# Patient Record
Sex: FEMALE | Race: WHITE | Employment: OTHER | URBAN - METROPOLITAN AREA
[De-identification: names, ages, dates, MRNs, and addresses within clinical notes are randomized per-mention and may not be internally consistent; named-entity substitution may affect disease eponyms.]

---

## 2017-08-17 PROBLEM — M54.40 ACUTE BILATERAL LOW BACK PAIN WITH SCIATICA: Status: ACTIVE | Noted: 2017-08-17

## 2017-08-24 PROBLEM — M48.061 LUMBAR STENOSIS: Status: ACTIVE | Noted: 2017-08-24

## 2018-03-20 DIAGNOSIS — I10 ESSENTIAL HYPERTENSION: ICD-10-CM

## 2018-03-20 RX ORDER — QUINAPRIL 10 MG/1
10 TABLET ORAL NIGHTLY
Qty: 90 TABLET | Refills: 1 | Status: SHIPPED | OUTPATIENT
Start: 2018-03-20 | End: 2018-07-06 | Stop reason: SDUPTHER

## 2018-03-31 ENCOUNTER — APPOINTMENT (OUTPATIENT)
Dept: CT IMAGING | Age: 63
End: 2018-03-31

## 2018-03-31 ENCOUNTER — HOSPITAL ENCOUNTER (EMERGENCY)
Age: 63
Discharge: HOME | End: 2018-03-31
Attending: EMERGENCY MEDICINE

## 2018-03-31 VITALS
HEIGHT: 65 IN | SYSTOLIC BLOOD PRESSURE: 151 MMHG | HEART RATE: 84 BPM | OXYGEN SATURATION: 97 % | DIASTOLIC BLOOD PRESSURE: 110 MMHG | BODY MASS INDEX: 31.65 KG/M2 | RESPIRATION RATE: 16 BRPM | TEMPERATURE: 98.1 F | WEIGHT: 190 LBS

## 2018-03-31 DIAGNOSIS — M54.89 MIDLINE BACK PAIN, UNSPECIFIED BACK LOCATION, UNSPECIFIED CHRONICITY: Primary | ICD-10-CM

## 2018-03-31 PROCEDURE — 72131 CT LUMBAR SPINE W/O DYE: CPT

## 2018-03-31 PROCEDURE — 72128 CT CHEST SPINE W/O DYE: CPT

## 2018-03-31 PROCEDURE — 96372 THER/PROPH/DIAG INJ SC/IM: CPT

## 2018-03-31 PROCEDURE — 6360000002 HC RX W HCPCS: Performed by: PHYSICIAN ASSISTANT

## 2018-03-31 PROCEDURE — 6370000000 HC RX 637 (ALT 250 FOR IP): Performed by: PHYSICIAN ASSISTANT

## 2018-03-31 PROCEDURE — 99284 EMERGENCY DEPT VISIT MOD MDM: CPT

## 2018-03-31 RX ORDER — KETOROLAC TROMETHAMINE 30 MG/ML
30 INJECTION, SOLUTION INTRAMUSCULAR; INTRAVENOUS ONCE
Status: COMPLETED | OUTPATIENT
Start: 2018-03-31 | End: 2018-03-31

## 2018-03-31 RX ORDER — HYDROCODONE BITARTRATE AND ACETAMINOPHEN 5; 325 MG/1; MG/1
1 TABLET ORAL EVERY 6 HOURS PRN
Qty: 12 TABLET | Refills: 0 | Status: SHIPPED | OUTPATIENT
Start: 2018-03-31 | End: 2018-04-05 | Stop reason: SDUPTHER

## 2018-03-31 RX ORDER — HYDROCODONE BITARTRATE AND ACETAMINOPHEN 5; 325 MG/1; MG/1
1 TABLET ORAL ONCE
Status: COMPLETED | OUTPATIENT
Start: 2018-03-31 | End: 2018-03-31

## 2018-03-31 RX ADMIN — HYDROCODONE BITARTRATE AND ACETAMINOPHEN 1 TABLET: 5; 325 TABLET ORAL at 19:35

## 2018-03-31 RX ADMIN — KETOROLAC TROMETHAMINE 30 MG: 30 INJECTION, SOLUTION INTRAMUSCULAR; INTRAVENOUS at 16:34

## 2018-03-31 ASSESSMENT — PAIN DESCRIPTION - PAIN TYPE
TYPE: ACUTE PAIN;CHRONIC PAIN
TYPE: ACUTE PAIN;CHRONIC PAIN

## 2018-03-31 ASSESSMENT — PAIN DESCRIPTION - FREQUENCY
FREQUENCY: CONTINUOUS
FREQUENCY: CONTINUOUS

## 2018-03-31 ASSESSMENT — PAIN SCALES - GENERAL
PAINLEVEL_OUTOF10: 8
PAINLEVEL_OUTOF10: 9
PAINLEVEL_OUTOF10: 9
PAINLEVEL_OUTOF10: 5

## 2018-03-31 ASSESSMENT — PAIN DESCRIPTION - LOCATION
LOCATION: BACK;SHOULDER
LOCATION: BACK;SHOULDER

## 2018-03-31 ASSESSMENT — PAIN DESCRIPTION - ORIENTATION
ORIENTATION: RIGHT;LOWER
ORIENTATION: LOWER;RIGHT

## 2018-04-03 ENCOUNTER — OFFICE VISIT (OUTPATIENT)
Dept: FAMILY MEDICINE CLINIC | Age: 63
End: 2018-04-03

## 2018-04-03 VITALS
HEART RATE: 98 BPM | DIASTOLIC BLOOD PRESSURE: 78 MMHG | OXYGEN SATURATION: 90 % | SYSTOLIC BLOOD PRESSURE: 154 MMHG | TEMPERATURE: 97.9 F | HEIGHT: 64 IN

## 2018-04-03 DIAGNOSIS — M54.16 CHRONIC RADICULAR PAIN OF LOWER BACK: ICD-10-CM

## 2018-04-03 DIAGNOSIS — M54.6 CHRONIC RIGHT-SIDED THORACIC BACK PAIN: ICD-10-CM

## 2018-04-03 DIAGNOSIS — G89.29 CHRONIC RIGHT-SIDED THORACIC BACK PAIN: ICD-10-CM

## 2018-04-03 DIAGNOSIS — M54.50 ACUTE EXACERBATION OF CHRONIC LOW BACK PAIN: Primary | ICD-10-CM

## 2018-04-03 DIAGNOSIS — G89.29 ACUTE EXACERBATION OF CHRONIC LOW BACK PAIN: Primary | ICD-10-CM

## 2018-04-03 DIAGNOSIS — G89.29 CHRONIC RADICULAR PAIN OF LOWER BACK: ICD-10-CM

## 2018-04-03 PROCEDURE — 96372 THER/PROPH/DIAG INJ SC/IM: CPT | Performed by: FAMILY MEDICINE

## 2018-04-03 PROCEDURE — 99213 OFFICE O/P EST LOW 20 MIN: CPT | Performed by: FAMILY MEDICINE

## 2018-04-03 RX ORDER — TRIAMCINOLONE ACETONIDE 40 MG/ML
40 INJECTION, SUSPENSION INTRA-ARTICULAR; INTRAMUSCULAR ONCE
Status: COMPLETED | OUTPATIENT
Start: 2018-04-03 | End: 2018-04-03

## 2018-04-03 RX ADMIN — TRIAMCINOLONE ACETONIDE 40 MG: 40 INJECTION, SUSPENSION INTRA-ARTICULAR; INTRAMUSCULAR at 15:01

## 2018-04-04 ENCOUNTER — TELEPHONE (OUTPATIENT)
Dept: FAMILY MEDICINE CLINIC | Age: 63
End: 2018-04-04

## 2018-04-04 DIAGNOSIS — G89.29 CHRONIC RADICULAR LOW BACK PAIN: ICD-10-CM

## 2018-04-04 DIAGNOSIS — M54.16 CHRONIC RADICULAR LOW BACK PAIN: ICD-10-CM

## 2018-04-04 DIAGNOSIS — M54.89 MIDLINE BACK PAIN, UNSPECIFIED BACK LOCATION, UNSPECIFIED CHRONICITY: ICD-10-CM

## 2018-04-04 DIAGNOSIS — M48.061 SPINAL STENOSIS OF LUMBAR REGION WITHOUT NEUROGENIC CLAUDICATION: Primary | ICD-10-CM

## 2018-04-05 RX ORDER — HYDROCODONE BITARTRATE AND ACETAMINOPHEN 5; 325 MG/1; MG/1
1 TABLET ORAL EVERY 8 HOURS PRN
Qty: 42 TABLET | Refills: 0 | Status: SHIPPED | OUTPATIENT
Start: 2018-04-05 | End: 2018-04-05 | Stop reason: SDUPTHER

## 2018-04-05 RX ORDER — HYDROCODONE BITARTRATE AND ACETAMINOPHEN 5; 325 MG/1; MG/1
1 TABLET ORAL EVERY 8 HOURS PRN
Qty: 42 TABLET | Refills: 0 | Status: SHIPPED | OUTPATIENT
Start: 2018-04-05 | End: 2018-04-19

## 2018-04-26 ENCOUNTER — OFFICE VISIT (OUTPATIENT)
Dept: PAIN MANAGEMENT | Age: 63
End: 2018-04-26

## 2018-04-26 VITALS
HEART RATE: 90 BPM | WEIGHT: 190 LBS | HEIGHT: 65 IN | BODY MASS INDEX: 31.65 KG/M2 | TEMPERATURE: 97.5 F | DIASTOLIC BLOOD PRESSURE: 86 MMHG | SYSTOLIC BLOOD PRESSURE: 148 MMHG | RESPIRATION RATE: 18 BRPM

## 2018-04-26 DIAGNOSIS — M54.16 LUMBAR RADICULOPATHY: ICD-10-CM

## 2018-04-26 DIAGNOSIS — M47.816 LUMBAR FACET ARTHROPATHY: ICD-10-CM

## 2018-04-26 DIAGNOSIS — G89.29 CHRONIC BACK PAIN, UNSPECIFIED BACK LOCATION, UNSPECIFIED BACK PAIN LATERALITY: ICD-10-CM

## 2018-04-26 DIAGNOSIS — M54.50 CHRONIC BILATERAL LOW BACK PAIN WITHOUT SCIATICA: ICD-10-CM

## 2018-04-26 DIAGNOSIS — M48.061 SPINAL STENOSIS OF LUMBAR REGION WITHOUT NEUROGENIC CLAUDICATION: ICD-10-CM

## 2018-04-26 DIAGNOSIS — M54.9 CHRONIC BACK PAIN, UNSPECIFIED BACK LOCATION, UNSPECIFIED BACK PAIN LATERALITY: ICD-10-CM

## 2018-04-26 DIAGNOSIS — G89.29 CHRONIC BILATERAL LOW BACK PAIN WITHOUT SCIATICA: ICD-10-CM

## 2018-04-26 DIAGNOSIS — G89.4 CHRONIC PAIN SYNDROME: ICD-10-CM

## 2018-04-26 DIAGNOSIS — M51.36 DDD (DEGENERATIVE DISC DISEASE), LUMBAR: ICD-10-CM

## 2018-04-26 PROBLEM — M54.14 THORACIC RADICULOPATHY: Status: ACTIVE | Noted: 2018-04-26

## 2018-04-26 PROBLEM — M51.369 DDD (DEGENERATIVE DISC DISEASE), LUMBAR: Status: ACTIVE | Noted: 2018-04-26

## 2018-04-26 PROCEDURE — 64484 NJX AA&/STRD TFRM EPI L/S EA: CPT | Performed by: PAIN MEDICINE

## 2018-04-26 PROCEDURE — 99204 OFFICE O/P NEW MOD 45 MIN: CPT

## 2018-04-26 PROCEDURE — 99204 OFFICE O/P NEW MOD 45 MIN: CPT | Performed by: PAIN MEDICINE

## 2018-04-26 PROCEDURE — 64483 NJX AA&/STRD TFRM EPI L/S 1: CPT | Performed by: PAIN MEDICINE

## 2018-05-03 RX ORDER — HYDROCODONE BITARTRATE AND ACETAMINOPHEN 5; 325 MG/1; MG/1
1 TABLET ORAL EVERY 6 HOURS PRN
COMMUNITY
End: 2018-05-24 | Stop reason: DRUGHIGH

## 2018-05-07 ENCOUNTER — HOSPITAL ENCOUNTER (OUTPATIENT)
Dept: OPERATING ROOM | Age: 63
Discharge: HOME | End: 2018-05-07

## 2018-05-07 ENCOUNTER — HOSPITAL ENCOUNTER (OUTPATIENT)
Age: 63
Setting detail: OUTPATIENT SURGERY
Discharge: HOME | End: 2018-05-07
Attending: PAIN MEDICINE | Admitting: PAIN MEDICINE

## 2018-05-07 VITALS
OXYGEN SATURATION: 100 % | DIASTOLIC BLOOD PRESSURE: 79 MMHG | HEART RATE: 60 BPM | SYSTOLIC BLOOD PRESSURE: 189 MMHG | RESPIRATION RATE: 14 BRPM

## 2018-05-07 DIAGNOSIS — M54.16 LUMBAR RADICULOPATHY: ICD-10-CM

## 2018-05-07 PROCEDURE — 3209999900 FLUORO FOR SURGICAL PROCEDURES

## 2018-05-07 PROCEDURE — 2709999900 HC NON-CHARGEABLE SUPPLY: Performed by: PAIN MEDICINE

## 2018-05-07 PROCEDURE — 2500000003 HC RX 250 WO HCPCS: Performed by: PAIN MEDICINE

## 2018-05-07 PROCEDURE — 6360000004 HC RX CONTRAST MEDICATION: Performed by: PAIN MEDICINE

## 2018-05-07 PROCEDURE — 7100000011 HC PHASE II RECOVERY - ADDTL 15 MIN: Performed by: PAIN MEDICINE

## 2018-05-07 PROCEDURE — 64483 NJX AA&/STRD TFRM EPI L/S 1: CPT | Performed by: PAIN MEDICINE

## 2018-05-07 PROCEDURE — 6360000002 HC RX W HCPCS: Performed by: PAIN MEDICINE

## 2018-05-07 PROCEDURE — 7100000010 HC PHASE II RECOVERY - FIRST 15 MIN: Performed by: PAIN MEDICINE

## 2018-05-07 PROCEDURE — 3600000015 HC SURGERY LEVEL 5 ADDTL 15MIN: Performed by: PAIN MEDICINE

## 2018-05-07 PROCEDURE — 3600000005 HC SURGERY LEVEL 5 BASE: Performed by: PAIN MEDICINE

## 2018-05-07 RX ORDER — LIDOCAINE HYDROCHLORIDE 5 MG/ML
INJECTION, SOLUTION INFILTRATION; INTRAVENOUS PRN
Status: DISCONTINUED | OUTPATIENT
Start: 2018-05-07 | End: 2018-05-07 | Stop reason: HOSPADM

## 2018-05-07 ASSESSMENT — PAIN - FUNCTIONAL ASSESSMENT: PAIN_FUNCTIONAL_ASSESSMENT: 0-10

## 2018-05-07 ASSESSMENT — PAIN SCALES - GENERAL
PAINLEVEL_OUTOF10: 2
PAINLEVEL_OUTOF10: 0

## 2018-05-24 ENCOUNTER — OFFICE VISIT (OUTPATIENT)
Dept: PAIN MANAGEMENT | Age: 63
End: 2018-05-24

## 2018-05-24 ENCOUNTER — HOSPITAL ENCOUNTER (OUTPATIENT)
Age: 63
Discharge: HOME | End: 2018-05-26

## 2018-05-24 VITALS
HEART RATE: 86 BPM | SYSTOLIC BLOOD PRESSURE: 178 MMHG | TEMPERATURE: 97.4 F | DIASTOLIC BLOOD PRESSURE: 110 MMHG | RESPIRATION RATE: 18 BRPM

## 2018-05-24 DIAGNOSIS — G89.29 CHRONIC BILATERAL LOW BACK PAIN WITHOUT SCIATICA: ICD-10-CM

## 2018-05-24 DIAGNOSIS — M54.16 LUMBAR RADICULOPATHY: ICD-10-CM

## 2018-05-24 DIAGNOSIS — M47.816 LUMBAR FACET ARTHROPATHY: ICD-10-CM

## 2018-05-24 DIAGNOSIS — G89.4 CHRONIC PAIN SYNDROME: ICD-10-CM

## 2018-05-24 DIAGNOSIS — M51.36 DDD (DEGENERATIVE DISC DISEASE), LUMBAR: ICD-10-CM

## 2018-05-24 DIAGNOSIS — M48.061 SPINAL STENOSIS OF LUMBAR REGION WITHOUT NEUROGENIC CLAUDICATION: ICD-10-CM

## 2018-05-24 DIAGNOSIS — M54.9 CHRONIC BACK PAIN, UNSPECIFIED BACK LOCATION, UNSPECIFIED BACK PAIN LATERALITY: Primary | ICD-10-CM

## 2018-05-24 DIAGNOSIS — G89.29 CHRONIC BACK PAIN, UNSPECIFIED BACK LOCATION, UNSPECIFIED BACK PAIN LATERALITY: Primary | ICD-10-CM

## 2018-05-24 DIAGNOSIS — M54.50 CHRONIC BILATERAL LOW BACK PAIN WITHOUT SCIATICA: ICD-10-CM

## 2018-05-24 LAB — SPECIFIC GRAVITY UA: 1.01 (ref 1–1.03)

## 2018-05-24 PROCEDURE — 80307 DRUG TEST PRSMV CHEM ANLYZR: CPT

## 2018-05-24 PROCEDURE — 99213 OFFICE O/P EST LOW 20 MIN: CPT | Performed by: PAIN MEDICINE

## 2018-05-24 PROCEDURE — 81005 URINALYSIS: CPT

## 2018-05-24 PROCEDURE — G0480 DRUG TEST DEF 1-7 CLASSES: HCPCS

## 2018-05-24 RX ORDER — HYDROCODONE BITARTRATE AND ACETAMINOPHEN 7.5; 325 MG/1; MG/1
1 TABLET ORAL 2 TIMES DAILY PRN
Qty: 90 TABLET | Refills: 0 | Status: SHIPPED | OUTPATIENT
Start: 2018-05-24 | End: 2018-05-24 | Stop reason: CLARIF

## 2018-05-24 RX ORDER — HYDROCODONE BITARTRATE AND ACETAMINOPHEN 7.5; 325 MG/1; MG/1
1 TABLET ORAL 2 TIMES DAILY PRN
Qty: 60 TABLET | Refills: 0 | Status: SHIPPED | OUTPATIENT
Start: 2018-05-24 | End: 2018-06-25 | Stop reason: SDUPTHER

## 2018-05-28 LAB
6AM URINE: <10 NG/ML
7-AMINOCLONAZEPAM, URINE: <5 NG/ML
ALPHA-HYDROXYALPRAZOLAM, URINE: <5 NG/ML
ALPHA-HYDROXYMIDAZOLAM, URINE: <20 NG/ML
ALPRAZOLAM, URINE: <5 NG/ML
CHLORDIAZEPOXIDE, URINE: <20 NG/ML
CLONAZEPAM, URINE: <5 NG/ML
CODEINE, URINE: <20 NG/ML
DIAZEPAM, URINE: <20 NG/ML
HYDROCODONE, URINE: <20 NG/ML
HYDROMORPHONE, URINE: <20 NG/ML
LORAZEPAM, URINE: <20 NG/ML
MIDAZOLAM, URINE: <20 NG/ML
MORPHINE URINE: <20 NG/ML
NORDIAZEPAM, URINE: <20 NG/ML
NORHYDROCODONE, URINE: 44 NG/ML
NOROXYCODONE, URINE: <20 NG/ML
NOROXYMORPHONE, URINE: <20 NG/ML
OXAZEPAM, URINE: <20 NG/ML
OXYCODONE, URINE CONFIRMATION: <20 NG/ML
OXYMORPHONE, URINE: <20 NG/ML
TEMAZEPAM, URINE: <20 NG/ML

## 2018-05-29 LAB
Lab: NORMAL
REPORT: NORMAL
THIS TEST SENT TO: NORMAL

## 2018-06-04 ENCOUNTER — HOSPITAL ENCOUNTER (OUTPATIENT)
Dept: OPERATING ROOM | Age: 63
Discharge: HOME | End: 2018-06-04

## 2018-06-04 ENCOUNTER — HOSPITAL ENCOUNTER (OUTPATIENT)
Age: 63
Setting detail: OUTPATIENT SURGERY
Discharge: HOME | End: 2018-06-04
Attending: PAIN MEDICINE | Admitting: PAIN MEDICINE

## 2018-06-04 VITALS
HEART RATE: 90 BPM | RESPIRATION RATE: 16 BRPM | SYSTOLIC BLOOD PRESSURE: 168 MMHG | DIASTOLIC BLOOD PRESSURE: 99 MMHG | OXYGEN SATURATION: 100 %

## 2018-06-04 DIAGNOSIS — M54.16 LUMBAR RADICULOPATHY: ICD-10-CM

## 2018-06-04 PROCEDURE — 2500000003 HC RX 250 WO HCPCS: Performed by: PAIN MEDICINE

## 2018-06-04 PROCEDURE — 7100000011 HC PHASE II RECOVERY - ADDTL 15 MIN: Performed by: PAIN MEDICINE

## 2018-06-04 PROCEDURE — 7100000010 HC PHASE II RECOVERY - FIRST 15 MIN: Performed by: PAIN MEDICINE

## 2018-06-04 PROCEDURE — 3600000005 HC SURGERY LEVEL 5 BASE: Performed by: PAIN MEDICINE

## 2018-06-04 PROCEDURE — 6360000002 HC RX W HCPCS: Performed by: PAIN MEDICINE

## 2018-06-04 PROCEDURE — 2709999900 HC NON-CHARGEABLE SUPPLY: Performed by: PAIN MEDICINE

## 2018-06-04 PROCEDURE — 3209999900 FLUORO FOR SURGICAL PROCEDURES

## 2018-06-04 PROCEDURE — 64483 NJX AA&/STRD TFRM EPI L/S 1: CPT | Performed by: PAIN MEDICINE

## 2018-06-04 RX ORDER — BUPIVACAINE HYDROCHLORIDE 2.5 MG/ML
INJECTION, SOLUTION EPIDURAL; INFILTRATION; INTRACAUDAL PRN
Status: DISCONTINUED | OUTPATIENT
Start: 2018-06-04 | End: 2018-06-04 | Stop reason: HOSPADM

## 2018-06-04 ASSESSMENT — PAIN SCALES - GENERAL
PAINLEVEL_OUTOF10: 6
PAINLEVEL_OUTOF10: 6

## 2018-06-04 ASSESSMENT — PAIN DESCRIPTION - DESCRIPTORS: DESCRIPTORS: BURNING

## 2018-06-04 ASSESSMENT — PAIN - FUNCTIONAL ASSESSMENT: PAIN_FUNCTIONAL_ASSESSMENT: 0-10

## 2018-06-11 ENCOUNTER — PROCEDURE VISIT (OUTPATIENT)
Dept: PAIN MANAGEMENT | Age: 63
End: 2018-06-11

## 2018-06-11 VITALS
HEART RATE: 82 BPM | SYSTOLIC BLOOD PRESSURE: 118 MMHG | OXYGEN SATURATION: 98 % | DIASTOLIC BLOOD PRESSURE: 70 MMHG | TEMPERATURE: 97.6 F | RESPIRATION RATE: 16 BRPM

## 2018-06-11 DIAGNOSIS — M79.7 FIBROMYALGIA: ICD-10-CM

## 2018-06-11 PROCEDURE — 20552 NJX 1/MLT TRIGGER POINT 1/2: CPT | Performed by: PAIN MEDICINE

## 2018-06-11 RX ORDER — METHYLPREDNISOLONE ACETATE 40 MG/ML
40 INJECTION, SUSPENSION INTRA-ARTICULAR; INTRALESIONAL; INTRAMUSCULAR; SOFT TISSUE ONCE
Status: COMPLETED | OUTPATIENT
Start: 2018-06-11 | End: 2018-06-11

## 2018-06-11 RX ORDER — HYDROCODONE BITARTRATE AND ACETAMINOPHEN 5; 325 MG/1; MG/1
TABLET ORAL
Refills: 0 | COMMUNITY
Start: 2018-04-01 | End: 2018-06-11

## 2018-06-11 RX ORDER — BUPIVACAINE HYDROCHLORIDE 2.5 MG/ML
1 INJECTION, SOLUTION INFILTRATION; PERINEURAL ONCE
Status: COMPLETED | OUTPATIENT
Start: 2018-06-11 | End: 2018-06-11

## 2018-06-11 RX ADMIN — BUPIVACAINE HYDROCHLORIDE 2.5 MG: 2.5 INJECTION, SOLUTION INFILTRATION; PERINEURAL at 16:25

## 2018-06-11 RX ADMIN — METHYLPREDNISOLONE ACETATE 40 MG: 40 INJECTION, SUSPENSION INTRA-ARTICULAR; INTRALESIONAL; INTRAMUSCULAR; SOFT TISSUE at 16:28

## 2018-06-25 ENCOUNTER — OFFICE VISIT (OUTPATIENT)
Dept: PAIN MANAGEMENT | Age: 63
End: 2018-06-25

## 2018-06-25 ENCOUNTER — HOSPITAL ENCOUNTER (OUTPATIENT)
Dept: GENERAL RADIOLOGY | Age: 63
Discharge: HOME | End: 2018-06-27

## 2018-06-25 ENCOUNTER — HOSPITAL ENCOUNTER (OUTPATIENT)
Age: 63
Discharge: HOME | End: 2018-06-27

## 2018-06-25 VITALS
OXYGEN SATURATION: 98 % | HEART RATE: 88 BPM | TEMPERATURE: 97.5 F | DIASTOLIC BLOOD PRESSURE: 68 MMHG | WEIGHT: 190 LBS | HEIGHT: 65 IN | BODY MASS INDEX: 31.65 KG/M2 | RESPIRATION RATE: 18 BRPM | SYSTOLIC BLOOD PRESSURE: 112 MMHG

## 2018-06-25 DIAGNOSIS — M54.16 LUMBAR RADICULOPATHY: ICD-10-CM

## 2018-06-25 DIAGNOSIS — M54.2 NECK PAIN: ICD-10-CM

## 2018-06-25 DIAGNOSIS — M54.50 CHRONIC BILATERAL LOW BACK PAIN WITHOUT SCIATICA: Primary | ICD-10-CM

## 2018-06-25 DIAGNOSIS — G89.29 CHRONIC BILATERAL LOW BACK PAIN WITHOUT SCIATICA: Primary | ICD-10-CM

## 2018-06-25 DIAGNOSIS — G89.4 CHRONIC PAIN SYNDROME: ICD-10-CM

## 2018-06-25 DIAGNOSIS — G89.29 CHRONIC BACK PAIN, UNSPECIFIED BACK LOCATION, UNSPECIFIED BACK PAIN LATERALITY: ICD-10-CM

## 2018-06-25 DIAGNOSIS — M48.061 SPINAL STENOSIS OF LUMBAR REGION WITHOUT NEUROGENIC CLAUDICATION: ICD-10-CM

## 2018-06-25 DIAGNOSIS — M51.26 DISC DISPLACEMENT, LUMBAR: ICD-10-CM

## 2018-06-25 DIAGNOSIS — M54.9 CHRONIC BACK PAIN, UNSPECIFIED BACK LOCATION, UNSPECIFIED BACK PAIN LATERALITY: ICD-10-CM

## 2018-06-25 DIAGNOSIS — M51.36 DDD (DEGENERATIVE DISC DISEASE), LUMBAR: ICD-10-CM

## 2018-06-25 DIAGNOSIS — M47.816 LUMBAR FACET ARTHROPATHY: ICD-10-CM

## 2018-06-25 PROCEDURE — 72050 X-RAY EXAM NECK SPINE 4/5VWS: CPT

## 2018-06-25 PROCEDURE — 99213 OFFICE O/P EST LOW 20 MIN: CPT | Performed by: PHYSICIAN ASSISTANT

## 2018-06-25 RX ORDER — HYDROCODONE BITARTRATE AND ACETAMINOPHEN 7.5; 325 MG/1; MG/1
1 TABLET ORAL 2 TIMES DAILY PRN
Qty: 60 TABLET | Refills: 0 | Status: SHIPPED | OUTPATIENT
Start: 2018-06-25 | End: 2018-07-19 | Stop reason: SDUPTHER

## 2018-06-26 ENCOUNTER — OFFICE VISIT (OUTPATIENT)
Dept: ENT CLINIC | Age: 63
End: 2018-06-26

## 2018-06-26 VITALS
DIASTOLIC BLOOD PRESSURE: 91 MMHG | HEIGHT: 65 IN | OXYGEN SATURATION: 98 % | WEIGHT: 190 LBS | HEART RATE: 79 BPM | BODY MASS INDEX: 31.65 KG/M2 | SYSTOLIC BLOOD PRESSURE: 148 MMHG

## 2018-06-26 DIAGNOSIS — R42 VERTIGO: Primary | ICD-10-CM

## 2018-06-26 PROCEDURE — 99213 OFFICE O/P EST LOW 20 MIN: CPT | Performed by: OTOLARYNGOLOGY

## 2018-06-26 ASSESSMENT — ENCOUNTER SYMPTOMS
COLOR CHANGE: 0
GASTROINTESTINAL NEGATIVE: 1
RESPIRATORY NEGATIVE: 1
ABDOMINAL PAIN: 0
SHORTNESS OF BREATH: 0
EYES NEGATIVE: 1

## 2018-06-27 ENCOUNTER — TELEPHONE (OUTPATIENT)
Dept: FAMILY MEDICINE CLINIC | Age: 63
End: 2018-06-27

## 2018-06-27 DIAGNOSIS — E11.9 TYPE 2 DIABETES MELLITUS WITHOUT COMPLICATION, WITHOUT LONG-TERM CURRENT USE OF INSULIN (HCC): Primary | ICD-10-CM

## 2018-06-27 DIAGNOSIS — I10 ESSENTIAL HYPERTENSION: ICD-10-CM

## 2018-06-28 ENCOUNTER — CLINICAL DOCUMENTATION (OUTPATIENT)
Dept: ENT CLINIC | Age: 63
End: 2018-06-28

## 2018-07-06 ENCOUNTER — HOSPITAL ENCOUNTER (OUTPATIENT)
Age: 63
Discharge: HOME | End: 2018-07-08

## 2018-07-06 ENCOUNTER — OFFICE VISIT (OUTPATIENT)
Dept: FAMILY MEDICINE CLINIC | Age: 63
End: 2018-07-06

## 2018-07-06 VITALS
OXYGEN SATURATION: 98 % | WEIGHT: 189 LBS | SYSTOLIC BLOOD PRESSURE: 134 MMHG | BODY MASS INDEX: 31.45 KG/M2 | TEMPERATURE: 97.7 F | DIASTOLIC BLOOD PRESSURE: 92 MMHG | HEART RATE: 98 BPM

## 2018-07-06 DIAGNOSIS — E11.9 TYPE 2 DIABETES MELLITUS WITHOUT COMPLICATION, WITHOUT LONG-TERM CURRENT USE OF INSULIN (HCC): ICD-10-CM

## 2018-07-06 DIAGNOSIS — F33.0 MILD EPISODE OF RECURRENT MAJOR DEPRESSIVE DISORDER (HCC): ICD-10-CM

## 2018-07-06 DIAGNOSIS — I10 ESSENTIAL HYPERTENSION: ICD-10-CM

## 2018-07-06 DIAGNOSIS — I10 ESSENTIAL HYPERTENSION: Primary | ICD-10-CM

## 2018-07-06 DIAGNOSIS — F41.9 ANXIETY: ICD-10-CM

## 2018-07-06 LAB
ALBUMIN SERPL-MCNC: 4.3 G/DL (ref 3.5–5.2)
ALP BLD-CCNC: 93 U/L (ref 35–104)
ALT SERPL-CCNC: 16 U/L (ref 0–32)
ANION GAP SERPL CALCULATED.3IONS-SCNC: 14 MMOL/L (ref 7–16)
AST SERPL-CCNC: 22 U/L (ref 0–31)
BASOPHILS ABSOLUTE: 0.05 E9/L (ref 0–0.2)
BASOPHILS RELATIVE PERCENT: 0.6 % (ref 0–2)
BILIRUB SERPL-MCNC: 0.4 MG/DL (ref 0–1.2)
BUN BLDV-MCNC: 7 MG/DL (ref 8–23)
CALCIUM SERPL-MCNC: 9.6 MG/DL (ref 8.6–10.2)
CHLORIDE BLD-SCNC: 102 MMOL/L (ref 98–107)
CO2: 26 MMOL/L (ref 22–29)
CREAT SERPL-MCNC: 0.7 MG/DL (ref 0.5–1)
EOSINOPHILS ABSOLUTE: 0.14 E9/L (ref 0.05–0.5)
EOSINOPHILS RELATIVE PERCENT: 1.6 % (ref 0–6)
GFR AFRICAN AMERICAN: >60
GFR NON-AFRICAN AMERICAN: >60 ML/MIN/1.73
GLUCOSE BLD-MCNC: 81 MG/DL (ref 74–109)
HBA1C MFR BLD: 5.6 % (ref 4–5.6)
HCT VFR BLD CALC: 44.9 % (ref 34–48)
HEMOGLOBIN: 15.6 G/DL (ref 11.5–15.5)
IMMATURE GRANULOCYTES #: 0.02 E9/L
IMMATURE GRANULOCYTES %: 0.2 % (ref 0–5)
LYMPHOCYTES ABSOLUTE: 2.08 E9/L (ref 1.5–4)
LYMPHOCYTES RELATIVE PERCENT: 23.4 % (ref 20–42)
MCH RBC QN AUTO: 35.1 PG (ref 26–35)
MCHC RBC AUTO-ENTMCNC: 34.7 % (ref 32–34.5)
MCV RBC AUTO: 101.1 FL (ref 80–99.9)
MONOCYTES ABSOLUTE: 0.65 E9/L (ref 0.1–0.95)
MONOCYTES RELATIVE PERCENT: 7.3 % (ref 2–12)
NEUTROPHILS ABSOLUTE: 5.95 E9/L (ref 1.8–7.3)
NEUTROPHILS RELATIVE PERCENT: 66.9 % (ref 43–80)
PDW BLD-RTO: 12.9 FL (ref 11.5–15)
PLATELET # BLD: 287 E9/L (ref 130–450)
PMV BLD AUTO: 11.4 FL (ref 7–12)
POTASSIUM SERPL-SCNC: 4.8 MMOL/L (ref 3.5–5)
RBC # BLD: 4.44 E12/L (ref 3.5–5.5)
SODIUM BLD-SCNC: 142 MMOL/L (ref 132–146)
TOTAL PROTEIN: 7.1 G/DL (ref 6.4–8.3)
WBC # BLD: 8.9 E9/L (ref 4.5–11.5)

## 2018-07-06 PROCEDURE — 36415 COLL VENOUS BLD VENIPUNCTURE: CPT | Performed by: FAMILY MEDICINE

## 2018-07-06 PROCEDURE — 99214 OFFICE O/P EST MOD 30 MIN: CPT | Performed by: FAMILY MEDICINE

## 2018-07-06 PROCEDURE — 85025 COMPLETE CBC W/AUTO DIFF WBC: CPT

## 2018-07-06 PROCEDURE — 80053 COMPREHEN METABOLIC PANEL: CPT

## 2018-07-06 PROCEDURE — 83036 HEMOGLOBIN GLYCOSYLATED A1C: CPT

## 2018-07-06 RX ORDER — QUINAPRIL 10 MG/1
10 TABLET ORAL NIGHTLY
Qty: 90 TABLET | Refills: 1 | Status: SHIPPED | OUTPATIENT
Start: 2018-07-06 | End: 2018-12-10 | Stop reason: SDUPTHER

## 2018-07-13 ENCOUNTER — TELEPHONE (OUTPATIENT)
Dept: FAMILY MEDICINE CLINIC | Age: 63
End: 2018-07-13

## 2018-07-19 ENCOUNTER — TELEPHONE (OUTPATIENT)
Dept: ENDOCRINOLOGY | Age: 63
End: 2018-07-19

## 2018-07-19 ENCOUNTER — OFFICE VISIT (OUTPATIENT)
Dept: PAIN MANAGEMENT | Age: 63
End: 2018-07-19

## 2018-07-19 VITALS
OXYGEN SATURATION: 97 % | DIASTOLIC BLOOD PRESSURE: 98 MMHG | HEIGHT: 64 IN | TEMPERATURE: 97.6 F | BODY MASS INDEX: 31.58 KG/M2 | RESPIRATION RATE: 16 BRPM | SYSTOLIC BLOOD PRESSURE: 160 MMHG | HEART RATE: 88 BPM | WEIGHT: 185 LBS

## 2018-07-19 DIAGNOSIS — M51.36 DDD (DEGENERATIVE DISC DISEASE), LUMBAR: ICD-10-CM

## 2018-07-19 DIAGNOSIS — M51.26 DISC DISPLACEMENT, LUMBAR: ICD-10-CM

## 2018-07-19 DIAGNOSIS — M47.816 LUMBAR FACET ARTHROPATHY: ICD-10-CM

## 2018-07-19 DIAGNOSIS — M54.16 LUMBAR RADICULOPATHY: ICD-10-CM

## 2018-07-19 DIAGNOSIS — M54.50 CHRONIC BILATERAL LOW BACK PAIN WITHOUT SCIATICA: ICD-10-CM

## 2018-07-19 DIAGNOSIS — M54.9 CHRONIC BACK PAIN, UNSPECIFIED BACK LOCATION, UNSPECIFIED BACK PAIN LATERALITY: ICD-10-CM

## 2018-07-19 DIAGNOSIS — G89.29 CHRONIC BACK PAIN, UNSPECIFIED BACK LOCATION, UNSPECIFIED BACK PAIN LATERALITY: ICD-10-CM

## 2018-07-19 DIAGNOSIS — M48.061 SPINAL STENOSIS OF LUMBAR REGION WITHOUT NEUROGENIC CLAUDICATION: ICD-10-CM

## 2018-07-19 DIAGNOSIS — G89.29 CHRONIC BILATERAL LOW BACK PAIN WITHOUT SCIATICA: ICD-10-CM

## 2018-07-19 DIAGNOSIS — G89.4 CHRONIC PAIN SYNDROME: ICD-10-CM

## 2018-07-19 PROCEDURE — 99213 OFFICE O/P EST LOW 20 MIN: CPT | Performed by: PHYSICIAN ASSISTANT

## 2018-07-19 RX ORDER — HYDROCODONE BITARTRATE AND ACETAMINOPHEN 7.5; 325 MG/1; MG/1
1 TABLET ORAL 2 TIMES DAILY PRN
Qty: 60 TABLET | Refills: 0 | Status: SHIPPED | OUTPATIENT
Start: 2018-07-27 | End: 2018-08-30 | Stop reason: SDUPTHER

## 2018-07-19 NOTE — PROGRESS NOTES
[address]  [address] [facility], [address] [place] [address] Lexa  [phone]    Follow up Note      Trevon Ochoa     Date of Visit:  2018    CC:  Patient presents for follow up   Chief Complaint   Patient presents with    Back Pain       HPI:    Pain is unchanged. On average,pain is perceived as severe (6-8 pain scale). Change in quality of symptoms:no. Pt reports pain around L1-L2 is much better and continues to have relief there. She reports pain just distal to her incision around L5-S1 at this time. Patient satisfaction with analgesia:fair. Medication side effects:none. Recent diagnostic testing:none. Recent interventional procedures: 18 PROCEDURE:  Lumbar paravertebral trigger point injections. poor. 18:  PROCEDURE: Bilateral Transforaminal epidural steroid injection under fluoroscopic guidance at foraminal level L1-2 (#2).  18:  PROCEDURE: Bilateral Transforaminal epidural steroid injection under fluoroscopic guidance at foraminal level L1-2 (#1). She has been on anticoagulation medications to include NSAIDS. The patient  has not been on herbal supplements. The patient is diabetic. Imaging:  MRI lumbar spine 2018  1. No significant interval change is observed since the previous study   of 2017.       2. Stable postoperative changes/posterior spinal fusion at the   L3-L4-L5 level.       3. Severe spine canal stenosis in the level of L2-L3           4. Encroachment of the neural foramina bilaterally in levels of L2-L3   and L5-S1      Thoracic spine MRI 2018  1. Some degenerative changes in the thoracic spine, mainly in the   facet joints in the mid-upper thoracic spine segments       2. Discrete loss of height of T6, more likely an old finding.      Previous treatments: Physical Therapy, Surgery L3-5 fusion/laminectomy and medications. .       Potential Aberrant Drug-Related Behavior:  No     Urine Drug Screenin18:  Consistent Cancer Mother         breast cancer [de-identified]     Stroke Mother     Heart Attack Father     Other Father 80        Aortic aneurysm       REVIEW OF SYSTEMS:     Ramon Broderick denies fever/chills, chest pain, shortness of breath, new bowel or bladder complaints or suicidal ideations. All other review of systems was negative. PHYSICAL EXAMINATION:      BP (!) 160/98 (Site: Right Arm, Position: Sitting, Cuff Size: Medium Adult)   Pulse 88   Temp 97.6 °F (36.4 °C) (Oral)   Resp 16   Ht 5' 4\" (1.626 m)   Wt 185 lb (83.9 kg)   LMP  (LMP Unknown)   SpO2 97%   BMI 31.76 kg/m²     General:      General appearance: awake, alert, oriented, in no acute distress, well developed, well nourished and in no acute distress   pleasant and well-hydrated. in no distress and A & O x3  Build:Overweight  Function:Rises from a seated position with difficulty    HEENT:    Head:normocephalic and atraumatic  Pupils:regular, round and equal.  Sclera: icterus present,   EOM:full and intact. Lungs:    Breathing:Normal expansion. Clear to auscultation. No rales, rhonchi, or wheezing. Abdomen:    Shape:non-distended and normal  Tenderness:none  Guarding:none      Lumbar spine:    Spine inspection:surgical incision scar   CVA tenderness:No   Palpation:midline tenderness, facet loading, right, negative and positive. Pt TTP just distal to surgical incision. Range of motion:abnormal moderately Lateral bending, flexion, extension rotation bilateral and is painful. Musculoskeletal:    SI joint tenderness:negative right, negative left              EDWAR test: Not done right, not done left  Piriformis tenderness:negative right, negative left  Trochanteric bursa tenderness:negative right, negative left  SLR:negative right, negative left, sitting     Extremities:    Tremors:None bilaterally upper and lower  Range of motion:Generally normal shoulders, pain with internal rotation of hips negative.   Intact:Yes  Varicose veins:absent

## 2018-08-02 ENCOUNTER — HOSPITAL ENCOUNTER (OUTPATIENT)
Dept: AUDIOLOGY | Age: 63
Discharge: HOME | End: 2018-08-02

## 2018-08-02 PROCEDURE — 92542 POSITIONAL NYSTAGMUS TEST: CPT | Performed by: AUDIOLOGIST

## 2018-08-02 PROCEDURE — 92537 CALORIC VSTBLR TEST W/REC: CPT | Performed by: AUDIOLOGIST

## 2018-08-02 PROCEDURE — 92700 UNLISTED ORL SERVICE/PX: CPT | Performed by: AUDIOLOGIST

## 2018-08-02 PROCEDURE — 92545 OSCILLATING TRACKING TEST: CPT | Performed by: AUDIOLOGIST

## 2018-08-02 PROCEDURE — 92544 OPTOKINETIC NYSTAGMUS TEST: CPT | Performed by: AUDIOLOGIST

## 2018-08-02 NOTE — PROGRESS NOTES
VNG EVALUATION    REASON FOR REFERRAL:  This patient was referred for VNG testing by Dr. Vivek De La O due to feeling off and dizzy. Feelings have been ongoing for years. Patient is having a lot of neck and back pain and believes pressure may ne also causing issues with balance. RESULTS:  Bithermal caloric irrigations revealed appropriate beating nystagmus with a right sided unilateral weakness. Directional preponderance and fixation suppression were within normal limits. No irregular eye movements were recorded during saccades, pendular tracking, or optokinetic testing. [place] was not completed due to neck and back pain. No significant nystagmus was recorded during gaze or positional testing. IMPRESSION:  VNG test results indicates a caloric reduced vestibular response of 29% in the right ear. Oculomotor and positional test results were within normal limits. If I can be of further assistance or provide additional information, please do not hesitate to contact this office. Thank you for the referral.      __________________________________    Electronically signed by MARY Kearney on 8/2/2018 at 3:13 PM

## 2018-08-07 ENCOUNTER — OFFICE VISIT (OUTPATIENT)
Dept: ENT CLINIC | Age: 63
End: 2018-08-07

## 2018-08-07 VITALS
OXYGEN SATURATION: 99 % | HEIGHT: 64 IN | DIASTOLIC BLOOD PRESSURE: 80 MMHG | WEIGHT: 185 LBS | SYSTOLIC BLOOD PRESSURE: 139 MMHG | BODY MASS INDEX: 31.58 KG/M2 | HEART RATE: 72 BPM

## 2018-08-07 DIAGNOSIS — R42 VERTIGO: Primary | ICD-10-CM

## 2018-08-07 PROCEDURE — 99213 OFFICE O/P EST LOW 20 MIN: CPT | Performed by: OTOLARYNGOLOGY

## 2018-08-07 ASSESSMENT — ENCOUNTER SYMPTOMS
EYES NEGATIVE: 1
RESPIRATORY NEGATIVE: 1
COLOR CHANGE: 0
ABDOMINAL PAIN: 0
GASTROINTESTINAL NEGATIVE: 1
SHORTNESS OF BREATH: 0

## 2018-08-07 NOTE — PROGRESS NOTES
Subjective:      Patient ID:  Michelle Monroe is a 66 y.o. female. HPI:    Michelle Monroe is here today for view of ENG results. The patient has had recurring episodes of dizziness. She is getting better since the last visit    Patient's medications, allergies, past medical, surgical, social and family histories were reviewed and updated as appropriate. Review of Systems   Constitutional: Negative. Eyes: Negative. Negative for visual disturbance. Respiratory: Negative. Negative for shortness of breath. Cardiovascular: Negative. Negative for chest pain. Gastrointestinal: Negative. Negative for abdominal pain. Genitourinary: Negative. Musculoskeletal: Negative. Skin: Negative. Negative for color change. Neurological: Positive for dizziness. Psychiatric/Behavioral: Negative. Negative for behavioral problems and hallucinations. All other systems reviewed and are negative. Objective:   Physical Exam   Constitutional: She is oriented to person, place, and time. She appears well-developed and well-nourished. HENT:   Head: Normocephalic and atraumatic. Nose: Nose normal.   Mouth/Throat: Uvula is midline, oropharynx is clear and moist and mucous membranes are normal.   Eyes: Conjunctivae and EOM are normal. Pupils are equal, round, and reactive to light. Neck: Normal range of motion. Neck supple. Cardiovascular: Normal rate, regular rhythm and normal heart sounds. Pulmonary/Chest: Effort normal and breath sounds normal.   Abdominal: Soft. Bowel sounds are normal.   Neurological: She is alert and oriented to person, place, and time. Skin: Skin is warm and dry. Nursing note and vitals reviewed. VNG:  IMPRESSION:  VNG test results indicates a caloric reduced vestibular response of 29% in the right ear.   Oculomotor and positional test results were within normal limits.        If I can be of further assistance or provide additional information, please do not

## 2018-08-17 ENCOUNTER — PREP FOR PROCEDURE (OUTPATIENT)
Dept: PAIN MANAGEMENT | Age: 63
End: 2018-08-17

## 2018-08-17 RX ORDER — SODIUM CHLORIDE 0.9 % (FLUSH) 0.9 %
10 SYRINGE (ML) INJECTION PRN
Status: CANCELLED | OUTPATIENT
Start: 2018-08-17 | End: 2019-08-17

## 2018-08-17 RX ORDER — SODIUM CHLORIDE 0.9 % (FLUSH) 0.9 %
10 SYRINGE (ML) INJECTION EVERY 12 HOURS SCHEDULED
Status: CANCELLED | OUTPATIENT
Start: 2018-08-17 | End: 2019-08-17

## 2018-08-21 ENCOUNTER — HOSPITAL ENCOUNTER (OUTPATIENT)
Dept: GENERAL RADIOLOGY | Age: 63
Discharge: HOME | End: 2018-08-23
Attending: PAIN MEDICINE

## 2018-08-21 ENCOUNTER — HOSPITAL ENCOUNTER (OUTPATIENT)
Age: 63
Setting detail: OUTPATIENT SURGERY
Discharge: HOME | End: 2018-08-21
Attending: PAIN MEDICINE | Admitting: PAIN MEDICINE

## 2018-08-21 VITALS
HEIGHT: 65 IN | OXYGEN SATURATION: 97 % | RESPIRATION RATE: 22 BRPM | SYSTOLIC BLOOD PRESSURE: 188 MMHG | BODY MASS INDEX: 30.82 KG/M2 | DIASTOLIC BLOOD PRESSURE: 84 MMHG | WEIGHT: 185 LBS | TEMPERATURE: 97.5 F | HEART RATE: 72 BPM

## 2018-08-21 DIAGNOSIS — R52 PAIN MANAGEMENT: ICD-10-CM

## 2018-08-21 LAB — METER GLUCOSE: 98 MG/DL (ref 70–110)

## 2018-08-21 PROCEDURE — 2709999900 HC NON-CHARGEABLE SUPPLY: Performed by: PAIN MEDICINE

## 2018-08-21 PROCEDURE — 3600000002 HC SURGERY LEVEL 2 BASE: Performed by: PAIN MEDICINE

## 2018-08-21 PROCEDURE — 82962 GLUCOSE BLOOD TEST: CPT

## 2018-08-21 PROCEDURE — 62323 NJX INTERLAMINAR LMBR/SAC: CPT | Performed by: PAIN MEDICINE

## 2018-08-21 PROCEDURE — 7100000011 HC PHASE II RECOVERY - ADDTL 15 MIN: Performed by: PAIN MEDICINE

## 2018-08-21 PROCEDURE — 2500000003 HC RX 250 WO HCPCS: Performed by: PAIN MEDICINE

## 2018-08-21 PROCEDURE — 7100000010 HC PHASE II RECOVERY - FIRST 15 MIN: Performed by: PAIN MEDICINE

## 2018-08-21 PROCEDURE — 6360000002 HC RX W HCPCS: Performed by: PAIN MEDICINE

## 2018-08-21 PROCEDURE — 3209999900 FLUORO FOR SURGICAL PROCEDURES

## 2018-08-21 RX ORDER — SODIUM CHLORIDE 0.9 % (FLUSH) 0.9 %
10 SYRINGE (ML) INJECTION PRN
Status: DISCONTINUED | OUTPATIENT
Start: 2018-08-21 | End: 2018-08-21 | Stop reason: HOSPADM

## 2018-08-21 RX ORDER — LIDOCAINE HYDROCHLORIDE 5 MG/ML
INJECTION, SOLUTION INFILTRATION; INTRAVENOUS PRN
Status: DISCONTINUED | OUTPATIENT
Start: 2018-08-21 | End: 2018-08-21 | Stop reason: HOSPADM

## 2018-08-21 RX ORDER — METHYLPREDNISOLONE ACETATE 40 MG/ML
INJECTION, SUSPENSION INTRA-ARTICULAR; INTRALESIONAL; INTRAMUSCULAR; SOFT TISSUE PRN
Status: DISCONTINUED | OUTPATIENT
Start: 2018-08-21 | End: 2018-08-21 | Stop reason: HOSPADM

## 2018-08-21 RX ORDER — SODIUM CHLORIDE 0.9 % (FLUSH) 0.9 %
10 SYRINGE (ML) INJECTION EVERY 12 HOURS SCHEDULED
Status: DISCONTINUED | OUTPATIENT
Start: 2018-08-21 | End: 2018-08-21 | Stop reason: HOSPADM

## 2018-08-21 ASSESSMENT — PAIN DESCRIPTION - ORIENTATION
ORIENTATION: LOWER

## 2018-08-21 ASSESSMENT — PAIN DESCRIPTION - ONSET
ONSET: ON-GOING

## 2018-08-21 ASSESSMENT — PAIN DESCRIPTION - FREQUENCY
FREQUENCY: CONTINUOUS

## 2018-08-21 ASSESSMENT — PAIN DESCRIPTION - LOCATION
LOCATION: BACK

## 2018-08-21 ASSESSMENT — PAIN DESCRIPTION - DESCRIPTORS
DESCRIPTORS: ACHING

## 2018-08-21 ASSESSMENT — PAIN - FUNCTIONAL ASSESSMENT: PAIN_FUNCTIONAL_ASSESSMENT: 0-10

## 2018-08-21 ASSESSMENT — PAIN SCALES - GENERAL
PAINLEVEL_OUTOF10: 6

## 2018-08-21 ASSESSMENT — PAIN DESCRIPTION - PAIN TYPE
TYPE: CHRONIC PAIN

## 2018-08-21 NOTE — H&P
[address]  [address] [facility], [address] [place] [address]  [phone]    Procedure History & Physical      Miladys Finn     HPI:    Patient  is here for low back and bilateral buttocks pain for LESI L1-2  Labs/imaging studies reviewed   All question and concerns addressed including R/B/A associated with the procedure    Past Medical History:   Diagnosis Date    Cerebral artery occlusion with cerebral infarction ([address]     tia    Chronic back pain     Costochondritis     Depression     Diabetic neuropathy ([facility] [address]     legs and feet    Falls frequently     last fall 3/20/15    Fibromyalgia     Hallux rigidus of left foot     History of colon polyps     HTN (hypertension)     Hyperlipidemia     Osteoarthritis     Pre-operative clearance for surgery 5/27/15    medical clearance per Dr. Rosa Connors arthritis(714.0)     Sleep apnea     uses cpap    Type II or unspecified type diabetes mellitus without mention of complication, not stated as uncontrolled        Past Surgical History:   Procedure Laterality Date    ANKLE SURGERY      Left    BACK SURGERY  2017    PLIF L3-L4, L4-L5 with rods, screws, and cages/Dr. Nikc Lopez BREAST SURGERY      reduction, bilat     SECTION      CHOLECYSTECTOMY      COLONOSCOPY  2015    ENDOSCOPY, COLON, DIAGNOSTIC      FOOT SURGERY  2005    Left    HERNIA REPAIR  1998    inguinal     NERVE BLOCK Bilateral 2018    L1-2 lumbar foramen #1    OTHER SURGICAL HISTORY Left 13    left foot tarso metatarsal joint injection    OTHER SURGICAL HISTORY Left 5/27/15    Endoscopic Gastroc recession left, Lapidus left foot and  Excision of exostosis left foot    [place] NOSE/CLEFT LIP/TIP Bilateral 2018    BILATERAL L1-2 LUMBAR FORAMEN #1 performed by Suly Clement MD at [address] [facility] NOSE/CLEFT LIP/TIP Bilateral 2018    BILATERAL TRANSFORAMINAL EPIDURAL STEROID INJECTION UNDER FLUOROSCOPIC GUIDANCE @ FORAMINAL LEVEL L1-2 #2 performed by Edwardo Lombardi MD at [address]  2000, 2005    Big Left Toe    TONSILLECTOMY      WISDOM TOOTH EXTRACTION         Prior to Admission medications    Medication Sig Start Date End Date Taking? Authorizing Provider   HYDROcodone-acetaminophen (NORCO) 7.5-325 MG per tablet Take 1 tablet by mouth 2 times daily as needed for Pain for up to 30 days. . 7/27/18 8/26/18 Yes TREASURE Olivera   quinapril (ACCUPRIL) 10 MG tablet Take 1 tablet by mouth nightly 7/6/18  Yes Apoorva Mc MD   gabapentin (NEURONTIN) 300 MG capsule Take 600 mg by mouth 3 times daily. Yes Historical Provider, MD   diclofenac (VOLTAREN) 75 MG EC tablet Take 1 tablet by mouth 2 times daily For pain. NSAID. 1/5/18  Yes Apoorva Mc MD   buPROPion (WELLBUTRIN XL) 150 MG extended release tablet Take 1 tablet by mouth every morning 11/28/17  Yes Apoorva Mc MD   metaxalone (SKELAXIN) 800 MG tablet Take 800 mg by mouth 3 times daily   Yes Historical Provider, MD   pitavastatin (LIVALO) 2 MG TABS tablet Take 1 tablet by mouth nightly 9/11/17  Yes Apoorva Mc MD   metFORMIN (GLUCOPHAGE XR) 500 MG extended release tablet Take 1 tablet by mouth 2 times daily (with meals) 9/11/17  Yes Apoorva Mc MD       Allergies   Allergen Reactions    Pcn [Penicillins] Anaphylaxis       Social History     Social History    Marital status:      Spouse name: N/A    Number of children: N/A    Years of education: N/A     Occupational History    Not on file.      Social History Main Topics    Smoking status: Current Every Day Smoker     Packs/day: 0.50     Years: 30.00     Types: Cigarettes    Smokeless tobacco: Never Used    Alcohol use 0.0 oz/week      Comment: rarely    Drug use: No    Sexual activity: No      Comment: Single      Other Topics Concern    Not on file     Social History Narrative    No narrative on file

## 2018-08-21 NOTE — OP NOTE
2018    Patient: Nav Zhu  :  1955  Age:  66 y.o. Sex:  female     PRE-OPERATIVE DIAGNOSIS: Lumbar disc displacement, lumbar radiculopathy. POST-OPERATIVE DIAGNOSIS: Same. PROCEDURE: Fluoroscopic guided therapeutic lumbar epidural steroid injection at the L2-3 level (# 1). SURGEON: ZARA Llamas M.D.. ANESTHESIA: Local    ESTIMATED BLOOD LOSS: None.  ______________________________________________________________________    BRIEF HISTORY:  Nav Zhu comes in today for first lumbar epidural injection at L1-2 level. The potential complications of this procedure were discussed with her again today. She has elected to undergo the aforementioned procedure. Lyssa complete History & Physical examination were reviewed in depth, a copy of which is in the chart. DESCRIPTION OF PROCEDURE:    After confirming written and informed consent, a time-out was performed and Lyssa name and date of birth, the procedure to be performed as well as the plan for the location of the needle insertion were confirmed. The patient was brought into the procedure room and placed in the prone position on the fluoroscopy table. A pillow was placed under the patient's lower abdomen/upper pelvis to increase lumbar interlaminar space. Standard monitors were placed, and vital signs were observed throughout the procedure. The area of the lumbar spine was prepped with chloraprep and draped in a sterile manner. The L1-2 interspace was identified and marked under AP fluoroscopy. The skin and subcutaneous tissues at the above level were anesthestized with 0.5% lidocaine. With intermittent fluoroscopy, an # 18 gauge 3 1/2 tuohy epidural needle was inserted and directed toward the interlaminar space. The needle was slowly advanced using loss of resistance technique and 5 cc glass syringe  until the tip of the epidural needle has passed through the ligamentum flavum and entered the epidural space.  AP and lateral fluoroscopic imaging is performed to verify that the epidural needle is properly placed. Negative aspiration of blood and CSF was confirmed. 0.5 ml of omnipaque 240 was used for confirmation of even epidural spread under both live and AP fluoroscopy. After negative aspiration, a solution of 0.5 % Lidocaine 3 ml and 40 mg DepoMedrol was easily injected. The needle was gently removed intact . The patient neck was cleaned and a Band-Aid was placed over the needle insertion point. Disposition the patient tolerated the procedure well and there were no complications . Vital signs remained stable throughout the procedure. The patient was escorted to the recovery area where they remained until discharge and written discharge instructions for the procedure were given. Plan: [facility] will return to our pain management center as scheduled.      Simon Bai MD

## 2018-08-30 ENCOUNTER — OFFICE VISIT (OUTPATIENT)
Dept: PAIN MANAGEMENT | Age: 63
End: 2018-08-30

## 2018-08-30 VITALS
RESPIRATION RATE: 16 BRPM | SYSTOLIC BLOOD PRESSURE: 174 MMHG | HEART RATE: 76 BPM | TEMPERATURE: 98.8 F | DIASTOLIC BLOOD PRESSURE: 102 MMHG

## 2018-08-30 DIAGNOSIS — M48.061 SPINAL STENOSIS OF LUMBAR REGION WITHOUT NEUROGENIC CLAUDICATION: ICD-10-CM

## 2018-08-30 DIAGNOSIS — G89.29 CHRONIC BILATERAL LOW BACK PAIN WITHOUT SCIATICA: ICD-10-CM

## 2018-08-30 DIAGNOSIS — G89.29 CHRONIC BACK PAIN, UNSPECIFIED BACK LOCATION, UNSPECIFIED BACK PAIN LATERALITY: ICD-10-CM

## 2018-08-30 DIAGNOSIS — M79.672 LEFT FOOT PAIN: ICD-10-CM

## 2018-08-30 DIAGNOSIS — M54.16 LUMBAR RADICULOPATHY: ICD-10-CM

## 2018-08-30 DIAGNOSIS — G89.4 CHRONIC PAIN SYNDROME: ICD-10-CM

## 2018-08-30 DIAGNOSIS — M54.50 CHRONIC BILATERAL LOW BACK PAIN WITHOUT SCIATICA: ICD-10-CM

## 2018-08-30 DIAGNOSIS — M47.816 LUMBAR FACET ARTHROPATHY: ICD-10-CM

## 2018-08-30 DIAGNOSIS — M54.9 CHRONIC BACK PAIN, UNSPECIFIED BACK LOCATION, UNSPECIFIED BACK PAIN LATERALITY: ICD-10-CM

## 2018-08-30 DIAGNOSIS — M51.36 DDD (DEGENERATIVE DISC DISEASE), LUMBAR: ICD-10-CM

## 2018-08-30 DIAGNOSIS — M51.26 DISC DISPLACEMENT, LUMBAR: ICD-10-CM

## 2018-08-30 PROCEDURE — 99213 OFFICE O/P EST LOW 20 MIN: CPT | Performed by: PHYSICIAN ASSISTANT

## 2018-08-30 RX ORDER — HYDROCODONE BITARTRATE AND ACETAMINOPHEN 7.5; 325 MG/1; MG/1
1 TABLET ORAL 2 TIMES DAILY PRN
Qty: 60 TABLET | Refills: 0 | Status: SHIPPED | OUTPATIENT
Start: 2018-08-30 | End: 2018-09-28 | Stop reason: SDUPTHER

## 2018-08-30 NOTE — PROGRESS NOTES
Surgery L3-5 fusion/laminectomy and medications. .       Potential Aberrant Drug-Related Behavior:  No     Urine Drug Screenin18:  Consistent for norhydrocodone metabolite     OARRS report:  2018 consistent   2018 consistent  2018 consistent  2018 consistent         Past Medical History:   Diagnosis Date    Cerebral artery occlusion with cerebral infarction ([facility] [address]     tia    Chronic back pain     Costochondritis     Depression     Diabetic neuropathy ([facility])     legs and feet    Falls frequently     last fall 3/20/15    Fibromyalgia     Hallux rigidus of left foot     History of colon polyps     HTN (hypertension)     Hyperlipidemia     Osteoarthritis     Pre-operative clearance for surgery 5/27/15    medical clearance per Dr. Nancy Price arthritis(714.0)     Sleep apnea     uses cpap    Type II or unspecified type diabetes mellitus without mention of complication, not stated as uncontrolled        Past Surgical History:   Procedure Laterality Date    ANKLE SURGERY      Left    BACK SURGERY  2017    PLIF L3-L4, L4-L5 with rods, screws, and cages/Dr. Laney Wolf BREAST SURGERY      reduction, bilat   [address]    CHOLECYSTECTOMY      COLONOSCOPY  2015    ENDOSCOPY, COLON, DIAGNOSTIC      FOOT SURGERY  2005    Left    HERNIA REPAIR  1998    inguinal     NERVE BLOCK Bilateral 2018    L1-2 lumbar foramen #1    OTHER SURGICAL HISTORY Left 13    left foot tarso metatarsal joint injection    OTHER SURGICAL HISTORY Left 5/27/15    Endoscopic Gastroc recession left, Lapidus left foot and  Excision of exostosis left foot    ID NJX DX/THER SBST INTRLMNR LMBR/SAC W/IMG GDN N/A 2018    EPIDURAL STEROID INJECTION L1-2 WITH LOW VOL performed by Dipti Love MD at [address] [facility] NOSE/CLEFT LIP/TIP Bilateral 2018    BILATERAL L1-2 LUMBAR FORAMEN #1 performed by Dipti Love MD at [address] [facility] NOSE/CLEFT LIP/TIP Bilateral 6/4/2018    BILATERAL TRANSFORAMINAL EPIDURAL STEROID INJECTION UNDER FLUOROSCOPIC GUIDANCE @ FORAMINAL LEVEL L1-2 #2 performed by Jazzmine Godinez MD at . [address]  2000, 2005    Big Left Toe    TONSILLECTOMY      WISDOM TOOTH EXTRACTION         Prior to Admission medications    Medication Sig Start Date End Date Taking? Authorizing Provider   HYDROcodone-acetaminophen (NORCO) 7.5-325 MG per tablet Take 1 tablet by mouth 2 times daily as needed for Pain for up to 30 days. . 8/30/18 9/29/18 Yes TREASURE Elizondo   quinapril (ACCUPRIL) 10 MG tablet Take 1 tablet by mouth nightly 7/6/18  Yes Olga Pierce MD   gabapentin (NEURONTIN) 300 MG capsule Take 600 mg by mouth 3 times daily. Yes Historical Provider, MD   diclofenac (VOLTAREN) 75 MG EC tablet Take 1 tablet by mouth 2 times daily For pain. NSAID. 1/5/18  Yes Olga Pierce MD   buPROPion (WELLBUTRIN XL) 150 MG extended release tablet Take 1 tablet by mouth every morning 11/28/17  Yes Olga Pierce MD   metaxalone (SKELAXIN) 800 MG tablet Take 800 mg by mouth 3 times daily   Yes Historical Provider, MD   pitavastatin (LIVALO) 2 MG TABS tablet Take 1 tablet by mouth nightly 9/11/17  Yes Olga Pierce MD   metFORMIN (GLUCOPHAGE XR) 500 MG extended release tablet Take 1 tablet by mouth 2 times daily (with meals) 9/11/17  Yes Olga Pierce MD       Allergies   Allergen Reactions    Pcn [Penicillins] Anaphylaxis       Social History     Social History    Marital status:      Spouse name: N/A    Number of children: N/A    Years of education: N/A     Occupational History    Not on file.      Social History Main Topics    Smoking status: Current Every Day Smoker     Packs/day: 0.50     Years: 30.00     Types: Cigarettes    Smokeless tobacco: Never Used    Alcohol use 0.0 oz/week      Comment: rarely    Drug use: No    Sexual activity: No      Comment: Single      Other Topics negative. Intact:Yes  Varicose veins:absent   Cyanosis:none  Edema:Normal    Neurological:    Cranial nerves:normal  Sensory:diminished to light lateral aspect of right leg  Motor:                  Right Quadriceps3/5          Left Quadriceps5/5           Right Gastrocnemius3/5    Left Gastrocnemius5/5  Right Ant Tibialis3/5  Left Ant Tibialis5/5  [address]    Dermatology:    Skin:no unusual rashes, no skin lesions, no palpable subcutaneous nodules and good skin turgor    Assessment/Plan:    Chronic low back pain with radiation to the Right groin, patient had low back surgery 08/2017 L3-5 fusion, recently had lumbar spine CT with severe L2-3 stenosis   Pt is s/p:  PROCEDURE: Fluoroscopic guided therapeutic lumbar epidural steroid injection at the L2-3 level (# 1) on 8/21/18 - 70% better at this point. Pt has required multiple procedures to obtain pain relief. Awaiting Neurosurgery referral as patient would like to see if there are any other options - She has an appointment for September 5th. Discussed smoking cessation. States that she will quit if surgery is recommended. She will be a good candidate for SCS if she is not a candidate for surgery  Continue with Norco 7.5/325 BID PRN - ordered for 8/30/18  Continue with Gabapentin 600 mg TID - through PCP  Referral to podiatry for evaluation of hardware removal and hammertoe left foot leading to painful ambulation and shoe wear - per patient request  OARRS report reviewed 08/18  Patient encouraged to stay active and to lose weight. Failed physical therapy, TENS unit, and pain cream  Treatment plan discussed with the patient including medications     Controlled Substances Monitoring:     RX Monitoring 7/19/2018   Attestation The Prescription Monitoring Report for this patient was reviewed today. Documentation Possible medication side effects, risk of tolerance/dependence & alternative treatments discussed. ;No signs of potential drug abuse or diversion identified. Acute Pain Prescriptions -   Medication Contracts Existing medication contract.                  ccreferring physic

## 2018-09-05 ENCOUNTER — OFFICE VISIT (OUTPATIENT)
Dept: NEUROSURGERY | Age: 63
End: 2018-09-05

## 2018-09-05 VITALS — BODY MASS INDEX: 29.99 KG/M2 | WEIGHT: 180 LBS | HEIGHT: 65 IN

## 2018-09-05 DIAGNOSIS — M54.2 NECK PAIN: Primary | ICD-10-CM

## 2018-09-05 DIAGNOSIS — M54.50 CHRONIC MIDLINE LOW BACK PAIN WITHOUT SCIATICA: ICD-10-CM

## 2018-09-05 DIAGNOSIS — G89.29 CHRONIC MIDLINE LOW BACK PAIN WITHOUT SCIATICA: ICD-10-CM

## 2018-09-05 PROCEDURE — 99204 OFFICE O/P NEW MOD 45 MIN: CPT | Performed by: NEUROLOGICAL SURGERY

## 2018-09-05 ASSESSMENT — ENCOUNTER SYMPTOMS
GASTROINTESTINAL NEGATIVE: 1
ALLERGIC/IMMUNOLOGIC NEGATIVE: 1
RESPIRATORY NEGATIVE: 1
BOWEL INCONTINENCE: 0
EYES NEGATIVE: 1
ABDOMINAL PAIN: 0
BACK PAIN: 1

## 2018-09-05 NOTE — LETTER
[address] [facility] Neurosurgery  [place] [address]  Phone: [phone]  Fax: [phone]    Scout Perry MD      September 5, 2018     Cricket Watkins MD  [address]    Patient: Washington Brandon  MR Number: [id number]  YOB: 1955  Date of Visit: 9/5/2018    Dear Dr. Cricket Watkins:    Thank you for the request for consultation for [facility] to me for the evaluation of back pain and neck pain. Below are the relevant portions of my assessment and plan of care. Assessment:     66year old lady who presents with back and neck pain. Her lumbar CT does not show any significant stenosis or hardware failure. She does not have ant cervical imaging for me to review       Plan:     I have advised the patient that she can consider a spinal cord stimulator trial for her back pain and we will get a cervical MRI  If you have questions, please do not hesitate to call me. I look forward to following Minerva Sadler along with you.     Sincerely,        Scout Perry MD

## 2018-09-05 NOTE — PROGRESS NOTES
are equal, round, and reactive to light. Conjunctivae and EOM are normal. Right eye exhibits no discharge. Left eye exhibits no discharge. No scleral icterus. Neck: Normal range of motion. Neck supple. No JVD present. No tracheal deviation present. No thyromegaly present. Pulmonary/Chest: No stridor. No apnea, no tachypnea and no bradypnea. She is not intubated. Abdominal: Normal appearance. She exhibits no distension. Musculoskeletal: Normal range of motion. She exhibits no edema, tenderness or deformity. Lymphadenopathy:     She has no cervical adenopathy. Neurological: She is alert and oriented to person, place, and time. She has normal strength and normal reflexes. She is not disoriented. She displays no atrophy, no tremor and normal reflexes. No cranial nerve deficit or sensory deficit. She exhibits normal muscle tone. She displays no seizure activity. Gait abnormal. Coordination normal. GCS eye subscore is 4. GCS verbal subscore is 5. GCS motor subscore is 6. She displays no Babinski's sign on the right side. She displays no Babinski's sign on the left side. Reflex Scores:       Tricep reflexes are 2+ on the right side and 2+ on the left side. Bicep reflexes are 2+ on the right side and 2+ on the left side. Brachioradialis reflexes are 2+ on the right side and 2+ on the left side. Patellar reflexes are 2+ on the right side and 2+ on the left side. Achilles reflexes are 2+ on the right side and 2+ on the left side. Skin: Skin is warm and dry. No rash noted. She is not diaphoretic. No erythema. No pallor. Psychiatric: She has a normal mood and affect. Her behavior is normal. Judgment and thought content normal.   Vitals reviewed. Assessment:      66year old lady who presents with back and neck pain. Her lumbar CT does not show any significant stenosis or hardware failure.    She does not have ant cervical imaging for me to review       Plan:      I have advised the

## 2018-09-11 ENCOUNTER — TELEPHONE (OUTPATIENT)
Dept: ADMINISTRATIVE | Age: 63
End: 2018-09-11

## 2018-09-17 ENCOUNTER — HOSPITAL ENCOUNTER (OUTPATIENT)
Dept: MRI IMAGING | Age: 63
Discharge: HOME | End: 2018-09-19

## 2018-09-17 DIAGNOSIS — M54.2 NECK PAIN: ICD-10-CM

## 2018-09-17 PROCEDURE — 72141 MRI NECK SPINE W/O DYE: CPT

## 2018-09-18 ENCOUNTER — TELEPHONE (OUTPATIENT)
Dept: NEUROSURGERY | Age: 63
End: 2018-09-18

## 2018-09-18 DIAGNOSIS — M54.2 NECK PAIN OF OVER 3 MONTHS DURATION: Primary | ICD-10-CM

## 2018-09-19 ENCOUNTER — TELEPHONE (OUTPATIENT)
Dept: NEUROSURGERY | Age: 63
End: 2018-09-19

## 2018-09-19 ENCOUNTER — HOSPITAL ENCOUNTER (OUTPATIENT)
Dept: GENERAL RADIOLOGY | Age: 63
Discharge: HOME | End: 2018-09-21

## 2018-09-19 ENCOUNTER — HOSPITAL ENCOUNTER (OUTPATIENT)
Age: 63
Discharge: HOME | End: 2018-09-21

## 2018-09-19 DIAGNOSIS — M54.2 NECK PAIN OF OVER 3 MONTHS DURATION: ICD-10-CM

## 2018-09-19 PROCEDURE — 72040 X-RAY EXAM NECK SPINE 2-3 VW: CPT

## 2018-09-20 NOTE — TELEPHONE ENCOUNTER
Pt called, x-ray results given, pt will proceed with PT, cervical MARIELENA, NSAIDS, and heat.   She is not currently a surgical candidate

## 2018-09-24 ENCOUNTER — TELEPHONE (OUTPATIENT)
Dept: FAMILY MEDICINE CLINIC | Age: 63
End: 2018-09-24

## 2018-09-24 DIAGNOSIS — Z12.31 ENCOUNTER FOR MAMMOGRAM TO ESTABLISH BASELINE MAMMOGRAM: Primary | ICD-10-CM

## 2018-09-24 NOTE — TELEPHONE ENCOUNTER
Pt phoned requesting an order for mammogram, she will have done at [facility]. Call barbie when order is placed.

## 2018-09-28 ENCOUNTER — OFFICE VISIT (OUTPATIENT)
Dept: PAIN MANAGEMENT | Age: 63
End: 2018-09-28

## 2018-09-28 VITALS
HEART RATE: 75 BPM | TEMPERATURE: 98 F | DIASTOLIC BLOOD PRESSURE: 80 MMHG | OXYGEN SATURATION: 96 % | RESPIRATION RATE: 16 BRPM | SYSTOLIC BLOOD PRESSURE: 138 MMHG

## 2018-09-28 DIAGNOSIS — M54.16 LUMBAR RADICULOPATHY: ICD-10-CM

## 2018-09-28 DIAGNOSIS — M47.816 LUMBAR FACET ARTHROPATHY: ICD-10-CM

## 2018-09-28 DIAGNOSIS — M54.9 CHRONIC BACK PAIN, UNSPECIFIED BACK LOCATION, UNSPECIFIED BACK PAIN LATERALITY: ICD-10-CM

## 2018-09-28 DIAGNOSIS — M54.50 CHRONIC BILATERAL LOW BACK PAIN WITHOUT SCIATICA: ICD-10-CM

## 2018-09-28 DIAGNOSIS — G89.29 CHRONIC BACK PAIN, UNSPECIFIED BACK LOCATION, UNSPECIFIED BACK PAIN LATERALITY: ICD-10-CM

## 2018-09-28 DIAGNOSIS — M51.36 DDD (DEGENERATIVE DISC DISEASE), LUMBAR: ICD-10-CM

## 2018-09-28 DIAGNOSIS — M51.26 DISC DISPLACEMENT, LUMBAR: ICD-10-CM

## 2018-09-28 DIAGNOSIS — G89.29 CHRONIC BILATERAL LOW BACK PAIN WITHOUT SCIATICA: ICD-10-CM

## 2018-09-28 DIAGNOSIS — M48.061 SPINAL STENOSIS OF LUMBAR REGION WITHOUT NEUROGENIC CLAUDICATION: Primary | ICD-10-CM

## 2018-09-28 DIAGNOSIS — G89.4 CHRONIC PAIN SYNDROME: ICD-10-CM

## 2018-09-28 PROCEDURE — 99213 OFFICE O/P EST LOW 20 MIN: CPT | Performed by: PAIN MEDICINE

## 2018-09-28 PROCEDURE — 99214 OFFICE O/P EST MOD 30 MIN: CPT | Performed by: PAIN MEDICINE

## 2018-09-28 RX ORDER — HYDROCODONE BITARTRATE AND ACETAMINOPHEN 7.5; 325 MG/1; MG/1
1 TABLET ORAL 2 TIMES DAILY PRN
Qty: 60 TABLET | Refills: 0 | Status: SHIPPED | OUTPATIENT
Start: 2018-10-04 | End: 2018-11-02 | Stop reason: SDUPTHER

## 2018-09-28 NOTE — PROGRESS NOTES
Start Date End Date Taking? Authorizing Provider   HYDROcodone-acetaminophen (NORCO) 7.5-325 MG per tablet Take 1 tablet by mouth 2 times daily as needed for Pain for up to 30 days. . 8/30/18 9/29/18 Yes TREASURE Eid   quinapril (ACCUPRIL) 10 MG tablet Take 1 tablet by mouth nightly 7/6/18  Yes Danilo Mtz MD   gabapentin (NEURONTIN) 300 MG capsule Take 600 mg by mouth 3 times daily. Yes Historical Provider, MD   diclofenac (VOLTAREN) 75 MG EC tablet Take 1 tablet by mouth 2 times daily For pain. NSAID. 1/5/18  Yes Danilo Mtz MD   metaxalone (SKELAXIN) 800 MG tablet Take 800 mg by mouth 3 times daily   Yes Historical Provider, MD   pitavastatin (LIVALO) 2 MG TABS tablet Take 1 tablet by mouth nightly 9/11/17  Yes Danilo Mtz MD   metFORMIN (GLUCOPHAGE XR) 500 MG extended release tablet Take 1 tablet by mouth 2 times daily (with meals) 9/11/17  Yes Danilo Mtz MD   buPROPion (WELLBUTRIN XL) 150 MG extended release tablet Take 1 tablet by mouth every morning 11/28/17   Danilo Mtz MD       Allergies   Allergen Reactions    Pcn [Penicillins] Anaphylaxis       Social History     Social History    Marital status:      Spouse name: N/A    Number of children: N/A    Years of education: N/A     Occupational History    Not on file.      Social History Main Topics    Smoking status: Current Every Day Smoker     Packs/day: 0.50     Years: 30.00     Types: Cigarettes    Smokeless tobacco: Never Used    Alcohol use 0.0 oz/week      Comment: rarely    Drug use: No    Sexual activity: No      Comment: Single      Other Topics Concern    Not on file     Social History Narrative    No narrative on file       Family History   Problem Relation Age of Onset    Dementia Mother     Breast Cancer Mother     Cancer Mother         breast cancer [de-identified]     Stroke Mother     Heart Attack Father     Other Father 80        Aortic aneurysm       REVIEW OF SYSTEMS:     Mayr Mtz denies fever/chills, chest pain, shortness of breath, new bowel or bladder complaints. All other review of systems was negative. PHYSICAL EXAMINATION:      /80   Pulse 75   Temp 98 °F (36.7 °C) (Oral)   Resp 16   LMP  (LMP Unknown)   SpO2 96%     General:       General appearance: awake, alert, oriented   pleasant and well-hydrated. , in moderate distress and A & O x3  Build:Overweight  Function:Rises from a seated position with difficulty     HEENT:     Head:normocephalic and atraumatic  Pupils:regular, round and equal.  Sclera: icterus absent,   EOM:full and intact.     Lungs:     Breathing:Breathing Pattern: regular, no distress     Abdomen:     Shape:obese and non-distended  Scars:yes  Tenderness:none  Guarding:none    Cervical spine:    Inspection:normal  Palpation:tenderness paravertebral muscles, facet loading, left, right, positive and tenderness. Range of motion:abnormal mildly flexion, extension rotation bilateral and is  painful.     Lumbar spine:     Spine inspection:surgical incision scar   CVA tenderness:No   Palpation:tenderness paravertebral muscles, facet loading, left, right and positive. Range of motion:abnormal moderately Lateral bending, flexion, extension rotation bilateral and is  painful.     Musculoskeletal:     Trigger points in Paraveteral:absent bilaterally  SI joint tenderness:positive right, negative left              EDWAR test:positive right, negative             left  Piriformis tenderness:negative right, negative left  Trochanteric bursa tenderness:positive right, negative left  SLR:negative right, negative left, sitting      Extremities:     Tremors:None bilaterally upper and lower  Range of motion:pain with internal rotation of hips negative.   Intact:Yes  Edema:Normal     Neurological:     Cranial nerves:normal  Sensory:diminished to light lateral aspect of right leg  Motor:          Right Grip5/5              Left Grip5/5               Right Bicep5/5           Left right

## 2018-10-08 ENCOUNTER — OFFICE VISIT (OUTPATIENT)
Dept: FAMILY MEDICINE CLINIC | Age: 63
End: 2018-10-08

## 2018-10-08 ENCOUNTER — TELEPHONE (OUTPATIENT)
Dept: FAMILY MEDICINE CLINIC | Age: 63
End: 2018-10-08

## 2018-10-08 VITALS
WEIGHT: 188 LBS | OXYGEN SATURATION: 95 % | DIASTOLIC BLOOD PRESSURE: 84 MMHG | BODY MASS INDEX: 31.28 KG/M2 | TEMPERATURE: 98.1 F | SYSTOLIC BLOOD PRESSURE: 148 MMHG | HEART RATE: 92 BPM

## 2018-10-08 DIAGNOSIS — F17.210 CIGARETTE NICOTINE DEPENDENCE WITHOUT COMPLICATION: ICD-10-CM

## 2018-10-08 DIAGNOSIS — Z76.0 MEDICATION REFILL: ICD-10-CM

## 2018-10-08 DIAGNOSIS — I10 ESSENTIAL HYPERTENSION: Primary | ICD-10-CM

## 2018-10-08 PROCEDURE — 99213 OFFICE O/P EST LOW 20 MIN: CPT | Performed by: FAMILY MEDICINE

## 2018-10-08 RX ORDER — VARENICLINE TARTRATE 25 MG
KIT ORAL
Qty: 1 EACH | Refills: 0 | Status: SHIPPED | OUTPATIENT
Start: 2018-10-08 | End: 2019-01-28 | Stop reason: ALTCHOICE

## 2018-10-08 RX ORDER — METFORMIN HYDROCHLORIDE 500 MG/1
500 TABLET, EXTENDED RELEASE ORAL
Qty: 90 TABLET | Refills: 1 | Status: SHIPPED | OUTPATIENT
Start: 2018-10-08 | End: 2019-02-22 | Stop reason: SDUPTHER

## 2018-10-10 ENCOUNTER — TELEPHONE (OUTPATIENT)
Dept: FAMILY MEDICINE CLINIC | Age: 63
End: 2018-10-10

## 2018-10-10 DIAGNOSIS — G89.29 CHRONIC BILATERAL LOW BACK PAIN WITHOUT SCIATICA: Primary | ICD-10-CM

## 2018-10-10 DIAGNOSIS — M54.16 LUMBAR RADICULOPATHY: ICD-10-CM

## 2018-10-10 DIAGNOSIS — G62.9 PERIPHERAL POLYNEUROPATHY: ICD-10-CM

## 2018-10-10 DIAGNOSIS — M51.26 DISC DISPLACEMENT, LUMBAR: ICD-10-CM

## 2018-10-10 DIAGNOSIS — M54.50 CHRONIC BILATERAL LOW BACK PAIN WITHOUT SCIATICA: Primary | ICD-10-CM

## 2018-10-11 ENCOUNTER — HOSPITAL ENCOUNTER (OUTPATIENT)
Dept: GENERAL RADIOLOGY | Age: 63
Discharge: HOME | End: 2018-10-13

## 2018-10-11 DIAGNOSIS — Z12.31 ENCOUNTER FOR MAMMOGRAM TO ESTABLISH BASELINE MAMMOGRAM: ICD-10-CM

## 2018-10-11 PROCEDURE — 77067 SCR MAMMO BI INCL CAD: CPT

## 2018-10-15 ENCOUNTER — HOSPITAL ENCOUNTER (OUTPATIENT)
Dept: PHYSICAL THERAPY | Age: 63
Setting detail: THERAPIES SERIES
Discharge: HOME | End: 2018-10-15

## 2018-10-15 ENCOUNTER — TELEPHONE (OUTPATIENT)
Dept: FAMILY MEDICINE CLINIC | Age: 63
End: 2018-10-15

## 2018-10-15 DIAGNOSIS — M54.2 CHRONIC NECK PAIN: Primary | ICD-10-CM

## 2018-10-15 DIAGNOSIS — G89.29 CHRONIC NECK PAIN: Primary | ICD-10-CM

## 2018-10-15 PROCEDURE — G8982 BODY POS GOAL STATUS: HCPCS | Performed by: PHYSICAL THERAPIST

## 2018-10-15 PROCEDURE — 97161 PT EVAL LOW COMPLEX 20 MIN: CPT | Performed by: PHYSICAL THERAPIST

## 2018-10-15 PROCEDURE — G8981 BODY POS CURRENT STATUS: HCPCS | Performed by: PHYSICAL THERAPIST

## 2018-10-15 ASSESSMENT — PAIN DESCRIPTION - DESCRIPTORS: DESCRIPTORS: SHARP;TIGHTNESS

## 2018-10-15 ASSESSMENT — PAIN DESCRIPTION - FREQUENCY: FREQUENCY: CONTINUOUS

## 2018-10-15 ASSESSMENT — PAIN DESCRIPTION - LOCATION: LOCATION: NECK

## 2018-10-15 ASSESSMENT — PAIN DESCRIPTION - PAIN TYPE: TYPE: CHRONIC PAIN

## 2018-10-17 ENCOUNTER — HOSPITAL ENCOUNTER (OUTPATIENT)
Dept: PHYSICAL THERAPY | Age: 63
Setting detail: THERAPIES SERIES
Discharge: HOME | End: 2018-10-17

## 2018-10-17 ENCOUNTER — HOSPITAL ENCOUNTER (OUTPATIENT)
Dept: PHYSICAL THERAPY | Age: 63
Setting detail: THERAPIES SERIES
End: 2018-10-17

## 2018-10-17 PROCEDURE — 97035 APP MDLTY 1+ULTRASOUND EA 15: CPT | Performed by: PHYSICAL THERAPIST

## 2018-10-17 PROCEDURE — G0283 ELEC STIM OTHER THAN WOUND: HCPCS | Performed by: PHYSICAL THERAPIST

## 2018-10-17 NOTE — TELEPHONE ENCOUNTER
I put Chantix rx in fax bin. I did not see any good alternative BP medications on PAP list so want her to  Continue the same as she has been taking.

## 2018-10-19 ENCOUNTER — HOSPITAL ENCOUNTER (OUTPATIENT)
Dept: PHYSICAL THERAPY | Age: 63
Setting detail: THERAPIES SERIES
Discharge: HOME | End: 2018-10-19

## 2018-10-19 PROCEDURE — G0283 ELEC STIM OTHER THAN WOUND: HCPCS | Performed by: PHYSICAL THERAPIST

## 2018-10-19 PROCEDURE — 97035 APP MDLTY 1+ULTRASOUND EA 15: CPT | Performed by: PHYSICAL THERAPIST

## 2018-10-22 ENCOUNTER — HOSPITAL ENCOUNTER (OUTPATIENT)
Dept: PHYSICAL THERAPY | Age: 63
Setting detail: THERAPIES SERIES
Discharge: HOME | End: 2018-10-22

## 2018-10-22 PROCEDURE — G0283 ELEC STIM OTHER THAN WOUND: HCPCS | Performed by: PHYSICAL THERAPIST

## 2018-10-22 PROCEDURE — 97035 APP MDLTY 1+ULTRASOUND EA 15: CPT | Performed by: PHYSICAL THERAPIST

## 2018-10-22 NOTE — PROGRESS NOTES
[facility]  Phone: [phone] Fax: [phone]       Physical Therapy Daily Treatment Note  Date:  10/22/2018    Patient Name:  Miley Samano    :  1955  MRN: [id number]    Restrictions/Precautions:    Diagnosis:  Cervical pain  Treatment Diagnosis:    Insurance/Certification information:  self  Referring Physician:  [facility] signed (Y/N):    Visit# / total visits:  4/  Pain level: 5/10  Time In:    1405  Time Out:   1500       Subjective:  Pt presents to PT for third treatment session. Reports cont pain across cervical region. States increase in symptoms following UBE. Exercises:  Exercise/Equipment Resistance/Repetitions Other comments                                                                                                                                         Other Therapeutic Activities:      Home Exercise Program:      Manual Treatments:      Modalities: MH/IFC [id number]; US x 8mins- left cervical region      Timed Code Treatment Minutes: Total Treatment Minutes:      Treatment/Activity Tolerance:  [x] Patient tolerated treatment well [] Patient limited by fatigue  [] Patient limited by pain  [] Patient limited by other medical complications  [] Other: AROM remains limited across cervical region. Pt instructed to cont AROM cervical region/chin tucks.  Held from SIRENA [address] 115 due to recent exacerbation in symptoms     Prognosis: [] Good [] Fair  [] Poor    Patient Requires Follow-up: [] Yes  [] No    Plan:   [x] Continue per plan of care [] Alter current plan (see comments)  [] Plan of care initiated [] Hold pending MD visit [] Discharge  Plan for Next Session:         Electronically signed by:  Mayela Gregory DPT

## 2018-10-26 ENCOUNTER — HOSPITAL ENCOUNTER (OUTPATIENT)
Dept: PHYSICAL THERAPY | Age: 63
Setting detail: THERAPIES SERIES
Discharge: HOME | End: 2018-10-26

## 2018-10-26 PROCEDURE — G0283 ELEC STIM OTHER THAN WOUND: HCPCS | Performed by: PHYSICAL THERAPIST

## 2018-10-26 PROCEDURE — 97035 APP MDLTY 1+ULTRASOUND EA 15: CPT | Performed by: PHYSICAL THERAPIST

## 2018-10-26 NOTE — PROGRESS NOTES
[facility]  Phone: [phone] Fax: [phone]       Physical Therapy Daily Treatment Note  Date:  10/26/2018    Patient Name:  Taylor Robles    :  1955  MRN: [id number]    Restrictions/Precautions:    Diagnosis:  Cervical pain  Treatment Diagnosis:    Insurance/Certification information:  self  Referring Physician:  Elliott Ya of care signed (Y/N):    Visit# / total visits:  5/  Pain level: 6/10  Time In:    1400  Time Out:   1445       Subjective:  Pt presents to PT for fourth treatment session. Reports cont pain across cervical region. States pain remains across B cervical regions with symptoms into posterior aspects of shoulders. Greater symptoms to L side. Exercises:  Exercise/Equipment Resistance/Repetitions Other comments                                                                                                                                         Other Therapeutic Activities:      Home Exercise Program:      Manual Treatments:      Modalities: MH/IFC r31feen; US x 8mins- left cervical region      Timed Code Treatment Minutes: Total Treatment Minutes:      Treatment/Activity Tolerance:  [x] Patient tolerated treatment well [] Patient limited by fatigue  [] Patient limited by pain  [] Patient limited by other medical complications  [] Other: AROM remains limited across cervical region. Pt instructed to cont AROM cervical region/chin tucks. US performed with increased symptoms across L upper trap region just superior to spine of scapula.      Prognosis: [] Good [] Fair  [] Poor    Patient Requires Follow-up: [] Yes  [] No    Plan:   [x] Continue per plan of care [] Alter current plan (see comments)  [] Plan of care initiated [] Hold pending MD visit [] Discharge  Plan for Next Session:         Electronically signed by:  Kristen Jamison DPT

## 2018-10-29 ENCOUNTER — HOSPITAL ENCOUNTER (OUTPATIENT)
Dept: PHYSICAL THERAPY | Age: 63
Setting detail: THERAPIES SERIES
Discharge: HOME | End: 2018-10-29

## 2018-10-29 PROCEDURE — 97035 APP MDLTY 1+ULTRASOUND EA 15: CPT | Performed by: PHYSICAL THERAPIST

## 2018-10-29 PROCEDURE — G0283 ELEC STIM OTHER THAN WOUND: HCPCS | Performed by: PHYSICAL THERAPIST

## 2018-10-31 ENCOUNTER — HOSPITAL ENCOUNTER (OUTPATIENT)
Dept: PHYSICAL THERAPY | Age: 63
Setting detail: THERAPIES SERIES
Discharge: HOME | End: 2018-10-31

## 2018-10-31 PROCEDURE — 97035 APP MDLTY 1+ULTRASOUND EA 15: CPT | Performed by: PHYSICAL THERAPIST

## 2018-10-31 PROCEDURE — G0283 ELEC STIM OTHER THAN WOUND: HCPCS | Performed by: PHYSICAL THERAPIST

## 2018-11-01 NOTE — PROGRESS NOTES
mainly in the   facet joints in the mid-upper thoracic spine segments       2. Discrete loss of height of T6, more likely an old finding.      Previous treatments: Physical Therapy, Surgery L3-5 fusion/laminectomy and medications. .       Potential Aberrant Drug-Related Behavior:  No     Urine Drug Screenin18:  Consistent for norhydrocodone metabolite      OARRS report:  2018 consistent   2018 consistent   2018 consistent   2018 consistent   2018 consistent   2018 consistent      Past Medical History:   Diagnosis Date    Cerebral artery occlusion with cerebral infarction ([facility] [address]     tia    Chronic back pain     Costochondritis     Depression     Diabetic neuropathy ([facility] [address]     legs and feet    Falls frequently     last fall 3/20/15    Fibromyalgia     Hallux rigidus of left foot     History of colon polyps     HTN (hypertension)     Hyperlipidemia     Osteoarthritis     Pre-operative clearance for surgery 5/27/15    medical clearance per Dr. Katarina Owens arthritis(714.0)     Sleep apnea     uses cpap    Type II or unspecified type diabetes mellitus without mention of complication, not stated as uncontrolled        Past Surgical History:   Procedure Laterality Date    ANKLE SURGERY      Left    BACK SURGERY  2017    PLIF L3-L4, L4-L5 with rods, screws, and cages/Dr. Karie Banks BREAST SURGERY  2010    reduction, bilat     SECTION      CHOLECYSTECTOMY      COLONOSCOPY  2015    ENDOSCOPY, COLON, DIAGNOSTIC      FOOT SURGERY  2005    Left    HERNIA REPAIR  1998    inguinal     NERVE BLOCK Bilateral 2018    L1-2 lumbar foramen #1    OTHER SURGICAL HISTORY Left 13    left foot tarso metatarsal joint injection    OTHER SURGICAL HISTORY Left 5/27/15    Endoscopic Gastroc recession left, Lapidus left foot and  Excision of exostosis left foot    CT NJX DX/THER SBST INTRLMNR LMBR/SAC W/IMG GDN N/A 2018    EPIDURAL left  Trochanteric bursa tenderness:negative right, negative left  SLR:negative right, negative left, sitting     Extremities:    Tremors:None bilaterally upper and lower  Range of motion:Generally normal shoulders, pain with internal rotation of hips negative. Intact:Yes  Varicose veins:absent   Cyanosis:none  Edema:Normal    Neurological:    Cranial nerves:normal  Sensory:diminished to light lateral aspect of right leg  Motor:                  Right Quadriceps3/5          Left Quadriceps5/5           Right Gastrocnemius3/5    Left Gastrocnemius5/5  Right Ant Tibialis3/5  Left Ant Tibialis5/5  [address]    Dermatology:    Skin:no unusual rashes, no skin lesions, no palpable subcutaneous nodules and good skin turgor    Assessment/Plan:    Chronic low back pain with radiation to the Right groin, patient had low back surgery 08/2017 L3-5 fusion, recently had lumbar spine CT with severe L2-3 stenosis   Patient is s/p bilateral TFESI L1-2 with good outcome, patient mentioned that pain in the upper region of her lower back is gone, will repeat as needed and possible facet MBB in the future   Patient had seen Madelaine Torres who didn't recommend surgery for her lower back. Going to PT for neck now. Left foot surgery coming up. Will consider interventional procedures for her neck if needed later (diagnostic facet MBB)  Patient returning in 1 month to meet with Dr. Macy Matthew to possibly schedule a LESI, but needed to wait due to already having 3 procedures in a 6 month period. Continue with Norco 7.5/325 BID PRN - ordered for 11/4/2018  Continue with Gabapentin 600 mg BID - through PCP. Patient recently weaned herself down to BID from TID as she wants to be taking less pills and feels that she is doing well. Patient currently in physical therapy for her cervical spine as she would like to be considered for a cervical injection in the future.   OARRS report reviewed 11/2018   Urine screen next office visit   Patient encouraged to stay active. Failed physical therapy for lumbar spine  Treatment plan discussed with the patient including medication side effects     Controlled Substances Monitoring:     RX Monitoring 11/2/2018   Attestation The Prescription Monitoring Report for this patient was reviewed today. Documentation Possible medication side effects, risk of tolerance/dependence & alternative treatments discussed. ;No signs of potential drug abuse or diversion identified. Acute Pain Prescriptions -   Medication Contracts Existing medication contract.                    ccreferring physic

## 2018-11-02 ENCOUNTER — TELEPHONE (OUTPATIENT)
Dept: PAIN MANAGEMENT | Age: 63
End: 2018-11-02

## 2018-11-02 ENCOUNTER — OFFICE VISIT (OUTPATIENT)
Dept: PAIN MANAGEMENT | Age: 63
End: 2018-11-02

## 2018-11-02 VITALS
SYSTOLIC BLOOD PRESSURE: 132 MMHG | BODY MASS INDEX: 29.99 KG/M2 | DIASTOLIC BLOOD PRESSURE: 88 MMHG | HEART RATE: 112 BPM | WEIGHT: 180 LBS | TEMPERATURE: 97.4 F | RESPIRATION RATE: 20 BRPM | HEIGHT: 65 IN

## 2018-11-02 DIAGNOSIS — M51.36 DDD (DEGENERATIVE DISC DISEASE), LUMBAR: ICD-10-CM

## 2018-11-02 DIAGNOSIS — M54.50 CHRONIC BILATERAL LOW BACK PAIN WITHOUT SCIATICA: ICD-10-CM

## 2018-11-02 DIAGNOSIS — G89.29 CHRONIC BACK PAIN, UNSPECIFIED BACK LOCATION, UNSPECIFIED BACK PAIN LATERALITY: ICD-10-CM

## 2018-11-02 DIAGNOSIS — M54.16 LUMBAR RADICULOPATHY: ICD-10-CM

## 2018-11-02 DIAGNOSIS — G89.29 CHRONIC BILATERAL LOW BACK PAIN WITHOUT SCIATICA: ICD-10-CM

## 2018-11-02 DIAGNOSIS — M48.061 SPINAL STENOSIS OF LUMBAR REGION WITHOUT NEUROGENIC CLAUDICATION: ICD-10-CM

## 2018-11-02 DIAGNOSIS — M51.26 DISC DISPLACEMENT, LUMBAR: ICD-10-CM

## 2018-11-02 DIAGNOSIS — G89.4 CHRONIC PAIN SYNDROME: ICD-10-CM

## 2018-11-02 DIAGNOSIS — M54.9 CHRONIC BACK PAIN, UNSPECIFIED BACK LOCATION, UNSPECIFIED BACK PAIN LATERALITY: ICD-10-CM

## 2018-11-02 DIAGNOSIS — M47.816 LUMBAR FACET ARTHROPATHY: ICD-10-CM

## 2018-11-02 PROCEDURE — 99213 OFFICE O/P EST LOW 20 MIN: CPT | Performed by: PHYSICIAN ASSISTANT

## 2018-11-02 RX ORDER — HYDROCODONE BITARTRATE AND ACETAMINOPHEN 7.5; 325 MG/1; MG/1
1 TABLET ORAL 2 TIMES DAILY PRN
Qty: 60 TABLET | Refills: 0 | Status: SHIPPED | OUTPATIENT
Start: 2018-11-04 | End: 2018-12-21 | Stop reason: SDUPTHER

## 2018-11-02 NOTE — PROGRESS NOTES
Ayad Rivera presents to the [facility] on 11/2/2018. Rosaura Zamora is complaining of pain low back into right hip, also c/o pain in neck. The pain is constant. The pain is described as aching, throbbing, stabbing, tender, burning, numb and miserable. Pain is rated today at a 7 on the VAS scale. She took her last dose of norco last night. She has not been on anticoagulation medications to include none.       /88   Pulse 112   Temp 97.4 °F (36.3 °C) (Oral)   Resp 20   Ht 5' 5\" (1.651 m)   Wt 180 lb (81.6 kg)   LMP  (LMP Unknown)   BMI 29.95 kg/m²

## 2018-11-27 ENCOUNTER — OFFICE VISIT (OUTPATIENT)
Dept: PAIN MANAGEMENT | Age: 63
End: 2018-11-27

## 2018-11-27 ENCOUNTER — TELEPHONE (OUTPATIENT)
Dept: PAIN MANAGEMENT | Age: 63
End: 2018-11-27

## 2018-11-27 ENCOUNTER — PREP FOR PROCEDURE (OUTPATIENT)
Dept: PODIATRY | Age: 63
End: 2018-11-27

## 2018-11-27 ENCOUNTER — PREP FOR PROCEDURE (OUTPATIENT)
Dept: PAIN MANAGEMENT | Age: 63
End: 2018-11-27

## 2018-11-27 VITALS
BODY MASS INDEX: 29.99 KG/M2 | HEIGHT: 65 IN | SYSTOLIC BLOOD PRESSURE: 112 MMHG | RESPIRATION RATE: 18 BRPM | TEMPERATURE: 98.4 F | WEIGHT: 180 LBS | DIASTOLIC BLOOD PRESSURE: 72 MMHG | HEART RATE: 84 BPM | OXYGEN SATURATION: 98 %

## 2018-11-27 DIAGNOSIS — M47.816 LUMBAR FACET ARTHROPATHY: ICD-10-CM

## 2018-11-27 DIAGNOSIS — M54.16 LUMBAR RADICULOPATHY: ICD-10-CM

## 2018-11-27 DIAGNOSIS — M48.061 SPINAL STENOSIS OF LUMBAR REGION WITHOUT NEUROGENIC CLAUDICATION: ICD-10-CM

## 2018-11-27 DIAGNOSIS — M54.16 LUMBAR RADICULOPATHY: Primary | ICD-10-CM

## 2018-11-27 DIAGNOSIS — M54.2 NECK PAIN: Primary | ICD-10-CM

## 2018-11-27 DIAGNOSIS — G89.29 CHRONIC BILATERAL LOW BACK PAIN WITHOUT SCIATICA: ICD-10-CM

## 2018-11-27 DIAGNOSIS — M54.9 CHRONIC BACK PAIN, UNSPECIFIED BACK LOCATION, UNSPECIFIED BACK PAIN LATERALITY: ICD-10-CM

## 2018-11-27 DIAGNOSIS — G89.4 CHRONIC PAIN SYNDROME: ICD-10-CM

## 2018-11-27 DIAGNOSIS — M54.50 CHRONIC BILATERAL LOW BACK PAIN WITHOUT SCIATICA: ICD-10-CM

## 2018-11-27 DIAGNOSIS — G89.29 CHRONIC BACK PAIN, UNSPECIFIED BACK LOCATION, UNSPECIFIED BACK PAIN LATERALITY: ICD-10-CM

## 2018-11-27 DIAGNOSIS — M51.36 DDD (DEGENERATIVE DISC DISEASE), LUMBAR: ICD-10-CM

## 2018-11-27 DIAGNOSIS — M79.672 LEFT FOOT PAIN: ICD-10-CM

## 2018-11-27 DIAGNOSIS — T85.848D: Primary | ICD-10-CM

## 2018-11-27 DIAGNOSIS — M51.26 DISC DISPLACEMENT, LUMBAR: ICD-10-CM

## 2018-11-27 PROCEDURE — 99214 OFFICE O/P EST MOD 30 MIN: CPT | Performed by: PAIN MEDICINE

## 2018-11-27 RX ORDER — SODIUM CHLORIDE 0.9 % (FLUSH) 0.9 %
10 SYRINGE (ML) INJECTION PRN
Status: CANCELLED | OUTPATIENT
Start: 2018-11-27

## 2018-11-27 RX ORDER — SODIUM CHLORIDE 0.9 % (FLUSH) 0.9 %
10 SYRINGE (ML) INJECTION EVERY 12 HOURS SCHEDULED
Status: CANCELLED | OUTPATIENT
Start: 2018-11-27

## 2018-11-27 RX ORDER — OXYCODONE AND ACETAMINOPHEN 7.5; 325 MG/1; MG/1
1 TABLET ORAL 2 TIMES DAILY
Qty: 60 TABLET | Refills: 0 | Status: SHIPPED | OUTPATIENT
Start: 2018-12-05 | End: 2018-11-28 | Stop reason: ALTCHOICE

## 2018-11-27 NOTE — PROGRESS NOTES
[facility], [address] [place] [address] [place]a  [phone]    Follow up Note      Taylor Robles     Date of Visit:  11/27/2018    CC:  Patient presents for follow up   Chief Complaint   Patient presents with    Pain     lower back neck pain       HPI:    Low back pain is worse   Change in quality of symptoms:none  Medication side effects:none. Recent diagnostic testing:none   Recent interventional procedures:none    She has not been on anticoagulation medications to include none and has not been on herbal supplements. She is not diabetic.     Imaging:   Cervical spine MRI 09/2018       1. Mild cervical spondylosis as described above.       2. No fracture or dislocation.       3. Increased signal is associated with facets at level of C3, C4, and   C5. This finding may be related to recent trauma versus inflammatory   arthritis. Clinical correlation recommended. MRI lumbar spine 03/2018  1. No significant interval change is observed since the previous study   of October 2017.       2. Stable postoperative changes/posterior spinal fusion at the   L3-L4-L5 level.       3. Severe spine canal stenosis in the level of L2-L3           4. Encroachment of the neural foramina bilaterally in levels of L2-L3   and L5-S1      Thoracic spine MRI 03/2018  1. Some degenerative changes in the thoracic spine, mainly in the   facet joints in the mid-upper thoracic spine segments       2. Discrete loss of height of T6, more likely an old finding.      Previous treatments: Physical Therapy, Surgery L3-5 fusion/laminectomy and medications. .    Patient had seen Dimitry Dunbar who didn't recommend surgery for her lower back       Potential Aberrant Drug-Related Behavior:  No    Urine Drug Screening: none  5/24/18:  Consistent for norhydrocodone metabolite    OARRS report:  05/2018 consistent   06/2018 consistent   07/2018 consistent   08/2018 consistent   09/2018 consistent   11/2018

## 2018-12-03 ENCOUNTER — TELEPHONE (OUTPATIENT)
Dept: FAMILY MEDICINE CLINIC | Age: 63
End: 2018-12-03

## 2018-12-03 ENCOUNTER — HOSPITAL ENCOUNTER (OUTPATIENT)
Dept: OPERATING ROOM | Age: 63
Discharge: HOME | End: 2018-12-03

## 2018-12-03 ENCOUNTER — HOSPITAL ENCOUNTER (OUTPATIENT)
Age: 63
Setting detail: OUTPATIENT SURGERY
Discharge: HOME | End: 2018-12-03
Attending: PAIN MEDICINE | Admitting: PAIN MEDICINE

## 2018-12-03 VITALS
OXYGEN SATURATION: 98 % | DIASTOLIC BLOOD PRESSURE: 84 MMHG | HEART RATE: 74 BPM | SYSTOLIC BLOOD PRESSURE: 177 MMHG | RESPIRATION RATE: 16 BRPM

## 2018-12-03 DIAGNOSIS — M54.16 LUMBAR RADICULOPATHY: ICD-10-CM

## 2018-12-03 PROCEDURE — 2709999900 HC NON-CHARGEABLE SUPPLY: Performed by: PAIN MEDICINE

## 2018-12-03 PROCEDURE — 6360000004 HC RX CONTRAST MEDICATION: Performed by: PAIN MEDICINE

## 2018-12-03 PROCEDURE — 64483 NJX AA&/STRD TFRM EPI L/S 1: CPT | Performed by: PAIN MEDICINE

## 2018-12-03 PROCEDURE — 6360000002 HC RX W HCPCS: Performed by: PAIN MEDICINE

## 2018-12-03 PROCEDURE — 3209999900 FLUORO FOR SURGICAL PROCEDURES

## 2018-12-03 PROCEDURE — 7100000010 HC PHASE II RECOVERY - FIRST 15 MIN: Performed by: PAIN MEDICINE

## 2018-12-03 PROCEDURE — 7100000011 HC PHASE II RECOVERY - ADDTL 15 MIN: Performed by: PAIN MEDICINE

## 2018-12-03 PROCEDURE — 2500000003 HC RX 250 WO HCPCS: Performed by: PAIN MEDICINE

## 2018-12-03 PROCEDURE — 3600000005 HC SURGERY LEVEL 5 BASE: Performed by: PAIN MEDICINE

## 2018-12-03 RX ORDER — LIDOCAINE HYDROCHLORIDE 5 MG/ML
INJECTION, SOLUTION INFILTRATION; INTRAVENOUS PRN
Status: DISCONTINUED | OUTPATIENT
Start: 2018-12-03 | End: 2018-12-03 | Stop reason: HOSPADM

## 2018-12-03 RX ORDER — SODIUM CHLORIDE 0.9 % (FLUSH) 0.9 %
10 SYRINGE (ML) INJECTION EVERY 12 HOURS SCHEDULED
Status: DISCONTINUED | OUTPATIENT
Start: 2018-12-03 | End: 2018-12-03 | Stop reason: HOSPADM

## 2018-12-03 RX ORDER — SODIUM CHLORIDE 0.9 % (FLUSH) 0.9 %
10 SYRINGE (ML) INJECTION PRN
Status: DISCONTINUED | OUTPATIENT
Start: 2018-12-03 | End: 2018-12-03 | Stop reason: HOSPADM

## 2018-12-03 ASSESSMENT — PAIN SCALES - GENERAL
PAINLEVEL_OUTOF10: 0
PAINLEVEL_OUTOF10: 0

## 2018-12-03 ASSESSMENT — PAIN - FUNCTIONAL ASSESSMENT: PAIN_FUNCTIONAL_ASSESSMENT: 0-10

## 2018-12-03 ASSESSMENT — PAIN DESCRIPTION - DESCRIPTORS: DESCRIPTORS: ACHING

## 2018-12-03 NOTE — OP NOTE
fluoroscopic view was then used to place the needle tip in the middle of the foramen. Negative aspiration was confirmed for blood and CSF and 0.5 cc of Omnipaque 240 contrast was injected at each level under live fluoroscopy. Appropriate neurograms were observed under AP fluoroscopy. Then after negative aspiration, a solution of the 2 cc of 0.5% lidocaine and 40 mg DepoMedrol was easily injected at each level. The needles were removed with constant aspiration technique. The patient back was cleaned and a bandage was placed over the needle insertion points    Disposition the patient tolerated the procedure well and there were no complications . Vital signs remained stable throughout the procedure. The patient was escorted to the recovery area where they remained until discharge and written discharge instructions for the procedure were given. Plan: Adolfo Koehler will return to our pain management center as scheduled.      Margy Jessica MD

## 2018-12-10 ENCOUNTER — OFFICE VISIT (OUTPATIENT)
Dept: FAMILY MEDICINE CLINIC | Age: 63
End: 2018-12-10

## 2018-12-10 VITALS
TEMPERATURE: 97.4 F | BODY MASS INDEX: 30.62 KG/M2 | SYSTOLIC BLOOD PRESSURE: 158 MMHG | DIASTOLIC BLOOD PRESSURE: 90 MMHG | WEIGHT: 184 LBS | HEART RATE: 80 BPM

## 2018-12-10 DIAGNOSIS — Z01.818 PRE-OP EVALUATION: Primary | ICD-10-CM

## 2018-12-10 DIAGNOSIS — E11.9 TYPE 2 DIABETES MELLITUS WITHOUT COMPLICATION, WITHOUT LONG-TERM CURRENT USE OF INSULIN (HCC): ICD-10-CM

## 2018-12-10 DIAGNOSIS — I10 ESSENTIAL HYPERTENSION: ICD-10-CM

## 2018-12-10 LAB
CREATININE URINE POCT: NORMAL
MICROALBUMIN/CREAT 24H UR: NORMAL MG/G{CREAT}
MICROALBUMIN/CREAT UR-RTO: NORMAL

## 2018-12-10 PROCEDURE — 99214 OFFICE O/P EST MOD 30 MIN: CPT | Performed by: FAMILY MEDICINE

## 2018-12-10 PROCEDURE — 90471 IMMUNIZATION ADMIN: CPT | Performed by: FAMILY MEDICINE

## 2018-12-10 PROCEDURE — 82044 UR ALBUMIN SEMIQUANTITATIVE: CPT | Performed by: FAMILY MEDICINE

## 2018-12-10 PROCEDURE — 93000 ELECTROCARDIOGRAM COMPLETE: CPT | Performed by: FAMILY MEDICINE

## 2018-12-10 PROCEDURE — 90686 IIV4 VACC NO PRSV 0.5 ML IM: CPT | Performed by: FAMILY MEDICINE

## 2018-12-10 RX ORDER — QUINAPRIL 20 MG/1
20 TABLET ORAL NIGHTLY
Qty: 90 TABLET | Refills: 1 | Status: SHIPPED | OUTPATIENT
Start: 2018-12-10 | End: 2019-01-29 | Stop reason: DRUGHIGH

## 2018-12-10 NOTE — PROGRESS NOTES
Day Smoker     Packs/day: 0.50     Years: 30.00     Types: Cigarettes    Smokeless tobacco: Never Used    Alcohol use 0.0 oz/week      Comment: rarely       ROS:  No CP, No palpitations,   No sob, No cough,   No abd pain, No heartburn,   No headaches,   No tingling, No numbness, No weakness,   No bowel changes, No hematochezia, No melena,  No bladder changes, No hematuria  No skin rashes, No skin lesions. No vision changes, No hearing changes,   No polyuria, polydipsia, polyphagia. Stable mood. ROS otherwise negative unless as listed in HPI. Chart reviewed and updated where appropriate for PMH, Fam, and Soc Hx. Physical Exam   BP (!) 158/90 (Site: Right Upper Arm, Position: Sitting, Cuff Size: Large Adult)   Pulse 80   Temp 97.4 °F (36.3 °C) (Oral)   Wt 184 lb (83.5 kg)   LMP  (LMP Unknown)   BMI 30.62 kg/m²   Wt Readings from Last 3 Encounters:   12/10/18 184 lb (83.5 kg)   11/27/18 180 lb (81.6 kg)   11/02/18 180 lb (81.6 kg)       Constitutional:    She is oriented to person, place, and time. She appears well-developed and well-nourished. HENT:    Nose: Nose normal.    Mouth/Throat: Oropharynx is clear and moist.   Eyes:    Conjunctivae are normal.    Pupils are equal, round, and reactive to light. EOMI. Neck:    Normal range of motion. No thyromegaly or nodules noted. No bruit. Cardiovascular:    Normal rate, regular rhythm and normal heart sounds. No ectopic beats. No murmur. No gallop and no friction rub. Pulmonary/Chest:    Effort normal and breath sounds normal.    No wheezes. No rales or rhonchi. Abdominal:    Soft. Obese. Bowel sounds are normal.    No distension. No tenderness. Musculoskeletal:    Normal range of motion. No joint swelling noted. No peripheral edema.     Current Outpatient Prescriptions on File Prior to Visit   Medication Sig Dispense Refill    varenicline (CHANTIX STARTING MONTH PAK) 0.5 MG X 11 & 1 MG X 42 tablet Take as directed on package. 1 each 0    metFORMIN (GLUCOPHAGE XR) 500 MG extended release tablet Take 1 tablet by mouth daily (with breakfast) 90 tablet 1    gabapentin (NEURONTIN) 300 MG capsule Take 600 mg by mouth 2 times daily. Corby Nobles diclofenac (VOLTAREN) 75 MG EC tablet Take 1 tablet by mouth 2 times daily For pain. NSAID. 180 tablet 1    metaxalone (SKELAXIN) 800 MG tablet Take 800 mg by mouth 3 times daily      pitavastatin (LIVALO) 2 MG TABS tablet Take 1 tablet by mouth nightly (Patient taking differently: Take 1 mg by mouth nightly ) 90 tablet 1     No current facility-administered medications on file prior to visit. Patient Active Problem List   Diagnosis Code    Loss of balance R26.89    Vertebrobasilar TIAs G45.0    Obesity E66.9    Disc displacement, lumbar M51.26    Lumbar radiculopathy M54.16    Peripheral neuropathy (HCC) G62.9    Type 2 diabetes mellitus without complication (HCC) G49.6    Depression F32.9    Osteoarthritis M19.90    Chronic back pain M54.9, G89.29    Fibromyalgia M79.7    HTN (hypertension) I10    Hyperlipidemia E78.5    History of adenomatous polyp of colon Z86.010    Vitamin D deficiency E55.9    Coccyx pain M53.3    Acute bilateral low back pain with sciatica M54.40    Lumbar stenosis M48.061    Chronic bilateral low back pain without sciatica M54.5, G89.29    DDD (degenerative disc disease), lumbar M51.36    Spinal stenosis of lumbar region without neurogenic claudication M48.061    Lumbar facet arthropathy M47.816    Chronic pain syndrome G89.4    Lumbar radiculopathy M54.16    Neck pain M54.2    Left foot pain M79.672        Jeni / Daiana Giordano was seen today for hypertension and pre-op exam.    Diagnoses and all orders for this visit:    Pre-op evaluation  -     EKG 12 lead; Future  -     EKG 12 lead  Patient presents at her baseline state of health. Only problems for improvement right now are her blood pressure.  We're increasing her quinapril to 20

## 2018-12-10 NOTE — PATIENT INSTRUCTIONS
Patient Education        Influenza (Flu) Vaccine (Inactivated or Recombinant): What You Need to Know  Why get vaccinated? Influenza (\"flu\") is a contagious disease that spreads around the [place] every winter, usually between October and May. Flu is caused by influenza viruses and is spread mainly by coughing, sneezing, and close contact. Anyone can get flu. Flu strikes suddenly and can last several days. Symptoms vary by age, but can include:  · Fever/chills. · Sore throat. · Muscle aches. · Fatigue. · Cough. · Headache. · Runny or stuffy nose. Flu can also lead to pneumonia and blood infections, and cause diarrhea and seizures in children. If you have a medical condition, such as heart or lung disease, flu can make it worse. Flu is more dangerous for some people. Infants and young children, people 72years of age and older, pregnant women, and people with certain health conditions or a weakened immune system are at greatest risk. Each year thousands of people in the [facility] die from flu, and many more are hospitalized. Flu vaccine can:  · Keep you from getting flu. · Make flu less severe if you do get it. · Keep you from spreading flu to your family and other people. Inactivated and recombinant flu vaccines  A dose of flu vaccine is recommended every flu season. Children 6 months through 6years of age may need two doses during the same flu season. Everyone else needs only one dose each flu season. Some inactivated flu vaccines contain a very small amount of a mercury-based preservative called thimerosal. Studies have not shown thimerosal in vaccines to be harmful, but flu vaccines that do not contain thimerosal are available. There is no live flu virus in flu shots. They cannot cause the flu. There are many flu viruses, and they are always changing.  Each year a new flu vaccine is made to protect against three or four viruses that are likely to cause disease in the upcoming flu pneumococcal vaccine (PCV13) and/or DTaP vaccine at the same time might be slightly more likely to have a seizure caused by fever. Ask your doctor for more information. Tell your doctor if a child who is getting flu vaccine has ever had a seizure  Problems that could happen after any injected vaccine:  · People sometimes faint after a medical procedure, including vaccination. Sitting or lying down for about 15 minutes can help prevent fainting, and injuries caused by a fall. Tell your doctor if you feel dizzy, or have vision changes or ringing in the ears. · Some people get severe pain in the shoulder and have difficulty moving the arm where a shot was given. This happens very rarely. · Any medication can cause a severe allergic reaction. Such reactions from a vaccine are very rare, estimated at about 1 in a million doses, and would happen within a few minutes to a few hours after the vaccination. As with any medicine, there is a very remote chance of a vaccine causing a serious injury or death. The safety of vaccines is always being monitored. For more information, visit: www.cdc.gov/vaccinesafety/. What if there is a serious reaction? What should I look for? · Look for anything that concerns you, such as signs of a severe allergic reaction, very high fever, or unusual behavior. Signs of a severe allergic reaction can include hives, swelling of the face and throat, difficulty breathing, a fast heartbeat, dizziness, and weakness - usually within a few minutes to a few hours after the vaccination. What should I do? · If you think it is a severe allergic reaction or other emergency that can't wait, call 9-1-1 and get the person to the nearest hospital. Otherwise, call your doctor. · Reactions should be reported to the \"Vaccine Adverse Event Reporting System\" (VAERS). Your doctor should file this report, or you can do it yourself through the VAERS website at www.vaers. [facility].gov, or by calling

## 2018-12-12 RX ORDER — OXYCODONE AND ACETAMINOPHEN 7.5; 325 MG/1; MG/1
1 TABLET ORAL 2 TIMES DAILY
COMMUNITY
End: 2019-01-08 | Stop reason: SINTOL

## 2018-12-14 ENCOUNTER — HOSPITAL ENCOUNTER (OUTPATIENT)
Dept: GENERAL RADIOLOGY | Age: 63
Discharge: HOME | End: 2018-12-16
Attending: PODIATRIST

## 2018-12-14 ENCOUNTER — ANESTHESIA EVENT (OUTPATIENT)
Dept: OPERATING ROOM | Age: 63
End: 2018-12-14

## 2018-12-14 ENCOUNTER — ANESTHESIA (OUTPATIENT)
Dept: OPERATING ROOM | Age: 63
End: 2018-12-14

## 2018-12-14 ENCOUNTER — HOSPITAL ENCOUNTER (OUTPATIENT)
Age: 63
Setting detail: OUTPATIENT SURGERY
Discharge: HOME | End: 2018-12-14
Attending: PODIATRIST | Admitting: PODIATRIST

## 2018-12-14 VITALS
TEMPERATURE: 97 F | HEIGHT: 65 IN | DIASTOLIC BLOOD PRESSURE: 78 MMHG | BODY MASS INDEX: 30.66 KG/M2 | OXYGEN SATURATION: 96 % | SYSTOLIC BLOOD PRESSURE: 142 MMHG | RESPIRATION RATE: 14 BRPM | HEART RATE: 85 BPM | WEIGHT: 184 LBS

## 2018-12-14 VITALS
DIASTOLIC BLOOD PRESSURE: 98 MMHG | SYSTOLIC BLOOD PRESSURE: 186 MMHG | RESPIRATION RATE: 7 BRPM | OXYGEN SATURATION: 99 % | TEMPERATURE: 97 F

## 2018-12-14 DIAGNOSIS — T84.84XA PAINFUL ORTHOPAEDIC HARDWARE (HCC): Primary | ICD-10-CM

## 2018-12-14 DIAGNOSIS — R52 PAIN: ICD-10-CM

## 2018-12-14 LAB
ANION GAP SERPL CALCULATED.3IONS-SCNC: 10 MMOL/L (ref 7–16)
BUN BLDV-MCNC: 11 MG/DL (ref 8–23)
CALCIUM SERPL-MCNC: 9.2 MG/DL (ref 8.6–10.2)
CHLORIDE BLD-SCNC: 103 MMOL/L (ref 98–107)
CO2: 27 MMOL/L (ref 22–29)
CREAT SERPL-MCNC: 0.7 MG/DL (ref 0.5–1)
GFR AFRICAN AMERICAN: >60
GFR NON-AFRICAN AMERICAN: >60 ML/MIN/1.73
GLUCOSE BLD-MCNC: 86 MG/DL (ref 74–99)
HCT VFR BLD CALC: 45.1 % (ref 34–48)
HEMOGLOBIN: 15.4 G/DL (ref 11.5–15.5)
MCH RBC QN AUTO: 34.9 PG (ref 26–35)
MCHC RBC AUTO-ENTMCNC: 34.1 % (ref 32–34.5)
MCV RBC AUTO: 102.3 FL (ref 80–99.9)
PDW BLD-RTO: 12.1 FL (ref 11.5–15)
PLATELET # BLD: 253 E9/L (ref 130–450)
PMV BLD AUTO: 11 FL (ref 7–12)
POTASSIUM SERPL-SCNC: 4.2 MMOL/L (ref 3.5–5)
RBC # BLD: 4.41 E12/L (ref 3.5–5.5)
SODIUM BLD-SCNC: 140 MMOL/L (ref 132–146)
WBC # BLD: 8.1 E9/L (ref 4.5–11.5)

## 2018-12-14 PROCEDURE — 3700000000 HC ANESTHESIA ATTENDED CARE: Performed by: PODIATRIST

## 2018-12-14 PROCEDURE — 6360000002 HC RX W HCPCS: Performed by: NURSE ANESTHETIST, CERTIFIED REGISTERED

## 2018-12-14 PROCEDURE — 3600000012 HC SURGERY LEVEL 2 ADDTL 15MIN: Performed by: PODIATRIST

## 2018-12-14 PROCEDURE — 2580000003 HC RX 258: Performed by: NURSE ANESTHETIST, CERTIFIED REGISTERED

## 2018-12-14 PROCEDURE — 7100000001 HC PACU RECOVERY - ADDTL 15 MIN: Performed by: PODIATRIST

## 2018-12-14 PROCEDURE — 6360000002 HC RX W HCPCS: Performed by: ANESTHESIOLOGY

## 2018-12-14 PROCEDURE — 88311 DECALCIFY TISSUE: CPT

## 2018-12-14 PROCEDURE — 7100000010 HC PHASE II RECOVERY - FIRST 15 MIN: Performed by: PODIATRIST

## 2018-12-14 PROCEDURE — 2500000003 HC RX 250 WO HCPCS: Performed by: PODIATRIST

## 2018-12-14 PROCEDURE — C1713 ANCHOR/SCREW BN/BN,TIS/BN: HCPCS | Performed by: PODIATRIST

## 2018-12-14 PROCEDURE — 3700000001 HC ADD 15 MINUTES (ANESTHESIA): Performed by: PODIATRIST

## 2018-12-14 PROCEDURE — 6370000000 HC RX 637 (ALT 250 FOR IP): Performed by: ANESTHESIOLOGY

## 2018-12-14 PROCEDURE — 2709999900 HC NON-CHARGEABLE SUPPLY: Performed by: PODIATRIST

## 2018-12-14 PROCEDURE — 88300 SURGICAL PATH GROSS: CPT

## 2018-12-14 PROCEDURE — 88304 TISSUE EXAM BY PATHOLOGIST: CPT

## 2018-12-14 PROCEDURE — 2720000010 HC SURG SUPPLY STERILE: Performed by: PODIATRIST

## 2018-12-14 PROCEDURE — 7100000011 HC PHASE II RECOVERY - ADDTL 15 MIN: Performed by: PODIATRIST

## 2018-12-14 PROCEDURE — 80048 BASIC METABOLIC PNL TOTAL CA: CPT

## 2018-12-14 PROCEDURE — 3600000002 HC SURGERY LEVEL 2 BASE: Performed by: PODIATRIST

## 2018-12-14 PROCEDURE — 7100000000 HC PACU RECOVERY - FIRST 15 MIN: Performed by: PODIATRIST

## 2018-12-14 PROCEDURE — 6360000002 HC RX W HCPCS: Performed by: PODIATRIST

## 2018-12-14 PROCEDURE — 85027 COMPLETE CBC AUTOMATED: CPT

## 2018-12-14 PROCEDURE — 3209999900 FLUORO FOR SURGICAL PROCEDURES

## 2018-12-14 PROCEDURE — 36415 COLL VENOUS BLD VENIPUNCTURE: CPT

## 2018-12-14 DEVICE — K WIRE FIX L6IN DIA0.045IN 1600645] MICROAIRE SURGICAL INSTRUMENTS INC]: Type: IMPLANTABLE DEVICE | Site: FOOT | Status: FUNCTIONAL

## 2018-12-14 RX ORDER — SODIUM CHLORIDE 9 MG/ML
INJECTION, SOLUTION INTRAVENOUS CONTINUOUS PRN
Status: DISCONTINUED | OUTPATIENT
Start: 2018-12-14 | End: 2018-12-14 | Stop reason: SDUPTHER

## 2018-12-14 RX ORDER — PROMETHAZINE HYDROCHLORIDE 25 MG/ML
6.25 INJECTION, SOLUTION INTRAMUSCULAR; INTRAVENOUS
Status: DISCONTINUED | OUTPATIENT
Start: 2018-12-14 | End: 2018-12-14 | Stop reason: HOSPADM

## 2018-12-14 RX ORDER — CEFAZOLIN SODIUM 1 G/3ML
INJECTION, POWDER, FOR SOLUTION INTRAMUSCULAR; INTRAVENOUS PRN
Status: DISCONTINUED | OUTPATIENT
Start: 2018-12-14 | End: 2018-12-14 | Stop reason: SDUPTHER

## 2018-12-14 RX ORDER — FENTANYL CITRATE 50 UG/ML
INJECTION, SOLUTION INTRAMUSCULAR; INTRAVENOUS PRN
Status: DISCONTINUED | OUTPATIENT
Start: 2018-12-14 | End: 2018-12-14 | Stop reason: SDUPTHER

## 2018-12-14 RX ORDER — SODIUM CHLORIDE 0.9 % (FLUSH) 0.9 %
10 SYRINGE (ML) INJECTION EVERY 12 HOURS SCHEDULED
Status: DISCONTINUED | OUTPATIENT
Start: 2018-12-14 | End: 2018-12-14 | Stop reason: HOSPADM

## 2018-12-14 RX ORDER — HYDROCODONE BITARTRATE AND ACETAMINOPHEN 5; 325 MG/1; MG/1
1 TABLET ORAL EVERY 6 HOURS PRN
COMMUNITY
End: 2018-12-21

## 2018-12-14 RX ORDER — CEFAZOLIN SODIUM 2 G/50ML
2 SOLUTION INTRAVENOUS
Status: DISCONTINUED | OUTPATIENT
Start: 2018-12-14 | End: 2018-12-14 | Stop reason: HOSPADM

## 2018-12-14 RX ORDER — SODIUM CHLORIDE 0.9 % (FLUSH) 0.9 %
10 SYRINGE (ML) INJECTION PRN
Status: DISCONTINUED | OUTPATIENT
Start: 2018-12-14 | End: 2018-12-14 | Stop reason: HOSPADM

## 2018-12-14 RX ORDER — MIDAZOLAM HYDROCHLORIDE 1 MG/ML
INJECTION INTRAMUSCULAR; INTRAVENOUS PRN
Status: DISCONTINUED | OUTPATIENT
Start: 2018-12-14 | End: 2018-12-14 | Stop reason: SDUPTHER

## 2018-12-14 RX ORDER — FENTANYL CITRATE 50 UG/ML
50 INJECTION, SOLUTION INTRAMUSCULAR; INTRAVENOUS EVERY 5 MIN PRN
Status: DISCONTINUED | OUTPATIENT
Start: 2018-12-14 | End: 2018-12-14 | Stop reason: HOSPADM

## 2018-12-14 RX ORDER — DIPHENHYDRAMINE HYDROCHLORIDE 50 MG/ML
12.5 INJECTION INTRAMUSCULAR; INTRAVENOUS
Status: DISCONTINUED | OUTPATIENT
Start: 2018-12-14 | End: 2018-12-14 | Stop reason: HOSPADM

## 2018-12-14 RX ORDER — DEXAMETHASONE SODIUM PHOSPHATE 4 MG/ML
INJECTION, SOLUTION INTRA-ARTICULAR; INTRALESIONAL; INTRAMUSCULAR; INTRAVENOUS; SOFT TISSUE PRN
Status: DISCONTINUED | OUTPATIENT
Start: 2018-12-14 | End: 2018-12-14 | Stop reason: SDUPTHER

## 2018-12-14 RX ORDER — SODIUM CHLORIDE, SODIUM LACTATE, POTASSIUM CHLORIDE, CALCIUM CHLORIDE 600; 310; 30; 20 MG/100ML; MG/100ML; MG/100ML; MG/100ML
INJECTION, SOLUTION INTRAVENOUS CONTINUOUS PRN
Status: DISCONTINUED | OUTPATIENT
Start: 2018-12-14 | End: 2018-12-14 | Stop reason: SDUPTHER

## 2018-12-14 RX ORDER — HYDROCODONE BITARTRATE AND ACETAMINOPHEN 5; 325 MG/1; MG/1
1 TABLET ORAL
Status: COMPLETED | OUTPATIENT
Start: 2018-12-14 | End: 2018-12-14

## 2018-12-14 RX ORDER — BUPIVACAINE HYDROCHLORIDE 5 MG/ML
INJECTION, SOLUTION EPIDURAL; INTRACAUDAL PRN
Status: DISCONTINUED | OUTPATIENT
Start: 2018-12-14 | End: 2018-12-14 | Stop reason: HOSPADM

## 2018-12-14 RX ORDER — VANCOMYCIN HYDROCHLORIDE 1 G/20ML
INJECTION, POWDER, LYOPHILIZED, FOR SOLUTION INTRAVENOUS PRN
Status: DISCONTINUED | OUTPATIENT
Start: 2018-12-14 | End: 2018-12-14 | Stop reason: HOSPADM

## 2018-12-14 RX ORDER — PROPOFOL 10 MG/ML
INJECTION, EMULSION INTRAVENOUS PRN
Status: DISCONTINUED | OUTPATIENT
Start: 2018-12-14 | End: 2018-12-14 | Stop reason: SDUPTHER

## 2018-12-14 RX ORDER — ONDANSETRON 2 MG/ML
INJECTION INTRAMUSCULAR; INTRAVENOUS PRN
Status: DISCONTINUED | OUTPATIENT
Start: 2018-12-14 | End: 2018-12-14 | Stop reason: SDUPTHER

## 2018-12-14 RX ORDER — MEPERIDINE HYDROCHLORIDE 25 MG/ML
12.5 INJECTION INTRAMUSCULAR; INTRAVENOUS; SUBCUTANEOUS EVERY 10 MIN PRN
Status: DISCONTINUED | OUTPATIENT
Start: 2018-12-14 | End: 2018-12-14 | Stop reason: HOSPADM

## 2018-12-14 RX ADMIN — ONDANSETRON HYDROCHLORIDE 4 MG: 2 INJECTION, SOLUTION INTRAMUSCULAR; INTRAVENOUS at 13:31

## 2018-12-14 RX ADMIN — CEFAZOLIN 2000 MG: 1 INJECTION, POWDER, FOR SOLUTION INTRAMUSCULAR; INTRAVENOUS at 13:27

## 2018-12-14 RX ADMIN — DEXAMETHASONE SODIUM PHOSPHATE 10 MG: 4 INJECTION, SOLUTION INTRAMUSCULAR; INTRAVENOUS at 13:31

## 2018-12-14 RX ADMIN — PROPOFOL 200 MG: 10 INJECTION, EMULSION INTRAVENOUS at 13:25

## 2018-12-14 RX ADMIN — MIDAZOLAM HYDROCHLORIDE 2 MG: 1 INJECTION, SOLUTION INTRAMUSCULAR; INTRAVENOUS at 13:14

## 2018-12-14 RX ADMIN — LIDOCAINE HYDROCHLORIDE 100 MG: 20 INJECTION INTRAVENOUS at 13:25

## 2018-12-14 RX ADMIN — FENTANYL CITRATE 50 MCG: 50 INJECTION, SOLUTION INTRAMUSCULAR; INTRAVENOUS at 15:00

## 2018-12-14 RX ADMIN — HYDROMORPHONE HYDROCHLORIDE 0.5 MG: 1 INJECTION, SOLUTION INTRAMUSCULAR; INTRAVENOUS; SUBCUTANEOUS at 15:10

## 2018-12-14 RX ADMIN — FENTANYL CITRATE 100 MCG: 50 INJECTION, SOLUTION INTRAMUSCULAR; INTRAVENOUS at 13:25

## 2018-12-14 RX ADMIN — SODIUM CHLORIDE, POTASSIUM CHLORIDE, SODIUM LACTATE AND CALCIUM CHLORIDE: 600; 310; 30; 20 INJECTION, SOLUTION INTRAVENOUS at 14:49

## 2018-12-14 RX ADMIN — HYDROCODONE BITARTRATE AND ACETAMINOPHEN 1 TABLET: 5; 325 TABLET ORAL at 16:15

## 2018-12-14 RX ADMIN — MEPERIDINE HYDROCHLORIDE 12.5 MG: 25 INJECTION, SOLUTION INTRAMUSCULAR; INTRAVENOUS; SUBCUTANEOUS at 15:07

## 2018-12-14 RX ADMIN — FENTANYL CITRATE 50 MCG: 50 INJECTION, SOLUTION INTRAMUSCULAR; INTRAVENOUS at 15:05

## 2018-12-14 RX ADMIN — PHENYLEPHRINE HYDROCHLORIDE 100 MCG: 10 INJECTION INTRAVENOUS at 13:27

## 2018-12-14 RX ADMIN — SODIUM CHLORIDE: 9 INJECTION, SOLUTION INTRAVENOUS at 13:14

## 2018-12-14 ASSESSMENT — PAIN DESCRIPTION - PAIN TYPE
TYPE: SURGICAL PAIN

## 2018-12-14 ASSESSMENT — PULMONARY FUNCTION TESTS
PIF_VALUE: 10
PIF_VALUE: 19
PIF_VALUE: 18
PIF_VALUE: 19
PIF_VALUE: 14
PIF_VALUE: 20
PIF_VALUE: 19
PIF_VALUE: 19
PIF_VALUE: 0
PIF_VALUE: 19
PIF_VALUE: 22
PIF_VALUE: 0
PIF_VALUE: 19
PIF_VALUE: 18
PIF_VALUE: 0
PIF_VALUE: 19
PIF_VALUE: 0
PIF_VALUE: 19
PIF_VALUE: 3
PIF_VALUE: 18
PIF_VALUE: 19
PIF_VALUE: 16
PIF_VALUE: 19
PIF_VALUE: 19
PIF_VALUE: 18
PIF_VALUE: 19
PIF_VALUE: 0
PIF_VALUE: 19
PIF_VALUE: 20
PIF_VALUE: 19
PIF_VALUE: 10
PIF_VALUE: 19
PIF_VALUE: 19
PIF_VALUE: 18
PIF_VALUE: 21
PIF_VALUE: 19
PIF_VALUE: 13
PIF_VALUE: 19
PIF_VALUE: 18
PIF_VALUE: 18
PIF_VALUE: 11
PIF_VALUE: 0
PIF_VALUE: 19
PIF_VALUE: 19
PIF_VALUE: 17
PIF_VALUE: 19
PIF_VALUE: 0
PIF_VALUE: 18
PIF_VALUE: 19
PIF_VALUE: 1
PIF_VALUE: 19
PIF_VALUE: 0
PIF_VALUE: 14
PIF_VALUE: 16
PIF_VALUE: 19
PIF_VALUE: 20
PIF_VALUE: 13
PIF_VALUE: 19
PIF_VALUE: 4
PIF_VALUE: 19
PIF_VALUE: 0
PIF_VALUE: 19
PIF_VALUE: 0
PIF_VALUE: 19
PIF_VALUE: 19
PIF_VALUE: 24
PIF_VALUE: 19
PIF_VALUE: 16
PIF_VALUE: 19
PIF_VALUE: 13
PIF_VALUE: 20
PIF_VALUE: 19
PIF_VALUE: 16
PIF_VALUE: 19

## 2018-12-14 ASSESSMENT — PAIN SCALES - GENERAL
PAINLEVEL_OUTOF10: 8
PAINLEVEL_OUTOF10: 7
PAINLEVEL_OUTOF10: 6
PAINLEVEL_OUTOF10: 7
PAINLEVEL_OUTOF10: 4
PAINLEVEL_OUTOF10: 8
PAINLEVEL_OUTOF10: 7

## 2018-12-14 ASSESSMENT — PAIN - FUNCTIONAL ASSESSMENT: PAIN_FUNCTIONAL_ASSESSMENT: 0-10

## 2018-12-14 ASSESSMENT — LIFESTYLE VARIABLES: SMOKING_STATUS: 1

## 2018-12-14 NOTE — ANESTHESIA PRE PROCEDURE
Department of Anesthesiology  Preprocedure Note       Name:  Jarrett Smith   Age:  66 y.o.  :  1955                                          MRN:  [id number]         Date:  2018      Surgeon: Elle Huang):  Karlo Anguiano DPM    Procedure: ARTHRODESIS 2ND DIGIT LEFT FOOT (Left )  REMOVAL OF PAINFUL HARDWARE LEFT FOOT  ++SYNTHES++ (Left )    Medications prior to admission:   Prior to Admission medications    Medication Sig Start Date End Date Taking? Authorizing Provider   pitavastatin (LIVALO) 1 MG TABS tablet Take 1 mg by mouth nightly   Yes Historical Provider, MD   oxyCODONE-acetaminophen (PERCOCET) 7.5-325 MG per tablet Take 1 tablet by mouth 2 times daily. Take am dos  . Yes Historical Provider, MD   quinapril (ACCUPRIL) 20 MG tablet Take 1 tablet by mouth nightly For blood pressure. 12/10/18  Yes Anusha Mccabe MD   varenicline (CHANTIX STARTING MONTH ) 0.5 MG X 11 & 1 MG X 42 tablet Take as directed on package. 10/8/18  Yes Anusha Mccabe MD   metFORMIN (GLUCOPHAGE XR) 500 MG extended release tablet Take 1 tablet by mouth daily (with breakfast) 10/8/18  Yes Anusha Mccabe MD   gabapentin (NEURONTIN) 300 MG capsule Take 600 mg by mouth 2 times daily. Take am dos . Yes Historical Provider, MD   diclofenac (VOLTAREN) 75 MG EC tablet Take 1 tablet by mouth 2 times daily For pain. NSAID. 18  Yes Anusha Mccabe MD   metaxalone (SKELAXIN) 800 MG tablet Take 800 mg by mouth 3 times daily as needed    Yes Historical Provider, MD       Current medications:    No current facility-administered medications for this encounter. Allergies:     Allergies   Allergen Reactions    Pcn [Penicillins] Anaphylaxis       Problem List:    Patient Active Problem List   Diagnosis Code    Loss of balance R26.89    Vertebrobasilar TIAs G45.0    Obesity E66.9    Disc displacement, lumbar M51.26    Lumbar radiculopathy M54.16    Peripheral neuropathy (HCC) G62.9    Type 2 diabetes

## 2018-12-17 NOTE — OP NOTE
[address] [address]                                OPERATIVE REPORT    PATIENT NAME: Bryan Saldivar                        :        1955  MED REC NO:   [id number]                            ROOM:  ACCOUNT NO:   [de-identified]                           ADMIT DATE: 2018  PROVIDER:     Chang Omer DPM    DATE OF PROCEDURE:  2018    PREOPERATIVE DIAGNOSES:  1.  Painful internal hardware, left foot. 2.  Hammertoe second digit, left foot. POSTOPERATIVE DIAGNOSES:  1.  Painful internal hardware, left foot. 2.  Hammertoe second digit, left foot. PROCEDURE PERFORMED:  Removal of painful hardware, left foot. Arthrodesis, second digit left foot with K-wire fixation. ESTIMATED BLOOD LOSS:  Less than 2 ml. COMPLICATIONS:  No complications. POSTOP INJECTION:  0.5% Marcaine plain 20 mL. TOURNIQUET:  Tourniquet was inflated to approximately 71 minutes, ankle  tourniquet at 250 mmHg. SPECIMEN:  Specimen from the arthrodesis was the head of the proximal  phalanx, second digit left foot. Also hardware, which included a plate  and five screws. DESCRIPTION OF PROCEDURE:  The patient was brought to the OR and placed  in the supine position. The patient's left foot was prepped and draped  in the usual aseptic fashion. Attention was directed to the left foot  base of the first metatarsal.  After inflation of tourniquet to 250  mmHg, a dorsal linear incision was made approximately 4 cm. This  incision was deepened with sharp and blunt dissection taking care to  avoid all neurovascular structures. All vessels were retracted and  ligated as necessary. Upon further dissection, the extensor tendon was  reflected laterally, and the periosteum was reflected exposing a plate  for the Lapidus procedure. At this time, using Synthes , three  screws were removed without any complication.   There was a fourth screw;  the head of the screw was stripped using a HeadSprout headless screw bur. The head of the screw was resected. This allowed removal of the plate. The area was flushed with vancomycin for approximately 5 minutes. All  fragment and all other metallic areas were reflected and resected in  toto. Specimen was the plate with five screws. The area was flushed  with copious amounts of normal saline solution and vancomycin, and  closure was achieved in the following manner. Periosteum was  reapproximated using a 2-0 Vicryl in simple interrupted fashion. Subcutaneous was closed with 3-0 Vicryl in a horizontal fashion, and  skin was reapproximated using 3-0 nylon in horizontal fashion and  simples. Postoperative dressing of Adaptic, 4x4, and Sebastián. Attention next was directed to the second digit, left foot where a  dorsal linear incision was made over the PIP joint. This incision was  deepened with sharp and blunt dissection taking care to avoid all  neurovascular structures. All vessels were retracted and ligated as  necessary. Upon exposure of the extensor tendon, a transverse incision  was made through the extensor tendon reflecting this proximally. Both  medial and lateral collateral ligaments were excised, and the head of  the proximal phalanx was introduced into the field. At this time using  a sagittal blade, the distal one-third of cartilage was resected, and  the cartilage from the base of the middle phalanx was resected as well. The area was flushed with copious amounts of normal saline solution, and  the middle phalanx and proximal phalanx were constructed and held in  rigorous position with a 0.045 K-wire. This was visualized on  fluoroscopy.   The area was flushed with normal saline solution, and  closure was achieved using a 3-0 Vicryl for four simple interrupted  sutures for the extensor tendon, a 3-0 Vicryl for subcutaneous in  horizontal fashion, and skin was reapproximated using a 3-0 nylon in  simple interrupted stitch. A postoperative dressing for this incision  was Adaptic, 4x4, and a 3-inch Sebastián and Ace bandage. Tourniquet was  deflated, and distal circulation 1 through 5 on left side was intact. The patient tolerated the anesthesia and procedure well with no  complications. 20 mL of 0.5% Marcaine plain were infiltrated into both  incision sites, and the patient was seen in Recovery. The patient will  be seen in three days postoperatively.         LEE ALFRED DPM    D: 12/14/2018 15:15:02       T: 12/14/2018 15:18:04     [email or web address]  Job#: [id number]     Doc#: [id number]    CC:

## 2018-12-21 DIAGNOSIS — M54.50 CHRONIC BILATERAL LOW BACK PAIN WITHOUT SCIATICA: ICD-10-CM

## 2018-12-21 DIAGNOSIS — G89.4 CHRONIC PAIN SYNDROME: ICD-10-CM

## 2018-12-21 DIAGNOSIS — M54.16 LUMBAR RADICULOPATHY: ICD-10-CM

## 2018-12-21 DIAGNOSIS — M48.061 SPINAL STENOSIS OF LUMBAR REGION WITHOUT NEUROGENIC CLAUDICATION: ICD-10-CM

## 2018-12-21 DIAGNOSIS — M51.26 DISC DISPLACEMENT, LUMBAR: ICD-10-CM

## 2018-12-21 DIAGNOSIS — M51.36 DDD (DEGENERATIVE DISC DISEASE), LUMBAR: ICD-10-CM

## 2018-12-21 DIAGNOSIS — M54.9 CHRONIC BACK PAIN, UNSPECIFIED BACK LOCATION, UNSPECIFIED BACK PAIN LATERALITY: ICD-10-CM

## 2018-12-21 DIAGNOSIS — G89.29 CHRONIC BILATERAL LOW BACK PAIN WITHOUT SCIATICA: ICD-10-CM

## 2018-12-21 DIAGNOSIS — G89.29 CHRONIC BACK PAIN, UNSPECIFIED BACK LOCATION, UNSPECIFIED BACK PAIN LATERALITY: ICD-10-CM

## 2018-12-21 DIAGNOSIS — M47.816 LUMBAR FACET ARTHROPATHY: ICD-10-CM

## 2018-12-21 RX ORDER — HYDROCODONE BITARTRATE AND ACETAMINOPHEN 7.5; 325 MG/1; MG/1
1 TABLET ORAL 2 TIMES DAILY PRN
Qty: 36 TABLET | Refills: 0 | Status: SHIPPED | OUTPATIENT
Start: 2018-12-21 | End: 2019-01-08 | Stop reason: SDUPTHER

## 2019-01-08 ENCOUNTER — OFFICE VISIT (OUTPATIENT)
Dept: PAIN MANAGEMENT | Age: 64
End: 2019-01-08

## 2019-01-08 VITALS
SYSTOLIC BLOOD PRESSURE: 136 MMHG | HEART RATE: 84 BPM | TEMPERATURE: 98 F | DIASTOLIC BLOOD PRESSURE: 84 MMHG | RESPIRATION RATE: 18 BRPM

## 2019-01-08 DIAGNOSIS — G89.29 CHRONIC BACK PAIN, UNSPECIFIED BACK LOCATION, UNSPECIFIED BACK PAIN LATERALITY: ICD-10-CM

## 2019-01-08 DIAGNOSIS — M54.9 CHRONIC BACK PAIN, UNSPECIFIED BACK LOCATION, UNSPECIFIED BACK PAIN LATERALITY: ICD-10-CM

## 2019-01-08 DIAGNOSIS — G89.4 CHRONIC PAIN SYNDROME: ICD-10-CM

## 2019-01-08 DIAGNOSIS — M51.36 DDD (DEGENERATIVE DISC DISEASE), LUMBAR: ICD-10-CM

## 2019-01-08 DIAGNOSIS — M54.50 CHRONIC BILATERAL LOW BACK PAIN WITHOUT SCIATICA: ICD-10-CM

## 2019-01-08 DIAGNOSIS — M47.816 LUMBAR FACET ARTHROPATHY: ICD-10-CM

## 2019-01-08 DIAGNOSIS — M51.26 DISC DISPLACEMENT, LUMBAR: ICD-10-CM

## 2019-01-08 DIAGNOSIS — M54.16 LUMBAR RADICULOPATHY: ICD-10-CM

## 2019-01-08 DIAGNOSIS — G89.29 CHRONIC BILATERAL LOW BACK PAIN WITHOUT SCIATICA: ICD-10-CM

## 2019-01-08 DIAGNOSIS — M48.061 SPINAL STENOSIS OF LUMBAR REGION WITHOUT NEUROGENIC CLAUDICATION: ICD-10-CM

## 2019-01-08 PROCEDURE — 99213 OFFICE O/P EST LOW 20 MIN: CPT | Performed by: PHYSICIAN ASSISTANT

## 2019-01-08 RX ORDER — HYDROCODONE BITARTRATE AND ACETAMINOPHEN 7.5; 325 MG/1; MG/1
1 TABLET ORAL 2 TIMES DAILY PRN
Qty: 60 TABLET | Refills: 0 | Status: SHIPPED | OUTPATIENT
Start: 2019-01-08 | End: 2019-02-07

## 2019-01-15 ENCOUNTER — TELEPHONE (OUTPATIENT)
Dept: FAMILY MEDICINE CLINIC | Age: 64
End: 2019-01-15

## 2019-01-25 ENCOUNTER — OFFICE VISIT (OUTPATIENT)
Dept: FAMILY MEDICINE CLINIC | Age: 64
End: 2019-01-25

## 2019-01-25 VITALS
OXYGEN SATURATION: 98 % | WEIGHT: 181 LBS | HEART RATE: 88 BPM | BODY MASS INDEX: 30.12 KG/M2 | SYSTOLIC BLOOD PRESSURE: 150 MMHG | DIASTOLIC BLOOD PRESSURE: 98 MMHG | TEMPERATURE: 97.9 F

## 2019-01-25 DIAGNOSIS — F41.9 ANXIETY: Primary | ICD-10-CM

## 2019-01-25 DIAGNOSIS — I16.0 HYPERTENSIVE URGENCY: ICD-10-CM

## 2019-01-25 PROCEDURE — 99214 OFFICE O/P EST MOD 30 MIN: CPT | Performed by: FAMILY MEDICINE

## 2019-01-25 RX ORDER — ESCITALOPRAM OXALATE 10 MG/1
10 TABLET ORAL DAILY
Qty: 30 TABLET | Refills: 1 | Status: SHIPPED | OUTPATIENT
Start: 2019-01-25 | End: 2019-02-22 | Stop reason: SDUPTHER

## 2019-01-25 RX ORDER — CLONIDINE HYDROCHLORIDE 0.1 MG/1
0.1 TABLET ORAL ONCE
Status: COMPLETED | OUTPATIENT
Start: 2019-01-25 | End: 2019-01-25

## 2019-01-25 RX ADMIN — CLONIDINE HYDROCHLORIDE 0.1 MG: 0.1 TABLET ORAL at 11:33

## 2019-01-28 ENCOUNTER — TELEPHONE (OUTPATIENT)
Dept: FAMILY MEDICINE CLINIC | Age: 64
End: 2019-01-28

## 2019-01-28 ENCOUNTER — HOSPITAL ENCOUNTER (EMERGENCY)
Age: 64
Discharge: HOME | End: 2019-01-28
Attending: EMERGENCY MEDICINE

## 2019-01-28 VITALS
TEMPERATURE: 98.6 F | BODY MASS INDEX: 29.99 KG/M2 | OXYGEN SATURATION: 97 % | RESPIRATION RATE: 16 BRPM | DIASTOLIC BLOOD PRESSURE: 89 MMHG | SYSTOLIC BLOOD PRESSURE: 155 MMHG | HEIGHT: 65 IN | HEART RATE: 78 BPM | WEIGHT: 180 LBS

## 2019-01-28 DIAGNOSIS — I10 HYPERTENSION, UNSPECIFIED TYPE: ICD-10-CM

## 2019-01-28 DIAGNOSIS — I10 ESSENTIAL HYPERTENSION: ICD-10-CM

## 2019-01-28 DIAGNOSIS — F41.1 ANXIETY STATE: Primary | ICD-10-CM

## 2019-01-28 LAB
ANION GAP SERPL CALCULATED.3IONS-SCNC: 15 MMOL/L (ref 7–16)
BASOPHILS ABSOLUTE: 0.08 E9/L (ref 0–0.2)
BASOPHILS RELATIVE PERCENT: 0.7 % (ref 0–2)
BUN BLDV-MCNC: 9 MG/DL (ref 8–23)
CALCIUM SERPL-MCNC: 9.5 MG/DL (ref 8.6–10.2)
CHLORIDE BLD-SCNC: 103 MMOL/L (ref 98–107)
CO2: 20 MMOL/L (ref 22–29)
CREAT SERPL-MCNC: 0.8 MG/DL (ref 0.5–1)
EKG ATRIAL RATE: 78 BPM
EKG P AXIS: 77 DEGREES
EKG P-R INTERVAL: 160 MS
EKG Q-T INTERVAL: 404 MS
EKG QRS DURATION: 92 MS
EKG QTC CALCULATION (BAZETT): 460 MS
EKG R AXIS: 42 DEGREES
EKG T AXIS: 61 DEGREES
EKG VENTRICULAR RATE: 78 BPM
EOSINOPHILS ABSOLUTE: 0.07 E9/L (ref 0.05–0.5)
EOSINOPHILS RELATIVE PERCENT: 0.6 % (ref 0–6)
GFR AFRICAN AMERICAN: >60
GFR NON-AFRICAN AMERICAN: >60 ML/MIN/1.73
GLUCOSE BLD-MCNC: 95 MG/DL (ref 74–99)
HCT VFR BLD CALC: 45 % (ref 34–48)
HEMOGLOBIN: 16.2 G/DL (ref 11.5–15.5)
IMMATURE GRANULOCYTES #: 0.04 E9/L
IMMATURE GRANULOCYTES %: 0.4 % (ref 0–5)
LYMPHOCYTES ABSOLUTE: 2.91 E9/L (ref 1.5–4)
LYMPHOCYTES RELATIVE PERCENT: 26.8 % (ref 20–42)
MCH RBC QN AUTO: 35.4 PG (ref 26–35)
MCHC RBC AUTO-ENTMCNC: 36 % (ref 32–34.5)
MCV RBC AUTO: 98.5 FL (ref 80–99.9)
MONOCYTES ABSOLUTE: 0.73 E9/L (ref 0.1–0.95)
MONOCYTES RELATIVE PERCENT: 6.7 % (ref 2–12)
NEUTROPHILS ABSOLUTE: 7.03 E9/L (ref 1.8–7.3)
NEUTROPHILS RELATIVE PERCENT: 64.8 % (ref 43–80)
PDW BLD-RTO: 12.1 FL (ref 11.5–15)
PLATELET # BLD: 280 E9/L (ref 130–450)
PMV BLD AUTO: 11.6 FL (ref 7–12)
POTASSIUM SERPL-SCNC: 4.2 MMOL/L (ref 3.5–5)
RBC # BLD: 4.57 E12/L (ref 3.5–5.5)
SODIUM BLD-SCNC: 138 MMOL/L (ref 132–146)
TROPONIN: 0.03 NG/ML (ref 0–0.03)
WBC # BLD: 10.9 E9/L (ref 4.5–11.5)

## 2019-01-28 PROCEDURE — 85025 COMPLETE CBC W/AUTO DIFF WBC: CPT

## 2019-01-28 PROCEDURE — 96374 THER/PROPH/DIAG INJ IV PUSH: CPT

## 2019-01-28 PROCEDURE — 6360000002 HC RX W HCPCS: Performed by: EMERGENCY MEDICINE

## 2019-01-28 PROCEDURE — 84484 ASSAY OF TROPONIN QUANT: CPT

## 2019-01-28 PROCEDURE — 93005 ELECTROCARDIOGRAM TRACING: CPT | Performed by: EMERGENCY MEDICINE

## 2019-01-28 PROCEDURE — 2580000003 HC RX 258

## 2019-01-28 PROCEDURE — 99283 EMERGENCY DEPT VISIT LOW MDM: CPT

## 2019-01-28 PROCEDURE — 80048 BASIC METABOLIC PNL TOTAL CA: CPT

## 2019-01-28 RX ORDER — SODIUM CHLORIDE 0.9 % (FLUSH) 0.9 %
SYRINGE (ML) INJECTION
Status: COMPLETED
Start: 2019-01-28 | End: 2019-01-28

## 2019-01-28 RX ORDER — HYDROXYZINE PAMOATE 25 MG/1
25 CAPSULE ORAL 3 TIMES DAILY PRN
Qty: 60 CAPSULE | Refills: 0 | Status: SHIPPED | OUTPATIENT
Start: 2019-01-28 | End: 2019-07-01 | Stop reason: SDUPTHER

## 2019-01-28 RX ORDER — LABETALOL HYDROCHLORIDE 5 MG/ML
20 INJECTION, SOLUTION INTRAVENOUS ONCE
Status: DISCONTINUED | OUTPATIENT
Start: 2019-01-28 | End: 2019-01-28 | Stop reason: HOSPADM

## 2019-01-28 RX ORDER — LORAZEPAM 2 MG/ML
0.5 INJECTION INTRAMUSCULAR ONCE
Status: COMPLETED | OUTPATIENT
Start: 2019-01-28 | End: 2019-01-28

## 2019-01-28 RX ADMIN — Medication 10 ML: at 14:54

## 2019-01-28 RX ADMIN — LORAZEPAM 0.5 MG: 2 INJECTION INTRAMUSCULAR; INTRAVENOUS at 14:50

## 2019-01-28 ASSESSMENT — PAIN SCALES - GENERAL: PAINLEVEL_OUTOF10: 8

## 2019-01-29 ENCOUNTER — HOSPITAL ENCOUNTER (OUTPATIENT)
Dept: PSYCHIATRY | Age: 64
Setting detail: THERAPIES SERIES
Discharge: HOME | End: 2019-01-29

## 2019-01-29 ENCOUNTER — HOSPITAL ENCOUNTER (OUTPATIENT)
Age: 64
Setting detail: THERAPIES SERIES
Discharge: HOME | End: 2019-01-29

## 2019-01-29 ENCOUNTER — TELEPHONE (OUTPATIENT)
Dept: FAMILY MEDICINE CLINIC | Age: 64
End: 2019-01-29

## 2019-01-29 VITALS
WEIGHT: 180 LBS | HEART RATE: 72 BPM | RESPIRATION RATE: 16 BRPM | DIASTOLIC BLOOD PRESSURE: 102 MMHG | TEMPERATURE: 97.6 F | BODY MASS INDEX: 29.99 KG/M2 | HEIGHT: 65 IN | SYSTOLIC BLOOD PRESSURE: 164 MMHG

## 2019-01-29 DIAGNOSIS — F41.9 ANXIETY DISORDER, UNSPECIFIED TYPE: ICD-10-CM

## 2019-01-29 PROCEDURE — 90853 GROUP PSYCHOTHERAPY: CPT

## 2019-01-29 PROCEDURE — 99204 OFFICE O/P NEW MOD 45 MIN: CPT | Performed by: PSYCHIATRY & NEUROLOGY

## 2019-01-29 RX ORDER — QUINAPRIL 40 MG/1
40 TABLET ORAL DAILY
Qty: 30 TABLET | Refills: 2 | Status: SHIPPED | OUTPATIENT
Start: 2019-01-29 | End: 2019-02-22 | Stop reason: SDUPTHER

## 2019-01-29 ASSESSMENT — PAIN DESCRIPTION - DESCRIPTORS: DESCRIPTORS: ACHING;STABBING

## 2019-01-29 ASSESSMENT — PAIN DESCRIPTION - LOCATION: LOCATION: BACK;KNEE;OTHER (COMMENT)

## 2019-01-29 ASSESSMENT — PAIN DESCRIPTION - FREQUENCY: FREQUENCY: CONTINUOUS

## 2019-01-29 ASSESSMENT — PAIN DESCRIPTION - PAIN TYPE: TYPE: CHRONIC PAIN

## 2019-01-29 ASSESSMENT — PAIN SCALES - GENERAL: PAINLEVEL_OUTOF10: 8

## 2019-01-29 ASSESSMENT — PAIN DESCRIPTION - PROGRESSION: CLINICAL_PROGRESSION: NOT CHANGED

## 2019-01-29 ASSESSMENT — PAIN DESCRIPTION - ONSET: ONSET: ON-GOING

## 2019-01-31 ENCOUNTER — HOSPITAL ENCOUNTER (OUTPATIENT)
Dept: PSYCHIATRY | Age: 64
Setting detail: THERAPIES SERIES
Discharge: HOME | End: 2019-01-31

## 2019-01-31 PROCEDURE — 90853 GROUP PSYCHOTHERAPY: CPT

## 2019-02-01 ENCOUNTER — HOSPITAL ENCOUNTER (OUTPATIENT)
Dept: PSYCHIATRY | Age: 64
Setting detail: THERAPIES SERIES
Discharge: HOME | End: 2019-02-01

## 2019-02-01 DIAGNOSIS — F41.9 ANXIETY DISORDER, UNSPECIFIED TYPE: ICD-10-CM

## 2019-02-01 PROCEDURE — 90853 GROUP PSYCHOTHERAPY: CPT

## 2019-02-01 PROCEDURE — 99213 OFFICE O/P EST LOW 20 MIN: CPT | Performed by: PSYCHIATRY & NEUROLOGY

## 2019-02-04 ENCOUNTER — HOSPITAL ENCOUNTER (OUTPATIENT)
Dept: PSYCHIATRY | Age: 64
Setting detail: THERAPIES SERIES
Discharge: HOME | End: 2019-02-04

## 2019-02-04 PROCEDURE — 90853 GROUP PSYCHOTHERAPY: CPT

## 2019-02-05 ENCOUNTER — HOSPITAL ENCOUNTER (OUTPATIENT)
Dept: PSYCHIATRY | Age: 64
Setting detail: THERAPIES SERIES
Discharge: HOME | End: 2019-02-05

## 2019-02-05 PROCEDURE — 90853 GROUP PSYCHOTHERAPY: CPT

## 2019-02-08 ENCOUNTER — HOSPITAL ENCOUNTER (OUTPATIENT)
Dept: PSYCHIATRY | Age: 64
Setting detail: THERAPIES SERIES
End: 2019-02-08

## 2019-02-08 ENCOUNTER — HOSPITAL ENCOUNTER (OUTPATIENT)
Dept: PSYCHIATRY | Age: 64
Setting detail: THERAPIES SERIES
Discharge: HOME | End: 2019-02-08

## 2019-02-08 DIAGNOSIS — F41.9 ANXIETY DISORDER, UNSPECIFIED TYPE: ICD-10-CM

## 2019-02-08 PROCEDURE — 99213 OFFICE O/P EST LOW 20 MIN: CPT | Performed by: PSYCHIATRY & NEUROLOGY

## 2019-02-08 PROCEDURE — 90853 GROUP PSYCHOTHERAPY: CPT

## 2019-02-12 ENCOUNTER — PREP FOR PROCEDURE (OUTPATIENT)
Dept: PAIN MANAGEMENT | Age: 64
End: 2019-02-12

## 2019-02-12 ENCOUNTER — OFFICE VISIT (OUTPATIENT)
Dept: PAIN MANAGEMENT | Age: 64
End: 2019-02-12

## 2019-02-12 VITALS
WEIGHT: 180 LBS | HEIGHT: 65 IN | DIASTOLIC BLOOD PRESSURE: 82 MMHG | SYSTOLIC BLOOD PRESSURE: 142 MMHG | OXYGEN SATURATION: 97 % | HEART RATE: 88 BPM | BODY MASS INDEX: 29.99 KG/M2 | RESPIRATION RATE: 16 BRPM

## 2019-02-12 DIAGNOSIS — G89.29 CHRONIC BACK PAIN, UNSPECIFIED BACK LOCATION, UNSPECIFIED BACK PAIN LATERALITY: ICD-10-CM

## 2019-02-12 DIAGNOSIS — M51.36 DDD (DEGENERATIVE DISC DISEASE), LUMBAR: ICD-10-CM

## 2019-02-12 DIAGNOSIS — M54.50 CHRONIC BILATERAL LOW BACK PAIN WITHOUT SCIATICA: ICD-10-CM

## 2019-02-12 DIAGNOSIS — M54.16 LUMBAR RADICULOPATHY: ICD-10-CM

## 2019-02-12 DIAGNOSIS — M47.812 CERVICAL FACET JOINT SYNDROME: Primary | ICD-10-CM

## 2019-02-12 DIAGNOSIS — M48.061 SPINAL STENOSIS OF LUMBAR REGION WITHOUT NEUROGENIC CLAUDICATION: ICD-10-CM

## 2019-02-12 DIAGNOSIS — G89.4 CHRONIC PAIN SYNDROME: ICD-10-CM

## 2019-02-12 DIAGNOSIS — M51.26 DISC DISPLACEMENT, LUMBAR: ICD-10-CM

## 2019-02-12 DIAGNOSIS — M47.816 LUMBAR FACET ARTHROPATHY: ICD-10-CM

## 2019-02-12 DIAGNOSIS — G89.29 CHRONIC BILATERAL LOW BACK PAIN WITHOUT SCIATICA: ICD-10-CM

## 2019-02-12 DIAGNOSIS — M54.9 CHRONIC BACK PAIN, UNSPECIFIED BACK LOCATION, UNSPECIFIED BACK PAIN LATERALITY: ICD-10-CM

## 2019-02-12 PROCEDURE — 99214 OFFICE O/P EST MOD 30 MIN: CPT | Performed by: PAIN MEDICINE

## 2019-02-12 RX ORDER — HYDROCODONE BITARTRATE AND ACETAMINOPHEN 7.5; 325 MG/1; MG/1
1 TABLET ORAL 2 TIMES DAILY
Qty: 60 TABLET | Refills: 0 | Status: SHIPPED | OUTPATIENT
Start: 2019-02-12 | End: 2019-03-12 | Stop reason: SDUPTHER

## 2019-02-12 RX ORDER — HYDROCODONE BITARTRATE AND ACETAMINOPHEN 7.5; 325 MG/1; MG/1
1 TABLET ORAL 2 TIMES DAILY
COMMUNITY
End: 2019-02-12 | Stop reason: SDUPTHER

## 2019-02-22 ENCOUNTER — OFFICE VISIT (OUTPATIENT)
Dept: FAMILY MEDICINE CLINIC | Age: 64
End: 2019-02-22

## 2019-02-22 VITALS
WEIGHT: 187 LBS | SYSTOLIC BLOOD PRESSURE: 152 MMHG | TEMPERATURE: 97.9 F | BODY MASS INDEX: 31.12 KG/M2 | OXYGEN SATURATION: 98 % | HEART RATE: 76 BPM | DIASTOLIC BLOOD PRESSURE: 98 MMHG

## 2019-02-22 DIAGNOSIS — Z76.0 MEDICATION REFILL: ICD-10-CM

## 2019-02-22 DIAGNOSIS — F41.9 ANXIETY: ICD-10-CM

## 2019-02-22 DIAGNOSIS — I10 ESSENTIAL HYPERTENSION: ICD-10-CM

## 2019-02-22 PROCEDURE — 99214 OFFICE O/P EST MOD 30 MIN: CPT | Performed by: FAMILY MEDICINE

## 2019-02-22 RX ORDER — ESCITALOPRAM OXALATE 10 MG/1
10 TABLET ORAL DAILY
Qty: 90 TABLET | Refills: 1 | Status: SHIPPED | OUTPATIENT
Start: 2019-02-22 | End: 2019-03-22

## 2019-02-22 RX ORDER — HYDROCHLOROTHIAZIDE 12.5 MG/1
12.5 TABLET ORAL DAILY
Qty: 30 TABLET | Refills: 1 | Status: SHIPPED | OUTPATIENT
Start: 2019-02-22 | End: 2019-04-16 | Stop reason: SDUPTHER

## 2019-02-22 RX ORDER — GABAPENTIN 300 MG/1
300 CAPSULE ORAL 2 TIMES DAILY
Qty: 180 CAPSULE | Refills: 0 | Status: SHIPPED | OUTPATIENT
Start: 2019-02-22 | End: 2019-05-31 | Stop reason: ALTCHOICE

## 2019-02-22 RX ORDER — METFORMIN HYDROCHLORIDE 500 MG/1
500 TABLET, EXTENDED RELEASE ORAL
Qty: 90 TABLET | Refills: 1 | Status: SHIPPED | OUTPATIENT
Start: 2019-02-22 | End: 2019-05-31

## 2019-02-22 RX ORDER — QUINAPRIL 40 MG/1
40 TABLET ORAL DAILY
Qty: 90 TABLET | Refills: 1 | Status: SHIPPED | OUTPATIENT
Start: 2019-02-22 | End: 2019-07-01 | Stop reason: SDUPTHER

## 2019-02-26 ENCOUNTER — HOSPITAL ENCOUNTER (OUTPATIENT)
Age: 64
Setting detail: OUTPATIENT SURGERY
Discharge: HOME | End: 2019-02-26
Attending: PAIN MEDICINE | Admitting: PAIN MEDICINE

## 2019-02-26 ENCOUNTER — HOSPITAL ENCOUNTER (OUTPATIENT)
Dept: GENERAL RADIOLOGY | Age: 64
Discharge: HOME | End: 2019-02-28
Attending: PAIN MEDICINE

## 2019-02-26 VITALS
HEART RATE: 65 BPM | OXYGEN SATURATION: 98 % | SYSTOLIC BLOOD PRESSURE: 156 MMHG | DIASTOLIC BLOOD PRESSURE: 82 MMHG | BODY MASS INDEX: 29.99 KG/M2 | TEMPERATURE: 98 F | RESPIRATION RATE: 18 BRPM | WEIGHT: 180 LBS | HEIGHT: 65 IN

## 2019-02-26 DIAGNOSIS — R52 PAIN: ICD-10-CM

## 2019-02-26 LAB — METER GLUCOSE: 114 MG/DL (ref 74–99)

## 2019-02-26 PROCEDURE — 2500000003 HC RX 250 WO HCPCS: Performed by: PAIN MEDICINE

## 2019-02-26 PROCEDURE — 2709999900 HC NON-CHARGEABLE SUPPLY: Performed by: PAIN MEDICINE

## 2019-02-26 PROCEDURE — 7100000010 HC PHASE II RECOVERY - FIRST 15 MIN: Performed by: PAIN MEDICINE

## 2019-02-26 PROCEDURE — 7100000011 HC PHASE II RECOVERY - ADDTL 15 MIN: Performed by: PAIN MEDICINE

## 2019-02-26 PROCEDURE — 3209999900 FLUORO FOR SURGICAL PROCEDURES

## 2019-02-26 PROCEDURE — 64490 INJ PARAVERT F JNT C/T 1 LEV: CPT | Performed by: PAIN MEDICINE

## 2019-02-26 PROCEDURE — 64491 INJ PARAVERT F JNT C/T 2 LEV: CPT | Performed by: PAIN MEDICINE

## 2019-02-26 PROCEDURE — 6360000002 HC RX W HCPCS: Performed by: PAIN MEDICINE

## 2019-02-26 PROCEDURE — 82962 GLUCOSE BLOOD TEST: CPT

## 2019-02-26 PROCEDURE — 3600000002 HC SURGERY LEVEL 2 BASE: Performed by: PAIN MEDICINE

## 2019-02-26 RX ORDER — BUPIVACAINE HYDROCHLORIDE 2.5 MG/ML
INJECTION, SOLUTION EPIDURAL; INFILTRATION; INTRACAUDAL PRN
Status: DISCONTINUED | OUTPATIENT
Start: 2019-02-26 | End: 2019-02-26 | Stop reason: ALTCHOICE

## 2019-02-26 RX ORDER — LIDOCAINE HYDROCHLORIDE 5 MG/ML
INJECTION, SOLUTION INFILTRATION; INTRAVENOUS PRN
Status: DISCONTINUED | OUTPATIENT
Start: 2019-02-26 | End: 2019-02-26 | Stop reason: ALTCHOICE

## 2019-02-26 RX ORDER — METHYLPREDNISOLONE ACETATE 40 MG/ML
INJECTION, SUSPENSION INTRA-ARTICULAR; INTRALESIONAL; INTRAMUSCULAR; SOFT TISSUE PRN
Status: DISCONTINUED | OUTPATIENT
Start: 2019-02-26 | End: 2019-02-26 | Stop reason: ALTCHOICE

## 2019-02-26 ASSESSMENT — PAIN DESCRIPTION - DESCRIPTORS
DESCRIPTORS: ACHING;BURNING
DESCRIPTORS: NUMBNESS

## 2019-02-26 ASSESSMENT — PAIN DESCRIPTION - FREQUENCY
FREQUENCY: CONTINUOUS

## 2019-02-26 ASSESSMENT — PAIN SCALES - GENERAL
PAINLEVEL_OUTOF10: 0

## 2019-02-26 ASSESSMENT — PAIN DESCRIPTION - ORIENTATION
ORIENTATION: LEFT

## 2019-02-26 ASSESSMENT — PAIN - FUNCTIONAL ASSESSMENT: PAIN_FUNCTIONAL_ASSESSMENT: 0-10

## 2019-02-26 ASSESSMENT — PAIN DESCRIPTION - LOCATION
LOCATION: NECK

## 2019-03-12 ENCOUNTER — OFFICE VISIT (OUTPATIENT)
Dept: PAIN MANAGEMENT | Age: 64
End: 2019-03-12

## 2019-03-12 VITALS
RESPIRATION RATE: 18 BRPM | DIASTOLIC BLOOD PRESSURE: 82 MMHG | HEART RATE: 84 BPM | WEIGHT: 185 LBS | TEMPERATURE: 98.9 F | BODY MASS INDEX: 30.82 KG/M2 | SYSTOLIC BLOOD PRESSURE: 140 MMHG | OXYGEN SATURATION: 98 % | HEIGHT: 65 IN

## 2019-03-12 DIAGNOSIS — M47.812 CERVICAL FACET JOINT SYNDROME: ICD-10-CM

## 2019-03-12 PROCEDURE — 99213 OFFICE O/P EST LOW 20 MIN: CPT | Performed by: PHYSICIAN ASSISTANT

## 2019-03-12 RX ORDER — HYDROCODONE BITARTRATE AND ACETAMINOPHEN 7.5; 325 MG/1; MG/1
1 TABLET ORAL 2 TIMES DAILY
Qty: 60 TABLET | Refills: 0 | Status: SHIPPED | OUTPATIENT
Start: 2019-03-14 | End: 2019-04-12 | Stop reason: SDUPTHER

## 2019-03-20 ENCOUNTER — HOSPITAL ENCOUNTER (EMERGENCY)
Age: 64
Discharge: HOME | End: 2019-03-20
Attending: EMERGENCY MEDICINE

## 2019-03-20 ENCOUNTER — APPOINTMENT (OUTPATIENT)
Dept: CT IMAGING | Age: 64
End: 2019-03-20

## 2019-03-20 VITALS
HEART RATE: 95 BPM | RESPIRATION RATE: 16 BRPM | SYSTOLIC BLOOD PRESSURE: 159 MMHG | DIASTOLIC BLOOD PRESSURE: 74 MMHG | WEIGHT: 185 LBS | BODY MASS INDEX: 30.82 KG/M2 | TEMPERATURE: 98.9 F | OXYGEN SATURATION: 99 % | HEIGHT: 65 IN

## 2019-03-20 DIAGNOSIS — R33.9 URINARY RETENTION: ICD-10-CM

## 2019-03-20 DIAGNOSIS — K59.00 CONSTIPATION, UNSPECIFIED CONSTIPATION TYPE: Primary | ICD-10-CM

## 2019-03-20 LAB
ANION GAP SERPL CALCULATED.3IONS-SCNC: 11 MMOL/L (ref 7–16)
ANISOCYTOSIS: ABNORMAL
BACTERIA: NORMAL /HPF
BASOPHILS ABSOLUTE: 0.03 E9/L (ref 0–0.2)
BASOPHILS RELATIVE PERCENT: 0.2 % (ref 0–2)
BILIRUBIN URINE: NEGATIVE
BLOOD, URINE: NEGATIVE
BUN BLDV-MCNC: 9 MG/DL (ref 8–23)
CALCIUM SERPL-MCNC: 8.8 MG/DL (ref 8.6–10.2)
CHLORIDE BLD-SCNC: 99 MMOL/L (ref 98–107)
CLARITY: CLEAR
CO2: 25 MMOL/L (ref 22–29)
COLOR: YELLOW
CREAT SERPL-MCNC: 0.7 MG/DL (ref 0.5–1)
EOSINOPHILS ABSOLUTE: 0.01 E9/L (ref 0.05–0.5)
EOSINOPHILS RELATIVE PERCENT: 0.1 % (ref 0–6)
GFR AFRICAN AMERICAN: >60
GFR NON-AFRICAN AMERICAN: >60 ML/MIN/1.73
GLUCOSE BLD-MCNC: 103 MG/DL (ref 74–99)
GLUCOSE URINE: NEGATIVE MG/DL
HCT VFR BLD CALC: 44.4 % (ref 34–48)
HEMOGLOBIN: 15.6 G/DL (ref 11.5–15.5)
IMMATURE GRANULOCYTES #: 0.04 E9/L
IMMATURE GRANULOCYTES %: 0.3 % (ref 0–5)
KETONES, URINE: NEGATIVE MG/DL
LACTIC ACID: 1.7 MMOL/L (ref 0.5–2.2)
LEUKOCYTE ESTERASE, URINE: NEGATIVE
LYMPHOCYTES ABSOLUTE: 0.54 E9/L (ref 1.5–4)
LYMPHOCYTES RELATIVE PERCENT: 4.4 % (ref 20–42)
MCH RBC QN AUTO: 35.1 PG (ref 26–35)
MCHC RBC AUTO-ENTMCNC: 35.1 % (ref 32–34.5)
MCV RBC AUTO: 100 FL (ref 80–99.9)
MONOCYTES ABSOLUTE: 0.74 E9/L (ref 0.1–0.95)
MONOCYTES RELATIVE PERCENT: 6 % (ref 2–12)
MUCUS: PRESENT
NEUTROPHILS ABSOLUTE: 11.01 E9/L (ref 1.8–7.3)
NEUTROPHILS RELATIVE PERCENT: 89 % (ref 43–80)
NITRITE, URINE: NEGATIVE
PDW BLD-RTO: 11.9 FL (ref 11.5–15)
PH UA: 7 (ref 5–9)
PLATELET # BLD: 214 E9/L (ref 130–450)
PMV BLD AUTO: 10.7 FL (ref 7–12)
POTASSIUM SERPL-SCNC: 3.9 MMOL/L (ref 3.5–5)
PROTEIN UA: NEGATIVE MG/DL
RBC # BLD: 4.44 E12/L (ref 3.5–5.5)
RBC UA: NORMAL /HPF (ref 0–2)
SODIUM BLD-SCNC: 135 MMOL/L (ref 132–146)
SPECIFIC GRAVITY UA: 1.01 (ref 1–1.03)
UROBILINOGEN, URINE: 0.2 E.U./DL
WBC # BLD: 12.4 E9/L (ref 4.5–11.5)
WBC UA: NORMAL /HPF (ref 0–5)

## 2019-03-20 PROCEDURE — 99284 EMERGENCY DEPT VISIT MOD MDM: CPT

## 2019-03-20 PROCEDURE — 85025 COMPLETE CBC W/AUTO DIFF WBC: CPT

## 2019-03-20 PROCEDURE — 96374 THER/PROPH/DIAG INJ IV PUSH: CPT

## 2019-03-20 PROCEDURE — 80048 BASIC METABOLIC PNL TOTAL CA: CPT

## 2019-03-20 PROCEDURE — 74177 CT ABD & PELVIS W/CONTRAST: CPT

## 2019-03-20 PROCEDURE — 83605 ASSAY OF LACTIC ACID: CPT

## 2019-03-20 PROCEDURE — 2580000003 HC RX 258: Performed by: RADIOLOGY

## 2019-03-20 PROCEDURE — 81001 URINALYSIS AUTO W/SCOPE: CPT

## 2019-03-20 PROCEDURE — 6360000004 HC RX CONTRAST MEDICATION: Performed by: RADIOLOGY

## 2019-03-20 PROCEDURE — 96375 TX/PRO/DX INJ NEW DRUG ADDON: CPT

## 2019-03-20 PROCEDURE — 6360000002 HC RX W HCPCS: Performed by: STUDENT IN AN ORGANIZED HEALTH CARE EDUCATION/TRAINING PROGRAM

## 2019-03-20 PROCEDURE — 2580000003 HC RX 258: Performed by: STUDENT IN AN ORGANIZED HEALTH CARE EDUCATION/TRAINING PROGRAM

## 2019-03-20 RX ORDER — MORPHINE SULFATE 2 MG/ML
4 INJECTION, SOLUTION INTRAMUSCULAR; INTRAVENOUS ONCE
Status: COMPLETED | OUTPATIENT
Start: 2019-03-20 | End: 2019-03-20

## 2019-03-20 RX ORDER — MAGNESIUM CARB/ALUMINUM HYDROX 105-160MG
TABLET,CHEWABLE ORAL
Qty: 1 BOTTLE | Refills: 0 | Status: SHIPPED | OUTPATIENT
Start: 2019-03-20 | End: 2019-05-30

## 2019-03-20 RX ORDER — ONDANSETRON 2 MG/ML
4 INJECTION INTRAMUSCULAR; INTRAVENOUS ONCE
Status: COMPLETED | OUTPATIENT
Start: 2019-03-20 | End: 2019-03-20

## 2019-03-20 RX ORDER — SODIUM CHLORIDE 0.9 % (FLUSH) 0.9 %
10 SYRINGE (ML) INJECTION PRN
Status: DISCONTINUED | OUTPATIENT
Start: 2019-03-20 | End: 2019-03-20 | Stop reason: HOSPADM

## 2019-03-20 RX ORDER — 0.9 % SODIUM CHLORIDE 0.9 %
1000 INTRAVENOUS SOLUTION INTRAVENOUS ONCE
Status: COMPLETED | OUTPATIENT
Start: 2019-03-20 | End: 2019-03-20

## 2019-03-20 RX ADMIN — IOPAMIDOL 110 ML: 755 INJECTION, SOLUTION INTRAVENOUS at 20:15

## 2019-03-20 RX ADMIN — ONDANSETRON 4 MG: 2 INJECTION INTRAMUSCULAR; INTRAVENOUS at 19:43

## 2019-03-20 RX ADMIN — MORPHINE SULFATE 4 MG: 2 INJECTION, SOLUTION INTRAMUSCULAR; INTRAVENOUS at 19:38

## 2019-03-20 RX ADMIN — SODIUM CHLORIDE 1000 ML: 9 INJECTION, SOLUTION INTRAVENOUS at 19:44

## 2019-03-20 RX ADMIN — Medication 10 ML: at 20:15

## 2019-03-20 ASSESSMENT — ENCOUNTER SYMPTOMS
NAUSEA: 0
CONSTIPATION: 1
WHEEZING: 0
ABDOMINAL PAIN: 0
COLOR CHANGE: 0
COUGH: 0
VOMITING: 0
SHORTNESS OF BREATH: 0
DIARRHEA: 0

## 2019-03-20 ASSESSMENT — PAIN - FUNCTIONAL ASSESSMENT: PAIN_FUNCTIONAL_ASSESSMENT: PREVENTS OR INTERFERES SOME ACTIVE ACTIVITIES AND ADLS

## 2019-03-20 ASSESSMENT — PAIN SCALES - GENERAL
PAINLEVEL_OUTOF10: 9
PAINLEVEL_OUTOF10: 9

## 2019-03-20 ASSESSMENT — PAIN DESCRIPTION - PAIN TYPE: TYPE: ACUTE PAIN

## 2019-03-20 ASSESSMENT — PAIN DESCRIPTION - LOCATION: LOCATION: ABDOMEN

## 2019-03-22 ENCOUNTER — OFFICE VISIT (OUTPATIENT)
Dept: FAMILY MEDICINE CLINIC | Age: 64
End: 2019-03-22

## 2019-03-22 ENCOUNTER — HOSPITAL ENCOUNTER (EMERGENCY)
Age: 64
Discharge: HOME | End: 2019-03-22
Attending: EMERGENCY MEDICINE

## 2019-03-22 ENCOUNTER — APPOINTMENT (OUTPATIENT)
Dept: GENERAL RADIOLOGY | Age: 64
End: 2019-03-22

## 2019-03-22 VITALS
BODY MASS INDEX: 30.79 KG/M2 | OXYGEN SATURATION: 98 % | HEIGHT: 65 IN | TEMPERATURE: 97.4 F | HEART RATE: 79 BPM | SYSTOLIC BLOOD PRESSURE: 128 MMHG | DIASTOLIC BLOOD PRESSURE: 68 MMHG

## 2019-03-22 VITALS
OXYGEN SATURATION: 95 % | DIASTOLIC BLOOD PRESSURE: 83 MMHG | TEMPERATURE: 97.6 F | HEIGHT: 65 IN | BODY MASS INDEX: 29.99 KG/M2 | RESPIRATION RATE: 14 BRPM | SYSTOLIC BLOOD PRESSURE: 154 MMHG | WEIGHT: 180 LBS | HEART RATE: 73 BPM

## 2019-03-22 DIAGNOSIS — R10.84 GENERALIZED ABDOMINAL PAIN: Primary | ICD-10-CM

## 2019-03-22 DIAGNOSIS — R10.30 LOWER ABDOMINAL PAIN: Primary | ICD-10-CM

## 2019-03-22 DIAGNOSIS — F50.2 VOMITING ASSOCIATED WITH BULIMIA NERVOSA WITH NAUSEA: ICD-10-CM

## 2019-03-22 DIAGNOSIS — R11.2 INTRACTABLE VOMITING WITH NAUSEA, UNSPECIFIED VOMITING TYPE: ICD-10-CM

## 2019-03-22 DIAGNOSIS — K59.00 CONSTIPATION, UNSPECIFIED CONSTIPATION TYPE: ICD-10-CM

## 2019-03-22 LAB
ALBUMIN SERPL-MCNC: 4.2 G/DL (ref 3.5–5.2)
ALP BLD-CCNC: 94 U/L (ref 35–104)
ALT SERPL-CCNC: 21 U/L (ref 0–32)
ANION GAP SERPL CALCULATED.3IONS-SCNC: 16 MMOL/L (ref 7–16)
APTT: 27.6 SEC (ref 24.5–35.1)
AST SERPL-CCNC: 34 U/L (ref 0–31)
BASOPHILS ABSOLUTE: 0.03 E9/L (ref 0–0.2)
BASOPHILS RELATIVE PERCENT: 0.4 % (ref 0–2)
BILIRUB SERPL-MCNC: 0.3 MG/DL (ref 0–1.2)
BUN BLDV-MCNC: 9 MG/DL (ref 8–23)
CALCIUM SERPL-MCNC: 9 MG/DL (ref 8.6–10.2)
CHLORIDE BLD-SCNC: 98 MMOL/L (ref 98–107)
CO2: 24 MMOL/L (ref 22–29)
CREAT SERPL-MCNC: 0.8 MG/DL (ref 0.5–1)
EOSINOPHILS ABSOLUTE: 0.01 E9/L (ref 0.05–0.5)
EOSINOPHILS RELATIVE PERCENT: 0.1 % (ref 0–6)
GFR AFRICAN AMERICAN: >60
GFR NON-AFRICAN AMERICAN: >60 ML/MIN/1.73
GLUCOSE BLD-MCNC: 83 MG/DL (ref 74–99)
HCT VFR BLD CALC: 45.7 % (ref 34–48)
HEMOGLOBIN: 16.1 G/DL (ref 11.5–15.5)
IMMATURE GRANULOCYTES #: 0.03 E9/L
IMMATURE GRANULOCYTES %: 0.4 % (ref 0–5)
INR BLD: 1
LACTIC ACID: 1.7 MMOL/L (ref 0.5–2.2)
LIPASE: 26 U/L (ref 13–60)
LYMPHOCYTES ABSOLUTE: 1.2 E9/L (ref 1.5–4)
LYMPHOCYTES RELATIVE PERCENT: 16.2 % (ref 20–42)
MAGNESIUM: 2 MG/DL (ref 1.6–2.6)
MCH RBC QN AUTO: 34.8 PG (ref 26–35)
MCHC RBC AUTO-ENTMCNC: 35.2 % (ref 32–34.5)
MCV RBC AUTO: 98.7 FL (ref 80–99.9)
MONOCYTES ABSOLUTE: 1.09 E9/L (ref 0.1–0.95)
MONOCYTES RELATIVE PERCENT: 14.7 % (ref 2–12)
NEUTROPHILS ABSOLUTE: 5.03 E9/L (ref 1.8–7.3)
NEUTROPHILS RELATIVE PERCENT: 68.2 % (ref 43–80)
PDW BLD-RTO: 11.9 FL (ref 11.5–15)
PLATELET # BLD: 207 E9/L (ref 130–450)
PMV BLD AUTO: 11.1 FL (ref 7–12)
POTASSIUM SERPL-SCNC: 3.5 MMOL/L (ref 3.5–5)
PROTHROMBIN TIME: 11.5 SEC (ref 9.3–12.4)
RBC # BLD: 4.63 E12/L (ref 3.5–5.5)
SODIUM BLD-SCNC: 138 MMOL/L (ref 132–146)
TOTAL PROTEIN: 6.6 G/DL (ref 6.4–8.3)
WBC # BLD: 7.4 E9/L (ref 4.5–11.5)

## 2019-03-22 PROCEDURE — 99214 OFFICE O/P EST MOD 30 MIN: CPT | Performed by: FAMILY MEDICINE

## 2019-03-22 PROCEDURE — 83690 ASSAY OF LIPASE: CPT

## 2019-03-22 PROCEDURE — 85025 COMPLETE CBC W/AUTO DIFF WBC: CPT

## 2019-03-22 PROCEDURE — 87040 BLOOD CULTURE FOR BACTERIA: CPT

## 2019-03-22 PROCEDURE — 96375 TX/PRO/DX INJ NEW DRUG ADDON: CPT

## 2019-03-22 PROCEDURE — 74018 RADEX ABDOMEN 1 VIEW: CPT

## 2019-03-22 PROCEDURE — 36415 COLL VENOUS BLD VENIPUNCTURE: CPT

## 2019-03-22 PROCEDURE — 80053 COMPREHEN METABOLIC PANEL: CPT

## 2019-03-22 PROCEDURE — 96374 THER/PROPH/DIAG INJ IV PUSH: CPT

## 2019-03-22 PROCEDURE — 99284 EMERGENCY DEPT VISIT MOD MDM: CPT

## 2019-03-22 PROCEDURE — 6360000002 HC RX W HCPCS: Performed by: EMERGENCY MEDICINE

## 2019-03-22 PROCEDURE — 83735 ASSAY OF MAGNESIUM: CPT

## 2019-03-22 PROCEDURE — 85730 THROMBOPLASTIN TIME PARTIAL: CPT

## 2019-03-22 PROCEDURE — 85610 PROTHROMBIN TIME: CPT

## 2019-03-22 PROCEDURE — 83605 ASSAY OF LACTIC ACID: CPT

## 2019-03-22 PROCEDURE — 2580000003 HC RX 258: Performed by: EMERGENCY MEDICINE

## 2019-03-22 RX ORDER — DICYCLOMINE HYDROCHLORIDE 10 MG/1
10 CAPSULE ORAL 4 TIMES DAILY
Qty: 360 CAPSULE | Refills: 1 | Status: SHIPPED | OUTPATIENT
Start: 2019-03-22 | End: 2019-04-01

## 2019-03-22 RX ORDER — PAROXETINE HYDROCHLORIDE 20 MG/1
10 TABLET, FILM COATED ORAL EVERY MORNING
COMMUNITY
End: 2019-05-30

## 2019-03-22 RX ORDER — ONDANSETRON 2 MG/ML
4 INJECTION INTRAMUSCULAR; INTRAVENOUS ONCE
Status: COMPLETED | OUTPATIENT
Start: 2019-03-22 | End: 2019-03-22

## 2019-03-22 RX ORDER — 0.9 % SODIUM CHLORIDE 0.9 %
1000 INTRAVENOUS SOLUTION INTRAVENOUS ONCE
Status: COMPLETED | OUTPATIENT
Start: 2019-03-22 | End: 2019-03-22

## 2019-03-22 RX ORDER — MORPHINE SULFATE 2 MG/ML
4 INJECTION, SOLUTION INTRAMUSCULAR; INTRAVENOUS ONCE
Status: COMPLETED | OUTPATIENT
Start: 2019-03-22 | End: 2019-03-22

## 2019-03-22 RX ADMIN — ONDANSETRON 4 MG: 2 INJECTION INTRAMUSCULAR; INTRAVENOUS at 15:44

## 2019-03-22 RX ADMIN — MORPHINE SULFATE 4 MG: 2 INJECTION, SOLUTION INTRAMUSCULAR; INTRAVENOUS at 15:44

## 2019-03-22 RX ADMIN — SODIUM CHLORIDE 1000 ML: 9 INJECTION, SOLUTION INTRAVENOUS at 15:44

## 2019-03-22 ASSESSMENT — PAIN DESCRIPTION - LOCATION: LOCATION: ABDOMEN

## 2019-03-22 ASSESSMENT — PAIN SCALES - GENERAL
PAINLEVEL_OUTOF10: 5
PAINLEVEL_OUTOF10: 9

## 2019-03-22 ASSESSMENT — PAIN DESCRIPTION - PROGRESSION: CLINICAL_PROGRESSION: GRADUALLY IMPROVING

## 2019-03-22 ASSESSMENT — PAIN DESCRIPTION - PAIN TYPE: TYPE: ACUTE PAIN

## 2019-03-24 ASSESSMENT — ENCOUNTER SYMPTOMS
NAUSEA: 1
ABDOMINAL PAIN: 1
COUGH: 0
CHEST TIGHTNESS: 0
CONSTIPATION: 1
DIARRHEA: 0
RESPIRATORY NEGATIVE: 1
BACK PAIN: 1
BLOOD IN STOOL: 0
VOMITING: 1
SHORTNESS OF BREATH: 0

## 2019-03-25 ENCOUNTER — TELEPHONE (OUTPATIENT)
Dept: FAMILY MEDICINE CLINIC | Age: 64
End: 2019-03-25

## 2019-03-25 RX ORDER — ONDANSETRON 4 MG/1
4 TABLET, ORALLY DISINTEGRATING ORAL EVERY 8 HOURS PRN
Qty: 9 TABLET | Refills: 0 | Status: SHIPPED | OUTPATIENT
Start: 2019-03-25 | End: 2019-03-28

## 2019-03-27 LAB
BLOOD CULTURE, ROUTINE: NORMAL
CULTURE, BLOOD 2: NORMAL

## 2019-03-29 LAB
EKG ATRIAL RATE: 100 BPM
EKG P AXIS: 81 DEGREES
EKG P-R INTERVAL: 160 MS
EKG Q-T INTERVAL: 364 MS
EKG QRS DURATION: 96 MS
EKG QTC CALCULATION (BAZETT): 469 MS
EKG R AXIS: 75 DEGREES
EKG T AXIS: 76 DEGREES
EKG VENTRICULAR RATE: 100 BPM

## 2019-04-01 ENCOUNTER — OFFICE VISIT (OUTPATIENT)
Dept: FAMILY MEDICINE CLINIC | Age: 64
End: 2019-04-01

## 2019-04-01 ENCOUNTER — TELEPHONE (OUTPATIENT)
Dept: FAMILY MEDICINE CLINIC | Age: 64
End: 2019-04-01

## 2019-04-01 VITALS
BODY MASS INDEX: 30.72 KG/M2 | HEART RATE: 75 BPM | WEIGHT: 184.4 LBS | DIASTOLIC BLOOD PRESSURE: 86 MMHG | OXYGEN SATURATION: 97 % | SYSTOLIC BLOOD PRESSURE: 138 MMHG | HEIGHT: 65 IN

## 2019-04-01 DIAGNOSIS — I10 ESSENTIAL HYPERTENSION: ICD-10-CM

## 2019-04-01 DIAGNOSIS — E11.9 TYPE 2 DIABETES MELLITUS WITHOUT COMPLICATION, WITHOUT LONG-TERM CURRENT USE OF INSULIN (HCC): Primary | ICD-10-CM

## 2019-04-01 LAB — HBA1C MFR BLD: 5.4 %

## 2019-04-01 PROCEDURE — 99213 OFFICE O/P EST LOW 20 MIN: CPT | Performed by: FAMILY MEDICINE

## 2019-04-01 PROCEDURE — 83036 HEMOGLOBIN GLYCOSYLATED A1C: CPT | Performed by: FAMILY MEDICINE

## 2019-04-01 NOTE — PROGRESS NOTES
[facility]  Office Progress Note - Dr. Essence James  4/1/19    CC:   Chief Complaint   Patient presents with    Hypertension     3 month f/u        S:   Hypertension  Follow-up  Blood pressure is borderline controlled today. Improved on recheck. Her readings from home still show regular elevations into the 144H and 995 systolic. BP Readings from Last 3 Encounters:   04/01/19 138/86   03/22/19 (!) 154/83   03/22/19 128/68     Patient continues medications regularly. Compliance is good. Denies CP, sob, abd pain, headaches, vision changes, dizziness, hypotensive symptoms. No side effects from medications noted. Diabetes mellitus  Follow-up -problem may be resolved. Stable A1c in the fives over time. She continues 500 mg of metformin daily. No polyuria polydipsia or polyphagia. Lab Results   Component Value Date    LABA1C 5.4 04/01/2019    LABA1C 5.6 07/06/2018    LABA1C 5.5 07/06/2017     Lab Results   Component Value Date    LABMICR <12.0 11/16/2017    LDLCALC 102 (H) 01/05/2018    CREATININE 0.8 03/22/2019     Wt Readings from Last 3 Encounters:   04/01/19 184 lb 6.4 oz (83.6 kg)   03/22/19 180 lb (81.6 kg)   03/20/19 185 lb (83.9 kg)     Patient continues diabetic medication regimen. Compliance is good. No side effects of medications noted. Diabetes is adequately controlled. Peripheral neuropathy is not present. Regular foot exams encouraged. Vision changes are not present. Yearly eye exam encouraged.         Past Medical History:   Diagnosis Date    Chronic back pain     Costochondritis     Depression     Diabetic neuropathy (HCC)     legs and feet    Falls frequently     none recent as of 2/19/19    Fibromyalgia     Hallux rigidus of left foot     HTN (hypertension)     Hyperlipidemia     CLYDE on CPAP     Osteoarthritis     Rheumatoid arthritis(714.0)     TIA (transient ischemic attack)     Type II or unspecified type diabetes mellitus without mention of complication, not stated as uncontrolled     Use of cane as ambulatory aid        Family History   Problem Relation Age of Onset    Dementia Mother     Breast Cancer Mother     Cancer Mother         breast cancer [de-identified]     Stroke Mother     Heart Attack Father     Other Father 80        Aortic aneurysm       Past Surgical History:   Procedure Laterality Date    ANESTHESIA NERVE BLOCK Left 2/26/2019    LEFT C3,4,5 MBB performed by Quinn Zamorano MD at [address]  2005    Left    ARTHRODESIS Left 12/14/2018    ARTHRODESIS 2ND DIGIT LEFT FOOT performed by Derrick Thomson DPM at [facility]  08/16/2017    PLIF L3-L4, L4-L5 with rods, screws, and cages/Dr. Moiz Ku BREAST SURGERY  2010    reduction, bilat   [address] [address]    CHOLECYSTECTOMY  2011    COLONOSCOPY  03/27/2015    ENDOSCOPY, COLON, DIAGNOSTIC      FOOT SURGERY  2005    Left    HARDWARE REMOVAL FOOT / ANKLE Left 12/14/2018    REMOVAL OF PAINFUL HARDWARE LEFT FOOT  ++SYNTHES++ performed by Derrick Thomson DPM at [facility]    inguinal     NERVE BLOCK Bilateral 05/07/2018    L1-2 lumbar foramen #1    NERVE BLOCK Bilateral 12/03/2018    s1 tfesi    OTHER SURGICAL HISTORY Left 9/25/13    left foot tarso metatarsal joint injection    OTHER SURGICAL HISTORY Left 5/27/15    Endoscopic Gastroc recession left, Lapidus left foot and  Excision of exostosis left foot    NC INJECT ANES/STEROID FORAMEN LUMBAR/SACRAL W IMG GUIDE ,1 LEVEL Bilateral 12/3/2018    BILATERAL S1  EPIDURAL STEROID INJECTION performed by Quinn Zamorano MD at [address] [facility] DX/THER SBST INTRLMNR LMBR/SAC W/IMG GDN N/A 8/21/2018    EPIDURAL STEROID INJECTION L1-2 WITH LOW VOL performed by Quinn Zamorano MD at [address] [facility] NOSE/CLEFT LIP/TIP Bilateral 5/7/2018    BILATERAL L1-2 LUMBAR FORAMEN #1 performed by Quinn Zamorano MD at [address] [facility] NOSE/CLEFT LIP/TIP Bilateral 6/4/2018    BILATERAL TRANSFORAMINAL EPIDURAL STEROID INJECTION UNDER FLUOROSCOPIC GUIDANCE @ FORAMINAL LEVEL L1-2 #2 performed by Cristina Patel MD at [address]  2000, 2005    Big Left Toe    TONSILLECTOMY      WISDOM TOOTH EXTRACTION         Social History     Tobacco Use    Smoking status: Current Every Day Smoker     Packs/day: 0.50     Years: 30.00     Pack years: 15.00     Types: Cigarettes    Smokeless tobacco: Never Used   Substance Use Topics    Alcohol use: Yes     Alcohol/week: 0.0 oz     Comment: rarely    Drug use: No       ROS:  No CP, No palpitations,   No sob, No cough,   No abd pain, No heartburn,   No headaches,   No tingling, No numbness, No weakness,   No bowel changes, No hematochezia, No melena,  No bladder changes, No hematuria  No skin rashes, No skin lesions. No vision changes, No hearing changes,   No polyuria, polydipsia, polyphagia. Stable mood. ROS otherwise negative unless as listed in HPI. Chart reviewed and updated where appropriate for PMH, Fam, and Soc Hx. Physical Exam   /86   Pulse 75   Ht 5' 5\" (1.651 m)   Wt 184 lb 6.4 oz (83.6 kg)   LMP  (LMP Unknown)   SpO2 97%   BMI 30.69 kg/m²   Wt Readings from Last 3 Encounters:   04/01/19 184 lb 6.4 oz (83.6 kg)   03/22/19 180 lb (81.6 kg)   03/20/19 185 lb (83.9 kg)       Constitutional:    She is oriented to person, place, and time. She appears well-developed and well-nourished. HENT:    Nose: Nose normal.    Mouth/Throat: Oropharynx is clear and moist.   Eyes:    Conjunctivae are normal.    Pupils are equal, round, and reactive to light. EOMI. Neck:    Normal range of motion. No thyromegaly or nodules noted. No bruit. Cardiovascular:    Normal rate, regular rhythm and normal heart sounds. No murmur. No gallop and no friction rub. Pulmonary/Chest:    Effort normal and breath sounds normal.    No wheezes. No rales or rhonchi. Abdominal:    Soft. Bowel sounds are normal.    No distension.  Osteoarthritis M19.90    Chronic back pain M54.9, G89.29    Fibromyalgia M79.7    HTN (hypertension) I10    Hyperlipidemia E78.5    History of adenomatous polyp of colon Z86.010    Vitamin D deficiency E55.9    Coccyx pain M53.3    Acute bilateral low back pain with sciatica M54.40    Lumbar stenosis M48.061    Chronic bilateral low back pain without sciatica M54.5, G89.29    DDD (degenerative disc disease), lumbar M51.36    Spinal stenosis of lumbar region without neurogenic claudication M48.061    Lumbar facet arthropathy M47.816    Chronic pain syndrome G89.4    Lumbar radiculopathy M54.16    Neck pain M54.2    Left foot pain M79.672    Painful orthopaedic hardware [facility]) T84.84XA    Cervical facet joint syndrome M47.812        Assessment / Olivia Peña was seen today for hypertension. Diagnoses and all orders for this visit:    Type 2 diabetes mellitus without complication, without long-term current use of insulin ([address] normal and stable A1c's  -     POCT glycosylated hemoglobin (Hb A1C)  Discontinue metformin. Recheck A1c in 3 months. Essential hypertension  Borderline control. We did a complete med rec today and outlined how she should be taking her medications. She will work on medication compliance. She will monitor her pressures at home and watch for readings regularly below 140/90. She will let me know if she is not having acceptable readings there. Return in about 3 months (around 7/1/2019). Patient counseled to follow up sooner or seek more acute care if symptoms worsening. Electronically signed by Domenica Foster MD on 4/1/2019    This note may have been created using dictation software.  Efforts were made to reduce grammatical or syntax errors, but some may persist.

## 2019-04-11 NOTE — PROGRESS NOTES
[facility], [address] [place] [address] Lexa  [phone]    Follow up Note      Oleg Zamorano     Date of Visit:  4/12/2019    CC:  Patient presents for follow up   Chief Complaint   Patient presents with    Neck Pain     worse on the left    Back Pain     low        HPI:    Pain is unchanged. Midline pain is still present to neck, Midline to lower back extending to right hip area. On average,pain is perceived as moderate (7 low back - pain scale). Change in quality of symptoms:no. Lower back is doing well at this point. Patient satisfaction with analgesia:fair. Medication side effects: None. Recent diagnostic testing:none. Recent interventional procedures: 6/11/18 PROCEDURE:  Lumbar paravertebral trigger point injections. poor. 6/4/18:  PROCEDURE: Bilateral Transforaminal epidural steroid injection under fluoroscopic guidance at foraminal level L1-2 (#2).  5/7/18:  PROCEDURE: Bilateral Transforaminal epidural steroid injection under fluoroscopic guidance at foraminal level L1-2 (#1).  8/21/18:  PROCEDURE: Fluoroscopic guided therapeutic lumbar epidural steroid injection at the L2-3 level (# 1). 70% better. 12/3/2018 PROCEDURE: Bilateral Transforaminal epidural steroid injection under fluoroscopic guidance at foraminal level S1 (#1) - excellent relief so far.  2/26/2019:  PROCEDURE: # 1  Left Cervical medial branch blocks at  Levels C3, C4 and C5 - left paraspinal area is better, but midline was never better. She has been on anticoagulation medications to include NSAIDS. The patient  has not been on herbal supplements. The patient is diabetic. Imaging:  MRI lumbar spine 03/2018  1. No significant interval change is observed since the previous study   of October 2017.       2. Stable postoperative changes/posterior spinal fusion at the   L3-L4-L5 level.       3. Severe spine canal stenosis in the level of L2-L3           4.  Encroachment of the Yes Valerie Lees MD   hydrOXYzine (VISTARIL) 25 MG capsule Take 1 capsule by mouth 3 times daily as needed for Anxiety  Patient taking differently: Take 25 mg by mouth 2 times daily as needed for Anxiety  1/28/19  Yes Skylar Sims MD   pitavastatin (LIVALO) 1 MG TABS tablet Take 1 mg by mouth nightly   Yes Historical Provider, MD   Magnesium Citrate 1.745 GM/30ML solution Use as directed  Patient taking differently: Take 296 mLs by mouth once  3/20/19   Joan Randle DO   quinapril (ACCUPRIL) 40 MG tablet Take 1 tablet by mouth daily For blood pressure. Patient taking differently: Take 40 mg by mouth every morning For blood pressure. 2/22/19   Valerie Lees MD   metFORMIN (GLUCOPHAGE XR) 500 MG extended release tablet Take 1 tablet by mouth daily (with breakfast) 2/22/19   Valerie Lees MD       Allergies   Allergen Reactions    Pcn [Penicillins] Anaphylaxis       Social History     Socioeconomic History    Marital status:      Spouse name: Not on file    Number of children: Not on file    Years of education: Not on file    Highest education level: Not on file   Occupational History    Not on file   Social Needs    Financial resource strain: Not on file    Food insecurity:     Worry: Not on file     Inability: Not on file    Transportation needs:     Medical: Not on file     Non-medical: Not on file   Tobacco Use    Smoking status: Current Every Day Smoker     Packs/day: 0.50     Years: 30.00     Pack years: 15.00     Types: Cigarettes    Smokeless tobacco: Never Used   Substance and Sexual Activity    Alcohol use:  Yes     Alcohol/week: 0.0 oz     Comment: rarely    Drug use: No    Sexual activity: Not on file     Comment: Single    Lifestyle    Physical activity:     Days per week: Not on file     Minutes per session: Not on file    Stress: Not on file   Relationships    Social connections:     Talks on phone: Not on file     Gets together: Not on file     Attends Orthodoxy service: Not on file     Active member of club or organization: Not on file     Attends meetings of clubs or organizations: Not on file     Relationship status: Not on file    Intimate partner violence:     Fear of current or ex partner: Not on file     Emotionally abused: Not on file     Physically abused: Not on file     Forced sexual activity: Not on file   Other Topics Concern    Not on file   Social History Narrative    Not on file       Family History   Problem Relation Age of Onset    Dementia Mother     Breast Cancer Mother     Cancer Mother         breast cancer [de-identified]     Stroke Mother     Heart Attack Father     Other Father 80        Aortic aneurysm       REVIEW OF SYSTEMS:     Karissa Manriquez denies fever/chills, chest pain, shortness of breath, new bowel or bladder complaints or suicidal ideations. All other review of systems was negative. PHYSICAL EXAMINATION:      /82   Pulse 84   Temp 97.8 °F (36.6 °C) (Oral)   Resp 18   Ht 5' 5\" (1.651 m)   Wt 185 lb (83.9 kg)   LMP  (LMP Unknown)   SpO2 94%   BMI 30.79 kg/m²     General:      General appearance: awake, alert, oriented, in no acute distress, well developed, well nourished and in no acute distress   pleasant and well-hydrated. in no distress and A & O x3  Build:Overweight  Function:Rises from a seated position with difficulty    HEENT:    Head:normocephalic and atraumatic  Pupils:regular, round and equal.  Sclera: icterus absent  EOM:full and intact. Lungs:    Breathing:Normal expansion. Clear to auscultation. No rales, rhonchi, or wheezing. Abdomen:    Shape:non-distended and normal  Tenderness:none  Guarding:none     Cervical spine:  Spine inspection:  Normal  Palpation:  + Midline TTP, negative paraspinal left and right, negative facet loading left and right  Range of motion:abnormal moderately Lateral bending, flexion, extension rotation left and is not painful.         Lumbar spine:    Spine inspection:surgical incision perform her daily activities with no signs of abuse or diversion) - ordered for 04/13/2019. Continue with Gabapentin 600 mg TID - through PCP  [place]ARRS report reviewed 04/2019  Patient encouraged to stay active and to lose weight. Failed physical therapy for her cervical spine  Treatment plan discussed with the patient including medications side effects     Controlled Substances Monitoring:     RX Monitoring 4/12/2019   Attestation The Prescription Monitoring Report for this patient was reviewed today. Acute Pain Prescriptions -   Chronic Pain Routine Monitoring Possible medication side effects, risk of tolerance/dependence & alternative treatments discussed. ;No signs of potential drug abuse or diversion identified: otherwise, see note documentation   Chronic Pain > 80 MEDD Obtained or confirmed a written medication contract was on file.                            ccreferring physic

## 2019-04-12 ENCOUNTER — OFFICE VISIT (OUTPATIENT)
Dept: PAIN MANAGEMENT | Age: 64
End: 2019-04-12

## 2019-04-12 VITALS
BODY MASS INDEX: 30.82 KG/M2 | DIASTOLIC BLOOD PRESSURE: 82 MMHG | HEIGHT: 65 IN | RESPIRATION RATE: 18 BRPM | HEART RATE: 84 BPM | TEMPERATURE: 97.8 F | OXYGEN SATURATION: 94 % | SYSTOLIC BLOOD PRESSURE: 118 MMHG | WEIGHT: 185 LBS

## 2019-04-12 DIAGNOSIS — M47.812 CERVICAL FACET JOINT SYNDROME: ICD-10-CM

## 2019-04-12 PROCEDURE — 99213 OFFICE O/P EST LOW 20 MIN: CPT | Performed by: PHYSICIAN ASSISTANT

## 2019-04-12 RX ORDER — HYDROCODONE BITARTRATE AND ACETAMINOPHEN 7.5; 325 MG/1; MG/1
1 TABLET ORAL 2 TIMES DAILY
Qty: 60 TABLET | Refills: 0 | Status: SHIPPED | OUTPATIENT
Start: 2019-04-13 | End: 2019-05-10 | Stop reason: SDUPTHER

## 2019-04-16 DIAGNOSIS — I10 ESSENTIAL HYPERTENSION: ICD-10-CM

## 2019-04-16 RX ORDER — HYDROCHLOROTHIAZIDE 12.5 MG/1
12.5 TABLET ORAL EVERY MORNING
Qty: 30 TABLET | Refills: 2 | Status: SHIPPED | OUTPATIENT
Start: 2019-04-16 | End: 2019-07-01 | Stop reason: SDUPTHER

## 2019-05-09 NOTE — PROGRESS NOTES
[facility], [address] [place] [address] Lexa  [phone]    Follow up Note      Kayla Oneal     Date of Visit:  5/10/2019    CC:  Patient presents for follow up   Chief Complaint   Patient presents with    Pain     neck down the back        HPI:    Pain is unchanged. Feels that she gets \"stuck\" when going from sitting to standing. On average,pain is perceived as moderate (7 low back - pain scale). Change in quality of symptoms:no. Seeing a massage therapist.    Patient satisfaction with analgesia:fair. Medication side effects: None. Recent diagnostic testing:none. Recent interventional procedures: 6/11/18 PROCEDURE:  Lumbar paravertebral trigger point injections. poor. 6/4/18:  PROCEDURE: Bilateral Transforaminal epidural steroid injection under fluoroscopic guidance at foraminal level L1-2 (#2).  5/7/18:  PROCEDURE: Bilateral Transforaminal epidural steroid injection under fluoroscopic guidance at foraminal level L1-2 (#1).  8/21/18:  PROCEDURE: Fluoroscopic guided therapeutic lumbar epidural steroid injection at the L2-3 level (# 1). 70% better. 12/3/2018 PROCEDURE: Bilateral Transforaminal epidural steroid injection under fluoroscopic guidance at foraminal level S1 (#1) - excellent relief so far.  2/26/2019:  PROCEDURE: # 1  Left Cervical medial branch blocks at  Levels C3, C4 and C5 - left paraspinal area is better, but midline was never better. She has been on anticoagulation medications to include NSAIDS. The patient  has not been on herbal supplements. The patient is diabetic. Imaging:  MRI lumbar spine 03/2018  1. No significant interval change is observed since the previous study   of October 2017.       2. Stable postoperative changes/posterior spinal fusion at the   L3-L4-L5 level.       3. Severe spine canal stenosis in the level of L2-L3           4.  Encroachment of the neural foramina bilaterally in levels of L2-L3   and L5-S1    Thoracic spine MRI 2018  1. Some degenerative changes in the thoracic spine, mainly in the   facet joints in the mid-upper thoracic spine segments       2. Discrete loss of height of T6, more likely an old finding.      Previous treatments: Physical Therapy, Surgery L3-5 fusion/laminectomy and medications. .       Potential Aberrant Drug-Related Behavior:  No     Urine Drug Screenin18:  Consistent for norhydrocodone metabolite  2018:  Consistent for hydrocodone and norhydrocodone      OARRS report:  2018 consistent   2018 consistent   2018 consistent   2018 consistent   2018 consistent   2018 consistent  2019 consistent  2019 consistent  2019 consistent  2019 consistent      Past Medical History:   Diagnosis Date    Chronic back pain     Costochondritis     Depression     Diabetic neuropathy ([facility] [address]     legs and feet    Falls frequently     none recent as of 19    Fibromyalgia     Hallux rigidus of left foot     HTN (hypertension)     Hyperlipidemia     CLYDE on CPAP     Osteoarthritis     Rheumatoid arthritis(714.0)     TIA (transient ischemic attack)     Type II or unspecified type diabetes mellitus without mention of complication, not stated as uncontrolled     Use of cane as ambulatory aid        Past Surgical History:   Procedure Laterality Date    ANESTHESIA NERVE BLOCK Left 2019    LEFT C3,4,5 MBB performed by Cristina Patel MD at [address] [address]      Left    ARTHRODESIS Left 2018    ARTHRODESIS 2ND DIGIT LEFT FOOT performed by Damaris Santiago DPM at [facility]  2017    PLIF L3-L4, L4-L5 with rods, screws, and cages/Dr. Melquiades Galicia BREAST SURGERY  2010    reduction, bilat     SECTION      CHOLECYSTECTOMY      COLONOSCOPY  2015    ENDOSCOPY, COLON, DIAGNOSTIC      FOOT SURGERY  2005    Left    HARDWARE REMOVAL FOOT / ANKLE Left 2018    REMOVAL OF PAINFUL HARDWARE LEFT FOOT  ++SYNTHES++ performed by Loretta Brink DPM at [facility]    inguinal     NERVE BLOCK Bilateral 05/07/2018    L1-2 lumbar foramen #1    NERVE BLOCK Bilateral 12/03/2018    s1 tfesi    OTHER SURGICAL HISTORY Left 9/25/13    left foot tarso metatarsal joint injection    OTHER SURGICAL HISTORY Left 5/27/15    Endoscopic Gastroc recession left, Lapidus left foot and  Excision of exostosis left foot    ND INJECT ANES/STEROID FORAMEN LUMBAR/SACRAL W IMG GUIDE ,1 LEVEL Bilateral 12/3/2018    BILATERAL S1  EPIDURAL STEROID INJECTION performed by Christos Amaro MD at [facility] DX/THER SBST INTRLMNR LMBR/SAC W/IMG GDN N/A 8/21/2018    EPIDURAL STEROID INJECTION L1-2 WITH LOW VOL performed by Christos Amaro MD at [address] [facility] NOSE/CLEFT LIP/TIP Bilateral 5/7/2018    BILATERAL L1-2 LUMBAR FORAMEN #1 performed by Christos Amaro MD at [address] [facility] NOSE/CLEFT LIP/TIP Bilateral 6/4/2018    BILATERAL TRANSFORAMINAL EPIDURAL STEROID INJECTION UNDER FLUOROSCOPIC GUIDANCE @ FORAMINAL LEVEL L1-2 #2 performed by Christos Amaro MD at [address]  2000, 2005    Big Left Toe    TONSILLECTOMY      WISDOM TOOTH EXTRACTION         Prior to Admission medications    Medication Sig Start Date End Date Taking? Authorizing Provider   hydrochlorothiazide (HYDRODIURIL) 12.5 MG tablet Take 1 tablet by mouth every morning For blood pressure 4/16/19   Estephanie Goldberg MD   HYDROcodone-acetaminophen (NORCO) 7.5-325 MG per tablet Take 1 tablet by mouth 2 times daily for 30 days. 4/13/19 5/13/19  TREASURE Humphrey   PARoxetine (PAXIL) 20 MG tablet Take 10 mg by mouth every morning     Historical Provider, MD   Magnesium Citrate 1.745 GM/30ML solution Use as directed  Patient taking differently: Take 296 mLs by mouth once  3/20/19   Angelina Craig DO   quinapril (ACCUPRIL) 40 MG tablet Take 1 tablet by mouth daily For blood pressure.   Patient taking differently: Take 40 mg by mouth every morning For blood pressure. 2/22/19   Anay Zapata MD   gabapentin (NEURONTIN) 300 MG capsule Take 1 capsule by mouth 2 times daily for 90 days. . 2/22/19 5/23/19  Anay Zapata MD   metFORMIN (GLUCOPHAGE XR) 500 MG extended release tablet Take 1 tablet by mouth daily (with breakfast) 2/22/19   Anay Zapata MD   hydrOXYzine (VISTARIL) 25 MG capsule Take 1 capsule by mouth 3 times daily as needed for Anxiety  Patient taking differently: Take 25 mg by mouth 2 times daily as needed for Anxiety  1/28/19   Damari Gama MD   pitavastatin (LIVALO) 1 MG TABS tablet Take 1 mg by mouth nightly    Historical Provider, MD       Allergies   Allergen Reactions    Pcn [Penicillins] Anaphylaxis       Social History     Socioeconomic History    Marital status:      Spouse name: Not on file    Number of children: Not on file    Years of education: Not on file    Highest education level: Not on file   Occupational History    Not on file   Social Needs    Financial resource strain: Not on file    Food insecurity:     Worry: Not on file     Inability: Not on file    Transportation needs:     Medical: Not on file     Non-medical: Not on file   Tobacco Use    Smoking status: Current Every Day Smoker     Packs/day: 0.50     Years: 30.00     Pack years: 15.00     Types: Cigarettes    Smokeless tobacco: Never Used   Substance and Sexual Activity    Alcohol use:  Yes     Alcohol/week: 0.0 oz     Comment: rarely    Drug use: No    Sexual activity: Not on file     Comment: Single    Lifestyle    Physical activity:     Days per week: Not on file     Minutes per session: Not on file    Stress: Not on file   Relationships    Social connections:     Talks on phone: Not on file     Gets together: Not on file     Attends Bahai service: Not on file     Active member of club or organization: Not on file     Attends meetings of clubs or organizations: Not on file Relationship status: Not on file    Intimate partner violence:     Fear of current or ex partner: Not on file     Emotionally abused: Not on file     Physically abused: Not on file     Forced sexual activity: Not on file   Other Topics Concern    Not on file   Social History Narrative    Not on file       Family History   Problem Relation Age of Onset    Dementia Mother     Breast Cancer Mother     Cancer Mother         breast cancer [de-identified]     Stroke Mother     Heart Attack Father     Other Father 80        Aortic aneurysm       REVIEW OF SYSTEMS:     Luna Hurt denies fever/chills, chest pain, shortness of breath, new bowel or bladder complaints or suicidal ideations. All other review of systems was negative. PHYSICAL EXAMINATION:      LMP  (LMP Unknown)     General:      General appearance: awake, alert, oriented, in no acute distress, well developed, well nourished and in no acute distress   pleasant and well-hydrated. in no distress and A & O x3  Build:Overweight  Function:Rises from a seated position with difficulty    HEENT:    Head:normocephalic and atraumatic  Pupils:regular, round and equal.  Sclera: icterus absent  EOM:full and intact. Lungs:    Breathing:Normal expansion. Clear to auscultation. No rales, rhonchi, or wheezing. Abdomen:    Shape:non-distended and normal  Tenderness:none  Guarding:none         Lumbar spine:    Spine inspection:surgical incision scar   CVA tenderness:No   Palpation: Negative paraspinal TTP, + midline TTP,  negative facet loading left and right  Range of motion:abnormal moderately Lateral bending, flexion, extension rotation bilateral and is not painful.     Musculoskeletal:    SI joint tenderness:negative right, negative left              EDWAR test: Negative right, not done left  Piriformis tenderness:negative right, negative left  Trochanteric bursa tenderness:negative right, negative left  SLR:negative right, negative left, sitting Extremities:    Tremors:None bilaterally upper and lower  Range of motion:Generally normal shoulders, pain with internal rotation of hips negative. Intact:Yes  Varicose veins:absent   Cyanosis:none  Edema:Normal      Neurological:    Cranial nerves:normal  Sensory:diminished to light lateral aspect of right leg  Motor:                  Right Quadriceps3/5          Left Quadriceps5/5           Right Gastrocnemius3/5    Left Gastrocnemius5/5  Right Ant Tibialis3/5  Left Ant Tibialis5/5  [address]    Dermatology:    Skin:no unusual rashes, no skin lesions, no palpable subcutaneous nodules and good skin turgor    Assessment/Plan:    Chronic low back pain with radiation to the Right groin, patient had low back surgery 08/2017 L3-5 fusion, recently had lumbar spine CT with severe L2-3 stenosis   Follow up on her low back pain, her low back pain is managed with current medications with some pain returning at this point  Patient is complaining of increased neck pain with radiation to the Left shoulder with positive cervical facet tenderness on exam Left worse than Right   Patient is s/p:  PROCEDURE: # 1  Left Cervical medial branch blocks at  Levels C3, C4 and C5 on 2/26/2019 with some continued left sided pain relief at this point but continues to have midline tenderness, which has been present since the injection without relief. Continue with Norco 7.5/325 BID PRN (patient has adqeuate pain control/able to perform her daily activities with no signs of abuse or diversion) - ordered for 05/13/2019. Continue with Gabapentin 600 mg TID - through PCP  OARRS report reviewed 05/2019  UDS ordered today  Currently in massotherapy. Had one session. She would like to hold off injections to see if she has improvement from massotherapy. Patient encouraged to stay active and to lose weight.  Failed physical therapy for her cervical spine  Treatment plan discussed with the patient including medications side effects Controlled Substances Monitoring:     RX Monitoring 5/10/2019   Attestation The Prescription Monitoring Report for this patient was reviewed today. Acute Pain Prescriptions -   Chronic Pain Routine Monitoring Possible medication side effects, risk of tolerance/dependence & alternative treatments discussed. ;Random urine drug screen sent today. ;No signs of potential drug abuse or diversion identified: otherwise, see note documentation   Chronic Pain > 80 MEDD Obtained or confirmed a written medication contract was on file.                                ccreferring physic

## 2019-05-10 ENCOUNTER — HOSPITAL ENCOUNTER (OUTPATIENT)
Age: 64
Discharge: HOME | End: 2019-05-12

## 2019-05-10 ENCOUNTER — OFFICE VISIT (OUTPATIENT)
Dept: PAIN MANAGEMENT | Age: 64
End: 2019-05-10

## 2019-05-10 VITALS
HEART RATE: 102 BPM | SYSTOLIC BLOOD PRESSURE: 132 MMHG | BODY MASS INDEX: 30.82 KG/M2 | TEMPERATURE: 98 F | OXYGEN SATURATION: 99 % | WEIGHT: 185 LBS | RESPIRATION RATE: 18 BRPM | DIASTOLIC BLOOD PRESSURE: 90 MMHG | HEIGHT: 65 IN

## 2019-05-10 DIAGNOSIS — M47.812 CERVICAL FACET JOINT SYNDROME: ICD-10-CM

## 2019-05-10 LAB — SPECIFIC GRAVITY UA: 1.01 (ref 1–1.03)

## 2019-05-10 PROCEDURE — G0480 DRUG TEST DEF 1-7 CLASSES: HCPCS

## 2019-05-10 PROCEDURE — 99213 OFFICE O/P EST LOW 20 MIN: CPT | Performed by: PHYSICIAN ASSISTANT

## 2019-05-10 PROCEDURE — 80307 DRUG TEST PRSMV CHEM ANLYZR: CPT

## 2019-05-10 RX ORDER — HYDROCODONE BITARTRATE AND ACETAMINOPHEN 7.5; 325 MG/1; MG/1
1 TABLET ORAL 2 TIMES DAILY
Qty: 60 TABLET | Refills: 0 | Status: SHIPPED | OUTPATIENT
Start: 2019-05-13 | End: 2019-05-29 | Stop reason: SDUPTHER

## 2019-05-14 LAB
6AM URINE: <10 NG/ML
7-AMINOCLONAZEPAM, URINE: <5 NG/ML
ALPHA-HYDROXYALPRAZOLAM, URINE: <5 NG/ML
ALPHA-HYDROXYMIDAZOLAM, URINE: <20 NG/ML
ALPRAZOLAM, URINE: <5 NG/ML
CHLORDIAZEPOXIDE, URINE: <20 NG/ML
CLONAZEPAM, URINE: <5 NG/ML
CODEINE, URINE: <20 NG/ML
DIAZEPAM, URINE: <20 NG/ML
HYDROCODONE, URINE: 73 NG/ML
HYDROMORPHONE, URINE: <20 NG/ML
LORAZEPAM, URINE: <20 NG/ML
MIDAZOLAM, URINE: <20 NG/ML
MORPHINE URINE: <20 NG/ML
NORDIAZEPAM, URINE: <20 NG/ML
NORHYDROCODONE, URINE: 579 NG/ML
NOROXYCODONE, URINE: <20 NG/ML
NOROXYMORPHONE, URINE: <20 NG/ML
OXAZEPAM, URINE: <20 NG/ML
OXYCODONE, URINE CONFIRMATION: <20 NG/ML
OXYMORPHONE, URINE: <20 NG/ML
TEMAZEPAM, URINE: <20 NG/ML

## 2019-05-17 LAB
Lab: NORMAL
REPORT: NORMAL
THIS TEST SENT TO: NORMAL

## 2019-05-29 ENCOUNTER — OFFICE VISIT (OUTPATIENT)
Dept: PAIN MANAGEMENT | Age: 64
End: 2019-05-29

## 2019-05-29 ENCOUNTER — PREP FOR PROCEDURE (OUTPATIENT)
Dept: PAIN MANAGEMENT | Age: 64
End: 2019-05-29

## 2019-05-29 VITALS
SYSTOLIC BLOOD PRESSURE: 114 MMHG | HEIGHT: 65 IN | BODY MASS INDEX: 30.82 KG/M2 | RESPIRATION RATE: 16 BRPM | OXYGEN SATURATION: 99 % | WEIGHT: 185 LBS | DIASTOLIC BLOOD PRESSURE: 68 MMHG | HEART RATE: 78 BPM | TEMPERATURE: 98.7 F

## 2019-05-29 DIAGNOSIS — M47.812 CERVICAL FACET JOINT SYNDROME: ICD-10-CM

## 2019-05-29 PROCEDURE — 99213 OFFICE O/P EST LOW 20 MIN: CPT | Performed by: PHYSICIAN ASSISTANT

## 2019-05-29 RX ORDER — HYDROCODONE BITARTRATE AND ACETAMINOPHEN 7.5; 325 MG/1; MG/1
1 TABLET ORAL 2 TIMES DAILY
Qty: 60 TABLET | Refills: 0 | Status: SHIPPED | OUTPATIENT
Start: 2019-06-12 | End: 2019-06-26 | Stop reason: SDUPTHER

## 2019-05-29 NOTE — PROGRESS NOTES
Eugenia Mckenzie presents to the [facility] on 5/29/2019. [facility] is complaining of pain cervical/lower back SI joint. The pain is constant. The pain is described as aching, throbbing, shooting and stabbing. Pain is rated on her best day at a 8, on her worst day at a 10, and on average at a 8 on the VAS scale.  She took her last dose of Percocet this am.     Any procedures since your last visit    Pacemaker or defibrilator: No managed by none    She has not been on anticoagulation medications to include none and is managed by None     /68   Pulse 78   Temp 98.7 °F (37.1 °C) (Oral)   Resp 16   Ht 5' 5\" (1.651 m)   Wt 185 lb (83.9 kg)   LMP  (LMP Unknown)   SpO2 99%   BMI 30.79 kg/m²

## 2019-05-29 NOTE — PROGRESS NOTES
Use as directed  Patient taking differently: Take 296 mLs by mouth once  3/20/19  Yes Ryan Khalil DO   quinapril (ACCUPRIL) 40 MG tablet Take 1 tablet by mouth daily For blood pressure. Patient taking differently: Take 40 mg by mouth every morning For blood pressure. 2/22/19  Yes Rose Mary Leon MD   metFORMIN (GLUCOPHAGE XR) 500 MG extended release tablet Take 1 tablet by mouth daily (with breakfast) 2/22/19  Yes Rose Mary Leon MD   hydrOXYzine (VISTARIL) 25 MG capsule Take 1 capsule by mouth 3 times daily as needed for Anxiety  Patient taking differently: Take 25 mg by mouth 2 times daily as needed for Anxiety  1/28/19  Yes Corona Bautista MD   pitavastatin (LIVALO) 1 MG TABS tablet Take 1 mg by mouth nightly   Yes Sonia Arias MD   gabapentin (NEURONTIN) 300 MG capsule Take 1 capsule by mouth 2 times daily for 90 days. . 2/22/19 5/23/19  Rose Mary Leon MD       Allergies   Allergen Reactions    Pcn [Penicillins] Anaphylaxis       Social History     Socioeconomic History    Marital status:      Spouse name: Not on file    Number of children: Not on file    Years of education: Not on file    Highest education level: Not on file   Occupational History    Not on file   Social Needs    Financial resource strain: Not on file    Food insecurity:     Worry: Not on file     Inability: Not on file    Transportation needs:     Medical: Not on file     Non-medical: Not on file   Tobacco Use    Smoking status: Current Every Day Smoker     Packs/day: 0.50     Years: 30.00     Pack years: 15.00     Types: Cigarettes    Smokeless tobacco: Never Used   Substance and Sexual Activity    Alcohol use:  Yes     Alcohol/week: 0.0 oz     Comment: rarely    Drug use: No    Sexual activity: Not on file     Comment: Single    Lifestyle    Physical activity:     Days per week: Not on file     Minutes per session: Not on file    Stress: Not on file   Relationships    Social connections:     Talks on phone: Not on file     Gets together: Not on file     Attends Church service: Not on file     Active member of club or organization: Not on file     Attends meetings of clubs or organizations: Not on file     Relationship status: Not on file    Intimate partner violence:     Fear of current or ex partner: Not on file     Emotionally abused: Not on file     Physically abused: Not on file     Forced sexual activity: Not on file   Other Topics Concern    Not on file   Social History Narrative    Not on file       Family History   Problem Relation Age of Onset    Dementia Mother     Breast Cancer Mother     Cancer Mother         breast cancer [de-identified]     Stroke Mother     Heart Attack Father     Other Father 80        Aortic aneurysm       REVIEW OF SYSTEMS:     Sabinejoss Wong denies fever/chills, chest pain, shortness of breath, new bowel or bladder complaints or suicidal ideations. All other review of systems was negative. PHYSICAL EXAMINATION:      /68   Pulse 78   Temp 98.7 °F (37.1 °C) (Oral)   Resp 16   Ht 5' 5\" (1.651 m)   Wt 185 lb (83.9 kg)   LMP  (LMP Unknown)   SpO2 99%   BMI 30.79 kg/m²     General:      General appearance: awake, alert, oriented, in no acute distress, well developed, well nourished and in no acute distress   pleasant and well-hydrated. in no distress and A & O x3  Build:Overweight  Function:Rises from a seated position with difficulty    HEENT:    Head:normocephalic and atraumatic  Pupils:regular, round and equal.  Sclera: icterus absent  EOM:full and intact. Lungs:    Breathing:Normal expansion. Clear to auscultation. No rales, rhonchi, or wheezing.     Abdomen:    Shape:non-distended and normal  Tenderness:none  Guarding:none         Lumbar spine:    Spine inspection:surgical incision scar   CVA tenderness:No   Palpation: + right paraspinal TTP, + midline TTP distally around S1,  negative facet loading left and right  Range of motion:abnormal moderately Lateral bending, flexion, extension rotation bilateral and is mildly painful. Musculoskeletal:    SI joint tenderness:negative right, negative left              EDWAR test: Negative right, not done left  Piriformis tenderness:negative right, negative left  Trochanteric bursa tenderness:negative right, negative left  SLR:positive right, negative left, sitting     Extremities:    Tremors:None bilaterally upper and lower  Range of motion:Generally normal shoulders, pain with internal rotation of hips negative. Intact:Yes  Varicose veins:absent   Cyanosis:none  Edema:Normal      Neurological:    Cranial nerves:normal  Sensory:diminished to light lateral aspect of right leg  Motor:                  Right Quadriceps3/5          Left Quadriceps5/5           Right Gastrocnemius3/5    Left Gastrocnemius5/5  Right Ant Tibialis3/5  Left Ant Tibialis5/5  [address]    Dermatology:    Skin:no unusual rashes, no skin lesions, no palpable subcutaneous nodules and good skin turgor    Assessment/Plan:    Chronic low back pain with radiation to the Right groin, patient had low back surgery 08/2017 L3-5 fusion, recently had lumbar spine CT with severe L2-3 stenosis   Follow up on her low back pain, her low back pain is managed with current medications with some pain returning at this point  Patient is complaining of increased neck pain with radiation to the Left shoulder with positive cervical facet tenderness on exam Left worse than Right   Patient is s/p:  PROCEDURE: # 1  Left Cervical medial branch blocks at  Levels C3, C4 and C5 on 2/26/2019 with some continued left sided pain relief at this point but continues to have midline tenderness, which has been present since the injection without relief. Continue with Norco 7.5/325 BID PRN (patient has adqeuate pain control/able to perform her daily activities with no signs of abuse or diversion) - ordered for 06/12/2019.   Continue with Gabapentin 600 mg TID - through PCP  OARRS report reviewed 05/2019  UDS reviewed today and is consistent  Attended massotherapy - helped somewhat  Will order a b/l TESI under fluoroscopic guidance at foraminal level S1 - over 2 months of relief from last injection and her pain is on the S1 region. Patient is going to a wedding in [place] on June 7th and requests injection before that time with any physician. Patient encouraged to stay active and to lose weight. Failed physical therapy for her cervical spine  Treatment plan discussed with the patient including medications side effects       Controlled Substances Monitoring:     RX Monitoring 5/29/2019   Attestation The Prescription Monitoring Report for this patient was reviewed today. Acute Pain Prescriptions -   Chronic Pain Routine Monitoring Possible medication side effects, risk of tolerance/dependence & alternative treatments discussed. ;No signs of potential drug abuse or diversion identified: otherwise, see note documentation   Chronic Pain > 80 MEDD Obtained or confirmed a written medication contract was on file.                                  ccreferring physic

## 2019-05-30 ENCOUNTER — OFFICE VISIT (OUTPATIENT)
Dept: FAMILY MEDICINE CLINIC | Age: 64
End: 2019-05-30

## 2019-05-30 VITALS
WEIGHT: 197 LBS | SYSTOLIC BLOOD PRESSURE: 130 MMHG | BODY MASS INDEX: 32.82 KG/M2 | HEIGHT: 65 IN | TEMPERATURE: 97.4 F | OXYGEN SATURATION: 99 % | DIASTOLIC BLOOD PRESSURE: 76 MMHG | HEART RATE: 81 BPM

## 2019-05-30 DIAGNOSIS — L03.221 CELLULITIS, NECK: Primary | ICD-10-CM

## 2019-05-30 PROCEDURE — 99213 OFFICE O/P EST LOW 20 MIN: CPT | Performed by: FAMILY MEDICINE

## 2019-05-30 RX ORDER — SULFAMETHOXAZOLE AND TRIMETHOPRIM 800; 160 MG/1; MG/1
1 TABLET ORAL 2 TIMES DAILY
Qty: 14 TABLET | Refills: 0 | Status: SHIPPED | OUTPATIENT
Start: 2019-05-30 | End: 2019-05-31 | Stop reason: ALTCHOICE

## 2019-05-30 ASSESSMENT — ENCOUNTER SYMPTOMS
SORE THROAT: 0
BACK PAIN: 0
SHORTNESS OF BREATH: 0
PHOTOPHOBIA: 0
NAUSEA: 0
COUGH: 0
BLOOD IN STOOL: 0
ABDOMINAL PAIN: 0
DIARRHEA: 0
CONSTIPATION: 0
VOMITING: 0

## 2019-05-30 NOTE — PROGRESS NOTES
2019     Donna Holder (:  1955) is a 60 y.o. female, here for evaluation of the following medical concerns:    HPI  Patient presents today for evaluation of worsening pain on the left side of the neck and behind the left ear. Patient states that she had a perm done in the last week. She states that the area became sore at that time and has worsened since. Denies any fever or chills. Denies any trauma or injury affected region denies any other systemic symptoms. Review of Systems   Constitutional: Negative for chills and fever. HENT: Negative for congestion, hearing loss, nosebleeds and sore throat. Eyes: Negative for photophobia. Respiratory: Negative for cough and shortness of breath. Cardiovascular: Negative for chest pain, palpitations and leg swelling. Gastrointestinal: Negative for abdominal pain, blood in stool, constipation, diarrhea, nausea and vomiting. Endocrine: Negative for polydipsia. Genitourinary: Negative for dysuria, frequency, hematuria and urgency. Musculoskeletal: Negative for back pain and myalgias. Skin: Positive for rash and wound. Neurological: Negative for dizziness, tremors, weakness and headaches. Hematological: Does not bruise/bleed easily. Psychiatric/Behavioral: Negative for hallucinations and suicidal ideas. All other systems reviewed and are negative. Prior to Visit Medications    Medication Sig Taking? Authorizing Provider   sulfamethoxazole-trimethoprim (BACTRIM DS;SEPTRA DS) 800-160 MG per tablet Take 1 tablet by mouth 2 times daily for 7 days Yes Chap[place] Josue,    HYDROcodone-acetaminophen (NORCO) 7.5-325 MG per tablet Take 1 tablet by mouth 2 times daily for 30 days. TREASURE De   hydrochlorothiazide (HYDRODIURIL) 12.5 MG tablet Take 1 tablet by mouth every morning For blood pressure  Jayne Valentine MD   quinapril (ACCUPRIL) 40 MG tablet Take 1 tablet by mouth daily For blood pressure.   Patient taking differently: Take 40 mg by mouth every morning For blood pressure. Estephanie Goldberg MD   gabapentin (NEURONTIN) 300 MG capsule Take 1 capsule by mouth 2 times daily for 90 days. Frankie Aguilar MD   metFORMIN (GLUCOPHAGE XR) 500 MG extended release tablet Take 1 tablet by mouth daily (with breakfast)  Estephanie Goldberg MD   hydrOXYzine (VISTARIL) 25 MG capsule Take 1 capsule by mouth 3 times daily as needed for Anxiety  Patient taking differently: Take 25 mg by mouth 2 times daily as needed for Leslie De La Vega MD   pitavastatin (LIVALO) 1 MG TABS tablet Take 1 mg by mouth nightly  Historical Provider, MD        Social History     Tobacco Use    Smoking status: Current Every Day Smoker     Packs/day: 0.50     Years: 30.00     Pack years: 15.00     Types: Cigarettes    Smokeless tobacco: Never Used   Substance Use Topics    Alcohol use: Yes     Alcohol/week: 0.0 oz     Comment: rarely        Vitals:    05/30/19 1302   BP: 130/76   Site: Right Upper Arm   Position: Sitting   Pulse: 81   Temp: 97.4 °F (36.3 °C)   SpO2: 99%   Weight: 197 lb (89.4 kg)   Height: 5' 5\" (1.651 m)     Estimated body mass index is 32.78 kg/m² as calculated from the following:    Height as of this encounter: 5' 5\" (1.651 m). Weight as of this encounter: 197 lb (89.4 kg). Physical Exam   Constitutional: She is oriented to person, place, and time. HENT:   Head: Normocephalic and atraumatic. Eyes: Pupils are equal, round, and reactive to light. Conjunctivae and EOM are normal. No scleral icterus. Neck: Neck supple. No thyromegaly present. Cardiovascular: Normal rate, regular rhythm, normal heart sounds and intact distal pulses. No murmur heard. Pulmonary/Chest: Effort normal and breath sounds normal. She has no rales. Abdominal: Soft. Bowel sounds are normal. She exhibits no distension. There is no tenderness. Musculoskeletal: Normal range of motion. She exhibits no edema.    Lymphadenopathy:     She has

## 2019-05-31 RX ORDER — SULFAMETHOXAZOLE AND TRIMETHOPRIM 800; 160 MG/1; MG/1
1 TABLET ORAL 2 TIMES DAILY
COMMUNITY
End: 2019-08-02

## 2019-05-31 RX ORDER — GABAPENTIN 300 MG/1
300 CAPSULE ORAL 2 TIMES DAILY
COMMUNITY
End: 2019-07-01 | Stop reason: ALTCHOICE

## 2019-06-04 ENCOUNTER — HOSPITAL ENCOUNTER (OUTPATIENT)
Age: 64
Setting detail: OUTPATIENT SURGERY
Discharge: HOME | End: 2019-06-04
Attending: PAIN MEDICINE | Admitting: PAIN MEDICINE

## 2019-06-04 ENCOUNTER — APPOINTMENT (OUTPATIENT)
Dept: GENERAL RADIOLOGY | Age: 64
End: 2019-06-04
Attending: PAIN MEDICINE

## 2019-06-04 VITALS
SYSTOLIC BLOOD PRESSURE: 144 MMHG | TEMPERATURE: 98 F | WEIGHT: 197 LBS | BODY MASS INDEX: 32.82 KG/M2 | HEIGHT: 65 IN | RESPIRATION RATE: 18 BRPM | HEART RATE: 87 BPM | DIASTOLIC BLOOD PRESSURE: 88 MMHG | OXYGEN SATURATION: 100 %

## 2019-06-04 DIAGNOSIS — R52 PAIN: ICD-10-CM

## 2019-06-04 PROCEDURE — 2709999900 HC NON-CHARGEABLE SUPPLY: Performed by: PAIN MEDICINE

## 2019-06-04 PROCEDURE — 3600000012 HC SURGERY LEVEL 2 ADDTL 15MIN: Performed by: PAIN MEDICINE

## 2019-06-04 PROCEDURE — 2500000003 HC RX 250 WO HCPCS: Performed by: PAIN MEDICINE

## 2019-06-04 PROCEDURE — 6360000002 HC RX W HCPCS: Performed by: PAIN MEDICINE

## 2019-06-04 PROCEDURE — 7100000011 HC PHASE II RECOVERY - ADDTL 15 MIN: Performed by: PAIN MEDICINE

## 2019-06-04 PROCEDURE — 3209999900 FLUORO FOR SURGICAL PROCEDURES

## 2019-06-04 PROCEDURE — 7100000010 HC PHASE II RECOVERY - FIRST 15 MIN: Performed by: PAIN MEDICINE

## 2019-06-04 PROCEDURE — 64483 NJX AA&/STRD TFRM EPI L/S 1: CPT | Performed by: PAIN MEDICINE

## 2019-06-04 PROCEDURE — 6360000004 HC RX CONTRAST MEDICATION: Performed by: PAIN MEDICINE

## 2019-06-04 PROCEDURE — 3600000002 HC SURGERY LEVEL 2 BASE: Performed by: PAIN MEDICINE

## 2019-06-04 RX ORDER — LIDOCAINE HYDROCHLORIDE 5 MG/ML
INJECTION, SOLUTION INFILTRATION; INTRAVENOUS PRN
Status: DISCONTINUED | OUTPATIENT
Start: 2019-06-04 | End: 2019-06-04 | Stop reason: ALTCHOICE

## 2019-06-04 RX ORDER — METHYLPREDNISOLONE ACETATE 40 MG/ML
INJECTION, SUSPENSION INTRA-ARTICULAR; INTRALESIONAL; INTRAMUSCULAR; SOFT TISSUE PRN
Status: DISCONTINUED | OUTPATIENT
Start: 2019-06-04 | End: 2019-06-04 | Stop reason: ALTCHOICE

## 2019-06-04 ASSESSMENT — PAIN - FUNCTIONAL ASSESSMENT: PAIN_FUNCTIONAL_ASSESSMENT: 0-10

## 2019-06-04 NOTE — PROGRESS NOTES
Patient awake and alert, no complaints of pain, nausea or vomiting. Discharge instructions reviewed with patient.

## 2019-06-04 NOTE — H&P
[facility]        [address] [facility], [address] [employer]  Dept: [phone]    Procedure History & Physical      Tilike Rhodes     HPI:    Patient  is here for LBP BLE pain for b/l S1 TFESI  Labs/imaging studies reviewed   All question and concerns addressed including R/B/A associated with the procedure    Past Medical History:   Diagnosis Date    Chronic back pain     Costochondritis     Depression     Diet-controlled type 2 diabetes mellitus ([address]     Falls frequently     none recent as of 2/19/19    Fibromyalgia     Hallux rigidus of left foot     HTN (hypertension)     Hyperlipidemia     CLYDE on CPAP     Osteoarthritis     Rheumatoid arthritis(714.0)     TIA (transient ischemic attack)     Use of cane as ambulatory aid        Past Surgical History:   Procedure Laterality Date    ANESTHESIA NERVE BLOCK Left 2/26/2019    LEFT C3,4,5 MBB performed by Dima Mena MD at [address]  2005    Left    ARTHRODESIS Left 12/14/2018    ARTHRODESIS 2ND DIGIT LEFT FOOT performed by Jimmy Schaefer DPM at [address] [place] [address]  08/16/2017    PLIF L3-L4, L4-L5 with rods, screws, and cages/Dr. Mikhail Monroe BREAST SURGERY  2010    reduction, bilat   [address] [facility]    CHOLECYSTECTOMY  2011    COLONOSCOPY  03/27/2015    ENDOSCOPY, COLON, DIAGNOSTIC      FOOT SURGERY  2005    Left    HARDWARE REMOVAL FOOT / ANKLE Left 12/14/2018    REMOVAL OF PAINFUL HARDWARE LEFT FOOT  ++SYNTHES++ performed by Jimmy Schaefer DPM at [facility] 108    inguinal     NERVE BLOCK Bilateral 05/07/2018    L1-2 lumbar foramen #1    NERVE BLOCK Bilateral 12/03/2018    s1 tfesi    OTHER SURGICAL HISTORY Left 9/25/13    left foot tarso metatarsal joint injection    OTHER SURGICAL HISTORY Left 5/27/15    Endoscopic Gastroc recession left, Lapidus left foot and  Excision of exostosis left foot    CT INJECT ANES/STEROID FORAMEN LUMBAR/SACRAL W IMG GUIDE ,1 LEVEL Bilateral pain    MUSCULOSKELETAL: negative for muscle weakness    SKIN: negative for itching or rashes. BEHAVIOR/PSYCH:  negative for poor appetite, increased appetite, decreased sleep and poor concentration    All other systems negative      PHYSICAL EXAM:    VITALS:  BP (!) 168/92   Pulse 89   Temp 98 °F (36.7 °C) (Temporal)   Resp 18   Ht 5' 5\" (1.651 m)   Wt 197 lb (89.4 kg)   LMP  (LMP Unknown)   SpO2 99%   BMI 32.78 kg/m²     CONSTITUTIONAL:  awake, alert, cooperative, no apparent distress, and appears stated age    EYES: PERRLA, EOMI    LUNGS:  No increased work of breathing, no audible wheezing    CARDIOVASCULAR:  regular rate and rhythm    ABDOMEN:  Soft non tender non distended     EXTREMITIES: no signs of clubbing or cyanosis. MUSCULOSKELETAL: negative for flaccid muscle tone or spastic movements. SKIN: gross examination reveals no signs of rashes, or diaphoresis. NEURO: Cranial nerves II-XII grossly intact. No signs of agitated mood.        Assessment/Plan:    LBP BLE pain for b/l S1 TFESI

## 2019-06-26 ENCOUNTER — OFFICE VISIT (OUTPATIENT)
Dept: PAIN MANAGEMENT | Age: 64
End: 2019-06-26

## 2019-06-26 VITALS
BODY MASS INDEX: 30.82 KG/M2 | TEMPERATURE: 98.6 F | RESPIRATION RATE: 18 BRPM | SYSTOLIC BLOOD PRESSURE: 128 MMHG | WEIGHT: 185 LBS | DIASTOLIC BLOOD PRESSURE: 74 MMHG | HEIGHT: 65 IN | HEART RATE: 84 BPM | OXYGEN SATURATION: 94 %

## 2019-06-26 DIAGNOSIS — M47.812 CERVICAL FACET JOINT SYNDROME: ICD-10-CM

## 2019-06-26 PROCEDURE — 99213 OFFICE O/P EST LOW 20 MIN: CPT | Performed by: PHYSICIAN ASSISTANT

## 2019-06-26 RX ORDER — HYDROCODONE BITARTRATE AND ACETAMINOPHEN 7.5; 325 MG/1; MG/1
1 TABLET ORAL 2 TIMES DAILY
Qty: 60 TABLET | Refills: 0 | Status: SHIPPED | OUTPATIENT
Start: 2019-07-12 | End: 2019-07-15 | Stop reason: SDUPTHER

## 2019-06-26 NOTE — PROGRESS NOTES
Anshul Pak presents to the [facility] on 6/26/2019. [facility] [place] is complaining of pain in the back . The pain is persistent. The pain is described as aching, throbbing, shooting and stabbing. Pain is rated on her best day at a 7, on her worst day at a 7, and on average at a 7 on the VAS scale. She took her last dose of Norco yesterday . Any procedures since your last visit: No,    Pacemaker or defibrilator: No managed by none. She has not been on anticoagulation medication      /74   Pulse 84   Temp 98.6 °F (37 °C)   Resp 18   Ht 5' 5\" (1.651 m)   Wt 185 lb (83.9 kg)   LMP  (LMP Unknown)   SpO2 94%   BMI 30.79 kg/m²      No LMP recorded (lmp unknown).  Patient is postmenopausal.

## 2019-06-26 NOTE — PROGRESS NOTES
[address] [facility], [address] [place] [address] Lexa  [phone]    Follow up Note      Bouchra Hancock     Date of Visit:  6/26/2019    CC:  Patient presents for follow up   Chief Complaint   Patient presents with    Pain     lower back        HPI:    Pain is better to lower back. Injection did help. Pain at distal end of incision. Neck is still painful, but lower back is currently worse. Change in quality of symptoms:no. Overall, doing well. Patient satisfaction with analgesia:fair. Medication side effects: None. Recent diagnostic testing:none. Recent interventional procedures: 6/11/18 PROCEDURE:  Lumbar paravertebral trigger point injections. poor. 6/4/18:  PROCEDURE: Bilateral Transforaminal epidural steroid injection under fluoroscopic guidance at foraminal level L1-2 (#2).  5/7/18:  PROCEDURE: Bilateral Transforaminal epidural steroid injection under fluoroscopic guidance at foraminal level L1-2 (#1).  8/21/18:  PROCEDURE: Fluoroscopic guided therapeutic lumbar epidural steroid injection at the L2-3 level (# 1). 70% better. 12/3/2018 PROCEDURE: Bilateral Transforaminal epidural steroid injection under fluoroscopic guidance at foraminal level S1 (#1) - Has worn off at this point. 2/26/2019:  PROCEDURE: # 1  Left Cervical medial branch blocks at  Levels C3, C4 and C5 - left paraspinal area is better, but midline was never better. 6/4/2019 PROCEDURE: Bilateral Transforaminal epidural steroid injection under fluoroscopic guidance at foraminal level S1 (#2) - good relief at this point. She has been on anticoagulation medications to include NSAIDS. The patient  has not been on herbal supplements. The patient is diabetic. Imaging:  MRI lumbar spine 03/2018  1. No significant interval change is observed since the previous study   of October 2017.       2. Stable postoperative changes/posterior spinal fusion at the   L3-L4-L5 level.       3.  Severe spine canal stenosis in the level of L2-L3           4. Encroachment of the neural foramina bilaterally in levels of L2-L3   and L5-S1      Thoracic spine MRI 2018  1. Some degenerative changes in the thoracic spine, mainly in the   facet joints in the mid-upper thoracic spine segments       2. Discrete loss of height of T6, more likely an old finding.      Previous treatments: Physical Therapy, Surgery L3-5 fusion/laminectomy and medications. .       Potential Aberrant Drug-Related Behavior:  No     Urine Drug Screenin18:  Consistent for norhydrocodone metabolite  2018:  Consistent for hydrocodone and norhydrocodone  05/10/2019:  Consistent for hydrocodone and norhydrocodone      OARRS report:  2018 consistent   2018 consistent   2018 consistent   2018 consistent   2018 consistent   2018 consistent  2019 consistent  2019 consistent  2019 consistent  2019 consistent  2019 consistent      Past Medical History:   Diagnosis Date    Chronic back pain     Costochondritis     Depression     Diet-controlled type 2 diabetes mellitus ([facility] [address] [facility]     Falls frequently     none recent as of 19    Fibromyalgia     Hallux rigidus of left foot     HTN (hypertension)     Hyperlipidemia     CLYDE on CPAP     Osteoarthritis     Rheumatoid arthritis(714.0)     TIA (transient ischemic attack)     Use of cane as ambulatory aid        Past Surgical History:   Procedure Laterality Date    ANESTHESIA NERVE BLOCK Left 2019    LEFT C3,4,5 MBB performed by Kristina Arce MD at [address]      Left    ARTHRODESIS Left 2018    ARTHRODESIS 2ND DIGIT LEFT FOOT performed by Emeterio Bruce DPM at [address] [facility]  2017    PLIF L3-L4, L4-L5 with rods, screws, and cages/Dr. Mirta Oliva BREAST SURGERY  2010    reduction, bilat   [address]    CHOLECYSTECTOMY  2011    COLONOSCOPY  2015    ENDOSCOPY, COLON, DIAGNOSTIC      EPIDURAL STEROID INJECTION N/A 6/4/2019    BILATERAL TRANSFORAMINAL EPIDURAL STEROID INJECTION S1 performed by Radha Lopez DO at [address]  2005    Left    HARDWARE REMOVAL FOOT / ANKLE Left 12/14/2018    REMOVAL OF PAINFUL HARDWARE LEFT FOOT  ++SYNTHES++ performed by Alice Overton DPM at [facility] 108    inguinal     NERVE BLOCK Bilateral 05/07/2018    L1-2 lumbar foramen #1    NERVE BLOCK Bilateral 12/03/2018    s1 tfesi    OTHER SURGICAL HISTORY Left 9/25/13    left foot tarso metatarsal joint injection    OTHER SURGICAL HISTORY Left 5/27/15    Endoscopic Gastroc recession left, Lapidus left foot and  Excision of exostosis left foot    UT INJECT ANES/STEROID FORAMEN LUMBAR/SACRAL W IMG GUIDE ,1 LEVEL Bilateral 12/3/2018    BILATERAL S1  EPIDURAL STEROID INJECTION performed by Yousuf Watson MD at [address] [facility] DX/THER SBST INTRLMNR LMBR/SAC W/IMG GDN N/A 8/21/2018    EPIDURAL STEROID INJECTION L1-2 WITH LOW VOL performed by Yousuf Watson MD at [address] [facility] NOSE/CLEFT LIP/TIP Bilateral 5/7/2018    BILATERAL L1-2 LUMBAR FORAMEN #1 performed by Yousuf Watson MD at [address] [facility] NOSE/CLEFT LIP/TIP Bilateral 6/4/2018    BILATERAL TRANSFORAMINAL EPIDURAL STEROID INJECTION UNDER FLUOROSCOPIC GUIDANCE @ FORAMINAL LEVEL L1-2 #2 performed by Yousuf aWtson MD at [address]  2000, 2005    Big Left Toe    TONSILLECTOMY      WISDOM TOOTH EXTRACTION         Prior to Admission medications    Medication Sig Start Date End Date Taking? Authorizing Provider   HYDROcodone-acetaminophen (NORCO) 7.5-325 MG per tablet Take 1 tablet by mouth 2 times daily for 30 days. 7/12/19 8/11/19 Yes TREASURE Durán   gabapentin (NEURONTIN) 300 MG capsule Take 300 mg by mouth 2 times daily.    Yes Historical Provider, MD   sulfamethoxazole-trimethoprim (BACTRIM DS;SEPTRA DS) 800-160 MG per tablet Take 1 tablet by mouth 2 times daily   Yes Historical file     Forced sexual activity: Not on file   Other Topics Concern    Not on file   Social History Narrative    Not on file       Family History   Problem Relation Age of Onset    Dementia Mother     Breast Cancer Mother     Cancer Mother         breast cancer [de-identified]     Stroke Mother     Heart Attack Father     Other Father 80        Aortic aneurysm       REVIEW OF SYSTEMS:     Melany Moreau denies fever/chills, chest pain, shortness of breath, new bowel or bladder complaints or suicidal ideations. All other review of systems was negative. PHYSICAL EXAMINATION:      /74   Pulse 84   Temp 98.6 °F (37 °C)   Resp 18   Ht 5' 5\" (1.651 m)   Wt 185 lb (83.9 kg)   LMP  (LMP Unknown)   SpO2 94%   BMI 30.79 kg/m²     General:      General appearance: awake, alert, oriented, in no acute distress, well developed, well nourished and in no acute distress   pleasant and well-hydrated. in no distress and A & O x3  Build:Overweight  Function:Rises from a seated position with difficulty    HEENT:    Head:normocephalic and atraumatic  Pupils:regular, round and equal.  Sclera: icterus absent  EOM:full and intact. Lungs:    Breathing:Normal expansion. Clear to auscultation. No rales, rhonchi, or wheezing. Abdomen:    Shape:non-distended and normal  Tenderness:none  Guarding:none         Lumbar spine:    Spine inspection:surgical incision scar   CVA tenderness:No   Palpation:  negative paraspinal TTP, + midline TTP distally around S1,  negative facet loading left and right  Range of motion:abnormal moderately Lateral bending, flexion, extension rotation bilateral and is mildly painful.     Musculoskeletal:    SI joint tenderness:negative right, negative left              EDWAR test: Negative right, not done left  Piriformis tenderness:negative right, negative left  Trochanteric bursa tenderness:negative right, negative left  SLR: negative right, negative left, sitting     Extremities:    Tremors:None bilaterally upper and lower  Range of motion:Generally normal shoulders, pain with internal rotation of hips negative. Intact:Yes  Varicose veins:absent   Cyanosis:none  Edema:Normal      Neurological:    Cranial nerves:normal  Sensory:diminished to light lateral aspect of right leg  Motor:                  Right Quadriceps3/5          Left Quadriceps5/5           Right Gastrocnemius3/5    Left Gastrocnemius5/5  Right Ant Tibialis3/5  Left Ant Tibialis5/5  [address]    Dermatology:    Skin:no unusual rashes, no skin lesions, no palpable subcutaneous nodules and good skin turgor    Assessment/Plan:    Chronic low back pain with radiation to the Right groin, patient had low back surgery 08/2017 L3-5 fusion, recently had lumbar spine CT with severe L2-3 stenosis   Follow up on her low back pain, her low back pain is managed with current medications with some pain returning at this point  Patient is complaining of increased neck pain with radiation to the Left shoulder with positive cervical facet tenderness on exam Left worse than Right   Patient is s/p:  PROCEDURE: # 1  Left Cervical medial branch blocks at  Levels C3, C4 and C5 on 2/26/2019 with some continued left sided pain relief at this point but continues to have midline tenderness, which has been present since the injection without relief. Continue with Norco 7.5/325 BID PRN (patient has adqeuate pain control/able to perform her daily activities with no signs of abuse or diversion) - ordered for 07/12/2019. Continue with Gabapentin 600 mg TID - through PCP  OARRS report reviewed 05/2019  UDS reviewed today and is consistent  Attended massotherapy - helped somewhat  Patient is s/p b/l TESI under fluoroscopic guidance at foraminal level S1 on 6/4/2019 with good relief at this point.  - patient has a history of over 2 months of relief from last injection and her pain is on the S1 region. Patient encouraged to stay active and to lose weight.  Failed physical therapy for her cervical spine  Treatment plan discussed with the patient including medications side effects     Controlled Substance Monitoring:    Acute and Chronic Pain Monitoring:   RX Monitoring 6/26/2019   Attestation -   Acute Pain Prescriptions -   Periodic Controlled Substance Monitoring Possible medication side effects, risk of tolerance/dependence & alternative treatments discussed. ;No signs of potential drug abuse or diversion identified. ;Assessed functional status. ;Obtaining appropriate analgesic effect of treatment.    Chronic Pain > 80 MEDD -                                      ccreferring physic

## 2019-07-01 ENCOUNTER — OFFICE VISIT (OUTPATIENT)
Dept: FAMILY MEDICINE CLINIC | Age: 64
End: 2019-07-01

## 2019-07-01 VITALS
SYSTOLIC BLOOD PRESSURE: 138 MMHG | OXYGEN SATURATION: 97 % | BODY MASS INDEX: 32.78 KG/M2 | HEART RATE: 84 BPM | WEIGHT: 197 LBS | DIASTOLIC BLOOD PRESSURE: 92 MMHG | TEMPERATURE: 97.9 F

## 2019-07-01 DIAGNOSIS — I10 ESSENTIAL HYPERTENSION: ICD-10-CM

## 2019-07-01 DIAGNOSIS — F41.9 ANXIETY: ICD-10-CM

## 2019-07-01 DIAGNOSIS — E11.9 TYPE 2 DIABETES MELLITUS WITHOUT COMPLICATION, WITHOUT LONG-TERM CURRENT USE OF INSULIN (HCC): Primary | ICD-10-CM

## 2019-07-01 LAB — HBA1C MFR BLD: 5.8 %

## 2019-07-01 PROCEDURE — 99214 OFFICE O/P EST MOD 30 MIN: CPT | Performed by: FAMILY MEDICINE

## 2019-07-01 PROCEDURE — 83036 HEMOGLOBIN GLYCOSYLATED A1C: CPT | Performed by: FAMILY MEDICINE

## 2019-07-01 RX ORDER — HYDROCHLOROTHIAZIDE 12.5 MG/1
12.5 TABLET ORAL EVERY MORNING
Qty: 30 TABLET | Refills: 2 | Status: SHIPPED | OUTPATIENT
Start: 2019-07-01 | End: 2019-10-01 | Stop reason: SDUPTHER

## 2019-07-01 RX ORDER — GABAPENTIN 400 MG/1
400 CAPSULE ORAL 2 TIMES DAILY
Qty: 180 CAPSULE | Refills: 0 | Status: SHIPPED | OUTPATIENT
Start: 2019-07-01 | End: 2019-10-01 | Stop reason: SDUPTHER

## 2019-07-01 RX ORDER — HYDROXYZINE PAMOATE 25 MG/1
25 CAPSULE ORAL 3 TIMES DAILY PRN
Qty: 60 CAPSULE | Refills: 0 | Status: SHIPPED | OUTPATIENT
Start: 2019-07-01 | End: 2019-07-29 | Stop reason: SDUPTHER

## 2019-07-01 RX ORDER — QUINAPRIL 40 MG/1
40 TABLET ORAL DAILY
Qty: 90 TABLET | Refills: 1 | Status: SHIPPED | OUTPATIENT
Start: 2019-07-01 | End: 2019-10-01 | Stop reason: SDUPTHER

## 2019-07-01 NOTE — PROGRESS NOTES
lb (89.4 kg)   06/26/19 185 lb (83.9 kg)   06/04/19 197 lb (89.4 kg)       Constitutional:    She is oriented to person, place, and time. She appears well-developed and well-nourished. HENT:    Nose: Nose normal.    Mouth/Throat: Oropharynx is clear and moist.   Eyes:    Conjunctivae are normal.    Pupils are equal, round, and reactive to light. EOMI. Neck:    Normal range of motion. No thyromegaly or nodules noted. No bruit. Cardiovascular:    Normal rate, regular rhythm and normal heart sounds. No murmur. No gallop and no friction rub. Pulmonary/Chest:    Effort normal and breath sounds normal.    No wheezes. No rales or rhonchi. Abdominal:    Soft. Bowel sounds are normal.    No distension. No tenderness. Musculoskeletal:    Normal range of motion. No joint swelling noted. No peripheral edema. Skin:    Skin is warm and dry. No rashes, lesions. Psychiatric:    She has a sometimes anxious mood and tearful affect. Normal groom and dress. Current Outpatient Medications on File Prior to Visit   Medication Sig Dispense Refill    [START ON 7/12/2019] HYDROcodone-acetaminophen (NORCO) 7.5-325 MG per tablet Take 1 tablet by mouth 2 times daily for 30 days. 60 tablet 0    sulfamethoxazole-trimethoprim (BACTRIM DS;SEPTRA DS) 800-160 MG per tablet Take 1 tablet by mouth 2 times daily      pitavastatin (LIVALO) 1 MG TABS tablet Take 1 mg by mouth nightly       No current facility-administered medications on file prior to visit.         Patient Active Problem List   Diagnosis Code    Loss of balance R26.89    Vertebrobasilar TIAs G45.0    Obesity E66.9    Disc displacement, lumbar M51.26    Lumbar radiculopathy M54.16    Peripheral neuropathy (HCC) G62.9    Type 2 diabetes mellitus without complication (HCC) D78.7    Depression F32.9    Osteoarthritis M19.90    Chronic back pain M54.9, G89.29    Fibromyalgia M79.7    HTN (hypertension) I10    Hyperlipidemia E78.5    counseled to follow up sooner or seek more acute care if symptoms worsening. Electronically signed by Carlos Jordan MD on 7/1/2019  Please note that >25 minutes was spent face-to-face with patient gathering history, performing physical exam, discussing findings, counseling patient, and determining plan forward. All questions addressed and answered. This note may have been created using dictation software.  Efforts were made to reduce grammatical or syntax errors, but some may persist.

## 2019-07-15 ENCOUNTER — OFFICE VISIT (OUTPATIENT)
Dept: PAIN MANAGEMENT | Age: 64
End: 2019-07-15

## 2019-07-15 VITALS
TEMPERATURE: 97.6 F | DIASTOLIC BLOOD PRESSURE: 90 MMHG | HEART RATE: 88 BPM | BODY MASS INDEX: 32.78 KG/M2 | WEIGHT: 197 LBS | SYSTOLIC BLOOD PRESSURE: 142 MMHG | RESPIRATION RATE: 16 BRPM | OXYGEN SATURATION: 99 %

## 2019-07-15 DIAGNOSIS — M25.551 RIGHT HIP PAIN: Primary | ICD-10-CM

## 2019-07-15 DIAGNOSIS — M47.812 CERVICAL FACET JOINT SYNDROME: ICD-10-CM

## 2019-07-15 PROCEDURE — 99213 OFFICE O/P EST LOW 20 MIN: CPT | Performed by: PHYSICIAN ASSISTANT

## 2019-07-15 RX ORDER — HYDROCODONE BITARTRATE AND ACETAMINOPHEN 7.5; 325 MG/1; MG/1
1 TABLET ORAL 2 TIMES DAILY
Qty: 60 TABLET | Refills: 0 | Status: SHIPPED | OUTPATIENT
Start: 2019-07-26 | End: 2019-08-28 | Stop reason: SDUPTHER

## 2019-07-15 NOTE — PROGRESS NOTES
organization: Not on file     Attends meetings of clubs or organizations: Not on file     Relationship status: Not on file    Intimate partner violence:     Fear of current or ex partner: Not on file     Emotionally abused: Not on file     Physically abused: Not on file     Forced sexual activity: Not on file   Other Topics Concern    Not on file   Social History Narrative    Not on file       Family History   Problem Relation Age of Onset    Dementia Mother     Breast Cancer Mother     Cancer Mother         breast cancer [de-identified]     Stroke Mother     Heart Attack Father     Other Father 80        Aortic aneurysm       REVIEW OF SYSTEMS:     Vanesa Watson denies fever/chills, chest pain, shortness of breath, new bowel or bladder complaints or suicidal ideations. All other review of systems was negative. PHYSICAL EXAMINATION:      BP (!) 142/90   Pulse 88   Temp 97.6 °F (36.4 °C) (Oral)   Resp 16   Wt 197 lb (89.4 kg)   LMP  (LMP Unknown)   SpO2 99%   BMI 32.78 kg/m²     General:      General appearance: awake, alert, oriented, in no acute distress, well developed, well nourished and in no acute distress   pleasant and well-hydrated. in no distress and A & O x3  Build:Overweight  Function:Rises from a seated position with difficulty    HEENT:    Head:normocephalic and atraumatic  Pupils:regular, round and equal.  Sclera: icterus absent  EOM:full and intact. Lungs:    Breathing:Normal expansion. Clear to auscultation. No rales, rhonchi, or wheezing. Abdomen:    Shape:non-distended and normal  Tenderness:none  Guarding:none         Lumbar spine:    Spine inspection:surgical incision scar   CVA tenderness:No   Palpation:  negative paraspinal TTP, + midline TTP distally around S1 continues,  negative facet loading left and right  Range of motion:abnormal moderately Lateral bending, flexion, extension rotation bilateral and is mildly painful.     Musculoskeletal:    SI joint tenderness:negative right, negative left              EDWAR test: Negative right, not done left  Piriformis tenderness:negative right, negative left  Trochanteric bursa tenderness:negative right, negative left  SLR: positive right, negative left, sitting     Extremities:    Tremors:None bilaterally upper and lower  Range of motion:Generally normal shoulders, pain with internal rotation of hips negative. Negative log roll exam.  Negative heel tap exam.    Intact:Yes  Varicose veins:absent   Cyanosis:none  Edema:Normal      Neurological:    Cranial nerves:normal  Sensory:diminished to light lateral aspect of right leg  Motor:                  Right Quadriceps3/5          Left Quadriceps5/5           Right Gastrocnemius3/5    Left Gastrocnemius5/5  Right Ant Tibialis3/5  Left Ant Tibialis5/5  [address]    Dermatology:    Skin:no unusual rashes, no skin lesions, no palpable subcutaneous nodules and good skin turgor    Assessment/Plan:    Chronic low back pain with radiation to the Right groin, patient had low back surgery 08/2017 L3-5 fusion, recently had lumbar spine CT with severe L2-3 stenosis   Follow up on her low back pain, her low back pain is managed with current medications with some pain returning at this point  Patient is complaining of increased neck pain with radiation to the Left shoulder with positive cervical facet tenderness on exam Left worse than Right   Patient is s/p:  PROCEDURE: # 1  Left Cervical medial branch blocks at  Levels C3, C4 and C5 on 2/26/2019 with some continued left sided pain relief at this point but continues to have midline tenderness, which has been present since the injection without relief. Patient presents today early prior to her next appointment to discuss right groin pain. Patient c/o deep right hip joint pain. She does have an aspect of radiation from her lower back and her exam is negative for log roll and heel tap regarding her right hip pain, however, this should be worked up.   Right

## 2019-07-16 ENCOUNTER — HOSPITAL ENCOUNTER (OUTPATIENT)
Age: 64
Discharge: HOME | End: 2019-07-18

## 2019-07-16 ENCOUNTER — HOSPITAL ENCOUNTER (OUTPATIENT)
Dept: GENERAL RADIOLOGY | Age: 64
Discharge: HOME | End: 2019-07-18

## 2019-07-16 DIAGNOSIS — M25.551 RIGHT HIP PAIN: ICD-10-CM

## 2019-07-16 PROCEDURE — 73502 X-RAY EXAM HIP UNI 2-3 VIEWS: CPT

## 2019-07-25 ENCOUNTER — TELEPHONE (OUTPATIENT)
Dept: FAMILY MEDICINE CLINIC | Age: 64
End: 2019-07-25

## 2019-07-25 ENCOUNTER — OFFICE VISIT (OUTPATIENT)
Dept: PAIN MANAGEMENT | Age: 64
End: 2019-07-25

## 2019-07-25 VITALS
RESPIRATION RATE: 18 BRPM | OXYGEN SATURATION: 96 % | HEART RATE: 88 BPM | SYSTOLIC BLOOD PRESSURE: 138 MMHG | TEMPERATURE: 97.5 F | WEIGHT: 185 LBS | BODY MASS INDEX: 31.58 KG/M2 | DIASTOLIC BLOOD PRESSURE: 88 MMHG | HEIGHT: 64 IN

## 2019-07-25 DIAGNOSIS — G89.4 CHRONIC PAIN SYNDROME: ICD-10-CM

## 2019-07-25 DIAGNOSIS — M54.16 LUMBAR RADICULOPATHY: Primary | ICD-10-CM

## 2019-07-25 DIAGNOSIS — M51.36 DDD (DEGENERATIVE DISC DISEASE), LUMBAR: ICD-10-CM

## 2019-07-25 PROCEDURE — 99213 OFFICE O/P EST LOW 20 MIN: CPT | Performed by: PHYSICIAN ASSISTANT

## 2019-07-25 NOTE — PROGRESS NOTES
jose/Dr. Eric Beckett BREAST SURGERY      reduction, bilat     SECTION      CHOLECYSTECTOMY      COLONOSCOPY  2015    ENDOSCOPY, COLON, DIAGNOSTIC      EPIDURAL STEROID INJECTION N/A 2019    BILATERAL TRANSFORAMINAL EPIDURAL STEROID INJECTION S1 performed by Jak Harris DO at [address]      Left    HARDWARE REMOVAL FOOT / ANKLE Left 2018    REMOVAL OF PAINFUL HARDWARE LEFT FOOT  ++SYNTHES++ performed by Carlos Zuñiga DPM at [facility] 108    inguinal     NERVE BLOCK Bilateral 2018    L1-2 lumbar foramen #1    NERVE BLOCK Bilateral 2018    s1 tfesi    OTHER SURGICAL HISTORY Left 13    left foot tarso metatarsal joint injection    OTHER SURGICAL HISTORY Left 5/27/15    Endoscopic Gastroc recession left, Lapidus left foot and  Excision of exostosis left foot    CO INJECT ANES/STEROID FORAMEN LUMBAR/SACRAL W IMG GUIDE ,1 LEVEL Bilateral 12/3/2018    BILATERAL S1  EPIDURAL STEROID INJECTION performed by Damian Johnston MD at [address] [facility] DX/THER SBST INTRLMNR LMBR/SAC W/IMG GDN N/A 2018    EPIDURAL STEROID INJECTION L1-2 WITH LOW VOL performed by Damian Johnston MD at [address] [facility] NOSE/CLEFT LIP/TIP Bilateral 2018    BILATERAL L1-2 LUMBAR FORAMEN #1 performed by Damian Johnston MD at [address] [facility] NOSE/CLEFT LIP/TIP Bilateral 2018    BILATERAL TRANSFORAMINAL EPIDURAL STEROID INJECTION UNDER FLUOROSCOPIC GUIDANCE @ FORAMINAL LEVEL L1-2 #2 performed by Damian Johnston MD at [address]  ,     Big Left Toe    TONSILLECTOMY      WISDOM TOOTH EXTRACTION         Prior to Admission medications    Medication Sig Start Date End Date Taking? Authorizing Provider   HYDROcodone-acetaminophen (NORCO) 7.5-325 MG per tablet Take 1 tablet by mouth 2 times daily for 30 days.  19 Yes TREASURE Reid   gabapentin (NEURONTIN) 400 MG capsule Take 1 pain is truly coming from her right hip joint. R/b discussed. Continue with Norco 7.5/325 BID PRN (patient has adqeuate pain control/able to perform her daily activities with no signs of abuse or diversion) - No script today. Ordered last visit. Continue with Gabapentin 600 mg TID - through PCP  OARRS report reviewed 07/2019  Attended massotherapy - helped somewhat  Patient is s/p b/l TESI under fluoroscopic guidance at foraminal level S1 on 6/4/2019 with some continued relief at this point. Patient encouraged to stay active  Failed physical therapy for her cervical spine  Treatment plan discussed with the patient including medications side effects     Controlled Substance Monitoring:    Acute and Chronic Pain Monitoring:   RX Monitoring 7/25/2019   Attestation -   Acute Pain Prescriptions -   Periodic Controlled Substance Monitoring Possible medication side effects, risk of tolerance/dependence & alternative treatments discussed. ;No signs of potential drug abuse or diversion identified. ;Assessed functional status.    Chronic Pain > 80 MEDD -                                      ccreferring physic

## 2019-07-29 RX ORDER — HYDROXYZINE PAMOATE 25 MG/1
25 CAPSULE ORAL 3 TIMES DAILY PRN
Qty: 270 CAPSULE | Refills: 0 | Status: SHIPPED | OUTPATIENT
Start: 2019-07-29 | End: 2019-10-01 | Stop reason: SDUPTHER

## 2019-08-02 ENCOUNTER — PREP FOR PROCEDURE (OUTPATIENT)
Dept: PAIN MANAGEMENT | Age: 64
End: 2019-08-02

## 2019-08-02 ENCOUNTER — OFFICE VISIT (OUTPATIENT)
Dept: PAIN MANAGEMENT | Age: 64
End: 2019-08-02

## 2019-08-02 VITALS
HEART RATE: 84 BPM | HEIGHT: 65 IN | DIASTOLIC BLOOD PRESSURE: 72 MMHG | OXYGEN SATURATION: 98 % | RESPIRATION RATE: 18 BRPM | SYSTOLIC BLOOD PRESSURE: 120 MMHG | TEMPERATURE: 98.6 F | WEIGHT: 185 LBS | BODY MASS INDEX: 30.82 KG/M2

## 2019-08-02 DIAGNOSIS — G89.4 CHRONIC PAIN SYNDROME: ICD-10-CM

## 2019-08-02 DIAGNOSIS — M54.16 LUMBAR RADICULOPATHY: Primary | ICD-10-CM

## 2019-08-02 PROCEDURE — 99213 OFFICE O/P EST LOW 20 MIN: CPT | Performed by: PHYSICIAN ASSISTANT

## 2019-08-12 ENCOUNTER — HOSPITAL ENCOUNTER (OUTPATIENT)
Age: 64
Setting detail: OUTPATIENT SURGERY
Discharge: HOME | End: 2019-08-12
Attending: PAIN MEDICINE | Admitting: PAIN MEDICINE

## 2019-08-12 ENCOUNTER — HOSPITAL ENCOUNTER (OUTPATIENT)
Dept: OPERATING ROOM | Age: 64
Setting detail: OUTPATIENT SURGERY
Discharge: HOME | End: 2019-08-12
Attending: PAIN MEDICINE

## 2019-08-12 VITALS
SYSTOLIC BLOOD PRESSURE: 158 MMHG | OXYGEN SATURATION: 99 % | HEART RATE: 78 BPM | DIASTOLIC BLOOD PRESSURE: 86 MMHG | RESPIRATION RATE: 16 BRPM

## 2019-08-12 DIAGNOSIS — M54.16 LUMBAR RADICULOPATHY: ICD-10-CM

## 2019-08-12 PROCEDURE — 7100000010 HC PHASE II RECOVERY - FIRST 15 MIN: Performed by: PAIN MEDICINE

## 2019-08-12 PROCEDURE — 6360000002 HC RX W HCPCS: Performed by: PAIN MEDICINE

## 2019-08-12 PROCEDURE — 7100000011 HC PHASE II RECOVERY - ADDTL 15 MIN: Performed by: PAIN MEDICINE

## 2019-08-12 PROCEDURE — 6360000004 HC RX CONTRAST MEDICATION: Performed by: PAIN MEDICINE

## 2019-08-12 PROCEDURE — 64483 NJX AA&/STRD TFRM EPI L/S 1: CPT | Performed by: PAIN MEDICINE

## 2019-08-12 PROCEDURE — 3209999900 FLUORO FOR SURGICAL PROCEDURES

## 2019-08-12 PROCEDURE — 2709999900 HC NON-CHARGEABLE SUPPLY: Performed by: PAIN MEDICINE

## 2019-08-12 PROCEDURE — 2500000003 HC RX 250 WO HCPCS: Performed by: PAIN MEDICINE

## 2019-08-12 PROCEDURE — 3600000005 HC SURGERY LEVEL 5 BASE: Performed by: PAIN MEDICINE

## 2019-08-12 RX ORDER — LIDOCAINE HYDROCHLORIDE 5 MG/ML
INJECTION, SOLUTION INFILTRATION; INTRAVENOUS PRN
Status: DISCONTINUED | OUTPATIENT
Start: 2019-08-12 | End: 2019-08-12 | Stop reason: ALTCHOICE

## 2019-08-12 ASSESSMENT — PAIN DESCRIPTION - LOCATION: LOCATION: BACK

## 2019-08-12 ASSESSMENT — PAIN DESCRIPTION - PAIN TYPE: TYPE: CHRONIC PAIN

## 2019-08-12 ASSESSMENT — PAIN SCALES - GENERAL
PAINLEVEL_OUTOF10: 8
PAINLEVEL_OUTOF10: 0

## 2019-08-12 ASSESSMENT — PAIN - FUNCTIONAL ASSESSMENT: PAIN_FUNCTIONAL_ASSESSMENT: 0-10

## 2019-08-12 NOTE — H&P
habits, diarrhea, constipation and abdominal pain    MUSCULOSKELETAL: negative for muscle weakness    SKIN: negative for itching or rashes. BEHAVIOR/PSYCH:  negative for poor appetite, increased appetite, decreased sleep and poor concentration    All other systems negative      PHYSICAL EXAM:    VITALS:  BP (!) 172/87   Pulse 78   Resp 23   LMP  (LMP Unknown)   SpO2 99%     CONSTITUTIONAL:  awake, alert, cooperative, no apparent distress, and appears stated age    EYES: PERRLA, EOMI    LUNGS:  No increased work of breathing, no audible wheezing    CARDIOVASCULAR:  regular rate and rhythm    ABDOMEN:  Soft non tender non distended     EXTREMITIES: no signs of clubbing or cyanosis. MUSCULOSKELETAL: negative for flaccid muscle tone or spastic movements. SKIN: gross examination reveals no signs of rashes, or diaphoresis. NEURO: Cranial nerves II-XII grossly intact. No signs of agitated mood.        Assessment/Plan:    Low back and leg pain for bilateral S1 TFESI

## 2019-08-28 ENCOUNTER — OFFICE VISIT (OUTPATIENT)
Dept: PAIN MANAGEMENT | Age: 64
End: 2019-08-28

## 2019-08-28 VITALS
WEIGHT: 185 LBS | HEIGHT: 64 IN | RESPIRATION RATE: 16 BRPM | HEART RATE: 88 BPM | SYSTOLIC BLOOD PRESSURE: 128 MMHG | TEMPERATURE: 97.9 F | BODY MASS INDEX: 31.58 KG/M2 | DIASTOLIC BLOOD PRESSURE: 74 MMHG | OXYGEN SATURATION: 99 %

## 2019-08-28 DIAGNOSIS — M47.812 CERVICAL FACET JOINT SYNDROME: ICD-10-CM

## 2019-08-28 PROCEDURE — 99213 OFFICE O/P EST LOW 20 MIN: CPT | Performed by: PHYSICIAN ASSISTANT

## 2019-08-28 RX ORDER — HYDROCODONE BITARTRATE AND ACETAMINOPHEN 7.5; 325 MG/1; MG/1
1 TABLET ORAL 2 TIMES DAILY
Qty: 60 TABLET | Refills: 0 | Status: SHIPPED | OUTPATIENT
Start: 2019-08-28 | End: 2019-09-25 | Stop reason: SDUPTHER

## 2019-09-18 ENCOUNTER — OFFICE VISIT (OUTPATIENT)
Dept: PAIN MANAGEMENT | Age: 64
End: 2019-09-18

## 2019-09-18 ENCOUNTER — APPOINTMENT (OUTPATIENT)
Dept: CT IMAGING | Age: 64
End: 2019-09-18

## 2019-09-18 ENCOUNTER — APPOINTMENT (OUTPATIENT)
Dept: MRI IMAGING | Age: 64
End: 2019-09-18

## 2019-09-18 ENCOUNTER — HOSPITAL ENCOUNTER (OUTPATIENT)
Age: 64
Setting detail: OBSERVATION
Discharge: HOME | End: 2019-09-20
Attending: EMERGENCY MEDICINE | Admitting: FAMILY MEDICINE

## 2019-09-18 VITALS
DIASTOLIC BLOOD PRESSURE: 68 MMHG | SYSTOLIC BLOOD PRESSURE: 124 MMHG | OXYGEN SATURATION: 98 % | HEART RATE: 88 BPM | RESPIRATION RATE: 16 BRPM | TEMPERATURE: 97.6 F

## 2019-09-18 DIAGNOSIS — R73.9 HYPERGLYCEMIA: ICD-10-CM

## 2019-09-18 DIAGNOSIS — G89.4 CHRONIC PAIN SYNDROME: ICD-10-CM

## 2019-09-18 DIAGNOSIS — M54.16 LUMBAR RADICULOPATHY: Primary | ICD-10-CM

## 2019-09-18 DIAGNOSIS — M54.9 INTRACTABLE BACK PAIN: Primary | ICD-10-CM

## 2019-09-18 DIAGNOSIS — M51.36 DDD (DEGENERATIVE DISC DISEASE), LUMBAR: ICD-10-CM

## 2019-09-18 LAB
ALBUMIN SERPL-MCNC: 4.5 G/DL (ref 3.5–5.2)
ALP BLD-CCNC: 96 U/L (ref 35–104)
ALT SERPL-CCNC: 16 U/L (ref 0–32)
ANION GAP SERPL CALCULATED.3IONS-SCNC: 13 MMOL/L (ref 7–16)
AST SERPL-CCNC: 16 U/L (ref 0–31)
BASOPHILS ABSOLUTE: 0.05 E9/L (ref 0–0.2)
BASOPHILS RELATIVE PERCENT: 0.6 % (ref 0–2)
BILIRUB SERPL-MCNC: 0.4 MG/DL (ref 0–1.2)
BILIRUBIN URINE: NEGATIVE
BLOOD, URINE: NEGATIVE
BUN BLDV-MCNC: 8 MG/DL (ref 8–23)
CALCIUM SERPL-MCNC: 9.7 MG/DL (ref 8.6–10.2)
CHLORIDE BLD-SCNC: 101 MMOL/L (ref 98–107)
CLARITY: CLEAR
CO2: 26 MMOL/L (ref 22–29)
COLOR: YELLOW
CREAT SERPL-MCNC: 0.9 MG/DL (ref 0.5–1)
EKG ATRIAL RATE: 89 BPM
EKG P AXIS: 83 DEGREES
EKG P-R INTERVAL: 164 MS
EKG Q-T INTERVAL: 372 MS
EKG QRS DURATION: 94 MS
EKG QTC CALCULATION (BAZETT): 452 MS
EKG R AXIS: 60 DEGREES
EKG T AXIS: 72 DEGREES
EKG VENTRICULAR RATE: 89 BPM
EOSINOPHILS ABSOLUTE: 0.04 E9/L (ref 0.05–0.5)
EOSINOPHILS RELATIVE PERCENT: 0.5 % (ref 0–6)
GFR AFRICAN AMERICAN: >60
GFR NON-AFRICAN AMERICAN: >60 ML/MIN/1.73
GLUCOSE BLD-MCNC: 147 MG/DL (ref 74–99)
GLUCOSE URINE: NEGATIVE MG/DL
HCT VFR BLD CALC: 44.4 % (ref 34–48)
HEMOGLOBIN: 15.4 G/DL (ref 11.5–15.5)
IMMATURE GRANULOCYTES #: 0.02 E9/L
IMMATURE GRANULOCYTES %: 0.2 % (ref 0–5)
KETONES, URINE: NEGATIVE MG/DL
LACTIC ACID: 1.9 MMOL/L (ref 0.5–2.2)
LEUKOCYTE ESTERASE, URINE: NEGATIVE
LYMPHOCYTES ABSOLUTE: 2.58 E9/L (ref 1.5–4)
LYMPHOCYTES RELATIVE PERCENT: 30.4 % (ref 20–42)
MCH RBC QN AUTO: 34.4 PG (ref 26–35)
MCHC RBC AUTO-ENTMCNC: 34.7 % (ref 32–34.5)
MCV RBC AUTO: 99.1 FL (ref 80–99.9)
MONOCYTES ABSOLUTE: 0.64 E9/L (ref 0.1–0.95)
MONOCYTES RELATIVE PERCENT: 7.5 % (ref 2–12)
NEUTROPHILS ABSOLUTE: 5.17 E9/L (ref 1.8–7.3)
NEUTROPHILS RELATIVE PERCENT: 60.8 % (ref 43–80)
NITRITE, URINE: NEGATIVE
PDW BLD-RTO: 12.4 FL (ref 11.5–15)
PH UA: 6.5 (ref 5–9)
PLATELET # BLD: 286 E9/L (ref 130–450)
PMV BLD AUTO: 10.8 FL (ref 7–12)
POTASSIUM SERPL-SCNC: 3.4 MMOL/L (ref 3.5–5)
PROTEIN UA: NEGATIVE MG/DL
RBC # BLD: 4.48 E12/L (ref 3.5–5.5)
SODIUM BLD-SCNC: 140 MMOL/L (ref 132–146)
SPECIFIC GRAVITY UA: <=1.005 (ref 1–1.03)
TOTAL PROTEIN: 7 G/DL (ref 6.4–8.3)
TROPONIN: <0.01 NG/ML (ref 0–0.03)
UROBILINOGEN, URINE: 0.2 E.U./DL
WBC # BLD: 8.5 E9/L (ref 4.5–11.5)

## 2019-09-18 PROCEDURE — G0378 HOSPITAL OBSERVATION PER HR: HCPCS

## 2019-09-18 PROCEDURE — 74177 CT ABD & PELVIS W/CONTRAST: CPT

## 2019-09-18 PROCEDURE — 85025 COMPLETE CBC W/AUTO DIFF WBC: CPT

## 2019-09-18 PROCEDURE — 87088 URINE BACTERIA CULTURE: CPT

## 2019-09-18 PROCEDURE — 93010 ELECTROCARDIOGRAM REPORT: CPT | Performed by: INTERNAL MEDICINE

## 2019-09-18 PROCEDURE — 96374 THER/PROPH/DIAG INJ IV PUSH: CPT

## 2019-09-18 PROCEDURE — 81003 URINALYSIS AUTO W/O SCOPE: CPT

## 2019-09-18 PROCEDURE — 6360000002 HC RX W HCPCS: Performed by: PHYSICIAN ASSISTANT

## 2019-09-18 PROCEDURE — 6360000002 HC RX W HCPCS: Performed by: STUDENT IN AN ORGANIZED HEALTH CARE EDUCATION/TRAINING PROGRAM

## 2019-09-18 PROCEDURE — 80053 COMPREHEN METABOLIC PANEL: CPT

## 2019-09-18 PROCEDURE — 99213 OFFICE O/P EST LOW 20 MIN: CPT | Performed by: PHYSICIAN ASSISTANT

## 2019-09-18 PROCEDURE — 96376 TX/PRO/DX INJ SAME DRUG ADON: CPT

## 2019-09-18 PROCEDURE — 93005 ELECTROCARDIOGRAM TRACING: CPT | Performed by: PHYSICIAN ASSISTANT

## 2019-09-18 PROCEDURE — 2580000003 HC RX 258: Performed by: STUDENT IN AN ORGANIZED HEALTH CARE EDUCATION/TRAINING PROGRAM

## 2019-09-18 PROCEDURE — 99285 EMERGENCY DEPT VISIT HI MDM: CPT

## 2019-09-18 PROCEDURE — 94760 N-INVAS EAR/PLS OXIMETRY 1: CPT

## 2019-09-18 PROCEDURE — 6360000004 HC RX CONTRAST MEDICATION: Performed by: RADIOLOGY

## 2019-09-18 PROCEDURE — 84484 ASSAY OF TROPONIN QUANT: CPT

## 2019-09-18 PROCEDURE — 96375 TX/PRO/DX INJ NEW DRUG ADDON: CPT

## 2019-09-18 PROCEDURE — 2580000003 HC RX 258: Performed by: RADIOLOGY

## 2019-09-18 PROCEDURE — 72148 MRI LUMBAR SPINE W/O DYE: CPT

## 2019-09-18 PROCEDURE — 87040 BLOOD CULTURE FOR BACTERIA: CPT

## 2019-09-18 PROCEDURE — 72146 MRI CHEST SPINE W/O DYE: CPT

## 2019-09-18 PROCEDURE — 83605 ASSAY OF LACTIC ACID: CPT

## 2019-09-18 PROCEDURE — 2580000003 HC RX 258: Performed by: PHYSICIAN ASSISTANT

## 2019-09-18 PROCEDURE — 6370000000 HC RX 637 (ALT 250 FOR IP): Performed by: STUDENT IN AN ORGANIZED HEALTH CARE EDUCATION/TRAINING PROGRAM

## 2019-09-18 RX ORDER — DEXAMETHASONE SODIUM PHOSPHATE 10 MG/ML
10 INJECTION INTRAMUSCULAR; INTRAVENOUS ONCE
Status: COMPLETED | OUTPATIENT
Start: 2019-09-18 | End: 2019-09-18

## 2019-09-18 RX ORDER — POTASSIUM CHLORIDE 20 MEQ/1
20 TABLET, EXTENDED RELEASE ORAL 2 TIMES DAILY WITH MEALS
Status: DISCONTINUED | OUTPATIENT
Start: 2019-09-18 | End: 2019-09-20 | Stop reason: HOSPADM

## 2019-09-18 RX ORDER — SODIUM CHLORIDE 0.9 % (FLUSH) 0.9 %
10 SYRINGE (ML) INJECTION EVERY 12 HOURS SCHEDULED
Status: DISCONTINUED | OUTPATIENT
Start: 2019-09-18 | End: 2019-09-20 | Stop reason: HOSPADM

## 2019-09-18 RX ORDER — KETOROLAC TROMETHAMINE 30 MG/ML
15 INJECTION, SOLUTION INTRAMUSCULAR; INTRAVENOUS ONCE
Status: COMPLETED | OUTPATIENT
Start: 2019-09-18 | End: 2019-09-18

## 2019-09-18 RX ORDER — ORPHENADRINE CITRATE 100 MG/1
100 TABLET, EXTENDED RELEASE ORAL 2 TIMES DAILY
Status: DISCONTINUED | OUTPATIENT
Start: 2019-09-18 | End: 2019-09-18

## 2019-09-18 RX ORDER — GABAPENTIN 400 MG/1
400 CAPSULE ORAL 2 TIMES DAILY
Status: DISCONTINUED | OUTPATIENT
Start: 2019-09-18 | End: 2019-09-20 | Stop reason: HOSPADM

## 2019-09-18 RX ORDER — HYDROXYZINE PAMOATE 25 MG/1
25 CAPSULE ORAL 3 TIMES DAILY PRN
Status: DISCONTINUED | OUTPATIENT
Start: 2019-09-18 | End: 2019-09-20 | Stop reason: HOSPADM

## 2019-09-18 RX ORDER — ONDANSETRON 2 MG/ML
4 INJECTION INTRAMUSCULAR; INTRAVENOUS ONCE
Status: COMPLETED | OUTPATIENT
Start: 2019-09-18 | End: 2019-09-18

## 2019-09-18 RX ORDER — SODIUM CHLORIDE 0.9 % (FLUSH) 0.9 %
10 SYRINGE (ML) INJECTION PRN
Status: DISCONTINUED | OUTPATIENT
Start: 2019-09-18 | End: 2019-09-20 | Stop reason: HOSPADM

## 2019-09-18 RX ORDER — SIMVASTATIN 20 MG
10 TABLET ORAL NIGHTLY
Status: DISCONTINUED | OUTPATIENT
Start: 2019-09-18 | End: 2019-09-20 | Stop reason: HOSPADM

## 2019-09-18 RX ORDER — LISINOPRIL 20 MG/1
20 TABLET ORAL DAILY
Status: DISCONTINUED | OUTPATIENT
Start: 2019-09-19 | End: 2019-09-20 | Stop reason: HOSPADM

## 2019-09-18 RX ORDER — HYDROCHLOROTHIAZIDE 12.5 MG/1
12.5 TABLET ORAL EVERY MORNING
Status: DISCONTINUED | OUTPATIENT
Start: 2019-09-19 | End: 2019-09-20 | Stop reason: HOSPADM

## 2019-09-18 RX ORDER — 0.9 % SODIUM CHLORIDE 0.9 %
1000 INTRAVENOUS SOLUTION INTRAVENOUS ONCE
Status: COMPLETED | OUTPATIENT
Start: 2019-09-18 | End: 2019-09-18

## 2019-09-18 RX ORDER — LORAZEPAM 2 MG/ML
0.5 INJECTION INTRAMUSCULAR ONCE
Status: DISCONTINUED | OUTPATIENT
Start: 2019-09-18 | End: 2019-09-18

## 2019-09-18 RX ORDER — LORAZEPAM 2 MG/ML
0.5 INJECTION INTRAMUSCULAR
Status: DISCONTINUED | OUTPATIENT
Start: 2019-09-18 | End: 2019-09-18

## 2019-09-18 RX ORDER — SODIUM CHLORIDE 0.9 % (FLUSH) 0.9 %
10 SYRINGE (ML) INJECTION PRN
Status: CANCELLED | OUTPATIENT
Start: 2019-09-18

## 2019-09-18 RX ORDER — ONDANSETRON 2 MG/ML
4 INJECTION INTRAMUSCULAR; INTRAVENOUS EVERY 6 HOURS PRN
Status: DISCONTINUED | OUTPATIENT
Start: 2019-09-18 | End: 2019-09-20 | Stop reason: HOSPADM

## 2019-09-18 RX ORDER — SODIUM CHLORIDE 0.9 % (FLUSH) 0.9 %
10 SYRINGE (ML) INJECTION PRN
Status: COMPLETED | OUTPATIENT
Start: 2019-09-18 | End: 2019-09-18

## 2019-09-18 RX ADMIN — SODIUM CHLORIDE 1000 ML: 9 INJECTION, SOLUTION INTRAVENOUS at 15:24

## 2019-09-18 RX ADMIN — SIMVASTATIN 10 MG: 20 TABLET, FILM COATED ORAL at 23:49

## 2019-09-18 RX ADMIN — HYDROMORPHONE HYDROCHLORIDE 0.5 MG: 1 INJECTION, SOLUTION INTRAMUSCULAR; INTRAVENOUS; SUBCUTANEOUS at 17:19

## 2019-09-18 RX ADMIN — HYDROMORPHONE HYDROCHLORIDE 0.5 MG: 1 INJECTION, SOLUTION INTRAMUSCULAR; INTRAVENOUS; SUBCUTANEOUS at 23:41

## 2019-09-18 RX ADMIN — HYDROMORPHONE HYDROCHLORIDE 1 MG: 1 INJECTION, SOLUTION INTRAMUSCULAR; INTRAVENOUS; SUBCUTANEOUS at 15:24

## 2019-09-18 RX ADMIN — ONDANSETRON 4 MG: 2 INJECTION INTRAMUSCULAR; INTRAVENOUS at 17:19

## 2019-09-18 RX ADMIN — Medication 10 ML: at 23:50

## 2019-09-18 RX ADMIN — KETOROLAC TROMETHAMINE 15 MG: 30 INJECTION, SOLUTION INTRAMUSCULAR; INTRAVENOUS at 15:24

## 2019-09-18 RX ADMIN — GABAPENTIN 400 MG: 400 CAPSULE ORAL at 23:41

## 2019-09-18 RX ADMIN — HYDROMORPHONE HYDROCHLORIDE 1 MG: 1 INJECTION, SOLUTION INTRAMUSCULAR; INTRAVENOUS; SUBCUTANEOUS at 20:00

## 2019-09-18 RX ADMIN — DEXAMETHASONE SODIUM PHOSPHATE 10 MG: 10 INJECTION INTRAMUSCULAR; INTRAVENOUS at 17:19

## 2019-09-18 RX ADMIN — IOPAMIDOL 110 ML: 755 INJECTION, SOLUTION INTRAVENOUS at 16:50

## 2019-09-18 RX ADMIN — Medication 10 ML: at 16:49

## 2019-09-18 RX ADMIN — HYDROXYZINE PAMOATE 25 MG: 25 CAPSULE ORAL at 23:40

## 2019-09-18 RX ADMIN — POTASSIUM CHLORIDE 20 MEQ: 20 TABLET, EXTENDED RELEASE ORAL at 23:40

## 2019-09-18 ASSESSMENT — PAIN DESCRIPTION - LOCATION
LOCATION: BACK
LOCATION: BACK

## 2019-09-18 ASSESSMENT — PAIN DESCRIPTION - ONSET: ONSET: ON-GOING

## 2019-09-18 ASSESSMENT — PAIN - FUNCTIONAL ASSESSMENT: PAIN_FUNCTIONAL_ASSESSMENT: ACTIVITIES ARE NOT PREVENTED

## 2019-09-18 ASSESSMENT — PAIN DESCRIPTION - PAIN TYPE
TYPE: CHRONIC PAIN
TYPE: ACUTE PAIN

## 2019-09-18 ASSESSMENT — PAIN SCALES - GENERAL
PAINLEVEL_OUTOF10: 6
PAINLEVEL_OUTOF10: 8
PAINLEVEL_OUTOF10: 9
PAINLEVEL_OUTOF10: 10
PAINLEVEL_OUTOF10: 10

## 2019-09-18 ASSESSMENT — PAIN DESCRIPTION - FREQUENCY
FREQUENCY: CONTINUOUS
FREQUENCY: CONTINUOUS

## 2019-09-18 ASSESSMENT — PAIN DESCRIPTION - DESCRIPTORS
DESCRIPTORS: ACHING;SHARP;CONSTANT
DESCRIPTORS: ACHING;THROBBING

## 2019-09-18 ASSESSMENT — PAIN DESCRIPTION - PROGRESSION: CLINICAL_PROGRESSION: GRADUALLY WORSENING

## 2019-09-18 NOTE — PROGRESS NOTES
Davis Brown presents to the [address] [place] [address] on 9/18/2019. Vanesa Watson is complaining of pain lower back/rt. groin. The pain is constant. The pain is described as aching, throbbing, shooting and stabbing. Pain is rated on her best day at a 10, on her worst day at a 10, and on average at a 10 on the VAS scale. She took her last dose of Norco *    Any procedures since your last visit: No, with  % relief. Pacemaker or defibrilator: No managed by     She has not been on anticoagulation medications to include none and is managed by       /68   Pulse 88   Temp 97.6 °F (36.4 °C) (Oral)   Resp 16   LMP  (LMP Unknown)   SpO2 98%      No LMP recorded (lmp unknown).  Patient is postmenopausal.
OR    SD SUNNY NOSE/CLEFT LIP/TIP Bilateral 6/4/2018    BILATERAL TRANSFORAMINAL EPIDURAL STEROID INJECTION UNDER FLUOROSCOPIC GUIDANCE @ FORAMINAL LEVEL L1-2 #2 performed by Ravinder Almendarez MD at . [address]  2000, 2005    Big Left Toe    TONSILLECTOMY      WISDOM TOOTH EXTRACTION         Prior to Admission medications    Medication Sig Start Date End Date Taking? Authorizing Provider   HYDROcodone-acetaminophen (NORCO) 7.5-325 MG per tablet Take 1 tablet by mouth 2 times daily for 30 days. 8/28/19 9/27/19 Yes TREASURE Manrique   hydrOXYzine (VISTARIL) 25 MG capsule Take 1 capsule by mouth 3 times daily as needed for Anxiety 7/29/19  Yes Kenrick Reid MD   gabapentin (NEURONTIN) 400 MG capsule Take 1 capsule by mouth 2 times daily for 90 days. 7/1/19 9/29/19 Yes Kenrick Reid MD   quinapril (ACCUPRIL) 40 MG tablet Take 1 tablet by mouth daily For blood pressure. 7/1/19  Yes Kenrick Reid MD   hydrochlorothiazide (HYDRODIURIL) 12.5 MG tablet Take 1 tablet by mouth every morning For blood pressure 7/1/19  Yes Kenrick Reid MD   pitavastatin (LIVALO) 1 MG TABS tablet Take 1 mg by mouth nightly   Yes Historical Provider, MD       Allergies   Allergen Reactions    Pcn [Penicillins] Anaphylaxis       Social History     Socioeconomic History    Marital status:      Spouse name: Not on file    Number of children: Not on file    Years of education: Not on file    Highest education level: Not on file   Occupational History    Not on file   Social Needs    Financial resource strain: Not on file    Food insecurity:     Worry: Not on file     Inability: Not on file    Transportation needs:     Medical: Not on file     Non-medical: Not on file   Tobacco Use    Smoking status: Current Every Day Smoker     Packs/day: 0.50     Years: 30.00     Pack years: 15.00     Types: Cigarettes    Smokeless tobacco: Never Used   Substance and Sexual Activity    Alcohol use:  Yes

## 2019-09-18 NOTE — ED PROVIDER NOTES
Chloride 101 98 - 107 mmol/L    CO2 26 22 - 29 mmol/L    Anion Gap 13 7 - 16 mmol/L    Glucose 147 (H) 74 - 99 mg/dL    BUN 8 8 - 23 mg/dL    CREATININE 0.9 0.5 - 1.0 mg/dL    GFR Non-African American >60 >=60 mL/min/1.73    GFR African American >60     Calcium 9.7 8.6 - 10.2 mg/dL    Total Protein 7.0 6.4 - 8.3 g/dL    Alb 4.5 3.5 - 5.2 g/dL    Total Bilirubin 0.4 0.0 - 1.2 mg/dL    Alkaline Phosphatase 96 35 - 104 U/L    ALT 16 0 - 32 U/L    AST 16 0 - 31 U/L   Lactic Acid, Plasma   Result Value Ref Range    Lactic Acid 1.9 0.5 - 2.2 mmol/L   Urinalysis   Result Value Ref Range    Color, UA Yellow Straw/Yellow    Clarity, UA Clear Clear    Glucose, Ur Negative Negative mg/dL    Bilirubin Urine Negative Negative    Ketones, Urine Negative Negative mg/dL    Specific Gravity, UA <=1.005 1.005 - 1.030    Blood, Urine Negative Negative    pH, UA 6.5 5.0 - 9.0    Protein, UA Negative Negative mg/dL    Urobilinogen, Urine 0.2 <2.0 E.U./dL    Nitrite, Urine Negative Negative    Leukocyte Esterase, Urine Negative Negative   Troponin   Result Value Ref Range    Troponin <0.01 0.00 - 0.03 ng/mL   EKG 12 Lead   Result Value Ref Range    Ventricular Rate 89 BPM    Atrial Rate 89 BPM    P-R Interval 164 ms    QRS Duration 94 ms    Q-T Interval 372 ms    QTc Calculation (Bazett) 452 ms    P Axis 83 degrees    R Axis 60 degrees    T Axis 72 degrees     Imaging: All Radiology results interpreted by Radiologist unless otherwise noted. CT ABDOMEN PELVIS W IV CONTRAST Additional Contrast? None   Final Result   1. Status post cholecystectomy, no dilatation of the biliary tree or   pancreatic ductal system. 2. No obstructive uropathy. 3. No acute inflammatory changes omental mesenteric fat planes, free   intraperitoneal air, ascites or indication for bowel obstruction. No   signs for diverticulitis. 4. Unremarkable appearance for the appendix. 5. After laminectomy and fusion in the lower lumbar spine. MRI LUMBAR SPINE W WO CONTRAST    (Results Pending)   MRI THORACIC SPINE W WO CONTRAST    (Results Pending)     EKG #1:  Interpreted by emergency department physician unless otherwise noted. Time:  1539    Rate: 89  Rhythm: Sinus. Interpretation: normal EKG, normal sinus rhythm. ED Course / Medical Decision Making     Medications   orphenadrine (NORFLEX) extended release tablet 100 mg (has no administration in time range)   HYDROmorphone (DILAUDID) injection 1 mg (1 mg Intravenous Given 9/18/19 1524)   ketorolac (TORADOL) injection 15 mg (15 mg Intravenous Given 9/18/19 1524)   ondansetron (ZOFRAN) injection 4 mg (4 mg Intravenous Given 9/18/19 1719)   0.9 % sodium chloride bolus (1,000 mLs Intravenous New Bag 9/18/19 1524)   iopamidol (ISOVUE-370) 76 % injection 110 mL (110 mLs Intravenous Given 9/18/19 1650)   sodium chloride flush 0.9 % injection 10 mL (10 mLs Intravenous Given 9/18/19 1649)   dexamethasone (DECADRON) injection 10 mg (10 mg Intravenous Given 9/18/19 1719)   HYDROmorphone (DILAUDID) injection 0.5 mg (0.5 mg Intravenous Given 9/18/19 1719)      Re-examination:  9/18/19       Time: 1630    Patient's pain is improving. She is requesting more pain medication. Time: 1800   Patient's symptoms continue to improve. Consult(s):   IP CONSULT TO FAMILY MEDICINE I spoke with Dr. Kary Lin, family medicine resident, who will come see the patient. Procedure(s):   none    Medical Decision Making/Counseling:    Patient presents to the emergency department for intractable back pain. Patient received multiple IV pain medications in the emergency department with some improvement of her symptoms. MRI imaging of the patient's lumbar spine was ordered and we are still waiting on these results. She will be admitted for further evaluation treatment. .    Assessment      1. Intractable back pain    2.  Hyperglycemia       Plan   Admit to med/surg floor  Patient condition is fair    New Medications

## 2019-09-19 ENCOUNTER — APPOINTMENT (OUTPATIENT)
Dept: MRI IMAGING | Age: 64
End: 2019-09-19

## 2019-09-19 LAB
ANION GAP SERPL CALCULATED.3IONS-SCNC: 10 MMOL/L (ref 7–16)
BUN BLDV-MCNC: 10 MG/DL (ref 8–23)
CALCIUM SERPL-MCNC: 8.9 MG/DL (ref 8.6–10.2)
CHLORIDE BLD-SCNC: 102 MMOL/L (ref 98–107)
CO2: 24 MMOL/L (ref 22–29)
CREAT SERPL-MCNC: 0.8 MG/DL (ref 0.5–1)
GFR AFRICAN AMERICAN: >60
GFR NON-AFRICAN AMERICAN: >60 ML/MIN/1.73
GLUCOSE BLD-MCNC: 209 MG/DL (ref 74–99)
HCT VFR BLD CALC: 40.8 % (ref 34–48)
HEMOGLOBIN: 14 G/DL (ref 11.5–15.5)
MCH RBC QN AUTO: 34.7 PG (ref 26–35)
MCHC RBC AUTO-ENTMCNC: 34.3 % (ref 32–34.5)
MCV RBC AUTO: 101.2 FL (ref 80–99.9)
PDW BLD-RTO: 12.2 FL (ref 11.5–15)
PLATELET # BLD: 271 E9/L (ref 130–450)
PMV BLD AUTO: 11.1 FL (ref 7–12)
POTASSIUM REFLEX MAGNESIUM: 4.3 MMOL/L (ref 3.5–5)
RBC # BLD: 4.03 E12/L (ref 3.5–5.5)
SODIUM BLD-SCNC: 136 MMOL/L (ref 132–146)
WBC # BLD: 7 E9/L (ref 4.5–11.5)

## 2019-09-19 PROCEDURE — 6370000000 HC RX 637 (ALT 250 FOR IP): Performed by: FAMILY MEDICINE

## 2019-09-19 PROCEDURE — G0378 HOSPITAL OBSERVATION PER HR: HCPCS

## 2019-09-19 PROCEDURE — 96376 TX/PRO/DX INJ SAME DRUG ADON: CPT

## 2019-09-19 PROCEDURE — 94660 CPAP INITIATION&MGMT: CPT

## 2019-09-19 PROCEDURE — 36415 COLL VENOUS BLD VENIPUNCTURE: CPT

## 2019-09-19 PROCEDURE — 6370000000 HC RX 637 (ALT 250 FOR IP): Performed by: STUDENT IN AN ORGANIZED HEALTH CARE EDUCATION/TRAINING PROGRAM

## 2019-09-19 PROCEDURE — 2580000003 HC RX 258: Performed by: STUDENT IN AN ORGANIZED HEALTH CARE EDUCATION/TRAINING PROGRAM

## 2019-09-19 PROCEDURE — 85027 COMPLETE CBC AUTOMATED: CPT

## 2019-09-19 PROCEDURE — 6360000002 HC RX W HCPCS: Performed by: FAMILY MEDICINE

## 2019-09-19 PROCEDURE — 80048 BASIC METABOLIC PNL TOTAL CA: CPT

## 2019-09-19 PROCEDURE — 99222 1ST HOSP IP/OBS MODERATE 55: CPT | Performed by: FAMILY MEDICINE

## 2019-09-19 RX ORDER — HYDROCODONE BITARTRATE AND ACETAMINOPHEN 7.5; 325 MG/1; MG/1
1 TABLET ORAL EVERY 12 HOURS
Status: DISCONTINUED | OUTPATIENT
Start: 2019-09-19 | End: 2019-09-20 | Stop reason: HOSPADM

## 2019-09-19 RX ORDER — LABETALOL HYDROCHLORIDE 5 MG/ML
10 INJECTION, SOLUTION INTRAVENOUS EVERY 6 HOURS PRN
Status: DISCONTINUED | OUTPATIENT
Start: 2019-09-19 | End: 2019-09-20 | Stop reason: HOSPADM

## 2019-09-19 RX ORDER — IBUPROFEN 600 MG/1
600 TABLET ORAL 3 TIMES DAILY PRN
Status: DISCONTINUED | OUTPATIENT
Start: 2019-09-19 | End: 2019-09-20 | Stop reason: HOSPADM

## 2019-09-19 RX ADMIN — HYDROMORPHONE HYDROCHLORIDE 0.25 MG: 1 INJECTION, SOLUTION INTRAMUSCULAR; INTRAVENOUS; SUBCUTANEOUS at 19:06

## 2019-09-19 RX ADMIN — IBUPROFEN 600 MG: 600 TABLET, FILM COATED ORAL at 14:13

## 2019-09-19 RX ADMIN — IBUPROFEN 600 MG: 600 TABLET, FILM COATED ORAL at 20:53

## 2019-09-19 RX ADMIN — HYDROMORPHONE HYDROCHLORIDE 0.25 MG: 1 INJECTION, SOLUTION INTRAMUSCULAR; INTRAVENOUS; SUBCUTANEOUS at 21:08

## 2019-09-19 RX ADMIN — POTASSIUM CHLORIDE 20 MEQ: 20 TABLET, EXTENDED RELEASE ORAL at 10:18

## 2019-09-19 RX ADMIN — Medication 10 ML: at 19:06

## 2019-09-19 RX ADMIN — HYDROXYZINE PAMOATE 25 MG: 25 CAPSULE ORAL at 12:13

## 2019-09-19 RX ADMIN — Medication 10 ML: at 21:09

## 2019-09-19 RX ADMIN — GABAPENTIN 400 MG: 400 CAPSULE ORAL at 10:20

## 2019-09-19 RX ADMIN — HYDROCODONE BITARTRATE AND ACETAMINOPHEN 1 TABLET: 7.5; 325 TABLET ORAL at 14:13

## 2019-09-19 RX ADMIN — HYDROCHLOROTHIAZIDE 12.5 MG: 12.5 TABLET ORAL at 10:20

## 2019-09-19 RX ADMIN — LISINOPRIL 20 MG: 20 TABLET ORAL at 22:37

## 2019-09-19 RX ADMIN — SIMVASTATIN 10 MG: 20 TABLET, FILM COATED ORAL at 20:54

## 2019-09-19 RX ADMIN — HYDROXYZINE PAMOATE 25 MG: 25 CAPSULE ORAL at 20:53

## 2019-09-19 RX ADMIN — HYDROMORPHONE HYDROCHLORIDE 0.25 MG: 1 INJECTION, SOLUTION INTRAMUSCULAR; INTRAVENOUS; SUBCUTANEOUS at 12:14

## 2019-09-19 RX ADMIN — Medication 10 ML: at 12:14

## 2019-09-19 RX ADMIN — Medication 10 ML: at 10:21

## 2019-09-19 RX ADMIN — GABAPENTIN 400 MG: 400 CAPSULE ORAL at 20:53

## 2019-09-19 RX ADMIN — Medication 10 ML: at 12:41

## 2019-09-19 ASSESSMENT — PAIN DESCRIPTION - DESCRIPTORS
DESCRIPTORS: ACHING;SHARP
DESCRIPTORS: ACHING;SHARP

## 2019-09-19 ASSESSMENT — PAIN DESCRIPTION - FREQUENCY
FREQUENCY: CONTINUOUS
FREQUENCY: CONTINUOUS

## 2019-09-19 ASSESSMENT — PAIN SCALES - GENERAL
PAINLEVEL_OUTOF10: 10
PAINLEVEL_OUTOF10: 9
PAINLEVEL_OUTOF10: 5
PAINLEVEL_OUTOF10: 8

## 2019-09-19 ASSESSMENT — PAIN DESCRIPTION - LOCATION
LOCATION: BACK
LOCATION: BACK

## 2019-09-19 ASSESSMENT — PAIN DESCRIPTION - ONSET
ONSET: ON-GOING
ONSET: ON-GOING

## 2019-09-19 ASSESSMENT — ENCOUNTER SYMPTOMS
DIARRHEA: 0
SHORTNESS OF BREATH: 0
BACK PAIN: 1
COUGH: 0
ABDOMINAL PAIN: 0
NAUSEA: 0
VOMITING: 0
CHEST TIGHTNESS: 0

## 2019-09-19 ASSESSMENT — PAIN DESCRIPTION - PAIN TYPE
TYPE: CHRONIC PAIN
TYPE: CHRONIC PAIN

## 2019-09-19 ASSESSMENT — PAIN - FUNCTIONAL ASSESSMENT
PAIN_FUNCTIONAL_ASSESSMENT: ACTIVITIES ARE NOT PREVENTED
PAIN_FUNCTIONAL_ASSESSMENT: ACTIVITIES ARE NOT PREVENTED

## 2019-09-19 NOTE — H&P
P-R Interval 164 ms    QRS Duration 94 ms    Q-T Interval 372 ms    QTc Calculation (Bazett) 452 ms    P Axis 83 degrees    R Axis 60 degrees    T Axis 72 degrees       CT ABDOMEN PELVIS W IV CONTRAST Additional Contrast? None   Final Result   1. Status post cholecystectomy, no dilatation of the biliary tree or   pancreatic ductal system. 2. No obstructive uropathy. 3. No acute inflammatory changes omental mesenteric fat planes, free   intraperitoneal air, ascites or indication for bowel obstruction. No   signs for diverticulitis. 4. Unremarkable appearance for the appendix. 5. After laminectomy and fusion in the lower lumbar spine. MRI LUMBAR SPINE W WO CONTRAST    (Results Pending)   MRI THORACIC SPINE W WO CONTRAST    (Results Pending)       ASSESSMENT/PLAN:      Active Hospital Problems    Diagnosis Date Noted    Intractable back pain [M54.9] 09/18/2019     1) Intractable back pain  Patient's pain has been out of proportion to her normal pain and developed a couple weeks after her epidural injection. With urinary incontinence and right sided buttock numbness, ANNIKA needs to be ruled out. Spinal epidural abscess unlikely as CT scan was unremarkable, however patient does have severe TTP in lower lumbar region, so this needs to also be ruled out. MRI will be done to rule both of these etiologies out. Will control pain as below  -Pain control with dilaudid 0.5 mg [id number], (patient takes total of 15 mg of norco daily)  -MRI thoracic and lumbar spine w and wo contrast    Home medications for HTN and HLD were restarted. DVT ppx: Lovenox  Code Status: Full        Case discussed with attending on Dr. Lia Haji.     Noy Garcia DO  Family Medicine Resident Physician   9/18/2019

## 2019-09-19 NOTE — PROGRESS NOTES
[facility]  Progress Note    Chief complaint :  Chief Complaint   Patient presents with    Back Pain    Abdominal Pain       Subjective:    Pt continues to have back pain along her whole spine stating maximum pain is around lumbar and sacral spine. She is concerned about not having had a BM in 1 day whereas she usually has several a day even with being on opioid medication and claiming not to take laxative or stool softener. No more loss of urine involuntarily. She states she can feel touch to the saddle region but states \"it feels weird down there\". States she can \"not live with this pain\". She reports not having had the contrast MRI done yesterday. Per nurse, MRI with contrast is planned today but unaware of time. Past medical, surgical, family and social history were reviewed, non-contributory, and unchanged unless otherwise stated. Review of Systems   Constitutional: Negative for chills and fever. Respiratory: Negative for cough, chest tightness and shortness of breath. Cardiovascular: Negative for chest pain, palpitations and leg swelling. Gastrointestinal: Negative for abdominal pain, diarrhea, nausea and vomiting. Genitourinary: Negative for dysuria and frequency. Musculoskeletal: Positive for back pain. Neurological: Negative for dizziness, light-headedness and headaches. Objective:  BP (!) 170/80   Pulse 89   Temp 97.7 °F (36.5 °C) (Oral)   Resp 18   Ht 5' 4\" (1.626 m)   Wt 202 lb 6.1 oz (91.8 kg)   LMP  (LMP Unknown)   SpO2 93%   BMI 34.74 kg/m²     Physical Exam   Constitutional: She appears well-developed and well-nourished. No distress. HENT:   Head: Normocephalic and atraumatic. Cardiovascular: Normal rate, regular rhythm and normal heart sounds. Exam reveals no gallop and no friction rub. No murmur heard. Pulmonary/Chest: Effort normal and breath sounds normal. No stridor. No respiratory distress. She has no wheezes. fL       Radiology and other tests reviewed:  MRI THORACIC SPINE WO CONTRAST   Final Result   1. Discrete localized bone marrow edema in the anterior-inferior   corner of T10, anterior superior corner of T11 at the disc space level   with intact endplates are more likely reactive process from   degenerative changes predominant anteriorly at T10-11 level. 2. No other areas of inflammatory reaction is seen in the thoracic   spine. 3. There is normal appearance for the thoracic spine cord. 4. No epidural process seen. MRI LUMBAR SPINE WO CONTRAST   Final Result   1. After laminectomy and fusion of L3, L4 and L5.      2. The canal is decompressed where laminectomy and fusion have been   performed. 3. At L2-3 level, the level above the fusion, there is a mild broad   posterior displacement represent a disc protrusion associated with   facet/ligamentum flavum hypertrophy, are causing moderate to severe   spine canal stenosis. CT ABDOMEN PELVIS W IV CONTRAST Additional Contrast? None   Final Result   1. Status post cholecystectomy, no dilatation of the biliary tree or   pancreatic ductal system. 2. No obstructive uropathy. 3. No acute inflammatory changes omental mesenteric fat planes, free   intraperitoneal air, ascites or indication for bowel obstruction. No   signs for diverticulitis. 4. Unremarkable appearance for the appendix. 5. After laminectomy and fusion in the lower lumbar spine.                 Assessment:  Active Hospital Problems    Diagnosis Date Noted    Intractable back pain [M54.9] 09/18/2019       Plan:    1) Intractable back pain  - Pain control with    - home Norco 7-325 mg BID    - Ibuprofen 600 mg TID PRN for mild and moderate pain    - with dilaudid 0.5 mg q6hrs PRN for severe breakthrough  - MRI thoracic and lumbar spine w/o contrast: no evidence of abscess or cauda equina syndrome but discrete localized bone marrow edema in the anterior-inferior,

## 2019-09-19 NOTE — PROGRESS NOTES
Pt refused  Head to toe and skin assessment. Patient stated, \"I don't have any open wound or sores that she is aware of.\" Patient stated, \"My medications are more important than an assessment. \" passed information onto oncoming nurse.

## 2019-09-19 NOTE — ED NOTES
Patient very upset about not being able to have water while at testing and not getting PM medications that she takes at home. Tried to explain why water isn't given in testing areas but patient extremely agitated and argumentative. SBAR faxed to [facility] and Gabriela Ruelas updated on patient complaints and refusing to complete MRI testing.      Luis Manuel Heredia RN  09/18/19 [id number]

## 2019-09-19 NOTE — PROGRESS NOTES
Went to pt bedside to set up the cpap as ordered. Placed mask on pt and she insisted that I remove it. Stated the mask was \"too much\". I explained that adjustments could be made and other mask options but the pt stated she was only here for the night and she would be fine with out it. cpap at bedside ready for use.   Pt told if she changed her mind to notify nurse and I would come back to assist.

## 2019-09-20 ENCOUNTER — TELEPHONE (OUTPATIENT)
Dept: FAMILY MEDICINE CLINIC | Age: 64
End: 2019-09-20

## 2019-09-20 ENCOUNTER — TELEPHONE (OUTPATIENT)
Dept: NEUROSURGERY | Age: 64
End: 2019-09-20

## 2019-09-20 VITALS
RESPIRATION RATE: 20 BRPM | TEMPERATURE: 97.6 F | DIASTOLIC BLOOD PRESSURE: 70 MMHG | BODY MASS INDEX: 34.55 KG/M2 | HEIGHT: 64 IN | WEIGHT: 202.38 LBS | OXYGEN SATURATION: 96 % | SYSTOLIC BLOOD PRESSURE: 122 MMHG | HEART RATE: 90 BPM

## 2019-09-20 PROBLEM — M54.9 INTRACTABLE BACK PAIN: Status: RESOLVED | Noted: 2019-09-18 | Resolved: 2019-09-20

## 2019-09-20 LAB
ANION GAP SERPL CALCULATED.3IONS-SCNC: 14 MMOL/L (ref 7–16)
BUN BLDV-MCNC: 11 MG/DL (ref 8–23)
CALCIUM SERPL-MCNC: 8.8 MG/DL (ref 8.6–10.2)
CHLORIDE BLD-SCNC: 105 MMOL/L (ref 98–107)
CO2: 22 MMOL/L (ref 22–29)
CREAT SERPL-MCNC: 0.7 MG/DL (ref 0.5–1)
GFR AFRICAN AMERICAN: >60
GFR NON-AFRICAN AMERICAN: >60 ML/MIN/1.73
GLUCOSE BLD-MCNC: 98 MG/DL (ref 74–99)
HCT VFR BLD CALC: 40.8 % (ref 34–48)
HEMOGLOBIN: 13.8 G/DL (ref 11.5–15.5)
MCH RBC QN AUTO: 34.9 PG (ref 26–35)
MCHC RBC AUTO-ENTMCNC: 33.8 % (ref 32–34.5)
MCV RBC AUTO: 103.3 FL (ref 80–99.9)
PDW BLD-RTO: 12.6 FL (ref 11.5–15)
PLATELET # BLD: 279 E9/L (ref 130–450)
PMV BLD AUTO: 11.1 FL (ref 7–12)
POTASSIUM REFLEX MAGNESIUM: 4.1 MMOL/L (ref 3.5–5)
RBC # BLD: 3.95 E12/L (ref 3.5–5.5)
SODIUM BLD-SCNC: 141 MMOL/L (ref 132–146)
WBC # BLD: 15.6 E9/L (ref 4.5–11.5)

## 2019-09-20 PROCEDURE — 6360000002 HC RX W HCPCS: Performed by: STUDENT IN AN ORGANIZED HEALTH CARE EDUCATION/TRAINING PROGRAM

## 2019-09-20 PROCEDURE — 6370000000 HC RX 637 (ALT 250 FOR IP): Performed by: STUDENT IN AN ORGANIZED HEALTH CARE EDUCATION/TRAINING PROGRAM

## 2019-09-20 PROCEDURE — 80048 BASIC METABOLIC PNL TOTAL CA: CPT

## 2019-09-20 PROCEDURE — 36415 COLL VENOUS BLD VENIPUNCTURE: CPT

## 2019-09-20 PROCEDURE — 85027 COMPLETE CBC AUTOMATED: CPT

## 2019-09-20 PROCEDURE — 96376 TX/PRO/DX INJ SAME DRUG ADON: CPT

## 2019-09-20 PROCEDURE — G0378 HOSPITAL OBSERVATION PER HR: HCPCS

## 2019-09-20 PROCEDURE — 2580000003 HC RX 258: Performed by: STUDENT IN AN ORGANIZED HEALTH CARE EDUCATION/TRAINING PROGRAM

## 2019-09-20 PROCEDURE — 99219 PR INITIAL OBSERVATION CARE/DAY 50 MINUTES: CPT | Performed by: NEUROLOGICAL SURGERY

## 2019-09-20 PROCEDURE — 6360000002 HC RX W HCPCS: Performed by: FAMILY MEDICINE

## 2019-09-20 PROCEDURE — 96372 THER/PROPH/DIAG INJ SC/IM: CPT

## 2019-09-20 PROCEDURE — 94660 CPAP INITIATION&MGMT: CPT

## 2019-09-20 PROCEDURE — 99238 HOSP IP/OBS DSCHRG MGMT 30/<: CPT | Performed by: FAMILY MEDICINE

## 2019-09-20 PROCEDURE — 6370000000 HC RX 637 (ALT 250 FOR IP): Performed by: FAMILY MEDICINE

## 2019-09-20 RX ORDER — IBUPROFEN 600 MG/1
600 TABLET ORAL EVERY 6 HOURS PRN
Qty: 120 TABLET | Refills: 0 | Status: SHIPPED | OUTPATIENT
Start: 2019-09-20 | End: 2019-10-01

## 2019-09-20 RX ORDER — DOCUSATE SODIUM 100 MG/1
100 CAPSULE, LIQUID FILLED ORAL 2 TIMES DAILY
Qty: 60 CAPSULE | Refills: 1 | Status: SHIPPED | OUTPATIENT
Start: 2019-09-20 | End: 2019-10-01

## 2019-09-20 RX ADMIN — GABAPENTIN 400 MG: 400 CAPSULE ORAL at 09:37

## 2019-09-20 RX ADMIN — POTASSIUM CHLORIDE 20 MEQ: 20 TABLET, EXTENDED RELEASE ORAL at 09:37

## 2019-09-20 RX ADMIN — HYDROMORPHONE HYDROCHLORIDE 0.25 MG: 1 INJECTION, SOLUTION INTRAMUSCULAR; INTRAVENOUS; SUBCUTANEOUS at 05:14

## 2019-09-20 RX ADMIN — HYDROCODONE BITARTRATE AND ACETAMINOPHEN 1 TABLET: 7.5; 325 TABLET ORAL at 02:28

## 2019-09-20 RX ADMIN — HYDROCHLOROTHIAZIDE 12.5 MG: 12.5 TABLET ORAL at 09:37

## 2019-09-20 RX ADMIN — HYDROXYZINE PAMOATE 25 MG: 25 CAPSULE ORAL at 05:13

## 2019-09-20 RX ADMIN — ENOXAPARIN SODIUM 40 MG: 40 INJECTION SUBCUTANEOUS at 09:37

## 2019-09-20 RX ADMIN — Medication 10 ML: at 05:13

## 2019-09-20 RX ADMIN — Medication 10 ML: at 09:38

## 2019-09-20 RX ADMIN — IBUPROFEN 600 MG: 600 TABLET, FILM COATED ORAL at 06:20

## 2019-09-20 ASSESSMENT — ENCOUNTER SYMPTOMS
TROUBLE SWALLOWING: 0
BACK PAIN: 1
SHORTNESS OF BREATH: 0
CONSTIPATION: 1
ABDOMINAL PAIN: 0
PHOTOPHOBIA: 0

## 2019-09-20 ASSESSMENT — PAIN SCALES - GENERAL
PAINLEVEL_OUTOF10: 7
PAINLEVEL_OUTOF10: 3
PAINLEVEL_OUTOF10: 5
PAINLEVEL_OUTOF10: 7
PAINLEVEL_OUTOF10: 2

## 2019-09-20 ASSESSMENT — PAIN DESCRIPTION - ONSET
ONSET: ON-GOING
ONSET: AWAKENED FROM SLEEP
ONSET: AWAKENED FROM SLEEP

## 2019-09-20 ASSESSMENT — PAIN - FUNCTIONAL ASSESSMENT
PAIN_FUNCTIONAL_ASSESSMENT: ACTIVITIES ARE NOT PREVENTED

## 2019-09-20 ASSESSMENT — PAIN DESCRIPTION - FREQUENCY
FREQUENCY: CONTINUOUS

## 2019-09-20 ASSESSMENT — PAIN DESCRIPTION - DESCRIPTORS
DESCRIPTORS: ACHING;SHARP
DESCRIPTORS: ACHING;SHARP;DISCOMFORT

## 2019-09-20 ASSESSMENT — PAIN DESCRIPTION - LOCATION
LOCATION: BACK

## 2019-09-20 ASSESSMENT — PAIN DESCRIPTION - PAIN TYPE
TYPE: CHRONIC PAIN

## 2019-09-20 ASSESSMENT — PAIN DESCRIPTION - ORIENTATION
ORIENTATION: LOWER
ORIENTATION: LOWER

## 2019-09-20 NOTE — TELEPHONE ENCOUNTER
Patient called our office with multiple complaints about her hospital stay. Advised patient to speak with the charge nurse, and attempted to give her information about the Patient Advocate but she hung up on me.

## 2019-09-20 NOTE — PROGRESS NOTES
Spoke to Dr. Angie Hughes re: consult. Okay to discharge. Updated Dr. Aung Mohamud and gave direct number for physician to physician communication.

## 2019-09-20 NOTE — CONSULTS
intolerance. Genitourinary: Negative for flank pain. Musculoskeletal: Positive for arthralgias, back pain, gait problem and myalgias. Skin: Negative for wound. Neurological: Positive for weakness and numbness. Negative for headaches. Psychiatric/Behavioral: Negative for confusion. Physical Exam   Constitutional: She appears well-developed and well-nourished. HENT:   Head: Normocephalic and atraumatic. Eyes: Pupils are equal, round, and reactive to light. EOM are normal.   Neck: Normal range of motion. No tracheal deviation present. Cardiovascular: Normal rate. Pulmonary/Chest: Effort normal.   Abdominal: She exhibits no distension. Neurological:   Alert and oriented x3  CN3-12 intact  Upper strength full  RLE 3/5 strength  LLE strength 4/5  Sensation intact to light touch  On right:   +kvng finger  +EDWAR test  +compression   +distraction   Skin: Skin is warm and dry. Psychiatric: Thought content normal.        /70   Pulse 90   Temp 97.6 °F (36.4 °C) (Oral)   Resp 20   Ht 5' 4\" (1.626 m)   Wt 202 lb 6.1 oz (91.8 kg)   LMP  (LMP Unknown)   SpO2 96%   BMI 34.74 kg/m²        Assessment:   Right SI joint dysfunction   Lumbar radiculopathy   Hx L3-5 fusion   New Problem: adjacent segment degeneration and L2-3 severe spinal stenosis    Plan:  -She is a pt of Dr. Benjamin Angeles and may continue to follow up with him  -Recommend right SI joint injection or possible RFA right SI joint. Dr. Ryan Joiner discussed case with Dr. Reynaldo Pal. May also continue lumbar MARIELENA  -If pt fails conservative treatments, may need to discuss possible surgery. Electronically signed by Naresh Bennett PA-C on 9/20/2019 at 12:10 PM       I independently saw, evaluated, and examined the patient. Agree with plan outlined above. I independently reviewed the patient's imaging and laboratory results. Kimberly Rodríguez is 60years old. She has history of hypertension, hyperlipidemia, obstructive sleep apnea, and diabetes. She also has history of L3-5 posterior fusion and interbody placement by Dr. Anuel Moore in August 2017. She has been followed in the pain clinic. She states that she has had injections performed. She presents with back pain and leg pain. In addition she has right hip pain. She has symptoms of neurogenic claudication. Her MRI from September 18 demonstrates that adjacent segment degeneration at the L2-3 level. There is severe spinal canal stenosis at the L2-3 level. CT scan of the abdomen pelvis from September 18 demonstrates the hardware from L3-5. To address the spinal stenosis and adjacent segment degeneration, she would need extension of fusion to the L2-3 level with decompression. Her right sacroiliac joint pain is currently bothering her the most.  She is going to follow-up with the pain clinic and potentially have radiofrequency ablation of the right sacroiliac joint. Na 141.  Electronically signed by Bridger Anderson MD

## 2019-09-20 NOTE — DISCHARGE SUMMARY
Physician Discharge Summary  [id number] [facility]     Patient ID:  Danilo Alexandra  [id number]  60 y.o.  1955    Admit date: 9/18/2019    Discharge date and time:  09/20/19 in the afternoon    Admission Diagnoses:   Intractable back pain [M54.9]    Discharge Diagnoses: Active Problems:    HTN (hypertension)  Resolved Problems:    Intractable back pain      Consults: neurosurgery and pain management    Procedures: none    Hospital Course: Poornima Bhagat is a 59 yo F with PMH of HTN, HLD, CLYDE, DM, and chronic back pain that presented to the ED due to the worsening of her back pain. In the ED, lab work showed hypokalemia. CT of the abdomen was completed which showed no acute process. Patient was admitted for pain management and further evaluation of her back pain. During her admission, neurosurgery was consulted and an MRI of the thoracic and lumbar spine w/w/o contrast was ordered. MRI of the thoracic spine w/o contrast showed localized bone marrow edema in the anterior-inferior corner of T10, anterior superior corner of T11 at the disc space level with intact endplates. Lumbar spine MRI showed at L2-L3 level above the level of the fusion, there is a mild broad posterior displacement representing a disc protrusion associated with facet/ligamentum flavum hypertrophy, which is causing moderate to severe spinal canal stenosis. Patient refused to have the MRI w/ contrast completed. Neurosurgery recommended SI joint injection or possible RFA right SI joint with the continuation of MARIELENA; once conservative treatments fail, surgery is possible. Dr. Nathanael Orozco, who is the patient's pain management doctor, was consulted. He stated that patient was stable to go home on the same medication regimen she was admitted on. He did not want to increase her medications at this point without seeing her. Patient is to have an ablation of the SI joint as an outpatient procedure.  Patient stated that she still had Norco at home, so no Gwynn was given upon discharge. Patient states that she hasn't had a stool in days, patient did not complain of abdominal pain or blood per rectum. Patient is a chronically on opioids for her pain. Patient was discharged with Ibuprofen 600 mg Q6 hours PRN for pain and Colace 100 mg BID. Patient was discharged in a stable condition with a follow up with Dr. Dmitri Rm office. Discharge Exam:  Physical Exam   Constitutional: She appears well-developed and well-nourished. No distress. Cardiovascular: Normal rate, regular rhythm and normal heart sounds. Exam reveals no gallop and no friction rub. No murmur heard. Pulmonary/Chest: Effort normal and breath sounds normal. She has no wheezes. Abdominal: Soft. She exhibits no distension. Bowel sounds are decreased. There is no tenderness. Musculoskeletal:        Lumbar back: She exhibits tenderness. She exhibits no deformity. Psychiatric: Her mood appears anxious. She is not agitated and not aggressive. Cognition and memory are normal.     Disposition: home  fair    Patient Instructions:   Current Discharge Medication List           Details   ibuprofen (ADVIL;MOTRIN) 600 MG tablet Take 1 tablet by mouth every 6 hours as needed for Pain  Qty: 120 tablet, Refills: 0      docusate sodium (COLACE) 100 MG capsule Take 1 capsule by mouth 2 times daily  Qty: 60 capsule, Refills: 1              Details   HYDROcodone-acetaminophen (NORCO) 7.5-325 MG per tablet Take 1 tablet by mouth 2 times daily for 30 days. Qty: 60 tablet, Refills: 0    Comments: Reduce doses taken as pain becomes manageable  Associated Diagnoses: Cervical facet joint syndrome      hydrOXYzine (VISTARIL) 25 MG capsule Take 1 capsule by mouth 3 times daily as needed for Anxiety  Qty: 270 capsule, Refills: 0      gabapentin (NEURONTIN) 400 MG capsule Take 1 capsule by mouth 2 times daily for 90 days.   Qty: 180 capsule, Refills: 0      quinapril (ACCUPRIL) 40 MG tablet Take 1 tablet

## 2019-09-20 NOTE — PROGRESS NOTES
Spoke to pain management, okay to discharge and follow up outpatient for possible ablation, resume same home medications for pain. Updated Dr. Francisca Ferro re: plan of care and discharge.

## 2019-09-20 NOTE — PROGRESS NOTES
[facility]  [facility]    Chief complaint :  Chief Complaint   Patient presents with    Back Pain    Abdominal Pain       Subjective:    Patient claims to not have been receiving medications overnight. Patient states that she is still having back pain and numbness in her groin. Patient states the pain is not well controlled with medications. Had a lengthy conversation with patient about her complaints. Patient would like pain management to see her in the hospital. Patient denies being able to stool since before she was admitted to the hospital on Wednesday; patient states that she was had regular bowel movements prior to her worsening of back pain without the need for laxatives or stool softeners. She states with her back pain, she has been experiencing R leg weakness and paresthesias. Tolerating diet. Per nursing, patient has been verbally abusive towards nursing staff. Past medical, surgical, family and social history were reviewed, non-contributory, and unchanged unless otherwise stated. Review of Systems   Respiratory: Negative for shortness of breath. Cardiovascular: Negative for chest pain. Gastrointestinal: Positive for constipation. Negative for abdominal pain. Genitourinary: Negative for difficulty urinating. Musculoskeletal: Positive for back pain. Neurological: Positive for weakness and numbness. Psychiatric/Behavioral: The patient is nervous/anxious. Objective:  /88   Pulse 80   Temp 97.9 °F (36.6 °C) (Oral)   Resp 18   Ht 5' 4\" (1.626 m)   Wt 202 lb 6.1 oz (91.8 kg)   LMP  (LMP Unknown)   SpO2 97%   BMI 34.74 kg/m²     Physical Exam   Constitutional: She appears well-developed and well-nourished. No distress. Cardiovascular: Normal rate, regular rhythm and normal heart sounds. Exam reveals no gallop and no friction rub. No murmur heard. Pulmonary/Chest: Effort normal and breath sounds normal. She has no wheezes. Abdominal: Soft. She exhibits no distension. Bowel sounds are decreased. There is no tenderness. Musculoskeletal:        Lumbar back: She exhibits tenderness. She exhibits no deformity. Psychiatric: Her mood appears anxious. She is not agitated and not aggressive. Cognition and memory are normal.     8/12/2019  PROCEDURE: Bilateral Transforaminal epidural steroid injection under fluoroscopic guidance at foraminal level S1  (#3) - 75-80% better x 1 month.  Now has returned.       Labs:  Recent Results (from the past 24 hour(s))   Basic Metabolic Panel w/ Reflex to MG    Collection Time: 09/20/19  6:00 AM   Result Value Ref Range    Sodium 141 132 - 146 mmol/L    Potassium reflex Magnesium 4.1 3.5 - 5.0 mmol/L    Chloride 105 98 - 107 mmol/L    CO2 22 22 - 29 mmol/L    Anion Gap 14 7 - 16 mmol/L    Glucose 98 74 - 99 mg/dL    BUN 11 8 - 23 mg/dL    CREATININE 0.7 0.5 - 1.0 mg/dL    GFR Non-African American >60 >=60 mL/min/1.73    GFR African American >60     Calcium 8.8 8.6 - 10.2 mg/dL   CBC    Collection Time: 09/20/19  6:00 AM   Result Value Ref Range    WBC 15.6 (H) 4.5 - 11.5 E9/L    RBC 3.95 3.50 - 5.50 E12/L    Hemoglobin 13.8 11.5 - 15.5 g/dL    Hematocrit 40.8 34.0 - 48.0 %    .3 (H) 80.0 - 99.9 fL    MCH 34.9 26.0 - 35.0 pg    MCHC 33.8 32.0 - 34.5 %    RDW 12.6 11.5 - 15.0 fL    Platelets 054 632 - 508 E9/L    MPV 11.1 7.0 - 12.0 fL       Radiology and other tests reviewed:  MRI THORACIC SPINE WO CONTRAST   Final Result   1. Discrete localized bone marrow edema in the anterior-inferior   corner of T10, anterior superior corner of T11 at the disc space level   with intact endplates are more likely reactive process from   degenerative changes predominant anteriorly at T10-11 level. 2. No other areas of inflammatory reaction is seen in the thoracic   spine. 3. There is normal appearance for the thoracic spine cord. 4. No epidural process seen.       MRI LUMBAR SPINE WO CONTRAST PPX: Lovenox   Code: FULL  Dispo: possible discharge today    Electronically signed by Eliza Mcallister MD on 9/20/2019 at 7:41 AM

## 2019-09-20 NOTE — TELEPHONE ENCOUNTER
Encounter created to check OARRS. Pt had 30 day supply of [place] 7-325 filled on 8/28/19. Pt now in hospital stating home medication isn't controlling pain. Spoke with Dr. Madeline Mcgill. He is ok with given bridging amount of Norco 7-325 for a week until patient can be seen in office. He requested we do not increase the dose. He spoke with Dr. Mary Driscoll over the phone.     Cezar Salcedo MD  Family Medicine Resident PGY-3  09/20/19   11:31 AM

## 2019-09-21 LAB — URINE CULTURE, ROUTINE: NORMAL

## 2019-09-23 ENCOUNTER — TELEPHONE (OUTPATIENT)
Dept: FAMILY MEDICINE CLINIC | Age: 64
End: 2019-09-23

## 2019-09-23 LAB
BLOOD CULTURE, ROUTINE: NORMAL
CULTURE, BLOOD 2: NORMAL

## 2019-09-23 NOTE — TELEPHONE ENCOUNTER
Pt has a 3 mth f/u with you on 10/1/19. She needs a [facility] f/u. Ok to change appt to a TCM appt?

## 2019-09-25 ENCOUNTER — OFFICE VISIT (OUTPATIENT)
Dept: PAIN MANAGEMENT | Age: 64
End: 2019-09-25

## 2019-09-25 ENCOUNTER — TELEPHONE (OUTPATIENT)
Dept: PAIN MANAGEMENT | Age: 64
End: 2019-09-25

## 2019-09-25 ENCOUNTER — HOSPITAL ENCOUNTER (OUTPATIENT)
Age: 64
Discharge: HOME | End: 2019-09-27

## 2019-09-25 VITALS
BODY MASS INDEX: 29.99 KG/M2 | WEIGHT: 180 LBS | HEIGHT: 65 IN | DIASTOLIC BLOOD PRESSURE: 72 MMHG | HEART RATE: 69 BPM | RESPIRATION RATE: 18 BRPM | OXYGEN SATURATION: 98 % | TEMPERATURE: 98 F | SYSTOLIC BLOOD PRESSURE: 110 MMHG

## 2019-09-25 DIAGNOSIS — M47.812 CERVICAL FACET JOINT SYNDROME: ICD-10-CM

## 2019-09-25 LAB — SPECIFIC GRAVITY UA: <=1.005 (ref 1–1.03)

## 2019-09-25 PROCEDURE — G0480 DRUG TEST DEF 1-7 CLASSES: HCPCS

## 2019-09-25 PROCEDURE — 99213 OFFICE O/P EST LOW 20 MIN: CPT | Performed by: PHYSICIAN ASSISTANT

## 2019-09-25 PROCEDURE — 80307 DRUG TEST PRSMV CHEM ANLYZR: CPT

## 2019-09-25 RX ORDER — HYDROCODONE BITARTRATE AND ACETAMINOPHEN 7.5; 325 MG/1; MG/1
1 TABLET ORAL 2 TIMES DAILY
Qty: 60 TABLET | Refills: 0 | Status: SHIPPED | OUTPATIENT
Start: 2019-09-25 | End: 2019-10-25

## 2019-09-25 NOTE — PROGRESS NOTES
BLOCK Right 2019    BILATERAL TRANSFORAMINAL EPIDURAL STEROID INJECTION S1 #3 performed by Alex Mckeon MD [address]      Left    ARTHRODESIS Left 2018    ARTHRODESIS 2ND DIGIT LEFT FOOT performed by Pat Mcgrath DPM at [address] [facility]  2017    PLIF L3-L4, L4-L5 with rods, screws, and cages/Dr. Lesly Tipton BREAST SURGERY      reduction, bilat     SECTION      CHOLECYSTECTOMY      COLONOSCOPY  2015    ENDOSCOPY, COLON, DIAGNOSTIC      EPIDURAL STEROID INJECTION N/A 2019    BILATERAL TRANSFORAMINAL EPIDURAL STEROID INJECTION S1 performed by Gavino Cuadra DO at [address]      Left    [address] / ANKLE Left 2018    REMOVAL OF PAINFUL HARDWARE LEFT FOOT  ++SYNTHES++ performed by Pat Mcgrath DPM at [facility]    inguinal     NERVE BLOCK Bilateral 2018    L1-2 lumbar foramen #1    NERVE BLOCK Bilateral 2018    s1 tfesi    NERVE BLOCK Bilateral 2019    OTHER SURGICAL HISTORY Left 13    left foot tarso metatarsal joint injection    OTHER SURGICAL HISTORY Left 5/27/15    Endoscopic Gastroc recession left, Lapidus left foot and  Excision of exostosis left foot    ME INJECT ANES/STEROID FORAMEN LUMBAR/SACRAL W IMG GUIDE ,1 LEVEL Bilateral 12/3/2018    BILATERAL S1  EPIDURAL STEROID INJECTION performed by Alex Mckeon MD at [address] [facility] DX/THER SBST INTRLMNR LMBR/SAC W/IMG GDN N/A 2018    EPIDURAL STEROID INJECTION L1-2 WITH LOW VOL performed by Alex Mckeon MD at [address] [facility] NOSE/CLEFT LIP/TIP Bilateral 2018    BILATERAL L1-2 LUMBAR FORAMEN #1 performed by Alex Mckeon MD at [address] [facility] NOSE/CLEFT LIP/TIP Bilateral 2018    BILATERAL TRANSFORAMINAL EPIDURAL STEROID INJECTION UNDER FLUOROSCOPIC GUIDANCE @ FORAMINAL LEVEL L1-2 #2 performed by Alex Mckeon MD at [address] [facility] guidance at foraminal level S1  (#3) on 8/12/2019 with 75-80% relief to lower back and radiation to groin. Lasted one month and now back to baseline.       Patient encouraged to stay active  Failed physical therapy for her cervical spine  Treatment plan discussed with the patient including medications side effects     Controlled Substance Monitoring:    Acute and Chronic Pain Monitoring:   RX Monitoring 9/25/2019   Attestation -   Acute Pain Prescriptions -   Periodic Controlled Substance Monitoring Possible medication side effects, risk of tolerance/dependence & alternative treatments discussed. ;No signs of potential drug abuse or diversion identified. ;Assessed functional status. ;Random urine drug screen sent today.    Chronic Pain > 80 MEDD -                                    ccreferring physicf

## 2019-09-29 LAB
6AM URINE: <10 NG/ML
CODEINE, URINE: <20 NG/ML
HYDROCODONE, URINE: 854 NG/ML
HYDROMORPHONE, URINE: <20 NG/ML
MORPHINE URINE: <20 NG/ML
NORHYDROCODONE, URINE: 889 NG/ML
NOROXYCODONE, URINE: <20 NG/ML
NOROXYMORPHONE, URINE: <20 NG/ML
OXYCODONE, URINE CONFIRMATION: <20 NG/ML
OXYMORPHONE, URINE: <20 NG/ML

## 2019-09-30 ENCOUNTER — HOSPITAL ENCOUNTER (OUTPATIENT)
Dept: OPERATING ROOM | Age: 64
Setting detail: OUTPATIENT SURGERY
Discharge: HOME | End: 2019-09-30
Attending: PAIN MEDICINE

## 2019-09-30 ENCOUNTER — HOSPITAL ENCOUNTER (OUTPATIENT)
Age: 64
Setting detail: OUTPATIENT SURGERY
Discharge: HOME | End: 2019-09-30
Attending: PAIN MEDICINE | Admitting: PAIN MEDICINE

## 2019-09-30 VITALS
HEART RATE: 72 BPM | RESPIRATION RATE: 16 BRPM | SYSTOLIC BLOOD PRESSURE: 117 MMHG | DIASTOLIC BLOOD PRESSURE: 71 MMHG | OXYGEN SATURATION: 99 %

## 2019-09-30 DIAGNOSIS — M46.1 SACROILIITIS (HCC): ICD-10-CM

## 2019-09-30 PROBLEM — M53.3 DISORDER OF SACRUM: Status: ACTIVE | Noted: 2019-09-30

## 2019-09-30 PROCEDURE — 3600000015 HC SURGERY LEVEL 5 ADDTL 15MIN: Performed by: PAIN MEDICINE

## 2019-09-30 PROCEDURE — 6360000002 HC RX W HCPCS: Performed by: PAIN MEDICINE

## 2019-09-30 PROCEDURE — 3600000005 HC SURGERY LEVEL 5 BASE: Performed by: PAIN MEDICINE

## 2019-09-30 PROCEDURE — 64640 INJECTION TREATMENT OF NERVE: CPT | Performed by: PAIN MEDICINE

## 2019-09-30 PROCEDURE — 7100000010 HC PHASE II RECOVERY - FIRST 15 MIN: Performed by: PAIN MEDICINE

## 2019-09-30 PROCEDURE — 2709999900 HC NON-CHARGEABLE SUPPLY: Performed by: PAIN MEDICINE

## 2019-09-30 PROCEDURE — 7100000011 HC PHASE II RECOVERY - ADDTL 15 MIN: Performed by: PAIN MEDICINE

## 2019-09-30 PROCEDURE — 3209999900 FLUORO FOR SURGICAL PROCEDURES

## 2019-09-30 PROCEDURE — 2500000003 HC RX 250 WO HCPCS: Performed by: PAIN MEDICINE

## 2019-09-30 PROCEDURE — C1713 ANCHOR/SCREW BN/BN,TIS/BN: HCPCS | Performed by: PAIN MEDICINE

## 2019-09-30 RX ORDER — LIDOCAINE HYDROCHLORIDE 20 MG/ML
INJECTION, SOLUTION EPIDURAL; INFILTRATION; INTRACAUDAL; PERINEURAL PRN
Status: DISCONTINUED | OUTPATIENT
Start: 2019-09-30 | End: 2019-09-30 | Stop reason: ALTCHOICE

## 2019-09-30 ASSESSMENT — PAIN SCALES - GENERAL: PAINLEVEL_OUTOF10: 0

## 2019-09-30 ASSESSMENT — PAIN - FUNCTIONAL ASSESSMENT: PAIN_FUNCTIONAL_ASSESSMENT: 0-10

## 2019-10-01 ENCOUNTER — OFFICE VISIT (OUTPATIENT)
Dept: FAMILY MEDICINE CLINIC | Age: 64
End: 2019-10-01

## 2019-10-01 VITALS
WEIGHT: 201 LBS | TEMPERATURE: 98.3 F | DIASTOLIC BLOOD PRESSURE: 100 MMHG | HEART RATE: 89 BPM | SYSTOLIC BLOOD PRESSURE: 140 MMHG | BODY MASS INDEX: 34.31 KG/M2 | HEIGHT: 64 IN | OXYGEN SATURATION: 94 %

## 2019-10-01 DIAGNOSIS — G89.29 CHRONIC RIGHT SI JOINT PAIN: ICD-10-CM

## 2019-10-01 DIAGNOSIS — M51.36 LUMBAR DEGENERATIVE DISC DISEASE: Primary | ICD-10-CM

## 2019-10-01 DIAGNOSIS — I10 ESSENTIAL HYPERTENSION: ICD-10-CM

## 2019-10-01 DIAGNOSIS — Z98.1 HISTORY OF LUMBAR SPINAL FUSION: ICD-10-CM

## 2019-10-01 DIAGNOSIS — M54.9 INTRACTABLE BACK PAIN: ICD-10-CM

## 2019-10-01 DIAGNOSIS — M53.3 CHRONIC RIGHT SI JOINT PAIN: ICD-10-CM

## 2019-10-01 LAB
7-AMINOCLONAZEPAM, URINE: <5 NG/ML
ALPHA-HYDROXYALPRAZOLAM, URINE: <5 NG/ML
ALPHA-HYDROXYMIDAZOLAM, URINE: <20 NG/ML
ALPRAZOLAM, URINE: <5 NG/ML
CHLORDIAZEPOXIDE, URINE: <20 NG/ML
CLONAZEPAM, URINE: <5 NG/ML
DIAZEPAM, URINE: <20 NG/ML
LORAZEPAM, URINE: <20 NG/ML
MIDAZOLAM, URINE: <20 NG/ML
NORDIAZEPAM, URINE: <20 NG/ML
OXAZEPAM, URINE: <20 NG/ML
TEMAZEPAM, URINE: <20 NG/ML

## 2019-10-01 PROCEDURE — 1111F DSCHRG MED/CURRENT MED MERGE: CPT | Performed by: FAMILY MEDICINE

## 2019-10-01 PROCEDURE — 99215 OFFICE O/P EST HI 40 MIN: CPT | Performed by: FAMILY MEDICINE

## 2019-10-02 RX ORDER — ATORVASTATIN CALCIUM 40 MG/1
40 TABLET, FILM COATED ORAL DAILY
Qty: 90 TABLET | Refills: 1 | Status: SHIPPED
Start: 2019-10-02 | End: 2020-06-12 | Stop reason: SDUPTHER

## 2019-10-02 RX ORDER — QUINAPRIL 40 MG/1
40 TABLET ORAL DAILY
Qty: 90 TABLET | Refills: 1 | Status: ON HOLD
Start: 2019-10-02 | End: 2020-03-19 | Stop reason: HOSPADM

## 2019-10-02 RX ORDER — HYDROCHLOROTHIAZIDE 12.5 MG/1
12.5 TABLET ORAL EVERY MORNING
Qty: 90 TABLET | Refills: 1 | Status: ON HOLD
Start: 2019-10-02 | End: 2020-03-19 | Stop reason: HOSPADM

## 2019-10-02 RX ORDER — GABAPENTIN 400 MG/1
400 CAPSULE ORAL 2 TIMES DAILY
Qty: 180 CAPSULE | Refills: 0 | Status: SHIPPED | OUTPATIENT
Start: 2019-10-02 | End: 2019-12-19 | Stop reason: SDUPTHER

## 2019-10-02 RX ORDER — HYDROXYZINE PAMOATE 25 MG/1
25 CAPSULE ORAL 3 TIMES DAILY PRN
Qty: 270 CAPSULE | Refills: 1 | Status: SHIPPED
Start: 2019-10-02 | End: 2020-09-11 | Stop reason: SDUPTHER

## 2019-10-03 ENCOUNTER — TELEPHONE (OUTPATIENT)
Dept: FAMILY MEDICINE CLINIC | Age: 64
End: 2019-10-03

## 2019-10-03 LAB
Lab: NORMAL
REPORT: NORMAL
THIS TEST SENT TO: NORMAL

## 2019-10-08 ENCOUNTER — TELEPHONE (OUTPATIENT)
Dept: FAMILY MEDICINE CLINIC | Age: 64
End: 2019-10-08

## 2019-10-20 ENCOUNTER — HOSPITAL ENCOUNTER (EMERGENCY)
Age: 64
Discharge: HOME | End: 2019-10-20

## 2019-10-20 VITALS
OXYGEN SATURATION: 100 % | HEIGHT: 65 IN | RESPIRATION RATE: 16 BRPM | WEIGHT: 180 LBS | DIASTOLIC BLOOD PRESSURE: 75 MMHG | TEMPERATURE: 98.2 F | SYSTOLIC BLOOD PRESSURE: 167 MMHG | BODY MASS INDEX: 29.99 KG/M2 | HEART RATE: 63 BPM

## 2019-10-20 DIAGNOSIS — M54.50 ACUTE EXACERBATION OF CHRONIC LOW BACK PAIN: Primary | ICD-10-CM

## 2019-10-20 DIAGNOSIS — G89.29 ACUTE EXACERBATION OF CHRONIC LOW BACK PAIN: Primary | ICD-10-CM

## 2019-10-20 PROCEDURE — 6360000002 HC RX W HCPCS: Performed by: NURSE PRACTITIONER

## 2019-10-20 PROCEDURE — 99282 EMERGENCY DEPT VISIT SF MDM: CPT

## 2019-10-20 PROCEDURE — 96372 THER/PROPH/DIAG INJ SC/IM: CPT

## 2019-10-20 RX ADMIN — HYDROMORPHONE HYDROCHLORIDE 1 MG: 1 INJECTION, SOLUTION INTRAMUSCULAR; INTRAVENOUS; SUBCUTANEOUS at 11:08

## 2019-10-20 ASSESSMENT — PAIN DESCRIPTION - PAIN TYPE: TYPE: ACUTE PAIN

## 2019-10-20 ASSESSMENT — PAIN DESCRIPTION - LOCATION: LOCATION: BACK

## 2019-10-20 ASSESSMENT — PAIN SCALES - GENERAL: PAINLEVEL_OUTOF10: 10

## 2019-11-05 ENCOUNTER — OFFICE VISIT (OUTPATIENT)
Dept: PAIN MANAGEMENT | Age: 64
End: 2019-11-05

## 2019-11-05 VITALS
TEMPERATURE: 98.4 F | HEART RATE: 84 BPM | SYSTOLIC BLOOD PRESSURE: 134 MMHG | WEIGHT: 180 LBS | DIASTOLIC BLOOD PRESSURE: 84 MMHG | HEIGHT: 65 IN | BODY MASS INDEX: 29.99 KG/M2 | RESPIRATION RATE: 18 BRPM | OXYGEN SATURATION: 98 %

## 2019-11-05 DIAGNOSIS — M25.551 RIGHT HIP PAIN: ICD-10-CM

## 2019-11-05 DIAGNOSIS — M54.50 CHRONIC BILATERAL LOW BACK PAIN WITHOUT SCIATICA: Primary | ICD-10-CM

## 2019-11-05 DIAGNOSIS — M48.061 SPINAL STENOSIS OF LUMBAR REGION WITHOUT NEUROGENIC CLAUDICATION: ICD-10-CM

## 2019-11-05 DIAGNOSIS — M47.812 CERVICAL FACET JOINT SYNDROME: ICD-10-CM

## 2019-11-05 DIAGNOSIS — M47.816 LUMBAR FACET ARTHROPATHY: ICD-10-CM

## 2019-11-05 DIAGNOSIS — G89.29 CHRONIC BILATERAL LOW BACK PAIN WITHOUT SCIATICA: Primary | ICD-10-CM

## 2019-11-05 DIAGNOSIS — M51.36 DDD (DEGENERATIVE DISC DISEASE), LUMBAR: ICD-10-CM

## 2019-11-05 DIAGNOSIS — G89.4 CHRONIC PAIN SYNDROME: ICD-10-CM

## 2019-11-05 DIAGNOSIS — M54.16 LUMBAR RADICULOPATHY: ICD-10-CM

## 2019-11-05 PROCEDURE — 99214 OFFICE O/P EST MOD 30 MIN: CPT | Performed by: PAIN MEDICINE

## 2019-11-05 RX ORDER — HYDROCODONE BITARTRATE AND ACETAMINOPHEN 7.5; 325 MG/1; MG/1
1 TABLET ORAL EVERY 8 HOURS PRN
Qty: 90 TABLET | Refills: 0 | Status: SHIPPED | OUTPATIENT
Start: 2019-11-05 | End: 2019-12-04 | Stop reason: SDUPTHER

## 2019-11-06 ENCOUNTER — OFFICE VISIT (OUTPATIENT)
Dept: FAMILY MEDICINE CLINIC | Age: 64
End: 2019-11-06

## 2019-11-06 VITALS
SYSTOLIC BLOOD PRESSURE: 138 MMHG | TEMPERATURE: 96.7 F | HEIGHT: 65 IN | OXYGEN SATURATION: 98 % | WEIGHT: 209 LBS | BODY MASS INDEX: 34.82 KG/M2 | DIASTOLIC BLOOD PRESSURE: 86 MMHG | HEART RATE: 73 BPM

## 2019-11-06 DIAGNOSIS — M54.9 CHRONIC BACK PAIN, UNSPECIFIED BACK LOCATION, UNSPECIFIED BACK PAIN LATERALITY: ICD-10-CM

## 2019-11-06 DIAGNOSIS — G89.29 CHRONIC BACK PAIN, UNSPECIFIED BACK LOCATION, UNSPECIFIED BACK PAIN LATERALITY: ICD-10-CM

## 2019-11-06 PROCEDURE — 90471 IMMUNIZATION ADMIN: CPT | Performed by: FAMILY MEDICINE

## 2019-11-06 PROCEDURE — 90686 IIV4 VACC NO PRSV 0.5 ML IM: CPT | Performed by: FAMILY MEDICINE

## 2019-11-06 PROCEDURE — 99214 OFFICE O/P EST MOD 30 MIN: CPT | Performed by: FAMILY MEDICINE

## 2019-11-06 RX ORDER — CYCLOBENZAPRINE HCL 5 MG
5 TABLET ORAL 2 TIMES DAILY PRN
Qty: 60 TABLET | Refills: 1 | Status: SHIPPED | OUTPATIENT
Start: 2019-11-06 | End: 2019-11-16

## 2019-11-12 ENCOUNTER — INITIAL CONSULT (OUTPATIENT)
Dept: NEUROSURGERY | Age: 64
End: 2019-11-12

## 2019-11-12 VITALS
HEART RATE: 89 BPM | DIASTOLIC BLOOD PRESSURE: 91 MMHG | BODY MASS INDEX: 35.16 KG/M2 | HEIGHT: 65 IN | SYSTOLIC BLOOD PRESSURE: 171 MMHG | WEIGHT: 211 LBS

## 2019-11-12 DIAGNOSIS — G89.29 CHRONIC MIDLINE LOW BACK PAIN WITH RIGHT-SIDED SCIATICA: Primary | ICD-10-CM

## 2019-11-12 DIAGNOSIS — M54.41 CHRONIC MIDLINE LOW BACK PAIN WITH RIGHT-SIDED SCIATICA: Primary | ICD-10-CM

## 2019-11-12 PROCEDURE — 99213 OFFICE O/P EST LOW 20 MIN: CPT | Performed by: NEUROLOGICAL SURGERY

## 2019-11-21 ENCOUNTER — OFFICE VISIT (OUTPATIENT)
Dept: FAMILY MEDICINE CLINIC | Age: 64
End: 2019-11-21

## 2019-11-21 VITALS
TEMPERATURE: 97.9 F | HEART RATE: 92 BPM | SYSTOLIC BLOOD PRESSURE: 136 MMHG | DIASTOLIC BLOOD PRESSURE: 88 MMHG | BODY MASS INDEX: 34.99 KG/M2 | WEIGHT: 210 LBS | HEIGHT: 65 IN | OXYGEN SATURATION: 98 %

## 2019-11-21 DIAGNOSIS — F17.210 CIGARETTE NICOTINE DEPENDENCE WITHOUT COMPLICATION: ICD-10-CM

## 2019-11-21 DIAGNOSIS — F41.8 DEPRESSION WITH ANXIETY: Primary | ICD-10-CM

## 2019-11-21 DIAGNOSIS — Z12.31 ENCOUNTER FOR MAMMOGRAM TO ESTABLISH BASELINE MAMMOGRAM: ICD-10-CM

## 2019-11-21 DIAGNOSIS — K12.0 APHTHOUS ULCER OF MOUTH: ICD-10-CM

## 2019-11-21 PROCEDURE — 99214 OFFICE O/P EST MOD 30 MIN: CPT | Performed by: FAMILY MEDICINE

## 2019-11-21 RX ORDER — FLUOXETINE HYDROCHLORIDE 20 MG/1
20 CAPSULE ORAL DAILY
Qty: 30 CAPSULE | Refills: 5 | Status: SHIPPED | OUTPATIENT
Start: 2019-11-21 | End: 2019-12-20 | Stop reason: SDUPTHER

## 2019-11-21 RX ORDER — VARENICLINE TARTRATE 25 MG
KIT ORAL
Qty: 1 BOX | Refills: 0 | Status: SHIPPED | OUTPATIENT
Start: 2019-11-21 | End: 2019-11-25

## 2019-11-25 ENCOUNTER — TELEPHONE (OUTPATIENT)
Dept: FAMILY MEDICINE CLINIC | Age: 64
End: 2019-11-25

## 2019-11-25 DIAGNOSIS — F17.210 CIGARETTE NICOTINE DEPENDENCE WITHOUT COMPLICATION: ICD-10-CM

## 2019-11-25 RX ORDER — VARENICLINE TARTRATE 1 MG/1
TABLET, FILM COATED ORAL
Qty: 10 TABLET | Refills: 0 | Status: SHIPPED | OUTPATIENT
Start: 2019-11-25 | End: 2019-12-19

## 2019-12-03 ENCOUNTER — HOSPITAL ENCOUNTER (OUTPATIENT)
Dept: GENERAL RADIOLOGY | Age: 64
Discharge: HOME | End: 2019-12-05

## 2019-12-03 DIAGNOSIS — Z12.31 ENCOUNTER FOR MAMMOGRAM TO ESTABLISH BASELINE MAMMOGRAM: ICD-10-CM

## 2019-12-03 PROCEDURE — 77063 BREAST TOMOSYNTHESIS BI: CPT

## 2019-12-04 ENCOUNTER — OFFICE VISIT (OUTPATIENT)
Dept: PAIN MANAGEMENT | Age: 64
End: 2019-12-04

## 2019-12-04 VITALS
SYSTOLIC BLOOD PRESSURE: 124 MMHG | WEIGHT: 200 LBS | BODY MASS INDEX: 33.32 KG/M2 | RESPIRATION RATE: 18 BRPM | TEMPERATURE: 98.4 F | DIASTOLIC BLOOD PRESSURE: 72 MMHG | HEART RATE: 84 BPM | HEIGHT: 65 IN | OXYGEN SATURATION: 98 %

## 2019-12-04 DIAGNOSIS — M51.36 DDD (DEGENERATIVE DISC DISEASE), LUMBAR: ICD-10-CM

## 2019-12-04 DIAGNOSIS — M48.061 SPINAL STENOSIS OF LUMBAR REGION WITHOUT NEUROGENIC CLAUDICATION: ICD-10-CM

## 2019-12-04 PROCEDURE — 99213 OFFICE O/P EST LOW 20 MIN: CPT | Performed by: PHYSICIAN ASSISTANT

## 2019-12-04 RX ORDER — HYDROCODONE BITARTRATE AND ACETAMINOPHEN 7.5; 325 MG/1; MG/1
1 TABLET ORAL EVERY 8 HOURS PRN
Qty: 90 TABLET | Refills: 0 | Status: SHIPPED | OUTPATIENT
Start: 2019-12-06 | End: 2020-01-10 | Stop reason: SDUPTHER

## 2019-12-05 ENCOUNTER — HOSPITAL ENCOUNTER (OUTPATIENT)
Age: 64
Discharge: HOME | End: 2019-12-07

## 2019-12-05 PROCEDURE — 88305 TISSUE EXAM BY PATHOLOGIST: CPT

## 2019-12-17 ENCOUNTER — PREP FOR PROCEDURE (OUTPATIENT)
Dept: NEUROSURGERY | Age: 64
End: 2019-12-17

## 2019-12-17 DIAGNOSIS — M51.9 LUMBAR DISC DISEASE: Primary | ICD-10-CM

## 2019-12-17 RX ORDER — SODIUM CHLORIDE 0.9 % (FLUSH) 0.9 %
10 SYRINGE (ML) INJECTION EVERY 12 HOURS SCHEDULED
Status: CANCELLED | OUTPATIENT
Start: 2019-12-17

## 2019-12-17 RX ORDER — SODIUM CHLORIDE 0.9 % (FLUSH) 0.9 %
10 SYRINGE (ML) INJECTION PRN
Status: CANCELLED | OUTPATIENT
Start: 2019-12-17

## 2019-12-17 RX ORDER — SODIUM CHLORIDE 9 MG/ML
INJECTION, SOLUTION INTRAVENOUS CONTINUOUS
Status: CANCELLED | OUTPATIENT
Start: 2019-12-17

## 2019-12-18 ENCOUNTER — TELEPHONE (OUTPATIENT)
Dept: FAMILY MEDICINE CLINIC | Age: 64
End: 2019-12-18

## 2019-12-19 ENCOUNTER — OFFICE VISIT (OUTPATIENT)
Dept: FAMILY MEDICINE CLINIC | Age: 64
End: 2019-12-19

## 2019-12-19 VITALS
TEMPERATURE: 98.5 F | BODY MASS INDEX: 35.49 KG/M2 | WEIGHT: 213 LBS | OXYGEN SATURATION: 97 % | HEIGHT: 65 IN | HEART RATE: 96 BPM | DIASTOLIC BLOOD PRESSURE: 82 MMHG | SYSTOLIC BLOOD PRESSURE: 140 MMHG

## 2019-12-19 DIAGNOSIS — Z01.818 PRE-OP EXAM: Primary | ICD-10-CM

## 2019-12-19 PROCEDURE — 93000 ELECTROCARDIOGRAM COMPLETE: CPT | Performed by: FAMILY MEDICINE

## 2019-12-19 PROCEDURE — 99213 OFFICE O/P EST LOW 20 MIN: CPT | Performed by: FAMILY MEDICINE

## 2019-12-19 RX ORDER — GABAPENTIN 400 MG/1
400 CAPSULE ORAL 2 TIMES DAILY
Qty: 180 CAPSULE | Refills: 0 | Status: ON HOLD
Start: 2019-12-19 | End: 2020-03-19 | Stop reason: HOSPADM

## 2019-12-20 ENCOUNTER — TELEPHONE (OUTPATIENT)
Dept: NEUROSURGERY | Age: 64
End: 2019-12-20

## 2019-12-20 ENCOUNTER — OFFICE VISIT (OUTPATIENT)
Dept: ENT CLINIC | Age: 64
End: 2019-12-20

## 2019-12-20 VITALS
SYSTOLIC BLOOD PRESSURE: 138 MMHG | WEIGHT: 200 LBS | DIASTOLIC BLOOD PRESSURE: 96 MMHG | HEIGHT: 65 IN | BODY MASS INDEX: 33.32 KG/M2 | HEART RATE: 90 BPM

## 2019-12-20 DIAGNOSIS — D00.07 SQUAMOUS CELL CARCINOMA IN SITU (SCCIS) OF LATERAL PORTION OF TONGUE: ICD-10-CM

## 2019-12-20 DIAGNOSIS — F41.8 DEPRESSION WITH ANXIETY: ICD-10-CM

## 2019-12-20 DIAGNOSIS — K14.8 LESION OF TONGUE: Primary | ICD-10-CM

## 2019-12-20 PROCEDURE — 99204 OFFICE O/P NEW MOD 45 MIN: CPT | Performed by: OTOLARYNGOLOGY

## 2019-12-20 RX ORDER — FLUOXETINE HYDROCHLORIDE 20 MG/1
20 CAPSULE ORAL DAILY
Qty: 30 CAPSULE | Refills: 5 | Status: SHIPPED | OUTPATIENT
Start: 2019-12-20 | End: 2020-01-27

## 2019-12-20 ASSESSMENT — ENCOUNTER SYMPTOMS
RESPIRATORY NEGATIVE: 1
SHORTNESS OF BREATH: 0
GASTROINTESTINAL NEGATIVE: 1
COLOR CHANGE: 0
ABDOMINAL PAIN: 0
EYES NEGATIVE: 1

## 2019-12-26 ENCOUNTER — TELEPHONE (OUTPATIENT)
Dept: ENT CLINIC | Age: 64
End: 2019-12-26

## 2019-12-27 RX ORDER — CYCLOBENZAPRINE HCL 5 MG
TABLET ORAL
Qty: 60 TABLET | Refills: 1 | Status: SHIPPED | OUTPATIENT
Start: 2019-12-27 | End: 2020-01-27 | Stop reason: SDUPTHER

## 2019-12-27 RX ORDER — CYCLOBENZAPRINE HCL 5 MG
5 TABLET ORAL 2 TIMES DAILY PRN
Qty: 180 TABLET | Refills: 0 | OUTPATIENT
Start: 2019-12-27

## 2019-12-30 ENCOUNTER — HOSPITAL ENCOUNTER (OUTPATIENT)
Dept: PREADMISSION TESTING | Age: 64
Discharge: HOME | End: 2019-12-30

## 2019-12-30 ENCOUNTER — HOSPITAL ENCOUNTER (OUTPATIENT)
Age: 64
Discharge: HOME | End: 2019-12-30

## 2019-12-30 DIAGNOSIS — M51.9 LUMBAR DISC DISEASE: ICD-10-CM

## 2019-12-30 LAB
ANION GAP SERPL CALCULATED.3IONS-SCNC: 12 MMOL/L (ref 7–16)
BASOPHILS ABSOLUTE: 0.06 E9/L (ref 0–0.2)
BASOPHILS RELATIVE PERCENT: 0.7 % (ref 0–2)
BUN BLDV-MCNC: 11 MG/DL (ref 8–23)
CALCIUM SERPL-MCNC: 9 MG/DL (ref 8.6–10.2)
CHLORIDE BLD-SCNC: 100 MMOL/L (ref 98–107)
CO2: 24 MMOL/L (ref 22–29)
CREAT SERPL-MCNC: 0.9 MG/DL (ref 0.5–1)
EOSINOPHILS ABSOLUTE: 0.12 E9/L (ref 0.05–0.5)
EOSINOPHILS RELATIVE PERCENT: 1.4 % (ref 0–6)
GFR AFRICAN AMERICAN: >60
GFR NON-AFRICAN AMERICAN: >60 ML/MIN/1.73
GLUCOSE BLD-MCNC: 148 MG/DL (ref 74–99)
HCT VFR BLD CALC: 43.9 % (ref 34–48)
HEMOGLOBIN: 14.9 G/DL (ref 11.5–15.5)
IMMATURE GRANULOCYTES #: 0.01 E9/L
IMMATURE GRANULOCYTES %: 0.1 % (ref 0–5)
LYMPHOCYTES ABSOLUTE: 2.74 E9/L (ref 1.5–4)
LYMPHOCYTES RELATIVE PERCENT: 33.1 % (ref 20–42)
MCH RBC QN AUTO: 34.1 PG (ref 26–35)
MCHC RBC AUTO-ENTMCNC: 33.9 % (ref 32–34.5)
MCV RBC AUTO: 100.5 FL (ref 80–99.9)
MONOCYTES ABSOLUTE: 0.74 E9/L (ref 0.1–0.95)
MONOCYTES RELATIVE PERCENT: 8.9 % (ref 2–12)
NEUTROPHILS ABSOLUTE: 4.62 E9/L (ref 1.8–7.3)
NEUTROPHILS RELATIVE PERCENT: 55.8 % (ref 43–80)
PDW BLD-RTO: 11.9 FL (ref 11.5–15)
PLATELET # BLD: 287 E9/L (ref 130–450)
PMV BLD AUTO: 10.2 FL (ref 7–12)
POTASSIUM REFLEX MAGNESIUM: 4 MMOL/L (ref 3.5–5)
RBC # BLD: 4.37 E12/L (ref 3.5–5.5)
SODIUM BLD-SCNC: 136 MMOL/L (ref 132–146)
WBC # BLD: 8.3 E9/L (ref 4.5–11.5)

## 2019-12-30 PROCEDURE — 36415 COLL VENOUS BLD VENIPUNCTURE: CPT

## 2019-12-30 PROCEDURE — 85025 COMPLETE CBC W/AUTO DIFF WBC: CPT

## 2019-12-30 PROCEDURE — 80048 BASIC METABOLIC PNL TOTAL CA: CPT

## 2020-01-05 ENCOUNTER — HOSPITAL ENCOUNTER (OUTPATIENT)
Dept: CT IMAGING | Age: 65
Discharge: HOME | End: 2020-01-07

## 2020-01-05 PROCEDURE — 6360000004 HC RX CONTRAST MEDICATION: Performed by: RADIOLOGY

## 2020-01-05 PROCEDURE — 70492 CT SFT TSUE NCK W/O & W/DYE: CPT

## 2020-01-05 RX ADMIN — IOPAMIDOL 80 ML: 755 INJECTION, SOLUTION INTRAVENOUS at 09:40

## 2020-01-10 ENCOUNTER — HOSPITAL ENCOUNTER (OUTPATIENT)
Age: 65
Discharge: HOME | End: 2020-01-12

## 2020-01-10 ENCOUNTER — OFFICE VISIT (OUTPATIENT)
Dept: PAIN MANAGEMENT | Age: 65
End: 2020-01-10

## 2020-01-10 VITALS
TEMPERATURE: 98.6 F | RESPIRATION RATE: 16 BRPM | HEART RATE: 86 BPM | OXYGEN SATURATION: 98 % | HEIGHT: 65 IN | SYSTOLIC BLOOD PRESSURE: 128 MMHG | WEIGHT: 200 LBS | DIASTOLIC BLOOD PRESSURE: 78 MMHG | BODY MASS INDEX: 33.32 KG/M2

## 2020-01-10 LAB — SPECIFIC GRAVITY UA: 1.01 (ref 1–1.03)

## 2020-01-10 PROCEDURE — G0480 DRUG TEST DEF 1-7 CLASSES: HCPCS

## 2020-01-10 PROCEDURE — 99213 OFFICE O/P EST LOW 20 MIN: CPT | Performed by: PHYSICIAN ASSISTANT

## 2020-01-10 PROCEDURE — 80307 DRUG TEST PRSMV CHEM ANLYZR: CPT

## 2020-01-10 RX ORDER — HYDROCODONE BITARTRATE AND ACETAMINOPHEN 7.5; 325 MG/1; MG/1
1 TABLET ORAL EVERY 8 HOURS PRN
Qty: 90 TABLET | Refills: 0 | Status: SHIPPED
Start: 2020-01-10 | End: 2020-02-11 | Stop reason: SDUPTHER

## 2020-01-10 NOTE — PROGRESS NOTES
consistent  05/2019 consistent  06/2019 consistent  07/2019 consistent  08/2019 consistent  08/2019 consistent  09/2019 consistent  09/2019 consistent  12/2019 consistent  01/2020 consistent      Past Medical History:   Diagnosis Date    Cancer ([facility] [address] 12/2019    LESION REMOVED UNDER TONGUE  DENTAL CLINIC    Chronic back pain     Costochondritis     Depression     Diet-controlled type 2 diabetes mellitus ([facility] [address]     Falls frequently     none recent as of 2/19/19    Fibromyalgia     Hallux rigidus of left foot     HTN (hypertension)     Hyperlipidemia     CLYDE on CPAP     SETTING ?    Osteoarthritis     Rheumatoid arthritis(714.0)     TIA (transient ischemic attack)     Use of cane as ambulatory aid        Past Surgical History:   Procedure Laterality Date    ANESTHESIA NERVE BLOCK Left 2/26/2019    LEFT C3,4,5 MBB performed by Rashmi Ayala MD at [address] [facility] Right 8/12/2019    BILATERAL TRANSFORAMINAL EPIDURAL STEROID INJECTION S1 #3 performed by Rashmi Ayala MD at [address] [facility]  2005    Left    ARTHRODESIS Left 12/14/2018    ARTHRODESIS 2ND DIGIT LEFT FOOT performed by Gabriella Fernandez DPM at [address] [facility]  08/16/2017    PLIF L3-L4, L4-L5 with rods, screws, and cages/Dr. Kathleen Magaña BREAST SURGERY  2010    reduction, bilat   [address]    CHOLECYSTECTOMY  2011    COLONOSCOPY  03/27/2015    ENDOSCOPY, COLON, DIAGNOSTIC      EPIDURAL STEROID INJECTION N/A 6/4/2019    BILATERAL TRANSFORAMINAL EPIDURAL STEROID INJECTION S1 performed by Selene Molina DO at [address]  2005    Left    [address] / ANKLE Left 12/14/2018    REMOVAL OF PAINFUL HARDWARE LEFT FOOT  ++SYNTHES++ performed by Gabriella Fernandez DPM at [address]    inguinal     NERVE BLOCK Bilateral 05/07/2018    L1-2 lumbar foramen #1    NERVE BLOCK Bilateral 12/03/2018    s1 tfesi    NERVE BLOCK Bilateral 08/12/2019    NERVE BLOCK Right 09/30/2019    sacral radiofrequency    OTHER SURGICAL HISTORY Left 9/25/13    left foot tarso metatarsal joint injection    OTHER SURGICAL HISTORY Left 5/27/15    Endoscopic Gastroc recession left, Lapidus left foot and  Excision of exostosis left foot    VT INJECT ANES/STEROID FORAMEN LUMBAR/SACRAL W IMG GUIDE ,1 LEVEL Bilateral 12/3/2018    BILATERAL S1  EPIDURAL STEROID INJECTION performed by Kike Sol MD at [address] [facility] DX/THER SBST INTRLMNR LMBR/SAC W/IMG GDN N/A 8/21/2018    EPIDURAL STEROID INJECTION L1-2 WITH LOW VOL performed by Kike Sol MD at [address] [facility] NOSE/CLEFT LIP/TIP Bilateral 5/7/2018    BILATERAL L1-2 LUMBAR FORAMEN #1 performed by Kike Sol MD at [address] [facility] NOSE/CLEFT LIP/TIP Bilateral 6/4/2018    BILATERAL TRANSFORAMINAL EPIDURAL STEROID INJECTION UNDER FLUOROSCOPIC GUIDANCE @ FORAMINAL LEVEL L1-2 #2 performed by Kike Sol MD at [address] Right 9/30/2019    RIGHT SACRAL RADIOFREQUENCY ABLATION performed by Kike Sol MD at [address]  2000, 2005    Big Left Toe    TONSILLECTOMY      WISDOM TOOTH EXTRACTION         Prior to Admission medications    Medication Sig Start Date End Date Taking? Authorizing Provider   HYDROcodone-acetaminophen (NORCO) 7.5-325 MG per tablet Take 1 tablet by mouth every 8 hours as needed for Pain for up to 30 days. Intended supply: 30 days 1/10/20 2/9/20 Yes TREASURE Dick   cyclobenzaprine (FLEXERIL) 5 MG tablet TAKE 1 TABLET BY MOUTH 2 TIMES A DAY AS NEEDED FOR MUSCLE SPASMS 12/27/19  Yes Toshia German MD   varenicline (CHANTIX) 1 MG tablet Take 1 mg by mouth 2 times daily   Yes Historical Provider, MD   FLUoxetine (PROZAC) 20 MG capsule Take 1 capsule by mouth daily For mood. 12/20/19  Yes Toshia German MD   gabapentin (NEURONTIN) 400 MG capsule Take 1 capsule by mouth 2 times daily for 92 days.  12/19/19 3/20/20 Yes Merlynn Kanner, MD   hydrochlorothiazide (HYDRODIURIL) 12.5 MG tablet Take 1 tablet by mouth every morning For blood pressure 10/2/19  Yes Merlynn Kanner, MD   hydrOXYzine (VISTARIL) 25 MG capsule Take 1 capsule by mouth 3 times daily as needed for Anxiety 10/2/19  Yes Merlynn Kanner, MD   quinapril (ACCUPRIL) 40 MG tablet Take 1 tablet by mouth daily For blood pressure. Patient taking differently: Take 40 mg by mouth every evening For blood pressure. 10/2/19  Yes Merlynn Kanner, MD   atorvastatin (LIPITOR) 40 MG tablet Take 1 tablet by mouth daily 10/2/19  Yes Merlynn Kanner, MD       Allergies   Allergen Reactions    Pcn [Penicillins] Anaphylaxis       Social History     Socioeconomic History    Marital status:      Spouse name: Not on file    Number of children: Not on file    Years of education: Not on file    Highest education level: Not on file   Occupational History    Not on file   Social Needs    Financial resource strain: Not on file    Food insecurity:     Worry: Not on file     Inability: Not on file    Transportation needs:     Medical: Not on file     Non-medical: Not on file   Tobacco Use    Smoking status: Former Smoker     Packs/day: 0.50     Years: 30.00     Pack years: 15.00     Types: Cigarettes     Last attempt to quit: 10/15/2019     Years since quittin.2    Smokeless tobacco: Never Used    Tobacco comment: STOP  USING CHANTIX   Substance and Sexual Activity    Alcohol use:  Yes     Alcohol/week: 0.0 standard drinks     Comment: rarely    Drug use: No    Sexual activity: Not on file     Comment: Single    Lifestyle    Physical activity:     Days per week: Not on file     Minutes per session: Not on file    Stress: Not on file   Relationships    Social connections:     Talks on phone: Not on file     Gets together: Not on file     Attends Protestant service: Not on file     Active member of club or organization: Not on file     Attends meetings of clubs or organizations: Not on file     Relationship status: Not on file    Intimate partner violence:     Fear of current or ex partner: Not on file     Emotionally abused: Not on file     Physically abused: Not on file     Forced sexual activity: Not on file   Other Topics Concern    Not on file   Social History Narrative    Not on file       Family History   Problem Relation Age of Onset    Dementia Mother     Breast Cancer Mother     Cancer Mother         breast cancer [de-identified]     Stroke Mother     Heart Attack Father     Other Father 80        Aortic aneurysm    Cancer Brother        REVIEW OF SYSTEMS:     Anusha Girard denies fever/chills, chest pain, shortness of breath, new bowel or bladder complaints or suicidal ideations. All other review of systems was negative. PHYSICAL EXAMINATION:      /78   Pulse 86   Temp 98.6 °F (37 °C) (Oral)   Resp 16   Ht 5' 5\" (1.651 m)   Wt 200 lb (90.7 kg)   LMP  (LMP Unknown)   SpO2 98%   BMI 33.28 kg/m²     General:      General appearance: awake, alert, oriented, in no acute distress, well developed, well nourished and in no acute distress   pleasant and well-hydrated. in no distress and A & O x3  Build:Overweight  Function:Rises from a seated position with difficulty    HEENT:    Head:normocephalic and atraumatic  Pupils:regular, round and equal.  Sclera: icterus absent  EOM:full and intact. Lungs:    Breathing:Normal expansion. Clear to auscultation. No rales, rhonchi, or wheezing. Abdomen:    Shape:non-distended and normal  Tenderness:none  Guarding:none         Lumbar spine:    Inspection:   Surgical incision  Palpation:  + midline and paraspinal TTP  Range of motion:abnormal moderately Lateral bending, flexion, extension rotation bilateral and is painful. Extremities:    Tremors:None bilaterally upper and lower  Range of motion:Generally normal shoulders, + mild right hip pain on log roll.     Intact:Yes  Varicose veins:absent Cyanosis:none  Edema:Normal      Neurological:    Cranial nerves:normal  Sensory:diminished to light lateral aspect of right leg  Motor:                  Right Quadriceps3/5          Left Quadriceps5/5           Right Gastrocnemius3/5    Left Gastrocnemius5/5  Right Ant Tibialis3/5  Left Ant Tibialis5/5  [address]    Dermatology:    Skin:no unusual rashes, no skin lesions, no palpable subcutaneous nodules and good skin turgor    Assessment/Plan:      Chronic low back pain with radiation to the Right groin, patient had low back surgery 08/2017 L3-5 fusion, recently had lumbar spine CT with severe L2-3 stenosis   Plan:  Patient is going to be scheduled for an exploration of prior fusion and an L2-L3 PLIF. She was scheduled but on hold as she had not quit smoking. She has stopped at this time. Informed patient that repeating interventional procedures will not help last visit  Continue with Norco 7.5/325 TID BID PRN - ordered today. Continue with Gabapentin 600 mg TID through her PCP  OARRS report reviewed 01/2020. UDS ordered today   Patient encouraged to stay active and to lose weight. Failed physical therapy  Treatment plan discussed with the patient including medications side effects     Controlled Substance Monitoring:    Acute and Chronic Pain Monitoring:   RX Monitoring 1/10/2020   Attestation -   Acute Pain Prescriptions -   Periodic Controlled Substance Monitoring Possible medication side effects, risk of tolerance/dependence & alternative treatments discussed. ;No signs of potential drug abuse or diversion identified. ;Assessed functional status. ;Obtaining appropriate analgesic effect of treatment. ;Random urine drug screen sent today.    Chronic Pain > 80 MEDD -                          ccreferring physicf

## 2020-01-10 NOTE — PROGRESS NOTES
Daphne Headley presents to the [facility] on 1/10/2020. [facility] in [place] is complaining of pain rt. Hip/groin/lower back  The pain is constant. The pain is described as aching, throbbing, shooting and stabbing. Pain is rated on her best day at a 8, on her worst day at a 10, and on average at a 7 on the VAS scale. She took her last dose of Norco 3 per day     Any procedures since your last visit: No, with  % relief. Pacemaker or defibrilator: No managed by     She is not on NSAIDS and is not on anticoagulation medications to include none and is managed by      /78   Pulse 86   Temp 98.6 °F (37 °C) (Oral)   Resp 16   Ht 5' 5\" (1.651 m)   Wt 200 lb (90.7 kg)   LMP  (LMP Unknown)   SpO2 98%   BMI 33.28 kg/m²      No LMP recorded (lmp unknown).  Patient is postmenopausal.

## 2020-01-13 LAB
6AM URINE: <10 NG/ML
CODEINE, URINE: <20 NG/ML
HYDROCODONE, URINE: 1080 NG/ML
HYDROMORPHONE, URINE: <20 NG/ML
MORPHINE URINE: <20 NG/ML
NORHYDROCODONE, URINE: 695 NG/ML
NOROXYCODONE, URINE: <20 NG/ML
NOROXYMORPHONE, URINE: <20 NG/ML
OXYCODONE, URINE CONFIRMATION: <20 NG/ML
OXYMORPHONE, URINE: <20 NG/ML

## 2020-01-15 ENCOUNTER — OFFICE VISIT (OUTPATIENT)
Dept: ENT CLINIC | Age: 65
End: 2020-01-15

## 2020-01-15 VITALS — HEIGHT: 65 IN | WEIGHT: 185 LBS | BODY MASS INDEX: 30.82 KG/M2

## 2020-01-15 LAB
Lab: NORMAL
REPORT: NORMAL
THIS TEST SENT TO: NORMAL

## 2020-01-15 PROCEDURE — 99213 OFFICE O/P EST LOW 20 MIN: CPT | Performed by: OTOLARYNGOLOGY

## 2020-01-15 ASSESSMENT — ENCOUNTER SYMPTOMS
COLOR CHANGE: 0
RESPIRATORY NEGATIVE: 1
EYES NEGATIVE: 1
ABDOMINAL PAIN: 0
SHORTNESS OF BREATH: 0
GASTROINTESTINAL NEGATIVE: 1

## 2020-01-15 NOTE — PROGRESS NOTES
Subjective:      Patient ID:  Huma Goodwin is a 60 y.o. female. HPI:    Pt returns for review of CT neck . There are not changes since last visit. No new lesions    Patient's medications, allergies, past medical, surgical, social and family histories were reviewed and updated as appropriate. Review of Systems   Constitutional: Negative. Eyes: Negative. Negative for visual disturbance. Respiratory: Negative. Negative for shortness of breath. Cardiovascular: Negative. Negative for chest pain. Gastrointestinal: Negative. Negative for abdominal pain. Genitourinary: Negative. Musculoskeletal: Negative. Skin: Negative. Negative for color change. Neurological: Positive for dizziness. Psychiatric/Behavioral: Negative. Negative for behavioral problems and hallucinations. All other systems reviewed and are negative. Objective: There were no vitals filed for this visit. Physical Exam  Vitals signs and nursing note reviewed. Constitutional:       Appearance: She is well-developed. HENT:      Head: Normocephalic and atraumatic. Nose: Nose normal.      Mouth/Throat:      Lips: Pink. Mouth: Mucous membranes are moist.      Tongue: No lesions. Palate: No mass. Pharynx: Uvula midline. Comments: There is a healing area on the left underside of the tongue where the biopsy was taken. Palpation shows mostly scar tissue. Eyes:      Conjunctiva/sclera: Conjunctivae normal.      Pupils: Pupils are equal, round, and reactive to light. Neck:      Musculoskeletal: Normal range of motion and neck supple. Comments: No adenopathy palpated today. Cardiovascular:      Rate and Rhythm: Normal rate and regular rhythm. Heart sounds: Normal heart sounds. Pulmonary:      Effort: Pulmonary effort is normal.      Breath sounds: Normal breath sounds. Abdominal:      General: Bowel sounds are normal.      Palpations: Abdomen is soft.    Lymphadenopathy: Cervical: No cervical adenopathy. Skin:     General: Skin is warm and dry. Neurological:      Mental Status: She is alert and oriented to person, place, and time. CT neck  Impression       1. No evidence of discrete mass involving tongue or floor of the mouth   on CT.       2. No evidence of neck soft tissue mass or lymphadenopathy. Assessment:       Diagnosis Orders   1. Lesion of tongue     2. 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        Follow up in 1 month(s)

## 2020-01-27 ENCOUNTER — OFFICE VISIT (OUTPATIENT)
Dept: FAMILY MEDICINE CLINIC | Age: 65
End: 2020-01-27

## 2020-01-27 VITALS
HEART RATE: 116 BPM | HEIGHT: 65 IN | SYSTOLIC BLOOD PRESSURE: 132 MMHG | TEMPERATURE: 97.8 F | DIASTOLIC BLOOD PRESSURE: 90 MMHG | OXYGEN SATURATION: 98 % | BODY MASS INDEX: 35.52 KG/M2 | WEIGHT: 213.2 LBS

## 2020-01-27 PROCEDURE — 99214 OFFICE O/P EST MOD 30 MIN: CPT | Performed by: FAMILY MEDICINE

## 2020-01-27 RX ORDER — FLUOXETINE HYDROCHLORIDE 40 MG/1
40 CAPSULE ORAL DAILY
Qty: 30 CAPSULE | Refills: 2 | Status: SHIPPED | OUTPATIENT
Start: 2020-01-27 | End: 2020-01-27

## 2020-01-27 RX ORDER — FLUOXETINE HYDROCHLORIDE 40 MG/1
40 CAPSULE ORAL DAILY
Qty: 90 CAPSULE | Refills: 1 | Status: SHIPPED
Start: 2020-01-27 | End: 2020-06-12 | Stop reason: SDUPTHER

## 2020-01-27 RX ORDER — CYCLOBENZAPRINE HCL 5 MG
5 TABLET ORAL 2 TIMES DAILY PRN
Qty: 180 TABLET | Refills: 0 | Status: ON HOLD
Start: 2020-01-27 | End: 2020-03-06 | Stop reason: HOSPADM

## 2020-01-27 NOTE — PROGRESS NOTES
OR    ANESTHESIA NERVE BLOCK Right 2019    BILATERAL TRANSFORAMINAL EPIDURAL STEROID INJECTION S1 #3 performed by Rashmi Ayala MD at [address]      Left    ARTHRODESIS Left 2018    ARTHRODESIS 2ND DIGIT LEFT FOOT performed by Gabriella Fernandez DPM at [address] [facility]  2017    PLIF L3-L4, L4-L5 with rods, screws, and cages/Dr. Kathleen Magaña BREAST SURGERY      reduction, bilat     SECTION      CHOLECYSTECTOMY      COLONOSCOPY  2015    ENDOSCOPY, COLON, DIAGNOSTIC      EPIDURAL STEROID INJECTION N/A 2019    BILATERAL TRANSFORAMINAL EPIDURAL STEROID INJECTION S1 performed by Selene Molina DO at [address]      Left    [address] / ANKLE Left 2018    REMOVAL OF PAINFUL HARDWARE LEFT FOOT  ++SYNTHES++ performed by Gabriella Fernandez DPM at [facility]    inguinal     NERVE BLOCK Bilateral 2018    L1-2 lumbar foramen #1    NERVE BLOCK Bilateral 2018    s1 tfesi    NERVE BLOCK Bilateral 2019    NERVE BLOCK Right 2019    sacral radiofrequency    OTHER SURGICAL HISTORY Left 13    left foot tarso metatarsal joint injection    OTHER SURGICAL HISTORY Left 5/27/15    Endoscopic Gastroc recession left, Lapidus left foot and  Excision of exostosis left foot    [place] INJECT ANES/STEROID FORAMEN LUMBAR/SACRAL W IMG GUIDE ,1 LEVEL Bilateral 12/3/2018    BILATERAL S1  EPIDURAL STEROID INJECTION performed by Rashmi Ayala MD at [address] [facility] DX/THER SBST INTRLMNR LMBR/SAC W/IMG GDN N/A 2018    EPIDURAL STEROID INJECTION L1-2 WITH LOW VOL performed by Rashmi Ayala MD at [address] [facility] NOSE/CLEFT LIP/TIP Bilateral 2018    BILATERAL L1-2 LUMBAR FORAMEN #1 performed by Rashmi Ayala MD at [address] [facility] NOSE/CLEFT LIP/TIP Bilateral 2018    BILATERAL TRANSFORAMINAL EPIDURAL STEROID INJECTION UNDER FLUOROSCOPIC GUIDANCE @ FORAMINAL LEVEL L1-2 #2 performed by Rufus Snell MD at [address] Right 2019    RIGHT SACRAL RADIOFREQUENCY ABLATION performed by Rufus Snell MD at [address]  ,     Big Left Toe    TONSILLECTOMY      WISDOM TOOTH EXTRACTION         Social History     Tobacco Use    Smoking status: Former Smoker     Packs/day: 0.50     Years: 30.00     Pack years: 15.00     Types: Cigarettes     Last attempt to quit: 10/15/2019     Years since quittin.2    Smokeless tobacco: Never Used    Tobacco comment: STOP  USING CHANTIX   Substance Use Topics    Alcohol use: Yes     Alcohol/week: 0.0 standard drinks     Comment: rarely    Drug use: No       ROS:  No CP, No palpitations,   No sob, No cough,   No abd pain, No heartburn,   No headaches,   No tingling, No numbness, No weakness,   No bowel changes, No hematochezia, No melena,  No bladder changes, No hematuria  No skin rashes, No skin lesions. No vision changes, No hearing changes,   No polyuria, polydipsia, polyphagia. Depressed and anxious and labile mood. ROS otherwise negative unless as listed in HPI. Chart reviewed and updated where appropriate for PMH, Fam, and Soc Hx. Physical Exam   BP (!) 132/90 (Site: Left Upper Arm, Position: Sitting, Cuff Size: Large Adult)   Pulse 116   Temp 97.8 °F (36.6 °C) (Temporal)   Ht 5' 5\" (1.651 m)   Wt 213 lb 3.2 oz (96.7 kg)   LMP  (LMP Unknown)   SpO2 98%   BMI 35.48 kg/m²   Wt Readings from Last 3 Encounters:   20 213 lb 3.2 oz (96.7 kg)   01/15/20 185 lb (83.9 kg)   01/10/20 200 lb (90.7 kg)       Constitutional:    She is oriented to person, place, and time. She appears well-developed and well-nourished. Cardiovascular:    Normal rate, regular rhythm and normal heart sounds. No murmur. No gallop and no friction rub. Pulmonary/Chest:    Effort normal and breath sounds normal.    No wheezes.  No rales or rhonchi. Abdominal:    Soft. Bowel sounds are normal.    No distension. No tenderness. Musculoskeletal:    Normal range of motion. No joint swelling noted. No peripheral edema. Neurological:    She is alert and oriented to person, place, and time. Motor and sensation grossly intact. Antalgic Gait with a quad point cane. Skin:    Skin is warm and dry. No rashes, lesions. Psychiatric:    She has a anxious mood and tearful and labile affect. Normal groom and dress. Lacking insight but judgement fair. Current Outpatient Medications on File Prior to Visit   Medication Sig Dispense Refill    HYDROcodone-acetaminophen (NORCO) 7.5-325 MG per tablet Take 1 tablet by mouth every 8 hours as needed for Pain for up to 30 days. Intended supply: 30 days 90 tablet 0    gabapentin (NEURONTIN) 400 MG capsule Take 1 capsule by mouth 2 times daily for 92 days. 180 capsule 0    hydrochlorothiazide (HYDRODIURIL) 12.5 MG tablet Take 1 tablet by mouth every morning For blood pressure 90 tablet 1    hydrOXYzine (VISTARIL) 25 MG capsule Take 1 capsule by mouth 3 times daily as needed for Anxiety 270 capsule 1    quinapril (ACCUPRIL) 40 MG tablet Take 1 tablet by mouth daily For blood pressure. (Patient taking differently: Take 40 mg by mouth every evening For blood pressure.) 90 tablet 1    atorvastatin (LIPITOR) 40 MG tablet Take 1 tablet by mouth daily 90 tablet 1     No current facility-administered medications on file prior to visit.         Patient Active Problem List   Diagnosis Code    Loss of balance R26.89    Vertebrobasilar TIAs G45.0    Obesity E66.9    Disc displacement, lumbar M51.26    Lumbar radiculopathy M54.16    Peripheral neuropathy (HCC) G62.9    Type 2 diabetes mellitus without complication (HCC) A40.7    Depression F32.9    Osteoarthritis M19.90    Chronic back pain M54.9, G89.29    Fibromyalgia M79.7    HTN (hypertension) I10    Hyperlipidemia E78.5    History of

## 2020-01-28 ENCOUNTER — TELEPHONE (OUTPATIENT)
Dept: FAMILY MEDICINE CLINIC | Age: 65
End: 2020-01-28

## 2020-02-10 ENCOUNTER — PREP FOR PROCEDURE (OUTPATIENT)
Dept: NEUROSURGERY | Age: 65
End: 2020-02-10

## 2020-02-10 RX ORDER — SODIUM CHLORIDE 9 MG/ML
INJECTION, SOLUTION INTRAVENOUS CONTINUOUS
Status: CANCELLED | OUTPATIENT
Start: 2020-02-10

## 2020-02-10 RX ORDER — SODIUM CHLORIDE 0.9 % (FLUSH) 0.9 %
10 SYRINGE (ML) INJECTION EVERY 12 HOURS SCHEDULED
Status: CANCELLED | OUTPATIENT
Start: 2020-02-10

## 2020-02-10 RX ORDER — SODIUM CHLORIDE 0.9 % (FLUSH) 0.9 %
10 SYRINGE (ML) INJECTION PRN
Status: CANCELLED | OUTPATIENT
Start: 2020-02-10

## 2020-02-11 ENCOUNTER — OFFICE VISIT (OUTPATIENT)
Dept: PAIN MANAGEMENT | Age: 65
End: 2020-02-11

## 2020-02-11 VITALS
RESPIRATION RATE: 16 BRPM | HEART RATE: 112 BPM | TEMPERATURE: 98 F | SYSTOLIC BLOOD PRESSURE: 158 MMHG | BODY MASS INDEX: 35.49 KG/M2 | WEIGHT: 213 LBS | HEIGHT: 65 IN | DIASTOLIC BLOOD PRESSURE: 88 MMHG | OXYGEN SATURATION: 95 %

## 2020-02-11 PROCEDURE — 99213 OFFICE O/P EST LOW 20 MIN: CPT | Performed by: PHYSICIAN ASSISTANT

## 2020-02-11 RX ORDER — HYDROCODONE BITARTRATE AND ACETAMINOPHEN 7.5; 325 MG/1; MG/1
1 TABLET ORAL EVERY 8 HOURS PRN
Qty: 72 TABLET | Refills: 0 | Status: ON HOLD
Start: 2020-02-11 | End: 2020-03-06 | Stop reason: HOSPADM

## 2020-02-11 NOTE — PROGRESS NOTES
[address]  [address] [facility], [address] [facility]    Follow up Note      Rosalino Huerta     Date of Visit:  2020    CC:  Patient presents for follow up   Chief Complaint   Patient presents with    Follow-up     lower back        HPI:    Patient reports continued lower back pain that extends to the right groin. Surgery is scheduled for 3/04/2020. Change in quality of symptoms:no. Patient satisfaction with analgesia:fair. Medication side effects: None. Recent diagnostic testing:none. Recent interventional procedures:  None since last visit.         She has been on anticoagulation medications to include NSAIDS. The patient  has not been on herbal supplements. The patient is diabetic. Imaging:  MRI lumbar spine 2018  1. No significant interval change is observed since the previous study   of 2017.       2. Stable postoperative changes/posterior spinal fusion at the   L3-L4-L5 level.       3. Severe spine canal stenosis in the level of L2-L3           4. Encroachment of the neural foramina bilaterally in levels of L2-L3   and L5-S1      Thoracic spine MRI 2018  1. Some degenerative changes in the thoracic spine, mainly in the   facet joints in the mid-upper thoracic spine segments       2. Discrete loss of height of T6, more likely an old finding.      Previous treatments: Physical Therapy, Surgery L3-5 fusion/laminectomy and medications. .       Potential Aberrant Drug-Related Behavior:  No     Urine Drug Screenin18:  Consistent for norhydrocodone metabolite  2018:  Consistent for hydrocodone and norhydrocodone  05/10/2019:  Consistent for hydrocodone and norhydrocodone  2019:  Consistent for hydrocodone and norhydrocodone  01/10/2020:  Consistent for hydrocodone and norhydrocodone      OARRS report:  2018 consistent   2018 consistent   2018 consistent   2018 consistent   2018 consistent   2018 consistent  2019 tfesi    NERVE BLOCK Bilateral 08/12/2019    NERVE BLOCK Right 09/30/2019    sacral radiofrequency    OTHER SURGICAL HISTORY Left 9/25/13    left foot tarso metatarsal joint injection    OTHER SURGICAL HISTORY Left 5/27/15    Endoscopic Gastroc recession left, Lapidus left foot and  Excision of exostosis left foot    [place] INJECT ANES/STEROID FORAMEN LUMBAR/SACRAL W IMG GUIDE ,1 LEVEL Bilateral 12/3/2018    BILATERAL S1  EPIDURAL STEROID INJECTION performed by Teresa Ingram MD at [address] [facility] DX/THER SBST INTRLMNR LMBR/SAC W/IMG GDN N/A 8/21/2018    EPIDURAL STEROID INJECTION L1-2 WITH LOW VOL performed by Teresa Ingram MD at [address] [facility] NOSE/CLEFT LIP/TIP Bilateral 5/7/2018    BILATERAL L1-2 LUMBAR FORAMEN #1 performed by Teresa Ingram MD at [address] [facility] NOSE/CLEFT LIP/TIP Bilateral 6/4/2018    BILATERAL TRANSFORAMINAL EPIDURAL STEROID INJECTION UNDER FLUOROSCOPIC GUIDANCE @ FORAMINAL LEVEL L1-2 #2 performed by Teresa Ingram MD at [address] Right 9/30/2019    RIGHT SACRAL RADIOFREQUENCY ABLATION performed by Teresa Ingram MD at [address] [facility]  2000, 2005    Big Left Toe    TONSILLECTOMY      WISDOM TOOTH EXTRACTION         Prior to Admission medications    Medication Sig Start Date End Date Taking? Authorizing Provider   HYDROcodone-acetaminophen (NORCO) 7.5-325 MG per tablet Take 1 tablet by mouth every 8 hours as needed for Pain for up to 24 days. Intended supply: 24 days 2/11/20 3/6/20 Yes TREASURE Crook   FLUoxetine (PROZAC) 40 MG capsule Take 1 capsule by mouth daily For mood. 1/27/20  Yes Binu Naqvi MD   cyclobenzaprine (FLEXERIL) 5 MG tablet Take 1 tablet by mouth 2 times daily as needed for Muscle spasms 1/27/20  Yes Binu Naqvi MD   gabapentin (NEURONTIN) 400 MG capsule Take 1 capsule by mouth 2 times daily for 92 days.  12/19/19 3/20/20 Yes Binu Naqvi MD hydrochlorothiazide (HYDRODIURIL) 12.5 MG tablet Take 1 tablet by mouth every morning For blood pressure 10/2/19  Yes Chris Reid MD   hydrOXYzine (VISTARIL) 25 MG capsule Take 1 capsule by mouth 3 times daily as needed for Anxiety 10/2/19  Yes Chris Reid MD   quinapril (ACCUPRIL) 40 MG tablet Take 1 tablet by mouth daily For blood pressure. Patient taking differently: Take 40 mg by mouth every evening For blood pressure. 10/2/19  Yes Chris Reid MD   atorvastatin (LIPITOR) 40 MG tablet Take 1 tablet by mouth daily 10/2/19  Yes Chris Reid MD       Allergies   Allergen Reactions    Pcn [Penicillins] Anaphylaxis       Social History     Socioeconomic History    Marital status:      Spouse name: Not on file    Number of children: Not on file    Years of education: Not on file    Highest education level: Not on file   Occupational History    Not on file   Social Needs    Financial resource strain: Not on file    Food insecurity:     Worry: Not on file     Inability: Not on file    Transportation needs:     Medical: Not on file     Non-medical: Not on file   Tobacco Use    Smoking status: Former Smoker     Packs/day: 0.50     Years: 30.00     Pack years: 15.00     Types: Cigarettes     Last attempt to quit: 10/15/2019     Years since quittin.3    Smokeless tobacco: Never Used    Tobacco comment: STOP  USING CHANTIX   Substance and Sexual Activity    Alcohol use:  Yes     Alcohol/week: 0.0 standard drinks     Comment: rarely    Drug use: No    Sexual activity: Not on file     Comment: Single    Lifestyle    Physical activity:     Days per week: Not on file     Minutes per session: Not on file    Stress: Not on file   Relationships    Social connections:     Talks on phone: Not on file     Gets together: Not on file     Attends Rastafarian service: Not on file     Active member of club or organization: Not on file     Attends meetings of clubs or organizations: Not on file     Relationship status: Not on file    Intimate partner violence:     Fear of current or ex partner: Not on file     Emotionally abused: Not on file     Physically abused: Not on file     Forced sexual activity: Not on file   Other Topics Concern    Not on file   Social History Narrative    Not on file       Family History   Problem Relation Age of Onset    Dementia Mother     Breast Cancer Mother     Cancer Mother         breast cancer [de-identified]     Stroke Mother     Heart Attack Father     Other Father 80        Aortic aneurysm    Cancer Brother        REVIEW OF SYSTEMS:     Damian Ireland denies fever/chills, chest pain, shortness of breath, new bowel or bladder complaints or suicidal ideations. All other review of systems was negative. PHYSICAL EXAMINATION:      BP (!) 158/88   Pulse 112   Temp 98 °F (36.7 °C) (Oral)   Resp 16   Ht 5' 5\" (1.651 m)   Wt 213 lb (96.6 kg)   LMP  (LMP Unknown)   SpO2 95%   BMI 35.45 kg/m²     General:      General appearance: awake, alert, oriented, in no acute distress, well developed, well nourished and in no acute distress   pleasant and well-hydrated. in no distress and A & O x3  Build:Overweight  Function:Rises from a seated position with difficulty    HEENT:    Head:normocephalic and atraumatic  Pupils:regular, round and equal.  Sclera: icterus absent  EOM:full and intact. Lungs:    Breathing:Normal expansion. Clear to auscultation. No rales, rhonchi, or wheezing. Abdomen:    Shape:non-distended and normal  Tenderness:none  Guarding:none         Lumbar spine:    Inspection:   Surgical incision  Palpation:  + midline and paraspinal TTP  Range of motion:abnormal moderately Lateral bending, flexion, extension rotation bilateral and is painful. Extremities:    Tremors:None bilaterally upper and lower  Range of motion:Generally normal shoulders, + mild right hip pain on log roll.     Intact:Yes  Varicose veins:absent

## 2020-02-13 NOTE — H&P
[address]  [address] [facility], [address] [place] [address] Lexa  [phone]    Procedure History & Physical      [facility]     HPI:    Patient  is here for low back and leg pain for bilateral S1 TFESI  Labs/imaging studies reviewed   All question and concerns addressed including R/B/A associated with the procedure    Past Medical History:   Diagnosis Date    Cerebral artery occlusion with cerebral infarction ([facility]     tia    Chronic back pain     Costochondritis     Depression     Diabetic neuropathy ([facility]     legs and feet    Falls frequently     last fall 3/20/15    Fibromyalgia     Hallux rigidus of left foot     History of colon polyps     HTN (hypertension)     Hyperlipidemia     Osteoarthritis     Pre-operative clearance for surgery 5/27/15    medical clearance per Dr. Tanika Vance arthritis(714.0)     Sleep apnea     uses cpap    Type II or unspecified type diabetes mellitus without mention of complication, not stated as uncontrolled        Past Surgical History:   Procedure Laterality Date    ANKLE SURGERY      Left    BACK SURGERY  2017    PLIF L3-L4, L4-L5 with rods, screws, and cages/Dr. Philip Caceres BREAST SURGERY      reduction, bilat     SECTION      CHOLECYSTECTOMY      COLONOSCOPY  2015    ENDOSCOPY, COLON, DIAGNOSTIC      FOOT SURGERY  2005    Left    HERNIA REPAIR  1998    inguinal     NERVE BLOCK Bilateral 2018    L1-2 lumbar foramen #1    NERVE BLOCK Bilateral 2018    s1 tfesi    OTHER SURGICAL HISTORY Left 13    left foot tarso metatarsal joint injection    OTHER SURGICAL HISTORY Left 5/27/15    Endoscopic Gastroc recession left, Lapidus left foot and  Excision of exostosis left foot    CT NJX DX/THER SBST INTRLMNR LMBR/SAC W/IMG GDN N/A 2018    EPIDURAL STEROID INJECTION L1-2 WITH LOW VOL performed by Rohini Castorena MD at [address] [facility] NOSE/CLEFT LIP/TIP Bilateral tobacco: Never Used    Alcohol use 0.0 oz/week      Comment: rarely    Drug use: No    Sexual activity: No      Comment: Single      Other Topics Concern    Not on file     Social History Narrative    No narrative on file       Family History   Problem Relation Age of Onset    Dementia Mother     Breast Cancer Mother     Cancer Mother         breast cancer [de-identified]     Stroke Mother     Heart Attack Father     Other Father 80        Aortic aneurysm         REVIEW OF SYSTEMS:    CONSTITUTIONAL:  negative for  fevers, chills, sweats and fatigue    RESPIRATORY:  negative for  dry cough, cough with sputum, dyspnea, wheezing and chest pain    CARDIOVASCULAR:  negative for chest pain, dyspnea, palpitations, syncope    GASTROINTESTINAL:  negative for nausea, vomiting, change in bowel habits, diarrhea, constipation and abdominal pain    MUSCULOSKELETAL: negative for muscle weakness    SKIN: negative for itching or rashes. BEHAVIOR/PSYCH:  negative for poor appetite, increased appetite, decreased sleep and poor concentration    All other systems negative      PHYSICAL EXAM:    VITALS:  BP (!) 177/84   Pulse 74   Resp 16   LMP  (LMP Unknown)   SpO2 98%     CONSTITUTIONAL:  awake, alert, cooperative, no apparent distress, and appears stated age    EYES: PERRLA, EOMI    LUNGS:  No increased work of breathing, no audible wheezing    CARDIOVASCULAR:  regular rate and rhythm    ABDOMEN:  Soft non tender non distended     EXTREMITIES: no signs of clubbing or cyanosis. MUSCULOSKELETAL: negative for flaccid muscle tone or spastic movements. SKIN: gross examination reveals no signs of rashes, or diaphoresis. NEURO: Cranial nerves II-XII grossly intact. No signs of agitated mood.        Assessment/Plan:    Low back and bilateral leg pain for bilateral S1 TFESI Wound Care: Petrolatum

## 2020-02-19 NOTE — PROGRESS NOTES
if you are to spend the night in the hospital.     PARKING INSTRUCTIONS:   [x] Arrival Time:_____0500________  · [x] Parking lot '\"I\"  is located on [facility] (the corner of [facility] and [facility]). To enter, press the button and the gate will lift. A free token will be provided to exit the lot. One car per patient is allowed to park in this lot. All other cars are to park on [address] [place] [address] either in the parking garage or the handicap lot. [] To reach the [facility] lobby from [address] [place] [address], upon entering the hospital, take elevator B to the 3rd floor. EDUCATION INSTRUCTIONS:      [] Knee or hip replacement booklet & exercise pamphlets given. [x] [address] placed in chart. [x] Pre-admission Testing educational folder given  [x] Incentive Spirometry,coughing & deep breathing exercises reviewed. [x]Medication information sheet(s)   [x]Fluoroscopy-Xray used in surgery reviewed with patient. Educational pamphlet placed in chart. [x]Pain: Post-op pain is normal and to be expected. You will be asked to rate your pain from 0-10(a zero is not acceptable-education is needed). Your post-op pain goal is:5  [x] Ask your nurse for your pain medication. [] Joint camp offered. [] Joint replacement booklets given. [] Other:___________________________    MEDICATION INSTRUCTIONS:   [x]Bring a complete list of your medications, please write the last time you took the medicine, give this list to the nurse. [x] Take the following medications the morning of surgery with 1-2 ounces of water: SEE LIST  [x] Stop herbal supplements and vitamins 5 days before your surgery. [] DO NOT take any diabetic medicine the morning of surgery. Follow instructions for insulin the day before surgery. [] If you are diabetic and your blood sugar is low or you feel symptomatic, you may drink 1-2 ounces of apple juice or take a glucose tablet.   The morning of your procedure, you may call the pre-op area if you have concerns about your blood sugar [phone]. [] Use your inhalers the morning of surgery. Bring your emergency inhaler with you day of surgery. [x] Follow physician instructions regarding any blood thinners you may be taking. WHAT TO EXPECT:  [x] The day of surgery you will be greeted and checked in by the [employer].  In addition, you will be registered in the [place] by a Patient Access Representative. Please bring your photo ID and insurance card. A nurse will greet you in accordance to the time you are needed in the pre-op area to prepare you for surgery. Please do not be discouraged if you are not greeted in the order you arrive as there are many variables that are involved in patient preparation. Your patience is greatly appreciated as you wait for your nurse. Please bring in items such as: books, magazines, newspapers, electronics, or any other items  to occupy your time in the waiting area. [x]  Delays may occur with surgery and staff will make a sincere effort to keep you informed of delays. If any delays occur with your procedure, we apologize ahead of time for your inconvenience as we recognize the value of your time.

## 2020-02-20 ENCOUNTER — OFFICE VISIT (OUTPATIENT)
Dept: FAMILY MEDICINE CLINIC | Age: 65
End: 2020-02-20

## 2020-02-20 ENCOUNTER — TELEPHONE (OUTPATIENT)
Dept: FAMILY MEDICINE CLINIC | Age: 65
End: 2020-02-20

## 2020-02-20 VITALS
BODY MASS INDEX: 36.65 KG/M2 | TEMPERATURE: 98.7 F | SYSTOLIC BLOOD PRESSURE: 136 MMHG | HEIGHT: 65 IN | OXYGEN SATURATION: 98 % | DIASTOLIC BLOOD PRESSURE: 86 MMHG | WEIGHT: 220 LBS | HEART RATE: 113 BPM

## 2020-02-20 PROCEDURE — 93000 ELECTROCARDIOGRAM COMPLETE: CPT | Performed by: FAMILY MEDICINE

## 2020-02-20 PROCEDURE — 99213 OFFICE O/P EST LOW 20 MIN: CPT | Performed by: FAMILY MEDICINE

## 2020-02-20 NOTE — PROGRESS NOTES
[facility]  Office Progress Note - Dr. Terrazas Stager  2/20/20    CC:   Chief Complaint   Patient presents with    Pre-op Exam     Back surgery 03/04/20        S: preparing for back surgery  2 weeks or so. Feeling at baseline health. No new problems or concerns. Nonsmoking now in prep for surgery. Active per usual.  Walking with a quad cane. She is able to do stairs - and stops on the way up due to pain. No SOB or CP during activity. No recent chest pains. No SOB. No previous problems with anesthesia. ALLERGY to PCN. She is hoping to go to acute rehab. She did this previously after last surgery. EKG reviewed and showing NSR, normal axis, good-r-wave progression. No St evelations or depression. No Q-waves.          Past Medical History:   Diagnosis Date    Cancer ([facility] [facility] 12/2019    LESION REMOVED UNDER TONGUE  DENTAL CLINIC    Chronic back pain     Costochondritis     Depression     Diet-controlled type 2 diabetes mellitus ([facility]     Falls frequently     none recent as of 2/19/19    Fibromyalgia     Hallux rigidus of left foot     HTN (hypertension)     Hyperlipidemia     CLYDE on CPAP     SETTING ?    Osteoarthritis     Rheumatoid arthritis(714.0)     TIA (transient ischemic attack)     Use of cane as ambulatory aid        Family History   Problem Relation Age of Onset    Dementia Mother     Breast Cancer Mother     Cancer Mother         breast cancer [de-identified]     Stroke Mother     Heart Attack Father     Other Father 80        Aortic aneurysm    Cancer Brother        Past Surgical History:   Procedure Laterality Date    ANESTHESIA NERVE BLOCK Left 2/26/2019    LEFT C3,4,5 MBB performed by Sami Faulkner MD at [address] [facility] Right 8/12/2019    BILATERAL TRANSFORAMINAL EPIDURAL STEROID INJECTION S1 #3 performed by Sami Faulkner MD at [facility] [address]  2005    Left    ARTHRODESIS Left 12/14/2018    ARTHRODESIS 2ND DIGIT LEFT FOOT performed by Tg Puri DPM at [address] [facility]  2017    PLIF L3-L4, L4-L5 with rods, screws, and cages/Dr. Priscila Yuan BREAST SURGERY      reduction, bilat     SECTION      CHOLECYSTECTOMY      COLONOSCOPY  2015    ENDOSCOPY, COLON, DIAGNOSTIC      EPIDURAL STEROID INJECTION N/A 2019    BILATERAL TRANSFORAMINAL EPIDURAL STEROID INJECTION S1 performed by Juanjose Resendiz DO at [address]      Left    [address] / ANKLE Left 2018    REMOVAL OF PAINFUL HARDWARE LEFT FOOT  ++SYNTHES++ performed by Tg Puri DPM at [facility]    inguinal     NERVE BLOCK Bilateral 2018    L1-2 lumbar foramen #1    NERVE BLOCK Bilateral 2018    s1 tfesi    NERVE BLOCK Bilateral 2019    NERVE BLOCK Right 2019    sacral radiofrequency    OTHER SURGICAL HISTORY Left 13    left foot tarso metatarsal joint injection    OTHER SURGICAL HISTORY Left 5/27/15    Endoscopic Gastroc recession left, Lapidus left foot and  Excision of exostosis left foot    DE INJECT ANES/STEROID FORAMEN LUMBAR/SACRAL W IMG GUIDE ,1 LEVEL Bilateral 12/3/2018    BILATERAL S1  EPIDURAL STEROID INJECTION performed by Santos Majano MD at [address] [facility] DX/THER SBST INTRLMNR LMBR/SAC W/IMG GDN N/A 2018    EPIDURAL STEROID INJECTION L1-2 WITH LOW VOL performed by Santos Majano MD at [address] [facility] NOSE/CLEFT LIP/TIP Bilateral 2018    BILATERAL L1-2 LUMBAR FORAMEN #1 performed by Santos Majano MD at [address] [facility] NOSE/CLEFT LIP/TIP Bilateral 2018    BILATERAL TRANSFORAMINAL EPIDURAL STEROID INJECTION UNDER FLUOROSCOPIC GUIDANCE @ FORAMINAL LEVEL L1-2 #2 performed by Santos Majano MD at [address] Right 2019    RIGHT SACRAL RADIOFREQUENCY ABLATION performed by Santos Majano MD at [address]  ,     Big sounds are normal.    No distension. No tenderness. Musculoskeletal:    Normal range of motion. No joint swelling noted. No peripheral edema. Skin:    Skin is warm and dry. No rashes, lesions. Psychiatric:    She has a normal mood and affect. Normal groom and dress. Current Outpatient Medications on File Prior to Visit   Medication Sig Dispense Refill    HYDROcodone-acetaminophen (NORCO) 7.5-325 MG per tablet Take 1 tablet by mouth every 8 hours as needed for Pain for up to 24 days. Intended supply: 24 days 72 tablet 0    FLUoxetine (PROZAC) 40 MG capsule Take 1 capsule by mouth daily For mood. 90 capsule 1    cyclobenzaprine (FLEXERIL) 5 MG tablet Take 1 tablet by mouth 2 times daily as needed for Muscle spasms 180 tablet 0    gabapentin (NEURONTIN) 400 MG capsule Take 1 capsule by mouth 2 times daily for 92 days. 180 capsule 0    hydrochlorothiazide (HYDRODIURIL) 12.5 MG tablet Take 1 tablet by mouth every morning For blood pressure 90 tablet 1    hydrOXYzine (VISTARIL) 25 MG capsule Take 1 capsule by mouth 3 times daily as needed for Anxiety 270 capsule 1    quinapril (ACCUPRIL) 40 MG tablet Take 1 tablet by mouth daily For blood pressure. (Patient taking differently: Take 40 mg by mouth every evening For blood pressure.) 90 tablet 1    atorvastatin (LIPITOR) 40 MG tablet Take 1 tablet by mouth daily 90 tablet 1     No current facility-administered medications on file prior to visit.         Patient Active Problem List   Diagnosis Code    Loss of balance R26.89    Vertebrobasilar TIAs G45.0    Obesity E66.9    Disc displacement, lumbar M51.26    Lumbar radiculopathy M54.16    Peripheral neuropathy (HCC) G62.9    Type 2 diabetes mellitus without complication (HCC) D65.9    Depression F32.9    Osteoarthritis M19.90    Chronic back pain M54.9, G89.29    Fibromyalgia M79.7    HTN (hypertension) I10    Hyperlipidemia E78.5    History of adenomatous polyp of colon Z86.010    Vitamin D deficiency E55.9    Coccyx pain M53.3    Acute bilateral low back pain with sciatica M54.40    Lumbar stenosis M48.061    Chronic bilateral low back pain without sciatica M54.5, G89.29    DDD (degenerative disc disease), lumbar M51.36    Spinal stenosis of lumbar region without neurogenic claudication M48.061    Lumbar facet arthropathy M47.816    Chronic pain syndrome G89.4    Lumbar radiculopathy M54.16    Neck pain M54.2    Left foot pain M79.672    Painful orthopaedic hardware [facility]) T84.84XA    Cervical facet joint syndrome M47.812    Cellulitis, neck L03.221    Disorder of sacrum M53.3        Assessment / Madonna Goodpasture was seen today for pre-op exam.    Diagnoses and all orders for this visit:    Pre-op exam  -     EKG 12 Lead - Normal, unchanged. She is likely of low cardiopulm risk for her upcoming surgery. She is nonsmoking. She completes at least 4 mets of activity without difficult. No problems with anesthesia. At baseline health without new problems / unexplained problems. ALLERGY to PCN. May proceed with surgery without further testing given all of above. May do well to have an acute rehab stay after surgery. F/u 2 months or so  Patient counseled to follow up sooner or seek more acute care if symptoms worsening. Electronically signed by Joslyn Tate MD on 2/20/2020    This note may have been created using dictation software.  Efforts were made to reduce grammatical or syntax errors, but some may persist.

## 2020-02-21 NOTE — TELEPHONE ENCOUNTER
Skinny Nance calling in wanting to speak with Doctor regarding pt's disability forms and diagnosis problems. I advised her that Doctor is seeing patients right now and cannot speak right now. I let her know that there is a note out to him regarding this and he will reply as soon as he can. Skinny Nance says there are 20 more days remaining for this to be completed.

## 2020-02-25 ENCOUNTER — OFFICE VISIT (OUTPATIENT)
Dept: ENT CLINIC | Age: 65
End: 2020-02-25

## 2020-02-25 VITALS
HEART RATE: 100 BPM | WEIGHT: 200 LBS | SYSTOLIC BLOOD PRESSURE: 148 MMHG | BODY MASS INDEX: 33.32 KG/M2 | HEIGHT: 65 IN | DIASTOLIC BLOOD PRESSURE: 86 MMHG

## 2020-02-25 PROCEDURE — 99213 OFFICE O/P EST LOW 20 MIN: CPT | Performed by: OTOLARYNGOLOGY

## 2020-02-25 ASSESSMENT — ENCOUNTER SYMPTOMS
SHORTNESS OF BREATH: 0
GASTROINTESTINAL NEGATIVE: 1
EYES NEGATIVE: 1
RESPIRATORY NEGATIVE: 1
ABDOMINAL PAIN: 0
COLOR CHANGE: 0

## 2020-02-25 NOTE — PROGRESS NOTES
Subjective:      Patient ID:  Milka Chavez is a 60 y.o. female. HPI:    Patient has hisotry of L lateral tongue SSC in-situ biopsied previously. CT neck was done and was unremarkable. There are not changes since last visit. No new lesions. Patient's medications, allergies, past medical, surgical, social and family histories were reviewed and updated as appropriate. Review of Systems   Constitutional: Negative. Eyes: Negative. Negative for visual disturbance. Respiratory: Negative. Negative for shortness of breath. Cardiovascular: Negative. Negative for chest pain. Gastrointestinal: Negative. Negative for abdominal pain. Genitourinary: Negative. Musculoskeletal: Negative. Skin: Negative. Negative for color change. Neurological: Positive for dizziness. Psychiatric/Behavioral: Negative. Negative for behavioral problems and hallucinations. All other systems reviewed and are negative. Objective:     Vitals:    02/25/20 1034   BP: (!) 148/86   Pulse: 100     Physical Exam  Vitals signs and nursing note reviewed. Constitutional:       Appearance: She is well-developed. HENT:      Head: Normocephalic and atraumatic. Nose: Nose normal.      Mouth/Throat:      Lips: Pink. Mouth: Mucous membranes are moist.      Tongue: No lesions. Palate: No mass. Pharynx: Uvula midline. Comments: There is a healing area on the left underside of the tongue where the biopsy was taken. Palpation shows mostly scar tissue. Eyes:      Conjunctiva/sclera: Conjunctivae normal.      Pupils: Pupils are equal, round, and reactive to light. Neck:      Musculoskeletal: Normal range of motion and neck supple. Comments: No adenopathy palpated today. Cardiovascular:      Rate and Rhythm: Normal rate and regular rhythm. Heart sounds: Normal heart sounds. Pulmonary:      Effort: Pulmonary effort is normal.      Breath sounds: Normal breath sounds.

## 2020-02-26 ENCOUNTER — HOSPITAL ENCOUNTER (OUTPATIENT)
Dept: GENERAL RADIOLOGY | Age: 65
Discharge: HOME | End: 2020-02-28

## 2020-02-26 ENCOUNTER — HOSPITAL ENCOUNTER (OUTPATIENT)
Dept: PREADMISSION TESTING | Age: 65
Discharge: HOME | End: 2020-02-26

## 2020-02-26 ENCOUNTER — ANESTHESIA EVENT (OUTPATIENT)
Dept: OPERATING ROOM | Age: 65
DRG: 455 | End: 2020-02-26

## 2020-02-26 VITALS
BODY MASS INDEX: 35.49 KG/M2 | TEMPERATURE: 97.4 F | DIASTOLIC BLOOD PRESSURE: 80 MMHG | RESPIRATION RATE: 16 BRPM | WEIGHT: 213 LBS | HEART RATE: 96 BPM | OXYGEN SATURATION: 96 % | SYSTOLIC BLOOD PRESSURE: 152 MMHG | HEIGHT: 65 IN

## 2020-02-26 LAB
ABO/RH: NORMAL
ANION GAP SERPL CALCULATED.3IONS-SCNC: 10 MMOL/L (ref 7–16)
ANTIBODY SCREEN: NORMAL
BACTERIA: NORMAL /HPF
BASOPHILS ABSOLUTE: 0.05 E9/L (ref 0–0.2)
BASOPHILS RELATIVE PERCENT: 0.6 % (ref 0–2)
BILIRUBIN URINE: NEGATIVE
BLOOD, URINE: NEGATIVE
BUN BLDV-MCNC: 10 MG/DL (ref 8–23)
CALCIUM SERPL-MCNC: 9 MG/DL (ref 8.6–10.2)
CHLORIDE BLD-SCNC: 104 MMOL/L (ref 98–107)
CLARITY: CLEAR
CO2: 25 MMOL/L (ref 22–29)
COLOR: YELLOW
CREAT SERPL-MCNC: 0.8 MG/DL (ref 0.5–1)
EOSINOPHILS ABSOLUTE: 0.11 E9/L (ref 0.05–0.5)
EOSINOPHILS RELATIVE PERCENT: 1.2 % (ref 0–6)
GFR AFRICAN AMERICAN: >60
GFR NON-AFRICAN AMERICAN: >60 ML/MIN/1.73
GLUCOSE BLD-MCNC: 187 MG/DL (ref 74–99)
GLUCOSE URINE: NEGATIVE MG/DL
HCT VFR BLD CALC: 40.2 % (ref 34–48)
HEMOGLOBIN: 13.4 G/DL (ref 11.5–15.5)
IMMATURE GRANULOCYTES #: 0.02 E9/L
IMMATURE GRANULOCYTES %: 0.2 % (ref 0–5)
INR BLD: 0.9
KETONES, URINE: NEGATIVE MG/DL
LEUKOCYTE ESTERASE, URINE: ABNORMAL
LYMPHOCYTES ABSOLUTE: 2.14 E9/L (ref 1.5–4)
LYMPHOCYTES RELATIVE PERCENT: 23.7 % (ref 20–42)
MCH RBC QN AUTO: 32.7 PG (ref 26–35)
MCHC RBC AUTO-ENTMCNC: 33.3 % (ref 32–34.5)
MCV RBC AUTO: 98 FL (ref 80–99.9)
MONOCYTES ABSOLUTE: 0.74 E9/L (ref 0.1–0.95)
MONOCYTES RELATIVE PERCENT: 8.2 % (ref 2–12)
NEUTROPHILS ABSOLUTE: 5.98 E9/L (ref 1.8–7.3)
NEUTROPHILS RELATIVE PERCENT: 66.1 % (ref 43–80)
NITRITE, URINE: NEGATIVE
PDW BLD-RTO: 12.1 FL (ref 11.5–15)
PH UA: 6.5 (ref 5–9)
PLATELET # BLD: 281 E9/L (ref 130–450)
PMV BLD AUTO: 10.8 FL (ref 7–12)
POTASSIUM REFLEX MAGNESIUM: 4.3 MMOL/L (ref 3.5–5)
PROTEIN UA: NEGATIVE MG/DL
PROTHROMBIN TIME: 10.5 SEC (ref 9.3–12.4)
RBC # BLD: 4.1 E12/L (ref 3.5–5.5)
RBC UA: NORMAL /HPF (ref 0–2)
SODIUM BLD-SCNC: 139 MMOL/L (ref 132–146)
SPECIFIC GRAVITY UA: 1.01 (ref 1–1.03)
UROBILINOGEN, URINE: 0.2 E.U./DL
WBC # BLD: 9 E9/L (ref 4.5–11.5)
WBC UA: NORMAL /HPF (ref 0–5)

## 2020-02-26 PROCEDURE — 87081 CULTURE SCREEN ONLY: CPT

## 2020-02-26 PROCEDURE — 86900 BLOOD TYPING SEROLOGIC ABO: CPT

## 2020-02-26 PROCEDURE — G0480 DRUG TEST DEF 1-7 CLASSES: HCPCS

## 2020-02-26 PROCEDURE — 36415 COLL VENOUS BLD VENIPUNCTURE: CPT

## 2020-02-26 PROCEDURE — 86901 BLOOD TYPING SEROLOGIC RH(D): CPT

## 2020-02-26 PROCEDURE — 85610 PROTHROMBIN TIME: CPT

## 2020-02-26 PROCEDURE — 87088 URINE BACTERIA CULTURE: CPT

## 2020-02-26 PROCEDURE — 71046 X-RAY EXAM CHEST 2 VIEWS: CPT

## 2020-02-26 PROCEDURE — 85025 COMPLETE CBC W/AUTO DIFF WBC: CPT

## 2020-02-26 PROCEDURE — 80048 BASIC METABOLIC PNL TOTAL CA: CPT

## 2020-02-26 PROCEDURE — 86850 RBC ANTIBODY SCREEN: CPT

## 2020-02-26 PROCEDURE — 81001 URINALYSIS AUTO W/SCOPE: CPT

## 2020-02-26 ASSESSMENT — PAIN DESCRIPTION - PAIN TYPE: TYPE: ACUTE PAIN

## 2020-02-26 ASSESSMENT — PAIN DESCRIPTION - LOCATION: LOCATION: BACK

## 2020-02-26 ASSESSMENT — PAIN DESCRIPTION - ORIENTATION: ORIENTATION: LOWER

## 2020-02-26 NOTE — ANESTHESIA PRE PROCEDURE
COLONOSCOPY  03/27/2015    ENDOSCOPY, COLON, DIAGNOSTIC      EPIDURAL STEROID INJECTION N/A 6/4/2019    BILATERAL TRANSFORAMINAL EPIDURAL STEROID INJECTION S1 performed by Frances Hernandez DO at [address]  [address]    Left    HARDWARE REMOVAL FOOT / ANKLE Left 12/14/2018    REMOVAL OF PAINFUL HARDWARE LEFT FOOT  ++SYNTHES++ performed by Saintclair Kitchen, DPM at [facility] [address]    inguinal     NERVE BLOCK Bilateral 05/07/2018    L1-2 lumbar foramen #1    NERVE BLOCK Bilateral 12/03/2018    s1 tfesi    NERVE BLOCK Bilateral 08/12/2019    NERVE BLOCK Right 09/30/2019    sacral radiofrequency    OTHER SURGICAL HISTORY Left 9/25/13    left foot tarso metatarsal joint injection    OTHER SURGICAL HISTORY Left 5/27/15    Endoscopic Gastroc recession left, Lapidus left foot and  Excision of exostosis left foot    [place] INJECT ANES/STEROID FORAMEN LUMBAR/SACRAL W IMG GUIDE ,1 LEVEL Bilateral 12/3/2018    BILATERAL S1  EPIDURAL STEROID INJECTION performed by Dorene Espinal MD at [address] [facility] DX/THER SBST INTRLMNR LMBR/SAC W/IMG GDN N/A 8/21/2018    EPIDURAL STEROID INJECTION L1-2 WITH LOW VOL performed by Dorene Espinal MD at [address] [facility] NOSE/CLEFT LIP/TIP Bilateral 5/7/2018    BILATERAL L1-2 LUMBAR FORAMEN #1 performed by Dorene Espinal MD at [address] [facility] NOSE/CLEFT LIP/TIP Bilateral 6/4/2018    BILATERAL TRANSFORAMINAL EPIDURAL STEROID INJECTION UNDER FLUOROSCOPIC GUIDANCE @ FORAMINAL LEVEL L1-2 #2 performed by Dorene Espinal MD at [address] Right 9/30/2019    RIGHT SACRAL RADIOFREQUENCY ABLATION [facility]  [facility]    Big Left Toe    TONSILLECTOMY      WISDOM TOOTH EXTRACTION         Social History:    Social History     Tobacco Use    Smoking status: Former Smoker     Packs/day: 0.50     Years: 30.00     Pack years: 15.00     Types: Cigarettes     Last attempt to quit: 10/15/2019     Years since quittin.3    Smokeless tobacco: Never Used    Tobacco comment: STOP  USING CHANTIX   Substance Use Topics    Alcohol use: Yes     Alcohol/week: 0.0 standard drinks     Comment: rarely                                Counseling given: Not Answered  Comment: STOP  USING CHANTIX      Vital Signs (Current): There were no vitals filed for this visit. BP Readings from Last 3 Encounters:   20 (!) 152/80   20 (!) 148/86   20 136/86       NPO Status:  greater than 8 hours                                                                               BMI:   Wt Readings from Last 3 Encounters:   20 213 lb (96.6 kg)   20 200 lb (90.7 kg)   20 220 lb (99.8 kg)     There is no height or weight on file to calculate BMI.    CBC:   Lab Results   Component Value Date    WBC 8.3 2019    RBC 4.37 2019    HGB 14.9 2019    HCT 43.9 2019    .5 2019    RDW 11.9 2019     2019       CMP:   Lab Results   Component Value Date     2019    K 4.0 2019     2019    CO2 24 2019    BUN 11 2019    CREATININE 0.9 2019    GFRAA >60 2019    LABGLOM >60 2019    GLUCOSE 148 2019    PROT 7.0 2019    CALCIUM 9.0 2019    BILITOT 0.4 2019    ALKPHOS 96 2019    AST 16 2019    ALT 16 2019       POC Tests: No results for input(s): POCGLU, POCNA, POCK, POCCL, POCBUN, POCHEMO, POCHCT in the last 72 hours.     Coags:   Lab Results   Component Value Date    PROTIME 11.5 2019    INR 1.0 2019    APTT 27.6 2019       HCG (If Applicable): No results found for: PREGTESTUR, PREGSERUM, HCG, HCGQUANT     ABGs: No results found for: PHART, PO2ART, RYN0MBD, MOS9LTT, BEART, C6GTUCWX     Type & Screen (If Applicable):  No results found for: LABABO, [address]    Anesthesia Evaluation  Patient summary reviewed and Nursing notes reviewed no history of anesthetic complications:   Airway: Mallampati: III  TM distance: <3 FB   Neck ROM: limited  Mouth opening: > = 3 FB Dental:          Pulmonary: breath sounds clear to auscultation  (+) sleep apnea: on CPAP,                            ROS comment: States that she quit smoking 2-3 months ago    Probable COPD based on extensive smoking history   Cardiovascular:    (+) hypertension:, hyperlipidemia    (-) past MI, CAD and CABG/stent      Rhythm: regular  Rate: normal                 ROS comment: Uses cane for ambulation     Neuro/Psych:   (+) neuromuscular disease:, TIA (about 6-7 years ago;  no residual defects), psychiatric history:depression/anxiety              ROS comment: Degenerative joint disease    Fibromyalgia    Lumbar disc displacement-Lumbar radiculopathy s/p back surgery about 2-3 years ago GI/Hepatic/Renal: Neg GI/Hepatic/Renal ROS       (-) hiatal hernia, GERD and PUD       Endo/Other:    (+) DiabetesType II DM, , : arthritis: OA., .                 Abdominal:   (+) obese,         Vascular: negative vascular ROS. Anesthesia Plan      general     ASA 3     (Possible invasives lines if needed (arterial and/or central lines). )  Induction: intravenous. BIS    Anesthetic plan and risks discussed with patient. Use of blood products discussed with patient whom consented to blood products. Shhanaz Mittal, ALEXANDRO - CRNA   2/26/2020      Changes made to assessment and physical exam.  Spoke to patient about anesthetic plan. Patient understands and wishes to proceed. NO CHANGE IN ASSESSMENT    Pt seen, examined, chart reviewed, plan discussed.   Feng Atwood  3/4/2020  7:11 AM

## 2020-02-27 LAB — MRSA CULTURE ONLY: NORMAL

## 2020-02-28 LAB — URINE CULTURE, ROUTINE: NORMAL

## 2020-02-29 LAB
3-OH-COTININE: <2 NG/ML
COTININE: <2 NG/ML
NICOTINE: <2 NG/ML

## 2020-03-03 NOTE — H&P
Back Pain   This is a chronic problem. The current episode started more than 1 year ago. The problem occurs constantly. The problem has been gradually worsening since onset. The pain is present in the lumbar spine. The quality of the pain is described as aching, stabbing and shooting. The pain does not radiate. The pain is at a severity of 6/10. The pain is moderate. The pain is the same all the time. The symptoms are aggravated by position. Stiffness is present in the morning. Pertinent negatives include no abdominal pain, bladder incontinence, bowel incontinence, chest pain, dysuria, fever, headaches, leg pain, numbness, paresis, paresthesias, pelvic pain, perianal numbness, tingling, weakness or weight loss. She has tried NSAIDs, ice, heat and analgesics for the symptoms. The treatment provided mild relief.         Review of Systems   Constitutional: Negative. Negative for fever and weight loss. HENT: Negative. Eyes: Negative. Respiratory: Negative. Cardiovascular: Negative. Negative for chest pain. Gastrointestinal: Negative. Negative for abdominal pain and bowel incontinence. Endocrine: Negative. Genitourinary: Negative. Negative for bladder incontinence, dysuria and pelvic pain. Musculoskeletal: Positive for back pain. Skin: Negative. Allergic/Immunologic: Negative. Neurological: Negative. Negative for tingling, weakness, numbness, headaches and paresthesias. Hematological: Negative. Psychiatric/Behavioral: Negative.          Objective:   Physical Exam   Constitutional: She is oriented to person, place, and time. She appears well-developed and well-nourished. No distress. She is not intubated. HENT:   Head: Normocephalic and atraumatic. Right Ear: External ear normal.   Left Ear: External ear normal.   Nose: Nose normal.   Mouth/Throat: Oropharynx is clear and moist. No oropharyngeal exudate. Eyes: Pupils are equal, round, and reactive to light.  Conjunctivae and EOM are normal. Right eye exhibits no discharge. Left eye exhibits no discharge. No scleral icterus. Neck: Normal range of motion. Neck supple. No JVD present. No tracheal deviation present. No thyromegaly present. Pulmonary/Chest: No stridor. No apnea, no tachypnea and no bradypnea. She is not intubated. Abdominal: Normal appearance. She exhibits no distension. Musculoskeletal: Normal range of motion. She exhibits no edema, tenderness or deformity. Lymphadenopathy:     She has no cervical adenopathy. Neurological: She is alert and oriented to person, place, and time. She has normal strength and normal reflexes. She is not disoriented. She displays no atrophy, no tremor and normal reflexes. No cranial nerve deficit or sensory deficit. She exhibits normal muscle tone. She displays no seizure activity. Gait abnormal. Coordination normal. GCS eye subscore is 4. GCS verbal subscore is 5. GCS motor subscore is 6. She displays no Babinski's sign on the right side. She displays no Babinski's sign on the left side. Reflex Scores:       Tricep reflexes are 2+ on the right side and 2+ on the left side. Bicep reflexes are 2+ on the right side and 2+ on the left side. Brachioradialis reflexes are 2+ on the right side and 2+ on the left side. Patellar reflexes are 2+ on the right side and 2+ on the left side. Achilles reflexes are 2+ on the right side and 2+ on the left side. Skin: Skin is warm and dry. No rash noted. She is not diaphoretic. No erythema. No pallor. Psychiatric: She has a normal mood and affect. Her behavior is normal. Judgment and thought content normal.   Vitals reviewed.        Assessment:   60year old lady who presents with back pain. 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   R/B/A of surgery have been discussed and she wishes to proceed with

## 2020-03-04 ENCOUNTER — APPOINTMENT (OUTPATIENT)
Dept: GENERAL RADIOLOGY | Age: 65
DRG: 455 | End: 2020-03-04
Attending: NEUROLOGICAL SURGERY

## 2020-03-04 ENCOUNTER — ANESTHESIA (OUTPATIENT)
Dept: OPERATING ROOM | Age: 65
DRG: 455 | End: 2020-03-04

## 2020-03-04 ENCOUNTER — HOSPITAL ENCOUNTER (INPATIENT)
Age: 65
LOS: 2 days | Discharge: NURSING, REHAB OR LONG-TERM CARE | DRG: 455 | End: 2020-03-06
Attending: NEUROLOGICAL SURGERY | Admitting: NEUROLOGICAL SURGERY

## 2020-03-04 VITALS
TEMPERATURE: 96.8 F | OXYGEN SATURATION: 100 % | SYSTOLIC BLOOD PRESSURE: 202 MMHG | RESPIRATION RATE: 15 BRPM | DIASTOLIC BLOOD PRESSURE: 106 MMHG

## 2020-03-04 LAB — METER GLUCOSE: 171 MG/DL (ref 74–99)

## 2020-03-04 PROCEDURE — 22842 INSERT SPINE FIXATION DEVICE: CPT | Performed by: PHYSICIAN ASSISTANT

## 2020-03-04 PROCEDURE — 6360000002 HC RX W HCPCS: Performed by: NEUROLOGICAL SURGERY

## 2020-03-04 PROCEDURE — 88304 TISSUE EXAM BY PATHOLOGIST: CPT

## 2020-03-04 PROCEDURE — 22853 INSJ BIOMECHANICAL DEVICE: CPT | Performed by: NEUROLOGICAL SURGERY

## 2020-03-04 PROCEDURE — 22633 ARTHRD CMBN 1NTRSPC LUMBAR: CPT | Performed by: NEUROLOGICAL SURGERY

## 2020-03-04 PROCEDURE — 2709999900 HC NON-CHARGEABLE SUPPLY: Performed by: NEUROLOGICAL SURGERY

## 2020-03-04 PROCEDURE — 3E0U0GB INTRODUCTION OF RECOMBINANT BONE MORPHOGENETIC PROTEIN INTO JOINTS, OPEN APPROACH: ICD-10-PCS | Performed by: NEUROLOGICAL SURGERY

## 2020-03-04 PROCEDURE — 6360000002 HC RX W HCPCS: Performed by: PHYSICIAN ASSISTANT

## 2020-03-04 PROCEDURE — 82962 GLUCOSE BLOOD TEST: CPT

## 2020-03-04 PROCEDURE — 95909 NRV CNDJ TST 5-6 STUDIES: CPT | Performed by: AUDIOLOGIST

## 2020-03-04 PROCEDURE — 22842 INSERT SPINE FIXATION DEVICE: CPT | Performed by: NEUROLOGICAL SURGERY

## 2020-03-04 PROCEDURE — 2580000003 HC RX 258

## 2020-03-04 PROCEDURE — 22853 INSJ BIOMECHANICAL DEVICE: CPT | Performed by: PHYSICIAN ASSISTANT

## 2020-03-04 PROCEDURE — 3209999900 FLUORO FOR SURGICAL PROCEDURES

## 2020-03-04 PROCEDURE — 22633 ARTHRD CMBN 1NTRSPC LUMBAR: CPT | Performed by: PHYSICIAN ASSISTANT

## 2020-03-04 PROCEDURE — 95940 IONM IN OPERATNG ROOM 15 MIN: CPT | Performed by: AUDIOLOGIST

## 2020-03-04 PROCEDURE — 3700000000 HC ANESTHESIA ATTENDED CARE: Performed by: NEUROLOGICAL SURGERY

## 2020-03-04 PROCEDURE — 0SB20ZZ EXCISION OF LUMBAR VERTEBRAL DISC, OPEN APPROACH: ICD-10-PCS | Performed by: NEUROLOGICAL SURGERY

## 2020-03-04 PROCEDURE — 22614 ARTHRD PST TQ 1NTRSPC EA ADD: CPT | Performed by: NEUROLOGICAL SURGERY

## 2020-03-04 PROCEDURE — 6370000000 HC RX 637 (ALT 250 FOR IP): Performed by: NEUROLOGICAL SURGERY

## 2020-03-04 PROCEDURE — 22614 ARTHRD PST TQ 1NTRSPC EA ADD: CPT | Performed by: PHYSICIAN ASSISTANT

## 2020-03-04 PROCEDURE — 88311 DECALCIFY TISSUE: CPT

## 2020-03-04 PROCEDURE — 2720000010 HC SURG SUPPLY STERILE: Performed by: NEUROLOGICAL SURGERY

## 2020-03-04 PROCEDURE — C1713 ANCHOR/SCREW BN/BN,TIS/BN: HCPCS | Performed by: NEUROLOGICAL SURGERY

## 2020-03-04 PROCEDURE — 00NY0ZZ RELEASE LUMBAR SPINAL CORD, OPEN APPROACH: ICD-10-PCS | Performed by: NEUROLOGICAL SURGERY

## 2020-03-04 PROCEDURE — 1200000000 HC SEMI PRIVATE

## 2020-03-04 PROCEDURE — 6360000002 HC RX W HCPCS: Performed by: ANESTHESIOLOGY

## 2020-03-04 PROCEDURE — 2500000003 HC RX 250 WO HCPCS

## 2020-03-04 PROCEDURE — 2500000003 HC RX 250 WO HCPCS: Performed by: NEUROLOGICAL SURGERY

## 2020-03-04 PROCEDURE — 0SG1071 FUSION OF 2 OR MORE LUMBAR VERTEBRAL JOINTS WITH AUTOLOGOUS TISSUE SUBSTITUTE, POSTERIOR APPROACH, POSTERIOR COLUMN, OPEN APPROACH: ICD-10-PCS | Performed by: NEUROLOGICAL SURGERY

## 2020-03-04 PROCEDURE — 3700000001 HC ADD 15 MINUTES (ANESTHESIA): Performed by: NEUROLOGICAL SURGERY

## 2020-03-04 PROCEDURE — 7100000000 HC PACU RECOVERY - FIRST 15 MIN: Performed by: NEUROLOGICAL SURGERY

## 2020-03-04 PROCEDURE — 2780000010 HC IMPLANT OTHER: Performed by: NEUROLOGICAL SURGERY

## 2020-03-04 PROCEDURE — 0SG00AJ FUSION OF LUMBAR VERTEBRAL JOINT WITH INTERBODY FUSION DEVICE, POSTERIOR APPROACH, ANTERIOR COLUMN, OPEN APPROACH: ICD-10-PCS | Performed by: NEUROLOGICAL SURGERY

## 2020-03-04 PROCEDURE — 3600000005 HC SURGERY LEVEL 5 BASE: Performed by: NEUROLOGICAL SURGERY

## 2020-03-04 PROCEDURE — 2580000003 HC RX 258: Performed by: NEUROLOGICAL SURGERY

## 2020-03-04 PROCEDURE — 2700000000 HC OXYGEN THERAPY PER DAY

## 2020-03-04 PROCEDURE — 3600000015 HC SURGERY LEVEL 5 ADDTL 15MIN: Performed by: NEUROLOGICAL SURGERY

## 2020-03-04 PROCEDURE — 0QP004Z REMOVAL OF INTERNAL FIXATION DEVICE FROM LUMBAR VERTEBRA, OPEN APPROACH: ICD-10-PCS | Performed by: NEUROLOGICAL SURGERY

## 2020-03-04 PROCEDURE — 7100000001 HC PACU RECOVERY - ADDTL 15 MIN: Performed by: NEUROLOGICAL SURGERY

## 2020-03-04 PROCEDURE — 88300 SURGICAL PATH GROSS: CPT

## 2020-03-04 PROCEDURE — 6360000002 HC RX W HCPCS

## 2020-03-04 PROCEDURE — 2580000003 HC RX 258: Performed by: PHYSICIAN ASSISTANT

## 2020-03-04 DEVICE — 5.5MM CURVED ROD, TITANIUM ALLOY, 75MM LENGTH
Type: IMPLANTABLE DEVICE | Site: BACK | Status: FUNCTIONAL
Brand: CREO

## 2020-03-04 DEVICE — THREADED LOCKING CAP, CREO
Type: IMPLANTABLE DEVICE | Site: BACK | Status: FUNCTIONAL
Brand: CREO

## 2020-03-04 DEVICE — BONE GRAFT KIT 7510400 INFUSE MEDIUM
Type: IMPLANTABLE DEVICE | Site: BACK | Status: FUNCTIONAL
Brand: INFUSE® BONE GRAFT

## 2020-03-04 DEVICE — CREO® THREADED 7.5 X 50MM POLYAXIAL SCREW
Type: IMPLANTABLE DEVICE | Site: BACK | Status: FUNCTIONAL
Brand: CREO

## 2020-03-04 DEVICE — SUSTAIN RADIOLUCENT SPACER, OBLIQUE, SMALL, 10X22, 10MM
Type: IMPLANTABLE DEVICE | Site: BACK | Status: FUNCTIONAL
Brand: SUSTAIN

## 2020-03-04 RX ORDER — SODIUM CHLORIDE 0.9 % (FLUSH) 0.9 %
10 SYRINGE (ML) INJECTION EVERY 12 HOURS SCHEDULED
Status: DISCONTINUED | OUTPATIENT
Start: 2020-03-04 | End: 2020-03-04

## 2020-03-04 RX ORDER — CYCLOBENZAPRINE HCL 10 MG
10 TABLET ORAL 3 TIMES DAILY PRN
Status: DISCONTINUED | OUTPATIENT
Start: 2020-03-04 | End: 2020-03-06 | Stop reason: HOSPADM

## 2020-03-04 RX ORDER — MIDAZOLAM HYDROCHLORIDE 1 MG/ML
INJECTION INTRAMUSCULAR; INTRAVENOUS PRN
Status: DISCONTINUED | OUTPATIENT
Start: 2020-03-04 | End: 2020-03-04 | Stop reason: SDUPTHER

## 2020-03-04 RX ORDER — PROPOFOL 10 MG/ML
INJECTION, EMULSION INTRAVENOUS PRN
Status: DISCONTINUED | OUTPATIENT
Start: 2020-03-04 | End: 2020-03-04 | Stop reason: SDUPTHER

## 2020-03-04 RX ORDER — FLUOXETINE HYDROCHLORIDE 20 MG/1
40 CAPSULE ORAL DAILY
Status: DISCONTINUED | OUTPATIENT
Start: 2020-03-05 | End: 2020-03-06 | Stop reason: HOSPADM

## 2020-03-04 RX ORDER — VANCOMYCIN HYDROCHLORIDE 500 MG/10ML
INJECTION, POWDER, LYOPHILIZED, FOR SOLUTION INTRAVENOUS PRN
Status: DISCONTINUED | OUTPATIENT
Start: 2020-03-04 | End: 2020-03-04 | Stop reason: ALTCHOICE

## 2020-03-04 RX ORDER — SODIUM PHOSPHATE, DIBASIC AND SODIUM PHOSPHATE, MONOBASIC 7; 19 G/133ML; G/133ML
1 ENEMA RECTAL DAILY PRN
Status: DISCONTINUED | OUTPATIENT
Start: 2020-03-04 | End: 2020-03-06 | Stop reason: HOSPADM

## 2020-03-04 RX ORDER — HYDROCODONE BITARTRATE AND ACETAMINOPHEN 5; 325 MG/1; MG/1
2 TABLET ORAL PRN
Status: DISCONTINUED | OUTPATIENT
Start: 2020-03-04 | End: 2020-03-04 | Stop reason: HOSPADM

## 2020-03-04 RX ORDER — KETAMINE HYDROCHLORIDE 10 MG/ML
INJECTION, SOLUTION INTRAMUSCULAR; INTRAVENOUS PRN
Status: DISCONTINUED | OUTPATIENT
Start: 2020-03-04 | End: 2020-03-04 | Stop reason: SDUPTHER

## 2020-03-04 RX ORDER — LISINOPRIL 20 MG/1
20 TABLET ORAL EVERY EVENING
Status: DISCONTINUED | OUTPATIENT
Start: 2020-03-04 | End: 2020-03-06 | Stop reason: HOSPADM

## 2020-03-04 RX ORDER — SODIUM CHLORIDE 9 MG/ML
INJECTION, SOLUTION INTRAVENOUS CONTINUOUS
Status: DISCONTINUED | OUTPATIENT
Start: 2020-03-04 | End: 2020-03-04

## 2020-03-04 RX ORDER — LABETALOL HYDROCHLORIDE 5 MG/ML
INJECTION, SOLUTION INTRAVENOUS PRN
Status: DISCONTINUED | OUTPATIENT
Start: 2020-03-04 | End: 2020-03-04 | Stop reason: SDUPTHER

## 2020-03-04 RX ORDER — SODIUM CHLORIDE 0.9 % (FLUSH) 0.9 %
10 SYRINGE (ML) INJECTION PRN
Status: DISCONTINUED | OUTPATIENT
Start: 2020-03-04 | End: 2020-03-04

## 2020-03-04 RX ORDER — SODIUM CHLORIDE 0.9 % (FLUSH) 0.9 %
10 SYRINGE (ML) INJECTION PRN
Status: DISCONTINUED | OUTPATIENT
Start: 2020-03-04 | End: 2020-03-06 | Stop reason: HOSPADM

## 2020-03-04 RX ORDER — MORPHINE SULFATE 4 MG/ML
4 INJECTION, SOLUTION INTRAMUSCULAR; INTRAVENOUS
Status: DISCONTINUED | OUTPATIENT
Start: 2020-03-04 | End: 2020-03-06 | Stop reason: HOSPADM

## 2020-03-04 RX ORDER — LIDOCAINE HYDROCHLORIDE AND EPINEPHRINE 5; 5 MG/ML; UG/ML
INJECTION, SOLUTION INFILTRATION; PERINEURAL PRN
Status: DISCONTINUED | OUTPATIENT
Start: 2020-03-04 | End: 2020-03-04 | Stop reason: ALTCHOICE

## 2020-03-04 RX ORDER — ATORVASTATIN CALCIUM 40 MG/1
40 TABLET, FILM COATED ORAL DAILY
Status: DISCONTINUED | OUTPATIENT
Start: 2020-03-04 | End: 2020-03-06 | Stop reason: HOSPADM

## 2020-03-04 RX ORDER — SODIUM CHLORIDE 0.9 % (FLUSH) 0.9 %
10 SYRINGE (ML) INJECTION EVERY 12 HOURS SCHEDULED
Status: DISCONTINUED | OUTPATIENT
Start: 2020-03-04 | End: 2020-03-06 | Stop reason: HOSPADM

## 2020-03-04 RX ORDER — LIDOCAINE HYDROCHLORIDE 20 MG/ML
INJECTION, SOLUTION INTRAVENOUS PRN
Status: DISCONTINUED | OUTPATIENT
Start: 2020-03-04 | End: 2020-03-04 | Stop reason: SDUPTHER

## 2020-03-04 RX ORDER — HYDROCODONE BITARTRATE AND ACETAMINOPHEN 7.5; 325 MG/1; MG/1
1 TABLET ORAL EVERY 4 HOURS PRN
Status: DISCONTINUED | OUTPATIENT
Start: 2020-03-04 | End: 2020-03-05

## 2020-03-04 RX ORDER — GLYCOPYRROLATE 1 MG/5 ML
SYRINGE (ML) INTRAVENOUS PRN
Status: DISCONTINUED | OUTPATIENT
Start: 2020-03-04 | End: 2020-03-04 | Stop reason: SDUPTHER

## 2020-03-04 RX ORDER — PHENYLEPHRINE HYDROCHLORIDE 10 MG/ML
INJECTION INTRAVENOUS PRN
Status: DISCONTINUED | OUTPATIENT
Start: 2020-03-04 | End: 2020-03-04 | Stop reason: SDUPTHER

## 2020-03-04 RX ORDER — ROCURONIUM BROMIDE 10 MG/ML
INJECTION, SOLUTION INTRAVENOUS PRN
Status: DISCONTINUED | OUTPATIENT
Start: 2020-03-04 | End: 2020-03-04 | Stop reason: SDUPTHER

## 2020-03-04 RX ORDER — SENNA AND DOCUSATE SODIUM 50; 8.6 MG/1; MG/1
1 TABLET, FILM COATED ORAL 2 TIMES DAILY
Status: DISCONTINUED | OUTPATIENT
Start: 2020-03-04 | End: 2020-03-06 | Stop reason: HOSPADM

## 2020-03-04 RX ORDER — POLYETHYLENE GLYCOL 3350 17 G/17G
17 POWDER, FOR SOLUTION ORAL DAILY
Status: DISCONTINUED | OUTPATIENT
Start: 2020-03-04 | End: 2020-03-06 | Stop reason: HOSPADM

## 2020-03-04 RX ORDER — MORPHINE SULFATE 2 MG/ML
1 INJECTION, SOLUTION INTRAMUSCULAR; INTRAVENOUS EVERY 5 MIN PRN
Status: DISCONTINUED | OUTPATIENT
Start: 2020-03-04 | End: 2020-03-04 | Stop reason: HOSPADM

## 2020-03-04 RX ORDER — MORPHINE SULFATE 2 MG/ML
2 INJECTION, SOLUTION INTRAMUSCULAR; INTRAVENOUS EVERY 5 MIN PRN
Status: DISCONTINUED | OUTPATIENT
Start: 2020-03-04 | End: 2020-03-04 | Stop reason: HOSPADM

## 2020-03-04 RX ORDER — GABAPENTIN 400 MG/1
400 CAPSULE ORAL 2 TIMES DAILY
Status: DISCONTINUED | OUTPATIENT
Start: 2020-03-04 | End: 2020-03-06 | Stop reason: HOSPADM

## 2020-03-04 RX ORDER — NEOSTIGMINE METHYLSULFATE 1 MG/ML
INJECTION, SOLUTION INTRAVENOUS PRN
Status: DISCONTINUED | OUTPATIENT
Start: 2020-03-04 | End: 2020-03-04 | Stop reason: SDUPTHER

## 2020-03-04 RX ORDER — HYDROCODONE BITARTRATE AND ACETAMINOPHEN 5; 325 MG/1; MG/1
1 TABLET ORAL PRN
Status: DISCONTINUED | OUTPATIENT
Start: 2020-03-04 | End: 2020-03-04 | Stop reason: HOSPADM

## 2020-03-04 RX ORDER — PROMETHAZINE HYDROCHLORIDE 25 MG/ML
6.25 INJECTION, SOLUTION INTRAMUSCULAR; INTRAVENOUS EVERY 10 MIN PRN
Status: DISCONTINUED | OUTPATIENT
Start: 2020-03-04 | End: 2020-03-04 | Stop reason: HOSPADM

## 2020-03-04 RX ORDER — HYDROCHLOROTHIAZIDE 12.5 MG/1
12.5 TABLET ORAL EVERY MORNING
Status: DISCONTINUED | OUTPATIENT
Start: 2020-03-04 | End: 2020-03-06 | Stop reason: HOSPADM

## 2020-03-04 RX ORDER — ONDANSETRON 2 MG/ML
4 INJECTION INTRAMUSCULAR; INTRAVENOUS EVERY 6 HOURS PRN
Status: DISCONTINUED | OUTPATIENT
Start: 2020-03-04 | End: 2020-03-06 | Stop reason: HOSPADM

## 2020-03-04 RX ORDER — DEXAMETHASONE SODIUM PHOSPHATE 10 MG/ML
INJECTION, SOLUTION INTRAMUSCULAR; INTRAVENOUS PRN
Status: DISCONTINUED | OUTPATIENT
Start: 2020-03-04 | End: 2020-03-04 | Stop reason: SDUPTHER

## 2020-03-04 RX ORDER — BUPIVACAINE HYDROCHLORIDE 2.5 MG/ML
INJECTION, SOLUTION EPIDURAL; INFILTRATION; INTRACAUDAL PRN
Status: DISCONTINUED | OUTPATIENT
Start: 2020-03-04 | End: 2020-03-04 | Stop reason: ALTCHOICE

## 2020-03-04 RX ORDER — MORPHINE SULFATE 2 MG/ML
2 INJECTION, SOLUTION INTRAMUSCULAR; INTRAVENOUS
Status: DISCONTINUED | OUTPATIENT
Start: 2020-03-04 | End: 2020-03-06 | Stop reason: HOSPADM

## 2020-03-04 RX ORDER — SODIUM CHLORIDE 9 MG/ML
INJECTION, SOLUTION INTRAVENOUS CONTINUOUS PRN
Status: DISCONTINUED | OUTPATIENT
Start: 2020-03-04 | End: 2020-03-04 | Stop reason: SDUPTHER

## 2020-03-04 RX ORDER — HYDROXYZINE PAMOATE 25 MG/1
25 CAPSULE ORAL 3 TIMES DAILY PRN
Status: DISCONTINUED | OUTPATIENT
Start: 2020-03-04 | End: 2020-03-06 | Stop reason: HOSPADM

## 2020-03-04 RX ORDER — PROMETHAZINE HYDROCHLORIDE 25 MG/1
12.5 TABLET ORAL EVERY 6 HOURS PRN
Status: DISCONTINUED | OUTPATIENT
Start: 2020-03-04 | End: 2020-03-06 | Stop reason: HOSPADM

## 2020-03-04 RX ORDER — ONDANSETRON 2 MG/ML
INJECTION INTRAMUSCULAR; INTRAVENOUS PRN
Status: DISCONTINUED | OUTPATIENT
Start: 2020-03-04 | End: 2020-03-04 | Stop reason: SDUPTHER

## 2020-03-04 RX ORDER — QUINAPRIL 20 MG/1
40 TABLET ORAL EVERY EVENING
Status: DISCONTINUED | OUTPATIENT
Start: 2020-03-04 | End: 2020-03-04 | Stop reason: CLARIF

## 2020-03-04 RX ORDER — MEPERIDINE HYDROCHLORIDE 50 MG/ML
12.5 INJECTION INTRAMUSCULAR; INTRAVENOUS; SUBCUTANEOUS EVERY 5 MIN PRN
Status: DISCONTINUED | OUTPATIENT
Start: 2020-03-04 | End: 2020-03-04 | Stop reason: HOSPADM

## 2020-03-04 RX ORDER — FENTANYL CITRATE 50 UG/ML
INJECTION, SOLUTION INTRAMUSCULAR; INTRAVENOUS PRN
Status: DISCONTINUED | OUTPATIENT
Start: 2020-03-04 | End: 2020-03-04 | Stop reason: SDUPTHER

## 2020-03-04 RX ADMIN — CYCLOBENZAPRINE 10 MG: 10 TABLET, FILM COATED ORAL at 12:01

## 2020-03-04 RX ADMIN — Medication 0.6 MG: at 09:45

## 2020-03-04 RX ADMIN — MIDAZOLAM 2 MG: 1 INJECTION INTRAMUSCULAR; INTRAVENOUS at 06:35

## 2020-03-04 RX ADMIN — PHENYLEPHRINE HYDROCHLORIDE 200 MCG: 10 INJECTION INTRAVENOUS at 08:03

## 2020-03-04 RX ADMIN — DEXTROSE MONOHYDRATE 1.5 G: 50 INJECTION, SOLUTION INTRAVENOUS at 17:46

## 2020-03-04 RX ADMIN — Medication 1.5 G: at 06:35

## 2020-03-04 RX ADMIN — PHENYLEPHRINE HYDROCHLORIDE 200 MCG: 10 INJECTION INTRAVENOUS at 07:22

## 2020-03-04 RX ADMIN — SODIUM CHLORIDE: 9 INJECTION, SOLUTION INTRAVENOUS at 09:00

## 2020-03-04 RX ADMIN — DEXAMETHASONE SODIUM PHOSPHATE 10 MG: 10 INJECTION INTRAMUSCULAR; INTRAVENOUS at 07:01

## 2020-03-04 RX ADMIN — SENNOSIDES AND DOCUSATE SODIUM 1 TABLET: 8.6; 5 TABLET ORAL at 19:22

## 2020-03-04 RX ADMIN — Medication 1.5 G: at 06:00

## 2020-03-04 RX ADMIN — Medication 3 MG: at 09:45

## 2020-03-04 RX ADMIN — SENNOSIDES AND DOCUSATE SODIUM 1 TABLET: 8.6; 5 TABLET ORAL at 13:29

## 2020-03-04 RX ADMIN — LIDOCAINE HYDROCHLORIDE 80 MG: 20 INJECTION, SOLUTION INTRAVENOUS at 06:41

## 2020-03-04 RX ADMIN — FENTANYL CITRATE 100 MCG: 50 INJECTION, SOLUTION INTRAMUSCULAR; INTRAVENOUS at 06:41

## 2020-03-04 RX ADMIN — MORPHINE SULFATE 4 MG: 4 INJECTION, SOLUTION INTRAMUSCULAR; INTRAVENOUS at 19:24

## 2020-03-04 RX ADMIN — BISACODYL 5 MG: 5 TABLET, COATED ORAL at 13:29

## 2020-03-04 RX ADMIN — ONDANSETRON HYDROCHLORIDE 4 MG: 2 INJECTION, SOLUTION INTRAMUSCULAR; INTRAVENOUS at 09:26

## 2020-03-04 RX ADMIN — PHENYLEPHRINE HYDROCHLORIDE 200 MCG: 10 INJECTION INTRAVENOUS at 07:17

## 2020-03-04 RX ADMIN — FENTANYL CITRATE 50 MCG: 50 INJECTION, SOLUTION INTRAMUSCULAR; INTRAVENOUS at 07:15

## 2020-03-04 RX ADMIN — FENTANYL CITRATE 50 MCG: 50 INJECTION, SOLUTION INTRAMUSCULAR; INTRAVENOUS at 08:43

## 2020-03-04 RX ADMIN — ATORVASTATIN CALCIUM 40 MG: 40 TABLET, FILM COATED ORAL at 19:22

## 2020-03-04 RX ADMIN — MORPHINE SULFATE 2 MG: 2 INJECTION, SOLUTION INTRAMUSCULAR; INTRAVENOUS at 10:40

## 2020-03-04 RX ADMIN — LABETALOL HYDROCHLORIDE 5 MG: 5 INJECTION INTRAVENOUS at 09:54

## 2020-03-04 RX ADMIN — PHENYLEPHRINE HYDROCHLORIDE 1 MCG: 10 INJECTION INTRAVENOUS at 08:06

## 2020-03-04 RX ADMIN — LISINOPRIL 20 MG: 20 TABLET ORAL at 17:46

## 2020-03-04 RX ADMIN — PHENYLEPHRINE HYDROCHLORIDE 200 MCG: 10 INJECTION INTRAVENOUS at 07:51

## 2020-03-04 RX ADMIN — PROPOFOL 180 MG: 10 INJECTION, EMULSION INTRAVENOUS at 06:41

## 2020-03-04 RX ADMIN — MORPHINE SULFATE 2 MG: 2 INJECTION, SOLUTION INTRAMUSCULAR; INTRAVENOUS at 10:35

## 2020-03-04 RX ADMIN — Medication 0.25 MCG/KG/HR: at 07:20

## 2020-03-04 RX ADMIN — HYDROCODONE BITARTRATE AND ACETAMINOPHEN 1 TABLET: 7.5; 325 TABLET ORAL at 17:46

## 2020-03-04 RX ADMIN — CYCLOBENZAPRINE 10 MG: 10 TABLET, FILM COATED ORAL at 21:19

## 2020-03-04 RX ADMIN — PHENYLEPHRINE HYDROCHLORIDE 200 MCG: 10 INJECTION INTRAVENOUS at 07:44

## 2020-03-04 RX ADMIN — SODIUM CHLORIDE: 9 INJECTION, SOLUTION INTRAVENOUS at 06:35

## 2020-03-04 RX ADMIN — ROCURONIUM BROMIDE 10 MG: 10 INJECTION, SOLUTION INTRAVENOUS at 07:44

## 2020-03-04 RX ADMIN — MORPHINE SULFATE 2 MG: 2 INJECTION, SOLUTION INTRAMUSCULAR; INTRAVENOUS at 10:57

## 2020-03-04 RX ADMIN — GABAPENTIN 400 MG: 400 CAPSULE ORAL at 19:21

## 2020-03-04 RX ADMIN — PHENYLEPHRINE HYDROCHLORIDE 200 MCG: 10 INJECTION INTRAVENOUS at 07:32

## 2020-03-04 RX ADMIN — HYDROCODONE BITARTRATE AND ACETAMINOPHEN 1 TABLET: 7.5; 325 TABLET ORAL at 12:01

## 2020-03-04 RX ADMIN — PHENYLEPHRINE HYDROCHLORIDE 200 MCG: 10 INJECTION INTRAVENOUS at 07:38

## 2020-03-04 RX ADMIN — HYDROCODONE BITARTRATE AND ACETAMINOPHEN 1 TABLET: 7.5; 325 TABLET ORAL at 22:03

## 2020-03-04 RX ADMIN — KETAMINE HYDROCHLORIDE 50 MG: 10 INJECTION, SOLUTION INTRAMUSCULAR; INTRAVENOUS at 07:17

## 2020-03-04 RX ADMIN — HYDROCHLOROTHIAZIDE 12.5 MG: 12.5 TABLET ORAL at 13:29

## 2020-03-04 RX ADMIN — MORPHINE SULFATE 2 MG: 2 INJECTION, SOLUTION INTRAMUSCULAR; INTRAVENOUS at 10:47

## 2020-03-04 RX ADMIN — MORPHINE SULFATE 2 MG: 2 INJECTION, SOLUTION INTRAMUSCULAR; INTRAVENOUS at 13:45

## 2020-03-04 RX ADMIN — POLYETHYLENE GLYCOL 3350 17 G: 17 POWDER, FOR SOLUTION ORAL at 13:29

## 2020-03-04 RX ADMIN — SODIUM CHLORIDE: 9 INJECTION, SOLUTION INTRAVENOUS at 05:59

## 2020-03-04 RX ADMIN — ROCURONIUM BROMIDE 50 MG: 10 INJECTION, SOLUTION INTRAVENOUS at 06:41

## 2020-03-04 ASSESSMENT — PULMONARY FUNCTION TESTS
PIF_VALUE: 2
PIF_VALUE: 29
PIF_VALUE: 27
PIF_VALUE: 29
PIF_VALUE: 30
PIF_VALUE: 33
PIF_VALUE: 29
PIF_VALUE: 29
PIF_VALUE: 28
PIF_VALUE: 33
PIF_VALUE: 26
PIF_VALUE: 29
PIF_VALUE: 35
PIF_VALUE: 26
PIF_VALUE: 32
PIF_VALUE: 30
PIF_VALUE: 1
PIF_VALUE: 28
PIF_VALUE: 33
PIF_VALUE: 29
PIF_VALUE: 23
PIF_VALUE: 30
PIF_VALUE: 30
PIF_VALUE: 28
PIF_VALUE: 27
PIF_VALUE: 3
PIF_VALUE: 34
PIF_VALUE: 29
PIF_VALUE: 0
PIF_VALUE: 28
PIF_VALUE: 24
PIF_VALUE: 32
PIF_VALUE: 30
PIF_VALUE: 25
PIF_VALUE: 27
PIF_VALUE: 29
PIF_VALUE: 27
PIF_VALUE: 28
PIF_VALUE: 3
PIF_VALUE: 29
PIF_VALUE: 23
PIF_VALUE: 29
PIF_VALUE: 30
PIF_VALUE: 2
PIF_VALUE: 28
PIF_VALUE: 30
PIF_VALUE: 27
PIF_VALUE: 2
PIF_VALUE: 27
PIF_VALUE: 29
PIF_VALUE: 27
PIF_VALUE: 29
PIF_VALUE: 29
PIF_VALUE: 2
PIF_VALUE: 26
PIF_VALUE: 29
PIF_VALUE: 28
PIF_VALUE: 30
PIF_VALUE: 16
PIF_VALUE: 30
PIF_VALUE: 29
PIF_VALUE: 30
PIF_VALUE: 29
PIF_VALUE: 20
PIF_VALUE: 30
PIF_VALUE: 25
PIF_VALUE: 32
PIF_VALUE: 34
PIF_VALUE: 25
PIF_VALUE: 30
PIF_VALUE: 28
PIF_VALUE: 28
PIF_VALUE: 21
PIF_VALUE: 30
PIF_VALUE: 29
PIF_VALUE: 24
PIF_VALUE: 20
PIF_VALUE: 3
PIF_VALUE: 1
PIF_VALUE: 30
PIF_VALUE: 28
PIF_VALUE: 29
PIF_VALUE: 28
PIF_VALUE: 0
PIF_VALUE: 29
PIF_VALUE: 29
PIF_VALUE: 5
PIF_VALUE: 2
PIF_VALUE: 32
PIF_VALUE: 30
PIF_VALUE: 30
PIF_VALUE: 28
PIF_VALUE: 22
PIF_VALUE: 29
PIF_VALUE: 34
PIF_VALUE: 32
PIF_VALUE: 27
PIF_VALUE: 29
PIF_VALUE: 1
PIF_VALUE: 24
PIF_VALUE: 21
PIF_VALUE: 20
PIF_VALUE: 29
PIF_VALUE: 30
PIF_VALUE: 31
PIF_VALUE: 32
PIF_VALUE: 28
PIF_VALUE: 29
PIF_VALUE: 28
PIF_VALUE: 2
PIF_VALUE: 28
PIF_VALUE: 2
PIF_VALUE: 29
PIF_VALUE: 31
PIF_VALUE: 23
PIF_VALUE: 28
PIF_VALUE: 29
PIF_VALUE: 33
PIF_VALUE: 20
PIF_VALUE: 30
PIF_VALUE: 4
PIF_VALUE: 33
PIF_VALUE: 20
PIF_VALUE: 32
PIF_VALUE: 32
PIF_VALUE: 20
PIF_VALUE: 2
PIF_VALUE: 24
PIF_VALUE: 27
PIF_VALUE: 31
PIF_VALUE: 31
PIF_VALUE: 28
PIF_VALUE: 26
PIF_VALUE: 25
PIF_VALUE: 32
PIF_VALUE: 30
PIF_VALUE: 33
PIF_VALUE: 30
PIF_VALUE: 27
PIF_VALUE: 28
PIF_VALUE: 29
PIF_VALUE: 25
PIF_VALUE: 32
PIF_VALUE: 27
PIF_VALUE: 29
PIF_VALUE: 23
PIF_VALUE: 31
PIF_VALUE: 29
PIF_VALUE: 30
PIF_VALUE: 2
PIF_VALUE: 4
PIF_VALUE: 30
PIF_VALUE: 2
PIF_VALUE: 27
PIF_VALUE: 29
PIF_VALUE: 33
PIF_VALUE: 29
PIF_VALUE: 31
PIF_VALUE: 28
PIF_VALUE: 28
PIF_VALUE: 29
PIF_VALUE: 28
PIF_VALUE: 27
PIF_VALUE: 28
PIF_VALUE: 30
PIF_VALUE: 30
PIF_VALUE: 29
PIF_VALUE: 28
PIF_VALUE: 3
PIF_VALUE: 26
PIF_VALUE: 20
PIF_VALUE: 29
PIF_VALUE: 20
PIF_VALUE: 31
PIF_VALUE: 30
PIF_VALUE: 28
PIF_VALUE: 30
PIF_VALUE: 15
PIF_VALUE: 28
PIF_VALUE: 30
PIF_VALUE: 29
PIF_VALUE: 22
PIF_VALUE: 27
PIF_VALUE: 29
PIF_VALUE: 21
PIF_VALUE: 35
PIF_VALUE: 34
PIF_VALUE: 20
PIF_VALUE: 20
PIF_VALUE: 25
PIF_VALUE: 28
PIF_VALUE: 31
PIF_VALUE: 29
PIF_VALUE: 0
PIF_VALUE: 31
PIF_VALUE: 30
PIF_VALUE: 28

## 2020-03-04 ASSESSMENT — PAIN DESCRIPTION - ORIENTATION
ORIENTATION: LOWER
ORIENTATION: RIGHT
ORIENTATION: LOWER;RIGHT
ORIENTATION: RIGHT

## 2020-03-04 ASSESSMENT — PAIN SCALES - GENERAL
PAINLEVEL_OUTOF10: 10
PAINLEVEL_OUTOF10: 8
PAINLEVEL_OUTOF10: 6
PAINLEVEL_OUTOF10: 10
PAINLEVEL_OUTOF10: 7
PAINLEVEL_OUTOF10: 6
PAINLEVEL_OUTOF10: 10
PAINLEVEL_OUTOF10: 0
PAINLEVEL_OUTOF10: 10
PAINLEVEL_OUTOF10: 0
PAINLEVEL_OUTOF10: 0
PAINLEVEL_OUTOF10: 7
PAINLEVEL_OUTOF10: 10
PAINLEVEL_OUTOF10: 0
PAINLEVEL_OUTOF10: 9

## 2020-03-04 ASSESSMENT — PAIN - FUNCTIONAL ASSESSMENT: PAIN_FUNCTIONAL_ASSESSMENT: 0-10

## 2020-03-04 ASSESSMENT — PAIN DESCRIPTION - FREQUENCY
FREQUENCY: CONTINUOUS

## 2020-03-04 ASSESSMENT — PAIN DESCRIPTION - ONSET
ONSET: AWAKENED FROM SLEEP
ONSET: ON-GOING

## 2020-03-04 ASSESSMENT — PAIN DESCRIPTION - DIRECTION
RADIATING_TOWARDS: RIGHT HIP
RADIATING_TOWARDS: RT HIP

## 2020-03-04 ASSESSMENT — PAIN DESCRIPTION - DESCRIPTORS
DESCRIPTORS: ACHING;BURNING;SHARP
DESCRIPTORS: BURNING;CONSTANT
DESCRIPTORS: BURNING;CRAMPING
DESCRIPTORS: CONSTANT
DESCRIPTORS: BURNING;CONSTANT
DESCRIPTORS: BURNING;CRAMPING
DESCRIPTORS: ACHING;DISCOMFORT;SHARP
DESCRIPTORS: ACHING;DISCOMFORT;CONSTANT
DESCRIPTORS: ACHING;DISCOMFORT;SHARP;NAGGING

## 2020-03-04 ASSESSMENT — PAIN DESCRIPTION - PAIN TYPE
TYPE: SURGICAL PAIN
TYPE: SURGICAL PAIN
TYPE: ACUTE PAIN
TYPE: SURGICAL PAIN
TYPE: SURGICAL PAIN
TYPE: ACUTE PAIN
TYPE: SURGICAL PAIN

## 2020-03-04 ASSESSMENT — PAIN DESCRIPTION - LOCATION
LOCATION: BACK
LOCATION: BACK;HIP
LOCATION: BACK
LOCATION: HIP
LOCATION: BACK
LOCATION: HIP

## 2020-03-04 ASSESSMENT — PAIN DESCRIPTION - PROGRESSION: CLINICAL_PROGRESSION: GRADUALLY WORSENING

## 2020-03-04 NOTE — PLAN OF CARE
Problem: Discharge Planning:  Goal: Discharged to appropriate level of care  Description  Discharged to appropriate level of care  Outcome: Met This Shift     Problem: Infection - Surgical Site:  Goal: Will show no infection signs and symptoms  Description  Will show no infection signs and symptoms  Outcome: Met This Shift     Problem: Mobility - Impaired:  Goal: Mobility will improve to maximum level  Description  Mobility will improve to maximum level  Outcome: Met This Shift     Problem: Pain - Acute:  Goal: Pain level will decrease  Description  Pain level will decrease  Outcome: Met This Shift     Problem: Sensory Perception - Impaired:  Goal: Sensory function intact, lower extremity  Description  Sensory function intact, lower extremity  Outcome: Met This Shift     Problem: Falls - Risk of:  Goal: Will remain free from falls  Description  Will remain free from falls  Outcome: Met This Shift  Goal: Absence of physical injury  Description  Absence of physical injury  Outcome: Met This Shift     Problem: Risk for Impaired Skin Integrity  Goal: Tissue integrity - skin and mucous membranes  Description  Structural intactness and normal physiological function of skin and  mucous membranes.   Outcome: Met This Shift     Problem: Pain:  Goal: Pain level will decrease  Description  Pain level will decrease  Outcome: Met This Shift  Goal: Control of acute pain  Description  Control of acute pain  Outcome: Met This Shift  Goal: Control of chronic pain  Description  Control of chronic pain  Outcome: Met This Shift

## 2020-03-04 NOTE — PROGRESS NOTES
INTRAOPERATIVE MONITORING EMG REPORT    Diagnosis: previous fusion, adjacent segment disorder, stenosis  Procedure: exploration fusion L3-5, posterior fusion L2-L4, PLIF L2/3   Anesthesia: isoflurane  Surgeon: Araceli Hodge M.D. Intra-op Monitorin.5 hours    Procedure:     EMG recording electrodes were placed over the vastus medialis, vastus lateralis, anterior tibialis, and medial gastrocnemius  musles for recording spontaneous EMG activity. A silent control was performed to test the integrity of the system prior to the procedure. Results:  Spontaneous EMG was quiet during the surgical procedure.     Evoked EMG:   LEFT    Direct Nerve (mA)            Screw (mA)   L2                                                      >30  L3                                                      14  L4     >30      RIGHT     L2                                                      >30  L3                                                      >30  L4                                                      >30

## 2020-03-04 NOTE — PROGRESS NOTES
Dangled pt's on side of bed. Attempted to have pt sit in chair. Pt stated \"the pain in my hip is to great for me to sit in a chair right now; I want to get back into bed\". Assisted pt back into bed. Will continue to monitor pt.

## 2020-03-04 NOTE — CONSULTS
Hospital Medicine  Consult History & Physical        Chief Complaint:    Medical management    Date of Service:   03/04/20    History Of Present Illness:      60 y.o. female with PMH of chronic back pain, HTN, CLYDE on CPAP, hyperlipidemia, diet controlled DM, rheumatoid arthritis, osteoarthritis, and CA who we are asked to see/evaluate by Patsy Delacruz MD for medical management. Admitted to neurosurgery services following an exploration of prior L3-L5 fusion and L2-L3 posterior lumbar interbody fusion. Her prior surgery was in 8/2017. Her back pain is a chronic problem with this current episode starting more than 1 year ago. The complainant has been constant and gradually worsening since onset. She is also complaining of right hip pain. Denies dizziness, nausea, vomiting or abdominal pain. Her last BM was yesterday. [facility] is consulted for medical management.       Past Medical History:        Diagnosis Date    Cancer ([facility] [address] 12/2019    LESION REMOVED UNDER TONGUE  DENTAL CLINIC    Chronic back pain     Costochondritis     Depression     Diet-controlled type 2 diabetes mellitus ([facility] [address]     Falls frequently     none recent as of 2/19/19    Fibromyalgia     Hallux rigidus of left foot     HTN (hypertension)     Hyperlipidemia     CLYDE on CPAP     SETTING ?    Osteoarthritis     Rheumatoid arthritis(714.0)     TIA (transient ischemic attack)     Use of cane as ambulatory aid        Past Surgical History:        Procedure Laterality Date    ANESTHESIA NERVE BLOCK Left 2/26/2019    LEFT C3,4,5 MBB performed by Dorene Espinal MD at [facility] Right 8/12/2019    BILATERAL TRANSFORAMINAL EPIDURAL STEROID INJECTION S1 #3 performed by Dorene Espinal MD at [address] [place], [place] [address]eri Kristian  2005    Left    ARTHRODESIS Left 12/14/2018    ARTHRODESIS 2ND DIGIT LEFT FOOT performed by Saintclair Kitchen, DPM at [address] [place] [address]  08/16/2017    PLIF L3-L4, L4-L5 with rods, ABLATION NERVES Right 9/30/2019    RIGHT SACRAL RADIOFREQUENCY ABLATION performed by Claire Back MD at [address]  2000, 2005    Big Left Toe    TONSILLECTOMY      WISDOM TOOTH EXTRACTION         Medications Prior to Admission:      Prior to Admission medications    Medication Sig Start Date End Date Taking? Authorizing Provider   HYDROcodone-acetaminophen (NORCO) 7.5-325 MG per tablet Take 1 tablet by mouth every 8 hours as needed for Pain for up to 24 days. Intended supply: 24 days 2/11/20 3/6/20 Yes TREASURE Oleary   FLUoxetine (PROZAC) 40 MG capsule Take 1 capsule by mouth daily For mood. 1/27/20  Yes Desmond Simms MD   cyclobenzaprine (FLEXERIL) 5 MG tablet Take 1 tablet by mouth 2 times daily as needed for Muscle spasms 1/27/20  Yes Desmond Simms MD   gabapentin (NEURONTIN) 400 MG capsule Take 1 capsule by mouth 2 times daily for 92 days. 12/19/19 3/20/20 Yes Desmond Simms MD   hydrochlorothiazide (HYDRODIURIL) 12.5 MG tablet Take 1 tablet by mouth every morning For blood pressure 10/2/19  Yes Desmond Simms MD   hydrOXYzine (VISTARIL) 25 MG capsule Take 1 capsule by mouth 3 times daily as needed for Anxiety 10/2/19  Yes Desmond Simms MD   quinapril (ACCUPRIL) 40 MG tablet Take 1 tablet by mouth daily For blood pressure. Patient taking differently: Take 40 mg by mouth every evening For blood pressure. 10/2/19  Yes Desmond Simms MD   atorvastatin (LIPITOR) 40 MG tablet Take 1 tablet by mouth daily 10/2/19  Yes Desmond Simms MD       Allergies:    Pcn [penicillins]    Social History:      TOBACCO:   reports that she quit smoking about 4 months ago. Her smoking use included cigarettes. She has a 15.00 pack-year smoking history. She has never used smokeless tobacco.  ETOH:   reports current alcohol use.       Family History:          Problem Relation Age of Onset    Dementia Mother     Breast Cancer Mother     Cancer Mother         breast cancer [de-identified] hours. No results for input(s): Cheyenne Parisi in the last 72 hours. Urinalysis:      Lab Results   Component Value Date    NITRU Negative 02/26/2020    WBCUA 1-3 02/26/2020    BACTERIA NONE SEEN 02/26/2020    RBCUA NONE 02/26/2020    BLOODU Negative 02/26/2020    SPECGRAV 1.010 02/26/2020    GLUCOSEU Negative 02/26/2020         ASSESSMENT:  HTN  HLD  Sleep apnea  Status post redo lumbar fusion    PLAN:  Resume home medication  Antibiotics  Pain control  Nausea control  Monitor BP  Monitor BGL  Daily weights  I/Os  Neurovascular checks  Monitor bowel function  IS/C&DB while awake  OOB for meals  IVF fluids  Oxygen therapy PRN       -  has a past medical history of Cancer ([facility] [address] Chronic back pain, Costochondritis, Depression, Diet-controlled type 2 diabetes mellitus ([facility] [address] Falls frequently, Fibromyalgia, Hallux rigidus of left foot, HTN (hypertension), Hyperlipidemia, CLYDE on CPAP, Osteoarthritis, Rheumatoid arthritis(714.0), TIA (transient ischemic attack), and Use of cane as ambulatory aid. - Body mass index is 35.45 kg/m².      - DVT Prophylaxis  SCDs    PT/OT Evaluation: Ordered    Iosco Peng, CNP  [facility]  Please contact me via [address]

## 2020-03-04 NOTE — OP NOTE
[address]                  [address] [facility],  [facility]                                OPERATIVE REPORT    PATIENT NAME: Donta Coleman                        :        1955  MED REC NO:   [id number]                            ROOM:       5219  ACCOUNT NO:   [de-identified]                           ADMIT DATE: 2020  PROVIDER:     Jose Manuel Britton MD    DATE OF PROCEDURE:  2020    PREOPERATIVE DIAGNOSES:  1. Prior L3 to L5 fusion. 2.  L2-L3 adjacent segment stenosis. POSTOPERATIVE DIAGNOSES:  1. Prior L3 to L5 fusion. 2.  L2-L3 adjacent segment stenosis. OPERATIVE PROCEDURES:  1. Exploration of prior L3 to L5 fusion. 2.  Removal of previously placed L3, L4, and L5 pedicle screws. 3.  Revision of posterolateral fusion from L2 to L4 with use of  bilateral L2, L3, and L4 pedicle screws with use of locally harvested  autograft and recombinant BMP-2 (INFUSE) of posterolateral fusion from  L2 to L4.  4.  A 360-degree fusion with posterior lumbar interbody fusion at L2-L3  with use of bilateral Globus SUSTAIN PEEK interbody spacer packed with  recombinant BMP-2 (INFUSE). 5.  Bilateral L2 and L3 laminectomy, bilateral L2-L3 medial facetectomy,  bilateral L2 and L3 foraminotomy, and bilateral L2-L3 diskectomy. 6.  Use of O-arm assisted navigation with placement of pedicle screws. 7.  Use of free-running EMG to test the pedicle screw heads. 8.  Use of intraoperative fluoroscopy, interpreted by myself, the  surgeon. ANESTHESIA:  Generalized endotracheal anesthesia. SURGEON:  Jose Manuel Britton MD    ASSISTANT:  Nichol Tomas PA-C    COMPLICATIONS:  None. ESTIMATED BLOOD LOSS:  300 mL. SPECIMEN:  Disk. OPERATIVE INDICATIONS:  The patient is a 81-year-old lady who had  previously undergone L3 to L5 fusion. She did well; however, presented  to the office complaining of back pain that radiated into her legs.   She  had CT myelogram that showed adjacent segment stenosis at L2-L3. There  was also some question as to whether or not she had lucency around her  previously placed L4 pedicle screws. After risks, benefits, and  alternatives were discussed with the patient, it was determined that she  would undergo the above-listed procedure. OPERATIVE PROCEDURE:  The patient was brought into the operating room. A timeout was performed where she was identified by her name, medical  record number, and the operative procedure which she was about to  undergo. Next, induction of generalized endotracheal anesthesia was  then commenced. Upon completion of induction of generalized  endotracheal anesthesia, she received preoperative antibiotics. Hopper  catheter was placed. Monitoring electrodes were placed into the muscle  groups in her lower extremities for free-running EMG testing. She was  then flipped into prone position on a Kenny table. All pressure  points were padded. Her lumbosacral region was prepped and draped in  the usual sterile fashion. After this was done, a #10 blade was used to  make a skin incision. Monopolar cautery was used to dissect through the  subcutaneous tissue. I placed a self-retaining Weitlaner retractor into  the wound. Next, I opened up the lumbodorsal fascia sharply with  monopolar cautery and I proceeded to carry dissection out laterally to  expose the bilateral lamina at L2 and transverse process at L2, also  exposed the bilateral transverse processes at L3 and L4. I then  proceeded to then expose the previously placed pedicle screws at L3, L4,  and L5. Next, I then proceeded to then use screwdriver to remove the  locking caps. Once the locking caps were removed, I removed the  previously placed rods and I then subsequently proceeded to then remove  the previously placed L3, L4, and L5 pedicle screws. Of note, the L3  and L4 pedicle screws were loose. The L5 pedicle screws were actually  very tight.

## 2020-03-04 NOTE — ANESTHESIA POSTPROCEDURE EVALUATION
Department of Anesthesiology  Postprocedure Note    Patient: Trixie Moreno  MRN: [id number]  YOB: 1955  Date of evaluation: 3/4/2020  Time:  11:34 AM     Procedure Summary     Date:  03/04/20 Room / Location:  [facility] OR 06 / [facility]    Anesthesia Start:  0159 Anesthesia Stop:      Procedure:  EXPLORATION OF PRIOR L3-L5 FUSION AND L2-L3  POSTERIOR LUMBAR INTERBODY FUSION -- NEEDS O-ARM, AUDIOLOGY, CAGES, PLATES, SCREWS, C-ARM, TERESA TABLE, CELL SAVER, PLATELET GEL -- GLOBUS (N/A ) Diagnosis:  (PRIOR FUSION, ADJACENT SEGMENT STENOSIS)    Surgeon:  Anabel Joseph MD Responsible Provider:  Socorro Rowan MD    Anesthesia Type:  general ASA Status:  3          Anesthesia Type: general    Carolyn Phase I: Carolyn Score: 9    Carolyn Phase II:      Last vitals: Reviewed and per EMR flowsheets.        Anesthesia Post Evaluation    Patient location during evaluation: PACU  Patient participation: complete - patient participated  Level of consciousness: awake  Pain score: 3  Airway patency: patent  Nausea & Vomiting: no nausea and no vomiting  Complications: no  Cardiovascular status: blood pressure returned to baseline  Respiratory status: acceptable  Hydration status: euvolemic

## 2020-03-04 NOTE — PROGRESS NOTES
Floor Called, nurse to nurse given. Spoke with Denilson Taveras . Patients test results review, VS reported to receiving nurse. Any and all important information regarding patient disclosed.

## 2020-03-04 NOTE — PROGRESS NOTES
Occupational Therapy          OT consult received and appreciated. Chart reviewed. Will hold evaluation due to LSO brace ordered and not delivered . Will evaluate at a later time after brace arrives. Thank you.  Amada Bell, OTR/L #[id number]

## 2020-03-05 PROCEDURE — 97530 THERAPEUTIC ACTIVITIES: CPT

## 2020-03-05 PROCEDURE — 6370000000 HC RX 637 (ALT 250 FOR IP): Performed by: NEUROLOGICAL SURGERY

## 2020-03-05 PROCEDURE — 6360000002 HC RX W HCPCS: Performed by: NEUROLOGICAL SURGERY

## 2020-03-05 PROCEDURE — 97162 PT EVAL MOD COMPLEX 30 MIN: CPT

## 2020-03-05 PROCEDURE — 97535 SELF CARE MNGMENT TRAINING: CPT

## 2020-03-05 PROCEDURE — 2580000003 HC RX 258: Performed by: NEUROLOGICAL SURGERY

## 2020-03-05 PROCEDURE — 97165 OT EVAL LOW COMPLEX 30 MIN: CPT

## 2020-03-05 PROCEDURE — 6370000000 HC RX 637 (ALT 250 FOR IP): Performed by: PHYSICIAN ASSISTANT

## 2020-03-05 PROCEDURE — 1200000000 HC SEMI PRIVATE

## 2020-03-05 RX ORDER — OXYCODONE HYDROCHLORIDE 5 MG/1
5 TABLET ORAL EVERY 4 HOURS PRN
Status: DISCONTINUED | OUTPATIENT
Start: 2020-03-05 | End: 2020-03-06 | Stop reason: HOSPADM

## 2020-03-05 RX ORDER — ACETAMINOPHEN 325 MG/1
650 TABLET ORAL EVERY 4 HOURS PRN
Status: DISCONTINUED | OUTPATIENT
Start: 2020-03-05 | End: 2020-03-06 | Stop reason: HOSPADM

## 2020-03-05 RX ORDER — OXYCODONE HYDROCHLORIDE 10 MG/1
10 TABLET ORAL EVERY 4 HOURS PRN
Status: DISCONTINUED | OUTPATIENT
Start: 2020-03-05 | End: 2020-03-06 | Stop reason: HOSPADM

## 2020-03-05 RX ADMIN — FLUOXETINE HYDROCHLORIDE 40 MG: 20 CAPSULE ORAL at 09:25

## 2020-03-05 RX ADMIN — HYDROXYZINE PAMOATE 25 MG: 25 CAPSULE ORAL at 20:31

## 2020-03-05 RX ADMIN — MORPHINE SULFATE 4 MG: 4 INJECTION, SOLUTION INTRAMUSCULAR; INTRAVENOUS at 20:30

## 2020-03-05 RX ADMIN — DEXTROSE MONOHYDRATE 1.5 G: 50 INJECTION, SOLUTION INTRAVENOUS at 18:36

## 2020-03-05 RX ADMIN — ATORVASTATIN CALCIUM 40 MG: 40 TABLET, FILM COATED ORAL at 20:29

## 2020-03-05 RX ADMIN — HYDROXYZINE PAMOATE 25 MG: 25 CAPSULE ORAL at 12:36

## 2020-03-05 RX ADMIN — OXYCODONE HYDROCHLORIDE 10 MG: 10 TABLET ORAL at 13:25

## 2020-03-05 RX ADMIN — MORPHINE SULFATE 2 MG: 2 INJECTION, SOLUTION INTRAMUSCULAR; INTRAVENOUS at 16:15

## 2020-03-05 RX ADMIN — SODIUM CHLORIDE, PRESERVATIVE FREE 10 ML: 5 INJECTION INTRAVENOUS at 08:28

## 2020-03-05 RX ADMIN — MORPHINE SULFATE 4 MG: 4 INJECTION, SOLUTION INTRAMUSCULAR; INTRAVENOUS at 08:28

## 2020-03-05 RX ADMIN — SENNOSIDES AND DOCUSATE SODIUM 1 TABLET: 8.6; 5 TABLET ORAL at 09:25

## 2020-03-05 RX ADMIN — HYDROCODONE BITARTRATE AND ACETAMINOPHEN 1 TABLET: 7.5; 325 TABLET ORAL at 02:03

## 2020-03-05 RX ADMIN — HYDROCHLOROTHIAZIDE 12.5 MG: 12.5 TABLET ORAL at 09:25

## 2020-03-05 RX ADMIN — MORPHINE SULFATE 4 MG: 4 INJECTION, SOLUTION INTRAMUSCULAR; INTRAVENOUS at 03:05

## 2020-03-05 RX ADMIN — BISACODYL 5 MG: 5 TABLET, COATED ORAL at 09:25

## 2020-03-05 RX ADMIN — MORPHINE SULFATE 4 MG: 4 INJECTION, SOLUTION INTRAMUSCULAR; INTRAVENOUS at 10:32

## 2020-03-05 RX ADMIN — GABAPENTIN 400 MG: 400 CAPSULE ORAL at 09:25

## 2020-03-05 RX ADMIN — CYCLOBENZAPRINE 10 MG: 10 TABLET, FILM COATED ORAL at 06:28

## 2020-03-05 RX ADMIN — SODIUM CHLORIDE, PRESERVATIVE FREE 10 ML: 5 INJECTION INTRAVENOUS at 20:30

## 2020-03-05 RX ADMIN — DEXTROSE MONOHYDRATE 1.5 G: 50 INJECTION, SOLUTION INTRAVENOUS at 06:22

## 2020-03-05 RX ADMIN — POLYETHYLENE GLYCOL 3350 17 G: 17 POWDER, FOR SOLUTION ORAL at 09:25

## 2020-03-05 RX ADMIN — GABAPENTIN 400 MG: 400 CAPSULE ORAL at 20:30

## 2020-03-05 RX ADMIN — OXYCODONE HYDROCHLORIDE 10 MG: 10 TABLET ORAL at 18:36

## 2020-03-05 RX ADMIN — SENNOSIDES AND DOCUSATE SODIUM 1 TABLET: 8.6; 5 TABLET ORAL at 20:30

## 2020-03-05 RX ADMIN — MAGNESIUM HYDROXIDE 30 ML: 2400 SUSPENSION ORAL at 20:29

## 2020-03-05 RX ADMIN — CYCLOBENZAPRINE 10 MG: 10 TABLET, FILM COATED ORAL at 18:36

## 2020-03-05 RX ADMIN — HYDROCODONE BITARTRATE AND ACETAMINOPHEN 1 TABLET: 7.5; 325 TABLET ORAL at 09:29

## 2020-03-05 ASSESSMENT — PAIN DESCRIPTION - LOCATION
LOCATION: BACK
LOCATION: BACK
LOCATION: BACK;HIP
LOCATION: BACK

## 2020-03-05 ASSESSMENT — PAIN DESCRIPTION - DESCRIPTORS
DESCRIPTORS: DISCOMFORT;SORE;ACHING
DESCRIPTORS: CONSTANT;DISCOMFORT;NAGGING
DESCRIPTORS: DISCOMFORT;NAGGING;PRESSURE
DESCRIPTORS: DISCOMFORT;PRESSURE;SORE
DESCRIPTORS: CONSTANT;DISCOMFORT;PRESSURE;SHARP

## 2020-03-05 ASSESSMENT — PAIN SCALES - GENERAL
PAINLEVEL_OUTOF10: 10
PAINLEVEL_OUTOF10: 8
PAINLEVEL_OUTOF10: 0
PAINLEVEL_OUTOF10: 5
PAINLEVEL_OUTOF10: 8
PAINLEVEL_OUTOF10: 4
PAINLEVEL_OUTOF10: 8
PAINLEVEL_OUTOF10: 10
PAINLEVEL_OUTOF10: 8
PAINLEVEL_OUTOF10: 7
PAINLEVEL_OUTOF10: 5
PAINLEVEL_OUTOF10: 7
PAINLEVEL_OUTOF10: 7
PAINLEVEL_OUTOF10: 6
PAINLEVEL_OUTOF10: 9

## 2020-03-05 ASSESSMENT — PAIN DESCRIPTION - ONSET
ONSET: ON-GOING

## 2020-03-05 ASSESSMENT — PAIN DESCRIPTION - FREQUENCY
FREQUENCY: CONTINUOUS

## 2020-03-05 ASSESSMENT — PAIN DESCRIPTION - PAIN TYPE
TYPE: SURGICAL PAIN
TYPE: ACUTE PAIN;SURGICAL PAIN;CHRONIC PAIN

## 2020-03-05 ASSESSMENT — PAIN DESCRIPTION - ORIENTATION
ORIENTATION: RIGHT;LOWER
ORIENTATION: RIGHT;LOWER

## 2020-03-05 ASSESSMENT — PAIN DESCRIPTION - DIRECTION
RADIATING_TOWARDS: RT HIP
RADIATING_TOWARDS: RT HIP

## 2020-03-05 NOTE — PROGRESS NOTES
DIAGNOSTIC      EPIDURAL STEROID INJECTION N/A 6/4/2019    BILATERAL TRANSFORAMINAL EPIDURAL STEROID INJECTION S1 performed by Natividad Marcus DO at [address]  2005    Left    HARDWARE REMOVAL FOOT / ANKLE Left 12/14/2018    REMOVAL OF PAINFUL HARDWARE LEFT FOOT  ++SYNTHES++ performed by Henry Tran DPM at [facility]    inguinal     LUMBAR SPINE SURGERY N/A 3/4/2020    EXPLORATION OF PRIOR L3-L5 FUSION AND L2-L3  POSTERIOR LUMBAR INTERBODY FUSION -- NEEDS O-ARM, AUDIOLOGY, CAGES, PLATES, SCREWS, C-ARM, TERESA TABLE, CELL SAVER, PLATELET GEL -- GLOBUS performed by Vika Collazo MD at [facility] Bilateral 05/07/2018    L1-2 lumbar foramen #1    NERVE BLOCK Bilateral 12/03/2018    s1 tfesi    NERVE BLOCK Bilateral 08/12/2019    NERVE BLOCK Right 09/30/2019    sacral radiofrequency    OTHER SURGICAL HISTORY Left 9/25/13    left foot tarso metatarsal joint injection    OTHER SURGICAL HISTORY Left 5/27/15    Endoscopic Gastroc recession left, Lapidus left foot and  Excision of exostosis left foot    NC INJECT ANES/STEROID FORAMEN LUMBAR/SACRAL W IMG GUIDE ,1 LEVEL Bilateral 12/3/2018    BILATERAL S1  EPIDURAL STEROID INJECTION performed by Teresa Ingram MD at [address] [facility] DX/THER SBST INTRLMNR LMBR/SAC W/IMG GDN N/A 8/21/2018    EPIDURAL STEROID INJECTION L1-2 WITH LOW VOL performed by Teresa Ingram MD at [address] [facility] NOSE/CLEFT LIP/TIP Bilateral 5/7/2018    BILATERAL L1-2 LUMBAR FORAMEN #1 performed by Teresa Ingram MD at [address] [facility] NOSE/CLEFT LIP/TIP Bilateral 6/4/2018    BILATERAL TRANSFORAMINAL EPIDURAL STEROID INJECTION UNDER FLUOROSCOPIC GUIDANCE @ FORAMINAL LEVEL L1-2 #2 performed by Teresa Ingram MD at [address] Right 9/30/2019    RIGHT SACRAL RADIOFREQUENCY ABLATION performed by Teresa Ingram MD at [address]  2000, 2005    Big Left Toe    TONSILLECTOMY      WISDOM TOOTH EXTRACTION         Past Medical:  Past Medical History:   Diagnosis Date    [facility] ([facility] 12/2019    LESION REMOVED UNDER TONGUE  DENTAL CLINIC    Chronic back pain     Costochondritis     Depression     Diet-controlled type 2 diabetes mellitus [facility]     Falls frequently     none recent as of 2/19/19    Fibromyalgia     Hallux rigidus of left foot     HTN (hypertension)     Hyperlipidemia     CLYDE on CPAP     SETTING ?    Osteoarthritis     Rheumatoid arthritis(714.0)     TIA (transient ischemic attack)     Use of cane as ambulatory aid        Current Co-morbidities:  [] Alzheimer's   [] Dysphasia     [] Parkinsonism  [] Amputation   [] GERD  [] Peripheral artery disease   [] Anemia      [] Encephalopathy  [] Peripheral vascular disease  [] Anxiety   [] Gangrene   [] Pneumonia  [] Aphasia   [] Gout    [] Polyneuropathy  [] Asthma   [] Heart Failure (diastolic) [] Post-polio syndrome  [] Atrial fibrillation  [] Heart Failure (left-sided) [] Pseudomonas enteritis   [] Blind    [] Heart Failure (right-sided) [] Pulmonary embolism  [] Cellulitis     [] Heart Failure (systolic) [] Renal dialysis  [] Clostridium difficile  [] Hemiparesis   [] Renal failure  [] Congestive heart failure [x] Hypertension   [x] Rheumatoid arthritis  [] COPD   [] Hypotension   [] Seizure disorder   [] Coronary Artery Disease [] Hypothyroidism  [] Septicemia   [] Deaf    [] Hyperlipidemia   [] Sleep apnea  [] Depression   [] Morbid obesity  [] Spinal cord injury  [x] Diabetes   [] MRSA   [] Stroke  [] Diabetic nephropathy  [] Myocardial infarction  [] Tracheostomy  [] Diabetic neuropathy  [x] Osteoarthritis  [] Traumatic brain injury   [] Diabetic retinopathy  [] Osteoporosis   [] Urinary tract infection  [] DVT    [] Pancytopenia  [] Vocal cord paralysis  []  Spinal stenosis   []  kidney disease [] VRE  [x] Post op lumbar fusion   [x] pain   [x] fibromyalgia       Medical/Functional Conditions requiring inpatient rehabilitation: Hypertension ( monitor BP), Diabetes ( monitor Blood sugar), OA,RA, post op lumbar fusion, fibromyalgia, back pain    Risk for Medical/Clinical Complications: hypo/hypertension, hypo/hyperglycemia, Falls, injury, pain, skin breakdown, abnormal vitals, abnormal labs, DVT, PE, pneumonia, decreased mobility, neuro changes     CLINICAL DATA:     Height : 5'5\"     Weight:  213#   BMI: 35.5       Date: 3/5/2020 Date: 3/6/2020 Date:    temperature 100.9 98.8    pulse 108 90    respirations 18 18    Blood pressure 117/67 115/66    Pulse oximeter 97% room air 95% room air       ALLERGIES: Pcn [penicillins]    DIET : DIET GENERAL;    Current Lab and Diagnostic Tests:   No results found for this or any previous visit (from the past 24 hour(s)).   Xr Chest Standard (2 Vw)  Result Date: 2/26/2020  No active process and no interim change     Additional labs or diagnostic studies needed before admission to rehabilitation unit:  None noted    Medications:   lidocaine  1 patch Transdermal Daily    atorvastatin  40 mg Oral Daily    FLUoxetine  40 mg Oral Daily    gabapentin  400 mg Oral BID    hydrochlorothiazide  12.5 mg Oral QAM    sodium chloride flush  10 mL Intravenous 2 times per day    vancomycin (VANCOCIN) IV  1,500 mg Intravenous Q12H    polyethylene glycol  17 g Oral Daily    bisacodyl  5 mg Oral Daily    sennosides-docusate sodium  1 tablet Oral BID    lisinopril  20 mg Oral QPM       ibuprofen, oxyCODONE, oxyCODONE, acetaminophen, cyclobenzaprine, hydrOXYzine, sodium chloride flush, promethazine **OR** ondansetron, morphine **OR** morphine, magnesium hydroxide, fleet    SPECIAL PRECAUTIONS: [] No current precautions  [] Cardiac  [] Renal [] Sternal [] Respiratory      [] Neurological           [] Hip  [x] Spinal [] Seizure  [] Aspiration  [] Isolation precautions:    [] Contact   [] Respiratory   [] Protective     [] Droplet    [x] Weight Bearing precautions:         [] Non Weight Bearing        [] Toe Touch Weight Bearing        [] Partial Weight Bearing        [x] Weight Bearing as Tolerated        [x] Fall Risk:   [] Recent history of falls [x] Falls risk level Sanders Anne Scale):high      [x] Bed Alarm    [x] Do not leave alone in the bathroom    [] Chair Alarm    [] Cognitive impairment      [] One to One supervision  [] Sitter / Jazmín Ly sitter   [] Safety enclosure bed  [] Decreased balance     SPECIAL REHABILITATION NEEDS:   [x] IV Therapy: [x] PRN Adapter  [] Midline  [] PICC      [] Central Line    [] TPN       [] Oxygen: [] Trach [] Bi-PAP [] CPAP  [] Nasal cannula  [] Liters:     [x] Wound Care:   [] Pressure ulcers(stage and location)    [] Wound vac   [x] Wound or incision care    [x] Pain Management (level of pain, meds): back pain ranges 10-4 on norco     [] Incontinence Bladder [] Hopper  Insertion date:   []Hemodialysis and  Frequency:   [] Incontinence Bowel    [] Last bowel movement :none noted    [] Substance use history:  [] Tobacco(quit 10/15/19)  [x] Alcohol  [] Other     [] Ethnic  [] Cultural  [] Spiritual  [] Language [] Needs  [] Other than English  [] Hearing Impaired  [] Visually Impaired  [] Speaking Impaired  [] Blind    [] Special equipment:  [] Devices/Splints  [] Type   [x] Brace   [x] Type - LSO  [] Bariatric bed  [] Extra wide commode  [] Extra wide wheelchair [] Extra wide walker  [] Jakub walker  [] Jakub wheelchair  [] Transfer lift    [] Other equipment     FUNCTIONAL STATUS PT / Yoandy Goss / Aftab Purcell:  FIM / EVAL Discipline Initial: 3/5/2020 Follow Up: 3/6/2020 Current:    Eating OT Set up  Set up     Grooming OT Moderate Assist  Stand by Assist     Bathing OT Dependent  Max Assist     Dressing Upper Extremity OT Dependent  Max Assist     Dressing Lower Extremity OT Dependent  Dependent     Toileting OT nt Minimum assistance     Toilet Transfers OT nt Minimum assistance     Tub/Shower Transfers OT nt nt    Homemaking OT nt nt    Bed Mobility PT Moderate Assist  Minimum assistance     Bed/Wheelchair Transfers PT Moderate Assist  Minimum assistance     Locomotion Walk / Wheelchair  Device:  Distance: PT 5' Wheeled walker Moderate Assist  45' Minimum assistance Wheeled walker     Endurance PT      Expression SP      Social Interaction SP      Problem Solving SP      Memory SP      Comprehension SP      Swallowing SP      Bowel Management NSG      Bladder Management NSG        Comments on Functional Status: pain is somewhat limiting.  Will benefit from 3 hours of acute rehab therapy daily , improve gait,transfers, self care to supervision    [x] Able to participate a minimum of 3 hours per day of therapy intervention    Required treatments/services: [x] Rehabilitation nursing [] Dietitian / nurtition                 [x] Case management  [] Respiratory Therapy      [x] Social work   [] Other     Required Therapy:  Therapy Hours per Day Days per Week Therapeutic Interventions Required   [x] Physical Therapy 1.5 5-7 Gait, transfers, Safety, strength, education, endurance    [x] Occupational Therapy 1.5 5-7 ADL's, Safety, strength, education, endurance    [] Speech Pathology      [] Prosthetics / Orthotics       []         Anticipated Discharge Plan:   Anticipated DME Needs:  [x] Home     [] Commode   [] Alone    [] Wheelchair   [x] Supervised    [] Walker   [] Assist    [] Oxygen        [] Hospital Bed  [] Assisted Living    [] Ramp        [x] To Be Determined      Anticipated Home Health Services:  Anticipated Outpatient Services:  [] PT       [] PT  [] OT      [] OT  [] Speech     [] Speech  [] Nursing     [] Dialysis  [] Aide      [x] To Be Determined  [x] To Be Determined    Anticipated support group:  [] Amputation  [] Multiple Sclerosis  [] Stroke  [] Brain Injury  [] Spinal cord injury  [] Other     Barriers to discharge: none noted    Discharge Support: [] Patient lives alone and does not have a caregiver available     [x] Patient has a caregiver available     [] Discharge plan has been verified with patient's caregiver      [] Caregiver is in agreement with the discharge plan     Expected functional status for safe discharge: supervision    Patient/support person goals: to go home and have pain relieved    Expected length of stay: 7-10 days    Discussed expected length of stay and agreeable to IRF plan: [x] Yes   [] No    Impairment Group Category: 8.9    Etiological Diagnosis: Lumbar radiculopathy    Primary Rehabilitation Diagnosis: Lumbar radiculopathy      Electronically signed by Vinicius Au RN on 3/6/2020 at 12:45 PM    Prescreen completed __________________________________ (signature of prescreener)    Date:   3/6/2020 Time:  36      JUSTIFICATION FOR ADMISSION TO ACUTE REHABILITATION:  Patient has suffered decline in functional abilities for gait, transfers,   ADL's and IADL's as well as endurance. Patient has functional deficits requiring intensive therapy across multiple disciplines in order to return home safely. Patient will need physician oversight for respiratory issues, abnormal vital signs, nutritional and hydration status, safety issues, medications and therapy modalities. PT, OT will work on deficits as noted in evaluations. Case management and social work will provide services for DME and management of a safe discharge home.      RECOMMEND LEVEL OF CARE  Recommend inpatient rehabilitation: [x] Yes   [] No  If no indicate reason:    [] Functional level too high  [] Unmotivated  [] No insurance carrier approval [] Unlikely to return to community  [] No medical necessity  [] Patient or family chose other facility  [] Too medically complex  [] Inadequate discharge plan  [] Rehabilitation bed unavailable [] Functional level too low  [] patient or family refused ARU    If patient not accepted for IRF admission, recommended level of care:  [] Subacute Rehabilitation Facility  [] [address]  [] [facility]   [] [facility]  [] Other      [] LTAC       Physician Assigned:  [] Dr. Max Mor         [x] Dr. Lanney John              [] Dr. Dl Mcardle [] Dr. Dada Frater:    ____________________________________________________________________  ____________________________________________________________________  ____________________________________________________________________  ____________________________________________________________________  ____________________________________________________________________      Physician Signature:_____________________________________    Print Signature:_________________________________________    Date:   3/6/2020 Time:    1446

## 2020-03-05 NOTE — PROGRESS NOTES
Physical Therapy    Physical Therapy Initial Assessment     Name: Nima Shafer  : 1955  MRN: [id number]    Referring Provider:  Kelvin Garduno MD    Date of Service: 3/5/2020    Evaluating PT:  Jonah Torres PT, DPT PT [id number]    Room #:  1075/7004-L  Diagnosis:  PRIOR FUSION, ADJACENT SEGMENT STENOSIS  PMHx/PSHx:  Fibromyalgia, Chronic Back Pain, Rheumatoid Arthritis, HTN, TIA, Depression  History of spine surgeries and L ankle surgeries    Procedure/Surgery:  EXPLORATION OF PRIOR L3-L5 FUSION AND L2-L3  POSTERIOR LUMBAR INTERBODY 3/5/2020  Precautions: Falls, Spine, LSO, Hemovac, Alarm  Equipment Needs:  [employer]    SUBJECTIVE:     Pt lives with sister and son in a 2 story home with 1+2 stairs to enter and no rail. Bed is on 2nd floor and bath is on 2nd floor. Pt has flight of stairs to 2nd floor with single rail (partial). Pt ambulated with [employer] [place] independently PTA. +   Equipment Owned:  Small Based [facility]    OBJECTIVE:   Initial Evaluation  Date: 3/5/2020 Treatment Short Term/ Long Term   Goals   AM-PAC 6 Clicks 33/24     Was pt agreeable to Eval/treatment? Yes     Does pt have pain? /10 Low back  R hip     Bed Mobility  Rolling: Júnior  Supine to sit: ModA  Sit to supine: NT  Scooting: ModA  Rolling: Independent  Supine to sit:  Independent  Sit to supine: Independent  Scooting: Independent     Transfers Sit to stand: ModA  Stand to sit: ModA  Stand pivot: ModA [employer]  Sit to stand: Modified Independent  Stand to sit: Modified Independent  Stand pivot: Modified Independent  [employer]   Ambulation    5 feet with ModA [employer]  >150 feet with Modified Independent  [employer]   Stair negotiation: ascended and descended  NT  >4 steps with no rail Modified Independent  AAD   ROM BUE:  See OT Note  BLE:  WFL     Strength BUE:  See OT Note  RLE: 4/5 grossly  LLE: 4/5 grossly  Improve Strength 1/3 MMT Grade   Balance Sitting EOB:  Júnior  Dynamic Standing:  ModA [employer]  Sitting EOB:  Independent    Dynamic Standing:  Modified Independent  [employer] Pt is A & O x 4  Sensation:  Pt denies numbness and tingling to extremities  Edema: WNL    Vitals:    Spo2 95 %  PRE    Spo2 97% At EOB during brief hyperventilation/anxious episode    Spo2 95%  POST      Patient education  Pt educated on role of PT    Patient response to education:   Pt verbalized understanding Pt demonstrated skill Pt requires further education in this area   x x x     ASSESSMENT:    Comments:  Pt received in supine agreeable to PT evaluation. Pt educated on spinal precautions. Pt required assistance to roll and assistance to don LSO. Pt requiring assistance with trunk and BLEs to perform supine to sit via log roll. Pt sitting statically with intermittent assistance. Pt tolerating ~3 min at edge of bed, then began hyperventilating stating she can't breathe due to brace. PT checked LSO brace, assured pt it is fighting proper in position and tightness. Pt spo2 97% at this time, pt educated on pursed lip breathing, hyperventilation and anxiety resolved ~ 1 min. Pt performed sit to stand transfer with assistance. Pt ambulated from bed to chair with decreased speed, step length, and step height bilaterally. RN updated, notified patient requesting pain medication, RN in to room to administer medication. Pt seated in chair approximately 10 min before pt requesting PT. Pt asking physical therapy to remove or loosen brace while she is in bedside chair. PT informing patient that the brace is fitting properly. PT offering to assist patient to transfer back to bed, due to inability to tolerate seated position. Pt declining to transfer back to bed, perseverating that she would like a different brace. Pt informed patient that the neurosurgeon selects the brace, and pt is to wear it anytime she is beyond 45 degrees until otherwise specified. Pt states she will sit in bedside chair and attempt to allow pain medications to take effect.  Patient informed if she is unable to tolerate chair, to use call bell and RN staff or PT will assist her. Pt understanding of this. Patient left in bedside chair with all needs met and call bell in reach, chair alarm activated. Treatment:  Patient practiced and was instructed in the following treatment:     Bed mobility- verbal cues to perform bed mobility via log roll   Spine precautions/donning of LSO.  Functional transfers-Verbal cues for proper positioning and sequencing to perform transfers safely with maximum independence.  Gait training- Verbal cues for proper positioning and sequencing using assistive device to maximize functional mobility independence. Pt's/ family goals   1. Get better    Patient and or family understand(s) diagnosis, prognosis, and plan of care. Yes    PLAN:    PT care will be provided in accordance with the objectives noted above. Exercises and functional mobility practice will be used as well as appropriate assistive devices or modalities to obtain goals. Patient and family education will also be administered as needed. Frequency of treatments: 5-7x/week x 1-2 weeks. Time in 1000  Time out  1033    Total Treatment Time  25 minutes     Evaluation Time includes thorough review of current medical information, gathering information on past medical history/social history and prior level of function, completion of standardized testing/informal observation of tasks, assessment of data and education on plan of care and goals.     CPT codes:  [] Low Complexity PT evaluation 39448  [x] Moderate Complexity PT evaluation 14746  [] High Complexity PT evaluation 81334  [] PT Re-evaluation 74686  [] Gait training 14061 0 minutes  [] Manual therapy 22497 0 minutes  [x] Therapeutic activities 81709 25 minutes  [] Therapeutic exercises 47598 0 minutes  [] Neuromuscular reeducation 32217 0 minutes       Daphney Macdonald PT, DPT   [id number]

## 2020-03-05 NOTE — PROGRESS NOTES
Hospitalist Progress Note      PCP: Luann Zaragoza MD    Date of Admission: 3/4/2020    Chief Complaint: Back pain    Hospital Course: Patient was admitted yesterday for PLIF with Dr. Gloria Botello. Subjective: She is complaining of increased pain level 9/10 of the back and right hip. She states that she has been having right hip pain for a very long time but it is worse since surgery. It is described as starting in her back and wrapping around to the right hip and radiating into her groin. She sees pain management. She was up in a chair and could only sit for a 1/2 hour due to increased pain. She said Dr. Gloria Botello saw her earlier this morning and is going to increase/adjust her pain medications. Also complaining of a sore, scratchy throat today most likely from intubation. Denies any other complaints at this time. Medications:  Reviewed    Infusion Medications   Scheduled Medications    atorvastatin  40 mg Oral Daily    FLUoxetine  40 mg Oral Daily    gabapentin  400 mg Oral BID    hydrochlorothiazide  12.5 mg Oral QAM    sodium chloride flush  10 mL Intravenous 2 times per day    vancomycin (VANCOCIN) IV  1,500 mg Intravenous Q12H    polyethylene glycol  17 g Oral Daily    bisacodyl  5 mg Oral Daily    sennosides-docusate sodium  1 tablet Oral BID    lisinopril  20 mg Oral QPM     PRN Meds: cyclobenzaprine, HYDROcodone-acetaminophen, hydrOXYzine, sodium chloride flush, promethazine **OR** ondansetron, morphine **OR** morphine, magnesium hydroxide, fleet      Intake/Output Summary (Last 24 hours) at 3/5/2020 1137  Last data filed at 3/5/2020 1053  Gross per 24 hour   Intake 540 ml   Output 3606 ml   Net -3066 ml       Exam:    /63   Pulse 107   Temp 99.6 °F (37.6 °C) (Temporal)   Resp 20   Ht 5' 5\" (1.651 m)   Wt 213 lb (96.6 kg)   LMP  (LMP Unknown)   SpO2 93%   BMI 35.45 kg/m²     General appearance: No apparent distress, appears stated age and cooperative.   HEENT: Pupils

## 2020-03-05 NOTE — CARE COORDINATION
3/5/2020 Care Coordination: Met with patient to discuss discharge planning. Pt states she has a hx of SEYZ ARU 9/2017. She has a shower chair and cane at home. States she has no insurance. Voicemail message left for [employer] to see patient. PM&R order obtained. SW/CM will follow.  Jojo Valenzuela RN CM

## 2020-03-05 NOTE — PROGRESS NOTES
Department of Neurosurgery  Progress Note    CHIEF COMPLAINT: Exploration L3-4 fusion; L2-L3 PLIF 3/4    SUBJECTIVE:  C/o op site pain. Hemovac drain removed. Up in chair with PT/OT. No new issues overnight. REVIEW OF SYSTEMS :  Constitutional: Negative for chills and fever. Neurological: Negative for dizziness, tremors and speech change. OBJECTIVE:   VITALS:  /67   Pulse 108   Temp 100.9 °F (38.3 °C) (Temporal)   Resp 18   Ht 5' 5\" (1.651 m)   Wt 213 lb (96.6 kg)   LMP  (LMP Unknown)   SpO2 93%   BMI 35.45 kg/m²     PHYSICAL:  Constitutional: Appears well-nourished. Head: Normocephalic and atraumatic. Eyes: EOM are normal. Pupils are equal, round, and reactive to light. Neck: Normal range of motion. No tracheal deviation present. Cardiovascular: Normal rate. Pulmonary/Chest: No stridor. Abdominal: No distension. Neurological:   Alert and oriented x3  Face symmetric  Moving all extremities  Sensation intact to light touch   Incision c/d/i  Skin: Skin is warm and dry.    Psychiatric: Thought content normal.       DATA:  CBC:   Lab Results   Component Value Date    WBC 9.0 02/26/2020    RBC 4.10 02/26/2020    HGB 13.4 02/26/2020    HCT 40.2 02/26/2020    MCV 98.0 02/26/2020    MCH 32.7 02/26/2020    MCHC 33.3 02/26/2020    RDW 12.1 02/26/2020     02/26/2020    MPV 10.8 02/26/2020     BMP:    Lab Results   Component Value Date     02/26/2020    K 4.3 02/26/2020     02/26/2020    CO2 25 02/26/2020    BUN 10 02/26/2020    LABALBU 4.5 09/18/2019    CREATININE 0.8 02/26/2020    CALCIUM 9.0 02/26/2020    GFRAA >60 02/26/2020    LABGLOM >60 02/26/2020    GLUCOSE 187 02/26/2020     PT/INR:    Lab Results   Component Value Date    PROTIME 10.5 02/26/2020    INR 0.9 02/26/2020     PTT:    Lab Results   Component Value Date    APTT 27.6 03/22/2019   [APTT}    Current Inpatient Medications  Current Facility-Administered Medications: oxyCODONE (ROXICODONE) immediate release

## 2020-03-05 NOTE — PROGRESS NOTES
FOOT / ANKLE Left 12/14/2018    REMOVAL OF PAINFUL HARDWARE LEFT FOOT  ++SYNTHES++ performed by Aisha Gallego DPM at [facility]    inguinal     LUMBAR SPINE SURGERY N/A 3/4/2020    EXPLORATION OF PRIOR L3-L5 FUSION AND L2-L3  POSTERIOR LUMBAR INTERBODY FUSION -- NEEDS O-ARM, AUDIOLOGY, CAGES, PLATES, SCREWS, C-ARM, TERESA TABLE, CELL SAVER, PLATELET GEL -- GLOBUS performed by Jason Collier MD at [address] [facility] Bilateral 05/07/2018    L1-2 lumbar foramen #1    NERVE BLOCK Bilateral 12/03/2018    s1 tfesi    NERVE BLOCK Bilateral 08/12/2019    NERVE BLOCK Right 09/30/2019    sacral radiofrequency    OTHER SURGICAL HISTORY Left 9/25/13    left foot tarso metatarsal joint injection    OTHER SURGICAL HISTORY Left 5/27/15    Endoscopic Gastroc recession left, Lapidus left foot and  Excision of exostosis left foot    ND INJECT ANES/STEROID FORAMEN LUMBAR/SACRAL W IMG GUIDE ,1 LEVEL Bilateral 12/3/2018    BILATERAL S1  EPIDURAL STEROID INJECTION performed by Rufus Snell MD at [address] [facility] DX/THER SBST INTRLMNR LMBR/SAC W/IMG GDN N/A 8/21/2018    EPIDURAL STEROID INJECTION L1-2 WITH LOW VOL performed by Rufus Snell MD at [address] [facility] NOSE/CLEFT LIP/TIP Bilateral 5/7/2018    BILATERAL L1-2 LUMBAR FORAMEN #1 performed by Rufus Snell MD at [address] [facility] NOSE/CLEFT LIP/TIP Bilateral 6/4/2018    BILATERAL TRANSFORAMINAL EPIDURAL STEROID INJECTION UNDER FLUOROSCOPIC GUIDANCE @ FORAMINAL LEVEL L1-2 #2 performed by Rufus Snell MD at [address] Right 9/30/2019    RIGHT SACRAL RADIOFREQUENCY ABLATION performed by Rufus Snell MD at [address] [facility]  2000, 2005    Big Left Toe    TONSILLECTOMY      WISDOM TOOTH EXTRACTION        Pertinent Medical History:   Past Medical History:   Diagnosis Date    Cancer ([address] 12/2019    LESION REMOVED UNDER TONGUE  DENTAL CLINIC    Chronic back pain     Costochondritis     Depression     Diet-controlled type 2 diabetes mellitus ([facility]     Falls frequently     none recent as of 2/19/19    Fibromyalgia     Hallux rigidus of left foot     HTN (hypertension)     Hyperlipidemia     CLYDE on CPAP     SETTING ?    Osteoarthritis     Rheumatoid arthritis(714.0)     TIA (transient ischemic attack)     Use of cane as ambulatory aid       Precautions:  Falls, neutral spine (no B,L,T), LSO brace, hemovac, veliz, chair alarm     Home Living: Pt lives with sister & son (alone during the day) in a 2 story home with 2+1 steps to enter and no HR. B & B on 2nd, 1/2 bath on 1st.  Bathroom setup: tub/shower, regular toilet   Equipment owned: small base quad cane    Prior Level of Function: Pt. States Ind. with ADLs , occasional assist from family (laundry) with IADLs; ambulated sm. base q-cane  Driving: active  Occupation: retired    Pain Level: 9/10 back pain, R groin, coughing. RN aware  Cognition: A&O: 4/4; Follows 1 step directions. Easily distracted, focuses on pain   Memory:  fair   Sequencing:  Fair-   Problem solving:  Fair-   Judgement/safety:  Fair-     Functional Assessment:   Initial Eval Status  Date: 3-5-2020 Treatment Status  Date: Short Term Goals = Long Term Goals  Treatment frequency: 3-5 days   Feeding s/u  Mod I    Grooming Moderate Assist   Supervision    UB Dressing Dependent with donning of LSO, gown  Supine/ log rolling in bed. Supervision    LB Dressing Dependent   Stand by Assist with A.E. Bathing Maximal Assist/Dep  Stand by Assist    Toileting NT/ veliz  Supervision    Bed Mobility  Log roll: Min A  Supine to sit: Moderate Assist   Sit to supine:NT  Supine to sit: Supervision   Sit to supine: Supervision    Functional Transfers Moderate Assist with sit <> stand, SPT using ww  Supervision    Functional Mobility Moderate Assist with ww few steps towards bedside chair  Supervision    Balance Sitting:     Static:  SBA    Dynamic:Min A  Standing:  Mod prior level of function, completion of standardized testing/informal observation of tasks, assessment of data, consultation with other medical professions/disciplines, and development of POC/Goals.)      Assessment of current deficits   Functional mobility [x]  ADLs [x] Strength [x]  Cognition []  Functional transfers  [x] IADLs [x] Safety Awareness [x]  Endurance [x]  Fine Motor Coordination [] Balance [x] Vision/perception [] Sensation []   Gross Motor Coordination [] ROM [] Delirium []                  Motor Control []    Plan of Care: OT for 2-5 x/wk. ADL retraining [x]   Equipment needs [x]   Neuromuscular re-education [x] Energy Conservation Techniques [x]  Functional Transfer training [x] Patient and/or Family Education [x]  Functional Mobility training [x]  Environmental Modifications [x]  Cognitive re-training []   Compensatory techniques for ADLs [x]  Splinting Needs []   Positioning to improve overall function [x]   Therapeutic Activity [x]  Therapeutic Exercise  [x]  Visual/Perceptual: []    Delirium prevention/treatment  []   Other:  []    Rehab Potential: Good for established goals     Patient / Family Goal: to have pain better controlled    Patient and/or family were instructed diagnosis, prognosis/goals and plan of care. Demonstrated fair understanding. [] Malnutrition indicators have been identified and nursing has been notified to ensure a dietitian consult is ordered. low Evaluation + 30    Treatment Time In:10:00            Treatment Time Out: 10:30               Treatment Charges: Mins Units   Ther Ex  44627     Manual Therapy 55886     Thera Activities 73462 10    ADL/Home Mgt 47020 20    Neuro Re-ed 87807     Group Therapy      Orthotic manage/training  16200     Non-Billable Time     Total Timed Treatment 30 2           KEANU Jordan/L   License #  [id number]

## 2020-03-06 ENCOUNTER — HOSPITAL ENCOUNTER (INPATIENT)
Age: 65
LOS: 13 days | Discharge: HOME | DRG: 560 | End: 2020-03-19
Attending: PHYSICAL MEDICINE & REHABILITATION | Admitting: PHYSICAL MEDICINE & REHABILITATION

## 2020-03-06 VITALS
DIASTOLIC BLOOD PRESSURE: 69 MMHG | BODY MASS INDEX: 35.49 KG/M2 | OXYGEN SATURATION: 94 % | HEIGHT: 65 IN | HEART RATE: 108 BPM | SYSTOLIC BLOOD PRESSURE: 143 MMHG | WEIGHT: 213 LBS | TEMPERATURE: 99.8 F | RESPIRATION RATE: 19 BRPM

## 2020-03-06 PROBLEM — Z98.1 S/P LUMBAR SPINAL FUSION: Status: ACTIVE | Noted: 2020-03-06

## 2020-03-06 LAB
ANION GAP SERPL CALCULATED.3IONS-SCNC: 12 MMOL/L (ref 7–16)
BUN BLDV-MCNC: 19 MG/DL (ref 8–23)
CALCIUM SERPL-MCNC: 8.2 MG/DL (ref 8.6–10.2)
CHLORIDE BLD-SCNC: 94 MMOL/L (ref 98–107)
CO2: 22 MMOL/L (ref 22–29)
CREAT SERPL-MCNC: 0.9 MG/DL (ref 0.5–1)
GFR AFRICAN AMERICAN: >60
GFR NON-AFRICAN AMERICAN: >60 ML/MIN/1.73
GLUCOSE BLD-MCNC: 236 MG/DL (ref 74–99)
POTASSIUM SERPL-SCNC: 4.4 MMOL/L (ref 3.5–5)
SODIUM BLD-SCNC: 128 MMOL/L (ref 132–146)

## 2020-03-06 PROCEDURE — 6370000000 HC RX 637 (ALT 250 FOR IP): Performed by: NURSE PRACTITIONER

## 2020-03-06 PROCEDURE — 6370000000 HC RX 637 (ALT 250 FOR IP): Performed by: NEUROLOGICAL SURGERY

## 2020-03-06 PROCEDURE — 97535 SELF CARE MNGMENT TRAINING: CPT

## 2020-03-06 PROCEDURE — 80048 BASIC METABOLIC PNL TOTAL CA: CPT

## 2020-03-06 PROCEDURE — L0627 LO SAG RI AN/POS PNL PRE CST: HCPCS

## 2020-03-06 PROCEDURE — 6370000000 HC RX 637 (ALT 250 FOR IP): Performed by: PHYSICIAN ASSISTANT

## 2020-03-06 PROCEDURE — 6360000002 HC RX W HCPCS: Performed by: NEUROLOGICAL SURGERY

## 2020-03-06 PROCEDURE — 97530 THERAPEUTIC ACTIVITIES: CPT

## 2020-03-06 PROCEDURE — 2580000003 HC RX 258: Performed by: NEUROLOGICAL SURGERY

## 2020-03-06 PROCEDURE — 36415 COLL VENOUS BLD VENIPUNCTURE: CPT

## 2020-03-06 PROCEDURE — 1280000000 HC REHAB R&B

## 2020-03-06 RX ORDER — HYDROCHLOROTHIAZIDE 12.5 MG/1
12.5 TABLET ORAL EVERY MORNING
Status: DISCONTINUED | OUTPATIENT
Start: 2020-03-07 | End: 2020-03-09

## 2020-03-06 RX ORDER — LISINOPRIL 20 MG/1
20 TABLET ORAL EVERY EVENING
Qty: 30 TABLET | Refills: 3 | Status: SHIPPED
Start: 2020-03-06 | End: 2020-06-12 | Stop reason: SDUPTHER

## 2020-03-06 RX ORDER — ACETAMINOPHEN 325 MG/1
650 TABLET ORAL EVERY 4 HOURS PRN
Status: DISCONTINUED | OUTPATIENT
Start: 2020-03-06 | End: 2020-03-19 | Stop reason: HOSPADM

## 2020-03-06 RX ORDER — ACETAMINOPHEN 325 MG/1
650 TABLET ORAL EVERY 4 HOURS PRN
Status: DISCONTINUED | OUTPATIENT
Start: 2020-03-06 | End: 2020-03-06 | Stop reason: SDUPTHER

## 2020-03-06 RX ORDER — OXYCODONE HYDROCHLORIDE 5 MG/1
5 TABLET ORAL EVERY 4 HOURS PRN
Qty: 42 TABLET | Refills: 0 | Status: ON HOLD
Start: 2020-03-06 | End: 2020-03-19 | Stop reason: HOSPADM

## 2020-03-06 RX ORDER — POLYETHYLENE GLYCOL 3350 17 G/17G
17 POWDER, FOR SOLUTION ORAL DAILY PRN
Status: DISCONTINUED | OUTPATIENT
Start: 2020-03-06 | End: 2020-03-19 | Stop reason: HOSPADM

## 2020-03-06 RX ORDER — LISINOPRIL 20 MG/1
20 TABLET ORAL EVERY EVENING
Status: DISCONTINUED | OUTPATIENT
Start: 2020-03-07 | End: 2020-03-19 | Stop reason: HOSPADM

## 2020-03-06 RX ORDER — ATORVASTATIN CALCIUM 40 MG/1
40 TABLET, FILM COATED ORAL DAILY
Status: DISCONTINUED | OUTPATIENT
Start: 2020-03-06 | End: 2020-03-19 | Stop reason: HOSPADM

## 2020-03-06 RX ORDER — ACETAMINOPHEN 325 MG/1
650 TABLET ORAL EVERY 4 HOURS PRN
Status: CANCELLED | OUTPATIENT
Start: 2020-03-06

## 2020-03-06 RX ORDER — POLYETHYLENE GLYCOL 3350 17 G/17G
17 POWDER, FOR SOLUTION ORAL DAILY PRN
Status: CANCELLED | OUTPATIENT
Start: 2020-03-06

## 2020-03-06 RX ORDER — OXYCODONE HYDROCHLORIDE 5 MG/1
5 TABLET ORAL EVERY 4 HOURS PRN
Status: DISCONTINUED | OUTPATIENT
Start: 2020-03-06 | End: 2020-03-09

## 2020-03-06 RX ORDER — SODIUM PHOSPHATE, DIBASIC AND SODIUM PHOSPHATE, MONOBASIC 7; 19 G/133ML; G/133ML
1 ENEMA RECTAL DAILY PRN
Status: CANCELLED | OUTPATIENT
Start: 2020-03-06

## 2020-03-06 RX ORDER — GABAPENTIN 400 MG/1
400 CAPSULE ORAL 2 TIMES DAILY
Status: CANCELLED | OUTPATIENT
Start: 2020-03-06

## 2020-03-06 RX ORDER — CYCLOBENZAPRINE HCL 10 MG
10 TABLET ORAL 3 TIMES DAILY PRN
Qty: 40 TABLET | Refills: 0 | Status: ON HOLD
Start: 2020-03-06 | End: 2020-03-19 | Stop reason: HOSPADM

## 2020-03-06 RX ORDER — HYDROXYZINE PAMOATE 25 MG/1
25 CAPSULE ORAL 3 TIMES DAILY PRN
Status: DISCONTINUED | OUTPATIENT
Start: 2020-03-06 | End: 2020-03-19 | Stop reason: HOSPADM

## 2020-03-06 RX ORDER — LISINOPRIL 20 MG/1
20 TABLET ORAL EVERY EVENING
Status: CANCELLED | OUTPATIENT
Start: 2020-03-06

## 2020-03-06 RX ORDER — GABAPENTIN 400 MG/1
400 CAPSULE ORAL 2 TIMES DAILY
Status: DISCONTINUED | OUTPATIENT
Start: 2020-03-06 | End: 2020-03-10

## 2020-03-06 RX ORDER — HYDROXYZINE PAMOATE 25 MG/1
25 CAPSULE ORAL 3 TIMES DAILY PRN
Status: CANCELLED | OUTPATIENT
Start: 2020-03-06

## 2020-03-06 RX ORDER — IBUPROFEN 400 MG/1
400 TABLET ORAL EVERY 6 HOURS PRN
Status: DISCONTINUED | OUTPATIENT
Start: 2020-03-06 | End: 2020-03-06 | Stop reason: HOSPADM

## 2020-03-06 RX ORDER — HYDROCHLOROTHIAZIDE 12.5 MG/1
12.5 TABLET ORAL EVERY MORNING
Status: CANCELLED | OUTPATIENT
Start: 2020-03-07

## 2020-03-06 RX ORDER — ATORVASTATIN CALCIUM 40 MG/1
40 TABLET, FILM COATED ORAL DAILY
Status: CANCELLED | OUTPATIENT
Start: 2020-03-06

## 2020-03-06 RX ORDER — LIDOCAINE 4 G/G
1 PATCH TOPICAL DAILY
Status: DISCONTINUED | OUTPATIENT
Start: 2020-03-06 | End: 2020-03-06 | Stop reason: HOSPADM

## 2020-03-06 RX ORDER — FLUOXETINE HYDROCHLORIDE 20 MG/1
40 CAPSULE ORAL DAILY
Status: CANCELLED | OUTPATIENT
Start: 2020-03-07

## 2020-03-06 RX ORDER — FLUOXETINE HYDROCHLORIDE 20 MG/1
40 CAPSULE ORAL DAILY
Status: DISCONTINUED | OUTPATIENT
Start: 2020-03-07 | End: 2020-03-19 | Stop reason: HOSPADM

## 2020-03-06 RX ORDER — CYCLOBENZAPRINE HCL 10 MG
10 TABLET ORAL 3 TIMES DAILY PRN
Status: CANCELLED | OUTPATIENT
Start: 2020-03-06

## 2020-03-06 RX ORDER — CYCLOBENZAPRINE HCL 10 MG
10 TABLET ORAL 3 TIMES DAILY PRN
Status: DISCONTINUED | OUTPATIENT
Start: 2020-03-06 | End: 2020-03-11

## 2020-03-06 RX ORDER — OXYCODONE HYDROCHLORIDE 5 MG/1
5 TABLET ORAL EVERY 4 HOURS PRN
Status: CANCELLED | OUTPATIENT
Start: 2020-03-06

## 2020-03-06 RX ORDER — SODIUM PHOSPHATE, DIBASIC AND SODIUM PHOSPHATE, MONOBASIC 7; 19 G/133ML; G/133ML
1 ENEMA RECTAL DAILY PRN
Status: DISCONTINUED | OUTPATIENT
Start: 2020-03-06 | End: 2020-03-19 | Stop reason: HOSPADM

## 2020-03-06 RX ADMIN — FLUOXETINE HYDROCHLORIDE 40 MG: 20 CAPSULE ORAL at 08:26

## 2020-03-06 RX ADMIN — OXYCODONE HYDROCHLORIDE 10 MG: 10 TABLET ORAL at 01:06

## 2020-03-06 RX ADMIN — HYDROCHLOROTHIAZIDE 12.5 MG: 12.5 TABLET ORAL at 08:26

## 2020-03-06 RX ADMIN — OXYCODONE HYDROCHLORIDE 10 MG: 10 TABLET ORAL at 16:30

## 2020-03-06 RX ADMIN — CYCLOBENZAPRINE 10 MG: 10 TABLET, FILM COATED ORAL at 08:27

## 2020-03-06 RX ADMIN — HYDROXYZINE PAMOATE 25 MG: 25 CAPSULE ORAL at 03:01

## 2020-03-06 RX ADMIN — GABAPENTIN 400 MG: 400 CAPSULE ORAL at 21:23

## 2020-03-06 RX ADMIN — CYCLOBENZAPRINE 10 MG: 10 TABLET, FILM COATED ORAL at 03:01

## 2020-03-06 RX ADMIN — BISACODYL 5 MG: 5 TABLET, COATED ORAL at 08:27

## 2020-03-06 RX ADMIN — OXYCODONE HYDROCHLORIDE 10 MG: 10 TABLET ORAL at 07:23

## 2020-03-06 RX ADMIN — OXYCODONE HYDROCHLORIDE 5 MG: 5 TABLET ORAL at 21:45

## 2020-03-06 RX ADMIN — SENNOSIDES AND DOCUSATE SODIUM 1 TABLET: 8.6; 5 TABLET ORAL at 08:27

## 2020-03-06 RX ADMIN — GABAPENTIN 400 MG: 400 CAPSULE ORAL at 08:27

## 2020-03-06 RX ADMIN — POLYETHYLENE GLYCOL 3350 17 G: 17 POWDER, FOR SOLUTION ORAL at 08:27

## 2020-03-06 RX ADMIN — LISINOPRIL 20 MG: 20 TABLET ORAL at 16:29

## 2020-03-06 RX ADMIN — DEXTROSE MONOHYDRATE 1.5 G: 50 INJECTION, SOLUTION INTRAVENOUS at 07:02

## 2020-03-06 RX ADMIN — HYDROXYZINE PAMOATE 25 MG: 25 CAPSULE ORAL at 16:29

## 2020-03-06 RX ADMIN — MORPHINE SULFATE 4 MG: 4 INJECTION, SOLUTION INTRAMUSCULAR; INTRAVENOUS at 03:02

## 2020-03-06 RX ADMIN — SODIUM CHLORIDE, PRESERVATIVE FREE 10 ML: 5 INJECTION INTRAVENOUS at 07:04

## 2020-03-06 RX ADMIN — ATORVASTATIN CALCIUM 40 MG: 40 TABLET, FILM COATED ORAL at 21:19

## 2020-03-06 ASSESSMENT — PAIN DESCRIPTION - LOCATION
LOCATION: BACK
LOCATION: BACK;INCISION;HIP
LOCATION: BACK
LOCATION: BACK

## 2020-03-06 ASSESSMENT — PAIN DESCRIPTION - DESCRIPTORS
DESCRIPTORS: ACHING;DISCOMFORT
DESCRIPTORS: RADIATING;ACHING;THROBBING
DESCRIPTORS: ACHING;DISCOMFORT
DESCRIPTORS: ACHING;SORE;DISCOMFORT
DESCRIPTORS: ACHING;DISCOMFORT

## 2020-03-06 ASSESSMENT — PAIN SCALES - GENERAL
PAINLEVEL_OUTOF10: 10
PAINLEVEL_OUTOF10: 7
PAINLEVEL_OUTOF10: 8
PAINLEVEL_OUTOF10: 10
PAINLEVEL_OUTOF10: 9
PAINLEVEL_OUTOF10: 10
PAINLEVEL_OUTOF10: 8
PAINLEVEL_OUTOF10: 9

## 2020-03-06 ASSESSMENT — PAIN DESCRIPTION - PAIN TYPE
TYPE: SURGICAL PAIN

## 2020-03-06 ASSESSMENT — PAIN DESCRIPTION - FREQUENCY: FREQUENCY: CONTINUOUS

## 2020-03-06 NOTE — PROGRESS NOTES
Hospitalist Progress Note      PCP: Greg Zamora MD    Date of Admission: 3/4/2020    Chief Complaint: Back pain    Hospital Course: Patient was admitted yesterday for PLIF with Dr. Jono Vanegas. Subjective: She is complaining of increased pain level 9/10 of the back and right hip. She states that she has been having right hip pain for a very long time but it is worse since surgery. It is described as starting in her back and wrapping around to the right hip and radiating into her groin. She sees pain management. She was up in a chair and could only sit for a 1/2 hour due to increased pain. She said Dr. Jono Vanegas saw her earlier this morning and is going to increase/adjust her pain medications. Also complaining of a sore, scratchy throat today most likely from intubation. Denies any other complaints at this time.         Medications:  Reviewed    Infusion Medications   Scheduled Medications    lidocaine  1 patch Transdermal Daily    atorvastatin  40 mg Oral Daily    FLUoxetine  40 mg Oral Daily    gabapentin  400 mg Oral BID    hydrochlorothiazide  12.5 mg Oral QAM    sodium chloride flush  10 mL Intravenous 2 times per day    vancomycin (VANCOCIN) IV  1,500 mg Intravenous Q12H    polyethylene glycol  17 g Oral Daily    bisacodyl  5 mg Oral Daily    sennosides-docusate sodium  1 tablet Oral BID    lisinopril  20 mg Oral QPM     PRN Meds: ibuprofen, oxyCODONE, oxyCODONE, acetaminophen, cyclobenzaprine, hydrOXYzine, sodium chloride flush, promethazine **OR** ondansetron, morphine **OR** morphine, magnesium hydroxide, fleet      Intake/Output Summary (Last 24 hours) at 3/6/2020 1106  Last data filed at 3/6/2020 0830  Gross per 24 hour   Intake 970 ml   Output 90 ml   Net 880 ml       Exam:    /88   Pulse 95   Temp 98.9 °F (37.2 °C) (Temporal)   Resp 16   Ht 5' 5\" (1.651 m)   Wt 213 lb (96.6 kg)   LMP  (LMP Unknown)   SpO2 95%   BMI 35.45 kg/m²     General appearance: No apparent

## 2020-03-06 NOTE — LETTER
PORTABLE PATIENT PROFILE  Milka Chavez  [id number]    MEDICAL DIAGNOSIS/CONDITION:   Patient Active Problem List   Diagnosis    Loss of balance    Vertebrobasilar TIAs    Obesity    Disc displacement, lumbar    Lumbar radiculopathy    Peripheral neuropathy (HCC)    Type 2 diabetes mellitus without complication (HCC)    Depression    Osteoarthritis    Chronic back pain    Fibromyalgia    HTN (hypertension)    Hyperlipidemia    History of adenomatous polyp of colon    Vitamin D deficiency    Coccyx pain    Acute bilateral low back pain with sciatica    Lumbar stenosis    Chronic bilateral low back pain without sciatica    DDD (degenerative disc disease), lumbar    Spinal stenosis of lumbar region without neurogenic claudication    Lumbar facet arthropathy    Chronic pain syndrome    Lumbar radiculopathy    Neck pain    Left foot pain    Painful orthopaedic hardware ([address]    Cervical facet joint syndrome    Cellulitis, neck    Disorder of sacrum    Adjacent segment disease with spinal stenosis    S/P lumbar spinal fusion       INSURANCE INFORMATION:  Payor: /     ADVANCED DIRECTIVES:   Advance Directives (For Healthcare)  Healthcare Directive: Yes, patient has an advance directive for healthcare treatment  Type of Healthcare Directive: Living will  Copy in Chart: Yes, copy in chart  [unfilled]     EMERGENCY CONTACT:       RISK FACTORS:   Social History     Tobacco Use    Smoking status: Former Smoker     Packs/day: 0.50     Years: 30.00     Pack years: 15.00     Types: Cigarettes     Last attempt to quit: 10/15/2019     Years since quittin.4    Smokeless tobacco: Never Used    Tobacco comment: STOP  USING CHANTIX   Substance Use Topics    Alcohol use:  Yes     Alcohol/week: 0.0 standard drinks     Comment: rarely       ALLERGIES:  Allergies   Allergen Reactions    Pcn [Penicillins] Anaphylaxis       IMMUNIZATIONS:  Immunization History Administered Date(s) Administered    Influenza Virus Vaccine 10/23/2014, 10/31/2015    Influenza, Quadv, IM, PF (6 mo and older Fluzone, Flulaval, Fluarix, and 3 yrs and older Afluria) 09/11/2017, 12/10/2018, 11/06/2019    Pneumococcal Polysaccharide ([id number]) 12/10/2015    Tdap (Boostrix, Adacel) 11/16/2017    Zoster Live (Zostavax) 07/06/2017       SWALLOWING:   Difficulty Chewing or Swallowing Food: No    VISION AND HEARING:   Sensory Problems  Visual impairment: Glasses    PHYSICIANS INVOLVED WITH CARE:    Edwin Giron MD  No ref.  provider found  MD Edwin Carpio MD

## 2020-03-06 NOTE — DISCHARGE INSTR - COC
6/4/2019    BILATERAL TRANSFORAMINAL EPIDURAL STEROID INJECTION S1 performed by Kathi Purcell DO at [facility]  2005    Left    HARDWARE REMOVAL FOOT / ANKLE Left 12/14/2018    REMOVAL OF PAINFUL HARDWARE LEFT FOOT  ++SYNTHES++ performed by Mendy Nickerson DPM at [facility] 108    inguinal     LUMBAR SPINE SURGERY N/A 3/4/2020    EXPLORATION OF PRIOR L3-L5 FUSION AND L2-L3  POSTERIOR LUMBAR INTERBODY FUSION -- NEEDS O-ARM, AUDIOLOGY, CAGES, PLATES, SCREWS, C-ARM, TERESA TABLE, CELL SAVER, PLATELET GEL -- GLOBUS performed by Yahir Tavarez MD at [address] [facility] [address] Bilateral 05/07/2018    L1-2 lumbar foramen #1    NERVE BLOCK Bilateral 12/03/2018    s1 tfesi    NERVE BLOCK Bilateral 08/12/2019    NERVE BLOCK Right 09/30/2019    sacral radiofrequency    OTHER SURGICAL HISTORY Left 9/25/13    left foot tarso metatarsal joint injection    OTHER SURGICAL HISTORY Left 5/27/15    Endoscopic Gastroc recession left, Lapidus left foot and  Excision of exostosis left foot    CT INJECT ANES/STEROID FORAMEN LUMBAR/SACRAL W IMG GUIDE ,1 LEVEL Bilateral 12/3/2018    BILATERAL S1  EPIDURAL STEROID INJECTION performed by Rhonda Calhoun MD at [address] [facility] DX/THER SBST INTRLMNR LMBR/SAC W/IMG GDN N/A 8/21/2018    EPIDURAL STEROID INJECTION L1-2 WITH LOW VOL performed by Rhonda Calhoun MD at [address] [facility] NOSE/CLEFT LIP/TIP Bilateral 5/7/2018    BILATERAL L1-2 LUMBAR FORAMEN #1 performed by Rhonda Calhoun MD at [address] [facility] NOSE/CLEFT LIP/TIP Bilateral 6/4/2018    BILATERAL TRANSFORAMINAL EPIDURAL STEROID INJECTION UNDER FLUOROSCOPIC GUIDANCE @ FORAMINAL LEVEL L1-2 #2 performed by Rhonda Calhoun MD at [address] Right 9/30/2019    RIGHT SACRAL RADIOFREQUENCY ABLATION performed by Rhonda Calhoun MD at [address]  2000, 2005    Big Left Toe    TONSILLECTOMY      WISDOM TOOTH EXTRACTION Immunization History:   Immunization History   Administered Date(s) Administered    Influenza Virus Vaccine 10/23/2014, 10/31/2015    Influenza, Terrence Austin, IM, PF (6 mo and older Fluzone, Flulaval, Fluarix, and 3 yrs and older Afluria) 09/11/2017, 12/10/2018, 11/06/2019    Pneumococcal Polysaccharide (Zuleeewqi51) 12/10/2015    Tdap (Boostrix, Adacel) 11/16/2017    Zoster Live (Zostavax) 07/06/2017       Active Problems:  Patient Active Problem List   Diagnosis Code    Loss of balance R26.89    Vertebrobasilar TIAs G45.0    Obesity E66.9    Disc displacement, lumbar M51.26    Lumbar radiculopathy M54.16    Peripheral neuropathy (HCC) G62.9    Type 2 diabetes mellitus without complication (HCC) H18.4    Depression F32.9    Osteoarthritis M19.90    Chronic back pain M54.9, G89.29    Fibromyalgia M79.7    HTN (hypertension) I10    Hyperlipidemia E78.5    History of adenomatous polyp of colon Z86.010    Vitamin D deficiency E55.9    Coccyx pain M53.3    Acute bilateral low back pain with sciatica M54.40    Lumbar stenosis M48.061    Chronic bilateral low back pain without sciatica M54.5, G89.29    DDD (degenerative disc disease), lumbar M51.36    Spinal stenosis of lumbar region without neurogenic claudication M48.061    Lumbar facet arthropathy M47.816    Chronic pain syndrome G89.4    Lumbar radiculopathy M54.16    Neck pain M54.2    Left foot pain M79.672    Painful orthopaedic hardware [facility]) T84.84XA    Cervical facet joint syndrome M47.812    Cellulitis, neck L03.221    Disorder of sacrum M53.3    Adjacent segment disease with spinal stenosis M48.00    S/P lumbar spinal fusion Z98.1       Isolation/Infection:   Isolation          No Isolation        Patient Infection Status     None to display          Nurse Assessment:  Last Vital Signs: BP (!) 143/69   Pulse 108   Temp 99.8 °F (37.7 °C) (Temporal)   Resp 19   Ht 5' 5\" (1.651 m)   Wt 213 lb (96.6 kg)   LMP  (LMP Unknown)   SpO2 94%   BMI 35.45 kg/m²     Last documented pain score (0-10 scale): Pain Level: 10  Last Weight:   Wt Readings from Last 1 Encounters:   03/04/20 213 lb (96.6 kg)     Mental Status:  oriented and alert    IV Access:  - Peripheral IV - site  R FA, insertion date: 3/4/2020    Nursing Mobility/ADLs:  Walking   Assisted  Transfer  Assisted  Bathing  Assisted  Dressing  Assisted  Toileting  Assisted  Feeding  Independent  Med Admin  Independent  Med Delivery   whole    Wound Care Documentation and Therapy:        Elimination:  Continence:   · Bowel: No  · Bladder: Yes  Urinary Catheter: None   Colostomy/Ileostomy/Ileal Conduit: No       Date of Last BM: 3/6/2020    Intake/Output Summary (Last 24 hours) at 3/6/2020 1650  Last data filed at 3/6/2020 1429  Gross per 24 hour   Intake 750 ml   Output 50 ml   Net 700 ml     I/O last 3 completed shifts: In: 750 [P.O.:750]  Out: 48 [Drains:50]    Safety Concerns: At Risk for Falls    Impairments/Disabilities:      None    Nutrition Therapy:  Current Nutrition Therapy:   - Oral Diet:  General    Routes of Feeding: Oral  Liquids: No Restrictions  Daily Fluid Restriction: no  Last Modified Barium Swallow with Video (Video Swallowing Test): not done    Treatments at the Time of Hospital Discharge:   Respiratory Treatments: ***  Oxygen Therapy:  is not on home oxygen therapy.   Ventilator:    - No ventilator support    Rehab Therapies: Physical Therapy and Occupational Therapy  Weight Bearing Status/Restrictions: No weight bearing restirctions  Other Medical Equipment (for information only, NOT a DME order):  walker, bath bench, bedside commode and hospital bed  Other Treatments: ***    RN SIGNATURE: Electronically signed by Anya Meléndez RN on 3/6/2020 at 4:53 PM      CASE MANAGEMENT/SOCIAL WORK SECTION    Inpatient Status Date: ***    Readmission Risk Assessment Score:  Readmission Risk              Risk of Unplanned Readmission:        9 Discharging to Facility/ Agency   · Name:   · Address:  · Phone:  · Fax:    Dialysis Facility (if applicable)   · Name:  · Address:  · Dialysis Schedule:  · Phone:  · Fax:    / signature: {Esignature:[id number]}    PHYSICIAN SECTION    Prognosis: {Prognosis:1115912229}    Condition at Discharge: Praneeth Arreola Patient Condition:505104092}    Rehab Potential (if transferring to Rehab): {Prognosis:4006974265}    Recommended Labs or Other Treatments After Discharge: ***    Physician Certification: I certify the above information and transfer of Isabel Keller  is necessary for the continuing treatment of the diagnosis listed and that she requires {Admit to Appropriate Level of Care:14212} for {GREATER/LESS:740964399} 30 days.      Update Admission H&P: {CHP DME Changes in SCZEU:880309156}    PHYSICIAN SIGNATURE:  {Esignature:488102992}

## 2020-03-06 NOTE — PROGRESS NOTES
Pt updated on transferring soon to [facility]. N2N called to [facility].     Electronically signed by Delano Chavez RN on 3/6/2020 at 4:47 PM

## 2020-03-06 NOTE — PROGRESS NOTES
Attempted to call report to ARU. Nurse not available at this time. Will attempt again.     Electronically signed by Delano Chavez RN on 3/6/2020 at 3:47 PM

## 2020-03-06 NOTE — PROGRESS NOTES
Physical Therapy  Facility/Department: [address] NEURO SPINE  Daily Treatment Note  NAME: Isabel Keller  : 1955  MRN: [id number]    Date of Service: 3/6/2020     Referring Provider:  Samantha Serrato MD     Evaluating PT:  Prince Plummer PT, DPT PT [id number]     Room #:  2903/3833-R  Diagnosis:  PRIOR FUSION, ADJACENT SEGMENT STENOSIS  PMHx/PSHx:  Fibromyalgia, Chronic Back Pain, Rheumatoid Arthritis, HTN, TIA, Depression  History of spine surgeries and L ankle surgeries     Procedure/Surgery:  EXPLORATION OF PRIOR L3-L5 FUSION AND L2-L3  POSTERIOR LUMBAR INTERBODY 3/5/2020  Precautions: Falls, Spine, LSO, Hemovac, Alarm  Equipment Needs:  [employer]     SUBJECTIVE:     Pt lives with sister and son (reports frequently not home) in a 2 story home with 1+2 stairs to enter and no rail. Bed is on 2nd floor and bath is on 2nd floor. Pt has flight of stairs to 2nd floor with single rail (partial). Pt ambulated with m-spatial [place] independently PTA. +   Equipment Owned:  Small Based Tiffanie Carr      OBJECTIVE:    Initial Evaluation  Date: 3/5/2020 Treatment Date: 3/6/2020  Short Term/ Long Term   Goals   AM-PAC 6 Clicks        Was pt agreeable to Eval/treatment? Yes  Yes     Does pt have pain? 9/10 Low back  R hip  Moderate pain in R ribcage with deep inhalation     Bed Mobility  Rolling: Júnior  Supine to sit: ModA  Sit to supine: NT  Scooting: ModA  Rolling: Júnior  Supine to sit: Júnior  Sit to supine: NT  Scooting: Júnior Rolling: Independent  Supine to sit:  Independent  Sit to supine: Independent  Scooting: Independent      Transfers Sit to stand: ModA  Stand to sit: ModA  Stand pivot: ModA [employer] Sit to stand: Júnior  Stand to sit: Júnior  Stand pivot: Júnior WW  Sit to stand: Modified Independent  Stand to sit: Modified Independent  Stand pivot: Modified Independent  [employer]   Ambulation    5 feet with ModA [employer] 45' x 2 ' Júnior    >150 feet with Modified Independent  [employer]   Stair negotiation: ascended and descended  NT  NT >4 steps with no rail Modified Independent  AAD   ROM BUE:  See OT Note  BLE:  WFL       Strength BUE:  See OT Note  RLE: 4/5 grossly  LLE: 4/5 grossly   Improve Strength 1/3 MMT Grade   Balance Sitting EOB:  Júnior  Dynamic Standing:  ModA [employer]  Sitting EOB:  SBA  Dynamic Standing:  Júnior [employer] Sitting EOB:  Independent     Dynamic Standing:  Modified Independent  WW       Pt is A & O x 4  Sensation:  Pt denies numbness and tingling to extremities  Edema: WNL    Vitals:    Spo2 95% /65 PRE    Spo2 95%   POST      Patient education  Pt educated on role of PT    Patient response to education:   Pt verbalized understanding Pt demonstrated skill Pt requires further education in this area   x x x     ASSESSMENT:    Comments:  Pt received in supine in pain medication therapeutic window, agreeable to PT. Pt recalled all spinal precautions however needed reinforcement throughout session to abide. Pt with improved activity tolerance and independence this session. Pt required assistance of trunk for log roll. Pt sitting statically without assistance. Pt intermittently reporting pain in R ribcage with deep inhalation. Pt educated on pursed lip breathing. Pt performed functional transfers with assistance and cues. Pt ambulating with decreased speed, step length, and step height bilaterally. PT will continue with plan of care, and progress pt as able. RN updated, patient has all needs, call bell in reach, and chair alarm activated    Treatment:  Patient practiced and was instructed in the following treatment:     Bed mobility- verbal cues to perform via log roll   Functional transfers-Verbal cues for proper positioning and sequencing to perform transfers safely with maximum independence.  Gait training-Verbal cues for proper positioning and sequencing using assistive device to maximize functional mobility independence.  Pursed lip breathing    PLAN:    Patient is making good progress towards established goals.   Will continue with current POC.       Time in  0945  Time out  1010    Total Treatment Time  25 minutes     CPT codes:  [] Gait training 99396 0 minutes  [] Manual therapy 51829 0 minutes  [x] Therapeutic activities 56261 25 minutes  [] Therapeutic exercises 93645 0 minutes  [] Neuromuscular reeducation 30044 0 minutes    Roanne Kayser PT, DPT  [id number]

## 2020-03-06 NOTE — PLAN OF CARE
Problem: Discharge Planning:  Goal: Discharged to appropriate level of care  Description  Discharged to appropriate level of care  Outcome: Met This Shift     Problem: Infection - Surgical Site:  Goal: Will show no infection signs and symptoms  Description  Will show no infection signs and symptoms  Outcome: Met This Shift     Problem: Mobility - Impaired:  Goal: Mobility will improve to maximum level  Description  Mobility will improve to maximum level  Outcome: Met This Shift     Problem: Pain - Acute:  Goal: Pain level will decrease  Description  Pain level will decrease  Outcome: Met This Shift     Problem: Falls - Risk of:  Goal: Will remain free from falls  Description  Will remain free from falls  Outcome: Met This Shift     Problem: Pain:  Goal: Pain level will decrease  Description  Pain level will decrease  Outcome: Met This Shift

## 2020-03-06 NOTE — CARE COORDINATION
3/6 Transition of Care. Plan is Daylene Ernie will go to ARU today. Will need drain discontinued.  Betsy Sheets RN CM

## 2020-03-06 NOTE — LETTER
DATE: 3/17/2020        [employer] available in your area  [address] [facility]  [place], [address]  [phone]  Activities include: ceramics, aerobics, walkers club, and fitness center. [address]  [place], [address]  [phone] opportunities, programs and prescription assistance   [facility]  [address], [facility] [address]  [phone], line dancing, chair yoga,   dance exercise classes, painting, gardening groups and more. [facility]  [address] [facility]  [facility], [place]  [phone]  Socialization opportunities, exercise and wellness classes, crafts, and computer classes. [facility]  [address], [address]  [phone]  Senior group meets on Mondays and Wednesdays from [address] [facility]. Activities include: discussions, crafts, guest speakers, cards and films. [address] [place] [address], [place]  [phone]  Activities such as cards, bowling and occasional bus trips. [facility]  [address] [facility], [address]  [phone]  Weekly programs including painting, dancing, exercise, computer classes,   crafts and theater. [address] [facility] 30 [facility],[address], [place] [address]  [phone]  Meetings 9AM-2PM on Thursdays to socialize and play cards. [place] [facility]  [address] [facility]  [phone]  Exercise equipment, water exercise classes,  indoor and outdoor pool. [employer] [facility]  [address] [facility], [address]  [phone]  Activities include: bus trips, fitness programs, arts, crafts, dinners and card games. [employer] [facility] [address] [facility], [address]  [address]  [place], [address]  [phone] Water exercise programs, different exercise equipment, indoor pool. [address] at the . [address] [facility]. [address]  [phone]  Free health screenings, health education, health information on community resources, fitness classes & fitness center. [facility]  [address], [address] [place] [address]  [phone]  8AM-4PM daily with noon meal.  Activities include arts, crafts, knitting and shannon [employer] [facility] [address]  [address]  [phone]  Low level fitness classes and warm water arthritis classes. [facility]  [address], [address] [facility]  [phone]  Group meets at 12PM on Mondays, Wednesdays and Fridays. Activities include bingo, lunch and special programs.

## 2020-03-06 NOTE — PROGRESS NOTES
8623I occupied by another patient. Will await bed availability.     Electronically signed by Dexter Aquino RN on 3/6/2020 at 1:36 PM

## 2020-03-07 ENCOUNTER — APPOINTMENT (OUTPATIENT)
Dept: GENERAL RADIOLOGY | Age: 65
DRG: 560 | End: 2020-03-07
Attending: PHYSICAL MEDICINE & REHABILITATION

## 2020-03-07 LAB
ANION GAP SERPL CALCULATED.3IONS-SCNC: 11 MMOL/L (ref 7–16)
BASOPHILS ABSOLUTE: 0 E9/L (ref 0–0.2)
BASOPHILS RELATIVE PERCENT: 0.3 % (ref 0–2)
BUN BLDV-MCNC: 17 MG/DL (ref 8–23)
CALCIUM SERPL-MCNC: 8.2 MG/DL (ref 8.6–10.2)
CHLORIDE BLD-SCNC: 96 MMOL/L (ref 98–107)
CO2: 25 MMOL/L (ref 22–29)
CREAT SERPL-MCNC: 0.9 MG/DL (ref 0.5–1)
EOSINOPHILS ABSOLUTE: 0.14 E9/L (ref 0.05–0.5)
EOSINOPHILS RELATIVE PERCENT: 0.9 % (ref 0–6)
GFR AFRICAN AMERICAN: >60
GFR NON-AFRICAN AMERICAN: >60 ML/MIN/1.73
GLUCOSE BLD-MCNC: 175 MG/DL (ref 74–99)
HCT VFR BLD CALC: 34 % (ref 34–48)
HEMOGLOBIN: 11.2 G/DL (ref 11.5–15.5)
LYMPHOCYTES ABSOLUTE: 1.72 E9/L (ref 1.5–4)
LYMPHOCYTES RELATIVE PERCENT: 11.3 % (ref 20–42)
MCH RBC QN AUTO: 33.1 PG (ref 26–35)
MCHC RBC AUTO-ENTMCNC: 32.9 % (ref 32–34.5)
MCV RBC AUTO: 100.6 FL (ref 80–99.9)
MONOCYTES ABSOLUTE: 1.56 E9/L (ref 0.1–0.95)
MONOCYTES RELATIVE PERCENT: 9.6 % (ref 2–12)
NEUTROPHILS ABSOLUTE: 12.17 E9/L (ref 1.8–7.3)
NEUTROPHILS RELATIVE PERCENT: 78.3 % (ref 43–80)
PDW BLD-RTO: 12.3 FL (ref 11.5–15)
PLATELET # BLD: 238 E9/L (ref 130–450)
PMV BLD AUTO: 10.6 FL (ref 7–12)
POTASSIUM REFLEX MAGNESIUM: 4.7 MMOL/L (ref 3.5–5)
RBC # BLD: 3.38 E12/L (ref 3.5–5.5)
RBC # BLD: NORMAL 10*6/UL
SODIUM BLD-SCNC: 132 MMOL/L (ref 132–146)
VACUOLATED NEUTROPHILS: ABNORMAL
WBC # BLD: 15.6 E9/L (ref 4.5–11.5)

## 2020-03-07 PROCEDURE — 6370000000 HC RX 637 (ALT 250 FOR IP): Performed by: PHYSICIAN ASSISTANT

## 2020-03-07 PROCEDURE — 36415 COLL VENOUS BLD VENIPUNCTURE: CPT

## 2020-03-07 PROCEDURE — 97530 THERAPEUTIC ACTIVITIES: CPT | Performed by: PHYSICAL THERAPIST

## 2020-03-07 PROCEDURE — 80048 BASIC METABOLIC PNL TOTAL CA: CPT

## 2020-03-07 PROCEDURE — 6370000000 HC RX 637 (ALT 250 FOR IP): Performed by: PHYSICAL MEDICINE & REHABILITATION

## 2020-03-07 PROCEDURE — 85025 COMPLETE CBC W/AUTO DIFF WBC: CPT

## 2020-03-07 PROCEDURE — 97162 PT EVAL MOD COMPLEX 30 MIN: CPT | Performed by: PHYSICAL THERAPIST

## 2020-03-07 PROCEDURE — 97535 SELF CARE MNGMENT TRAINING: CPT

## 2020-03-07 PROCEDURE — 97166 OT EVAL MOD COMPLEX 45 MIN: CPT

## 2020-03-07 PROCEDURE — 6360000002 HC RX W HCPCS: Performed by: PHYSICAL MEDICINE & REHABILITATION

## 2020-03-07 PROCEDURE — 71046 X-RAY EXAM CHEST 2 VIEWS: CPT

## 2020-03-07 PROCEDURE — 1280000000 HC REHAB R&B

## 2020-03-07 RX ORDER — KETOROLAC TROMETHAMINE 30 MG/ML
15 INJECTION, SOLUTION INTRAMUSCULAR; INTRAVENOUS ONCE
Status: COMPLETED | OUTPATIENT
Start: 2020-03-07 | End: 2020-03-07

## 2020-03-07 RX ADMIN — HYDROXYZINE PAMOATE 25 MG: 25 CAPSULE ORAL at 13:43

## 2020-03-07 RX ADMIN — OXYCODONE HYDROCHLORIDE 5 MG: 5 TABLET ORAL at 15:48

## 2020-03-07 RX ADMIN — OXYCODONE HYDROCHLORIDE 5 MG: 5 TABLET ORAL at 01:55

## 2020-03-07 RX ADMIN — FLUOXETINE HYDROCHLORIDE 40 MG: 20 CAPSULE ORAL at 08:57

## 2020-03-07 RX ADMIN — ACETAMINOPHEN 650 MG: 325 TABLET ORAL at 15:48

## 2020-03-07 RX ADMIN — KETOROLAC TROMETHAMINE 15 MG: 30 INJECTION, SOLUTION INTRAMUSCULAR at 18:43

## 2020-03-07 RX ADMIN — OXYCODONE HYDROCHLORIDE 5 MG: 5 TABLET ORAL at 06:41

## 2020-03-07 RX ADMIN — HYDROCHLOROTHIAZIDE 12.5 MG: 12.5 TABLET ORAL at 08:57

## 2020-03-07 RX ADMIN — GABAPENTIN 400 MG: 400 CAPSULE ORAL at 20:36

## 2020-03-07 RX ADMIN — CYCLOBENZAPRINE 10 MG: 10 TABLET, FILM COATED ORAL at 17:20

## 2020-03-07 RX ADMIN — BISACODYL 5 MG: 5 TABLET, COATED ORAL at 08:57

## 2020-03-07 RX ADMIN — ATORVASTATIN CALCIUM 40 MG: 40 TABLET, FILM COATED ORAL at 20:35

## 2020-03-07 RX ADMIN — MAGNESIUM HYDROXIDE 30 ML: 2400 SUSPENSION ORAL at 19:00

## 2020-03-07 RX ADMIN — ACETAMINOPHEN 650 MG: 325 TABLET ORAL at 09:00

## 2020-03-07 RX ADMIN — POLYETHYLENE GLYCOL 3350 17 G: 17 POWDER, FOR SOLUTION ORAL at 19:00

## 2020-03-07 RX ADMIN — ACETAMINOPHEN 650 MG: 325 TABLET ORAL at 20:38

## 2020-03-07 RX ADMIN — GABAPENTIN 400 MG: 400 CAPSULE ORAL at 08:57

## 2020-03-07 RX ADMIN — OXYCODONE HYDROCHLORIDE 5 MG: 5 TABLET ORAL at 20:35

## 2020-03-07 ASSESSMENT — PAIN SCALES - GENERAL
PAINLEVEL_OUTOF10: 7
PAINLEVEL_OUTOF10: 8
PAINLEVEL_OUTOF10: 9
PAINLEVEL_OUTOF10: 3
PAINLEVEL_OUTOF10: 0
PAINLEVEL_OUTOF10: 7
PAINLEVEL_OUTOF10: 6
PAINLEVEL_OUTOF10: 0
PAINLEVEL_OUTOF10: 8
PAINLEVEL_OUTOF10: 9
PAINLEVEL_OUTOF10: 9
PAINLEVEL_OUTOF10: 7
PAINLEVEL_OUTOF10: 7

## 2020-03-07 ASSESSMENT — PAIN DESCRIPTION - FREQUENCY
FREQUENCY: CONTINUOUS

## 2020-03-07 ASSESSMENT — PAIN DESCRIPTION - PAIN TYPE
TYPE: SURGICAL PAIN
TYPE: SURGICAL PAIN
TYPE: SURGICAL PAIN;CHRONIC PAIN
TYPE: SURGICAL PAIN
TYPE: SURGICAL PAIN

## 2020-03-07 ASSESSMENT — PAIN DESCRIPTION - ONSET
ONSET: ON-GOING

## 2020-03-07 ASSESSMENT — 9 HOLE PEG TEST
TESTTIME_SECONDS: 27
TESTTIME_SECONDS: 31

## 2020-03-07 ASSESSMENT — PAIN DESCRIPTION - DESCRIPTORS
DESCRIPTORS: ACHING;DISCOMFORT

## 2020-03-07 ASSESSMENT — PAIN DESCRIPTION - ORIENTATION
ORIENTATION: RIGHT;LOWER
ORIENTATION: RIGHT

## 2020-03-07 ASSESSMENT — PAIN DESCRIPTION - PROGRESSION
CLINICAL_PROGRESSION: GRADUALLY IMPROVING

## 2020-03-07 ASSESSMENT — PAIN DESCRIPTION - LOCATION
LOCATION: BACK

## 2020-03-07 NOTE — PROGRESS NOTES
Patient oriented to room and new admission folder given. Patient Guide reviewed and explanation of Patient Rights and Responsibilities completed.  Nolberto Mireles  3/6/2020  7:35 PM

## 2020-03-07 NOTE — PROGRESS NOTES
Progress Note - covering Dr. Irina Cerrato    Subjective/   60y.o. year old female on the rehab unit for post laminectomy. She is complaining of a lot of pain in the (R) flank and into the (R) groin and thigh. States this is old. No SOB or chest pain. No cough. She had a low grade temp earlier. No chills or sweats reported. Objective/   VITALS:  /76   Pulse 96   Temp 98.6 °F (37 °C) (Oral)   Resp 20   Ht 5' 5\" (1.651 m)   Wt 214 lb (97.1 kg)   LMP  (LMP Unknown)   SpO2 94%   BMI 35.61 kg/m²   24HR INTAKE/OUTPUT:      Intake/Output Summary (Last 24 hours) at 3/7/2020 1718  Last data filed at 3/7/2020 1130  Gross per 24 hour   Intake 600 ml   Output 310 ml   Net 290 ml     Constitutional:  Alert, awake, no apparent distress   Cardiovascular:  S1, S2 without m/r/g   Respiratory:  CTA B without w/r/r   Abdomen: +BS, protuberant. No tenderness. No rebound  Ext: no pitting LE edema, no calf tenderness or cords  Neuro: awake and alert, weakness (B) LEs. Uncomfortable. Nursing notes this seems to wax and wane. Functional Level    Initial Evaluation  3/7 AM     PM    Short Term Goals Long Term Goals    Was pt agreeable to Eval/treatment? yes           Does pt have pain? 8/10 back pain           Bed Mobility  Rolling: Min A  Supine to sit: Mod A  Sit to supine:  Mod A  Scooting: Min A towards EOB     Supervision Mod Independent    Transfers Sit to stand: Min A  Stand to sit: Min A  Stand pivot: Min A     Supervision Mod Independent    Ambulation    4 feet with SBQC with Mod A  30 feet x2, 75 feet with FWW with Min A     150 feet with AAD with Supervision 300 feet with AAD with Mod Independent    Walking 10 feet on uneven surface  NT     10 feet with AAD with Min A 10 feet with AAD with Supervision   Wheel Chair Mobility NT           Car Transfers NT, pt declines due to pain     Min A Supervision   Stair negotiation: ascended and descended  3 steps with B rail with Mod A     4 steps with 1 rail with

## 2020-03-07 NOTE — H&P
[address]                  [address] [place],  [facility]                              HISTORY AND PHYSICAL    PATIENT NAME: Tigist Bautista                        :        1955  MED REC NO:   [id number]                            ROOM:       3271  ACCOUNT NO:   [de-identified]                           ADMIT DATE: 2020  PROVIDER:     Cecilia Pryor MD    REHAB HISTORY AND PHYSICAL    AGE:  60. SEX:  Female. CHIEF COMPLAINT:  Lumbar radiculopathy. HISTORY OF PRESENT ILLNESS:  The patient was admitted to [facility]  with worsening lumbar radiculopathy pain. She had had previous lumbar  surgery, but developed problems above the level of previous surgery. Dr. Lina Daigle did an extended decompression and fusion. She tolerated the  surgery, but still has significant pain, weakness, and functional  deficit. She is at a minimal assist level with most of her functioning  with very limited ambulation distance. She is felt to be a good  candidate for further rehab. PAST MEDICAL HISTORY:  1.  Lumbar radiculopathy. 2.  Obesity. 3.  Type 2 diabetes mellitus. 4.  Peripheral neuropathy. 5.  Hypertension. ALLERGIES:  PENICILLIN. CURRENT MEDICATIONS:  Lipitor, Prozac, Neurontin, HydroDIURIL, and  Zestril. HABITS:  No smoking or alcohol. REVIEW OF SYSTEMS:  Negative for prior HEENT problems. Negative for  prior cardiac or pulmonary problems. Negative for prior GI or   problems. Orthopedic is positive for arthritis. Neurologic is positive  for neuropathy and radiculopathy. PHYSICAL EXAMINATION:  GENERAL:  Obese white female in no acute distress. HEENT:  Head:  Normocephalic, atraumatic. Eyes:  Pupils equal, round,  reactive to light. Pharynx:  Normal.  LUNGS:  Clear to P and A. HEART:  Regular rate and rhythm. S1, S2 normal.  No murmurs or gallops. ABDOMEN:  Bowel sounds normal.  Soft, nontender.   No masses or  organomegaly. BACK:  There is a lumbar incision. EXTREMITIES:  Without clubbing, cyanosis, or edema. MENTAL STATUS:  Alert and oriented. SENSATION:  Diminished distally. NEUROMUSCULAR:  4/5 strength in both lowers. PROBLEM LIST:  1. Lumbar radiculopathy. 2.  Status post lumbar decompression and fusion. 3.  Obesity. 4.  Hypertension. 5.  Type 2 diabetes mellitus. 6.  Diabetic neuropathy. INDIVIDUALIZED OVERALL PLAN OF CARE:  I think the patient is a good  candidate for further rehab. She should be able to progress well with  her strength. We will address pain control, control her blood pressure  and diabetes. She needs to get to an independent level of functioning  for home. Rehab prognosis good. Medical prognosis good. PT will be working on ambulation, transfers, and advanced transfers an  hour and a half five to seven days a week with goals of modified  independent. OT will be working on upper extremity strengthening,  functioning, ADL skills, and basic homemaking an hour and a half five to  seven days a week with goals of modified independent. She will have  24-hour-a-day, 7-day-a-week rehab nursing to address bowel and bladder  issues, carryover from therapy and safety. Overall length of stay will probably be about two weeks with the patient  returning home at the end of that time.         Randolph Browne MD    D: 03/07/2020 8:16:15       T: 03/07/2020 8:27:08     [email or web address]  Job#: [id number]     Doc#: [id number]    CC:

## 2020-03-07 NOTE — PROGRESS NOTES
Occupational Therapy   Occupational Therapy Initial Assessment  Date: 3/7/2020   Patient Name: Nima Shafer  MRN: [id number]     : 1955   Referring Practitioner:   Diagnosis: lumbar radiculopathy (Pt underwent exploration of prior L3-L5 fusion and L2-L3 PLIF on 3/4)   Additional Pertinent Hx: fibromyalgia, DM II, HTN, RA, TIA, chronic back pain (has had back surgery), CA, foot surgery    Precautions:  falls, spinal, LSO (pt can tima sititng EOB per verbally speaking with  3/7)    Date of Service: 3/7/2020    Discharge Recommendations:  Home with Home health OT, Home with assist PRN         Subjective   General  Chart Reviewed: Yes  Family / Caregiver Present: No  Pain Level: 7/10 at surgical site, RN aware    Social/Functional History  Lives With: (son and sister, sister is not always home)  Type of Home: House  Home Layout: Multi-level, Bed/Bath upstairs  Home Access: Stairs to enter without rails  Entrance Stairs - Number of Steps: 1+2 steps  Bathroom Shower/Tub: Tub/Shower unit, Walk-in shower(walk in on 1st, tub on 2nd)  Bathroom Toilet: Standard  Bathroom Equipment: Shower chair, 3-in-1 commode  Home Equipment: (SBQC)  Receives Help From: Family  ADL Assistance: Independent  Homemaking Assistance: Needs assistance  Ambulation Assistance: Independent  Transfer Assistance: Independent  Active : Yes  Occupation: Retired       Objective   Vision: Within Functional Limits  Hearing: Within functional limits    Orientation  Overall Orientation Status: Within Normal Limits  Orientation Level: Oriented X4     Balance  Sitting Balance: Supervision (on commode and sitting EOB)  Standing Balance: Minimal assistance  Functional Mobility  Functional - Mobility Device: (ww)  Activity: To/from bathroom  Assist Level: Minimal assistance    Toilet Transfer: Minimal assistance  Shower Transfers: Minimal assistance     ADL  Feeding: Independent  Grooming: Setup  UE Bathing: Setup (simulated)  LE Bathing: Moderate assistance (simulated)  UE Dressing: Maximum assistance (assist with LSO)  LE Dressing: Maximum assistance (assist threading pants, socks, shoes)  Toileting: Minimal assistance (assist to steady balance with troy hygiene and pants management)    RUE Tone: Normotonic  LUE Tone: Normotonic    Coordination  Movements Are Fluid And Coordinated: Yes     Bed mobility  Supine to Sit: Unable to assess  Sit to Supine: Stand by assistance(with vc's for log rolling technique)     Vision - Basic Assessment  Prior Vision: Wears glasses only for reading  Cognition  Overall Cognitive Status: WFL  Arousal/Alertness: Appropriate responses to stimuli  Following Commands: Follows multistep commands consistently; Follows multistep commands with repitition; Follows multistep commands with increased time  Attention Span: Appears intact  Memory: Appears intact  Safety Judgement: Decreased awareness of need for assistance;Decreased awareness of need for safety  Problem Solving: Assistance required to generate solutions;Assistance required to correct errors made;Assistance required to implement solutions;Assistance required to identify errors made  Insights: Decreased awareness of deficits  Initiation: Does not require cues  Sequencing: Does not require cues  Perception  Overall Perceptual Status: WFL     Sensation  Overall Sensation Status: WFL, c/o numbness/tingling at RLE        LUE AROM : Exceptions (grossly 110')  RUE AROM : Exceptions (grossly 110')    Gross LUE Strength: WFL (grossly observed 3/5)  Gross RUE Strength: WFL (grossly observed 3/5)     Hand Dominance: Left  Left Hand Strength -  (lbs)  Handle Setting 2: 50.3# Norm for pt age group: 45.7#  Right Hand Strength -  (lbs)  Handle Setting 2: 53.3# Norm for pt age group: 55.1#  Fine Motor Skills  Left 9 Hole Peg Test Time (secs): 27  Norm for pt age group: 20.33 secs  Right 9 Hole Peg Test Time (secs): 31 Norm for pt age group: 18.99 secs     Assessment Performance deficits / Impairments: Decreased functional mobility ; Decreased strength;Decreased endurance;Decreased ADL status; Decreased safe awareness;Decreased ROM; Decreased balance  Prognosis: Good  Decision Making: Medium Complexity    Treatment/education: Initial OT eval completed. Pt presents sitting in chair and agreeable to OT session. Completed ADLs this AM see above for assessment. Educated pt on OT POC and pt participated in goal planning. Educated pt on hand placement/body mechanics/posture/balance to increase safety with ADLs/functional transfers/mobility. Educated on spinal precautions. Pt requires increased time to complete ADLs/functional transfers due to being limited by pain. Pt demonstrates decreased insight. Pt appears motivated, cooperative, pleasant. Pt appeared to have tolerated session well. Pt would benefit from skilled OT services to increase independence with ADLs/functional transfers. Initiate OT POC. REQUIRES OT FOLLOW UP: Yes  Activity Tolerance: Patient limited by pain; Patient Tolerated treatment well        Plan   Times per week: 2x/day, 5-7 days/wk  Plan weeks: 1-2 wks  Current Treatment Recommendations: Strengthening, Endurance Training, Neuromuscular Re-education, Patient/Caregiver Education & Training, ROM, Equipment Evaluation, Education, & procurement, Balance Training, Gait Training, Self-Care / ADL, Functional Mobility Training, Safety Education & Training, Home Management Training    Goals  Long term goals  Time Frame for Long term goals : 1-2 wks  Long term goal 1: Pt will be IND with grooming task seated/standing at sink  Long term goal 2: Pt will be Mod I with UE dressing of shirt and LSO  Long term goal 3: Pt will be Mod I with LE dressing using AE  Long term goal 4: Pt will be Mod I with commode and shower transfers  Long term goal 5: Pt will be SUP for bathing task  Long term goals 6: Pt will be Mod I with light homemaking task  Long term goal 7: Pt will demo G

## 2020-03-07 NOTE — PROGRESS NOTES
Department of Neurosurgery  Attending Progress Note    CHIEF COMPLAINT:    SUBJECTIVE:  Complains of incisional pain and spasm    ROS:    OBJECTIVE  Physical  VITALS:  BP (!) 151/58   Pulse 104   Temp 97.8 °F (36.6 °C)   Resp 18   Ht 5' 5\" (1.651 m)   Wt 214 lb (97.1 kg)   LMP  (LMP Unknown)   SpO2 96%   BMI 35.61 kg/m²   NEUROLOGIC:  Mental Status Exam:  Level of Alertness:   awake  Orientation:   person, place, time  Motor Exam:  Motor exam is symmetrical 5 out of 5 all extremities bilaterally  Sensory:  Sensory intact    Data  CBC:   Lab Results   Component Value Date    WBC 15.6 03/07/2020    RBC 3.38 03/07/2020    HGB 11.2 03/07/2020    HCT 34.0 03/07/2020    .6 03/07/2020    MCH 33.1 03/07/2020    MCHC 32.9 03/07/2020    RDW 12.3 03/07/2020     03/07/2020    MPV 10.6 03/07/2020     BMP:    Lab Results   Component Value Date     03/07/2020    K 4.7 03/07/2020    CL 96 03/07/2020    CO2 25 03/07/2020    BUN 17 03/07/2020    LABALBU 4.5 09/18/2019    CREATININE 0.9 03/07/2020    CALCIUM 8.2 03/07/2020    GFRAA >60 03/07/2020    LABGLOM >60 03/07/2020    GLUCOSE 175 03/07/2020     Current Inpatient Medications  Current Facility-Administered Medications: acetaminophen (TYLENOL) tablet 650 mg, 650 mg, Oral, Q4H PRN  polyethylene glycol (GLYCOLAX) packet 17 g, 17 g, Oral, Daily PRN  bisacodyl (DULCOLAX) EC tablet 5 mg, 5 mg, Oral, Daily  fleet rectal enema 1 enema, 1 enema, Rectal, Daily PRN  magnesium hydroxide (MILK OF MAGNESIA) 400 MG/5ML suspension 30 mL, 30 mL, Oral, Daily PRN  atorvastatin (LIPITOR) tablet 40 mg, 40 mg, Oral, Daily  cyclobenzaprine (FLEXERIL) tablet 10 mg, 10 mg, Oral, TID PRN  FLUoxetine (PROZAC) capsule 40 mg, 40 mg, Oral, Daily  gabapentin (NEURONTIN) capsule 400 mg, 400 mg, Oral, BID  hydrochlorothiazide (HYDRODIURIL) tablet 12.5 mg, 12.5 mg, Oral, QAM  hydrOXYzine (VISTARIL) capsule 25 mg, 25 mg, Oral, TID PRN  lisinopril (PRINIVIL;ZESTRIL) tablet 20 mg, 20 mg,

## 2020-03-07 NOTE — PROGRESS NOTES
Physical Therapy  Evaluating Therapist: Niyah Cabrera PT, FRANCOIS, [id number]    ROOM: [address] [place] [address]  DIAGNOSIS: Lumbar radiculopathy  PRECAUTIONS: spinal neutrality, LSO, falls    HPI: Pt underwent exploration of prior L3-5 fusion and L2-3 posterior lumbar interbody fusion on 3/5/20 for treatment of worsening lumbar pain. Social: Pt lives with sister and son in a 2 floor house with 1+2 steps and no rail(s) to enter. Bed is on second floor and bath is on second floor. 12 stairs with 1 HR to second floor. She was Mod Independent with [facility] prior to admission. Initial Evaluation  3/7 AM     PM    Short Term Goals Long Term Goals    Was pt agreeable to Eval/treatment? yes       Does pt have pain? 8/10 back pain       Bed Mobility  Rolling: Min A  Supine to sit: Mod A  Sit to supine: Mod A  Scooting: Min A towards EOB   Supervision Mod Independent    Transfers Sit to stand: Min A  Stand to sit: Min A  Stand pivot: Min A   Supervision Mod Independent    Ambulation    4 feet with SBQC with Mod A  30 feet x2, 75 feet with FWW with Min A   150 feet with AAD with Supervision 300 feet with AAD with Mod Independent    Walking 10 feet on uneven surface  NT   10 feet with AAD with Min A 10 feet with AAD with Supervision   Wheel Chair Mobility NT       Car Transfers NT, pt declines due to pain   Min A Supervision   Stair negotiation: ascended and descended  3 steps with B rail with Mod A   4 steps with 1 rail with Min A 12 steps with 1 rail with Supervision   Curb Step:   ascended and descended NT   7.5 inch step with AAD and Min A 7.5 inch step with AAD and Supervision   Picking up object off the floor NT   Will  object with Min A Will  object with Supervision   BLE ROM WNL       BLE Strength 4/5 overall bilaterally       Balance  Sitting EOB: SBA  Dynamic standing: Min A with FWW       Date Family Teach Completed TBD       Is additional Family Teaching Needed?   Y or N Y       Hindering Progress Pain control, impulsivity, balance deficits, decreased endurance        PT recommended ELOS 2 weeks       Team's Discharge Plan        Therapist at Team Meeting          Pt is alert and Oriented x3  Sensation: intact light touch to B LEs, no c/o numbness/tingling  Edema: unremarkable  Endurance: [employer]    Chair alarm: yes     ASSESSMENT  Pt displays functional ability as noted in the objective portion of this evaluation. Comments:  Pt resting in L sidelying upon arrival, with LSO loosened with posterior rotation of R shoulder and anterior rotation of L hip. PT assisting pt with replacing LSO during review of spinal precautions with 3/3 recall demonstrated by pt verbally. Pt requires ongoing education for avoiding twisting or stooping during session despite ability to verbally state spinal precautions. Pt with rapid, impulsive movements of trunk during bed mobility with multiple attempts for twisting requiring manual and verbal cues to maintain spinal neutral. Pt with poor ability to complete sit>supine with neutral alignment, attempting to roll onto thoracic spine with B knees in hook-lying on R side. Pt requires lift/lower assistance on cues for hand placement with each sit<>stand transfer with limited carryover during session. Pt has forward flexed posture during ambulation requiring manual assistance for walker approximation and verbal cues for increased step length and symmetry. Short distance amb was attempted with [employer] [place] with fair sequencing demonstrated, improved balance and pain control was noted with use of FWW and walker was used for remainder of session. Pt limited by back pain and sensation of spasms during session, requiring increased rest breaks between each component of mobility assessment and declining car transfer due to pain. Pt returned to room upon completion of session with all needs in reach and chair alarm set.     Patient education  Pt educated on spinal precautions, objective findings, appropriate

## 2020-03-08 LAB
ALBUMIN SERPL-MCNC: 2.9 G/DL (ref 3.5–5.2)
ALP BLD-CCNC: 97 U/L (ref 35–104)
ALT SERPL-CCNC: 16 U/L (ref 0–32)
ANION GAP SERPL CALCULATED.3IONS-SCNC: 14 MMOL/L (ref 7–16)
AST SERPL-CCNC: 22 U/L (ref 0–31)
BASOPHILS ABSOLUTE: 0.05 E9/L (ref 0–0.2)
BASOPHILS RELATIVE PERCENT: 0.4 % (ref 0–2)
BILIRUB SERPL-MCNC: 1 MG/DL (ref 0–1.2)
BILIRUBIN URINE: NEGATIVE
BLOOD, URINE: NEGATIVE
BUN BLDV-MCNC: 14 MG/DL (ref 8–23)
CALCIUM SERPL-MCNC: 8.3 MG/DL (ref 8.6–10.2)
CHLORIDE BLD-SCNC: 95 MMOL/L (ref 98–107)
CLARITY: CLEAR
CO2: 24 MMOL/L (ref 22–29)
COLOR: YELLOW
CREAT SERPL-MCNC: 0.8 MG/DL (ref 0.5–1)
EOSINOPHILS ABSOLUTE: 0.27 E9/L (ref 0.05–0.5)
EOSINOPHILS RELATIVE PERCENT: 2.2 % (ref 0–6)
GFR AFRICAN AMERICAN: >60
GFR NON-AFRICAN AMERICAN: >60 ML/MIN/1.73
GLUCOSE BLD-MCNC: 117 MG/DL (ref 74–99)
GLUCOSE URINE: NEGATIVE MG/DL
HCT VFR BLD CALC: 33.7 % (ref 34–48)
HEMOGLOBIN: 10.8 G/DL (ref 11.5–15.5)
IMMATURE GRANULOCYTES #: 0.07 E9/L
IMMATURE GRANULOCYTES %: 0.6 % (ref 0–5)
KETONES, URINE: NEGATIVE MG/DL
LEUKOCYTE ESTERASE, URINE: NEGATIVE
LYMPHOCYTES ABSOLUTE: 2.07 E9/L (ref 1.5–4)
LYMPHOCYTES RELATIVE PERCENT: 17 % (ref 20–42)
MCH RBC QN AUTO: 32.4 PG (ref 26–35)
MCHC RBC AUTO-ENTMCNC: 32 % (ref 32–34.5)
MCV RBC AUTO: 101.2 FL (ref 80–99.9)
MONOCYTES ABSOLUTE: 1.39 E9/L (ref 0.1–0.95)
MONOCYTES RELATIVE PERCENT: 11.4 % (ref 2–12)
NEUTROPHILS ABSOLUTE: 8.33 E9/L (ref 1.8–7.3)
NEUTROPHILS RELATIVE PERCENT: 68.4 % (ref 43–80)
NITRITE, URINE: NEGATIVE
PDW BLD-RTO: 12.3 FL (ref 11.5–15)
PH UA: 8 (ref 5–9)
PLATELET # BLD: 268 E9/L (ref 130–450)
PMV BLD AUTO: 10.8 FL (ref 7–12)
POTASSIUM SERPL-SCNC: 4.6 MMOL/L (ref 3.5–5)
PROTEIN UA: NEGATIVE MG/DL
RBC # BLD: 3.33 E12/L (ref 3.5–5.5)
SODIUM BLD-SCNC: 133 MMOL/L (ref 132–146)
SPECIFIC GRAVITY UA: 1.02 (ref 1–1.03)
TOTAL PROTEIN: 5.7 G/DL (ref 6.4–8.3)
UROBILINOGEN, URINE: 1 E.U./DL
WBC # BLD: 12.2 E9/L (ref 4.5–11.5)

## 2020-03-08 PROCEDURE — 97530 THERAPEUTIC ACTIVITIES: CPT

## 2020-03-08 PROCEDURE — 85025 COMPLETE CBC W/AUTO DIFF WBC: CPT

## 2020-03-08 PROCEDURE — 6370000000 HC RX 637 (ALT 250 FOR IP): Performed by: PHYSICAL MEDICINE & REHABILITATION

## 2020-03-08 PROCEDURE — 80053 COMPREHEN METABOLIC PANEL: CPT

## 2020-03-08 PROCEDURE — 81003 URINALYSIS AUTO W/O SCOPE: CPT

## 2020-03-08 PROCEDURE — 1280000000 HC REHAB R&B

## 2020-03-08 PROCEDURE — 36415 COLL VENOUS BLD VENIPUNCTURE: CPT

## 2020-03-08 PROCEDURE — 97535 SELF CARE MNGMENT TRAINING: CPT

## 2020-03-08 PROCEDURE — 87088 URINE BACTERIA CULTURE: CPT

## 2020-03-08 PROCEDURE — 6370000000 HC RX 637 (ALT 250 FOR IP): Performed by: PHYSICIAN ASSISTANT

## 2020-03-08 RX ADMIN — OXYCODONE HYDROCHLORIDE 5 MG: 5 TABLET ORAL at 16:55

## 2020-03-08 RX ADMIN — CYCLOBENZAPRINE 10 MG: 10 TABLET, FILM COATED ORAL at 05:19

## 2020-03-08 RX ADMIN — GABAPENTIN 400 MG: 400 CAPSULE ORAL at 20:59

## 2020-03-08 RX ADMIN — OXYCODONE HYDROCHLORIDE 5 MG: 5 TABLET ORAL at 05:19

## 2020-03-08 RX ADMIN — HYDROXYZINE PAMOATE 25 MG: 25 CAPSULE ORAL at 09:07

## 2020-03-08 RX ADMIN — OXYCODONE HYDROCHLORIDE 5 MG: 5 TABLET ORAL at 09:07

## 2020-03-08 RX ADMIN — FLUOXETINE HYDROCHLORIDE 40 MG: 20 CAPSULE ORAL at 09:07

## 2020-03-08 RX ADMIN — GABAPENTIN 400 MG: 400 CAPSULE ORAL at 09:08

## 2020-03-08 RX ADMIN — CYCLOBENZAPRINE 10 MG: 10 TABLET, FILM COATED ORAL at 16:55

## 2020-03-08 RX ADMIN — CYCLOBENZAPRINE 10 MG: 10 TABLET, FILM COATED ORAL at 09:07

## 2020-03-08 RX ADMIN — ACETAMINOPHEN 650 MG: 325 TABLET ORAL at 17:05

## 2020-03-08 RX ADMIN — LISINOPRIL 20 MG: 20 TABLET ORAL at 16:55

## 2020-03-08 RX ADMIN — OXYCODONE HYDROCHLORIDE 5 MG: 5 TABLET ORAL at 13:14

## 2020-03-08 RX ADMIN — HYDROXYZINE PAMOATE 25 MG: 25 CAPSULE ORAL at 20:59

## 2020-03-08 RX ADMIN — ATORVASTATIN CALCIUM 40 MG: 40 TABLET, FILM COATED ORAL at 20:59

## 2020-03-08 RX ADMIN — HYDROCHLOROTHIAZIDE 12.5 MG: 12.5 TABLET ORAL at 09:08

## 2020-03-08 RX ADMIN — OXYCODONE HYDROCHLORIDE 5 MG: 5 TABLET ORAL at 21:05

## 2020-03-08 RX ADMIN — BISACODYL 5 MG: 5 TABLET, COATED ORAL at 09:07

## 2020-03-08 RX ADMIN — OXYCODONE HYDROCHLORIDE 5 MG: 5 TABLET ORAL at 00:40

## 2020-03-08 RX ADMIN — MAGNESIUM HYDROXIDE 30 ML: 2400 SUSPENSION ORAL at 17:01

## 2020-03-08 RX ADMIN — POLYETHYLENE GLYCOL 3350 17 G: 17 POWDER, FOR SOLUTION ORAL at 16:55

## 2020-03-08 ASSESSMENT — PAIN SCALES - GENERAL
PAINLEVEL_OUTOF10: 7
PAINLEVEL_OUTOF10: 6
PAINLEVEL_OUTOF10: 6
PAINLEVEL_OUTOF10: 10
PAINLEVEL_OUTOF10: 0
PAINLEVEL_OUTOF10: 8
PAINLEVEL_OUTOF10: 5
PAINLEVEL_OUTOF10: 7
PAINLEVEL_OUTOF10: 10
PAINLEVEL_OUTOF10: 8
PAINLEVEL_OUTOF10: 9

## 2020-03-08 ASSESSMENT — PAIN DESCRIPTION - ORIENTATION
ORIENTATION: RIGHT;LEFT
ORIENTATION: RIGHT;LOWER
ORIENTATION: RIGHT;LEFT
ORIENTATION: RIGHT
ORIENTATION: RIGHT;LOWER

## 2020-03-08 ASSESSMENT — PAIN DESCRIPTION - LOCATION
LOCATION: HIP
LOCATION: BACK
LOCATION: BACK
LOCATION: BACK;HIP
LOCATION: BACK

## 2020-03-08 ASSESSMENT — PAIN DESCRIPTION - PROGRESSION
CLINICAL_PROGRESSION: NOT CHANGED

## 2020-03-08 ASSESSMENT — PAIN DESCRIPTION - FREQUENCY
FREQUENCY: INTERMITTENT

## 2020-03-08 ASSESSMENT — PAIN - FUNCTIONAL ASSESSMENT
PAIN_FUNCTIONAL_ASSESSMENT: PREVENTS OR INTERFERES SOME ACTIVE ACTIVITIES AND ADLS

## 2020-03-08 ASSESSMENT — PAIN DESCRIPTION - PAIN TYPE
TYPE: CHRONIC PAIN

## 2020-03-08 ASSESSMENT — PAIN DESCRIPTION - ONSET
ONSET: ON-GOING

## 2020-03-08 ASSESSMENT — PAIN DESCRIPTION - DESCRIPTORS
DESCRIPTORS: ACHING;DISCOMFORT
DESCRIPTORS: ACHING;DISCOMFORT
DESCRIPTORS: NAGGING;ACHING;DISCOMFORT

## 2020-03-08 NOTE — PROGRESS NOTES
Physical Therapy  Evaluating Therapist: Bc Ruano PT, FRAN[place]IS, [id number]    ROOM: [address] [place] [address]  DIAGNOSIS: Lumbar radiculopathy  PRECAUTIONS: spinal neutrality, LSO, falls    HPI: Pt underwent exploration of prior L3-5 fusion and L2-3 posterior lumbar interbody fusion on 3/5/20 for treatment of worsening lumbar pain. Social: Pt lives with sister and son in a 2 floor house with 1+2 steps and no rail(s) to enter. Bed is on second floor and bath is on second floor. 12 stairs with 1 HR to second floor. She was Mod Independent with [facility] prior to admission. Initial Evaluation  3/7 AM    3/8 PM    Short Term Goals Long Term Goals    Was pt agreeable to Eval/treatment? yes yes      Does pt have pain? 8/10 back pain 8/10 R side pain and low back R side        Bed Mobility  Rolling: Min A  Supine to sit: Mod A  Sit to supine:  Mod A  Scooting: Min A towards EOB Rolling Júnior  Supine to sit ModA  Scooting SBA  Supervision Mod Independent    Transfers Sit to stand: Min A  Stand to sit: Min A  Stand pivot: Min A Sit to stand Júnior  Stand to sit Júnior  Stand pivot [place] with ww  Supervision Mod Independent    Ambulation    4 feet with SBQC with Mod A  30 feet x2, 75 feet with FWW with Min A 70 feet x1 with ww Jún[place] 35 feet x1 with ww J[place]  150 feet with AAD with Supervision 300 feet with AAD with Mod Independent    Walking 10 feet on uneven surface  NT N/T  10 feet with AAD with Min A 10 feet with AAD with Supervision   Wheel Chair Mobility NT N/T      Car Transfers NT, pt declines due to pain N/T due to pain  Min A Supervision   Stair negotiation: ascended and descended  3 steps with B rail with Mod A 2 steps B rails modA  4 steps with 1 rail with Min A 12 steps with 1 rail with Supervision   Curb Step:   ascended and descended NT N/T  7.5 inch step with AAD and Min A 7.5 inch step with AAD and Supervision   Picking up object off the floor NT N/T  Will  object with Min A Will  object with Supervision   BLE ROM WNL BLE Strength 4/5 overall bilaterally       Balance  Sitting EOB: SBA  Dynamic standing: Min A with FWW Sitting EOB SBA  Dynamic standing with ww Angel      Date Family Teach Completed TBD       Is additional Family Teaching Needed? Y or N Y yes      Hindering Progress Pain control, impulsivity, balance deficits, decreased endurance  Pain , decreased endurance      PT recommended ELOS 2 weeks       Team's Discharge Plan        Therapist at Team Meeting          Pt is alert and Oriented x3  Sensation: intact light touch to B LEs, no c/o numbness/tingling  Edema: unremarkable  Endurance: [employer]    Chair alarm: yes         Comments:  Pt lying on side in bed with LSO loose and agreed to tx session. Pt c/o R side pain that radiates. Donned LSO in supine. Pt required ModA for trunk during sidelying to sit. Pt required cues for handplacement during transfers. No LOB during amb but noted increased pain with sit <>stand transfers. Pt returned to room at end of session remained up in chair for lunch. Pt given call light. Patient education  Pt educated on spinal precautions and LSO fit. Patient response to education:   Pt verbalized understanding Pt demonstrated skill Pt requires further education in this area   yes yes yes         PLAN  PT care will be provided in accordance with the objectives noted above. Whenever appropriate, clear delegation orders will be provided for nursing staff. Exercises and functional mobility practice will be used as well as appropriate assistive devices or modalities to obtain goals. Patient and family education will also be administered as needed. Frequency of treatments will be 2x/day M-F and 1x/day Sat or Sun x  2 weeks.     Time in:10:50  Time out:11:20    [place] Code [id number]

## 2020-03-09 PROCEDURE — 97530 THERAPEUTIC ACTIVITIES: CPT

## 2020-03-09 PROCEDURE — 97110 THERAPEUTIC EXERCISES: CPT

## 2020-03-09 PROCEDURE — 6370000000 HC RX 637 (ALT 250 FOR IP): Performed by: PHYSICAL MEDICINE & REHABILITATION

## 2020-03-09 PROCEDURE — 1280000000 HC REHAB R&B

## 2020-03-09 PROCEDURE — 97535 SELF CARE MNGMENT TRAINING: CPT

## 2020-03-09 PROCEDURE — 6370000000 HC RX 637 (ALT 250 FOR IP): Performed by: PHYSICIAN ASSISTANT

## 2020-03-09 RX ORDER — OXYCODONE HYDROCHLORIDE AND ACETAMINOPHEN 5; 325 MG/1; MG/1
1 TABLET ORAL EVERY 4 HOURS PRN
Status: DISCONTINUED | OUTPATIENT
Start: 2020-03-09 | End: 2020-03-11

## 2020-03-09 RX ADMIN — OXYCODONE AND ACETAMINOPHEN 1 TABLET: 5; 325 TABLET ORAL at 18:52

## 2020-03-09 RX ADMIN — HYDROXYZINE PAMOATE 25 MG: 25 CAPSULE ORAL at 17:52

## 2020-03-09 RX ADMIN — BISACODYL 5 MG: 5 TABLET, COATED ORAL at 08:34

## 2020-03-09 RX ADMIN — ATORVASTATIN CALCIUM 40 MG: 40 TABLET, FILM COATED ORAL at 20:40

## 2020-03-09 RX ADMIN — OXYCODONE AND ACETAMINOPHEN 1 TABLET: 5; 325 TABLET ORAL at 15:03

## 2020-03-09 RX ADMIN — OXYCODONE HYDROCHLORIDE 5 MG: 5 TABLET ORAL at 06:41

## 2020-03-09 RX ADMIN — CYCLOBENZAPRINE 10 MG: 10 TABLET, FILM COATED ORAL at 16:04

## 2020-03-09 RX ADMIN — ACETAMINOPHEN 650 MG: 325 TABLET ORAL at 07:25

## 2020-03-09 RX ADMIN — POLYETHYLENE GLYCOL 3350 17 G: 17 POWDER, FOR SOLUTION ORAL at 08:34

## 2020-03-09 RX ADMIN — GABAPENTIN 400 MG: 400 CAPSULE ORAL at 08:34

## 2020-03-09 RX ADMIN — OXYCODONE AND ACETAMINOPHEN 1 TABLET: 5; 325 TABLET ORAL at 23:16

## 2020-03-09 RX ADMIN — LISINOPRIL 20 MG: 20 TABLET ORAL at 17:52

## 2020-03-09 RX ADMIN — FLUOXETINE HYDROCHLORIDE 40 MG: 20 CAPSULE ORAL at 08:34

## 2020-03-09 RX ADMIN — GABAPENTIN 400 MG: 400 CAPSULE ORAL at 20:40

## 2020-03-09 RX ADMIN — OXYCODONE AND ACETAMINOPHEN 1 TABLET: 5; 325 TABLET ORAL at 11:06

## 2020-03-09 RX ADMIN — CYCLOBENZAPRINE 10 MG: 10 TABLET, FILM COATED ORAL at 08:34

## 2020-03-09 RX ADMIN — OXYCODONE HYDROCHLORIDE 5 MG: 5 TABLET ORAL at 02:40

## 2020-03-09 RX ADMIN — CYCLOBENZAPRINE 10 MG: 10 TABLET, FILM COATED ORAL at 23:16

## 2020-03-09 ASSESSMENT — PAIN DESCRIPTION - PAIN TYPE
TYPE: SURGICAL PAIN
TYPE: CHRONIC PAIN
TYPE: SURGICAL PAIN
TYPE: CHRONIC PAIN
TYPE: CHRONIC PAIN

## 2020-03-09 ASSESSMENT — PAIN DESCRIPTION - PROGRESSION
CLINICAL_PROGRESSION: NOT CHANGED

## 2020-03-09 ASSESSMENT — PAIN DESCRIPTION - LOCATION
LOCATION: BACK

## 2020-03-09 ASSESSMENT — PAIN SCALES - GENERAL
PAINLEVEL_OUTOF10: 9
PAINLEVEL_OUTOF10: 5
PAINLEVEL_OUTOF10: 7
PAINLEVEL_OUTOF10: 0
PAINLEVEL_OUTOF10: 8
PAINLEVEL_OUTOF10: 0
PAINLEVEL_OUTOF10: 8
PAINLEVEL_OUTOF10: 10
PAINLEVEL_OUTOF10: 10
PAINLEVEL_OUTOF10: 8
PAINLEVEL_OUTOF10: 8
PAINLEVEL_OUTOF10: 0
PAINLEVEL_OUTOF10: 5
PAINLEVEL_OUTOF10: 0
PAINLEVEL_OUTOF10: 6
PAINLEVEL_OUTOF10: 0

## 2020-03-09 ASSESSMENT — PAIN DESCRIPTION - DESCRIPTORS
DESCRIPTORS: CONSTANT;DISCOMFORT;SORE
DESCRIPTORS: ACHING;DISCOMFORT;SORE
DESCRIPTORS: ACHING;DISCOMFORT;SORE

## 2020-03-09 ASSESSMENT — PAIN DESCRIPTION - ONSET
ONSET: ON-GOING

## 2020-03-09 ASSESSMENT — PAIN DESCRIPTION - FREQUENCY
FREQUENCY: CONTINUOUS

## 2020-03-09 ASSESSMENT — PAIN - FUNCTIONAL ASSESSMENT
PAIN_FUNCTIONAL_ASSESSMENT: PREVENTS OR INTERFERES SOME ACTIVE ACTIVITIES AND ADLS
PAIN_FUNCTIONAL_ASSESSMENT: PREVENTS OR INTERFERES SOME ACTIVE ACTIVITIES AND ADLS

## 2020-03-09 ASSESSMENT — PAIN DESCRIPTION - ORIENTATION
ORIENTATION: LOWER
ORIENTATION: MID;LOWER
ORIENTATION: MID;LOWER

## 2020-03-09 NOTE — PROGRESS NOTES
with 1 rail with Min A 12 steps with 1 rail with Supervision   Curb Step:   ascended and descended NT NT NT 7.5 inch step with AAD and Min A 7.5 inch step with AAD and Supervision   Picking up object off the floor NT NT NT Will  object with Min A Will  object with Supervision   BLE ROM WNL WNL      BLE Strength 4/5 overall bilaterally 4/5 BLE      Balance  Sitting EOB: SBA  Dynamic standing: Min A with FWW Sitting: SBA  Standing: CGA with [employer]      Date Family Teach Completed TBD TBD      Is additional Family Teaching Needed? Y or N Y Yes      Hindering Progress Pain control, impulsivity, balance deficits, decreased endurance  Pain, decreased endurance      PT recommended ELOS 2 weeks       Team's Discharge Plan        Therapist at Team Meeting          Therapeutic Exercise:   AM:   - Functional mobility as noted above in chart  - STS x3 reps to improve BLE strength and function  PM:   - Functional mobility as noted above in chart  - Seated dip machine 2x5 with 10lbs on each side to improve BUE strength  - LAQ 2x5/side to improve BLE strength  - Ankle pumps 2x25 to improve BLE strength    Additional Comments: PT reported significant levels of LBP and R hip pain with all functional mobility as well as during rest. Pt requires increased time to complete sit<>stand and cues for proper hand placement and trunk posture. Pt ambulated with a slow gait speed and cited pain as her limiting factor. Pt's R knee buckled during ambulation but did not require assistance to correct for this. Pt stated that her R hip pain has been ongoing even before the surgery. Deferred to 1 step forward and backward stair step up downs in the AM session to ensure safety due to pt's high pain levels and reported fatigue prior to starting the activity.      Patient/family education  Pt/family educated on gait with [employer], sit<>stand posture/technique, stair negotiation technique    Patient/family response to education:   Pt/family verbalized understanding Pt/family demonstrated skill Pt/family requires further education in this area   yes With cues yes     AM  Time in: 0915  Time out: 1000    PM  Time in: 1430  Time out: 1515    Pt is making good progress toward established Physical Therapy goals. Continue with physical therapy current plan of care.     Delphine Hernadez, DPT  [id number]

## 2020-03-09 NOTE — PROGRESS NOTES
Physical Therapy  Weekly Team Note  Evaluating Therapist: Krishna Chaidez, PT, DPT, [id number]    ROOM: [address] [place] [address]  DIAGNOSIS: Lumbar radiculopathy  PRECAUTIONS: spinal neutrality, LSO, falls    HPI: Pt underwent exploration of prior L3-5 fusion and L2-3 posterior lumbar interbody fusion on 3/5/20 for treatment of worsening lumbar pain. Social: Pt lives with sister and son in a 2 floor house with 1+2 steps and no rail(s) to enter. Bed is on second floor and bath is on second floor. 12 stairs with 1 HR to second floor. She was Mod Independent with [facility] prior to admission. Initial Evaluation  3/7 3/9/20    Comments   Short Term Goals Long Term Goals    Bed Mobility  Rolling: Min A  Supine to sit: Mod A  Sit to supine:  Mod A  Scooting: Min A towards EOB Rolling: SBA  Supine to sit: Min A  Sit to supine: CGA  Scooting: SBA  Log roll technique Supervision Mod Independent    Transfers Sit to stand: Min A  Stand to sit: Min A  Stand pivot: Min A Sit to stand: CGA  Stand to sit: CGA  Stand pivot: CGA with [employer] Increased time to complete Supervision Mod Independent    Ambulation    4 feet with SBQC with Mod A  30 feet x2, 75 feet with FWW with Min A 50 feet with [employer] CGA; 15 feet x2 reps with [employer] CGA Slow gait speed, antalgic, reported fatigue at end of ambulation 150 feet with AAD with Supervision 300 feet with AAD with Mod Independent    Wheel Chair Mobility NT 25 feet with BUE propulsion SBA Pain limited     Car Transfers NT, pt declines due to pain Mod A Very painful Min A Supervision   Stair negotiation: ascended and descended  3 steps with B rail with Mod A 1 step forward and backward x4 reps with B rails CGA  4 steps with 1 rail with Min A 12 steps with 1 rail with Supervision   BLE ROM WNL WNL      BLE Strength 4/5 overall bilaterally 4/5 BLE      Balance  Sitting EOB: SBA  Dynamic standing: Min A with FWW Sitting: SBA  Standing: CGA with [employer]      Date Family Teach Completed TBD TBD      Is additional Family Teaching Needed? Y or N Y Yes      Hindering Progress Pain control, impulsivity, balance deficits, decreased endurance  Pain, decreased endurance      PT recommended ELOS 2 weeks 2 weeks      Team's Discharge Plan  2 weeks      Therapist at [facility], [facility]  Desma Halsted, [place], FRANCOIS [id number]          Date:  3/9/20  Supporting factors: Mod Independent PLOF, family support  Barriers to discharge:  Pain, decreased endurance/overall mobility  Additional comments:  Pt is mainly limited by pain and fatigue at this time. Pt should continue to use [employer] for ambulation at this time to increase stability/balance. Should see an improvement in pt's functional mobility once pt is experiencing less pain.   DME:  [employer]  After Care:  Home health PT    Vanesa Srivastava  [id number]

## 2020-03-09 NOTE — DISCHARGE SUMMARY
daily as needed for Anxiety             lisinopril (PRINIVIL;ZESTRIL) 20 MG tablet  Take 1 tablet by mouth every evening             oxyCODONE (ROXICODONE) 5 MG immediate release tablet  Take 1 tablet by mouth every 4 hours as needed for Pain for up to 7 days. quinapril (ACCUPRIL) 40 MG tablet  Take 1 tablet by mouth daily For blood pressure.                  Darleen Lopez  3/9/2020

## 2020-03-09 NOTE — PROGRESS NOTES
OCCUPATIONAL THERAPY DAILY NOTE    Date:3/9/2020  Patient Name: Michael Delgado  MRN: [id number]  : 1955  [facility]     Referring Practitioner:   Diagnosis: lumbar radiculopathy (Pt underwent exploration of prior L3-L5 fusion and L2-L3 PLIF on 3/4)   Additional Pertinent Hx: fibromyalgia, DM II, HTN, RA, TIA, chronic back pain (has had back surgery), CA, foot surgery  Precautions:  falls, spinal, LSO (pt can tima sititng EOB per verbally speaking with  3/7)      Functional Assessment:   Date Status AE  Comments   Feeding 3/9/2020 Independent      Grooming 3/9/2020  SBA  Washing/drying hands standing at sink. Bathing 3/7/2020  UB- set up  LB mod assist     UB Dressing 3/8/2020  max assist     LB Dressing 3/9/20  max assist Reacher  sock aid Donning slip on shoes with assist required to manage back of shoe around heel. Homemaking   TBD       Functional Transfers / Balance:   Date Status DME  Comments   Sit Balance 3/9/2020  S/SBA  Seated at EOB and on commode. Stand Balance 3/9/2020 CGA ww For balance/safety d/t unsteadiness during functional activities. [] Tub  [x] Shower   Transfer 3/7/2020  min assist     Commode   Transfer      Toileting 3/9/2020        3/9/20 CGA        Min A W/w  Min cues for hand placement/safety required. Pt required assist for balance/safety during clothing management d/t unsteadiness requiring Min A to recover. Functional   Mobility 3/9/2020 CGA ww Completed within pt's room/bathroom with min cues for walker safety and proximity. Other: supine <>EOB 3/9/2020 SBA Bed rail Min cues for technique and to maintain precautions required. Functional Exercises / Activity:  -UBE completed seated at table 2x5 minutes to increase B UE strength/endurance for functional activities with fair+ tolerance. -Yellow [place] completed x40 reps in all planes with focus on increasing B UE strength/endurance for functional activities. Fair tolerance.      Pt c/o Missed -         Total Tx Time 90 Mins   Viva Mediate OTR/L [place]. Emperatrizo MENDEZ/L [id number]    I have read & agree with the above status.     Viva Mediate OTR/L [id number]

## 2020-03-09 NOTE — PROGRESS NOTES
03/09/20 1440   Attendance   Activity Puzzles  [facility] [facility]   Participation Active participation   [place] Demonstrates ability to complete social goals   Social Skills Cooperates with others in group activity   Leisure Education Demonstrates knowledge of benefits of leisure involvement  [place] identified dexterity and socialization as benefits.)   Time Spent With Patient   Minutes 30

## 2020-03-10 LAB — URINE CULTURE, ROUTINE: NORMAL

## 2020-03-10 PROCEDURE — 97530 THERAPEUTIC ACTIVITIES: CPT

## 2020-03-10 PROCEDURE — 1280000000 HC REHAB R&B

## 2020-03-10 PROCEDURE — 97110 THERAPEUTIC EXERCISES: CPT

## 2020-03-10 PROCEDURE — 6370000000 HC RX 637 (ALT 250 FOR IP): Performed by: PHYSICAL MEDICINE & REHABILITATION

## 2020-03-10 PROCEDURE — 97535 SELF CARE MNGMENT TRAINING: CPT

## 2020-03-10 PROCEDURE — 6370000000 HC RX 637 (ALT 250 FOR IP): Performed by: PHYSICIAN ASSISTANT

## 2020-03-10 RX ORDER — GABAPENTIN 400 MG/1
400 CAPSULE ORAL 3 TIMES DAILY
Status: DISCONTINUED | OUTPATIENT
Start: 2020-03-10 | End: 2020-03-19 | Stop reason: HOSPADM

## 2020-03-10 RX ADMIN — HYDROXYZINE PAMOATE 25 MG: 25 CAPSULE ORAL at 17:50

## 2020-03-10 RX ADMIN — OXYCODONE AND ACETAMINOPHEN 1 TABLET: 5; 325 TABLET ORAL at 04:05

## 2020-03-10 RX ADMIN — FLUOXETINE HYDROCHLORIDE 40 MG: 20 CAPSULE ORAL at 08:32

## 2020-03-10 RX ADMIN — GABAPENTIN 400 MG: 400 CAPSULE ORAL at 21:43

## 2020-03-10 RX ADMIN — POLYETHYLENE GLYCOL 3350 17 G: 17 POWDER, FOR SOLUTION ORAL at 08:31

## 2020-03-10 RX ADMIN — ATORVASTATIN CALCIUM 40 MG: 40 TABLET, FILM COATED ORAL at 21:43

## 2020-03-10 RX ADMIN — OXYCODONE AND ACETAMINOPHEN 1 TABLET: 5; 325 TABLET ORAL at 08:31

## 2020-03-10 RX ADMIN — HYDROXYZINE PAMOATE 25 MG: 25 CAPSULE ORAL at 08:32

## 2020-03-10 RX ADMIN — OXYCODONE AND ACETAMINOPHEN 1 TABLET: 5; 325 TABLET ORAL at 13:28

## 2020-03-10 RX ADMIN — GABAPENTIN 400 MG: 400 CAPSULE ORAL at 08:32

## 2020-03-10 RX ADMIN — OXYCODONE AND ACETAMINOPHEN 1 TABLET: 5; 325 TABLET ORAL at 22:48

## 2020-03-10 RX ADMIN — LISINOPRIL 20 MG: 20 TABLET ORAL at 18:27

## 2020-03-10 RX ADMIN — CYCLOBENZAPRINE 10 MG: 10 TABLET, FILM COATED ORAL at 17:50

## 2020-03-10 RX ADMIN — OXYCODONE AND ACETAMINOPHEN 1 TABLET: 5; 325 TABLET ORAL at 17:50

## 2020-03-10 RX ADMIN — GABAPENTIN 400 MG: 400 CAPSULE ORAL at 13:28

## 2020-03-10 RX ADMIN — BISACODYL 5 MG: 5 TABLET, COATED ORAL at 08:31

## 2020-03-10 ASSESSMENT — PAIN DESCRIPTION - FREQUENCY
FREQUENCY: CONTINUOUS

## 2020-03-10 ASSESSMENT — PAIN DESCRIPTION - PROGRESSION
CLINICAL_PROGRESSION: NOT CHANGED

## 2020-03-10 ASSESSMENT — PAIN DESCRIPTION - ONSET
ONSET: ON-GOING

## 2020-03-10 ASSESSMENT — PAIN SCALES - GENERAL
PAINLEVEL_OUTOF10: 9
PAINLEVEL_OUTOF10: 8
PAINLEVEL_OUTOF10: 5
PAINLEVEL_OUTOF10: 0
PAINLEVEL_OUTOF10: 9
PAINLEVEL_OUTOF10: 7
PAINLEVEL_OUTOF10: 9
PAINLEVEL_OUTOF10: 0
PAINLEVEL_OUTOF10: 9
PAINLEVEL_OUTOF10: 0
PAINLEVEL_OUTOF10: 6

## 2020-03-10 ASSESSMENT — PAIN DESCRIPTION - DESCRIPTORS
DESCRIPTORS: CONSTANT;DISCOMFORT;SHARP;SORE
DESCRIPTORS: ACHING;CONSTANT;DISCOMFORT
DESCRIPTORS: CONSTANT;SHARP;SHOOTING
DESCRIPTORS: CONSTANT
DESCRIPTORS: CONSTANT;DISCOMFORT;SHARP

## 2020-03-10 ASSESSMENT — PAIN DESCRIPTION - PAIN TYPE
TYPE: CHRONIC PAIN
TYPE: SURGICAL PAIN
TYPE: CHRONIC PAIN
TYPE: ACUTE PAIN;SURGICAL PAIN

## 2020-03-10 ASSESSMENT — PAIN DESCRIPTION - LOCATION
LOCATION: BACK
LOCATION: BACK;LEG
LOCATION: BACK;HIP
LOCATION: BACK;LEG
LOCATION: BACK;HIP
LOCATION: BACK

## 2020-03-10 ASSESSMENT — PAIN DESCRIPTION - ORIENTATION
ORIENTATION: RIGHT
ORIENTATION: RIGHT
ORIENTATION: LOWER
ORIENTATION: LOWER;RIGHT

## 2020-03-10 ASSESSMENT — PAIN - FUNCTIONAL ASSESSMENT: PAIN_FUNCTIONAL_ASSESSMENT: PREVENTS OR INTERFERES SOME ACTIVE ACTIVITIES AND ADLS

## 2020-03-10 NOTE — PROGRESS NOTES
OCCUPATIONAL THERAPY DAILY NOTE    Date:3/10/2020  Patient Name: Milka Chavez  MRN: [id number]  : 1955  Room: [address] [place], [place] [address]     Referring Practitioner:   Diagnosis: lumbar radiculopathy (Pt underwent exploration of prior L3-L5 fusion and L2-L3 PLIF on 3/4)   Additional Pertinent Hx: fibromyalgia, DM II, HTN, RA, TIA, chronic back pain (has had back surgery), CA, foot surgery  Precautions:  falls, spinal, LSO (pt can tima sititng EOB per verbally speaking with  3/7)      Functional Assessment:   Date Status AE  Comments   Feeding 3/9/2020 Independent      Grooming 3/10/2020  S/SBA seated Pt washed face/hands seated at sink. Pt applied deodorant and brushed hair while seated. .    Bathing 3/10/2020  UB- set up  LB min/mod assist  Pt washed neck, arms, chest and stomach after set up. Pt needed mod assist to reach legs/feet and may benefit using LH sponge for back precautions. Pt stood for troy care and washing buttocks at sink. UB Dressing 3/10/2020 Mod A  Pt donned back brace seated on EOB with mod A..   Pt donned t- shirt needing min assist.    LB Dressing 3/10/20  mod/Max assist Reacher  sock aid  LH shoe horn Pt needed vc's using reacher to tima underwear/pants over feet and pulling garment up over hips while standing. Demo how to use sock aid to tima socks for back precautions. Pt used LH shoe horn to tima slip ons needing vc's. Homemaking   TBD       Functional Transfers / Balance:   Date Status DME  Comments   Sit Balance 3/10/2020  S/SBA  Seated at EOB and up in w/c   Stand Balance 3/10/2020 CGA ww  sink Standing balance during troy care, clothing mgmt and transfers . [] Tub  [x] Shower   Transfer 3/7/2020  min assist  3/10/20 Shower pending Dr. Karyle Puller per consult with OTR/L and nurse.     Commode   Transfer      Toileting 3/9/2020        3/9/20 CGA        Min A W/w     Functional   Mobility 3/9/2020 CGA ww .   Other: supine >EOB    EOB to w/c  3/10/2020      3/10/20 SBA      CGA Breakdown  Variance:   First Session  8:17am 9:10am [x] Individual Tx- 53 Mins  [] Concurrent Tx -   [] Co-Tx -   [] Group Tx -   [] Time Missed -     Second Session [id number] [] Individual Tx-   [x] Concurrent Tx -45 Mins  [] Co-Tx -   [] Group Tx -   [] Time Missed -     Third Session    [] Individual Tx-   [] Concurrent Tx -  [] Co-Tx -   [] Group Tx -   [] Time Missed -         Total Tx Time 98 Mins   Oskar Linea MENDEZ/L [address] MENDEZ/L [id number]    I have read & agree with the above status.     Godwin Elias OTR/L [id number]

## 2020-03-10 NOTE — PATIENT CARE CONFERENCE
assist, pain hinders  Short term wheelchair goal: met  Long term wheelchair goal: Modified independent      Car transfers:   Current level: mod assist  Short term car transfers goal: min assist  Long term car transfers goal:supervision    Stairs:   Current level : 1 with 2 rails at Contact guard assist  Short term stairs goal: 4 at min assist  Long term stairs goal: 12 at supervision    Lower Extremity Strength Issues:  4/5 bilaterally    Other comments: standing balance is Contact guard assist with a wheeled walker      OCCUPATIONAL THERAPY:      Tub/shower:   Current level: min assist with wheeled walker  Short term tub/shower goal: Contact guard assist  Long term tub/shower goal: standby assist      Feeding:  Current level: independent  Short term feeding goal: independent  Long term feeding goal: independent    Grooming:   Current level: supervision  Short term grooming goal: Modified independent  Long term grooming goal: Modified independent    Bathing:  Current level: mod assist  Short term bathing goal: min assist  Long term bathing goal: standby assist    Homemaking:   Current level: to be assessed    Upper body dressing:  Current level: mod assist due to LSO brace  Short term upper body dressing goal: min assist  Long term upper body dressing goal: Modified independent    Lower body dressing:  Current level: max assist with adaptive equipment  Short term lower body dressing goal: mod assist-min assist  Long term lower body dressing goal: Modified independent    Toilet transfer:   Current level: min assist with wheeled walker, pain  hinders  Short term toilet transfer goal: Contact guard assist  Long term toilet transfer goal: Modified independent      Safety awareness: fair      Patient/family's personal goals: \"get done with rehab\"  Factors supporting goal achievement:  family support, prior level of function  Factors hindering goal achievement:  pain, decreased endurance      Discharge Plan   Estimated

## 2020-03-10 NOTE — PROGRESS NOTES
Physical Therapy  Daily Treatment Note  Evaluating Therapist: Paddy Rhodes PT, FRANCOIS, [id number]    ROOM: [address] [place] [address]  DIAGNOSIS: Lumbar radiculopathy  PRECAUTIONS: spinal neutrality, LSO, falls    HPI: Pt underwent exploration of prior L3-5 fusion and L2-3 posterior lumbar interbody fusion on 3/5/20 for treatment of worsening lumbar pain. Social: Pt lives with sister and son in a 2 floor house with 1+2 steps and no rail(s) to enter. Bed is on second floor and bath is on second floor. 12 stairs with 1 HR to second floor. She was Mod Independent with [facility] prior to admission. Initial Evaluation  3/7 AM    PM    Short Term Goals Long Term Goals    Was pt agreeable to Eval/treatment? yes yes yes     Does pt have pain? 8/10 back pain 8/10 back pain and R hip pain   7/10 back pain 9/10 R hip pain     Bed Mobility  Rolling: Min A  Supine to sit: Mod A  Sit to supine:  Mod A  Scooting: Min A towards EOB Rolling: SBA  Supine to sit: Min A  Sit to supine: Min A  Scooting: SBA  SBA all aspects in hospital bed Supervision Mod Independent    Transfers Sit to stand: Min A  Stand to sit: Min A  Stand pivot: Min A Sit to stand: CGA  Stand to sit: CGA  Stand pivot: CGA with [employer] Sit to stand: CGA  Stand to sit: CGA  Stand pivot: CGA with [employer] Supervision Mod Independent    Ambulation    4 feet with SBQC with Mod A  30 feet x2, 75 feet with FWW with Min A 50 feet front [employer] CGA and 100 feet front [employer] CGA 50 feet x 2 front [employer]  feet with AAD with Supervision 300 feet with AAD with Mod Independent    Walking 10 feet on uneven surface  NT NT NT 10 feet with AAD with Min A 10 feet with AAD with Supervision   Wheel Chair Mobility NT NT NT     Car Transfers NT, pt declines due to pain NT NT Min A Supervision   Stair negotiation: ascended and descended  3 steps with B rail with Mod A NT NT 4 steps with 1 rail with Min A 12 steps with 1 rail with Supervision   Curb Step:   ascended and descended NT NT NT 7.5 inch step with AAD and Min A 7.5 inch step with AAD and Supervision   Picking up object off the floor NT NT NT Will  object with Min A Will  object with Supervision   BLE ROM WNL WNL      BLE Strength 4/5 overall bilaterally 4/5 BLE      Balance  Sitting EOB: SBA  Dynamic standing: Min A with FWW Sitting: SBA  Standing: CGA with [employer]      Date Family Teach Completed TBD TBD      Is additional Family Teaching Needed? Y or N Y Yes      Hindering Progress Pain control, impulsivity, balance deficits, decreased endurance  Pain, decreased endurance      PT recommended ELOS 2 weeks       Team's Discharge Plan        Therapist at Team Meeting          Therapeutic Exercise:   AM:   - Functional mobility as noted above in chart  - STS x5 reps to improve BLE strength and function  - in supine position RLE stretching (hamstring, quads, RLE distraction for relief of hip pain)    PM:   - Functional mobility as noted above in chart  - seated therapeutic exercises: LAQ, isometric hip adduction, hip abduction orange theraband, ankle df/pf all performed 3 x 10  - repeated bouts of rolling R and L in bed for doffing of LSO at end of session in room. Additional Comments: Pt reporting significant tightness and pain throughout R hip as well as post-op back pain. Pt was placed in supine position on mat table for performance of RLE stretching. No neural tension noted with SLR test.  Quads and hamstrings were stretched to pt tolerance and long axis distraction was applied to RLE for relief of hip pain. Pt reported mild improvement in hip pain at end of session but back pain remained persistent. In PM session hip pain was elevated to 9/10 which pt states is from \"sitting up too long\" and back pain remained the same. Pt's pain seems to be multifaceted with reports of hip joint type pain as well as radicular pain.       Patient/family education  Pt educated on gait with WW, sit<>stand technique    Patient/family response to education:   Pt/family

## 2020-03-10 NOTE — PROGRESS NOTES
03/10/20 1258   Attendance   Activity Puzzles  ([address] [facility])   Participation Active participation   [place] Demonstrates ability to complete social goals   Social Skills Cooperates with others in group activity   Leisure Education Demonstrates knowledge of benefits of leisure involvement  Riccardo Owusu identified enjoyment as a benefit.)   Time Spent With Patient   Minutes 30

## 2020-03-11 PROCEDURE — 1280000000 HC REHAB R&B

## 2020-03-11 PROCEDURE — 97110 THERAPEUTIC EXERCISES: CPT

## 2020-03-11 PROCEDURE — 6370000000 HC RX 637 (ALT 250 FOR IP): Performed by: NEUROLOGICAL SURGERY

## 2020-03-11 PROCEDURE — 97530 THERAPEUTIC ACTIVITIES: CPT

## 2020-03-11 PROCEDURE — 6370000000 HC RX 637 (ALT 250 FOR IP): Performed by: PHYSICAL MEDICINE & REHABILITATION

## 2020-03-11 PROCEDURE — 6370000000 HC RX 637 (ALT 250 FOR IP): Performed by: PHYSICIAN ASSISTANT

## 2020-03-11 RX ORDER — METHOCARBAMOL 750 MG/1
750 TABLET, FILM COATED ORAL 4 TIMES DAILY
Status: DISCONTINUED | OUTPATIENT
Start: 2020-03-11 | End: 2020-03-19 | Stop reason: HOSPADM

## 2020-03-11 RX ORDER — OXYCODONE HYDROCHLORIDE AND ACETAMINOPHEN 5; 325 MG/1; MG/1
1.5 TABLET ORAL EVERY 4 HOURS PRN
Status: DISCONTINUED | OUTPATIENT
Start: 2020-03-11 | End: 2020-03-17

## 2020-03-11 RX ADMIN — METHOCARBAMOL TABLETS 750 MG: 750 TABLET, COATED ORAL at 13:29

## 2020-03-11 RX ADMIN — BISACODYL 5 MG: 5 TABLET, COATED ORAL at 08:45

## 2020-03-11 RX ADMIN — OXYCODONE AND ACETAMINOPHEN 1 TABLET: 5; 325 TABLET ORAL at 08:45

## 2020-03-11 RX ADMIN — HYDROXYZINE PAMOATE 25 MG: 25 CAPSULE ORAL at 15:18

## 2020-03-11 RX ADMIN — OXYCODONE AND ACETAMINOPHEN 1 TABLET: 5; 325 TABLET ORAL at 02:28

## 2020-03-11 RX ADMIN — METHOCARBAMOL TABLETS 750 MG: 750 TABLET, COATED ORAL at 20:09

## 2020-03-11 RX ADMIN — LISINOPRIL 20 MG: 20 TABLET ORAL at 18:05

## 2020-03-11 RX ADMIN — GABAPENTIN 400 MG: 400 CAPSULE ORAL at 20:09

## 2020-03-11 RX ADMIN — HYDROXYZINE PAMOATE 25 MG: 25 CAPSULE ORAL at 02:29

## 2020-03-11 RX ADMIN — GABAPENTIN 400 MG: 400 CAPSULE ORAL at 08:44

## 2020-03-11 RX ADMIN — OXYCODONE AND ACETAMINOPHEN 1.5 TABLET: 5; 325 TABLET ORAL at 23:04

## 2020-03-11 RX ADMIN — GABAPENTIN 400 MG: 400 CAPSULE ORAL at 15:18

## 2020-03-11 RX ADMIN — OXYCODONE AND ACETAMINOPHEN 1.5 TABLET: 5; 325 TABLET ORAL at 13:29

## 2020-03-11 RX ADMIN — METHOCARBAMOL TABLETS 750 MG: 750 TABLET, COATED ORAL at 18:05

## 2020-03-11 RX ADMIN — ATORVASTATIN CALCIUM 40 MG: 40 TABLET, FILM COATED ORAL at 20:09

## 2020-03-11 RX ADMIN — ACETAMINOPHEN 650 MG: 325 TABLET ORAL at 21:49

## 2020-03-11 RX ADMIN — HYDROXYZINE PAMOATE 25 MG: 25 CAPSULE ORAL at 09:08

## 2020-03-11 RX ADMIN — CYCLOBENZAPRINE 10 MG: 10 TABLET, FILM COATED ORAL at 08:44

## 2020-03-11 RX ADMIN — FLUOXETINE HYDROCHLORIDE 40 MG: 20 CAPSULE ORAL at 08:45

## 2020-03-11 RX ADMIN — OXYCODONE AND ACETAMINOPHEN 1.5 TABLET: 5; 325 TABLET ORAL at 18:16

## 2020-03-11 RX ADMIN — CYCLOBENZAPRINE 10 MG: 10 TABLET, FILM COATED ORAL at 02:29

## 2020-03-11 ASSESSMENT — PAIN SCALES - GENERAL
PAINLEVEL_OUTOF10: 0
PAINLEVEL_OUTOF10: 10
PAINLEVEL_OUTOF10: 7
PAINLEVEL_OUTOF10: 8
PAINLEVEL_OUTOF10: 8
PAINLEVEL_OUTOF10: 10
PAINLEVEL_OUTOF10: 7
PAINLEVEL_OUTOF10: 8
PAINLEVEL_OUTOF10: 3
PAINLEVEL_OUTOF10: 8
PAINLEVEL_OUTOF10: 0
PAINLEVEL_OUTOF10: 10
PAINLEVEL_OUTOF10: 7
PAINLEVEL_OUTOF10: 0

## 2020-03-11 ASSESSMENT — PAIN DESCRIPTION - DESCRIPTORS
DESCRIPTORS: DISCOMFORT;CONSTANT;SHOOTING
DESCRIPTORS: CONSTANT;DISCOMFORT;SPASM
DESCRIPTORS: CONSTANT;DISCOMFORT
DESCRIPTORS: CONSTANT;DISCOMFORT;SPASM
DESCRIPTORS: ACHING;CONSTANT;DISCOMFORT;SHOOTING

## 2020-03-11 ASSESSMENT — PAIN - FUNCTIONAL ASSESSMENT
PAIN_FUNCTIONAL_ASSESSMENT: PREVENTS OR INTERFERES SOME ACTIVE ACTIVITIES AND ADLS

## 2020-03-11 ASSESSMENT — PAIN DESCRIPTION - LOCATION
LOCATION: BACK;HIP;KNEE
LOCATION: HIP;BACK
LOCATION: HIP;BACK
LOCATION: BACK
LOCATION: BACK;HIP

## 2020-03-11 ASSESSMENT — PAIN DESCRIPTION - ORIENTATION
ORIENTATION: RIGHT

## 2020-03-11 ASSESSMENT — PAIN DESCRIPTION - ONSET
ONSET: ON-GOING

## 2020-03-11 ASSESSMENT — PAIN DESCRIPTION - FREQUENCY
FREQUENCY: CONTINUOUS

## 2020-03-11 ASSESSMENT — PAIN DESCRIPTION - PROGRESSION
CLINICAL_PROGRESSION: NOT CHANGED
CLINICAL_PROGRESSION: GRADUALLY WORSENING
CLINICAL_PROGRESSION: GRADUALLY WORSENING
CLINICAL_PROGRESSION: NOT CHANGED

## 2020-03-11 ASSESSMENT — PAIN DESCRIPTION - PAIN TYPE
TYPE: SURGICAL PAIN
TYPE: CHRONIC PAIN
TYPE: CHRONIC PAIN;SURGICAL PAIN

## 2020-03-11 NOTE — PROGRESS NOTES
SBA      CGA Bed rail      ww Pt used log rolling method for supine> EOB transfers     Pt used ww for back precautions during bed to w/c transfers. Functional Exercises / Activity:  Pt completed therapeutic leisure puzzle activity seated at table with 1# wrist weights donned with focus on increasing B UE strength/endurance for functional activities. Pt required Min A and increased time to complete 48 piece puzzle. Pt pain is a limiting factor with regards to endurance & nursing & MD aware    Pt demo CGA-MIn A sit<>stand with increased time to get on her feet. Pt stood @ high low table while engaged in a functional activity standing @ the high low table. Pt took rests as need & min vc's to ensure good safety stand<>sit. Pt activity tolerance continues to be hindered by pain     Sensory / Neuromuscular Re-Education:      Cognitive Skills:   Status Comments   Problem   Solving fair    Memory fair    Sequencing fair    Safety fair      Visual Perception:    Education:  Pt educated with A.E to increase LB dressing while following back precautions. Pt educated with safety during sit to stand transfers on/off EOB and w/c to increase awareness. [] Family teach completed on:    Pain Level :9/10 back pain;nurse just gave patient pain medication. AM tx session: Pt c/o pain 9/10 in back and reports pain hindering concentration. Additional Notes:     Patient has made fair progress during treatment sessions toward set goals. Therapy emphasis to obtain goals:  Current Treatment Recommendations: Strengthening, Endurance Training, Neuromuscular Re-education, Patient/Caregiver Education & Training, ROM, Equipment Evaluation, Education, & procurement, Balance Training, Gait Training, Self-Care / ADL, Functional Mobility Training, Safety Education & Training, Home Management Training        [x] Continue with current OT Plan of care.   [] Prepare for Discharge     DISCHARGE RECOMMENDATIONS  Recommended DME:    Cely Serrato Discharge Care:   []Home Independently  []Home with 24hr Care / Supervision []Home with Partial Supervision []Home with Home Health OT []Home with Out Pt OT []Other: ___   Comments:         Time in Time out Tx Time Breakdown  Variance:   First Session  [id number] [x] Individual Tx- 45 Mins  [] Concurrent Tx -   [] Co-Tx -   [] Group Tx -   [] Time Missed -     Second Session 1345 1430 [] Individual Tx-   [x] Concurrent Tx - 45 Mins  [] Co-Tx -   [] Group Tx -   [] Time Missed -     Third Session    [] Individual Tx-   [] Concurrent Tx -  [] Co-Tx -   [] Group Tx -   [] Time Missed -         Total Tx Time 90 Mins   Renuka Contreras OTR/L [id number]

## 2020-03-11 NOTE — PROGRESS NOTES
Physical Therapy  Daily Treatment Note  Evaluating Therapist: Felix Rodas, PT, DPT, [id number]    ROOM: [address] [place] [address]  DIAGNOSIS: Lumbar radiculopathy  PRECAUTIONS: spinal neutrality, LSO, falls    HPI: Pt underwent exploration of prior L3-5 fusion and L2-3 posterior lumbar interbody fusion on 3/5/20 for treatment of worsening lumbar pain. Social: Pt lives with sister and son in a 2 floor house with 1+2 steps and no rail(s) to enter. Bed is on second floor and bath is on second floor. 12 stairs with 1 HR to second floor. She was Mod Independent with [facility] prior to admission. Initial Evaluation  3/7 AM    PM    Short Term Goals Long Term Goals    Was pt agreeable to Eval/treatment? yes yes yes     Does pt have pain? 8/10 back pain 8/10 back pain and R hip pain   9/10 back, R hip, and now R knee pain     Bed Mobility  Rolling: Min A  Supine to sit: Mod A  Sit to supine:  Mod A  Scooting: Min A towards EOB NT NT Supervision Mod Independent    Transfers Sit to stand: Min A  Stand to sit: Min A  Stand pivot: Min A Sit to stand: Angel  Stand to sit: Angel  Stand pivot: NT pt unable Sit to stand: Angel  Stand to sit: Angel  Stand pivot: Angel with [employer] Supervision Mod Independent    Ambulation    4 feet with SBQC with Mod A  30 feet x2, 75 feet with FWW with Min A NT pt unable to maintain weight bearing on RLE this AM 50 feet x 2 front [employer]  feet with AAD with Supervision 300 feet with AAD with Mod Independent    Walking 10 feet on uneven surface  NT NT NT 10 feet with AAD with Min A 10 feet with AAD with Supervision   Wheel Chair Mobility NT NT NT     Car Transfers NT, pt declines due to pain NT NT Min A Supervision   Stair negotiation: ascended and descended  3 steps with B rail with Mod A NT NT 4 steps with 1 rail with Min A 12 steps with 1 rail with Supervision   Curb Step:   ascended and descended NT NT NT 7.5 inch step with AAD and Min A 7.5 inch step with AAD and Supervision   Picking up object off the floor NT NT NT

## 2020-03-11 NOTE — PROGRESS NOTES
03/11/20 1304   Attendance   Activity Puzzles  (Jigsaw Puzzles)   Participation Active participation   [place] Demonstrates ability to complete social goals   Social Skills Interacts independently in social activity   Leisure Education Demonstrates knowledge of benefits of leisure involvement  Mikel Denis identified accomplishment as a benefit.)   Time Spent With Patient   Minutes 40

## 2020-03-11 NOTE — PROGRESS NOTES
Gonzalo Sutherland is a 60 y.o. female patient. Current Facility-Administered Medications   Medication Dose Route Frequency Provider Last Rate Last Dose    oxyCODONE-acetaminophen (PERCOCET) 5-325 MG per tablet 1.5 tablet  1.5 tablet Oral Q4H PRN Candelaria Sherman MD        gabapentin (NEURONTIN) capsule 400 mg  400 mg Oral TID Candelaria Sherman MD   400 mg at 03/11/20 0844    acetaminophen (TYLENOL) tablet 650 mg  650 mg Oral Q4H PRN Candelaria Sherman MD   650 mg at 03/09/20 0725    polyethylene glycol (GLYCOLAX) packet 17 g  17 g Oral Daily PRN Candelaria Sherman MD   17 g at 03/10/20 0831    bisacodyl (DULCOLAX) EC tablet 5 mg  5 mg Oral Daily [place]   5 mg at 03/11/20 0845    fleet rectal enema 1 enema  1 enema Rectal Daily PRN [place]        magnesium hydroxide (MILK OF MAGNESIA) 400 MG/5ML suspension 30 mL  30 mL Oral Daily PRN [place]   30 mL at 03/08/20 1701    atorvastatin (LIPITOR) tablet 40 mg  40 mg Oral Daily [place]   40 mg at 03/10/20 2143    cyclobenzaprine (FLEXERIL) tablet 10 mg  10 mg Oral TID PRN [place]   10 mg at 03/11/20 0844    FLUoxetine (PROZAC) capsule 40 mg  40 mg Oral Daily [place]   40 mg at 03/11/20 0845    hydrOXYzine (VISTARIL) capsule 25 mg  25 mg Oral TID PRN [place]   25 mg at 03/11/20 0229    lisinopril (PRINIVIL;ZESTRIL) tablet 20 mg  20 mg Oral QPM [place]   20 mg at 03/10/20 1827     Allergies   Allergen Reactions    Pcn [Penicillins] Anaphylaxis     Active Problems:    Lumbar radiculopathy  Resolved Problems:    * No resolved hospital problems. *    Blood pressure 108/78, pulse 97, temperature 98.6 °F (37 °C), temperature source Temporal, resp. rate 18, height 5' 5\" (1.651 m), weight 214 lb (97.1 kg), SpO2 98 %, not currently breastfeeding. Subjective:  Symptoms:  Stable. She reports weakness. Diet:  Adequate intake. Activity level: Impaired due to pain. transfers . []? Tub  [x]? Shower   Transfer 3/7/2020  min assist   3/10/20 Shower pending Dr. Keren Roy per consult with OTR/L and nurse. Commode   Transfer        Toileting 3/9/2020           3/9/20 CGA           Min A W/w      Functional   Mobility 3/9/2020 CGA ww .   Other: supine >EOB     EOB to w/c  3/10/2020        3/10/20 SBA        CGA Bed rail        ww Pt used log rolling method for supine> EOB transfers      Pt used ww for back precautions during bed to w/c transfers.       Functional Exercises / Activity:  Pt engaged in am ADL;s to increase independence with dressing, bathing, grooming and transfers while following back precautions and incorporated A.E training.   -Pt completed therapeutic leisure puzzle activity seated at table with 1# wrist weights donned with focus on increasing B UE strength/endurance for functional activities. Pt required Mod/Min A and increased time to complete 48 piece puzzle.      Sensory / Neuromuscular Re-Education:        Cognitive Skills:    Status Comments   Problem   Solving fair     Memory fair     Sequencing fair     Safety fair        Visual Perception:     Education:  Pt educated with A.E to increase LB dressing while following back precautions. Pt educated with safety during sit to stand transfers on/off EOB and w/c to increase awareness. []? Family teach completed on:     Pain Level :9/10 back pain;nurse just gave patient pain medication. AM tx session: Pt c/o pain 9/10 in back and reports pain hindering concentration.      Additional Notes:      Patient has made fair progress during treatment sessions toward set goals.  Therapy emphasis to obtain goals:  Current Treatment Recommendations: Strengthening, Endurance Training, Neuromuscular Re-education, Patient/Caregiver Education & Training, ROM, Equipment Evaluation, Education, & procurement, Balance Training, Gait Training, Self-Care / ADL, Functional Mobility Training, Safety Education & Training, Home Management Training          [x]? Continue with current OT Plan of care. []? Prepare for Discharge                DISCHARGE RECOMMENDATIONS    Recommended DME:     Post Discharge Care:   []? Home Independently  []? Home with 24hr Care / Supervision []? Home with Partial Supervision []? Home with Home Health OT []? Home with Out Pt OT []? Other: ___   Comments:            Time in Time out Tx Time Breakdown  Variance:   First Session  8:17am 9:10am [x]? Individual Tx- 53 Mins  []? Concurrent Tx -   []? Co-Tx -   []? Group Tx -   []? Time Missed -      Second Session [id number] []? Individual Tx-   [x]? Concurrent Tx -45 Mins  []? Co-Tx -   []? Group Tx -   []? Time Missed -      Third Session      []? Individual Tx-   []? Concurrent Tx -  []? Co-Tx -   []? Group Tx -   []? Time Missed -              Total Tx Time 98 Mins   Zafar Page MENDEZ/L [address] MENDEZ/L [address]     I have read & agree with the above status.     Piyush Yenifer OTR/L [id number]                     Revision History                                            Assessment:    Condition: In stable condition. Improving. (Lumbar radiculopathy). Plan:   Encourage ambulation. (She is participating well in therapy  Still complaining of significant pain issues  I increased gabapentin yesterday  We will increase the Percocet to 7.5 mg).        Sania Lou MD  3/11/2020

## 2020-03-12 PROCEDURE — 1280000000 HC REHAB R&B

## 2020-03-12 PROCEDURE — 97530 THERAPEUTIC ACTIVITIES: CPT

## 2020-03-12 PROCEDURE — 97535 SELF CARE MNGMENT TRAINING: CPT

## 2020-03-12 PROCEDURE — 6370000000 HC RX 637 (ALT 250 FOR IP): Performed by: PHYSICAL MEDICINE & REHABILITATION

## 2020-03-12 PROCEDURE — 6370000000 HC RX 637 (ALT 250 FOR IP): Performed by: NEUROLOGICAL SURGERY

## 2020-03-12 PROCEDURE — 97110 THERAPEUTIC EXERCISES: CPT

## 2020-03-12 PROCEDURE — 6370000000 HC RX 637 (ALT 250 FOR IP): Performed by: PHYSICIAN ASSISTANT

## 2020-03-12 RX ADMIN — GABAPENTIN 400 MG: 400 CAPSULE ORAL at 09:09

## 2020-03-12 RX ADMIN — OXYCODONE AND ACETAMINOPHEN 1.5 TABLET: 5; 325 TABLET ORAL at 21:22

## 2020-03-12 RX ADMIN — BISACODYL 5 MG: 5 TABLET, COATED ORAL at 09:17

## 2020-03-12 RX ADMIN — GABAPENTIN 400 MG: 400 CAPSULE ORAL at 21:22

## 2020-03-12 RX ADMIN — ACETAMINOPHEN 650 MG: 325 TABLET ORAL at 09:08

## 2020-03-12 RX ADMIN — ATORVASTATIN CALCIUM 40 MG: 40 TABLET, FILM COATED ORAL at 21:22

## 2020-03-12 RX ADMIN — OXYCODONE AND ACETAMINOPHEN 1.5 TABLET: 5; 325 TABLET ORAL at 17:10

## 2020-03-12 RX ADMIN — METHOCARBAMOL TABLETS 750 MG: 750 TABLET, COATED ORAL at 16:33

## 2020-03-12 RX ADMIN — GABAPENTIN 400 MG: 400 CAPSULE ORAL at 12:56

## 2020-03-12 RX ADMIN — OXYCODONE AND ACETAMINOPHEN 1.5 TABLET: 5; 325 TABLET ORAL at 09:15

## 2020-03-12 RX ADMIN — POLYETHYLENE GLYCOL 3350 17 G: 17 POWDER, FOR SOLUTION ORAL at 09:07

## 2020-03-12 RX ADMIN — OXYCODONE AND ACETAMINOPHEN 1.5 TABLET: 5; 325 TABLET ORAL at 12:51

## 2020-03-12 RX ADMIN — METHOCARBAMOL TABLETS 750 MG: 750 TABLET, COATED ORAL at 09:08

## 2020-03-12 RX ADMIN — FLUOXETINE HYDROCHLORIDE 40 MG: 20 CAPSULE ORAL at 09:08

## 2020-03-12 RX ADMIN — HYDROXYZINE PAMOATE 25 MG: 25 CAPSULE ORAL at 21:22

## 2020-03-12 RX ADMIN — LISINOPRIL 20 MG: 20 TABLET ORAL at 18:17

## 2020-03-12 RX ADMIN — METHOCARBAMOL TABLETS 750 MG: 750 TABLET, COATED ORAL at 12:50

## 2020-03-12 RX ADMIN — METHOCARBAMOL TABLETS 750 MG: 750 TABLET, COATED ORAL at 21:22

## 2020-03-12 RX ADMIN — HYDROXYZINE PAMOATE 25 MG: 25 CAPSULE ORAL at 09:07

## 2020-03-12 ASSESSMENT — PAIN DESCRIPTION - LOCATION
LOCATION: BACK;LEG
LOCATION: GROIN
LOCATION: BACK;GROIN

## 2020-03-12 ASSESSMENT — PAIN SCALES - GENERAL
PAINLEVEL_OUTOF10: 8
PAINLEVEL_OUTOF10: 8
PAINLEVEL_OUTOF10: 6
PAINLEVEL_OUTOF10: 10
PAINLEVEL_OUTOF10: 0
PAINLEVEL_OUTOF10: 10
PAINLEVEL_OUTOF10: 8
PAINLEVEL_OUTOF10: 5

## 2020-03-12 ASSESSMENT — PAIN DESCRIPTION - ORIENTATION
ORIENTATION: RIGHT

## 2020-03-12 ASSESSMENT — PAIN DESCRIPTION - DESCRIPTORS
DESCRIPTORS: SPASM
DESCRIPTORS: ACHING

## 2020-03-12 ASSESSMENT — PAIN DESCRIPTION - PROGRESSION: CLINICAL_PROGRESSION: NOT CHANGED

## 2020-03-13 PROCEDURE — 6370000000 HC RX 637 (ALT 250 FOR IP): Performed by: NEUROLOGICAL SURGERY

## 2020-03-13 PROCEDURE — 97535 SELF CARE MNGMENT TRAINING: CPT

## 2020-03-13 PROCEDURE — 97530 THERAPEUTIC ACTIVITIES: CPT

## 2020-03-13 PROCEDURE — 6370000000 HC RX 637 (ALT 250 FOR IP): Performed by: PHYSICAL MEDICINE & REHABILITATION

## 2020-03-13 PROCEDURE — 6370000000 HC RX 637 (ALT 250 FOR IP): Performed by: PHYSICIAN ASSISTANT

## 2020-03-13 PROCEDURE — 1280000000 HC REHAB R&B

## 2020-03-13 PROCEDURE — 97110 THERAPEUTIC EXERCISES: CPT

## 2020-03-13 RX ORDER — LIDOCAINE 4 G/G
1 PATCH TOPICAL DAILY
Status: DISCONTINUED | OUTPATIENT
Start: 2020-03-13 | End: 2020-03-19 | Stop reason: HOSPADM

## 2020-03-13 RX ADMIN — METHOCARBAMOL TABLETS 750 MG: 750 TABLET, COATED ORAL at 21:31

## 2020-03-13 RX ADMIN — OXYCODONE AND ACETAMINOPHEN 1.5 TABLET: 5; 325 TABLET ORAL at 21:31

## 2020-03-13 RX ADMIN — GABAPENTIN 400 MG: 400 CAPSULE ORAL at 13:57

## 2020-03-13 RX ADMIN — ATORVASTATIN CALCIUM 40 MG: 40 TABLET, FILM COATED ORAL at 21:31

## 2020-03-13 RX ADMIN — FLUOXETINE HYDROCHLORIDE 40 MG: 20 CAPSULE ORAL at 08:32

## 2020-03-13 RX ADMIN — METHOCARBAMOL TABLETS 750 MG: 750 TABLET, COATED ORAL at 08:32

## 2020-03-13 RX ADMIN — HYDROXYZINE PAMOATE 25 MG: 25 CAPSULE ORAL at 08:38

## 2020-03-13 RX ADMIN — OXYCODONE AND ACETAMINOPHEN 1.5 TABLET: 5; 325 TABLET ORAL at 03:00

## 2020-03-13 RX ADMIN — BISACODYL 5 MG: 5 TABLET, COATED ORAL at 08:38

## 2020-03-13 RX ADMIN — OXYCODONE AND ACETAMINOPHEN 1.5 TABLET: 5; 325 TABLET ORAL at 12:30

## 2020-03-13 RX ADMIN — LISINOPRIL 20 MG: 20 TABLET ORAL at 17:26

## 2020-03-13 RX ADMIN — METHOCARBAMOL TABLETS 750 MG: 750 TABLET, COATED ORAL at 17:26

## 2020-03-13 RX ADMIN — OXYCODONE AND ACETAMINOPHEN 1.5 TABLET: 5; 325 TABLET ORAL at 08:32

## 2020-03-13 RX ADMIN — METHOCARBAMOL TABLETS 750 MG: 750 TABLET, COATED ORAL at 12:29

## 2020-03-13 RX ADMIN — GABAPENTIN 400 MG: 400 CAPSULE ORAL at 08:32

## 2020-03-13 RX ADMIN — GABAPENTIN 400 MG: 400 CAPSULE ORAL at 21:31

## 2020-03-13 RX ADMIN — HYDROXYZINE PAMOATE 25 MG: 25 CAPSULE ORAL at 14:55

## 2020-03-13 RX ADMIN — OXYCODONE AND ACETAMINOPHEN 1.5 TABLET: 5; 325 TABLET ORAL at 17:26

## 2020-03-13 ASSESSMENT — PAIN DESCRIPTION - ONSET
ONSET: ON-GOING
ONSET: AWAKENED FROM SLEEP
ONSET: ON-GOING
ONSET: ON-GOING

## 2020-03-13 ASSESSMENT — PAIN DESCRIPTION - ORIENTATION
ORIENTATION: LOWER

## 2020-03-13 ASSESSMENT — PAIN SCALES - GENERAL
PAINLEVEL_OUTOF10: 0
PAINLEVEL_OUTOF10: 7
PAINLEVEL_OUTOF10: 5
PAINLEVEL_OUTOF10: 4
PAINLEVEL_OUTOF10: 0
PAINLEVEL_OUTOF10: 0
PAINLEVEL_OUTOF10: 9
PAINLEVEL_OUTOF10: 8
PAINLEVEL_OUTOF10: 8
PAINLEVEL_OUTOF10: 5

## 2020-03-13 ASSESSMENT — PAIN DESCRIPTION - FREQUENCY
FREQUENCY: CONTINUOUS

## 2020-03-13 ASSESSMENT — PAIN DESCRIPTION - PROGRESSION
CLINICAL_PROGRESSION: NOT CHANGED

## 2020-03-13 ASSESSMENT — PAIN DESCRIPTION - PAIN TYPE
TYPE: CHRONIC PAIN
TYPE: ACUTE PAIN
TYPE: CHRONIC PAIN

## 2020-03-13 ASSESSMENT — PAIN DESCRIPTION - DESCRIPTORS
DESCRIPTORS: ACHING;DISCOMFORT
DESCRIPTORS: ACHING;CONSTANT;DISCOMFORT
DESCRIPTORS: ACHING;DISCOMFORT;SPASM
DESCRIPTORS: ACHING;CONSTANT

## 2020-03-13 ASSESSMENT — PAIN DESCRIPTION - LOCATION
LOCATION: BACK

## 2020-03-13 ASSESSMENT — PAIN DESCRIPTION - RADICULAR PAIN: RADICULAR_PAIN: ABSENT

## 2020-03-13 NOTE — PROGRESS NOTES
breastfeeding. Subjective:  Symptoms:  Stable. She reports weakness. Diet:  Adequate intake. Activity level: Impaired due to pain. Pain:  She complains of pain that is moderate. Objective:  General Appearance: In no acute distress. Vital signs: (most recent): Blood pressure 131/77, pulse 84, temperature 98.4 °F (36.9 °C), temperature source Temporal, resp. rate 17, height 5' 5\" (1.651 m), weight 214 lb (97.1 kg), SpO2 98 %, not currently breastfeeding. Vital signs are normal.    Output: Producing urine and producing stool. Lungs:  Normal effort and normal respiratory rate. Breath sounds clear to auscultation. Heart: Normal rate. Regular rhythm. S1 normal and S2 normal.    Abdomen: Abdomen is soft. Bowel sounds are normal.   There is no abdominal tenderness. Extremities: Decreased range of motion. Neurological: Patient is alert. (Right leg weakness). Assessment:      Date   Status   AE    Comments     Feeding   3/11/2020   Independent              Grooming   3/12/2020   Sup   seated   Standing at sink pt applied make up and combed hair. Bathing   3/10/2020    UB- set up    LB min/mod assist           UB Dressing   3/10/2020   Mod A             LB Dressing   3/10/20    mod/Max assist   Reacher    sock aid    LH shoe horn       Homemaking        TBD                  Functional Transfers / Balance:      Date Status DME  Comments   Sit Balance 3/11/2020  S/SBA       Stand Balance 3/11/2020 CGA ww  sink     []? Tub  [x]? Shower   Transfer 3/7/2020  min assist       Commode   Transfer        Toileting 3/11/2020           3/9/20 CGA           Min A W/w      Functional   Mobility 3/11/2020 Min-CGA ww     Other: supine >EOB     EOB to w/c  3/10/2020        3/10/20 SBA        CGA Bed rail        ww          Assessment:    Condition: In stable condition. Improving. (Lumbar radiculopathy). Plan:   Encourage ambulation.   (Still having problems with right leg  Previous x-ray showed moderate osteoarthritis  I have increased her narcotic and her gabapentin  Lidoderm patch to the hip  We are not able to use nonsteroidals because of the lumbar surgery).        Ric Marcelo MD  3/13/2020

## 2020-03-13 NOTE — PROGRESS NOTES
A 7.5 inch step with AAD and Supervision   Picking up object off the floor NT NT NT Will  object with Min A Will  object with Supervision   BLE ROM WNL WNL      BLE Strength 4/5 overall bilaterally 4/5 BLE      Balance  Sitting EOB: SBA  Dynamic standing: Min A with FWW Sitting: SBA  Standing: SBA with [employer]      Date Family Teach Completed TBD TBD      Is additional Family Teaching Needed? Y or N Y Yes      Hindering Progress Pain control, impulsivity, balance deficits, decreased endurance  Pain, decreased endurance      PT recommended ELOS 2 weeks       Team's Discharge Plan        Therapist at Team Meeting          Therapeutic Exercise:    AM:   - Functional mobility as noted above in chart  - Side stepping no UE support x20 feet/side  - Ambulation with no AD x20 feet SBA  PM:   - Functional mobility as noted above in chart  - STS x10 reps to improve BLE strength and function  - Standing knee flexion x10/side, Standing hip extension x10/side to improve BLE strength and function       Additional Comments: Pt has significantly improved her overall level of functional mobility since eval. Pt has been experiencing less pain overall which is most likely contributing to pt's improvement in functional mobility. Pt demonstrates good safety with functional mobility and requires minimal cueing for this. PT sessions should continue to progress pt's overall level of functional mobility, strength, and endurance. Patient/family education  Pt educated on wheelchair push ups for back spasm relief, exercises to promote gentle pain free ROM    Patient/family response to education:   Pt/family verbalized understanding Pt/family demonstrated skill Pt/family requires further education in this area   yes With cues yes     AM  Time in: 0915  Time out: 1000    PM  Time in: 1430  Time out: 1515    Pt is making good progress toward established Physical Therapy goals.   Continue with physical therapy current plan of care.    Shyla Ruelas, DPT  [id number]

## 2020-03-14 LAB
BACTERIA: ABNORMAL /HPF
BILIRUBIN URINE: NEGATIVE
BLOOD, URINE: NEGATIVE
CLARITY: CLEAR
COLOR: YELLOW
GLUCOSE URINE: NEGATIVE MG/DL
KETONES, URINE: NEGATIVE MG/DL
LEUKOCYTE ESTERASE, URINE: ABNORMAL
NITRITE, URINE: NEGATIVE
PH UA: 6 (ref 5–9)
PROTEIN UA: NEGATIVE MG/DL
RBC UA: ABNORMAL /HPF (ref 0–2)
SPECIFIC GRAVITY UA: 1.01 (ref 1–1.03)
UROBILINOGEN, URINE: 0.2 E.U./DL
VITAMIN D 25-HYDROXY: 13 NG/ML (ref 30–100)
WBC UA: ABNORMAL /HPF (ref 0–5)

## 2020-03-14 PROCEDURE — 81001 URINALYSIS AUTO W/SCOPE: CPT

## 2020-03-14 PROCEDURE — 36415 COLL VENOUS BLD VENIPUNCTURE: CPT

## 2020-03-14 PROCEDURE — 87088 URINE BACTERIA CULTURE: CPT

## 2020-03-14 PROCEDURE — 1280000000 HC REHAB R&B

## 2020-03-14 PROCEDURE — 87186 SC STD MICRODIL/AGAR DIL: CPT

## 2020-03-14 PROCEDURE — 97530 THERAPEUTIC ACTIVITIES: CPT | Performed by: PHYSICAL THERAPIST

## 2020-03-14 PROCEDURE — 87077 CULTURE AEROBIC IDENTIFY: CPT

## 2020-03-14 PROCEDURE — 6370000000 HC RX 637 (ALT 250 FOR IP): Performed by: PHYSICAL MEDICINE & REHABILITATION

## 2020-03-14 PROCEDURE — 82306 VITAMIN D 25 HYDROXY: CPT

## 2020-03-14 PROCEDURE — 6370000000 HC RX 637 (ALT 250 FOR IP): Performed by: NEUROLOGICAL SURGERY

## 2020-03-14 PROCEDURE — 6370000000 HC RX 637 (ALT 250 FOR IP): Performed by: PHYSICIAN ASSISTANT

## 2020-03-14 RX ORDER — ERGOCALCIFEROL 1.25 MG/1
50000 CAPSULE ORAL WEEKLY
Status: DISCONTINUED | OUTPATIENT
Start: 2020-03-14 | End: 2020-03-19 | Stop reason: HOSPADM

## 2020-03-14 RX ADMIN — OXYCODONE AND ACETAMINOPHEN 1.5 TABLET: 5; 325 TABLET ORAL at 02:13

## 2020-03-14 RX ADMIN — METHOCARBAMOL TABLETS 750 MG: 750 TABLET, COATED ORAL at 13:44

## 2020-03-14 RX ADMIN — HYDROXYZINE PAMOATE 25 MG: 25 CAPSULE ORAL at 22:17

## 2020-03-14 RX ADMIN — METHOCARBAMOL TABLETS 750 MG: 750 TABLET, COATED ORAL at 22:17

## 2020-03-14 RX ADMIN — FLUOXETINE HYDROCHLORIDE 40 MG: 20 CAPSULE ORAL at 08:54

## 2020-03-14 RX ADMIN — GABAPENTIN 400 MG: 400 CAPSULE ORAL at 22:17

## 2020-03-14 RX ADMIN — BISACODYL 5 MG: 5 TABLET, COATED ORAL at 08:54

## 2020-03-14 RX ADMIN — OXYCODONE AND ACETAMINOPHEN 1.5 TABLET: 5; 325 TABLET ORAL at 08:53

## 2020-03-14 RX ADMIN — OXYCODONE AND ACETAMINOPHEN 1.5 TABLET: 5; 325 TABLET ORAL at 18:10

## 2020-03-14 RX ADMIN — GABAPENTIN 400 MG: 400 CAPSULE ORAL at 08:54

## 2020-03-14 RX ADMIN — METHOCARBAMOL TABLETS 750 MG: 750 TABLET, COATED ORAL at 16:50

## 2020-03-14 RX ADMIN — LISINOPRIL 20 MG: 20 TABLET ORAL at 18:10

## 2020-03-14 RX ADMIN — ATORVASTATIN CALCIUM 40 MG: 40 TABLET, FILM COATED ORAL at 22:17

## 2020-03-14 RX ADMIN — METHOCARBAMOL TABLETS 750 MG: 750 TABLET, COATED ORAL at 08:54

## 2020-03-14 RX ADMIN — GABAPENTIN 400 MG: 400 CAPSULE ORAL at 13:44

## 2020-03-14 RX ADMIN — OXYCODONE AND ACETAMINOPHEN 1.5 TABLET: 5; 325 TABLET ORAL at 22:20

## 2020-03-14 RX ADMIN — OXYCODONE AND ACETAMINOPHEN 1.5 TABLET: 5; 325 TABLET ORAL at 13:44

## 2020-03-14 RX ADMIN — ERGOCALCIFEROL 50000 UNITS: 1.25 CAPSULE ORAL at 22:17

## 2020-03-14 RX ADMIN — HYDROXYZINE PAMOATE 25 MG: 25 CAPSULE ORAL at 08:54

## 2020-03-14 ASSESSMENT — PAIN DESCRIPTION - DESCRIPTORS
DESCRIPTORS: CONSTANT;DISCOMFORT;ACHING
DESCRIPTORS: ACHING
DESCRIPTORS: ACHING
DESCRIPTORS: ACHING;DISCOMFORT;DULL;CONSTANT

## 2020-03-14 ASSESSMENT — PAIN SCALES - GENERAL
PAINLEVEL_OUTOF10: 5
PAINLEVEL_OUTOF10: 7
PAINLEVEL_OUTOF10: 0
PAINLEVEL_OUTOF10: 8
PAINLEVEL_OUTOF10: 7
PAINLEVEL_OUTOF10: 7
PAINLEVEL_OUTOF10: 4
PAINLEVEL_OUTOF10: 7

## 2020-03-14 ASSESSMENT — PAIN DESCRIPTION - ORIENTATION
ORIENTATION: LOWER
ORIENTATION: LOWER;RIGHT
ORIENTATION: LOWER;RIGHT
ORIENTATION: RIGHT

## 2020-03-14 ASSESSMENT — PAIN DESCRIPTION - PROGRESSION
CLINICAL_PROGRESSION: NOT CHANGED
CLINICAL_PROGRESSION: NOT CHANGED

## 2020-03-14 ASSESSMENT — PAIN DESCRIPTION - ONSET
ONSET: ON-GOING
ONSET: ON-GOING

## 2020-03-14 ASSESSMENT — PAIN DESCRIPTION - PAIN TYPE
TYPE: CHRONIC PAIN
TYPE: ACUTE PAIN
TYPE: CHRONIC PAIN
TYPE: CHRONIC PAIN

## 2020-03-14 ASSESSMENT — PAIN DESCRIPTION - LOCATION
LOCATION: BACK;HIP
LOCATION: BACK
LOCATION: HIP
LOCATION: BACK;HIP

## 2020-03-14 ASSESSMENT — PAIN DESCRIPTION - FREQUENCY
FREQUENCY: CONTINUOUS
FREQUENCY: CONTINUOUS

## 2020-03-14 ASSESSMENT — PAIN DESCRIPTION - RADICULAR PAIN
RADICULAR_PAIN: ABSENT
RADICULAR_PAIN: ABSENT

## 2020-03-14 NOTE — PROGRESS NOTES
03/08/2020    PROT 5.7 03/08/2020    LABALBU 2.9 03/08/2020    CALCIUM 8.3 03/08/2020    BILITOT 1.0 03/08/2020    ALKPHOS 97 03/08/2020    AST 22 03/08/2020    ALT 16 03/08/2020             25 Hydroxy VIT D:                                        13    FUNCTIONAL STATUS:     Cognition:   WNL. Speech:  WNL. ADLs and Self Care:  MOD to MIN A. Bed Mobility:  SBA. Transfers:   SBA. Gait:     150' x 2 with [employer] with SBA. Stairs:    8 steps with 2 rails with SBA, non reciprocally. ROM:        PLAN:    1.) Start on Ergocalciferol Weekly. 2,) Start on Protein supplement. 3.) Rechek Labs to follow Protein Calorie malnutrition,Leucocytosis  and      Anemia.   4.) Start to ambulate on the unit with Staff.  5.) Continue Rehab Program.

## 2020-03-15 LAB
ALBUMIN SERPL-MCNC: 3.3 G/DL (ref 3.5–5.2)
ALP BLD-CCNC: 127 U/L (ref 35–104)
ALT SERPL-CCNC: 14 U/L (ref 0–32)
ANION GAP SERPL CALCULATED.3IONS-SCNC: 11 MMOL/L (ref 7–16)
AST SERPL-CCNC: 16 U/L (ref 0–31)
BILIRUB SERPL-MCNC: <0.2 MG/DL (ref 0–1.2)
BUN BLDV-MCNC: 8 MG/DL (ref 8–23)
CALCIUM SERPL-MCNC: 8.6 MG/DL (ref 8.6–10.2)
CHLORIDE BLD-SCNC: 105 MMOL/L (ref 98–107)
CO2: 27 MMOL/L (ref 22–29)
CREAT SERPL-MCNC: 0.8 MG/DL (ref 0.5–1)
GFR AFRICAN AMERICAN: >60
GFR NON-AFRICAN AMERICAN: >60 ML/MIN/1.73
GLUCOSE BLD-MCNC: 121 MG/DL (ref 74–99)
HCT VFR BLD CALC: 33.7 % (ref 34–48)
HEMOGLOBIN: 10.6 G/DL (ref 11.5–15.5)
MCH RBC QN AUTO: 32.3 PG (ref 26–35)
MCHC RBC AUTO-ENTMCNC: 31.5 % (ref 32–34.5)
MCV RBC AUTO: 102.7 FL (ref 80–99.9)
PDW BLD-RTO: 12 FL (ref 11.5–15)
PLATELET # BLD: 413 E9/L (ref 130–450)
PMV BLD AUTO: 9.4 FL (ref 7–12)
POTASSIUM SERPL-SCNC: 4.3 MMOL/L (ref 3.5–5)
RBC # BLD: 3.28 E12/L (ref 3.5–5.5)
SODIUM BLD-SCNC: 143 MMOL/L (ref 132–146)
TOTAL PROTEIN: 5.6 G/DL (ref 6.4–8.3)
WBC # BLD: 8.4 E9/L (ref 4.5–11.5)

## 2020-03-15 PROCEDURE — 6370000000 HC RX 637 (ALT 250 FOR IP): Performed by: NEUROLOGICAL SURGERY

## 2020-03-15 PROCEDURE — 97530 THERAPEUTIC ACTIVITIES: CPT

## 2020-03-15 PROCEDURE — 6370000000 HC RX 637 (ALT 250 FOR IP): Performed by: PHYSICAL MEDICINE & REHABILITATION

## 2020-03-15 PROCEDURE — 1280000000 HC REHAB R&B

## 2020-03-15 PROCEDURE — 80053 COMPREHEN METABOLIC PANEL: CPT

## 2020-03-15 PROCEDURE — 6370000000 HC RX 637 (ALT 250 FOR IP): Performed by: PHYSICIAN ASSISTANT

## 2020-03-15 PROCEDURE — 36415 COLL VENOUS BLD VENIPUNCTURE: CPT

## 2020-03-15 PROCEDURE — 97535 SELF CARE MNGMENT TRAINING: CPT

## 2020-03-15 PROCEDURE — 85027 COMPLETE CBC AUTOMATED: CPT

## 2020-03-15 RX ADMIN — GABAPENTIN 400 MG: 400 CAPSULE ORAL at 12:33

## 2020-03-15 RX ADMIN — METHOCARBAMOL TABLETS 750 MG: 750 TABLET, COATED ORAL at 12:33

## 2020-03-15 RX ADMIN — GABAPENTIN 400 MG: 400 CAPSULE ORAL at 22:10

## 2020-03-15 RX ADMIN — METHOCARBAMOL TABLETS 750 MG: 750 TABLET, COATED ORAL at 07:52

## 2020-03-15 RX ADMIN — OXYCODONE AND ACETAMINOPHEN 1.5 TABLET: 5; 325 TABLET ORAL at 08:14

## 2020-03-15 RX ADMIN — LISINOPRIL 20 MG: 20 TABLET ORAL at 17:58

## 2020-03-15 RX ADMIN — METHOCARBAMOL TABLETS 750 MG: 750 TABLET, COATED ORAL at 22:07

## 2020-03-15 RX ADMIN — FLUOXETINE HYDROCHLORIDE 40 MG: 20 CAPSULE ORAL at 08:32

## 2020-03-15 RX ADMIN — ATORVASTATIN CALCIUM 40 MG: 40 TABLET, FILM COATED ORAL at 22:07

## 2020-03-15 RX ADMIN — GABAPENTIN 400 MG: 400 CAPSULE ORAL at 08:32

## 2020-03-15 RX ADMIN — OXYCODONE AND ACETAMINOPHEN 1.5 TABLET: 5; 325 TABLET ORAL at 22:10

## 2020-03-15 RX ADMIN — METHOCARBAMOL TABLETS 750 MG: 750 TABLET, COATED ORAL at 17:05

## 2020-03-15 RX ADMIN — HYDROXYZINE PAMOATE 25 MG: 25 CAPSULE ORAL at 08:32

## 2020-03-15 RX ADMIN — BISACODYL 5 MG: 5 TABLET, COATED ORAL at 08:32

## 2020-03-15 RX ADMIN — OXYCODONE AND ACETAMINOPHEN 1.5 TABLET: 5; 325 TABLET ORAL at 17:08

## 2020-03-15 RX ADMIN — OXYCODONE AND ACETAMINOPHEN 1.5 TABLET: 5; 325 TABLET ORAL at 12:33

## 2020-03-15 RX ADMIN — HYDROXYZINE PAMOATE 25 MG: 25 CAPSULE ORAL at 22:07

## 2020-03-15 ASSESSMENT — PAIN SCALES - GENERAL
PAINLEVEL_OUTOF10: 7
PAINLEVEL_OUTOF10: 0
PAINLEVEL_OUTOF10: 5
PAINLEVEL_OUTOF10: 3
PAINLEVEL_OUTOF10: 9
PAINLEVEL_OUTOF10: 0
PAINLEVEL_OUTOF10: 8
PAINLEVEL_OUTOF10: 8

## 2020-03-15 ASSESSMENT — PAIN DESCRIPTION - PAIN TYPE
TYPE: ACUTE PAIN
TYPE: CHRONIC PAIN

## 2020-03-15 ASSESSMENT — PAIN - FUNCTIONAL ASSESSMENT: PAIN_FUNCTIONAL_ASSESSMENT: PREVENTS OR INTERFERES SOME ACTIVE ACTIVITIES AND ADLS

## 2020-03-15 ASSESSMENT — PAIN DESCRIPTION - DESCRIPTORS
DESCRIPTORS: ACHING;DISCOMFORT;CONSTANT;SORE
DESCRIPTORS: ACHING
DESCRIPTORS: ACHING;DISCOMFORT

## 2020-03-15 ASSESSMENT — PAIN DESCRIPTION - LOCATION
LOCATION: BACK;HIP
LOCATION: HIP
LOCATION: BACK
LOCATION: BACK

## 2020-03-15 ASSESSMENT — PAIN DESCRIPTION - FREQUENCY
FREQUENCY: CONTINUOUS
FREQUENCY: CONTINUOUS

## 2020-03-15 ASSESSMENT — PAIN DESCRIPTION - PROGRESSION
CLINICAL_PROGRESSION: NOT CHANGED
CLINICAL_PROGRESSION: NOT CHANGED

## 2020-03-15 ASSESSMENT — PAIN DESCRIPTION - ONSET
ONSET: ON-GOING
ONSET: ON-GOING

## 2020-03-15 ASSESSMENT — PAIN DESCRIPTION - ORIENTATION
ORIENTATION: RIGHT
ORIENTATION: MID
ORIENTATION: LOWER;RIGHT

## 2020-03-15 NOTE — PROGRESS NOTES
spinal precautions due to pt able to recall 1/3 intitially and AE for LE ADLs.     [] Family teach completed on:    Pain Level : Back; R Hip; R LE- Moderate    Additional Notes:     Patient has made fair progress during treatment sessions toward set goals. Therapy emphasis to obtain goals:  Current Treatment Recommendations: Strengthening, Endurance Training, Neuromuscular Re-education, Patient/Caregiver Education & Training, ROM, Equipment Evaluation, Education, & procurement, Balance Training, Gait Training, Self-Care / ADL, Functional Mobility Training, Safety Education & Training, Home Management Training        [x] Continue with current OT Plan of care. [] Prepare for Discharge     DISCHARGE RECOMMENDATIONS  Recommended DME:    Post Discharge Care:   []Home Independently  []Home with 24hr Care / Supervision []Home with Partial Supervision []Home with Home Health OT []Home with Out Pt OT []Other: ___   Comments:         Time in Time out Tx Time Breakdown  Variance:   First Session  10:20 10:50 [x] Individual Tx- 30  [] Concurrent Tx -   [] Co-Tx -   [] Group Tx -   [] Time Missed -      Second Session   [] Individual Tx-   [] Concurrent Tx -  [] Co-Tx -   [] Group Tx -   [] Time Missed -     Third Session    [] Individual Tx-   [] Concurrent Tx -  [] Co-Tx -   [] Group Tx -   [] Time Missed -         Total Tx Time: 30  mins    Martha Cisse MENDEZ/L [id number]  I have read the above note and agree with the documentation.   Humza Devine OTR/L [id number]

## 2020-03-16 LAB
ORGANISM: ABNORMAL
URINE CULTURE, ROUTINE: ABNORMAL

## 2020-03-16 PROCEDURE — 97110 THERAPEUTIC EXERCISES: CPT

## 2020-03-16 PROCEDURE — 97535 SELF CARE MNGMENT TRAINING: CPT

## 2020-03-16 PROCEDURE — 6370000000 HC RX 637 (ALT 250 FOR IP): Performed by: NEUROLOGICAL SURGERY

## 2020-03-16 PROCEDURE — 6370000000 HC RX 637 (ALT 250 FOR IP): Performed by: PHYSICAL MEDICINE & REHABILITATION

## 2020-03-16 PROCEDURE — 97530 THERAPEUTIC ACTIVITIES: CPT

## 2020-03-16 PROCEDURE — 6370000000 HC RX 637 (ALT 250 FOR IP): Performed by: PHYSICIAN ASSISTANT

## 2020-03-16 PROCEDURE — 1280000000 HC REHAB R&B

## 2020-03-16 RX ORDER — CIPROFLOXACIN 500 MG/1
500 TABLET, FILM COATED ORAL EVERY 12 HOURS SCHEDULED
Status: DISCONTINUED | OUTPATIENT
Start: 2020-03-16 | End: 2020-03-19 | Stop reason: HOSPADM

## 2020-03-16 RX ADMIN — OXYCODONE AND ACETAMINOPHEN 1.5 TABLET: 5; 325 TABLET ORAL at 17:11

## 2020-03-16 RX ADMIN — HYDROXYZINE PAMOATE 25 MG: 25 CAPSULE ORAL at 08:53

## 2020-03-16 RX ADMIN — BISACODYL 5 MG: 5 TABLET, COATED ORAL at 08:52

## 2020-03-16 RX ADMIN — CIPROFLOXACIN HYDROCHLORIDE 500 MG: 500 TABLET, FILM COATED ORAL at 21:08

## 2020-03-16 RX ADMIN — GABAPENTIN 400 MG: 400 CAPSULE ORAL at 08:52

## 2020-03-16 RX ADMIN — OXYCODONE AND ACETAMINOPHEN 1.5 TABLET: 5; 325 TABLET ORAL at 13:06

## 2020-03-16 RX ADMIN — GABAPENTIN 400 MG: 400 CAPSULE ORAL at 13:06

## 2020-03-16 RX ADMIN — LISINOPRIL 20 MG: 20 TABLET ORAL at 17:07

## 2020-03-16 RX ADMIN — HYDROXYZINE PAMOATE 25 MG: 25 CAPSULE ORAL at 16:28

## 2020-03-16 RX ADMIN — METHOCARBAMOL TABLETS 750 MG: 750 TABLET, COATED ORAL at 17:07

## 2020-03-16 RX ADMIN — OXYCODONE AND ACETAMINOPHEN 1.5 TABLET: 5; 325 TABLET ORAL at 08:53

## 2020-03-16 RX ADMIN — GABAPENTIN 400 MG: 400 CAPSULE ORAL at 21:07

## 2020-03-16 RX ADMIN — OXYCODONE AND ACETAMINOPHEN 1.5 TABLET: 5; 325 TABLET ORAL at 02:33

## 2020-03-16 RX ADMIN — METHOCARBAMOL TABLETS 750 MG: 750 TABLET, COATED ORAL at 08:52

## 2020-03-16 RX ADMIN — ATORVASTATIN CALCIUM 40 MG: 40 TABLET, FILM COATED ORAL at 21:07

## 2020-03-16 RX ADMIN — OXYCODONE AND ACETAMINOPHEN 1.5 TABLET: 5; 325 TABLET ORAL at 21:24

## 2020-03-16 RX ADMIN — METHOCARBAMOL TABLETS 750 MG: 750 TABLET, COATED ORAL at 13:06

## 2020-03-16 RX ADMIN — FLUOXETINE HYDROCHLORIDE 40 MG: 20 CAPSULE ORAL at 08:52

## 2020-03-16 ASSESSMENT — PAIN DESCRIPTION - PAIN TYPE
TYPE: CHRONIC PAIN
TYPE: ACUTE PAIN;CHRONIC PAIN

## 2020-03-16 ASSESSMENT — PAIN SCALES - GENERAL
PAINLEVEL_OUTOF10: 6
PAINLEVEL_OUTOF10: 9
PAINLEVEL_OUTOF10: 9
PAINLEVEL_OUTOF10: 6
PAINLEVEL_OUTOF10: 5
PAINLEVEL_OUTOF10: 8
PAINLEVEL_OUTOF10: 8
PAINLEVEL_OUTOF10: 3
PAINLEVEL_OUTOF10: 0
PAINLEVEL_OUTOF10: 9

## 2020-03-16 ASSESSMENT — PAIN DESCRIPTION - DESCRIPTORS
DESCRIPTORS: ACHING;CONSTANT;DISCOMFORT;SPASM
DESCRIPTORS: ACHING;DISCOMFORT;DULL
DESCRIPTORS: ACHING
DESCRIPTORS: ACHING;CONSTANT

## 2020-03-16 ASSESSMENT — PAIN DESCRIPTION - ONSET
ONSET: ON-GOING

## 2020-03-16 ASSESSMENT — PAIN DESCRIPTION - PROGRESSION
CLINICAL_PROGRESSION: NOT CHANGED

## 2020-03-16 ASSESSMENT — PAIN DESCRIPTION - FREQUENCY
FREQUENCY: CONTINUOUS

## 2020-03-16 ASSESSMENT — PAIN DESCRIPTION - ORIENTATION
ORIENTATION: RIGHT
ORIENTATION: RIGHT
ORIENTATION: LOWER
ORIENTATION: LOWER;RIGHT
ORIENTATION: LOWER

## 2020-03-16 ASSESSMENT — PAIN DESCRIPTION - LOCATION
LOCATION: BACK
LOCATION: BACK
LOCATION: GROIN;HIP
LOCATION: HIP
LOCATION: BACK;HIP

## 2020-03-16 ASSESSMENT — PAIN - FUNCTIONAL ASSESSMENT: PAIN_FUNCTIONAL_ASSESSMENT: PREVENTS OR INTERFERES SOME ACTIVE ACTIVITIES AND ADLS

## 2020-03-16 NOTE — PROGRESS NOTES
OCCUPATIONAL THERAPY DAILY NOTE    Date:3/16/2020  Patient Name: Noemi Mulligan  MRN: [id number]  : 1955  Room: [address] [place], [place] [address]     Referring Practitioner:   Diagnosis: lumbar radiculopathy (Pt underwent exploration of prior L3-L5 fusion and L2-L3 PLIF on 3/4)   Additional Pertinent Hx: fibromyalgia, DM II, HTN, RA, TIA, chronic back pain (has had back surgery), CA, foot surgery  Precautions:  falls, spinal, LSO (pt can tima sititng EOB per verbally speaking with  3/7)    Functional Assessment:   Date Status AE  Comments   Feeding 3/16/2020 Independent      Grooming 3/16/2020 Mod I  seated Blow drying her hair   Bathing 3/16/2020 Sup  Shower level seated with back incision covered with aqua guard   UB Dressing 3/16/2020 Sup  To don her bra, shirt & LSO brace   LB Dressing 3/16/20 SBA Shoe horn Increased time & min vc's for safety when her pants dropped to floor & vc's to use reacher to pull pants up   Homemaking 3/13/20  CGA  ww      Functional Transfers / Balance:   Date Status DME  Comments   Sit Balance 3/16/2020 S/SBA  Demoed during ADLs. Stand Balance 3/16/2020 SBA ww   During functional mobility   [] Tub  [x] Shower   Transfer 3/16/2020 SBA Ww, shower bench Vc for AD placement provided   Commode   Transfer      Toileting 3/16/2020        3/16/20 SBA        SBA W/w     Functional   Mobility 3/16/2020 SBA ww Household plus distance   Other: supine <>EOB    EOB <> w/c  3/15/2020      3/15/20 SBA      SBA Bed rail      ww Pt demoed understanding of log vladimir technique    Pt utilized ww while transferring from bed to chair     Functional Exercises / Activity:  Pt demo Sup supine>sit. Pt demo Sup to don LSO brace & SBA to don shoes seated EOB. Pt demo SBA functional mobility using a rw household distance.  Pt tolerated BUE strengthening exercises & endurance activities to improve pt activity tolerance & ability to complete homemaking tasks with F tolerance      Sensory / Neuromuscular

## 2020-03-16 NOTE — PROGRESS NOTES
Physical Therapy  Weekly Team Note  Evaluating Therapist: Mini Zuniga, PT, DPT, [id number]    ROOM: [address] [place] [address]  DIAGNOSIS: Lumbar radiculopathy  PRECAUTIONS: spinal neutrality, LSO, falls    HPI: Pt underwent exploration of prior L3-5 fusion and L2-3 posterior lumbar interbody fusion on 3/5/20 for treatment of worsening lumbar pain. Social: Pt lives with sister and son in a 2 floor house with 1+2 steps and no rail(s) to enter. Bed is on second floor and bath is on second floor. 12 stairs with 1 HR to second floor. She was Mod Independent with [facility] prior to admission. Initial Evaluation  3/7 3/9/20    3/16/20 Comments   Short Term Goals Long Term Goals    Bed Mobility  Rolling: Min A  Supine to sit: Mod A  Sit to supine:  Mod A  Scooting: Min A towards EOB Rolling: SBA  Supine to sit: Min A  Sit to supine: CGA  Scooting: SBA  Supervision all aspects  Supervision Mod Independent    Transfers Sit to stand: Min A  Stand to sit: Min A  Stand pivot: Min A Sit to stand: CGA  Stand to sit: CGA  Stand pivot: CGA with [employer] Sit to stand: SBA  Stand to sit: SBA  Stand pivot: SBA with [employer] Increased time to complete due to pain Supervision Mod Independent    Ambulation    4 feet with SBQC with Mod A  30 feet x2, 75 feet with FWW with Min A 50 feet with [employer] CGA; 15 feet x2 reps with [employer]  feet x1 rep and 75 feet x1 rep with WW SBA Slow gait speed, increased R hip pain with ambulation 150 feet with AAD with Supervision 300 feet with AAD with Mod Independent    Wheel Chair Mobility NT 25 feet with BUE propulsion SBA NT      Car Transfers NT, pt declines due to pain Mod A SBA  Min A Supervision   Stair negotiation: ascended and descended  3 steps with B rail with Mod A 1 step forward and backward x4 reps with B rails CGA 8 steps 2 rails SBA  4 steps with 1 rail with Min A 12 steps with 1 rail with Supervision   BLE ROM WNL WNL WNL      BLE Strength 4/5 overall bilaterally 4/5 BLE 4/5 BLE      Balance  Sitting EOB: SBA  Dynamic standing: Min A with FWW Sitting: SBA  Standing: CGA with [employer] Sitting: Supervision  Standing: SBA with [employer]      Date Family Teach Completed TBD TBD TBD      Is additional Family Teaching Needed? Y or N Y Yes Yes      Hindering Progress Pain control, impulsivity, balance deficits, decreased endurance  Pain, decreased endurance Pain      PT recommended ELOS 2 weeks 2 weeks 7-10 days      Team's Discharge Plan  2 weeks Discharge 3/24/20      Therapist at [address] [facility], PT, DPT [id number]   Ivelisse [place] PT, DPT [id number]          Date:  3/9/20  Supporting factors: Mod Independent PLOF, family support  Barriers to discharge:  Pain, decreased endurance/overall mobility  Additional comments:  Pt is mainly limited by pain and fatigue at this time. Pt should continue to use [employer] for ambulation at this time to increase stability/balance. Should see an improvement in pt's functional mobility once pt is experiencing less pain. DME:  [employer]  After Care:  Home health PT    [place], [place]  [id number]    Date:  3/16/20  Supporting factors: Mod Independent PLOF, family support  Barriers to discharge:  Pain, decreased endurance/overall mobility  Additional comments:  Pt's functional mobility significantly improves when pt is not experiencing increased pain levels. Pt's performance in PT is highly dependent upon pain. Pt has been reporting more R hip pain than LBP. Will continue to progress pt's strength, endurance, and overall functional mobility with respect to pt's pain levels.   DME:  [employer]  After Care:   Bandar Adams, FRANCOIS  [id number]

## 2020-03-16 NOTE — PROGRESS NOTES
Pan Shah is a 60 y.o. female patient.     Current Facility-Administered Medications   Medication Dose Route Frequency Provider Last Rate Last Dose    ciprofloxacin (CIPRO) tablet 500 mg  500 mg Oral 2 times per day Isaiah Prince MD        vitamin D (ERGOCALCIFEROL) capsule 50,000 Units  50,000 Units Oral Weekly Salma Maldonado MD   50,000 Units at 03/14/20 2217    lidocaine 4 % external patch 1 patch  1 patch Transdermal Daily Isaiah Prince MD   1 patch at 03/15/20 0830    oxyCODONE-acetaminophen (PERCOCET) 5-325 MG per tablet 1.5 tablet  1.5 tablet Oral Q4H PRN Isaiah Prince MD   1.5 tablet at 03/16/20 0233    methocarbamol (ROBAXIN) tablet 750 mg  750 mg Oral 4x Daily Rosalba Santos MD   750 mg at 03/15/20 2207    gabapentin (NEURONTIN) capsule 400 mg  400 mg Oral TID Isaiah Prince MD   400 mg at 03/15/20 2210    acetaminophen (TYLENOL) tablet 650 mg  650 mg Oral Q4H PRN Isaiah Prince MD   650 mg at 03/12/20 0908    polyethylene glycol (GLYCOLAX) packet 17 g  17 g Oral Daily PRN Isaiah Prince MD   17 g at 03/12/20 0907    bisacodyl (DULCOLAX) EC tablet 5 mg  5 mg Oral Daily TREASURE Smart   5 mg at 03/15/20 7752    fleet rectal enema 1 enema  1 enema Rectal Daily PRN TREASURE Smart        magnesium hydroxide (MILK OF MAGNESIA) 400 MG/5ML suspension 30 mL  30 mL Oral Daily PRN TREASURE Smart   30 mL at 03/08/20 1701    atorvastatin (LIPITOR) tablet 40 mg  40 mg Oral Daily TREASURE Smart   40 mg at 03/15/20 2207    FLUoxetine (PROZAC) capsule 40 mg  40 mg Oral Daily TREASURE Smart   40 mg at 03/15/20 3216    hydrOXYzine (VISTARIL) capsule 25 mg  25 mg Oral TID PRN TREASURE Smart   25 mg at 03/15/20 2207    lisinopril (PRINIVIL;ZESTRIL) tablet 20 mg  20 mg Oral QPM T[employer] Smart   20 mg at 03/15/20 1758     Allergies   Allergen Reactions    Pcn [Penicillins] Anaphylaxis     Active Problems:    Lumbar radiculopathy  Resolved Problems:    *

## 2020-03-16 NOTE — PROGRESS NOTES
rail with Supervision   Curb Step:   ascended and descended NT NT 7.5 inch step with [employer] Min A 7.5 inch step with AAD and Min A 7.5 inch step with AAD and Supervision   Picking up object off the floor NT NT NT Will  object with Min A Will  object with Supervision   BLE ROM WNL WNL         BLE Strength 4/5 overall bilaterally 4/5 BLE         Balance  Sitting EOB: SBA  Dynamic standing: Min A with FWW Sitting: Supervision  Standing: SBA with [employer]         Date Family Teach Completed TBD TBD         Is additional Family Teaching Needed? Y or N Y Yes         Hindering Progress Pain control, impulsivity, balance deficits, decreased endurance  Pain, decreased endurance         PT recommended ELOS 2 weeks           Team's Discharge Plan             Therapist at Team Meeting                Therapeutic Exercise:   AM:  - Functional mobility as noted above in chart  - STS x5 reps to improve BLE strength and function  - Standing hip abduction in parallel bars x10/side to improve BLE strength and function  - Standing calf raises x15 to improve BLE strength and function  - Seated manual resisted hip abductions x15 reps to improve BLE strength and function  PM:  - Functional mobility as noted above in chart      Patient education  Pt educated on safety with functional mobility, stair negotiation, pain relief tips while sitting in chair    Patient response to education:   Pt verbalized understanding Pt demonstrated skill Pt requires further education in this area   yes yes yes     Additional Comments: Pt reported significantly high levels of R hip pain that limited her functional mobility and activity. Pt was unable to ambulate further distance  due to her pain. Pt stated that her R hip pain is radiating down to her R knee and causes her knee feel like it is going to buckle. Performed gentle exercises in parallel bars to relieve weight bearing pain from R hip as well as continuing to improve leg strength and endurance.

## 2020-03-17 ENCOUNTER — APPOINTMENT (OUTPATIENT)
Dept: GENERAL RADIOLOGY | Age: 65
DRG: 560 | End: 2020-03-17
Attending: PHYSICAL MEDICINE & REHABILITATION

## 2020-03-17 PROCEDURE — 97530 THERAPEUTIC ACTIVITIES: CPT

## 2020-03-17 PROCEDURE — 97110 THERAPEUTIC EXERCISES: CPT

## 2020-03-17 PROCEDURE — 6370000000 HC RX 637 (ALT 250 FOR IP): Performed by: PHYSICIAN ASSISTANT

## 2020-03-17 PROCEDURE — 6370000000 HC RX 637 (ALT 250 FOR IP): Performed by: PHYSICAL MEDICINE & REHABILITATION

## 2020-03-17 PROCEDURE — 72100 X-RAY EXAM L-S SPINE 2/3 VWS: CPT

## 2020-03-17 PROCEDURE — 6370000000 HC RX 637 (ALT 250 FOR IP): Performed by: NEUROLOGICAL SURGERY

## 2020-03-17 PROCEDURE — 1280000000 HC REHAB R&B

## 2020-03-17 RX ORDER — OXYCODONE HYDROCHLORIDE AND ACETAMINOPHEN 5; 325 MG/1; MG/1
1 TABLET ORAL EVERY 4 HOURS PRN
Status: DISCONTINUED | OUTPATIENT
Start: 2020-03-17 | End: 2020-03-19 | Stop reason: HOSPADM

## 2020-03-17 RX ORDER — MORPHINE SULFATE 15 MG/1
15 TABLET, FILM COATED, EXTENDED RELEASE ORAL EVERY 12 HOURS SCHEDULED
Status: DISCONTINUED | OUTPATIENT
Start: 2020-03-17 | End: 2020-03-19 | Stop reason: HOSPADM

## 2020-03-17 RX ADMIN — OXYCODONE AND ACETAMINOPHEN 1.5 TABLET: 5; 325 TABLET ORAL at 05:33

## 2020-03-17 RX ADMIN — METHOCARBAMOL TABLETS 750 MG: 750 TABLET, COATED ORAL at 17:29

## 2020-03-17 RX ADMIN — HYDROXYZINE PAMOATE 25 MG: 25 CAPSULE ORAL at 21:22

## 2020-03-17 RX ADMIN — GABAPENTIN 400 MG: 400 CAPSULE ORAL at 15:24

## 2020-03-17 RX ADMIN — OXYCODONE HYDROCHLORIDE AND ACETAMINOPHEN 1 TABLET: 5; 325 TABLET ORAL at 17:32

## 2020-03-17 RX ADMIN — CIPROFLOXACIN HYDROCHLORIDE 500 MG: 500 TABLET, FILM COATED ORAL at 09:23

## 2020-03-17 RX ADMIN — METHOCARBAMOL TABLETS 750 MG: 750 TABLET, COATED ORAL at 00:12

## 2020-03-17 RX ADMIN — MORPHINE SULFATE 15 MG: 15 TABLET, FILM COATED, EXTENDED RELEASE ORAL at 10:55

## 2020-03-17 RX ADMIN — FLUOXETINE HYDROCHLORIDE 40 MG: 20 CAPSULE ORAL at 09:24

## 2020-03-17 RX ADMIN — CIPROFLOXACIN HYDROCHLORIDE 500 MG: 500 TABLET, FILM COATED ORAL at 21:22

## 2020-03-17 RX ADMIN — OXYCODONE AND ACETAMINOPHEN 1.5 TABLET: 5; 325 TABLET ORAL at 09:22

## 2020-03-17 RX ADMIN — METHOCARBAMOL TABLETS 750 MG: 750 TABLET, COATED ORAL at 12:21

## 2020-03-17 RX ADMIN — LISINOPRIL 20 MG: 20 TABLET ORAL at 17:29

## 2020-03-17 RX ADMIN — HYDROXYZINE PAMOATE 25 MG: 25 CAPSULE ORAL at 09:25

## 2020-03-17 RX ADMIN — ATORVASTATIN CALCIUM 40 MG: 40 TABLET, FILM COATED ORAL at 21:22

## 2020-03-17 RX ADMIN — BISACODYL 5 MG: 5 TABLET, COATED ORAL at 09:22

## 2020-03-17 RX ADMIN — METHOCARBAMOL TABLETS 750 MG: 750 TABLET, COATED ORAL at 05:09

## 2020-03-17 RX ADMIN — GABAPENTIN 400 MG: 400 CAPSULE ORAL at 21:22

## 2020-03-17 RX ADMIN — ACETAMINOPHEN 650 MG: 325 TABLET ORAL at 05:08

## 2020-03-17 RX ADMIN — MORPHINE SULFATE 15 MG: 15 TABLET, FILM COATED, EXTENDED RELEASE ORAL at 21:22

## 2020-03-17 RX ADMIN — OXYCODONE AND ACETAMINOPHEN 1.5 TABLET: 5; 325 TABLET ORAL at 01:27

## 2020-03-17 RX ADMIN — GABAPENTIN 400 MG: 400 CAPSULE ORAL at 09:22

## 2020-03-17 ASSESSMENT — PAIN SCALES - GENERAL
PAINLEVEL_OUTOF10: 0
PAINLEVEL_OUTOF10: 8
PAINLEVEL_OUTOF10: 7
PAINLEVEL_OUTOF10: 8
PAINLEVEL_OUTOF10: 8
PAINLEVEL_OUTOF10: 6
PAINLEVEL_OUTOF10: 5
PAINLEVEL_OUTOF10: 8
PAINLEVEL_OUTOF10: 7
PAINLEVEL_OUTOF10: 8
PAINLEVEL_OUTOF10: 6

## 2020-03-17 ASSESSMENT — PAIN DESCRIPTION - DESCRIPTORS
DESCRIPTORS: ACHING;CONSTANT
DESCRIPTORS: ACHING;CONSTANT
DESCRIPTORS: ACHING

## 2020-03-17 ASSESSMENT — PAIN DESCRIPTION - LOCATION
LOCATION: HIP
LOCATION: GROIN;HIP
LOCATION: BACK;HIP

## 2020-03-17 ASSESSMENT — PAIN DESCRIPTION - ORIENTATION
ORIENTATION: RIGHT

## 2020-03-17 NOTE — PATIENT CARE CONFERENCE
[address] [place] [address]  ACUTE REHABILITATION  TEAM CONFERENCE NOTE/PATIENT PLAN OF CARE    Date: 3/17/2020  Admission date: 3/6/2020  Patient Name: Zhang Arroyo        MRN: [id number]    : 1955  (66 y.o.)  Gender: female   Rehab diagnosis/surgery with date:  Lumbar radiculopathy-360 degree fusion with PLIF at L2-L3 on 3/4/20  Impairment Group Code:  8.9      MEDICAL/FUNCTIONAL HISTORY/STATUS:  continues to complain of back pain, will check follow up x-ray today    Consultations/Labs/X-rays: 3/14/20 urine culture positive for Klebsiella      MEDICATION UPDATE:  Cipro 500mg twice daily for urinary tract infection      NURSING FIMS:    Bowel:   Current level: continent    Bladder:   Current level: continent    Toilet Hygiene:   Current level : supervision  Short term Toilet hygiene goal: Modified independent  Long term toilet hygiene goal:  independent    Skin integrity:    back  incision  open to air, staples out  Pain: back    NUTRITION    Diet  general  Liquid consistency   thin    SOCIAL INFORMATION:  Lives with: son   Prior community services:  none  Home Architecture:  2 story, 1-2 entry, no rails, 12 with 1 rail to 2nd with full bath and bed, 1/2 bath on first floor  Prior Level of function:  independent with quad cane  DME:  quad cane, bedside commode    FAMILY / PATIENT EDUCATION:  safety and self care are ongoing    PHYSICAL THERAPY    Bed mobility:   Current level: supervision  Short term bed mobility goal: supervision  Long term bed mobility goal: Modified independent    Chair/bed transfers:  Current level: standby assist with wheeled walker, extra time due to pain  Short term Chair/bed transfers goal: supervision  Long term Chair/bed transfers goal: Modified independent      Ambulation:   Current level: 150 ft wheeled walker at standby assist, c/o right hip  pain  Short term ambulation goal: met  Long term ambulation goal: 300 ft at Modified independent    Car transfers:   Current level: home  Services at Discharge: home exercise program  Equipment at Discharge: wheeled walker      INTERDISCIPLINARY TEAM/PHYSICIAN [address]:  try MS contin, decrease percocet      I approve the established interdisciplinary plan of care as documented within the medical record of [facility]. Electronically signed by Ruddy Garcia RN  on 3/17/2020 at 9:26 AM  The following interdisciplinary team members were present:  THU Esparza LSW Richard S. Haybarger, PT, DPT  Cas Rodríguez OTR

## 2020-03-17 NOTE — PROGRESS NOTES
Physical Therapy  Facility/Department: [address] REHAB  Daily Treatment Note  NAME: Tiny Dowell  : 1955  MRN: [id number]    Date of Service: 3/17/2020    Evaluating Therapist: Neftaly Hernandes PT, DPT, [id number]     ROOM: [address] [place] [address]  DIAGNOSIS: Lumbar radiculopathy  PRECAUTIONS: spinal neutrality, LSO, falls     HPI: Pt underwent exploration of prior L3-5 fusion and L2-3 posterior lumbar interbody fusion on 3/5/20 for treatment of worsening lumbar pain.     Social: Pt lives with sister and son in a 2 floor house with 1+2 steps and no rail(s) to enter. Bed is on second floor and bath is on second floor. 12 stairs with 1 HR to second floor. She was Mod Independent with [facility] prior to admission.       Initial Evaluation  3/7 AM    PM    Short Term Goals Long Term Goals    Was pt agreeable to Eval/treatment? yes yes yes       Does pt have pain? 8/10 back pain Mild LBP, 7-8/10 R hip pain    Mild LBP, 7/10 R hip pain       Bed Mobility  Rolling: Min A  Supine to sit: Mod A  Sit to supine:  Mod A  Scooting: Min A towards EOB Supervision all aspects Supervision all aspects Supervision Mod Independent    Transfers Sit to stand: Min A  Stand to sit: Min A  Stand pivot: Min A Sit to stand: Sup  Stand to sit: Sup  Stand pivot: Sup with [employer] Sit to stand: Sup  Stand to sit: Sup  Stand pivot: Sup with [employer] Supervision Mod Independent    Ambulation    4 feet with SBQC with Mod A  30 feet x2, 75 feet with FWW with Min A 400 feet x1 rep and 300 feet x1 rep with [employer] Sup 400 feet with [employer] Sup 150 feet with AAD with Supervision 300 feet with AAD with Mod Independent    Walking 10 feet on uneven surface  NT NT NT 10 feet with AAD with Min A 10 feet with AAD with Supervision   Wheel Chair Mobility NT NT NT       Car Transfers NT, pt declines due to pain NT NT Min A Supervision   Stair negotiation: ascended and descended  3 steps with B rail with Mod A 8 steps 2 rails Sup NT 4 steps with 1 rail with Min A 12 steps with 1 rail with Supervision

## 2020-03-17 NOTE — PROGRESS NOTES
radiculopathy  Resolved Problems:    * No resolved hospital problems. *    Blood pressure (!) 145/69, pulse 81, temperature 98.7 °F (37.1 °C), temperature source Temporal, resp. rate 18, height 5' 5\" (1.651 m), weight 214 lb (97.1 kg), SpO2 98 %, not currently breastfeeding. Subjective:  Symptoms:  Stable. She reports weakness. Diet:  Adequate intake. Activity level: Impaired due to pain. Pain:  She complains of pain that is moderate. Objective:  General Appearance: In no acute distress. Vital signs: (most recent): Blood pressure (!) 145/69, pulse 81, temperature 98.7 °F (37.1 °C), temperature source Temporal, resp. rate 18, height 5' 5\" (1.651 m), weight 214 lb (97.1 kg), SpO2 98 %, not currently breastfeeding. Vital signs are normal.    Output: Producing urine and producing stool. Lungs:  Normal effort and normal respiratory rate. Breath sounds clear to auscultation. Heart: Normal rate. Regular rhythm. S1 normal and S2 normal.    Abdomen: Abdomen is soft. Bowel sounds are normal.   There is no abdominal tenderness. Extremities: Normal range of motion. Neurological: Patient is alert. (4/5 RLE). Assessment:    Condition: In stable condition. Improving. (LumBar radiculopathy). Plan:   Encourage ambulation. (Still having pain despite the addition of MS Contin and the increased gabapentin  Slowly improving right leg function  Will review in team today  Planning discharge).        Isaiah Prince MD  3/17/2020

## 2020-03-18 VITALS
SYSTOLIC BLOOD PRESSURE: 125 MMHG | DIASTOLIC BLOOD PRESSURE: 71 MMHG | TEMPERATURE: 98.9 F | OXYGEN SATURATION: 97 % | WEIGHT: 214 LBS | HEART RATE: 90 BPM | HEIGHT: 65 IN | RESPIRATION RATE: 18 BRPM | BODY MASS INDEX: 35.65 KG/M2

## 2020-03-18 PROCEDURE — 97110 THERAPEUTIC EXERCISES: CPT

## 2020-03-18 PROCEDURE — 6370000000 HC RX 637 (ALT 250 FOR IP): Performed by: PHYSICAL MEDICINE & REHABILITATION

## 2020-03-18 PROCEDURE — 6370000000 HC RX 637 (ALT 250 FOR IP): Performed by: NEUROLOGICAL SURGERY

## 2020-03-18 PROCEDURE — 97530 THERAPEUTIC ACTIVITIES: CPT

## 2020-03-18 PROCEDURE — 1280000000 HC REHAB R&B

## 2020-03-18 PROCEDURE — 6370000000 HC RX 637 (ALT 250 FOR IP): Performed by: PHYSICIAN ASSISTANT

## 2020-03-18 PROCEDURE — 97535 SELF CARE MNGMENT TRAINING: CPT

## 2020-03-18 RX ADMIN — GABAPENTIN 400 MG: 400 CAPSULE ORAL at 21:27

## 2020-03-18 RX ADMIN — CIPROFLOXACIN HYDROCHLORIDE 500 MG: 500 TABLET, FILM COATED ORAL at 08:39

## 2020-03-18 RX ADMIN — CIPROFLOXACIN HYDROCHLORIDE 500 MG: 500 TABLET, FILM COATED ORAL at 21:28

## 2020-03-18 RX ADMIN — ATORVASTATIN CALCIUM 40 MG: 40 TABLET, FILM COATED ORAL at 21:27

## 2020-03-18 RX ADMIN — FLUOXETINE HYDROCHLORIDE 40 MG: 20 CAPSULE ORAL at 08:39

## 2020-03-18 RX ADMIN — OXYCODONE HYDROCHLORIDE AND ACETAMINOPHEN 1 TABLET: 5; 325 TABLET ORAL at 06:03

## 2020-03-18 RX ADMIN — METHOCARBAMOL TABLETS 750 MG: 750 TABLET, COATED ORAL at 17:05

## 2020-03-18 RX ADMIN — GABAPENTIN 400 MG: 400 CAPSULE ORAL at 08:39

## 2020-03-18 RX ADMIN — METHOCARBAMOL TABLETS 750 MG: 750 TABLET, COATED ORAL at 11:37

## 2020-03-18 RX ADMIN — OXYCODONE HYDROCHLORIDE AND ACETAMINOPHEN 1 TABLET: 5; 325 TABLET ORAL at 20:06

## 2020-03-18 RX ADMIN — BISACODYL 5 MG: 5 TABLET, COATED ORAL at 08:39

## 2020-03-18 RX ADMIN — MORPHINE SULFATE 15 MG: 15 TABLET, FILM COATED, EXTENDED RELEASE ORAL at 08:45

## 2020-03-18 RX ADMIN — METHOCARBAMOL TABLETS 750 MG: 750 TABLET, COATED ORAL at 00:09

## 2020-03-18 RX ADMIN — MORPHINE SULFATE 15 MG: 15 TABLET, FILM COATED, EXTENDED RELEASE ORAL at 21:28

## 2020-03-18 RX ADMIN — GABAPENTIN 400 MG: 400 CAPSULE ORAL at 13:49

## 2020-03-18 RX ADMIN — LISINOPRIL 20 MG: 20 TABLET ORAL at 17:03

## 2020-03-18 RX ADMIN — METHOCARBAMOL TABLETS 750 MG: 750 TABLET, COATED ORAL at 06:03

## 2020-03-18 ASSESSMENT — PAIN DESCRIPTION - ONSET
ONSET: ON-GOING

## 2020-03-18 ASSESSMENT — PAIN DESCRIPTION - PAIN TYPE
TYPE: SURGICAL PAIN
TYPE: ACUTE PAIN
TYPE: CHRONIC PAIN
TYPE: ACUTE PAIN
TYPE: SURGICAL PAIN

## 2020-03-18 ASSESSMENT — PAIN DESCRIPTION - ORIENTATION
ORIENTATION: LEFT
ORIENTATION: LOWER
ORIENTATION: LEFT
ORIENTATION: LEFT
ORIENTATION: LOWER

## 2020-03-18 ASSESSMENT — PAIN DESCRIPTION - PROGRESSION
CLINICAL_PROGRESSION: GRADUALLY IMPROVING
CLINICAL_PROGRESSION: GRADUALLY IMPROVING
CLINICAL_PROGRESSION: NOT CHANGED
CLINICAL_PROGRESSION: NOT CHANGED

## 2020-03-18 ASSESSMENT — PAIN DESCRIPTION - DESCRIPTORS
DESCRIPTORS: ACHING;DISCOMFORT
DESCRIPTORS: ACHING;DISCOMFORT
DESCRIPTORS: ACHING;THROBBING;CONSTANT

## 2020-03-18 ASSESSMENT — PAIN DESCRIPTION - FREQUENCY
FREQUENCY: CONTINUOUS

## 2020-03-18 ASSESSMENT — PAIN - FUNCTIONAL ASSESSMENT
PAIN_FUNCTIONAL_ASSESSMENT: PREVENTS OR INTERFERES SOME ACTIVE ACTIVITIES AND ADLS
PAIN_FUNCTIONAL_ASSESSMENT: ACTIVITIES ARE NOT PREVENTED

## 2020-03-18 ASSESSMENT — PAIN SCALES - GENERAL
PAINLEVEL_OUTOF10: 0
PAINLEVEL_OUTOF10: 6
PAINLEVEL_OUTOF10: 9
PAINLEVEL_OUTOF10: 7

## 2020-03-18 ASSESSMENT — PAIN DESCRIPTION - LOCATION
LOCATION: HIP
LOCATION: BACK
LOCATION: HIP
LOCATION: BACK
LOCATION: HIP

## 2020-03-18 NOTE — PROGRESS NOTES
Physical Therapy  Facility/Department: [address] REHAB  Daily Treatment Note  NAME: Rosalino Huerta  : 1955  MRN: [id number]    Date of Service: 3/18/2020    Evaluating Therapist: Angie To PT, DPT, [id number]     ROOM: [address] [place] [address]  DIAGNOSIS: Lumbar radiculopathy  PRECAUTIONS: spinal neutrality, LSO, falls     HPI: Pt underwent exploration of prior L3-5 fusion and L2-3 posterior lumbar interbody fusion on 3/5/20 for treatment of worsening lumbar pain.     Social: Pt lives with sister and son in a 2 floor house with 1+2 steps and no rail(s) to enter. Bed is on second floor and bath is on second floor. 12 stairs with 1 HR to second floor. She was Mod Independent with [facility] prior to admission.       Initial Evaluation  3/7 AM    PM    Short Term Goals Long Term Goals    Was pt agreeable to Eval/treatment? yes yes yes       Does pt have pain? 8/10 back pain Mild LBP, 7/10 R hip pain    Mild LBP, 7/10 R hip pain       Bed Mobility  Rolling: Min A  Supine to sit: Mod A  Sit to supine:  Mod A  Scooting: Min A towards EOB Supervision all aspects Supervision all aspects Supervision Mod Independent    Transfers Sit to stand: Min A  Stand to sit: Min A  Stand pivot: Min A Sit to stand: Sup  Stand to sit: Sup  Stand pivot: Sup with [employer] Sit to stand: Sup  Stand to sit: Sup  Stand pivot: Sup with [employer] Supervision Mod Independent    Ambulation    4 feet with SBQC with Mod A  30 feet x2, 75 feet with FWW with Min A 400 feet with [employer] Sup 400 feet x1 rep and 300 feet x1 rep with [employer] Sup 150 feet with AAD with Supervision 300 feet with AAD with Mod Independent    Walking 10 feet on uneven surface  NT NT NT 10 feet with AAD with Min A 10 feet with AAD with Supervision   Wheel Chair Mobility NT NT NT       Car Transfers NT, pt declines due to pain NT NT Min A Supervision   Stair negotiation: ascended and descended  3 steps with B rail with Mod A 12 steps 2 rails Sup NT 4 steps with 1 rail with Min A 12 steps with 1 rail with Supervision Curb Step:   ascended and descended NT NT NT 7.5 inch step with AAD and Min A 7.5 inch step with AAD and Supervision   Picking up object off the floor NT NT NT Will  object with Min A Will  object with Supervision   BLE ROM WNL WNL         BLE Strength 4/5 overall bilaterally 4/5 BLE         Balance  Sitting EOB: SBA  Dynamic standing: Min A with FWW Sitting: Independent  Standing: Sup with [employer]         Date Family Teach Completed TBD TBD         Is additional Family Teaching Needed? Y or N Y Yes         Hindering Progress Pain control, impulsivity, balance deficits, decreased endurance  Pain, decreased endurance         PT recommended ELOS 2 weeks           Team's Discharge Plan             Therapist at Team Meeting                Therapeutic Exercise:   AM:  - Functional mobility as noted above in chart  - Side stepping in parallel bars with intermittent hand support x2 reps/side down and back to improve BLE strength, function, and dynamic standing balance  - Standing alternating marching 2x10/side to improve BLE strength (emphasis on controlled eccentric and core contraction during hip flexion)  PM:  - Functional mobility as noted above in chart  - Tandem stance hold x1 minute/side to improve balance  - Airex pad feet together stance hold x2 minutes to improve balance  - Airex pad feet together stance hold with eyes closed x2 minutes to improve balance  - 3 cane forward step overs x8 reps to improve dynamic standing balance      Patient education  Pt educated on safety with functional mobility    Patient response to education:   Pt verbalized understanding Pt demonstrated skill Pt requires further education in this area   yes yes yes     Additional Comments: Pt continues to report significant levels of pain before, during, and after PT. Pt ambulates with a steady gait with no unsteadiness observed. Pt should continue to use [employer] for ambulation and other functional mobility at this time.  Pt's ambulation distance is limited by pain. Focused on balance exercises in PM session to improve pt's static and dynamic standing balance. Expect to see pt continue to improve with functional mobility as pain becomes less severe. AM  Time in: 0915  Time out: 1000  PM  Time in: 1430  Time out: 1515    Pt is making good progress toward established Physical Therapy goals. Continue with physical therapy current plan of care.     Seferino Jones DPT  [id number]

## 2020-03-18 NOTE — PROGRESS NOTES
negotiation, pain relief tips while sitting in chair     Patient response to education:   Pt verbalized understanding Pt demonstrated skill Pt requires further education in this area   yes yes yes      Additional Comments: Pt improved ambulation distance today as a result of having less pain in her low back and R hip. Pt stated that she feels her functional mobility is entirely dependent upon her pain levels. Pt stated that she feels she does not require family teaching especially given the circumstances of the visitor restriction. Will continue to progress pt's strength, endurance, safety, and overall level of functional mobility.     AM  Time in: 0915  Time out: 1000  PM  Time in: 1430  Time out: 1515     Pt is making good progress toward established Physical Therapy goals. Continue with physical therapy current plan of care.     Farrah Rasclara, DPT  [id number]                      Assessment:    Condition: In stable condition. Improving. (Lumbar radiculopathy). Plan:   Encourage ambulation. (Ambulation is improved with MS Contin  I decreased the dose of Percocet  Recheck of the x-ray did show some spondylo-listhesis  I will discuss that with Dr. Shakir Byrne).        Avril Zuñiga MD  3/18/2020

## 2020-03-18 NOTE — PROGRESS NOTES
OCCUPATIONAL THERAPY DAILY NOTE    Date:3/18/2020  Patient Name: Tiny Dowell  MRN: [id number]  : 1955  Room: [address] [place], [place] [address]     Referring Practitioner:   Diagnosis: lumbar radiculopathy (Pt underwent exploration of prior L3-L5 fusion and L2-L3 PLIF on 3/4)   Additional Pertinent Hx: fibromyalgia, DM II, HTN, RA, TIA, chronic back pain (has had back surgery), CA, foot surgery  Precautions:  falls, spinal, LSO (pt can tima sititng EOB per verbally speaking with  3/7)    Functional Assessment:   Date Status AE  Comments   Feeding 3/16/2020 Independent      Grooming 3/16/2020 Mod I  seated    Bathing 3/16/2020 Sup     UB Dressing 3/17/2020 Sup     LB Dressing 3/18/20  SBA Sock aid,long shoe horn  Pt donned socks with sock aid and slip on shoes using long shoe horn    Homemaking 3/13/20  CGA  ww      Functional Transfers / Balance:   Date Status DME  Comments   Sit Balance 3/16/2020 S/SBA     Stand Balance 3/18/20  SBA ww   At table top level engaging BUE;s in tx activity with several rest breaks due to pain    [] Tub  [x] Shower   Transfer 3/16/2020 SBA Ww, shower bench    Commode   Transfer      Toileting 3/18/20         3/18/20  SBA        SBA W/w     Functional   Mobility 3/17/20  SBA ww Pt ambulated from her room to OT clinic    Other: supine <>EOB    EOB <> w/c  3/18/20       3/18/20  SBA      SBA Bed rail      ww Verbal cues to remind pt to follow log rolling technique      Functional Exercises / Activity:   BUE strength exercises: dowel faiza with 2 # weight 3 sets 10 reps in all planes                                             Red can do bar 3 sets 15 reps   Therapeutic activity with focus on UB strength ,fine motor coordination and standing balance/endurance. Pt stood 3 times at SBA for 5 mins each time     Standing balance/endurance activity activity at table top level completing shoulder ROM arc at SBA         Sensory / Neuromuscular Re-Education:      Cognitive Skills:   Status Comments Problem   Solving Fair+    Memory Fair+    Sequencing Fair+    Safety Fair+      Visual Perception:    Education:  Pt educated on log rolling technique when getting out of bed  Pt was educated on AE for LB dressing       [] Family teach completed on:    Pain Level : R hip 7/10    Additional Notes:     Patient has made fair progress during treatment sessions toward set goals. Therapy emphasis to obtain goals:  Current Treatment Recommendations: Strengthening, Endurance Training, Neuromuscular Re-education, Patient/Caregiver Education & Training, ROM, Equipment Evaluation, Education, & procurement, Balance Training, Gait Training, Self-Care / ADL, Functional Mobility Training, Safety Education & Training, Home Management Training        [x] Continue with current OT Plan of care.   [] Prepare for Discharge     DISCHARGE RECOMMENDATIONS  Recommended DME:    Post Discharge Care:   []Home Independently  []Home with 24hr Care / Supervision []Home with Partial Supervision []Home with Home Health OT []Home with Out Pt OT []Other: ___   Comments:         Time in Time out Tx Time Breakdown  Variance:   First Session  [id number] [] Individual Tx-   [x] Concurrent Tx - 45 mins  [] Co-Tx -   [] Group Tx -   [] Time Missed -      Second Session [id number]  [x] Individual Tx-45   [] Concurrent Tx    [] Co-Tx -   [] Group Tx -   [] Time Missed -     Third Session    [] Individual Tx-   [] Concurrent Tx -  [] Co-Tx -   [] Group Tx -   [] Time Missed -         Total Tx Time: 90 mins    Masoud Valente OTR/L [id number]

## 2020-03-19 PROCEDURE — 6370000000 HC RX 637 (ALT 250 FOR IP): Performed by: PHYSICAL MEDICINE & REHABILITATION

## 2020-03-19 PROCEDURE — 97530 THERAPEUTIC ACTIVITIES: CPT

## 2020-03-19 PROCEDURE — 97535 SELF CARE MNGMENT TRAINING: CPT

## 2020-03-19 PROCEDURE — 6370000000 HC RX 637 (ALT 250 FOR IP): Performed by: PHYSICIAN ASSISTANT

## 2020-03-19 PROCEDURE — 97110 THERAPEUTIC EXERCISES: CPT

## 2020-03-19 PROCEDURE — 6370000000 HC RX 637 (ALT 250 FOR IP): Performed by: NEUROLOGICAL SURGERY

## 2020-03-19 RX ORDER — GABAPENTIN 400 MG/1
400 CAPSULE ORAL 3 TIMES DAILY
Qty: 90 CAPSULE | Refills: 3 | Status: SHIPPED
Start: 2020-03-19 | End: 2020-09-11 | Stop reason: SDUPTHER

## 2020-03-19 RX ORDER — CIPROFLOXACIN 500 MG/1
500 TABLET, FILM COATED ORAL EVERY 12 HOURS SCHEDULED
Qty: 8 TABLET | Refills: 0 | Status: SHIPPED | OUTPATIENT
Start: 2020-03-19 | End: 2020-03-23

## 2020-03-19 RX ORDER — POLYETHYLENE GLYCOL 3350 17 G/17G
17 POWDER, FOR SOLUTION ORAL DAILY PRN
Qty: 527 G | Refills: 1 | Status: SHIPPED | OUTPATIENT
Start: 2020-03-19 | End: 2020-04-17

## 2020-03-19 RX ORDER — OXYCODONE HYDROCHLORIDE AND ACETAMINOPHEN 5; 325 MG/1; MG/1
1 TABLET ORAL EVERY 6 HOURS PRN
Qty: 60 TABLET | Refills: 0 | Status: SHIPPED | OUTPATIENT
Start: 2020-03-19 | End: 2020-04-01 | Stop reason: SDUPTHER

## 2020-03-19 RX ORDER — METHOCARBAMOL 750 MG/1
750 TABLET, FILM COATED ORAL 4 TIMES DAILY
Qty: 40 TABLET | Refills: 0 | Status: SHIPPED | OUTPATIENT
Start: 2020-03-19 | End: 2020-03-29

## 2020-03-19 RX ORDER — MORPHINE SULFATE 15 MG/1
15 TABLET, FILM COATED, EXTENDED RELEASE ORAL EVERY 12 HOURS SCHEDULED
Qty: 30 TABLET | Refills: 0 | Status: SHIPPED | OUTPATIENT
Start: 2020-03-19 | End: 2020-04-01 | Stop reason: SDUPTHER

## 2020-03-19 RX ADMIN — METHOCARBAMOL TABLETS 750 MG: 750 TABLET, COATED ORAL at 08:50

## 2020-03-19 RX ADMIN — FLUOXETINE HYDROCHLORIDE 40 MG: 20 CAPSULE ORAL at 08:51

## 2020-03-19 RX ADMIN — MORPHINE SULFATE 15 MG: 15 TABLET, FILM COATED, EXTENDED RELEASE ORAL at 08:49

## 2020-03-19 RX ADMIN — OXYCODONE HYDROCHLORIDE AND ACETAMINOPHEN 1 TABLET: 5; 325 TABLET ORAL at 13:04

## 2020-03-19 RX ADMIN — CIPROFLOXACIN HYDROCHLORIDE 500 MG: 500 TABLET, FILM COATED ORAL at 08:51

## 2020-03-19 RX ADMIN — GABAPENTIN 400 MG: 400 CAPSULE ORAL at 13:03

## 2020-03-19 RX ADMIN — METHOCARBAMOL TABLETS 750 MG: 750 TABLET, COATED ORAL at 00:45

## 2020-03-19 RX ADMIN — BISACODYL 5 MG: 5 TABLET, COATED ORAL at 08:50

## 2020-03-19 RX ADMIN — OXYCODONE HYDROCHLORIDE AND ACETAMINOPHEN 1 TABLET: 5; 325 TABLET ORAL at 08:53

## 2020-03-19 RX ADMIN — GABAPENTIN 400 MG: 400 CAPSULE ORAL at 08:49

## 2020-03-19 RX ADMIN — METHOCARBAMOL TABLETS 750 MG: 750 TABLET, COATED ORAL at 13:03

## 2020-03-19 ASSESSMENT — PAIN SCALES - GENERAL
PAINLEVEL_OUTOF10: 0
PAINLEVEL_OUTOF10: 5
PAINLEVEL_OUTOF10: 7
PAINLEVEL_OUTOF10: 5
PAINLEVEL_OUTOF10: 7

## 2020-03-19 ASSESSMENT — PAIN DESCRIPTION - PROGRESSION: CLINICAL_PROGRESSION: NOT CHANGED

## 2020-03-19 ASSESSMENT — PAIN DESCRIPTION - PAIN TYPE: TYPE: SURGICAL PAIN

## 2020-03-19 ASSESSMENT — PAIN DESCRIPTION - FREQUENCY: FREQUENCY: CONTINUOUS

## 2020-03-19 ASSESSMENT — PAIN DESCRIPTION - LOCATION
LOCATION: BACK;HIP
LOCATION: BACK;HIP

## 2020-03-19 ASSESSMENT — PAIN DESCRIPTION - DESCRIPTORS
DESCRIPTORS: ACHING;DISCOMFORT
DESCRIPTORS: ACHING;DISCOMFORT

## 2020-03-19 ASSESSMENT — PAIN DESCRIPTION - ONSET: ONSET: ON-GOING

## 2020-03-19 ASSESSMENT — PAIN DESCRIPTION - ORIENTATION: ORIENTATION: LOWER

## 2020-03-19 NOTE — PROGRESS NOTES
OCCUPATIONAL THERAPY DAILY NOTE & DISCHARGE SUMMARY  Date:3/19/2020  Patient Name: Claudene Bristol  MRN: [id number]  : 1955  Room: [address] [place] [address]     Referring Practitioner:   Diagnosis: lumbar radiculopathy (Pt underwent exploration of prior L3-L5 fusion and L2-L3 PLIF on 3/4)   Additional Pertinent Hx: fibromyalgia, DM II, HTN, RA, TIA, chronic back pain (has had back surgery), CA, foot surgery  Precautions:  falls, spinal, LSO (pt can tima sititng EOB per verbally speaking with  3/7)    Functional Assessment:   Date Status AE  Comments   Feeding 3/19/2020 Independent      Grooming 3/19/2020 Mod I  seated    Bathing 3/16/2020 Sup     UB Dressing 3/19/2020 Mod I      LB Dressing 3/19/20  SBA Sock aid,long shoe horn  Pt donned socks with sock aid and slip on shoes using long shoe horn    Homemaking 3/13/20  CGA  ww      Functional Transfers / Balance:   Date Status DME  Comments   Sit Balance 3/19/2020 S/SBA     Stand Balance 3/19/20  SBA ww   At table top level engaging BUE;s in tx activity with several rest breaks due to pain    [] Tub  [x] Shower   Transfer 3/16/2020 SBA Ww, shower bench    Commode   Transfer      Toileting 3/19/20         3/19/20  Mod I         independent W/w     Functional   Mobility 3/19/20  Sup ww Pt ambulated from her room to OT clinic    Other: supine <>EOB    EOB <> w/c  3/19/20       3/18/20  SBA      SBA Bed rail      ww Verbal cues to remind pt to follow log rolling technique      Functional Exercises / Activity:   Pt demo Mod I sit<>stand. Pt demo Mod I functional mobility in her room. Pt demo Mod I commode trf using a rw to a standard commode. PT demo independent toileting. Pt demo Sup functional mobility on the unit to the OT gym. Pt tolerated endurance activities to maximize pt activity tolerance so that she can sustain IADLs when she goes home.     Sensory / Neuromuscular Re-Education:      Cognitive Skills:   Status Comments   Problem   Solving Fair+    Memory Fair+    Sequencing Fair+    Safety Fair+      Visual Perception:    Education:  Pt educated on log rolling technique when getting out of bed  Pt was educated on AE for LB dressing       [] Family teach completed on:    Pain Level : R hip 7/10    Additional Notes:     Patient has made fair progress during treatment sessions toward set goals. Therapy emphasis to obtain goals:  Current Treatment Recommendations: Strengthening, Endurance Training, Neuromuscular Re-education, Patient/Caregiver Education & Training, ROM, Equipment Evaluation, Education, & procurement, Balance Training, Gait Training, Self-Care / ADL, Functional Mobility Training, Safety Education & Training, Home Management Training        [] Continue with current OT Plan of care. [x] Prepare for Discharge     DISCHARGE RECOMMENDATIONS  OCCUPATIONAL THERAPY DISCHARGE SUMMARY    Discontinue Occupational Therapy intervention. Pt has:   [] met all set goals. [x] made good progress toward set goals. [] has made slow progress toward goals and would benefit from rehab setting change.  [] had a medical decline and therefore was transferred off the Rehab unit.     Long term goals  Time Frame for Long term goals : 1-2 wks  Long term goal 1: Pt will be IND with grooming task seated/standing at sink  Long term goal 2: Pt will be Mod I with UE dressing of shirt and LSO  Long term goal 3: Pt will be Mod I with LE dressing using AE  Long term goal 4: Pt will be Mod I with commode and shower transfers  Long term goal 5: Pt will be SUP for bathing task  Long term goals 6: Pt will be Mod I with light homemaking task  Long term goal 7: Pt will demo G safety, insight, understanding, reasoning, and adherence to spinal precautions during functional activities    The Patient has received education on:  [x]Transfer Safety [x]Compensatory tech for ADLs [x]AE training [x]DME training [x]Energy Conservation [x]Home / Kitchen Safety  [x]Fall Prevention  []Other:    Family training was completed: no    Post Discharge Care:   []Home Independently  []Home with 24hr Care / Supervision []Home with Partial Supervision []Home with Home Health OT []Home with Out Pt OT []Other: ___   Comments:         Time in Time out Tx Time Breakdown  Variance:   First Session  [id number] [] Individual Tx-   [x] Concurrent Tx - 45 mins  [] Co-Tx -   [] Group Tx -   [] Time Missed -      Second Session [id number]  [x] Individual Tx-45   [] Concurrent Tx    [] Co-Tx -   [] Group Tx -   [] Time Missed -     Third Session    [] Individual Tx-   [] Concurrent Tx -  [] Co-Tx -   [] Group Tx -   [] Time Missed -         Total Tx Time: 90 mins    Renuka Contreras OTR/L [id number]

## 2020-03-19 NOTE — PROGRESS NOTES
Daphne Headley is a 60 y.o. female patient.     Current Facility-Administered Medications   Medication Dose Route Frequency Provider Last Rate Last Dose    oxyCODONE-acetaminophen (PERCOCET) 5-325 MG per tablet 1 tablet  1 tablet Oral Q4H PRN Porsha Oshea MD   1 tablet at 03/19/20 0853    morphine (MS CONTIN) extended release tablet 15 mg  15 mg Oral 2 times per day Porsha Oshea MD   15 mg at 03/19/20 0849    ciprofloxacin (CIPRO) tablet 500 mg  500 mg Oral 2 times per day Porsha Oshea MD   500 mg at 03/19/20 0851    vitamin D (ERGOCALCIFEROL) capsule 50,000 Units  50,000 Units Oral Weekly Lisa Holt MD   50,000 Units at 03/14/20 2217    lidocaine 4 % external patch 1 patch  1 patch Transdermal Daily Porsha Oshea MD   1 patch at 03/19/20 0850    methocarbamol (ROBAXIN) tablet 750 mg  750 mg Oral 4x Daily Ryann Guajardo MD   750 mg at 03/19/20 0850    gabapentin (NEURONTIN) capsule 400 mg  400 mg Oral TID Porsha Oshea MD   400 mg at 03/19/20 0849    acetaminophen (TYLENOL) tablet 650 mg  650 mg Oral Q4H PRN Porsha Oshea MD   650 mg at 03/17/20 0508    polyethylene glycol (GLYCOLAX) packet 17 g  17 g Oral Daily PRN Porsha Oshea MD   17 g at 03/12/20 0907    bisacodyl (DULCOLAX) EC tablet 5 mg  5 mg Oral Daily TREASURE Hadley   5 mg at 03/19/20 9387    fleet rectal enema 1 enema  1 enema Rectal Daily PRN TREASURE Hadley        magnesium hydroxide (MILK OF MAGNESIA) 400 MG/5ML suspension 30 mL  30 mL Oral Daily PRN TREASURE Hadley   30 mL at 03/08/20 1701    atorvastatin (LIPITOR) tablet 40 mg  40 mg Oral Daily TREASURE Hadley   40 mg at 03/18/20 2127    FLUoxetine (PROZAC) capsule 40 mg  40 mg Oral Daily TREASURE Hadley   40 mg at 03/19/20 0851    hydrOXYzine (VISTARIL) capsule 25 mg  25 mg Oral TID PRN TREASURE Hadley   25 mg at 03/17/20 2122    lisinopril (PRINIVIL;ZESTRIL) tablet 20 mg  20 mg Oral QPM TREASURE Hadley   20 mg at 03/18/20 1703     Allergies   Allergen Reactions    Pcn [Penicillins] Anaphylaxis     Active Problems:    Lumbar radiculopathy  Resolved Problems:    * No resolved hospital problems. *    Blood pressure 125/71, pulse 90, temperature 98.9 °F (37.2 °C), temperature source Temporal, resp. rate 18, height 5' 5\" (1.651 m), weight 214 lb (97.1 kg), SpO2 97 %, not currently breastfeeding. Subjective:  Symptoms:  Stable. She reports weakness. Diet:  Adequate intake. Activity level: Impaired due to weakness. Pain:  She complains of pain that is mild. Objective:  General Appearance: In no acute distress. Vital signs: (most recent): Blood pressure 125/71, pulse 90, temperature 98.9 °F (37.2 °C), temperature source Temporal, resp. rate 18, height 5' 5\" (1.651 m), weight 214 lb (97.1 kg), SpO2 97 %, not currently breastfeeding. Vital signs are normal.    Output: Producing urine and producing stool. Lungs:  Normal effort and normal respiratory rate. Breath sounds clear to auscultation. Heart: Normal rate. Regular rhythm. S1 normal and S2 normal.    Abdomen: Abdomen is soft. Bowel sounds are normal.   There is no abdominal tenderness. Extremities: Normal range of motion. Neurological: Patient is alert. (4/5 str). Assessment:    Condition: In stable condition. Improving. (Lumbar lami). Plan:   Encourage ambulation. (Pain better controlled  Up and ambulating in the room  Dr. Carolina Marr reviewed the x-rays and feels that she is stable  We are planning discharge).        Ric Marcelo MD  3/19/2020

## 2020-03-19 NOTE — PLAN OF CARE
Problem: Pain:  Goal: Pain level will decrease  Description: Pain level will decrease  3/19/2020 1631 by Laura Solorio RN  Outcome: Met This Shift  3/19/2020 0459 by Marilia Loyola RN  Outcome: Met This Shift     Problem: Falls - Risk of:  Goal: Will remain free from falls  Description: Will remain free from falls  3/19/2020 1631 by Laura Solorio RN  Outcome: Met This Shift  3/19/2020 0459 by Marilia Loyola RN  Outcome: Met This Shift     Problem: Falls - Risk of:  Goal: Absence of physical injury  Description: Absence of physical injury  Outcome: Met This Shift     Problem: DAILY CARE  Goal: Daily care needs are met  3/19/2020 1631 by Laura Solorio RN  Outcome: Met This Shift  3/19/2020 0459 by Marilia Loyola RN  Outcome: Met This Shift     Problem: Mobility - Impaired:  Goal: Mobility will improve  Description: Mobility will improve  3/19/2020 1631 by Laura Solorio RN  Outcome: Met This Shift  3/19/2020 0459 by Marilia Loyola RN  Outcome: Met This Shift

## 2020-03-20 NOTE — CONSULTS
[address]                  [address] [facility]  [facility]                                  CONSULTATION    PATIENT NAME: Tamela Andrews                        :        1955  MED REC NO:   [id number]                            ROOM:       45  ACCOUNT NO:   [de-identified]                           ADMIT DATE: 2020  PROVIDER:     Ian Cahidez MD    CONSULT DATE:  2020    INTRODUCTION:  The patient was admitted to [facility] with lumbar  radiculopathy. She underwent decompression and fusion, and was  transferred to acute rehab on 2020. HOSPITAL COURSE:  On admission, the patient was felt to be a good rehab  candidate. She had a lot of problems with pain, and eventually, had to  be on MS Contin for adequate pain relief as well as Norco for  breakthrough. She did improve eventually from a functional standpoint  and progressed to the point that she was at a standby to independent  level with all of her functioning and was ready for discharge home on  2020. Discharge was to home. Discharge condition is good. LABORATORY DATA:  CBC and metabolic profile were unremarkable except for  a hemoglobin 10.6, hematocrit 33.7. MEDICATIONS:  MS Contin 15 mg b.i.d., Percocet 5/325 for breakthrough  pain, Cipro 500 mg b.i.d. for four more days, gabapentin 400 mg t.i.d.,  Robaxin 750 mg q.i.d., Zestril 20 mg daily. DIAGNOSES:  1. Lumbar radiculopathy. 2.  Status post lumbar decompression and fusion. 3.  Obesity. 4.  Hypertension. 5.  Type 2 diabetes mellitus. 6.  Diabetic neuropathy. 7.  Recent urinary tract infection, treated.         Sherryle Bergeron, MD    D: 2020 13:02:58       T: 2020 13:13:13     [email or web address]  Job#: [id number]     Doc#: [id number]    CC:

## 2020-03-25 NOTE — DISCHARGE SUMMARY
[address]                  [address] [facility],  [facility]                               DISCHARGE SUMMARY    PATIENT NAME: Jonathan Kitchen                        :        1955  MED REC NO:   [id number]                            ROOM:       7762  ACCOUNT NO:   [de-identified]                           ADMIT DATE: 2020  PROVIDER:     Chaitanya Tellez MD                  DISCHARGE DATE:  2020      REHAB DISCHARGE SUMMARY    INTRODUCTION:  The patient was admitted to [facility] with lumbar  radiculopathy and had decompression and fusion. She was transferred to  acute rehab on about 2020. HOSPITAL COURSE:  On admission, the patient was felt to be a good rehab  candidate. She progressed and was ready for discharge home on  2020. Discharge is to home. Discharge condition is good. See  the consult that is dated 2020 for further details. DIAGNOSIS:  Lumbar radiculopathy.         Massiel Tompkins MD    D: 2020 10:20:53       T: 2020 10:27:11     [email or web address]  Job#: [id number]     Doc#: [id number]    CC:

## 2020-03-31 ENCOUNTER — HOSPITAL ENCOUNTER (OUTPATIENT)
Dept: GENERAL RADIOLOGY | Age: 65
Discharge: HOME | End: 2020-04-02

## 2020-03-31 ENCOUNTER — HOSPITAL ENCOUNTER (OUTPATIENT)
Age: 65
Discharge: HOME | End: 2020-04-02

## 2020-03-31 PROCEDURE — 72100 X-RAY EXAM L-S SPINE 2/3 VWS: CPT

## 2020-04-01 ENCOUNTER — OFFICE VISIT (OUTPATIENT)
Dept: NEUROSURGERY | Age: 65
End: 2020-04-01

## 2020-04-01 VITALS — TEMPERATURE: 98 F | HEIGHT: 65 IN | WEIGHT: 214 LBS | BODY MASS INDEX: 35.65 KG/M2

## 2020-04-01 PROCEDURE — 99024 POSTOP FOLLOW-UP VISIT: CPT | Performed by: PHYSICIAN ASSISTANT

## 2020-04-01 RX ORDER — OXYCODONE HYDROCHLORIDE AND ACETAMINOPHEN 5; 325 MG/1; MG/1
1 TABLET ORAL EVERY 6 HOURS PRN
Qty: 60 TABLET | Refills: 0 | Status: SHIPPED | OUTPATIENT
Start: 2020-04-01 | End: 2020-04-16

## 2020-04-01 RX ORDER — MORPHINE SULFATE 15 MG/1
15 TABLET, FILM COATED, EXTENDED RELEASE ORAL EVERY 12 HOURS SCHEDULED
Qty: 30 TABLET | Refills: 0 | Status: SHIPPED | OUTPATIENT
Start: 2020-04-01 | End: 2020-04-16

## 2020-04-01 NOTE — PROGRESS NOTES
Post Operative Follow-up    Subjective: Kamila Shea is a 60 y.o.  female who underwent exploration of prior L3-5 fusion and L2-3 PLIF on 3/4/20 by Dr. Brayan De La Torre. She presents to the office for a one month follow up. Pt states her previous back pain has improved. She has new pain going from her right elbow into her hand. Needs refills on pain medication. Wears brace when out of bed. Denies issues with incision site. Lumbar spine x-rays reviewed.      Physical Exam:              WDWN, no apparent distress              Non-labored breathing               Vitals Stable              Alert and oriented x3              CN 3-12 intact              PERRL              EOMI              Good motor strength              Sensation to LT intact bilaterally   Incision site healing well with no signs of infection                 Imagin20 lumbar x-rays:   Impression   Findings consistent with degenerative disc disease and   degenerative facets disease. Stable L2-L4 posterior fusion hardware. Stable grade 1 retrolisthesis of L2 on L3 and grade 1 retrolisthesis   of L5 on S1.          Assessment: This is a 60 y.o.  female presenting for a one month follow-up s/p L2-3 PLIF     Plan:  -Pain control and expectations discussed  -We will continue to refill her pain medications for 3 months after surgery. Pain management can take over after that. Pain medications refilled to pharmacy. -OARRS report reviewed  -Continue with restrictions and brace  -Follow-up in neurosurgery clinic in 2 months for 3 month follow up with repeat lumbar x-rays  -Call or return to neurosurgery office sooner if symptoms worsen or if new issues arise in the interim.     Electronically signed by Barak Blackburn PA-C on 2020 at 2:28 PM

## 2020-04-17 ENCOUNTER — VIRTUAL VISIT (OUTPATIENT)
Dept: PRIMARY CARE CLINIC | Age: 65
End: 2020-04-17

## 2020-04-17 VITALS — WEIGHT: 214 LBS | BODY MASS INDEX: 35.65 KG/M2 | HEIGHT: 65 IN

## 2020-04-17 PROCEDURE — 99442 PR PHYS/QHP TELEPHONE EVALUATION 11-20 MIN: CPT | Performed by: FAMILY MEDICINE

## 2020-04-17 RX ORDER — MORPHINE SULFATE 15 MG/1
15 TABLET, FILM COATED, EXTENDED RELEASE ORAL 2 TIMES DAILY
COMMUNITY
End: 2020-06-09

## 2020-04-17 RX ORDER — METHOCARBAMOL 750 MG/1
750 TABLET, FILM COATED ORAL 2 TIMES DAILY PRN
Qty: 180 TABLET | Refills: 0 | Status: SHIPPED
Start: 2020-04-17 | End: 2020-08-17

## 2020-04-17 RX ORDER — METHOCARBAMOL 750 MG/1
750 TABLET, FILM COATED ORAL 4 TIMES DAILY PRN
COMMUNITY
End: 2020-04-17

## 2020-04-17 RX ORDER — OXYCODONE HYDROCHLORIDE AND ACETAMINOPHEN 5; 325 MG/1; MG/1
1 TABLET ORAL EVERY 6 HOURS PRN
COMMUNITY
End: 2020-06-09

## 2020-04-17 RX ORDER — METHOCARBAMOL 750 MG/1
750 TABLET, FILM COATED ORAL 2 TIMES DAILY PRN
Qty: 60 TABLET | Refills: 0 | Status: SHIPPED
Start: 2020-04-17 | End: 2020-04-17

## 2020-04-17 NOTE — PROGRESS NOTES
Whitley Wong is a 60 y.o. female evaluated via telephone on 4/17/2020. Consent:  She and/or health care decision maker is aware that that she may receive a bill for this telephone service, depending on her insurance coverage, and has provided verbal consent to proceed: Yes      Documentation:  I communicated with the patient and/or health care decision maker about hospital follow up back surgery. Details of this discussion including any medical advice provided: back is improved. \"My back is okay. .. theres this spot that [is a little painful]. It affects me that im weak and when I am lying in bed and want to turn over. .. um that's not doable. I have to pull myself. \"  But overall her pain is significantly improved. She is still having right low back vs right hip pain - hard to differentiate on the phone. This sounds more like a right L2 or L3 radiculopathy. Right buttock, right hip and groin pain that radiates to her right knee. Pain meds have been helpful, when it wears off the pain comes on strong and fast.   She is in MS contin and Percocet. Understands this will not be long term, and then she will be seeing the pain clinic. No new problems or concerns. Overall improved from prior to surgery. Has reasonable expectations for her pain medication. Is somewhat interested in discussion of hip injection to see if this helps with pain and diagnosis. She has not checked her BP recently. She feels well. Denies CP, sob, abd pain, headaches, vision changes. Overall no changes made today with medications. She was counseled to follow up with Neurosurgery and pain mgmt as currently scheduled. Does not need any more immediate care based on current tolerability of symptoms and restricted health workup capacity given corona virus. Did refill robaxin and reduced dose to BID prn, as she has bene using it lately.      I affirm this is a Patient Initiated Episode with an Established Patient who has not had a related appointment within my department in the past 7 days or scheduled within the next 24 hours. Total Time: 18 minutes.      Mati Cleary   3:28 PM

## 2020-04-20 ENCOUNTER — TELEPHONE (OUTPATIENT)
Dept: FAMILY MEDICINE CLINIC | Age: 65
End: 2020-04-20

## 2020-04-24 ENCOUNTER — HOSPITAL ENCOUNTER (OUTPATIENT)
Dept: GENERAL RADIOLOGY | Age: 65
Discharge: HOME | End: 2020-04-26

## 2020-04-24 ENCOUNTER — HOSPITAL ENCOUNTER (OUTPATIENT)
Age: 65
Discharge: HOME | End: 2020-04-26

## 2020-04-24 PROCEDURE — 72100 X-RAY EXAM L-S SPINE 2/3 VWS: CPT

## 2020-04-27 ENCOUNTER — OFFICE VISIT (OUTPATIENT)
Dept: NEUROSURGERY | Age: 65
End: 2020-04-27

## 2020-04-27 VITALS
DIASTOLIC BLOOD PRESSURE: 105 MMHG | HEIGHT: 65 IN | SYSTOLIC BLOOD PRESSURE: 164 MMHG | TEMPERATURE: 97.2 F | BODY MASS INDEX: 33.32 KG/M2 | HEART RATE: 102 BPM | WEIGHT: 200 LBS

## 2020-04-27 PROCEDURE — 99024 POSTOP FOLLOW-UP VISIT: CPT | Performed by: PHYSICIAN ASSISTANT

## 2020-04-28 ENCOUNTER — TELEPHONE (OUTPATIENT)
Dept: NEUROSURGERY | Age: 65
End: 2020-04-28

## 2020-04-28 ENCOUNTER — TELEPHONE (OUTPATIENT)
Dept: PAIN MANAGEMENT | Age: 65
End: 2020-04-28

## 2020-04-28 NOTE — TELEPHONE ENCOUNTER
Patient would like to speak with [place], [place]. Patient states she has questions for her.   Pt#  [phone]

## 2020-04-29 ENCOUNTER — TELEPHONE (OUTPATIENT)
Dept: NEUROSURGERY | Age: 65
End: 2020-04-29

## 2020-05-26 ENCOUNTER — HOSPITAL ENCOUNTER (OUTPATIENT)
Age: 65
Discharge: HOME | End: 2020-05-28

## 2020-05-26 ENCOUNTER — HOSPITAL ENCOUNTER (OUTPATIENT)
Dept: GENERAL RADIOLOGY | Age: 65
Discharge: HOME | End: 2020-05-28

## 2020-05-26 PROCEDURE — 72100 X-RAY EXAM L-S SPINE 2/3 VWS: CPT

## 2020-05-27 ENCOUNTER — OFFICE VISIT (OUTPATIENT)
Dept: NEUROSURGERY | Age: 65
End: 2020-05-27

## 2020-05-27 VITALS
DIASTOLIC BLOOD PRESSURE: 93 MMHG | TEMPERATURE: 99.2 F | WEIGHT: 200 LBS | SYSTOLIC BLOOD PRESSURE: 138 MMHG | HEIGHT: 65 IN | BODY MASS INDEX: 33.32 KG/M2 | HEART RATE: 95 BPM

## 2020-05-27 PROCEDURE — 99024 POSTOP FOLLOW-UP VISIT: CPT | Performed by: PHYSICIAN ASSISTANT

## 2020-06-09 ENCOUNTER — TELEPHONE (OUTPATIENT)
Dept: PAIN MANAGEMENT | Age: 65
End: 2020-06-09

## 2020-06-09 ENCOUNTER — PREP FOR PROCEDURE (OUTPATIENT)
Dept: PAIN MANAGEMENT | Age: 65
End: 2020-06-09

## 2020-06-09 ENCOUNTER — OFFICE VISIT (OUTPATIENT)
Dept: PAIN MANAGEMENT | Age: 65
End: 2020-06-09

## 2020-06-09 ENCOUNTER — HOSPITAL ENCOUNTER (OUTPATIENT)
Age: 65
Discharge: HOME | End: 2020-06-11
Payer: COMMERCIAL

## 2020-06-09 VITALS
BODY MASS INDEX: 33.32 KG/M2 | TEMPERATURE: 97.8 F | OXYGEN SATURATION: 97 % | HEART RATE: 88 BPM | HEIGHT: 65 IN | SYSTOLIC BLOOD PRESSURE: 146 MMHG | WEIGHT: 200 LBS | RESPIRATION RATE: 16 BRPM | DIASTOLIC BLOOD PRESSURE: 88 MMHG

## 2020-06-09 LAB — SPECIFIC GRAVITY UA: <=1.005 (ref 1–1.03)

## 2020-06-09 PROCEDURE — G0480 DRUG TEST DEF 1-7 CLASSES: HCPCS

## 2020-06-09 PROCEDURE — 80307 DRUG TEST PRSMV CHEM ANLYZR: CPT

## 2020-06-09 PROCEDURE — 99214 OFFICE O/P EST MOD 30 MIN: CPT | Performed by: PHYSICIAN ASSISTANT

## 2020-06-09 RX ORDER — HYDROCODONE BITARTRATE AND ACETAMINOPHEN 5; 325 MG/1; MG/1
1 TABLET ORAL 3 TIMES DAILY
Qty: 90 TABLET | Refills: 0 | Status: SHIPPED
Start: 2020-06-09 | End: 2020-07-17 | Stop reason: SDUPTHER

## 2020-06-09 RX ORDER — HYDROCODONE BITARTRATE AND ACETAMINOPHEN 7.5; 325 MG/1; MG/1
1 TABLET ORAL 2 TIMES DAILY
Qty: 75 TABLET | Refills: 0 | Status: SHIPPED
Start: 2020-06-09 | End: 2020-06-09

## 2020-06-09 NOTE — PROGRESS NOTES
Do you currently have any of the following:    Fever: No  Headache:  No  Cough: No  Shortness of breath: No  Exposed to anyone with these symptoms: No         Robina Kay presents to the [address] [place] [address] on 6/9/2020. Lenn Hammans is complaining of pain lower back/SIJ. The pain is constant. The pain is described as aching, throbbing, shooting and stabbing. Pain is rated on her best day at a 6, on her worst day at a 10, and on average at a 8 on the VAS scale. She took her last dose of Percocet this am,MScontin out. Any procedures since your last visit: No, with % relief. Pacemaker or defibrilator: No managed by . She is  on NSAIDS and is not on anticoagulation medications to include none and is managed by    BP (!) 146/88   Pulse 88   Temp 97.8 °F (36.6 °C) (Oral)   Resp 16   Ht 5' 5\" (1.651 m)   Wt 200 lb (90.7 kg)   LMP  (LMP Unknown)   SpO2 97%   BMI 33.28 kg/m²      No LMP recorded (lmp unknown).  Patient is postmenopausal.

## 2020-06-09 NOTE — PROGRESS NOTES
Medical History:   Diagnosis Date    Cancer [facility]) 12/2019    LESION REMOVED UNDER TONGUE  DENTAL CLINIC    Chronic back pain     Costochondritis     Depression     Diet-controlled type 2 diabetes mellitus ([address]     Falls frequently     none recent as of 2/19/19    Fibromyalgia     Hallux rigidus of left foot     HTN (hypertension)     Hyperlipidemia     CLYDE on CPAP     SETTING ?    Osteoarthritis     Rheumatoid arthritis(714.0)     TIA (transient ischemic attack)     Use of cane as ambulatory aid        Past Surgical History:   Procedure Laterality Date    ANESTHESIA NERVE BLOCK Left 2/26/2019    LEFT C3,4,5 MBB performed by Vinay Sheldon MD at [address] [facility] Right 8/12/2019    BILATERAL TRANSFORAMINAL EPIDURAL STEROID INJECTION S1 #3 performed by Vinay Sheldon MD [facility] [address] [facility]  2005    Left    ARTHRODESIS Left 12/14/2018    ARTHRODESIS 2ND DIGIT LEFT FOOT performed by Sunday Jones DPM at [address] [facility]  08/16/2017    PLIF L3-L4, L4-L5 with rods, screws, and cages/Dr. Brayan Watson BREAST SURGERY  2010    reduction, bilat   [address] [facility]    CHOLECYSTECTOMY  2011    COLONOSCOPY  03/27/2015    ENDOSCOPY, COLON, DIAGNOSTIC      EPIDURAL STEROID INJECTION N/A 6/4/2019    BILATERAL TRANSFORAMINAL EPIDURAL STEROID INJECTION S1 performed by Carl Saini DO at [address]  2005    Left    HARDWARE REMOVAL FOOT / ANKLE Left 12/14/2018    REMOVAL OF PAINFUL HARDWARE LEFT FOOT  ++SYNTHES++ performed by Sunday Jones DPM at [facility]    inguinal     LUMBAR SPINE SURGERY N/A 3/4/2020    EXPLORATION OF PRIOR L3-L5 FUSION AND L2-L3  POSTERIOR LUMBAR INTERBODY FUSION -- NEEDS O-ARM, AUDIOLOGY, CAGES, PLATES, SCREWS, C-ARM, TERESA TABLE, CELL SAVER, PLATELET GEL -- GLOBUS performed by Marlyn Magana MD at [address] Bilateral 05/07/2018    L1-2 lumbar foramen #1    NERVE BLOCK Bilateral 12/03/2018 Gastrocnemius5/5  Right Ant Tibialis3/5  Left Ant Tibialis5/5  [address]    Dermatology:    Skin:no unusual rashes, no skin lesions, no palpable subcutaneous nodules and good skin turgor    Assessment/Plan:      Chronic low back pain with radiation to the Right groin, patient had low back surgery 08/2017 L3-5 fusion, recently had lumbar spine CT with severe L2-3 stenosis     Plan:  Patient is s/p revision fusion L2-L4 on 3/4/2020. Continues with right SI joint pain. Has seen NSG. Recommending PT and right SI joint injection. Ordered today. Has been on percocet 5/325 BID s/p surgery. Working well but causing constipation. Will change to norco 5/325 TID. (same morphine equivalents). Previously on norco 7.5 TID #90 through our office. Discussed that our goal is to continue decreasing as able. Continue with Gabapentin 600 mg TID through her PCP  OARRS report reviewed 06/2020. Patient encouraged to stay active and to lose weight. Failed physical therapy  Treatment plan discussed with the patient including medications side effects     Controlled Substance Monitoring:    Acute and Chronic Pain Monitoring:   RX Monitoring 6/9/2020   Attestation -   Acute Pain Prescriptions -   Periodic Controlled Substance Monitoring Possible medication side effects, risk of tolerance/dependence & alternative treatments discussed. ;No signs of potential drug abuse or diversion identified. ;Assessed functional status.    Chronic Pain > 80 MEDD -                          ccreferring physicf

## 2020-06-11 ENCOUNTER — HOSPITAL ENCOUNTER (OUTPATIENT)
Age: 65
Discharge: HOME | End: 2020-06-13

## 2020-06-11 PROCEDURE — U0003 INFECTIOUS AGENT DETECTION BY NUCLEIC ACID (DNA OR RNA); SEVERE ACUTE RESPIRATORY SYNDROME CORONAVIRUS 2 (SARS-COV-2) (CORONAVIRUS DISEASE [COVID-19]), AMPLIFIED PROBE TECHNIQUE, MAKING USE OF HIGH THROUGHPUT TECHNOLOGIES AS DESCRIBED BY CMS-2020-01-R: HCPCS

## 2020-06-12 ENCOUNTER — OFFICE VISIT (OUTPATIENT)
Dept: FAMILY MEDICINE CLINIC | Age: 65
End: 2020-06-12

## 2020-06-12 VITALS
OXYGEN SATURATION: 96 % | BODY MASS INDEX: 36.78 KG/M2 | HEART RATE: 91 BPM | TEMPERATURE: 99.2 F | SYSTOLIC BLOOD PRESSURE: 144 MMHG | WEIGHT: 221 LBS | DIASTOLIC BLOOD PRESSURE: 88 MMHG

## 2020-06-12 LAB
6AM URINE: <10 NG/ML
7-AMINOCLONAZEPAM, URINE: <5 NG/ML
ALPHA-HYDROXYALPRAZOLAM, URINE: <5 NG/ML
ALPHA-HYDROXYMIDAZOLAM, URINE: <20 NG/ML
ALPRAZOLAM, URINE: <5 NG/ML
CHLORDIAZEPOXIDE, URINE: <20 NG/ML
CLONAZEPAM, URINE: <5 NG/ML
CODEINE, URINE: <20 NG/ML
DIAZEPAM, URINE: <20 NG/ML
HYDROCODONE, URINE: 119 NG/ML
HYDROMORPHONE, URINE: <20 NG/ML
LORAZEPAM, URINE: <20 NG/ML
MIDAZOLAM, URINE: <20 NG/ML
MORPHINE URINE: <20 NG/ML
NORDIAZEPAM, URINE: <20 NG/ML
NORHYDROCODONE, URINE: 181 NG/ML
NOROXYCODONE, URINE: 401 NG/ML
NOROXYMORPHONE, URINE: <20 NG/ML
OXAZEPAM, URINE: <20 NG/ML
OXYCODONE, URINE CONFIRMATION: 392 NG/ML
OXYMORPHONE, URINE: <20 NG/ML
TEMAZEPAM, URINE: <20 NG/ML

## 2020-06-12 PROCEDURE — 99214 OFFICE O/P EST MOD 30 MIN: CPT | Performed by: FAMILY MEDICINE

## 2020-06-12 RX ORDER — FLUOXETINE HYDROCHLORIDE 40 MG/1
40 CAPSULE ORAL DAILY
Qty: 90 CAPSULE | Refills: 1 | Status: SHIPPED
Start: 2020-06-12 | End: 2020-09-11 | Stop reason: SDUPTHER

## 2020-06-12 RX ORDER — LISINOPRIL 30 MG/1
30 TABLET ORAL EVERY EVENING
Qty: 90 TABLET | Refills: 1 | Status: SHIPPED
Start: 2020-06-12 | End: 2020-09-11 | Stop reason: SDUPTHER

## 2020-06-12 RX ORDER — VARENICLINE TARTRATE
KIT
Qty: 1 BOX | Refills: 0 | Status: SHIPPED
Start: 2020-06-12 | End: 2020-06-30

## 2020-06-12 RX ORDER — ATORVASTATIN CALCIUM 40 MG/1
40 TABLET, FILM COATED ORAL DAILY
Qty: 90 TABLET | Refills: 1 | Status: SHIPPED
Start: 2020-06-12 | End: 2020-09-11 | Stop reason: SDUPTHER

## 2020-06-12 NOTE — PROGRESS NOTES
EXPLORATION OF PRIOR L3-L5 FUSION AND L2-L3  POSTERIOR LUMBAR INTERBODY FUSION -- NEEDS O-ARM, AUDIOLOGY, CAGES, PLATES, SCREWS, C-ARM, TERESA TABLE, CELL SAVER, PLATELET GEL -- GLOBUS performed by Mary Bell MD at [address] Bilateral 2018    L1-2 lumbar foramen #1    NERVE BLOCK Bilateral 2018    s1 tfesi    NERVE BLOCK Bilateral 2019    NERVE BLOCK Right 2019    sacral radiofrequency    OTHER SURGICAL HISTORY Left 13    left foot tarso metatarsal joint injection    OTHER SURGICAL HISTORY Left 5/27/15    Endoscopic Gastroc recession left, Lapidus left foot and  Excision of exostosis left foot    OH INJECT ANES/STEROID FORAMEN LUMBAR/SACRAL W IMG GUIDE ,1 LEVEL Bilateral 12/3/2018    BILATERAL S1  EPIDURAL STEROID INJECTION performed by Alli Padilla MD at [address] [facility] DX/THER SBST INTRLMNR LMBR/SAC W/IMG GDN N/A 2018    EPIDURAL STEROID INJECTION L1-2 WITH LOW VOL performed by Alli Padilla MD at [address] [facility] NOSE/CLEFT LIP/TIP Bilateral 2018    BILATERAL L1-2 LUMBAR FORAMEN #1 performed by Alli Padilla MD at [address] [facility] NOSE/CLEFT LIP/TIP Bilateral 2018    BILATERAL TRANSFORAMINAL EPIDURAL STEROID INJECTION UNDER FLUOROSCOPIC GUIDANCE @ FORAMINAL LEVEL L1-2 #2 performed by Alli Padilla MD at [address] [place] Right 2019    RIGHT SACRAL RADIOFREQUENCY ABLATION performed by Alli Padilla MD at [address]  ,     Big Left Toe    TONSILLECTOMY      WISDOM TOOTH EXTRACTION         Social History     Tobacco Use    Smoking status: Former Smoker     Packs/day: 0.50     Years: 30.00     Pack years: 15.00     Types: Cigarettes     Last attempt to quit: 10/15/2019     Years since quittin.6    Smokeless tobacco: Never Used    Tobacco comment: STOP  USING CHANTIX   Substance Use Topics    Alcohol use:  Yes     Alcohol/week: 0.0 standard drinks     Comment: rarely    Drug use: No     _________________________________________________________  ROS: POSITIVE: Low back pain and groin pain. Otherwise:  No CP, No palpitations,   No sob, No cough,   No abd pain, No heartburn,   No headaches, No vision changes, No hearing changes,   No tingling, No numbness, No weakness,   No bowel changes, No hematochezia, No melena,  No bladder changes, No hematuria  No skin rashes, No skin lesions. No polyuria, polydipsia, polyphagia. Stable mood. ROS otherwise negative unless as listed in HPI. Chart reviewed and updated where appropriate for PMH, Fam, and Soc Hx.  __________________________________________________________  Physical Exam   BP (!) 144/88   Pulse 91   Temp 99.2 °F (37.3 °C) (Temporal)   Wt 221 lb (100.2 kg)   LMP  (LMP Unknown)   SpO2 96%   BMI 36.78 kg/m²   Wt Readings from Last 3 Encounters:   06/12/20 221 lb (100.2 kg)   06/09/20 200 lb (90.7 kg)   05/27/20 200 lb (90.7 kg)   Weight is not being appropriately recorded at pain management visits. She is up about 1 pound from her last visit with me. Constitutional:    She is oriented to person, place, and time. She appears well-developed and well-nourished. HENT:    Nose: Nose normal.    Mouth/Throat: Oropharynx is clear and moist.   Eyes:    Conjunctivae are normal.    Pupils are equal, round, and reactive to light. EOMI. Neck:    Normal range of motion. No thyromegaly or nodules noted. No bruit. Cardiovascular:    Normal rate, regular rhythm and normal heart sounds. No murmur. No gallop and no friction rub. Pulmonary/Chest:    Effort normal and breath sounds normal.    No wheezes. No rales or rhonchi. Abdominal:    Soft. Bowel sounds are normal.    No distension. No tenderness. Skin:    Skin is warm and dry. No rashes, No lesions. Psychiatric:    She has a normal mood and affect. Normal groom and dress. No SI or HI. ________________________________________________________  Current Outpatient Medications on File Prior to Visit   Medication Sig Dispense Refill    HYDROcodone-acetaminophen (NORCO) 5-325 MG per tablet Take 1 tablet by mouth 3 times daily for 30 days. Intended supply: 3 days. Take lowest dose possible to manage pain 90 tablet 0    methocarbamol (ROBAXIN) 750 MG tablet Take 1 tablet by mouth 2 times daily as needed (muscle pain / spasm) 180 tablet 0    gabapentin (NEURONTIN) 400 MG capsule Take 1 capsule by mouth 3 times daily for 30 days. 90 capsule 3    hydrOXYzine (VISTARIL) 25 MG capsule Take 1 capsule by mouth 3 times daily as needed for Anxiety 270 capsule 1     No current facility-administered medications on file prior to visit.         Patient Active Problem List   Diagnosis Code    Loss of balance R26.89    Vertebrobasilar TIAs G45.0    Obesity E66.9    Disc displacement, lumbar M51.26    Peripheral neuropathy ([address] G62.9    Type 2 diabetes mellitus without complication (HCC) Y40.8    Depression F32.9    Osteoarthritis M19.90    Chronic back pain M54.9, G89.29    Fibromyalgia M79.7    HTN (hypertension) I10    Hyperlipidemia E78.5    History of adenomatous polyp of colon Z86.010    Vitamin D deficiency E55.9    Coccyx pain M53.3    Acute bilateral low back pain with sciatica M54.40    Lumbar stenosis M48.061    Chronic bilateral low back pain without sciatica M54.5, G89.29    DDD (degenerative disc disease), lumbar M51.36    Spinal stenosis of lumbar region without neurogenic claudication M48.061    Lumbar facet arthropathy M47.816    Chronic pain syndrome G89.4    Lumbar radiculopathy M54.16    Neck pain M54.2    Left foot pain M79.672    Painful orthopaedic hardware [facility]) T84.84XA    Cervical facet joint syndrome M47.812    Cellulitis, neck L03.221    Disorder of sacrum M53.3    Adjacent segment disease with spinal stenosis M48.00    S/P lumbar spinal fusion Z98.1

## 2020-06-13 LAB
SARS-COV-2: NOT DETECTED
SOURCE: NORMAL

## 2020-06-15 ENCOUNTER — TELEPHONE (OUTPATIENT)
Dept: FAMILY MEDICINE CLINIC | Age: 65
End: 2020-06-15

## 2020-06-15 ENCOUNTER — TELEPHONE (OUTPATIENT)
Dept: ENT CLINIC | Age: 65
End: 2020-06-15

## 2020-06-15 LAB
Lab: NORMAL
REPORT: NORMAL
THIS TEST SENT TO: NORMAL

## 2020-06-15 NOTE — TELEPHONE ENCOUNTER
Patient said her cpap machine .  She got a loaner but her pcp needs to fax a rx saying that she needs a new one.    [phone]  Respiratory Dept ATTN: Alesha Odom

## 2020-06-16 ENCOUNTER — TELEPHONE (OUTPATIENT)
Dept: FAMILY MEDICINE CLINIC | Age: 65
End: 2020-06-16

## 2020-06-16 ENCOUNTER — HOSPITAL ENCOUNTER (OUTPATIENT)
Dept: GENERAL RADIOLOGY | Age: 65
Setting detail: OUTPATIENT SURGERY
Discharge: HOME | End: 2020-06-18
Attending: PAIN MEDICINE

## 2020-06-16 ENCOUNTER — HOSPITAL ENCOUNTER (OUTPATIENT)
Age: 65
Setting detail: OUTPATIENT SURGERY
Discharge: HOME | End: 2020-06-16
Attending: PAIN MEDICINE | Admitting: PAIN MEDICINE

## 2020-06-16 VITALS
BODY MASS INDEX: 36.65 KG/M2 | RESPIRATION RATE: 18 BRPM | OXYGEN SATURATION: 98 % | HEIGHT: 65 IN | DIASTOLIC BLOOD PRESSURE: 77 MMHG | SYSTOLIC BLOOD PRESSURE: 180 MMHG | TEMPERATURE: 98.2 F | WEIGHT: 220 LBS | HEART RATE: 87 BPM

## 2020-06-16 PROCEDURE — 3209999900 FLUORO FOR SURGICAL PROCEDURES

## 2020-06-16 PROCEDURE — 6360000002 HC RX W HCPCS: Performed by: PAIN MEDICINE

## 2020-06-16 PROCEDURE — 3600000002 HC SURGERY LEVEL 2 BASE: Performed by: PAIN MEDICINE

## 2020-06-16 PROCEDURE — 7100000010 HC PHASE II RECOVERY - FIRST 15 MIN: Performed by: PAIN MEDICINE

## 2020-06-16 PROCEDURE — 2500000003 HC RX 250 WO HCPCS: Performed by: PAIN MEDICINE

## 2020-06-16 PROCEDURE — 2709999900 HC NON-CHARGEABLE SUPPLY: Performed by: PAIN MEDICINE

## 2020-06-16 PROCEDURE — 6360000004 HC RX CONTRAST MEDICATION: Performed by: PAIN MEDICINE

## 2020-06-16 PROCEDURE — 27096 INJECT SACROILIAC JOINT: CPT | Performed by: PAIN MEDICINE

## 2020-06-16 PROCEDURE — 7100000011 HC PHASE II RECOVERY - ADDTL 15 MIN: Performed by: PAIN MEDICINE

## 2020-06-16 RX ORDER — BUPIVACAINE HYDROCHLORIDE 2.5 MG/ML
INJECTION, SOLUTION EPIDURAL; INFILTRATION; INTRACAUDAL PRN
Status: DISCONTINUED | OUTPATIENT
Start: 2020-06-16 | End: 2020-06-16 | Stop reason: ALTCHOICE

## 2020-06-16 RX ORDER — METHYLPREDNISOLONE ACETATE 40 MG/ML
INJECTION, SUSPENSION INTRA-ARTICULAR; INTRALESIONAL; INTRAMUSCULAR; SOFT TISSUE PRN
Status: DISCONTINUED | OUTPATIENT
Start: 2020-06-16 | End: 2020-06-16 | Stop reason: ALTCHOICE

## 2020-06-16 RX ORDER — LIDOCAINE HYDROCHLORIDE 5 MG/ML
INJECTION, SOLUTION INFILTRATION; INTRAVENOUS PRN
Status: DISCONTINUED | OUTPATIENT
Start: 2020-06-16 | End: 2020-06-16 | Stop reason: ALTCHOICE

## 2020-06-16 ASSESSMENT — PAIN DESCRIPTION - DESCRIPTORS: DESCRIPTORS: CONSTANT;DISCOMFORT

## 2020-06-16 ASSESSMENT — PAIN SCALES - GENERAL: PAINLEVEL_OUTOF10: 6

## 2020-06-16 ASSESSMENT — PAIN DESCRIPTION - PAIN TYPE: TYPE: CHRONIC PAIN

## 2020-06-16 ASSESSMENT — PAIN - FUNCTIONAL ASSESSMENT: PAIN_FUNCTIONAL_ASSESSMENT: 0-10

## 2020-06-16 NOTE — H&P
[facility]        [address] [facility], [address]  Dept: [phone]    Procedure History & Physical      Marilee Jane     HPI:    Patient  is here for right LBP for right SIJ injection  Labs/imaging studies reviewed   All question and concerns addressed including R/B/A associated with the procedure    Past Medical History:   Diagnosis Date    Cancer ([facility] 12/2019    LESION REMOVED UNDER TONGUE  DENTAL CLINIC    Chronic back pain     Costochondritis     Depression     Diet-controlled type 2 diabetes mellitus ([facility]     Falls frequently     none recent as of 2/19/19    Fibromyalgia     Hallux rigidus of left foot     HTN (hypertension)     Hyperlipidemia     CLYDE on CPAP     SETTING ?    Osteoarthritis     Rheumatoid arthritis(714.0)     TIA (transient ischemic attack)     Use of cane as ambulatory aid        Past Surgical History:   Procedure Laterality Date    ANESTHESIA NERVE BLOCK Left 2/26/2019    LEFT C3,4,5 MBB performed by Luna Brand MD at [address] Right 8/12/2019    BILATERAL TRANSFORAMINAL EPIDURAL STEROID INJECTION S1 #3 performed by Luna Brand MD at [address]  2005    Left    ARTHRODESIS Left 12/14/2018    ARTHRODESIS 2ND DIGIT LEFT FOOT performed by Charity Whalen DPM at [address]  08/16/2017    PLIF L3-L4, L4-L5 with rods, screws, and cages/Dr. Marcelino Xie BREAST SURGERY  2010    reduction, bilat   [address] [facility]    CHOLECYSTECTOMY  2011    COLONOSCOPY  03/27/2015    ENDOSCOPY, COLON, DIAGNOSTIC      EPIDURAL STEROID INJECTION N/A 6/4/2019    BILATERAL TRANSFORAMINAL EPIDURAL STEROID INJECTION S1 performed by Caren Diallo DO at [address]  2005    Left    [address] / ANKLE Left 12/14/2018    REMOVAL OF PAINFUL HARDWARE LEFT FOOT  ++SYNTHES++ performed by Charity Whalen DPM at [facility]    inguinal     LUMBAR SPINE SURGERY N/A cyanosis. MUSCULOSKELETAL: negative for flaccid muscle tone or spastic movements. SKIN: gross examination reveals no signs of rashes, or diaphoresis. NEURO: Cranial nerves II-XII grossly intact. No signs of agitated mood.        Assessment/Plan:    Right LB pain for right SIJ injection

## 2020-06-16 NOTE — TELEPHONE ENCOUNTER
Pt returned your phone call regarding moving appt up. She can be reached at [phone]. Thank you.

## 2020-06-26 ENCOUNTER — OFFICE VISIT (OUTPATIENT)
Dept: ENT CLINIC | Age: 65
End: 2020-06-26

## 2020-06-26 VITALS — WEIGHT: 220 LBS | HEIGHT: 65 IN | BODY MASS INDEX: 36.65 KG/M2 | TEMPERATURE: 97 F

## 2020-06-26 PROCEDURE — 99213 OFFICE O/P EST LOW 20 MIN: CPT | Performed by: OTOLARYNGOLOGY

## 2020-06-26 ASSESSMENT — ENCOUNTER SYMPTOMS
COLOR CHANGE: 0
ABDOMINAL PAIN: 0
RESPIRATORY NEGATIVE: 1
GASTROINTESTINAL NEGATIVE: 1
EYES NEGATIVE: 1
SHORTNESS OF BREATH: 0

## 2020-06-26 NOTE — PROGRESS NOTES
Subjective:      Patient ID:  Whitley Alicia is a 63 y.o. female. HPI:    Patient presents today for new lesion of the tongue. Condition has been present for 3 week(s). Pt noticed it herself and is concerned about 2 spots one on the tongue and one on the gingiva of the mandible.  Pt has previous history of tongue CA      Past Medical History:   Diagnosis Date    [facility] 12/2019    LESION REMOVED UNDER TONGUE  DENTAL CLINIC    Chronic back pain     Costochondritis     Depression     Diet-controlled type 2 diabetes mellitus ([facility]     Falls frequently     none recent as of 2/19/19    Fibromyalgia     Hallux rigidus of left foot     HTN (hypertension)     Hyperlipidemia     CLYDE on CPAP     SETTING ?    Osteoarthritis     Rheumatoid arthritis(714.0)     TIA (transient ischemic attack)     Use of cane as ambulatory aid      Past Surgical History:   Procedure Laterality Date    ANESTHESIA NERVE BLOCK Left 2/26/2019    LEFT C3,4,5 MBB performed by Viry Carlin MD at [address] [facility] Right 8/12/2019    BILATERAL TRANSFORAMINAL EPIDURAL STEROID INJECTION S1 #3 performed by Viry Carlin MD at [facility] Right 6/16/2020    RIGHT SACROILIAC JOINT INJECTION      CPT: 55206 performed by Mt Mercado DO at [facility]  2005    Left    ARTHRODESIS Left 12/14/2018    ARTHRODESIS 2ND DIGIT LEFT FOOT performed by Stephenie Lennox, DPM at [address] [place] [address]  08/16/2017    PLIF L3-L4, L4-L5 with rods, screws, and cages/Dr. Samantha Rivas BREAST SURGERY  2010    reduction, bilat   [address]    CHOLECYSTECTOMY  2011    COLONOSCOPY  03/27/2015    ENDOSCOPY, COLON, DIAGNOSTIC      EPIDURAL STEROID INJECTION N/A 6/4/2019    BILATERAL TRANSFORAMINAL EPIDURAL STEROID INJECTION S1 performed by tM Mercado DO at [address] [facility]  2005    Left    HARDWARE REMOVAL FOOT / ANKLE Left 12/14/2018    REMOVAL OF PAINFUL HARDWARE LEFT Cancer Brother      Social History     Socioeconomic History    Marital status:      Spouse name: None    Number of children: None    Years of education: None    Highest education level: None   Occupational History    None   Social Needs    Financial resource strain: None    Food insecurity     Worry: None     Inability: None    Transportation needs     Medical: None     Non-medical: None   Tobacco Use    Smoking status: Former Smoker     Packs/day: 0.50     Years: 30.00     Pack years: 15.00     Types: Cigarettes     Last attempt to quit: 10/15/2019     Years since quittin.6    Smokeless tobacco: Never Used    Tobacco comment: STOP  USING CHANTIX   Substance and Sexual Activity    Alcohol use: Yes     Alcohol/week: 0.0 standard drinks     Comment: rarely    Drug use: No    Sexual activity: Not Currently   Lifestyle    Physical activity     Days per week: None     Minutes per session: None    Stress: None   Relationships    Social connections     Talks on phone: None     Gets together: None     Attends [employer] service: None     Active member of club or organization: None     Attends meetings of clubs or organizations: None     Relationship status: None    Intimate partner violence     Fear of current or ex partner: None     Emotionally abused: None     Physically abused: None     Forced sexual activity: None   Other Topics Concern    None   Social History Narrative    None     Allergies   Allergen Reactions    Pcn [Penicillins] Anaphylaxis       Review of Systems   Constitutional: Negative. HENT: Positive for mouth sores. Eyes: Negative. Negative for visual disturbance. Respiratory: Negative. Negative for shortness of breath. Cardiovascular: Negative. Negative for chest pain. Gastrointestinal: Negative. Negative for abdominal pain. Genitourinary: Negative. Musculoskeletal: Negative. Skin: Negative. Negative for color change.    Neurological: Positive

## 2020-06-28 ENCOUNTER — HOSPITAL ENCOUNTER (EMERGENCY)
Age: 65
Discharge: HOME | End: 2020-06-28
Attending: EMERGENCY MEDICINE

## 2020-06-28 VITALS
BODY MASS INDEX: 36.65 KG/M2 | HEART RATE: 89 BPM | TEMPERATURE: 96.9 F | HEIGHT: 65 IN | WEIGHT: 220 LBS | SYSTOLIC BLOOD PRESSURE: 162 MMHG | OXYGEN SATURATION: 98 % | RESPIRATION RATE: 18 BRPM | DIASTOLIC BLOOD PRESSURE: 92 MMHG

## 2020-06-28 LAB
BILIRUBIN URINE: NEGATIVE
BLOOD, URINE: NEGATIVE
CLARITY: CLEAR
COLOR: YELLOW
GLUCOSE URINE: NEGATIVE MG/DL
KETONES, URINE: NEGATIVE MG/DL
LEUKOCYTE ESTERASE, URINE: NEGATIVE
NITRITE, URINE: NEGATIVE
PH UA: 6 (ref 5–9)
PROTEIN UA: NEGATIVE MG/DL
SPECIFIC GRAVITY UA: 1.01 (ref 1–1.03)
UROBILINOGEN, URINE: 0.2 E.U./DL

## 2020-06-28 PROCEDURE — 99283 EMERGENCY DEPT VISIT LOW MDM: CPT

## 2020-06-28 PROCEDURE — 81003 URINALYSIS AUTO W/O SCOPE: CPT

## 2020-06-28 PROCEDURE — 87088 URINE BACTERIA CULTURE: CPT

## 2020-06-28 PROCEDURE — 96372 THER/PROPH/DIAG INJ SC/IM: CPT

## 2020-06-28 PROCEDURE — 6360000002 HC RX W HCPCS: Performed by: EMERGENCY MEDICINE

## 2020-06-28 RX ORDER — NAPROXEN 500 MG/1
500 TABLET ORAL 2 TIMES DAILY
Qty: 14 TABLET | Refills: 0 | Status: SHIPPED | OUTPATIENT
Start: 2020-06-28 | End: 2020-07-17 | Stop reason: ALTCHOICE

## 2020-06-28 RX ORDER — KETOROLAC TROMETHAMINE 30 MG/ML
30 INJECTION, SOLUTION INTRAMUSCULAR; INTRAVENOUS ONCE
Status: COMPLETED | OUTPATIENT
Start: 2020-06-28 | End: 2020-06-28

## 2020-06-28 RX ADMIN — KETOROLAC TROMETHAMINE 30 MG: 30 INJECTION, SOLUTION INTRAMUSCULAR at 21:43

## 2020-06-28 ASSESSMENT — PAIN DESCRIPTION - PAIN TYPE
TYPE: ACUTE PAIN
TYPE: ACUTE PAIN

## 2020-06-28 ASSESSMENT — PAIN DESCRIPTION - ORIENTATION
ORIENTATION: LOWER
ORIENTATION: LOWER

## 2020-06-28 ASSESSMENT — PAIN SCALES - GENERAL
PAINLEVEL_OUTOF10: 6
PAINLEVEL_OUTOF10: 9
PAINLEVEL_OUTOF10: 9

## 2020-06-28 ASSESSMENT — PAIN DESCRIPTION - LOCATION
LOCATION: BACK
LOCATION: BACK

## 2020-06-28 ASSESSMENT — PAIN DESCRIPTION - DESCRIPTORS
DESCRIPTORS: ACHING;SHARP;TENDER;THROBBING
DESCRIPTORS: CONSTANT

## 2020-06-29 ENCOUNTER — TELEPHONE (OUTPATIENT)
Dept: FAMILY MEDICINE CLINIC | Age: 65
End: 2020-06-29

## 2020-06-29 NOTE — ED NOTES
Pain low back midline in one spot. Pain radiates to leg hip and leg. No falls in past month. abd  Pressure and urine frequency. No burning.       Josue Ware RN  06/28/20 [id number]

## 2020-06-29 NOTE — TELEPHONE ENCOUNTER
Tatiana from [address] calling in, states that Pt's CPAP is broken and needs a new script for it. For insurance purposes, they need recent chart notes stating that patient has CLYDE. Elva Bradford said that making an addendum on pt's 06/18/20 office visit notes would satisfy this. Not sure if this is able to be done?

## 2020-06-29 NOTE — ED PROVIDER NOTES
HPI:  20,   Time: 9:29 PM EDT         Blank Barton is a 63 y.o. female presenting to the ED for dysuria , beginning ago. The complaint has been persistent, moderate in severity, and worsened by nothing. Patient states that she had back surgery in March by neurosurgery and that she is on a pain management plan and she has been having lower abdominal discomfort which she attributes to her back problems however seem to be worse today she was concerned she had a urinary tract infection she presents to the ER. She is not having any incontinence. .    ROS:   Pertinent positives and negatives are stated within HPI, all other systems reviewed and are negative.  --------------------------------------------- PAST HISTORY ---------------------------------------------  Past Medical History:  has a past medical history of Cancer ([address] Chronic back pain, Costochondritis, Depression, Diet-controlled type 2 diabetes mellitus ([facility] Falls frequently, Fibromyalgia, Hallux rigidus of left foot, HTN (hypertension), Hyperlipidemia, CLYDE on CPAP, Osteoarthritis, Rheumatoid arthritis(714.0), TIA (transient ischemic attack), and Use of cane as ambulatory aid. Past Surgical History:  has a past surgical history that includes  section (); Toe Surgery (, ); Ankle surgery (); Foot surgery (); Cholecystectomy (); Tonsillectomy; [place] tooth extraction; other surgical history (Left, 13); hernia repair (); Colonoscopy (2015); other surgical history (Left, 5/27/15); Breast surgery (); Endoscopy, colon, diagnostic; back surgery (2017); Nerve Block (Bilateral, 2018); pr becca nose/cleft lip/tip (Bilateral, 2018); pr becca nose/cleft lip/tip (Bilateral, 2018); pr njx dx/ther sbst intrlmnr lmbr/sac w/img gdn (N/A, 2018);  Nerve Block (Bilateral, 2018); pr inject anes/steroid foramen lumbar/sacral w img guide ,1 level (Bilateral, 12/3/2018); arthrodesis (Left, 12/14/2018); HARDWARE REMOVAL FOOT / ANKLE (Left, 12/14/2018); Anesthesia Nerve Block (Left, 2/26/2019); epidural steroid injection (N/A, 6/4/2019); Nerve Block (Bilateral, 08/12/2019); Anesthesia Nerve Block (Right, 8/12/2019); Nerve Block (Right, 09/30/2019); RADIOFREQUENCY ABLATION NERVES (Right, 9/30/2019); Lumbar spine surgery (N/A, 3/4/2020); and Anesthesia Nerve Block (Right, 6/16/2020). Social History:  reports that she has been smoking cigarettes. She has a 15.00 pack-year smoking history. She has never used smokeless tobacco. She reports current alcohol use. She reports that she does not use drugs. Family History: family history includes Breast Cancer in her mother; Cancer in her brother and mother; Dementia in her mother; Heart Attack in her father; Other (age of onset: 80) in her father; Stroke in her mother. The patients home medications have been reviewed.     Allergies: Pcn [penicillins]    -------------------------------------------------- RESULTS -------------------------------------------------  All laboratory and radiology results have been personally reviewed by myself   LABS:  Results for orders placed or performed during the hospital encounter of 06/28/20   Culture, Urine   Result Value Ref Range    Urine Culture, Routine       <10,000 CFU/mL  Mixed gram positive organisms  Gram negative rods     Urinalysis   Result Value Ref Range    Color, UA Yellow Straw/Yellow    Clarity, UA Clear Clear    Glucose, Ur Negative Negative mg/dL    Bilirubin Urine Negative Negative    Ketones, Urine Negative Negative mg/dL    Specific Gravity, UA 1.015 1.005 - 1.030    Blood, Urine Negative Negative    pH, UA 6.0 5.0 - 9.0    Protein, UA Negative Negative mg/dL    Urobilinogen, Urine 0.2 <2.0 E.U./dL    Nitrite, Urine Negative Negative    Leukocyte Esterase, Urine Negative Negative       RADIOLOGY:  Interpreted by Radiologist.  No orders to display       ------------------------- NURSING NOTES AND are answered at this time and they are agreeable with the plan.      --------------------------------- IMPRESSION AND DISPOSITION ---------------------------------    IMPRESSION  1. Dysuria    2.  Acute exacerbation of chronic low back pain        DISPOSITION  Disposition: Discharge to home  Patient condition is fair                  Nona Charles MD  07/01/20 1295

## 2020-06-30 ENCOUNTER — HOSPITAL ENCOUNTER (OUTPATIENT)
Age: 65
Discharge: HOME | End: 2020-07-02

## 2020-06-30 ENCOUNTER — OFFICE VISIT (OUTPATIENT)
Dept: FAMILY MEDICINE CLINIC | Age: 65
End: 2020-06-30

## 2020-06-30 VITALS
WEIGHT: 215 LBS | TEMPERATURE: 97.9 F | HEIGHT: 65 IN | HEART RATE: 91 BPM | OXYGEN SATURATION: 99 % | DIASTOLIC BLOOD PRESSURE: 86 MMHG | SYSTOLIC BLOOD PRESSURE: 140 MMHG | BODY MASS INDEX: 35.82 KG/M2

## 2020-06-30 LAB
ALBUMIN SERPL-MCNC: 4.1 G/DL (ref 3.5–5.2)
ALP BLD-CCNC: 155 U/L (ref 35–104)
ALT SERPL-CCNC: 25 U/L (ref 0–32)
ANION GAP SERPL CALCULATED.3IONS-SCNC: 14 MMOL/L (ref 7–16)
AST SERPL-CCNC: 25 U/L (ref 0–31)
BACTERIA: ABNORMAL /HPF
BASOPHILS ABSOLUTE: 0.06 E9/L (ref 0–0.2)
BASOPHILS RELATIVE PERCENT: 0.5 % (ref 0–2)
BILIRUB SERPL-MCNC: 0.3 MG/DL (ref 0–1.2)
BILIRUBIN URINE: NEGATIVE
BLOOD, URINE: NEGATIVE
BUN BLDV-MCNC: 14 MG/DL (ref 8–23)
C-REACTIVE PROTEIN: <0.1 MG/DL (ref 0–0.4)
CALCIUM SERPL-MCNC: 9.3 MG/DL (ref 8.6–10.2)
CHLORIDE BLD-SCNC: 102 MMOL/L (ref 98–107)
CLARITY: CLEAR
CO2: 21 MMOL/L (ref 22–29)
COLOR: YELLOW
CREAT SERPL-MCNC: 0.8 MG/DL (ref 0.5–1)
CRYSTALS, UA: ABNORMAL /HPF
EOSINOPHILS ABSOLUTE: 0.1 E9/L (ref 0.05–0.5)
EOSINOPHILS RELATIVE PERCENT: 0.8 % (ref 0–6)
GFR AFRICAN AMERICAN: >60
GFR NON-AFRICAN AMERICAN: >60 ML/MIN/1.73
GLUCOSE BLD-MCNC: 100 MG/DL (ref 74–99)
GLUCOSE URINE: NEGATIVE MG/DL
HCT VFR BLD CALC: 46.1 % (ref 34–48)
HEMOGLOBIN: 15.3 G/DL (ref 11.5–15.5)
IMMATURE GRANULOCYTES #: 0.04 E9/L
IMMATURE GRANULOCYTES %: 0.3 % (ref 0–5)
KETONES, URINE: NEGATIVE MG/DL
LEUKOCYTE ESTERASE, URINE: NEGATIVE
LIPASE: 36 U/L (ref 13–60)
LYMPHOCYTES ABSOLUTE: 2.83 E9/L (ref 1.5–4)
LYMPHOCYTES RELATIVE PERCENT: 23.7 % (ref 20–42)
MCH RBC QN AUTO: 32.9 PG (ref 26–35)
MCHC RBC AUTO-ENTMCNC: 33.2 % (ref 32–34.5)
MCV RBC AUTO: 99.1 FL (ref 80–99.9)
MONOCYTES ABSOLUTE: 0.97 E9/L (ref 0.1–0.95)
MONOCYTES RELATIVE PERCENT: 8.1 % (ref 2–12)
NEUTROPHILS ABSOLUTE: 7.93 E9/L (ref 1.8–7.3)
NEUTROPHILS RELATIVE PERCENT: 66.6 % (ref 43–80)
NITRITE, URINE: NEGATIVE
PDW BLD-RTO: 13.5 FL (ref 11.5–15)
PH UA: 5.5 (ref 5–9)
PLATELET # BLD: 326 E9/L (ref 130–450)
PMV BLD AUTO: 11.5 FL (ref 7–12)
POTASSIUM SERPL-SCNC: 4.2 MMOL/L (ref 3.5–5)
PROTEIN UA: NEGATIVE MG/DL
RBC # BLD: 4.65 E12/L (ref 3.5–5.5)
RBC UA: ABNORMAL /HPF (ref 0–2)
SEDIMENTATION RATE, ERYTHROCYTE: 2 MM/HR (ref 0–20)
SODIUM BLD-SCNC: 137 MMOL/L (ref 132–146)
SPECIFIC GRAVITY UA: 1.01 (ref 1–1.03)
TOTAL PROTEIN: 7.1 G/DL (ref 6.4–8.3)
UROBILINOGEN, URINE: 0.2 E.U./DL
WBC # BLD: 11.9 E9/L (ref 4.5–11.5)
WBC UA: ABNORMAL /HPF (ref 0–5)

## 2020-06-30 PROCEDURE — 80053 COMPREHEN METABOLIC PANEL: CPT

## 2020-06-30 PROCEDURE — 86140 C-REACTIVE PROTEIN: CPT

## 2020-06-30 PROCEDURE — 36415 COLL VENOUS BLD VENIPUNCTURE: CPT

## 2020-06-30 PROCEDURE — 81001 URINALYSIS AUTO W/SCOPE: CPT

## 2020-06-30 PROCEDURE — 85025 COMPLETE CBC W/AUTO DIFF WBC: CPT

## 2020-06-30 PROCEDURE — 85651 RBC SED RATE NONAUTOMATED: CPT

## 2020-06-30 PROCEDURE — 99214 OFFICE O/P EST MOD 30 MIN: CPT | Performed by: FAMILY MEDICINE

## 2020-06-30 PROCEDURE — 83690 ASSAY OF LIPASE: CPT

## 2020-06-30 RX ORDER — AMOXICILLIN 250 MG
2 CAPSULE ORAL DAILY PRN
Qty: 30 TABLET | Refills: 0 | Status: SHIPPED
Start: 2020-06-30 | End: 2020-08-17

## 2020-06-30 NOTE — PROGRESS NOTES
[facility]  Office Progress Note - Dr. Jarquin Number  6/30/20    CC:   Chief Complaint   Patient presents with   [facility] ED Follow-up     06/28/20 for Low back pain and nausea --Possible kidney stones    Sleep Apnea        HPI:She was in the Ed 2 days ago. inc freq urination  + constipation. No dsuria.   + Nocturia.   + nausea. a few weeks. pain, pressure, tightenting in the lower abd which is present in waves. Almost a pelvic or vaginal pain on further discussion. +reflux  \"malaise\"  +sweats and chills. No measured fever. +maybe hematuria. Blood and urine will be ordered today. Urine from ED was normal and had <10kCFU. Roommate had ab positive for covid. Patient Asx. Sleep apnea  She has long term obstructive sleep apnea. She uses her CPAP all night. She feels much better when she uses it. She feels rested in morning. She even takes it on vacation with her. Her machine recently broke. She has a loaner. She needs a new one. Her medical supply company wont get her a new one because of insurance bureaucratic regulations that require an office note to documented on the condition. A summary of the above on a Rx pad would suffice. So here it is in office note form. 7cm H2O pressure. Small so and Paykel ESON nasal mask with heated humidification.  Was last orders sent in 2015 or so that I was able to find in her chart.   _________________________________________________________  Past Medical History:   Diagnosis Date    Cancer ([facility] [address] 12/2019    LESION REMOVED UNDER TONGUE  DENTAL CLINIC    Chronic back pain     Costochondritis     Depression     Diet-controlled type 2 diabetes mellitus ([address]     Falls frequently     none recent as of 2/19/19    Fibromyalgia     Hallux rigidus of left foot     HTN (hypertension)     Hyperlipidemia     CLYDE on CPAP     SETTING ?    Osteoarthritis     Rheumatoid arthritis(714.0)     TIA (transient ischemic attack)     Use of cane as ambulatory aid        Family History   Problem Relation Age of Onset    Dementia Mother     Breast Cancer Mother     Cancer Mother         breast cancer [de-identified]     Stroke Mother     Heart Attack Father     Other Father 80        Aortic aneurysm    Cancer Brother        Past Surgical History:   Procedure Laterality Date    ANESTHESIA NERVE BLOCK Left 2019    LEFT C3,4,5 MBB performed by Seth Cuellar MD at [address] Right 2019    BILATERAL TRANSFORAMINAL EPIDURAL STEROID INJECTION S1 #3 performed by Seth Cuellar MD at [address] Right 2020    RIGHT SACROILIAC JOINT INJECTION      CPT: [facility]. performed by Barron Lowe DO at [address]      Left    ARTHRODESIS Left 2018    ARTHRODESIS 2ND DIGIT LEFT FOOT performed by Grzegorz Mead DPM at [address] [place] [address]  2017    PLIF L3-L4, L4-L5 with rods, screws, and cages/Dr. Chiquita Felder BREAST SURGERY      reduction, bilat     SECTION      CHOLECYSTECTOMY      COLONOSCOPY  2015    ENDOSCOPY, COLON, DIAGNOSTIC      EPIDURAL STEROID INJECTION N/A 2019    BILATERAL TRANSFORAMINAL EPIDURAL STEROID INJECTION S1 performed by Barron Lowe DO at [address]      Left    HARDWARE REMOVAL FOOT / ANKLE Left 2018    REMOVAL OF PAINFUL HARDWARE LEFT FOOT  ++SYNTHES++ performed by Grzegorz Mead DPM at [facility] 108    inguinal     LUMBAR SPINE SURGERY N/A 3/4/2020    EXPLORATION OF PRIOR L3-L5 FUSION AND L2-L3  POSTERIOR LUMBAR INTERBODY FUSION -- NEEDS O-ARM, AUDIOLOGY, CAGES, PLATES, SCREWS, C-ARM, TERESA TABLE, CELL SAVER, PLATELET GEL -- GLOBUS performed by Cande Chan MD at [facility] 21 Bilateral 2018    L1-2 lumbar foramen #1    NERVE BLOCK Bilateral 2018    s1 tfesi    NERVE BLOCK Bilateral 2019    NERVE BLOCK Right 2019    sacral radiofrequency    OTHER SURGICAL HISTORY Left 13    left foot tarso metatarsal joint injection    OTHER SURGICAL HISTORY Left 5/27/15    Endoscopic Gastroc recession left, Lapidus left foot and  Excision of exostosis left foot    [place] INJECT ANES/STEROID FORAMEN LUMBAR/SACRAL W IMG GUIDE ,1 LEVEL Bilateral 12/3/2018    BILATERAL S1  EPIDURAL STEROID INJECTION performed by Rachel Francis MD at [address] [facility] DX/THER SBST INTRLMNR LMBR/SAC W/IMG GDN N/A 2018    EPIDURAL STEROID INJECTION L1-2 WITH LOW VOL performed by Rachel Francis MD at [address] [facility] NOSE/CLEFT LIP/TIP Bilateral 2018    BILATERAL L1-2 LUMBAR FORAMEN #1 performed by Rachel Francis MD at [address] [facility] NOSE/CLEFT LIP/TIP Bilateral 2018    BILATERAL TRANSFORAMINAL EPIDURAL STEROID INJECTION UNDER FLUOROSCOPIC GUIDANCE @ FORAMINAL LEVEL L1-2 #2 performed by Rachel Francis MD at [address] Right 2019    RIGHT SACRAL RADIOFREQUENCY ABLATION performed by Rachel Francis MD at [address]  ,     Big Left Toe    TONSILLECTOMY      WISDOM TOOTH EXTRACTION         Social History     Tobacco Use    Smoking status: Current Every Day Smoker     Packs/day: 0.50     Years: 30.00     Pack years: 15.00     Types: Cigarettes     Last attempt to quit: 10/15/2019     Years since quittin.7    Smokeless tobacco: Never Used    Tobacco comment: was going to try to stop but chantix is too expensive   Substance Use Topics    Alcohol use:  Yes     Alcohol/week: 0.0 standard drinks     Comment: rarely    Drug use: No     _________________________________________________________  ROS: POSITIVE:As in HPI  Otherwise:  No CP, No palpitations,   No sob, No cough,   +abd pain, No heartburn,   No headaches, No vision changes, No hearing changes,   No tingling, No numbness, No weakness,   No bowel changes, No hematochezia, No melena,  +bladder changes, No hematuria  No skin rashes, No skin lesions. No polyuria, polydipsia, polyphagia. Stable mood. ROS otherwise negative unless as listed in HPI. Chart reviewed and updated where appropriate for PMH, Fam, and Soc Hx.  __________________________________________________________  Physical Exam   BP (!) 140/86 (Site: Left Upper Arm, Position: Sitting, Cuff Size: Large Adult)   Pulse 91   Temp 97.9 °F (36.6 °C) (Temporal)   Ht 5' 5\" (1.651 m)   Wt 215 lb (97.5 kg)   LMP  (LMP Unknown)   SpO2 99%   BMI 35.78 kg/m²   Wt Readings from Last 3 Encounters:   06/30/20 215 lb (97.5 kg)   06/28/20 220 lb (99.8 kg)   06/26/20 220 lb (99.8 kg)       Constitutional:    She is oriented to person, place, and time. She appears well-developed and well-nourished. HENT:    Nose: Nose normal.    Mouth/Throat: Oropharynx is clear and moist.   Eyes:    Conjunctivae are normal.    Pupils are equal, round, and reactive to light. EOMI. Neck:    Normal range of motion. No thyromegaly or nodules noted. No bruit. Cardiovascular:    Normal rate, regular rhythm and normal heart sounds. No murmur. No gallop and no friction rub. Pulmonary/Chest:    Effort normal and breath sounds normal.    No wheezes. No rales or rhonchi. Abdominal:    Soft. Bowel sounds are normal.    No distension. Minimal suprapubic tenderness. No guarding, no rigidity. Musculoskeletal:    Normal range of motion. No joint swelling noted.     No peripheral edema.    ________________________________________________________  Current Outpatient Medications on File Prior to Visit   Medication Sig Dispense Refill    naproxen (NAPROSYN) 500 MG tablet Take 1 tablet by mouth 2 times daily for 7 days 14 tablet 0    atorvastatin (LIPITOR) 40 MG tablet Take 1 tablet by mouth daily 90 tablet 1    lisinopril (PRINIVIL;ZESTRIL) 30 MG tablet Take 1 tablet by mouth every evening 90 tablet 1    FLUoxetine (PROZAC) 40 MG capsule Take 1 capsule by mouth daily For mood. 90 capsule 1    HYDROcodone-acetaminophen (NORCO) 5-325 MG per tablet Take 1 tablet by mouth 3 times daily for 30 days. Intended supply: 3 days. Take lowest dose possible to manage pain 90 tablet 0    methocarbamol (ROBAXIN) 750 MG tablet Take 1 tablet by mouth 2 times daily as needed (muscle pain / spasm) 180 tablet 0    hydrOXYzine (VISTARIL) 25 MG capsule Take 1 capsule by mouth 3 times daily as needed for Anxiety 270 capsule 1    gabapentin (NEURONTIN) 400 MG capsule Take 1 capsule by mouth 3 times daily for 30 days. 90 capsule 3     No current facility-administered medications on file prior to visit.         Patient Active Problem List   Diagnosis Code    Loss of balance R26.89    Vertebrobasilar TIAs G45.0    Obesity E66.9    Disc displacement, lumbar M51.26    Peripheral neuropathy ([address] G62.9    Type 2 diabetes mellitus without complication (HCC) L04.2    Depression F32.9    Osteoarthritis M19.90    Chronic back pain M54.9, G89.29    Fibromyalgia M79.7    HTN (hypertension) I10    Hyperlipidemia E78.5    History of adenomatous polyp of colon Z86.010    Vitamin D deficiency E55.9    Coccyx pain M53.3    Acute bilateral low back pain with sciatica M54.40    Lumbar stenosis M48.061    Chronic bilateral low back pain without sciatica M54.5, G89.29    DDD (degenerative disc disease), lumbar M51.36    Spinal stenosis of lumbar region without neurogenic claudication M48.061    Lumbar facet arthropathy M47.816    Chronic pain syndrome G89.4    Lumbar radiculopathy M54.16    Neck pain M54.2    Left foot pain M79.672    Painful orthopaedic hardware [facility]) T84.84XA    Cervical facet joint syndrome M47.812    Cellulitis, neck L03.221    Disorder of sacrum M53.3    Adjacent segment disease with spinal stenosis M48.00    S/P lumbar spinal fusion Z98.1      ________________________________________________________  Assessment / Fariha Europe was seen today for ed follow-up and sleep apnea. Diagnoses and all orders for this visit:    Suprapubic abdominal pain  -     CBC Auto Differential; Future  -     Comprehensive Metabolic Panel; Future  -     LIPASE; Future  -     SEDIMENTATION RATE; Future  -     C-REACTIVE PROTEIN; Future  -     Cancel: POCT Urinalysis no Micro  -     Urinalysis with Microscopic; Future  -     senna-docusate (PERICOLACE) 8.6-50 MG per tablet; Take 2 tablets by mouth daily as needed for Constipation    Gross hematuria  -     CBC Auto Differential; Future  -     Comprehensive Metabolic Panel; Future  -     LIPASE; Future  -     SEDIMENTATION RATE; Future  -     C-REACTIVE PROTEIN; Future  -     Cancel: POCT Urinalysis no Micro  -     Urinalysis with Microscopic; Future    Reviewed recent Ed visit and this couple weeks of lower abd/pelvic pain. DD is wide - nephrolithiasis, UTI, Pyelo, constipation, uterine prolapse causing vaginal fullness sensation, ovarian or uterine pathology, etc.   Will start with above labs and urine. Can come back for pelvic exam if she would like. Try pericolace to see if any improvement with more freq BMs. Call for new symptoms that would help differentiate. Labs reviewed - minimal wbc inc at 11.9, alk phos at 155, few crystals in urine. Otherwise normal.     CLYDE (obstructive sleep apnea)  CPAP irreparable. See HPI notes. Needs a new one. Return if symptoms worsen or fail to improve. or as scheduled. Patient counseled to follow up sooner or seek more acute care if symptoms worsening or not improving according to plan. Electronically signed by Jefferson Amaya MD on 7/4/2020  Please note that >25 minutes was spent face-to-face with patient gathering history, performing physical exam, discussing findings, counseling patient, and determining plan forward. All questions addressed and answered. This note may have been created using dictation software.  Efforts were made to reduce grammatical or syntax errors, but some may persist.

## 2020-07-01 LAB — URINE CULTURE, ROUTINE: NORMAL

## 2020-07-02 ENCOUNTER — TELEPHONE (OUTPATIENT)
Dept: FAMILY MEDICINE CLINIC | Age: 65
End: 2020-07-02

## 2020-07-02 NOTE — TELEPHONE ENCOUNTER
Patient asking for lab result from 06/30? Also do you think she may have an aneurysm like her dad?  She feels her symptoms are similar

## 2020-07-09 ENCOUNTER — OFFICE VISIT (OUTPATIENT)
Dept: SURGERY | Age: 65
End: 2020-07-09

## 2020-07-09 VITALS
HEIGHT: 65 IN | RESPIRATION RATE: 16 BRPM | OXYGEN SATURATION: 95 % | WEIGHT: 217.2 LBS | DIASTOLIC BLOOD PRESSURE: 75 MMHG | BODY MASS INDEX: 36.19 KG/M2 | HEART RATE: 90 BPM | TEMPERATURE: 98.2 F | SYSTOLIC BLOOD PRESSURE: 121 MMHG

## 2020-07-09 PROCEDURE — 99243 OFF/OP CNSLTJ NEW/EST LOW 30: CPT | Performed by: SURGERY

## 2020-07-09 RX ORDER — SODIUM CHLORIDE 9 MG/ML
INJECTION, SOLUTION INTRAVENOUS CONTINUOUS
Status: CANCELLED | OUTPATIENT
Start: 2020-07-09

## 2020-07-09 NOTE — PROGRESS NOTES
performed by Dorian Tony MD at [address] Right 2019    BILATERAL TRANSFORAMINAL EPIDURAL STEROID INJECTION S1 #3 performed by Dorian Tony MD at [address] [facility] Right 2020    RIGHT SACROILIAC JOINT INJECTION      CPT: 32993 performed by Padmaja Garcia DO at [address]      Left    ARTHRODESIS Left 2018    ARTHRODESIS 2ND DIGIT LEFT FOOT performed by Skye Villela DPM at [facility]  2017    PLIF L3-L4, L4-L5 with rods, screws, and cages/Dr. Khoury Bohemia BREAST SURGERY      reduction, bilat     SECTION      CHOLECYSTECTOMY      COLONOSCOPY  2015    ENDOSCOPY, COLON, DIAGNOSTIC      EPIDURAL STEROID INJECTION N/A 2019    BILATERAL TRANSFORAMINAL EPIDURAL STEROID INJECTION S1 performed by Padmaja Garcia DO at [address]      Left    [address] / ANKLE Left 2018    REMOVAL OF PAINFUL HARDWARE LEFT FOOT  ++SYNTHES++ performed by Skye Villela DPM at [facility] 108    inguinal     LUMBAR SPINE SURGERY N/A 3/4/2020    EXPLORATION OF PRIOR L3-L5 FUSION AND L2-L3  POSTERIOR LUMBAR INTERBODY FUSION -- NEEDS O-ARM, AUDIOLOGY, CAGES, PLATES, SCREWS, C-ARM, TERESA TABLE, CELL SAVER, PLATELET GEL -- GLOBUS performed by Abbey Sharpe MD at [facility] 21 Bilateral 2018    L1-2 lumbar foramen #1    NERVE BLOCK Bilateral 2018    s1 tfesi    NERVE BLOCK Bilateral 2019    NERVE BLOCK Right 2019    sacral radiofrequency    OTHER SURGICAL HISTORY Left 13    left foot tarso metatarsal joint injection    OTHER SURGICAL HISTORY Left 5/27/15    Endoscopic Gastroc recession left, Lapidus left foot and  Excision of exostosis left foot    [place] INJECT ANES/STEROID FORAMEN LUMBAR/SACRAL W IMG GUIDE ,1 LEVEL Bilateral 12/3/2018    BILATERAL S1  EPIDURAL STEROID INJECTION performed by Dorian Tony MD at [facility] OR    DE NJX DX/THER SBST INTRLMNR LMBR/SAC W/IMG GDN N/A 8/21/2018    EPIDURAL STEROID INJECTION L1-2 WITH LOW VOL performed by Gail Bess MD at [address] [facility] NOSE/CLEFT LIP/TIP Bilateral 5/7/2018    BILATERAL L1-2 LUMBAR FORAMEN #1 performed by Gail Bess MD at [address] [facility] NOSE/CLEFT LIP/TIP Bilateral 6/4/2018    BILATERAL TRANSFORAMINAL EPIDURAL STEROID INJECTION UNDER FLUOROSCOPIC GUIDANCE @ FORAMINAL LEVEL L1-2 #2 performed by Gail Bess MD at [address] Right 9/30/2019    RIGHT SACRAL RADIOFREQUENCY ABLATION performed by Gail Bess MD at [address]  2000, 2005    Big Left Toe    TONSILLECTOMY      WISDOM TOOTH EXTRACTION          Allergies: Pcn [penicillins]     Medications:   Current Outpatient Medications   Medication Sig Dispense Refill    atorvastatin (LIPITOR) 40 MG tablet Take 1 tablet by mouth daily 90 tablet 1    lisinopril (PRINIVIL;ZESTRIL) 30 MG tablet Take 1 tablet by mouth every evening 90 tablet 1    FLUoxetine (PROZAC) 40 MG capsule Take 1 capsule by mouth daily For mood. 90 capsule 1    HYDROcodone-acetaminophen (NORCO) 5-325 MG per tablet Take 1 tablet by mouth 3 times daily for 30 days. Intended supply: 3 days. Take lowest dose possible to manage pain 90 tablet 0    methocarbamol (ROBAXIN) 750 MG tablet Take 1 tablet by mouth 2 times daily as needed (muscle pain / spasm) 180 tablet 0    hydrOXYzine (VISTARIL) 25 MG capsule Take 1 capsule by mouth 3 times daily as needed for Anxiety 270 capsule 1    senna-docusate (PERICOLACE) 8.6-50 MG per tablet Take 2 tablets by mouth daily as needed for Constipation (Patient not taking: Reported on 7/9/2020) 30 tablet 0    naproxen (NAPROSYN) 500 MG tablet Take 1 tablet by mouth 2 times daily for 7 days 14 tablet 0    gabapentin (NEURONTIN) 400 MG capsule Take 1 capsule by mouth 3 times daily for 30 days.  90 capsule 3     No current facility-administered medications for this visit. Social History:   Social History     Tobacco Use    Smoking status: Current Every Day Smoker     Packs/day: 0.50     Years: 30.00     Pack years: 15.00     Types: Cigarettes     Last attempt to quit: 10/15/2019     Years since quittin.7    Smokeless tobacco: Never Used    Tobacco comment: was going to try to stop but chantix is too expensive   Substance Use Topics    Alcohol use: Yes     Alcohol/week: 0.0 standard drinks     Comment: rarely        Family History:   Family History   Problem Relation Age of Onset    Dementia Mother     Breast Cancer Mother     Cancer Mother         breast cancer [de-identified]     Stroke Mother     Heart Attack Father     Other Father 80        Aortic aneurysm    Cancer Brother        REVIEW OF SYSTEMS:    Constitutional: negative  Eyes: negative  Ears, nose, mouth, throat, and face: negative  Respiratory: negative  Cardiovascular: negative  Gastrointestinal: as in HPI  Genitourinary:negative  Integument/breast: negative  Hematologic/lymphatic: negative  Musculoskeletal:negative  Neurological: negative  Allergic/Immunologic: negative    PHYSICAL EXAM   /75 (Site: Left Upper Arm, Position: Sitting, Cuff Size: Large Adult)   Pulse 90   Temp 98.2 °F (36.8 °C) (Oral)   Resp 16   Ht 5' 5\" (1.651 m)   Wt 217 lb 3.2 oz (98.5 kg)   LMP  (LMP Unknown)   SpO2 95%   BMI 36.14 kg/m²     General appearance: alert, cooperative and in no acute distress. Eyes: Grossly normal   Lungs: clear to auscultation bilaterally  Heart: regular rate and rhythm  Abdomen:  soft, non-tender; bowel sounds normal; no masses,  no organomegaly  Skin: No skin abnormalities  Neurologic: Alert and oriented x 3. Grossly normal  Musculoskeletal: No clubbing cyanosis or edema.       ASSESSMENT AND PLAN:       Assessment: Mallory Campuzano is an 63 y.o. female who presents for a colonoscopy with family history of colon cancer, history of tubular adenomas, with constipation, weakess    Plan: I will set the patient up for a colonoscopy, possible biopsy, possible polypectomy. I explained the risks including but not limited to bleeding, perforation leading to possible surgery, or infection. The benefits, alternatives, and potential complications associated with the above procedure to be performed and transfusions when applicable with the patient/responsible person prior to the procedure. I discussed the risk of bowel peroration, postoperative bleeding, post-polypectomy syndrome, as well as the possibility of needing emergency surgery or another colonoscopy. All of the patient's questions were answered. The patient understands and agrees to the procedure.        Physician Signature: Electronically signed by Maryse Cosme MD, General Surgery    Send copy of H&P to PCP, Jefferson Amaya MD and referring physician, Gracie Ruano,*

## 2020-07-09 NOTE — PATIENT INSTRUCTIONS
Rigoberto Hahn MD, FACS    Preoperative Instructions    Please read the following information very carefully. It contains information that is necessary to best prepare you for your upcoming procedure. Make arrangements for a  to take you to and from your procedure. YOU MUST HAVE SOMEONE DRIVE YOU HOME - this cannot be a taxi or public transportation. You will not be administered anesthesia without someone to go home and be at home with you that day. Nothing to eat or drink after midnight the night before your procedure. Follow your bowel prep instructions if you have them for this procedure. 3 days prior to your procedure: Stop taking blood thinners like Coumadin or Plavix or Xarelto. 5 days prior to your procedure: Stop taking Aspirin or Aspirin containing products. If you cannot stop any of these medications prior to your procedure, please contact our office. Medications morning of procedure: Only heart, breathing, blood pressure, and seizure medications are permitted on the morning of your procedure. These medications can be taken with a sip of water. IF YOU ARE UNABLE TO KEEP THE ABOVE SCHEDULED PROCEDURE, YOU MUST NOTIFY DR. BURTON'S OFFICE [phone]. NOT THE FACILITY. NO CHEWING GUM OR CHEWING TOBACCO AFTER MIDNIGHT ON DAY OF PROCEDURE.    YOU MUST HAVE TRANSPORTATION TO AND FROM THE FACILITY. What is a colonoscopy? A colonoscopy is a test that lets a doctor look inside your colon. The doctor uses a thin, lighted tube called a colonoscope to look for problems. These include small growths called polyps, cancer, or bleeding. During the test, the doctor can take samples of tissue that can be checked for cancer or other problems. This is called a biopsy. The doctor can also take out polyps. Before the test, you will need to stop eating solid foods. You also will drink a liquid or take a tablet that cleans out your colon.  This helps your doctor be able to see inside your colon during the test.  Follow-up care is a key part of your treatment and safety. Be sure to make and go to all appointments, and call your doctor if you are having problems. It's also a good idea to know your test results and keep a list of the medicines you take. What happens before the procedure? Procedures can be stressful. This information will help you understand what you can expect. And it will help you safely prepare for your procedure. Preparing for the procedure  · Understand exactly what procedure is planned, along with the risks, benefits, and other options. · Tell your doctors ALL the medicines, vitamins, supplements, and herbal remedies you take. Some of these can increase the risk of bleeding or interact with anesthesia. · If you take blood thinners, such as warfarin (Coumadin), clopidogrel (Plavix), or aspirin, be sure to talk to your doctor. He or she will tell you if you should stop taking these medicines before your procedure. Make sure that you understand exactly what your doctor wants you to do. · Your doctor will tell you which medicines to take or stop before your procedure. You may need to stop taking certain medicines a week or more before the procedure. So talk to your doctor as soon as you can. · If you have an advance directive, let your doctor know. It may include a living will and a durable power of  for health care. Bring a copy to the hospital. If you don't have one, you may want to prepare one. It lets your doctor and loved ones know your health care wishes. Doctors advise that everyone prepare these papers before any type of surgery or procedure. Before the procedure  · Follow your doctor's directions about when to stop eating solid foods and drink only clear liquids. You can drink water, clear juices, clear broths, flavored ice pops, and gelatin (such as Jell-O). Do not eat or drink anything red or purple.  This includes grape juice and grape-flavored ice pops. It also includes fruit punch and cherry gelatin. · Drink the \"colon prep\" liquid as your doctor tells you. You will want to stay home, because the liquid will make you go to the bathroom a lot. Your stools will be loose and watery. It is very important to drink all of the liquid. If you have problems drinking it, call your doctor. Some doctors may have you take a tablet rather than drink a liquid. · Do not eat any solid foods after you drink the colon prep. · Stop drinking clear liquids 6 to 8 hours before the test.  What happens on the day of the procedure? · Follow the instructions exactly about when to stop eating and drinking. If you don't, your procedure may be canceled. If your doctor told you to take your medicines on the day of the procedure, take them with only a sip of water. · Take a bath or shower before you come in for your procedure. Do not apply lotions, perfumes, deodorants, or nail polish. · Take off all jewelry and piercings. And take out contact lenses, if you wear them. At the [address] [place] [address] or hospital  · Bring a picture ID. · You will be kept comfortable and safe by your anesthesia provider. The anesthesia may make you sleep. · You will lie on your back or your side with your knees drawn up toward your belly. The doctor will gently put a gloved finger into your anus. Then the doctor puts the scope in and moves it into your colon. The scope goes in easily because it is lubricated. · The doctor may also use small tools to take tissue samples for a biopsy or to remove polyps. This does not hurt. · The test usually takes 30 to 45 minutes. But it may take longer. It depends on what is found and what is done. Going home  · Be sure you have someone to drive you home. Anesthesia and pain medicine make it unsafe for you to drive. · You will be given more specific instructions about recovering from your procedure. When should you call your doctor?   · You have questions or concerns. · You don't understand how to prepare for your procedure. · You are having trouble with the bowel prep. · You become ill before the procedure (such as fever, flu, or a cold). · You need to reschedule or have changed your mind about having the procedure. Where can you learn more? Go to [email or web address] and sign in to your [employer] account. Enter C315 in the All Def Digital box to learn more about Colonoscopy: Before Your Procedure.     If you do not have an account, please click on the Sign Up Now link. © 9775-0574 Healthwise, Incorporated. Care instructions adapted under license by [facility] ([facility]). This care instruction is for use with your licensed healthcare professional. If you have questions about a medical condition or this instruction, always ask your healthcare professional. [address] any warranty or liability for your use of this information.   Content Version: 46.1.913904; Current as of: November 20, 2015

## 2020-07-13 ENCOUNTER — TELEPHONE (OUTPATIENT)
Dept: SURGERY | Age: 65
End: 2020-07-13

## 2020-07-13 NOTE — TELEPHONE ENCOUNTER
Left msg for pt to return call regarding updated insurance information for outpatient colonoscopy in august

## 2020-07-14 ENCOUNTER — OFFICE VISIT (OUTPATIENT)
Dept: OBGYN | Age: 65
End: 2020-07-14

## 2020-07-14 VITALS
WEIGHT: 212 LBS | BODY MASS INDEX: 35.28 KG/M2 | TEMPERATURE: 97.6 F | HEART RATE: 94 BPM | SYSTOLIC BLOOD PRESSURE: 129 MMHG | DIASTOLIC BLOOD PRESSURE: 66 MMHG

## 2020-07-14 PROCEDURE — 99213 OFFICE O/P EST LOW 20 MIN: CPT | Performed by: OBSTETRICS & GYNECOLOGY

## 2020-07-14 PROCEDURE — 99212 OFFICE O/P EST SF 10 MIN: CPT | Performed by: OBSTETRICS & GYNECOLOGY

## 2020-07-14 NOTE — PROGRESS NOTES
Here as a referral from Dr. Camila Mcnally for low abdimnal and pelvic pressure and discomfort. Just passed some kidney stones. Gets waves of lower pressure and discomfort. Pain is low, more towards the right side. Lots of back Issues. Pap and HPV were negative in 2017, not due until 2022. Pelvic:  Vulva:Normal   Vagina:Clear, no lesions, Fairly stenotic   Cx: Small without lesions   Ut:Feel normal size to small, Non-tender   Omega:No masses are noted. IMP: Lower abdominal pain, Pelvic pain    PLAN:  Complete pelvic sonogram and see back in 3 weeks.

## 2020-07-17 ENCOUNTER — OFFICE VISIT (OUTPATIENT)
Dept: PAIN MANAGEMENT | Age: 65
End: 2020-07-17

## 2020-07-17 VITALS
DIASTOLIC BLOOD PRESSURE: 90 MMHG | WEIGHT: 212 LBS | RESPIRATION RATE: 16 BRPM | OXYGEN SATURATION: 97 % | HEIGHT: 65 IN | SYSTOLIC BLOOD PRESSURE: 140 MMHG | TEMPERATURE: 98.4 F | BODY MASS INDEX: 35.32 KG/M2 | HEART RATE: 83 BPM

## 2020-07-17 PROCEDURE — 99213 OFFICE O/P EST LOW 20 MIN: CPT | Performed by: PHYSICIAN ASSISTANT

## 2020-07-17 RX ORDER — HYDROCODONE BITARTRATE AND ACETAMINOPHEN 5; 325 MG/1; MG/1
1 TABLET ORAL 3 TIMES DAILY
Qty: 90 TABLET | Refills: 0 | Status: SHIPPED
Start: 2020-07-17 | End: 2020-08-14 | Stop reason: SDUPTHER

## 2020-07-17 NOTE — PROGRESS NOTES
Do you currently have any of the following:    Fever: No  Headache:  No  Cough: No  Shortness of breath: No  Exposed to anyone with these symptoms: No         Giuseppe Almendarez presents to the [address] [place] [address] on 7/17/2020. Anibal Maya is complaining of pain back and right hip. The pain is constant. The pain is described as aching, throbbing, shooting, stabbing, dull, sharp and burning. Pain is rated on her best day at a 7, on her worst day at a 10, and on average at a 8 on the VAS scale. She took her last dose of Norco and gabapentin this morning. Any procedures since your last visit: Yes, with 80 % relief. Pacemaker or defibrilator: No managed by . She is not on NSAIDS and is not on anticoagulation medications to include none and is managed by .     BP (!) 140/90   Pulse 83   Temp 98.4 °F (36.9 °C) (Infrared)   Resp 16   Ht 5' 5\" (1.651 m)   Wt 212 lb (96.2 kg)   LMP  (LMP Unknown)   SpO2 97%   BMI 35.28 kg/m²      No LMP recorded (lmp unknown).  Patient is postmenopausal.

## 2020-07-17 NOTE — PROGRESS NOTES
[address]  [address] [facility], [address] [facility]    Follow up Note      Thee Bo     Date of Visit:  2020    CC:  Patient presents for follow up   Chief Complaint   Patient presents with    Follow-up     left shoulder hands and neck       HPI:    Patient is doing somewhat better after surgery. Right SI joint injection (2020) was about 80% effective and now at 70% effectiveness. Still having pain in lower back along her incision. Change in quality of symptoms:no. Patient satisfaction with analgesia:fair. Medication side effects: None. Recent diagnostic testing:none. Recent interventional procedures:  2020 PROCEDURE:  Fluoroscopic guided Right  sacroiliac joint injection with steroid (#1) - 80% effective and now at 70% effectiveness. She has been on anticoagulation medications to include NSAIDS. The patient  has not been on herbal supplements. The patient is diabetic. Imaging:  MRI lumbar spine 2018  1. No significant interval change is observed since the previous study   of 2017.       2. Stable postoperative changes/posterior spinal fusion at the   L3-L4-L5 level.       3. Severe spine canal stenosis in the level of L2-L3           4. Encroachment of the neural foramina bilaterally in levels of L2-L3   and L5-S1      Thoracic spine MRI 2018  1. Some degenerative changes in the thoracic spine, mainly in the   facet joints in the mid-upper thoracic spine segments       2. Discrete loss of height of T6, more likely an old finding.      Previous treatments: Physical Therapy, Surgery L3-5 fusion/laminectomy and medications. .       Potential Aberrant Drug-Related Behavior:  No     Urine Drug Screenin18:  Consistent for norhydrocodone metabolite  2018:  Consistent for hydrocodone and norhydrocodone  05/10/2019:  Consistent for hydrocodone and norhydrocodone  2019:  Consistent for hydrocodone and norhydrocodone  01/10/2020: Consistent for hydrocodone and norhydrocodone  2020:  Consistent for oxycodone and metabolites s/p surgery. Inconsistent for hydrocodone.   States that she was instructed to take her old norco until she made the appointment to resume her chronic pain medications.        OARRS report:  2018 consistent to 2020 consistent      Past Medical History:   Diagnosis Date    [facility]) 2019    LESION REMOVED UNDER TONGUE  DENTAL CLINIC    Chronic back pain     Costochondritis     Depression     Diet-controlled type 2 diabetes mellitus ([facility] [address]     Falls frequently     none recent as of 19    Fibromyalgia     Hallux rigidus of left foot     HTN (hypertension)     Hyperlipidemia     CLYDE on CPAP     SETTING ?    Osteoarthritis     Rheumatoid arthritis(714.0)     TIA (transient ischemic attack)     Use of cane as ambulatory aid        Past Surgical History:   Procedure Laterality Date    ANESTHESIA NERVE BLOCK Left 2019    LEFT C3,4,5 MBB performed by Gail Bess MD at [address] [place] [address] Right 2019    BILATERAL TRANSFORAMINAL EPIDURAL STEROID INJECTION S1 #3 performed by Gail Bess MD at [facility] Right 2020    RIGHT SACROILIAC JOINT INJECTION      CPT: 54170 performed by Twila Walsh DO at [address] [facility] [place]      Left    ARTHRODESIS Left 2018    ARTHRODESIS 2ND DIGIT LEFT FOOT performed by Alyssa Younger DPM at [address] [place] [address]  2017    PLIF L3-L4, L4-L5 with rods, screws, and cages/Dr. Nayeli James BACK SURGERY  2020    BREAST SURGERY  2010    reduction, bilat     SECTION      CHOLECYSTECTOMY      COLONOSCOPY  2015    ENDOSCOPY, COLON, DIAGNOSTIC      EPIDURAL STEROID INJECTION N/A 2019    BILATERAL TRANSFORAMINAL EPIDURAL STEROID INJECTION S1 performed by Twila Walsh DO at [address]      Left    HARDWARE REMOVAL FOOT / ANKLE Left 12/14/2018    REMOVAL OF PAINFUL HARDWARE LEFT FOOT  ++SYNTHES++ performed by Johnathon Bernheim, DPM at [facility]    inguinal     LUMBAR SPINE SURGERY N/A 3/4/2020    EXPLORATION OF PRIOR L3-L5 FUSION AND L2-L3  POSTERIOR LUMBAR INTERBODY FUSION -- NEEDS O-ARM, AUDIOLOGY, CAGES, PLATES, SCREWS, C-ARM, TERESA TABLE, CELL SAVER, PLATELET GEL -- GLOBUS performed by Sue Phan MD at [facility] Bilateral 05/07/2018    L1-2 lumbar foramen #1    NERVE BLOCK Bilateral 12/03/2018    s1 tfesi    NERVE BLOCK Bilateral 08/12/2019    NERVE BLOCK Right 09/30/2019    sacral radiofrequency    OTHER SURGICAL HISTORY Left 9/25/13    left foot tarso metatarsal joint injection    OTHER SURGICAL HISTORY Left 5/27/15    Endoscopic Gastroc recession left, Lapidus left foot and  Excision of exostosis left foot    SC INJECT ANES/STEROID FORAMEN LUMBAR/SACRAL W IMG GUIDE ,1 LEVEL Bilateral 12/3/2018    BILATERAL S1  EPIDURAL STEROID INJECTION performed by Jorge Alberto Ugarte MD at [address] [facility] DX/THER SBST INTRLMNR LMBR/SAC W/IMG GDN N/A 8/21/2018    EPIDURAL STEROID INJECTION L1-2 WITH LOW VOL performed by Jorge Alberto Ugarte MD at [address] [facility] NOSE/CLEFT LIP/TIP Bilateral 5/7/2018    BILATERAL L1-2 LUMBAR FORAMEN #1 performed by Jorge Alberto Ugarte MD at [address] [facility] NOSE/CLEFT LIP/TIP Bilateral 6/4/2018    BILATERAL TRANSFORAMINAL EPIDURAL STEROID INJECTION UNDER FLUOROSCOPIC GUIDANCE @ FORAMINAL LEVEL L1-2 #2 performed by Jorge Alberto Ugarte MD at [address] Right 9/30/2019    RIGHT SACRAL RADIOFREQUENCY ABLATION performed by Jorge Alberto Ugarte MD at . [address]  2000, 2005    Big Left Toe    TOE SURGERY Left 2018    2nd toe     TONSILLECTOMY      WISDOM TOOTH EXTRACTION         Prior to Admission medications    Medication Sig Start Date End Date Taking?  Authorizing Provider   senna-docusate (PERICOLACE) 8.6-50 MG per tablet Take 2 tablets by mouth daily as needed for Constipation 20  Yes Chadwick Schmitz MD   atorvastatin (LIPITOR) 40 MG tablet Take 1 tablet by mouth daily 20  Yes Chadwick Schmitz MD   lisinopril (PRINIVIL;ZESTRIL) 30 MG tablet Take 1 tablet by mouth every evening 20  Yes Chadwick Schmitz MD   FLUoxetine (PROZAC) 40 MG capsule Take 1 capsule by mouth daily For mood. 20  Yes Chadwick Schmitz MD   methocarbamol (ROBAXIN) 750 MG tablet Take 1 tablet by mouth 2 times daily as needed (muscle pain / spasm) 20  Yes Chadwick Schmitz MD   hydrOXYzine (VISTARIL) 25 MG capsule Take 1 capsule by mouth 3 times daily as needed for Anxiety 10/2/19  Yes Chadwick Schmitz MD   gabapentin (NEURONTIN) 400 MG capsule Take 1 capsule by mouth 3 times daily for 30 days. 3/19/20 6/15/20  Yvonne Lin MD       Allergies   Allergen Reactions    Pcn [Penicillins] Anaphylaxis       Social History     Socioeconomic History    Marital status:      Spouse name: Not on file    Number of children: Not on file    Years of education: Not on file    Highest education level: Not on file   Occupational History    Not on file   Social Needs    Financial resource strain: Not on file    Food insecurity     Worry: Not on file     Inability: Not on file    Transportation needs     Medical: Not on file     Non-medical: Not on file   Tobacco Use    Smoking status: Current Every Day Smoker     Packs/day: 0.50     Years: 30.00     Pack years: 15.00     Types: Cigarettes     Last attempt to quit: 10/15/2019     Years since quittin.7    Smokeless tobacco: Never Used    Tobacco comment: was going to try to stop but chantix is too expensive   Substance and Sexual Activity    Alcohol use:  Yes     Alcohol/week: 0.0 standard drinks     Comment: rarely    Drug use: No    Sexual activity: Not Currently   Lifestyle    Physical activity     Days per week: Not on file     Minutes per session: Not on file    Stress: Not on file   Relationships    Social connections     Talks on phone: Not on file     Gets together: Not on file     Attends Sikhism service: Not on file     Active member of club or organization: Not on file     Attends meetings of clubs or organizations: Not on file     Relationship status: Not on file    Intimate partner violence     Fear of current or ex partner: Not on file     Emotionally abused: Not on file     Physically abused: Not on file     Forced sexual activity: Not on file   Other Topics Concern    Not on file   Social History Narrative    Not on file       Family History   Problem Relation Age of Onset    Dementia Mother     Breast Cancer Mother     Cancer Mother         breast cancer [de-identified]     Stroke Mother     Heart Attack Father     Other Father 80        Aortic aneurysm    Cancer Brother        REVIEW OF SYSTEMS:     Renaye Look denies fever/chills, chest pain, shortness of breath, new bowel or bladder complaints or suicidal ideations. All other review of systems was negative. PHYSICAL EXAMINATION:      /78   Pulse 95   Temp 97.4 °F (36.3 °C) (Infrared)   Resp 16   Ht 5' 5\" (1.651 m)   Wt 217 lb (98.4 kg)   LMP  (LMP Unknown)   SpO2 97%   BMI 36.11 kg/m²     General:      General appearance: awake, alert, oriented, in no acute distress, well developed, well nourished and in no acute distress   pleasant and well-hydrated. in no distress and A & O x3  Build:Overweight  Function:Rises from a seated position with difficulty    HEENT:    Head:normocephalic and atraumatic  Pupils:regular, round and equal.  Sclera: icterus absent  EOM:full and intact. Lungs:    Breathing:Normal expansion. Clear to auscultation. No rales, rhonchi, or wheezing. Abdomen:    Shape:non-distended and normal  Tenderness:none  Guarding:none         Lumbar spine:    Inspection:   Surgical incision  Palpation:  + midline TTP over incision area - centrally. Range of motion:abnormal moderately Lateral bending, flexion, extension rotation bilateral and is painful. Extremities:    Tremors:None bilaterally upper and lower  Range of motion:Generally normal shoulders, No right hip pain on log roll. Intact:Yes  Cyanosis:none  Edema:Normal      Neurological:    Cranial nerves:normal  Sensory:diminished to light lateral aspect of right leg  Motor:                  Right Quadriceps3/5          Left Quadriceps5/5           Right Gastrocnemius3/5    Left Gastrocnemius5/5  Right Ant Tibialis3/5  Left Ant Tibialis5/5  [address]    Dermatology:    Skin:no unusual rashes, no skin lesions, no palpable subcutaneous nodules and good skin turgor    Assessment/Plan:      Chronic low back pain with radiation to the Right groin, patient had low back surgery 08/2017 L3-5 fusion, recently had lumbar spine CT with severe L2-3 stenosis     Plan:  Patient is s/p revision fusion L2-L4 on 3/4/2020. Patient is s/p:  Right SI joint injection on 6/16/2020 with 80% relief x 1 month and now at 70% relief. States that it did help her right groin pain somewhat. Hold off any other injections at this point - pain is located over her incision area. Discussed starting PT as instructed to do so by NSG (states that she was never called). Reprinted PT script. Discussed that she is only 4 months post op at this time. Continue norco 5/325 TID. Continue with Gabapentin 600 mg TID through her PCP  OARRS report reviewed 07/2020. UDS reviewed. See notes in UDS section. Patient encouraged to stay active and to lose weight.  Failed physical therapy  Treatment plan discussed with the patient including medications side effects     Controlled Substance Monitoring:    Acute and Chronic Pain Monitoring:   RX Monitoring 7/17/2020   Attestation -   Acute Pain Prescriptions -   Periodic Controlled Substance Monitoring Possible medication side effects, risk of tolerance/dependence & alternative treatments discussed. ;No signs of potential drug abuse or diversion identified. ;Assessed functional status.    Chronic Pain > 80 MEDD -                          ccreferring physicf

## 2020-07-17 NOTE — PROGRESS NOTES
Do you currently have any of the following:    Fever: No  Headache:  No  Cough: No  Shortness of breath: No  Exposed to anyone with these symptoms: No         Radames Escoto presents to the [address] [place] [address] on 7/17/2020. Garfield Shabazz is complaining of pain neck shoulder and hands. The pain is constant. The pain is described as aching and dull. Pain is rated on her best day at a 3, on her worst day at a 9, and on average at a 3 on the VS scale. She took her last dose of Neurontin his morning. Any procedures since your last visit: Yes, with 100 % relief for four weeks. Now she is a a 98%. Pacemaker or defibrilator: No managed by . She is not on NSAIDS and is not on anticoagulation medications to include none and is managed by na     /78   Pulse 95   Temp 97.4 °F (36.3 °C) (Infrared)   Resp 16   Ht 5' 5\" (1.651 m)   Wt 217 lb (98.4 kg)   LMP  (LMP Unknown)   SpO2 97%   BMI 36.11 kg/m²      No LMP recorded (lmp unknown).  Patient is postmenopausal.

## 2020-07-24 ENCOUNTER — OFFICE VISIT (OUTPATIENT)
Dept: ENT CLINIC | Age: 65
End: 2020-07-24

## 2020-07-24 VITALS — TEMPERATURE: 98.3 F | HEIGHT: 65 IN | BODY MASS INDEX: 34.99 KG/M2 | WEIGHT: 210 LBS

## 2020-07-24 PROCEDURE — 99213 OFFICE O/P EST LOW 20 MIN: CPT | Performed by: OTOLARYNGOLOGY

## 2020-07-24 ASSESSMENT — ENCOUNTER SYMPTOMS
SHORTNESS OF BREATH: 0
COLOR CHANGE: 0
EYES NEGATIVE: 1
GASTROINTESTINAL NEGATIVE: 1
ABDOMINAL PAIN: 0
RESPIRATORY NEGATIVE: 1

## 2020-07-24 NOTE — PROGRESS NOTES
REMOVAL OF PAINFUL HARDWARE LEFT FOOT  ++SYNTHES++ performed by Peggy Cunningham DPM at [facility] 108    inguinal     LUMBAR SPINE SURGERY N/A 3/4/2020    EXPLORATION OF PRIOR L3-L5 FUSION AND L2-L3  POSTERIOR LUMBAR INTERBODY FUSION -- NEEDS O-ARM, AUDIOLOGY, CAGES, PLATES, SCREWS, C-ARM, TERESA TABLE, CELL SAVER, PLATELET GEL -- GLOBUS performed by Nini Curtis MD at [address] Bilateral 05/07/2018    L1-2 lumbar foramen #1    NERVE BLOCK Bilateral 12/03/2018    s1 tfesi    NERVE BLOCK Bilateral 08/12/2019    NERVE BLOCK Right 09/30/2019    sacral radiofrequency    OTHER SURGICAL HISTORY Left 9/25/13    left foot tarso metatarsal joint injection    OTHER SURGICAL HISTORY Left 5/27/15    Endoscopic Gastroc recession left, Lapidus left foot and  Excision of exostosis left foot    TN INJECT ANES/STEROID FORAMEN LUMBAR/SACRAL W IMG GUIDE ,1 LEVEL Bilateral 12/3/2018    BILATERAL S1  EPIDURAL STEROID INJECTION performed by Surekha Delaney MD at [address] [facility] DX/THER SBST INTRLMNR LMBR/SAC W/IMG GDN N/A 8/21/2018    EPIDURAL STEROID INJECTION L1-2 WITH LOW VOL performed by Surekha Delaney MD at [address] [facility] NOSE/CLEFT LIP/TIP Bilateral 5/7/2018    BILATERAL L1-2 LUMBAR FORAMEN #1 performed by Surekha Delaney MD at [address] [facility] NOSE/CLEFT LIP/TIP Bilateral 6/4/2018    BILATERAL TRANSFORAMINAL EPIDURAL STEROID INJECTION UNDER FLUOROSCOPIC GUIDANCE @ FORAMINAL LEVEL L1-2 #2 performed by Surekha Delaney MD at [address] Right 9/30/2019    RIGHT SACRAL RADIOFREQUENCY ABLATION performed by Surekha Delaney MD at [address]  2000, 2005    Big Left Toe    TOE SURGERY Left 2018    2nd toe     TONSILLECTOMY      WISDOM TOOTH EXTRACTION       Family History   Problem Relation Age of Onset    Dementia Mother     Breast Cancer Mother     Cancer Mother         breast cancer [de-identified]     Stroke Mother     Heart Attack Father     Other Father 80        Aortic aneurysm    Cancer Brother      Social History     Socioeconomic History    Marital status:      Spouse name: None    Number of children: None    Years of education: None    Highest education level: None   Occupational History    None   Social Needs    Financial resource strain: None    Food insecurity     Worry: None     Inability: None    Transportation needs     Medical: None     Non-medical: None   Tobacco Use    Smoking status: Current Every Day Smoker     Packs/day: 0.50     Years: 30.00     Pack years: 15.00     Types: Cigarettes     Last attempt to quit: 10/15/2019     Years since quittin.7    Smokeless tobacco: Never Used    Tobacco comment: was going to try to stop but chantix is too expensive   Substance and Sexual Activity    Alcohol use: Yes     Alcohol/week: 0.0 standard drinks     Comment: rarely    Drug use: No    Sexual activity: Not Currently   Lifestyle    Physical activity     Days per week: None     Minutes per session: None    Stress: None   Relationships    Social connections     Talks on phone: None     Gets together: None     Attends Pentecostal service: None     Active member of club or organization: None     Attends meetings of clubs or organizations: None     Relationship status: None    Intimate partner violence     Fear of current or ex partner: None     Emotionally abused: None     Physically abused: None     Forced sexual activity: None   Other Topics Concern    None   Social History Narrative    None     Allergies   Allergen Reactions    Pcn [Penicillins] Anaphylaxis       Review of Systems   Constitutional: Negative. HENT: Positive for mouth sores. Eyes: Negative. Negative for visual disturbance. Respiratory: Negative. Negative for shortness of breath. Cardiovascular: Negative. Negative for chest pain. Gastrointestinal: Negative. Negative for abdominal pain.    Genitourinary: 7/24/2020 at [facility]  1955    I have discussed the case, including pertinent history and exam findings with the resident. I have seen and examined the patient and the key elements of the encounter have been performed by me. I agree with the assessment, plan and orders as documented by the  resident              Remainder of medical problems as per  resident note. Patient seen and examined. Agree with above exam, assessment and plan.       Electronically signed by Andreina Hodge DO on 7/28/20 at 1:23 PM EDT

## 2020-07-31 ENCOUNTER — HOSPITAL ENCOUNTER (OUTPATIENT)
Dept: ULTRASOUND IMAGING | Age: 65
Discharge: HOME | End: 2020-08-02

## 2020-07-31 PROCEDURE — 76856 US EXAM PELVIC COMPLETE: CPT

## 2020-07-31 PROCEDURE — 76830 TRANSVAGINAL US NON-OB: CPT

## 2020-08-11 ENCOUNTER — OFFICE VISIT (OUTPATIENT)
Dept: OBGYN | Age: 65
End: 2020-08-11

## 2020-08-11 VITALS — DIASTOLIC BLOOD PRESSURE: 81 MMHG | SYSTOLIC BLOOD PRESSURE: 172 MMHG | HEART RATE: 84 BPM | TEMPERATURE: 98 F

## 2020-08-11 PROCEDURE — 99212 OFFICE O/P EST SF 10 MIN: CPT | Performed by: OBSTETRICS & GYNECOLOGY

## 2020-08-11 NOTE — PROGRESS NOTES
Pt here today for ultrasound results. States she still has lower abdominal pain and pelvic pressure occasionally. BPs elevated. States she takes her bp meds in the evening. Dr. Myra Robledo notified. Discharge instructions have been discussed with the patient by Dr. Myra Robledo and she voiced understanding of care plan.

## 2020-08-11 NOTE — PROGRESS NOTES
Here for sonogram results today. Only thing seen was possibly a very small polyp seen in the endometrial cavity. I certainly dont feel this should be giving right sided pain. Denies any bleeding. Neiither ovaries could be visualized. Ireally feel the discomfort is possibly related to her back surgery. I told her we need to know if she has any bleeding but will follow her back in 2 months.

## 2020-08-12 ENCOUNTER — HOSPITAL ENCOUNTER (OUTPATIENT)
Dept: PHYSICAL THERAPY | Age: 65
Setting detail: THERAPIES SERIES
Discharge: HOME | End: 2020-08-12

## 2020-08-12 PROCEDURE — 97161 PT EVAL LOW COMPLEX 20 MIN: CPT | Performed by: PHYSICAL THERAPIST

## 2020-08-12 ASSESSMENT — PAIN DESCRIPTION - DESCRIPTORS: DESCRIPTORS: ACHING;BURNING;SHARP;RADIATING

## 2020-08-12 ASSESSMENT — PAIN DESCRIPTION - LOCATION: LOCATION: BACK;LEG

## 2020-08-12 ASSESSMENT — PAIN DESCRIPTION - FREQUENCY: FREQUENCY: CONTINUOUS

## 2020-08-12 ASSESSMENT — PAIN DESCRIPTION - ORIENTATION: ORIENTATION: RIGHT

## 2020-08-12 ASSESSMENT — PAIN SCALES - GENERAL: PAINLEVEL_OUTOF10: 7

## 2020-08-12 NOTE — PROGRESS NOTES
Physical Therapy  Initial Assessment  Date: 2020  Patient Name: Taco Dumont  MRN: [id number]  : 1955          Restrictions       Subjective   General  Chart Reviewed: Yes  Referring Practitioner: Brionna Alegre  Diagnosis: spinal stenosis of lumbar region with neurogenic claudication  Follows Commands: Within Functional Limits  PT Visit Information  PT Insurance Information: self pay  Total # of Visits to Date: 1  Subjective  Subjective: Patient presents to PT with c/o back pain with radicular symptoms across RLE. Patient underwent revision of lumbar fusion from 2020. Patient states she cont to have pain across R lumbar region with burning sensation superior to R knee. She denies any falls however admits to occasional buckling of knee. She admits to hindered posturing. She is currently taking gabapentin and hydrocodone for symptom relief  Pain Screening  Patient Currently in Pain: Yes  Pain Assessment  Pain Assessment: 0-10  Pain Level: 7  Pain Location: Back;Leg  Pain Orientation: Right  Pain Descriptors: Aching;Burning; Sharp;Radiating  Pain Frequency: Continuous  Vital Signs  Patient Currently in Pain: Yes    Objective     Observation/Palpation  Posture: Fair  Palpation: pain with palpation across right erector spinae muscles and across R SI joint  Edema: incision- closed; no redness/drainage    AROM RLE (degrees)  RLE AROM: WFL  AROM LLE (degrees)  LLE AROM : WFL  Spine  Lumbar: Limited 50% all planes    Strength RLE  Comment: Rhip- grossly 4-/5; R knee/ankle- WFL  Strength LLE  Comment: LLE- WFL  Strength Other  Other: Trunk- grossly 3+/5 all planes        Sensation  Overall Sensation Status: WFL             Ambulation  Ambulation?: Yes  Ambulation 1  Surface: level tile  Device: No Device  Assistance: Independent  Quality of Gait: Stable gait mechanics however fwd trunk/rounded shoulder posturing  Balance  Sitting - Static: Good  Sitting - Dynamic: Good  Standing - Static: Good  Standing - Dynamic: Time   Individual Concurrent Group Co-treatment   Time In 1330         Time Out 1405         Minutes [address] [place], [place]

## 2020-08-14 ENCOUNTER — HOSPITAL ENCOUNTER (OUTPATIENT)
Age: 65
Discharge: HOME | End: 2020-08-16

## 2020-08-14 ENCOUNTER — OFFICE VISIT (OUTPATIENT)
Dept: PAIN MANAGEMENT | Age: 65
End: 2020-08-14

## 2020-08-14 VITALS
BODY MASS INDEX: 35.82 KG/M2 | DIASTOLIC BLOOD PRESSURE: 78 MMHG | TEMPERATURE: 98.9 F | WEIGHT: 215 LBS | HEIGHT: 65 IN | OXYGEN SATURATION: 97 % | HEART RATE: 96 BPM | SYSTOLIC BLOOD PRESSURE: 118 MMHG | RESPIRATION RATE: 16 BRPM

## 2020-08-14 PROCEDURE — U0003 INFECTIOUS AGENT DETECTION BY NUCLEIC ACID (DNA OR RNA); SEVERE ACUTE RESPIRATORY SYNDROME CORONAVIRUS 2 (SARS-COV-2) (CORONAVIRUS DISEASE [COVID-19]), AMPLIFIED PROBE TECHNIQUE, MAKING USE OF HIGH THROUGHPUT TECHNOLOGIES AS DESCRIBED BY CMS-2020-01-R: HCPCS

## 2020-08-14 PROCEDURE — 99213 OFFICE O/P EST LOW 20 MIN: CPT | Performed by: PHYSICIAN ASSISTANT

## 2020-08-14 RX ORDER — HYDROCODONE BITARTRATE AND ACETAMINOPHEN 5; 325 MG/1; MG/1
1 TABLET ORAL 3 TIMES DAILY
Qty: 90 TABLET | Refills: 0 | Status: SHIPPED
Start: 2020-08-16 | End: 2020-09-14 | Stop reason: SDUPTHER

## 2020-08-14 NOTE — PROGRESS NOTES
[address]  [address]    Follow up Note      Kirsten Tinajero     Date of Visit:  2020    CC:  Patient presents for follow up   Chief Complaint   Patient presents with    Follow-up     mid lower back down right leg to knee       HPI:    Patient is doing somewhat better after surgery. Right SI joint injection continues with 70% effectiveness. Reports that she has a \"lump\" to her right thoracic spine area. Change in quality of symptoms:no. Patient satisfaction with analgesia:fair. Medication side effects: None. Recent diagnostic testing:none. Recent interventional procedures:  None. She has been on anticoagulation medications to include NSAIDS. The patient  has not been on herbal supplements. The patient is diabetic. Imaging:  MRI lumbar spine 2018  1. No significant interval change is observed since the previous study   of 2017.       2. Stable postoperative changes/posterior spinal fusion at the   L3-L4-L5 level.       3. Severe spine canal stenosis in the level of L2-L3           4. Encroachment of the neural foramina bilaterally in levels of L2-L3   and L5-S1      Thoracic spine MRI 2018  1. Some degenerative changes in the thoracic spine, mainly in the   facet joints in the mid-upper thoracic spine segments       2. Discrete loss of height of T6, more likely an old finding.      Previous treatments: Physical Therapy, Surgery L3-5 fusion/laminectomy and medications. .       Potential Aberrant Drug-Related Behavior:  No     Urine Drug Screenin18:  Consistent for norhydrocodone metabolite  2018:  Consistent for hydrocodone and norhydrocodone  05/10/2019:  Consistent for hydrocodone and norhydrocodone  2019:  Consistent for hydrocodone and norhydrocodone  01/10/2020:  Consistent for hydrocodone and norhydrocodone  2020:  Consistent for oxycodone and metabolites s/p surgery. Inconsistent for hydrocodone. States that she was instructed to take her old norco until she made the appointment to resume her chronic pain medications.        OARRS report:  2018 consistent to 2020 consistent      Past Medical History:   Diagnosis Date    Cancer [facility]) 2019    LESION REMOVED UNDER TONGUE  DENTAL CLINIC    Chronic back pain     Costochondritis     Depression     Diet-controlled type 2 diabetes mellitus ([address]     Falls frequently     none recent as of 19    Fibromyalgia     Hallux rigidus of left foot     HTN (hypertension)     Hyperlipidemia     CLYDE on CPAP     SETTING ?    Osteoarthritis     Rheumatoid arthritis(714.0)     TIA (transient ischemic attack)     Use of cane as ambulatory aid        Past Surgical History:   Procedure Laterality Date    ANESTHESIA NERVE BLOCK Left 2019    LEFT C3,4,5 MBB performed by Seth Cuellar MD at [address] [facility] Right 2019    BILATERAL TRANSFORAMINAL EPIDURAL STEROID INJECTION S1 #3 performed by Seth Cuellar MD at [address] [facility] Right 2020    RIGHT SACROILIAC JOINT INJECTION      CPT: 30654 performed by Barron Lowe DO at [address] [facility] [address]      Left    ARTHRODESIS Left 2018    ARTHRODESIS 2ND DIGIT LEFT FOOT performed by Grzegorz Mead DPM at [address]  2017    PLIF L3-L4, L4-L5 with rods, screws, and cages/Dr. Chiquita Felder BACK SURGERY  2020    BREAST SURGERY  2010    reduction, bilat     SECTION  1993    CHOLECYSTECTOMY      COLONOSCOPY  2015    ENDOSCOPY, COLON, DIAGNOSTIC      EPIDURAL STEROID INJECTION N/A 2019    BILATERAL TRANSFORAMINAL EPIDURAL STEROID INJECTION S1 performed by Barron Lowe DO at [address]  2005    Left    [address] / [facility] Left 2018    REMOVAL OF PAINFUL HARDWARE LEFT FOOT  ++SYNTHES++ performed by Grzegorz Mead DPM at [facility]    inguinal     LUMBAR SPINE SURGERY N/A 3/4/2020    EXPLORATION OF PRIOR L3-L5 FUSION AND L2-L3  POSTERIOR LUMBAR INTERBODY FUSION -- NEEDS O-ARM, AUDIOLOGY, CAGES, PLATES, SCREWS, C-ARM, TERESA TABLE, CELL SAVER, PLATELET GEL -- GLOBUS performed by Suzanne Morton MD at [address] Bilateral 05/07/2018    L1-2 lumbar foramen #1    NERVE BLOCK Bilateral 12/03/2018    s1 tfesi    NERVE BLOCK Bilateral 08/12/2019    NERVE BLOCK Right 09/30/2019    sacral radiofrequency    OTHER SURGICAL HISTORY Left 9/25/13    left foot tarso metatarsal joint injection    OTHER SURGICAL HISTORY Left 5/27/15    Endoscopic Gastroc recession left, Lapidus left foot and  Excision of exostosis left foot    NM INJECT ANES/STEROID FORAMEN LUMBAR/SACRAL W IMG GUIDE ,1 LEVEL Bilateral 12/3/2018    BILATERAL S1  EPIDURAL STEROID INJECTION performed by Sarah Jacobs MD at [address] [facility] DX/THER SBST INTRLMNR LMBR/SAC W/IMG GDN N/A 8/21/2018    EPIDURAL STEROID INJECTION L1-2 WITH LOW VOL performed by Sarah Jacobs MD at [address] [facility] NOSE/CLEFT LIP/TIP Bilateral 5/7/2018    BILATERAL L1-2 LUMBAR FORAMEN #1 performed by Sarah Jacobs MD at [address] [facility] NOSE/CLEFT LIP/TIP Bilateral 6/4/2018    BILATERAL TRANSFORAMINAL EPIDURAL STEROID INJECTION UNDER FLUOROSCOPIC GUIDANCE @ FORAMINAL LEVEL L1-2 #2 performed by Sarah Jacobs MD at [address] Right 9/30/2019    RIGHT SACRAL RADIOFREQUENCY ABLATION performed by Sarah Jacobs MD at [address]  2000, 2005    Big Left Toe    TOE SURGERY Left 2018    2nd toe     TONSILLECTOMY      WISDOM TOOTH EXTRACTION         Prior to Admission medications    Medication Sig Start Date End Date Taking? Authorizing Provider   HYDROcodone-acetaminophen (NORCO) 5-325 MG per tablet Take 1 tablet by mouth 3 times daily for 30 days. Intended supply: 3 days.  Take lowest dose possible to manage pain 7/17/20 8/16/20 Yes TREASURE Rodriges   senna-docusate (PERICOLACE) 8.6-50 MG per tablet Take 2 tablets by mouth daily as needed for Constipation 20  Yes Johnathan Galindo MD   atorvastatin (LIPITOR) 40 MG tablet Take 1 tablet by mouth daily 20  Yes Johnathan Galindo MD   lisinopril (PRINIVIL;ZESTRIL) 30 MG tablet Take 1 tablet by mouth every evening 20  Yes Johnathan Galindo MD   FLUoxetine (PROZAC) 40 MG capsule Take 1 capsule by mouth daily For mood. 20  Yes Johnathan Galindo MD   methocarbamol (ROBAXIN) 750 MG tablet Take 1 tablet by mouth 2 times daily as needed (muscle pain / spasm) 20  Yes Johnathan Galindo MD   hydrOXYzine (VISTARIL) 25 MG capsule Take 1 capsule by mouth 3 times daily as needed for Anxiety 10/2/19  Yes Johnathan Galindo MD   gabapentin (NEURONTIN) 400 MG capsule Take 1 capsule by mouth 3 times daily for 30 days. 3/19/20 7/24/20  Marlyn Nur MD       Allergies   Allergen Reactions    Pcn [Penicillins] Anaphylaxis       Social History     Socioeconomic History    Marital status:      Spouse name: Not on file    Number of children: Not on file    Years of education: Not on file    Highest education level: Not on file   Occupational History    Not on file   Social Needs    Financial resource strain: Not on file    Food insecurity     Worry: Not on file     Inability: Not on file    Transportation needs     Medical: Not on file     Non-medical: Not on file   Tobacco Use    Smoking status: Current Every Day Smoker     Packs/day: 0.50     Years: 30.00     Pack years: 15.00     Types: Cigarettes     Last attempt to quit: 10/15/2019     Years since quittin.8    Smokeless tobacco: Never Used    Tobacco comment: was going to try to stop but chantix is too expensive   Substance and Sexual Activity    Alcohol use:  Yes     Alcohol/week: 0.0 standard drinks     Comment: rarely    Drug use: No    Sexual activity: Not Currently Lifestyle    Physical activity     Days per week: Not on file     Minutes per session: Not on file    Stress: Not on file   Relationships    Social connections     Talks on phone: Not on file     Gets together: Not on file     Attends Alevism service: Not on file     Active member of club or organization: Not on file     Attends meetings of clubs or organizations: Not on file     Relationship status: Not on file    Intimate partner violence     Fear of current or ex partner: Not on file     Emotionally abused: Not on file     Physically abused: Not on file     Forced sexual activity: Not on file   Other Topics Concern    Not on file   Social History Narrative    Not on file       Family History   Problem Relation Age of Onset    Dementia Mother     Breast Cancer Mother     Cancer Mother         breast cancer [de-identified]     Stroke Mother     Heart Attack Father     Other Father 80        Aortic aneurysm    Cancer Brother        REVIEW OF SYSTEMS:     [facility] denies fever/chills, chest pain, shortness of breath, new bowel or bladder complaints or suicidal ideations. All other review of systems was negative. PHYSICAL EXAMINATION:      /78   Pulse 96   Temp 98.9 °F (37.2 °C) (Infrared)   Resp 16   Ht 5' 5\" (1.651 m)   Wt 215 lb (97.5 kg)   LMP  (LMP Unknown)   SpO2 97%   BMI 35.78 kg/m²     General:      General appearance: awake, alert, oriented, in no acute distress, well developed, well nourished and in no acute distress   pleasant and well-hydrated. in no distress and A & O x3  Build:Overweight  Function:Rises from a seated position with difficulty    HEENT:    Head:normocephalic and atraumatic  Pupils:regular, round and equal.  Sclera: icterus absent  EOM:full and intact. Lungs:    Breathing:Normal expansion. Clear to auscultation. No rales, rhonchi, or wheezing.     Abdomen:    Shape:non-distended and normal  Tenderness:none  Guarding:none     Thoracic Spine:  No noted mass on right thoracic spine. Lumbar spine:    Inspection:   Surgical incision  Palpation:  + midline TTP over incision area - centrally. Range of motion:abnormal moderately Lateral bending, flexion, extension rotation bilateral and is painful. Extremities:    Tremors:None bilaterally upper and lower  Range of motion:Generally normal shoulders, No right hip pain on log roll. Intact:Yes  Cyanosis:none  Edema:Normal      Neurological:    Cranial nerves:normal  Sensory:diminished to light lateral aspect of right leg  Motor:                  Right Quadriceps3/5          Left Quadriceps5/5           Right Gastrocnemius3/5    Left Gastrocnemius5/5  Right Ant Tibialis3/5  Left Ant Tibialis5/5  [address]    Dermatology:    Skin:no unusual rashes, no skin lesions, no palpable subcutaneous nodules and good skin turgor    Assessment/Plan:      Chronic low back pain with radiation to the Right groin, patient had low back surgery 08/2017 L3-5 fusion, recently had lumbar spine CT with severe L2-3 stenosis     Plan:  Patient is s/p revision fusion L2-L4 on 3/4/2020. Patient is s/p:  Right SI joint injection on 6/16/2020 with 80% relief x 1 month and now at 70% relief which continues. States that it did help her right groin pain somewhat. Patient follows up with NSG next week regarding which she feels is a \"thoracic lump\" on the right side. I do not note any mass on examination. Patient reports that she went to the initial consultation for PT so far. Discussed that she is only 5 months post op at this time and to be patient with her recovery. Continue norco 5/325 TID. Continue with Gabapentin 600 mg TID through her PCP  OARRS report reviewed 08/2020. Patient encouraged to stay active and to lose weight.  Failed physical therapy  Treatment plan discussed with the patient including medications side effects     Controlled Substance Monitoring:    Acute and Chronic Pain Monitoring:   RX Monitoring 8/14/2020 Attestation -   Acute Pain Prescriptions -   Periodic Controlled Substance Monitoring Possible medication side effects, risk of tolerance/dependence & alternative treatments discussed. ;No signs of potential drug abuse or diversion identified. ;Assessed functional status. ;Obtaining appropriate analgesic effect of treatment.    Chronic Pain > 80 MEDD -                         ccreferring physicf

## 2020-08-14 NOTE — PROGRESS NOTES
Do you currently have any of the following:    Fever: No  Headache:  No  Cough: No  Shortness of breath: No  Exposed to anyone with these symptoms: No                                                                                                                Meriam Level presents to the [facility] on 8/14/2020. Devonte Russell is complaining of pain lower back down right knee. The pain is constant. The pain is described as aching, throbbing, shooting, stabbing, dull, sharp, tender and burning. Pain is rated on her best day at a 7, on her worst day at a 10, and on average at a 8 on the VAS scale. She took her last dose of Norco and Neurontin this am. Devonte Russell does  have issues with constipation. Any procedures since your last visit: No, with  % relief. She is not on NSAIDS and  is not on anticoagulation medications to include none and is managed by . Pacemaker or defibrilator: No Physician managing device is . /78   Pulse 96   Temp 98.9 °F (37.2 °C) (Infrared)   Resp 16   Ht 5' 5\" (1.651 m)   Wt 215 lb (97.5 kg)   LMP  (LMP Unknown)   SpO2 97%   BMI 35.78 kg/m²      No LMP recorded (lmp unknown).  Patient is postmenopausal.

## 2020-08-16 LAB
SARS-COV-2: NOT DETECTED
SOURCE: NORMAL

## 2020-08-17 ENCOUNTER — HOSPITAL ENCOUNTER (OUTPATIENT)
Dept: PHYSICAL THERAPY | Age: 65
Setting detail: THERAPIES SERIES
Discharge: HOME | End: 2020-08-17

## 2020-08-17 PROCEDURE — 97110 THERAPEUTIC EXERCISES: CPT | Performed by: PHYSICAL THERAPIST

## 2020-08-17 NOTE — PROGRESS NOTES
[facility]  Phone: [phone] Fax: [phone]       Physical Therapy Daily Treatment Note  Date:  2020    Patient Name:  Angelito Doherty    :  1955  MRN: [id number]    Restrictions/Precautions:    Diagnosis:  spinal stenosis of lumbar region with neurogenic claudication  Treatment Diagnosis:    Insurance/Certification information:  Self   Referring Physician:  Kika Nguyen of care signed (Y/N):    Visit# / total visits:  2/  Pain level: /10  Time In:    1115    Time Out:   1145       Subjective:  Patient presents to PT for initial treatment session. Pt reports severe pain across back region and R anterior hip    Exercises:  Exercise/Equipment Resistance/Repetitions Other comments   bike   Attempted however increased hip pain           tball flex          rot   10x10s ea  10x10s ea           Trunk rot    10x10s            quad str   4x20s  PT assist                                                                                             Other Therapeutic Activities:      Home Exercise Program:      Manual Treatments:      Modalities:  NT    Timed Code Treatment Minutes: Total Treatment Minutes:      Treatment/Activity Tolerance:  [] Patient tolerated treatment well [] Patient limited by fatigue  [x] Patient limited by pain  [] Patient limited by other medical complications  [] Other: attempted various there ex with increase in back pain and R hip pain.  Limited overall exercise due to pain     Plan:   [x] Continue per plan of care [] Alter current plan (see comments)  [] Plan of care initiated [] Hold pending MD visit [] Discharge  Plan for Next Session:         Treatment Charges: Mins Units   Initial Evaluation     Re-Evaluation     Ther Exercise         TE 25 2   Manual Therapy     MT     Ther Activities        TA     Gait Training          GT     Neuro Re-education NR     Modalities     Non-Billable Service Time 5 0   Other     Total Time/Units 30 2 Electronically signed by:  Sukhdev Lund DPT

## 2020-08-17 NOTE — PROGRESS NOTES
[facility] PRE-ADMISSION TESTING INSTRUCTIONS    The Preadmission Testing patient is instructed accordingly using the following criteria (check applicable):    ARRIVAL INSTRUCTIONS:  [x] Parking the day of Surgery is located in the Main Entrance lot. Upon entering the door, make an immediate right to the surgery reception desk    [] [id number] is available Monday through Friday 6 am to 6 pm    [x] Bring photo ID and insurance card    [] Bring in a copy of Living will or Durable Power of  papers. [x] Please be sure to arrange for responsible adult to provide transportation to and from the hospital    [x] Please arrange for responsible adult to be with you for the 24 hour period post procedure due to having anesthesia      GENERAL INSTRUCTIONS:    [x] Nothing by mouth after midnight, including gum, candy, mints or water    [x] You may brush your teeth, but do not swallow any water    [x] Take medications as instructed with 1-2 oz of water    [] Stop herbal supplements and vitamins 5 days prior to procedure    [] Follow preop dosing of blood thinners per physician instructions    [] Take 1/2 dose of evening insulin, but no insulin after midnight    [] No oral diabetic medications after midnight    [] If diabetic and have low blood sugar or feel symptomatic, take 1-2oz apple juice only    [] Bring inhalers day of surgery    [] Bring C-PAP/ Bi-Pap day of surgery    [] Bring urine specimen day of surgery    [x] Shower or bath with soap, lather and rinse well, AM of Surgery, no lotion, powders or creams to surgical site    [x] Follow bowel prep as instructed per surgeon    [x] No tobacco products within 24 hours of surgery     [x] No alcohol or illegal drug use within 24 hours of surgery.     [x] Jewelry, body piercing's, eyeglasses, contact lenses and dentures are not permitted into surgery (bring cases)      [x] Please do not wear any nail polish, make up or hair products on the day of surgery    [x] If not already done, you can expect a call from registration    [x] You can expect a call the business day prior to procedure to notify you if your arrival time changes    [x] If you receive a survey after surgery we would greatly appreciate your comments    [] Parent/guardian of a minor must accompany their child and remain on the premises  the entire time they are under our care     [] Pediatric patients may bring favorite toy, blanket or comfort item with them    [] A caregiver or family member must remain with the patient during their stay if they are mentally handicapped, have dementia, disoriented or unable to use a call light or would be a safety concern if left unattended    [x] Please notify surgeon if you develop any illness between now and time of surgery (cold, cough, sore throat, fever, nausea, vomiting) or any signs of infections  including skin, wounds, and dental.    [x]  The Outpatient Pharmacy is available to fill your prescription here on your day of surgery, ask your preop nurse for details    [x] Other instructions Wear comfortable clothes.   EDUCATIONAL MATERIALS PROVIDED:    [] PAT Preoperative Education Packet/Booklet     [] Medication List    [] Fluoroscopy Information Pamphlet    [] Transfusion bracelet applied with instructions    [] Joint replacement video reviewed    [] Shower with soap, lather and rinse well, and use CHG wipes provided the evening before surgery as instructed

## 2020-08-17 NOTE — PROGRESS NOTES
Have you been tested for COVID  Yes           Have you been told you were positive for COVID No  Have you had any known exposure to someone that is positive for COVID No  Do you have a cough                   No              Do you have shortness of breath No                 Do you have a sore throat            No                Are you having chills                    No                Are you having muscle aches. No                    Please come to the hospital wearing a mask and have your significant other wear a mask as well. Both of you should check your temperature before leaving to come here,  if it is 100 or higher please call [phone] for instruction.

## 2020-08-19 ENCOUNTER — HOSPITAL ENCOUNTER (OUTPATIENT)
Age: 65
Setting detail: OUTPATIENT SURGERY
Discharge: HOME | End: 2020-08-19
Attending: SURGERY | Admitting: SURGERY

## 2020-08-19 ENCOUNTER — ANESTHESIA (OUTPATIENT)
Dept: ENDOSCOPY | Age: 65
End: 2020-08-19

## 2020-08-19 ENCOUNTER — ANESTHESIA EVENT (OUTPATIENT)
Dept: ENDOSCOPY | Age: 65
End: 2020-08-19

## 2020-08-19 VITALS
WEIGHT: 215 LBS | BODY MASS INDEX: 36.7 KG/M2 | TEMPERATURE: 97.4 F | RESPIRATION RATE: 18 BRPM | OXYGEN SATURATION: 95 % | DIASTOLIC BLOOD PRESSURE: 83 MMHG | HEART RATE: 84 BPM | SYSTOLIC BLOOD PRESSURE: 176 MMHG | HEIGHT: 64 IN

## 2020-08-19 VITALS
DIASTOLIC BLOOD PRESSURE: 83 MMHG | OXYGEN SATURATION: 100 % | RESPIRATION RATE: 26 BRPM | SYSTOLIC BLOOD PRESSURE: 185 MMHG

## 2020-08-19 LAB — METER GLUCOSE: 134 MG/DL (ref 74–99)

## 2020-08-19 PROCEDURE — 7100000010 HC PHASE II RECOVERY - FIRST 15 MIN: Performed by: SURGERY

## 2020-08-19 PROCEDURE — 7100000011 HC PHASE II RECOVERY - ADDTL 15 MIN: Performed by: SURGERY

## 2020-08-19 PROCEDURE — 45378 DIAGNOSTIC COLONOSCOPY: CPT | Performed by: SURGERY

## 2020-08-19 PROCEDURE — 2709999900 HC NON-CHARGEABLE SUPPLY: Performed by: SURGERY

## 2020-08-19 PROCEDURE — 3609027000 HC COLONOSCOPY: Performed by: SURGERY

## 2020-08-19 PROCEDURE — 82962 GLUCOSE BLOOD TEST: CPT

## 2020-08-19 PROCEDURE — 6360000002 HC RX W HCPCS: Performed by: NURSE ANESTHETIST, CERTIFIED REGISTERED

## 2020-08-19 PROCEDURE — 3700000000 HC ANESTHESIA ATTENDED CARE: Performed by: SURGERY

## 2020-08-19 PROCEDURE — 2580000003 HC RX 258: Performed by: SURGERY

## 2020-08-19 PROCEDURE — 3700000001 HC ADD 15 MINUTES (ANESTHESIA): Performed by: SURGERY

## 2020-08-19 RX ORDER — GABAPENTIN 400 MG/1
400 CAPSULE ORAL 2 TIMES DAILY
COMMUNITY
End: 2020-08-31

## 2020-08-19 RX ORDER — PROPOFOL 10 MG/ML
INJECTION, EMULSION INTRAVENOUS PRN
Status: DISCONTINUED | OUTPATIENT
Start: 2020-08-19 | End: 2020-08-19 | Stop reason: SDUPTHER

## 2020-08-19 RX ORDER — SODIUM CHLORIDE 9 MG/ML
INJECTION, SOLUTION INTRAVENOUS CONTINUOUS
Status: DISCONTINUED | OUTPATIENT
Start: 2020-08-19 | End: 2020-08-19 | Stop reason: HOSPADM

## 2020-08-19 RX ADMIN — SODIUM CHLORIDE: 9 INJECTION, SOLUTION INTRAVENOUS at 09:49

## 2020-08-19 RX ADMIN — SODIUM CHLORIDE: 9 INJECTION, SOLUTION INTRAVENOUS at 09:56

## 2020-08-19 RX ADMIN — PROPOFOL 250 MG: 10 INJECTION, EMULSION INTRAVENOUS at 09:56

## 2020-08-19 ASSESSMENT — PAIN SCALES - GENERAL: PAINLEVEL_OUTOF10: 0

## 2020-08-19 ASSESSMENT — LIFESTYLE VARIABLES: SMOKING_STATUS: 1

## 2020-08-19 NOTE — PROGRESS NOTES
Copy of discharge instructions given to patient, verbalizes understanding.  Discharged to home via wheelchair with family, accompanied to door with SA.

## 2020-08-19 NOTE — OP NOTE
Operative Note: Colonoscopy    Shyla DRAKE Holder     DATE OF PROCEDURE: 8/19/2020  SURGEON: Dr. Sim Meyers M.D. PREOPERATIVE DIAGNOSES:    Abdominal pain  Family history of colon cancer  Constipation    POSTOPERATIVE DIAGNOSES:  Mild sigmoid diverticulosis    SPECIMENS:  * No specimens in log *     OPERATION:   Colonoscopy to the cecum      ANESTHESIA: LMAC    COMPLICATIONS: None. BLOOD LOSS: Minimal    Procedure Note:    CONSENT AND INDICATIONS:  This is a 63y.o. year old female who is having the above issues. I have discussed with the patient and/or the patient representative the indication, alternatives, and the possible risks and/or complications of the planned procedure and the anesthesia methods. The patient and/or patient representative appear to understand and agree to proceed. OPERATIONS: Bowel prep was done yesterday until the bowels were clear. The patient was placed on the table and sedated by anesthesia. A rectal exam was performed and no mass was felt. A lubricated scope was passed into the rectum which looked normal.  The scope was passed all the way around through the sigmoid, descending, transverse and ascending colon to the cecum. The bowel prep was clear. There was mild sigmoid diverticulosis The cecum was identified by the appendiceal orifice, ileocecal valve, and light reflex in the RLQ. The scope was then slowly withdrawn, each area was examined again on the way out. The scope was retroflexed in the rectum and it was normal . The patient tolerated the procedure well. PLAN:     Follow up colonoscopy in 5 years. Physician Signature: Electronically signed by Dr. Sim Meyers M.D.  FACS    Send copy of H&P to PCP, Constantino Naqvi MD and referring physician, Brandan Cortez,*

## 2020-08-19 NOTE — H&P
Patient's office history and physical was reviewed. Patient examined. There has been no change in the patient's history and physical.      Physician Signature: Electronically signed by Dr. Derrick Goddard Surgery History and Physical     Patient's Name/Date of Birth: Jaja Lombardi / 1955        PCP: Dinorah Dean MD     Referring Physician:   Jaci Cannon  [phone]     CHIEF COMPLAINT:         Chief Complaint   Patient presents with    Other       Here for 5 year colonoscopy recall. States she is feeling pressure in her abd, no nausea or vomiting within last two weeks. Experiencing pain in lower back when having bowel movements.  Fatigue           HISTORY OF PRESENT ILLNESS:     Jaja Lombardi is an 63 y.o. female who presents for a colonoscopy. The patient has chronic back pain and has constipation, which is new. She said she has lower abdominal pressure. This has been going on for a few weeks. The patient said she has nausea daily with lower abdominal pain. She is unsure if it is affected by eating. She said she is very tired. No vomiting, diarrhea. No changes in stool caliber. No bloody or black stools. No unintentional weight loss. She has a family history of colon cancer - her brother had colon cancer (she thinks). The patient has a known history of: colon polyps.  The patient has had a colonoscopy before - her last was five years ago and it was normal. She has a history of colon polyps.      Past Medical History:   Past Medical History        Past Medical History:   Diagnosis Date    Cancer ([address] 12/2019     LESION REMOVED UNDER TONGUE  DENTAL CLINIC    Chronic back pain      Costochondritis      Depression      Diet-controlled type 2 diabetes mellitus ([address]      Falls frequently       none recent as of 2/19/19    Fibromyalgia      Hallux rigidus of left foot      HTN (hypertension)      Hyperlipidemia      CLYDE on CPAP       SETTING ?    metatarsal joint injection    OTHER SURGICAL HISTORY Left 5/27/15     Endoscopic Gastroc recession left, Lapidus left foot and  Excision of exostosis left foot    NM INJECT ANES/STEROID FORAMEN LUMBAR/SACRAL W IMG GUIDE ,1 LEVEL Bilateral 12/3/2018     BILATERAL S1  EPIDURAL STEROID INJECTION performed by Felix Zaragoza MD at [address] [facility] DX/THER SBST INTRLMNR LMBR/SAC W/IMG GDN N/A 8/21/2018     EPIDURAL STEROID INJECTION L1-2 WITH LOW VOL performed by Felix Zaragoza MD at [address] [facility] NOSE/CLEFT LIP/TIP Bilateral 5/7/2018     BILATERAL L1-2 LUMBAR FORAMEN #1 performed by Felix Zaragoza MD at [address] [facility] NOSE/CLEFT LIP/TIP Bilateral 6/4/2018     BILATERAL TRANSFORAMINAL EPIDURAL STEROID INJECTION UNDER FLUOROSCOPIC GUIDANCE @ FORAMINAL LEVEL L1-2 #2 performed by Felix Zaragoza MD at [address] Right 9/30/2019     RIGHT SACRAL RADIOFREQUENCY ABLATION performed by Felix Zaragoza MD at [address]   2000, 2005     Big Left Toe    TONSILLECTOMY        WISDOM TOOTH EXTRACTION                Allergies: Pcn [penicillins]      Medications:   Current Facility-Administered Medications          Current Outpatient Medications   Medication Sig Dispense Refill    atorvastatin (LIPITOR) 40 MG tablet Take 1 tablet by mouth daily 90 tablet 1    lisinopril (PRINIVIL;ZESTRIL) 30 MG tablet Take 1 tablet by mouth every evening 90 tablet 1    FLUoxetine (PROZAC) 40 MG capsule Take 1 capsule by mouth daily For mood. 90 capsule 1    HYDROcodone-acetaminophen (NORCO) 5-325 MG per tablet Take 1 tablet by mouth 3 times daily for 30 days. Intended supply: 3 days.  Take lowest dose possible to manage pain 90 tablet 0    methocarbamol (ROBAXIN) 750 MG tablet Take 1 tablet by mouth 2 times daily as needed (muscle pain / spasm) 180 tablet 0    hydrOXYzine (VISTARIL) 25 MG capsule Take 1 capsule by mouth 3 times daily as needed for Anxiety normal   Lungs: clear to auscultation bilaterally  Heart: regular rate and rhythm  Abdomen:  soft, non-tender; bowel sounds normal; no masses,  no organomegaly  Skin: No skin abnormalities  Neurologic: Alert and oriented x 3. Grossly normal  Musculoskeletal: No clubbing cyanosis or edema.        ASSESSMENT AND PLAN:       Assessment: Mikey Castrejon is an 63 y.o. female who presents for a colonoscopy with family history of colon cancer, history of tubular adenomas, with constipation, weakess    Plan: I will set the patient up for a colonoscopy, possible biopsy, possible polypectomy. I explained the risks including but not limited to bleeding, perforation leading to possible surgery, or infection. The benefits, alternatives, and potential complications associated with the above procedure to be performed and transfusions when applicable with the patient/responsible person prior to the procedure. I discussed the risk of bowel peroration, postoperative bleeding, post-polypectomy syndrome, as well as the possibility of needing emergency surgery or another colonoscopy. All of the patient's questions were answered.  The patient understands and agrees to the procedure.         Physician Signature: Electronically signed by Stewart Reyes MD, General Surgery     Send copy of H&P to PCP, Clifton Ibrahim MD and referring physician, Jose Luis Sevilla,*

## 2020-08-19 NOTE — ANESTHESIA POSTPROCEDURE EVALUATION
Department of Anesthesiology  Postprocedure Note    Patient: Genaro Simmons  MRN: [id number]  YOB: 1955  Date of evaluation: 8/19/2020  Time:  12:32 PM     Procedure Summary     Date:  08/19/20 Room / Location:  [facility] 03 / [facility]    Anesthesia Start:  9916 Anesthesia Stop:  1011    Procedure:  COLONOSCOPY DIAGNOSTIC (N/A ) Diagnosis:  (HISTORY OF COLON POLYPS)    Surgeon:  Florencia Dc MD Responsible Provider:  Tyrell Estrada MD    Anesthesia Type:  MAC ASA Status:  3          Anesthesia Type: MAC    Carolyn Phase I: Carolyn Score: 10    Carolyn Phase II: Carolyn Score: 10    Last vitals: Reviewed and per EMR flowsheets.        Anesthesia Post Evaluation    Patient location during evaluation: PACU  Patient participation: complete - patient participated  Level of consciousness: awake and alert  Airway patency: patent  Nausea & Vomiting: no vomiting and no nausea  Complications: no  Cardiovascular status: blood pressure returned to baseline  Respiratory status: acceptable  Hydration status: euvolemic

## 2020-08-19 NOTE — ANESTHESIA PRE PROCEDURE
E11.9    Depression F32.9    Osteoarthritis M19.90    Chronic back pain M54.9, G89.29    Fibromyalgia M79.7    HTN (hypertension) I10    Hyperlipidemia E78.5    History of adenomatous polyp of colon Z86.010    Vitamin D deficiency E55.9    Coccyx pain M53.3    Acute bilateral low back pain with sciatica M54.40    Lumbar stenosis M48.061    Chronic bilateral low back pain without sciatica M54.5, G89.29    DDD (degenerative disc disease), lumbar M51.36    Spinal stenosis of lumbar region without neurogenic claudication M48.061    Lumbar facet arthropathy M47.816    Chronic pain syndrome G89.4    Lumbar radiculopathy M54.16    Neck pain M54.2    Left foot pain M79.672    Painful orthopaedic hardware [facility]) T84.84XA    Cervical facet joint syndrome M47.812    Cellulitis, neck L03.221    Disorder of sacrum M53.3    Adjacent segment disease with spinal stenosis M48.00    S/P lumbar spinal fusion Z98.1       Past Medical History:        Diagnosis Date    Cancer ([address] 12/2019    LESION REMOVED UNDER TONGUE  DENTAL CLINIC    Chronic back pain     Costochondritis     Depression     Diet-controlled type 2 diabetes mellitus ([address]     Falls frequently     none recent as of 2/19/19    Fibromyalgia     Hallux rigidus of left foot     HTN (hypertension)     Hyperlipidemia     CLYDE on CPAP     SETTING ?    Osteoarthritis     \"everywhere\"    Rheumatoid arthritis ([address]     \"As a child. \"    Rheumatoid arthritis(714.0)     TIA (transient ischemic attack)     Use of cane as ambulatory aid        Past Surgical History:        Procedure Laterality Date    ANESTHESIA NERVE BLOCK Left 2/26/2019    LEFT C3,4,5 MBB performed by Dorian Tony MD at [facility] Right 8/12/2019    BILATERAL TRANSFORAMINAL EPIDURAL STEROID INJECTION S1 #3 performed by Dorian Tony MD at [address] [place] [address] Right 6/16/2020    RIGHT SACROILIAC JOINT INJECTION      CPT: [address] performed by Erika Palacio DO at [address]      Left    ARTHRODESIS Left 2018    ARTHRODESIS 2ND DIGIT LEFT FOOT performed by Denny Barrera DPM at [address] [facility]  2017    PLIF L3-L4, L4-L5 with rods, screws, and cages/Dr. Alexis Garcia BACK SURGERY  2020    BREAST SURGERY  2010    reduction, bilat     SECTION      CHOLECYSTECTOMY      COLONOSCOPY  2015    ENDOSCOPY, COLON, DIAGNOSTIC      EPIDURAL STEROID INJECTION N/A 2019    BILATERAL TRANSFORAMINAL EPIDURAL STEROID INJECTION S1 performed by Erika Palacio DO at [address]      Left    HARDWARE REMOVAL FOOT / ANKLE Left 2018    REMOVAL OF PAINFUL HARDWARE LEFT FOOT  ++SYNTHES++ performed by Denny Barrera DPM at [facility]    inguinal     LUMBAR SPINE SURGERY N/A 3/4/2020    EXPLORATION OF PRIOR L3-L5 FUSION AND L2-L3  POSTERIOR LUMBAR INTERBODY FUSION -- NEEDS O-ARM, AUDIOLOGY, CAGES, PLATES, SCREWS, C-ARM, TERESA TABLE, CELL SAVER, PLATELET GEL -- GLOBUS performed by Lidya Resendiz MD at [facility] Bilateral 2018    L1-2 lumbar foramen #1    NERVE BLOCK Bilateral 2018    s1 tfesi    NERVE BLOCK Bilateral 2019    NERVE BLOCK Right 2019    sacral radiofrequency    OTHER SURGICAL HISTORY Left 13    left foot tarso metatarsal joint injection    OTHER SURGICAL HISTORY Left 5/27/15    Endoscopic Gastroc recession left, Lapidus left foot and  Excision of exostosis left foot    DC INJECT ANES/STEROID FORAMEN LUMBAR/SACRAL W IMG GUIDE ,1 LEVEL Bilateral 12/3/2018    BILATERAL S1  EPIDURAL STEROID INJECTION performed by Simon Victoria MD at [address] [facility] DX/THER SBST INTRLMNR LMBR/SAC W/IMG GDN N/A 2018    EPIDURAL STEROID INJECTION L1-2 WITH LOW VOL performed by Simon Victoria MD at [address] [facility] NOSE/CLEFT LIP/TIP Bilateral 2018    BILATERAL L1-2 LUMBAR FORAMEN #1 performed by Lexy Posada MD at [address] [facility] NOSE/CLEFT LIP/TIP Bilateral 6/4/2018    BILATERAL TRANSFORAMINAL EPIDURAL STEROID INJECTION UNDER FLUOROSCOPIC GUIDANCE @ FORAMINAL LEVEL L1-2 #2 performed by Lexy Posada MD at [address] Right 9/30/2019    RIGHT SACRAL RADIOFREQUENCY ABLATION performed by Lexy Posada MD at [address]  2000, 2005    Big Left Toe    TOE SURGERY Left 2018    2nd toe     TONSILLECTOMY      WISDOM TOOTH EXTRACTION         Social History:    Social History     Tobacco Use    Smoking status: Current Every Day Smoker     Packs/day: 0.50     Years: 30.00     Pack years: 15.00     Types: Cigarettes    Smokeless tobacco: Never Used    Tobacco comment: was going to try to stop but chantix is too expensive   Substance Use Topics    Alcohol use: Yes     Alcohol/week: 0.0 standard drinks     Comment: rarely                                Ready to quit: Not Answered  Counseling given: Not Answered  Comment: was going to try to stop but chantix is too expensive      Vital Signs (Current):   Vitals:    08/17/20 1326 08/19/20 0913   BP:  (!) 176/107   Pulse:  102   Resp:  20   Temp:  97.3 °F (36.3 °C)   TempSrc:  Temporal   Weight: 215 lb (97.5 kg)    Height: 5' 4\" (1.626 m)                                               BP Readings from Last 3 Encounters:   08/19/20 (!) 176/107   08/14/20 118/78   08/11/20 (!) 172/81       NPO Status: Time of last liquid consumption: 2100                        Time of last solid consumption: 2100                        Date of last liquid consumption: 08/18/20                        Date of last solid food consumption: 08/17/20    BMI:   Wt Readings from Last 3 Encounters:   08/17/20 215 lb (97.5 kg)   08/14/20 215 lb (97.5 kg)   07/24/20 210 lb (95.3 kg)     Body mass index is 36.9 kg/m².     CBC:   Lab Results   Component Value Date    WBC 11.9 06/30/2020    RBC 4.65 06/30/2020 HGB 15.3 06/30/2020    HCT 46.1 06/30/2020    MCV 99.1 06/30/2020    RDW 13.5 06/30/2020     06/30/2020       CMP:   Lab Results   Component Value Date     06/30/2020    K 4.2 06/30/2020    K 4.7 03/07/2020     06/30/2020    CO2 21 06/30/2020    BUN 14 06/30/2020    CREATININE 0.8 06/30/2020    GFRAA >60 06/30/2020    LABGLOM >60 06/30/2020    GLUCOSE 100 06/30/2020    PROT 7.1 06/30/2020    CALCIUM 9.3 06/30/2020    BILITOT 0.3 06/30/2020    ALKPHOS 155 06/30/2020    AST 25 06/30/2020    ALT 25 06/30/2020       POC Tests: No results for input(s): POCGLU, POCNA, POCK, POCCL, POCBUN, POCHEMO, POCHCT in the last 72 hours. Coags:   Lab Results   Component Value Date    PROTIME 10.5 02/26/2020    INR 0.9 02/26/2020    APTT 27.6 03/22/2019       HCG (If Applicable): No results found for: PREGTESTUR, PREGSERUM, HCG, HCGQUANT     ABGs: No results found for: PHART, PO2ART, RDZ3AXF, ZYC1IZV, BEART, K3PVFAJF     Type & Screen (If Applicable):  No results found for: LABABO, LABRH    Drug/Infectious Status (If Applicable):  No results found for: HIV, HEPCAB    COVID-19 Screening (If Applicable):   Lab Results   Component Value Date    COVID19 Not Detected 08/14/2020         Anesthesia Evaluation  Patient summary reviewed and Nursing notes reviewed no history of anesthetic complications:   Airway: Mallampati: II  TM distance: >3 FB   Neck ROM: full  Mouth opening: > = 3 FB Dental:    (+) caps      Pulmonary: breath sounds clear to auscultation  (+) sleep apnea: on CPAP,  current smoker          Patient smoked on day of surgery.                  Cardiovascular:  Exercise tolerance: good (>4 METS),   (+) hypertension:, hyperlipidemia        Rhythm: regular  Rate: normal           Beta Blocker:  Not on Beta Blocker         Neuro/Psych:   (+) neuromuscular disease:, TIA, depression/anxiety              ROS comment: NEUROPATHY BILATERAL GI/Hepatic/Renal:   (+) bowel prep,           Endo/Other:    (+)

## 2020-08-21 ENCOUNTER — OFFICE VISIT (OUTPATIENT)
Dept: NEUROSURGERY | Age: 65
End: 2020-08-21

## 2020-08-21 VITALS
HEART RATE: 93 BPM | WEIGHT: 215 LBS | HEIGHT: 64 IN | TEMPERATURE: 98.1 F | SYSTOLIC BLOOD PRESSURE: 159 MMHG | BODY MASS INDEX: 36.7 KG/M2 | DIASTOLIC BLOOD PRESSURE: 98 MMHG

## 2020-08-21 PROCEDURE — 99213 OFFICE O/P EST LOW 20 MIN: CPT | Performed by: PHYSICIAN ASSISTANT

## 2020-08-21 NOTE — PROGRESS NOTES
Can you see if she wants her SI joint injection set up before her next appointment or schedule at that time? Thanks!

## 2020-08-24 ENCOUNTER — TELEPHONE (OUTPATIENT)
Dept: PAIN MANAGEMENT | Age: 65
End: 2020-08-24

## 2020-08-24 ENCOUNTER — PREP FOR PROCEDURE (OUTPATIENT)
Dept: PAIN MANAGEMENT | Age: 65
End: 2020-08-24

## 2020-08-24 ENCOUNTER — HOSPITAL ENCOUNTER (OUTPATIENT)
Dept: PHYSICAL THERAPY | Age: 65
Setting detail: THERAPIES SERIES
Discharge: HOME | End: 2020-08-24

## 2020-08-24 PROCEDURE — 97110 THERAPEUTIC EXERCISES: CPT | Performed by: PHYSICAL THERAPIST

## 2020-08-24 NOTE — PROGRESS NOTES
Activities        TA     Gait Training          GT     Neuro Re-education NR     Modalities     Non-Billable Service Time 5 0   Other     Total Time/Units 35 2     Electronically signed by:  Mg Mills DPT

## 2020-08-25 ENCOUNTER — TELEPHONE (OUTPATIENT)
Dept: NEUROSURGERY | Age: 65
End: 2020-08-25

## 2020-08-25 ENCOUNTER — OFFICE VISIT (OUTPATIENT)
Dept: ENT CLINIC | Age: 65
End: 2020-08-25

## 2020-08-25 VITALS — BODY MASS INDEX: 37.56 KG/M2 | HEIGHT: 64 IN | TEMPERATURE: 99.2 F | WEIGHT: 220 LBS

## 2020-08-25 PROCEDURE — 99213 OFFICE O/P EST LOW 20 MIN: CPT | Performed by: OTOLARYNGOLOGY

## 2020-08-25 ASSESSMENT — ENCOUNTER SYMPTOMS
ABDOMINAL PAIN: 0
COLOR CHANGE: 0
EYES NEGATIVE: 1
SHORTNESS OF BREATH: 0
RESPIRATORY NEGATIVE: 1
GASTROINTESTINAL NEGATIVE: 1

## 2020-08-25 NOTE — PROGRESS NOTES
"Oncology Rooming Note    March 28, 2018 12:55 PM   Orlin Alcantar is a 70 year old male who presents for:    Chief Complaint   Patient presents with     Blood Draw     labs drawn by venipuncture by rn.  vs taken.     Oncology Clinic Visit     Return: Bladder CA     Initial Vitals: /80 (BP Location: Right arm, Patient Position: Sitting, Cuff Size: Adult Regular)  Pulse 110  Temp 98.3  F (36.8  C) (Oral)  Resp 16  Wt 73 kg (160 lb 14.4 oz)  SpO2 99%  BMI 21.82 kg/m2 Estimated body mass index is 21.82 kg/(m^2) as calculated from the following:    Height as of 3/26/18: 1.829 m (6' 0.01\").    Weight as of this encounter: 73 kg (160 lb 14.4 oz). Body surface area is 1.93 meters squared.  No Pain (0) Comment: Data Unavailable   No LMP for male patient.  Allergies reviewed: Yes  Medications reviewed: Yes    Medications: Medication refills not needed today.  Pharmacy name entered into [facility]:    [facility] - [place], [place] - [address] [facility] - [place], [place] - [facility] [address]  [facility] [place], [place] - [address]    Clinical concerns: new patient to Dr. Teddy Espinal was NOT notified.    10 minutes for nursing intake (face to face time)     Elizabeth Norman CMA              " Subjective:      Patient ID:  Angelito Doherty is a 63 y.o. female. HPI:    Patient presents today for lesion of the tongue. Condition has been present for 3 month(s). Pt states the lesion is still present and somewhat bothersome. Pt has previous history of tongue CA in situ completely excised by Dr. Earline Leiva in Dec 2019. She notes that the lesion has not progressed but that it does irritate her when she eats. Past Medical History:   Diagnosis Date    Cancer ([facility] [address] 12/2019    LESION REMOVED UNDER TONGUE  DENTAL CLINIC    Chronic back pain     Costochondritis     Depression     Diet-controlled type 2 diabetes mellitus ([facility] [address]     Falls frequently     none recent as of 2/19/19    Fibromyalgia     Hallux rigidus of left foot     HTN (hypertension)     Hyperlipidemia     CLYDE on CPAP     SETTING ?    Osteoarthritis     \"everywhere\"    Rheumatoid arthritis ([facility] [address]     \"As a child. \"    Rheumatoid arthritis(714.0)     TIA (transient ischemic attack)     Use of cane as ambulatory aid      Past Surgical History:   Procedure Laterality Date    ANESTHESIA NERVE BLOCK Left 2/26/2019    LEFT C3,4,5 MBB performed by Seth Cuellar MD at [address] Right 8/12/2019    BILATERAL TRANSFORAMINAL EPIDURAL STEROID INJECTION S1 #3 performed by Seth Cuellar MD at [address] [facility] [address] Right 6/16/2020    RIGHT SACROILIAC JOINT INJECTION      CPT: 47717 performed by Barron Lowe DO at [address]  2005    Left    ARTHRODESIS Left 12/14/2018    ARTHRODESIS 2ND DIGIT LEFT FOOT performed by Grzegorz Mead DPM at [address] [place] [address]  08/16/2017    PLIF L3-L4, L4-L5 with rods, screws, and cages/Dr. Chiquita Felder BACK SURGERY  03/04/2020    BREAST SURGERY  2010    reduction, bilat   [address] CHOLECYSTECTOMY  2011    COLONOSCOPY  03/27/2015    COLONOSCOPY N/A 8/19/2020    COLONOSCOPY DIAGNOSTIC performed by Patrick Shell MD at [facility] ENDOSCOPY    ENDOSCOPY, COLON, DIAGNOSTIC      EPIDURAL STEROID INJECTION N/A 6/4/2019    BILATERAL TRANSFORAMINAL EPIDURAL STEROID INJECTION S1 performed by Walter Silver DO at [address]  2005    Left    HARDWARE REMOVAL FOOT / ANKLE Left 12/14/2018    REMOVAL OF PAINFUL HARDWARE LEFT FOOT  ++SYNTHES++ performed by Jaymes Rinne, DPM at [facility]    inguinal     LUMBAR SPINE SURGERY N/A 3/4/2020    EXPLORATION OF PRIOR L3-L5 FUSION AND L2-L3  POSTERIOR LUMBAR INTERBODY FUSION -- NEEDS O-ARM, AUDIOLOGY, CAGES, PLATES, SCREWS, C-ARM, TERESA TABLE, CELL SAVER, PLATELET GEL -- GLOBUS performed by Oswaldo Elam MD at [facility] Bilateral 05/07/2018    L1-2 lumbar foramen #1    NERVE BLOCK Bilateral 12/03/2018    s1 tfesi    NERVE BLOCK Bilateral 08/12/2019    NERVE BLOCK Right 09/30/2019    sacral radiofrequency    OTHER SURGICAL HISTORY Left 9/25/13    left foot tarso metatarsal joint injection    OTHER SURGICAL HISTORY Left 5/27/15    Endoscopic Gastroc recession left, Lapidus left foot and  Excision of exostosis left foot    FL INJECT ANES/STEROID FORAMEN LUMBAR/SACRAL W IMG GUIDE ,1 LEVEL Bilateral 12/3/2018    BILATERAL S1  EPIDURAL STEROID INJECTION performed by Gabriel Webb MD at [address] [facility] DX/THER SBST INTRLMNR LMBR/SAC W/IMG GDN N/A 8/21/2018    EPIDURAL STEROID INJECTION L1-2 WITH LOW VOL performed by Gabriel Webb MD at [address] [facility] NOSE/CLEFT LIP/TIP Bilateral 5/7/2018    BILATERAL L1-2 LUMBAR FORAMEN #1 performed by Gabriel Webb MD at [address] [facility] NOSE/CLEFT LIP/TIP Bilateral 6/4/2018    BILATERAL TRANSFORAMINAL EPIDURAL STEROID INJECTION UNDER FLUOROSCOPIC GUIDANCE @ FORAMINAL LEVEL L1-2 #2 performed by Gabriel Webb MD at [address] Right 9/30/2019    RIGHT SACRAL RADIOFREQUENCY ABLATION performed by Gabriel Webb MD at [address] [facility]  2000, 2005    Big Left Toe    TOE SURGERY Left 2018    2nd toe     TONSILLECTOMY      WISDOM TOOTH EXTRACTION       Family History   Problem Relation Age of Onset    Dementia Mother     Breast Cancer Mother     Cancer Mother         breast cancer [de-identified]     Stroke Mother     Heart Attack Father     Other Father 80        Aortic aneurysm    Cancer Brother      Social History     Socioeconomic History    Marital status:      Spouse name: None    Number of children: None    Years of education: None    Highest education level: None   Occupational History    None   Social Needs    Financial resource strain: None    Food insecurity     Worry: None     Inability: None    Transportation needs     Medical: None     Non-medical: None   Tobacco Use    Smoking status: Current Every Day Smoker     Packs/day: 0.50     Years: 30.00     Pack years: 15.00     Types: Cigarettes    Smokeless tobacco: Never Used    Tobacco comment: was going to try to stop but chantix is too expensive   Substance and Sexual Activity    Alcohol use: Yes     Alcohol/week: 0.0 standard drinks     Comment: rarely    Drug use: No    Sexual activity: Not Currently   Lifestyle    Physical activity     Days per week: None     Minutes per session: None    Stress: None   Relationships    Social connections     Talks on phone: None     Gets together: None     Attends [employer] witness service: None     Active member of club or organization: None     Attends meetings of clubs or organizations: None     Relationship status: None    Intimate partner violence     Fear of current or ex partner: None     Emotionally abused: None     Physically abused: None     Forced sexual activity: None   Other Topics Concern    None   Social History Narrative    None     Allergies   Allergen Reactions    Pcn [Penicillins] Anaphylaxis       Review of Systems   Constitutional: Negative. Negative for appetite change. HENT: Positive for mouth sores.     Eyes: Negative. Negative for pain, discharge and visual disturbance. Respiratory: Negative. Negative for apnea, chest tightness and shortness of breath. Cardiovascular: Negative. Negative for chest pain, palpitations and leg swelling. Gastrointestinal: Negative. Negative for abdominal pain, diarrhea and vomiting. Endocrine: Negative for cold intolerance, heat intolerance and polydipsia. Genitourinary: Negative. Negative for dysuria, flank pain and hematuria. Musculoskeletal: Negative. Negative for arthralgias, gait problem and neck pain. Skin: Negative. Negative for color change, pallor and rash. Allergic/Immunologic: Negative for environmental allergies, food allergies and immunocompromised state. Neurological: Negative. Negative for dizziness, numbness and headaches. Hematological: Negative for adenopathy. Psychiatric/Behavioral: Negative. Negative for behavioral problems and hallucinations. All other systems reviewed and are negative. Objective:     Vitals:    08/25/20 1023   Temp: 99.2 °F (37.3 °C)     Physical Exam  Vitals signs and nursing note reviewed. Constitutional:       Appearance: She is well-developed. HENT:      Head: Normocephalic and atraumatic. Right Ear: Hearing, tympanic membrane, ear canal and external ear normal.      Left Ear: Hearing, tympanic membrane, ear canal and external ear normal.      Nose: Nose normal.      Mouth/Throat:      Tongue: Lesions present. Comments: Left side under the tongue there is a 0.5cm lesion, raised, white  Eyes:      Conjunctiva/sclera: Conjunctivae normal.      Pupils: Pupils are equal, round, and reactive to light. Neck:      Musculoskeletal: Normal range of motion and neck supple. Cardiovascular:      Rate and Rhythm: Normal rate and regular rhythm. Heart sounds: Normal heart sounds. Pulmonary:      Effort: Pulmonary effort is normal. No respiratory distress.       Breath sounds: Normal breath sounds. Abdominal:      General: Bowel sounds are normal. There is no distension. Palpations: Abdomen is soft. Tenderness: There is no abdominal tenderness. There is no guarding. Musculoskeletal: Normal range of motion. Skin:     General: Skin is warm and dry. Neurological:      Mental Status: She is alert and oriented to person, place, and time. Psychiatric:         Behavior: Behavior normal.         Thought Content: Thought content normal.         Judgment: Judgment normal.                 Assessment:       Diagnosis Orders   1. Lesion of tongue     2. 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 I have seen and examined the patient and the key elements of the encounter have been performed by me. I agree with the assessment, plan and orders as documented by the  resident              Remainder of medical problems as per  resident note. Patient seen and examined. Agree with above exam, assessment and plan.       Electronically signed by David Hutchinson DO on 8/28/20 at 12:55 PM EDT

## 2020-08-25 NOTE — TELEPHONE ENCOUNTER
Giovanniace Faye called in stating she has some questions about PT and injections she would like to discuss with you about. She is smoking again so before her next surgery she will have to quit smoking again.  Pts #[phone]

## 2020-08-26 ENCOUNTER — HOSPITAL ENCOUNTER (OUTPATIENT)
Dept: PHYSICAL THERAPY | Age: 65
Setting detail: THERAPIES SERIES
Discharge: HOME | End: 2020-08-26

## 2020-08-26 PROCEDURE — 97110 THERAPEUTIC EXERCISES: CPT | Performed by: PHYSICAL THERAPIST

## 2020-08-26 NOTE — PROGRESS NOTES
[facility]  Phone: [phone] Fax: [phone]       Physical Therapy Daily Treatment Note  Date:  2020    Patient Name:  Brittany Hui    :  1955  MRN: [id number]    Restrictions/Precautions:    Diagnosis:  spinal stenosis of lumbar region with neurogenic claudication  Treatment Diagnosis:    Insurance/Certification information:  Self   Referring Physician:  Olivia Fox of care signed (Y/N):    Visit# / total visits:  3/  Pain level: /10  Time In:    1300   Time Out:   1330     Subjective:  Patient presents to PT for two treatment sessions this week. Pt reports cont pain across mid back region and R anterior hip    Exercises:  Exercise/Equipment Resistance/Repetitions Other comments             tball flex          rot   10x10s ea  10x10s ea         Bridge  2x5    Pelvic tilts    2x10            SLR      x10ea             Sit to stand    x5 Focusing on proper technique to improve core stabilization and reduce overall pain with movement                                                       Other Therapeutic Activities:      Home Exercise Program:      Manual Treatments:      Modalities:  NT    Timed Code Treatment Minutes: Total Treatment Minutes:      Treatment/Activity Tolerance:  [] Patient tolerated treatment well [] Patient limited by fatigue  [x] Patient limited by pain  [] Patient limited by other medical complications  [] Other: performed there ex focusing on trunk ROM/strengthening. Limited strength remains across core region making functional activities difficult. Pain remains across back with all there ex.    Antalgic gait present today following therapy     Plan:   [x] Continue per plan of care [] Alter current plan (see comments)  [] Plan of care initiated [] Hold pending MD visit [] Discharge  Plan for Next Session:         Treatment Charges: Mins Units   Initial Evaluation     Re-Evaluation     Ther Exercise         TE 30 2   Manual Therapy     MT Ther Activities        TA     Gait Training          GT     Neuro Re-education NR     Modalities     Non-Billable Service Time  0   Other     Total Time/Units 30 2     Electronically signed by:  Uyen Benitez DPT

## 2020-08-28 ENCOUNTER — TELEPHONE (OUTPATIENT)
Dept: NEUROSURGERY | Age: 65
End: 2020-08-28

## 2020-08-28 ASSESSMENT — ENCOUNTER SYMPTOMS
DIARRHEA: 0
APNEA: 0
EYE DISCHARGE: 0
VOMITING: 0
EYE PAIN: 0
CHEST TIGHTNESS: 0

## 2020-08-28 NOTE — TELEPHONE ENCOUNTER
Patient is to go for second SI joint injection. Call/return to our office after injection to discuss pain relief. Continue PT at this time.

## 2020-08-31 ENCOUNTER — HOSPITAL ENCOUNTER (OUTPATIENT)
Dept: PHYSICAL THERAPY | Age: 65
Setting detail: THERAPIES SERIES
Discharge: HOME | End: 2020-08-31

## 2020-08-31 PROCEDURE — 97110 THERAPEUTIC EXERCISES: CPT | Performed by: PHYSICAL THERAPIST

## 2020-08-31 NOTE — PROGRESS NOTES
[facility]  INSTRUCTIONS    . ..........................................................................................................................................       ? Parking the day of Surgery is located in the [facility]. Upon entering the door, make an immediate right to the surgery reception desk    ? Bring photo ID and insurance card     ? You may have a light breakfast day of procedure    ? Wear loose comfortable clothing    ? Please follow instructions for medications as given per Dr's office     ? Stop blood thinners as per Dr's office instructions    ? You can expect a call the business day prior to procedure to notify you if your arrival time changes    ? Please arrange for     ?   Other instructions

## 2020-08-31 NOTE — PROGRESS NOTES
[facility]  Phone: [phone] Fax: [phone]       Physical Therapy Daily Treatment Note  Date:  2020    Patient Name:  Kirsten Tinajero    :  1955  MRN: [id number]    Restrictions/Precautions:    Diagnosis:  spinal stenosis of lumbar region with neurogenic claudication  Treatment Diagnosis:    Insurance/Certification information:  Self   Referring Physician:  Meli Maloney of care signed (Y/N):    Visit# / total visits:  4/  Pain level: /10  Time In:    1300   Time Out:   1335     Subjective:  Patient presents to PT for third treatment sessions this week. Pt reports cont pain across mid/low back region. She is going to have injections tomorrow. Exercises:  Exercise/Equipment Resistance/Repetitions Other comments             tball flex          rot   10x10s ea  10x10s ea    Seated on ball  x5mins  Focusing on upright posturing- core strengthening         Bridge  2x5    Pelvic tilts    2x10            SLR      x10ea             Sit to stand    x10 Focusing on proper technique to improve core stabilization and reduce overall pain with movement                                                       Other Therapeutic Activities:      Home Exercise Program:      Manual Treatments:      Modalities:  NT    Timed Code Treatment Minutes: Total Treatment Minutes:      Treatment/Activity Tolerance:  [] Patient tolerated treatment well [] Patient limited by fatigue  [x] Patient limited by pain  [] Patient limited by other medical complications  [] Other: performed there ex focusing on trunk ROM/strengthening. Limited strength remains across core region making functional activities difficult. Pain cont to increase especially with rotation movements of the back.       Plan:   [x] Continue per plan of care [] Alter current plan (see comments)  [] Plan of care initiated [] Hold pending MD visit [] Discharge  Plan for Next Session:         Treatment Charges: Mins Units   Initial Evaluation     Re-Evaluation     Ther Exercise         TE 35 2   Manual Therapy     MT     Ther Activities        TA     Gait Training          GT     Neuro Re-education NR     Modalities     Non-Billable Service Time  0   Other     Total Time/Units 35 2     Electronically signed by:  Karyn BELLT

## 2020-08-31 NOTE — PROGRESS NOTES
Have you been tested for COVID  Yes           Have you been told you were positive for COVID No  Have you had any known exposure to someone that is positive for COVID No  Do you have a cough                   No              Do you have shortness of breath No                 Do you have a sore throat            No                Are you having chills                    No                Are you having muscle aches. No                    Please come to the hospital wearing a mask and have your significant other wear a mask as well. Both of you should check your temperature before leaving to come here,  if it is 100 or higher please call [phone] for instruction.

## 2020-09-01 ENCOUNTER — HOSPITAL ENCOUNTER (OUTPATIENT)
Age: 65
Setting detail: OUTPATIENT SURGERY
Discharge: HOME | End: 2020-09-01
Attending: PAIN MEDICINE | Admitting: PAIN MEDICINE

## 2020-09-01 ENCOUNTER — HOSPITAL ENCOUNTER (OUTPATIENT)
Dept: GENERAL RADIOLOGY | Age: 65
Discharge: HOME | End: 2020-09-03
Attending: PAIN MEDICINE

## 2020-09-01 VITALS
SYSTOLIC BLOOD PRESSURE: 156 MMHG | WEIGHT: 215 LBS | BODY MASS INDEX: 35.82 KG/M2 | RESPIRATION RATE: 16 BRPM | TEMPERATURE: 98 F | HEART RATE: 74 BPM | DIASTOLIC BLOOD PRESSURE: 70 MMHG | OXYGEN SATURATION: 97 % | HEIGHT: 65 IN

## 2020-09-01 PROCEDURE — 27096 INJECT SACROILIAC JOINT: CPT | Performed by: PAIN MEDICINE

## 2020-09-01 PROCEDURE — 2500000003 HC RX 250 WO HCPCS: Performed by: PAIN MEDICINE

## 2020-09-01 PROCEDURE — 3209999900 FLUORO FOR SURGICAL PROCEDURES

## 2020-09-01 PROCEDURE — 7100000011 HC PHASE II RECOVERY - ADDTL 15 MIN: Performed by: PAIN MEDICINE

## 2020-09-01 PROCEDURE — 3600000002 HC SURGERY LEVEL 2 BASE: Performed by: PAIN MEDICINE

## 2020-09-01 PROCEDURE — 6360000002 HC RX W HCPCS: Performed by: PAIN MEDICINE

## 2020-09-01 PROCEDURE — 2709999900 HC NON-CHARGEABLE SUPPLY: Performed by: PAIN MEDICINE

## 2020-09-01 PROCEDURE — 6360000004 HC RX CONTRAST MEDICATION: Performed by: PAIN MEDICINE

## 2020-09-01 PROCEDURE — 7100000010 HC PHASE II RECOVERY - FIRST 15 MIN: Performed by: PAIN MEDICINE

## 2020-09-01 RX ORDER — LIDOCAINE HYDROCHLORIDE 5 MG/ML
INJECTION, SOLUTION INFILTRATION; INTRAVENOUS PRN
Status: DISCONTINUED | OUTPATIENT
Start: 2020-09-01 | End: 2020-09-01 | Stop reason: ALTCHOICE

## 2020-09-01 RX ORDER — METHYLPREDNISOLONE ACETATE 40 MG/ML
INJECTION, SUSPENSION INTRA-ARTICULAR; INTRALESIONAL; INTRAMUSCULAR; SOFT TISSUE PRN
Status: DISCONTINUED | OUTPATIENT
Start: 2020-09-01 | End: 2020-09-01 | Stop reason: ALTCHOICE

## 2020-09-01 RX ORDER — BUPIVACAINE HYDROCHLORIDE 2.5 MG/ML
INJECTION, SOLUTION EPIDURAL; INFILTRATION; INTRACAUDAL PRN
Status: DISCONTINUED | OUTPATIENT
Start: 2020-09-01 | End: 2020-09-01 | Stop reason: ALTCHOICE

## 2020-09-01 ASSESSMENT — PAIN SCALES - GENERAL
PAINLEVEL_OUTOF10: 8

## 2020-09-01 ASSESSMENT — PAIN DESCRIPTION - ORIENTATION
ORIENTATION: RIGHT;LOWER

## 2020-09-01 ASSESSMENT — PAIN DESCRIPTION - PAIN TYPE
TYPE: CHRONIC PAIN

## 2020-09-01 ASSESSMENT — PAIN DESCRIPTION - LOCATION
LOCATION: BACK

## 2020-09-01 ASSESSMENT — PAIN DESCRIPTION - DESCRIPTORS
DESCRIPTORS: ACHING;BURNING

## 2020-09-01 ASSESSMENT — PAIN DESCRIPTION - DIRECTION: RADIATING_TOWARDS: RIGHT KNEE

## 2020-09-01 ASSESSMENT — PAIN - FUNCTIONAL ASSESSMENT: PAIN_FUNCTIONAL_ASSESSMENT: 0-10

## 2020-09-01 NOTE — OP NOTE
The joint space was appropriately outlined. Then, after negative aspiration, a solution consisiting of 0.25% marcaine 2 cc and 40 mg DepoMedrol was easily injected. The needle was then removed and the needle insertion site was covered with Band-Aid. Disposition the patient tolerated the procedure well and there were no complications . Vital signs remained stable throughout the procedure. The patient was escorted to the recovery area where they remained until discharge and written discharge instructions for the procedure were given. Plan: Shyla Hoff will return to our pain management center as scheduled.      Robel Mccormick DO

## 2020-09-01 NOTE — H&P
MINA DAMIAN [facility] - BEHAVIORAL HEALTH SERVICES Pain Management        [address] [facility], [address]  Dept: [phone]    Procedure History & Physical      Rashel Martini     HPI:    Patient  is here for right LBP for right SIJ injection  Labs/imaging studies reviewed   All question and concerns addressed including R/B/A associated with the procedure    Past Medical History:   Diagnosis Date    Cancer ([facility] [address] 2019    LESION REMOVED UNDER TONGUE  DENTAL CLINIC    Chronic back pain     Costochondritis     Depression     Diet-controlled type 2 diabetes mellitus ([facility] [address] [facility]     Falls frequently     none recent as of 19    Fibromyalgia     Hallux rigidus of left foot     HTN (hypertension)     Hyperlipidemia     CLYDE on CPAP     SETTING ?    Osteoarthritis     \"everywhere\"    Rheumatoid arthritis ([facility] [address]     \"As a child. \"    Rheumatoid arthritis(714.0)     TIA (transient ischemic attack)     Use of cane as ambulatory aid        Past Surgical History:   Procedure Laterality Date    ANESTHESIA NERVE BLOCK Left 2019    LEFT C3,4,5 MBB performed by Lexy Posada MD at [facility] Right 2019    BILATERAL TRANSFORAMINAL EPIDURAL STEROID INJECTION S1 #3 performed by Lexy Posada MD at [address] Right 2020    RIGHT SACROILIAC JOINT INJECTION      CPT: 27077 performed by Gilda Morton DO at [address]      Left    ARTHRODESIS Left 2018    ARTHRODESIS 2ND DIGIT LEFT FOOT performed by Maira Anderson DPM at [address] [place] [address]  2017    PLIF L3-L4, L4-L5 with rods, screws, and cages/Dr. Emmie Ibarra BACK SURGERY  2020    BREAST SURGERY  2010    reduction, bilat     SECTION  1993    CHOLECYSTECTOMY      COLONOSCOPY  2015    COLONOSCOPY N/A 2020    COLONOSCOPY DIAGNOSTIC performed by Brenda Trevino MD at [address] [facility], COLON, DIAGNOSTIC      EPIDURAL STEROID INJECTION N/A 2019 BILATERAL TRANSFORAMINAL EPIDURAL STEROID INJECTION S1 performed by Kaley Putnam DO at [address]  2005    Left    HARDWARE REMOVAL FOOT / ANKLE Left 12/14/2018    REMOVAL OF PAINFUL HARDWARE LEFT FOOT  ++SYNTHES++ performed by Maribel Murphy DPM at [facility]    inguinal     LUMBAR SPINE SURGERY N/A 3/4/2020    EXPLORATION OF PRIOR L3-L5 FUSION AND L2-L3  POSTERIOR LUMBAR INTERBODY FUSION -- NEEDS O-ARM, AUDIOLOGY, CAGES, PLATES, SCREWS, C-ARM, TERESA TABLE, CELL SAVER, PLATELET GEL -- GLOBUS performed by Antonieta Ba MD at [address] Bilateral 05/07/2018    L1-2 lumbar foramen #1    NERVE BLOCK Bilateral 12/03/2018    s1 tfesi    NERVE BLOCK Bilateral 08/12/2019    NERVE BLOCK Right 09/30/2019    sacral radiofrequency    OTHER SURGICAL HISTORY Left 9/25/13    left foot tarso metatarsal joint injection    OTHER SURGICAL HISTORY Left 5/27/15    Endoscopic Gastroc recession left, Lapidus left foot and  Excision of exostosis left foot    [place] INJECT ANES/STEROID FORAMEN LUMBAR/SACRAL W IMG GUIDE ,1 LEVEL Bilateral 12/3/2018    BILATERAL S1  EPIDURAL STEROID INJECTION performed by Gabriella Neves MD at [address] [facility] DX/THER SBST INTRLMNR LMBR/SAC W/IMG GDN N/A 8/21/2018    EPIDURAL STEROID INJECTION L1-2 WITH LOW VOL performed by Gabriella Neves MD at [address] [facility] NOSE/CLEFT LIP/TIP Bilateral 5/7/2018    BILATERAL L1-2 LUMBAR FORAMEN #1 performed by Gabriella Neves MD at [address] [facility] NOSE/CLEFT LIP/TIP Bilateral 6/4/2018    BILATERAL TRANSFORAMINAL EPIDURAL STEROID INJECTION UNDER FLUOROSCOPIC GUIDANCE @ FORAMINAL LEVEL L1-2 #2 performed by Gabriella Neves MD at [address] Right 9/30/2019    RIGHT SACRAL RADIOFREQUENCY ABLATION performed by Gabriella Neves MD at [address]  [address], 2005    Big Left Toe    TOE SURGERY Left 2018    2nd toe     TONSILLECTOMY      JESSICADOM TOOTH EXTRACTION         Prior to Admission medications    Medication Sig Start Date End Date Taking? Authorizing Provider   HYDROcodone-acetaminophen (NORCO) 5-325 MG per tablet Take 1 tablet by mouth 3 times daily for 30 days. Intended supply: 3 days. Take lowest dose possible to manage pain 8/16/20 9/15/20 Yes TREASURE Machado   atorvastatin (LIPITOR) 40 MG tablet Take 1 tablet by mouth daily 6/12/20  Yes Mallory Mckee MD   lisinopril (PRINIVIL;ZESTRIL) 30 MG tablet Take 1 tablet by mouth every evening 6/12/20  Yes Mallory Mckee MD   FLUoxetine (PROZAC) 40 MG capsule Take 1 capsule by mouth daily For mood. 6/12/20  Yes Mallory Mckee MD   gabapentin (NEURONTIN) 400 MG capsule Take 1 capsule by mouth 3 times daily for 30 days. 3/19/20 8/31/20 Yes Yoly Thompson MD   hydrOXYzine (VISTARIL) 25 MG capsule Take 1 capsule by mouth 3 times daily as needed for Anxiety 10/2/19  Yes Mallory Mckee MD       Allergies   Allergen Reactions    Pcn [Penicillins] Anaphylaxis       Social History     Socioeconomic History    Marital status:      Spouse name: Not on file    Number of children: Not on file    Years of education: Not on file    Highest education level: Not on file   Occupational History    Not on file   Social Needs    Financial resource strain: Not on file    Food insecurity     Worry: Not on file     Inability: Not on file    Transportation needs     Medical: Not on file     Non-medical: Not on file   Tobacco Use    Smoking status: Current Every Day Smoker     Packs/day: 0.50     Years: 30.00     Pack years: 15.00     Types: Cigarettes    Smokeless tobacco: Never Used    Tobacco comment: was going to try to stop but chantix is too expensive   Substance and Sexual Activity    Alcohol use:  Yes     Alcohol/week: 0.0 standard drinks     Comment: rarely    Drug use: No    Sexual activity: Not on file   Lifestyle    Physical activity     Days per week: Not on file     Minutes per session: Not on file    Stress: Not on file   Relationships    Social connections     Talks on phone: Not on file     Gets together: Not on file     Attends Evangelical service: Not on file     Active member of club or organization: Not on file     Attends meetings of clubs or organizations: Not on file     Relationship status: Not on file    Intimate partner violence     Fear of current or ex partner: Not on file     Emotionally abused: Not on file     Physically abused: Not on file     Forced sexual activity: Not on file   Other Topics Concern    Not on file   Social History Narrative    Not on file       Family History   Problem Relation Age of Onset    Dementia Mother     Breast Cancer Mother     Cancer Mother         breast cancer [de-identified]     Stroke Mother     Heart Attack Father     Other Father 80        Aortic aneurysm    Cancer Brother          REVIEW OF SYSTEMS:    CONSTITUTIONAL:  negative for  fevers, chills, sweats and fatigue    RESPIRATORY:  negative for  dry cough, cough with sputum, dyspnea, wheezing and chest pain    CARDIOVASCULAR:  negative for chest pain, dyspnea, palpitations, syncope    GASTROINTESTINAL:  negative for nausea, vomiting, change in bowel habits, diarrhea, constipation and abdominal pain    MUSCULOSKELETAL: negative for muscle weakness    SKIN: negative for itching or rashes.     BEHAVIOR/PSYCH:  negative for poor appetite, increased appetite, decreased sleep and poor concentration    All other systems negative      PHYSICAL EXAM:    VITALS:  BP (!) 197/84   Pulse 79   Temp 98 °F (36.7 °C) (Temporal)   Resp 16   Ht 5' 5\" (1.651 m)   Wt 215 lb (97.5 kg)   LMP  (LMP Unknown)   SpO2 95%   BMI 35.78 kg/m²     CONSTITUTIONAL:  awake, alert, cooperative, no apparent distress, and appears stated age    EYES: PERRLA, EOMI    LUNGS:  No increased work of breathing, no audible wheezing    CARDIOVASCULAR:  regular rate and rhythm    ABDOMEN:  Soft non

## 2020-09-04 ENCOUNTER — HOSPITAL ENCOUNTER (OUTPATIENT)
Dept: PHYSICAL THERAPY | Age: 65
Setting detail: THERAPIES SERIES
Discharge: HOME | End: 2020-09-04

## 2020-09-04 PROCEDURE — 97110 THERAPEUTIC EXERCISES: CPT | Performed by: PHYSICAL THERAPIST

## 2020-09-04 NOTE — PROGRESS NOTES
[facility]  Phone: [phone] Fax: [phone]       Physical Therapy Daily Treatment Note  Date:  2020    Patient Name:  Ardeen Moritz    :  1955  MRN: [id number]    Restrictions/Precautions:    Diagnosis:  spinal stenosis of lumbar region with neurogenic claudication  Treatment Diagnosis:    Insurance/Certification information:  Self   Referring Physician:  Catie Sanford of care signed (Y/N):    Visit# / total visits:  6/  Pain level: /10  Time In:    1300   Time Out:   1330     Subjective:  Patient presents to PT for fifth treatment session. . Pt reports cont to have symptoms across back region with radiating pain across lateral hip into groin. She admits to feeling somewhat better however states symptoms remain controlled with medication. Exercises:  Exercise/Equipment Resistance/Repetitions Other comments             tball flex          rot   10x10s ea  10x10s ea    Seated on ball  x5mins  Focusing on upright posturing- core strengthening         Bridge  2x5    Pelvic tilts    2x10            SLR      x10ea             Sit to stand    x10 Focusing on proper technique to improve core stabilization and reduce overall pain with movement                                                       Other Therapeutic Activities:      Home Exercise Program:      Manual Treatments:      Modalities:  NT    Timed Code Treatment Minutes: Total Treatment Minutes:      Treatment/Activity Tolerance:  [] Patient tolerated treatment well [] Patient limited by fatigue  [x] Patient limited by pain  [] Patient limited by other medical complications  [] Other: performed there ex focusing on trunk ROM/strengthening. Better tolerance of there ex today. No c/o increased pain with exercises. Pt sat with erect posturing on ball with no issues. Overall better upright posturing both sit/stand with less fwd trunk/rounded shoulder posturing.    Plan:   [x] Continue per plan of care [] Sophia Cole current plan (see comments)  [] Plan of care initiated [] Hold pending MD visit [] Discharge  Plan for Next Session:         Treatment Charges: Mins Units   Initial Evaluation     Re-Evaluation     Ther Exercise         TE 30 2   Manual Therapy     MT     Ther Activities        TA     Gait Training          GT     Neuro Re-education NR     Modalities     Non-Billable Service Time  0   Other     Total Time/Units 30 2     Electronically signed by:  Clayton Tellez DPT

## 2020-09-08 ENCOUNTER — HOSPITAL ENCOUNTER (OUTPATIENT)
Dept: PHYSICAL THERAPY | Age: 65
Setting detail: THERAPIES SERIES
Discharge: HOME | End: 2020-09-08

## 2020-09-08 PROCEDURE — 97110 THERAPEUTIC EXERCISES: CPT | Performed by: PHYSICAL THERAPIST

## 2020-09-08 NOTE — PROGRESS NOTES
[facility]  Phone: [phone] Fax: [phone]       Physical Therapy Daily Treatment Note  Date:  2020    Patient Name:  Mallory Campuzano    :  1955  MRN: [id number]    Restrictions/Precautions:    Diagnosis:  spinal stenosis of lumbar region with neurogenic claudication  Treatment Diagnosis:    Insurance/Certification information:  Self   Referring Physician:  Dario Screen of care signed (Y/N):    Visit# / total visits:  7/  Pain level: /10  Time In:    1300  Time Out:   1325     Subjective:  Patient presents to PT for sixth treatment session. . Pt reports she has noticed some improvement in core strength/posturing with therapy however cont to have ongoing pain to SI region   Exercises:  Exercise/Equipment Resistance/Repetitions Other comments             tball flex          rot   10x10s ea  10x10s ea    Seated on ball  x5mins  Focusing on upright posturing- core strengthening         Bridge  2x5    Pelvic tilts    2x10            SLR      x10ea             Sit to stand    x10 Focusing on proper technique to improve core stabilization and reduce overall pain with movement                                                       Other Therapeutic Activities:      Home Exercise Program:      Manual Treatments:      Modalities:  NT    Timed Code Treatment Minutes: Total Treatment Minutes:      Treatment/Activity Tolerance:  [] Patient tolerated treatment well [] Patient limited by fatigue  [x] Patient limited by pain  [] Patient limited by other medical complications  [] Other: performed there ex focusing on trunk ROM/strengthening. Pt cont to have pain across lower back region with there ex. Pt cont ot exhibit better upright posturing with standing/walking.      Plan:   [x] Continue per plan of care [] Alter current plan (see comments)  [] Plan of care initiated [] Hold pending MD visit [] Discharge  Plan for Next Session:         Treatment Charges: Mins Units   Initial Evaluation     Re-Evaluation     Ther Exercise         TE 25 2   Manual Therapy     MT     Ther Activities        TA     Gait Training          GT     Neuro Re-education NR     Modalities     Non-Billable Service Time  0   Other     Total Time/Units 25 2     Electronically signed by:  Randy Erickson DPT

## 2020-09-10 ENCOUNTER — HOSPITAL ENCOUNTER (OUTPATIENT)
Dept: PHYSICAL THERAPY | Age: 65
Setting detail: THERAPIES SERIES
Discharge: HOME | End: 2020-09-10

## 2020-09-10 PROCEDURE — 97110 THERAPEUTIC EXERCISES: CPT | Performed by: PHYSICAL THERAPIST

## 2020-09-10 NOTE — PROGRESS NOTES
[facility]  Phone: [phone] Fax: [phone]       Physical Therapy Daily Treatment Note  Date:  9/10/2020    Patient Name:  Danette Sears    :  1955  MRN: [id number]    Restrictions/Precautions:    Diagnosis:  spinal stenosis of lumbar region with neurogenic claudication  Treatment Diagnosis:    Insurance/Certification information:  Self   Referring Physician:  Ricarda Severs of care signed (Y/N):    Visit# / total visits:  8/  Pain level: /10  Time In:    1130  Time Out:   1155     Subjective:  Patient presents to PT for seventh treatment session. . Pt reports she cont to have symptoms across R SI region that radiates across anterior pelvic region. Exercises:  Exercise/Equipment Resistance/Repetitions Other comments             tball flex          rot   10x10s ea  10x10s ea    Seated on ball  x5mins  Focusing on upright posturing- core strengthening         Bridge  2x5    Pelvic tilts    2x10            SLR      x10ea             Sit to stand    x10 Focusing on proper technique to improve core stabilization and reduce overall pain with movement                                                       Other Therapeutic Activities:      Home Exercise Program:      Manual Treatments:      Modalities:  NT    Timed Code Treatment Minutes: Total Treatment Minutes:      Treatment/Activity Tolerance:  [] Patient tolerated treatment well [] Patient limited by fatigue  [x] Patient limited by pain  [] Patient limited by other medical complications  [] Other: performed there ex focusing on trunk ROM/strengthening. Pt demonstrates independence with HEP.  Patient recommends pt cont with HEP daily and to follow up with NS      Plan:   [x] Continue per plan of care [] Alter current plan (see comments)  [] Plan of care initiated [] Hold pending MD visit [] Discharge  Plan for Next Session:         Treatment Charges: Mins Units   Initial Evaluation     Re-Evaluation     Ther Exercise TE 25 2   Manual Therapy     MT     Ther Activities        TA     Gait Training          GT     Neuro Re-education NR     Modalities     Non-Billable Service Time  0   Other     Total Time/Units 25 2     Electronically signed by:  Aziza Ventura DPT

## 2020-09-11 ENCOUNTER — OFFICE VISIT (OUTPATIENT)
Dept: FAMILY MEDICINE CLINIC | Age: 65
End: 2020-09-11

## 2020-09-11 VITALS
OXYGEN SATURATION: 98 % | WEIGHT: 212 LBS | BODY MASS INDEX: 36.19 KG/M2 | HEIGHT: 64 IN | SYSTOLIC BLOOD PRESSURE: 138 MMHG | HEART RATE: 88 BPM | DIASTOLIC BLOOD PRESSURE: 76 MMHG | TEMPERATURE: 98.4 F

## 2020-09-11 PROCEDURE — 99214 OFFICE O/P EST MOD 30 MIN: CPT | Performed by: FAMILY MEDICINE

## 2020-09-11 RX ORDER — GABAPENTIN 400 MG/1
400 CAPSULE ORAL 3 TIMES DAILY
Qty: 90 CAPSULE | Refills: 2 | Status: SHIPPED
Start: 2020-09-11 | End: 2021-01-27 | Stop reason: SDUPTHER

## 2020-09-11 RX ORDER — LISINOPRIL 30 MG/1
30 TABLET ORAL EVERY EVENING
Qty: 90 TABLET | Refills: 1 | Status: SHIPPED
Start: 2020-09-11 | End: 2021-04-26 | Stop reason: SDUPTHER

## 2020-09-11 RX ORDER — FLUOXETINE HYDROCHLORIDE 40 MG/1
40 CAPSULE ORAL DAILY
Qty: 90 CAPSULE | Refills: 1 | Status: SHIPPED
Start: 2020-09-11 | End: 2021-04-26 | Stop reason: SDUPTHER

## 2020-09-11 RX ORDER — HYDROXYZINE PAMOATE 25 MG/1
25 CAPSULE ORAL 3 TIMES DAILY PRN
Qty: 270 CAPSULE | Refills: 1 | Status: SHIPPED
Start: 2020-09-11 | End: 2021-08-07 | Stop reason: SDUPTHER

## 2020-09-11 RX ORDER — ATORVASTATIN CALCIUM 40 MG/1
40 TABLET, FILM COATED ORAL DAILY
Qty: 90 TABLET | Refills: 1 | Status: SHIPPED
Start: 2020-09-11 | End: 2021-05-13 | Stop reason: SDUPTHER

## 2020-09-11 RX ORDER — VARENICLINE TARTRATE 1 MG/1
1 TABLET, FILM COATED ORAL 2 TIMES DAILY
Qty: 60 TABLET | Refills: 1 | Status: ON HOLD
Start: 2020-09-11 | End: 2021-03-09

## 2020-09-11 NOTE — PROGRESS NOTES
[facility]  Office Progress Note - Dr. Clif Cortez  9/11/20    CC:   Chief Complaint   Patient presents with    Hypertension        HPI:   Hypertension  Follow-up  Blood pressure is controlled today. BP Readings from Last 3 Encounters:   09/11/20 138/76   09/01/20 (!) 156/70   08/21/20 (!) 159/98     Patient continues medications regularly. Compliance is good. Denies CP, sob, abd pain, headaches, vision changes, dizziness, hypotensive symptoms. No side effects from medications noted. She has been feeling ok emotionally. Steady  Continues prozac  Still problems with son who has aspergers. Worries about him. Trying to find his way. Overall not getting as upset as had bene in past. No SI or Hi. Back pain improved, still present  Considering SI joint fusion. Has injection recently that did help. Following with PMR and NS. Hyperlipidemia  Follow-up  Patient continues lipid-lowering medication. Compliance is good. No side effects noted. No myalgias. Lab Results   Component Value Date    CHOL 204 (H) 01/05/2018    CHOL 207 (H) 12/06/2016    CHOL 203 (H) 08/17/2016     Lab Results   Component Value Date    TRIG 314 (H) 01/05/2018    TRIG 244 (H) 12/06/2016    TRIG 331 (H) 08/17/2016     Lab Results   Component Value Date    HDL 39 01/05/2018    HDL 40 12/06/2016    HDL 40 08/17/2016     Lab Results   Component Value Date    LDLCALC 102 (H) 01/05/2018    LDLCALC 118 (H) 12/06/2016    LDLCALC 97 08/17/2016     Lipids were adequately controlled.       _________________________________________________________  Past Medical History:   Diagnosis Date    Cancer ([facility] [address] 12/2019    LESION REMOVED UNDER TONGUE  DENTAL CLINIC    Chronic back pain     Costochondritis     Depression     Diet-controlled type 2 diabetes mellitus ([address]     Falls frequently     none recent as of 2/19/19    Fibromyalgia     Hallux rigidus of left foot     HTN (hypertension)     Hyperlipidemia     CLYDE on CPAP     SETTING ?    Osteoarthritis     \"everywhere\"    Rheumatoid arthritis ([address]     \"As a child. \"    Rheumatoid arthritis(714.0)     TIA (transient ischemic attack)     Use of cane as ambulatory aid        Family History   Problem Relation Age of Onset    Dementia Mother     Breast Cancer Mother     Cancer Mother         breast cancer [de-identified]     Stroke Mother     Heart Attack Father     Other Father 80        Aortic aneurysm    Cancer Brother        Past Surgical History:   Procedure Laterality Date    ANESTHESIA NERVE BLOCK Left 2019    LEFT C3,4,5 MBB performed by Lonnie Leong MD at [address] [facility] Right 2019    BILATERAL TRANSFORAMINAL EPIDURAL STEROID INJECTION S1 #3 performed by Lonnie Leong MD at [address] [facility] Right 2020    RIGHT SACROILIAC JOINT INJECTION      CPT: 68154 performed by Jin Chawla DO at [address] [facility]      Left    ARTHRODESIS Left 2018    ARTHRODESIS 2ND DIGIT LEFT FOOT performed by Kalyani Borges DPM at [place] [address]  2017    PLIF L3-L4, L4-L5 with rods, screws, and cages/Dr. Smooth Lee BACK SURGERY  2020    BREAST SURGERY      reduction, bilat     SECTION  1993    CHOLECYSTECTOMY      COLONOSCOPY  2015    COLONOSCOPY N/A 2020    COLONOSCOPY DIAGNOSTIC performed by Ballard Hatchet, MD at [address] [place] [address], COLON, DIAGNOSTIC      EPIDURAL STEROID INJECTION N/A 2019    BILATERAL TRANSFORAMINAL EPIDURAL STEROID INJECTION S1 [facility] [address]      Left    HARDWARE REMOVAL FOOT / ANKLE Left 2018    REMOVAL OF PAINFUL HARDWARE LEFT FOOT  ++SYNTHES++ performed by Kalyani Borges DPM at [facility] 108    inguinal     LUMBAR SPINE SURGERY N/A 3/4/2020    EXPLORATION OF PRIOR L3-L5 FUSION AND L2-L3  POSTERIOR LUMBAR INTERBODY FUSION -- NEEDS O-ARM, AUDIOLOGY, CAGES, PLATES, SCREWS, C-ARM, TERESA TABLE, CELL SAVER, PLATELET GEL -- GLOBUS performed by Reinaldo Lozano MD at [address] Bilateral 05/07/2018    L1-2 lumbar foramen #1    NERVE BLOCK Bilateral 12/03/2018    s1 tfesi    NERVE BLOCK Bilateral 08/12/2019    NERVE BLOCK Right 09/30/2019    sacral radiofrequency    NERVE BLOCK Right 9/1/2020    RIGHT SACROILIAC JOINT INJECTION #2 performed by Glenys Hamman, DO at [facility] Left 9/25/13    left foot tarso metatarsal joint injection    OTHER SURGICAL HISTORY Left 5/27/15    Endoscopic Gastroc recession left, Lapidus left foot and  Excision of exostosis left foot    [place] INJECT ANES/STEROID FORAMEN LUMBAR/SACRAL W IMG GUIDE ,1 LEVEL Bilateral 12/3/2018    BILATERAL S1  EPIDURAL STEROID INJECTION performed by Emmie Villarreal MD at [address] [facility] DX/THER SBST INTRLMNR LMBR/SAC W/IMG GDN N/A 8/21/2018    EPIDURAL STEROID INJECTION L1-2 WITH LOW VOL performed by Emmie Villarreal MD at [address] [facility] NOSE/CLEFT LIP/TIP Bilateral 5/7/2018    BILATERAL L1-2 LUMBAR FORAMEN #1 performed by Emmie Villarreal MD at [address] [facility] NOSE/CLEFT LIP/TIP Bilateral 6/4/2018    BILATERAL TRANSFORAMINAL EPIDURAL STEROID INJECTION UNDER FLUOROSCOPIC GUIDANCE @ FORAMINAL LEVEL L1-2 #2 performed by Emmie Villarreal MD at [address] Right 9/30/2019    RIGHT SACRAL RADIOFREQUENCY ABLATION performed by Emmie Villarreal MD at [address] [facility]  2000, 2005    Big Left Toe    TOE SURGERY Left 2018    2nd toe     TONSILLECTOMY      WISDOM TOOTH EXTRACTION         Social History     Tobacco Use    Smoking status: Current Every Day Smoker     Packs/day: 0.50     Years: 30.00     Pack years: 15.00     Types: Cigarettes    Smokeless tobacco: Never Used    Tobacco comment: was going to try to stop but chantix is too expensive   Substance Use Topics    Alcohol use:  Yes Alcohol/week: 0.0 standard drinks     Comment: rarely    Drug use: No     _________________________________________________________  ROS: POSITIVE:  Otherwise:  No CP, No palpitations,   No sob, No cough,   No abd pain, No heartburn,   No headaches, No vision changes, No hearing changes,   No tingling, No numbness, No weakness,   No bowel changes, No hematochezia, No melena,  No bladder changes, No hematuria  No skin rashes, No skin lesions. No polyuria, polydipsia, polyphagia. Stable mood. ROS otherwise negative unless as listed in HPI. Chart reviewed and updated where appropriate for PMH, Fam, and Soc Hx.  __________________________________________________________  Physical Exam   /76 (Site: Left Upper Arm, Position: Sitting, Cuff Size: Large Adult)   Pulse 88   Temp 98.4 °F (36.9 °C) (Temporal)   Ht 5' 4\" (1.626 m)   Wt 212 lb (96.2 kg)   LMP  (LMP Unknown)   SpO2 98%   BMI 36.39 kg/m²   Wt Readings from Last 3 Encounters:   09/11/20 212 lb (96.2 kg)   08/31/20 215 lb (97.5 kg)   08/25/20 220 lb (99.8 kg)       Constitutional:    She is oriented to person, place, and time. She appears well-developed and well-nourished. HENT:    Nose: Nose normal.    Mouth/Throat: Oropharynx is clear and moist.   Eyes:    Conjunctivae are normal.    Pupils are equal, round, and reactive to light. EOMI. Neck:    Normal range of motion. No thyromegaly or nodules noted. No bruit. Cardiovascular:    Normal rate, regular rhythm and normal heart sounds. No murmur. No gallop and no friction rub. Pulmonary/Chest:    Effort normal and breath sounds normal.    No wheezes. No rales or rhonchi. Abdominal:    Soft. Bowel sounds are normal.    No distension. No tenderness. Musculoskeletal:    Normal range of motion. No joint swelling noted. No peripheral edema. Skin:    Skin is warm and dry. No rashes, No lesions. Psychiatric:    She has a normal mood and affect.     Normal groom and dress. No SI or HI.   ________________________________________________________  Current Outpatient Medications on File Prior to Visit   Medication Sig Dispense Refill    HYDROcodone-acetaminophen (NORCO) 5-325 MG per tablet Take 1 tablet by mouth 3 times daily for 30 days. Intended supply: 3 days. Take lowest dose possible to manage pain 90 tablet 0     No current facility-administered medications on file prior to visit. Patient Active Problem List   Diagnosis Code    Loss of balance R26.89    Vertebrobasilar TIAs G45.0    Obesity E66.9    Disc displacement, lumbar M51.26    Peripheral neuropathy ([address] G62.9    Type 2 diabetes mellitus without complication (HCC) T19.0    Depression F32.9    Osteoarthritis M19.90    Chronic back pain M54.9, G89.29    Fibromyalgia M79.7    HTN (hypertension) I10    Hyperlipidemia E78.5    History of adenomatous polyp of colon Z86.010    Vitamin D deficiency E55.9    Coccyx pain M53.3    Acute bilateral low back pain with sciatica M54.40    Lumbar stenosis M48.061    Chronic bilateral low back pain without sciatica M54.5, G89.29    DDD (degenerative disc disease), lumbar M51.36    Spinal stenosis of lumbar region without neurogenic claudication M48.061    Lumbar facet arthropathy M47.816    Chronic pain syndrome G89.4    Lumbar radiculopathy M54.16    Neck pain M54.2    Left foot pain M79.672    Painful orthopaedic hardware [facility]) T84.84XA    Cervical facet joint syndrome M47.812    Cellulitis, neck L03.221    Disorder of sacrum M53.3    Adjacent segment disease with spinal stenosis M48.00    S/P lumbar spinal fusion Z98.1      ________________________________________________________  Assessment / Parish Cortez was seen today for hypertension. Diagnoses and all orders for this visit:    Other chronic pain  -     gabapentin (NEURONTIN) 400 MG capsule; Take 1 capsule by mouth 3 times daily for 90 days.     Depression with anxiety  -     FLUoxetine (PROZAC) 40 MG capsule; Take 1 capsule by mouth daily For mood. -     hydrOXYzine (VISTARIL) 25 MG capsule; Take 1 capsule by mouth 3 times daily as needed for Anxiety    Essential hypertension  -     lisinopril (PRINIVIL;ZESTRIL) 30 MG tablet; Take 1 tablet by mouth every evening    Hyperlipidemia, unspecified hyperlipidemia type  -     atorvastatin (LIPITOR) 40 MG tablet; Take 1 tablet by mouth daily    Cigarette nicotine dependence without complication  -     varenicline (CHANTIX CONTINUING MONTH CHARITY) 1 MG tablet; Take 1 tablet by mouth 2 times daily      Return in about 6 months (around 3/11/2021). or as scheduled. Patient counseled to follow up sooner or seek more acute care if symptoms worsening or not improving according to plan. Electronically signed by Jefferson Amaya MD on 9/11/2020  Please note that >25 minutes was spent face-to-face with patient gathering history, performing physical exam, discussing findings, counseling patient, and determining plan forward. All questions addressed and answered. This note may have been created using dictation software.  Efforts were made to reduce grammatical or syntax errors, but some may persist.

## 2020-09-14 ENCOUNTER — OFFICE VISIT (OUTPATIENT)
Dept: PAIN MANAGEMENT | Age: 65
End: 2020-09-14

## 2020-09-14 VITALS
WEIGHT: 212 LBS | HEART RATE: 84 BPM | SYSTOLIC BLOOD PRESSURE: 142 MMHG | DIASTOLIC BLOOD PRESSURE: 82 MMHG | TEMPERATURE: 98.3 F | RESPIRATION RATE: 18 BRPM | HEIGHT: 63 IN | BODY MASS INDEX: 37.56 KG/M2 | OXYGEN SATURATION: 98 %

## 2020-09-14 PROCEDURE — 99213 OFFICE O/P EST LOW 20 MIN: CPT | Performed by: PHYSICIAN ASSISTANT

## 2020-09-14 RX ORDER — HYDROCODONE BITARTRATE AND ACETAMINOPHEN 5; 325 MG/1; MG/1
1 TABLET ORAL 3 TIMES DAILY
Qty: 90 TABLET | Refills: 0 | Status: SHIPPED
Start: 2020-10-01 | End: 2020-10-30 | Stop reason: SDUPTHER

## 2020-09-14 NOTE — PROGRESS NOTES
Do you currently have any of the following:    Fever: No  Headache:  No  Cough: No  Shortness of breath: No  Exposed to anyone with these symptoms: No         Tasha Adams presents to the [address] [place] [address] on 9/14/2020. Placido Mckinney is complaining of pain in the back . The pain is constant. The pain is described as aching, throbbing, shooting and stabbing. Pain is rated on her best day at a 6, on her worst day at a 7, and on average at a 6 on the VAS scale. She took her last dose of Norco .     Any procedures since your last visit: Yes, with 75 % relief. Pacemaker or defibrilator: No managed by none. She is not on NSAIDS and is not on anticoagulation medicationBP (!) 142/82   Pulse 84   Temp 98.3 °F (36.8 °C)   Resp 18   Ht 5' 3\" (1.6 m)   Wt 212 lb (96.2 kg)   LMP  (LMP Unknown)   SpO2 98%   BMI 37.55 kg/m²      No LMP recorded (lmp unknown).  Patient is postmenopausal.

## 2020-09-14 NOTE — PROGRESS NOTES
[address]  [address]  [facility] - BE[facility]AVIOR[facility], [facility]    Follow up Note      Maxine Yuval     Date of Visit:  2020    CC:  Patient presents for follow up   Chief Complaint   Patient presents with    Pain     lower back       HPI:    Patient is better since the injection. Change in quality of symptoms:no. Patient satisfaction with analgesia:fair. Medication side effects: None. Recent diagnostic testing:none. Recent interventional procedures:  2020   PROCEDURE:  Fluoroscopic guided Right sacroiliac joint injection with steroid (#2) - 75-80% relief x 10 days and now 70% relief . She has been on anticoagulation medications to include NSAIDS. The patient  has not been on herbal supplements. The patient is diabetic. Imaging:  MRI lumbar spine 2018  1. No significant interval change is observed since the previous study   of 2017.       2. Stable postoperative changes/posterior spinal fusion at the   L3-L4-L5 level.       3. Severe spine canal stenosis in the level of L2-L3           4. Encroachment of the neural foramina bilaterally in levels of L2-L3   and L5-S1      Thoracic spine MRI 2018  1. Some degenerative changes in the thoracic spine, mainly in the   facet joints in the mid-upper thoracic spine segments       2. Discrete loss of height of T6, more likely an old finding.      Previous treatments: Physical Therapy, Surgery L3-5 fusion/laminectomy and medications. .       Potential Aberrant Drug-Related Behavior:  No     Urine Drug Screenin18:  Consistent for norhydrocodone metabolite  2018:  Consistent for hydrocodone and norhydrocodone  05/10/2019:  Consistent for hydrocodone and norhydrocodone  2019:  Consistent for hydrocodone and norhydrocodone  01/10/2020:  Consistent for hydrocodone and norhydrocodone  2020:  Consistent for oxycodone and metabolites s/p surgery. Inconsistent for hydrocodone.   States that she was instructed to take her old norco until she made the appointment to resume her chronic pain medications.        OARRS report:  2018 consistent to 2020 consistent      Past Medical History:   Diagnosis Date    Cancer [facility]) 2019    LESION REMOVED UNDER TONGUE  DENTAL CLINIC    Chronic back pain     Costochondritis     Depression     Diet-controlled type 2 diabetes mellitus ([facility] [address] [facility]     Falls frequently     none recent as of 19    Fibromyalgia     Hallux rigidus of left foot     HTN (hypertension)     Hyperlipidemia     CLYDE on CPAP     SETTING ?    Osteoarthritis     \"everywhere\"    Rheumatoid arthritis ([address]     \"As a child. \"    Rheumatoid arthritis(714.0)     TIA (transient ischemic attack)     Use of cane as ambulatory aid        Past Surgical History:   Procedure Laterality Date    ANESTHESIA NERVE BLOCK Left 2019    LEFT C3,4,5 MBB performed by Reed Herring MD at [facility] Right 2019    BILATERAL TRANSFORAMINAL EPIDURAL STEROID INJECTION S1 #3 performed by Reed Herring MD at [address] [facility] Right 2020    RIGHT SACROILIAC JOINT INJECTION      CPT: 66169 performed by Prabhjot Mohamud DO at [address]      Left    ARTHRODESIS Left 2018    ARTHRODESIS 2ND DIGIT LEFT FOOT performed by Layne Shay DPM at [address] [place] [address]  2017    PLIF L3-L4, L4-L5 with rods, screws, and cages/Dr. Debra Oakes BACK SURGERY  2020    BREAST SURGERY  2010    reduction, bilat     SECTION      CHOLECYSTECTOMY      COLONOSCOPY  2015    COLONOSCOPY N/A 2020    COLONOSCOPY DIAGNOSTIC performed by Bradford Anguiano MD at [address] [place] [address], COLON, DIAGNOSTIC      EPIDURAL STEROID INJECTION N/A 2019    BILATERAL TRANSFORAMINAL EPIDURAL STEROID INJECTION S1 performed by Prabhjot Mohamud DO at [address]  2005    Left    [address] / ANKLE Left 12/14/2018    REMOVAL OF PAINFUL HARDWARE LEFT FOOT  ++SYNTHES++ performed by Mookie Inman DPM at [facility]    inguinal     LUMBAR SPINE SURGERY N/A 3/4/2020    EXPLORATION OF PRIOR L3-L5 FUSION AND L2-L3  POSTERIOR LUMBAR INTERBODY FUSION -- NEEDS O-ARM, AUDIOLOGY, CAGES, PLATES, SCREWS, C-ARM, TERESA TABLE, CELL SAVER, PLATELET GEL -- GLOBUS performed by Harpal Arredondo MD at [facility] Bilateral 05/07/2018    L1-2 lumbar foramen #1    NERVE BLOCK Bilateral 12/03/2018    s1 tfesi    NERVE BLOCK Bilateral 08/12/2019    NERVE BLOCK Right 09/30/2019    sacral radiofrequency    NERVE BLOCK Right 9/1/2020    RIGHT SACROILIAC JOINT INJECTION #2 performed by Darío Olivares DO at [address] Left 9/25/13    left foot tarso metatarsal joint injection    OTHER SURGICAL HISTORY Left 5/27/15    Endoscopic Gastroc recession left, Lapidus left foot and  Excision of exostosis left foot    [place] INJECT ANES/STEROID FORAMEN LUMBAR/SACRAL W IMG GUIDE ,1 LEVEL Bilateral 12/3/2018    BILATERAL S1  EPIDURAL STEROID INJECTION performed by Néstor Severino MD at [address] [facility] DX/THER SBST INTRLMNR LMBR/SAC W/IMG GDN N/A 8/21/2018    EPIDURAL STEROID INJECTION L1-2 WITH LOW VOL performed by Néstor Severino MD at [address] [facility] NOSE/CLEFT LIP/TIP Bilateral 5/7/2018    BILATERAL L1-2 LUMBAR FORAMEN #1 performed by Néstor Severino MD at [address] [facility] NOSE/CLEFT LIP/TIP Bilateral 6/4/2018    BILATERAL TRANSFORAMINAL EPIDURAL STEROID INJECTION UNDER FLUOROSCOPIC GUIDANCE @ FORAMINAL LEVEL L1-2 #2 performed by Néstor Severino MD at [address] Right 9/30/2019    RIGHT SACRAL RADIOFREQUENCY ABLATION performed by Néstor Severino MD at [address]  [address], 2005    Big Left Toe    TOE SURGERY Left 2018    2nd toe     TONSILLECTOMY      WISDOM TOOTH EXTRACTION         Prior to Admission medications    Medication Sig Start Date End Date Taking? Authorizing Provider   gabapentin (NEURONTIN) 400 MG capsule Take 1 capsule by mouth 3 times daily for 90 days. 9/11/20 12/10/20 Yes Anita Manuel, MD   FLUoxetine (PROZAC) 40 MG capsule Take 1 capsule by mouth daily For mood. 9/11/20  Yes Anita Manuel, MD   lisinopril (PRINIVIL;ZESTRIL) 30 MG tablet Take 1 tablet by mouth every evening 9/11/20  Yes Anita Manuel, MD   atorvastatin (LIPITOR) 40 MG tablet Take 1 tablet by mouth daily 9/11/20  Yes Anita Manuel, MD   hydrOXYzine (VISTARIL) 25 MG capsule Take 1 capsule by mouth 3 times daily as needed for Anxiety 9/11/20  Yes Anita Prior, MD   varenicline (CHANTIX CONTINUING MONTH PAK) 1 MG tablet Take 1 tablet by mouth 2 times daily 9/11/20  Yes Anita Manuel, MD   HYDROcodone-acetaminophen (NORCO) 5-325 MG per tablet Take 1 tablet by mouth 3 times daily for 30 days. Intended supply: 3 days. Take lowest dose possible to manage pain 8/16/20 9/15/20 Yes TREASURE Escamilla       Allergies   Allergen Reactions    Pcn [Penicillins] Anaphylaxis       Social History     Socioeconomic History    Marital status:      Spouse name: Not on file    Number of children: Not on file    Years of education: Not on file    Highest education level: Not on file   Occupational History    Not on file   Social Needs    Financial resource strain: Not on file    Food insecurity     Worry: Not on file     Inability: Not on file    Transportation needs     Medical: Not on file     Non-medical: Not on file   Tobacco Use    Smoking status: Current Every Day Smoker     Packs/day: 0.50     Years: 30.00     Pack years: 15.00     Types: Cigarettes    Smokeless tobacco: Never Used    Tobacco comment: was going to try to stop but chantix is too expensive   Substance and Sexual Activity    Alcohol use:  Yes     Alcohol/week: 0.0 standard drinks     Comment: rarely    Drug use: No    Sexual activity: Not on file   Lifestyle    Physical activity     Days per week: Not on file     Minutes per session: Not on file    Stress: Not on file   Relationships    Social connections     Talks on phone: Not on file     Gets together: Not on file     Attends Restoration service: Not on file     Active member of club or organization: Not on file     Attends meetings of clubs or organizations: Not on file     Relationship status: Not on file    Intimate partner violence     Fear of current or ex partner: Not on file     Emotionally abused: Not on file     Physically abused: Not on file     Forced sexual activity: Not on file   Other Topics Concern    Not on file   Social History Narrative    Not on file       Family History   Problem Relation Age of Onset    Dementia Mother     Breast Cancer Mother     Cancer Mother         breast cancer [de-identified]     Stroke Mother     Heart Attack Father     Other Father 80        Aortic aneurysm    Cancer Brother        REVIEW OF SYSTEMS:     Dayan Pretty denies fever/chills, chest pain, shortness of breath, new bowel or bladder complaints or suicidal ideations. All other review of systems was negative. PHYSICAL EXAMINATION:      BP (!) 142/82   Pulse 84   Temp 98.3 °F (36.8 °C)   Resp 18   Ht 5' 3\" (1.6 m)   Wt 212 lb (96.2 kg)   LMP  (LMP Unknown)   SpO2 98%   BMI 37.55 kg/m²     General:      General appearance: awake, alert, oriented, in no acute distress, well developed, well nourished and in no acute distress   pleasant and well-hydrated. in no distress and A & O x3  Build:Overweight  Function:Rises from a seated position with difficulty    HEENT:    Head:normocephalic and atraumatic  Pupils:regular, round and equal.  Sclera: icterus absent  EOM:full and intact. Lungs:    Breathing:Normal expansion. Clear to auscultation. No rales, rhonchi, or wheezing.     Abdomen:    Shape:non-distended and normal  Tenderness:none  Guarding:none     Thoracic treatment.    Chronic Pain > 80 MEDD -                         ccreferring physicf

## 2020-09-21 ENCOUNTER — TELEPHONE (OUTPATIENT)
Dept: FAMILY MEDICINE CLINIC | Age: 65
End: 2020-09-21

## 2020-09-21 NOTE — TELEPHONE ENCOUNTER
Received call from [employer] asking us to call patient for Proof of Income for medication LM on patient voice mail asking her to call [phone] and give proof of income

## 2020-09-25 ENCOUNTER — OFFICE VISIT (OUTPATIENT)
Dept: NEUROSURGERY | Age: 65
End: 2020-09-25

## 2020-09-25 VITALS
WEIGHT: 212 LBS | SYSTOLIC BLOOD PRESSURE: 124 MMHG | DIASTOLIC BLOOD PRESSURE: 80 MMHG | HEIGHT: 63 IN | HEART RATE: 82 BPM | BODY MASS INDEX: 37.56 KG/M2 | TEMPERATURE: 98.4 F

## 2020-09-25 PROCEDURE — 99213 OFFICE O/P EST LOW 20 MIN: CPT | Performed by: PHYSICIAN ASSISTANT

## 2020-09-25 NOTE — PROGRESS NOTES
kvng finger on the right and she has 3 positive provocative tests with significant short term improvement with right sacroiliac joint injections. I will schedule her for a right sacroiliac joint fusion.     Marilou Benitez

## 2020-10-01 ENCOUNTER — TELEPHONE (OUTPATIENT)
Dept: ENT CLINIC | Age: 65
End: 2020-10-01

## 2020-10-01 NOTE — TELEPHONE ENCOUNTER
Called patient to schedule an appointment for left jaw,liquid in ear,tingling sensation neck pain x 2 weeks.  Patient has history tongue cancer

## 2020-10-02 ENCOUNTER — OFFICE VISIT (OUTPATIENT)
Dept: ENT CLINIC | Age: 65
End: 2020-10-02

## 2020-10-02 VITALS — TEMPERATURE: 98.2 F | WEIGHT: 210 LBS | BODY MASS INDEX: 34.99 KG/M2 | HEIGHT: 65 IN

## 2020-10-02 PROCEDURE — 99213 OFFICE O/P EST LOW 20 MIN: CPT | Performed by: OTOLARYNGOLOGY

## 2020-10-02 RX ORDER — PREDNISONE 10 MG/1
40 TABLET ORAL DAILY
Qty: 20 TABLET | Refills: 0 | Status: SHIPPED | OUTPATIENT
Start: 2020-10-02 | End: 2020-10-07

## 2020-10-02 ASSESSMENT — ENCOUNTER SYMPTOMS
EYES NEGATIVE: 1
EYE PAIN: 0
RESPIRATORY NEGATIVE: 1
EYE DISCHARGE: 0
GASTROINTESTINAL NEGATIVE: 1
SHORTNESS OF BREATH: 0
ABDOMINAL PAIN: 0
VOMITING: 0
CHEST TIGHTNESS: 0
COLOR CHANGE: 0
APNEA: 0
DIARRHEA: 0

## 2020-10-02 NOTE — PROGRESS NOTES
Subjective:      Patient ID:  Augustina Quigley is a 63 y.o. female. HPI:    Patient presents today for left ear and jaw pain. Condition has been present for 2 week(s). PT states there is sharp pain there at times and down her neck. Past Medical History:   Diagnosis Date    Cancer ([facility] 2019    LESION REMOVED UNDER TONGUE  DENTAL CLINIC    Chronic back pain     Costochondritis     Depression     Diet-controlled type 2 diabetes mellitus ([facility]     Falls frequently     none recent as of 19    Fibromyalgia     Hallux rigidus of left foot     HTN (hypertension)     Hyperlipidemia     CLYDE on CPAP     SETTING ?    Osteoarthritis     \"everywhere\"    Rheumatoid arthritis ([facility]     \"As a child. \"    Rheumatoid arthritis(714.0)     TIA (transient ischemic attack)     Use of cane as ambulatory aid      Past Surgical History:   Procedure Laterality Date    ANESTHESIA NERVE BLOCK Left 2019    LEFT C3,4,5 MBB performed by Rodrigo Kumar MD at [facility] Right 2019    BILATERAL TRANSFORAMINAL EPIDURAL STEROID INJECTION S1 #3 performed by Rodrigo Kumar MD at [address] [facility] Right 2020    RIGHT SACROILIAC JOINT INJECTION      CPT: 67814 performed by Rhys Elizondo DO at [address]      Left    ARTHRODESIS Left 2018    ARTHRODESIS 2ND DIGIT LEFT FOOT performed by Ruddy Connell DPM at [facility]  2017    PLIF L3-L4, L4-L5 with rods, screws, and cages/Dr. Perla Medrano BACK SURGERY  2020    BREAST SURGERY      reduction, bilat     SECTION  1993    CHOLECYSTECTOMY      COLONOSCOPY  2015    COLONOSCOPY N/A 2020    COLONOSCOPY DIAGNOSTIC performed by Sanju Brock MD at [address] [facility], COLON, DIAGNOSTIC      EPIDURAL STEROID INJECTION N/A 2019    BILATERAL TRANSFORAMINAL EPIDURAL STEROID INJECTION S1 performed by Rhys Elizondo DO at [facility] TONSILLECTOMY      WISDOM TOOTH EXTRACTION       Family History   Problem Relation Age of Onset    Dementia Mother    Jacquelin Lopez Breast Cancer Mother     Cancer Mother         breast cancer [de-identified]     Stroke Mother     Heart Attack Father     Other Father 80        Aortic aneurysm    Cancer Brother      Social History     Socioeconomic History    Marital status:      Spouse name: None    Number of children: None    Years of education: None    Highest education level: None   Occupational History    None   Social Needs    Financial resource strain: None    Food insecurity     Worry: None     Inability: None    Transportation needs     Medical: None     Non-medical: None   Tobacco Use    Smoking status: Current Every Day Smoker     Packs/day: 0.50     Years: 30.00     Pack years: 15.00     Types: Cigarettes    Smokeless tobacco: Never Used    Tobacco comment: was going to try to stop but chantix is too expensive   Substance and Sexual Activity    Alcohol use: Not Currently     Alcohol/week: 0.0 standard drinks     Comment: rarely    Drug use: No    Sexual activity: None   Lifestyle    Physical activity     Days per week: None     Minutes per session: None    Stress: None   Relationships    Social connections     Talks on phone: None     Gets together: None     Attends Congregational service: None     Active member of club or organization: None     Attends meetings of clubs or organizations: None     Relationship status: None    Intimate partner violence     Fear of current or ex partner: None     Emotionally abused: None     Physically abused: None     Forced sexual activity: None   Other Topics Concern    None   Social History Narrative    None     Allergies   Allergen Reactions    Pcn [Penicillins] Anaphylaxis       Review of Systems   Constitutional: Negative. Negative for appetite change. HENT: Positive for mouth sores. Eyes: Negative. Negative for pain, discharge and visual disturbance. Respiratory: Negative. Negative for apnea, chest tightness and shortness of breath. Cardiovascular: Negative. Negative for chest pain, palpitations and leg swelling. Gastrointestinal: Negative. Negative for abdominal pain, diarrhea and vomiting. Endocrine: Negative for cold intolerance, heat intolerance and polydipsia. Genitourinary: Negative. Negative for dysuria, flank pain and hematuria. Musculoskeletal: Negative. Negative for arthralgias, gait problem and neck pain. Skin: Negative. Negative for color change, pallor and rash. Allergic/Immunologic: Negative for environmental allergies, food allergies and immunocompromised state. Neurological: Negative. Negative for dizziness, numbness and headaches. Hematological: Negative for adenopathy. Psychiatric/Behavioral: Negative. Negative for behavioral problems and hallucinations. All other systems reviewed and are negative. Objective:     Vitals:    10/02/20 0911   Temp: 98.2 °F (36.8 °C)     Physical Exam  Vitals signs and nursing note reviewed. Constitutional:       Appearance: She is well-developed. HENT:      Head: Normocephalic and atraumatic. Comments: Pt jaw palpated and does have some crepitance on the left and excursion is in midline. Right Ear: Hearing, tympanic membrane, ear canal and external ear normal.      Left Ear: Hearing, tympanic membrane, ear canal and external ear normal.      Nose: Nose normal.      Mouth/Throat:      Tongue: Lesions present. Comments: Left side under the tongue there is a 0.5cm lesion, raised, white  Eyes:      Conjunctiva/sclera: Conjunctivae normal.      Pupils: Pupils are equal, round, and reactive to light. Neck:      Musculoskeletal: Normal range of motion and neck supple. Cardiovascular:      Rate and Rhythm: Normal rate and regular rhythm. Heart sounds: Normal heart sounds.    Pulmonary:      Effort: Pulmonary effort is normal. No respiratory distress. Breath sounds: Normal breath sounds. Abdominal:      General: Bowel sounds are normal. There is no distension. Palpations: Abdomen is soft. Tenderness: There is no abdominal tenderness. There is no guarding. Musculoskeletal: Normal range of motion. Skin:     General: Skin is warm and dry. Neurological:      Mental Status: She is alert and oriented to person, place, and time. Psychiatric:         Behavior: Behavior normal.         Thought Content: Thought content normal.         Judgment: Judgment normal.                 Assessment:       Diagnosis Orders   1. Otalgia of left ear     2. Arthralgia of left temporomandibular joint                Plan:      TMJ  Discussed causes of TMJ and the management of it. Patient is to start a soft diet for the next week with warm compresses to the area and OTC NSAIDS. I recommend:    Excision tongue lesion with frozen section. The procedure risks and benefits were discussed with the patient and family. Pt and family understood and decided to proceed with the surgery. Risks of any anesthesia are:  Reactions to medicines   Breathing problems  Risks of any surgery are:  Bleeding   Infection     Specific risks of surgery:       Follow up in 1 week(s)

## 2020-10-02 NOTE — PATIENT INSTRUCTIONS
[facility]. We are committed to your medical treatment and  care. If you need to reschedule or cancel your surgery or follow up  appointment, please call the surgery scheduler at [phone]. INSTRUCTIONS FOR SURGERY    Nothing to eat or drink after midnight the night before surgery unless surgery is at [facility] or otherwise instructed by the hospital.    DO NOT TAKE ANY ASPIRIN PRODUCTS 7 days prior to surgery-unless required by your cardiologist or primary care physician. Tylenol only. No Advil, Motrin, Aleve, or Ibuprofen    Any illegal drugs in your system (including Marijuana even if legally prescribed) will result in your surgery being cancelled. Please be sure to check with our office or the hospital on time frame for the drugs to be out of your system. Should your insurance change at any time you must contact our office. Failure to do so may result in your surgery being rescheduled. If you need paperwork filled out for work, you must give the office 2 weeks to complete and submit the forms.       [address], [facility] call you a couple days prior to your surgery and give you further instructions, if any questions call them at [phone]

## 2020-10-14 ASSESSMENT — PAIN DESCRIPTION - LOCATION
LOCATION: OTHER (COMMENT)
LOCATION_2: BACK

## 2020-10-14 ASSESSMENT — PAIN DESCRIPTION - ORIENTATION
ORIENTATION: INNER
ORIENTATION_2: LOWER

## 2020-10-14 ASSESSMENT — PAIN DESCRIPTION - PAIN TYPE
TYPE_2: CHRONIC PAIN
TYPE: CHRONIC PAIN

## 2020-10-14 ASSESSMENT — PAIN SCALES - GENERAL: PAINLEVEL_OUTOF10: 3

## 2020-10-14 ASSESSMENT — PAIN DESCRIPTION - INTENSITY: RATING_2: 8

## 2020-10-14 NOTE — PROGRESS NOTES
Have you been tested for COVID  No   Scheduled for COVID test 10-16-20 for OR scheduled 10-21-20         Have you been told you were positive for COVID No  Have you had any known exposure to someone that is positive for COVID No  Do you have a cough                   No              Do you have shortness of breath No                 Do you have a sore throat            No                Are you having chills                    No                Are you having muscle aches. No                    Please come to the hospital wearing a mask and have your significant other wear a mask as well. Both of you should check your temperature before leaving to come here,  if it is 100 or higher please call [phone] for instruction. [facility] PRE-ADMISSION TESTING INSTRUCTIONS    The Preadmission Testing patient is instructed accordingly using the following criteria (check applicable):    ARRIVAL INSTRUCTIONS:  [x] Parking the day of Surgery is located in the Main Entrance lot. Upon entering the door, make an immediate right to the surgery reception desk    [] [phone] is available Monday through Friday 6 am to 6 pm    [x] Bring photo ID and insurance card    [] Bring in a copy of Living will or Durable Power of  papers.     [x] Please be sure to arrange for responsible adult to provide transportation to and from the hospital    [x] Please arrange for responsible adult to be with you for the 24 hour period post procedure due to having anesthesia      GENERAL INSTRUCTIONS:    [x] Nothing by mouth after midnight, including gum, candy, mints or water    [x] You may brush your teeth, but do not swallow any water    [x] Take medications as instructed with 1-2 oz of water    [x] Stop herbal supplements and vitamins 5 days prior to procedure    [x] Follow preop dosing of blood thinners per physician instructions    [] Take 1/2 dose of evening insulin, but no insulin after midnight    [] No oral diabetic medications after midnight    [] If diabetic and have low blood sugar or feel symptomatic, take 1-2oz apple juice only    [] Bring inhalers day of surgery    [] Bring C-PAP/ Bi-Pap day of surgery    [] Bring urine specimen day of surgery    [x] Shower or bath with soap, lather and rinse well, AM of Surgery, no lotion, powders or creams to surgical site    [] Follow bowel prep as instructed per surgeon    [x] No tobacco products within 24 hours of surgery     [x] No alcohol or illegal drug use within 24 hours of surgery.     [x] Jewelry, body piercing's, eyeglasses, contact lenses and dentures are not permitted into surgery (bring cases)      [x] Please do not wear any nail polish, make up or hair products on the day of surgery    [x] If not already done, you can expect a call from registration    [x] You can expect a call the business day prior to procedure to notify you if your arrival time changes    [x] If you receive a survey after surgery we would greatly appreciate your comments    [] Parent/guardian of a minor must accompany their child and remain on the premises  the entire time they are under our care     [] Pediatric patients may bring favorite toy, blanket or comfort item with them    [] A caregiver or family member must remain with the patient during their stay if they are mentally handicapped, have dementia, disoriented or unable to use a call light or would be a safety concern if left unattended    [x] Please notify surgeon if you develop any illness between now and time of surgery (cold, cough, sore throat, fever, nausea, vomiting) or any signs of infections  including skin, wounds, and dental.    [x]  The Outpatient Pharmacy is available to fill your prescription here on your day of surgery, ask your preop nurse for details    [x] Other instructions  EDUCATIONAL MATERIALS PROVIDED:    [] PAT Preoperative Education Packet/Booklet     [] Medication List    [] Fluoroscopy Information Pamphlet    [] Transfusion bracelet applied with instructions    [] Joint replacement video reviewed    [] Shower with soap, lather and rinse well, and use CHG wipes provided the evening before surgery as instructed

## 2020-10-15 ENCOUNTER — OFFICE VISIT (OUTPATIENT)
Dept: FAMILY MEDICINE CLINIC | Age: 65
End: 2020-10-15

## 2020-10-15 ENCOUNTER — TELEPHONE (OUTPATIENT)
Dept: ADMINISTRATIVE | Age: 65
End: 2020-10-15

## 2020-10-15 ENCOUNTER — NURSE ONLY (OUTPATIENT)
Dept: FAMILY MEDICINE CLINIC | Age: 65
End: 2020-10-15

## 2020-10-15 VITALS
BODY MASS INDEX: 34.95 KG/M2 | SYSTOLIC BLOOD PRESSURE: 128 MMHG | HEART RATE: 88 BPM | OXYGEN SATURATION: 96 % | DIASTOLIC BLOOD PRESSURE: 74 MMHG | HEIGHT: 65 IN

## 2020-10-15 PROCEDURE — 90686 IIV4 VACC NO PRSV 0.5 ML IM: CPT | Performed by: FAMILY MEDICINE

## 2020-10-15 PROCEDURE — 90471 IMMUNIZATION ADMIN: CPT | Performed by: FAMILY MEDICINE

## 2020-10-15 PROCEDURE — 99213 OFFICE O/P EST LOW 20 MIN: CPT | Performed by: NURSE PRACTITIONER

## 2020-10-15 RX ORDER — SODIUM CHLORIDE 0.9 % (FLUSH) 0.9 %
10 SYRINGE (ML) INJECTION EVERY 12 HOURS SCHEDULED
Status: CANCELLED | OUTPATIENT
Start: 2020-10-15

## 2020-10-15 RX ORDER — SODIUM CHLORIDE 9 MG/ML
INJECTION, SOLUTION INTRAVENOUS CONTINUOUS
Status: CANCELLED | OUTPATIENT
Start: 2020-10-15

## 2020-10-15 RX ORDER — SODIUM CHLORIDE 0.9 % (FLUSH) 0.9 %
10 SYRINGE (ML) INJECTION PRN
Status: CANCELLED | OUTPATIENT
Start: 2020-10-15

## 2020-10-15 NOTE — PROGRESS NOTES
Chief Complaint:   Foot Pain (Lt foot , started a week ago. Heal hurts. )      History of Present Illness   Source of history provided by:  patient. Cierra Rodas is a 63 y.o. old female presenting to express care for evaluation of left foot pain starting 7 days ago. Pt states there was an injury in which she stepped on her keys by accident. Reports no associated swelling or bruising. Denies any paresthesias, knee pain, weakness, fever, chills, or abrasions. Pt states there is mild pain with ambulation. Has been taking nothing for her symptoms. She does have a history of surgery to the foot. She does have an appointment with Dr. Artemus Osgood tomorrow but the pain has been too intense to wait any longer. Review of Systems   Unless otherwise stated in this report or unable to obtain because of the patient's clinical or mental status as evidenced by the medical record, this patients's positive and negative responses for Review of Systems, constitutional, psych, eyes, ENT, cardiovascular, respiratory, gastrointestinal, neurological, genitourinary, musculoskeletal, integument systems and systems related to the presenting problem are either stated in the preceding or were negative for the symptoms and/or complaints related to the medical problem. Past Medical History:  has a past medical history of Cancer ([address] Chronic back pain, Costochondritis, Depression, Falls, Fibromyalgia, Hallux rigidus of left foot, HTN (hypertension), Hyperlipidemia, CLYDE on CPAP, Osteoarthritis, Rheumatoid arthritis ([address] Rheumatoid arthritis(714.0), TIA (transient ischemic attack), Tongue lesion, and Use of cane as ambulatory aid. Past Surgical History:  has a past surgical history that includes  section (); Toe Surgery (, ); Ankle surgery (); Cholecystectomy (); Tonsillectomy; [place] tooth extraction; other surgical history (Left, 13); hernia repair ();  Colonoscopy (2015); other surgical history (Left, 5/27/15); Endoscopy, colon, diagnostic; back surgery (08/16/2017); Nerve Block (Bilateral, 05/07/2018); pr becca nose/cleft lip/tip (Bilateral, 5/7/2018); pr becca nose/cleft lip/tip (Bilateral, 6/4/2018); pr njx dx/ther sbst intrlmnr lmbr/sac w/img gdn (N/A, 8/21/2018); Nerve Block (Bilateral, 12/03/2018); pr inject anes/steroid foramen lumbar/sacral w img guide ,1 level (Bilateral, 12/3/2018); arthrodesis (Left, 12/14/2018); HARDWARE REMOVAL FOOT / ANKLE (Left, 12/14/2018); Anesthesia Nerve Block (Left, 2/26/2019); epidural steroid injection (N/A, 6/4/2019); Nerve Block (Bilateral, 08/12/2019); Anesthesia Nerve Block (Right, 8/12/2019); Nerve Block (Right, 09/30/2019); RADIOFREQUENCY ABLATION NERVES (Right, 9/30/2019); Lumbar spine surgery (N/A, 3/4/2020); Anesthesia Nerve Block (Right, 6/16/2020); back surgery (03/04/2020); Toe Surgery (Left, 2018); Colonoscopy (N/A, 8/19/2020); Nerve Block (Right, 9/1/2020); Breast surgery (2010); and Foot surgery. Social History:  reports that she quit smoking 10 days ago. Her smoking use included cigarettes. She has a 7.50 pack-year smoking history. She has never used smokeless tobacco. She reports previous alcohol use. She reports that she does not use drugs. Family History: family history includes Breast Cancer in her mother; Cancer in her brother and mother; Dementia in her mother; Heart Attack in her father; Other (age of onset: 80) in her father; Stroke in her mother. Allergies: Pcn [penicillins]    Physical Exam   Vital Signs: /74   Pulse 88   Ht 5' 5\" (1.651 m)   LMP  (LMP Unknown)   SpO2 96%   BMI 34.95 kg/m²  Oxygen Saturation Interpretation: Normal.    Constitutional:  Alert, development consistent with age. Neck:  Normal ROM. Supple. Chest: Heart RRR without pathologic murmurs or gallops. Lungs CTAB without W/R/R. Foot: left            Tenderness:   To the dorsal aspect of the foot/heel            Swelling: None noted Deformity: No obvious deformity. ROM: Limited ROM due to pain. Skin:  No rash, abrasions, or erythema noted. Neurovascular:              Sensory deficit: Sensation intact proximal and distal to the injury site. Pulse deficit: Pulses 2+ and bounding. Capillary refill: Less then 2 sec throughout. Ankle: left            Tenderness:  None              Swelling: None            Deformity: No obvious deformity noted. ROM: Full ROM. Skin:  No abrasions, erythema, or rashes noted. Gait: Antalgic gait noted. Lymphatics: No lymphangitis or adenopathy noted. Neurological:  Alert and oriented. Motor functions intact. Test Results Section   Radiology: All Radiology results interpreted by Radiologist unless otherwise noted. No results found. Assessment / Plan   Impression:   Wil Yepez was seen today for foot pain. Diagnoses and all orders for this visit:    Left foot pain  -     XR FOOT LEFT (MIN 3 VIEWS); Future      X-ray of left foot was obtained in office. Will await final results from radiologist, however, I did look at the x-ray myself and did not notice any obvious fracture or dislocation. There is a bone spur noted which could be causing her pain. Advised patient I would like her to take an anti-inflammatory however she does have a biopsy coming up in a couple days and will call ENT to see if she is allowed take any anti-inflammatories. In the meanwhile did advise ice to the area. She is following up with her podiatrist, Dr. Artemus Osgood, tomorrow. ED sooner if symptoms worsen or change. ED immediately with worsening pain/swelling, calf pain/swelling, fever, paresthesias, weakness, CP, or SOB. Pt is in agreement with this care plan. All questions answered. Return if symptoms worsen or fail to improve.     Bandar Vallejo, APRN - CNP

## 2020-10-16 ENCOUNTER — OFFICE VISIT (OUTPATIENT)
Dept: PODIATRY | Age: 65
End: 2020-10-16

## 2020-10-16 ENCOUNTER — HOSPITAL ENCOUNTER (OUTPATIENT)
Age: 65
Discharge: HOME | End: 2020-10-18

## 2020-10-16 VITALS
WEIGHT: 210 LBS | HEIGHT: 65 IN | DIASTOLIC BLOOD PRESSURE: 82 MMHG | BODY MASS INDEX: 34.99 KG/M2 | SYSTOLIC BLOOD PRESSURE: 132 MMHG | TEMPERATURE: 97.7 F

## 2020-10-16 PROCEDURE — U0003 INFECTIOUS AGENT DETECTION BY NUCLEIC ACID (DNA OR RNA); SEVERE ACUTE RESPIRATORY SYNDROME CORONAVIRUS 2 (SARS-COV-2) (CORONAVIRUS DISEASE [COVID-19]), AMPLIFIED PROBE TECHNIQUE, MAKING USE OF HIGH THROUGHPUT TECHNOLOGIES AS DESCRIBED BY CMS-2020-01-R: HCPCS

## 2020-10-16 PROCEDURE — 99213 OFFICE O/P EST LOW 20 MIN: CPT | Performed by: PODIATRIST

## 2020-10-16 PROCEDURE — 29540 STRAPPING ANKLE &/FOOT: CPT | Performed by: PODIATRIST

## 2020-10-16 PROCEDURE — 20550 NJX 1 TENDON SHEATH/LIGAMENT: CPT | Performed by: PODIATRIST

## 2020-10-16 RX ORDER — BUPIVACAINE HYDROCHLORIDE 5 MG/ML
30 INJECTION, SOLUTION PERINEURAL ONCE
Status: COMPLETED | OUTPATIENT
Start: 2020-10-16 | End: 2020-10-16

## 2020-10-16 RX ORDER — BETAMETHASONE SODIUM PHOSPHATE AND BETAMETHASONE ACETATE 3; 3 MG/ML; MG/ML
6 INJECTION, SUSPENSION INTRA-ARTICULAR; INTRALESIONAL; INTRAMUSCULAR; SOFT TISSUE ONCE
Status: COMPLETED | OUTPATIENT
Start: 2020-10-16 | End: 2020-10-16

## 2020-10-16 RX ADMIN — BUPIVACAINE HYDROCHLORIDE 150 MG: 5 INJECTION, SOLUTION PERINEURAL at 11:16

## 2020-10-16 RX ADMIN — BETAMETHASONE SODIUM PHOSPHATE AND BETAMETHASONE ACETATE 6 MG: 3; 3 INJECTION, SUSPENSION INTRA-ARTICULAR; INTRALESIONAL; INTRAMUSCULAR; SOFT TISSUE at 11:16

## 2020-10-16 NOTE — PROGRESS NOTES
10/16/20  Jaquelin Fears : 1955 Sex: female  Age: 63 y.o. Patient was referred by Yahir Kerns MD    Chief Complaint   Patient presents with    Foot Injury     left foot injured it stepping on keys went thru express care yesterday had xrays no fx noted  she stepped on keys over ten days ago        SUBJECTIVE patient is seen today for left foot pain. Patient relates that she stepped on a set of keys about a week ago and it aggravated her plantar fasciitis. She has had plantar fasciitis in the past with instructions icing today she had an x-ray done several days ago showing no fractures she feels about 2% better  Foot Injury    The incident occurred 5 to 7 days ago. The incident occurred at home. The injury mechanism was a direct blow. The pain is present in the left foot. The pain is at a severity of 3/10. The pain is mild. The pain has been fluctuating since onset. Review of Systems  Const: Denies constitutional symptoms  Musculo: Denies symptoms other than stated above  Skin: Denies symptoms other than stated above       Current Outpatient Medications:     HYDROcodone-acetaminophen (NORCO) 5-325 MG per tablet, Take 1 tablet by mouth 3 times daily for 30 days. Take lowest dose possible to manage pain, Disp: 90 tablet, Rfl: 0    gabapentin (NEURONTIN) 400 MG capsule, Take 1 capsule by mouth 3 times daily for 90 days. , Disp: 90 capsule, Rfl: 2    FLUoxetine (PROZAC) 40 MG capsule, Take 1 capsule by mouth daily For mood. , Disp: 90 capsule, Rfl: 1    lisinopril (PRINIVIL;ZESTRIL) 30 MG tablet, Take 1 tablet by mouth every evening, Disp: 90 tablet, Rfl: 1    atorvastatin (LIPITOR) 40 MG tablet, Take 1 tablet by mouth daily, Disp: 90 tablet, Rfl: 1    hydrOXYzine (VISTARIL) 25 MG capsule, Take 1 capsule by mouth 3 times daily as needed for Anxiety, Disp: 270 capsule, Rfl: 1    varenicline (CHANTIX CONTINUING MONTH CHARITY) 1 MG tablet, Take 1 tablet by mouth 2 times daily, Disp: 60 tablet, Rfl: 1  Allergies   Allergen Reactions    Pcn [Penicillins] Anaphylaxis       Past Medical History:   Diagnosis Date    Cancer ([facility] [address] [facility] 2019    LESION REMOVED UNDER TONGUE  DENTAL CLINIC    Chronic back pain     Costochondritis     h/o    Depression     Falls     last one      Fibromyalgia     Hallux rigidus of left foot     HTN (hypertension)     Hyperlipidemia     CLYDE on CPAP     Osteoarthritis     \"everywhere\"    Rheumatoid arthritis ([facility] [address]     \"As a child. \"    Rheumatoid arthritis(714.0)     TIA (transient ischemic attack)     no deficits     Tongue lesion     for OR 10-21-     Use of cane as ambulatory aid      Past Surgical History:   Procedure Laterality Date    ANESTHESIA NERVE BLOCK Left 2019    LEFT C3,4,5 MBB performed by Anne De MD at [address] [facility] Right 2019    BILATERAL TRANSFORAMINAL EPIDURAL STEROID INJECTION S1 #3 performed by Anne De MD at [address] [place] [address] Right 2020    RIGHT SACROILIAC JOINT INJECTION      CPT: 58480 performed by Мария Chappell DO at [address] [place] [address]      Left    ARTHRODESIS Left 2018    ARTHRODESIS 2ND DIGIT LEFT FOOT performed by Romie Alan DPM at [address] [place] [address]  2017    PLIF L3-L4, L4-L5 with rods, screws, and cages/Dr. Tessa Acuna BACK SURGERY  2020    BREAST SURGERY      reduction, bilat     SECTION      CHOLECYSTECTOMY      COLONOSCOPY  2015    COLONOSCOPY N/A 2020    COLONOSCOPY DIAGNOSTIC performed by Perla Reed MD at [address] [facility], COLON, DIAGNOSTIC      EPIDURAL STEROID INJECTION N/A 2019    BILATERAL TRANSFORAMINAL EPIDURAL STEROID INJECTION S1 performed by Мария Chappell DO at [place]      Left    [address] / ANKLE Left 2018    REMOVAL OF PAINFUL HARDWARE LEFT FOOT  ++SYNTHES++ performed by Romie Alan DPM at [facility] HERNIA REPAIR  1998    inguinal     LUMBAR SPINE SURGERY N/A 3/4/2020    EXPLORATION OF PRIOR L3-L5 FUSION AND L2-L3  POSTERIOR LUMBAR INTERBODY FUSION -- NEEDS O-ARM, AUDIOLOGY, CAGES, PLATES, SCREWS, C-ARM, TERESA TABLE, CELL SAVER, PLATELET GEL -- GLOBUS performed by Shelley Lubin MD at [facility] Bilateral 05/07/2018    L1-2 lumbar foramen #1    NERVE BLOCK Bilateral 12/03/2018    s1 tfesi    NERVE BLOCK Bilateral 08/12/2019    NERVE BLOCK Right 09/30/2019    sacral radiofrequency    NERVE BLOCK Right 9/1/2020    RIGHT SACROILIAC JOINT INJECTION #2 performed [facility]ztof Maddox [facility]    left foot tarso metatarsal joint injection    OTHER SURGICAL HISTORY Left 5/27/15    Endoscopic Gastroc recession left, Lapidus left foot and  Excision of exostosis left foot    [place] INJECT ANES/STEROID FORAMEN LUMBAR/SACRAL W IMG GUIDE ,1 LEVEL Bilateral 12/3/2018    BILATERAL S1  EPIDURAL STEROID INJECTION performed by Candance Reasons, MD at [address] [facility] DX/THER SBST INTRLMNR LMBR/SAC W/IMG GDN N/A 8/21/2018    EPIDURAL STEROID INJECTION L1-2 WITH LOW VOL performed by Candance Reasons, MD at [address] [facility] NOSE/CLEFT LIP/TIP Bilateral 5/7/2018    BILATERAL L1-2 LUMBAR FORAMEN #1 performed by Candance Reasons, MD at [address] [facility] NOSE/CLEFT LIP/TIP Bilateral 6/4/2018    BILATERAL TRANSFORAMINAL EPIDURAL STEROID INJECTION UNDER FLUOROSCOPIC GUIDANCE @ FORAMINAL LEVEL L1-2 #2 performed by Candance Reasons, MD at [address] Right 9/30/2019    RIGHT SACRAL RADIOFREQUENCY ABLATION performed by Candance Reasons, MD at . [facility] [address]  2000, 2005    Big Left Toe    TOE SURGERY Left 2018    2nd toe     TONSILLECTOMY      WISDOM TOOTH EXTRACTION       Family History   Problem Relation Age of Onset    Dementia Mother     Breast Cancer Mother     Cancer Mother         breast cancer [de-identified]     Stroke Mother     Heart Attack Father     Other Father 80        Aortic aneurysm    Cancer Brother      Social History     Socioeconomic History    Marital status:      Spouse name: Not on file    Number of children: Not on file    Years of education: Not on file    Highest education level: Not on file   Occupational History    Not on file   Social Needs    Financial resource strain: Not on file    Food insecurity     Worry: Not on file     Inability: Not on file    Transportation needs     Medical: Not on file     Non-medical: Not on file   Tobacco Use    Smoking status: Former Smoker     Packs/day: 0.25     Years: 30.00     Pack years: 7.50     Types: Cigarettes     Last attempt to quit: 10/5/2020     Years since quittin.0    Smokeless tobacco: Never Used    Tobacco comment: was going to try to stop but chantix is too expensive   Substance and Sexual Activity    Alcohol use: Not Currently     Alcohol/week: 0.0 standard drinks     Comment: rarely    Drug use: No    Sexual activity: Not on file   Lifestyle    Physical activity     Days per week: Not on file     Minutes per session: Not on file    Stress: Not on file   Relationships    Social connections     Talks on phone: Not on file     Gets together: Not on file     Attends [employer] service: Not on file     Active member of club or organization: Not on file     Attends meetings of clubs or organizations: Not on file     Relationship status: Not on file    Intimate partner violence     Fear of current or ex partner: Not on file     Emotionally abused: Not on file     Physically abused: Not on file     Forced sexual activity: Not on file   Other Topics Concern    Not on file   Social History Narrative    Not on file       Vitals:    10/16/20 1042   BP: 132/82   Temp: 97.7 °F (36.5 °C)   TempSrc: Temporal   Weight: 210 lb (95.3 kg)   Height: 5' 5\" (1.651 m)       Focused Lower Extremity Physical Exam:  Vitals:    10/16/20 1042 BP: 132/82   Temp: 97.7 °F (36.5 °C)        Foot Exam     Ortho Exam    Vascular: pulses  dp  pt  palpable  Capillary Refill Time:   Hair growth  Skin:    Edema:    Neurologic:      Musculoskeletal/ Orthopedic examination: There is pain with palpation at the insertion of the plantar fascial muscle calcaneus. There is negative pain with palpation to the medial lateral tubercles of the calcaneus. NAIL   Web space   Derm:      Xr Foot Left (min 3 Views)    Result Date: 10/15/2020  EXAMINATION: THREE XRAY VIEWS OF THE LEFT FOOT 10/15/2020 2:02 pm COMPARISON: February 2013 HISTORY: ORDERING SYSTEM PROVIDED HISTORY: Left foot pain TECHNOLOGIST PROVIDED HISTORY: Special attention to heel. Reason for exam:->left foot injury, extreme pain FINDINGS: Surgical changes at the base of the proximal phalanx of the 1st toe, mid 1st metatarsal, and medial cuboid. Diffuse osteopenia. No acute fracture or dislocation. No erosive or lytic bony changes. Alignment is anatomic. The large inferior calcaneal spur again noted. Partially visualized surgical changes at the distal fibula and talus. Diffuse osteopenia and surgical changes. No acute fracture or dislocation. No erosive or lytic bony changes. Large inferior calcaneal spur. Assessment and Plan: Heel spur noted plantar fasciitis noted I feel that the trauma induced the plantar fasciitis. Today we gave a cortisone shot into the right heel LowDye with arch pad icing stretching when the taping comes off  Potential risks, complications, alternative treatments, and procedure prognosis were explained to the patient. Verbal informed consent was obtained from the patient. Chlora prep preparation was performed over plantar fascia. 1 mL of 0.5% Marcaine plain and 1 cc of Celestone Soluspan into the left heel injected in standard medial approach plantar fascia. Patient tolerated steroid injection well. Band-Aid applied.   The patient was educated on a possible steroid flare and the use of ice with frozen water bottle 10 minutes each night discussed. Continue passive and active range of motion stretches and strengthening bilateral plantar fascia and Achilles tendons. STRAPPING AND TAPING  An application of 1 inch and 2 inch tape to plantar aspect of foot comprising of felt pad to support the foot and ankle to reduce pain and control swelling. Yamilka Yi was seen today for foot injury. Diagnoses and all orders for this visit:    Plantar fascial fibromatosis    Calcaneal spur of both feet    Pain in left toe(s)    Contusion of left foot, initial encounter        No follow-ups on file. Seen By:  Marija Sanford DPM      Document was created using voice recognition software. Note was reviewed, however may contain grammatical errors.

## 2020-10-17 ENCOUNTER — HOSPITAL ENCOUNTER (OUTPATIENT)
Dept: GENERAL RADIOLOGY | Age: 65
Discharge: HOME | End: 2020-10-19

## 2020-10-17 ENCOUNTER — HOSPITAL ENCOUNTER (OUTPATIENT)
Age: 65
Discharge: HOME | End: 2020-10-19

## 2020-10-17 LAB
SARS-COV-2: NOT DETECTED
SOURCE: NORMAL

## 2020-10-17 PROCEDURE — 73521 X-RAY EXAM HIPS BI 2 VIEWS: CPT

## 2020-10-17 PROCEDURE — 71046 X-RAY EXAM CHEST 2 VIEWS: CPT

## 2020-10-20 NOTE — H&P
Subjective:      Patient ID:  Alexandr Padgett is a 63 y.o. female.     HPI:     Patient presents today for left ear and jaw pain. Condition has been present for 2 week(s). PT states there is sharp pain there at times and down her neck.         Past Medical History        Past Medical History:   Diagnosis Date    Cancer [facility] 2019     LESION REMOVED UNDER TONGUE  DENTAL CLINIC    Chronic back pain      Costochondritis      Depression      Diet-controlled type 2 diabetes mellitus ([facility]      Falls frequently       none recent as of 19    Fibromyalgia      Hallux rigidus of left foot      HTN (hypertension)      Hyperlipidemia      CLYDE on CPAP       SETTING ?    Osteoarthritis       \"everywhere\"    Rheumatoid arthritis ([facility]       \"As a child. \"    Rheumatoid arthritis(714.0)      TIA (transient ischemic attack)      Use of cane as ambulatory aid          Past Surgical History         Past Surgical History:   Procedure Laterality Date    ANESTHESIA NERVE BLOCK Left 2019     LEFT C3,4,5 MBB performed by Ernestina Davis MD at [facility] Right 2019     BILATERAL TRANSFORAMINAL EPIDURAL STEROID INJECTION S1 #3 performed by Ernestina Davis MD at [address] [facility] Right 2020     RIGHT SACROILIAC JOINT INJECTION      CPT: 46756 performed by Stephanie Giordano DO at [address]        Left    ARTHRODESIS Left 2018     ARTHRODESIS 2ND DIGIT LEFT FOOT performed by Shirley Shannon DPM at [address] [place] [address]   2017     PLIF L3-L4, L4-L5 with rods, screws, and cages/Dr. Garnett Second BACK SURGERY   2020    BREAST SURGERY        reduction, bilat     SECTION       CHOLECYSTECTOMY       COLONOSCOPY   2015    COLONOSCOPY N/A 2020     COLONOSCOPY DIAGNOSTIC performed [facility] Alison Rodgers MD at [facility] ENDOSCOPY    ENDOSCOPY, COLON, DIAGNOSTIC        EPIDURAL STEROID INJECTION N/A 6/4/2019     BILATERAL TRANSFORAMINAL EPIDURAL STEROID INJECTION S1 performed by Emely Blake DO at [address]   2005     Left    [address] / ANKLE Left 12/14/2018     REMOVAL OF PAINFUL HARDWARE LEFT FOOT  ++SYNTHES++ performed by Lucy Mcintosh DPM at [facility]     inguinal     LUMBAR SPINE SURGERY N/A 3/4/2020     EXPLORATION OF PRIOR L3-L5 FUSION AND L2-L3  POSTERIOR LUMBAR INTERBODY FUSION -- NEEDS O-ARM, AUDIOLOGY, CAGES, PLATES, SCREWS, C-ARM, TERESA TABLE, CELL SAVER, PLATELET GEL -- GLOBUS performed by Gretta Cash MD at [address] Bilateral 05/07/2018     L1-2 lumbar foramen #1    NERVE BLOCK Bilateral 12/03/2018     s1 tfesi    NERVE BLOCK Bilateral 08/12/2019    NERVE BLOCK Right 09/30/2019     sacral radiofrequency    NERVE BLOCK Right 9/1/2020     RIGHT SACROILIAC JOINT INJECTION #2 performed by Emely Blake DO at [address] Left 9/25/13     left foot tarso metatarsal joint injection    OTHER SURGICAL HISTORY Left 5/27/15     Endoscopic Gastroc recession left, Lapidus left foot and  Excision of exostosis left foot    [place] INJECT ANES/STEROID FORAMEN LUMBAR/SACRAL W IMG GUIDE ,1 LEVEL Bilateral 12/3/2018     BILATERAL S1  EPIDURAL STEROID INJECTION performed by Fina Mendes MD at [address] DX/THER SBST INTRLMNR LMBR/SAC W/IMG GDN N/A 8/21/2018     EPIDURAL STEROID INJECTION L1-2 WITH LOW VOL performed by Fina Mendes MD at [address] [facility] NOSE/CLEFT LIP/TIP Bilateral 5/7/2018     BILATERAL L1-2 LUMBAR FORAMEN #1 performed by Fina Mendes MD at [address] NOSE/CLEFT LIP/TIP Bilateral 6/4/2018     BILATERAL TRANSFORAMINAL EPIDURAL STEROID INJECTION UNDER FLUOROSCOPIC GUIDANCE @ FORAMINAL LEVEL L1-2 #2 performed by Fina Mendes MD at [address] [place] Right 9/30/2019     RIGHT SACRAL RADIOFREQUENCY ABLATION performed by Lukasz Monk Nicole Carrillo MD at . [facility] [address]   2000, 2005     Big Left Toe    TOE SURGERY Left 2018     2nd toe     TONSILLECTOMY        WISDOM TOOTH EXTRACTION            Family History         Family History   Problem Relation Age of Onset    Dementia Mother      Breast Cancer Mother      Cancer Mother           breast cancer [de-identified]     Stroke Mother      Heart Attack Father      Other Father 80         Aortic aneurysm    Cancer Brother          Social History   Social History            Socioeconomic History    Marital status:        Spouse name: None    Number of children: None    Years of education: None    Highest education level: None   Occupational History    None   Social Needs    Financial resource strain: None    Food insecurity       Worry: None       Inability: None    Transportation needs       Medical: None       Non-medical: None   Tobacco Use    Smoking status: Current Every Day Smoker       Packs/day: 0.50       Years: 30.00       Pack years: 15.00       Types: Cigarettes    Smokeless tobacco: Never Used    Tobacco comment: was going to try to stop but chantix is too expensive   Substance and Sexual Activity    Alcohol use: Not Currently       Alcohol/week: 0.0 standard drinks       Comment: rarely    Drug use: No    Sexual activity: None   Lifestyle    Physical activity       Days per week: None       Minutes per session: None    Stress: None   Relationships    Social connections       Talks on phone: None       Gets together: None       Attends [employer] service: None       Active member of club or organization: None       Attends meetings of clubs or organizations: None       Relationship status: None    Intimate partner violence       Fear of current or ex partner: None       Emotionally abused: None       Physically abused: None       Forced sexual activity: None   Other Topics Concern    None   Social History Narrative    None             Allergies   Allergen Reactions    Pcn [Penicillins] Anaphylaxis         Review of Systems   Constitutional: Negative. Negative for appetite change. HENT: Positive for mouth sores. Eyes: Negative. Negative for pain, discharge and visual disturbance. Respiratory: Negative. Negative for apnea, chest tightness and shortness of breath. Cardiovascular: Negative. Negative for chest pain, palpitations and leg swelling. Gastrointestinal: Negative. Negative for abdominal pain, diarrhea and vomiting. Endocrine: Negative for cold intolerance, heat intolerance and polydipsia. Genitourinary: Negative. Negative for dysuria, flank pain and hematuria. Musculoskeletal: Negative. Negative for arthralgias, gait problem and neck pain. Skin: Negative. Negative for color change, pallor and rash. Allergic/Immunologic: Negative for environmental allergies, food allergies and immunocompromised state. Neurological: Negative. Negative for dizziness, numbness and headaches. Hematological: Negative for adenopathy. Psychiatric/Behavioral: Negative. Negative for behavioral problems and hallucinations. All other systems reviewed and are negative.                    Objective:          Vitals:     10/02/20 0911   Temp: 98.2 °F (36.8 °C)      Physical Exam  Vitals signs and nursing note reviewed. Constitutional:       Appearance: She is well-developed. HENT:      Head: Normocephalic and atraumatic. Comments: Pt jaw palpated and does have some crepitance on the left and excursion is in midline. Right Ear: Hearing, tympanic membrane, ear canal and external ear normal.      Left Ear: Hearing, tympanic membrane, ear canal and external ear normal.      Nose: Nose normal.      Mouth/Throat:      Tongue: Lesions present. Comments: Left side under the tongue there is a 0.5cm lesion, raised, white  Eyes:      Conjunctiva/sclera: Conjunctivae normal.      Pupils: Pupils are equal, round, and reactive to light.    Neck: Musculoskeletal: Normal range of motion and neck supple. Cardiovascular:      Rate and Rhythm: Normal rate and regular rhythm. Heart sounds: Normal heart sounds. Pulmonary:      Effort: Pulmonary effort is normal. No respiratory distress. Breath sounds: Normal breath sounds. Abdominal:      General: Bowel sounds are normal. There is no distension. Palpations: Abdomen is soft. Tenderness: There is no abdominal tenderness. There is no guarding. Musculoskeletal: Normal range of motion. Skin:     General: Skin is warm and dry. Neurological:      Mental Status: She is alert and oriented to person, place, and time. Psychiatric:         Behavior: Behavior normal.         Thought Content: Thought content normal.         Judgment: Judgment normal.                        Assessment:        Diagnosis Orders   1. Otalgia of left ear      2. Arthralgia of left temporomandibular joint                             Plan:      TMJ  Discussed causes of TMJ and the management of it. Patient is to start a soft diet for the next week with warm compresses to the area and OTC NSAIDS.       I recommend:     Excision tongue lesion with frozen section.     The procedure risks and benefits were discussed with the patient and family.  Pt and family understood and decided to proceed with the surgery.     Risks of any anesthesia are:  · Reactions to medicines   · Breathing problems  Risks of any surgery are:  · Bleeding   · Infection      Specific risks of surgery:

## 2020-10-21 ENCOUNTER — ANESTHESIA (OUTPATIENT)
Dept: OPERATING ROOM | Age: 65
End: 2020-10-21

## 2020-10-21 ENCOUNTER — HOSPITAL ENCOUNTER (OUTPATIENT)
Age: 65
Setting detail: OUTPATIENT SURGERY
Discharge: HOME | End: 2020-10-21
Attending: OTOLARYNGOLOGY | Admitting: OTOLARYNGOLOGY

## 2020-10-21 ENCOUNTER — ANESTHESIA EVENT (OUTPATIENT)
Dept: OPERATING ROOM | Age: 65
End: 2020-10-21

## 2020-10-21 VITALS
BODY MASS INDEX: 34.99 KG/M2 | OXYGEN SATURATION: 94 % | RESPIRATION RATE: 18 BRPM | DIASTOLIC BLOOD PRESSURE: 82 MMHG | SYSTOLIC BLOOD PRESSURE: 157 MMHG | HEIGHT: 65 IN | TEMPERATURE: 98 F | HEART RATE: 88 BPM | WEIGHT: 210 LBS

## 2020-10-21 VITALS
RESPIRATION RATE: 4 BRPM | OXYGEN SATURATION: 99 % | DIASTOLIC BLOOD PRESSURE: 119 MMHG | SYSTOLIC BLOOD PRESSURE: 232 MMHG

## 2020-10-21 LAB
HCT VFR BLD CALC: 41 % (ref 34–48)
HEMOGLOBIN: 13.9 G/DL (ref 11.5–15.5)
MCH RBC QN AUTO: 34.2 PG (ref 26–35)
MCHC RBC AUTO-ENTMCNC: 33.9 % (ref 32–34.5)
MCV RBC AUTO: 101 FL (ref 80–99.9)
PDW BLD-RTO: 12.5 FL (ref 11.5–15)
PLATELET # BLD: 278 E9/L (ref 130–450)
PMV BLD AUTO: 11.2 FL (ref 7–12)
RBC # BLD: 4.06 E12/L (ref 3.5–5.5)
WBC # BLD: 8.8 E9/L (ref 4.5–11.5)

## 2020-10-21 PROCEDURE — 36415 COLL VENOUS BLD VENIPUNCTURE: CPT

## 2020-10-21 PROCEDURE — 3700000000 HC ANESTHESIA ATTENDED CARE: Performed by: OTOLARYNGOLOGY

## 2020-10-21 PROCEDURE — 93005 ELECTROCARDIOGRAM TRACING: CPT

## 2020-10-21 PROCEDURE — 6360000002 HC RX W HCPCS: Performed by: NURSE ANESTHETIST, CERTIFIED REGISTERED

## 2020-10-21 PROCEDURE — 3600000002 HC SURGERY LEVEL 2 BASE: Performed by: OTOLARYNGOLOGY

## 2020-10-21 PROCEDURE — 7100000011 HC PHASE II RECOVERY - ADDTL 15 MIN: Performed by: OTOLARYNGOLOGY

## 2020-10-21 PROCEDURE — 7100000010 HC PHASE II RECOVERY - FIRST 15 MIN: Performed by: OTOLARYNGOLOGY

## 2020-10-21 PROCEDURE — 85027 COMPLETE CBC AUTOMATED: CPT

## 2020-10-21 PROCEDURE — 6370000000 HC RX 637 (ALT 250 FOR IP): Performed by: ANESTHESIOLOGY

## 2020-10-21 PROCEDURE — 2709999900 HC NON-CHARGEABLE SUPPLY: Performed by: OTOLARYNGOLOGY

## 2020-10-21 PROCEDURE — 6360000002 HC RX W HCPCS: Performed by: ANESTHESIOLOGY

## 2020-10-21 PROCEDURE — 2500000003 HC RX 250 WO HCPCS: Performed by: OTOLARYNGOLOGY

## 2020-10-21 PROCEDURE — 3600000012 HC SURGERY LEVEL 2 ADDTL 15MIN: Performed by: OTOLARYNGOLOGY

## 2020-10-21 PROCEDURE — 2500000003 HC RX 250 WO HCPCS: Performed by: NURSE ANESTHETIST, CERTIFIED REGISTERED

## 2020-10-21 PROCEDURE — 41100 BIOPSY OF TONGUE: CPT | Performed by: OTOLARYNGOLOGY

## 2020-10-21 PROCEDURE — 7100000000 HC PACU RECOVERY - FIRST 15 MIN: Performed by: OTOLARYNGOLOGY

## 2020-10-21 PROCEDURE — 88342 IMHCHEM/IMCYTCHM 1ST ANTB: CPT

## 2020-10-21 PROCEDURE — 88331 PATH CONSLTJ SURG 1 BLK 1SPC: CPT

## 2020-10-21 PROCEDURE — 88305 TISSUE EXAM BY PATHOLOGIST: CPT

## 2020-10-21 PROCEDURE — 7100000001 HC PACU RECOVERY - ADDTL 15 MIN: Performed by: OTOLARYNGOLOGY

## 2020-10-21 PROCEDURE — 3700000001 HC ADD 15 MINUTES (ANESTHESIA): Performed by: OTOLARYNGOLOGY

## 2020-10-21 PROCEDURE — 2580000003 HC RX 258: Performed by: NURSE ANESTHETIST, CERTIFIED REGISTERED

## 2020-10-21 RX ORDER — ONDANSETRON 2 MG/ML
INJECTION INTRAMUSCULAR; INTRAVENOUS PRN
Status: DISCONTINUED | OUTPATIENT
Start: 2020-10-21 | End: 2020-10-21 | Stop reason: SDUPTHER

## 2020-10-21 RX ORDER — SODIUM CHLORIDE 9 MG/ML
INJECTION, SOLUTION INTRAVENOUS CONTINUOUS PRN
Status: DISCONTINUED | OUTPATIENT
Start: 2020-10-21 | End: 2020-10-21 | Stop reason: SDUPTHER

## 2020-10-21 RX ORDER — ROCURONIUM BROMIDE 10 MG/ML
INJECTION, SOLUTION INTRAVENOUS PRN
Status: DISCONTINUED | OUTPATIENT
Start: 2020-10-21 | End: 2020-10-21 | Stop reason: SDUPTHER

## 2020-10-21 RX ORDER — SUCCINYLCHOLINE/SOD CL,ISO/PF 200MG/10ML
SYRINGE (ML) INTRAVENOUS PRN
Status: DISCONTINUED | OUTPATIENT
Start: 2020-10-21 | End: 2020-10-21 | Stop reason: SDUPTHER

## 2020-10-21 RX ORDER — CHLORHEXIDINE GLUCONATE 0.12 MG/ML
15 RINSE ORAL 2 TIMES DAILY
Qty: 420 ML | Refills: 0 | Status: SHIPPED | OUTPATIENT
Start: 2020-10-21 | End: 2020-11-04

## 2020-10-21 RX ORDER — DEXAMETHASONE SODIUM PHOSPHATE 4 MG/ML
INJECTION, SOLUTION INTRA-ARTICULAR; INTRALESIONAL; INTRAMUSCULAR; INTRAVENOUS; SOFT TISSUE PRN
Status: DISCONTINUED | OUTPATIENT
Start: 2020-10-21 | End: 2020-10-21 | Stop reason: SDUPTHER

## 2020-10-21 RX ORDER — LIDOCAINE HYDROCHLORIDE AND EPINEPHRINE 10; 10 MG/ML; UG/ML
INJECTION, SOLUTION INFILTRATION; PERINEURAL PRN
Status: DISCONTINUED | OUTPATIENT
Start: 2020-10-21 | End: 2020-10-21 | Stop reason: ALTCHOICE

## 2020-10-21 RX ORDER — SODIUM CHLORIDE 0.9 % (FLUSH) 0.9 %
10 SYRINGE (ML) INJECTION EVERY 12 HOURS SCHEDULED
Status: DISCONTINUED | OUTPATIENT
Start: 2020-10-21 | End: 2020-10-21 | Stop reason: HOSPADM

## 2020-10-21 RX ORDER — MEPERIDINE HYDROCHLORIDE 25 MG/ML
12.5 INJECTION INTRAMUSCULAR; INTRAVENOUS; SUBCUTANEOUS EVERY 5 MIN PRN
Status: DISCONTINUED | OUTPATIENT
Start: 2020-10-21 | End: 2020-10-21 | Stop reason: HOSPADM

## 2020-10-21 RX ORDER — FENTANYL CITRATE 50 UG/ML
50 INJECTION, SOLUTION INTRAMUSCULAR; INTRAVENOUS EVERY 5 MIN PRN
Status: DISCONTINUED | OUTPATIENT
Start: 2020-10-21 | End: 2020-10-21 | Stop reason: HOSPADM

## 2020-10-21 RX ORDER — FENTANYL CITRATE 50 UG/ML
INJECTION, SOLUTION INTRAMUSCULAR; INTRAVENOUS PRN
Status: DISCONTINUED | OUTPATIENT
Start: 2020-10-21 | End: 2020-10-21 | Stop reason: SDUPTHER

## 2020-10-21 RX ORDER — LIDOCAINE HYDROCHLORIDE 20 MG/ML
INJECTION, SOLUTION EPIDURAL; INFILTRATION; INTRACAUDAL; PERINEURAL PRN
Status: DISCONTINUED | OUTPATIENT
Start: 2020-10-21 | End: 2020-10-21 | Stop reason: SDUPTHER

## 2020-10-21 RX ORDER — SODIUM CHLORIDE 0.9 % (FLUSH) 0.9 %
10 SYRINGE (ML) INJECTION PRN
Status: DISCONTINUED | OUTPATIENT
Start: 2020-10-21 | End: 2020-10-21 | Stop reason: HOSPADM

## 2020-10-21 RX ORDER — ONDANSETRON 2 MG/ML
4 INJECTION INTRAMUSCULAR; INTRAVENOUS
Status: DISCONTINUED | OUTPATIENT
Start: 2020-10-21 | End: 2020-10-21 | Stop reason: HOSPADM

## 2020-10-21 RX ORDER — HYDROCODONE BITARTRATE AND ACETAMINOPHEN 5; 325 MG/1; MG/1
1 TABLET ORAL ONCE
Status: COMPLETED | OUTPATIENT
Start: 2020-10-21 | End: 2020-10-21

## 2020-10-21 RX ORDER — FENTANYL CITRATE 50 UG/ML
25 INJECTION, SOLUTION INTRAMUSCULAR; INTRAVENOUS EVERY 5 MIN PRN
Status: DISCONTINUED | OUTPATIENT
Start: 2020-10-21 | End: 2020-10-21 | Stop reason: HOSPADM

## 2020-10-21 RX ORDER — MIDAZOLAM HYDROCHLORIDE 1 MG/ML
INJECTION INTRAMUSCULAR; INTRAVENOUS PRN
Status: DISCONTINUED | OUTPATIENT
Start: 2020-10-21 | End: 2020-10-21 | Stop reason: SDUPTHER

## 2020-10-21 RX ORDER — PROPOFOL 10 MG/ML
INJECTION, EMULSION INTRAVENOUS PRN
Status: DISCONTINUED | OUTPATIENT
Start: 2020-10-21 | End: 2020-10-21 | Stop reason: SDUPTHER

## 2020-10-21 RX ADMIN — SODIUM CHLORIDE: 9 INJECTION, SOLUTION INTRAVENOUS at 11:22

## 2020-10-21 RX ADMIN — ONDANSETRON 4 MG: 2 INJECTION INTRAMUSCULAR; INTRAVENOUS at 11:19

## 2020-10-21 RX ADMIN — SODIUM CHLORIDE: 9 INJECTION, SOLUTION INTRAVENOUS at 11:13

## 2020-10-21 RX ADMIN — SUGAMMADEX 300 MG: 100 INJECTION, SOLUTION INTRAVENOUS at 11:52

## 2020-10-21 RX ADMIN — MIDAZOLAM 2 MG: 1 INJECTION INTRAMUSCULAR; INTRAVENOUS at 11:13

## 2020-10-21 RX ADMIN — ROCURONIUM BROMIDE 40 MG: 10 INJECTION, SOLUTION INTRAVENOUS at 11:30

## 2020-10-21 RX ADMIN — DEXAMETHASONE SODIUM PHOSPHATE 10 MG: 4 INJECTION, SOLUTION INTRAMUSCULAR; INTRAVENOUS at 11:19

## 2020-10-21 RX ADMIN — HYDROCODONE BITARTRATE AND ACETAMINOPHEN 1 TABLET: 5; 325 TABLET ORAL at 13:26

## 2020-10-21 RX ADMIN — PROPOFOL 200 MG: 10 INJECTION, EMULSION INTRAVENOUS at 11:19

## 2020-10-21 RX ADMIN — LIDOCAINE HYDROCHLORIDE 40 MG: 20 INJECTION, SOLUTION EPIDURAL; INFILTRATION; INTRACAUDAL; PERINEURAL at 11:34

## 2020-10-21 RX ADMIN — FENTANYL CITRATE 100 MCG: 50 INJECTION, SOLUTION INTRAMUSCULAR; INTRAVENOUS at 11:19

## 2020-10-21 RX ADMIN — LIDOCAINE HYDROCHLORIDE 60 MG: 20 INJECTION, SOLUTION EPIDURAL; INFILTRATION; INTRACAUDAL; PERINEURAL at 11:19

## 2020-10-21 RX ADMIN — FENTANYL CITRATE 50 MCG: 50 INJECTION, SOLUTION INTRAMUSCULAR; INTRAVENOUS at 12:25

## 2020-10-21 RX ADMIN — Medication 160 MG: at 11:19

## 2020-10-21 ASSESSMENT — PAIN SCALES - GENERAL
PAINLEVEL_OUTOF10: 7
PAINLEVEL_OUTOF10: 0

## 2020-10-21 ASSESSMENT — PULMONARY FUNCTION TESTS
PIF_VALUE: 17
PIF_VALUE: 21
PIF_VALUE: 3
PIF_VALUE: 20
PIF_VALUE: 4
PIF_VALUE: 1
PIF_VALUE: 17
PIF_VALUE: 17
PIF_VALUE: 24
PIF_VALUE: 17
PIF_VALUE: 20
PIF_VALUE: 1
PIF_VALUE: 22
PIF_VALUE: 21
PIF_VALUE: 17
PIF_VALUE: 22
PIF_VALUE: 2
PIF_VALUE: 20
PIF_VALUE: 17
PIF_VALUE: 2
PIF_VALUE: 21
PIF_VALUE: 2
PIF_VALUE: 1
PIF_VALUE: 23
PIF_VALUE: 2
PIF_VALUE: 22
PIF_VALUE: 17
PIF_VALUE: 20
PIF_VALUE: 17
PIF_VALUE: 21
PIF_VALUE: 17
PIF_VALUE: 23
PIF_VALUE: 20
PIF_VALUE: 22
PIF_VALUE: 19
PIF_VALUE: 17
PIF_VALUE: 23
PIF_VALUE: 23
PIF_VALUE: 17
PIF_VALUE: 22
PIF_VALUE: 17
PIF_VALUE: 20
PIF_VALUE: 2
PIF_VALUE: 17
PIF_VALUE: 18

## 2020-10-21 NOTE — H&P
Carla Souza was seen and re-examined preoperatively today, October 21, 2020. There was no substantial change in her physical and medical status. Patient is fit for the proposed surgical procedure. All questions were appropriately addressed and had no further questions regarding the risks, benefits, and alternatives of the procedure. Carla Souza and family wished to proceed.     Navid Villa DO  Resident Physician  [facility]  Otolaryngology Residency  10/21/2020  10:36 AM

## 2020-10-21 NOTE — ANESTHESIA PRE PROCEDURE
Department of Anesthesiology  Preprocedure Note       Name:  Augustina Quigley   Age:  63 y.o.  :  1955                                          MRN:  [id number]         Date:  10/21/2020      Surgeon: Cora Mullins):  Janina Gonzales DO    Procedure: Procedure(s):  EXCISION OF TONGUE LESION    Medications prior to admission:   Prior to Admission medications    Medication Sig Start Date End Date Taking? Authorizing Provider   HYDROcodone-acetaminophen (NORCO) 5-325 MG per tablet Take 1 tablet by mouth 3 times daily for 30 days. Take lowest dose possible to manage pain 10/1/20 10/31/20 Yes TREASURE Hale   gabapentin (NEURONTIN) 400 MG capsule Take 1 capsule by mouth 3 times daily for 90 days. 9/11/20 12/10/20 Yes Radha Landis MD   FLUoxetine (PROZAC) 40 MG capsule Take 1 capsule by mouth daily For mood. 20  Yes Radha Landis MD   lisinopril (PRINIVIL;ZESTRIL) 30 MG tablet Take 1 tablet by mouth every evening 20  Yes Radha Landis MD   atorvastatin (LIPITOR) 40 MG tablet Take 1 tablet by mouth daily 20  Yes Radha Landis MD   hydrOXYzine (VISTARIL) 25 MG capsule Take 1 capsule by mouth 3 times daily as needed for Anxiety 20  Yes Radha Landis MD   varenicline (CHANTIX CONTINUING MONTH CHARITY) 1 MG tablet Take 1 tablet by mouth 2 times daily 20  Yes Radha Landis MD       Current medications:    Current Facility-Administered Medications   Medication Dose Route Frequency Provider Last Rate Last Dose    sodium chloride flush 0.9 % injection 10 mL  10 mL Intravenous 2 times per day Jairo Dennis DO        sodium chloride flush 0.9 % injection 10 mL  10 mL Intravenous PRN Jairo Dennis DO           Allergies:     Allergies   Allergen Reactions    Pcn [Penicillins] Anaphylaxis       Problem List:    Patient Active Problem List   Diagnosis Code    Loss of balance R26.89    Vertebrobasilar TIAs G45.0    Obesity E66.9    Disc displacement, lumbar M51.26    Peripheral neuropathy (HCC) G62.9    Type 2 diabetes mellitus without complication (HCC) X02.1    Depression F32.9    Osteoarthritis M19.90    Chronic back pain M54.9, G89.29    Fibromyalgia M79.7    HTN (hypertension) I10    Hyperlipidemia E78.5    History of adenomatous polyp of colon Z86.010    Vitamin D deficiency E55.9    Coccyx pain M53.3    Acute bilateral low back pain with sciatica M54.40    Lumbar stenosis M48.061    Chronic bilateral low back pain without sciatica M54.5, G89.29    DDD (degenerative disc disease), lumbar M51.36    Spinal stenosis of lumbar region without neurogenic claudication M48.061    Lumbar facet arthropathy M47.816    Chronic pain syndrome G89.4    Lumbar radiculopathy M54.16    Neck pain M54.2    Left foot pain M79.672    Painful orthopaedic hardware [facility]) T84.84XA    Cervical facet joint syndrome M47.812    Cellulitis, neck L03.221    Disorder of sacrum M53.3    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 \"    Rheumatoid arthritis(714.0)     TIA (transient ischemic attack)     no deficits     Tongue lesion     for OR 10-21-20     Use of cane as ambulatory aid        Past Surgical History:        Procedure Laterality Date    ANESTHESIA NERVE BLOCK Left 2/26/2019    LEFT C3,4,5 MBB performed by Jazmín Mullins MD at [facility] Right 8/12/2019    BILATERAL TRANSFORAMINAL EPIDURAL STEROID INJECTION S1 #3 performed by Jazmín Mullins MD at [address] [place] [address] STEROID INJECTION performed by Andres Mckeon MD at [address] [facility] DX/THER SBST INTRLMNR LMBR/SAC W/IMG GDN N/A 2018    EPIDURAL STEROID INJECTION L1-2 WITH LOW VOL performed by Andres Mckeon MD at [address] [facility] NOSE/CLEFT LIP/TIP Bilateral 2018    BILATERAL L1-2 LUMBAR FORAMEN #1 performed by Andres Mckeon MD at [address] [facility] NOSE/CLEFT LIP/TIP Bilateral 2018    BILATERAL TRANSFORAMINAL EPIDURAL STEROID INJECTION UNDER FLUOROSCOPIC GUIDANCE @ FORAMINAL LEVEL L1-2 #2 performed by Andres Mckeon MD at [address] Right 2019    RIGHT SACRAL RADIOFREQUENCY ABLATION performed by Andres Mckeon MD at . [facility] [address]  ,     Big Left Toe    TOE SURGERY Left 2018    2nd toe     TONSILLECTOMY      WISDOM TOOTH EXTRACTION         Social History:    Social History     Tobacco Use    Smoking status: Former Smoker     Packs/day: 0.25     Years: 30.00     Pack years: 7.50     Types: Cigarettes     Last attempt to quit: 10/5/2020     Years since quittin.0    Smokeless tobacco: Never Used    Tobacco comment: was going to try to stop but chantix is too expensive   Substance Use Topics    Alcohol use: Not Currently     Alcohol/week: 0.0 standard drinks     Comment: rarely                                Counseling given: Not Answered  Comment: was going to try to stop but chantix is too expensive      Vital Signs (Current):   Vitals:    10/14/20 1620   BP: (!) 151/82   Pulse: 76   Resp: 18   Temp: 98.4 °F (36.9 °C)   TempSrc: Temporal   SpO2: 97%   Weight: 210 lb (95.3 kg)   Height: 5' 5\" (1.651 m)                                              BP Readings from Last 3 Encounters:   10/14/20 (!) 151/82   10/16/20 132/82   10/15/20 128/74       NPO Status: Time of last liquid consumption:                         Time of last solid consumption:                         Date of last liquid consumption: 10/20/20                        Date of last solid food consumption: 10/20/20    BMI:   Wt Readings from Last 3 Encounters:   10/14/20 210 lb (95.3 kg)   10/16/20 210 lb (95.3 kg)   10/02/20 210 lb (95.3 kg)     Body mass index is 34.95 kg/m². CBC:   Lab Results   Component Value Date    WBC 11.9 06/30/2020    RBC 4.65 06/30/2020    HGB 15.3 06/30/2020    HCT 46.1 06/30/2020    MCV 99.1 06/30/2020    RDW 13.5 06/30/2020     06/30/2020       CMP:   Lab Results   Component Value Date     06/30/2020    K 4.2 06/30/2020    K 4.7 03/07/2020     06/30/2020    CO2 21 06/30/2020    BUN 14 06/30/2020    CREATININE 0.8 06/30/2020    GFRAA >60 06/30/2020    LABGLOM >60 06/30/2020    GLUCOSE 100 06/30/2020    PROT 7.1 06/30/2020    CALCIUM 9.3 06/30/2020    BILITOT 0.3 06/30/2020    ALKPHOS 155 06/30/2020    AST 25 06/30/2020    ALT 25 06/30/2020       POC Tests: No results for input(s): POCGLU, POCNA, POCK, POCCL, POCBUN, POCHEMO, POCHCT in the last 72 hours.     Coags:   Lab Results   Component Value Date    PROTIME 10.5 02/26/2020    INR 0.9 02/26/2020    APTT 27.6 03/22/2019       HCG (If Applicable): No results found for: PREGTESTUR, PREGSERUM, HCG, HCGQUANT     ABGs: No results found for: PHART, PO2ART, OEP7BQS, RIO3BIC, BEART, U0OPEAKM     Type & Screen (If Applicable):  No results found for: LABABO, LABRH    Drug/Infectious Status (If Applicable):  No results found for: HIV, HEPCAB    COVID-19 Screening (If Applicable):   Lab Results   Component Value Date    COVID19 Not Detected 10/16/2020         Anesthesia Evaluation  Patient summary reviewed  Airway: Mallampati: III  TM distance: >3 FB     Mouth opening: > = 3 FB Dental:          Pulmonary:   (+) sleep apnea: on CPAP,                             Cardiovascular:    (+) hypertension:,         Rhythm: regular  Rate: normal           Beta Blocker:  Not on Beta Blocker         Neuro/Psych:   (+) neuromuscular disease:, TIA, psychiatric history:depression/anxiety    (-) seizures           GI/Hepatic/Renal:             Endo/Other:    (+) DiabetesType II DM, , : arthritis: rheumatoid. , .                 Abdominal:   (+) obese,     Abdomen: soft. Vascular:                                        Anesthesia Plan      general     ASA 3       Induction: intravenous. Anesthetic plan and risks discussed with patient. Plan discussed with CRNA.                   Endy Cuenca MD   10/21/2020

## 2020-10-21 NOTE — PROGRESS NOTES
CLINICAL PHARMACY NOTE: MEDS TO [address] [place] [address] [facility] Select Patient?: No  Total # of Prescriptions Filled: 1   The following medications were delivered to the patient:  · Chlorhexidine gluc 0.12% sol  Total # of Interventions Completed: 4  Time Spent (min): 45    Additional Documentation:

## 2020-10-21 NOTE — ANESTHESIA POSTPROCEDURE EVALUATION
Department of Anesthesiology  Postprocedure Note    Patient: Ryan Garg  MRN: [id number]  YOB: 1955  Date of evaluation: 10/21/2020  Time:  12:55 PM     Procedure Summary     Date:  10/21/20 Room / Location:  [address]    Anesthesia Start:  9518 Anesthesia Stop:  5926    Procedure:  EXCISION OF TONGUE LESION (N/A Mouth) Diagnosis:  (TONGUE LESION)    Surgeon:  Nanda Tanner DO Responsible Provider:  Paz Nuñez MD    Anesthesia Type:  general ASA Status:  3          Anesthesia Type: general    Carolyn Phase I: Carolyn Score: 10    Carolyn Phase II:      Last vitals: Reviewed and per EMR flowsheets.        Anesthesia Post Evaluation    Patient location during evaluation: PACU  Patient participation: complete - patient participated  Level of consciousness: awake  Pain score: 3  Airway patency: patent  Nausea & Vomiting: no nausea  Complications: no  Cardiovascular status: blood pressure returned to baseline  Respiratory status: acceptable  Hydration status: euvolemic

## 2020-10-22 LAB
EKG ATRIAL RATE: 73 BPM
EKG P AXIS: 70 DEGREES
EKG P-R INTERVAL: 158 MS
EKG Q-T INTERVAL: 406 MS
EKG QRS DURATION: 94 MS
EKG QTC CALCULATION (BAZETT): 447 MS
EKG R AXIS: 26 DEGREES
EKG T AXIS: 59 DEGREES
EKG VENTRICULAR RATE: 73 BPM

## 2020-10-23 ENCOUNTER — TELEPHONE (OUTPATIENT)
Dept: FAMILY MEDICINE CLINIC | Age: 65
End: 2020-10-23

## 2020-10-23 NOTE — TELEPHONE ENCOUNTER
Roby Henderson called in, wanting to see Dr Mariann ABAD. She is not able to bear weight on her left foot and cannot wait until her November appt. I wasn't sure if we were able to schedule without consulting you first.  Please advise.

## 2020-10-26 ENCOUNTER — OFFICE VISIT (OUTPATIENT)
Dept: PODIATRY | Age: 65
End: 2020-10-26

## 2020-10-26 VITALS — TEMPERATURE: 97.7 F | DIASTOLIC BLOOD PRESSURE: 84 MMHG | SYSTOLIC BLOOD PRESSURE: 144 MMHG

## 2020-10-26 PROCEDURE — 99213 OFFICE O/P EST LOW 20 MIN: CPT | Performed by: PODIATRIST

## 2020-10-26 PROCEDURE — 20550 NJX 1 TENDON SHEATH/LIGAMENT: CPT | Performed by: PODIATRIST

## 2020-10-26 RX ORDER — BUPIVACAINE HYDROCHLORIDE 5 MG/ML
1 INJECTION, SOLUTION PERINEURAL ONCE
Status: COMPLETED | OUTPATIENT
Start: 2020-10-26 | End: 2020-10-26

## 2020-10-26 RX ORDER — BETAMETHASONE SODIUM PHOSPHATE AND BETAMETHASONE ACETATE 3; 3 MG/ML; MG/ML
6 INJECTION, SUSPENSION INTRA-ARTICULAR; INTRALESIONAL; INTRAMUSCULAR; SOFT TISSUE ONCE
Status: COMPLETED | OUTPATIENT
Start: 2020-10-26 | End: 2020-10-26

## 2020-10-26 RX ADMIN — BETAMETHASONE SODIUM PHOSPHATE AND BETAMETHASONE ACETATE 6 MG: 3; 3 INJECTION, SUSPENSION INTRA-ARTICULAR; INTRALESIONAL; INTRAMUSCULAR; SOFT TISSUE at 16:18

## 2020-10-26 RX ADMIN — BUPIVACAINE HYDROCHLORIDE 5 MG: 5 INJECTION, SOLUTION PERINEURAL at 16:18

## 2020-10-26 NOTE — PROGRESS NOTES
10/26/20  Terese Olszewski : 1955 Sex: female  Age: 63 y.o. Patient was referred by Anna Hanson MD    Chief Complaint   Patient presents with    Foot Injury     left had an inj last visit no better just had tongue sx and is having back sx next month    Foot Pain       SUBJECTIVE pt seen for f/u  Left heel pain  50% better injection really helped   HPI  Review of Systems  Const: Denies constitutional symptoms  Musculo: Denies symptoms other than stated above  Skin: Denies symptoms other than stated above       Current Outpatient Medications:     chlorhexidine (PERIDEX) 0.12 % solution, Take 15 mLs by mouth 2 times daily for 14 days Swish and spit., Disp: 420 mL, Rfl: 0    HYDROcodone-acetaminophen (NORCO) 5-325 MG per tablet, Take 1 tablet by mouth 3 times daily for 30 days. Take lowest dose possible to manage pain, Disp: 90 tablet, Rfl: 0    gabapentin (NEURONTIN) 400 MG capsule, Take 1 capsule by mouth 3 times daily for 90 days. , Disp: 90 capsule, Rfl: 2    FLUoxetine (PROZAC) 40 MG capsule, Take 1 capsule by mouth daily For mood. , Disp: 90 capsule, Rfl: 1    lisinopril (PRINIVIL;ZESTRIL) 30 MG tablet, Take 1 tablet by mouth every evening, Disp: 90 tablet, Rfl: 1    atorvastatin (LIPITOR) 40 MG tablet, Take 1 tablet by mouth daily, Disp: 90 tablet, Rfl: 1    hydrOXYzine (VISTARIL) 25 MG capsule, Take 1 capsule by mouth 3 times daily as needed for Anxiety, Disp: 270 capsule, Rfl: 1    varenicline (CHANTIX CONTINUING MONTH ) 1 MG tablet, Take 1 tablet by mouth 2 times daily, Disp: 60 tablet, Rfl: 1  Allergies   Allergen Reactions    Pcn [Penicillins] Anaphylaxis       Past Medical History:   Diagnosis Date    Cancer ([facility] [address] 2019    LESION REMOVED UNDER TONGUE  DENTAL CLINIC    Chronic back pain     Costochondritis     h/o    Depression     Falls     last one      Fibromyalgia     Hallux rigidus of left foot     HTN (hypertension)     Hyperlipidemia     CLYDE on CPAP     Osteoarthritis     \"everywhere\"    Rheumatoid arthritis ([address]     \"As a child. \"    Rheumatoid arthritis(714.0)     TIA (transient ischemic attack)     no deficits     Tongue lesion     for OR 10-21-20     Use of cane as ambulatory aid      Past Surgical History:   Procedure Laterality Date    ANESTHESIA NERVE BLOCK Left 2019    LEFT C3,4,5 MBB performed by Tyler Lala MD at [address] Right 2019    BILATERAL TRANSFORAMINAL EPIDURAL STEROID INJECTION S1 #3 performed by Tyler Lala MD at [address] [facility] Right 2020    RIGHT SACROILIAC JOINT INJECTION      CPT: 29676 performed by Lilli Weir DO at [address]      Left    ARTHRODESIS Left 2018    ARTHRODESIS 2ND DIGIT LEFT FOOT performed by Aamir Morin DPM at [address]  2017    PLIF L3-L4, L4-L5 with rods, screws, and cages/Dr. Earline Goldsmith BACK SURGERY  2020    BREAST SURGERY      reduction, bilat     SECTION      CHOLECYSTECTOMY      COLONOSCOPY  2015    COLONOSCOPY N/A 2020    COLONOSCOPY DIAGNOSTIC performed by Sada Estrada MD at [address], COLON, DIAGNOSTIC      EPIDURAL STEROID INJECTION N/A 2019    BILATERAL TRANSFORAMINAL EPIDURAL STEROID INJECTION S1 performed by Lilli Weir DO at [address]      Left    [address] / [facility] Left 2018    REMOVAL OF PAINFUL HARDWARE LEFT FOOT  ++SYNTHES++ performed by Aamir Morin DPM at [facility]    inguinal     LUMBAR SPINE SURGERY N/A 3/4/2020    EXPLORATION OF PRIOR L3-L5 FUSION AND L2-L3  POSTERIOR LUMBAR INTERBODY FUSION -- NEEDS O-ARM, AUDIOLOGY, CAGES, PLATES, SCREWS, C-ARM, TERESA TABLE, CELL SAVER, PLATELET GEL -- GLOBUS performed by Nidhi Bridges MD at [address] N/A 10/21/2020    EXCISION OF TONGUE LESION performed by Abe Mortimer, Exam: skin intact; Neurovascular  Dorsalis pedis: 3+  Posterior tibial: 3+  Saphenous nerve sensation: normal  Tibial nerve sensation: normal  Superficial peroneal nerve sensation: normal  Deep peroneal nerve sensation: normal  Sural nerve sensation: normal             Ortho Exam    Vascular: pulses  dp  pt  palpable  Capillary Refill Time:   Hair growth  Skin:    Edema:    Neurologic:      Musculoskeletal/ Orthopedic examination:  Pain inferior aspect left heel   NAIL   Web space   Derm:         Assessment and Plan:Potential risks, complications, alternative treatments, and procedure prognosis were explained to the patient. Verbal informed consent was obtained from the patient. Chlora prep preparation was performed over plantar fascia. 1 mL of 0.5% Marcaine plain and 1cc celestone  injected in standard medial approach plantar fascia. Patient tolerated steroid injection well. Band-Aid applied. The patient was educated on a possible steroid flare and the use of ice with frozen water bottle 10 minutes each night discussed. Continue passive and active range of motion stretches and strengthening bilateral plantar fascia and Achilles tendons. [facility] was seen today for foot injury and foot pain. Diagnoses and all orders for this visit:    Plantar fascial fibromatosis    Calcaneal spur of both feet    Pain in left toe(s)    Other orders  -     betamethasone acetate-betamethasone sodium phosphate (CELESTONE) injection 6 mg  -     bupivacaine (MARCAINE) 0.5 % injection 5 mg        Return in about 1 month (around 11/26/2020). Seen By:  Claudia Chaidez DPM      Document was created using voice recognition software. Note was reviewed, however may contain grammatical errors.

## 2020-10-27 ENCOUNTER — OFFICE VISIT (OUTPATIENT)
Dept: ENT CLINIC | Age: 65
End: 2020-10-27

## 2020-10-27 VITALS — BODY MASS INDEX: 34.99 KG/M2 | TEMPERATURE: 98.1 F | HEIGHT: 65 IN | WEIGHT: 210 LBS

## 2020-10-27 PROCEDURE — 99212 OFFICE O/P EST SF 10 MIN: CPT | Performed by: OTOLARYNGOLOGY

## 2020-10-27 RX ORDER — PREDNISONE 20 MG/1
40 TABLET ORAL DAILY
Qty: 10 TABLET | Refills: 0 | Status: SHIPPED | OUTPATIENT
Start: 2020-10-27 | End: 2020-11-01

## 2020-10-27 ASSESSMENT — ENCOUNTER SYMPTOMS
ABDOMINAL PAIN: 0
GASTROINTESTINAL NEGATIVE: 1
EYE DISCHARGE: 0
APNEA: 0
VOMITING: 0
COLOR CHANGE: 0
EYE PAIN: 0
CHEST TIGHTNESS: 0
RESPIRATORY NEGATIVE: 1
SHORTNESS OF BREATH: 0
EYES NEGATIVE: 1
DIARRHEA: 0

## 2020-10-27 NOTE — PROGRESS NOTES
Subjective:      Patient ID:  Nicole Mccabe is a 63 y.o. female. HPI:    Patient presents today for follow up excision of tongue lesion. Patient is having pain and some difficulty swallowing and with speech since surgery, denies fever chills shortness of breath. Past Medical History:   Diagnosis Date    Cancer ([facility] [address] 2019    LESION REMOVED UNDER TONGUE  DENTAL CLINIC    Chronic back pain     Costochondritis     h/o    Depression     Falls     last one      Fibromyalgia     Hallux rigidus of left foot     HTN (hypertension)     Hyperlipidemia     CLYDE on CPAP     Osteoarthritis     \"everywhere\"    Rheumatoid arthritis ([facility] [address]     \"As a child. \"    Rheumatoid arthritis(714.0)     TIA (transient ischemic attack)     no deficits     Tongue lesion     for OR 10-21-20     Use of cane as ambulatory aid      Past Surgical History:   Procedure Laterality Date    ANESTHESIA NERVE BLOCK Left 2019    LEFT C3,4,5 MBB performed by Quita Little MD at [facility] Right 2019    BILATERAL TRANSFORAMINAL EPIDURAL STEROID INJECTION S1 #3 performed by Quita Little MD at [address] [facility] Right 2020    RIGHT SACROILIAC JOINT INJECTION      CPT: 89617 performed by Anthony Dempsey DO at [address]      Left    ARTHRODESIS Left 2018    ARTHRODESIS 2ND DIGIT LEFT FOOT performed by Salma Lizarraga DPM at [address] [place] [address]  2017    PLIF L3-L4, L4-L5 with rods, screws, and cages/Dr. Meneses Corporal BACK SURGERY  2020    BREAST SURGERY  2010    reduction, bilat     SECTION      CHOLECYSTECTOMY      COLONOSCOPY  2015    COLONOSCOPY N/A 2020    COLONOSCOPY DIAGNOSTIC performed by Ananya Ramesh MD at [address] [facility], COLON, DIAGNOSTIC      EPIDURAL STEROID INJECTION N/A 2019    BILATERAL TRANSFORAMINAL EPIDURAL STEROID INJECTION S1 performed by Anthony Dempsey Deanna Morris MD at [facility] TOE SURGERY  ,     Big Left Toe    TOE SURGERY Left 2018    2nd toe     TONSILLECTOMY      WISDOM TOOTH EXTRACTION       Family History   Problem Relation Age of Onset    Dementia Mother     Breast Cancer Mother     Cancer Mother         breast cancer [de-identified]     Stroke Mother     Heart Attack Father     Other Father 80        Aortic aneurysm    Cancer Brother      Social History     Socioeconomic History    Marital status:      Spouse name: None    Number of children: None    Years of education: None    Highest education level: None   Occupational History    None   Social Needs    Financial resource strain: None    Food insecurity     Worry: None     Inability: None    Transportation needs     Medical: None     Non-medical: None   Tobacco Use    Smoking status: Former Smoker     Packs/day: 0.25     Years: 30.00     Pack years: 7.50     Types: Cigarettes     Last attempt to quit: 10/5/2020     Years since quittin.0    Smokeless tobacco: Never Used    Tobacco comment: was going to try to stop but chantix is too expensive   Substance and Sexual Activity    Alcohol use: Not Currently     Alcohol/week: 0.0 standard drinks     Comment: rarely    Drug use: No    Sexual activity: None   Lifestyle    Physical activity     Days per week: None     Minutes per session: None    Stress: None   Relationships    Social connections     Talks on phone: None     Gets together: None     Attends Buddhist service: None     Active member of club or organization: None     Attends meetings of clubs or organizations: None     Relationship status: None    Intimate partner violence     Fear of current or ex partner: None     Emotionally abused: None     Physically abused: None     Forced sexual activity: None   Other Topics Concern    None   Social History Narrative    None     Allergies   Allergen Reactions    Pcn [Penicillins] Anaphylaxis       Review of Systems Constitutional: Negative. Negative for appetite change. HENT: Positive for mouth sores. Eyes: Negative. Negative for pain, discharge and visual disturbance. Respiratory: Negative. Negative for apnea, chest tightness and shortness of breath. Cardiovascular: Negative. Negative for chest pain, palpitations and leg swelling. Gastrointestinal: Negative. Negative for abdominal pain, diarrhea and vomiting. Endocrine: Negative for cold intolerance, heat intolerance and polydipsia. Genitourinary: Negative. Negative for dysuria, flank pain and hematuria. Musculoskeletal: Negative. Negative for arthralgias, gait problem and neck pain. Skin: Negative. Negative for color change, pallor and rash. Allergic/Immunologic: Negative for environmental allergies, food allergies and immunocompromised state. Neurological: Negative. Negative for dizziness, numbness and headaches. Hematological: Negative for adenopathy. Psychiatric/Behavioral: Negative. Negative for behavioral problems and hallucinations. All other systems reviewed and are negative. Objective:     Vitals:    10/27/20 1301   Temp: 98.1 °F (36.7 °C)     Physical Exam  Vitals signs and nursing note reviewed. Constitutional:       Appearance: She is well-developed. HENT:      Head: Normocephalic and atraumatic. Comments: Pt jaw palpated and does have some crepitance on the left and excursion is in midline. Right Ear: Hearing, tympanic membrane, ear canal and external ear normal.      Left Ear: Hearing, tympanic membrane, ear canal and external ear normal.      Nose: Nose normal.      Mouth/Throat:      Tongue: No lesions. Comments: Well healing left sided surgical excision of the tongue   Sutures dissolving   Eyes:      Conjunctiva/sclera: Conjunctivae normal.      Pupils: Pupils are equal, round, and reactive to light. Neck:      Musculoskeletal: Normal range of motion and neck supple. Cardiovascular:      Rate and Rhythm: Normal rate and regular rhythm. Heart sounds: Normal heart sounds. Pulmonary:      Effort: Pulmonary effort is normal. No respiratory distress. Breath sounds: Normal breath sounds. Abdominal:      General: Bowel sounds are normal. There is no distension. Palpations: Abdomen is soft. Tenderness: There is no abdominal tenderness. There is no guarding. Musculoskeletal: Normal range of motion. Skin:     General: Skin is warm and dry. Neurological:      Mental Status: She is alert and oriented to person, place, and time. Psychiatric:         Behavior: Behavior normal.         Thought Content: Thought content normal.         Judgment: Judgment normal.                 Assessment:       Diagnosis Orders   1. 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 Arthralgia of left temporomandibular joint                Plan:      TMJ  Discussed causes of TMJ and the management of it. Patient is to start a soft diet for the next week with warm compresses to the area and OTC NSAIDS. Will give prednisone 40mg for 5 days to assist with worsened pain since surgery    Tongue Squamous cell carcinoma in situ  -discussed with pathology in situ but awaiting final path  -will await final pathology but if continues in situ, no further intervention, margins surgically negatve        Follow up in 1 week(s)                     Jm Devi  1955    I have discussed the case, including pertinent history and exam findings with the resident. I have seen and examined the patient and the key elements of the encounter have been performed by me. I agree with the assessment, plan and orders as documented by the  resident              Remainder of medical problems as per  resident note. Patient seen and examined. Agree with above exam, assessment and plan.       Electronically signed by Mark Young DO on 11/8/20 at 6:07 PM EST

## 2020-10-30 ENCOUNTER — TELEPHONE (OUTPATIENT)
Dept: FAMILY MEDICINE CLINIC | Age: 65
End: 2020-10-30

## 2020-10-30 ENCOUNTER — OFFICE VISIT (OUTPATIENT)
Dept: PAIN MANAGEMENT | Age: 65
End: 2020-10-30

## 2020-10-30 ENCOUNTER — HOSPITAL ENCOUNTER (OUTPATIENT)
Age: 65
Discharge: HOME | End: 2020-11-01

## 2020-10-30 VITALS
OXYGEN SATURATION: 98 % | TEMPERATURE: 98.2 F | WEIGHT: 210 LBS | SYSTOLIC BLOOD PRESSURE: 134 MMHG | HEART RATE: 91 BPM | HEIGHT: 65 IN | BODY MASS INDEX: 34.99 KG/M2 | DIASTOLIC BLOOD PRESSURE: 72 MMHG

## 2020-10-30 LAB — SPECIFIC GRAVITY UA: >=1.03 (ref 1–1.03)

## 2020-10-30 PROCEDURE — G0480 DRUG TEST DEF 1-7 CLASSES: HCPCS

## 2020-10-30 PROCEDURE — 99213 OFFICE O/P EST LOW 20 MIN: CPT

## 2020-10-30 PROCEDURE — 80307 DRUG TEST PRSMV CHEM ANLYZR: CPT

## 2020-10-30 PROCEDURE — 99213 OFFICE O/P EST LOW 20 MIN: CPT | Performed by: PHYSICIAN ASSISTANT

## 2020-10-30 RX ORDER — HYDROCODONE BITARTRATE AND ACETAMINOPHEN 5; 325 MG/1; MG/1
1 TABLET ORAL 3 TIMES DAILY
Qty: 51 TABLET | Refills: 0 | Status: SHIPPED
Start: 2020-11-01 | End: 2020-12-14 | Stop reason: SDUPTHER

## 2020-10-30 NOTE — PROGRESS NOTES
Do you currently have any of the following:    Fever: No  Headache:  No  Cough: No  Shortness of breath: No  Exposed to anyone with these symptoms: No         Nicole Mccabe presents to the [address] [place] [address] on 10/30/2020. [facility] is complaining of backpain . The pain is constant. The pain is described as aching, throbbing, shooting and stabbing. Pain is rated on her best day at a 6, on her worst day at a 10, and on average at a 8 on the VAS scale. She took her last dose of Norco was this morning. Any procedures since your last visit: No    Pacemaker or defibrilator: No     She is not on NSAIDS and is not on anticoagulation medications to include none. /72 (Site: Right Upper Arm, Position: Sitting, Cuff Size: Medium Adult)   Pulse 91   Temp 98.2 °F (36.8 °C)   Ht 5' 5\" (1.651 m)   Wt 210 lb (95.3 kg)   LMP  (LMP Unknown)   SpO2 98%   BMI 34.95 kg/m²      No LMP recorded (lmp unknown).  Patient is postmenopausal.

## 2020-10-30 NOTE — TELEPHONE ENCOUNTER
2 boxes of Chantix 1 mg tabs. #56 tabs/box received for patient today via our PAP. Labeled, signed and ready for . Voicemail left for patient to return our call at their earliest convenience. Patient will need to sign and date P.A. P. log. (white binder above my desk)    Chantix on Dr. Maddie Rodríguez desk.

## 2020-10-30 NOTE — PROGRESS NOTES
OARRS report:  2018 consistent to 10/2020 consistent      Past Medical History:   Diagnosis Date    Cancer [facility]) 2019    LESION REMOVED UNDER TONGUE  DENTAL CLINIC    Chronic back pain     Costochondritis     h/o    Depression     Falls     last one      Fibromyalgia     Hallux rigidus of left foot     HTN (hypertension)     Hyperlipidemia     CLYDE on CPAP     Osteoarthritis     \"everywhere\"    Rheumatoid arthritis ([address]     \"As a child. \"    Rheumatoid arthritis(714.0)     TIA (transient ischemic attack)     no deficits     Tongue lesion     for OR 10-21-20     Use of cane as ambulatory aid        Past Surgical History:   Procedure Laterality Date    ANESTHESIA NERVE BLOCK Left 2019    LEFT C3,4,5 MBB performed by Alexa Salcedo MD at [address] Right 2019    BILATERAL TRANSFORAMINAL EPIDURAL STEROID INJECTION S1 #3 performed by Alexa Salcedo MD at [address] Right 2020    RIGHT SACROILIAC JOINT INJECTION      CPT: 78036 performed by Kell Wolfe DO at [address]      Left    ARTHRODESIS Left 2018    ARTHRODESIS 2ND DIGIT LEFT FOOT performed by Holly Box DPM at [address] [place] [address]  2017    PLIF L3-L4, L4-L5 with rods, screws, and cages/Dr. Arcadio Aguiar BACK SURGERY  2020    BREAST SURGERY  2010    reduction, bilat     SECTION  1993    CHOLECYSTECTOMY      COLONOSCOPY  2015    COLONOSCOPY N/A 2020    COLONOSCOPY DIAGNOSTIC performed by Fidelia Yip MD at [address] [facility], COLON, DIAGNOSTIC      EPIDURAL STEROID INJECTION N/A 2019    BILATERAL TRANSFORAMINAL EPIDURAL STEROID INJECTION S1 performed by Kell Wolfe DO at [address] [place] [address]      Left    [address] / ANKLE Left 2018    REMOVAL OF PAINFUL HARDWARE LEFT FOOT  ++SYNTHES++ performed by Holly Box DPM at [address] [place] [address] REPAIR  1998    inguinal     LUMBAR SPINE SURGERY N/A 3/4/2020    EXPLORATION OF PRIOR L3-L5 FUSION AND L2-L3  POSTERIOR LUMBAR INTERBODY FUSION -- NEEDS O-ARM, AUDIOLOGY, CAGES, PLATES, SCREWS, C-ARM, TERESA TABLE, CELL SAVER, PLATELET GEL -- GLOBUS performed by Princess Dav MD at [address] N/A 10/21/2020    EXCISION OF TONGUE LESION performed by Kathleen Dowell DO at [address] Bilateral 05/07/2018    L1-2 lumbar foramen #1    NERVE BLOCK Bilateral 12/03/2018    s1 tfesi    NERVE BLOCK Bilateral 08/12/2019    NERVE BLOCK Right 09/30/2019    sacral radiofrequency    NERVE BLOCK Right 9/1/2020    RIGHT SACROILIAC JOINT INJECTION #2 performed by Rebeca Sanford DO at [address] [facility] Left 9/25/13    left foot tarso metatarsal joint injection    OTHER SURGICAL HISTORY Left 5/27/15    Endoscopic Gastroc recession left, Lapidus left foot and  Excision of exostosis left foot    MA INJECT ANES/STEROID FORAMEN LUMBAR/SACRAL W IMG GUIDE ,1 LEVEL Bilateral 12/3/2018    BILATERAL S1  EPIDURAL STEROID INJECTION performed by Myriam Del Rio MD at [address] [facility] DX/THER SBST INTRLMNR LMBR/SAC W/IMG GDN N/A 8/21/2018    EPIDURAL STEROID INJECTION L1-2 WITH LOW VOL performed by Myriam Del Rio MD at [facility] NOSE/CLEFT LIP/TIP Bilateral 5/7/2018    BILATERAL L1-2 LUMBAR FORAMEN #1 performed by Myriam Del Rio MD at [address] [facility] NOSE/CLEFT LIP/TIP Bilateral 6/4/2018    BILATERAL TRANSFORAMINAL EPIDURAL STEROID INJECTION UNDER FLUOROSCOPIC GUIDANCE @ FORAMINAL LEVEL L1-2 #2 performed by Myriam Del Rio MD at [address] Right 9/30/2019    RIGHT SACRAL RADIOFREQUENCY ABLATION performed by Myriam Del Rio MD at [address]  2000, 2005    Big Left Toe    TOE SURGERY Left 2018    2nd toe     TONSILLECTOMY      WISDOM TOOTH EXTRACTION         Prior to Admission medications Medication Sig Start Date End Date Taking? Authorizing Provider   predniSONE (DELTASONE) 20 MG tablet Take 2 tablets by mouth daily for 5 days 10/27/20 11/1/20 Yes Win Rashid, DO   chlorhexidine (PERIDEX) 0.12 % solution Take 15 mLs by mouth 2 times daily for 14 days Swish and spit. 10/21/20 11/4/20 Yes Jenn Barrientos,    HYDROcodone-acetaminophen (NORCO) 5-325 MG per tablet Take 1 tablet by mouth 3 times daily for 30 days. Take lowest dose possible to manage pain 10/1/20 10/31/20 Yes TREASURE Cook   gabapentin (NEURONTIN) 400 MG capsule Take 1 capsule by mouth 3 times daily for 90 days. 9/11/20 12/10/20 Yes Nazario Hardy MD   FLUoxetine (PROZAC) 40 MG capsule Take 1 capsule by mouth daily For mood.  9/11/20  Yes Nazario Hardy MD   lisinopril (PRINIVIL;ZESTRIL) 30 MG tablet Take 1 tablet by mouth every evening 9/11/20  Yes Nazario Hardy MD   atorvastatin (LIPITOR) 40 MG tablet Take 1 tablet by mouth daily 9/11/20  Yes Nazario Hardy MD   hydrOXYzine (VISTARIL) 25 MG capsule Take 1 capsule by mouth 3 times daily as needed for Anxiety 9/11/20  Yes Nazario Hardy MD   varenicline (CHANTIX CONTINUING MONTH CHARITY) 1 MG tablet Take 1 tablet by mouth 2 times daily 9/11/20  Yes Nazario Hardy MD       Allergies   Allergen Reactions    Pcn [Penicillins] Anaphylaxis       Social History     Socioeconomic History    Marital status:      Spouse name: Not on file    Number of children: Not on file    Years of education: Not on file    Highest education level: Not on file   Occupational History    Not on file   Social Needs    Financial resource strain: Not on file    Food insecurity     Worry: Not on file     Inability: Not on file    Transportation needs     Medical: Not on file     Non-medical: Not on file   Tobacco Use    Smoking status: Former Smoker     Packs/day: 0.25     Years: 30.00     Pack years: 7.50     Types: Cigarettes     Last attempt to quit: 10/5/2020     Years since quittin.0    Smokeless tobacco: Never Used    Tobacco comment: was going to try to stop but chantix is too expensive   Substance and Sexual Activity    Alcohol use: Not Currently     Alcohol/week: 0.0 standard drinks     Comment: rarely    Drug use: No    Sexual activity: Not on file   Lifestyle    Physical activity     Days per week: Not on file     Minutes per session: Not on file    Stress: Not on file   Relationships    Social connections     Talks on phone: Not on file     Gets together: Not on file     Attends Sabianist service: Not on file     Active member of club or organization: Not on file     Attends meetings of clubs or organizations: Not on file     Relationship status: Not on file    Intimate partner violence     Fear of current or ex partner: Not on file     Emotionally abused: Not on file     Physically abused: Not on file     Forced sexual activity: Not on file   Other Topics Concern    Not on file   Social History Narrative    Not on file       Family History   Problem Relation Age of Onset    Dementia Mother     Breast Cancer Mother     Cancer Mother         breast cancer [de-identified]     Stroke Mother     Heart Attack Father     Other Father 80        Aortic aneurysm    Cancer Brother        REVIEW OF SYSTEMS:     Julia Morgan denies fever/chills, chest pain, shortness of breath, new bowel or bladder complaints or suicidal ideations. All other review of systems was negative. PHYSICAL EXAMINATION:      /72 (Site: Right Upper Arm, Position: Sitting, Cuff Size: Medium Adult)   Pulse 91   Temp 98.2 °F (36.8 °C)   Ht 5' 5\" (1.651 m)   Wt 210 lb (95.3 kg)   LMP  (LMP Unknown)   SpO2 98%   BMI 34.95 kg/m²     General:      General appearance: awake, alert, oriented, in no acute distress, well developed, well nourished and in no acute distress   pleasant and well-hydrated.     in no distress and A & O x3  Build:Overweight  Function:Rises from a seated position with difficulty    HEENT:    Head:normocephalic and atraumatic  Pupils:regular, round and equal.  Sclera: icterus absent  EOM:full and intact. Lungs:    Breathing:Normal expansion. Clear to auscultation. No rales, rhonchi, or wheezing. Abdomen:    Shape:non-distended and normal  Tenderness:none  Guarding:none     Lumbar spine:     Range of motion:abnormal moderately Lateral bending, flexion, extension rotation bilateral and is painful. Extremities:    Tremors:None bilaterally upper and lower  Range of motion:Generally normal shoulders  Intact:Yes  Cyanosis:none  Edema:Normal      Neurological:    Cranial nerves:normal  Sensory:diminished to light lateral aspect of right leg  Motor:                  Right Quadriceps3/5          Left Quadriceps5/5           Right Gastrocnemius3/5    Left Gastrocnemius5/5  Right Ant Tibialis3/5  Left Ant Tibialis5/5  Gait:antalgic    Dermatology:    Skin:no unusual rashes, no skin lesions, no palpable subcutaneous nodules and good skin turgor    Assessment/Plan:      Chronic low back pain with radiation to the Right groin, patient had low back surgery 08/2017 L3-5 fusion, recently had lumbar spine CT with severe L2-3 stenosis     Plan:  Patient is s/p revision fusion L2-L4 on 3/4/2020. Patient is s/p:  Right SI joint injection on 6/16/2020 with 80% relief x 1 month and s/p repeat on 9/1/2020 with 75-80% relief x 10 days and now at with less relief but still some. She is scheduled for a right SI joint fusion with Dr. Hanna Sutherland on 11/17. They can prescribe freely during the post op period. Continue norco 5/325 TID. Continue with Gabapentin 600 mg TID through her PCP  OARRS report reviewed 10/2020. UDS ordered today  Patient encouraged to stay active and to lose weight.  Failed physical therapy  Treatment plan discussed with the patient including medications side effects       Controlled Substance Monitoring:    Acute and Chronic Pain Monitoring:   RX Monitoring 10/30/2020   Attestation -   Acute Pain Prescriptions -   Periodic Controlled Substance Monitoring Possible medication side effects, risk of tolerance/dependence & alternative treatments discussed. ;No signs of potential drug abuse or diversion identified. ;Assessed functional status. ;Obtaining appropriate analgesic effect of treatment. ;Random urine drug screen sent today.    Chronic Pain > 80 MEDD -                       ccreferring physicf

## 2020-11-04 LAB
6AM URINE: <10 NG/ML
CODEINE, URINE: <20 NG/ML
HYDROCODONE, URINE: 1015 NG/ML
HYDROMORPHONE, URINE: <20 NG/ML
MORPHINE URINE: <20 NG/ML
NORHYDROCODONE, URINE: 1028 NG/ML
NOROXYCODONE, URINE: <20 NG/ML
NOROXYMORPHONE, URINE: <20 NG/ML
OXYCODONE, URINE CONFIRMATION: <20 NG/ML
OXYMORPHONE, URINE: <20 NG/ML

## 2020-11-05 LAB
Lab: NORMAL
REPORT: NORMAL
THIS TEST SENT TO: NORMAL

## 2020-11-06 ENCOUNTER — TELEPHONE (OUTPATIENT)
Dept: ENT CLINIC | Age: 65
End: 2020-11-06

## 2020-11-06 ENCOUNTER — APPOINTMENT (OUTPATIENT)
Dept: GENERAL RADIOLOGY | Age: 65
End: 2020-11-06

## 2020-11-06 ENCOUNTER — TELEPHONE (OUTPATIENT)
Dept: ADMINISTRATIVE | Age: 65
End: 2020-11-06

## 2020-11-06 ENCOUNTER — OFFICE VISIT (OUTPATIENT)
Dept: ENT CLINIC | Age: 65
End: 2020-11-06

## 2020-11-06 ENCOUNTER — HOSPITAL ENCOUNTER (EMERGENCY)
Age: 65
Discharge: HOME | End: 2020-11-06
Attending: EMERGENCY MEDICINE
Payer: COMMERCIAL

## 2020-11-06 VITALS
HEART RATE: 77 BPM | RESPIRATION RATE: 16 BRPM | SYSTOLIC BLOOD PRESSURE: 149 MMHG | WEIGHT: 210 LBS | TEMPERATURE: 98.4 F | BODY MASS INDEX: 34.99 KG/M2 | HEIGHT: 65 IN | DIASTOLIC BLOOD PRESSURE: 83 MMHG | OXYGEN SATURATION: 98 %

## 2020-11-06 VITALS — DIASTOLIC BLOOD PRESSURE: 95 MMHG | SYSTOLIC BLOOD PRESSURE: 153 MMHG

## 2020-11-06 VITALS
SYSTOLIC BLOOD PRESSURE: 155 MMHG | TEMPERATURE: 97.6 F | HEIGHT: 65 IN | BODY MASS INDEX: 34.99 KG/M2 | HEART RATE: 90 BPM | WEIGHT: 210 LBS | DIASTOLIC BLOOD PRESSURE: 102 MMHG | OXYGEN SATURATION: 97 %

## 2020-11-06 LAB
ALBUMIN SERPL-MCNC: 3.6 G/DL (ref 3.5–5.2)
ALP BLD-CCNC: 140 U/L (ref 35–104)
ALT SERPL-CCNC: 26 U/L (ref 0–32)
ANION GAP SERPL CALCULATED.3IONS-SCNC: 8 MMOL/L (ref 7–16)
AST SERPL-CCNC: 16 U/L (ref 0–31)
BASOPHILS ABSOLUTE: 0.02 E9/L (ref 0–0.2)
BASOPHILS RELATIVE PERCENT: 0.2 % (ref 0–2)
BILIRUB SERPL-MCNC: 0.4 MG/DL (ref 0–1.2)
BUN BLDV-MCNC: 13 MG/DL (ref 8–23)
CALCIUM SERPL-MCNC: 8.7 MG/DL (ref 8.6–10.2)
CHLORIDE BLD-SCNC: 103 MMOL/L (ref 98–107)
CO2: 26 MMOL/L (ref 22–29)
CREAT SERPL-MCNC: 0.8 MG/DL (ref 0.5–1)
EKG ATRIAL RATE: 78 BPM
EKG P AXIS: 66 DEGREES
EKG P-R INTERVAL: 166 MS
EKG Q-T INTERVAL: 398 MS
EKG QRS DURATION: 92 MS
EKG QTC CALCULATION (BAZETT): 453 MS
EKG R AXIS: 6 DEGREES
EKG T AXIS: 50 DEGREES
EKG VENTRICULAR RATE: 78 BPM
EOSINOPHILS ABSOLUTE: 0.14 E9/L (ref 0.05–0.5)
EOSINOPHILS RELATIVE PERCENT: 1.4 % (ref 0–6)
GFR AFRICAN AMERICAN: >60
GFR NON-AFRICAN AMERICAN: >60 ML/MIN/1.73
GLUCOSE BLD-MCNC: 149 MG/DL (ref 74–99)
HCT VFR BLD CALC: 42.7 % (ref 34–48)
HEMOGLOBIN: 14.1 G/DL (ref 11.5–15.5)
IMMATURE GRANULOCYTES #: 0.04 E9/L
IMMATURE GRANULOCYTES %: 0.4 % (ref 0–5)
LYMPHOCYTES ABSOLUTE: 2.12 E9/L (ref 1.5–4)
LYMPHOCYTES RELATIVE PERCENT: 21.2 % (ref 20–42)
MCH RBC QN AUTO: 33.7 PG (ref 26–35)
MCHC RBC AUTO-ENTMCNC: 33 % (ref 32–34.5)
MCV RBC AUTO: 102.2 FL (ref 80–99.9)
MONOCYTES ABSOLUTE: 0.88 E9/L (ref 0.1–0.95)
MONOCYTES RELATIVE PERCENT: 8.8 % (ref 2–12)
NEUTROPHILS ABSOLUTE: 6.8 E9/L (ref 1.8–7.3)
NEUTROPHILS RELATIVE PERCENT: 68 % (ref 43–80)
PDW BLD-RTO: 12.4 FL (ref 11.5–15)
PLATELET # BLD: 265 E9/L (ref 130–450)
PMV BLD AUTO: 10 FL (ref 7–12)
POTASSIUM REFLEX MAGNESIUM: 4.3 MMOL/L (ref 3.5–5)
PRO-BNP: 46 PG/ML (ref 0–125)
RBC # BLD: 4.18 E12/L (ref 3.5–5.5)
SODIUM BLD-SCNC: 137 MMOL/L (ref 132–146)
TOTAL PROTEIN: 6.4 G/DL (ref 6.4–8.3)
TROPONIN: <0.01 NG/ML (ref 0–0.03)
TROPONIN: <0.01 NG/ML (ref 0–0.03)
WBC # BLD: 10 E9/L (ref 4.5–11.5)

## 2020-11-06 PROCEDURE — 83880 ASSAY OF NATRIURETIC PEPTIDE: CPT

## 2020-11-06 PROCEDURE — 96374 THER/PROPH/DIAG INJ IV PUSH: CPT

## 2020-11-06 PROCEDURE — 2500000003 HC RX 250 WO HCPCS: Performed by: EMERGENCY MEDICINE

## 2020-11-06 PROCEDURE — 71045 X-RAY EXAM CHEST 1 VIEW: CPT

## 2020-11-06 PROCEDURE — 99284 EMERGENCY DEPT VISIT MOD MDM: CPT

## 2020-11-06 PROCEDURE — 84484 ASSAY OF TROPONIN QUANT: CPT

## 2020-11-06 PROCEDURE — 99024 POSTOP FOLLOW-UP VISIT: CPT | Performed by: OTOLARYNGOLOGY

## 2020-11-06 PROCEDURE — 93010 ELECTROCARDIOGRAM REPORT: CPT | Performed by: INTERNAL MEDICINE

## 2020-11-06 PROCEDURE — 6370000000 HC RX 637 (ALT 250 FOR IP): Performed by: GENERAL PRACTICE

## 2020-11-06 PROCEDURE — 85025 COMPLETE CBC W/AUTO DIFF WBC: CPT

## 2020-11-06 PROCEDURE — 6370000000 HC RX 637 (ALT 250 FOR IP): Performed by: EMERGENCY MEDICINE

## 2020-11-06 PROCEDURE — 36415 COLL VENOUS BLD VENIPUNCTURE: CPT

## 2020-11-06 PROCEDURE — 93005 ELECTROCARDIOGRAM TRACING: CPT | Performed by: GENERAL PRACTICE

## 2020-11-06 PROCEDURE — 80053 COMPREHEN METABOLIC PANEL: CPT

## 2020-11-06 RX ORDER — OMEPRAZOLE 20 MG/1
20 CAPSULE, DELAYED RELEASE ORAL
Qty: 60 CAPSULE | Refills: 0 | Status: SHIPPED | OUTPATIENT
Start: 2020-11-06 | End: 2021-03-03

## 2020-11-06 RX ORDER — ASPIRIN 81 MG/1
324 TABLET, CHEWABLE ORAL ONCE
Status: COMPLETED | OUTPATIENT
Start: 2020-11-06 | End: 2020-11-06

## 2020-11-06 RX ADMIN — FAMOTIDINE 20 MG: 10 INJECTION INTRAVENOUS at 11:30

## 2020-11-06 RX ADMIN — ASPIRIN 81 MG CHEWABLE TABLET 324 MG: 81 TABLET CHEWABLE at 10:28

## 2020-11-06 RX ADMIN — LIDOCAINE HYDROCHLORIDE: 20 SOLUTION ORAL; TOPICAL at 11:30

## 2020-11-06 ASSESSMENT — ENCOUNTER SYMPTOMS
DIARRHEA: 0
EYE PAIN: 0
DIARRHEA: 0
NAUSEA: 0
EYES NEGATIVE: 1
COUGH: 0
GASTROINTESTINAL NEGATIVE: 1
WHEEZING: 0
COLOR CHANGE: 0
SORE THROAT: 0
EYE DISCHARGE: 0
ABDOMINAL PAIN: 0
SHORTNESS OF BREATH: 0
SHORTNESS OF BREATH: 0
ABDOMINAL PAIN: 0
VOMITING: 0
APNEA: 0
CHEST TIGHTNESS: 0
CHOKING: 0
RESPIRATORY NEGATIVE: 1
SINUS PAIN: 0
CHEST TIGHTNESS: 0
EYE PAIN: 0
VOMITING: 0

## 2020-11-06 NOTE — PROGRESS NOTES
:     Patient ID:  Carla Souza is a 63 y.o. female. HPI:    Pt returns today for followup, s/p tongue biopsy and review of path report. Pt is not doing well. There are not problems from the surgical site. Pt is feeling chest pain today. She states it has been present for a week. She states the chest is hurting and won't relieved        Past Medical History:   Diagnosis Date    Cancer ([facility] 2019    LESION REMOVED UNDER TONGUE  DENTAL CLINIC    Chronic back pain     Costochondritis     h/o    Depression     Falls     last one      Fibromyalgia     Hallux rigidus of left foot     HTN (hypertension)     Hyperlipidemia     CLYDE on CPAP     Osteoarthritis     \"everywhere\"    Rheumatoid arthritis ([facility]     \"As a child. \"    Rheumatoid arthritis(714.0)     TIA (transient ischemic attack)     no deficits     Tongue lesion     for OR 10-21-20     Use of cane as ambulatory aid      Past Surgical History:   Procedure Laterality Date    ANESTHESIA NERVE BLOCK Left 2019    LEFT C3,4,5 MBB performed by Leonora Howell MD at [address] [facility] Right 2019    BILATERAL TRANSFORAMINAL EPIDURAL STEROID INJECTION S1 #3 performed by Leonora Howell MD at [address] [facility] [address] Right 2020    RIGHT SACROILIAC JOINT INJECTION      CPT: 68863 performed by Yue Keen DO at [address]      Left    ARTHRODESIS Left 2018    ARTHRODESIS 2ND DIGIT LEFT FOOT performed by Paddy Coleman DPM at [address] [facility]  2017    PLIF L3-L4, L4-L5 with rods, screws, and cages/Dr. Mackenzie Virk BACK SURGERY  2020    BREAST SURGERY  2010    reduction, bilat     SECTION  1993    CHOLECYSTECTOMY      COLONOSCOPY  2015    COLONOSCOPY N/A 2020    COLONOSCOPY DIAGNOSTIC performed by Caro Catherine MD at [address] [facility], COLON, DIAGNOSTIC      EPIDURAL STEROID INJECTION N/A 2019    BILATERAL TRANSFORAMINAL EPIDURAL STEROID INJECTION S1 performed by Krzysztof Maddox DO at [address] [facility]      Left    [address] / ANKLE Left 12/14/2018    REMOVAL OF PAINFUL HARDWARE LEFT FOOT  ++SYNTHES++ performed by Michoacano Hardy DPM at [address]    inguinal     LUMBAR SPINE SURGERY N/A 3/4/2020    EXPLORATION OF PRIOR L3-L5 FUSION AND L2-L3  POSTERIOR LUMBAR INTERBODY FUSION -- NEEDS O-ARM, AUDIOLOGY, CAGES, PLATES, SCREWS, C-ARM, TERESA TABLE, CELL SAVER, PLATELET GEL -- GLOBUS performed by Shelley Lubin MD at [email or web address] N/A 10/21/2020    EXCISION OF TONGUE LESION performed by Lety Fitch DO at [address] [facility] Bilateral 05/07/2018    L1-2 lumbar foramen #1    NERVE BLOCK Bilateral 12/03/2018    s1 tfesi    NERVE BLOCK Bilateral 08/12/2019    NERVE BLOCK Right 09/30/2019    sacral radiofrequency    NERVE BLOCK Right 9/1/2020    RIGHT SACROILIAC JOINT INJECTION #2 performed by Krzysztof Maddox DO at [address] [facility] Left 9/25/13    left foot tarso metatarsal joint injection    OTHER SURGICAL HISTORY Left 5/27/15    Endoscopic Gastroc recession left, Lapidus left foot and  Excision of exostosis left foot    NC INJECT ANES/STEROID FORAMEN LUMBAR/SACRAL W IMG GUIDE ,1 LEVEL Bilateral 12/3/2018    BILATERAL S1  EPIDURAL STEROID INJECTION performed by Candance Reasons, MD at [address] [facility] DX/THER SBST INTRLMNR LMBR/SAC W/IMG GDN N/A 8/21/2018    EPIDURAL STEROID INJECTION L1-2 WITH LOW VOL performed by Candance Reasons, MD at [address] [facility] NOSE/CLEFT LIP/TIP Bilateral 5/7/2018    BILATERAL L1-2 LUMBAR FORAMEN #1 performed by Candance Reasons, MD at [address] [facility] NOSE/CLEFT LIP/TIP Bilateral 6/4/2018    BILATERAL TRANSFORAMINAL EPIDURAL STEROID INJECTION UNDER FLUOROSCOPIC GUIDANCE @ FORAMINAL LEVEL L1-2 #2 performed by Candance Reasons, MD at [address] [facility] Allergen Reactions    Pcn [Penicillins] Anaphylaxis           Review of Systems   Constitutional: Negative. Negative for appetite change. HENT: Positive for mouth sores. Eyes: Negative. Negative for pain, discharge and visual disturbance. Respiratory: Negative. Negative for apnea, chest tightness and shortness of breath. Cardiovascular: Negative. Negative for chest pain, palpitations and leg swelling. Gastrointestinal: Negative. Negative for abdominal pain, diarrhea and vomiting. Endocrine: Negative for cold intolerance, heat intolerance and polydipsia. Genitourinary: Negative. Negative for dysuria, flank pain and hematuria. Musculoskeletal: Negative. Negative for arthralgias, gait problem and neck pain. Skin: Negative. Negative for color change, pallor and rash. Allergic/Immunologic: Negative for environmental allergies, food allergies and immunocompromised state. Neurological: Negative. Negative for dizziness, numbness and headaches. Hematological: Negative for adenopathy. Psychiatric/Behavioral: Negative. Negative for behavioral problems and hallucinations. All other systems reviewed and are negative. Objective:   Physical Exam  Vitals signs and nursing note reviewed. Constitutional:       Appearance: She is well-developed. HENT:      Head: Normocephalic and atraumatic. Comments: Pt jaw palpated and does have some crepitance on the left and excursion is in midline. Right Ear: Hearing, tympanic membrane, ear canal and external ear normal.      Left Ear: Hearing, tympanic membrane, ear canal and external ear normal.      Nose: Nose normal.      Mouth/Throat:      Tongue: No lesions. Comments: Well healing left sided surgical excision of the tongue   Sutures dissolving   Eyes:      Conjunctiva/sclera: Conjunctivae normal.      Pupils: Pupils are equal, round, and reactive to light.    Neck:      Musculoskeletal: Normal range of motion and neck supple. Cardiovascular:      Rate and Rhythm: Normal rate and regular rhythm. Heart sounds: Normal heart sounds. Pulmonary:      Effort: Pulmonary effort is normal. No respiratory distress. Breath sounds: Normal breath sounds. Abdominal:      General: Bowel sounds are normal. There is no distension. Palpations: Abdomen is soft. Tenderness: There is no abdominal tenderness. There is no guarding. Musculoskeletal: Normal range of motion. Skin:     General: Skin is warm and dry. Neurological:      Mental Status: She is alert and oriented to person, place, and time. Psychiatric:         Behavior: Behavior normal.         Thought Content: Thought content normal.         Judgment: Judgment normal.               Pathology:  Diagnosis:   Left anterior tongue, excision: In situ squamous cell carcinoma   Surgical margins negative for malignancy     Comment:     Intradepartmental consultation is obtained. Additional   levels as well as cytokeratin 5/6 immunostained sections are reviewed. There is no definitive evidence of an invasive component. Assessment:      Diagnosis Orders   1. 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  She is doing better from the surgery.    Follow up in 1 month(s) for recheck

## 2020-11-06 NOTE — ED PROVIDER NOTES
normal.      Nose: Nose normal.      Mouth/Throat:      Pharynx: No oropharyngeal exudate. Eyes:      General:         Right eye: No discharge. Left eye: No discharge. Pupils: Pupils are equal, round, and reactive to light. Neck:      Musculoskeletal: Normal range of motion. Thyroid: No thyromegaly. Vascular: No JVD. Trachea: No tracheal deviation. Cardiovascular:      Rate and Rhythm: Normal rate and regular rhythm. Heart sounds: Normal heart sounds. No murmur. No friction rub. No gallop. Pulmonary:      Effort: No respiratory distress. Breath sounds: No stridor. No wheezing or rales. Abdominal:      General: There is no distension. Tenderness: There is no abdominal tenderness. Musculoskeletal:         General: No deformity. Lymphadenopathy:      Cervical: No cervical adenopathy. Skin:     General: Skin is warm and dry. Neurological:      Mental Status: She is alert. Cranial Nerves: No cranial nerve deficit. EKG #1:  Interpreted by emergency department physician unless otherwise noted. Time:  0857    Rate: 78  Rhythm: Sinus. Interpretation: normal EKG, normal sinus rhythm, normal sinus rhythm. Heart Score for Risk of Major Adverse Cardiac Events    HISTORY  Highly suspicious  +2  Moderately suspicious +1  Slightly suspicious  +0    EKG  Significant ST depression +2  Non specific repolarization +1  Normal    +0    AGE  >=65    +2  45-65    +1  <45    +0    RISK FACTORS*  > or = 3 risk factors or +2    history of atherosclerotic    disease.    1-2 risk factors  +1  No risk factors   +0    TROPONIN  >= 3 x normal limit  +2  1-3 x normal limit  +1  Within lormal range  +0    Total    3    *Risk factors  Risk factors: HTN, hypercholesterolemia, DM, obesity (BMI >30 kg/m²), smoking (current, or smoking cessation =3 mo), positive family history (parent or sibling with CVD before age 72); atherosclerotic disease: prior MI, PCI/CABG, CVA/TIA, or --------------------------------------------- PAST HISTORY ---------------------------------------------  Past Medical History:  has a past medical history of Cancer ([address] Chronic back pain, Costochondritis, Depression, Falls, Fibromyalgia, Hallux rigidus of left foot, HTN (hypertension), Hyperlipidemia, CLYDE on CPAP, Osteoarthritis, Rheumatoid arthritis ([address] Rheumatoid arthritis(714.0), TIA (transient ischemic attack), Tongue lesion, and Use of cane as ambulatory aid. Past Surgical History:  has a past surgical history that includes  section (); Toe Surgery (, ); Ankle surgery (); Cholecystectomy (); Tonsillectomy; [place] tooth extraction; other surgical history (Left, 13); hernia repair (); Colonoscopy (2015); other surgical history (Left, 5/27/15); Endoscopy, colon, diagnostic; back surgery (2017); Nerve Block (Bilateral, 2018); pr becca nose/cleft lip/tip (Bilateral, 2018); pr becca nose/cleft lip/tip (Bilateral, 2018); pr njx dx/ther sbst intrlmnr lmbr/sac w/img gdn (N/A, 2018); Nerve Block (Bilateral, 2018); pr inject anes/steroid foramen lumbar/sacral w img guide ,1 level (Bilateral, 12/3/2018); arthrodesis (Left, 2018); HARDWARE REMOVAL FOOT / ANKLE (Left, 2018); Anesthesia Nerve Block (Left, 2019); epidural steroid injection (N/A, 2019); Nerve Block (Bilateral, 2019); Anesthesia Nerve Block (Right, 2019); Nerve Block (Right, 2019); RADIOFREQUENCY ABLATION NERVES (Right, 2019); Lumbar spine surgery (N/A, 3/4/2020); Anesthesia Nerve Block (Right, 2020); back surgery (2020); Toe Surgery (Left, ); Colonoscopy (N/A, 2020); Nerve Block (Right, 2020); Breast surgery (); Foot surgery; and Mouth surgery (N/A, 10/21/2020). Social History:  reports that she quit smoking about 4 weeks ago. Her smoking use included cigarettes.  She has a 7.50 pack-year smoking g/dL    Total Bilirubin 0.4 0.0 - 1.2 mg/dL    Alkaline Phosphatase 140 (H) 35 - 104 U/L    ALT 26 0 - 32 U/L    AST 16 0 - 31 U/L   Troponin   Result Value Ref Range    Troponin <0.01 0.00 - 0.03 ng/mL   Brain Natriuretic Peptide   Result Value Ref Range    Pro-BNP 46 0 - 125 pg/mL   Troponin   Result Value Ref Range    Troponin <0.01 0.00 - 0.03 ng/mL   EKG 12 Lead   Result Value Ref Range    Ventricular Rate 78 BPM    Atrial Rate 78 BPM    P-R Interval 166 ms    QRS Duration 92 ms    Q-T Interval 398 ms    QTc Calculation (Bazett) 453 ms    P Axis 66 degrees    R Axis 6 degrees    T Axis 50 degrees       Radiology:  XR CHEST PORTABLE   Final Result   No acute cardiopulmonary disease in the visualized chest             ------------------------- NURSING NOTES AND VITALS REVIEWED ---------------------------  Date / Time Roomed:  11/6/2020  9:23 AM  ED Bed Assignment:  16/16    The nursing notes within the ED encounter and vital signs as below have been reviewed. BP (!) 144/79   Pulse 78   Temp 98.4 °F (36.9 °C) (Oral)   Resp 16   Ht 5' 5\" (1.651 m)   Wt 210 lb (95.3 kg)   LMP  (LMP Unknown)   SpO2 100%   BMI 34.95 kg/m²   Oxygen Saturation Interpretation: Normal      ------------------------------------------ PROGRESS NOTES ------------------------------------------  2:19 PM EST  I have spoken with the patient and discussed todays results, in addition to providing specific details for the plan of care and counseling regarding the diagnosis and prognosis. Their questions are answered at this time and they are agreeable with the plan. I discussed at length with them reasons for immediate return here for re evaluation. They will followup with their primary care physician by calling their office tomorrow.       --------------------------------- ADDITIONAL PROVIDER NOTES ---------------------------------  At this time the patient is without objective evidence of an acute process requiring hospitalization or inpatient management. They have remained hemodynamically stable throughout their entire ED visit and are stable for discharge with outpatient follow-up. The plan has been discussed in detail and they are aware of the specific conditions for emergent return, as well as the importance of follow-up. New Prescriptions    No medications on file       Diagnosis:  1. Chest pain, unspecified type        Disposition:  Patient's disposition: Discharge to home  Patient's condition is stable.                  Nallely  43., DO  Resident  11/06/20 [id number]

## 2020-11-06 NOTE — TELEPHONE ENCOUNTER
Pt was seen at [facility] ER today for chest pain. She was told to schedule a f/u asap with her pcp to have a stress test ordered. Pcp out of the office next week. Pt requesting if she can be scheduled for stress test.  Please contact pt at [phone].

## 2020-11-06 NOTE — TELEPHONE ENCOUNTER
Patient in office c/o severe heartburn for a week now. Patient denies hx of heartburn previously and has never felt like this before. Patient denies SOB, chest pain, etc. Patient appears very uncomfortable with numbness and tingling on L side of neck as well. VS taken-Dr Gonzales notified. Patient sent to ED for evaluation.

## 2020-11-10 ENCOUNTER — TELEPHONE (OUTPATIENT)
Dept: FAMILY MEDICINE CLINIC | Age: 65
End: 2020-11-10

## 2020-11-10 ENCOUNTER — TELEPHONE (OUTPATIENT)
Dept: NEUROSURGERY | Age: 65
End: 2020-11-10

## 2020-11-10 NOTE — TELEPHONE ENCOUNTER
Postpone upcoming neurosurgery next week for SI joint fusion due to new onset chest pain. Appt with me on Monday - please schedule. Possible stress test ordered at that time.

## 2020-11-10 NOTE — TELEPHONE ENCOUNTER
She saw Dr Salvador Villafana who then sent her to the ER because of the Jaw pain.   They did an EKG and told her that she needs a stress test.

## 2020-11-10 NOTE — PROGRESS NOTES
Jennifer 38 PRE-ADMISSION TESTING GENERAL INSTRUCTIONS- [facility]-phone number:[phone]    GENERAL INSTRUCTIONS  [x] Antibacterial Soap shower Night before  Surgery  [x] Jim wipe instruction sheet and wipes given. [x] Nothing by mouth after midnight, including gum, candy, mints, or water. [x] You may brush your teeth, gargle, but do NOT swallow water. [x]No smoking, chewing tobacco, illegal drugs, or alcohol within 24 hours of your surgery. [x] Jewelry, valuables or body piercing's should not be brought to the hospital. All body and/or tongue piercing's must be removed prior to arriving to hospital.  ALL hair pins must be removed. [x] Do not wear makeup, lotions, powders, deodorant. Nail polish as directed by the nurse. [x] Arrange transportation with a responsible adult  to and from the hospital. If you do not have a responsible adult  to transport you, you will need to make arrangements with a medical transportation company (i.e. [employer]. A Uber/taxi/bus is not appropriate unless you are accompanied by a responsible adult ). Arrange for someone to be with you for the remainder of the day and for 24 hours after your procedure due to having had anesthesia. Who will be your  for transportation? __sister_______________   Who will be staying with you for 24 hrs after your procedure?__________sister________  [x] Bring insurance card and photo ID. [x] Transfusion Bracelet: Please bring with you to hospital, day of surgery  [x] CPAP/BI-PAP: Please bring your machine if you are to spend the night in the hospital.     PARKING INSTRUCTIONS:   [x] Arrival Time:___0500  [address]           [x] To reach the [facility] from [facility], upon entering the hospital, take elevator B to the 3rd floor.     EDUCATION INSTRUCTIONS:       [x] Pre-admission Testing educational folder given  [x] Incentive Spirometry,coughing & deep breathing exercises reviewed. []Fluoroscopy-Xray used in surgery reviewed with patient. Educational pamphlet placed in chart. [x]Pain: Post-op pain is normal and to be expected. You will be asked to rate your pain from 0-10(a zero is not acceptable-education is needed). Your post-op pain goal is:  [x] Ask your nurse for your pain medication. MEDICATION INSTRUCTIONS:   [x]Bring a complete list of your medications, please write the last time you took the medicine, give this list to the nurse. [x] Take the following medications the morning of surgery with 1-2 ounces of water: SEE MED SHEET    WHAT TO EXPECT:  [x] The day of surgery you will be greeted and checked in by the [employer].  In addition, you will be registered in the [place] by a Patient Access Representative. Please bring your photo ID and insurance card. A nurse will greet you in accordance to the time you are needed in the pre-op area to prepare you for surgery. Please do not be discouraged if you are not greeted in the order you arrive as there are many variables that are involved in patient preparation. Your patience is greatly appreciated as you wait for your nurse. Please bring in items such as: books, magazines, newspapers, electronics, or any other items  to occupy your time in the waiting area. [x]  Delays may occur with surgery and staff will make a sincere effort to keep you informed of delays. If any delays occur with your procedure, we apologize ahead of time for your inconvenience as we recognize the value of your time.

## 2020-11-11 ENCOUNTER — HOSPITAL ENCOUNTER (OUTPATIENT)
Dept: PREADMISSION TESTING | Age: 65
Discharge: HOME | End: 2020-11-11

## 2020-11-11 NOTE — TELEPHONE ENCOUNTER
Patient questioning if her surgery had to be postponed. Needs medical and cardiac clearance. Patient notified 11/11/20.

## 2020-11-12 ENCOUNTER — TELEPHONE (OUTPATIENT)
Dept: PAIN MANAGEMENT | Age: 65
End: 2020-11-12

## 2020-11-12 NOTE — TELEPHONE ENCOUNTER
Pt called stating \"my surgery was postponed until possibly January. I have a partial refill on my norco but will need more refills. \"  Pt last seen Oct 2020 no future apt scheduled at this time.

## 2020-11-16 ENCOUNTER — OFFICE VISIT (OUTPATIENT)
Dept: FAMILY MEDICINE CLINIC | Age: 65
End: 2020-11-16
Payer: COMMERCIAL

## 2020-11-16 VITALS
HEIGHT: 65 IN | TEMPERATURE: 98.8 F | OXYGEN SATURATION: 98 % | SYSTOLIC BLOOD PRESSURE: 134 MMHG | BODY MASS INDEX: 35.16 KG/M2 | WEIGHT: 211 LBS | DIASTOLIC BLOOD PRESSURE: 86 MMHG | HEART RATE: 90 BPM

## 2020-11-16 PROCEDURE — 90471 IMMUNIZATION ADMIN: CPT | Performed by: FAMILY MEDICINE

## 2020-11-16 PROCEDURE — 90750 HZV VACC RECOMBINANT IM: CPT | Performed by: FAMILY MEDICINE

## 2020-11-16 PROCEDURE — 99213 OFFICE O/P EST LOW 20 MIN: CPT | Performed by: FAMILY MEDICINE

## 2020-11-16 NOTE — PROGRESS NOTES
[facility]  Office Progress Note - Dr. Sandrine Parra  11/16/20    CC:   Chief Complaint   Patient presents with    Heartburn     Follow up from hospital SEB 11/06        HPI: f/u from ED visit  She was sent from ENT office to Ed for neck pain and report of heart burn. This was interpreted as possible chest pain with neck radiation. She felt though was typical of heartburn and that neck pain was related to recent ENT surgery / positioning. In ED she had ACS workup which was neg. troponins x2, EKG reassuring. She had quick resolution of symptoms with a GI cocktail. She has not had any recurrence. She continues to have some persistent ever present soreness along the anterior /lateral side of her neck, less tenderness but present over the SCM. No masses or LAD palpable. She has been normally active recently without SOBOE, Angina, exertional fatigue etc.        _________________________________________________________  Past Medical History:   Diagnosis Date    Cancer ([facility] 12/2019    LESION REMOVED UNDER TONGUE  DENTAL CLINIC    Chronic back pain     Costochondritis     h/o    Depression     Falls     last one 2017     Fibromyalgia     Hallux rigidus of left foot     HTN (hypertension)     Hyperlipidemia     CLYDE on CPAP     Osteoarthritis     \"everywhere\"    Rheumatoid arthritis ([address]     \"As a child. \"    Rheumatoid arthritis(714.0)     TIA (transient ischemic attack)     no deficits     Tongue lesion     for OR 10-21-20     Use of cane as ambulatory aid        Family History   Problem Relation Age of Onset    Dementia Mother     Breast Cancer Mother     Cancer Mother         breast cancer [de-identified]     Stroke Mother     Heart Attack Father     Other Father 80        Aortic aneurysm    Cancer Brother        Past Surgical History:   Procedure Laterality Date    ANESTHESIA NERVE BLOCK Left 2/26/2019    LEFT C3,4,5 MBB performed by Donis Croft MD at [facility] ANESTHESIA NERVE BLOCK Right 2019    BILATERAL TRANSFORAMINAL EPIDURAL STEROID INJECTION S1 #3 performed by Melissa Smith MD at [address] Right 2020    RIGHT SACROILIAC JOINT INJECTION      CPT: 28770 performed by Janie Fontaine DO at [address]      Left    ARTHRODESIS Left 2018    ARTHRODESIS 2ND DIGIT LEFT FOOT performed by Jessica Dumont DPM at [facility]  2017    PLIF L3-L4, L4-L5 with rods, screws, and cages/Dr. Silvano Thompson BACK SURGERY  2020    BREAST SURGERY      reduction, bilat     SECTION  1993    CHOLECYSTECTOMY      COLONOSCOPY  2015    COLONOSCOPY N/A 2020    COLONOSCOPY DIAGNOSTIC performed by Genoveva Castellano MD at [facility], COLON, DIAGNOSTIC      EPIDURAL STEROID INJECTION N/A 2019    BILATERAL TRANSFORAMINAL EPIDURAL STEROID INJECTION S1 performed by Janie Fontaine DO at [address]      Left    [address] / ANKLE Left 2018    REMOVAL OF PAINFUL HARDWARE LEFT FOOT  ++SYNTHES++ performed by Jessica Dumont DPM at [facility]    inguinal     LUMBAR SPINE SURGERY N/A 3/4/2020    EXPLORATION OF PRIOR L3-L5 FUSION AND L2-L3  POSTERIOR LUMBAR INTERBODY FUSION -- NEEDS O-ARM, AUDIOLOGY, CAGES, PLATES, SCREWS, C-ARM, TERESA TABLE, CELL SAVER, PLATELET GEL -- GLOBUS performed by Socrates Null MD at [address] N/A 10/21/2020    EXCISION OF TONGUE LESION performed by Mark Young DO at [address] Bilateral 2018    L1-2 lumbar foramen #1    NERVE BLOCK Bilateral 2018    s1 tfesi    NERVE BLOCK Bilateral 2019    NERVE BLOCK Right 2019    sacral radiofrequency    NERVE BLOCK Right 2020    RIGHT SACROILIAC JOINT INJECTION #2 performed by Janie Fontaine DO at [facility] Left 13    left foot tarso metatarsal hematuria  No skin rashes, No skin lesions. No polyuria, polydipsia, polyphagia. Stable mood. ROS otherwise negative unless as listed in HPI. Chart reviewed and updated where appropriate for PMH, Fam, and Soc Hx.  __________________________________________________________  Physical Exam   /86 (Site: Left Upper Arm, Position: Sitting, Cuff Size: Large Adult)   Pulse 90   Temp 98.8 °F (37.1 °C) (Temporal)   Ht 5' 5\" (1.651 m)   Wt 211 lb (95.7 kg)   LMP  (LMP Unknown)   SpO2 98%   BMI 35.11 kg/m²   Wt Readings from Last 3 Encounters:   11/16/20 211 lb (95.7 kg)   11/06/20 210 lb (95.3 kg)   11/06/20 210 lb (95.3 kg)       Constitutional:    She is oriented to person, place, and time. She appears well-developed and well-nourished. HENT:   Eyes:    Conjunctivae are normal.    Pupils are equal, round, and reactive to light. EOMI. Neck:    Normal range of motion. No thyromegaly or nodules noted. No bruit. Tenderness over left neck as in the HPI. Cardiovascular:    Normal rate, regular rhythm and normal heart sounds. No murmur. No gallop and no friction rub. Pulmonary/Chest:    Effort normal and breath sounds normal.    No wheezes. No rales or rhonchi. Abdominal:    Soft. Bowel sounds are normal.    No distension. No tenderness. Musculoskeletal:    Normal range of motion. No joint swelling noted. No peripheral edema. Skin:    Skin is warm and dry. No rashes, No lesions. Psychiatric:    She has a normal mood and affect. Normal groom and dress. No SI or HI.   ________________________________________________________  Current Outpatient Medications on File Prior to Visit   Medication Sig Dispense Refill    HYDROcodone-acetaminophen (NORCO) 5-325 MG per tablet Take 1 tablet by mouth 3 times daily for 17 days. Take lowest dose possible to manage pain 51 tablet 0    gabapentin (NEURONTIN) 400 MG capsule Take 1 capsule by mouth 3 times daily for 90 days.  90 capsule 2  FLUoxetine (PROZAC) 40 MG capsule Take 1 capsule by mouth daily For mood. 90 capsule 1    lisinopril (PRINIVIL;ZESTRIL) 30 MG tablet Take 1 tablet by mouth every evening 90 tablet 1    atorvastatin (LIPITOR) 40 MG tablet Take 1 tablet by mouth daily 90 tablet 1    hydrOXYzine (VISTARIL) 25 MG capsule Take 1 capsule by mouth 3 times daily as needed for Anxiety 270 capsule 1    varenicline (CHANTIX CONTINUING MONTH CHARITY) 1 MG tablet Take 1 tablet by mouth 2 times daily 60 tablet 1    omeprazole (PRILOSEC) 20 MG delayed release capsule Take 1 capsule by mouth 2 times daily (before meals) (Patient not taking: Reported on 11/16/2020) 60 capsule 0     No current facility-administered medications on file prior to visit.         Patient Active Problem List   Diagnosis Code    Loss of balance R26.89    Vertebrobasilar TIAs G45.0    Obesity E66.9    Disc displacement, lumbar M51.26    Peripheral neuropathy ([facility] [address] G62.9    Type 2 diabetes mellitus without complication (HCC) E03.2    Depression F32.9    Osteoarthritis M19.90    Chronic back pain M54.9, G89.29    Fibromyalgia M79.7    HTN (hypertension) I10    Hyperlipidemia E78.5    History of adenomatous polyp of colon Z86.010    Vitamin D deficiency E55.9    Coccyx pain M53.3    Acute bilateral low back pain with sciatica M54.40    Lumbar stenosis M48.061    Chronic bilateral low back pain without sciatica M54.5, G89.29    DDD (degenerative disc disease), lumbar M51.36    Spinal stenosis of lumbar region without neurogenic claudication M48.061    Lumbar facet arthropathy M47.816    Chronic pain syndrome G89.4    Lumbar radiculopathy M54.16    Neck pain M54.2    Left foot pain M79.672    Painful orthopaedic hardware [facility]) T84.84XA    Cervical facet joint syndrome M47.812    Cellulitis, neck L03.221    Disorder of sacrum M53.3    Adjacent segment disease with spinal stenosis [id number]    S/P lumbar spinal fusion Z98.1    Tongue lesion K14.8 ________________________________________________________  Assessment / Amara Castillo was seen today for heartburn. Diagnoses and all orders for this visit:    Chest pain, unspecified type  This sounds very much like she probably had 2 different problems going on at once. Sounds like reflux was causing her heartburn sensation. Denies ever having CP, pressure, heaviness, exertional symptoms, etc.   GI cocktail quickly resolved sx in ED. Normal EKG and neg trops in ED. Persistent and likely unrelated neck pain, which is probably from recent ENT surgery / positioning. It is slowly improving with time and no abnormalities on PE. Given all of the above, I do not think she needs to have a stress test to evaluate for CAD. She does have some risk factors but she is not having any persistent or recurrent symptoms that would suggest CAD. Certainly we can pursue this if any would develop. OK to proceed with NS that was scheduled recently. Encounter for mammogram to establish baseline mammogram  -     [facility] YESENIA DIGITAL SCREEN BILATERAL; Future    Other orders  -     Zoster recombinant [facility])    Keep reg fu, sooner prn or as scheduled. Patient counseled to follow up sooner or seek more acute care if symptoms worsening or not improving according to plan. Electronically signed by Kayden Cruz MD on 11/16/2020    This note may have been created using dictation software.  Efforts were made to reduce grammatical or syntax errors, but some may persist.

## 2020-12-07 ENCOUNTER — TELEPHONE (OUTPATIENT)
Dept: NEUROSURGERY | Age: 65
End: 2020-12-07

## 2020-12-11 ENCOUNTER — OFFICE VISIT (OUTPATIENT)
Dept: ENT CLINIC | Age: 65
End: 2020-12-11

## 2020-12-11 VITALS — TEMPERATURE: 97.9 F | WEIGHT: 211 LBS | HEIGHT: 65 IN | BODY MASS INDEX: 35.16 KG/M2

## 2020-12-11 PROCEDURE — 99213 OFFICE O/P EST LOW 20 MIN: CPT | Performed by: OTOLARYNGOLOGY

## 2020-12-11 ASSESSMENT — ENCOUNTER SYMPTOMS
ABDOMINAL PAIN: 0
EYE DISCHARGE: 0
CHEST TIGHTNESS: 0
EYE PAIN: 0
RESPIRATORY NEGATIVE: 1
VOMITING: 0
SHORTNESS OF BREATH: 0
GASTROINTESTINAL NEGATIVE: 1
COLOR CHANGE: 0
DIARRHEA: 0
APNEA: 0
EYES NEGATIVE: 1

## 2020-12-11 NOTE — PROGRESS NOTES
Subjective:      Patient ID:  Ibeth Graf is a 63 y.o. female. HPI:    Patient presents today for tongue cancer check. Condition has been present for 1 month(s). Pt feels increased sensation in the area of the biopsy      Past Medical History:   Diagnosis Date    Cancer ([facility] [address] [address] 2019    LESION REMOVED UNDER TONGUE  DENTAL CLINIC    Chronic back pain     Costochondritis     h/o    Depression     Falls     last one      Fibromyalgia     Hallux rigidus of left foot     HTN (hypertension)     Hyperlipidemia     CLYDE on CPAP     Osteoarthritis     \"everywhere\"    Rheumatoid arthritis ([address]     \"As a child. \"    Rheumatoid arthritis(714.0)     TIA (transient ischemic attack)     no deficits     Tongue lesion     for OR 10-21-20     Use of cane as ambulatory aid      Past Surgical History:   Procedure Laterality Date    ANESTHESIA NERVE BLOCK Left 2019    LEFT C3,4,5 MBB performed by Andres Mckeon MD at [facility] Right 2019    BILATERAL TRANSFORAMINAL EPIDURAL STEROID INJECTION S1 #3 performed by Andres Mckeon MD at [address] [facility] Right 2020    RIGHT SACROILIAC JOINT INJECTION      CPT: 60455 performed by Ximena Gonzalez DO at [address]      Left    ARTHRODESIS Left 2018    ARTHRODESIS 2ND DIGIT LEFT FOOT performed by Hemanth Dorsey DPM at [facility]  2017    PLIF L3-L4, L4-L5 with rods, screws, and cages/Dr. Mindi Hudson BACK SURGERY  2020    BREAST SURGERY      reduction, bilat     SECTION      CHOLECYSTECTOMY      COLONOSCOPY  2015    COLONOSCOPY N/A 2020    COLONOSCOPY DIAGNOSTIC performed by Lita Cerrato MD at [address] [facility], COLON, DIAGNOSTIC      EPIDURAL STEROID INJECTION N/A 2019    BILATERAL TRANSFORAMINAL EPIDURAL STEROID INJECTION S1 performed by Ximena Gonzalez DO at [address]      Left    HARDWARE REMOVAL FOOT / ANKLE Left 12/14/2018    REMOVAL OF PAINFUL HARDWARE LEFT FOOT  ++SYNTHES++ performed by Michoacano Hardy DPM at [facility]    inguinal     LUMBAR SPINE SURGERY N/A 3/4/2020    EXPLORATION OF PRIOR L3-L5 FUSION AND L2-L3  POSTERIOR LUMBAR INTERBODY FUSION -- NEEDS O-ARM, AUDIOLOGY, CAGES, PLATES, SCREWS, C-ARM, TERESA TABLE, CELL SAVER, PLATELET GEL -- GLOBUS performed by Shelley Lubin MD at [address] [facility]  N/A 10/21/2020    EXCISION OF TONGUE LESION performed by Lety Fitch DO at [address] [facility] [address] Bilateral 05/07/2018    L1-2 lumbar foramen #1    NERVE BLOCK Bilateral 12/03/2018    s1 tfesi    NERVE BLOCK Bilateral 08/12/2019    NERVE BLOCK Right 09/30/2019    sacral radiofrequency    NERVE BLOCK Right 9/1/2020    RIGHT SACROILIAC JOINT INJECTION #2 performed by Krzysztof Maddox DO at [address] Left 9/25/13    left foot tarso metatarsal joint injection    OTHER SURGICAL HISTORY Left 5/27/15    Endoscopic Gastroc recession left, Lapidus left foot and  Excision of exostosis left foot    MN INJECT ANES/STEROID FORAMEN LUMBAR/SACRAL W IMG GUIDE ,1 LEVEL Bilateral 12/3/2018    BILATERAL S1  EPIDURAL STEROID INJECTION performed by Candance Reasons, MD at [address] [facility] DX/THER SBST INTRLMNR LMBR/SAC W/IMG GDN N/A 8/21/2018    EPIDURAL STEROID INJECTION L1-2 WITH LOW VOL performed by Candance Reasons, MD at [address] [facility] NOSE/CLEFT LIP/TIP Bilateral 5/7/2018    BILATERAL L1-2 LUMBAR FORAMEN #1 performed by Candance Reasons, MD at [address] [facility] NOSE/CLEFT LIP/TIP Bilateral 6/4/2018    BILATERAL TRANSFORAMINAL EPIDURAL STEROID INJECTION UNDER FLUOROSCOPIC GUIDANCE @ FORAMINAL LEVEL L1-2 #2 performed by Candance Reasons, MD at [address] Right 9/30/2019    RIGHT SACRAL RADIOFREQUENCY ABLATION performed by Candance Reasons, MD at . [facility] ,     Big Left Toe    TOE SURGERY Left 2018    2nd toe     TONSILLECTOMY      WISDOM TOOTH EXTRACTION       Family History   Problem Relation Age of Onset    Dementia Mother     Breast Cancer Mother     Cancer Mother         breast cancer [de-identified]     Stroke Mother     Heart Attack Father     Other Father 80        Aortic aneurysm    Cancer Brother      Social History     Socioeconomic History    Marital status:      Spouse name: None    Number of children: None    Years of education: None    Highest education level: None   Occupational History    None   Social Needs    Financial resource strain: None    Food insecurity     Worry: None     Inability: None    Transportation needs     Medical: None     Non-medical: None   Tobacco Use    Smoking status: Former Smoker     Packs/day: 0.25     Years: 30.00     Pack years: 7.50     Types: Cigarettes     Last attempt to quit: 10/5/2020     Years since quittin.1    Smokeless tobacco: Never Used    Tobacco comment: was going to try to stop but chantix is too expensive   Substance and Sexual Activity    Alcohol use: Not Currently     Alcohol/week: 0.0 standard drinks     Comment: rarely    Drug use: No    Sexual activity: None   Lifestyle    Physical activity     Days per week: None     Minutes per session: None    Stress: None   Relationships    Social connections     Talks on phone: None     Gets together: None     Attends Christian service: None     Active member of club or organization: None     Attends meetings of clubs or organizations: None     Relationship status: None    Intimate partner violence     Fear of current or ex partner: None     Emotionally abused: None     Physically abused: None     Forced sexual activity: None   Other Topics Concern    None   Social History Narrative    None     Allergies   Allergen Reactions    Pcn [Penicillins] Anaphylaxis       Review of Systems   Constitutional: Negative.   Negative for appetite change. HENT: Positive for mouth sores. Eyes: Negative. Negative for pain, discharge and visual disturbance. Respiratory: Negative. Negative for apnea, chest tightness and shortness of breath. Cardiovascular: Negative. Negative for chest pain, palpitations and leg swelling. Gastrointestinal: Negative. Negative for abdominal pain, diarrhea and vomiting. Endocrine: Negative for cold intolerance, heat intolerance and polydipsia. Genitourinary: Negative. Negative for dysuria, flank pain and hematuria. Musculoskeletal: Negative. Negative for arthralgias, gait problem and neck pain. Skin: Negative. Negative for color change, pallor and rash. Allergic/Immunologic: Negative for environmental allergies, food allergies and immunocompromised state. Neurological: Negative. Negative for dizziness, numbness and headaches. Hematological: Negative for adenopathy. Psychiatric/Behavioral: Negative. Negative for behavioral problems and hallucinations. All other systems reviewed and are negative. Objective:     Vitals:    12/11/20 0819   Temp: 97.9 °F (36.6 °C)     Physical Exam  Vitals signs and nursing note reviewed. Constitutional:       Appearance: She is well-developed. HENT:      Head: Normocephalic and atraumatic. Comments: Pt jaw palpated and does have some crepitance on the left and excursion is in midline. Right Ear: Hearing, tympanic membrane, ear canal and external ear normal.      Left Ear: Hearing, tympanic membrane, ear canal and external ear normal.      Nose: Nose normal.      Mouth/Throat:      Tongue: No lesions. Tongue does not deviate from midline. Comments: Well healing left sided surgical excision of the tongue   Sutures dissolving   Eyes:      Conjunctiva/sclera: Conjunctivae normal.      Pupils: Pupils are equal, round, and reactive to light. Neck:      Musculoskeletal: Normal range of motion and neck supple.    Cardiovascular: Rate and Rhythm: Normal rate and regular rhythm. Heart sounds: Normal heart sounds. Pulmonary:      Effort: Pulmonary effort is normal. No respiratory distress. Breath sounds: Normal breath sounds. Abdominal:      General: Bowel sounds are normal. There is no distension. Palpations: Abdomen is soft. Tenderness: There is no abdominal tenderness. There is no guarding. Musculoskeletal: Normal range of motion. Skin:     General: Skin is warm and dry. Neurological:      Mental Status: She is alert and oriented to person, place, and time. Psychiatric:         Behavior: Behavior normal.         Thought Content: Thought content normal.         Judgment: Judgment normal.                 Assessment:       Diagnosis Orders   1. Lesion of tongue     2. 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        Follow up in 1 month(s)

## 2020-12-14 ENCOUNTER — TELEPHONE (OUTPATIENT)
Dept: PAIN MANAGEMENT | Age: 65
End: 2020-12-14

## 2020-12-14 RX ORDER — HYDROCODONE BITARTRATE AND ACETAMINOPHEN 5; 325 MG/1; MG/1
1 TABLET ORAL 2 TIMES DAILY
Qty: 44 TABLET | Refills: 0 | Status: SHIPPED
Start: 2020-12-14 | End: 2021-01-06 | Stop reason: SDUPTHER

## 2020-12-14 NOTE — TELEPHONE ENCOUNTER
Please schedule patient for 1/5/2021. She said that she will call you. She is going into the dentist's office. I gave her enough to get to that date - norco.  She is only taking BID now due to doing better. Her surgery has been delayed at this time. Most likely the end of January.

## 2020-12-14 NOTE — TELEPHONE ENCOUNTER
Pt. Called and needs remainder of her medication escribed to the pharmacy please. [address] [facility] pharmacy.

## 2020-12-21 ENCOUNTER — TELEPHONE (OUTPATIENT)
Dept: FAMILY MEDICINE CLINIC | Age: 65
End: 2020-12-21

## 2020-12-21 NOTE — TELEPHONE ENCOUNTER
Eleanor calling to ask if you will write something to excuse her from [facility]. She said that if you include \"Perminant\" on the note and they will not contact her again.  It can be faxed to [phone]

## 2021-01-06 ENCOUNTER — OFFICE VISIT (OUTPATIENT)
Dept: PAIN MANAGEMENT | Age: 66
End: 2021-01-06

## 2021-01-06 VITALS
BODY MASS INDEX: 36.02 KG/M2 | RESPIRATION RATE: 16 BRPM | WEIGHT: 211 LBS | OXYGEN SATURATION: 99 % | HEART RATE: 92 BPM | HEIGHT: 64 IN | TEMPERATURE: 97.6 F | SYSTOLIC BLOOD PRESSURE: 118 MMHG | DIASTOLIC BLOOD PRESSURE: 78 MMHG

## 2021-01-06 DIAGNOSIS — M47.816 LUMBAR FACET ARTHROPATHY: ICD-10-CM

## 2021-01-06 DIAGNOSIS — G89.4 CHRONIC PAIN SYNDROME: ICD-10-CM

## 2021-01-06 DIAGNOSIS — M47.812 CERVICAL FACET JOINT SYNDROME: ICD-10-CM

## 2021-01-06 DIAGNOSIS — M54.50 CHRONIC BILATERAL LOW BACK PAIN WITHOUT SCIATICA: ICD-10-CM

## 2021-01-06 DIAGNOSIS — G89.29 CHRONIC BILATERAL LOW BACK PAIN WITHOUT SCIATICA: ICD-10-CM

## 2021-01-06 DIAGNOSIS — M54.16 LUMBAR RADICULOPATHY: ICD-10-CM

## 2021-01-06 DIAGNOSIS — M48.061 SPINAL STENOSIS OF LUMBAR REGION WITHOUT NEUROGENIC CLAUDICATION: Primary | ICD-10-CM

## 2021-01-06 DIAGNOSIS — M51.36 DDD (DEGENERATIVE DISC DISEASE), LUMBAR: ICD-10-CM

## 2021-01-06 DIAGNOSIS — M25.551 RIGHT HIP PAIN: ICD-10-CM

## 2021-01-06 PROCEDURE — 99214 OFFICE O/P EST MOD 30 MIN: CPT | Performed by: PHYSICIAN ASSISTANT

## 2021-01-06 PROCEDURE — 99213 OFFICE O/P EST LOW 20 MIN: CPT | Performed by: PHYSICIAN ASSISTANT

## 2021-01-06 RX ORDER — HYDROCODONE BITARTRATE AND ACETAMINOPHEN 5; 325 MG/1; MG/1
1 TABLET ORAL 2 TIMES DAILY
Qty: 70 TABLET | Refills: 0 | Status: SHIPPED
Start: 2021-01-06 | End: 2021-02-03 | Stop reason: SDUPTHER

## 2021-01-06 NOTE — PROGRESS NOTES
[address]  [address]  [facility]    Follow up Note      Padmini Rothman     Date of Visit:  2021    CC:  Patient presents for follow up   Chief Complaint   Patient presents with    Follow-up     lower back/neck region        HPI:    Patient is unchanged. Reports that she did fall due to losing her balance several weeks ago. Hit buttocks area. Doing better now. Change in quality of symptoms:no. Patient satisfaction with analgesia:fair. Medication side effects: None. Recent diagnostic testing:none. Recent interventional procedures: None. She has been on anticoagulation medications to include NSAIDS. The patient  has not been on herbal supplements. The patient is diabetic. Imaging:  MRI lumbar spine 2018  1. No significant interval change is observed since the previous study   of 2017.       2. Stable postoperative changes/posterior spinal fusion at the   L3-L4-L5 level.       3. Severe spine canal stenosis in the level of L2-L3           4. Encroachment of the neural foramina bilaterally in levels of L2-L3   and L5-S1      Thoracic spine MRI 2018  1. Some degenerative changes in the thoracic spine, mainly in the   facet joints in the mid-upper thoracic spine segments       2. Discrete loss of height of T6, more likely an old finding.      Previous treatments: Physical Therapy, Surgery L3-5 fusion/laminectomy and medications. .       Potential Aberrant Drug-Related Behavior:  No     Urine Drug Screenin18:  Consistent for norhydrocodone metabolite  2018:  Consistent for hydrocodone and norhydrocodone  05/10/2019:  Consistent for hydrocodone and norhydrocodone  2019:  Consistent for hydrocodone and norhydrocodone  01/10/2020:  Consistent for hydrocodone and norhydrocodone 2020:  Consistent for oxycodone and metabolites s/p surgery. Inconsistent for hydrocodone. States that she was instructed to take her old norco until she made the appointment to resume her chronic pain medications. 10/2020:  Consistent      OARRS report:  2018 consistent to 2021 consistent    Opioid agreement:  2020      Past Medical History:   Diagnosis Date    Cancer [facility]) 2019    LESION REMOVED UNDER TONGUE  DENTAL CLINIC    Chronic back pain     Costochondritis     h/o    Depression     Falls     last one      Fibromyalgia     Hallux rigidus of left foot     HTN (hypertension)     Hyperlipidemia     CLYDE on CPAP     Osteoarthritis     \"everywhere\"    Rheumatoid arthritis ([address]     \"As a child. \"    Rheumatoid arthritis(714.0)     TIA (transient ischemic attack)     no deficits     Tongue lesion     for OR 10-21-20     Use of cane as ambulatory aid        Past Surgical History:   Procedure Laterality Date    ANESTHESIA NERVE BLOCK Left 2019    LEFT C3,4,5 MBB performed by David Ashton MD at [address] Right 2019    BILATERAL TRANSFORAMINAL EPIDURAL STEROID INJECTION S1 #3 performed by David Ashton MD at [address] [facility] [address] Right 2020    RIGHT SACROILIAC JOINT INJECTION      CPT: 49882 performed by Mickey Alamo DO at [address]      Left    ARTHRODESIS Left 2018    ARTHRODESIS 2ND DIGIT LEFT FOOT performed by Dillon Avery DPM at [address] [place] [address]  2017    PLIF L3-L4, L4-L5 with rods, screws, and cages/Dr. Marybeth Gomez BACK SURGERY  2020    BREAST SURGERY  2010    reduction, bilat     SECTION  1993    CHOLECYSTECTOMY      COLONOSCOPY  2015    COLONOSCOPY N/A 2020    COLONOSCOPY DIAGNOSTIC performed by Valentina Cho MD at [address] [facility], COLON, DIAGNOSTIC      EPIDURAL STEROID INJECTION N/A 2019 BILATERAL TRANSFORAMINAL EPIDURAL STEROID INJECTION S1 performed by Tien Truong DO at [address]      Left    HARDWARE REMOVAL FOOT / ANKLE Left 12/14/2018    REMOVAL OF PAINFUL HARDWARE LEFT FOOT  ++SYNTHES++ performed by Maxx Menezes DPM at [facility]    inguinal     LUMBAR SPINE SURGERY N/A 3/4/2020    EXPLORATION OF PRIOR L3-L5 FUSION AND L2-L3  POSTERIOR LUMBAR INTERBODY FUSION -- NEEDS O-ARM, AUDIOLOGY, CAGES, PLATES, SCREWS, C-ARM, TERESA TABLE, CELL SAVER, PLATELET GEL -- GLOBUS performed by Oksana Molina MD at [address] N/A 10/21/2020    EXCISION OF TONGUE LESION performed by Ricco Blank DO at [address] [facility] [address] Bilateral 05/07/2018    L1-2 lumbar foramen #1    NERVE BLOCK Bilateral 12/03/2018    s1 tfesi    NERVE BLOCK Bilateral 08/12/2019    NERVE BLOCK Right 09/30/2019    sacral radiofrequency    NERVE BLOCK Right 9/1/2020    RIGHT SACROILIAC JOINT INJECTION #2 performed by Tien Truong DO at [address] Left 9/25/13    left foot tarso metatarsal joint injection    OTHER SURGICAL HISTORY Left 5/27/15    Endoscopic Gastroc recession left, Lapidus left foot and  Excision of exostosis left foot    GA NJX AA&/STRD TFRML EPI LUMBAR/SACRAL 1 LEVEL Bilateral 12/3/2018    BILATERAL S1  EPIDURAL STEROID INJECTION performed by Viry Yoo MD at [address] [facility] DX/THER SBST INTRLMNR LMBR/SAC W/IMG GDN N/A 8/21/2018    EPIDURAL STEROID INJECTION L1-2 WITH LOW VOL performed by Viry Yoo MD at [address] [facility] NOSE/CLEFT LIP/TIP Bilateral 5/7/2018    BILATERAL L1-2 LUMBAR FORAMEN #1 performed by Viry Yoo MD at [address] [facility] NOSE/CLEFT LIP/TIP Bilateral 6/4/2018    BILATERAL TRANSFORAMINAL EPIDURAL STEROID INJECTION UNDER FLUOROSCOPIC GUIDANCE @ FORAMINAL LEVEL L1-2 #2 performed by Viry Yoo MD at [address] [facility]  RADIOFREQUENCY ABLATION NERVES Right 9/30/2019    RIGHT SACRAL RADIOFREQUENCY ABLATION performed by Bib Arthur MD at . [address]  2000, 2005    Big Left Toe    TOE SURGERY Left 2018    2nd toe     TONSILLECTOMY      WISDOM TOOTH EXTRACTION         Prior to Admission medications    Medication Sig Start Date End Date Taking? Authorizing Provider   HYDROcodone-acetaminophen (NORCO) 5-325 MG per tablet Take 1 tablet by mouth 2 times daily for 30 days. 10 extra pills given to be taken TID for severe pain only 1/6/21 2/5/21 Yes TREASURE Yu   omeprazole (PRILOSEC) 20 MG delayed release capsule Take 1 capsule by mouth 2 times daily (before meals) 11/6/20  Yes Riley Pierce DO   FLUoxetine (PROZAC) 40 MG capsule Take 1 capsule by mouth daily For mood. 9/11/20  Yes Kimberlyn Montez MD   lisinopril (PRINIVIL;ZESTRIL) 30 MG tablet Take 1 tablet by mouth every evening 9/11/20  Yes Kimberlyn Montez MD   atorvastatin (LIPITOR) 40 MG tablet Take 1 tablet by mouth daily 9/11/20  Yes Kimberlyn Montez MD   hydrOXYzine (VISTARIL) 25 MG capsule Take 1 capsule by mouth 3 times daily as needed for Anxiety 9/11/20  Yes Kimberlyn Montez MD   varenicline (CHANTIX CONTINUING MONTH PAK) 1 MG tablet Take 1 tablet by mouth 2 times daily 9/11/20  Yes Kimberlyn oMntez MD   gabapentin (NEURONTIN) 400 MG capsule Take 1 capsule by mouth 3 times daily for 90 days.  9/11/20 12/10/20  Kimberlyn Montez MD       Allergies   Allergen Reactions    Pcn [Penicillins] Anaphylaxis       Social History     Socioeconomic History    Marital status:      Spouse name: Not on file    Number of children: Not on file    Years of education: Not on file    Highest education level: Not on file   Occupational History    Not on file   Social Needs    Financial resource strain: Not on file    Food insecurity     Worry: Not on file     Inability: Not on file   [employer] needs Medical: Not on file     Non-medical: Not on file   Tobacco Use    Smoking status: Former Smoker     Packs/day: 0.25     Years: 30.00     Pack years: 7.50     Types: Cigarettes     Quit date: 10/5/2020     Years since quittin.2    Smokeless tobacco: Never Used    Tobacco comment: was going to try to stop but chantix is too expensive   Substance and Sexual Activity    Alcohol use: Not Currently     Alcohol/week: 0.0 standard drinks     Comment: rarely    Drug use: No    Sexual activity: Not on file   Lifestyle    Physical activity     Days per week: Not on file     Minutes per session: Not on file    Stress: Not on file   Relationships    Social connections     Talks on phone: Not on file     Gets together: Not on file     Attends Scientologist service: Not on file     Active member of club or organization: Not on file     Attends meetings of clubs or organizations: Not on file     Relationship status: Not on file    Intimate partner violence     Fear of current or ex partner: Not on file     Emotionally abused: Not on file     Physically abused: Not on file     Forced sexual activity: Not on file   Other Topics Concern    Not on file   Social History Narrative    Not on file       Family History   Problem Relation Age of Onset    Dementia Mother     Breast Cancer Mother     Cancer Mother         breast cancer [de-identified]     Stroke Mother     Heart Attack Father     Other Father 80        Aortic aneurysm    Cancer Brother        REVIEW OF SYSTEMS:     Phyliss Loser denies fever/chills, chest pain, shortness of breath, new bowel or bladder complaints or suicidal ideations. All other review of systems was negative.     PHYSICAL EXAMINATION:      /78   Pulse 92   Temp 97.6 °F (36.4 °C) (Oral)   Resp 16   Ht 5' 4\" (1.626 m)   Wt 211 lb (95.7 kg)   LMP  (LMP Unknown)   SpO2 99%   BMI 36.22 kg/m²     General: General appearance: awake, alert, oriented, in no acute distress, well developed, well nourished and in no acute distress   pleasant and well-hydrated. in no distress and A & O x3  Build:Overweight  Function:Rises from a seated position with difficulty    HEENT:    Head:normocephalic and atraumatic  Pupils:regular, round and equal.  Sclera: icterus absent  EOM:full and intact. Lungs:    Breathing:Normal expansion. Clear to auscultation. No rales, rhonchi, or wheezing. Abdomen:    Shape:non-distended and normal  Tenderness:none  Guarding:none     Lumbar spine:     Range of motion:abnormal moderately Lateral bending, flexion, extension rotation bilateral and is painful. Extremities:    Tremors:None bilaterally upper and lower  Range of motion:Generally normal shoulders  Intact:Yes  Cyanosis:none  Edema:Normal      Neurological:    Cranial nerves:normal  Sensory:diminished to light lateral aspect of right leg    Cervical spine:  + TTP throughout myofascial area left > right.   + mild midline TTP.                      Dermatology:    Skin:no unusual rashes, no skin lesions, no palpable subcutaneous nodules and good skin turgor    Assessment/Plan:      Chronic low back pain with radiation to the Right groin, patient had low back surgery 08/2017 L3-5 fusion, recently had lumbar spine CT with severe L2-3 stenosis     Plan:  Patient is s/p revision fusion L2-L4 on 3/4/2020. Patient is s/p:  Right SI joint injection on 6/16/2020 with 80% relief x 1 month and s/p repeat on 9/1/2020 with 75-80% relief x 10 days and now at with less relief but still some. She is going to be scheduled for a right SI joint fusion with Dr. Julius Boothe. Not scheduled yet. She thinks she will be scheduled for February. They can prescribe freely during the post op period. Continue norco 5/325 TID. Down to #70. Would like 10 extra for severe days.     Continue with Gabapentin 600 mg TID through her PCP  OARRS report reviewed 01/2021 B/L cervical spine TPIs ordered today. Patient encouraged to stay active and to lose weight. Failed physical therapy  Treatment plan discussed with the patient including medications side effects       Controlled Substance Monitoring:    Acute and Chronic Pain Monitoring:   RX Monitoring 1/6/2021   Attestation -   Acute Pain Prescriptions -   Periodic Controlled Substance Monitoring Possible medication side effects, risk of tolerance/dependence & alternative treatments discussed. ;No signs of potential drug abuse or diversion identified. ;Assessed functional status. ;Obtaining appropriate analgesic effect of treatment.    Chronic Pain > 80 MEDD -                       ccreferring physicf

## 2021-01-06 NOTE — PROGRESS NOTES
Do you currently have any of the following:    Fever: No  Headache:  No  Cough: No  Shortness of breath: No  Exposed to anyone with these symptoms: No         Sherron Ventura presents to the [address] [place] [address] on 1/6/2021. Sabrina Farmer is complaining of pain lower back/neck region The pain is constant. The pain is described as aching, throbbing, shooting and stabbing. Pain is rated on her best day at a 6, on her worst day at a 10, and on average at a 8 on the VAS scale. She took her last dose of Percocet     Any procedures since your last visit:     Pacemaker or defibrilator: No managed by . She is not on NSAIDS and is not on anticoagulation medications to include none and is managed by     Medication Contract and Consent for Opioid Use Documents Filed     Patient Documents       Type of Document Status Date Received Received By Description     Medication Contract Received  [facility] Opioid medication contract with Dr Lutz Gave 3/24/20     Medication Contract Received 4/26/2018  3:50 PM ANYA NUNO PAIN MANAGEMENT PATIENT AGREEMENT 4/26/2018     Medication Contract Received 11/5/2020  4:23 PM EBER HONG Opioid medication contract with Dr Lutz Gave                /78   Pulse 92   Temp 97.6 °F (36.4 °C) (Oral)   Resp 16   Ht 5' 4\" (1.626 m)   Wt 211 lb (95.7 kg)   LMP  (LMP Unknown)   SpO2 99%   BMI 36.22 kg/m²      No LMP recorded (lmp unknown).  Patient is postmenopausal.

## 2021-01-07 NOTE — TELEPHONE ENCOUNTER
Letter signed and faxed to given number on 12/24/20. No confirmation fax received after faxing several times. VM left with pt requesting return call to confirm whether or not this matter has been taken care of for her.

## 2021-01-13 ENCOUNTER — PROCEDURE VISIT (OUTPATIENT)
Dept: PAIN MANAGEMENT | Age: 66
End: 2021-01-13

## 2021-01-13 VITALS
SYSTOLIC BLOOD PRESSURE: 138 MMHG | RESPIRATION RATE: 16 BRPM | DIASTOLIC BLOOD PRESSURE: 84 MMHG | HEART RATE: 88 BPM | TEMPERATURE: 98.7 F

## 2021-01-13 DIAGNOSIS — M79.10 MYALGIA: Primary | ICD-10-CM

## 2021-01-13 PROCEDURE — 20553 NJX 1/MLT TRIGGER POINTS 3/>: CPT | Performed by: PAIN MEDICINE

## 2021-01-13 RX ORDER — BUPIVACAINE HYDROCHLORIDE 2.5 MG/ML
10 INJECTION, SOLUTION INFILTRATION; PERINEURAL ONCE
Status: COMPLETED | OUTPATIENT
Start: 2021-01-13 | End: 2021-01-13

## 2021-01-13 RX ORDER — METHYLPREDNISOLONE ACETATE 40 MG/ML
40 INJECTION, SUSPENSION INTRA-ARTICULAR; INTRALESIONAL; INTRAMUSCULAR; SOFT TISSUE ONCE
Status: COMPLETED | OUTPATIENT
Start: 2021-01-13 | End: 2021-01-13

## 2021-01-13 RX ADMIN — BUPIVACAINE HYDROCHLORIDE 25 MG: 2.5 INJECTION, SOLUTION INFILTRATION; PERINEURAL at 14:00

## 2021-01-13 RX ADMIN — METHYLPREDNISOLONE ACETATE 40 MG: 40 INJECTION, SUSPENSION INTRA-ARTICULAR; INTRALESIONAL; INTRAMUSCULAR; SOFT TISSUE at 14:00

## 2021-01-13 NOTE — PROGRESS NOTES
2021    Patient: Tamiko Germain  :  1955  Age:  63 y.o. Sex:  female     PRE-PROCEDURE DIAGNOSIS: Myofascial pain. POST-PROCEDURE DIAGNOSIS: Same. PROCEDURE: bilateral trapezius, supraspinatus, rhomboid trigger point injections. SURGEON:   ZARA Ricardo. ANESTHESIA: local    ESTIMATED BLOOD LOSS:  None.  ______________________________________________________________________    BRIEF HISTORY: Tamiko Germain comes in today for bilateral trapezius, supraspinatus, rhomboid trigger point injection. After discussing the potential risks and benefits of the procedure with the patient. [place] [facility] did request that we proceed. A complete History & Physical was reviewed and it is unchanged. DESCRIPTION OF PROCEDURE:   Each trigger point was marked with patient input, prepped with chloraprep and draped. A #25-gauge, 1.5-inch needle was passed into the area of greatest tenderness. Each trigger point received equal, divided dosages or a total of 9 cc of 0.25% Marcaine mixed with 40 mg DepoMedrol after negative aspiration of blood, air or paresthesia. The needle was removed intact and Band-Aids were applied. Disposition the patient tolerated the procedure well and there were no complications . Ita Duval will follow up in our [address] [place] [address] as scheduled. She was encouraged to call with questions, concerns or if worsening of symptoms occurs.     Radu Jimenez, DO

## 2021-01-13 NOTE — PROGRESS NOTES
1/13/2021    Patient Active Problem List   Diagnosis    Loss of balance    Vertebrobasilar TIAs    Obesity    Disc displacement, lumbar    Peripheral neuropathy ([facility]    Type 2 diabetes mellitus without complication (HCC)    Depression    Osteoarthritis    Chronic back pain    Fibromyalgia    HTN (hypertension)    Hyperlipidemia    History of adenomatous polyp of colon    Vitamin D deficiency    Coccyx pain    Acute bilateral low back pain with sciatica    Lumbar stenosis    Chronic bilateral low back pain without sciatica    DDD (degenerative disc disease), lumbar    Spinal stenosis of lumbar region without neurogenic claudication    Lumbar facet arthropathy    Chronic pain syndrome    Lumbar radiculopathy    Neck pain    Left foot pain    Painful orthopaedic hardware ([facility]    Cervical facet joint syndrome    Cellulitis, neck    Disorder of sacrum    Adjacent segment disease with spinal stenosis    S/P lumbar spinal fusion    Tongue lesion       Allergies as of 01/13/2021 - Review Complete 01/13/2021   Allergen Reaction Noted    Pcn [penicillins] Anaphylaxis 11/12/2012        Procedure: cervical trigger point injections     y consent signed y pt confirms not on blood thinners  n/a ID band applied y procedure verified with patient  y allergies noted n/a pt confirms not pregnant    Patient rates pain a 8/10 on the VAS scale. Skin Prep:  ChloraPrep    Anesthetic:  none    Medication:  Marcaine 0.25% and DepoMedrol 40 mg    Vitals:    01/13/21 1335   BP: 138/84   Pulse: 88   Resp: 16   Temp: 98.7 °F (37.1 °C)       Comments: Tolerated well    I witnessed patient signing consent to Medical Procedure and Treatment form.      Ramila Page

## 2021-01-15 ENCOUNTER — OFFICE VISIT (OUTPATIENT)
Dept: ENT CLINIC | Age: 66
End: 2021-01-15

## 2021-01-15 VITALS — WEIGHT: 210 LBS | BODY MASS INDEX: 34.99 KG/M2 | TEMPERATURE: 97.9 F | HEIGHT: 65 IN | RESPIRATION RATE: 18 BRPM

## 2021-01-15 DIAGNOSIS — D00.07 SQUAMOUS CELL CARCINOMA IN SITU (SCCIS) OF LATERAL PORTION OF TONGUE: Primary | ICD-10-CM

## 2021-01-15 PROCEDURE — 99213 OFFICE O/P EST LOW 20 MIN: CPT | Performed by: OTOLARYNGOLOGY

## 2021-01-15 ASSESSMENT — ENCOUNTER SYMPTOMS
DIARRHEA: 0
EYE PAIN: 0
APNEA: 0
EYE DISCHARGE: 0
CHEST TIGHTNESS: 0
COLOR CHANGE: 0
GASTROINTESTINAL NEGATIVE: 1
ABDOMINAL PAIN: 0
RESPIRATORY NEGATIVE: 1
EYES NEGATIVE: 1
VOMITING: 0
SHORTNESS OF BREATH: 0

## 2021-01-15 NOTE — PROGRESS NOTES
Subjective:      Patient ID:  Felix Lux is a 63 y.o. female. HPI:    Patient presents today for tongue cancer check. Condition has been present for 2 month(s). Pt feels increased sensation in the area of the biopsy. States that she just feels abnormal and uncomfortable sensation in the area of excision. She denies pain, but states that it is irritating to her. She denies difficulty with swallowing or speaking. Past Medical History:   Diagnosis Date    Cancer ([address] 12/2019    LESION REMOVED UNDER TONGUE  DENTAL CLINIC    Chronic back pain     Costochondritis     h/o    Depression     Falls     last one 2017     Fibromyalgia     Hallux rigidus of left foot     HTN (hypertension)     Hyperlipidemia     CLYDE on CPAP     Osteoarthritis     \"everywhere\"    Rheumatoid arthritis ([address]     \"As a child. \"    Rheumatoid arthritis(714.0)     TIA (transient ischemic attack)     no deficits     Tongue lesion     for OR 10-21-20     Use of cane as ambulatory aid      Past Surgical History:   Procedure Laterality Date    ANESTHESIA NERVE BLOCK Left 2/26/2019    LEFT C3,4,5 MBB performed by Merlene Rubin MD at [facility] Right 8/12/2019    BILATERAL TRANSFORAMINAL EPIDURAL STEROID INJECTION S1 #3 performed by Merlene Rubin MD at [address] [facility] [address] Right 6/16/2020    RIGHT SACROILIAC JOINT INJECTION      CPT: 42987 performed by Stephanie Bass DO at [address]  2005    Left    ARTHRODESIS Left 12/14/2018    ARTHRODESIS 2ND DIGIT LEFT FOOT performed by Eulice Nageotte, DPM at [address] [facility]  08/16/2017    PLIF L3-L4, L4-L5 with rods, screws, and cages/Dr. Ramesh Braun BACK SURGERY  03/04/2020    BREAST SURGERY  2010    reduction, bilat   [address] CHOLECYSTECTOMY  2011    COLONOSCOPY  03/27/2015    COLONOSCOPY N/A 8/19/2020    COLONOSCOPY DIAGNOSTIC performed by Andrea Garibay MD at [address] [facility] ENDOSCOPY, COLON, DIAGNOSTIC      EPIDURAL STEROID INJECTION N/A 6/4/2019    BILATERAL TRANSFORAMINAL EPIDURAL STEROID INJECTION S1 performed by Deuce Garzon DO at [address]      Left    [address] / ANKLE Left 12/14/2018    REMOVAL OF PAINFUL HARDWARE LEFT FOOT  ++SYNTHES++ performed by Bentley Soto DPM at [facility]    inguinal     LUMBAR SPINE SURGERY N/A 3/4/2020    EXPLORATION OF PRIOR L3-L5 FUSION AND L2-L3  POSTERIOR LUMBAR INTERBODY FUSION -- NEEDS O-ARM, AUDIOLOGY, CAGES, PLATES, SCREWS, C-ARM, TERESA TABLE, CELL SAVER, PLATELET GEL -- GLOBUS performed by Diony Saldana MD at [address] N/A 10/21/2020    EXCISION OF TONGUE LESION performed by Annika Cruz DO at [address] Bilateral 05/07/2018    L1-2 lumbar foramen #1    NERVE BLOCK Bilateral 12/03/2018    s1 tfesi    NERVE BLOCK Bilateral 08/12/2019    NERVE BLOCK Right 09/30/2019    sacral radiofrequency    NERVE BLOCK Right 9/1/2020    RIGHT SACROILIAC JOINT INJECTION #2 performed by Deuce Garzon DO at [address] [facility]note 2] Left 9/25/13    left foot tarso metatarsal joint injection    OTHER SURGICAL HISTORY Left 5/27/15    Endoscopic Gastroc recession left, Lapidus left foot and  Excision of exostosis left foot    RI NJX AA&/STRD TFRML EPI LUMBAR/SACRAL 1 LEVEL Bilateral 12/3/2018    BILATERAL S1  EPIDURAL STEROID INJECTION performed by Kailash Morse MD at [address] [facility] DX/THER SBST INTRLMNR LMBR/SAC W/IMG GDN N/A 8/21/2018    EPIDURAL STEROID INJECTION L1-2 WITH LOW VOL performed by Kailash Morse MD at [address] [facility] NOSE/CLEFT LIP/TIP Bilateral 5/7/2018    BILATERAL L1-2 LUMBAR FORAMEN #1 performed by Kailash Morse MD at [address] [facility] NOSE/CLEFT LIP/TIP Bilateral 6/4/2018    BILATERAL TRANSFORAMINAL EPIDURAL STEROID INJECTION UNDER FLUOROSCOPIC GUIDANCE @ FORAMINAL LEVEL L1-2 #2 performed by Darek Moulton Marcello Burch MD at [address] Right 2019    RIGHT SACRAL RADIOFREQUENCY ABLATION performed by Bib Arthur MD at [address] [facility] [address] TOE SURGERY  2000,     Big Left Toe    TOE SURGERY Left 2018    2nd toe     TONSILLECTOMY      WISDOM TOOTH EXTRACTION       Family History   Problem Relation Age of Onset    Dementia Mother     Breast Cancer Mother     Cancer Mother         breast cancer [de-identified]     Stroke Mother     Heart Attack Father     Other Father 80        Aortic aneurysm    Cancer Brother      Social History     Socioeconomic History    Marital status:      Spouse name: None    Number of children: None    Years of education: None    Highest education level: None   Occupational History    None   Social Needs    Financial resource strain: None    Food insecurity     Worry: None     Inability: None    Transportation needs     Medical: None     Non-medical: None   Tobacco Use    Smoking status: Former Smoker     Packs/day: 0.25     Years: 30.00     Pack years: 7.50     Types: Cigarettes     Quit date: 10/5/2020     Years since quittin.2    Smokeless tobacco: Never Used    Tobacco comment: was going to try to stop but chantix is too expensive   Substance and Sexual Activity    Alcohol use: Not Currently     Alcohol/week: 0.0 standard drinks     Comment: rarely    Drug use: No    Sexual activity: None   Lifestyle    Physical activity     Days per week: None     Minutes per session: None    Stress: None   Relationships    Social connections     Talks on phone: None     Gets together: None     Attends Restorationism service: None     Active member of club or organization: None     Attends meetings of clubs or organizations: None     Relationship status: None    Intimate partner violence     Fear of current or ex partner: None     Emotionally abused: None     Physically abused: None     Forced sexual activity: None   Other Topics Concern    None   Social History Narrative    None     Allergies   Allergen Reactions    Pcn [Penicillins] Anaphylaxis       Review of Systems   Constitutional: Negative. Negative for appetite change. HENT: Positive for mouth sores. Eyes: Negative. Negative for pain, discharge and visual disturbance. Respiratory: Negative. Negative for apnea, chest tightness and shortness of breath. Cardiovascular: Negative. Negative for chest pain, palpitations and leg swelling. Gastrointestinal: Negative. Negative for abdominal pain, diarrhea and vomiting. Endocrine: Negative for cold intolerance, heat intolerance and polydipsia. Genitourinary: Negative. Negative for dysuria, flank pain and hematuria. Musculoskeletal: Negative. Negative for arthralgias, gait problem and neck pain. Skin: Negative. Negative for color change, pallor and rash. Allergic/Immunologic: Negative for environmental allergies, food allergies and immunocompromised state. Neurological: Negative. Negative for dizziness, numbness and headaches. Hematological: Negative for adenopathy. Psychiatric/Behavioral: Negative. Negative for behavioral problems and hallucinations. All other systems reviewed and are negative. Objective:     Vitals:    01/15/21 0837   Resp: 18   Temp: 97.9 °F (36.6 °C)     Physical Exam  Vitals signs and nursing note reviewed. Constitutional:       Appearance: She is well-developed. HENT:      Head: Normocephalic and atraumatic. Comments:        Right Ear: Hearing, tympanic membrane, ear canal and external ear normal.      Left Ear: Hearing, tympanic membrane, ear canal and external ear normal.      Nose: Nose normal.      Mouth/Throat:      Tongue: No lesions. Tongue does not deviate from midline. Comments:  Well healing left sided surgical excision of the tongue   Sutures dissolving   Eyes:      Conjunctiva/sclera: Conjunctivae normal.      Pupils: Pupils are equal, round, and reactive to light. Neck:      Musculoskeletal: Normal range of motion and neck supple. Cardiovascular:      Rate and Rhythm: Normal rate and regular rhythm. Heart sounds: Normal heart sounds. Pulmonary:      Effort: Pulmonary effort is normal. No respiratory distress. Breath sounds: Normal breath sounds. Abdominal:      General: Bowel sounds are normal. There is no distension. Palpations: Abdomen is soft. Tenderness: There is no abdominal tenderness. There is no guarding. Musculoskeletal: Normal range of motion. Skin:     General: Skin is warm and dry. Neurological:      Mental Status: She is alert and oriented to person, place, and time. Psychiatric:         Behavior: Behavior normal.         Thought Content: Thought content normal.         Judgment: Judgment normal.                 Assessment:       Diagnosis Orders   1. 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 Follow up in 3 months                     Hetal Vega  1955    I have discussed the case, including pertinent history and exam findings with the resident. I have seen and examined the patient and the key elements of the encounter have been performed by me. I agree with the assessment, plan and orders as documented by the  resident              Remainder of medical problems as per  resident note. Patient seen and examined. Agree with above exam, assessment and plan.       Electronically signed by Artem Beltran DO on 1/20/21 at 11:27 AM EST

## 2021-01-18 ENCOUNTER — TELEPHONE (OUTPATIENT)
Dept: FAMILY MEDICINE CLINIC | Age: 66
End: 2021-01-18

## 2021-01-18 NOTE — TELEPHONE ENCOUNTER
Chantix received via patient assistance program. Medication ready for . Voicemail left for pt advising her that she may  any time. Med labeled and on Dr. Rivera Clipper desk. Pt must sign P. A. P. book upon .  (White binder above my computer/desk)

## 2021-01-26 ENCOUNTER — TELEPHONE (OUTPATIENT)
Dept: FAMILY MEDICINE CLINIC | Age: 66
End: 2021-01-26

## 2021-01-26 DIAGNOSIS — G89.29 OTHER CHRONIC PAIN: ICD-10-CM

## 2021-01-26 NOTE — TELEPHONE ENCOUNTER
Eleanor calling for a new script for Gabapentin. She needs it to go to [facility] in [facility]. She is asking for a 90 days supply. I did not pend this yet because she has an appointment with you next week. Either way, she will be out by her appointment. Please send whatever you want her to have at this time.

## 2021-01-27 RX ORDER — GABAPENTIN 400 MG/1
400 CAPSULE ORAL 3 TIMES DAILY
Qty: 270 CAPSULE | Refills: 1 | Status: SHIPPED
Start: 2021-01-27 | End: 2021-05-04 | Stop reason: SDUPTHER

## 2021-02-03 ENCOUNTER — OFFICE VISIT (OUTPATIENT)
Dept: PAIN MANAGEMENT | Age: 66
End: 2021-02-03

## 2021-02-03 VITALS
HEIGHT: 65 IN | RESPIRATION RATE: 18 BRPM | WEIGHT: 220 LBS | DIASTOLIC BLOOD PRESSURE: 98 MMHG | SYSTOLIC BLOOD PRESSURE: 138 MMHG | BODY MASS INDEX: 36.65 KG/M2 | TEMPERATURE: 97.4 F | OXYGEN SATURATION: 98 % | HEART RATE: 96 BPM

## 2021-02-03 DIAGNOSIS — M25.551 RIGHT HIP PAIN: ICD-10-CM

## 2021-02-03 DIAGNOSIS — M54.16 LUMBAR RADICULOPATHY: ICD-10-CM

## 2021-02-03 DIAGNOSIS — M54.50 CHRONIC BILATERAL LOW BACK PAIN WITHOUT SCIATICA: ICD-10-CM

## 2021-02-03 DIAGNOSIS — G89.29 CHRONIC BILATERAL LOW BACK PAIN WITHOUT SCIATICA: ICD-10-CM

## 2021-02-03 DIAGNOSIS — M51.36 DDD (DEGENERATIVE DISC DISEASE), LUMBAR: ICD-10-CM

## 2021-02-03 DIAGNOSIS — M48.061 SPINAL STENOSIS OF LUMBAR REGION WITHOUT NEUROGENIC CLAUDICATION: ICD-10-CM

## 2021-02-03 DIAGNOSIS — G89.4 CHRONIC PAIN SYNDROME: ICD-10-CM

## 2021-02-03 DIAGNOSIS — M79.10 MYALGIA: Primary | ICD-10-CM

## 2021-02-03 DIAGNOSIS — M47.816 LUMBAR FACET ARTHROPATHY: ICD-10-CM

## 2021-02-03 DIAGNOSIS — M47.812 CERVICAL FACET JOINT SYNDROME: ICD-10-CM

## 2021-02-03 PROCEDURE — 99213 OFFICE O/P EST LOW 20 MIN: CPT | Performed by: PHYSICIAN ASSISTANT

## 2021-02-03 PROCEDURE — 99214 OFFICE O/P EST MOD 30 MIN: CPT | Performed by: PHYSICIAN ASSISTANT

## 2021-02-03 RX ORDER — HYDROCODONE BITARTRATE AND ACETAMINOPHEN 5; 325 MG/1; MG/1
1 TABLET ORAL 2 TIMES DAILY
Qty: 70 TABLET | Refills: 0 | Status: SHIPPED | OUTPATIENT
Start: 2021-02-05 | End: 2021-03-07

## 2021-02-03 NOTE — PROGRESS NOTES
[address]  [address]  [place] [facility], [facility]    Follow up Note      Karli Christian     Date of Visit:  2/3/2021    CC:  Patient presents for follow up   Chief Complaint   Patient presents with    Follow-up     neck and lower back       HPI:    Patient is a little better to her cervical paraspinal areas. More pain to her left trapezium. States that her sacrum surgery is scheduled for 3/9. Change in quality of symptoms:no. Patient satisfaction with analgesia:fair. Medication side effects: None. Recent diagnostic testing:none. Recent interventional procedures: 2021 PROCEDURE: bilateral trapezius, supraspinatus, rhomboid trigger point injections  100% better except for left trapezium area. .    She has been on anticoagulation medications to include NSAIDS. The patient  has not been on herbal supplements. The patient is diabetic. Imaging:    MRI lumbar spine 2018  1. No significant interval change is observed since the previous study   of 2017.       2. Stable postoperative changes/posterior spinal fusion at the   L3-L4-L5 level.       3. Severe spine canal stenosis in the level of L2-L3           4. Encroachment of the neural foramina bilaterally in levels of L2-L3   and L5-S1      Thoracic spine MRI 2018  1. Some degenerative changes in the thoracic spine, mainly in the   facet joints in the mid-upper thoracic spine segments       2. Discrete loss of height of T6, more likely an old finding.      Previous treatments: Physical Therapy, Surgery L3-5 fusion/laminectomy and medications. .       Potential Aberrant Drug-Related Behavior:  No     Urine Drug Screenin18:  Consistent for norhydrocodone metabolite  2018:  Consistent for hydrocodone and norhydrocodone  05/10/2019:  Consistent for hydrocodone and norhydrocodone  2019:  Consistent for hydrocodone and norhydrocodone  01/10/2020:  Consistent for hydrocodone and norhydrocodone 2020:  Consistent for oxycodone and metabolites s/p surgery. Inconsistent for hydrocodone. States that she was instructed to take her old norco until she made the appointment to resume her chronic pain medications. 10/2020:  Consistent     OARRS report:  2018 consistent to 2021 consistent    Opioid agreement:  2020      Past Medical History:   Diagnosis Date    Cancer [facility]) 2019    LESION REMOVED UNDER TONGUE  DENTAL CLINIC    Chronic back pain     Costochondritis     h/o    Depression     Falls     last one      Fibromyalgia     Hallux rigidus of left foot     HTN (hypertension)     Hyperlipidemia     CLYDE on CPAP     Osteoarthritis     \"everywhere\"    Rheumatoid arthritis ([address]     \"As a child. \"    Rheumatoid arthritis(714.0)     TIA (transient ischemic attack)     no deficits     Tongue lesion     for OR 10-21-20     Use of cane as ambulatory aid        Past Surgical History:   Procedure Laterality Date    ANESTHESIA NERVE BLOCK Left 2019    LEFT C3,4,5 MBB performed by Buddy Pace MD at [address] [facility] Right 2019    BILATERAL TRANSFORAMINAL EPIDURAL STEROID INJECTION S1 #3 performed by Buddy Pace MD at [address] [facility] Right 2020    RIGHT SACROILIAC JOINT INJECTION      CPT: 48627 performed by Juan Francisco Fernando DO at [address]      Left    ARTHRODESIS Left 2018    ARTHRODESIS 2ND DIGIT LEFT FOOT performed by Osmany Rankin DPM at [address] [facility]  2017    PLIF L3-L4, L4-L5 with rods, screws, and cages/Dr. Treviño Tessa BACK SURGERY  2020    BREAST SURGERY  2010    reduction, bilat     SECTION  1993    CHOLECYSTECTOMY      COLONOSCOPY  2015    COLONOSCOPY N/A 2020    COLONOSCOPY DIAGNOSTIC performed by Ronaldo Higgins MD at [address], COLON, DIAGNOSTIC      EPIDURAL STEROID INJECTION N/A 2019    BILATERAL SACRAL RADIOFREQUENCY ABLATION performed by Jhoana Pham MD at [address]  2000, 2005    Big Left Toe    TOE SURGERY Left 2018    2nd toe     TONSILLECTOMY      WISDOM TOOTH EXTRACTION         Prior to Admission medications    Medication Sig Start Date End Date Taking? Authorizing Provider   gabapentin (NEURONTIN) 400 MG capsule Take 1 capsule by mouth 3 times daily for 90 days. 1/27/21 4/27/21 Yes Hollis Mena MD   HYDROcodone-acetaminophen (NORCO) 5-325 MG per tablet Take 1 tablet by mouth 2 times daily for 30 days. 10 extra pills given to be taken TID for severe pain only 1/6/21 2/5/21 Yes TREASURE Ricks   omeprazole (PRILOSEC) 20 MG delayed release capsule Take 1 capsule by mouth 2 times daily (before meals) 11/6/20  Yes Riley Pierce DO   FLUoxetine (PROZAC) 40 MG capsule Take 1 capsule by mouth daily For mood.  9/11/20  Yes Hollis Mena MD   lisinopril (PRINIVIL;ZESTRIL) 30 MG tablet Take 1 tablet by mouth every evening 9/11/20  Yes Hollis Mena MD   atorvastatin (LIPITOR) 40 MG tablet Take 1 tablet by mouth daily 9/11/20  Yes Hollis Mena MD   hydrOXYzine (VISTARIL) 25 MG capsule Take 1 capsule by mouth 3 times daily as needed for Anxiety 9/11/20  Yes Hollis Mena MD   varenicline (CHANTIX CONTINUING MONTH CHARITY) 1 MG tablet Take 1 tablet by mouth 2 times daily 9/11/20  Yes Hollis Mena MD       Allergies   Allergen Reactions    Pcn [Penicillins] Anaphylaxis       Social History     Socioeconomic History    Marital status:      Spouse name: Not on file    Number of children: Not on file    Years of education: Not on file    Highest education level: Not on file   Occupational History    Not on file   Social Needs    Financial resource strain: Not on file    Food insecurity     Worry: Not on file     Inability: Not on file    Transportation needs     Medical: Not on file     Non-medical: Not on file Tobacco Use    Smoking status: Former Smoker     Packs/day: 0.25     Years: 30.00     Pack years: 7.50     Types: Cigarettes     Quit date: 10/5/2020     Years since quittin.3    Smokeless tobacco: Never Used    Tobacco comment: was going to try to stop but chantix is too expensive   Substance and Sexual Activity    Alcohol use: Not Currently     Alcohol/week: 0.0 standard drinks     Comment: rarely    Drug use: No    Sexual activity: Not on file   Lifestyle    Physical activity     Days per week: Not on file     Minutes per session: Not on file    Stress: Not on file   Relationships    Social connections     Talks on phone: Not on file     Gets together: Not on file     Attends Alevism service: Not on file     Active member of club or organization: Not on file     Attends meetings of clubs or organizations: Not on file     Relationship status: Not on file    Intimate partner violence     Fear of current or ex partner: Not on file     Emotionally abused: Not on file     Physically abused: Not on file     Forced sexual activity: Not on file   Other Topics Concern    Not on file   Social History Narrative    Not on file       Family History   Problem Relation Age of Onset    Dementia Mother     Breast Cancer Mother     Cancer Mother         breast cancer [address]     Stroke Mother     Heart Attack Father     Other Father 80        Aortic aneurysm    Cancer Brother        REVIEW OF SYSTEMS:     Sabrina Farmer denies fever/chills, chest pain, shortness of breath, new bowel or bladder complaints or suicidal ideations. All other review of systems was negative.     PHYSICAL EXAMINATION:      BP (!) 138/98   Pulse 96   Temp 97.4 °F (36.3 °C) (Infrared)   Resp 18   Ht 5' 5\" (1.651 m)   Wt 220 lb (99.8 kg)   LMP  (LMP Unknown)   SpO2 98%   BMI 36.61 kg/m²     General:      General appearance: awake, alert, oriented, in no acute distress, well developed, well nourished and in no acute distress   pleasant and well-hydrated. in no distress and A & O x3  Build:Overweight  Function:Rises from a seated position with difficulty    HEENT:    Head:normocephalic and atraumatic  Pupils:regular, round and equal.  Sclera: icterus absent  EOM:full and intact. Lungs:    Breathing:Normal expansion. Clear to auscultation. No rales, rhonchi, or wheezing. Abdomen:    Shape:non-distended and normal  Tenderness:none  Guarding:none     Lumbar spine:     Range of motion:abnormal moderately Lateral bending, flexion, extension rotation bilateral and is painful. Extremities:    Tremors:None bilaterally upper and lower  Range of motion:Generally normal shoulders  Intact:Yes  Cyanosis:none  Edema:Normal      Neurological:    Cranial nerves:normal  Sensory:diminished to light lateral aspect of right leg    Cervical spine:  Left trapezius TTP, mild right sided cervical paraspinal TTP.                 Dermatology:    Skin:no unusual rashes, no skin lesions, no palpable subcutaneous nodules and good skin turgor    Assessment/Plan:      Chronic low back pain with radiation to the Right groin, patient had low back surgery 08/2017 L3-5 fusion, recently had lumbar spine CT with severe L2-3 stenosis     Plan:  Patient is s/p revision fusion L2-L4 on 3/4/2020. Patient is s/p:  Right SI joint injection on 6/16/2020 with 80% relief x 1 month and s/p repeat on 9/1/2020 with 75-80% relief x 10 days and now at with less relief but still some. She is scheduled for a right SI joint fusion with Dr. Alexis Blackmon on March 9, 2021. They can prescribe freely during the post op period. Continue norco 5/325 TID. Down to #70. Would like 10 extra for severe days. Continue with Gabapentin 600 mg TID through her PCP  OARRS report reviewed 02/2021  B/L cervical spine TPIs with 100% relief except for left trapezium area. Repeat ordered for that area. Patient encouraged to stay active and to lose weight.  Failed physical therapy  Treatment plan discussed with the patient including medications side effects       Controlled Substance Monitoring:    Acute and Chronic Pain Monitoring:   RX Monitoring 2/3/2021   Attestation -   Acute Pain Prescriptions -   Periodic Controlled Substance Monitoring Possible medication side effects, risk of tolerance/dependence & alternative treatments discussed. ;No signs of potential drug abuse or diversion identified. ;Assessed functional status. ;Obtaining appropriate analgesic effect of treatment.    Chronic Pain > 80 MEDD -                     ccreferring physicf

## 2021-02-04 ENCOUNTER — PROCEDURE VISIT (OUTPATIENT)
Dept: PAIN MANAGEMENT | Age: 66
End: 2021-02-04

## 2021-02-04 VITALS
SYSTOLIC BLOOD PRESSURE: 152 MMHG | TEMPERATURE: 98.1 F | RESPIRATION RATE: 18 BRPM | DIASTOLIC BLOOD PRESSURE: 92 MMHG | HEART RATE: 100 BPM | OXYGEN SATURATION: 98 %

## 2021-02-04 DIAGNOSIS — M79.10 MYALGIA: Primary | ICD-10-CM

## 2021-02-04 PROCEDURE — 20553 NJX 1/MLT TRIGGER POINTS 3/>: CPT | Performed by: PAIN MEDICINE

## 2021-02-04 RX ORDER — BUPIVACAINE HYDROCHLORIDE 2.5 MG/ML
10 INJECTION, SOLUTION EPIDURAL; INFILTRATION; INTRACAUDAL ONCE
Status: COMPLETED | OUTPATIENT
Start: 2021-02-04 | End: 2021-02-04

## 2021-02-04 RX ADMIN — BUPIVACAINE HYDROCHLORIDE 10 ML: 2.5 INJECTION, SOLUTION EPIDURAL; INFILTRATION; INTRACAUDAL at 12:23

## 2021-02-04 RX ADMIN — BUPIVACAINE HYDROCHLORIDE 25 MG: 2.5 INJECTION, SOLUTION EPIDURAL; INFILTRATION; INTRACAUDAL at 10:04

## 2021-02-04 NOTE — PROGRESS NOTES
2021    Patient: Jenaro Delgado  :  1955  Age:  63 y.o. Sex:  female     PRE-PROCEDURE DIAGNOSIS: Myofascial pain. POST-PROCEDURE DIAGNOSIS: Same. PROCEDURE: bilateral cervical paraspinal, trapezius, supraspinatus, rhomboid trigger point injections. SURGEON:   ZARA Aguayo. ANESTHESIA: local    ESTIMATED BLOOD LOSS:  None.  ______________________________________________________________________    BRIEF HISTORY: Jenaro Delgado comes in today for bilateral cervical paraspinal, trapezius, supraspinatus, rhomboid trigger point injection. After discussing the potential risks and benefits of the procedure with the patient. Jared Fofana did request that we proceed. A complete History & Physical was reviewed and it is unchanged. DESCRIPTION OF PROCEDURE:   Each trigger point was marked with patient input, prepped with chloraprep and draped. A #25-gauge, 1.5-inch needle was passed into the area of greatest tenderness. Each trigger point received equal, divided dosages or a total of 10 cc of 0.25% Marcaine after negative aspiration of blood, air or paresthesia. The needle was removed intact and Band-Aids were applied. Disposition the patient tolerated the procedure well and there were no complications . Jared Fofana will follow up in our [address] [place] [address] as scheduled. She was encouraged to call with questions, concerns or if worsening of symptoms occurs.     Kuldip Caceres DO

## 2021-02-04 NOTE — PROGRESS NOTES
2/4/2021    Patient Active Problem List   Diagnosis    Loss of balance    Vertebrobasilar TIAs    Obesity    Disc displacement, lumbar    Peripheral neuropathy (HCC)    Type 2 diabetes mellitus without complication (HCC)    Depression    Osteoarthritis    Chronic back pain    Fibromyalgia    HTN (hypertension)    Hyperlipidemia    History of adenomatous polyp of colon    Vitamin D deficiency    Coccyx pain    Acute bilateral low back pain with sciatica    Lumbar stenosis    Chronic bilateral low back pain without sciatica    DDD (degenerative disc disease), lumbar    Spinal stenosis of lumbar region without neurogenic claudication    Lumbar facet arthropathy    Chronic pain syndrome    Lumbar radiculopathy    Neck pain    Left foot pain    Painful orthopaedic hardware ([address]    Cervical facet joint syndrome    Cellulitis, neck    Disorder of sacrum    Adjacent segment disease with spinal stenosis    S/P lumbar spinal fusion    Tongue lesion       Allergies as of 02/04/2021 - Review Complete 02/04/2021   Allergen Reaction Noted    Pcn [penicillins] Anaphylaxis 11/12/2012        Procedure: bilateral trapezius, supraspinatus, rhomboid trigger point injections          y consent signed y pt confirms not on blood thinners  n/a ID band applied y procedure verified with patient  y allergies noted y pt confirms not pregnant    Patient rates pain a 8/10 on the VAS scale. Skin Prep:  ChloraPrep    Anesthetic:  None    Medication:  Marcaine 0.25%    Vitals:    02/04/21 0913   BP: (!) 152/92   Pulse: 100   Resp: 18   Temp: 98.1 °F (36.7 °C)   SpO2: 98%       Comments: Tolerated well    I witnessed patient signing consent to Medical Procedure and Treatment form.      Hanna Boudreaux

## 2021-02-09 ENCOUNTER — OFFICE VISIT (OUTPATIENT)
Dept: PODIATRY | Age: 66
End: 2021-02-09

## 2021-02-09 VITALS — TEMPERATURE: 97.4 F | DIASTOLIC BLOOD PRESSURE: 82 MMHG | SYSTOLIC BLOOD PRESSURE: 122 MMHG

## 2021-02-09 DIAGNOSIS — S90.32XA CONTUSION OF LEFT FOOT, INITIAL ENCOUNTER: Primary | ICD-10-CM

## 2021-02-09 PROCEDURE — 99213 OFFICE O/P EST LOW 20 MIN: CPT | Performed by: PODIATRIST

## 2021-02-09 NOTE — PROGRESS NOTES
Reactions    Pcn [Penicillins] Anaphylaxis       Past Medical History:   Diagnosis Date    Cancer ([facility] 2019    LESION REMOVED UNDER TONGUE  DENTAL CLINIC    Chronic back pain     Costochondritis     h/o    Depression     Falls     last one      Fibromyalgia     Hallux rigidus of left foot     HTN (hypertension)     Hyperlipidemia     CLYDE on CPAP     Osteoarthritis     \"everywhere\"    Rheumatoid arthritis ([facility]     \"As a child. \"    Rheumatoid arthritis(714.0)     TIA (transient ischemic attack)     no deficits     Tongue lesion     for OR 10-21-     Use of cane as ambulatory aid      Past Surgical History:   Procedure Laterality Date    ANESTHESIA NERVE BLOCK Left 2019    LEFT C3,4,5 MBB performed by Shreya Gooden MD at [address] [facility] Right 2019    BILATERAL TRANSFORAMINAL EPIDURAL STEROID INJECTION S1 #3 performed by Shreya Gooden MD at [address] [facility] Right 2020    RIGHT SACROILIAC JOINT INJECTION      CPT: 00390 performed by Jay Ulloa DO at [address]      Left    ARTHRODESIS Left 2018    ARTHRODESIS 2ND DIGIT LEFT FOOT performed by Bong Chino DPM at [address] [place] [address]  2017    PLIF L3-L4, L4-L5 with rods, screws, and cages/Dr. Jennifer Ramirez BACK SURGERY  2020    BREAST SURGERY  2010    reduction, bilat     SECTION  1993    CHOLECYSTECTOMY      COLONOSCOPY  2015    COLONOSCOPY N/A 2020    COLONOSCOPY DIAGNOSTIC performed by Keven Cordova MD at [address] [facility], COLON, DIAGNOSTIC      EPIDURAL STEROID INJECTION N/A 2019    BILATERAL TRANSFORAMINAL EPIDURAL STEROID INJECTION S1 performed by Jay Ulloa DO at [address]      Left    [address] / ANKLE Left 2018    REMOVAL OF PAINFUL HARDWARE LEFT FOOT  ++SYNTHES++ performed by Bong Chino DPM at [place] [address] inguinal     LUMBAR SPINE SURGERY N/A 3/4/2020    EXPLORATION OF PRIOR L3-L5 FUSION AND L2-L3  POSTERIOR LUMBAR INTERBODY FUSION -- NEEDS O-ARM, AUDIOLOGY, CAGES, PLATES, SCREWS, C-ARM, TERESA TABLE, CELL SAVER, PLATELET GEL -- GLOBUS performed by Chapincito Darden MD at [address] [facility] [address] N/A 10/21/2020    EXCISION OF TONGUE LESION performed by Heidy Yuan DO at [address] [facility] [address] Bilateral 05/07/2018    L1-2 lumbar foramen #1    NERVE BLOCK Bilateral 12/03/2018    s1 tfesi    NERVE BLOCK Bilateral 08/12/2019    NERVE BLOCK Right 09/30/2019    sacral radiofrequency    NERVE BLOCK Right 9/1/2020    RIGHT SACROILIAC JOINT INJECTION #2 performed by Nancy Chacon DO at [place] [address] Left 9/25/13    left foot tarso metatarsal joint injection    OTHER SURGICAL HISTORY Left 5/27/15    Endoscopic Gastroc recession left, Lapidus left foot and  Excision of exostosis left foot    NC NJX AA&/STRD TFRML EPI LUMBAR/SACRAL 1 LEVEL Bilateral 12/3/2018    BILATERAL S1  EPIDURAL STEROID INJECTION performed by Mannie Stevens MD at [address] [facility] DX/THER SBST INTRLMNR LMBR/SAC W/IMG GDN N/A 8/21/2018    EPIDURAL STEROID INJECTION L1-2 WITH LOW VOL performed by Mannie Stevens MD at [address] [facility] NOSE/CLEFT LIP/TIP Bilateral 5/7/2018    BILATERAL L1-2 LUMBAR FORAMEN #1 performed by Mannie Stevens MD at [address] [facility] NOSE/CLEFT LIP/TIP Bilateral 6/4/2018    BILATERAL TRANSFORAMINAL EPIDURAL STEROID INJECTION UNDER FLUOROSCOPIC GUIDANCE @ FORAMINAL LEVEL L1-2 #2 performed by Mannie Stevens MD at [address] Right 9/30/2019    RIGHT SACRAL RADIOFREQUENCY ABLATION performed by Mannie Stevens MD at [address]  2000, 2005    Big Left Toe    TOE SURGERY Left 2018    2nd toe     TONSILLECTOMY      WISDOM TOOTH EXTRACTION       Family History   Problem Relation Age of Onset    Dementia Mother    [facility] Breast Cancer Mother     Cancer Mother         breast cancer [de-identified]     Stroke Mother     Heart Attack Father     Other Father 80        Aortic aneurysm    Cancer Brother      Social History     Socioeconomic History    Marital status:      Spouse name: Not on file    Number of children: Not on file    Years of education: Not on file    Highest education level: Not on file   Occupational History    Not on file   Social Needs    Financial resource strain: Not on file    Food insecurity     Worry: Not on file     Inability: Not on file    Transportation needs     Medical: Not on file     Non-medical: Not on file   Tobacco Use    Smoking status: Former Smoker     Packs/day: 0.25     Years: 30.00     Pack years: 7.50     Types: Cigarettes     Quit date: 10/5/2020     Years since quittin.3    Smokeless tobacco: Never Used    Tobacco comment: was going to try to stop but chantix is too expensive   Substance and Sexual Activity    Alcohol use: Not Currently     Alcohol/week: 0.0 standard drinks     Comment: rarely    Drug use: No    Sexual activity: Not on file   Lifestyle    Physical activity     Days per week: Not on file     Minutes per session: Not on file    Stress: Not on file   Relationships    Social connections     Talks on phone: Not on file     Gets together: Not on file     Attends Hoahaoism service: Not on file     Active member of club or organization: Not on file     Attends meetings of clubs or organizations: Not on file     Relationship status: Not on file    Intimate partner violence     Fear of current or ex partner: Not on file     Emotionally abused: Not on file     Physically abused: Not on file     Forced sexual activity: Not on file   Other Topics Concern    Not on file   Social History Narrative    Not on file       Vitals:    21 0959   BP: 122/82   Temp: 97.4 °F (36.3 °C)   TempSrc: Temporal       Focused Lower Extremity Physical Exam:  Vitals:    21 0959   BP: 122/82   Temp: 97.4 °F (36.3 °C)        Foot Exam    General  General Appearance: appears stated age and healthy   Orientation: alert and oriented to person, place, and time       Left Foot/Ankle      Inspection and Palpation  Tenderness: bony tenderness and metatarsals   Swelling: dorsum   Skin Exam: skin intact; Neurovascular  Dorsalis pedis: 3+  Posterior tibial: 3+  Saphenous nerve sensation: normal  Tibial nerve sensation: normal  Superficial peroneal nerve sensation: normal  Deep peroneal nerve sensation: normal  Sural nerve sensation: normal    Muscle Strength  Ankle dorsiflexion: 5  Ankle plantar flexion: 5  Ankle inversion: 5  Ankle eversion: 5  Great toe extension: 5  Great toe flexion: 5             Left Ankle Exam     Tenderness   The patient is experiencing tenderness in the ATF. Swelling: mild    Range of Motion   Dorsiflexion: normal   Plantar flexion: normal   Eversion: normal   Inversion: normal     Muscle Strength   The patient has normal left ankle strength. Dorsiflexion:  5/5   Plantar flexion:  5/5             Vascular: pulses  dp  pt  palapble  Capillary Refill Time:   Hair growth  Skin:    Edema:    Neurologic:      Musculoskeletal/ Orthopedic examination: Pain with palpation to the anterior tib-fib ligament. Pain with inversion negative pain with eversion dorsiflexion and plantarflexion. Mild pain with palpation to the metatarsal 1-3  NAIL   Web space   Derm:      X-rays were taken revealing internal hardware is noted fusion at the base of the metatarsal cuneiform joint as well as an implant no fracture seen in ankle    Assessment and Plan: Today the patient was placed in a cam walker left side for 2 weeks she may take this off when she sleeps takes a shower she will take this off before back surgery. Cam Walker  Signed informed consent was obtained from the patient. Patient applied the cam walker to the affected limb. This device fit well.   Patient was given written and verbal instructions regarding this cam walker. This is medically necessary to help reduce pain and promote and expedite healing. [facility] was seen today for foot injury. Diagnoses and all orders for this visit:    Contusion of left foot, initial encounter  -     XR FOOT LEFT (MIN 3 VIEWS); Future  -     XR ANKLE LEFT (MIN 3 VIEWS); Future        No follow-ups on file. Seen By:  Ayah Aguilera DPM      Document was created using voice recognition software. Note was reviewed, however may contain grammatical errors.

## 2021-02-11 ENCOUNTER — PREP FOR PROCEDURE (OUTPATIENT)
Dept: NEUROSURGERY | Age: 66
End: 2021-02-11

## 2021-02-11 DIAGNOSIS — Z01.818 PRE-OP TESTING: Primary | ICD-10-CM

## 2021-02-11 RX ORDER — SODIUM CHLORIDE 0.9 % (FLUSH) 0.9 %
10 SYRINGE (ML) INJECTION EVERY 12 HOURS SCHEDULED
Status: CANCELLED | OUTPATIENT
Start: 2021-02-11

## 2021-02-11 RX ORDER — SODIUM CHLORIDE 9 MG/ML
INJECTION, SOLUTION INTRAVENOUS CONTINUOUS
Status: CANCELLED | OUTPATIENT
Start: 2021-02-11

## 2021-02-11 RX ORDER — SODIUM CHLORIDE 0.9 % (FLUSH) 0.9 %
10 SYRINGE (ML) INJECTION PRN
Status: CANCELLED | OUTPATIENT
Start: 2021-02-11

## 2021-02-23 NOTE — PROGRESS NOTES
PATIENT AGREES TO COVID TEST 3/4/21 @ [facility], AGREES TO SELF ISOLATE UNTIL OR DAY.

## 2021-02-24 ENCOUNTER — OFFICE VISIT (OUTPATIENT)
Dept: OBGYN | Age: 66
End: 2021-02-24

## 2021-02-24 VITALS
RESPIRATION RATE: 16 BRPM | DIASTOLIC BLOOD PRESSURE: 78 MMHG | HEART RATE: 90 BPM | SYSTOLIC BLOOD PRESSURE: 148 MMHG | BODY MASS INDEX: 34.99 KG/M2 | HEIGHT: 65 IN | WEIGHT: 210 LBS

## 2021-02-24 DIAGNOSIS — N89.8 VAGINAL ODOR: Primary | ICD-10-CM

## 2021-02-24 DIAGNOSIS — B96.89 BV (BACTERIAL VAGINOSIS): ICD-10-CM

## 2021-02-24 DIAGNOSIS — N76.0 BV (BACTERIAL VAGINOSIS): ICD-10-CM

## 2021-02-24 PROCEDURE — 99212 OFFICE O/P EST SF 10 MIN: CPT | Performed by: OBSTETRICS & GYNECOLOGY

## 2021-02-24 PROCEDURE — 99213 OFFICE O/P EST LOW 20 MIN: CPT | Performed by: OBSTETRICS & GYNECOLOGY

## 2021-02-24 RX ORDER — METRONIDAZOLE 500 MG/1
500 TABLET ORAL 2 TIMES DAILY
Qty: 14 TABLET | Refills: 0 | Status: SHIPPED
Start: 2021-02-24 | End: 2021-03-03

## 2021-02-24 NOTE — PROGRESS NOTES
Here today for a Vaginal odor, Having back surgery in 2 weeks. Odor noted last few weeks. No itching. Perhaps a bit of yellow discharge noted. Pelvic:Appears to have some BV present. IMP: Vaginal odor, BV    PLAN:Flagyl 500 bid for 7 days. See prn.

## 2021-03-03 NOTE — PROGRESS NOTES
Jennifer 38 PRE-ADMISSION TESTING GENERAL INSTRUCTIONS- [facility]-phone number:[phone]    GENERAL INSTRUCTIONS    [x] Jim wipe instruction sheet and wipes given. [x] Nothing by mouth after midnight, including gum, candy, mints, or water. [x] You may brush your teeth, gargle, but do NOT swallow water. [x]No smoking, chewing tobacco, illegal drugs, or alcohol within 24 hours of your surgery. [x] Jewelry, valuables or body piercing's should not be brought to the hospital. All body and/or tongue piercing's must be removed prior to arriving to hospital.  ALL hair pins must be removed. [x] Do not wear makeup, lotions, powders, deodorant. Nail polish as directed by the nurse. [x] Arrange transportation with a responsible adult  to and from the hospital. If you do not have a responsible adult  to transport you, you will need to make arrangements with a medical transportation company (i.e. [employer]. A Uber/taxi/bus is not appropriate unless you are accompanied by a responsible adult ). Arrange for someone to be with you for the remainder of the day and for 24 hours after your procedure due to having had anesthesia. Who will be your  for transportation?____FRANSISCO WASHBURN______________   Who will be staying with you for 24 hrs after your procedure?__________FRANSISCO WASHBURN________  [x] Bring insurance card and photo ID. [x] Transfusion Bracelet: Please bring with you to hospital, day of surgery      [x] Bring copy of living will or healthcare power of  papers to be placed in your electronic record.   [x] CPAP/BI-PAP: Please bring your machine if you are to spend the night in the hospital.     PARKING INSTRUCTIONS:   [x] ARRIVE TO [address] [place] [address] 3/9/21 @ 0500 AM. YOU WILL BE DIRECTED TO PRE OP FOR SURGERY WITH DR Dorian Burr         [x] To reach the [facility] lobby from [address] [place] [address], upon entering the hospital, take elevator B to the 3rd floor. EDUCATION INSTRUCTIONS:        [x] Pre-admission Testing educational folder given  [x] Incentive Spirometry,coughing & deep breathing exercises reviewed. [x]Medication information sheet(s)   [x]Fluoroscopy-Xray used in surgery reviewed with patient. Educational pamphlet placed in chart. [x]Pain: Post-op pain is normal and to be expected. You will be asked to rate your pain from 0-10(a zero is not acceptable-education is needed). Your post-op pain goal is:  [x] Ask your nurse for your pain medication. MEDICATION INSTRUCTIONS:   [x]Bring a complete list of your medications, please write the last time you took the medicine, give this list to the nurse. [x] Take the following medications the morning of surgery with 1-2 ounces of water: SEE LIST  [x] Stop herbal supplements and vitamins 5 days before your surgery. [x] Follow physician instructions regarding any blood thinners you may be taking. WHAT TO EXPECT:  [x] The day of surgery you will be greeted and checked in by the [employer].  In addition, you will be registered in the [place] by a Patient Access Representative. Please bring your photo ID and insurance card. A nurse will greet you in accordance to the time you are needed in the pre-op area to prepare you for surgery. Please do not be discouraged if you are not greeted in the order you arrive as there are many variables that are involved in patient preparation. Your patience is greatly appreciated as you wait for your nurse. Please bring in items such as: books, magazines, newspapers, electronics, or any other items  to occupy your time in the waiting area. [x]  Delays may occur with surgery and staff will make a sincere effort to keep you informed of delays. If any delays occur with your procedure, we apologize ahead of time for your inconvenience as we recognize the value of your time.

## 2021-03-04 ENCOUNTER — HOSPITAL ENCOUNTER (OUTPATIENT)
Age: 66
Discharge: HOME | End: 2021-03-06
Payer: OTHER GOVERNMENT

## 2021-03-04 ENCOUNTER — HOSPITAL ENCOUNTER (OUTPATIENT)
Dept: PREADMISSION TESTING | Age: 66
Discharge: HOME | End: 2021-03-04

## 2021-03-04 ENCOUNTER — HOSPITAL ENCOUNTER (OUTPATIENT)
Dept: GENERAL RADIOLOGY | Age: 66
Discharge: HOME | End: 2021-03-06

## 2021-03-04 VITALS
DIASTOLIC BLOOD PRESSURE: 75 MMHG | SYSTOLIC BLOOD PRESSURE: 144 MMHG | OXYGEN SATURATION: 98 % | RESPIRATION RATE: 20 BRPM | WEIGHT: 210 LBS | BODY MASS INDEX: 34.99 KG/M2 | HEIGHT: 65 IN | TEMPERATURE: 97.5 F

## 2021-03-04 DIAGNOSIS — Z01.818 PRE-OP TESTING: ICD-10-CM

## 2021-03-04 DIAGNOSIS — U07.1 COVID-19: ICD-10-CM

## 2021-03-04 LAB
ABO/RH: NORMAL
ANION GAP SERPL CALCULATED.3IONS-SCNC: 11 MMOL/L (ref 7–16)
ANTIBODY SCREEN: NORMAL
BASOPHILS ABSOLUTE: 0.05 E9/L (ref 0–0.2)
BASOPHILS RELATIVE PERCENT: 0.6 % (ref 0–2)
BILIRUBIN URINE: NEGATIVE
BLOOD, URINE: NEGATIVE
BUN BLDV-MCNC: 10 MG/DL (ref 8–23)
CALCIUM SERPL-MCNC: 8.8 MG/DL (ref 8.6–10.2)
CHLORIDE BLD-SCNC: 104 MMOL/L (ref 98–107)
CLARITY: CLEAR
CO2: 23 MMOL/L (ref 22–29)
COLOR: YELLOW
CREAT SERPL-MCNC: 0.8 MG/DL (ref 0.5–1)
EOSINOPHILS ABSOLUTE: 0.08 E9/L (ref 0.05–0.5)
EOSINOPHILS RELATIVE PERCENT: 1 % (ref 0–6)
GFR AFRICAN AMERICAN: >60
GFR NON-AFRICAN AMERICAN: >60 ML/MIN/1.73
GLUCOSE BLD-MCNC: 120 MG/DL (ref 74–99)
GLUCOSE URINE: NEGATIVE MG/DL
HCT VFR BLD CALC: 43.1 % (ref 34–48)
HEMOGLOBIN: 14.8 G/DL (ref 11.5–15.5)
IMMATURE GRANULOCYTES #: 0.03 E9/L
IMMATURE GRANULOCYTES %: 0.4 % (ref 0–5)
INR BLD: 1
KETONES, URINE: NEGATIVE MG/DL
LEUKOCYTE ESTERASE, URINE: NEGATIVE
LYMPHOCYTES ABSOLUTE: 1.85 E9/L (ref 1.5–4)
LYMPHOCYTES RELATIVE PERCENT: 22.3 % (ref 20–42)
MCH RBC QN AUTO: 33.8 PG (ref 26–35)
MCHC RBC AUTO-ENTMCNC: 34.3 % (ref 32–34.5)
MCV RBC AUTO: 98.4 FL (ref 80–99.9)
MONOCYTES ABSOLUTE: 0.69 E9/L (ref 0.1–0.95)
MONOCYTES RELATIVE PERCENT: 8.3 % (ref 2–12)
NEUTROPHILS ABSOLUTE: 5.6 E9/L (ref 1.8–7.3)
NEUTROPHILS RELATIVE PERCENT: 67.4 % (ref 43–80)
NITRITE, URINE: NEGATIVE
PDW BLD-RTO: 12.4 FL (ref 11.5–15)
PH UA: 6.5 (ref 5–9)
PLATELET # BLD: 292 E9/L (ref 130–450)
PMV BLD AUTO: 11.2 FL (ref 7–12)
POTASSIUM REFLEX MAGNESIUM: 4.3 MMOL/L (ref 3.5–5)
PROTEIN UA: NEGATIVE MG/DL
PROTHROMBIN TIME: 10.5 SEC (ref 9.3–12.4)
RBC # BLD: 4.38 E12/L (ref 3.5–5.5)
SODIUM BLD-SCNC: 138 MMOL/L (ref 132–146)
SPECIFIC GRAVITY UA: 1.01 (ref 1–1.03)
UROBILINOGEN, URINE: 0.2 E.U./DL
WBC # BLD: 8.3 E9/L (ref 4.5–11.5)

## 2021-03-04 PROCEDURE — 86901 BLOOD TYPING SEROLOGIC RH(D): CPT

## 2021-03-04 PROCEDURE — 80048 BASIC METABOLIC PNL TOTAL CA: CPT

## 2021-03-04 PROCEDURE — 81003 URINALYSIS AUTO W/O SCOPE: CPT

## 2021-03-04 PROCEDURE — 36415 COLL VENOUS BLD VENIPUNCTURE: CPT

## 2021-03-04 PROCEDURE — 86900 BLOOD TYPING SEROLOGIC ABO: CPT

## 2021-03-04 PROCEDURE — 86850 RBC ANTIBODY SCREEN: CPT

## 2021-03-04 PROCEDURE — 85610 PROTHROMBIN TIME: CPT

## 2021-03-04 PROCEDURE — 87088 URINE BACTERIA CULTURE: CPT

## 2021-03-04 PROCEDURE — 71046 X-RAY EXAM CHEST 2 VIEWS: CPT

## 2021-03-04 PROCEDURE — U0003 INFECTIOUS AGENT DETECTION BY NUCLEIC ACID (DNA OR RNA); SEVERE ACUTE RESPIRATORY SYNDROME CORONAVIRUS 2 (SARS-COV-2) (CORONAVIRUS DISEASE [COVID-19]), AMPLIFIED PROBE TECHNIQUE, MAKING USE OF HIGH THROUGHPUT TECHNOLOGIES AS DESCRIBED BY CMS-2020-01-R: HCPCS

## 2021-03-04 PROCEDURE — 87081 CULTURE SCREEN ONLY: CPT

## 2021-03-04 PROCEDURE — 85025 COMPLETE CBC W/AUTO DIFF WBC: CPT

## 2021-03-04 ASSESSMENT — PAIN SCALES - GENERAL: PAINLEVEL_OUTOF10: 8

## 2021-03-04 ASSESSMENT — PAIN DESCRIPTION - PAIN TYPE: TYPE: CHRONIC PAIN

## 2021-03-04 ASSESSMENT — PAIN DESCRIPTION - DESCRIPTORS: DESCRIPTORS: ACHING;SHARP

## 2021-03-05 LAB — MRSA CULTURE ONLY: NORMAL

## 2021-03-06 LAB
SARS-COV-2: NOT DETECTED
SOURCE: NORMAL
URINE CULTURE, ROUTINE: NORMAL

## 2021-03-08 NOTE — H&P
ANA Brooke is a 63 y.o.  female who underwent exploration of prior L3-5 fusion and L2-3 PLIF on 3/4/20 by Dr. Trixie Rouse. She had routine follow up. At patient's three month follow up, she continued to have severe right SI joint pain and right groin pain. Pain is worse getting up from a seated position and sitting on her right side. Provocative testing including distraction, thigh thrust, and EDWAR were positive on the right. Pt was sent for a right SI joint injection performed on 6/16/20. She had 80% pain relief for one month after the injection. At her follow up in our office, she was sent for a second right SI joint injection. Patient presents to  discuss pain relief from second injection. States she had 75-80% pain relief for two weeks. Pain has progressively returned. Denies new pain, weakness, numbness, or tingling. ROS  HEENT: negative for headache  CVS: negative for chest pain  RESP: negative for SOB  GI: negative for abdominal pain  : negative for hematuria  HEME: positive for easy bruising  ID: negative for recent infection  MUSCULOSKELETAL: positive for back pain  PSYCHIATRIC: negative for anxiety  NEUROLOGIC: negative for seizure or stroke    Past Medical History:   Diagnosis Date    Cancer ([facility] [address] 12/2019    LESION REMOVED UNDER TONGUE  DENTAL CLINIC    Chronic back pain     Costochondritis     h/o    Depression     Falls     last one 2017     Fibromyalgia     Hallux rigidus of left foot     HTN (hypertension)     Hyperlipidemia     CLYDE on CPAP     Osteoarthritis     \"everywhere\"    Rheumatoid arthritis ([address]     \"As a child. \"    Rheumatoid arthritis(714.0)     TIA (transient ischemic attack)     no deficits     Tongue lesion     for OR 10-21-20     Use of cane as ambulatory aid      Past Surgical History:   Procedure Laterality Date    ANESTHESIA NERVE BLOCK Left 2/26/2019    LEFT C3,4,5 MBB performed by Jhoana Pham MD at [address] Right 8/12/2019 BILATERAL TRANSFORAMINAL EPIDURAL STEROID INJECTION S1 #3 performed by Louie Vera MD at [address] Right 2020    RIGHT SACROILIAC JOINT INJECTION      CPT: 18014 performed by Aurelio Catalan DO at [address]      Left    ARTHRODESIS Left 2018    ARTHRODESIS 2ND DIGIT LEFT FOOT performed by Denise Schuster DPM at [address]  2017    PLIF L3-L4, L4-L5 with rods, screws, and cages/Dr. Riley Capellan BACK SURGERY  2020    BREAST SURGERY  2010    reduction, bilat     SECTION  1993    CHOLECYSTECTOMY      COLONOSCOPY  2015    COLONOSCOPY N/A 2020    COLONOSCOPY DIAGNOSTIC performed by Radha Rausch MD at [address] [facility], COLON, DIAGNOSTIC      EPIDURAL STEROID INJECTION N/A 2019    BILATERAL TRANSFORAMINAL EPIDURAL STEROID INJECTION S1 performed by Aurelio Catalan DO at [facility]      Left    [address] / [facility]KLE Left 2018    REMOVAL OF PAINFUL HARDWARE LEFT FOOT  ++SYNTHES++ performed by Denise Schuster DPM at [facility]     LUMBAR SPINE SURGERY N/A 3/4/2020    EXPLORATION OF PRIOR L3-L5 FUSION AND L2-L3  POSTERIOR LUMBAR INTERBODY FUSION -- NEEDS O-ARM, AUDIOLOGY, CAGES, PLATES, SCREWS, C-ARM, TERESA TABLE, CELL SAVER, PLATELET GEL -- GLOBUS performed by Marian Campbell MD at [address] N/A 10/21/2020    EXCISION OF TONGUE LESION performed by Alfonso Linares DO at [address] Bilateral 2018    L1-2 lumbar foramen #1    NERVE BLOCK Bilateral 2018    s1 tfesi    NERVE BLOCK Bilateral 2019    NERVE BLOCK Right 2019    sacral radiofrequency    NERVE BLOCK Right 2020    RIGHT SACROILIAC JOINT INJECTION #2 performed by Aurelio Catalan DO at [address] [place] [address] Left 13    left foot tarso metatarsal joint injection    OTHER SURGICAL HISTORY Left 5/27/15    Endoscopic Gastroc recession left, Lapidus left foot and  Excision of exostosis left foot    ID NJX AA&/STRD TFRML EPI LUMBAR/SACRAL 1 LEVEL Bilateral 12/3/2018    BILATERAL S1  EPIDURAL STEROID INJECTION performed by Emelia Barthel, MD at [address] [facility] DX/THER SBST INTRLMNR LMBR/SAC W/IMG GDN N/A 2018    EPIDURAL STEROID INJECTION L1-2 WITH LOW VOL performed by Emelia Barthel, MD at [address] [facility] NOSE/CLEFT LIP/TIP Bilateral 2018    BILATERAL L1-2 LUMBAR FORAMEN #1 performed by Emelia Barthel, MD at [address] [facility] NOSE/CLEFT LIP/TIP Bilateral 2018    BILATERAL TRANSFORAMINAL EPIDURAL STEROID INJECTION UNDER FLUOROSCOPIC GUIDANCE @ FORAMINAL LEVEL L1-2 #2 performed by Emelia Barthel, MD at [address] Right 2019    RIGHT SACRAL RADIOFREQUENCY ABLATION performed by Emelia Barthel, MD at [address] [facility] TOE SURGERY  ,     Big Left Toe    TOE SURGERY Left 2018    2nd toe     TONSILLECTOMY      WISDOM TOOTH EXTRACTION       Family History   Problem Relation Age of Onset    Dementia Mother     Breast Cancer Mother     Cancer Mother         breast cancer [de-identified]     Stroke Mother     Heart Attack Father     Other Father 80        Aortic aneurysm    Cancer Brother      Social History     Socioeconomic History    Marital status:      Spouse name: Not on file    Number of children: Not on file    Years of education: Not on file    Highest education level: Not on file   Occupational History    Not on file   Social Needs    Financial resource strain: Not on file    Food insecurity     Worry: Not on file     Inability: Not on file    Transportation needs     Medical: Not on file     Non-medical: Not on file   Tobacco Use    Smoking status: Former Smoker     Packs/day: 0.25     Years: 30.00     Pack years: 7.50     Types: Cigarettes     Quit date: 10/5/2020     Years since quittin.4    Smokeless tobacco: Never Used    Tobacco comment: was going to try to stop but chantix is too expensive   Substance and Sexual Activity    Alcohol use: Not Currently     Alcohol/week: 0.0 standard drinks     Comment: rarely    Drug use: No    Sexual activity: Not on file   Lifestyle    Physical activity     Days per week: Not on file     Minutes per session: Not on file    Stress: Not on file   Relationships    Social connections     Talks on phone: Not on file     Gets together: Not on file     Attends Sabianist service: Not on file     Active member of club or organization: Not on file     Attends meetings of clubs or organizations: Not on file     Relationship status: Not on file    Intimate partner violence     Fear of current or ex partner: Not on file     Emotionally abused: Not on file     Physically abused: Not on file     Forced sexual activity: Not on file   Other Topics Concern    Not on file   Social History Narrative    Not on file     No current facility-administered medications on file prior to encounter. Current Outpatient Medications on File Prior to Encounter   Medication Sig Dispense Refill    gabapentin (NEURONTIN) 400 MG capsule Take 1 capsule by mouth 3 times daily for 90 days. 270 capsule 1    FLUoxetine (PROZAC) 40 MG capsule Take 1 capsule by mouth daily For mood.  90 capsule 1    lisinopril (PRINIVIL;ZESTRIL) 30 MG tablet Take 1 tablet by mouth every evening 90 tablet 1    atorvastatin (LIPITOR) 40 MG tablet Take 1 tablet by mouth daily 90 tablet 1    hydrOXYzine (VISTARIL) 25 MG capsule Take 1 capsule by mouth 3 times daily as needed for Anxiety 270 capsule 1    varenicline (CHANTIX CONTINUING MONTH CHARITY) 1 MG tablet Take 1 tablet by mouth 2 times daily (Patient not taking: Reported on 2/24/2021) 60 tablet 1     Allergies   Allergen Reactions    Pcn [Penicillins] Anaphylaxis            Physical Exam:              WDWN, no apparent distress              Non-labored breathing               Vitals Stable              Alert and oriented x3              CN 3-12 intact              PERRL              EOMI              CZIX motor strength              Sensation to LT intact bilaterally              On right:               +SI joint tenderness              +EDWAR              +distraction               +thigh thrust               Previous lumbar incision healed                              Imaging: N/A     Assessment: This is a 63 y.o.  female presenting for a follow-up right SI joint pain      Plan:  -Pt sent for hip/pelvis x-rays  -Pt had greater than 75% pain relief after both SI joint injections. She has failed conservative treatments including physical therapy.  -Discussed further treatment options including RFA and SI joint fusion. Pt wishes to proceed with right SI joint fusion  -OARRS report reviewed     Electronically signed by Shankar Nelson PA-C on 9/25/2020 at 1:56 PM      I have interviewed and examined the patient and agree with above. She has right SI joint pain with positive kvng finger on the right and she has 3 positive provocative tests with significant short term improvement with right sacroiliac joint injections. I will schedule her for a right sacroiliac joint fusion.  R/B/A of surgery have been discussed and she wishes to proceed    Socrates Piper

## 2021-03-09 ENCOUNTER — HOSPITAL ENCOUNTER (OUTPATIENT)
Age: 66
Setting detail: OBSERVATION
Discharge: HOME HEALTH | End: 2021-03-10
Attending: NEUROLOGICAL SURGERY | Admitting: NEUROLOGICAL SURGERY

## 2021-03-09 ENCOUNTER — ANESTHESIA (OUTPATIENT)
Dept: OPERATING ROOM | Age: 66
End: 2021-03-09

## 2021-03-09 ENCOUNTER — ANESTHESIA EVENT (OUTPATIENT)
Dept: OPERATING ROOM | Age: 66
End: 2021-03-09

## 2021-03-09 VITALS
SYSTOLIC BLOOD PRESSURE: 120 MMHG | TEMPERATURE: 97.2 F | OXYGEN SATURATION: 100 % | RESPIRATION RATE: 12 BRPM | DIASTOLIC BLOOD PRESSURE: 68 MMHG

## 2021-03-09 DIAGNOSIS — U07.1 COVID-19: Primary | ICD-10-CM

## 2021-03-09 DIAGNOSIS — F17.210 CIGARETTE NICOTINE DEPENDENCE WITHOUT COMPLICATION: ICD-10-CM

## 2021-03-09 DIAGNOSIS — M53.3 SACROILIAC JOINT DYSFUNCTION OF RIGHT SIDE: Primary | ICD-10-CM

## 2021-03-09 DIAGNOSIS — G89.29 CHRONIC MIDLINE LOW BACK PAIN WITH SCIATICA, SCIATICA LATERALITY UNSPECIFIED: ICD-10-CM

## 2021-03-09 DIAGNOSIS — M54.40 CHRONIC MIDLINE LOW BACK PAIN WITH SCIATICA, SCIATICA LATERALITY UNSPECIFIED: ICD-10-CM

## 2021-03-09 PROCEDURE — 2500000003 HC RX 250 WO HCPCS: Performed by: NEUROLOGICAL SURGERY

## 2021-03-09 PROCEDURE — 2580000003 HC RX 258: Performed by: PHYSICIAN ASSISTANT

## 2021-03-09 PROCEDURE — G0378 HOSPITAL OBSERVATION PER HR: HCPCS

## 2021-03-09 PROCEDURE — 2720000010 HC SURG SUPPLY STERILE: Performed by: NEUROLOGICAL SURGERY

## 2021-03-09 PROCEDURE — 6360000002 HC RX W HCPCS: Performed by: NEUROLOGICAL SURGERY

## 2021-03-09 PROCEDURE — 7100000000 HC PACU RECOVERY - FIRST 15 MIN: Performed by: NEUROLOGICAL SURGERY

## 2021-03-09 PROCEDURE — 6360000002 HC RX W HCPCS: Performed by: NURSE ANESTHETIST, CERTIFIED REGISTERED

## 2021-03-09 PROCEDURE — 2709999900 HC NON-CHARGEABLE SUPPLY: Performed by: NEUROLOGICAL SURGERY

## 2021-03-09 PROCEDURE — 3700000001 HC ADD 15 MINUTES (ANESTHESIA): Performed by: NEUROLOGICAL SURGERY

## 2021-03-09 PROCEDURE — 2780000010 HC IMPLANT OTHER: Performed by: NEUROLOGICAL SURGERY

## 2021-03-09 PROCEDURE — 27280 ARTHR SI JT OPN B1GRF INSTRM: CPT | Performed by: PHYSICIAN ASSISTANT

## 2021-03-09 PROCEDURE — 3600000015 HC SURGERY LEVEL 5 ADDTL 15MIN: Performed by: NEUROLOGICAL SURGERY

## 2021-03-09 PROCEDURE — C1713 ANCHOR/SCREW BN/BN,TIS/BN: HCPCS | Performed by: NEUROLOGICAL SURGERY

## 2021-03-09 PROCEDURE — 6360000002 HC RX W HCPCS: Performed by: PHYSICIAN ASSISTANT

## 2021-03-09 PROCEDURE — 6370000000 HC RX 637 (ALT 250 FOR IP): Performed by: NEUROLOGICAL SURGERY

## 2021-03-09 PROCEDURE — 3700000000 HC ANESTHESIA ATTENDED CARE: Performed by: NEUROLOGICAL SURGERY

## 2021-03-09 PROCEDURE — 2500000003 HC RX 250 WO HCPCS: Performed by: NURSE ANESTHETIST, CERTIFIED REGISTERED

## 2021-03-09 PROCEDURE — 3600000005 HC SURGERY LEVEL 5 BASE: Performed by: NEUROLOGICAL SURGERY

## 2021-03-09 PROCEDURE — 2580000003 HC RX 258: Performed by: NURSE ANESTHETIST, CERTIFIED REGISTERED

## 2021-03-09 PROCEDURE — 27280 ARTHR SI JT OPN B1GRF INSTRM: CPT | Performed by: NEUROLOGICAL SURGERY

## 2021-03-09 PROCEDURE — 2580000003 HC RX 258: Performed by: NEUROLOGICAL SURGERY

## 2021-03-09 PROCEDURE — 6370000000 HC RX 637 (ALT 250 FOR IP): Performed by: PHYSICIAN ASSISTANT

## 2021-03-09 PROCEDURE — 6360000002 HC RX W HCPCS: Performed by: ANESTHESIOLOGY

## 2021-03-09 PROCEDURE — C1729 CATH, DRAINAGE: HCPCS | Performed by: NEUROLOGICAL SURGERY

## 2021-03-09 PROCEDURE — 7100000001 HC PACU RECOVERY - ADDTL 15 MIN: Performed by: NEUROLOGICAL SURGERY

## 2021-03-09 DEVICE — IMPLANTABLE DEVICE
Type: IMPLANTABLE DEVICE | Site: HIP | Status: FUNCTIONAL
Brand: IFUSE IMPLANT SYSTEM

## 2021-03-09 DEVICE — IMPLANT SPNL L40MM DIA7MM SACROILIAC JT TI POR PLSM SPR: Type: IMPLANTABLE DEVICE | Site: HIP | Status: FUNCTIONAL

## 2021-03-09 RX ORDER — FLUOXETINE HYDROCHLORIDE 20 MG/1
40 CAPSULE ORAL DAILY
Status: DISCONTINUED | OUTPATIENT
Start: 2021-03-10 | End: 2021-03-10 | Stop reason: HOSPADM

## 2021-03-09 RX ORDER — PROMETHAZINE HYDROCHLORIDE 25 MG/1
12.5 TABLET ORAL EVERY 6 HOURS PRN
Status: DISCONTINUED | OUTPATIENT
Start: 2021-03-09 | End: 2021-03-10 | Stop reason: HOSPADM

## 2021-03-09 RX ORDER — POLYETHYLENE GLYCOL 3350 17 G/17G
17 POWDER, FOR SOLUTION ORAL DAILY
Status: DISCONTINUED | OUTPATIENT
Start: 2021-03-09 | End: 2021-03-10 | Stop reason: HOSPADM

## 2021-03-09 RX ORDER — MORPHINE SULFATE 4 MG/ML
4 INJECTION, SOLUTION INTRAMUSCULAR; INTRAVENOUS
Status: DISCONTINUED | OUTPATIENT
Start: 2021-03-09 | End: 2021-03-10 | Stop reason: HOSPADM

## 2021-03-09 RX ORDER — HYDROCODONE BITARTRATE AND ACETAMINOPHEN 10; 325 MG/1; MG/1
1 TABLET ORAL EVERY 4 HOURS PRN
Status: DISCONTINUED | OUTPATIENT
Start: 2021-03-09 | End: 2021-03-10 | Stop reason: HOSPADM

## 2021-03-09 RX ORDER — HYDROCODONE BITARTRATE AND ACETAMINOPHEN 5; 325 MG/1; MG/1
1 TABLET ORAL EVERY 4 HOURS PRN
Status: DISCONTINUED | OUTPATIENT
Start: 2021-03-09 | End: 2021-03-10 | Stop reason: HOSPADM

## 2021-03-09 RX ORDER — VANCOMYCIN HYDROCHLORIDE 500 MG/10ML
INJECTION, POWDER, LYOPHILIZED, FOR SOLUTION INTRAVENOUS PRN
Status: DISCONTINUED | OUTPATIENT
Start: 2021-03-09 | End: 2021-03-09 | Stop reason: ALTCHOICE

## 2021-03-09 RX ORDER — SODIUM CHLORIDE 0.9 % (FLUSH) 0.9 %
10 SYRINGE (ML) INJECTION PRN
Status: DISCONTINUED | OUTPATIENT
Start: 2021-03-09 | End: 2021-03-10 | Stop reason: HOSPADM

## 2021-03-09 RX ORDER — MORPHINE SULFATE 2 MG/ML
2 INJECTION, SOLUTION INTRAMUSCULAR; INTRAVENOUS
Status: DISCONTINUED | OUTPATIENT
Start: 2021-03-09 | End: 2021-03-10 | Stop reason: HOSPADM

## 2021-03-09 RX ORDER — HYDROCODONE BITARTRATE AND ACETAMINOPHEN 5; 325 MG/1; MG/1
1 TABLET ORAL EVERY 8 HOURS PRN
Status: ON HOLD | COMMUNITY
End: 2021-03-10 | Stop reason: HOSPADM

## 2021-03-09 RX ORDER — SODIUM CHLORIDE 9 MG/ML
INJECTION, SOLUTION INTRAVENOUS CONTINUOUS
Status: DISCONTINUED | OUTPATIENT
Start: 2021-03-09 | End: 2021-03-09

## 2021-03-09 RX ORDER — ATORVASTATIN CALCIUM 40 MG/1
40 TABLET, FILM COATED ORAL DAILY
Status: DISCONTINUED | OUTPATIENT
Start: 2021-03-09 | End: 2021-03-10 | Stop reason: HOSPADM

## 2021-03-09 RX ORDER — SODIUM CHLORIDE 0.9 % (FLUSH) 0.9 %
10 SYRINGE (ML) INJECTION EVERY 12 HOURS SCHEDULED
Status: DISCONTINUED | OUTPATIENT
Start: 2021-03-09 | End: 2021-03-10 | Stop reason: HOSPADM

## 2021-03-09 RX ORDER — SODIUM CHLORIDE 0.9 % (FLUSH) 0.9 %
10 SYRINGE (ML) INJECTION PRN
Status: DISCONTINUED | OUTPATIENT
Start: 2021-03-09 | End: 2021-03-09 | Stop reason: HOSPADM

## 2021-03-09 RX ORDER — DEXAMETHASONE SODIUM PHOSPHATE 10 MG/ML
INJECTION, SOLUTION INTRAMUSCULAR; INTRAVENOUS PRN
Status: DISCONTINUED | OUTPATIENT
Start: 2021-03-09 | End: 2021-03-09 | Stop reason: SDUPTHER

## 2021-03-09 RX ORDER — GLYCOPYRROLATE 1 MG/5 ML
SYRINGE (ML) INTRAVENOUS PRN
Status: DISCONTINUED | OUTPATIENT
Start: 2021-03-09 | End: 2021-03-09 | Stop reason: SDUPTHER

## 2021-03-09 RX ORDER — ONDANSETRON 2 MG/ML
4 INJECTION INTRAMUSCULAR; INTRAVENOUS EVERY 6 HOURS PRN
Status: DISCONTINUED | OUTPATIENT
Start: 2021-03-09 | End: 2021-03-10 | Stop reason: HOSPADM

## 2021-03-09 RX ORDER — HYDROXYZINE PAMOATE 25 MG/1
25 CAPSULE ORAL 3 TIMES DAILY PRN
Status: DISCONTINUED | OUTPATIENT
Start: 2021-03-09 | End: 2021-03-10 | Stop reason: HOSPADM

## 2021-03-09 RX ORDER — CYCLOBENZAPRINE HCL 10 MG
10 TABLET ORAL 3 TIMES DAILY PRN
Status: DISCONTINUED | OUTPATIENT
Start: 2021-03-09 | End: 2021-03-10 | Stop reason: HOSPADM

## 2021-03-09 RX ORDER — HYDROCODONE BITARTRATE AND ACETAMINOPHEN 5; 325 MG/1; MG/1
1 TABLET ORAL PRN
Status: DISCONTINUED | OUTPATIENT
Start: 2021-03-09 | End: 2021-03-09 | Stop reason: HOSPADM

## 2021-03-09 RX ORDER — LIDOCAINE HYDROCHLORIDE 20 MG/ML
INJECTION, SOLUTION INTRAVENOUS PRN
Status: DISCONTINUED | OUTPATIENT
Start: 2021-03-09 | End: 2021-03-09 | Stop reason: SDUPTHER

## 2021-03-09 RX ORDER — LIDOCAINE HYDROCHLORIDE AND EPINEPHRINE 10; 10 MG/ML; UG/ML
INJECTION, SOLUTION INFILTRATION; PERINEURAL PRN
Status: DISCONTINUED | OUTPATIENT
Start: 2021-03-09 | End: 2021-03-09 | Stop reason: ALTCHOICE

## 2021-03-09 RX ORDER — ROCURONIUM BROMIDE 10 MG/ML
INJECTION, SOLUTION INTRAVENOUS PRN
Status: DISCONTINUED | OUTPATIENT
Start: 2021-03-09 | End: 2021-03-09 | Stop reason: SDUPTHER

## 2021-03-09 RX ORDER — MAGNESIUM CARB/ALUMINUM HYDROX 105-160MG
TABLET,CHEWABLE ORAL PRN
Status: DISCONTINUED | OUTPATIENT
Start: 2021-03-09 | End: 2021-03-09 | Stop reason: ALTCHOICE

## 2021-03-09 RX ORDER — OXYCODONE HYDROCHLORIDE 10 MG/1
10 TABLET ORAL EVERY 4 HOURS PRN
Status: DISCONTINUED | OUTPATIENT
Start: 2021-03-09 | End: 2021-03-09

## 2021-03-09 RX ORDER — PROPOFOL 10 MG/ML
INJECTION, EMULSION INTRAVENOUS PRN
Status: DISCONTINUED | OUTPATIENT
Start: 2021-03-09 | End: 2021-03-09 | Stop reason: SDUPTHER

## 2021-03-09 RX ORDER — MIDAZOLAM HYDROCHLORIDE 1 MG/ML
INJECTION INTRAMUSCULAR; INTRAVENOUS PRN
Status: DISCONTINUED | OUTPATIENT
Start: 2021-03-09 | End: 2021-03-09 | Stop reason: SDUPTHER

## 2021-03-09 RX ORDER — SENNA AND DOCUSATE SODIUM 50; 8.6 MG/1; MG/1
1 TABLET, FILM COATED ORAL 2 TIMES DAILY
Status: DISCONTINUED | OUTPATIENT
Start: 2021-03-09 | End: 2021-03-10 | Stop reason: HOSPADM

## 2021-03-09 RX ORDER — MEPERIDINE HYDROCHLORIDE 25 MG/ML
12.5 INJECTION INTRAMUSCULAR; INTRAVENOUS; SUBCUTANEOUS EVERY 5 MIN PRN
Status: DISCONTINUED | OUTPATIENT
Start: 2021-03-09 | End: 2021-03-09 | Stop reason: HOSPADM

## 2021-03-09 RX ORDER — GABAPENTIN 400 MG/1
400 CAPSULE ORAL 3 TIMES DAILY
Status: DISCONTINUED | OUTPATIENT
Start: 2021-03-09 | End: 2021-03-10 | Stop reason: HOSPADM

## 2021-03-09 RX ORDER — SODIUM CHLORIDE 0.9 % (FLUSH) 0.9 %
10 SYRINGE (ML) INJECTION EVERY 12 HOURS SCHEDULED
Status: DISCONTINUED | OUTPATIENT
Start: 2021-03-09 | End: 2021-03-09 | Stop reason: HOSPADM

## 2021-03-09 RX ORDER — ONDANSETRON 2 MG/ML
INJECTION INTRAMUSCULAR; INTRAVENOUS PRN
Status: DISCONTINUED | OUTPATIENT
Start: 2021-03-09 | End: 2021-03-09 | Stop reason: SDUPTHER

## 2021-03-09 RX ORDER — FENTANYL CITRATE 50 UG/ML
INJECTION, SOLUTION INTRAMUSCULAR; INTRAVENOUS PRN
Status: DISCONTINUED | OUTPATIENT
Start: 2021-03-09 | End: 2021-03-09 | Stop reason: SDUPTHER

## 2021-03-09 RX ORDER — SODIUM CHLORIDE 9 MG/ML
INJECTION, SOLUTION INTRAVENOUS CONTINUOUS
Status: DISCONTINUED | OUTPATIENT
Start: 2021-03-09 | End: 2021-03-10 | Stop reason: HOSPADM

## 2021-03-09 RX ORDER — BUPIVACAINE HYDROCHLORIDE 2.5 MG/ML
INJECTION, SOLUTION EPIDURAL; INFILTRATION; INTRACAUDAL PRN
Status: DISCONTINUED | OUTPATIENT
Start: 2021-03-09 | End: 2021-03-09 | Stop reason: ALTCHOICE

## 2021-03-09 RX ORDER — HYDROCODONE BITARTRATE AND ACETAMINOPHEN 5; 325 MG/1; MG/1
2 TABLET ORAL PRN
Status: DISCONTINUED | OUTPATIENT
Start: 2021-03-09 | End: 2021-03-09 | Stop reason: HOSPADM

## 2021-03-09 RX ORDER — DIPHENHYDRAMINE HYDROCHLORIDE 50 MG/ML
12.5 INJECTION INTRAMUSCULAR; INTRAVENOUS
Status: DISCONTINUED | OUTPATIENT
Start: 2021-03-09 | End: 2021-03-09 | Stop reason: HOSPADM

## 2021-03-09 RX ORDER — NEOSTIGMINE METHYLSULFATE 1 MG/ML
INJECTION, SOLUTION INTRAVENOUS PRN
Status: DISCONTINUED | OUTPATIENT
Start: 2021-03-09 | End: 2021-03-09 | Stop reason: SDUPTHER

## 2021-03-09 RX ORDER — OXYCODONE HYDROCHLORIDE 5 MG/1
5 TABLET ORAL EVERY 4 HOURS PRN
Status: DISCONTINUED | OUTPATIENT
Start: 2021-03-09 | End: 2021-03-09

## 2021-03-09 RX ADMIN — ONDANSETRON HYDROCHLORIDE 4 MG: 2 INJECTION, SOLUTION INTRAMUSCULAR; INTRAVENOUS at 08:31

## 2021-03-09 RX ADMIN — FENTANYL CITRATE 100 MCG: 50 INJECTION, SOLUTION INTRAMUSCULAR; INTRAVENOUS at 06:38

## 2021-03-09 RX ADMIN — SODIUM CHLORIDE: 9 INJECTION, SOLUTION INTRAVENOUS at 05:59

## 2021-03-09 RX ADMIN — GABAPENTIN 400 MG: 400 CAPSULE ORAL at 20:42

## 2021-03-09 RX ADMIN — PHENYLEPHRINE HYDROCHLORIDE 200 MCG: 10 INJECTION INTRAVENOUS at 07:29

## 2021-03-09 RX ADMIN — SODIUM CHLORIDE, PRESERVATIVE FREE 10 ML: 5 INJECTION INTRAVENOUS at 22:37

## 2021-03-09 RX ADMIN — HYDROXYZINE PAMOATE 25 MG: 25 CAPSULE ORAL at 20:52

## 2021-03-09 RX ADMIN — GABAPENTIN 400 MG: 400 CAPSULE ORAL at 13:31

## 2021-03-09 RX ADMIN — PROPOFOL 100 MG: 10 INJECTION, EMULSION INTRAVENOUS at 06:38

## 2021-03-09 RX ADMIN — DOCUSATE SODIUM 50 MG AND SENNOSIDES 8.6 MG 1 TABLET: 8.6; 5 TABLET, FILM COATED ORAL at 13:31

## 2021-03-09 RX ADMIN — Medication 3 MG: at 08:57

## 2021-03-09 RX ADMIN — PHENYLEPHRINE HYDROCHLORIDE 200 MCG: 10 INJECTION INTRAVENOUS at 07:21

## 2021-03-09 RX ADMIN — ROCURONIUM BROMIDE 30 MG: 10 INJECTION, SOLUTION INTRAVENOUS at 06:38

## 2021-03-09 RX ADMIN — SODIUM CHLORIDE: 9 INJECTION, SOLUTION INTRAVENOUS at 22:34

## 2021-03-09 RX ADMIN — HYDROMORPHONE HYDROCHLORIDE 0.5 MG: 1 INJECTION, SOLUTION INTRAMUSCULAR; INTRAVENOUS; SUBCUTANEOUS at 09:35

## 2021-03-09 RX ADMIN — HYDROMORPHONE HYDROCHLORIDE 0.5 MG: 1 INJECTION, SOLUTION INTRAMUSCULAR; INTRAVENOUS; SUBCUTANEOUS at 09:42

## 2021-03-09 RX ADMIN — POLYETHYLENE GLYCOL 3350 17 G: 17 POWDER, FOR SOLUTION ORAL at 13:32

## 2021-03-09 RX ADMIN — LIDOCAINE HYDROCHLORIDE 100 MG: 20 INJECTION, SOLUTION INTRAVENOUS at 06:38

## 2021-03-09 RX ADMIN — SODIUM CHLORIDE: 9 INJECTION, SOLUTION INTRAVENOUS at 13:30

## 2021-03-09 RX ADMIN — FENTANYL CITRATE 50 MCG: 50 INJECTION, SOLUTION INTRAMUSCULAR; INTRAVENOUS at 08:14

## 2021-03-09 RX ADMIN — BISACODYL 5 MG: 5 TABLET, COATED ORAL at 13:31

## 2021-03-09 RX ADMIN — PHENYLEPHRINE HYDROCHLORIDE 200 MCG: 10 INJECTION INTRAVENOUS at 06:57

## 2021-03-09 RX ADMIN — DOCUSATE SODIUM 50 MG AND SENNOSIDES 8.6 MG 1 TABLET: 8.6; 5 TABLET, FILM COATED ORAL at 20:42

## 2021-03-09 RX ADMIN — MORPHINE SULFATE 4 MG: 4 INJECTION, SOLUTION INTRAMUSCULAR; INTRAVENOUS at 22:37

## 2021-03-09 RX ADMIN — CYCLOBENZAPRINE 10 MG: 10 TABLET, FILM COATED ORAL at 13:40

## 2021-03-09 RX ADMIN — MIDAZOLAM 2 MG: 1 INJECTION INTRAMUSCULAR; INTRAVENOUS at 06:34

## 2021-03-09 RX ADMIN — PHENYLEPHRINE HYDROCHLORIDE 200 MCG: 10 INJECTION INTRAVENOUS at 07:36

## 2021-03-09 RX ADMIN — PHENYLEPHRINE HYDROCHLORIDE 200 MCG: 10 INJECTION INTRAVENOUS at 07:34

## 2021-03-09 RX ADMIN — Medication 10 ML: at 13:41

## 2021-03-09 RX ADMIN — PHENYLEPHRINE HYDROCHLORIDE 100 MCG: 10 INJECTION INTRAVENOUS at 07:15

## 2021-03-09 RX ADMIN — PHENYLEPHRINE HYDROCHLORIDE 50 MCG/MIN: 10 INJECTION INTRAVENOUS at 07:42

## 2021-03-09 RX ADMIN — PHENYLEPHRINE HYDROCHLORIDE 200 MCG: 10 INJECTION INTRAVENOUS at 07:01

## 2021-03-09 RX ADMIN — LISINOPRIL 30 MG: 20 TABLET ORAL at 20:51

## 2021-03-09 RX ADMIN — HYDROMORPHONE HYDROCHLORIDE 0.5 MG: 1 INJECTION, SOLUTION INTRAMUSCULAR; INTRAVENOUS; SUBCUTANEOUS at 09:21

## 2021-03-09 RX ADMIN — VANCOMYCIN HYDROCHLORIDE 1500 MG: 10 INJECTION, POWDER, LYOPHILIZED, FOR SOLUTION INTRAVENOUS at 06:01

## 2021-03-09 RX ADMIN — HYDROCODONE BITARTRATE AND ACETAMINOPHEN 1 TABLET: 10; 325 TABLET ORAL at 20:42

## 2021-03-09 RX ADMIN — DEXAMETHASONE SODIUM PHOSPHATE 10 MG: 10 INJECTION, SOLUTION INTRAMUSCULAR; INTRAVENOUS at 07:15

## 2021-03-09 RX ADMIN — HYDROMORPHONE HYDROCHLORIDE 0.5 MG: 1 INJECTION, SOLUTION INTRAMUSCULAR; INTRAVENOUS; SUBCUTANEOUS at 09:27

## 2021-03-09 RX ADMIN — ATORVASTATIN CALCIUM 40 MG: 40 TABLET, FILM COATED ORAL at 13:32

## 2021-03-09 RX ADMIN — Medication 2000 MG: at 16:03

## 2021-03-09 RX ADMIN — Medication 0.6 MG: at 08:57

## 2021-03-09 ASSESSMENT — PULMONARY FUNCTION TESTS
PIF_VALUE: 30
PIF_VALUE: 21
PIF_VALUE: 1
PIF_VALUE: 27
PIF_VALUE: 34
PIF_VALUE: 21
PIF_VALUE: 20
PIF_VALUE: 17
PIF_VALUE: 17
PIF_VALUE: 35
PIF_VALUE: 21
PIF_VALUE: 26
PIF_VALUE: 27
PIF_VALUE: 1
PIF_VALUE: 17
PIF_VALUE: 16
PIF_VALUE: 27
PIF_VALUE: 16
PIF_VALUE: 1
PIF_VALUE: 20
PIF_VALUE: 17
PIF_VALUE: 18
PIF_VALUE: 33
PIF_VALUE: 31
PIF_VALUE: 3
PIF_VALUE: 27
PIF_VALUE: 20
PIF_VALUE: 21
PIF_VALUE: 16
PIF_VALUE: 26
PIF_VALUE: 31
PIF_VALUE: 17
PIF_VALUE: 30
PIF_VALUE: 27
PIF_VALUE: 16
PIF_VALUE: 24
PIF_VALUE: 35
PIF_VALUE: 17
PIF_VALUE: 26
PIF_VALUE: 21
PIF_VALUE: 17
PIF_VALUE: 33
PIF_VALUE: 2
PIF_VALUE: 21
PIF_VALUE: 33
PIF_VALUE: 31
PIF_VALUE: 23
PIF_VALUE: 21
PIF_VALUE: 30
PIF_VALUE: 17
PIF_VALUE: 17
PIF_VALUE: 25
PIF_VALUE: 17
PIF_VALUE: 1
PIF_VALUE: 27
PIF_VALUE: 34
PIF_VALUE: 16
PIF_VALUE: 26
PIF_VALUE: 34
PIF_VALUE: 31
PIF_VALUE: 16
PIF_VALUE: 27
PIF_VALUE: 27
PIF_VALUE: 31
PIF_VALUE: 19
PIF_VALUE: 2
PIF_VALUE: 30
PIF_VALUE: 27
PIF_VALUE: 33
PIF_VALUE: 30
PIF_VALUE: 21
PIF_VALUE: 21
PIF_VALUE: 34
PIF_VALUE: 26

## 2021-03-09 ASSESSMENT — PAIN DESCRIPTION - DESCRIPTORS
DESCRIPTORS: SHARP;CONSTANT
DESCRIPTORS: ACHING;STABBING

## 2021-03-09 ASSESSMENT — PAIN DESCRIPTION - LOCATION
LOCATION: BACK
LOCATION: BACK

## 2021-03-09 ASSESSMENT — PAIN SCALES - GENERAL
PAINLEVEL_OUTOF10: 10
PAINLEVEL_OUTOF10: 7
PAINLEVEL_OUTOF10: 7
PAINLEVEL_OUTOF10: 10

## 2021-03-09 ASSESSMENT — PAIN - FUNCTIONAL ASSESSMENT
PAIN_FUNCTIONAL_ASSESSMENT: PREVENTS OR INTERFERES SOME ACTIVE ACTIVITIES AND ADLS
PAIN_FUNCTIONAL_ASSESSMENT: 0-10

## 2021-03-09 ASSESSMENT — PAIN DESCRIPTION - FREQUENCY: FREQUENCY: CONTINUOUS

## 2021-03-09 ASSESSMENT — PAIN DESCRIPTION - ONSET: ONSET: ON-GOING

## 2021-03-09 ASSESSMENT — PAIN DESCRIPTION - PAIN TYPE
TYPE: SURGICAL PAIN

## 2021-03-09 ASSESSMENT — PAIN DESCRIPTION - ORIENTATION: ORIENTATION: RIGHT;LEFT

## 2021-03-09 NOTE — PROGRESS NOTES
Admitted to [facility]. Instructions reviewed with pt and sister. Covid test done: 3/4/21    Results: not detected    Self quarantine guidelines followed since tested? yes    Any unusual S/S or concerns expressed/observed?  no

## 2021-03-09 NOTE — ANESTHESIA PRE PROCEDURE
Department of Anesthesiology  Preprocedure Note       Name:  Shell Clinton   Age:  63 y.o.  :  1955                                          MRN:  [id number]         Date:  3/9/2021      Surgeon: Consuelo Tena):  Luna Pressley MD    Procedure: Procedure(s):  RIGHT PERCUTANEOUS SACROILIAC JOINT FUSION, (REQUESTS O-ARM)    Medications prior to admission:   Prior to Admission medications    Medication Sig Start Date End Date Taking? Authorizing Provider   HYDROcodone-acetaminophen (NORCO) 5-325 MG per tablet Take 1 tablet by mouth every 8 hours as needed for Pain. Historical Provider, MD   Handicap Placard MISC DX:  Loss of Balance, Chronic low back pain, s/p back surgery. Duration of 5 years 21   Adela Billy MD   gabapentin (NEURONTIN) 400 MG capsule Take 1 capsule by mouth 3 times daily for 90 days. 21  Adela Billy MD   FLUoxetine (PROZAC) 40 MG capsule Take 1 capsule by mouth daily For mood. 20   Adela Billy MD   lisinopril (PRINIVIL;ZESTRIL) 30 MG tablet Take 1 tablet by mouth every evening 20   Adela Billy MD   atorvastatin (LIPITOR) 40 MG tablet Take 1 tablet by mouth daily 20   Adela Billy MD   hydrOXYzine (VISTARIL) 25 MG capsule Take 1 capsule by mouth 3 times daily as needed for Anxiety 20   Adela Billy MD       Current medications:    No current facility-administered medications for this visit. No current outpatient medications on file.      Facility-Administered Medications Ordered in Other Visits   Medication Dose Route Frequency Provider Last Rate Last Admin    0.9 % sodium chloride infusion   Intravenous Continuous TREASURE Su 100 mL/hr at 21 0559 New Bag at 21 0559    sodium chloride flush 0.9 % injection 10 mL  10 mL Intravenous 2 times per day TREASURE Su        sodium chloride flush 0.9 % injection 10 mL  10 mL Intravenous PRN TREASURE Su       [facility] vancomycin 1500 mg in dextrose 5% 300 mL IVPB  1,500 mg Intravenous On Call to Jazmin Luna, PA           Allergies: Allergies   Allergen Reactions    Pcn [Penicillins] Anaphylaxis       Problem List:    Patient Active Problem List   Diagnosis Code    Loss of balance R26.89    Vertebrobasilar TIAs G45.0    Obesity E66.9    Disc displacement, lumbar M51.26    Peripheral neuropathy ([address] G62.9    Type 2 diabetes mellitus without complication (HCC) Z20.9    Depression F32.9    Osteoarthritis M19.90    Chronic back pain M54.9, G89.29    Fibromyalgia M79.7    HTN (hypertension) I10    Hyperlipidemia E78.5    History of adenomatous polyp of colon Z86.010    Vitamin D deficiency E55.9    Coccyx pain M53.3    Acute bilateral low back pain with sciatica M54.40    Lumbar stenosis M48.061    Chronic bilateral low back pain without sciatica M54.5, G89.29    DDD (degenerative disc disease), lumbar M51.36    Spinal stenosis of lumbar region without neurogenic claudication M48.061    Lumbar facet arthropathy M47.816    Chronic pain syndrome G89.4    Lumbar radiculopathy M54.16    Neck pain M54.2    Left foot pain M79.672    Painful orthopaedic hardware [facility]) T84.84XA    Cervical facet joint syndrome M47.812    Cellulitis, neck L03.221    Disorder of sacrum M53.3    Adjacent segment disease with spinal stenosis [id number]    S/P lumbar spinal fusion Z98.1    Tongue lesion K14.8       Past Medical History:        Diagnosis Date    Cancer ([facility] [address] 12/2019    LESION REMOVED UNDER TONGUE  DENTAL CLINIC    Chronic back pain     Costochondritis     h/o    Depression     Falls     last one 2017     Fibromyalgia     Hallux rigidus of left foot     HTN (hypertension)     Hyperlipidemia     CLYDE on CPAP     Osteoarthritis     \"everywhere\"    Rheumatoid arthritis ([address]     \"As a child. \"    Rheumatoid arthritis(714.0)     TIA (transient ischemic attack)     no deficits     Tongue lesion Right 2019    sacral radiofrequency    NERVE BLOCK Right 2020    RIGHT SACROILIAC JOINT INJECTION #2 performed by Aurelio Catalan DO at [address] [facility] Left 13    left foot tarso metatarsal joint injection    OTHER SURGICAL HISTORY Left 5/27/15    Endoscopic Gastroc recession left, Lapidus left foot and  Excision of exostosis left foot    CA NJX AA&/STRD TFRML EPI LUMBAR/SACRAL 1 LEVEL Bilateral 12/3/2018    BILATERAL S1  EPIDURAL STEROID INJECTION performed by Louie Vera MD at [address] [facility] DX/THER SBST INTRLMNR LMBR/SAC W/IMG GDN N/A 2018    EPIDURAL STEROID INJECTION L1-2 WITH LOW VOL performed by Louie Vera MD at [address] [facility] NOSE/CLEFT LIP/TIP Bilateral 2018    BILATERAL L1-2 LUMBAR FORAMEN #1 performed by Louie Vera MD at [address] [facility] NOSE/CLEFT LIP/TIP Bilateral 2018    BILATERAL TRANSFORAMINAL EPIDURAL STEROID INJECTION UNDER FLUOROSCOPIC GUIDANCE @ FORAMINAL LEVEL L1-2 #2 performed by Louie Vera MD at [address] Right 2019    RIGHT SACRAL RADIOFREQUENCY ABLATION performed by Louie Vera MD at [address]  ,     Big Left Toe    TOE SURGERY Left 2018    2nd toe     TONSILLECTOMY      WISDOM TOOTH EXTRACTION         Social History:    Social History     Tobacco Use    Smoking status: Former Smoker     Packs/day: 0.25     Years: 30.00     Pack years: 7.50     Types: Cigarettes     Quit date: 10/5/2020     Years since quittin.4    Smokeless tobacco: Never Used    Tobacco comment: was going to try to stop but chantix is too expensive   Substance Use Topics    Alcohol use: Not Currently     Alcohol/week: 0.0 standard drinks     Comment: rarely                                Counseling given: Not Answered  Comment: was going to try to stop but chantix is too expensive      Vital Signs (Current):    There were no vitals filed for this visit. BP Readings from Last 3 Encounters:   03/04/21 (!) 144/75   02/24/21 (!) 148/78   02/09/21 122/82       NPO Status:                                                                                 BMI:   Wt Readings from Last 3 Encounters:   03/09/21 210 lb (95.3 kg)   03/04/21 210 lb (95.3 kg)   02/24/21 210 lb (95.3 kg)     There is no height or weight on file to calculate BMI.    CBC:   Lab Results   Component Value Date    WBC 8.3 03/04/2021    RBC 4.38 03/04/2021    HGB 14.8 03/04/2021    HCT 43.1 03/04/2021    MCV 98.4 03/04/2021    RDW 12.4 03/04/2021     03/04/2021       CMP:   Lab Results   Component Value Date     03/04/2021    K 4.3 03/04/2021     03/04/2021    CO2 23 03/04/2021    BUN 10 03/04/2021    CREATININE 0.8 03/04/2021    GFRAA >60 03/04/2021    LABGLOM >60 03/04/2021    GLUCOSE 120 03/04/2021    PROT 6.4 11/06/2020    CALCIUM 8.8 03/04/2021    BILITOT 0.4 11/06/2020    ALKPHOS 140 11/06/2020    AST 16 11/06/2020    ALT 26 11/06/2020       POC Tests: No results for input(s): POCGLU, POCNA, POCK, POCCL, POCBUN, POCHEMO, POCHCT in the last 72 hours.     Coags:   Lab Results   Component Value Date    PROTIME 10.5 03/04/2021    INR 1.0 03/04/2021    APTT 27.6 03/22/2019       HCG (If Applicable): No results found for: PREGTESTUR, PREGSERUM, HCG, HCGQUANT     ABGs: No results found for: PHART, PO2ART, WZD7NVK, QIR7FQI, BEART, U4WQJJCW     Type & Screen (If Applicable):  No results found for: LABABO, LABRH    Drug/Infectious Status (If Applicable):  No results found for: HIV, HEPCAB    COVID-19 Screening (If Applicable):   Lab Results   Component Value Date    COVID19 Not Detected 03/04/2021         Anesthesia Evaluation  Patient summary reviewed no history of anesthetic complications:   Airway: Mallampati: III  TM distance: >3 FB     Mouth opening: > = 3 FB Dental:          Pulmonary: breath sounds clear to auscultation  (+) sleep apnea: on CPAP,                            ROS comment: Former Smoker   Cardiovascular:    (+) hypertension:,         Rhythm: regular  Rate: normal           Beta Blocker:  Not on Beta Blocker         Neuro/Psych:   (+) neuromuscular disease:, TIA, psychiatric history:depression/anxiety    (-) seizures           GI/Hepatic/Renal:             Endo/Other:    (+) DiabetesType II DM, , : arthritis: rheumatoid and OA., .                  ROS comment: obese Abdominal:           Vascular: negative vascular ROS. Anesthesia Plan      general     ASA 3       Induction: intravenous. MIPS: Postoperative opioids intended and Prophylactic antiemetics administered. Anesthetic plan and risks discussed with patient. Plan discussed with CRNA.                   Heather Webb MD   3/9/2021

## 2021-03-09 NOTE — PLAN OF CARE
Problem: Falls - Risk of:  Goal: Will remain free from falls  Description: Will remain free from falls  3/9/2021 1855 by Amee Cotter RN  Outcome: Met This Shift  3/9/2021 1324 by Radha Gilmore RN  Outcome: Met This Shift  Goal: Absence of physical injury  Description: Absence of physical injury  3/9/2021 1855 by Amee Cotter RN  Outcome: Met This Shift  3/9/2021 1324 by Radha Gilmore RN  Outcome: Met This Shift     Problem: Skin Integrity:  Goal: Will show no infection signs and symptoms  Description: Will show no infection signs and symptoms  Outcome: Met This Shift  Goal: Absence of new skin breakdown  Description: Absence of new skin breakdown  Outcome: Met This Shift  Goal: Risk for impaired skin integrity will decrease  Description: Risk for impaired skin integrity will decrease  Outcome: Met This Shift     Problem: Pain:  Goal: Pain level will decrease  Description: Pain level will decrease  Outcome: Met This Shift  Goal: Control of acute pain  Description: Control of acute pain  Outcome: Met This Shift  Goal: Control of chronic pain  Description: Control of chronic pain  Outcome: Met This Shift     Problem: Sensory:  Goal: Pain level will decrease  Description: Pain level will decrease  Outcome: Met This Shift

## 2021-03-09 NOTE — BRIEF OP NOTE
Brief Postoperative Note      Patient: Felix Lux  YOB: 1955  MRN: [id number]    Date of Procedure: 3/9/2021    Pre-Op Diagnosis: RT SACOILIAC JOINT DISFUCTION    Post-Op Diagnosis: Same       Procedure(s):  RIGHT PERCUTANEOUS SACROILIAC JOINT FUSION    Surgeon(s):  Kevan Fried MD    Assistant:  Physician Assistant: Yomaira Bonilla PA-C    Anesthesia: General    Estimated Blood Loss (mL): less than 50     Complications: None    Specimens:   * No specimens in log *    Implants:  Implant Name Type Inv.  Item Serial No.  Lot No. LRB No. Used Action   IMPL SPINE I-FUSE 3D 7.0X45 MM 3D Spine IMPL SPINE I-FUSE 3D 7.0X45 MM 3D  [employer] [id number] Right 1 Implanted   IMPLANT SPNL L40MM DIA7MM SACROILIAC JT TI POR PLSM SPR  IMPLANT SPNL L40MM DIA7MM SACROILIAC JT TI POR PLSM SPR  [employer]WD [id number] Right 1 Implanted   IMPL SPINE I-FUSE SAC 35 MM 3D 7 MM Spine IMPL SPINE I-FUSE SAC 35 MM 3D 7 MM  [employer] [id number] Right 1 Implanted         Drains: * No LDAs found *    Findings: see dictated op note    Electronically signed by Dwayne Stout MD on 3/9/2021 at 8:47 AM

## 2021-03-09 NOTE — ANESTHESIA POSTPROCEDURE EVALUATION
Department of Anesthesiology  Postprocedure Note    Patient: Pryor Gottron  MRN: [id number]  YOB: 1955  Date of evaluation: 3/9/2021  Time:  11:15 AM     Procedure Summary     Date: 03/09/21 Room / Location: [facility] OR  / [facility]    Anesthesia Start: 7949 Anesthesia Stop: 8886    Procedure: RIGHT PERCUTANEOUS SACROILIAC JOINT FUSION (Right Hip) Diagnosis: ( [facility])    Surgeons: Oly Soni MD Responsible Provider: Marck Waggoner MD    Anesthesia Type: general ASA Status: 3          Anesthesia Type: general    Carolyn Phase I: Carolyn Score: 9    Carolyn Phase II:      Last vitals: Reviewed and per EMR flowsheets.        Anesthesia Post Evaluation    Patient location during evaluation: PACU  Patient participation: complete - patient participated  Level of consciousness: awake and alert  Airway patency: patent  Nausea & Vomiting: no nausea and no vomiting  Complications: no  Cardiovascular status: hemodynamically stable  Respiratory status: acceptable  Hydration status: euvolemic

## 2021-03-10 ENCOUNTER — APPOINTMENT (OUTPATIENT)
Dept: GENERAL RADIOLOGY | Age: 66
End: 2021-03-10
Attending: NEUROLOGICAL SURGERY

## 2021-03-10 VITALS
BODY MASS INDEX: 34.99 KG/M2 | DIASTOLIC BLOOD PRESSURE: 64 MMHG | WEIGHT: 210 LBS | RESPIRATION RATE: 18 BRPM | HEART RATE: 80 BPM | TEMPERATURE: 98.4 F | OXYGEN SATURATION: 97 % | SYSTOLIC BLOOD PRESSURE: 130 MMHG | HEIGHT: 65 IN

## 2021-03-10 PROCEDURE — 96375 TX/PRO/DX INJ NEW DRUG ADDON: CPT

## 2021-03-10 PROCEDURE — 6370000000 HC RX 637 (ALT 250 FOR IP): Performed by: PHYSICIAN ASSISTANT

## 2021-03-10 PROCEDURE — G0378 HOSPITAL OBSERVATION PER HR: HCPCS

## 2021-03-10 PROCEDURE — 97165 OT EVAL LOW COMPLEX 30 MIN: CPT

## 2021-03-10 PROCEDURE — 97530 THERAPEUTIC ACTIVITIES: CPT

## 2021-03-10 PROCEDURE — 2580000003 HC RX 258: Performed by: NEUROLOGICAL SURGERY

## 2021-03-10 PROCEDURE — 97535 SELF CARE MNGMENT TRAINING: CPT

## 2021-03-10 PROCEDURE — 6360000002 HC RX W HCPCS: Performed by: NEUROLOGICAL SURGERY

## 2021-03-10 PROCEDURE — 6370000000 HC RX 637 (ALT 250 FOR IP): Performed by: NEUROLOGICAL SURGERY

## 2021-03-10 PROCEDURE — 97162 PT EVAL MOD COMPLEX 30 MIN: CPT

## 2021-03-10 PROCEDURE — 96374 THER/PROPH/DIAG INJ IV PUSH: CPT

## 2021-03-10 PROCEDURE — 3209999900 FLUORO FOR SURGICAL PROCEDURES

## 2021-03-10 PROCEDURE — 97166 OT EVAL MOD COMPLEX 45 MIN: CPT

## 2021-03-10 PROCEDURE — 96376 TX/PRO/DX INJ SAME DRUG ADON: CPT

## 2021-03-10 RX ORDER — HYDROCODONE BITARTRATE AND ACETAMINOPHEN 5; 325 MG/1; MG/1
1 TABLET ORAL EVERY 4 HOURS PRN
Qty: 42 TABLET | Refills: 0 | Status: SHIPPED | OUTPATIENT
Start: 2021-03-10 | End: 2021-03-24 | Stop reason: SDUPTHER

## 2021-03-10 RX ORDER — CYCLOBENZAPRINE HCL 10 MG
10 TABLET ORAL 3 TIMES DAILY PRN
Qty: 40 TABLET | Refills: 0 | Status: SHIPPED | OUTPATIENT
Start: 2021-03-10 | End: 2021-04-16 | Stop reason: SDUPTHER

## 2021-03-10 RX ORDER — VARENICLINE TARTRATE 1 MG/1
1 TABLET, FILM COATED ORAL 2 TIMES DAILY
Qty: 60 TABLET | Refills: 1 | Status: SHIPPED | OUTPATIENT
Start: 2021-03-10 | End: 2021-03-11 | Stop reason: ALTCHOICE

## 2021-03-10 RX ADMIN — ATORVASTATIN CALCIUM 40 MG: 40 TABLET, FILM COATED ORAL at 08:29

## 2021-03-10 RX ADMIN — Medication 2000 MG: at 02:03

## 2021-03-10 RX ADMIN — SODIUM CHLORIDE, PRESERVATIVE FREE 10 ML: 5 INJECTION INTRAVENOUS at 08:33

## 2021-03-10 RX ADMIN — DOCUSATE SODIUM 50 MG AND SENNOSIDES 8.6 MG 1 TABLET: 8.6; 5 TABLET, FILM COATED ORAL at 08:29

## 2021-03-10 RX ADMIN — HYDROCODONE BITARTRATE AND ACETAMINOPHEN 1 TABLET: 10; 325 TABLET ORAL at 12:06

## 2021-03-10 RX ADMIN — CYCLOBENZAPRINE 10 MG: 10 TABLET, FILM COATED ORAL at 08:29

## 2021-03-10 RX ADMIN — BISACODYL 5 MG: 5 TABLET, COATED ORAL at 08:29

## 2021-03-10 RX ADMIN — MORPHINE SULFATE 4 MG: 4 INJECTION, SOLUTION INTRAMUSCULAR; INTRAVENOUS at 14:28

## 2021-03-10 RX ADMIN — FLUOXETINE HYDROCHLORIDE 40 MG: 20 CAPSULE ORAL at 08:29

## 2021-03-10 RX ADMIN — GABAPENTIN 400 MG: 400 CAPSULE ORAL at 13:25

## 2021-03-10 RX ADMIN — CYCLOBENZAPRINE 10 MG: 10 TABLET, FILM COATED ORAL at 14:28

## 2021-03-10 RX ADMIN — MORPHINE SULFATE 4 MG: 4 INJECTION, SOLUTION INTRAMUSCULAR; INTRAVENOUS at 08:33

## 2021-03-10 RX ADMIN — HYDROCODONE BITARTRATE AND ACETAMINOPHEN 1 TABLET: 5; 325 TABLET ORAL at 00:52

## 2021-03-10 RX ADMIN — GABAPENTIN 400 MG: 400 CAPSULE ORAL at 08:28

## 2021-03-10 RX ADMIN — LISINOPRIL 30 MG: 20 TABLET ORAL at 17:47

## 2021-03-10 RX ADMIN — POLYETHYLENE GLYCOL 3350 17 G: 17 POWDER, FOR SOLUTION ORAL at 08:31

## 2021-03-10 RX ADMIN — HYDROCODONE BITARTRATE AND ACETAMINOPHEN 1 TABLET: 10; 325 TABLET ORAL at 07:16

## 2021-03-10 ASSESSMENT — PAIN DESCRIPTION - ORIENTATION: ORIENTATION: RIGHT;LOWER

## 2021-03-10 ASSESSMENT — PAIN DESCRIPTION - PROGRESSION
CLINICAL_PROGRESSION: NOT CHANGED
CLINICAL_PROGRESSION: NOT CHANGED

## 2021-03-10 ASSESSMENT — PAIN SCALES - GENERAL
PAINLEVEL_OUTOF10: 4
PAINLEVEL_OUTOF10: 4
PAINLEVEL_OUTOF10: 6
PAINLEVEL_OUTOF10: 4
PAINLEVEL_OUTOF10: 3

## 2021-03-10 ASSESSMENT — PAIN DESCRIPTION - DESCRIPTORS: DESCRIPTORS: ACHING;SHARP

## 2021-03-10 ASSESSMENT — PAIN - FUNCTIONAL ASSESSMENT
PAIN_FUNCTIONAL_ASSESSMENT: PREVENTS OR INTERFERES WITH MANY ACTIVE NOT PASSIVE ACTIVITIES
PAIN_FUNCTIONAL_ASSESSMENT: PREVENTS OR INTERFERES WITH MANY ACTIVE NOT PASSIVE ACTIVITIES

## 2021-03-10 ASSESSMENT — PAIN DESCRIPTION - PAIN TYPE: TYPE: ACUTE PAIN;SURGICAL PAIN

## 2021-03-10 ASSESSMENT — PAIN DESCRIPTION - LOCATION: LOCATION: SACRUM

## 2021-03-10 ASSESSMENT — PAIN DESCRIPTION - ONSET: ONSET: ON-GOING

## 2021-03-10 NOTE — OP NOTE
[address]                  [address] [facility], [address] [place] [address]                                OPERATIVE REPORT    PATIENT NAME: Bassem Lester                        :        1955  MED REC NO:   [id number]                            ROOM:       5206  ACCOUNT NO:   [de-identified]                           ADMIT DATE: 2021  PROVIDER:     Prabhjot Alejandro MD    DATE OF PROCEDURE:  2021    PREOPERATIVE DIAGNOSIS:  Right sacroiliac joint dysfunction. POSTOPERATIVE DIAGNOSIS:  Right sacroiliac joint dysfunction. OPERATIVE PROCEDURES:  1. Right sacroiliac joint fusion with use of SI-BONE iFuse implant  system with the first implant being a 7 mm x 45 mm implant, second  implant being a 7 mm x 40 implant, and the last implant being a 7 mm x  35 mm implant. 2.  Use of O-arm assisted navigation with placement of sacroiliac joint  implants and right sacroiliac joint fusion. 3.  AS modifier for Julia Negro PA-C, as an assistant for primary  exposure and primary closure. ANESTHESIA:  Generalized endotracheal anesthesia. SURGEON:  Prabhjot Alejandro MD    ASSISTANT:  Julia Negro PA-C    COMPLICATIONS:  None. ESTIMATED BLOOD LOSS:  50 mL. SPECIMEN:  None. OPERATIVE INDICATIONS:  The patient is a 30-year-old lady who presented  to the office complaining of right sacroiliac joint dysfunction. She  had physical therapy and sacroiliac joint injections, but only had  short-term relief and after risks, benefits, and alternatives were  discussed with the patient, it was determined that she would undergo the  above-listed procedure. Julia Negro PA-C, was required for her  expertise due to the complex nature of the procedure for primary  exposure and primary closure. DESCRIPTION OF OPERATIVE PROCEDURE:  The patient was brought into the  operating room.   A timeout was performed where she was identified by her  name, medical record number, and the operative procedure, which she was  about to undergo. Next, induction of generalized endotracheal  anesthesia was then commenced. Upon completion of induction of  generalized endotracheal anesthesia, she received preoperative  antibiotics. She was then flipped into prone position on a flat Kenny  table. All pressure points were padded. Her lumbosacral and gluteal  regions were then prepped and draped in the usual sterile fashion. I  then proceeded to then place the O-arm percutaneous pin in the left  posterior-superior iliac spine. After this was done, I then performed  an O-arm spin. After the spin was completed, I then proceeded to use  the O-arm wand to miladis out my incision in the right sacroiliac region. Once this incision was marked out, I then proceeded to use a #10 blade  to make the skin incision. I then used the blunt finger dissection to  dissect down to the sacrum. After this was done, I then proceeded to  then use a Universal drill guide to dock on to the sacrum. Once I used  the Universal drill guide to dock into the sacrum, I then proceeded to  use a pin  to then insert a guide pin. Once the guide pin was  docked into the sacrum, I was then able to place my tissue dissector  over the guide pin and then placed my soft tissue retractor over that. Once I placed the soft tissue retractor over the guide pin, I then  proceeded to then drill down through the sacroiliac joint and after this  was done, I then removed the drill and proceeded to then broach. Upon  completion of broaching, I then subsequently proceeded to then insert  the first implant, it was a 7 mm x 45 mm implant across the sacroiliac  joint. Once this was completed, I then proceeded to then use the guide  pin to then insert the Universal drill guide and once the [place]  drill guide was inserted, I then subsequently proceeded to use the  second guide pin that was inserted with the pin .   Once the guide  pin had been inserted, I then proceeded to then use the soft tissue  dissector to dissect soft tissue over the guide pin and then proceeded  to then place my soft tissue retractor over that. I was able to then  drill through the sacroiliac joint and upon completion of the drilling,  I then proceeded to broach. After broaching, I then proceeded to then  place my second implant, which was a 7 mm x 40 mm implant. Once this  was done, I then proceeded to again use my pin directors and then guide  the Universal drill guide for placement of the third implant. Once this  was docked onto bone, I then proceeded to drive the guide pin and  through the sacroiliac joint. Once this was completed, I then proceeded  to then use soft tissue dissector to dissect the soft tissue. Upon  completion of dissection with soft tissue dissector, I then placed a  soft tissue retractor. After this was docked onto bone, I then  proceeded to then drill across the sacroiliac joint and once this was  completed, I then proceeded to then broach again, and I ultimately  placed the final 7 mm x 35 mm implant. After this was done, I then  performed another O-arm spin to confirm appropriate placement of the  implants. I then irrigated the wound copiously with  antibiotic-impregnated saline. I proceeded to then close that incision  in layers using 2-0 Vicryl for subcutaneous layer and 4-0 Monocryl in a  subcuticular fashion for the skin with Dermabond over that, and I was  able to then remove the perk pin and closed the perk pin insertion site  with 2-0 Vicryl and 4-0 Monocryl. Dry sterile dressing was placed over  both incisions. The patient was then flipped in the supine position on  her hospital bed, was extubated, and transported to the postanesthesia  care unit in stable condition. There were no complications. Counts  were correct. I was present for the entire case. Please note, Caryn Guo PA-C, was the only qualified assistant.   She

## 2021-03-10 NOTE — CARE COORDINATION
3/10/2021 - Care coordination - S/P right SI joint fusion. Neurosurgery following. Spoke with pt in room to discuss discharge planning. PCP is Dr Judith Orona. Pharmacy is [facility]. Lives with her son and sister in a 2 story home with 15 steps to 2nd floor where bedroom is. Bathroom on first floor. DME - cane, fww. Crutches in room. Denies hx CHLOE/ARU. Hx [facility]. Pt requesting hospital bed. [facility]ME order on chart for [facility]. Pt has no health insurance but applies for financial  assistance with [facility] every 6-8 months. States she signed up in October for financial assistance. Spoke with Fortino Brock in [facility] DME regarding bed. And called referral for [address] to [facility]. Plan is to discharge home when medically stable. Pt states her sister can provide a ride home. SW/CM will follow.

## 2021-03-10 NOTE — PROGRESS NOTES
OCCUPATIONAL THERAPY INITIAL EVALUATION      Date:3/10/2021  Patient Name: Santiago Mckay  MRN: [id number]  : 1955  Room: [address] [place], [place] [address]    Evaluating OT: Marilou Loya. Doug Denise OTR/L #[id number]    Referring physician: Mitzi Lopez MD  AM-PAC Daily Activity Raw Score:   Recommended Adaptive Equipment: ww, BSC, AE for LE dressing and bathing - hip kit issued 3/10    Diagnosis: SI joint dysfunction   Surgery: R SI fusion with implants   Pertinent Medical History:   Past Medical History:   Diagnosis Date    [facility]    LESION REMOVED UNDER TONGUE  DENTAL CLINIC    Chronic back pain     Costochondritis     h/o    Depression     Falls     last one      Fibromyalgia     Hallux rigidus of left foot     HTN (hypertension)     Hyperlipidemia     CLYDE on CPAP     Osteoarthritis     \"everywhere\"    Rheumatoid arthritis ([facility]     \"As a child. \"    Rheumatoid arthritis(714.0)     TIA (transient ischemic attack)     no deficits     Tongue lesion     for OR 10-21-20     Use of cane as ambulatory aid         Precautions:  Falls, NWB to RLE     Home Living: Pt lives with sister and son in a 2 story home  with 1+1+2 step(s) to enter and no rail(s); bed/bath on second floor with first floor set up available if patient receives hospital bed. Bathroom setup: walk in shower with seat    Equipment owned: [facility]  Prior Level of Function:   Independent with ADLs ,  Shared with family  with IADLs; using cane prn for ambulation.    Driving: yes                           Medication Management self  Occupation: retired  Leisure:TV reading and walking    Pain Level: 7/10 R SI and L knee and foot pain  Cognition: A&O: 3/3; Follows multi step directions   Memory:  good   Sequencing:  good   Problem solving:  good   Judgement/safety:  Good-      Functional Assessment:   Initial Eval Status  Date: 3/10/21 Treatment Status  Date: Short Term Goals=LTG  Treatment frequency: PRN 1-3 x/week   Feeding Independent      Grooming Setup sitting   Mod I sitting    UB Dressing SBA  Independent sitting   LB Dressing Mod A   Mod I with AE prn    Bathing Mod A   Mod I with LHS and shower seat    Toileting Mod A   Mod I to [facility] and ww   Bed Mobility  Supine to sit: SBA   Sit to supine: n/t  Supine to sit: mod I    Sit to supine: mod I    Functional Transfers Sit to stand: min A with ww  Stand to sit:  Min A cues to use UE and grade decent  Stand pivot: min A with ww able to hop  Mod I with ww    Functional Mobility Min A with ww  Mod I with ww short distance   Balance Sitting:     Static:  good    Dynamic:SBA  Standing: min  A     Activity Tolerance Fair+ O2 RA 97% HR 91  good   Visual/  Perceptual Glasses: yes WFL not (present)         Safety Good-                 good     Hand dominance: L  UE ROM: RUE:  WFL  LUE:  WFL  Strength: RUE:  4+/5 LUE:  4+/5   Strength: R WFL   L WFL   Fine Motor Coordination: R WFL  L WFL    Hearing: WFL  Sensation:  No c/o numbness or tingling  Tone:  WFL  Edema: none noted                            Comments: Nursing approval to work with patient given. Upon arrival, patient supine and agreeable to evaluation. OT evaluation performed with  education on benefits of mobility and safety with ADL completion. Patient cooperative and motivated. At end of session, patient sitting up in chair and  with call light and phone within reach, all lines and tubes intact. Nursing notified. OT treatment: OT for functional assessment of  ADL, Functional Transfer/Mobility Training, Equipment Needs, Energy Conservation Techniques, Pt/Family Education, Ther Ex- deep breathing for edema and pain control., OT role and POC reviewed. Patient  demo good- understanding of functional limitations and safety. Skilled occupational therapy services provided include instruction/training on safety and adapted techniques for completion of therapeutic activities, ADLs/IADLs, and therapeutic exercise deep breathing for edema and pain control.  Therapist educated pt on NWB to RLE precautions. Skilled monitoring of O2 sats, HR, and pt response throughout treatment. OT treatment provided this date includes:  ·  ADL-  Instruction/training on safety and adapted techniques for completion of ADLs: Pt. Demonstrates good understanding of precautions & techniques- reviewed AE and issued, mod I to don sock with sock aide. reviewed bathroom safety and need to use LHS and seat. Demo good- understanding. ·  Functional Mobility-  Instruction/training on safety and improved independence with bed mobility, /functional transfers using ww   ·  Sitting EOB SBA 5 minutes to improve dynamic sitting balance and activity tolerance during ADLs. ·  Activity tolerance- Instruction/training on energy conservation/work simplification for completion of ADLs:. Pt. Demo fair+ tolerance due to pain  this day   ·  Skilled monitoring of O2 sats-  refer to activity tolerance reporting  · Therapeutic Exercise- Instruction on deep breathing and benefits of upright sitting exercise to improve functional Ind. with ADLs             Patient would  benefit from continued skilled OT to increase functional independence and quality of life.     Eval Complexity: mod    Assessment of current deficits   Functional mobility [x]  ADLs [x] Strength []  Cognition []  Functional transfers  [x] IADLs [x] Safety Awareness [x]  Endurance [x]  Fine Motor Coordination [] Balance [x] Vision/perception [] Sensation []   Gross Motor Coordination [] ROM [] Delirium []                  Motor Control []    Plan of Care: OT 1-3x/week for 5-7 days PRN   Instruction/training on adapted ADL techniques and AE recommendations to increase functional independence within precautions  Training on energy conservation strategies/techniques to improve independence/tolerance for self-care routine  Functional transfer/mobility training/DME recommendations for increased independence, safety, and fall prevention  Patient/Family education to increase follow through with safety techniques and functional independence  Recommendation of environmental modifications for increased safety with functional transfers/mobility and ADLs  Positioning to improve functional independence    Rehab Potential: Good for established goals    Patient / Family Goal: home with son to assist     Evaluation time includes thorough review of current medical information, gathering information on past medical & social history & PLOF, completion of standardized testing, informal observation of tasks, consultation with other medical professions/disciplines, assessment of data & development of POC/goals. Patient and/or family were instructed diagnosis, prognosis/goals and plan of care. yes Demonstrated good understanding. Min Units   OT Eval Low 47965     OT Eval Medium 06314 X    OT Eval High 03131     OT Re-Eval [id number]     Therapeutic Ex [phone]     Therapeutic Activities 70603 12 1   ADL/Self Care 04832 11 1   Orthotic Management 38177     Neuro Re-Ed 37923     Non-Billable Time              Time In: 7:50          Time Out: 8:15                          13:20                           13:40    [] Malnutrition indicators have been identified and nursing has been notified to ensure a dietitian consult is ordered. mod OT Evaluation + 23  treatment minutes    Beata Serum.  [address]

## 2021-03-10 NOTE — PROGRESS NOTES
Pt is s/p R SI joint fusion POD#1, doing okay. Is to work with PT for crutch walking d/t NWB Rt leg. Post-op Instructions Reinforced:    Use of Incentive Spirometry q2h- encourage use  Use of PCD's when stationary in bed-reinforced importance  Pain Control- Use of pain scale and communication(white) board for pain med availability, Frequency and time available. Encourage up to chair for each meal-reinforce importance  Progress diet slowly-avoid nausea/vomiting  S/S to alert staff- sudden increase of pain/numbness of extremities, CP/SOB  Setting realistic pain goals-reaching goal before discharge. ALWAYS keep call light in reach !!  Reinforce hospital/unit expectations    Discharge Plan-Await PT/OT and post-op progress to assist with discharg planning/needs.  Pt requesting hospital bed d/t number of steps @ home-CM aware    Verbalized understanding of all of above-contact number given    Ani Huertas RN, Spine Navigator  [phone]

## 2021-03-10 NOTE — PROGRESS NOTES
CLINICAL PHARMACY NOTE: MEDS TO [address] Select Patient?: No  Total # of Prescriptions Filled: 2   The following medications were delivered to the patient:  · Hydrocodone/apap 5-325  · Cyclobenzaprine 10 mg  Total # of Interventions Completed: 3  Time Spent (min): 15    Additional Documentation:

## 2021-03-10 NOTE — PLAN OF CARE
Shift  Goal: Ability to tolerate increased activity will improve  Description: Ability to tolerate increased activity will improve  Outcome: Met This Shift     Problem:  Bowel/Gastric:  Goal: Gastrointestinal status for postoperative course will improve  Description: Gastrointestinal status for postoperative course will improve  Outcome: Met This Shift     Problem: Fluid Volume:  Goal: Maintenance of adequate hydration will improve  Description: Maintenance of adequate hydration will improve  Outcome: Met This Shift     Problem: Health Behavior:  Goal: Ability to state signs and symptoms to report to health care provider will improve  Description: Ability to state signs and symptoms to report to health care provider will improve  Outcome: Met This Shift     Problem: Physical Regulation:  Goal: Ability to maintain clinical measurements within normal limits will improve  Description: Ability to maintain clinical measurements within normal limits will improve  Outcome: Met This Shift     Problem: Sensory:  Goal: Pain level will decrease  Description: Pain level will decrease  3/10/2021 0555 by Lauren Robles RN  Outcome: Met This Shift  3/9/2021 2655 by Kaylee Cook RN  Outcome: Met This Shift

## 2021-03-10 NOTE — PLAN OF CARE
Problem: Falls - Risk of:  Goal: Will remain free from falls  Description: Will remain free from falls  3/10/2021 1050 by Anna Joiner RN  Outcome: Met This Shift  3/10/2021 0555 by Bola Goodwin RN  Outcome: Met This Shift  Goal: Absence of physical injury  Description: Absence of physical injury  3/10/2021 1050 by Anna Joiner RN  Outcome: Met This Shift  3/10/2021 0555 by Bola Goodwin RN  Outcome: Met This Shift     Problem: Skin Integrity:  Goal: Will show no infection signs and symptoms  Description: Will show no infection signs and symptoms  3/10/2021 1050 by Anna Joiner RN  Outcome: Met This Shift  3/10/2021 0555 by Bola Goodwin RN  Outcome: Met This Shift  Goal: Absence of new skin breakdown  Description: Absence of new skin breakdown  3/10/2021 1050 by Anna Joiner RN  Outcome: Met This Shift  3/10/2021 0555 by Bola Goodwin RN  Outcome: Met This Shift  Goal: Risk for impaired skin integrity will decrease  Description: Risk for impaired skin integrity will decrease  3/10/2021 1050 by Anna Joiner RN  Outcome: Met This Shift  3/10/2021 0555 by Bola Goodwin RN  Outcome: Met This Shift     Problem:  Bowel/Gastric:  Goal: Gastrointestinal status for postoperative course will improve  Description: Gastrointestinal status for postoperative course will improve  3/10/2021 0555 by Bola Goodwin RN  Outcome: Met This Shift     Problem: Fluid Volume:  Goal: Maintenance of adequate hydration will improve  Description: Maintenance of adequate hydration will improve  3/10/2021 0555 by Bola Goodwin RN  Outcome: Met This Shift     Problem: Health Behavior:  Goal: Ability to state signs and symptoms to report to health care provider will improve  Description: Ability to state signs and symptoms to report to health care provider will improve  3/10/2021 0555 by Bola Goodwin RN  Outcome: Met This Shift     Problem: Physical Regulation:  Goal: Ability to maintain clinical measurements within normal limits will improve Description: Ability to maintain clinical measurements within normal limits will improve  3/10/2021 0555 by Dru Pringle RN  Outcome: Met This Shift     Problem: Urinary Elimination:  Goal: Ability to achieve and maintain adequate urine output will improve  Description: Ability to achieve and maintain adequate urine output will improve  Outcome: Met This Shift     Problem: Pain:  Goal: Pain level will decrease  Description: Pain level will decrease  3/10/2021 1050 by Ana Cheney RN  Outcome: Ongoing  3/10/2021 0555 by Dru Pringle RN  Outcome: Met This Shift  Goal: Control of acute pain  Description: Control of acute pain  3/10/2021 1050 by Ana Cheney RN  Outcome: Ongoing  3/10/2021 0555 by Dru Pringle RN  Outcome: Met This Shift  Goal: Control of chronic pain  Description: Control of chronic pain  3/10/2021 1050 by Ana Cheney RN  Outcome: Ongoing  3/10/2021 0555 by Dru Pringle RN  Outcome: Met This Shift     Problem:  Activity:  Goal: Ability to avoid complications of mobility impairment will improve  Description: Ability to avoid complications of mobility impairment will improve  3/10/2021 1050 by Ana Cheney RN  Outcome: Ongoing  3/10/2021 0555 by Dru Pringle RN  Outcome: Met This Shift  Goal: Ability to tolerate increased activity will improve  Description: Ability to tolerate increased activity will improve  3/10/2021 1050 by Ana Cheney RN  Outcome: Ongoing  3/10/2021 0555 by Dru Pringle RN  Outcome: Met This Shift     Problem: Sensory:  Goal: Pain level will decrease  Description: Pain level will decrease  3/10/2021 1050 by Ana Cheney RN  Outcome: Ongoing  3/10/2021 0555 by Dru Pringle RN  Outcome: Met This Shift

## 2021-03-11 ENCOUNTER — OFFICE VISIT (OUTPATIENT)
Dept: FAMILY MEDICINE CLINIC | Age: 66
End: 2021-03-11

## 2021-03-11 VITALS
SYSTOLIC BLOOD PRESSURE: 130 MMHG | BODY MASS INDEX: 34.95 KG/M2 | HEART RATE: 89 BPM | HEIGHT: 65 IN | DIASTOLIC BLOOD PRESSURE: 76 MMHG | OXYGEN SATURATION: 98 % | TEMPERATURE: 98 F

## 2021-03-11 DIAGNOSIS — Z23 NEED FOR SHINGLES VACCINE: ICD-10-CM

## 2021-03-11 DIAGNOSIS — E78.5 HYPERLIPIDEMIA, UNSPECIFIED HYPERLIPIDEMIA TYPE: ICD-10-CM

## 2021-03-11 DIAGNOSIS — I10 ESSENTIAL HYPERTENSION: Primary | ICD-10-CM

## 2021-03-11 DIAGNOSIS — R73.03 PREDIABETES: ICD-10-CM

## 2021-03-11 DIAGNOSIS — F33.0 MILD EPISODE OF RECURRENT MAJOR DEPRESSIVE DISORDER (HCC): ICD-10-CM

## 2021-03-11 LAB
CHOLESTEROL, TOTAL: 140 MG/DL (ref 0–199)
HBA1C MFR BLD: 6.4 % (ref 4–5.6)
HDLC SERPL-MCNC: 48 MG/DL
LDL CHOLESTEROL CALCULATED: 65 MG/DL (ref 0–99)
TRIGL SERPL-MCNC: 137 MG/DL (ref 0–149)
VLDLC SERPL CALC-MCNC: 27 MG/DL

## 2021-03-11 PROCEDURE — 90750 HZV VACC RECOMBINANT IM: CPT | Performed by: FAMILY MEDICINE

## 2021-03-11 PROCEDURE — 90471 IMMUNIZATION ADMIN: CPT | Performed by: FAMILY MEDICINE

## 2021-03-11 PROCEDURE — 99214 OFFICE O/P EST MOD 30 MIN: CPT | Performed by: FAMILY MEDICINE

## 2021-03-11 RX ORDER — FLUOXETINE HYDROCHLORIDE 20 MG/1
20 CAPSULE ORAL DAILY
Qty: 30 CAPSULE | Refills: 1 | Status: SHIPPED
Start: 2021-03-11 | End: 2021-05-13

## 2021-03-11 NOTE — DISCHARGE SUMMARY
Discharge Summary    [address] SUMMARY:                The patient is a 63 y.o. female who was admitted to the hospital on 3/9/2021  5:15 AM for treatment of back pain. On the day of admission, a SI joint fusion was performed. The patient's hospital course was uncomplicated and consisted of physical therapy, incision observation, and a return to normal oral intake. The patient was discharged on 3/10/2021  7:13 PM tolerating a diet, moving bowels, and urinating without difficulty. The incisions were clean and intact. The patient was discharged to home in satisfactory condition with instructions to call the office for a follow up appointment. Hospital Problem List:  Active Problems:    Sacroiliac joint dysfunction  Resolved Problems:    * No resolved hospital problems. *     Procedure(s) (LRB):  RIGHT PERCUTANEOUS SACROILIAC JOINT FUSION (Right)    Discharge Medications:    [address] Medication Instructions WWI:[id number]    Printed on:03/11/21 7983   Medication Information                      atorvastatin (LIPITOR) 40 MG tablet  Take 1 tablet by mouth daily             cyclobenzaprine (FLEXERIL) 10 MG tablet  Take 1 tablet by mouth 3 times daily as needed for Muscle spasms             FLUoxetine (PROZAC) 40 MG capsule  Take 1 capsule by mouth daily For mood. gabapentin (NEURONTIN) 400 MG capsule  Take 1 capsule by mouth 3 times daily for 90 days. Handicap Placard MISC  DX:  Loss of Balance, Chronic low back pain, s/p back surgery. Duration of 5 years             HYDROcodone-acetaminophen (NORCO) 5-325 MG per tablet  Take 1 tablet by mouth every 4 hours as needed for Pain for up to 7 days.              hydrOXYzine (VISTARIL) 25 MG capsule  Take 1 capsule by mouth 3 times daily as needed for Anxiety             lisinopril (PRINIVIL;ZESTRIL) 30 MG tablet  Take 1 tablet by mouth every evening             varenicline (CHANTIX CONTINUING MONTH CHARITY) 1 MG tablet  Take 1 tablet by mouth 2 times daily                 Sebastian Barnett  3/11/2021

## 2021-03-11 NOTE — PROGRESS NOTES
[facility]  Office Progress Note - Dr. Leavitt Paget  3/11/21    CC:   Chief Complaint   Patient presents with    Hypertension        HPI:  She would like shingrix #2 today - given. Hypertension  Follow-up  Blood pressure is  controlled today. BP Readings from Last 3 Encounters:   03/11/21 130/76   03/10/21 130/64   03/09/21 120/68     Patient continues medications regularly. Compliance is good. Denies CP, sob, abd pain, headaches, vision changes, dizziness, hypotensive symptoms. No side effects from medications noted. Smoking cessation - she is currently not smoking. definitely thinking about it. But hasn't picked it back up yet. Mood has been down. She is considering inc to prozac - she is currently on 40mg daily and has bene pretty steady. No SI or HI. She has not been moving bowels well since surgery. Was passing some gas in the hospital though, but hasn't yet had a BM.       _________________________________________________________    Assessment / Magdalenegiacomo Velazquez was seen today for hypertension. Diagnoses and all orders for this visit:    Essential hypertension  At goal  Continue same meds as below. Hyperlipidemia, unspecified hyperlipidemia type  -     Lipid Panel; Future  Update lipids  Continue statin. Prediabetes  -     Hemoglobin A1C; Future  -     Cancel: Microalbumin / Creatinine Urine Ratio; Future  Update a1c and microalbumin. Had DM in 2013, but resolved and has bene steady normal to prediabetes in recent years. Need for shingles vaccine  -     Zoster recombinant [facility])    Mild episode of recurrent major depressive disorder ([facility] [address]  Worse recently and she would like to try inc prozac  Thinks related to recent surgery, dec mobility, problems with son  No SI or HI. Will inc prozac to 60mg daily. Other orders  -     FLUoxetine (PROZAC) 20 MG capsule;  Take 1 capsule by mouth daily Total of 60mg daily (40mg + 20mg capsules)    Return in about 2 months (around 5/11/2021). or as scheduled. Patient counseled to follow up sooner or seek more acute care if symptoms worsening or not improving according to plan. Electronically signed by Doris Menendez MD on 3/11/2021    _________________________________________________________  Current Outpatient Medications on File Prior to Visit   Medication Sig Dispense Refill    HYDROcodone-acetaminophen (NORCO) 5-325 MG per tablet Take 1 tablet by mouth every 4 hours as needed for Pain for up to 7 days. 42 tablet 0    cyclobenzaprine (FLEXERIL) 10 MG tablet Take 1 tablet by mouth 3 times daily as needed for Muscle spasms 40 tablet 0    Handicap Placard MISC DX:  Loss of Balance, Chronic low back pain, s/p back surgery. Duration of 5 years 1 each 0    gabapentin (NEURONTIN) 400 MG capsule Take 1 capsule by mouth 3 times daily for 90 days. 270 capsule 1    FLUoxetine (PROZAC) 40 MG capsule Take 1 capsule by mouth daily For mood. 90 capsule 1    lisinopril (PRINIVIL;ZESTRIL) 30 MG tablet Take 1 tablet by mouth every evening 90 tablet 1    atorvastatin (LIPITOR) 40 MG tablet Take 1 tablet by mouth daily 90 tablet 1    hydrOXYzine (VISTARIL) 25 MG capsule Take 1 capsule by mouth 3 times daily as needed for Anxiety 270 capsule 1     No current facility-administered medications on file prior to visit.         Patient Active Problem List   Diagnosis Code    Loss of balance R26.89    Vertebrobasilar TIAs G45.0    Obesity E66.9    Disc displacement, lumbar M51.26    Peripheral neuropathy ([address] G62.9    Type 2 diabetes mellitus without complication (HCC) R70.1    Depression F32.9    Osteoarthritis M19.90    Chronic back pain M54.9, G89.29    Fibromyalgia M79.7    HTN (hypertension) I10    Hyperlipidemia E78.5    History of adenomatous polyp of colon Z86.010    Vitamin D deficiency E55.9    Coccyx pain M53.3    Acute bilateral low back pain with sciatica M54.40    Lumbar stenosis M48.061    Chronic bilateral low back pain without sciatica M54.5, G89.29    DDD (degenerative disc disease), lumbar M51.36    Spinal stenosis of lumbar region without neurogenic claudication M48.061    Lumbar facet arthropathy M47.816    Chronic pain syndrome G89.4    Lumbar radiculopathy M54.16    Neck pain M54.2    Left foot pain M79.672    Painful orthopaedic hardware [facility]) T84.84XA    Cervical facet joint syndrome M47.812    Cellulitis, neck L03.221    Disorder of sacrum M53.3    Adjacent segment disease with spinal stenosis [id number]    S/P lumbar spinal fusion Z98.1    Tongue lesion K14.8    Sacroiliac joint dysfunction M53.3     _________________________________________________________  Past Medical History:   Diagnosis Date    Cancer ([facility] [address] 12/2019    LESION REMOVED UNDER TONGUE  DENTAL CLINIC    Chronic back pain     Costochondritis     h/o    Depression     Falls     last one 2017     Fibromyalgia     Hallux rigidus of left foot     HTN (hypertension)     Hyperlipidemia     CLYDE on CPAP     Osteoarthritis     \"everywhere\"    Rheumatoid arthritis ([address]     \"As a child. \"    Rheumatoid arthritis(714.0)     TIA (transient ischemic attack)     no deficits     Tongue lesion     for OR 10-21-20     Use of cane as ambulatory aid        Family History   Problem Relation Age of Onset    Dementia Mother     Breast Cancer Mother     Cancer Mother         breast cancer [de-identified]     Stroke Mother     Heart Attack Father     Other Father 80        Aortic aneurysm    Cancer Brother        Past Surgical History:   Procedure Laterality Date    ANESTHESIA NERVE BLOCK Left 2/26/2019    LEFT C3,4,5 MBB performed by Wilbert Calderon MD at [address] Right 8/12/2019    BILATERAL TRANSFORAMINAL EPIDURAL STEROID INJECTION S1 #3 performed by Wilbert Calderon MD at [address] [facility] [facility] [address] Right 6/16/2020    RIGHT SACROILIAC JOINT INJECTION      CPT: 62690 performed by [place] Martino Kaley Garcia DO at [address]      Left    ARTHRODESIS Left 2018    ARTHRODESIS 2ND DIGIT LEFT FOOT performed by Maxx Menezes DPM at [facility]  2017    PLIF L3-L4, L4-L5 with rods, screws, and cages/Dr. Mckeon Grout BACK SURGERY  2020    BACK SURGERY Right 3/9/2021    RIGHT PERCUTANEOUS SACROILIAC JOINT FUSION performed by Oksana Molina MD at [facility]      reduction, bilat     SECTION      CHOLECYSTECTOMY      COLONOSCOPY  2015    COLONOSCOPY N/A 2020    COLONOSCOPY DIAGNOSTIC performed by Wil De La O MD at [facility] COLON, DIAGNOSTIC      EPIDURAL STEROID INJECTION N/A 2019    BILATERAL TRANSFORAMINAL EPIDURAL STEROID INJECTION S1 performed by Tien Truong DO at [facility]      Left    [address] / ANKLE Left 2018    REMOVAL OF PAINFUL HARDWARE LEFT FOOT  ++SYNTHES++ performed by Maxx Menezes DPM at [facility]    inguinal     LUMBAR SPINE SURGERY N/A 3/4/2020    EXPLORATION OF PRIOR L3-L5 FUSION AND L2-L3  POSTERIOR LUMBAR INTERBODY FUSION -- NEEDS O-ARM, AUDIOLOGY, CAGES, PLATES, SCREWS, C-ARM, TERESA TABLE, CELL SAVER, PLATELET GEL -- GLOBUS performed by Oksana Molina MD at [address] N/A 10/21/2020    EXCISION OF TONGUE LESION performed by Ricco Blank DO at [address] Bilateral 2018    L1-2 lumbar foramen #1    NERVE BLOCK Bilateral 2018    s1 tfesi    NERVE BLOCK Bilateral 2019    NERVE BLOCK Right 2019    sacral radiofrequency    NERVE BLOCK Right 2020    RIGHT SACROILIAC JOINT INJECTION #2 performed by Tien Truong DO at [facility] Left 13    left foot tarso metatarsal joint injection    OTHER SURGICAL HISTORY Left 5/27/15    Endoscopic Gastroc recession left, Lapidus left foot and  Excision of exostosis left foot    GA NJX AA&/STRD TFRML EPI LUMBAR/SACRAL 1 LEVEL Bilateral 12/3/2018    BILATERAL S1  EPIDURAL STEROID INJECTION performed by Esvin Guerrero MD at [address] [facility] DX/THER SBST INTRLMNR LMBR/SAC W/IMG GDN N/A 2018    EPIDURAL STEROID INJECTION L1-2 WITH LOW VOL performed by Esvin Guerrero MD at [address] [facility] NOSE/CLEFT LIP/TIP Bilateral 2018    BILATERAL L1-2 LUMBAR FORAMEN #1 performed by Esvin Guerrero MD at [address] [facility] NOSE/CLEFT LIP/TIP Bilateral 2018    BILATERAL TRANSFORAMINAL EPIDURAL STEROID INJECTION UNDER FLUOROSCOPIC GUIDANCE @ FORAMINAL LEVEL L1-2 #2 performed by Esvin Guerrero MD at [address] Right 2019    RIGHT SACRAL RADIOFREQUENCY ABLATION performed by Esvin Guerrero MD at [address]  ,     Big Left Toe    TOE SURGERY Left 2018    2nd toe     TONSILLECTOMY      WISDOM TOOTH EXTRACTION         Social History     Tobacco Use    Smoking status: Former Smoker     Packs/day: 0.25     Years: 30.00     Pack years: 7.50     Types: Cigarettes     Quit date: 10/5/2020     Years since quittin.4    Smokeless tobacco: Never Used    Tobacco comment: was going to try to stop but chantix is too expensive   Substance Use Topics    Alcohol use: Not Currently     Alcohol/week: 0.0 standard drinks     Comment: rarely    Drug use: No       Chart reviewed and updated where appropriate for PMH, Fam, and Soc Hx.  _________________________________________________________  ROS: POSITIVE: As in the HPI.    Otherwise Pertinent negatives are negative.    __________________________________________________________  Physical Exam   /76 (Site: Left Upper Arm, Position: Sitting, Cuff Size: Large Adult)   Pulse 89   Temp 98 °F (36.7 °C) (Temporal)   Ht 5' 5\" (1.651 m)   LMP  (LMP Unknown)   SpO2 98%   BMI 34.95 kg/m²   Wt Readings from Last 3 Encounters:   21 210 lb (95.3 kg) 03/04/21 210 lb (95.3 kg)   02/24/21 210 lb (95.3 kg)       Constitutional:    She is oriented to person, place, and time. She appears well-developed and well-nourished. Seated in wheelchair due to NWB on RLE. Neck:    Normal range of motion. No thyromegaly or nodules noted. No bruit. Cardiovascular:    Normal rate, regular rhythm and normal heart sounds. No murmur. No gallop and no friction rub. Pulmonary/Chest:    Effort normal and breath sounds normal.    No wheezes. No rales or rhonchi. Abdominal:    Soft. Bowel sounds are normal.    No distension. No tenderness. Musculoskeletal:    Normal range of motion. No joint swelling noted. No peripheral edema. Skin:    Skin is warm and dry. No rashes, No lesions. Psychiatric:    She has a normal mood and affect. Normal groom and dress. No SI or HI.   ________________________________________________________    This note may have been created using dictation software.  Efforts were made to reduce grammatical or syntax errors, but some may persist.

## 2021-03-16 ENCOUNTER — OFFICE VISIT (OUTPATIENT)
Dept: PODIATRY | Age: 66
End: 2021-03-16

## 2021-03-16 VITALS — SYSTOLIC BLOOD PRESSURE: 130 MMHG | TEMPERATURE: 97.7 F | DIASTOLIC BLOOD PRESSURE: 70 MMHG

## 2021-03-16 DIAGNOSIS — M25.572 CHRONIC PAIN OF LEFT ANKLE: Primary | ICD-10-CM

## 2021-03-16 DIAGNOSIS — G89.29 CHRONIC PAIN OF LEFT ANKLE: Primary | ICD-10-CM

## 2021-03-16 PROCEDURE — 20605 DRAIN/INJ JOINT/BURSA W/O US: CPT | Performed by: PODIATRIST

## 2021-03-16 PROCEDURE — 99213 OFFICE O/P EST LOW 20 MIN: CPT | Performed by: PODIATRIST

## 2021-03-16 RX ORDER — BUPIVACAINE HYDROCHLORIDE 5 MG/ML
1 INJECTION, SOLUTION PERINEURAL ONCE
Status: COMPLETED | OUTPATIENT
Start: 2021-03-16 | End: 2021-03-16

## 2021-03-16 RX ORDER — METHYLPREDNISOLONE ACETATE 40 MG/ML
40 INJECTION, SUSPENSION INTRA-ARTICULAR; INTRALESIONAL; INTRAMUSCULAR; SOFT TISSUE ONCE
Status: COMPLETED | OUTPATIENT
Start: 2021-03-16 | End: 2021-03-16

## 2021-03-16 RX ADMIN — BUPIVACAINE HYDROCHLORIDE 5 MG: 5 INJECTION, SOLUTION PERINEURAL at 16:18

## 2021-03-16 RX ADMIN — METHYLPREDNISOLONE ACETATE 40 MG: 40 INJECTION, SUSPENSION INTRA-ARTICULAR; INTRALESIONAL; INTRAMUSCULAR; SOFT TISSUE at 16:19

## 2021-03-16 NOTE — PROGRESS NOTES
3/16/21  Claudette Councilman : 1955 Sex: female  Age: 63 y.o. Patient was referred by Reny Ryder MD    Chief Complaint   Patient presents with    Foot Injury     left foot and left ankle twisted it on  was given cam walker had hip sx cannot wear cam walker     Foot Pain       SUBJECTIVE patient is seen for follow-up of left ankle pain. Patient could not wear the cam walker due to having right hip surgery. Patient is still in pain  HPI  Review of Systems  Const: Denies constitutional symptoms  Musculo: Denies symptoms other than stated above  Skin: Denies symptoms other than stated above       Current Outpatient Medications:     FLUoxetine (PROZAC) 20 MG capsule, Take 1 capsule by mouth daily Total of 60mg daily (40mg + 20mg capsules), Disp: 30 capsule, Rfl: 1    HYDROcodone-acetaminophen (NORCO) 5-325 MG per tablet, Take 1 tablet by mouth every 4 hours as needed for Pain for up to 7 days. , Disp: 42 tablet, Rfl: 0    cyclobenzaprine (FLEXERIL) 10 MG tablet, Take 1 tablet by mouth 3 times daily as needed for Muscle spasms, Disp: 40 tablet, Rfl: 0    Handicap Placard MISC, DX:  Loss of Balance, Chronic low back pain, s/p back surgery. Duration of 5 years, Disp: 1 each, Rfl: 0    gabapentin (NEURONTIN) 400 MG capsule, Take 1 capsule by mouth 3 times daily for 90 days. , Disp: 270 capsule, Rfl: 1    FLUoxetine (PROZAC) 40 MG capsule, Take 1 capsule by mouth daily For mood. , Disp: 90 capsule, Rfl: 1    lisinopril (PRINIVIL;ZESTRIL) 30 MG tablet, Take 1 tablet by mouth every evening, Disp: 90 tablet, Rfl: 1    atorvastatin (LIPITOR) 40 MG tablet, Take 1 tablet by mouth daily, Disp: 90 tablet, Rfl: 1    hydrOXYzine (VISTARIL) 25 MG capsule, Take 1 capsule by mouth 3 times daily as needed for Anxiety, Disp: 270 capsule, Rfl: 1  Allergies   Allergen Reactions    Pcn [Penicillins] Anaphylaxis       Past Medical History:   Diagnosis Date    Cancer ([facility] [address] 2019    LESION REMOVED UNDER TONGUE DENTAL CLINIC    Chronic back pain     Costochondritis     h/o    Depression     Falls     last one 2017     Fibromyalgia     Hallux rigidus of left foot     HTN (hypertension)     Hyperlipidemia     CLYDE on CPAP     Osteoarthritis     \"everywhere\"    Rheumatoid arthritis ([address]     \"As a child. \"    Rheumatoid arthritis(714.0)     TIA (transient ischemic attack)     no deficits     Tongue lesion     for OR 10-21-20     Use of cane as ambulatory aid      Past Surgical History:   Procedure Laterality Date    ANESTHESIA NERVE BLOCK Left 2019    LEFT C3,4,5 MBB performed by Carmen Morales MD at [address] Right 2019    BILATERAL TRANSFORAMINAL EPIDURAL STEROID INJECTION S1 #3 performed by Carmen Morales MD at [address] [facility] [address] Right 2020    RIGHT SACROILIAC JOINT INJECTION      CPT: 15021 performed by Allison Fernandes DO at [address]      Left    ARTHRODESIS Left 2018    ARTHRODESIS 2ND DIGIT LEFT FOOT performed by Ligia Garcia DPM at [address] [place] [address]  2017    PLIF L3-L4, L4-L5 with rods, screws, and cages/Dr. Radha Phillips BACK SURGERY  2020    BACK SURGERY Right 3/9/2021    RIGHT PERCUTANEOUS SACROILIAC JOINT FUSION performed by Janiya Rivera MD at [facility]      reduction, bilat     SECTION  1993    CHOLECYSTECTOMY      COLONOSCOPY  2015    COLONOSCOPY N/A 2020    COLONOSCOPY DIAGNOSTIC performed by Mer Donahue MD at [address] [facility], COLON, DIAGNOSTIC      EPIDURAL STEROID INJECTION N/A 2019    BILATERAL TRANSFORAMINAL EPIDURAL STEROID INJECTION S1 performed by Allison Fernandes DO at [address]      Left    [address] / ANKLE Left 2018    REMOVAL OF PAINFUL HARDWARE LEFT FOOT  ++SYNTHES++ performed by Ligia Garcia DPM at [place] [address] N/A 3/4/2020    EXPLORATION OF PRIOR L3-L5 FUSION AND L2-L3  POSTERIOR LUMBAR INTERBODY FUSION -- NEEDS O-ARM, AUDIOLOGY, CAGES, PLATES, SCREWS, C-ARM, TERESA TABLE, CELL SAVER, PLATELET GEL -- GLOBUS performed by Sherren Philips, MD at [address] [facility] N/A 10/21/2020    EXCISION OF TONGUE LESION performed by Artem Beltran DO at [address] [facility] Bilateral 05/07/2018    L1-2 lumbar foramen #1    NERVE BLOCK Bilateral 12/03/2018    s1 tfesi    NERVE BLOCK Bilateral 08/12/2019    NERVE BLOCK Right 09/30/2019    sacral radiofrequency    NERVE BLOCK Right 9/1/2020    RIGHT SACROILIAC JOINT INJECTION #2 performed by Enrique Castro DO at [address] [facility] Left 9/25/13    left foot tarso metatarsal joint injection    OTHER SURGICAL HISTORY Left 5/27/15    Endoscopic Gastroc recession left, Lapidus left foot and  Excision of exostosis left foot    NM NJX AA&/STRD TFRML EPI LUMBAR/SACRAL 1 LEVEL Bilateral 12/3/2018    BILATERAL S1  EPIDURAL STEROID INJECTION performed by Moises Reid MD at [address] [facility] DX/THER SBST INTRLMNR LMBR/SAC W/IMG GDN N/A 8/21/2018    EPIDURAL STEROID INJECTION L1-2 WITH LOW VOL performed by Moises Reid MD at [address] [facility] NOSE/CLEFT LIP/TIP Bilateral 5/7/2018    BILATERAL L1-2 LUMBAR FORAMEN #1 performed by Moises Reid MD at [address] [facility] NOSE/CLEFT LIP/TIP Bilateral 6/4/2018    BILATERAL TRANSFORAMINAL EPIDURAL STEROID INJECTION UNDER FLUOROSCOPIC GUIDANCE @ FORAMINAL LEVEL L1-2 #2 performed by Moises Reid MD at [address] Right 9/30/2019    RIGHT SACRAL RADIOFREQUENCY ABLATION performed by Moises Reid MD at [address]  2000, 2005    Big Left Toe    TOE SURGERY Left 2018    2nd toe     TONSILLECTOMY      WISDOM TOOTH EXTRACTION       Family History   Problem Relation Age of Onset    Dementia Mother     Breast Cancer Mother     Cancer Mother         breast cancer [de-identified]     Stroke Mother     Heart Attack Father     Other Father 80        Aortic aneurysm    Cancer Brother      Social History     Socioeconomic History    Marital status:      Spouse name: Not on file    Number of children: Not on file    Years of education: Not on file    Highest education level: Not on file   Occupational History    Not on file   Social Needs    Financial resource strain: Not on file    Food insecurity     Worry: Not on file     Inability: Not on file    Transportation needs     Medical: Not on file     Non-medical: Not on file   Tobacco Use    Smoking status: Former Smoker     Packs/day: 0.25     Years: 30.00     Pack years: 7.50     Types: Cigarettes     Quit date: 10/5/2020     Years since quittin.4    Smokeless tobacco: Never Used    Tobacco comment: was going to try to stop but chantix is too expensive   Substance and Sexual Activity    Alcohol use: Not Currently     Alcohol/week: 0.0 standard drinks     Comment: rarely    Drug use: No    Sexual activity: Not on file   Lifestyle    Physical activity     Days per week: Not on file     Minutes per session: Not on file    Stress: Not on file   Relationships    Social connections     Talks on phone: Not on file     Gets together: Not on file     Attends Sabianism service: Not on file     Active member of club or organization: Not on file     Attends meetings of clubs or organizations: Not on file     Relationship status: Not on file    Intimate partner violence     Fear of current or ex partner: Not on file     Emotionally abused: Not on file     Physically abused: Not on file     Forced sexual activity: Not on file   Other Topics Concern    Not on file   Social History Narrative    Not on file       Vitals:    21 1538   BP: 130/70   Temp: 97.7 °F (36.5 °C)   TempSrc: Temporal   Weight: Comment: wheelchair       Focused Lower Extremity Physical Exam:  Vitals:    21 1538 BP: 130/70   Temp: 97.7 °F (36.5 °C)        Foot Exam    Left Foot/Ankle      Muscle Strength  Ankle dorsiflexion: 2  Ankle plantar flexion: 2             Left Ankle Exam     Tenderness   The patient is experiencing tenderness in the ATF, deltoid and lateral malleolus. Swelling: mild    Range of Motion   Dorsiflexion: abnormal   Plantar flexion: abnormal   Eversion: abnormal   Inversion: abnormal     Muscle Strength   Dorsiflexion:  2/5   Plantar flexion:  2/5   Anterior tibial:  3/5   Posterior tibial:  2/5  Gastrocsoleus:  3/5  Peroneal muscle:  3/5    Tests   Anterior drawer: negative  Varus tilt: negative            Vascular: pulses  dp  pt    Capillary Refill Time:   Hair growth  Skin:    Edema:    Neurologic:      Musculoskeletal/ Orthopedic examination:    NAIL   Web space  Derm:          Assessment and Plan: Left ankle 1 cc injection:  Potential risks, complications, alternative treatment options and procedure prognosis were explained to the patient. Verbal and signed informed consent were obtained from the patient. An antiseptic preparation of the skin was performed with ChloraPrep 1 cc 0.5% Marcaine plain 1 cc of Depo-Medrol injected into the left ankle. No adverse reaction was observed. The puncture site was covered with an adhesive bandage and the patient was fitted with a franci patient was then instructed on elevating icing left ankle. Post injection instructions were given. Tami Sosa was seen today for foot injury and foot pain. Diagnoses and all orders for this visit:    Chronic pain of left ankle    Other orders  -     methylPREDNISolone acetate (DEPO-MEDROL) injection 40 mg  -     bupivacaine (MARCAINE) 0.5 % injection 5 mg        Return in about 1 month (around 4/16/2021). Seen By:  Ju Anderson DPM      Document was created using voice recognition software. Note was reviewed, however may contain grammatical errors.

## 2021-03-17 ENCOUNTER — PROCEDURE VISIT (OUTPATIENT)
Dept: PAIN MANAGEMENT | Age: 66
End: 2021-03-17

## 2021-03-17 VITALS
OXYGEN SATURATION: 98 % | HEART RATE: 84 BPM | RESPIRATION RATE: 16 BRPM | BODY MASS INDEX: 34.95 KG/M2 | DIASTOLIC BLOOD PRESSURE: 90 MMHG | TEMPERATURE: 98.3 F | SYSTOLIC BLOOD PRESSURE: 138 MMHG | WEIGHT: 210 LBS

## 2021-03-17 DIAGNOSIS — M79.10 MYALGIA: Primary | ICD-10-CM

## 2021-03-17 PROCEDURE — 76942 ECHO GUIDE FOR BIOPSY: CPT | Performed by: PAIN MEDICINE

## 2021-03-17 PROCEDURE — 20553 NJX 1/MLT TRIGGER POINTS 3/>: CPT | Performed by: PAIN MEDICINE

## 2021-03-17 RX ORDER — BUPIVACAINE HYDROCHLORIDE 2.5 MG/ML
10 INJECTION, SOLUTION EPIDURAL; INFILTRATION; INTRACAUDAL ONCE
Status: COMPLETED | OUTPATIENT
Start: 2021-03-17 | End: 2021-03-17

## 2021-03-17 RX ADMIN — BUPIVACAINE HYDROCHLORIDE 10 ML: 2.5 INJECTION, SOLUTION EPIDURAL; INFILTRATION; INTRACAUDAL at 15:15

## 2021-03-17 NOTE — PROGRESS NOTES
Do you currently have any of the following:    Fever: No  Headache:  No  Cough: No  Shortness of breath: No  Exposed to anyone with these symptoms: No                                                                                                                Marge Christian presents to the [facility] on 3/17/2021. Klarissa Clark is complaining of cervical pain radiating into bilateal shoulders. The pain is constant. The pain is described as aching. Pain is rated on her best day at a 6, on her worst day at a 10, and on average at a 7 on the VAS scale. She took her last dose of Norco today, Neurontin today. Klarissa Clark does not have issues with constipation. Any procedures since your last visit: Yes, with 75 % relief. She is not on NSAIDS and  is not on anticoagulation medications to include none and is managed by . Pacemaker or defibrillator: No Physician managing device is . Medication Contract and Consent for Opioid Use Documents Filed     Patient Documents       Type of Document Status Date Received Received By Description     Medication Contract Received  Ayanna Valenzuela Opioid medication contract with Dr Leonel Deras 3/24/20     Medication Contract Received 4/26/2018  3:50 PM ANYA NUNO PAIN MANAGEMENT PATIENT AGREEMENT 4/26/2018     Medication Contract Received 11/5/2020  4:23 PM EBER HONG Opioid medication contract with Dr Leonel Deras     Medication Contract Received 3/1/2021  1:26 PM EBER HONG Opioid medication contract with Dr Lenoel Deras                   BP (!) 138/90   Pulse 84   Temp 98.3 °F (36.8 °C)   Resp 16   Wt 210 lb (95.3 kg)   LMP  (LMP Unknown)   SpO2 98%   BMI 34.95 kg/m²      No LMP recorded (lmp unknown).  Patient is postmenopausal.

## 2021-03-17 NOTE — PROGRESS NOTES
3/17/2021    Patient: Terrie Varghese  :  1955  Age:  63 y.o. Sex:  female     PRE-PROCEDURE DIAGNOSIS: Myofascial pain. POST-PROCEDURE DIAGNOSIS: Same. PROCEDURE: bilateral cervical paraspinal, trapezius, supraspinatus, rhomboid trigger point injections under ultrasound guidance. SURGEON:   ZARA Hatfield. ANESTHESIA: local    ESTIMATED BLOOD LOSS:  None.  ______________________________________________________________________    BRIEF HISTORY: Terrie Varghese comes in today for bilateral cervical paraspinal, trapezius, supraspinatus, rhomboid  trigger point injection. After discussing the potential risks and benefits of the procedure with the patient. Perico Adams did request that we proceed. A complete History & Physical was reviewed and it is unchanged. DESCRIPTION OF PROCEDURE:   Each trigger point was marked with patient input, prepped with chloraprep and draped. A #25-gauge, 1.5-inch needle was passed into the area of greatest tenderness under ultrasound guidance. Each trigger point received equal, divided dosages or a total of 10 cc of 0.25% Marcaine after negative aspiration of blood, air or paresthesia with ultrasound visualization of solution spread. The needle was removed intact and Band-Aids were applied. Disposition the patient tolerated the procedure well and there were no complications . Perico Adams will follow up in our [address] [place] [address] as scheduled. She was encouraged to call with questions, concerns or if worsening of symptoms occurs.     Ulysses Thayer, DO

## 2021-03-22 DIAGNOSIS — M53.3 SACROILIAC JOINT DYSFUNCTION OF RIGHT SIDE: Primary | ICD-10-CM

## 2021-03-24 ENCOUNTER — TELEPHONE (OUTPATIENT)
Dept: NEUROSURGERY | Age: 66
End: 2021-03-24

## 2021-03-24 DIAGNOSIS — G89.29 CHRONIC MIDLINE LOW BACK PAIN WITH SCIATICA, SCIATICA LATERALITY UNSPECIFIED: ICD-10-CM

## 2021-03-24 DIAGNOSIS — M54.40 CHRONIC MIDLINE LOW BACK PAIN WITH SCIATICA, SCIATICA LATERALITY UNSPECIFIED: ICD-10-CM

## 2021-03-24 RX ORDER — HYDROCODONE BITARTRATE AND ACETAMINOPHEN 5; 325 MG/1; MG/1
1 TABLET ORAL EVERY 4 HOURS PRN
Qty: 42 TABLET | Refills: 0 | Status: SHIPPED
Start: 2021-03-24 | End: 2021-05-19 | Stop reason: SDUPTHER

## 2021-03-24 NOTE — TELEPHONE ENCOUNTER
Patient requesting refill on [place] to [facility] in [facility].

## 2021-03-29 ENCOUNTER — TELEPHONE (OUTPATIENT)
Dept: NEUROSURGERY | Age: 66
End: 2021-03-29

## 2021-03-29 DIAGNOSIS — M53.3 SACROILIAC JOINT DYSFUNCTION OF RIGHT SIDE: Primary | ICD-10-CM

## 2021-03-29 NOTE — TELEPHONE ENCOUNTER
Patient requesting tub transfer bench order faxed to [facility] ([facility]) [phone]

## 2021-03-31 ENCOUNTER — OFFICE VISIT (OUTPATIENT)
Dept: PAIN MANAGEMENT | Age: 66
End: 2021-03-31

## 2021-03-31 ENCOUNTER — OFFICE VISIT (OUTPATIENT)
Dept: PODIATRY | Age: 66
End: 2021-03-31

## 2021-03-31 VITALS — DIASTOLIC BLOOD PRESSURE: 82 MMHG | TEMPERATURE: 97.8 F | SYSTOLIC BLOOD PRESSURE: 138 MMHG

## 2021-03-31 DIAGNOSIS — G89.4 CHRONIC PAIN SYNDROME: ICD-10-CM

## 2021-03-31 DIAGNOSIS — G89.29 CHRONIC BILATERAL LOW BACK PAIN WITHOUT SCIATICA: ICD-10-CM

## 2021-03-31 DIAGNOSIS — M48.061 SPINAL STENOSIS OF LUMBAR REGION WITHOUT NEUROGENIC CLAUDICATION: ICD-10-CM

## 2021-03-31 DIAGNOSIS — M47.812 CERVICAL FACET JOINT SYNDROME: ICD-10-CM

## 2021-03-31 DIAGNOSIS — M25.572 CHRONIC PAIN OF LEFT ANKLE: Primary | ICD-10-CM

## 2021-03-31 DIAGNOSIS — M79.10 MYALGIA: Primary | ICD-10-CM

## 2021-03-31 DIAGNOSIS — M51.36 DDD (DEGENERATIVE DISC DISEASE), LUMBAR: ICD-10-CM

## 2021-03-31 DIAGNOSIS — G89.29 CHRONIC PAIN OF LEFT ANKLE: Primary | ICD-10-CM

## 2021-03-31 DIAGNOSIS — M54.50 CHRONIC BILATERAL LOW BACK PAIN WITHOUT SCIATICA: ICD-10-CM

## 2021-03-31 DIAGNOSIS — M54.16 LUMBAR RADICULOPATHY: ICD-10-CM

## 2021-03-31 DIAGNOSIS — M47.816 LUMBAR FACET ARTHROPATHY: ICD-10-CM

## 2021-03-31 PROCEDURE — 99213 OFFICE O/P EST LOW 20 MIN: CPT | Performed by: PODIATRIST

## 2021-03-31 PROCEDURE — 99213 OFFICE O/P EST LOW 20 MIN: CPT | Performed by: PHYSICIAN ASSISTANT

## 2021-03-31 NOTE — PROGRESS NOTES
3/16/21  Pryor Gottron : 1955 Sex: female  Age: 63 y.o. Patient was referred by Kaiser Campbell MD    Chief Complaint   Patient presents with    Foot Pain     had an inj left ankle last visit doing great        SUBJECTIVE patient is seen for follow-up of left ankle pain. Patient could not wear the cam walker due to having right hip surgery. Injection last  Visit   Feeling better   Foot Pain   The pain is present in the left foot. This is a chronic problem. The current episode started more than 1 month ago. The problem occurs rarely. The problem has been rapidly improving. Review of Systems  Const: Denies constitutional symptoms  Musculo: Denies symptoms other than stated above  Skin: Denies symptoms other than stated above       Current Outpatient Medications:     diclofenac sodium (VOLTAREN) 1 % GEL, Apply 4 g topically 2 times daily, Disp: 350 g, Rfl: 1    Misc. Devices (TRANSFER BENCH) MISC, 1 Units by Does not apply route daily, Disp: 1 each, Rfl: 0    HYDROcodone-acetaminophen (NORCO) 5-325 MG per tablet, Take 1 tablet by mouth every 4 hours as needed for Pain for up to 7 days. , Disp: 42 tablet, Rfl: 0    FLUoxetine (PROZAC) 20 MG capsule, Take 1 capsule by mouth daily Total of 60mg daily (40mg + 20mg capsules), Disp: 30 capsule, Rfl: 1    Handicap Placard MISC, DX:  Loss of Balance, Chronic low back pain, s/p back surgery. Duration of 5 years, Disp: 1 each, Rfl: 0    gabapentin (NEURONTIN) 400 MG capsule, Take 1 capsule by mouth 3 times daily for 90 days. , Disp: 270 capsule, Rfl: 1    FLUoxetine (PROZAC) 40 MG capsule, Take 1 capsule by mouth daily For mood. , Disp: 90 capsule, Rfl: 1    lisinopril (PRINIVIL;ZESTRIL) 30 MG tablet, Take 1 tablet by mouth every evening, Disp: 90 tablet, Rfl: 1    atorvastatin (LIPITOR) 40 MG tablet, Take 1 tablet by mouth daily, Disp: 90 tablet, Rfl: 1    hydrOXYzine (VISTARIL) 25 MG capsule, Take 1 capsule by mouth 3 times daily as needed for Anxiety, Disp: 270 capsule, Rfl: 1  Allergies   Allergen Reactions    Pcn [Penicillins] Anaphylaxis       Past Medical History:   Diagnosis Date    Cancer ([facility] 12/2019    LESION REMOVED UNDER TONGUE  DENTAL CLINIC    Chronic back pain     Costochondritis     h/o    Depression     Falls     last one 2017     Fibromyalgia     Hallux rigidus of left foot     HTN (hypertension)     Hyperlipidemia     CLYDE on CPAP     Osteoarthritis     \"everywhere\"    Rheumatoid arthritis ([facility] [address]     \"As a child. \"    Rheumatoid arthritis(714.0)     TIA (transient ischemic attack)     no deficits     Tongue lesion     for OR 10-21-20     Use of cane as ambulatory aid      Past Surgical History:   Procedure Laterality Date    ANESTHESIA NERVE BLOCK Left 2/26/2019    LEFT C3,4,5 MBB performed by Ryder Paz MD at [facility] Right 8/12/2019    BILATERAL TRANSFORAMINAL EPIDURAL STEROID INJECTION S1 #3 performed by Ryder Paz MD at [address] [facility] [address] Right 6/16/2020    RIGHT SACROILIAC JOINT INJECTION      CPT: 80029 performed by Skinny Hunter DO at [address]  2005    Left    ARTHRODESIS Left 12/14/2018    ARTHRODESIS 2ND DIGIT LEFT FOOT performed by Danny King DPM at [address] [facility]  08/16/2017    PLIF L3-L4, L4-L5 with rods, screws, and cages/Dr. Radha Rhodes BACK SURGERY  03/04/2020    BACK SURGERY Right 3/9/2021    RIGHT PERCUTANEOUS SACROILIAC JOINT FUSION performed by Any Modi MD at [facility]  2010    reduction, bilat   [address]    CHOLECYSTECTOMY  2011    COLONOSCOPY  03/27/2015    COLONOSCOPY N/A 8/19/2020    COLONOSCOPY DIAGNOSTIC performed by Fritz Mead MD at [address] [facility], COLON, DIAGNOSTIC      EPIDURAL STEROID INJECTION N/A 6/4/2019    BILATERAL TRANSFORAMINAL EPIDURAL STEROID INJECTION S1 performed by Skinny Hunter DO at [address]      Left    HARDWARE REMOVAL FOOT / ANKLE Left 12/14/2018    REMOVAL OF PAINFUL HARDWARE LEFT FOOT  ++SYNTHES++ performed by Delphine Neumann DPM at [facility]    inguinal     LUMBAR SPINE SURGERY N/A 3/4/2020    EXPLORATION OF PRIOR L3-L5 FUSION AND L2-L3  POSTERIOR LUMBAR INTERBODY FUSION -- NEEDS O-ARM, AUDIOLOGY, CAGES, PLATES, SCREWS, C-ARM, TERESA TABLE, CELL SAVER, PLATELET GEL -- GLOBUS performed by Stella Jin MD at [address] N/A 10/21/2020    EXCISION OF TONGUE LESION performed by Lonnie Maynard DO at [address] [facility] [address] Bilateral 05/07/2018    L1-2 lumbar foramen #1    NERVE BLOCK Bilateral 12/03/2018    s1 tfesi    NERVE BLOCK Bilateral 08/12/2019    NERVE BLOCK Right 09/30/2019    sacral radiofrequency    NERVE BLOCK Right 9/1/2020    RIGHT SACROILIAC JOINT INJECTION #2 performed by Kuldip Caceres DO at [address] [facility] Left 9/25/13    left foot tarso metatarsal joint injection    OTHER SURGICAL HISTORY Left 5/27/15    Endoscopic Gastroc recession left, Lapidus left foot and  Excision of exostosis left foot    OK NJX AA&/STRD TFRML EPI LUMBAR/SACRAL 1 LEVEL Bilateral 12/3/2018    BILATERAL S1  EPIDURAL STEROID INJECTION performed by Ac Farfan MD at [facility] DX/THER SBST INTRLMNR LMBR/SAC W/IMG GDN N/A 8/21/2018    EPIDURAL STEROID INJECTION L1-2 WITH LOW VOL performed by Ac Farfan MD at [address] [facility] NOSE/CLEFT LIP/TIP Bilateral 5/7/2018    BILATERAL L1-2 LUMBAR FORAMEN #1 performed by Ac Farfan MD at [address] [facility] NOSE/CLEFT LIP/TIP Bilateral 6/4/2018    BILATERAL TRANSFORAMINAL EPIDURAL STEROID INJECTION UNDER FLUOROSCOPIC GUIDANCE @ FORAMINAL LEVEL L1-2 #2 performed by Ac Farfan MD at [address] Right 9/30/2019    RIGHT SACRAL RADIOFREQUENCY ABLATION performed by Ac Farfan MD at [address]  2000, 2005    Big Left Not on file       Vitals:    03/31/21 1325   BP: 138/82   Temp: 97.8 °F (36.6 °C)   TempSrc: Temporal   Weight: Comment: refused       Focused Lower Extremity Physical Exam:  Vitals:    03/31/21 1325   BP: 138/82   Temp: 97.8 °F (36.6 °C)        Foot Exam    Left Foot/Ankle      Muscle Strength  Ankle dorsiflexion: 2  Ankle plantar flexion: 2             Left Ankle Exam   Swelling: mild    Range of Motion   Dorsiflexion: abnormal   Plantar flexion: abnormal   Eversion: abnormal   Inversion: abnormal     Muscle Strength   Dorsiflexion:  2/5   Plantar flexion:  2/5   Anterior tibial:  3/5   Posterior tibial:  2/5  Gastrocsoleus:  3/5  Peroneal muscle:  3/5    Tests   Anterior drawer: negative  Varus tilt: negative            Vascular: pulses  dp  pt    Capillary Refill Time:   Hair growth  Skin:    Edema:    Neurologic:      Musculoskeletal/ Orthopedic examination:    NAIL   Web space  Derm:          Assessment and Plan:new balance  Shoes  With orthotics        Jesse Posada was seen today for foot pain. Diagnoses and all orders for this visit:    Chronic pain of left ankle    Other orders  -     diclofenac sodium (VOLTAREN) 1 % GEL; Apply 4 g topically 2 times daily        Return in about 2 months (around 5/31/2021). Seen By:  Tino Davis DPM      Document was created using voice recognition software. Note was reviewed, however may contain grammatical errors.

## 2021-03-31 NOTE — PROGRESS NOTES
[facility]  [address] [place] [address]    Follow up Note      [facility]     Date of Visit:  3/31/2021    CC:  Patient presents for follow up   Chief Complaint   Patient presents with    Follow-up     lower back pain        HPI:    Pain is somewhat improved after surgery. Taking norco 3-4 times per day. She is pleased with her surgery. Follows up next week. Change in quality of symptoms:no. Patient satisfaction with analgesia:fair. Medication side effects: None. Recent diagnostic testing:none. Recent interventional procedures: 2021 PROCEDURE: bilateral cervical paraspinal, trapezius, supraspinatus, rhomboid trigger point injections under ultrasound guidance - 80% better. She has been on anticoagulation medications to include NSAIDS. The patient  has not been on herbal supplements. The patient is diabetic. Imaging:    MRI lumbar spine 2018  1. No significant interval change is observed since the previous study   of 2017.       2. Stable postoperative changes/posterior spinal fusion at the   L3-L4-L5 level.       3. Severe spine canal stenosis in the level of L2-L3           4. Encroachment of the neural foramina bilaterally in levels of L2-L3   and L5-S1      Thoracic spine MRI 2018  1. Some degenerative changes in the thoracic spine, mainly in the   facet joints in the mid-upper thoracic spine segments       2. Discrete loss of height of T6, more likely an old finding.      Previous treatments: Physical Therapy, Surgery L3-5 fusion/laminectomy and medications. .       Potential Aberrant Drug-Related Behavior:  No     Urine Drug Screenin18:  Consistent for norhydrocodone metabolite  2018:  Consistent for hydrocodone and norhydrocodone  05/10/2019:  Consistent for hydrocodone and norhydrocodone  2019:  Consistent for hydrocodone and norhydrocodone  01/10/2020:  Consistent for hydrocodone and norhydrocodone  2020: Consistent for oxycodone and metabolites s/p surgery. Inconsistent for hydrocodone. States that she was instructed to take her old norco until she made the appointment to resume her chronic pain medications. 10/2020:  Consistent     OARRS report:  05/2018 consistent to 03/2021 consistent (norco scripts from [facility] post op 3/10/2021 and 3/24/2021. Opioid agreement:  11/05/2020      Past Medical History:   Diagnosis Date    Cancer ([address] 12/2019    LESION REMOVED UNDER TONGUE  DENTAL CLINIC    Chronic back pain     Costochondritis     h/o    Depression     Falls     last one 2017     Fibromyalgia     Hallux rigidus of left foot     HTN (hypertension)     Hyperlipidemia     CLYDE on CPAP     Osteoarthritis     \"everywhere\"    Rheumatoid arthritis ([address]     \"As a child. \"    Rheumatoid arthritis(714.0)     TIA (transient ischemic attack)     no deficits     Tongue lesion     for OR 10-21-20     Use of cane as ambulatory aid        Past Surgical History:   Procedure Laterality Date    ANESTHESIA NERVE BLOCK Left 2/26/2019    LEFT C3,4,5 MBB performed by Louie Vera MD at [address] [facility] Right 8/12/2019    BILATERAL TRANSFORAMINAL EPIDURAL STEROID INJECTION S1 #3 performed by Louie Vera MD at [address] [facility] Right 6/16/2020    RIGHT SACROILIAC JOINT INJECTION      CPT: 02323 performed by Aurelio Catalan DO at [address]  2005    Left    ARTHRODESIS Left 12/14/2018    ARTHRODESIS 2ND DIGIT LEFT FOOT performed by Denise Schuster DPM at [address]  08/16/2017    PLIF L3-L4, L4-L5 with rods, screws, and cages/Dr. Riley Capellan BACK SURGERY  03/04/2020    BACK SURGERY Right 3/9/2021    RIGHT PERCUTANEOUS SACROILIAC JOINT FUSION performed by Marian Campbell MD at [address] [facility]  2010    reduction, [address]    CHOLECYSTECTOMY  2011    COLONOSCOPY  03/27/2015    COLONOSCOPY N/A 8/19/2020 COLONOSCOPY DIAGNOSTIC performed by Apolonia Kahn MD at [address], COLON, DIAGNOSTIC      EPIDURAL STEROID INJECTION N/A 6/4/2019    BILATERAL TRANSFORAMINAL EPIDURAL STEROID INJECTION S1 performed by Nitza Savage DO at [address]      Left    [address] / ANKLE Left 12/14/2018    REMOVAL OF PAINFUL HARDWARE LEFT FOOT  ++SYNTHES++ performed by Juli Mccoy DPM at [facility]    inguinal     LUMBAR SPINE SURGERY N/A 3/4/2020    EXPLORATION OF PRIOR L3-L5 FUSION AND L2-L3  POSTERIOR LUMBAR INTERBODY FUSION -- NEEDS O-ARM, AUDIOLOGY, CAGES, PLATES, SCREWS, C-ARM, TERESA TABLE, CELL SAVER, PLATELET GEL -- GLOBUS performed by Floridalma Torres MD at [address] N/A 10/21/2020    EXCISION OF TONGUE LESION performed by Frank Borges DO at [address] Bilateral 05/07/2018    L1-2 lumbar foramen #1    NERVE BLOCK Bilateral 12/03/2018    s1 tfesi    NERVE BLOCK Bilateral 08/12/2019    NERVE BLOCK Right 09/30/2019    sacral radiofrequency    NERVE BLOCK Right 9/1/2020    RIGHT SACROILIAC JOINT INJECTION #2 performed by Nitza Savage DO at [address] Left 9/25/13    left foot tarso metatarsal joint injection    OTHER SURGICAL HISTORY Left 5/27/15    Endoscopic Gastroc recession left, Lapidus left foot and  Excision of exostosis left foot    MI NJX AA&/STRD TFRML EPI LUMBAR/SACRAL 1 LEVEL Bilateral 12/3/2018    BILATERAL S1  EPIDURAL STEROID INJECTION performed by Ruth Kaplan MD at [address] [facility] DX/THER SBST INTRLMNR LMBR/SAC W/IMG GDN N/A 8/21/2018    EPIDURAL STEROID INJECTION L1-2 WITH LOW VOL performed by Ruth Kaplan MD at [address] [facility] NOSE/CLEFT LIP/TIP Bilateral 5/7/2018    BILATERAL L1-2 LUMBAR FORAMEN #1 performed by Ruth Kaplan MD at [address] NOSE/CLEFT LIP/TIP Bilateral 6/4/2018    BILATERAL TRANSFORAMINAL EPIDURAL STEROID INJECTION UNDER FLUOROSCOPIC GUIDANCE @ FORAMINAL LEVEL L1-2 #2 performed by Buddy Pace MD at [address] Right 9/30/2019    RIGHT SACRAL RADIOFREQUENCY ABLATION performed by Buddy Pace MD at . [address]  2000, 2005    Big Left Toe    TOE SURGERY Left 2018    2nd toe     TONSILLECTOMY      WISDOM TOOTH EXTRACTION         Prior to Admission medications    Medication Sig Start Date End Date Taking? Authorizing Provider   Misc. Devices (TRANSFER BENCH) MISC 1 Units by Does not apply route daily 3/29/21  Yes Karley Sands PA-C   HYDROcodone-acetaminophen (NORCO) 5-325 MG per tablet Take 1 tablet by mouth every 4 hours as needed for Pain for up to 7 days. 3/24/21 3/31/21 Yes Lois Sosa PA-C   FLUoxetine (PROZAC) 20 MG capsule Take 1 capsule by mouth daily Total of 60mg daily (40mg + 20mg capsules) 3/11/21  Yes Smiley Canada MD   Handicap Placard MISC DX:  Loss of Balance, Chronic low back pain, s/p back surgery. Duration of 5 years 2/22/21  Yes Smiley Canada MD   gabapentin (NEURONTIN) 400 MG capsule Take 1 capsule by mouth 3 times daily for 90 days. 1/27/21 4/27/21 Yes Smiley Canada MD   FLUoxetine (PROZAC) 40 MG capsule Take 1 capsule by mouth daily For mood.  9/11/20  Yes Smiley Canada MD   lisinopril (PRINIVIL;ZESTRIL) 30 MG tablet Take 1 tablet by mouth every evening 9/11/20  Yes Smiley Canada MD   atorvastatin (LIPITOR) 40 MG tablet Take 1 tablet by mouth daily 9/11/20  Yes Smiley Canada MD   hydrOXYzine (VISTARIL) 25 MG capsule Take 1 capsule by mouth 3 times daily as needed for Anxiety 9/11/20  Yes Smiley Canada MD       Allergies   Allergen Reactions    Pcn [Penicillins] Anaphylaxis       Social History     Socioeconomic History    Marital status:      Spouse name: Not on file    Number of children: Not on file    Years of education: Not on file    Highest education level: Not on file   Occupational History    Not on file   Social Needs    Financial resource strain: Not on file    Food insecurity     Worry: Not on file     Inability: Not on file    Transportation needs     Medical: Not on file     Non-medical: Not on file   Tobacco Use    Smoking status: Former Smoker     Packs/day: 0.25     Years: 30.00     Pack years: 7.50     Types: Cigarettes     Quit date: 10/5/2020     Years since quittin.4    Smokeless tobacco: Never Used    Tobacco comment: was going to try to stop but chantix is too expensive   Substance and Sexual Activity    Alcohol use: Not Currently     Alcohol/week: 0.0 standard drinks     Comment: rarely    Drug use: No    Sexual activity: Not on file   Lifestyle    Physical activity     Days per week: Not on file     Minutes per session: Not on file    Stress: Not on file   Relationships    Social connections     Talks on phone: Not on file     Gets together: Not on file     Attends Hinduism service: Not on file     Active member of club or organization: Not on file     Attends meetings of clubs or organizations: Not on file     Relationship status: Not on file    Intimate partner violence     Fear of current or ex partner: Not on file     Emotionally abused: Not on file     Physically abused: Not on file     Forced sexual activity: Not on file   Other Topics Concern    Not on file   Social History Narrative    Not on file       Family History   Problem Relation Age of Onset    Dementia Mother     Breast Cancer Mother     Cancer Mother         breast cancer [de-identified]     Stroke Mother     Heart Attack Father     Other Father 80        Aortic aneurysm    Cancer Brother        REVIEW OF SYSTEMS:     [facility] denies fever/chills, chest pain, shortness of breath, new bowel or bladder complaints or suicidal ideations. All other review of systems was negative.     PHYSICAL EXAMINATION:      LMP  (LMP Unknown)     General:      General appearance: awake, alert, oriented, in no acute distress, well developed, well nourished and in no acute distress   pleasant and well-hydrated. in no distress and A & O x3  Build:Overweight  Function:Rises from a seated position with difficulty    HEENT:    Head:normocephalic and atraumatic  Pupils:regular, round and equal.  Sclera: icterus absent  EOM:full and intact. Lungs:    Breathing:Normal expansion. No  wheezing. Abdomen:    Shape:non-distended and normal    Lumbar spine:     Range of motion:abnormal moderately Lateral bending, flexion, extension rotation bilateral and is mildly painful. Extremities:    Tremors:None bilaterally upper and lower  Range of motion:Generally normal shoulders  Intact:Yes  Cyanosis:none  Edema:Normal      Neurological:    Cranial nerves:normal  Sensory:diminished to light lateral aspect of right leg                   Dermatology:    Skin:no unusual rashes, no skin lesions, no palpable subcutaneous nodules and good skin turgor    Assessment/Plan:      Chronic low back pain with radiation to the Right groin, patient had low back surgery 08/2017 L3-5 fusion, recently had lumbar spine CT with severe L2-3 stenosis     Plan:  Patient is s/p revision fusion L2-L4 on 3/4/2020. Patient is s/p:  Right sacroiliac joint fusion with use of SI-BONE iFuse implant on 3/9/2021 by Dr. Bolivar Reno. Continue norco 5/325 through [facility]. Patient can return when on chronic dosing level. (okay to call for refill as I did not give her a prescription today). Continue with Gabapentin 600 mg TID through her PCP  OARRS report reviewed 03/2021              Patient is s/p:  Nena Morgan: bilateral cervical paraspinal,                           trapezius, supraspinatus, rhomboid trigger point injections under                   ultrasound guidance on 3/17/2021 with 80% relief. Patient encouraged to stay active and to lose weight.  Failed physical therapy  Treatment plan discussed with the patient including medications side effects         Controlled Substance Monitoring:    Acute and Chronic Pain Monitoring:   RX Monitoring 3/31/2021   Attestation -   Acute Pain Prescriptions -   Periodic Controlled Substance Monitoring Possible medication side effects, risk of tolerance/dependence & alternative treatments discussed. ;No signs of potential drug abuse or diversion identified. ;Assessed functional status. ;Obtaining appropriate analgesic effect of treatment.    Chronic Pain > 80 MEDD -                   ccreferring physicf

## 2021-03-31 NOTE — PROGRESS NOTES
Do you currently have any of the following:    Fever: No  Headache:  No  Cough: No  Shortness of breath: No  Exposed to anyone with these symptoms: No         Phong Whatley presents to the [address] [place] [address] on 3/31/2021. Genesis Clark is complaining of pain lower back The pain is constant. The pain is described as aching, throbbing, shooting and stabbing. Pain is rated on her best day at a 5, on her worst day at a 10, and on average at a 6 on the VAS scale. She took her last dose of Norco today/gabapentin      Any procedures since your last visit:     Pacemaker or defibrillator: No managed by     She is not   on NSAIDS and is not on anticoagulation medications to include none and is managed by     Medication Contract and Consent for Opioid Use Documents Filed     Patient Documents       Type of Document Status Date Received Received By Description     Medication Contract Received  Codie Foster Opioid medication contract with Dr Amelie Wilson 3/24/20     Medication Contract Received 4/26/2018  3:50 PM [address] MANAGEMENT PATIENT AGREEMENT 4/26/2018     Medication Contract Received 11/5/2020  4:23 PM EBER HONG Opioid medication contract with Dr Amelie Wilson     Medication Contract Received 3/1/2021  1:26 PM EBER HONG Opioid medication contract with Dr Amelie Wilson                LMP  (LMP Unknown)      No LMP recorded (lmp unknown).  Patient is postmenopausal.

## 2021-04-05 ENCOUNTER — HOSPITAL ENCOUNTER (OUTPATIENT)
Dept: GENERAL RADIOLOGY | Age: 66
Discharge: HOME | End: 2021-04-07

## 2021-04-05 ENCOUNTER — HOSPITAL ENCOUNTER (OUTPATIENT)
Age: 66
Discharge: HOME | End: 2021-04-07

## 2021-04-05 ENCOUNTER — IMMUNIZATION (OUTPATIENT)
Dept: PRIMARY CARE CLINIC | Age: 66
End: 2021-04-05

## 2021-04-05 DIAGNOSIS — M53.3 SACROILIAC JOINT DYSFUNCTION OF RIGHT SIDE: ICD-10-CM

## 2021-04-05 PROCEDURE — 91301 COVID-19, MODERNA VACCINE 100MCG/0.5ML DOSE: CPT | Performed by: PHYSICIAN ASSISTANT

## 2021-04-05 PROCEDURE — 0011A COVID-19, MODERNA VACCINE 100MCG/0.5ML DOSE: CPT | Performed by: PHYSICIAN ASSISTANT

## 2021-04-05 PROCEDURE — 72100 X-RAY EXAM L-S SPINE 2/3 VWS: CPT

## 2021-04-05 PROCEDURE — 72202 X-RAY EXAM SI JOINTS 3/> VWS: CPT

## 2021-04-06 ENCOUNTER — OFFICE VISIT (OUTPATIENT)
Dept: NEUROSURGERY | Age: 66
End: 2021-04-06

## 2021-04-06 ENCOUNTER — HOSPITAL ENCOUNTER (OUTPATIENT)
Dept: ULTRASOUND IMAGING | Age: 66
Discharge: HOME | End: 2021-04-08

## 2021-04-06 VITALS — DIASTOLIC BLOOD PRESSURE: 72 MMHG | SYSTOLIC BLOOD PRESSURE: 128 MMHG | TEMPERATURE: 97.4 F | HEART RATE: 110 BPM

## 2021-04-06 DIAGNOSIS — M79.662 PAIN OF LEFT CALF: ICD-10-CM

## 2021-04-06 DIAGNOSIS — M79.662 PAIN OF LEFT CALF: Primary | ICD-10-CM

## 2021-04-06 PROCEDURE — 93971 EXTREMITY STUDY: CPT

## 2021-04-06 PROCEDURE — 99024 POSTOP FOLLOW-UP VISIT: CPT | Performed by: PHYSICIAN ASSISTANT

## 2021-04-06 RX ORDER — HYDROCODONE BITARTRATE AND ACETAMINOPHEN 5; 325 MG/1; MG/1
1 TABLET ORAL EVERY 6 HOURS PRN
COMMUNITY
End: 2021-05-19 | Stop reason: SDUPTHER

## 2021-04-14 ENCOUNTER — OFFICE VISIT (OUTPATIENT)
Dept: ENT CLINIC | Age: 66
End: 2021-04-14

## 2021-04-14 ENCOUNTER — HOSPITAL ENCOUNTER (OUTPATIENT)
Dept: PHYSICAL THERAPY | Age: 66
Setting detail: THERAPIES SERIES
Discharge: HOME | End: 2021-04-14

## 2021-04-14 VITALS — HEIGHT: 65 IN | WEIGHT: 200 LBS | BODY MASS INDEX: 33.32 KG/M2

## 2021-04-14 DIAGNOSIS — K14.8 LESION OF TONGUE: Primary | ICD-10-CM

## 2021-04-14 DIAGNOSIS — D00.07 SQUAMOUS CELL CARCINOMA IN SITU (SCCIS) OF LATERAL PORTION OF TONGUE: ICD-10-CM

## 2021-04-14 PROCEDURE — 99213 OFFICE O/P EST LOW 20 MIN: CPT | Performed by: OTOLARYNGOLOGY

## 2021-04-14 PROCEDURE — 97161 PT EVAL LOW COMPLEX 20 MIN: CPT | Performed by: PHYSICAL THERAPIST

## 2021-04-14 ASSESSMENT — ENCOUNTER SYMPTOMS
EYE PAIN: 0
EYES NEGATIVE: 1
SHORTNESS OF BREATH: 0
ABDOMINAL PAIN: 0
EYE DISCHARGE: 0
RESPIRATORY NEGATIVE: 1
APNEA: 0
CHEST TIGHTNESS: 0
GASTROINTESTINAL NEGATIVE: 1
DIARRHEA: 0
VOMITING: 0
COLOR CHANGE: 0

## 2021-04-14 ASSESSMENT — PAIN DESCRIPTION - PAIN TYPE: TYPE: CHRONIC PAIN;SURGICAL PAIN

## 2021-04-14 ASSESSMENT — PAIN DESCRIPTION - FREQUENCY: FREQUENCY: INTERMITTENT

## 2021-04-14 ASSESSMENT — PAIN DESCRIPTION - ORIENTATION: ORIENTATION: LEFT

## 2021-04-14 ASSESSMENT — PAIN DESCRIPTION - LOCATION: LOCATION: LEG

## 2021-04-14 NOTE — PROGRESS NOTES
SBQC; increased L trendelenberg gait noted; VCs on proper mechanics with SBQC  Balance  Sitting - Static: Good  Sitting - Dynamic: Good  Standing - Static: Fair;+  Standing - Dynamic: Fair;+     Assessment   Conditions Requiring Skilled Therapeutic Intervention  Body structures, Functions, Activity limitations: Decreased functional mobility ; Decreased strength;Decreased posture; Increased pain  Assessment: pt presents to PT with c/o LLE pain; pain across L hip and L ankle; limited strength noted to L hip and trunk with hindered posturing and gait  Prognosis: Fair  Decision Making: Low Complexity  REQUIRES PT FOLLOW UP: Yes         Plan   Plan  Times per week: 3x/week x 2-4 weeks  Current Treatment Recommendations: Strengthening, ROM, Manual Therapy - Soft Tissue Mobilization, Pain Management, Home Exercise Program, Safety Education & Training, Patient/Caregiver Education & Training, Gait Training         Goals  Short term goals  Time Frame for Short term goals: 1 week  Short term goal 1: increase strength of L hip to grossly 4/5 to reduce trendelenberg gait  Short term goal 2: increase strength of trunk to improve upright posturing to Farmer Sachs term goal 3: decrease pain to < 5/10 across L lateral hip/ankle with activity  Long term goals  Time Frame for Long term goals : 2-3 weeks  Long term goal 1: increase strength of L hip to grossly 4+/5 to reduce trendelenberg gait  Long term goal 2: increase strength of trunk to improve upright posturing to GOOD-  Long term goal 3: decrease pain to < 3/10 across L lateral hip/ankle with activity  Long term goal 4: pt demonstrates independence with HEP  Patient Goals   Patient goals : to reduce LLE pain       Therapy Time   Individual Concurrent Group Co-treatment   Time In 1510         Time Out 1550         Minutes 40                 Carlito Mckay, PT

## 2021-04-14 NOTE — PROGRESS NOTES
INJECTION N/A 06/04/2019    BILATERAL TRANSFORAMINAL EPIDURAL STEROID INJECTION S1 performed by Pedro Pablo Dodge DO at [address]      Left    [address] / ANKLE Left 12/14/2018    REMOVAL OF PAINFUL HARDWARE LEFT FOOT  ++SYNTHES++ performed by Gian Mckinley DPM at [facility]    inguinal     LUMBAR SPINE SURGERY N/A 03/04/2020    EXPLORATION OF PRIOR L3-L5 FUSION AND L2-L3  POSTERIOR LUMBAR INTERBODY FUSION -- NEEDS O-ARM, AUDIOLOGY, CAGES, PLATES, SCREWS, C-ARM, TERESA TABLE, CELL SAVER, PLATELET GEL -- GLOBUS performed by Nayeli Cosby MD at [address] N/A 10/21/2020    EXCISION OF TONGUE LESION performed by Romie Knight DO at [address] Bilateral 05/07/2018    L1-2 lumbar foramen #1    NERVE BLOCK Bilateral 12/03/2018    s1 tfesi    NERVE BLOCK Bilateral 08/12/2019    NERVE BLOCK Right 09/30/2019    sacral radiofrequency    NERVE BLOCK Right 09/01/2020    RIGHT SACROILIAC JOINT INJECTION #2 performed by Pedro Pablo Dodge DO at [address] [facility] Left 09/25/2013    left foot tarso metatarsal joint injection    OTHER SURGICAL HISTORY Left 05/27/2015    Endoscopic Gastroc recession left, Lapidus left foot and  Excision of exostosis left foot    [place] NJX AA&/STRD TFRML EPI LUMBAR/SACRAL 1 LEVEL Bilateral 12/03/2018    BILATERAL S1  EPIDURAL STEROID INJECTION performed by Keysha Suárez MD at [address] [facility] SBST INTRLMNR LMBR/SAC W/IMG GDN N/A 08/21/2018    EPIDURAL STEROID INJECTION L1-2 WITH LOW VOL performed by Keysha Suárez MD at [address] [facility] NOSE/CLEFT LIP/TIP Bilateral 05/07/2018    BILATERAL L1-2 LUMBAR FORAMEN #1 performed by Keysha Suárez MD at [address] [facility] NOSE/CLEFT LIP/TIP Bilateral 06/04/2018    BILATERAL TRANSFORAMINAL EPIDURAL STEROID INJECTION UNDER FLUOROSCOPIC GUIDANCE @ FORAMINAL LEVEL L1-2 #2 performed by Keysha Suárez MD at [address] [facility] None     Allergies   Allergen Reactions    Pcn [Penicillins] Anaphylaxis       Review of Systems   Constitutional: Negative. Negative for appetite change. HENT: Positive for mouth sores. Eyes: Negative. Negative for pain, discharge and visual disturbance. Respiratory: Negative. Negative for apnea, chest tightness and shortness of breath. Cardiovascular: Negative. Negative for chest pain, palpitations and leg swelling. Gastrointestinal: Negative. Negative for abdominal pain, diarrhea and vomiting. Endocrine: Negative for cold intolerance, heat intolerance and polydipsia. Genitourinary: Negative. Negative for dysuria, flank pain and hematuria. Musculoskeletal: Negative. Negative for arthralgias, gait problem and neck pain. Skin: Negative. Negative for color change, pallor and rash. Allergic/Immunologic: Negative for environmental allergies, food allergies and immunocompromised state. Neurological: Negative. Negative for dizziness, numbness and headaches. Hematological: Negative for adenopathy. Psychiatric/Behavioral: Negative. Negative for behavioral problems and hallucinations. All other systems reviewed and are negative. Objective: There were no vitals filed for this visit. Physical Exam  Vitals signs and nursing note reviewed. Constitutional:       Appearance: She is well-developed. HENT:      Head: Normocephalic and atraumatic. Comments:        Right Ear: Hearing, tympanic membrane, ear canal and external ear normal.      Left Ear: Hearing, tympanic membrane, ear canal and external ear normal.      Nose: Nose normal.      Mouth/Throat:      Lips: Pink. Mouth: Mucous membranes are moist.      Tongue: No lesions. Tongue does not deviate from midline. Pharynx: Oropharynx is clear. Comments:  Well healing left sided surgical excision of the tongue     Eyes:      Conjunctiva/sclera: Conjunctivae normal.      Pupils: Pupils are equal, round, and reactive to light. Neck:      Musculoskeletal: Normal range of motion and neck supple. Cardiovascular:      Rate and Rhythm: Normal rate and regular rhythm. Heart sounds: Normal heart sounds. Pulmonary:      Effort: Pulmonary effort is normal. No respiratory distress. Breath sounds: Normal breath sounds. Abdominal:      General: Bowel sounds are normal. There is no distension. Palpations: Abdomen is soft. Tenderness: There is no abdominal tenderness. There is no guarding. Musculoskeletal: Normal range of motion. Skin:     General: Skin is warm and dry. Neurological:      Mental Status: She is alert and oriented to person, place, and time. Psychiatric:         Behavior: Behavior normal.         Thought Content: Thought content normal.         Judgment: Judgment normal.                 Assessment:       Diagnosis Orders   1. Lesion of tongue     2. 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 But I will continue to follow.    Follow up in 3 month(s)

## 2021-04-16 ENCOUNTER — TELEPHONE (OUTPATIENT)
Dept: NEUROSURGERY | Age: 66
End: 2021-04-16

## 2021-04-16 RX ORDER — CYCLOBENZAPRINE HCL 10 MG
10 TABLET ORAL 3 TIMES DAILY PRN
Qty: 40 TABLET | Refills: 0 | Status: SHIPPED
Start: 2021-04-16 | End: 2021-05-19 | Stop reason: SDUPTHER

## 2021-04-19 ENCOUNTER — TELEPHONE (OUTPATIENT)
Dept: PAIN MANAGEMENT | Age: 66
End: 2021-04-19

## 2021-04-19 ENCOUNTER — HOSPITAL ENCOUNTER (OUTPATIENT)
Dept: PHYSICAL THERAPY | Age: 66
Setting detail: THERAPIES SERIES
Discharge: HOME | End: 2021-04-19

## 2021-04-19 PROCEDURE — 97110 THERAPEUTIC EXERCISES: CPT | Performed by: PHYSICAL THERAPIST

## 2021-04-19 NOTE — TELEPHONE ENCOUNTER
[address] [facility] calling requesting to have an injection in the office. I advised her we would ask Dr. Dave Needs tomorrow when she is in the office.

## 2021-04-19 NOTE — PROGRESS NOTES
[facility]  Phone: [phone] Fax: [phone]       Physical Therapy Daily Treatment Note  Date:  2021    Patient Name:  Jamie Deluna    :  1955  MRN: [id number]    Restrictions/Precautions:    Diagnosis:  pain in left calf U79.659  Insurance/Certification information:  No coverage   Referring Physician:  Candy AMANDA  Plan of care signed (Y/N):    Visit# / total visits:  2/  Pain level: 10/10  Time In:      1500  Time Out:      1600    Subjective:  Pt reports cont to have pain across L leg/calf region     Exercises:  Exercise/Equipment Resistance/Repetitions Other comments   bike   x6mins           Rot str   5x10s    Piriformis str   5x10s ea    IT band str   5x10s ea           bridge   2x5    SLR   x10           Ankle str (all planes)     [id number]    Tband ankle (all planes)    x20ea  JS band                                                               Other Therapeutic Activities:      Home Exercise Program:      Manual Treatments:      Modalities:  NT    Timed Code Treatment Minutes: Total Treatment Minutes:      Treatment/Activity Tolerance:  [x] Patient tolerated treatment well [] Patient limited by fatigue  [] Patient limited by pain  [] Patient limited by other medical complications  [] Other: pt demonstrates good understanding of there ex.  Instructed to begin exercises daily for max benefit    Plan:   [x] Continue per plan of care [] Alter current plan (see comments)  [] Plan of care initiated [] Hold pending MD visit [] Discharge  Plan for Next Session:         Treatment Charges: Mins Units   Initial Evaluation     Re-Evaluation     Ther Exercise         TE 45 3   Manual Therapy     MT     Ther Activities        TA     Gait Training          GT     Neuro Re-education NR     Modalities     Non-Billable Service Time 15    Other     Total Time/Units 60 3     Electronically signed by:  Marie Mccord DPT

## 2021-04-20 ENCOUNTER — HOSPITAL ENCOUNTER (OUTPATIENT)
Dept: PHYSICAL THERAPY | Age: 66
Setting detail: THERAPIES SERIES
End: 2021-04-20

## 2021-04-20 NOTE — TELEPHONE ENCOUNTER
Patient requesting a left hip bursa injection. States that it is inhibiting her PT s/p surgery. She said that she has to temporarily move to [place] for a period of time and would be leaving for [place] next Wednesday. Please note on the appointment that patient needs exam prior to the procedure the day of the appointment to ensure appropriate. Thank you!

## 2021-04-21 ENCOUNTER — HOSPITAL ENCOUNTER (OUTPATIENT)
Dept: PHYSICAL THERAPY | Age: 66
Setting detail: THERAPIES SERIES
End: 2021-04-21

## 2021-04-23 ENCOUNTER — OFFICE VISIT (OUTPATIENT)
Dept: PAIN MANAGEMENT | Age: 66
End: 2021-04-23

## 2021-04-23 ENCOUNTER — HOSPITAL ENCOUNTER (OUTPATIENT)
Dept: PHYSICAL THERAPY | Age: 66
Setting detail: THERAPIES SERIES
Discharge: HOME | End: 2021-04-23

## 2021-04-23 VITALS
RESPIRATION RATE: 16 BRPM | WEIGHT: 200 LBS | HEART RATE: 102 BPM | DIASTOLIC BLOOD PRESSURE: 88 MMHG | SYSTOLIC BLOOD PRESSURE: 148 MMHG | OXYGEN SATURATION: 98 % | HEIGHT: 65 IN | TEMPERATURE: 98.5 F | BODY MASS INDEX: 33.32 KG/M2

## 2021-04-23 DIAGNOSIS — M70.62 GREATER TROCHANTERIC BURSITIS OF LEFT HIP: ICD-10-CM

## 2021-04-23 DIAGNOSIS — M79.10 MYALGIA: Primary | ICD-10-CM

## 2021-04-23 PROCEDURE — 20611 DRAIN/INJ JOINT/BURSA W/US: CPT | Performed by: PAIN MEDICINE

## 2021-04-23 PROCEDURE — 97110 THERAPEUTIC EXERCISES: CPT | Performed by: PHYSICAL THERAPIST

## 2021-04-23 PROCEDURE — 99213 OFFICE O/P EST LOW 20 MIN: CPT | Performed by: PAIN MEDICINE

## 2021-04-23 RX ORDER — BUPIVACAINE HYDROCHLORIDE 2.5 MG/ML
4 INJECTION, SOLUTION EPIDURAL; INFILTRATION; INTRACAUDAL ONCE
Status: COMPLETED | OUTPATIENT
Start: 2021-04-23 | End: 2021-04-23

## 2021-04-23 RX ORDER — METHYLPREDNISOLONE ACETATE 40 MG/ML
40 INJECTION, SUSPENSION INTRA-ARTICULAR; INTRALESIONAL; INTRAMUSCULAR; SOFT TISSUE ONCE
Status: COMPLETED | OUTPATIENT
Start: 2021-04-23 | End: 2021-04-23

## 2021-04-23 RX ADMIN — BUPIVACAINE HYDROCHLORIDE 4 ML: 2.5 INJECTION, SOLUTION EPIDURAL; INFILTRATION; INTRACAUDAL at 13:47

## 2021-04-23 RX ADMIN — METHYLPREDNISOLONE ACETATE 40 MG: 40 INJECTION, SUSPENSION INTRA-ARTICULAR; INTRALESIONAL; INTRAMUSCULAR; SOFT TISSUE at 13:48

## 2021-04-23 NOTE — PROGRESS NOTES
[facility]  Phone: [phone] Fax: [phone]       Physical Therapy Daily Treatment Note  Date:  2021    Patient Name:  Matt Velazquez    :  1955  MRN: [id number]    Restrictions/Precautions:    Diagnosis:  pain in left calf K16.366  Insurance/Certification information:  No coverage   Referring Physician:  Constantino AMANDA  Plan of care signed (Y/N):    Visit# / total visits:  3/  Pain level: 10/10  Time In:      1430  Time Out:      1530    Subjective:  Pt reports having injections earlier today to help reduce pain. Exercises:  Exercise/Equipment Resistance/Repetitions Other comments   bike   x6mins           Rot str   5x10s    Piriformis str   5x10s ea    IT band str   5x10s ea           bridge   2x5    SLR   x10           Ankle str (all planes)     [id number]                                                                 Other Therapeutic Activities:      Home Exercise Program:      Manual Treatments:      Modalities:  NT    Timed Code Treatment Minutes: Total Treatment Minutes:      Treatment/Activity Tolerance:  [x] Patient tolerated treatment well [] Patient limited by fatigue  [] Patient limited by pain  [] Patient limited by other medical complications  [] Other: pt demonstrates good understanding of there ex. Cont VCs required with most exercises. Mild pain remains across back/hip/lower leg.      Plan:   [x] Continue per plan of care [] Alter current plan (see comments)  [] Plan of care initiated [] Hold pending MD visit [] Discharge  Plan for Next Session:         Treatment Charges: Mins Units   Initial Evaluation     Re-Evaluation     Ther Exercise         TE 45 3   Manual Therapy     MT     Ther Activities        TA     Gait Training          GT     Neuro Re-education NR     Modalities     Non-Billable Service Time 15    Other     Total Time/Units 60 3     Electronically signed by:  Hemanth Montes DPT

## 2021-04-26 ENCOUNTER — HOSPITAL ENCOUNTER (OUTPATIENT)
Dept: PHYSICAL THERAPY | Age: 66
Setting detail: THERAPIES SERIES
Discharge: HOME | End: 2021-04-26

## 2021-04-26 DIAGNOSIS — F41.8 DEPRESSION WITH ANXIETY: ICD-10-CM

## 2021-04-26 DIAGNOSIS — I10 ESSENTIAL HYPERTENSION: ICD-10-CM

## 2021-04-26 PROCEDURE — 97110 THERAPEUTIC EXERCISES: CPT | Performed by: PHYSICAL THERAPIST

## 2021-04-26 RX ORDER — LISINOPRIL 30 MG/1
30 TABLET ORAL EVERY EVENING
Qty: 90 TABLET | Refills: 1 | Status: SHIPPED
Start: 2021-04-26 | End: 2021-08-07 | Stop reason: SDUPTHER

## 2021-04-26 RX ORDER — FLUOXETINE HYDROCHLORIDE 40 MG/1
40 CAPSULE ORAL DAILY
Qty: 90 CAPSULE | Refills: 1 | Status: SHIPPED
Start: 2021-04-26 | End: 2021-08-07

## 2021-04-26 NOTE — PROGRESS NOTES
[facility]  Phone: [phone] Fax: [phone]       Physical Therapy Daily Treatment Note  Date:  2021    Patient Name:  Jose G Brenner    :  1955  MRN: [id number]    Restrictions/Precautions:    Diagnosis:  pain in left calf L48.186  Insurance/Certification information:  No coverage   Referring Physician:  Leverette Heimlich PA  Plan of care signed (Y/N):    Visit# / total visits:  4/  Pain level: 10/10  Time In:      1100  Time Out:      1150    Subjective:  Pt reports cont to have pain across LLE. [facility] physician office. She has follow up in . Exercises:  Exercise/Equipment Resistance/Repetitions Other comments   bike   x6mins           Rot str   5x10s    Piriformis str   5x10s ea    IT band str   5x10s ea           bridge   2x5    SLR   x10           Ankle str (all planes)     [id number]    Tband ankle (all planes)    x20ea  JS band                                                               Other Therapeutic Activities:      Home Exercise Program:      Manual Treatments:      Modalities:  NT    Timed Code Treatment Minutes: Total Treatment Minutes:      Treatment/Activity Tolerance:  [x] Patient tolerated treatment well [] Patient limited by fatigue  [] Patient limited by pain  [] Patient limited by other medical complications  [] Other: pt demonstrates good understanding of there ex. VCs required throughout. Pt cont to c/o pain across LLE stating that symptoms are now worse with therapy.  Pt has one session remaining before patient relocates     Plan:   [x] Continue per plan of care [] Alter current plan (see comments)  [] Plan of care initiated [] Hold pending MD visit [] Discharge  Plan for Next Session:         Treatment Charges: Mins Units   Initial Evaluation     Re-Evaluation     Ther Exercise         TE 45 3   Manual Therapy     MT     Ther Activities        TA     Gait Training          GT     Neuro Re-education NR     Modalities Non-Billable Service Time 5    Other     Total Time/Units 50 3     Electronically signed by:  Garland BELLT

## 2021-04-26 NOTE — TELEPHONE ENCOUNTER
Name of Medication(s) Requested:  Lisinopril  fluoxetine    [facility]    Medication(s) pended? [x] Yes  [] No    Last Appointment:  3/11/2021    Future appts:  Future Appointments   Date Time Provider Becky Zheng   4/26/2021 11:00 AM Shaunna Veras PT [facility]AKSHMI PT [facility]   4/26/2021  5:00 PM Chirag Bang DPM [facility]   4/28/2021 11:00 AM TOÑITO Engel PT [facility]   5/3/2021  5:20 PM MHYX N LIMA COVID IMM, MODERNA 28 DAY SECOND DOSE N LIMA COVID [facility]   5/13/2021  1:00 PM Gini Costa MD [facility]   [facility]   7/14/2021  3:45 PM DO Jefferson Dacosta [facility]        Does patient need call back?   [] Yes  [x] No

## 2021-04-28 ENCOUNTER — HOSPITAL ENCOUNTER (OUTPATIENT)
Dept: PHYSICAL THERAPY | Age: 66
Setting detail: THERAPIES SERIES
Discharge: HOME | End: 2021-04-28

## 2021-04-28 PROCEDURE — 97110 THERAPEUTIC EXERCISES: CPT | Performed by: PHYSICAL THERAPIST

## 2021-04-28 NOTE — PROGRESS NOTES
[facility]  Phone: [phone] Fax: [phone]       Physical Therapy Daily Treatment Note  Date:  2021    Patient Name:  Amada Roberson    :  1955  MRN: [id number]    Restrictions/Precautions:    Diagnosis:  pain in left calf C64.448  Insurance/Certification information:  No coverage   Referring Physician:  Jillian AMANDA  Plan of care signed (Y/N):    Visit# / total visits:  5/  Pain level: 10/10  Time In:      1100  Time Out:      1150    Subjective:  Pt reports cont to have pain across LLE. Feels ankle region is somewhat better however states having severe pain across left lower back/hip region     Exercises:  Exercise/Equipment Resistance/Repetitions Other comments   bike   x8mins           Rot str   5x10s    Piriformis str   5x10s ea    IT band str   5x10s ea           bridge   2x5    SLR   x10           Ankle str (all planes)     [id number]    Tband ankle (all planes)    x20ea  JS band                                                               Other Therapeutic Activities:      Home Exercise Program:      Manual Treatments:      Modalities:  NT    Timed Code Treatment Minutes: Total Treatment Minutes:      Treatment/Activity Tolerance:  [x] Patient tolerated treatment well [] Patient limited by fatigue  [] Patient limited by pain  [] Patient limited by other medical complications  [] Other: pt demonstrates good understanding of there ex. Minimal VCs needed to complete exercises. Pain has decreased across left ankle/calf however cont to persist across L hip/low back region. Pt instructed to cont with HEP daily and to use quad cane for ambulation.      Plan:   [x] Continue per plan of care [] Alter current plan (see comments)  [] Plan of care initiated [] Hold pending MD visit [] Discharge  Plan for Next Session:         Treatment Charges: Mins Units   Initial Evaluation     Re-Evaluation     Ther Exercise         TE 45 3   Manual Therapy     MT     Ther Activities        TA     Gait Training          GT     Neuro Re-education NR     Modalities     Non-Billable Service Time 5    Other     Total Time/Units 50 3     Electronically signed by:  Carlito Mckay DPT

## 2021-04-29 NOTE — TELEPHONE ENCOUNTER
Device (Pacemaker or ICD) Discharge Instructions    Site Care:  • Steri- Strips may be over your incision.  Leave them in place. Your doctor will remove them at your next office visit.  • You may shower 2 days after your device implant with a gentle soap. Let the water gently run over the Steri-Strips, but avoid direct spray on the incision. After the shower, gently pat the area dry with a clean towel.   • Do not apply any lotions, powders or home remedies to your incision site.  • If you have been instructed to apply a dressing, change it daily for 5 days.  • No baths or swimming until cleared by your doctor.   • Watch for signs of infection- redness, swelling, warmth, discharge, fever. If you notice any of these signs, please call your doctor’s office during business hours.     Restrictions:  • Do not raise your arm above your head (on the side of your device). You should keep your elbow below the level of your shoulder. This restriction is in place until seen by your physician.   • Do not lift, pull or push anything heavier than 10 pounds on the side of your implant. This restriction is in place until seen by your physician. This includes your own body.   • No repetitive arm motions on the affected side (examples: golfing, vacuuming)  • No driving until seen by your physician.  • If your doctor wants you to have a shoulder immobilizer, you should limit your time wearing it to avoid painful frozen shoulder that can occur.   • No surgeries or procedures (including dental) for one month to limit the potential for infection.      Follow up:  • Follow up with your doctor in 1-2 weeks after implant.   • If you do not have an appointment please call the office after discharge to arrange an appointment. [phone].  • Follow up appointments are important to monitor for signs of infection, preserve the device battery, monitor settings and arrhythmias   Pt would like handicap placard mailed to her. Placard pended.

## 2021-04-30 DIAGNOSIS — G89.29 CHRONIC LOW BACK PAIN WITH LEFT-SIDED SCIATICA, UNSPECIFIED BACK PAIN LATERALITY: Primary | ICD-10-CM

## 2021-04-30 DIAGNOSIS — M54.42 CHRONIC LOW BACK PAIN WITH LEFT-SIDED SCIATICA, UNSPECIFIED BACK PAIN LATERALITY: Primary | ICD-10-CM

## 2021-05-04 ENCOUNTER — HOSPITAL ENCOUNTER (OUTPATIENT)
Age: 66
Discharge: HOME | End: 2021-05-06

## 2021-05-04 ENCOUNTER — OFFICE VISIT (OUTPATIENT)
Dept: NEUROSURGERY | Age: 66
End: 2021-05-04

## 2021-05-04 ENCOUNTER — HOSPITAL ENCOUNTER (OUTPATIENT)
Dept: GENERAL RADIOLOGY | Age: 66
Discharge: HOME | End: 2021-05-06

## 2021-05-04 VITALS
HEART RATE: 92 BPM | SYSTOLIC BLOOD PRESSURE: 141 MMHG | BODY MASS INDEX: 33.32 KG/M2 | DIASTOLIC BLOOD PRESSURE: 92 MMHG | HEIGHT: 65 IN | WEIGHT: 200 LBS

## 2021-05-04 DIAGNOSIS — G89.29 OTHER CHRONIC PAIN: ICD-10-CM

## 2021-05-04 DIAGNOSIS — G89.29 CHRONIC LOW BACK PAIN WITH LEFT-SIDED SCIATICA, UNSPECIFIED BACK PAIN LATERALITY: ICD-10-CM

## 2021-05-04 DIAGNOSIS — M54.42 CHRONIC LOW BACK PAIN WITH LEFT-SIDED SCIATICA, UNSPECIFIED BACK PAIN LATERALITY: Primary | ICD-10-CM

## 2021-05-04 DIAGNOSIS — G89.29 CHRONIC LOW BACK PAIN WITH LEFT-SIDED SCIATICA, UNSPECIFIED BACK PAIN LATERALITY: Primary | ICD-10-CM

## 2021-05-04 DIAGNOSIS — Z98.1 S/P LUMBAR FUSION: ICD-10-CM

## 2021-05-04 DIAGNOSIS — M53.3 SACROILIAC JOINT DYSFUNCTION OF RIGHT SIDE: ICD-10-CM

## 2021-05-04 DIAGNOSIS — M54.42 CHRONIC LOW BACK PAIN WITH LEFT-SIDED SCIATICA, UNSPECIFIED BACK PAIN LATERALITY: ICD-10-CM

## 2021-05-04 PROCEDURE — 72100 X-RAY EXAM L-S SPINE 2/3 VWS: CPT

## 2021-05-04 PROCEDURE — 99214 OFFICE O/P EST MOD 30 MIN: CPT | Performed by: PHYSICIAN ASSISTANT

## 2021-05-04 PROCEDURE — 72170 X-RAY EXAM OF PELVIS: CPT

## 2021-05-04 RX ORDER — METHYLPREDNISOLONE 4 MG/1
TABLET ORAL
Qty: 1 KIT | Refills: 0 | Status: SHIPPED | OUTPATIENT
Start: 2021-05-04 | End: 2021-05-10

## 2021-05-04 RX ORDER — GABAPENTIN 400 MG/1
400 CAPSULE ORAL 3 TIMES DAILY
Qty: 270 CAPSULE | Refills: 1 | Status: SHIPPED | OUTPATIENT
Start: 2021-05-04 | End: 2021-06-01 | Stop reason: SDUPTHER

## 2021-05-04 NOTE — PROGRESS NOTES
Problem Focused Office Visit     Subjective: Kristyn Maddox is a 61 y.o.  female who is well known to our services. She previously underwent L2-L3 PLIF on 3/4/20 and right SI joint fusion on 3/9/21. Pt had stable post operative follow up. She presents to the office today c/o left leg pain. Pt states her right leg and groin pain has significantly improved since surgery. Several weeks after her SI joint fusion, her left leg started to hurt. Pain is sharp and shooting into her left foot. She completed PT. Standing and walking makes her pain worse. Sitting and laying down provides moderate relief. She had a left hip bursa injection without significant relief. Denies loss of bowel or bladder, saddle anesthesia, pain down the right leg, fever, chills, SOB, or chest pain.      Physical Exam:              WDWN, no apparent distress              Non-labored breathing               Vitals Stable              Alert and oriented x3              CN 3-12 intact              PERRL              EOMI              BRIONES well              Motor strength symmetric              Sensation to LT intact bilaterally   Tenderness to palpation of left him and calf   Previous incisions healed well with no signs of infection                 Imaging: Lumbar spine and pevlis x-rays 5/4/21:   FINDINGS:   L-spine: Stable alignment of lumbosacral fusion with intact hardware and   stable degenerative spondylotic disease.  Normal soft tissues.       Pelvis: Intact fixation hardware traversing the right SI joint.  Stable   appearance at the right SI joint.  There is osteoarthritis at both hips as   before.          Assessment: This is a 61 y.o.  female presenting for evaluation of left leg pain     Plan:  -X-rays stable. Medrol dosepack script printed  -Will increase gabapentin to TID. New script printed  -She has follow up 6/1.  Let us know how her left leg is doing at that time.   -Follow-up in neurosurgery clinic as scheduled  -Call or return to neurosurgery office sooner if symptoms worsen or if new issues arise in the interim.     Electronically signed by Germaine Frias PA-C on 5/4/2021 at 1:57 PM

## 2021-05-10 ENCOUNTER — IMMUNIZATION (OUTPATIENT)
Dept: PRIMARY CARE CLINIC | Age: 66
End: 2021-05-10

## 2021-05-10 PROCEDURE — 91301 COVID-19, MODERNA VACCINE 100MCG/0.5ML DOSE: CPT | Performed by: PHYSICIAN ASSISTANT

## 2021-05-10 PROCEDURE — 0012A COVID-19, MODERNA VACCINE 100MCG/0.5ML DOSE: CPT | Performed by: PHYSICIAN ASSISTANT

## 2021-05-13 ENCOUNTER — OFFICE VISIT (OUTPATIENT)
Dept: FAMILY MEDICINE CLINIC | Age: 66
End: 2021-05-13

## 2021-05-13 VITALS
OXYGEN SATURATION: 97 % | WEIGHT: 228 LBS | DIASTOLIC BLOOD PRESSURE: 78 MMHG | BODY MASS INDEX: 37.99 KG/M2 | SYSTOLIC BLOOD PRESSURE: 126 MMHG | TEMPERATURE: 97.6 F | HEART RATE: 119 BPM | HEIGHT: 65 IN

## 2021-05-13 DIAGNOSIS — E78.5 HYPERLIPIDEMIA, UNSPECIFIED HYPERLIPIDEMIA TYPE: ICD-10-CM

## 2021-05-13 DIAGNOSIS — F41.8 DEPRESSION WITH ANXIETY: ICD-10-CM

## 2021-05-13 DIAGNOSIS — R73.03 PREDIABETES: ICD-10-CM

## 2021-05-13 DIAGNOSIS — R73.03 PREDIABETES: Primary | ICD-10-CM

## 2021-05-13 DIAGNOSIS — I10 ESSENTIAL HYPERTENSION: ICD-10-CM

## 2021-05-13 LAB
CREATININE URINE: 233 MG/DL (ref 29–226)
MICROALBUMIN UR-MCNC: <12 MG/L
MICROALBUMIN/CREAT UR-RTO: ABNORMAL (ref 0–30)

## 2021-05-13 PROCEDURE — 99214 OFFICE O/P EST MOD 30 MIN: CPT | Performed by: FAMILY MEDICINE

## 2021-05-13 RX ORDER — ATORVASTATIN CALCIUM 40 MG/1
40 TABLET, FILM COATED ORAL DAILY
Qty: 90 TABLET | Refills: 3 | Status: SHIPPED
Start: 2021-05-13 | End: 2021-08-07 | Stop reason: SDUPTHER

## 2021-05-13 NOTE — PROGRESS NOTES
[facility]  Office Progress Note - Dr. Eduard Alamo  5/13/21    CC:   Chief Complaint   Patient presents with    Hypertension    Back Pain     Has appt tomorrow at pain clinic tomorrow        /78 (Site: Left Upper Arm, Position: Sitting, Cuff Size: Large Adult)   Pulse 119   Temp 97.6 °F (36.4 °C) (Temporal)   Ht 5' 5\" (1.651 m)   Wt 228 lb (103.4 kg)   LMP  (LMP Unknown)   SpO2 97%   BMI 37.94 kg/m²   Wt Readings from Last 3 Encounters:   05/13/21 228 lb (103.4 kg)   05/04/21 200 lb (90.7 kg)   04/23/21 200 lb (90.7 kg)       HPI: 2-month follow-up mood  Increase Prozac dose to 60 mg daily after her last appointment. She decided maybe she didn't need it - so has continued taking 40mg daily. Reports her mood has been pretty good lately. Has had a lot of change and uncertainty with moving lately. Living up around [facility] with a sister for now. Some inc back pains recently with left leg radic. She is going to be seeing the pain clinic tomorrow. Feels like she has healed up well after her last back surgery. Hypertension  Follow-up  Blood pressure is controlled today. BP Readings from Last 3 Encounters:   05/13/21 126/78   05/04/21 (!) 141/92   04/23/21 (!) 148/88     Patient continues medications regularly. Compliance is good. Denies CP, sob, abd pain, headaches, vision changes, dizziness, hypotensive symptoms. No side effects from medications noted. _________________________________________________________    Assessment / Dorothy Janeth was seen today for hypertension and back pain. Diagnoses and all orders for this visit:    Prediabetes  -     Microalbumin / Creatinine Urine Ratio; Future  Lab Results   Component Value Date    LABA1C 6.4 (H) 03/11/2021   Work on weight loss. Hyperlipidemia, unspecified hyperlipidemia type  -   Refill atorvastatin (LIPITOR) 40 MG tablet; Take 1 tablet by mouth daily  Stable    Depression with anxiety  Mood improved. Has continued Prozac 40 mg daily. Never made the increase, not feeling that it was necessary after some reflection. Essential hypertension, improved, controlled  Continue same. Return in about 3 months (around 8/13/2021). or as scheduled. Patient counseled to follow up sooner or seek more acute care if symptoms worsening or not improving according to plan. Electronically signed by Rachna Olivera MD on 5/13/2021    _________________________________________________________  Current Outpatient Medications on File Prior to Visit   Medication Sig Dispense Refill    gabapentin (NEURONTIN) 400 MG capsule Take 1 capsule by mouth 3 times daily for 90 days. 270 capsule 1    FLUoxetine (PROZAC) 40 MG capsule Take 1 capsule by mouth daily For mood. 90 capsule 1    lisinopril (PRINIVIL;ZESTRIL) 30 MG tablet Take 1 tablet by mouth every evening 90 tablet 1    HYDROcodone-acetaminophen (NORCO) 5-325 MG per tablet Take 1 tablet by mouth every 6 hours as needed for Pain.  diclofenac sodium (VOLTAREN) 1 % GEL Apply 4 g topically 2 times daily 350 g 1    Misc. Devices (TRANSFER BENCH) MISC 1 Units by Does not apply route daily 1 each 0    Handicap Placard MISC DX:  Loss of Balance, Chronic low back pain, s/p back surgery. Duration of 5 years 1 each 0    hydrOXYzine (VISTARIL) 25 MG capsule Take 1 capsule by mouth 3 times daily as needed for Anxiety 270 capsule 1     No current facility-administered medications on file prior to visit.         Patient Active Problem List   Diagnosis Code    Loss of balance R26.89    Vertebrobasilar TIAs G45.0    Obesity E66.9    Disc displacement, lumbar M51.26    Peripheral neuropathy ([address] G62.9    Type 2 diabetes mellitus without complication (HCC) U61.7    Depression F32.9    Osteoarthritis M19.90    Chronic back pain M54.9, G89.29    Fibromyalgia M79.7    HTN (hypertension) I10    Hyperlipidemia E78.5    History of adenomatous polyp of colon Z86.010    Vitamin D deficiency E55.9    Coccyx pain M53.3    Acute bilateral low back pain with sciatica M54.40    Lumbar stenosis M48.061    Chronic bilateral low back pain without sciatica M54.5, G89.29    DDD (degenerative disc disease), lumbar M51.36    Spinal stenosis of lumbar region without neurogenic claudication M48.061    Lumbar facet arthropathy M47.816    Chronic pain syndrome G89.4    Lumbar radiculopathy M54.16    Neck pain M54.2    Left foot pain M79.672    Painful orthopaedic hardware [facility]) T84.84XA    Cervical facet joint syndrome M47.812    Cellulitis, neck L03.221    Disorder of sacrum M53.3    Adjacent segment disease with spinal stenosis [id number]    S/P lumbar spinal fusion Z98.1    Tongue lesion K14.8    Sacroiliac joint dysfunction M53.3     _________________________________________________________  Past Medical History:   Diagnosis Date    Cancer ([facility] [address] 12/2019    LESION REMOVED UNDER TONGUE  DENTAL CLINIC    Chronic back pain     Costochondritis     h/o    Depression     Falls     last one 2017     Fibromyalgia     Hallux rigidus of left foot     HTN (hypertension)     Hyperlipidemia     CLYDE on CPAP     Osteoarthritis     \"everywhere\"    Rheumatoid arthritis ([facility] [address]     \"As a child. \"    Rheumatoid arthritis(714.0)     TIA (transient ischemic attack)     no deficits     Tongue lesion     for OR 10-21-20     Use of cane as ambulatory aid        Family History   Problem Relation Age of Onset    Dementia Mother     Breast Cancer Mother     Cancer Mother         breast cancer [de-identified]     Stroke Mother     Heart Attack Father     Other Father 80        Aortic aneurysm    Cancer Brother     Diabetes Brother        Past Surgical History:   Procedure Laterality Date    ANESTHESIA NERVE BLOCK Left 02/26/2019    LEFT C3,4,5 MBB performed by Pérez Campuzano MD at [address] Right 08/12/2019    BILATERAL TRANSFORAMINAL EPIDURAL STEROID INJECTION S1 #3 OR    OTHER SURGICAL HISTORY Left 2013    left foot tarso metatarsal joint injection    OTHER SURGICAL HISTORY Left 2015    Endoscopic Gastroc recession left, Lapidus left foot and  Excision of exostosis left foot    [place] NJX AA&/STRD TFRML EPI LUMBAR/SACRAL 1 LEVEL Bilateral 2018    BILATERAL S1  EPIDURAL STEROID INJECTION performed by Cely Cervantes MD at [address] [facility] DX/THER SBST INTRLMNR LMBR/SAC W/IMG GDN N/A 2018    EPIDURAL STEROID INJECTION L1-2 WITH LOW VOL performed by Cely Cervantes MD at [address] [facility] NOSE/CLEFT LIP/TIP Bilateral 2018    BILATERAL L1-2 LUMBAR FORAMEN #1 performed by Cely Cervantes MD at [address] [facility] NOSE/CLEFT LIP/TIP Bilateral 2018    BILATERAL TRANSFORAMINAL EPIDURAL STEROID INJECTION UNDER FLUOROSCOPIC GUIDANCE @ FORAMINAL LEVEL L1-2 #2 performed by Cely Cervantes MD at [address] Right 2019    RIGHT SACRAL RADIOFREQUENCY ABLATION performed by Cely Cervantes MD at [address] [facility]  ,     Big Left Toe    TOE SURGERY Left     2nd toe     TONSILLECTOMY      WISDOM TOOTH EXTRACTION         Social History     Tobacco Use    Smoking status: Former Smoker     Packs/day: 0.25     Years: 30.00     Pack years: 7.50     Types: Cigarettes     Quit date: 10/5/2020     Years since quittin.6    Smokeless tobacco: Never Used    Tobacco comment: was going to try to stop but chantix is too expensive   Substance Use Topics    Alcohol use: Not Currently     Alcohol/week: 0.0 standard drinks     Comment: rarely    Drug use: No       Chart reviewed and updated where appropriate for PMH, Fam, and Soc Hx.  _________________________________________________________  ROS: POSITIVE: As in the HPI.    Otherwise Pertinent negatives are negative.    __________________________________________________________  Physical Exam   Constitutional:    She is oriented to person, place, and time. She appears well-developed and well-nourished. Neck:    Normal range of motion. No thyromegaly or nodules noted. No bruit. No LAD. Cardiovascular:    Normal rate, regular rhythm and normal heart sounds. No murmur. No gallop and no friction rub. Pulmonary/Chest:    Effort normal and breath sounds normal.    No wheezes. No rales or rhonchi. Abdominal:    Soft. Obese. Bowel sounds are normal.    No distension. No tenderness. Skin:    Skin is warm and dry. No rashes, No lesions. Psychiatric:    She has a normal mood and affect. Normal groom and dress. No SI or HI.   ________________________________________________________    This note may have been created using dictation software.  Efforts were made to reduce errors, but some may persist.

## 2021-05-13 NOTE — PROGRESS NOTES
for hydrocodone and norhydrocodone  01/10/2020:  Consistent for hydrocodone and norhydrocodone  06/2020:  Consistent for oxycodone and metabolites s/p surgery. Inconsistent for hydrocodone. States that she was instructed to take her old norco until she made the appointment to resume her chronic pain medications. 10/2020:  Consistent     OARRS report:  05/2018 consistent to 05/2021 consistent (norco scripts from [facility] post op 3/10/2021 and 3/24/2021. Opioid agreement:  11/05/2020      Past Medical History:   Diagnosis Date    Cancer ([facility] [address] 12/2019    LESION REMOVED UNDER TONGUE  DENTAL CLINIC    Chronic back pain     Costochondritis     h/o    Depression     Falls     last one 2017     Fibromyalgia     Hallux rigidus of left foot     HTN (hypertension)     Hyperlipidemia     CLYDE on CPAP     Osteoarthritis     \"everywhere\"    Rheumatoid arthritis ([facility] [address]     \"As a child. \"    Rheumatoid arthritis(714.0)     TIA (transient ischemic attack)     no deficits     Tongue lesion     for OR 10-21-20     Use of cane as ambulatory aid        Past Surgical History:   Procedure Laterality Date    ANESTHESIA NERVE BLOCK Left 02/26/2019    LEFT C3,4,5 MBB performed by Lazarus Joseph, MD at [address] [facility] Right 08/12/2019    BILATERAL TRANSFORAMINAL EPIDURAL STEROID INJECTION S1 #3 performed by Lazarus Joseph, MD at [address] [place] [address] Right 06/16/2020    RIGHT SACROILIAC JOINT INJECTION      CPT: 34671 performed by Maria C Goss DO at [address]  2005    Left    ARTHRODESIS Left 12/14/2018    ARTHRODESIS 2ND DIGIT LEFT FOOT performed by Lindsay Curran DPM at [address] [facility]  08/16/2017    PLIF L3-L4, L4-L5 with rods, screws, and cages/Dr. Cristela Cortez BACK SURGERY  03/04/2020    BACK SURGERY Right 03/09/2021    RIGHT PERCUTANEOUS SACROILIAC JOINT FUSION performed by Dannielle Watson MD at [place] [address]    reduction, bilat     SECTION  1993    CHOLECYSTECTOMY  2011    COLONOSCOPY  2015    COLONOSCOPY N/A 2020    COLONOSCOPY DIAGNOSTIC performed by Reynaldo Topete MD at [address] [facility]  2021    SI Joint    ENDOSCOPY, COLON, DIAGNOSTIC      EPIDURAL STEROID INJECTION N/A 2019    BILATERAL TRANSFORAMINAL EPIDURAL STEROID INJECTION S1 performed by Tere Blanton DO at [address] [place] [address] Rd      Left    [address] / ANKLE Left 2018    REMOVAL OF PAINFUL HARDWARE LEFT FOOT  ++SYNTHES++ performed by Dedrick Camarena DPM at [facility] [address]    inguinal     LUMBAR SPINE SURGERY N/A 2020    EXPLORATION OF PRIOR L3-L5 FUSION AND L2-L3  POSTERIOR LUMBAR INTERBODY FUSION -- NEEDS O-ARM, AUDIOLOGY, CAGES, PLATES, SCREWS, C-ARM, TERESA TABLE, CELL SAVER, PLATELET GEL -- GLOBUS performed by Dariel Greer MD at [address] N/A 10/21/2020    EXCISION OF TONGUE LESION performed by Deanie Boxer, DO at [address] Bilateral 2018    L1-2 lumbar foramen #1    NERVE BLOCK Bilateral 2018    s1 tfesi    NERVE BLOCK Bilateral 2019    NERVE BLOCK Right 2019    sacral radiofrequency    NERVE BLOCK Right 2020    RIGHT SACROILIAC JOINT INJECTION #2 performed by Tere Blanton DO at [address] Left 2013    left foot tarso metatarsal joint injection    OTHER SURGICAL HISTORY Left 2015    Endoscopic Gastroc recession left, Lapidus left foot and  Excision of exostosis left foot    [place] NJX AA&/STRD TFRML EPI LUMBAR/SACRAL 1 LEVEL Bilateral 2018    BILATERAL S1  EPIDURAL STEROID INJECTION performed by Tito Matos MD at [address] [facility] DX/THER SBST INTRLMNR LMBR/SAC W/IMG GDN N/A 2018    EPIDURAL STEROID INJECTION L1-2 WITH LOW VOL performed by Tito Matos MD at [address] [facility] NOSE/CLEFT LIP/TIP Bilateral 2018    BILATERAL L1-2 LUMBAR FORAMEN #1 performed by Ramon Farmer MD at [address] [facility] NOSE/CLEFT LIP/TIP Bilateral 06/04/2018    BILATERAL TRANSFORAMINAL EPIDURAL STEROID INJECTION UNDER FLUOROSCOPIC GUIDANCE @ FORAMINAL LEVEL L1-2 #2 performed by Ramon Farmer MD at [address] [place] Right 09/30/2019    RIGHT SACRAL RADIOFREQUENCY ABLATION performed by Ramon Farmer MD at [address]  [address], 2005    Big Left Toe    TOE SURGERY Left 2018    2nd toe     TONSILLECTOMY      WISDOM TOOTH EXTRACTION         Prior to Admission medications    Medication Sig Start Date End Date Taking? Authorizing Provider   atorvastatin (LIPITOR) 40 MG tablet Take 1 tablet by mouth daily 5/13/21  Yes Mikaela Russ MD   gabapentin (NEURONTIN) 400 MG capsule Take 1 capsule by mouth 3 times daily for 90 days. 5/4/21 8/2/21 Yes Lois Baer PA-C   FLUoxetine (PROZAC) 40 MG capsule Take 1 capsule by mouth daily For mood. 4/26/21  Yes Mikaela Russ MD   lisinopril (PRINIVIL;ZESTRIL) 30 MG tablet Take 1 tablet by mouth every evening 4/26/21  Yes Mikaela Russ MD   HYDROcodone-acetaminophen [facility]) 5-325 MG per tablet Take 1 tablet by mouth every 6 hours as needed for Pain. Yes Historical Provider, MD   diclofenac sodium (VOLTAREN) 1 % GEL Apply 4 g topically 2 times daily 3/31/21  Yes Álvaro Cox DPM   Misc. Devices (TRANSFER BENCH) MISC 1 Units by Does not apply route daily 3/29/21  Yes José Miguel Arreola PA-C   Handicap Placard MISC DX:  Loss of Balance, Chronic low back pain, s/p back surgery.    Duration of 5 years 2/22/21  Yes Mikaela Russ MD   hydrOXYzine (VISTARIL) 25 MG capsule Take 1 capsule by mouth 3 times daily as needed for Anxiety 9/11/20  Yes Mikaela Russ MD       Allergies   Allergen Reactions    Pcn [Penicillins] Anaphylaxis       Social History     Socioeconomic History    Marital status:      Spouse name: Not on file    Number of children: Not on file    Years of education: Not on file    Highest education level: Not on file   Occupational History    Not on file   Social Needs    Financial resource strain: Not on file    Food insecurity     Worry: Not on file     Inability: Not on file    Transportation needs     Medical: Not on file     Non-medical: Not on file   Tobacco Use    Smoking status: Former Smoker     Packs/day: 0.25     Years: 30.00     Pack years: 7.50     Types: Cigarettes     Quit date: 10/5/2020     Years since quittin.6    Smokeless tobacco: Never Used    Tobacco comment: was going to try to stop but chantix is too expensive   Substance and Sexual Activity    Alcohol use: Not Currently     Alcohol/week: 0.0 standard drinks     Comment: rarely    Drug use: No    Sexual activity: Not on file   Lifestyle    Physical activity     Days per week: Not on file     Minutes per session: Not on file    Stress: Not on file   Relationships    Social connections     Talks on phone: Not on file     Gets together: Not on file     Attends [employer] service: Not on file     Active member of club or organization: Not on file     Attends meetings of clubs or organizations: Not on file     Relationship status: Not on file    Intimate partner violence     Fear of current or ex partner: Not on file     Emotionally abused: Not on file     Physically abused: Not on file     Forced sexual activity: Not on file   Other Topics Concern    Not on file   Social History Narrative    Not on file       Family History   Problem Relation Age of Onset    Dementia Mother     Breast Cancer Mother     Cancer Mother         breast cancer [de-identified]     Stroke Mother     Heart Attack Father     Other Father 80        Aortic aneurysm    Cancer Brother     Diabetes Brother        REVIEW OF SYSTEMS:     Darris Canavan denies fever/chills, chest pain, shortness of breath, new bowel or bladder complaints or suicidal ideations.  All other 3/9/2021 by Dr. [facility]. Continue norco 5/325 through [facility]. Patient can return when on chronic dosing level. (okay to call for refill as I did not give her a prescription today). Continue with Gabapentin 600 mg TID through her PCP. She reports that any increases make her off balance. OARRS report reviewed 05/2021              Patient is s/p:                 PROCEDURE PERFORMED: Left  Greater trochanter bursea                     steroid injection under ultrasound guidance on 4/23/2021 with only                 slight relief. Patient encouraged to stay active and to lose weight. Failed physical therapy  Treatment plan discussed with the patient including medications side effects         Controlled Substance Monitoring:    Acute and Chronic Pain Monitoring:   RX Monitoring 5/14/2021   Attestation -   Acute Pain Prescriptions -   Periodic Controlled Substance Monitoring Possible medication side effects, risk of tolerance/dependence & alternative treatments discussed. ;No signs of potential drug abuse or diversion identified. ;Assessed functional status.    Chronic Pain > 80 MEDD -                   ccreferring physicf

## 2021-05-14 ENCOUNTER — TELEPHONE (OUTPATIENT)
Dept: PAIN MANAGEMENT | Age: 66
End: 2021-05-14

## 2021-05-14 ENCOUNTER — OFFICE VISIT (OUTPATIENT)
Dept: PAIN MANAGEMENT | Age: 66
End: 2021-05-14

## 2021-05-14 VITALS
DIASTOLIC BLOOD PRESSURE: 68 MMHG | TEMPERATURE: 97.1 F | SYSTOLIC BLOOD PRESSURE: 132 MMHG | BODY MASS INDEX: 37.99 KG/M2 | OXYGEN SATURATION: 97 % | HEART RATE: 112 BPM | RESPIRATION RATE: 16 BRPM | WEIGHT: 228 LBS | HEIGHT: 65 IN

## 2021-05-14 DIAGNOSIS — M79.10 MYALGIA: Primary | ICD-10-CM

## 2021-05-14 DIAGNOSIS — M70.62 GREATER TROCHANTERIC BURSITIS OF LEFT HIP: ICD-10-CM

## 2021-05-14 DIAGNOSIS — M47.816 LUMBAR FACET ARTHROPATHY: ICD-10-CM

## 2021-05-14 DIAGNOSIS — G89.29 CHRONIC BILATERAL LOW BACK PAIN WITHOUT SCIATICA: ICD-10-CM

## 2021-05-14 DIAGNOSIS — M48.061 SPINAL STENOSIS OF LUMBAR REGION WITHOUT NEUROGENIC CLAUDICATION: ICD-10-CM

## 2021-05-14 DIAGNOSIS — M54.16 LUMBAR RADICULOPATHY: ICD-10-CM

## 2021-05-14 DIAGNOSIS — M47.812 CERVICAL FACET JOINT SYNDROME: ICD-10-CM

## 2021-05-14 DIAGNOSIS — G89.4 CHRONIC PAIN SYNDROME: ICD-10-CM

## 2021-05-14 DIAGNOSIS — M51.36 DDD (DEGENERATIVE DISC DISEASE), LUMBAR: ICD-10-CM

## 2021-05-14 DIAGNOSIS — M54.50 CHRONIC BILATERAL LOW BACK PAIN WITHOUT SCIATICA: ICD-10-CM

## 2021-05-14 PROCEDURE — 99213 OFFICE O/P EST LOW 20 MIN: CPT | Performed by: PHYSICIAN ASSISTANT

## 2021-05-14 NOTE — TELEPHONE ENCOUNTER
[facility] called in to clarify her treatment plan that we discussed earlier. Discussed our treatment plan from earlier. She understands.

## 2021-05-17 DIAGNOSIS — M43.28 FUSION OF SACRAL REGION OF SPINE: Primary | ICD-10-CM

## 2021-05-19 ENCOUNTER — TELEPHONE (OUTPATIENT)
Dept: NEUROSURGERY | Age: 66
End: 2021-05-19

## 2021-05-19 DIAGNOSIS — G89.29 CHRONIC MIDLINE LOW BACK PAIN WITH SCIATICA, SCIATICA LATERALITY UNSPECIFIED: ICD-10-CM

## 2021-05-19 DIAGNOSIS — M54.40 CHRONIC MIDLINE LOW BACK PAIN WITH SCIATICA, SCIATICA LATERALITY UNSPECIFIED: ICD-10-CM

## 2021-05-19 RX ORDER — HYDROCODONE BITARTRATE AND ACETAMINOPHEN 5; 325 MG/1; MG/1
1 TABLET ORAL EVERY 4 HOURS PRN
Qty: 42 TABLET | Refills: 0 | Status: SHIPPED
Start: 2021-05-19 | End: 2021-06-01 | Stop reason: SDUPTHER

## 2021-05-19 RX ORDER — CYCLOBENZAPRINE HCL 10 MG
10 TABLET ORAL 3 TIMES DAILY PRN
Qty: 40 TABLET | Refills: 0 | Status: SHIPPED
Start: 2021-05-19 | End: 2021-06-01 | Stop reason: SDUPTHER

## 2021-05-19 NOTE — TELEPHONE ENCOUNTER
Coreg 6,25 mg BID. NEEDS CBC if not done in past 2 - 3 months.      THANKS.  
Left voicemail for patient
Pain medications refilled. Lumbar CT scan ordered to have prior to follow up visit.
35

## 2021-05-29 ENCOUNTER — HOSPITAL ENCOUNTER (OUTPATIENT)
Age: 66
Discharge: HOME | End: 2021-05-31

## 2021-05-29 ENCOUNTER — HOSPITAL ENCOUNTER (OUTPATIENT)
Dept: GENERAL RADIOLOGY | Age: 66
Discharge: HOME | End: 2021-05-31

## 2021-05-29 ENCOUNTER — HOSPITAL ENCOUNTER (OUTPATIENT)
Dept: CT IMAGING | Age: 66
Discharge: HOME | End: 2021-05-31

## 2021-05-29 DIAGNOSIS — M43.28 FUSION OF SACRAL REGION OF SPINE: ICD-10-CM

## 2021-05-29 DIAGNOSIS — M54.40 CHRONIC MIDLINE LOW BACK PAIN WITH SCIATICA, SCIATICA LATERALITY UNSPECIFIED: ICD-10-CM

## 2021-05-29 DIAGNOSIS — G89.29 CHRONIC MIDLINE LOW BACK PAIN WITH SCIATICA, SCIATICA LATERALITY UNSPECIFIED: ICD-10-CM

## 2021-05-29 PROCEDURE — 72170 X-RAY EXAM OF PELVIS: CPT

## 2021-05-29 PROCEDURE — 72131 CT LUMBAR SPINE W/O DYE: CPT

## 2021-05-29 PROCEDURE — 72100 X-RAY EXAM L-S SPINE 2/3 VWS: CPT

## 2021-06-01 ENCOUNTER — OFFICE VISIT (OUTPATIENT)
Dept: NEUROSURGERY | Age: 66
End: 2021-06-01

## 2021-06-01 VITALS
SYSTOLIC BLOOD PRESSURE: 157 MMHG | BODY MASS INDEX: 37.99 KG/M2 | HEART RATE: 109 BPM | DIASTOLIC BLOOD PRESSURE: 112 MMHG | HEIGHT: 65 IN | OXYGEN SATURATION: 97 % | WEIGHT: 228 LBS

## 2021-06-01 DIAGNOSIS — G89.29 OTHER CHRONIC PAIN: ICD-10-CM

## 2021-06-01 DIAGNOSIS — G89.29 CHRONIC MIDLINE LOW BACK PAIN WITH SCIATICA, SCIATICA LATERALITY UNSPECIFIED: ICD-10-CM

## 2021-06-01 DIAGNOSIS — M54.40 CHRONIC MIDLINE LOW BACK PAIN WITH SCIATICA, SCIATICA LATERALITY UNSPECIFIED: ICD-10-CM

## 2021-06-01 PROCEDURE — 99024 POSTOP FOLLOW-UP VISIT: CPT | Performed by: PHYSICIAN ASSISTANT

## 2021-06-01 RX ORDER — HYDROCODONE BITARTRATE AND ACETAMINOPHEN 5; 325 MG/1; MG/1
1 TABLET ORAL EVERY 4 HOURS PRN
Qty: 42 TABLET | Refills: 0 | Status: SHIPPED
Start: 2021-06-01 | End: 2021-06-28 | Stop reason: SDUPTHER

## 2021-06-01 RX ORDER — GABAPENTIN 400 MG/1
400 CAPSULE ORAL 3 TIMES DAILY
Qty: 21 CAPSULE | Refills: 2 | Status: SHIPPED
Start: 2021-06-01 | End: 2021-06-28 | Stop reason: SDUPTHER

## 2021-06-01 RX ORDER — CYCLOBENZAPRINE HCL 10 MG
10 TABLET ORAL 3 TIMES DAILY PRN
Qty: 30 TABLET | Refills: 0 | Status: SHIPPED
Start: 2021-06-01 | End: 2021-08-23 | Stop reason: SDUPTHER

## 2021-06-01 NOTE — PROGRESS NOTES
Post-Operative Visit      Subjective: Jose G Brenner is a 61 y.o.  female who is well known to our services. She previously underwent L2-L3 PLIF on 3/4/20 and is 3 months post-op right SI joint fusion on 3/9/21. Patient c/o continued and worsening left sided back, hip, and leg pain. Admits to radicular pain following L5/S1 with n/t and weakness. States that her knee will buckle, ambulating with a cane. No loss of bowel or bladder function. No family present. X-rays and CT scan completed.      Physical Exam:              WDWN, no apparent distress              Non-labored breathing               Vitals Stable              Awake and alert              CN 3-12 intact              PERRL              EOMI              BRIONES well              5/5 x 4 ext, pain noted in LLE              Sensation to LT intact bilaterally   Incisions c/d/i   +SLR   +Left SI joint tenderness   +Left EDWAR   -Distraction and Thigh Trust test    Imaging:   Pelvic X-ray  Impression   1.  Stable alignment status post fusion involving right sacroiliac joint.       2.  No fracture or dislocation of hips or pelvis. Lumbar X-ray  Impression   No significant change in alignment status post L2-L4 posterior fusion.  No   evidence of hardware complication.         Lumbar CT  Impression   Postoperative findings from L2 to L4-5.       At L5-S1 there is a small disc herniation and mild facet arthropathy causing   mild spinal stenosis and neuroforaminal narrowing.          Assessment:  This is a 61 y.o.  female presenting for 3 month post-op visit s/p right SI joint fusion.      Plan:  -X-rays reviewed--WNL  -Lumbar CT reviewed  -Will need lumbar CT myelogram  -Lumbar flex/ex films  -Hip x-rays  -Pain control discussion  -Activities as tolerated  -Will discuss tx options once all imaging completed

## 2021-06-01 NOTE — PATIENT INSTRUCTIONS

## 2021-06-01 NOTE — PROGRESS NOTES
CLINICAL PHARMACY NOTE: MEDS TO BEDS    Total # of Prescriptions Filled: 3   The following medications were delivered to the patient:  · CYCLOBENZAPRINE 10 MG  · GABAPENTIN 400 MG  · NORCO 5/ 325 MG    Additional Documentation:

## 2021-06-09 ENCOUNTER — TELEPHONE (OUTPATIENT)
Dept: NEUROSURGERY | Age: 66
End: 2021-06-09

## 2021-06-09 DIAGNOSIS — Z01.812 PRE-PROCEDURE LAB EXAM: Primary | ICD-10-CM

## 2021-06-18 ENCOUNTER — TELEPHONE (OUTPATIENT)
Dept: ADMINISTRATIVE | Age: 66
End: 2021-06-18

## 2021-06-18 NOTE — TELEPHONE ENCOUNTER
Patient called and stated she has another appt on 07/14 that will interfere with her appt at 3:45 with Dr. Mirta Miles, she would like to be seen wither earlier that day or anything sooner. No availability at this time. Selena Ram Appointment not cancelled. She would like to hear from office.

## 2021-06-28 ENCOUNTER — OFFICE VISIT (OUTPATIENT)
Dept: PAIN MANAGEMENT | Age: 66
End: 2021-06-28

## 2021-06-28 VITALS
SYSTOLIC BLOOD PRESSURE: 136 MMHG | RESPIRATION RATE: 16 BRPM | TEMPERATURE: 98.2 F | OXYGEN SATURATION: 97 % | HEART RATE: 102 BPM | HEIGHT: 65 IN | BODY MASS INDEX: 37.99 KG/M2 | DIASTOLIC BLOOD PRESSURE: 82 MMHG | WEIGHT: 228 LBS

## 2021-06-28 DIAGNOSIS — M51.36 DDD (DEGENERATIVE DISC DISEASE), LUMBAR: ICD-10-CM

## 2021-06-28 DIAGNOSIS — G89.4 CHRONIC PAIN SYNDROME: ICD-10-CM

## 2021-06-28 DIAGNOSIS — M47.816 LUMBAR FACET ARTHROPATHY: ICD-10-CM

## 2021-06-28 DIAGNOSIS — M54.40 CHRONIC MIDLINE LOW BACK PAIN WITH SCIATICA, SCIATICA LATERALITY UNSPECIFIED: ICD-10-CM

## 2021-06-28 DIAGNOSIS — G89.29 CHRONIC BILATERAL LOW BACK PAIN WITHOUT SCIATICA: Primary | ICD-10-CM

## 2021-06-28 DIAGNOSIS — M79.10 MYALGIA: ICD-10-CM

## 2021-06-28 DIAGNOSIS — M47.812 CERVICAL FACET JOINT SYNDROME: ICD-10-CM

## 2021-06-28 DIAGNOSIS — M70.62 GREATER TROCHANTERIC BURSITIS OF LEFT HIP: ICD-10-CM

## 2021-06-28 DIAGNOSIS — G89.29 OTHER CHRONIC PAIN: ICD-10-CM

## 2021-06-28 DIAGNOSIS — G89.29 CHRONIC MIDLINE LOW BACK PAIN WITH SCIATICA, SCIATICA LATERALITY UNSPECIFIED: ICD-10-CM

## 2021-06-28 DIAGNOSIS — M54.16 LUMBAR RADICULOPATHY: ICD-10-CM

## 2021-06-28 DIAGNOSIS — M54.50 CHRONIC BILATERAL LOW BACK PAIN WITHOUT SCIATICA: Primary | ICD-10-CM

## 2021-06-28 DIAGNOSIS — M48.061 SPINAL STENOSIS OF LUMBAR REGION WITHOUT NEUROGENIC CLAUDICATION: ICD-10-CM

## 2021-06-28 PROCEDURE — 99213 OFFICE O/P EST LOW 20 MIN: CPT | Performed by: PHYSICIAN ASSISTANT

## 2021-06-28 RX ORDER — HYDROCODONE BITARTRATE AND ACETAMINOPHEN 5; 325 MG/1; MG/1
1 TABLET ORAL 2 TIMES DAILY
Qty: 60 TABLET | Refills: 0 | Status: SHIPPED
Start: 2021-06-28 | End: 2021-07-26 | Stop reason: SDUPTHER

## 2021-06-28 RX ORDER — GABAPENTIN 400 MG/1
400 CAPSULE ORAL 3 TIMES DAILY
Qty: 90 CAPSULE | Refills: 2 | Status: SHIPPED
Start: 2021-06-28 | End: 2021-08-23 | Stop reason: SDUPTHER

## 2021-06-28 NOTE — PROGRESS NOTES
[address]  [address]  [facility]    Follow up Note      Linda Call     Date of Visit:  2021    CC:  Patient presents for follow up   Chief Complaint   Patient presents with    Follow-up    Lower Back Pain       HPI:    Pain is better. Seen by NSG on 2021 and states that her pain has improved. States that she does have pain to her left ankle/foot and will be seeing a podiatrist.  She reports that she is scheduled for a CT myelogram to further evaluate her back pain through NSG. Change in quality of symptoms:no. Patient satisfaction with analgesia:fair. Medication side effects: None. Recent diagnostic testing:none. Recent interventional procedures: None. She has been on anticoagulation medications to include NSAIDS. The patient  has not been on herbal supplements. The patient is diabetic. Imaging:    MRI lumbar spine 2018  1. No significant interval change is observed since the previous study   of 2017.       2. Stable postoperative changes/posterior spinal fusion at the   L3-L4-L5 level.       3. Severe spine canal stenosis in the level of L2-L3           4. Encroachment of the neural foramina bilaterally in levels of L2-L3   and L5-S1      Thoracic spine MRI 2018  1. Some degenerative changes in the thoracic spine, mainly in the   facet joints in the mid-upper thoracic spine segments       2. Discrete loss of height of T6, more likely an old finding.      Previous treatments: Physical Therapy, Surgery L3-5 fusion/laminectomy and medications. .       Potential Aberrant Drug-Related Behavior:  No     Urine Drug Screenin18:  Consistent for norhydrocodone metabolite  2018:  Consistent for hydrocodone and norhydrocodone  05/10/2019:  Consistent for hydrocodone and norhydrocodone  2019:  Consistent for hydrocodone and norhydrocodone  01/10/2020:  Consistent for hydrocodone and norhydrocodone  2020:  Consistent for oxycodone and metabolites s/p surgery. Inconsistent for hydrocodone. States that she was instructed to take her old norco until she made the appointment to resume her chronic pain medications. 10/2020:  Consistent     OARRS report:  05/2018 consistent to 05/2021 consistent (norco scripts from [facility] post op 3/10/2021 and 3/24/2021. [place] script through [facility] - last one 06/01/2021       Opioid agreement:  11/05/2020      Past Medical History:   Diagnosis Date    Cancer ([facility] [address] 12/2019    LESION REMOVED UNDER TONGUE  DENTAL CLINIC    Chronic back pain     Costochondritis     h/o    Depression     Falls     last one 2017     Fibromyalgia     Hallux rigidus of left foot     HTN (hypertension)     Hyperlipidemia     CLYDE on CPAP     Osteoarthritis     \"everywhere\"    Rheumatoid arthritis ([facility] [address]     \"As a child. \"    Rheumatoid arthritis(714.0)     TIA (transient ischemic attack)     no deficits     Tongue lesion     for OR 10-21-20     Use of cane as ambulatory aid        Past Surgical History:   Procedure Laterality Date    ANESTHESIA NERVE BLOCK Left 02/26/2019    LEFT C3,4,5 MBB performed by Keysha Suárez MD at [facility] Right 08/12/2019    BILATERAL TRANSFORAMINAL EPIDURAL STEROID INJECTION S1 #3 performed by Keysha Suárez MD at [address] [place] [address] Right 06/16/2020    RIGHT SACROILIAC JOINT INJECTION      CPT: 45099 performed by Pedro Pablo Dodge DO at [address]  2005    Left    ARTHRODESIS Left 12/14/2018    ARTHRODESIS 2ND DIGIT LEFT FOOT performed by Gian Mckinley DPM at [facility]  08/16/2017    PLIF L3-L4, L4-L5 with rods, screws, and cages/Dr. Bernard Gill BACK SURGERY  03/04/2020    BACK SURGERY Right 03/09/2021    RIGHT PERCUTANEOUS SACROILIAC JOINT FUSION performed by Nayeli Cosby MD at [facility]  2010    reduction, [address]    CHOLECYSTECTOMY  2011    COLONOSCOPY 03/27/2015    COLONOSCOPY N/A 08/19/2020    COLONOSCOPY DIAGNOSTIC performed by Lisseth Bonner MD at [facility]  03/2021    SI Joint    ENDOSCOPY, COLON, DIAGNOSTIC      EPIDURAL STEROID INJECTION N/A 06/04/2019    BILATERAL TRANSFORAMINAL EPIDURAL STEROID INJECTION S1 performed by Robert Chapa DO at [facility]    [facility] [address] / ANKLE Left 12/14/2018    REMOVAL OF PAINFUL HARDWARE LEFT FOOT  ++SYNTHES++ performed by Prince Moore DPM at [facility]    inguinal     LUMBAR SPINE SURGERY N/A 03/04/2020    EXPLORATION OF PRIOR L3-L5 FUSION AND L2-L3  POSTERIOR LUMBAR INTERBODY FUSION -- NEEDS O-ARM, AUDIOLOGY, CAGES, PLATES, SCREWS, C-ARM, TERESA TABLE, CELL SAVER, PLATELET GEL -- GLOBUS performed by Boyd Beckett MD at [address] N/A 10/21/2020    EXCISION OF TONGUE LESION performed by Linda Aceves DO at [address] Bilateral 05/07/2018    L1-2 lumbar foramen #1    NERVE BLOCK Bilateral 12/03/2018    s1 tfesi    NERVE BLOCK Bilateral 08/12/2019    NERVE BLOCK Right 09/30/2019    sacral radiofrequency    NERVE BLOCK Right 09/01/2020    RIGHT SACROILIAC JOINT INJECTION #2 performed by Robert Chapa DO at [address] [place] [address] Left 09/25/2013    left foot tarso metatarsal joint injection    OTHER SURGICAL HISTORY Left 05/27/2015    Endoscopic Gastroc recession left, Lapidus left foot and  Excision of exostosis left foot    [place] NJX AA&/STRD TFRML EPI LUMBAR/SACRAL 1 LEVEL Bilateral 12/03/2018    BILATERAL S1  EPIDURAL STEROID INJECTION performed by Uyen Augustine MD at [facility] DX/THER SBST INTRLMNR LMBR/SAC W/IMG GDN N/A 08/21/2018    EPIDURAL STEROID INJECTION L1-2 WITH LOW VOL performed by Uyen Augustine MD at [facility] NOSE/CLEFT LIP/TIP Bilateral 05/07/2018    BILATERAL L1-2 LUMBAR FORAMEN #1 performed by Uyen Augustine MD at [facility] of education: Not on file    Highest education level: Not on file   Occupational History    Not on file   Tobacco Use    Smoking status: Former Smoker     Packs/day: 0.25     Years: 30.00     Pack years: 7.50     Types: Cigarettes     Quit date: 10/5/2020     Years since quittin.7    Smokeless tobacco: Never Used    Tobacco comment: was going to try to stop but chantix is too expensive   Vaping Use    Vaping Use: Never used   Substance and Sexual Activity    Alcohol use: Not Currently     Alcohol/week: 0.0 standard drinks     Comment: rarely    Drug use: No    Sexual activity: Not on file   Other Topics Concern    Not on file   Social History Narrative    Not on file     Social Determinants of Health     Financial Resource Strain:     Difficulty of Paying Living Expenses:    Food Insecurity:     Worried About Running Out of Food in the Last Year:     Titi of Food in the Last Year:    Transportation Needs:     Lack of Transportation (Medical):      Lack of Transportation (Non-Medical):    Physical Activity:     Days of Exercise per Week:     Minutes of Exercise per Session:    Stress:     Feeling of Stress :    Social Connections:     Frequency of Communication with Friends and Family:     Frequency of Social Gatherings with Friends and Family:     Attends [employer] Services:     Active Member of Clubs or Organizations:     Attends Club or Organization Meetings:     Marital Status:    Intimate Partner Violence:     Fear of Current or Ex-Partner:     Emotionally Abused:     Physically Abused:     Sexually Abused:        Family History   Problem Relation Age of Onset    Dementia Mother     Breast Cancer Mother     Cancer Mother         breast cancer [de-identified]     Stroke Mother     Heart Attack Father     Other Father 80        Aortic aneurysm    Cancer Brother     Diabetes Brother        REVIEW OF SYSTEMS:     Tari Harrell denies fever/chills, chest pain, shortness of breath, new bowel or bladder complaints or suicidal ideations. All other review of systems was negative. PHYSICAL EXAMINATION:      /82   Pulse 102   Temp 98.2 °F (36.8 °C) (Infrared)   Resp 16   Ht 5' 5\" (1.651 m)   Wt 228 lb (103.4 kg)   LMP  (LMP Unknown)   SpO2 97%   BMI 37.94 kg/m²     General:      General appearance: awake, alert, oriented, in no acute distress, well developed, well nourished and in no acute distress   pleasant and well-hydrated. in no distress and A & O x3  Build:Overweight  Function:Rises from a seated position with difficulty    HEENT:    Head:normocephalic and atraumatic  Pupils:regular, round and equal.  Sclera: icterus absent  EOM:full and intact. Lungs:    Breathing:Normal expansion. No  wheezing. Abdomen:    Shape:non-distended and normal    Lumbar spine:     Range of motion:abnormal moderately Lateral bending, flexion, extension rotation bilateral and is mildly painful. Extremities:    Tremors:None bilaterally upper and lower  Range of motion:Generally normal shoulders  Intact:Yes  Cyanosis:none  Edema:Normal      Neurological:    Cranial nerves:normal  Sensory:diminished to light lateral aspect of right leg                   Dermatology:    Skin:no unusual rashes, no skin lesions, no palpable subcutaneous nodules and good skin turgor    Assessment/Plan:      Chronic low back pain with radiation to the Right groin, patient had low back surgery 08/2017 L3-5 fusion, recently had lumbar spine CT with severe L2-3 stenosis     [place] [facility] Plan:  Patient is s/p revision fusion L2-L4 on 3/4/2020. Patient is s/p:  Right sacroiliac joint fusion with use of SI-BONE iFuse implant on 3/9/2021 by Dr. Freddy Faust. Continue norco 5/325 but will return to chronic dosing BID. Continue with Gabapentin 400 mg TID - ordered today. Normally goes through her PCP. She reports that any increases make her off balance.     OARRS report reviewed 06/2021  UDS next

## 2021-06-28 NOTE — PROGRESS NOTES
Do you currently have any of the following:    Fever: No  Headache:  No  Cough: No  Shortness of breath: No  Exposed to anyone with these symptoms: No         Jose G Brenner presents to the [address] [place] [address] on 6/28/2021. [facility] is complaining of pain lower back. The pain is persistent. The pain is described as aching, throbbing, shooting, stabbing and sharp. Pain is rated on her best day at a 6, on her worst day at a 10, and on average at a 7 on the VAS scale. She took her last dose of Norco 9 AM.     Any procedures since your last visit: No, with  % relief. Pacemaker or defibrillator: No managed by . She is not on NSAIDS and is not on anticoagulation medications to include none and is managed by . Medication Contract and Consent for Opioid Use Documents Filed     Patient Documents       Type of Document Status Date Received Received By Description     Medication Contract Received  Cate Knight Opioid medication contract with Dr Jah Rojo 3/24/20     Medication Contract Received 4/26/2018  3:50 PM ANYA NUNO PAIN MANAGEMENT PATIENT AGREEMENT 4/26/2018     Medication Contract Received 11/5/2020  4:23 PM EBER HONG Opioid medication contract with Dr Jah Rojo     Medication Contract Received 3/1/2021  1:26 PM EBER HONG Opioid medication contract with Dr Jah Rojo                /82   Pulse 102   Temp 98.2 °F (36.8 °C) (Infrared)   Resp 16   Ht 5' 5\" (1.651 m)   Wt 228 lb (103.4 kg)   LMP  (LMP Unknown)   SpO2 97%   BMI 37.94 kg/m²      No LMP recorded (lmp unknown).  Patient is postmenopausal.

## 2021-07-05 ENCOUNTER — HOSPITAL ENCOUNTER (OUTPATIENT)
Age: 66
Discharge: HOME | End: 2021-07-05

## 2021-07-05 DIAGNOSIS — Z01.812 PRE-PROCEDURE LAB EXAM: ICD-10-CM

## 2021-07-05 LAB
INR BLD: 1
PLATELET # BLD: 274 E9/L (ref 130–450)
PROTHROMBIN TIME: 10.9 SEC (ref 9.3–12.4)

## 2021-07-05 PROCEDURE — 36415 COLL VENOUS BLD VENIPUNCTURE: CPT

## 2021-07-05 PROCEDURE — 85049 AUTOMATED PLATELET COUNT: CPT

## 2021-07-05 PROCEDURE — 85610 PROTHROMBIN TIME: CPT

## 2021-07-06 ENCOUNTER — OFFICE VISIT (OUTPATIENT)
Dept: PODIATRY | Age: 66
End: 2021-07-06

## 2021-07-06 VITALS
BODY MASS INDEX: 37.94 KG/M2 | WEIGHT: 228 LBS | DIASTOLIC BLOOD PRESSURE: 84 MMHG | TEMPERATURE: 98.2 F | SYSTOLIC BLOOD PRESSURE: 158 MMHG

## 2021-07-06 DIAGNOSIS — G89.29 CHRONIC PAIN OF LEFT ANKLE: Primary | ICD-10-CM

## 2021-07-06 DIAGNOSIS — M25.572 CHRONIC PAIN OF LEFT ANKLE: Primary | ICD-10-CM

## 2021-07-06 PROCEDURE — 99213 OFFICE O/P EST LOW 20 MIN: CPT | Performed by: PODIATRIST

## 2021-07-06 NOTE — PROGRESS NOTES
21  Ibeth Graf : 1955 Sex: female  Age: 61 y.o. Patient was referred by Kenneth Mittal MD    Chief Complaint   Patient presents with    Foot Injury     left ankle chronic pain the injections do not help wants further testing done she is having back test this week not sure if this is coming from her back     Rafaela Mckeon patient is seen today for follow-up of left ankle pain this is chronic with no improvement with conservative treatment at this time. HPI  Review of Systems  Const: Denies constitutional symptoms  Musculo: Denies symptoms other than stated above  Skin: Denies symptoms other than stated above       Current Outpatient Medications:     HYDROcodone-acetaminophen (NORCO) 5-325 MG per tablet, Take 1 tablet by mouth 2 times daily for 30 days. , Disp: 60 tablet, Rfl: 0    gabapentin (NEURONTIN) 400 MG capsule, Take 1 capsule by mouth 3 times daily for 30 days. , Disp: 90 capsule, Rfl: 2    atorvastatin (LIPITOR) 40 MG tablet, Take 1 tablet by mouth daily, Disp: 90 tablet, Rfl: 3    FLUoxetine (PROZAC) 40 MG capsule, Take 1 capsule by mouth daily For mood. , Disp: 90 capsule, Rfl: 1    lisinopril (PRINIVIL;ZESTRIL) 30 MG tablet, Take 1 tablet by mouth every evening, Disp: 90 tablet, Rfl: 1    diclofenac sodium (VOLTAREN) 1 % GEL, Apply 4 g topically 2 times daily, Disp: 350 g, Rfl: 1    Misc. Devices (TRANSFER BENCH) MISC, 1 Units by Does not apply route daily, Disp: 1 each, Rfl: 0    Handicap Placard MISC, DX:  Loss of Balance, Chronic low back pain, s/p back surgery.   Duration of 5 years, Disp: 1 each, Rfl: 0    hydrOXYzine (VISTARIL) 25 MG capsule, Take 1 capsule by mouth 3 times daily as needed for Anxiety, Disp: 270 capsule, Rfl: 1  Allergies   Allergen Reactions    Pcn [Penicillins] Anaphylaxis       Past Medical History:   Diagnosis Date    Cancer ([facility] [address] 2019    LESION REMOVED UNDER TONGUE  DENTAL CLINIC    Chronic back pain     Costochondritis h/o    Depression     Falls     last one 2017     Fibromyalgia     Hallux rigidus of left foot     HTN (hypertension)     Hyperlipidemia     CLYDE on CPAP     Osteoarthritis     \"everywhere\"    Rheumatoid arthritis ([address]     \"As a child. \"    Rheumatoid arthritis(714.0)     TIA (transient ischemic attack)     no deficits     Tongue lesion     for OR 10-20     Use of cane as ambulatory aid      Past Surgical History:   Procedure Laterality Date    ANESTHESIA NERVE BLOCK Left 2019    LEFT C3,4,5 MBB performed by Cherry Mo MD at [address] Right 2019    BILATERAL TRANSFORAMINAL EPIDURAL STEROID INJECTION S1 #3 performed by Cherry Mo MD at [address] [facility] [address] Right 2020    RIGHT SACROILIAC JOINT INJECTION      CPT: 91495 performed by Heber Marcus DO at [address]      Left    ARTHRODESIS Left 2018    ARTHRODESIS 2ND DIGIT LEFT FOOT performed by Ruben Lopez DPM at [address] [place] [address]  2017    PLIF L3-L4, L4-L5 with rods, screws, and cages/Dr. Alfaro [place] BACK SURGERY  2020    BACK SURGERY Right 2021    RIGHT PERCUTANEOUS SACROILIAC JOINT FUSION performed by Jermain Pak MD at [facility]      reduction, bilat     SECTION      CHOLECYSTECTOMY      COLONOSCOPY  2015    COLONOSCOPY N/A 2020    COLONOSCOPY DIAGNOSTIC performed by Charlene Evans MD at [address] [place] [address]  2021    SI Joint    ENDOSCOPY, COLON, DIAGNOSTIC      EPIDURAL STEROID INJECTION N/A 2019    BILATERAL TRANSFORAMINAL EPIDURAL STEROID INJECTION S1 performed by Heber Marcus DO at [address]      Left    [address] / ANKLE Left 2018    REMOVAL OF PAINFUL HARDWARE LEFT FOOT  ++SYNTHES++ performed by Ruben Lopez DPM at [facility]    inguinal     LUMBAR SPINE SURGERY N/A 03/04/2020    EXPLORATION OF PRIOR L3-L5 FUSION AND L2-L3  POSTERIOR LUMBAR INTERBODY FUSION -- NEEDS O-ARM, AUDIOLOGY, CAGES, PLATES, SCREWS, C-ARM, TERESA TABLE, CELL SAVER, PLATELET GEL -- GLOBUS performed by Payal Denis MD at [address] [email or web address] N/A 10/21/2020    EXCISION OF TONGUE LESION performed by Carla Kay DO [place] [facility] Bilateral 05/07/2018    L1-2 lumbar foramen #1    NERVE BLOCK Bilateral 12/03/2018    s1 tfesi    NERVE BLOCK Bilateral 08/12/2019    NERVE BLOCK Right 09/30/2019    sacral radiofrequency    NERVE BLOCK Right 09/01/2020    RIGHT SACROILIAC JOINT INJECTION #2 performed by Yolanda Woods DO at [facility] Left 09/25/2013    left foot tarso metatarsal joint injection    OTHER SURGICAL HISTORY Left 05/27/2015    Endoscopic Gastroc recession left, Lapidus left foot and  Excision of exostosis left foot    [place] NJX AA&/STRD TFRML EPI LUMBAR/SACRAL 1 LEVEL Bilateral 12/03/2018    BILATERAL S1  EPIDURAL STEROID INJECTION performed by Raman Niño MD at [facility] DX/THER SBST INTRLMNR LMBR/SAC W/IMG GDN N/A 08/21/2018    EPIDURAL STEROID INJECTION L1-2 WITH LOW VOL performed by Raman Niño MD at [facility] NOSE/CLEFT LIP/TIP Bilateral 05/07/2018    BILATERAL L1-2 LUMBAR FORAMEN #1 performed by Raman Nioñ MD at [address] NOSE/CLEFT LIP/TIP Bilateral 06/04/2018    BILATERAL TRANSFORAMINAL EPIDURAL STEROID INJECTION UNDER FLUOROSCOPIC GUIDANCE @ FORAMINAL LEVEL L1-2 #2 performed by Raman Niño MD at [address] [place] Right 09/30/2019    RIGHT SACRAL RADIOFREQUENCY ABLATION performed by Raman Niño MD at [facility] SURGERY  2000, 2005    Big Left Toe    TOE SURGERY Left 2018    2nd toe     TONSILLECTOMY      WISDOM TOOTH EXTRACTION       Family History   Problem Relation Age of Onset    Dementia Mother     Breast Cancer Mother Temp: 98.2 °F (36.8 °C)   TempSrc: Temporal   Weight: 228 lb (103.4 kg)       Focused Lower Extremity Physical Exam:  Vitals:    07/06/21 0902   BP: (!) 158/84   Temp: 98.2 °F (36.8 °C)        Foot Exam     Left Ankle Exam     Tenderness   The patient is experiencing tenderness in the ATF, CF, deltoid and medial malleolus. Swelling: mild    Range of Motion   Dorsiflexion: abnormal   Plantar flexion: abnormal   Eversion: abnormal   Inversion: abnormal     Muscle Strength   The patient has normal left ankle strength. Tests   Anterior drawer: positive  Varus tilt: negative            Vascular: pulses  dp  pt    Capillary Refill Time:   Hair growth  Skin:    Edema:    Neurologic:      Musculoskeletal/ Orthopedic examination:    NAIL  Web space   Derm:          Assessment and Plan: Today after failed conservative treatment we will get a get an MRI of the left ankle. Patient is also getting treatment for her lower lumbar sacral region. Patient will be followed up after MRI. Angel Harris was seen today for foot injury and foot pain. Diagnoses and all orders for this visit:    Chronic pain of left ankle  -     MRI ANKLE LEFT WO CONTRAST; Future        No follow-ups on file. Seen By:  Aamir Morin DPM      Document was created using voice recognition software. Note was reviewed, however may contain grammatical errors.

## 2021-07-08 ENCOUNTER — HOSPITAL ENCOUNTER (OUTPATIENT)
Dept: GENERAL RADIOLOGY | Age: 66
Discharge: HOME | End: 2021-07-10

## 2021-07-08 ENCOUNTER — HOSPITAL ENCOUNTER (OUTPATIENT)
Dept: CT IMAGING | Age: 66
Discharge: HOME | End: 2021-07-10

## 2021-07-08 VITALS
HEIGHT: 65 IN | WEIGHT: 220 LBS | HEART RATE: 79 BPM | RESPIRATION RATE: 18 BRPM | SYSTOLIC BLOOD PRESSURE: 172 MMHG | TEMPERATURE: 98.7 F | OXYGEN SATURATION: 93 % | DIASTOLIC BLOOD PRESSURE: 80 MMHG | BODY MASS INDEX: 36.65 KG/M2

## 2021-07-08 DIAGNOSIS — G89.29 CHRONIC MIDLINE LOW BACK PAIN WITH SCIATICA, SCIATICA LATERALITY UNSPECIFIED: ICD-10-CM

## 2021-07-08 DIAGNOSIS — M54.40 CHRONIC MIDLINE LOW BACK PAIN WITH SCIATICA, SCIATICA LATERALITY UNSPECIFIED: ICD-10-CM

## 2021-07-08 PROCEDURE — 7100000010 HC PHASE II RECOVERY - FIRST 15 MIN

## 2021-07-08 PROCEDURE — 7100000011 HC PHASE II RECOVERY - ADDTL 15 MIN

## 2021-07-08 PROCEDURE — 6360000004 HC RX CONTRAST MEDICATION: Performed by: RADIOLOGY

## 2021-07-08 PROCEDURE — 72132 CT LUMBAR SPINE W/DYE: CPT

## 2021-07-08 PROCEDURE — 72265 MYELOGRAPHY L-S SPINE: CPT

## 2021-07-08 RX ADMIN — IOPAMIDOL 12 ML: 408 INJECTION, SOLUTION INTRATHECAL at 09:16

## 2021-07-08 ASSESSMENT — PAIN DESCRIPTION - DESCRIPTORS: DESCRIPTORS: ACHING;SHOOTING

## 2021-07-08 ASSESSMENT — PAIN - FUNCTIONAL ASSESSMENT: PAIN_FUNCTIONAL_ASSESSMENT: 0-10

## 2021-07-08 NOTE — PROGRESS NOTES
18 - Patient arrived via ambulation to Radiology department for myelogram. Allergies, home medications, H&P and fasting instructions reviewed with patient. Vital signs taken. Procedural instructions given, questions answered, understanding expressed and consent signed. Patient given fluoroscopy education, no questions at this time.

## 2021-07-13 ENCOUNTER — OFFICE VISIT (OUTPATIENT)
Dept: NEUROSURGERY | Age: 66
End: 2021-07-13

## 2021-07-13 VITALS
WEIGHT: 220 LBS | OXYGEN SATURATION: 97 % | TEMPERATURE: 97.2 F | BODY MASS INDEX: 36.65 KG/M2 | HEIGHT: 65 IN | HEART RATE: 95 BPM | DIASTOLIC BLOOD PRESSURE: 94 MMHG | RESPIRATION RATE: 18 BRPM | SYSTOLIC BLOOD PRESSURE: 143 MMHG

## 2021-07-13 DIAGNOSIS — M54.16 LUMBAR RADICULOPATHY: Primary | ICD-10-CM

## 2021-07-13 DIAGNOSIS — Z98.1 S/P LUMBAR FUSION: ICD-10-CM

## 2021-07-13 PROCEDURE — 99213 OFFICE O/P EST LOW 20 MIN: CPT | Performed by: PHYSICIAN ASSISTANT

## 2021-07-13 NOTE — PROGRESS NOTES
Office Follow Up     Subjective: Alexandr Padgett is a 61 y.o.  female who is well known to our services. She previously underwent L2-L3 PLIF on 3/4/20 and right SI joint fusion on 3/9/21. Pt had stable post operative follow up. She presented to the office about a month ago c/o left leg pain. Pt states her right leg and groin pain has significantly improved since surgery. Several weeks after her SI joint fusion, her left leg started to hurt. Pain is sharp and shooting into her left foot. She completed PT. Standing and walking makes her pain worse. Sitting and laying down provides moderate relief. She had a left hip bursa injection without significant relief. Left SI joint dysfunction was ruled out. A lumbar CT myelogram was ordered at that time. She presents to the office today to review the imaging. Pt states she continues to have left leg pain that radiates down the side of the leg into her calf. She is following up with her podiatrist and has an MRI of her left ankle scheduled.  Denies loss of bowel or bladder, saddle anesthesia, pain down the right leg, fever, chills, SOB, or chest pain.      Physical Exam:              WDWN, no apparent distress              Non-labored breathing               Vitals Stable              Alert and oriented x3              CN 3-12 intact              PERRL              EOMI              BRIONES well              Motor strength symmetric              Sensation to LT intact bilaterally   Tenderness to palpation of left him and calf   Previous incisions healed well with no signs of infection                 Imaging: Lumbar CT myelogram:   L1-L2: There is no significant disc protrusion, central spinal canal stenosis   or neural foraminal narrowing.       L2-L3: There is artificial disc material with posterior laminectomy.  There   is no canal stenosis or left foraminal narrowing.  There is moderate right   foraminal narrowing.       L3-L4: There is artificial disc material and posterior laminectomy.  There is   no canal stenosis.  There is mild bilateral foraminal narrowing.       L4-L5: There is artificial disc material with posterior laminectomy.  There   is no canal stenosis.  There is mild left and moderate right foraminal   narrowing.       L5-S1: There is a circumferential disc bulge with facet hypertrophy.  There   is no canal stenosis.  There is moderate right and severe left foraminal   narrowing.              Assessment: This is a 61 y.o.  female presenting for evaluation of left leg pain     Plan:  -Imaging demonstrates severe left foraminal stenosis at L5-S1. Recommend trying left L5-S1 TFESI  -Await MRI ankle  -Continue follow up with pain management  -Call or return to neurosurgery office sooner if symptoms worsen or if new issues arise in the interim.     Electronically signed by Bhanu Cruz PA-C on 7/13/2021 at 4:08 PM

## 2021-07-14 ENCOUNTER — PREP FOR PROCEDURE (OUTPATIENT)
Dept: PAIN MANAGEMENT | Age: 66
End: 2021-07-14

## 2021-07-14 ENCOUNTER — OFFICE VISIT (OUTPATIENT)
Dept: ENT CLINIC | Age: 66
End: 2021-07-14

## 2021-07-14 VITALS
OXYGEN SATURATION: 97 % | BODY MASS INDEX: 36.65 KG/M2 | DIASTOLIC BLOOD PRESSURE: 94 MMHG | SYSTOLIC BLOOD PRESSURE: 159 MMHG | HEART RATE: 96 BPM | WEIGHT: 220 LBS | TEMPERATURE: 97.4 F | HEIGHT: 65 IN

## 2021-07-14 DIAGNOSIS — D00.07 SQUAMOUS CELL CARCINOMA IN SITU (SCCIS) OF LATERAL PORTION OF TONGUE: ICD-10-CM

## 2021-07-14 DIAGNOSIS — K14.8 LESION OF TONGUE: Primary | ICD-10-CM

## 2021-07-14 PROCEDURE — 99213 OFFICE O/P EST LOW 20 MIN: CPT | Performed by: OTOLARYNGOLOGY

## 2021-07-14 ASSESSMENT — ENCOUNTER SYMPTOMS
DIARRHEA: 0
EYE DISCHARGE: 0
EYES NEGATIVE: 1
ABDOMINAL PAIN: 0
RESPIRATORY NEGATIVE: 1
GASTROINTESTINAL NEGATIVE: 1
SHORTNESS OF BREATH: 0
APNEA: 0
EYE PAIN: 0
CHEST TIGHTNESS: 0
VOMITING: 0
COLOR CHANGE: 0

## 2021-07-14 NOTE — PROGRESS NOTES
Subjective:      Patient ID:  Michaela Jackson is a 61 y.o. female. HPI:    Patient presents today for recheck tongue lesion. Condition has been present for 3 month(s). Pt is not having any new issues. She is having some sensations in the tongue but not pain. No dysphagia. Past Medical History:   Diagnosis Date    Cancer ([facility] 12/2019    LESION REMOVED UNDER TONGUE  DENTAL CLINIC    Chronic back pain     Costochondritis     h/o    Depression     Falls     Last fall Feb 2021    Fibromyalgia     Hallux rigidus of left foot     HTN (hypertension)     Hyperlipidemia     CLYDE on CPAP     Osteoarthritis     \"everywhere\"    Rheumatoid arthritis ([facility]     \"As a child. \"    Rheumatoid arthritis(714.0)     TIA (transient ischemic attack)     no deficits     Tongue lesion     for OR 10-21-20     Use of cane as ambulatory aid      Past Surgical History:   Procedure Laterality Date    ANESTHESIA NERVE BLOCK Left 02/26/2019    LEFT C3,4,5 MBB performed by Alexa Salcedo MD at [facility] Right 08/12/2019    BILATERAL TRANSFORAMINAL EPIDURAL STEROID INJECTION S1 #3 performed by Alexa Salcedo MD at [facility] Right 06/16/2020    RIGHT SACROILIAC JOINT INJECTION      CPT: 33990 performed by Kell Wolfe DO at [address]  2005    Left    ARTHRODESIS Left 12/14/2018    ARTHRODESIS 2ND DIGIT LEFT FOOT performed by Holly Box DPM at [address] [place] [address]  08/16/2017    PLIF L3-L4, L4-L5 with rods, screws, and cages/Dr. Arcadio Aguiar BACK SURGERY  03/04/2020    BACK SURGERY Right 03/09/2021    RIGHT PERCUTANEOUS SACROILIAC JOINT FUSION performed by Erick Saxena MD at [facility]  2010    reduction, [address]    CHOLECYSTECTOMY  2011    COLONOSCOPY  03/27/2015    COLONOSCOPY N/A 08/19/2020    COLONOSCOPY DIAGNOSTIC performed by Fidelia Yip MD at [address] [place] [address] FORAMINAL LEVEL L1-2 #2 performed by Anahi Ortega MD at [address] Right 2019    RIGHT SACRAL RADIOFREQUENCY ABLATION performed by Anahi Ortega MD at [address] [facility] [address] TOE SURGERY  ,     Big Left Toe    TOE SURGERY Left 2018    2nd toe     TONSILLECTOMY      WISDOM TOOTH EXTRACTION       Family History   Problem Relation Age of Onset    Dementia Mother     Breast Cancer Mother     Cancer Mother         breast cancer [de-identified]     Stroke Mother     Heart Attack Father     Other Father 80        Aortic aneurysm    Cancer Brother     Diabetes Brother      Social History     Socioeconomic History    Marital status:      Spouse name: None    Number of children: None    Years of education: None    Highest education level: None   Occupational History    None   Tobacco Use    Smoking status: Former Smoker     Packs/day: 0.25     Years: 30.00     Pack years: 7.50     Types: Cigarettes     Quit date: 10/5/2020     Years since quittin.7    Smokeless tobacco: Never Used    Tobacco comment: was going to try to stop but chantix is too expensive   Vaping Use    Vaping Use: Never used   Substance and Sexual Activity    Alcohol use: Not Currently     Alcohol/week: 0.0 standard drinks     Comment: rarely    Drug use: No    Sexual activity: None   Other Topics Concern    None   Social History Narrative    None     Social Determinants of Health     Financial Resource Strain:     Difficulty of Paying Living Expenses:    Food Insecurity:     Worried About Running Out of Food in the Last Year:     Ran Out of Food in the Last Year:    Transportation Needs:     Lack of Transportation (Medical):      Lack of Transportation (Non-Medical):    Physical Activity:     Days of Exercise per Week:     Minutes of Exercise per Session:    Stress:     Feeling of Stress :    Social Connections:     Frequency of Communication with Friends and Family:     Frequency of Social Gatherings with Friends and Family:     Attends Mu-ism Services:     Active Member of Clubs or Organizations:     Attends Club or Organization Meetings:     Marital Status:    Intimate Partner Violence:     Fear of Current or Ex-Partner:     Emotionally Abused:     Physically Abused:     Sexually Abused: Allergies   Allergen Reactions    Pcn [Penicillins] Anaphylaxis       Review of Systems   Constitutional: Negative. Negative for appetite change. HENT: Positive for mouth sores. Eyes: Negative. Negative for pain, discharge and visual disturbance. Respiratory: Negative. Negative for apnea, chest tightness and shortness of breath. Cardiovascular: Negative. Negative for chest pain, palpitations and leg swelling. Gastrointestinal: Negative. Negative for abdominal pain, diarrhea and vomiting. Endocrine: Negative for cold intolerance, heat intolerance and polydipsia. Genitourinary: Negative. Negative for dysuria, flank pain and hematuria. Musculoskeletal: Negative. Negative for arthralgias, gait problem and neck pain. Skin: Negative. Negative for color change, pallor and rash. Allergic/Immunologic: Negative for environmental allergies, food allergies and immunocompromised state. Neurological: Negative. Negative for dizziness, numbness and headaches. Hematological: Negative for adenopathy. Psychiatric/Behavioral: Negative. Negative for behavioral problems and hallucinations. All other systems reviewed and are negative. Objective:     Vitals:    07/14/21 1636   BP: (!) 159/94   Pulse: 96   Temp: 97.4 °F (36.3 °C)   SpO2: 97%     Physical Exam  Vitals and nursing note reviewed. Constitutional:       Appearance: She is well-developed. HENT:      Head: Normocephalic and atraumatic.       Comments:        Right Ear: Hearing, tympanic membrane, ear canal and external ear normal.      Left Ear: Hearing, tympanic membrane, ear canal and external ear normal.      Nose: Nose normal.      Mouth/Throat:      Lips: Pink. Mouth: Mucous membranes are moist.      Tongue: No lesions. Tongue does not deviate from midline. Pharynx: Oropharynx is clear. Comments: No lesions on the left side of the tongue. Eyes:      Conjunctiva/sclera: Conjunctivae normal.      Pupils: Pupils are equal, round, and reactive to light. Cardiovascular:      Rate and Rhythm: Normal rate and regular rhythm. Heart sounds: Normal heart sounds. Pulmonary:      Effort: Pulmonary effort is normal. No respiratory distress. Breath sounds: Normal breath sounds. Abdominal:      General: Bowel sounds are normal. There is no distension. Palpations: Abdomen is soft. Tenderness: There is no abdominal tenderness. There is no guarding. Musculoskeletal:         General: Normal range of motion. Cervical back: Normal range of motion and neck supple. Skin:     General: Skin is warm and dry. Neurological:      Mental Status: She is alert and oriented to person, place, and time. Psychiatric:         Behavior: Behavior normal.         Thought Content: Thought content normal.         Judgment: Judgment normal.                 Assessment:       Diagnosis Orders   1. Lesion of tongue     2. 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    Follow up in 6 month(s)

## 2021-07-16 ENCOUNTER — HOSPITAL ENCOUNTER (OUTPATIENT)
Dept: MRI IMAGING | Age: 66
Discharge: HOME | End: 2021-07-18

## 2021-07-16 DIAGNOSIS — G89.29 CHRONIC PAIN OF LEFT ANKLE: ICD-10-CM

## 2021-07-16 DIAGNOSIS — M25.572 CHRONIC PAIN OF LEFT ANKLE: ICD-10-CM

## 2021-07-16 PROCEDURE — 73721 MRI JNT OF LWR EXTRE W/O DYE: CPT

## 2021-07-22 ENCOUNTER — OFFICE VISIT (OUTPATIENT)
Dept: PODIATRY | Age: 66
End: 2021-07-22

## 2021-07-22 VITALS — TEMPERATURE: 98.2 F | SYSTOLIC BLOOD PRESSURE: 139 MMHG | DIASTOLIC BLOOD PRESSURE: 78 MMHG

## 2021-07-22 DIAGNOSIS — M25.572 CHRONIC PAIN OF LEFT ANKLE: ICD-10-CM

## 2021-07-22 DIAGNOSIS — S93.491A TEAR OF TALOFIBULAR LIGAMENT, RIGHT, INITIAL ENCOUNTER: Primary | ICD-10-CM

## 2021-07-22 DIAGNOSIS — G89.29 CHRONIC PAIN OF LEFT ANKLE: ICD-10-CM

## 2021-07-22 DIAGNOSIS — S93.422D SPRAIN OF DELTOID LIGAMENT OF LEFT ANKLE, SUBSEQUENT ENCOUNTER: ICD-10-CM

## 2021-07-22 PROCEDURE — 99213 OFFICE O/P EST LOW 20 MIN: CPT | Performed by: PODIATRIST

## 2021-07-22 NOTE — PROGRESS NOTES
Do you currently have any of the following:    Fever: No  Headache:  No  Cough: No  Shortness of breath: No  Exposed to anyone with these symptoms: No         Jevon Tony presents to the [facility] 4/23/[facility]21. Darrel Munroe is complaining of pain left hip/buttocks The pain is constant. The pain is described as aching, throbbing, shooting and stabbing. Pain is rated on her best day at a 6, on her worst day at a 10, and on average at a 9 on the VAS scale. She took her last dose of Norco and Neurontin this am      Any procedures since your last visit:     Pacemaker or defibrillator: No managed by     She is not on NSAIDS and is not on anticoagulation medications to include none and is managed by     Medication Contract and Consent for Opioid Use Documents Filed     Patient Documents       Type of Document Status Date Received Received By Description     Medication Contract Received  Tyron Childs Opioid medication contract with Dr Reynaldo Blanca 3/24/20     Medication Contract Received 4/26/2018  3:50 PM [address] MANAGEMENT PATIENT AGREEMENT 4/26/2018     Medication Contract Received 11/5/2020  4:23 PM EBER HONG Opioid medication contract with Dr Reynaldo Blanca     Medication Contract Received 3/1/2021  1:26 PM EBER HONG Opioid medication contract with Dr Reynaldo Blanca                BP (!) 148/88   Pulse 102   Temp 98.5 °F (36.9 °C) (Temporal)   Resp 16   Ht 5' 5\" (1.651 m)   Wt 200 lb (90.7 kg)   LMP  (LMP Unknown)   SpO2 98%   BMI 33.28 kg/m²      No LMP recorded (lmp unknown).  Patient is postmenopausal. Tarah Torreso  1983  female  41 y.o. SUBJECTIVE:       Chief Complaint   Patient presents with    3 Month Follow-Up       HPI:  Follow-up visit for chronic problems. Patient have not had any of the blood work or his stool test done as ordered last visit. She also did not have CT scan of the abdomen pelvis for follow-up of adrenal mass. She still get occasional diarrhea. Patient denies any blood in the stool. Denies any loss of appetite or weight loss. She continues to suffer from recurrent hidradenitis suppurativa. She has been getting Humira for treatment of hidradenitis suppurativa  History of asthma. She uses albuterol couple of times a week. She continue to use Advair. History of multiple joint pain as well as chronic back pain. She is status post back injection. She reported no improvement of her chronic back pain. She continue to follow-up with Dr. Thania France. Past Medical History:   Diagnosis Date    Asthma     Depression     HS (hereditary spherocytosis) (HCC)     HTN (hypertension)     Thyroid disease      Past Surgical History:   Procedure Laterality Date     SECTION      OTHER SURGICAL HISTORY Right 2018    EXCISION OF SEBACEOUS CYST OF BACK UPPER NECK    PAIN MANAGEMENT PROCEDURE Right 2021    RIGHT L3 TRANSFORAMINAL EPIDURAL STEROID INJECTION WITH FLUOROSCOPY performed by Beth Dyer MD at [place] [address] History     Socioeconomic History    Marital status: Single     Spouse name: None    Number of children: 1    Years of education: None    Highest education level: None   Occupational History    Occupation: / mobile phone expert     Comment: [employer]/on [employer] 12 hr days   Tobacco Use    Smoking status: Current Every Day Smoker     Packs/day: 0.10     Types: Cigarettes     Start date: 1998    Smokeless tobacco: Never Used   Vaping Use    Vaping Use: Unknown   Substance and Sexual Activity    Alcohol use:  Yes    Drug use: No    Sexual activity: Yes     Partners: Male     Birth control/protection: I.U.D. Other Topics Concern    None   Social History Narrative    None     Social Determinants of Health     Financial Resource Strain:     Difficulty of Paying Living Expenses:    Food Insecurity:     Worried About Running Out of Food in the Last Year:     [address] in the Last Year:    Transportation Needs:     Lack of Transportation (Medical):  Lack of Transportation (Non-Medical):    Physical Activity:     Days of Exercise per Week:     Minutes of Exercise per Session:    Stress:     Feeling of Stress :    Social Connections:     Frequency of Communication with Friends and Family:     Frequency of Social Gatherings with Friends and Family:     Attends Latter-day Services:     Active Member of Clubs or Organizations:     Attends Club or Organization Meetings:     Marital Status:    Intimate Partner Violence:     Fear of Current or Ex-Partner:     Emotionally Abused:     Physically Abused:     Sexually Abused:      Family History   Adopted: Yes   Family history unknown: Yes       Review of Systems   Constitutional: Negative for activity change. HENT: Negative for trouble swallowing and voice change. Eyes: Negative for photophobia and visual disturbance. Respiratory: Negative for shortness of breath and wheezing. Cardiovascular: Negative for chest pain and palpitations. Gastrointestinal: Negative for blood in stool and rectal pain. Neurological: Negative for dizziness and light-headedness.        OBJECTIVE:  Pulse Readings from Last 4 Encounters:   07/22/21 94   05/20/21 74   04/16/21 80   02/26/20 87     Wt Readings from Last 4 Encounters:   07/22/21 228 lb 3.2 oz (103.5 kg)   05/20/21 232 lb (105.2 kg)   04/16/21 255 lb (115.7 kg)   01/06/21 235 lb (106.6 kg)     BP Readings from Last 4 Encounters:   07/22/21 132/86   05/20/21 98/72   04/16/21 118/88   02/26/20 110/63     Physical Exam  Vitals and nursing note reviewed. Constitutional:       Appearance: She is obese. Cardiovascular:      Rate and Rhythm: Normal rate and regular rhythm. Pulses: Normal pulses. Heart sounds: Normal heart sounds. Pulmonary:      Effort: Pulmonary effort is normal.      Breath sounds: No wheezing or rhonchi. Abdominal:      General: Bowel sounds are normal.      Palpations: There is no mass. Tenderness: There is no abdominal tenderness. There is no guarding or rebound. Musculoskeletal:      Right lower leg: No edema. Left lower leg: No edema.          CBC:   Lab Results   Component Value Date    WBC 9.3 02/07/2020    HGB 14.9 02/07/2020    HCT 43.9 02/07/2020     02/07/2020     CMP:  Lab Results   Component Value Date     10/22/2020    K 4.1 10/22/2020     10/22/2020    CO2 23 10/22/2020    ANIONGAP 10 10/22/2020    GLUCOSE 101 10/22/2020    BUN 10 10/22/2020    CREATININE 0.7 10/22/2020    GFRAA >60 10/22/2020    CALCIUM 9.7 10/22/2020    PROT 7.5 04/16/2021    LABALBU 4.6 04/16/2021    AGRATIO 1.5 01/15/2020    BILITOT <0.2 04/16/2021    ALKPHOS 85 04/16/2021    ALT 18 04/16/2021    AST 17 04/16/2021    GLOB 3.0 01/15/2020     URINALYSIS:  Lab Results   Component Value Date    GLUCOSEU neg 05/02/2018    GLUCOSEU Negative 01/15/2018    KETUA neg 05/02/2018    KETUA Negative 01/15/2018    SPECGRAV 1.020 05/02/2018    SPECGRAV 1.022 01/15/2018    BLOODU trace-lysed 05/02/2018    BLOODU Negative 01/15/2018    PHUR 7.0 05/02/2018    PHUR 6.0 01/15/2018    PROTEINU neg 05/02/2018    PROTEINU Negative 01/15/2018    NITRU POSITIVE 01/15/2018    LEUKOCYTESUR trace 05/02/2018    LEUKOCYTESUR TRACE 01/15/2018    LABMICR YES 01/15/2018    URINETYPE Voided 01/15/2018     MICRO/ALB:   Lab Results   Component Value Date    LABMICR YES 01/15/2018     LIPID:  Lab Results   Component Value Date    CHOL 154 10/22/2020    TRIG 157 10/22/2020    HDL 39 04/16/2021    LDLCALC 146 04/16/2021    LABVLDL 41 04/16/2021     TSH:   Lab Results   Component Value Date    TSHREFLEX 2.77 01/15/2018       ASSESSMENT/PLAN:  Assessment/Plan:  Shelby Gauthier was seen today for 3 month follow-up. Diagnoses and all orders for this visit:    Moderate persistent asthma without complication  Continue current Advair and as needed albuterol. Hyperlipidemia, unspecified hyperlipidemia type  Diet lifestyle changes. Continue Lipitor. Follow-up lipid profile  Diarrhea, unspecified type  -     BASIC METABOLIC PANEL; Future  -     C DIFF TOXIN/ANTIGEN; Future    Arthralgia of multiple joints    Depression, unspecified depression type  Patient feels her depression symptom is in remission. She will continue to take Prozac  -     BASIC METABOLIC PANEL; Future    Vitamin D deficiency  -     Cholecalciferol (VITAMIN D3) 1.25 MG (30255 UT) CAPS; Take 50,000 capsules by mouth once a week    She will schedule CT scan of the abdomen.       Orders Placed This Encounter   Procedures    C DIFF TOXIN/ANTIGEN     Standing Status:   Future     Standing Expiration Date:   7/22/2022    BASIC METABOLIC PANEL     Standing Status:   Future     Standing Expiration Date:   7/22/2022    Lipid, Fasting     Standing Status:   Future     Standing Expiration Date:   7/22/2022     Current Outpatient Medications   Medication Sig Dispense Refill    Cholecalciferol (VITAMIN D3) 1.25 MG (08203 UT) CAPS Take 50,000 capsules by mouth once a week 12 capsule 1    spironolactone (ALDACTONE) 50 MG tablet Take 50 mg by mouth 2 times daily      atorvastatin (LIPITOR) 20 MG tablet Take 1 tablet by mouth daily 30 tablet 5    albuterol sulfate HFA (VENTOLIN HFA) 108 (90 Base) MCG/ACT inhaler Inhale 2 puffs into the lungs every 6 hours as needed for Wheezing 1 Inhaler 5    fluticasone-salmeterol (ADVAIR) 250-50 MCG/DOSE AEPB Inhale 1 puff into the lungs every 12 hours 60 each 5    FLUoxetine (PROZAC) 20 MG capsule Take 1 capsule by mouth daily 30 capsule 5  tiZANidine (ZANAFLEX) 4 MG tablet TAKE 1 TABLET BY MOUTH THREE TIMES DAILY 90 tablet 5    naproxen (NAPROSYN) 500 MG tablet Take 1 tablet by mouth 2 times daily as needed for Pain 180 tablet 1    hydrOXYzine (ATARAX) 25 MG tablet Take 1 tablet by mouth every 8 hours as needed for Anxiety 40 tablet 1    melatonin 3 MG TABS tablet Take 1 tablet by mouth daily 90 tablet 3    nystatin (MYCOSTATIN) 440066 UNIT/GM powder Apply topically 4 times daily Apply topically 4 times daily. 1 Bottle 11    clindamycin (CLINDAGEL) 1 % gel Apply topically to affected areas twice a day      BENZOYL PEROXIDE 5 % external wash Apply 142 g topically daily      Levonorgestrel (MIRENA IU) by Intrauterine route.  adalimumab (HUMIRA PEN-PS/UV/ADOL HS START) 40 MG/0.8ML injection Inject 80 mg into the skin once for 1 dose Inject 80 mg SC x 3 doses on days 1, 2, 15, then 40 mg SC qwk on day 29. 2 each 0     No current facility-administered medications for this visit. Return in about 3 months (around 10/22/2021). An After Visit Summary was printed and given to the patient. Documentation was done using voice recognition dragon software. Every effort was made to ensure accuracy; however, inadvertent  Unintentional computerized transcription errors may be present.

## 2021-07-22 NOTE — PROGRESS NOTES
[facility]NAG[facility]  INSTRUCTIONS  . .......................................................................................................................................... [] Parking the day of Surgery is located in the [facility].   Upon entering the door, make immediate right into the surgery reception room    []  Bring photo ID and insurance card     [] You may have a light breakfast day of procedure    []  Wear loose comfortable clothing    []  Please follow instructions for medications as given per Dr's office     [] Stop blood thinners as per Dr's office instructions    [] You can expect a call the business day prior to procedure to notify you of your arrival time     [] Please arrange for     []  Other instructions

## 2021-07-22 NOTE — PROGRESS NOTES
21  Glenroy Donald : 1955 Sex: female  Age: 61 y.o. Patient was referred by Armond Umanzor MD    Chief Complaint   Patient presents with    Foot Pain     we are going over mri results today next week she is getting steriod inj in lumbar region       SUBJECTIVE patient is seen today for follow-up of left ankle pain this is chronic with no improvement with conservative treatment at this time. HPI  Review of Systems  Const: Denies constitutional symptoms  Musculo: Denies symptoms other than stated above  Skin: Denies symptoms other than stated above       Current Outpatient Medications:     HYDROcodone-acetaminophen (NORCO) 5-325 MG per tablet, Take 1 tablet by mouth 2 times daily for 30 days. , Disp: 60 tablet, Rfl: 0    gabapentin (NEURONTIN) 400 MG capsule, Take 1 capsule by mouth 3 times daily for 30 days. , Disp: 90 capsule, Rfl: 2    atorvastatin (LIPITOR) 40 MG tablet, Take 1 tablet by mouth daily, Disp: 90 tablet, Rfl: 3    FLUoxetine (PROZAC) 40 MG capsule, Take 1 capsule by mouth daily For mood. , Disp: 90 capsule, Rfl: 1    lisinopril (PRINIVIL;ZESTRIL) 30 MG tablet, Take 1 tablet by mouth every evening, Disp: 90 tablet, Rfl: 1    diclofenac sodium (VOLTAREN) 1 % GEL, Apply 4 g topically 2 times daily, Disp: 350 g, Rfl: 1    Misc. Devices (TRANSFER BENCH) MISC, 1 Units by Does not apply route daily, Disp: 1 each, Rfl: 0    Handicap Placard MISC, DX:  Loss of Balance, Chronic low back pain, s/p back surgery.   Duration of 5 years, Disp: 1 each, Rfl: 0    hydrOXYzine (VISTARIL) 25 MG capsule, Take 1 capsule by mouth 3 times daily as needed for Anxiety, Disp: 270 capsule, Rfl: 1  Allergies   Allergen Reactions    Pcn [Penicillins] Anaphylaxis       Past Medical History:   Diagnosis Date    Cancer ([facility] [address] 2019    LESION REMOVED UNDER TONGUE  DENTAL CLINIC    Chronic back pain     Costochondritis     h/o    Depression     Falls     Last fall 2021    Fibromyalgia     Hallux rigidus of left foot     HTN (hypertension)     Hyperlipidemia     CLYDE on CPAP     Osteoarthritis     \"everywhere\"    Rheumatoid arthritis ([address]     \"As a child. \"    Rheumatoid arthritis(714.0)     TIA (transient ischemic attack)     no deficits     Tongue lesion     for OR 10-21-     Use of cane as ambulatory aid      Past Surgical History:   Procedure Laterality Date    ANESTHESIA NERVE BLOCK Left 2019    LEFT C3,4,5 MBB performed by Cheryl Quarles MD at [address] Right 2019    BILATERAL TRANSFORAMINAL EPIDURAL STEROID INJECTION S1 #3 performed by Cheryl Quarles MD at [address] Right 2020    RIGHT SACROILIAC JOINT INJECTION      CPT: 11174 performed by Lynne Pinto DO at [address] [place] [address]      Left    ARTHRODESIS Left 2018    ARTHRODESIS 2ND DIGIT LEFT FOOT performed by Ga Soliz DPM at [address] [place] [address]  2017    PLIF L3-L4, L4-L5 with rods, screws, and cages/Dr. Hay Hughes BACK SURGERY  2020    BACK SURGERY Right 2021    RIGHT PERCUTANEOUS SACROILIAC JOINT FUSION performed by Selam Espinosa MD at [facility]      reduction, bilat     SECTION  1993    CHOLECYSTECTOMY      COLONOSCOPY  2015    COLONOSCOPY N/A 2020    COLONOSCOPY DIAGNOSTIC performed by Jose Antunez MD at [address] [place] [address]  2021    SI Joint    ENDOSCOPY, COLON, DIAGNOSTIC      EPIDURAL STEROID INJECTION N/A 2019    BILATERAL TRANSFORAMINAL EPIDURAL STEROID INJECTION S1 performed by Lynne Pinto DO at [place] [address]      Left    [address] / ANKLE Left 2018    REMOVAL OF PAINFUL HARDWARE LEFT FOOT  ++SYNTHES++ performed by Ga Soliz DPM at [facility]    inguinal     LUMBAR SPINE SURGERY N/A 2020    EXPLORATION OF PRIOR L3-L5 FUSION AND L2-L3  POSTERIOR LUMBAR INTERBODY FUSION -- NEEDS O-ARM, AUDIOLOGY, CAGES, PLATES, SCREWS, C-ARM, TERESA TABLE, CELL SAVER, PLATELET GEL -- GLOBUS performed by Wan Samuels MD at [address] N/A 10/21/2020    EXCISION OF TONGUE LESION performed by Guillermo Salguero DO at [address] [facility] [address] Bilateral 05/07/2018    L1-2 lumbar foramen #1    NERVE BLOCK Bilateral 12/03/2018    s1 tfesi    NERVE BLOCK Bilateral 08/12/2019    NERVE BLOCK Right 09/30/2019    sacral radiofrequency    NERVE BLOCK Right 09/01/2020    RIGHT SACROILIAC JOINT INJECTION #2 performed by Anthony Dempsey DO at [address] Left 09/25/2013    left foot tarso metatarsal joint injection    OTHER SURGICAL HISTORY Left 05/27/2015    Endoscopic Gastroc recession left, Lapidus left foot and  Excision of exostosis left foot    GA NJX AA&/STRD TFRML EPI LUMBAR/SACRAL 1 LEVEL Bilateral 12/03/2018    BILATERAL S1  EPIDURAL STEROID INJECTION performed by Quita Little MD at [address] [facility] DX/THER SBST INTRLMNR LMBR/SAC W/IMG GDN N/A 08/21/2018    EPIDURAL STEROID INJECTION L1-2 WITH LOW VOL performed by Quita Little MD at [address] [facility] NOSE/CLEFT LIP/TIP Bilateral 05/07/2018    BILATERAL L1-2 LUMBAR FORAMEN #1 performed by Quita Little MD at [address] [facility] NOSE/CLEFT LIP/TIP Bilateral 06/04/2018    BILATERAL TRANSFORAMINAL EPIDURAL STEROID INJECTION UNDER FLUOROSCOPIC GUIDANCE @ FORAMINAL LEVEL L1-2 #2 performed by Quita Little MD at [address] Right 09/30/2019    RIGHT SACRAL RADIOFREQUENCY ABLATION performed by Quita Little MD at [address] [facility] SURGERY  2000, 2005    Big Left Toe    TOE SURGERY Left 2018    2nd toe     TONSILLECTOMY      WISDOM TOOTH EXTRACTION       Family History   Problem Relation Age of Onset    Dementia Mother     Breast Cancer Mother     Cancer Mother         breast cancer [de-identified]     Stroke Mother     Heart Attack Father     Other Father 80        Aortic aneurysm    Cancer Brother     Diabetes Brother      Social History     Socioeconomic History    Marital status:      Spouse name: Not on file    Number of children: Not on file    Years of education: Not on file    Highest education level: Not on file   Occupational History    Not on file   Tobacco Use    Smoking status: Former Smoker     Packs/day: 0.25     Years: 30.00     Pack years: 7.50     Types: Cigarettes     Quit date: 10/5/2020     Years since quittin.7    Smokeless tobacco: Never Used    Tobacco comment: was going to try to stop but chantix is too expensive   Vaping Use    Vaping Use: Never used   Substance and Sexual Activity    Alcohol use: Not Currently     Alcohol/week: 0.0 standard drinks     Comment: rarely    Drug use: No    Sexual activity: Not on file   Other Topics Concern    Not on file   Social History Narrative    Not on file     Social Determinants of Health     Financial Resource Strain:     Difficulty of Paying Living Expenses:    Food Insecurity:     Worried About Running Out of Food in the Last Year:     Ran Out of Food in the Last Year:    Transportation Needs:     Lack of Transportation (Medical):      Lack of Transportation (Non-Medical):    Physical Activity:     Days of Exercise per Week:     Minutes of Exercise per Session:    Stress:     Feeling of Stress :    Social Connections:     Frequency of Communication with Friends and Family:     Frequency of Social Gatherings with Friends and Family:     Attends Cheondo[employer] Services:     Active Member of Clubs or Organizations:     Attends Club or Organization Meetings:     Marital Status:    Intimate Partner Violence:     Fear of Current or Ex-Partner:     Emotionally Abused:     Physically Abused:     Sexually Abused:        Vitals:    21 1045   BP: 139/78   Temp: 98.2 °F (36.8 °C)   TempSrc: Temporal       Focused Lower Extremity Physical Exam:  Vitals:    07/22/21 1045   BP: 139/78   Temp: 98.2 °F (36.8 °C)        Foot Exam    General  General Appearance: appears stated age and healthy   Orientation: alert and oriented to person, place, and time       Left Foot/Ankle      Inspection and Palpation  Skin Exam: skin intact; Neurovascular  Dorsalis pedis: 3+  Posterior tibial: 3+  Saphenous nerve sensation: normal  Tibial nerve sensation: normal  Superficial peroneal nerve sensation: normal  Deep peroneal nerve sensation: normal  Sural nerve sensation: normal    Range of Motion    Passive  Ankle dorsiflexion: pain  Plantar flexion: pain  Ankle eversion: pain    Active  Ankle dorsiflexion: pain  Plantar flexion: pain  Ankle eversion: pain  Ankle inversion: pain    Tests  Anterior drawer: positive              Left Ankle Exam     Tenderness   The patient is experiencing tenderness in the ATF, CF, deltoid and medial malleolus. Swelling: mild    Range of Motion   Dorsiflexion: abnormal   Plantar flexion: abnormal   Eversion: abnormal   Inversion: abnormal     Muscle Strength   The patient has normal left ankle strength. Tests   Anterior drawer: positive  Varus tilt: negative            Vascular: pulses  dp  pt    Capillary Refill Time:   Hair growth  Skin:    Edema:    Neurologic:      Musculoskeletal/ Orthopedic examination:    NAIL  Web space   Derm:      CT LUMBAR SPINE W CONTRAST    Result Date: 7/8/2021  EXAMINATION: CT OF THE LUMBAR SPINE WITH INTRATHECAL CONTRAST 7/8/2021 9:22 am: TECHNIQUE: CT of the lumbar spine was performed after the administration of intrathecal contrast with multiplanar reconstructed images provided for interpretation. Dose modulation, iterative reconstruction, and/or weight based adjustment of the mA/kV was utilized to reduce the radiation dose to as low as reasonably achievable. COMPARISON: CT lumbar spine performed 05/29/2021.  HISTORY: ORDERING SYSTEM PROVIDED HISTORY: Chronic midline low back pain with sciatica, sciatica laterality unspecified TECHNOLOGIST PROVIDED HISTORY: Reason for exam:->post myelogram CT What reading provider will be dictating this exam?->CRC FINDINGS: BONES/ALIGNMENT: There is minimal levocurvature of the lumbar spine. There is posterior fixation from L2-L4 without evidence for hardware complication. The vertebral body heights are maintained. There is minimal retrolisthesis of L2 on L3. SOFT TISSUES: The posterior paraspinal soft tissues are unremarkable. The visualized abdominal structures are unremarkable. The conus is normal in caliber and terminates at L1. The cauda equina is unremarkable. L1-L2: There is no significant disc protrusion, central spinal canal stenosis or neural foraminal narrowing. L2-L3: There is artificial disc material with posterior laminectomy. There is no canal stenosis or left foraminal narrowing. There is moderate right foraminal narrowing. L3-L4: There is artificial disc material and posterior laminectomy. There is no canal stenosis. There is mild bilateral foraminal narrowing. L4-L5: There is artificial disc material with posterior laminectomy. There is no canal stenosis. There is mild left and moderate right foraminal narrowing. L5-S1: There is a circumferential disc bulge with facet hypertrophy. There is no canal stenosis. There is moderate right and severe left foraminal narrowing. Posterior fixation and laminectomy from L2-L4 without evidence for hardware complication. Multilevel degenerative change with bilateral foraminal narrowing as described above.      FL MYELOGRAM LUMBOSACRAL S&I    Result Date: 7/8/2021  EXAMINATION: FLUOROSCOPIC LUMBAR MYELOGRAM 7/8/2021: HISTORY: ORDERING SYSTEM PROVIDED HISTORY: Chronic midline low back pain with sciatica, sciatica laterality unspecified TECHNOLOGIST PROVIDED HISTORY: What reading provider will be dictating this exam?->CRC FLUOROSCOPY DOSE AND TYPE OR TIME AND EXPOSURES: Images: 6 Fluoroscopic time: 1.3 minutes Total dose: 75 mGy PROCEDURE: : Krystina Dexter Informed consent was obtained after the risks and benefits of the procedure were discussed with the patient and all questions were answered fully. [place] protocol was observed and a standard timeout was performed. The patient was positioned prone and the back was prepped and draped in the normal sterile fashion. 1% lidocaine was used for local anesthesia. The subarachnoid space was accessed with a 22-gauge 3.5\" spinal needle at the L3 level. Free flow of clear CSF was noted. [id number] ml of Isovue-M 200 was injected into the intrathecal space under fluoroscopic visualization. The stylet was reinserted, spinal needle was removed and brief pressure was applied at the puncture site. There were no immediate complications and the patient tolerated the procedure well. 1.  Successful lumbar myelogram with fluoroscopy. 2.  Postmyelogram lumbar CT to follow and reported separately. MRI ANKLE LEFT WO CONTRAST    Result Date: 7/16/2021  EXAMINATION: MRI OF THE LEFT ANKLE WITHOUT CONTRAST, 7/16/2021 12:49 pm TECHNIQUE: Multiplanar multisequence MRI of the left ankle was performed without the administration of intravenous contrast. COMPARISON: Left ankle plain radiographs from 02/09/2021 HISTORY: ORDERING SYSTEM PROVIDED HISTORY: Chronic pain of left ankle 44-year-old female with chronic left ankle pain FINDINGS: SYNDESMOTIC LIGAMENTS:  The anterior-inferior tibiofibular ligament, interosseous membrane and posterior-inferior tibiofibular ligaments are normal. LATERAL COLLATERAL LIGAMENT COMPLEX: Age-indeterminate complete ATFL tear. Posterior talofibular ligament and calcaneofibular ligament appear intact. DELTOID LIGAMENT COMPLEX: Evidence of remote partial tearing of the medial deltoid ligament complex.  SINUS TARSI AND SPRING LIGAMENT:  The fat plug within the sinus tarsi is preserved and the interosseous and cervical ligaments are normal.  The navicular-calcaneal (spring) ligament is without acute abnormality. Lisfranc ligament complex appears intact. MEDIAL TENDONS: The posterior tibialis, flexor digitorum longus and flexor hallucis longus tendons are intact. LATERAL TENDONS:  The peroneus longus and brevis tendons are intact. ANTERIOR TENDONS: The tibialis anterior, extensor hallucis longus and extensor digitorum longus tendons are normal in position, morphology and signal. ACHILLES TENDON: The Achilles tendon is normal in position, morphology and signal.  No associated bursitis. PLANTAR FASCIA: Mild plantar calcaneal spur. Intermediate T1 signal thickening of the central cord plantar fascia. No discrete plantar fascial tear. TARSAL TUNNEL: There are no obstructing lesions within the tarsal tunnel. BONE MARROW: Postsurgical changes and susceptibility artifact from hardware and prior fusion of the medial cuneiform-1st metatarsal articulation. Susceptibility artifact from suture anchors in the distal fibula/lateral malleolus and lateral aspect of the talus. No osteochondral lesion or defect at the talar dome. Bone marrow signal intensity within the remaining visualized osseous structures otherwise grossly unremarkable. No tarsal coalition. No marginal erosions. JOINT SPACES: Mild degenerative changes of the TMT joints, tibiotalar and subtalar joints as well as the midfoot. No tibiotalar, subtalar or intertarsal joint effusion. SOFT TISSUES: Mild edema in the subcutaneous fat about the ankle. No discrete organized fluid collection. 1. Susceptibility artifact from postsurgical changes in the distal fibula/lateral malleolus and lateral aspect of the talus as well as prior medial cuneiform-1st metatarsal related to prior arthrodesis. The suture anchors in the lateral malleolus and lateral talus could be related to prior ATFL repair surgery. 2. Mild diffuse degenerative changes as detailed above. 3. Mild plantar calcaneal spur.   Mild central cord plantar fasciitis. 4. Age-indeterminate, likely remote complete ATFL tear. Evidence of remote partial tearing of the medial deltoid ligament complex. Assessment and Plan: reviewed mri with patient surgical correction in future   Repair atf ligament   And deltoid ligament    Rory Judd was seen today for foot pain. Diagnoses and all orders for this visit:    Tear of talofibular ligament, right, initial encounter    Chronic pain of left ankle    Sprain of deltoid ligament of left ankle, subsequent encounter        Return in about 4 weeks (around 8/19/2021). Seen By:  Lucio Alfaro DPM      Document was created using voice recognition software. Note was reviewed, however may contain grammatical errors.

## 2021-07-22 NOTE — PROGRESS NOTES
Have you been tested for COVID  Yes           Have you been told you were positive for COVID No  Have you had any known exposure to someone that is positive for COVID No  Do you have a cough                   No              Do you have shortness of breath No                 Do you have a sore throat            No                Are you having chills                    No                Are you having muscle aches. No                    Please come to the hospital wearing a mask and have your significant other wear a mask as well. Both of you should check your temperature before leaving to come here,  if it is 100 or higher please call [phone] for instruction.

## 2021-07-26 ENCOUNTER — OFFICE VISIT (OUTPATIENT)
Dept: PAIN MANAGEMENT | Age: 66
End: 2021-07-26

## 2021-07-26 VITALS
SYSTOLIC BLOOD PRESSURE: 152 MMHG | WEIGHT: 220 LBS | RESPIRATION RATE: 16 BRPM | OXYGEN SATURATION: 94 % | DIASTOLIC BLOOD PRESSURE: 100 MMHG | HEART RATE: 100 BPM | TEMPERATURE: 98.5 F | HEIGHT: 65 IN | BODY MASS INDEX: 36.65 KG/M2

## 2021-07-26 DIAGNOSIS — G89.29 CHRONIC BILATERAL LOW BACK PAIN WITHOUT SCIATICA: ICD-10-CM

## 2021-07-26 DIAGNOSIS — M79.10 MYALGIA: ICD-10-CM

## 2021-07-26 DIAGNOSIS — M51.36 DDD (DEGENERATIVE DISC DISEASE), LUMBAR: ICD-10-CM

## 2021-07-26 DIAGNOSIS — M47.812 CERVICAL FACET JOINT SYNDROME: ICD-10-CM

## 2021-07-26 DIAGNOSIS — G89.29 CHRONIC MIDLINE LOW BACK PAIN WITH SCIATICA, SCIATICA LATERALITY UNSPECIFIED: ICD-10-CM

## 2021-07-26 DIAGNOSIS — M54.40 CHRONIC MIDLINE LOW BACK PAIN WITH SCIATICA, SCIATICA LATERALITY UNSPECIFIED: ICD-10-CM

## 2021-07-26 DIAGNOSIS — M47.816 LUMBAR FACET ARTHROPATHY: ICD-10-CM

## 2021-07-26 DIAGNOSIS — G89.4 CHRONIC PAIN SYNDROME: Primary | ICD-10-CM

## 2021-07-26 DIAGNOSIS — M54.50 CHRONIC BILATERAL LOW BACK PAIN WITHOUT SCIATICA: ICD-10-CM

## 2021-07-26 DIAGNOSIS — M48.061 SPINAL STENOSIS OF LUMBAR REGION WITHOUT NEUROGENIC CLAUDICATION: ICD-10-CM

## 2021-07-26 DIAGNOSIS — M54.16 LUMBAR RADICULOPATHY: ICD-10-CM

## 2021-07-26 DIAGNOSIS — G89.29 OTHER CHRONIC PAIN: ICD-10-CM

## 2021-07-26 PROCEDURE — 99213 OFFICE O/P EST LOW 20 MIN: CPT | Performed by: PHYSICIAN ASSISTANT

## 2021-07-26 RX ORDER — HYDROCODONE BITARTRATE AND ACETAMINOPHEN 5; 325 MG/1; MG/1
1 TABLET ORAL 2 TIMES DAILY
Qty: 60 TABLET | Refills: 0 | Status: SHIPPED
Start: 2021-07-28 | End: 2021-08-23 | Stop reason: SDUPTHER

## 2021-07-26 NOTE — PROGRESS NOTES
Do you currently have any of the following:    Fever: No  Headache:  No  Cough: No  Shortness of breath: No  Exposed to anyone with these symptoms: No         Ryan Garg presents to the [address] [place] [address] on 7/26/2021. [place] is complaining of pain lower back pain that radiates into the left leg. The pain is constant. The pain is described as aching, throbbing and stabbing. Pain is rated on her best day at a 3, on her worst day at a 10, and on average at a 8 on the VAS scale. She took her last dose of Norco .     Any procedures since your last visit: No, with  % relief. Pacemaker or defibrillator: No managed by . She is not on NSAIDS and is not on anticoagulation medications to include none and is managed by . Medication Contract and Consent for Opioid Use Documents Filed     Patient Documents       Type of Document Status Date Received Received By Description     Medication Contract Received  Michelle Christianson Opioid medication contract with Dr Jl Balderrama 3/24/20     Medication Contract Received 4/26/2018  3:50 PM ANYA NUNO PAIN MANAGEMENT PATIENT AGREEMENT 4/26/2018     Medication Contract Received 11/5/2020  4:23 PM EBER HONG Opioid medication contract with Dr Jl Balderrama     Medication Contract Received 3/1/2021  1:26 PM EBER HONG Opioid medication contract with Dr Jl Balderrama                BP (!) 152/100   Pulse 100   Temp 98.5 °F (36.9 °C) (Infrared)   Resp 16   Ht 5' 5\" (1.651 m)   Wt 220 lb (99.8 kg)   LMP  (LMP Unknown)   SpO2 94%   BMI 36.61 kg/m²      No LMP recorded (lmp unknown).  Patient is postmenopausal.

## 2021-07-26 NOTE — PROGRESS NOTES
[address]  [address]  [facility]    Follow up Note      Jm Devi     Date of Visit:  2021    CC:  Patient presents for follow up   Chief Complaint   Patient presents with    Follow-up    Lower Back Pain    Leg Pain     left leg       HPI:    Pain is unchanged to left lower back and leg. Scheduled for an epidural tomorrow. Change in quality of symptoms:no. Patient satisfaction with analgesia:fair. Medication side effects: None. Recent diagnostic testing:none. Recent interventional procedures: None. She has been on anticoagulation medications to include NSAIDS. The patient  has not been on herbal supplements. The patient is diabetic. Imagin2021 CT lumbar myelogram    FINDINGS:   BONES/ALIGNMENT: There is minimal levocurvature of the lumbar spine.  There   is posterior fixation from L2-L4 without evidence for hardware complication.    The vertebral body heights are maintained.  There is minimal retrolisthesis   of L2 on L3.       SOFT TISSUES: The posterior paraspinal soft tissues are unremarkable.  The   visualized abdominal structures are unremarkable.  The conus is normal in   caliber and terminates at L1.  The cauda equina is unremarkable.       L1-L2: There is no significant disc protrusion, central spinal canal stenosis   or neural foraminal narrowing.       L2-L3: There is artificial disc material with posterior laminectomy.  There   is no canal stenosis or left foraminal narrowing.  There is moderate right   foraminal narrowing.       L3-L4: There is artificial disc material and posterior laminectomy.  There is   no canal stenosis.  There is mild bilateral foraminal narrowing.       L4-L5: There is artificial disc material with posterior laminectomy.  There   is no canal stenosis.  There is mild left and moderate right foraminal   narrowing.       L5-S1: There is a circumferential disc bulge with facet hypertrophy. Jose Alberto Casas   is no canal stenosis.  There is moderate right and severe left foraminal   narrowing.           Impression   Posterior fixation and laminectomy from L2-L4 without evidence for hardware   complication.       Multilevel degenerative change with bilateral foraminal narrowing as   described above.                 MRI lumbar spine 2018  1. No significant interval change is observed since the previous study   of 2017.       2. Stable postoperative changes/posterior spinal fusion at the   L3-L4-L5 level.       3. Severe spine canal stenosis in the level of L2-L3           4. Encroachment of the neural foramina bilaterally in levels of L2-L3   and L5-S1      Thoracic spine MRI 2018  1. Some degenerative changes in the thoracic spine, mainly in the   facet joints in the mid-upper thoracic spine segments       2. Discrete loss of height of T6, more likely an old finding.      Previous treatments: Physical Therapy, Surgery L3-5 fusion/laminectomy and medications. .       Potential Aberrant Drug-Related Behavior:  No     Urine Drug Screenin18:  Consistent for norhydrocodone metabolite  2018:  Consistent for hydrocodone and norhydrocodone  05/10/2019:  Consistent for hydrocodone and norhydrocodone  2019:  Consistent for hydrocodone and norhydrocodone  01/10/2020:  Consistent for hydrocodone and norhydrocodone  2020:  Consistent for oxycodone and metabolites s/p surgery. Inconsistent for hydrocodone. States that she was instructed to take her old norco until she made the appointment to resume her chronic pain medications. 10/2020:  Consistent     OARRS report:  2018 consistent to 2021 consistent (norco scripts from [facility] post op 3/10/2021 and 3/24/2021.    [place] script through [facility] - last one 2021 - consistent     Opioid agreement:  2020      Past Medical History:   Diagnosis Date    Cancer ([facility] [address] 2019    LESION REMOVED UNDER [facility]  DENTAL [facility]    Chronic back pain     Costochondritis     h/o    Depression     Falls     Last fall 2021    Fibromyalgia     Hallux rigidus of left foot     HTN (hypertension)     Hyperlipidemia     CLYDE on CPAP     Osteoarthritis     \"everywhere\"    Rheumatoid arthritis ([address]     \"As a child. \"    Rheumatoid arthritis(714.0)     TIA (transient ischemic attack)     no deficits     Use of cane as ambulatory aid        Past Surgical History:   Procedure Laterality Date    ANESTHESIA NERVE BLOCK Left 2019    LEFT C3,4,5 MBB performed by Manny Bo MD at [address] Right 2019    BILATERAL TRANSFORAMINAL EPIDURAL STEROID INJECTION S1 #3 performed by Manny Bo MD at [address] Right 2020    RIGHT SACROILIAC JOINT INJECTION      CPT: 42283 performed by Kalyn Pearson DO at [address] [facility]  2005    Left    ARTHRODESIS Left 2018    ARTHRODESIS 2ND DIGIT LEFT FOOT performed by King Lorena DPM at [address] [place] [address]  2017    PLIF L3-L4, L4-L5 with rods, screws, and cages/Dr. Eliceo Fink BACK SURGERY  2020    BACK SURGERY Right 2021    RIGHT PERCUTANEOUS SACROILIAC JOINT FUSION performed by Ravinder Tierney MD at [facility]      reduction, bilat     SECTION  1993    CHOLECYSTECTOMY      COLONOSCOPY  2015    COLONOSCOPY N/A 2020    COLONOSCOPY DIAGNOSTIC performed by Balaji Borges MD at [address] [place] [address]  2021    SI Joint    ENDOSCOPY, COLON, DIAGNOSTIC      EPIDURAL STEROID INJECTION N/A 2019    BILATERAL TRANSFORAMINAL EPIDURAL STEROID INJECTION S1 performed by Kalyn Pearson DO at [facility] [address]      Left    [address] / ANKLE Left 2018    REMOVAL OF PAINFUL HARDWARE LEFT FOOT  ++SYNTHES++ performed by King Lorena DPM at [facility]    inguinal     LUMBAR SPINE SURGERY N/A 03/04/2020    EXPLORATION OF PRIOR L3-L5 FUSION AND L2-L3  POSTERIOR LUMBAR INTERBODY FUSION -- NEEDS O-ARM, AUDIOLOGY, CAGES, PLATES, SCREWS, C-ARM, TERESA TABLE, CELL SAVER, PLATELET GEL -- GLOBUS performed by Payal Denis MD at [address] [employer] N/A 10/21/2020    EXCISION OF TONGUE LESION performed by Carla Kay DO at [facility] Bilateral 05/07/2018    L1-2 lumbar foramen #1    NERVE BLOCK Bilateral 12/03/2018    s1 tfesi    NERVE BLOCK Bilateral 08/12/2019    NERVE BLOCK Right 09/30/2019    sacral radiofrequency    NERVE BLOCK Right 09/01/2020    RIGHT SACROILIAC JOINT INJECTION #2 performed by Yolanda Woods DO at [address] Left 09/25/2013    left foot tarso metatarsal joint injection    OTHER SURGICAL HISTORY Left 05/27/2015    Endoscopic Gastroc recession left, Lapidus left foot and  Excision of exostosis left foot    NM NJX AA&/STRD TFRML EPI LUMBAR/SACRAL 1 LEVEL Bilateral 12/03/2018    BILATERAL S1  EPIDURAL STEROID INJECTION performed by Raman Niño MD at [address] [facility] DX/THER SBST INTRLMNR LMBR/SAC W/IMG GDN N/A 08/21/2018    EPIDURAL STEROID INJECTION L1-2 WITH LOW VOL performed by Raman Niño MD at [address] [facility] NOSE/CLEFT LIP/TIP Bilateral 05/07/2018    BILATERAL L1-2 LUMBAR FORAMEN #1 performed by Raman Niño MD at [address] [facility] NOSE/CLEFT LIP/TIP Bilateral 06/04/2018    BILATERAL TRANSFORAMINAL EPIDURAL STEROID INJECTION UNDER FLUOROSCOPIC GUIDANCE @ FORAMINAL LEVEL L1-2 #2 performed by Raman Niño MD at [address] Right 09/30/2019    RIGHT SACRAL RADIOFREQUENCY ABLATION performed by Raman Niño MD at [address]  2000, 2005    Big Left Toe    TOE SURGERY Left 2018    2nd toe     TONSILLECTOMY      WISDOM TOOTH EXTRACTION         Prior to Admission medications    Medication Sig Start Date End Date Taking?  Authorizing Provider   HYDROcodone-acetaminophen (NORCO) 5-325 MG per tablet Take 1 tablet by mouth 2 times daily for 30 days. 21 Yes TREASURE Reid   gabapentin (NEURONTIN) 400 MG capsule Take 1 capsule by mouth 3 times daily for 30 days. 21 Yes TREASURE Reid   atorvastatin (LIPITOR) 40 MG tablet Take 1 tablet by mouth daily 21  Yes Noah Ruiz MD   FLUoxetine (PROZAC) 40 MG capsule Take 1 capsule by mouth daily For mood. 21  Yes Noah Ruiz MD   lisinopril (PRINIVIL;ZESTRIL) 30 MG tablet Take 1 tablet by mouth every evening 21  Yes Noah Ruiz MD   Misc. Devices (TRANSFER BENCH) MISC 1 Units by Does not apply route daily 3/29/21  Yes Jose Coronel PA-C   Handicap Placard MISC DX:  Loss of Balance, Chronic low back pain, s/p back surgery.    Duration of 5 years 21  Yes Noah Ruiz MD   hydrOXYzine (VISTARIL) 25 MG capsule Take 1 capsule by mouth 3 times daily as needed for Anxiety 20  Yes Noah Ruiz MD   diclofenac sodium (VOLTAREN) 1 % GEL Apply 4 g topically 2 times daily  Patient not taking: Reported on 2021 3/31/21   Alvera Knife, DPM       Allergies   Allergen Reactions    Pcn [Penicillins] Anaphylaxis       Social History     Socioeconomic History    Marital status:      Spouse name: Not on file    Number of children: Not on file    Years of education: Not on file    Highest education level: Not on file   Occupational History    Not on file   Tobacco Use    Smoking status: Current Some Day Smoker     Packs/day: 0.25     Years: 30.00     Pack years: 7.50     Types: Cigarettes     Last attempt to quit: 10/5/2020     Years since quittin.8    Smokeless tobacco: Never Used    Tobacco comment: was going to try to stop but chantix is too expensive   Vaping Use    Vaping Use: Never used   Substance and Sexual Activity    Alcohol use: Not Currently     Alcohol/week: 0.0 standard drinks     Comment: rarely    Drug use: No    Sexual activity: Not on file   Other Topics Concern    Not on file   Social History Narrative    Not on file     Social Determinants of Health     Financial Resource Strain:     Difficulty of Paying Living Expenses:    Food Insecurity:     Worried About Running Out of Food in the Last Year:     [address] in the Last Year:    Transportation Needs:     Lack of Transportation (Medical):  Lack of Transportation (Non-Medical):    Physical Activity:     Days of Exercise per Week:     Minutes of Exercise per Session:    Stress:     Feeling of Stress :    Social Connections:     Frequency of Communication with Friends and Family:     Frequency of Social Gatherings with Friends and Family:     Attends Yazidi Services:     Active Member of Clubs or Organizations:     Attends Club or Organization Meetings:     Marital Status:    Intimate Partner Violence:     Fear of Current or Ex-Partner:     Emotionally Abused:     Physically Abused:     Sexually Abused:        Family History   Problem Relation Age of Onset    Dementia Mother     Breast Cancer Mother     Cancer Mother         breast cancer [de-identified]     Stroke Mother     Heart Attack Father     Other Father 80        Aortic aneurysm    Cancer Brother     Diabetes Brother        REVIEW OF SYSTEMS:     Kathy Settles denies fever/chills, chest pain, shortness of breath, new bowel or bladder complaints or suicidal ideations. All other review of systems was negative. PHYSICAL EXAMINATION:      BP (!) 152/100   Pulse 100   Temp 98.5 °F (36.9 °C) (Infrared)   Resp 16   Ht 5' 5\" (1.651 m)   Wt 220 lb (99.8 kg)   LMP  (LMP Unknown)   SpO2 94%   BMI 36.61 kg/m²     General:      General appearance: awake, alert, oriented, in no acute distress, well developed, well nourished and in no acute distress   pleasant and well-hydrated.     in no distress and A & O x3  Build:Overweight  Function:Rises from a seated position with difficulty    HEENT:    Head:normocephalic and atraumatic  Pupils:regular, round and equal.  Sclera: icterus absent  EOM:full and intact. Lungs:    Breathing:Normal expansion. No  wheezing. Abdomen:    Shape:non-distended and normal    Lumbar spine:     Range of motion:abnormal moderately Lateral bending, flexion, extension rotation bilateral and is mildly painful. Extremities:    Tremors:None bilaterally upper and lower  Range of motion:Generally normal shoulders  Intact:Yes  Cyanosis:none  Edema:Normal      Neurological:    Cranial nerves:normal  Sensory:diminished to light lateral aspect of right leg                   Dermatology:    Skin:no unusual rashes, no skin lesions, no palpable subcutaneous nodules and good skin turgor    Assessment/Plan:      Chronic low back pain with radiation to the Right groin, patient had low back surgery 08/2017 L3-5 fusion, recently had lumbar spine CT with severe L2-3 stenosis     Plan:  Patient is s/p revision fusion L2-L4 on 3/4/2020. Patient is s/p:  Right sacroiliac joint fusion with use of SI-BONE iFuse implant on 3/9/2021 by Dr. Curt Araujo. Continue norco 5/325 but will return to chronic dosing BID. Continue with Gabapentin 400 mg TID  Normally goes through her PCP. She reports that any increases make her off balance. States that she is taking BID. She is scheduled for a left L5 S1 TFESI tomorrow. OARRS report reviewed 07/2021  UDS ordered today  Patient encouraged to stay active and to lose weight. Failed physical therapy  Treatment plan discussed with the patient including medications side effects     Controlled Substance Monitoring:    Acute and Chronic Pain Monitoring:   RX Monitoring 7/26/2021   Attestation -   Acute Pain Prescriptions -   Periodic Controlled Substance Monitoring Possible medication side effects, risk of tolerance/dependence & alternative treatments discussed. ;No signs of potential drug abuse or diversion identified. ;Assessed functional status. ;Obtaining appropriate analgesic effect of treatment. ;Random urine drug screen sent today.    Chronic Pain > 80 MEDD -                   ccreferring physicf

## 2021-07-27 ENCOUNTER — HOSPITAL ENCOUNTER (OUTPATIENT)
Dept: GENERAL RADIOLOGY | Age: 66
Setting detail: OUTPATIENT SURGERY
Discharge: HOME | End: 2021-07-29
Attending: PAIN MEDICINE

## 2021-07-27 ENCOUNTER — HOSPITAL ENCOUNTER (OUTPATIENT)
Age: 66
Setting detail: OUTPATIENT SURGERY
Discharge: HOME | End: 2021-07-27
Attending: PAIN MEDICINE | Admitting: PAIN MEDICINE
Payer: OTHER GOVERNMENT

## 2021-07-27 VITALS
OXYGEN SATURATION: 95 % | RESPIRATION RATE: 18 BRPM | SYSTOLIC BLOOD PRESSURE: 152 MMHG | WEIGHT: 220 LBS | TEMPERATURE: 97.8 F | DIASTOLIC BLOOD PRESSURE: 72 MMHG | BODY MASS INDEX: 36.65 KG/M2 | HEART RATE: 92 BPM | HEIGHT: 65 IN

## 2021-07-27 DIAGNOSIS — R52 PAIN MANAGEMENT: ICD-10-CM

## 2021-07-27 PROCEDURE — 3209999900 FLUORO FOR SURGICAL PROCEDURES

## 2021-07-27 PROCEDURE — 7100000011 HC PHASE II RECOVERY - ADDTL 15 MIN: Performed by: PAIN MEDICINE

## 2021-07-27 PROCEDURE — 2709999900 HC NON-CHARGEABLE SUPPLY: Performed by: PAIN MEDICINE

## 2021-07-27 PROCEDURE — 6360000004 HC RX CONTRAST MEDICATION: Performed by: PAIN MEDICINE

## 2021-07-27 PROCEDURE — 7100000010 HC PHASE II RECOVERY - FIRST 15 MIN: Performed by: PAIN MEDICINE

## 2021-07-27 PROCEDURE — 6360000002 HC RX W HCPCS: Performed by: PAIN MEDICINE

## 2021-07-27 PROCEDURE — 64484 NJX AA&/STRD TFRM EPI L/S EA: CPT | Performed by: PAIN MEDICINE

## 2021-07-27 PROCEDURE — 2500000003 HC RX 250 WO HCPCS: Performed by: PAIN MEDICINE

## 2021-07-27 PROCEDURE — 3600000002 HC SURGERY LEVEL 2 BASE: Performed by: PAIN MEDICINE

## 2021-07-27 PROCEDURE — 64483 NJX AA&/STRD TFRM EPI L/S 1: CPT | Performed by: PAIN MEDICINE

## 2021-07-27 RX ORDER — LIDOCAINE HYDROCHLORIDE 5 MG/ML
INJECTION, SOLUTION INFILTRATION; INTRAVENOUS PRN
Status: DISCONTINUED | OUTPATIENT
Start: 2021-07-27 | End: 2021-07-27 | Stop reason: ALTCHOICE

## 2021-07-27 RX ORDER — METHYLPREDNISOLONE ACETATE 40 MG/ML
INJECTION, SUSPENSION INTRA-ARTICULAR; INTRALESIONAL; INTRAMUSCULAR; SOFT TISSUE PRN
Status: DISCONTINUED | OUTPATIENT
Start: 2021-07-27 | End: 2021-07-27 | Stop reason: ALTCHOICE

## 2021-07-27 ASSESSMENT — PAIN SCALES - GENERAL: PAINLEVEL_OUTOF10: 7

## 2021-07-27 ASSESSMENT — PAIN DESCRIPTION - DESCRIPTORS: DESCRIPTORS: ACHING

## 2021-07-27 ASSESSMENT — PAIN DESCRIPTION - PAIN TYPE: TYPE: CHRONIC PAIN

## 2021-07-27 ASSESSMENT — PAIN DESCRIPTION - LOCATION: LOCATION: BACK

## 2021-07-27 ASSESSMENT — PAIN - FUNCTIONAL ASSESSMENT: PAIN_FUNCTIONAL_ASSESSMENT: 0-10

## 2021-07-27 NOTE — H&P
JOINT FUSION  03/2021    SI Joint    ENDOSCOPY, COLON, DIAGNOSTIC      EPIDURAL STEROID INJECTION N/A 06/04/2019    BILATERAL TRANSFORAMINAL EPIDURAL STEROID INJECTION S1 performed by Manuela Dance, DO at [address]      Left    [address] / ANKLE Left 12/14/2018    REMOVAL OF PAINFUL HARDWARE LEFT FOOT  ++SYNTHES++ performed by Tre Peters DPM at [facility] [address]    inguinal     LUMBAR SPINE SURGERY N/A 03/04/2020    EXPLORATION OF PRIOR L3-L5 FUSION AND L2-L3  POSTERIOR LUMBAR INTERBODY FUSION -- NEEDS O-ARM, AUDIOLOGY, CAGES, PLATES, SCREWS, C-ARM, TERESA TABLE, CELL SAVER, PLATELET GEL -- GLOBUS performed by Jr Lockett MD at [address] N/A 10/21/2020    EXCISION OF TONGUE LESION performed by Harjit Vogel DO at [address] [facility] [address] Bilateral 05/07/2018    L1-2 lumbar foramen #1    NERVE BLOCK Bilateral 12/03/2018    s1 tfesi    NERVE BLOCK Bilateral 08/12/2019    NERVE BLOCK Right 09/30/2019    sacral radiofrequency    NERVE BLOCK Right 09/01/2020    RIGHT SACROILIAC JOINT INJECTION #2 performed by Manuela Dance, DO at [address] Left 09/25/2013    left foot tarso metatarsal joint injection    OTHER SURGICAL HISTORY Left 05/27/2015    Endoscopic Gastroc recession left, Lapidus left foot and  Excision of exostosis left foot    [place] NJX AA&/STRD TFRML EPI LUMBAR/SACRAL 1 LEVEL Bilateral 12/03/2018    BILATERAL S1  EPIDURAL STEROID INJECTION performed by Jolynn Shipley MD at [address] [facility] DX/THER SBST INTRLMNR LMBR/SAC W/IMG GDN N/A 08/21/2018    EPIDURAL STEROID INJECTION L1-2 WITH LOW VOL performed by Jolynn Shipley MD at [address] [facility] NOSE/CLEFT LIP/TIP Bilateral 05/07/2018    BILATERAL L1-2 LUMBAR FORAMEN #1 performed by Jolynn Shipley MD at [address] [facility] NOSE/CLEFT LIP/TIP Bilateral 06/04/2018    BILATERAL TRANSFORAMINAL EPIDURAL STEROID INJECTION UNDER FLUOROSCOPIC GUIDANCE @ FORAMINAL LEVEL L1-2 #2 performed by Anne De MD at [address] [place] [address] [place] Right 09/30/2019    RIGHT SACRAL RADIOFREQUENCY ABLATION performed by Anne De MD at [address]  2000, 2005    Big Left Toe    TOE SURGERY Left 2018    2nd toe     TONSILLECTOMY      WISDOM TOOTH EXTRACTION         Prior to Admission medications    Medication Sig Start Date End Date Taking? Authorizing Provider   HYDROcodone-acetaminophen (NORCO) 5-325 MG per tablet Take 1 tablet by mouth 2 times daily for 30 days. 7/28/21 8/27/21 Yes TREASURE Frazier   gabapentin (NEURONTIN) 400 MG capsule Take 1 capsule by mouth 3 times daily for 30 days. 6/28/21 7/28/21 Yes TREASURE Frazier   atorvastatin (LIPITOR) 40 MG tablet Take 1 tablet by mouth daily 5/13/21  Yes Velasquez Guevara MD   FLUoxetine (PROZAC) 40 MG capsule Take 1 capsule by mouth daily For mood. 4/26/21  Yes Velasquez Guevara MD   lisinopril (PRINIVIL;ZESTRIL) 30 MG tablet Take 1 tablet by mouth every evening 4/26/21  Yes Velasquez Guevara MD   hydrOXYzine (VISTARIL) 25 MG capsule Take 1 capsule by mouth 3 times daily as needed for Anxiety 9/11/20  Yes Velasquez Guevara MD   diclofenac sodium (VOLTAREN) 1 % GEL Apply 4 g topically 2 times daily  Patient not taking: Reported on 7/26/2021 3/31/21   Romie Alan DPM   Misc. Devices (TRANSFER BENCH) MISC 1 Units by Does not apply route daily 3/29/21   Cony Dennis PA-C   Handicap Placard MISC DX:  Loss of Balance, Chronic low back pain, s/p back surgery.    Duration of 5 years 2/22/21   Velasquez Guevara MD       Allergies   Allergen Reactions    Pcn [Penicillins] Anaphylaxis       Social History     Socioeconomic History    Marital status:      Spouse name: Not on file    Number of children: Not on file    Years of education: Not on file    Highest education level: Not on file   Occupational History    Not on file Tobacco Use    Smoking status: Current Some Day Smoker     Packs/day: 0.25     Years: 30.00     Pack years: 7.50     Types: Cigarettes     Last attempt to quit: 10/5/2020     Years since quittin.8    Smokeless tobacco: Never Used    Tobacco comment: was going to try to stop but chantix is too expensive   Vaping Use    Vaping Use: Never used   Substance and Sexual Activity    Alcohol use: Not Currently     Alcohol/week: 0.0 standard drinks     Comment: rarely    Drug use: No    Sexual activity: Not on file   Other Topics Concern    Not on file   Social History Narrative    Not on file     Social Determinants of Health     Financial Resource Strain:     Difficulty of Paying Living Expenses:    Food Insecurity:     Worried About Running Out of Food in the Last Year:     Titi of Food in the Last Year:    Transportation Needs:     Lack of Transportation (Medical):      Lack of Transportation (Non-Medical):    Physical Activity:     Days of Exercise per Week:     Minutes of Exercise per Session:    Stress:     Feeling of Stress :    Social Connections:     Frequency of Communication with Friends and Family:     Frequency of Social Gatherings with Friends and Family:     Attends Hindu Services:     Active Member of Clubs or Organizations:     Attends Club or Organization Meetings:     Marital Status:    Intimate Partner Violence:     Fear of Current or Ex-Partner:     Emotionally Abused:     Physically Abused:     Sexually Abused:        Family History   Problem Relation Age of Onset    Dementia Mother     Breast Cancer Mother     Cancer Mother         breast cancer [de-identified]     Stroke Mother     Heart Attack Father     Other Father 80        Aortic aneurysm    Cancer Brother     Diabetes Brother          REVIEW OF SYSTEMS:    CONSTITUTIONAL:  negative for  fevers, chills, sweats and fatigue    RESPIRATORY:  negative for  dry cough, cough with sputum, dyspnea, wheezing and chest pain    CARDIOVASCULAR:  negative for chest pain, dyspnea, palpitations, syncope    GASTROINTESTINAL:  negative for nausea, vomiting, change in bowel habits, diarrhea, constipation and abdominal pain    MUSCULOSKELETAL: negative for muscle weakness    SKIN: negative for itching or rashes. BEHAVIOR/PSYCH:  negative for poor appetite, increased appetite, decreased sleep and poor concentration    All other systems negative      PHYSICAL EXAM:    VITALS:  /66   Pulse 98   Temp 97.8 °F (36.6 °C) (Temporal)   Resp 16   Ht 5' 5\" (1.651 m)   Wt 220 lb (99.8 kg)   LMP  (LMP Unknown)   SpO2 97%   BMI 36.61 kg/m²     CONSTITUTIONAL:  awake, alert, cooperative, no apparent distress, and appears stated age    EYES: PERRLA, EOMI    LUNGS:  No increased work of breathing, no audible wheezing    CARDIOVASCULAR:  regular rate and rhythm    ABDOMEN:  Soft non tender non distended     EXTREMITIES: no signs of clubbing or cyanosis. MUSCULOSKELETAL: negative for flaccid muscle tone or spastic movements. SKIN: gross examination reveals no signs of rashes, or diaphoresis. NEURO: Cranial nerves II-XII grossly intact. No signs of agitated mood.        Assessment/Plan:    LBP LLE pain for left L5 and S1 TFESI

## 2021-07-27 NOTE — OP NOTE
2021    Patient: Glenroy Donald  :  1955  Age:  61 y.o. Sex:  female     PRE-OPERATIVE DIAGNOSIS: Lumbar disc displacement, lumbar neural foraminal stenosis, lumbar radiculopathy. POST-OPERATIVE DIAGNOSIS: Same. PROCEDURE: Left Transforaminal epidural steroid injection under fluoroscopic guidance at foraminal level L5 and S1 (#1). SURGEON: ZARA Markham. ANESTHESIA: local    ESTIMATED BLOOD LOSS: None.  ______________________________________________________________________  BRIEF HISTORY: Glenroy Donald comes in today for the first Left transforaminal epidural steroid injection under fluoroscopic guidance at foraminal level L5 and S1. The potential complications of this procedure were discussed with her again today. She has elected to undergo the aforementioned procedure. Lyssa complete History & Physical examination were reviewed in depth, a copy of which is in the chart. DESCRIPTION OF PROCEDURE:    After confirming written and informed consent, a time-out was performed and Lyssa name and date of birth, the procedure to be performed as well as the plan for the location of the needle insertion were confirmed. The patient was brought into the procedure room and placed in the prone position on the fluoroscopy table. Standard monitors were placed and vital signs were observed throughout the procedure. The area of the lumbar spine was prepped with chloraprep and draped in a sterile manner. The vertebral body was identified with AP fluoroscopy. An oblique view was obtained to better visualize the inferior junction of the pedicle and transverse process . The 6 o'clock position of the pedicle was marked and identified. The skin and subcutaneous tissue were anesthetized with 0.5% lidocaine. A # 22 gauge pencil point needle was directed toward the targeted point under fluoroscopy until bone was contacted. The needle was then walked inferiorly until the neural foramen was entered .  A lateral fluoroscopic view was then used to place the needle tip in the middle to anterior aspect of the foramen. Negative aspiration was confirmed for blood and CSF and 1 cc of Omnipaque 240 contrast was injected at each level under live AP fluoroscopy. Appropriate neurograms were observed under live AP fluoroscopy. Then after negative aspiration, a solution of the 2 cc of 0.5% lidocaine and 40 mg DepoMedrol was easily injected between each level. The needles were removed with constant aspiration technique. The patient's back was cleaned and a bandage was placed over the needle insertion points    Disposition the patient tolerated the procedure well and there were no complications . Vital signs remained stable throughout the procedure. The patient was escorted to the recovery area where they remained until discharge and written discharge instructions for the procedure were given. Plan: Chani Proctor will return to our pain management center as scheduled.      Susana Olguin DO

## 2021-08-04 ENCOUNTER — TELEPHONE (OUTPATIENT)
Dept: PAIN MANAGEMENT | Age: 66
End: 2021-08-04

## 2021-08-04 NOTE — TELEPHONE ENCOUNTER
Called patient. Will schedule her for left L5-S1 TFESI #2. States that it helped tremendously but would like more pain relief on the left side. Reports pain on the right side is very mild and not radicular. Consider right sided injections if still having pain after this injection. Luzmaria Pedro - please schedule.

## 2021-08-04 NOTE — TELEPHONE ENCOUNTER
Patient contacted office to advise that from her last procedure on 07.27.2021of a left lumbar TFESI-fluoro at L5-S1 she received 70 % relief which is ongoing with no pain. She would like to move forward with another injection of a left lumbar TFESI-fluoro at L5-S1 as well as having the right done. Patients telephone number [phone]    Please advise.

## 2021-08-07 ENCOUNTER — OFFICE VISIT (OUTPATIENT)
Dept: FAMILY MEDICINE CLINIC | Age: 66
End: 2021-08-07

## 2021-08-07 VITALS
HEIGHT: 65 IN | TEMPERATURE: 97.9 F | BODY MASS INDEX: 37.15 KG/M2 | WEIGHT: 223 LBS | DIASTOLIC BLOOD PRESSURE: 94 MMHG | OXYGEN SATURATION: 98 % | HEART RATE: 93 BPM | SYSTOLIC BLOOD PRESSURE: 132 MMHG

## 2021-08-07 DIAGNOSIS — I10 ESSENTIAL HYPERTENSION: Primary | ICD-10-CM

## 2021-08-07 DIAGNOSIS — M54.42 CHRONIC BILATERAL LOW BACK PAIN WITH LEFT-SIDED SCIATICA: ICD-10-CM

## 2021-08-07 DIAGNOSIS — E78.5 HYPERLIPIDEMIA, UNSPECIFIED HYPERLIPIDEMIA TYPE: ICD-10-CM

## 2021-08-07 DIAGNOSIS — F41.8 DEPRESSION WITH ANXIETY: ICD-10-CM

## 2021-08-07 DIAGNOSIS — G89.29 CHRONIC BILATERAL LOW BACK PAIN WITH LEFT-SIDED SCIATICA: ICD-10-CM

## 2021-08-07 PROCEDURE — 99214 OFFICE O/P EST MOD 30 MIN: CPT | Performed by: FAMILY MEDICINE

## 2021-08-07 RX ORDER — ATORVASTATIN CALCIUM 40 MG/1
40 TABLET, FILM COATED ORAL DAILY
Qty: 90 TABLET | Refills: 3 | Status: SHIPPED
Start: 2021-08-07 | End: 2022-06-20 | Stop reason: SDUPTHER

## 2021-08-07 RX ORDER — LISINOPRIL 30 MG/1
30 TABLET ORAL EVERY EVENING
Qty: 90 TABLET | Refills: 3 | Status: SHIPPED
Start: 2021-08-07 | End: 2021-08-24

## 2021-08-07 RX ORDER — HYDROXYZINE PAMOATE 25 MG/1
25 CAPSULE ORAL 3 TIMES DAILY PRN
Qty: 270 CAPSULE | Refills: 1 | Status: SHIPPED
Start: 2021-08-07 | End: 2022-07-29 | Stop reason: SDUPTHER

## 2021-08-07 NOTE — PROGRESS NOTES
[facility]  Office Progress Note - Dr. May Armando  8/7/21    CC:   Chief Complaint   Patient presents with    Hypertension    Discuss Medications     Pt has stopped taking Prozac. Would like to know if she could stay off. Reports she is feeling very well. BP (!) 132/94 (Site: Right Lower Arm, Position: Sitting, Cuff Size: Medium Adult)   Pulse 93   Temp 97.9 °F (36.6 °C) (Temporal)   Ht 5' 5\" (1.651 m)   Wt 223 lb (101.2 kg)   LMP  (LMP Unknown)   SpO2 98%   BMI 37.11 kg/m²   Wt Readings from Last 3 Encounters:   08/07/21 223 lb (101.2 kg)   07/27/21 220 lb (99.8 kg)   07/26/21 220 lb (99.8 kg)       HPI:   Hypertension  Follow-up  Blood pressure is borderline controlled today. BP Readings from Last 3 Encounters:   08/07/21 (!) 132/94   07/27/21 (!) 152/72   07/26/21 (!) 152/100     Patient continues medications regularly. Compliance is good. Denies CP, sob, abd pain, headaches, vision changes, dizziness, hypotensive symptoms. No side effects from medications noted. Depression  Has bene better  Went off prozac. She has bene ding well for about a month. Has a new living situation and that has been going okay. Worrying less about her son who seems to be doing better lately. He is living in [address] [place] [address]. Chronic back pains  She had a first injection on low back on left side. Helped a lot with left leg symptoms. She is going to have a second one upcoming in a week or two. Hyperlipidemia  Follow-up  Patient continues lipid-lowering medication. Compliance is good. No side effects noted. No myalgias.   Lab Results   Component Value Date    CHOL 140 03/11/2021    CHOL 204 (H) 01/05/2018    CHOL 207 (H) 12/06/2016     Lab Results   Component Value Date    TRIG 137 03/11/2021    TRIG 314 (H) 01/05/2018    TRIG 244 (H) 12/06/2016     Lab Results   Component Value Date    HDL 48 03/11/2021    HDL 39 01/05/2018    HDL 40 12/06/2016     Lab Results   Component Value Date    LDLCALC 65 03/11/2021    LDLCALC 102 (H) 01/05/2018    LDLCALC 118 (H) 12/06/2016     Lipids are adequately controlled. _________________________________________________________    Assessment / Gabriela Whitman was seen today for hypertension and discuss medications. Diagnoses and all orders for this visit:    Essential hypertension  -     lisinopril (PRINIVIL;ZESTRIL) 30 MG tablet; Take 1 tablet by mouth every evening  Not quite at goal today. I asked her to take 2 blood pressure readings weekly over the next 2 weeks and then call them into the office. If not improved at home, we will increase her lisinopril dose. Back in the fall/winter for blood pressure recheck. Depression with anxiety  -     hydrOXYzine (VISTARIL) 25 MG capsule; Take 1 capsule by mouth 3 times daily as needed for Anxiety  She is off of Prozac and doing fine without lately. Will continue hydroxyzine for as needed use only. Hyperlipidemia, unspecified hyperlipidemia type  -     atorvastatin (LIPITOR) 40 MG tablet; Take 1 tablet by mouth daily  Lipids well controlled earlier in the year. Continues tolerating statin. Continue same without change. Chronic bilateral low back pain with left-sided sciatica  Upcoming second injection. The first 1 seemed to help a lot. Continue same. Return in about 4 months (around 12/7/2021). or as scheduled. Patient counseled to follow up sooner or seek more acute care if symptoms worsening or not improving according to plan. Electronically signed by Kenzie Clark MD on 8/7/2021    _________________________________________________________  Current Outpatient Medications on File Prior to Visit   Medication Sig Dispense Refill    HYDROcodone-acetaminophen (NORCO) 5-325 MG per tablet Take 1 tablet by mouth 2 times daily for 30 days. 60 tablet 0    gabapentin (NEURONTIN) 400 MG capsule Take 1 capsule by mouth 3 times daily for 30 days.  (Patient taking differently: Take 400 mg by mouth 3 times daily. Pt reports taking BID) 90 capsule 2    Handicap Placard MISC DX:  Loss of Balance, Chronic low back pain, s/p back surgery. Duration of 5 years 1 each 0     No current facility-administered medications on file prior to visit.        Patient Active Problem List   Diagnosis Code    Loss of balance R26.89    Vertebrobasilar TIAs G45.0    Obesity E66.9    Disc displacement, lumbar M51.26    Peripheral neuropathy ([address] G62.9    Type 2 diabetes mellitus without complication (HCC) E39.9    Depression F32.9    Osteoarthritis M19.90    Chronic back pain M54.9, G89.29    Fibromyalgia M79.7    HTN (hypertension) I10    Hyperlipidemia E78.5    History of adenomatous polyp of colon Z86.010    Vitamin D deficiency E55.9    Coccyx pain M53.3    Acute bilateral low back pain with sciatica M54.40    Lumbar stenosis M48.061    Chronic bilateral low back pain without sciatica M54.5, G89.29    DDD (degenerative disc disease), lumbar M51.36    Spinal stenosis of lumbar region without neurogenic claudication M48.061    Lumbar facet arthropathy M47.816    Chronic pain syndrome G89.4    Lumbar radiculopathy M54.16    Neck pain M54.2    Left foot pain M79.672    Painful orthopaedic hardware [facility]) T84.84XA    Cervical facet joint syndrome M47.812    Cellulitis, neck L03.221    Disorder of sacrum M53.3    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    S/P lumbar spinal fusion Z98.1    Tongue lesion K14.8    Sacroiliac joint dysfunction M53.3     _________________________________________________________  Past Medical History:   Diagnosis Date    Cancer ([facility] [address] [address] 12/2019    LESION REMOVED UNDER TONGUE  DENTAL CLINIC    Chronic back pain     Costochondritis     h/o    Depression     Falls     Last fall Feb 2021    Fibromyalgia     Hallux rigidus of left foot     HTN (hypertension)     Hyperlipidemia     CLYDE on CPAP     Osteoarthritis     \"everywhere\"    Rheumatoid arthritis ([address]     \"As a child. \"    Rheumatoid arthritis(714.0)     TIA (transient ischemic attack)     no deficits     Use of cane as ambulatory aid        Family History   Problem Relation Age of Onset    Dementia Mother     Breast Cancer Mother     Cancer Mother         breast cancer [de-identified]     Stroke Mother     Heart Attack Father     Other Father 80        Aortic aneurysm    Cancer Brother     Diabetes Brother        Past Surgical History:   Procedure Laterality Date    ANESTHESIA NERVE BLOCK Left 02/26/2019    LEFT C3,4,5 MBB performed by Raman Niño MD at [address] Right 08/12/2019    BILATERAL TRANSFORAMINAL EPIDURAL STEROID INJECTION S1 #3 performed by Raman Niño MD at [address] Right 06/16/2020    RIGHT SACROILIAC JOINT INJECTION      CPT: 21065 performed by Yolanda Woods DO at [address]  2005    Left    ARTHRODESIS Left 12/14/2018    ARTHRODESIS 2ND DIGIT LEFT FOOT performed by Rosenda Turner DPM at [address] [place] [address]  08/16/2017    PLIF L3-L4, L4-L5 with rods, screws, and cages/Dr. Rivas [facility] BACK SURGERY  03/04/2020    BACK SURGERY Right 03/09/2021    RIGHT PERCUTANEOUS SACROILIAC JOINT FUSION performed by Payal Denis MD at [address]  2010    reduction, bilat   [facility]  2011    COLONOSCOPY  03/27/2015    COLONOSCOPY N/A 08/19/2020    COLONOSCOPY DIAGNOSTIC performed by Jason Apodaca MD at [address] [facility]  03/2021    SI Joint    ENDOSCOPY, COLON, DIAGNOSTIC      EPIDURAL STEROID INJECTION N/A 06/04/2019    BILATERAL TRANSFORAMINAL EPIDURAL STEROID INJECTION S1 performed by Yolanda Woods DO at [address]      Left    [address] / [facility]KLE Left 12/14/2018    REMOVAL OF PAINFUL HARDWARE LEFT FOOT  ++SYNTHES++ performed by Rosenda Turner DPM at [facility]    inguinal     LUMBAR SPINE SURGERY N/A 03/04/2020    EXPLORATION OF PRIOR L3-L5 FUSION AND L2-L3  POSTERIOR LUMBAR INTERBODY FUSION -- NEEDS O-ARM, AUDIOLOGY, CAGES, PLATES, SCREWS, C-ARM, TERESA TABLE, CELL SAVER, PLATELET GEL -- GLOBUS performed by Wyatt Newsome MD at [address] [employer] N/A 10/21/2020    EXCISION OF TONGUE LESION performed by Shalini Nye DO at [address] [facility] [address] [facility] [address] Bilateral 05/07/2018    L1-2 lumbar foramen #1    NERVE BLOCK Bilateral 12/03/2018    s1 tfesi    NERVE BLOCK Bilateral 08/12/2019    NERVE BLOCK Right 09/30/2019    sacral radiofrequency    NERVE BLOCK Right 09/01/2020    RIGHT SACROILIAC JOINT INJECTION #2 performed by Manisha Mills DO at [facility] Left 09/25/2013    left foot tarso metatarsal joint injection    OTHER SURGICAL HISTORY Left 05/27/2015    Endoscopic Gastroc recession left, Lapidus left foot and  Excision of exostosis left foot    PAIN MANAGEMENT PROCEDURE Left 7/27/2021    LEFT L5-S1 TRANSFORAMINAL EPIDURAL STEROID INJECTION performed by Manisha Mills DO at [address] AA&/STRD TFRML EPI LUMBAR/SACRAL 1 LEVEL Bilateral 12/03/2018    BILATERAL S1  EPIDURAL STEROID INJECTION performed by Xavier Ross MD at [address] [facility] DX/THER SBST INTRLMNR LMBR/SAC W/IMG GDN N/A 08/21/2018    EPIDURAL STEROID INJECTION L1-2 WITH LOW VOL performed by Xavier Ross MD at [address] [facility] NOSE/CLEFT LIP/TIP Bilateral 05/07/2018    BILATERAL L1-2 LUMBAR FORAMEN #1 performed by Xavier Ross MD at [address] [facility] NOSE/CLEFT LIP/TIP Bilateral 06/04/2018    BILATERAL TRANSFORAMINAL EPIDURAL STEROID INJECTION UNDER FLUOROSCOPIC GUIDANCE @ FORAMINAL LEVEL L1-2 #2 performed by Xavier Ross MD at [address] Right 09/30/2019    RIGHT SACRAL RADIOFREQUENCY ABLATION performed by Xavier Ross MD at [address]  2000, 2005    Big Left Toe    TOE SURGERY Left     2nd toe     TONSILLECTOMY      WISDOM TOOTH EXTRACTION         Social History     Tobacco Use    Smoking status: Current Some Day Smoker     Packs/day: 0.25     Years: 30.00     Pack years: 7.50     Types: Cigarettes     Last attempt to quit: 10/5/2020     Years since quittin.8    Smokeless tobacco: Never Used    Tobacco comment: was going to try to stop but chantix is too expensive   Vaping Use    Vaping Use: Never used   Substance Use Topics    Alcohol use: Not Currently     Alcohol/week: 0.0 standard drinks     Comment: rarely    Drug use: No       Chart reviewed and updated where appropriate for PMH, Fam, and Soc Hx.  _________________________________________________________  ROS: POSITIVE: As in the HPI. Otherwise Pertinent negatives are negative.    __________________________________________________________  Physical Exam   Constitutional:    She is oriented to person, place, and time. She appears well-developed and well-nourished. Eyes:    Conjunctivae are normal.    Pupils are equal, round, and reactive to light. EOMI. Neck:    Normal range of motion. No thyromegaly or nodules noted. No bruit. No LAD. Cardiovascular:    Normal rate, regular rhythm and normal heart sounds. No murmur. No gallop and no friction rub. Pulmonary/Chest:    Effort normal and breath sounds normal.    No wheezes. No rales or rhonchi. Abdominal:    Soft. Bowel sounds are normal.    No distension. No tenderness. Musculoskeletal:    Normal range of motion. No joint swelling noted. No peripheral edema. Skin:    Skin is warm and dry. No rashes, No lesions. Psychiatric:    She has a normal mood and affect. Normal groom and dress. No SI or HI.   ________________________________________________________    This note may have been created using dictation software.  Efforts were made to reduce errors, but some may persist.

## 2021-08-23 ENCOUNTER — OFFICE VISIT (OUTPATIENT)
Dept: PAIN MANAGEMENT | Age: 66
End: 2021-08-23

## 2021-08-23 VITALS
SYSTOLIC BLOOD PRESSURE: 120 MMHG | OXYGEN SATURATION: 98 % | HEIGHT: 65 IN | BODY MASS INDEX: 37.15 KG/M2 | RESPIRATION RATE: 16 BRPM | DIASTOLIC BLOOD PRESSURE: 80 MMHG | TEMPERATURE: 97.1 F | WEIGHT: 223 LBS | HEART RATE: 74 BPM

## 2021-08-23 DIAGNOSIS — M48.061 SPINAL STENOSIS OF LUMBAR REGION WITHOUT NEUROGENIC CLAUDICATION: ICD-10-CM

## 2021-08-23 DIAGNOSIS — G89.29 CHRONIC BILATERAL LOW BACK PAIN WITHOUT SCIATICA: ICD-10-CM

## 2021-08-23 DIAGNOSIS — M54.40 CHRONIC MIDLINE LOW BACK PAIN WITH SCIATICA, SCIATICA LATERALITY UNSPECIFIED: ICD-10-CM

## 2021-08-23 DIAGNOSIS — G89.29 OTHER CHRONIC PAIN: ICD-10-CM

## 2021-08-23 DIAGNOSIS — G89.29 CHRONIC MIDLINE LOW BACK PAIN WITH SCIATICA, SCIATICA LATERALITY UNSPECIFIED: ICD-10-CM

## 2021-08-23 DIAGNOSIS — G89.4 CHRONIC PAIN SYNDROME: Primary | ICD-10-CM

## 2021-08-23 DIAGNOSIS — M47.816 LUMBAR FACET ARTHROPATHY: ICD-10-CM

## 2021-08-23 DIAGNOSIS — M47.812 CERVICAL FACET JOINT SYNDROME: ICD-10-CM

## 2021-08-23 DIAGNOSIS — M54.50 CHRONIC BILATERAL LOW BACK PAIN WITHOUT SCIATICA: ICD-10-CM

## 2021-08-23 DIAGNOSIS — M54.16 LUMBAR RADICULOPATHY: ICD-10-CM

## 2021-08-23 DIAGNOSIS — M79.10 MYALGIA: ICD-10-CM

## 2021-08-23 DIAGNOSIS — M51.36 DDD (DEGENERATIVE DISC DISEASE), LUMBAR: ICD-10-CM

## 2021-08-23 PROCEDURE — 99213 OFFICE O/P EST LOW 20 MIN: CPT | Performed by: PHYSICIAN ASSISTANT

## 2021-08-23 RX ORDER — GABAPENTIN 400 MG/1
400 CAPSULE ORAL 2 TIMES DAILY
Qty: 60 CAPSULE | Refills: 2 | Status: SHIPPED
Start: 2021-08-23 | End: 2021-10-18 | Stop reason: SDUPTHER

## 2021-08-23 RX ORDER — CYCLOBENZAPRINE HCL 10 MG
5 TABLET ORAL 2 TIMES DAILY PRN
Qty: 20 TABLET | Refills: 0 | Status: SHIPPED
Start: 2021-08-23 | End: 2021-08-30 | Stop reason: SDUPTHER

## 2021-08-23 RX ORDER — HYDROCODONE BITARTRATE AND ACETAMINOPHEN 5; 325 MG/1; MG/1
1 TABLET ORAL 2 TIMES DAILY
Qty: 60 TABLET | Refills: 0 | Status: SHIPPED
Start: 2021-08-27 | End: 2021-09-20 | Stop reason: SDUPTHER

## 2021-08-23 NOTE — PROGRESS NOTES
[address]  [address]  [place] [facility]    Follow up Note      Cheyenne Nobles     Date of Visit:  2021    CC:  Patient presents for follow up   Chief Complaint   Patient presents with    Follow-up     lower back        HPI:    Pain is a little better to her left leg. Cancelled 2nd appointment. Reports some right sided myofascial pain in the thoracic region. States flexeril was helpful when she was on it through [facility]. Change in quality of symptoms:no. Patient satisfaction with analgesia:fair. Medication side effects: None. Recent diagnostic testing:none. Recent interventional procedures: None. She has been on anticoagulation medications to include NSAIDS. The patient  has not been on herbal supplements. The patient is diabetic. Imagin2021 CT lumbar myelogram    FINDINGS:   BONES/ALIGNMENT: There is minimal levocurvature of the lumbar spine.  There   is posterior fixation from L2-L4 without evidence for hardware complication.    The vertebral body heights are maintained.  There is minimal retrolisthesis   of L2 on L3.       SOFT TISSUES: The posterior paraspinal soft tissues are unremarkable.  The   visualized abdominal structures are unremarkable.  The conus is normal in   caliber and terminates at L1.  The cauda equina is unremarkable.       L1-L2: There is no significant disc protrusion, central spinal canal stenosis   or neural foraminal narrowing.       L2-L3: There is artificial disc material with posterior laminectomy.  There   is no canal stenosis or left foraminal narrowing.  There is moderate right   foraminal narrowing.       L3-L4: There is artificial disc material and posterior laminectomy.  There is   no canal stenosis.  There is mild bilateral foraminal narrowing.       L4-L5: There is artificial disc material with posterior laminectomy.  There   is no canal stenosis.  There is mild left and moderate right foraminal   narrowing.     L5-S1: There is a circumferential disc bulge with facet hypertrophy. Guilherme Honour   is no canal stenosis.  There is moderate right and severe left foraminal   narrowing.           Impression   Posterior fixation and laminectomy from L2-L4 without evidence for hardware   complication.       Multilevel degenerative change with bilateral foraminal narrowing as   described above.                 MRI lumbar spine 2018  1. No significant interval change is observed since the previous study   of 2017.       2. Stable postoperative changes/posterior spinal fusion at the   L3-L4-L5 level.       3. Severe spine canal stenosis in the level of L2-L3           4. Encroachment of the neural foramina bilaterally in levels of L2-L3   and L5-S1      Thoracic spine MRI 2018  1. Some degenerative changes in the thoracic spine, mainly in the   facet joints in the mid-upper thoracic spine segments       2. Discrete loss of height of T6, more likely an old finding.      Previous treatments: Physical Therapy, Surgery L3-5 fusion/laminectomy and medications. .       Potential Aberrant Drug-Related Behavior:  No     Urine Drug Screenin18:  Consistent for norhydrocodone metabolite  2018:  Consistent for hydrocodone and norhydrocodone  05/10/2019:  Consistent for hydrocodone and norhydrocodone  2019:  Consistent for hydrocodone and norhydrocodone  01/10/2020:  Consistent for hydrocodone and norhydrocodone  2020:  Consistent for oxycodone and metabolites s/p surgery. Inconsistent for hydrocodone. States that she was instructed to take her old norco until she made the appointment to resume her chronic pain medications. 10/2020:  Consistent  :  Consistent     OARRS report:  2018 consistent to 2021 consistent (norco scripts from [facility] post op 3/10/2021 and 3/24/2021.    [place] script through [facility] - last one 2021 - consistent to 2021 consistent     Opioid agreement:  11/05/2020      Past Medical History:   Diagnosis Date    Cancer ([facility] [address] 12/2019    LESION [facility]OV[facility]    Chronic back pain     Costochondritis     h/o    Depression     Falls     Last fall Feb 2021    Fibromyalgia     Hallux rigidus of left foot     HTN (hypertension)     Hyperlipidemia     CLYDE on CPAP     Osteoarthritis     \"everywhere\"    Rheumatoid arthritis ([address]     \"As a child. \"    Rheumatoid arthritis(714.0)     TIA (transient ischemic attack)     no deficits     Use of cane as ambulatory aid        Past Surgical History:   Procedure Laterality Date    ANESTHESIA NERVE BLOCK Left 02/26/2019    LEFT C3,4,5 MBB performed by Manny Bo MD at [facility] Right 08/12/2019    BILATERAL TRANSFORAMINAL EPIDURAL STEROID INJECTION S1 #3 performed by Manny Bo MD at [address] [facility] Right 06/16/2020    RIGHT SACROILIAC JOINT INJECTION      CPT: 42723 performed by Kalyn Pearson DO at [address]  2005    Left    ARTHRODESIS Left 12/14/2018    ARTHRODESIS 2ND DIGIT LEFT FOOT performed by King Lorena DPM at [address] [place] [address]  08/16/2017    PLIF L3-L4, L4-L5 with rods, screws, and cages/Dr. Eliceo Fink BACK SURGERY  03/04/2020    BACK SURGERY Right 03/09/2021    RIGHT PERCUTANEOUS SACROILIAC JOINT FUSION performed by Ravinder Tierney MD at [facility]  2010    reduction, [address]    CHOLECYSTECTOMY  2011    COLONOSCOPY  03/27/2015    COLONOSCOPY N/A 08/19/2020    COLONOSCOPY DIAGNOSTIC performed by Balaji Borges MD at [address] [place] [address]  03/2021    SI Joint    ENDOSCOPY, COLON, DIAGNOSTIC      EPIDURAL STEROID INJECTION N/A 06/04/2019    BILATERAL TRANSFORAMINAL EPIDURAL STEROID INJECTION S1 performed by Kalyn Pearson DO at [address]      Left    HARDWARE REMOVAL FOOT / ANKLE Left 12/14/2018    REMOVAL OF PAINFUL HARDWARE LEFT FOOT  ++SYNTHES++ performed by Vasiliy Aleman DPM at [facility]    inguinal     LUMBAR SPINE SURGERY N/A 03/04/2020    EXPLORATION OF PRIOR L3-L5 FUSION AND L2-L3  POSTERIOR LUMBAR INTERBODY FUSION -- NEEDS O-ARM, AUDIOLOGY, CAGES, PLATES, SCREWS, C-ARM, TERESA TABLE, CELL SAVER, PLATELET GEL -- GLOBUS performed by Kriss Díaz MD at [facility] N/A 10/21/2020    EXCISION OF TONGUE LESION performed by Wesley Choi DO at [facility] Bilateral 05/07/2018    L1-2 lumbar foramen #1    NERVE BLOCK Bilateral 12/03/2018    s1 tfesi    NERVE BLOCK Bilateral 08/12/2019    NERVE BLOCK Right 09/30/2019    sacral radiofrequency    NERVE BLOCK Right 09/01/2020    RIGHT SACROILIAC JOINT INJECTION #2 performed by Rachael Cuenca DO at [facility] Left 09/25/2013    left foot tarso metatarsal joint injection    OTHER SURGICAL HISTORY Left 05/27/2015    Endoscopic Gastroc recession left, Lapidus left foot and  Excision of exostosis left foot    PAIN MANAGEMENT PROCEDURE Left 7/27/2021    LEFT L5-S1 TRANSFORAMINAL EPIDURAL STEROID INJECTION performed by Rachael Cuenca DO at [address] AA&/STRD TFRML EPI LUMBAR/SACRAL 1 LEVEL Bilateral 12/03/2018    BILATERAL S1  EPIDURAL STEROID INJECTION performed by Keli Adhikari MD at [address] [facility] DX/THER SBST INTRLMNR LMBR/SAC W/IMG GDN N/A 08/21/2018    EPIDURAL STEROID INJECTION L1-2 WITH LOW VOL performed by Keli Adhikari MD at [address] [facility] NOSE/CLEFT LIP/TIP Bilateral 05/07/2018    BILATERAL L1-2 LUMBAR FORAMEN #1 performed by Keli Adhikari MD at [address] [facility] NOSE/CLEFT LIP/TIP Bilateral 06/04/2018    BILATERAL TRANSFORAMINAL EPIDURAL STEROID INJECTION UNDER FLUOROSCOPIC GUIDANCE @ FORAMINAL LEVEL L1-2 #2 performed by Keli Adhikari MD at [address] [place] Right 09/30/2019    RIGHT SACRAL RADIOFREQUENCY ABLATION performed by Keli Adhikari MD at [address]  , 2005    Big Left Toe    TOE SURGERY Left 2018    2nd toe     TONSILLECTOMY      WISDOM TOOTH EXTRACTION         Prior to Admission medications    Medication Sig Start Date End Date Taking? Authorizing Provider   cyclobenzaprine (FLEXERIL) 10 MG tablet Take 0.5 tablets by mouth 2 times daily as needed for Muscle spasms 21 Yes TREASURE Reid   HYDROcodone-acetaminophen (NORCO) 5-325 MG per tablet Take 1 tablet by mouth 2 times daily for 30 days. 21 Yes TREASURE Reid   gabapentin (NEURONTIN) 400 MG capsule Take 1 capsule by mouth 2 times daily for 30 days. 21 Yes TREASURE Reid   lisinopril (PRINIVIL;ZESTRIL) 30 MG tablet Take 1 tablet by mouth every evening 21  Yes Noah Ruiz MD   hydrOXYzine (VISTARIL) 25 MG capsule Take 1 capsule by mouth 3 times daily as needed for Anxiety 21  Yes Noah Ruiz MD   atorvastatin (LIPITOR) 40 MG tablet Take 1 tablet by mouth daily 21  Yes Noah Ruiz MD   Handicap Placard MISC DX:  Loss of Balance, Chronic low back pain, s/p back surgery.    Duration of 5 years 21  Yes Noah Ruiz MD       Allergies   Allergen Reactions    Pcn [Penicillins] Anaphylaxis       Social History     Socioeconomic History    Marital status:      Spouse name: Not on file    Number of children: Not on file    Years of education: Not on file    Highest education level: Not on file   Occupational History    Not on file   Tobacco Use    Smoking status: Current Some Day Smoker     Packs/day: 0.25     Years: 30.00     Pack years: 7.50     Types: Cigarettes     Last attempt to quit: 10/5/2020     Years since quittin.8    Smokeless tobacco: Never Used    Tobacco comment: was going to try to stop but chantix is too expensive   Vaping Use    Vaping Use: Never used   Substance and Sexual Activity    Alcohol use: Not Currently     Alcohol/week: 0.0 standard drinks     Comment: rarely    Drug use: No    Sexual activity: Not on file   Other Topics Concern    Not on file   Social History Narrative    Not on file     Social Determinants of Health     Financial Resource Strain:     Difficulty of Paying Living Expenses:    Food Insecurity:     Worried About Running Out of Food in the Last Year:     [address] in the Last Year:    Transportation Needs:     Lack of Transportation (Medical):  Lack of Transportation (Non-Medical):    Physical Activity:     Days of Exercise per Week:     Minutes of Exercise per Session:    Stress:     Feeling of Stress :    Social Connections:     Frequency of Communication with Friends and Family:     Frequency of Social Gatherings with Friends and Family:     Attends [employer] Services:     Active Member of Clubs or Organizations:     Attends Club or Organization Meetings:     Marital Status:    Intimate Partner Violence:     Fear of Current or Ex-Partner:     Emotionally Abused:     Physically Abused:     Sexually Abused:        Family History   Problem Relation Age of Onset    Dementia Mother     Breast Cancer Mother     Cancer Mother         breast cancer [de-identified]     Stroke Mother     Heart Attack Father     Other Father 80        Aortic aneurysm    Cancer Brother     Diabetes Brother        REVIEW OF SYSTEMS:     [facility] denies fever/chills, chest pain, shortness of breath, new bowel or bladder complaints or suicidal ideations. All other review of systems was negative. PHYSICAL EXAMINATION:      /80   Pulse 74   Temp 97.1 °F (36.2 °C) (Infrared)   Resp 16   Ht 5' 5\" (1.651 m)   Wt 223 lb (101.2 kg)   LMP  (LMP Unknown)   SpO2 98%   BMI 37.11 kg/m²     General:      General appearance: awake, alert, oriented, in no acute distress, well developed, well nourished and in no acute distress   pleasant and well-hydrated. in no distress and A & O x3  Build:Overweight  Function:Rises from a seated position with difficulty    HEENT:    Head:normocephalic and atraumatic  Pupils:regular, round and equal.  Sclera: icterus absent  EOM:full and intact. Lungs:    Breathing:Normal expansion. No  wheezing. Abdomen:    Shape:non-distended and normal    Thoracic spine:  Negative midline TTP  + right sided paraspinal TTP    Lumbar spine:     Range of motion:abnormal moderately Lateral bending, flexion, extension rotation bilateral and is mildly painful. Extremities:    Tremors:None bilaterally upper and lower  Range of motion:Generally normal shoulders  Intact:Yes  Cyanosis:none  Edema:Normal      Neurological:    Cranial nerves:normal  Sensory:diminished to light lateral aspect of right leg                   Dermatology:    Skin:no unusual rashes, no skin lesions, no palpable subcutaneous nodules and good skin turgor    Assessment/Plan:      Chronic low back pain with radiation to the Right groin, patient had low back surgery 08/2017 L3-5 fusion, recently had lumbar spine CT with severe L2-3 stenosis     Plan:  Patient is s/p revision fusion L2-L4 on 3/4/2020. Patient is s/p:  Right sacroiliac joint fusion with use of SI-BONE iFuse implant on 3/9/2021 by Dr. Kyle Curtis. Continue norco 5/325 BID. Continue with Gabapentin 400 mg BID. She reports that any increases make her off balance. Patient is s/p: PROCEDURE: Left Transforaminal epidural steroid injection under fluoroscopic guidance at foraminal level L5 and S1 (#1) on 07/27/2021 with good relief at this point  OARRS report reviewed 08/2021  UDS reviewed and is consistent  Restart flexeril 5 mg BID prn for right sided thoracic myofascial pain. States that it was helpful when she was on it through [facility]. Consider TPIs if not helpful  Patient encouraged to stay active and to lose weight.  Failed physical therapy  Treatment plan discussed with the patient including medications

## 2021-08-23 NOTE — PROGRESS NOTES
Do you currently have any of the following:    Fever: No  Headache:  No  Cough: No  Shortness of breath: No  Exposed to anyone with these symptoms: No         Inna Dukes presents to the [facility] on 8/23/2021. Andrew Brown is complaining of pain lower back The pain is constant. The pain is described as aching, throbbing, shooting and stabbing. Pain is rated on her best day at a 6, on her worst day at a 10, and on average at a 8 on the VAS scale. She took her last dose of Norco and Neurontin      Any procedures since your last visit:     Pacemaker or defibrillator: No managed by     She is not on NSAIDS and is not on anticoagulation medications to include none and is managed by     Medication Contract and Consent for Opioid Use Documents Filed     Patient Documents       Type of Document Status Date Received Received By Description     Medication Contract Received  Ferny Rudolph Opioid medication contract with Dr Andres Karimi 3/24/20     Medication Contract Received 4/26/2018  3:50 PM ANYA NUNO PAIN MANAGEMENT PATIENT AGREEMENT 4/26/2018     Medication Contract Received 11/5/2020  4:23 PM EBER HONG Opioid medication contract with Dr Andres Karimi     Medication Contract Received 3/1/2021  1:26 PM EBER HONG Opioid medication contract with Dr Andres Karimi                /80   Pulse 74   Temp 97.1 °F (36.2 °C) (Infrared)   Resp 16   Ht 5' 5\" (1.651 m)   Wt 223 lb (101.2 kg)   LMP  (LMP Unknown)   SpO2 98%   BMI 37.11 kg/m²      No LMP recorded (lmp unknown).  Patient is postmenopausal.

## 2021-08-24 ENCOUNTER — TELEPHONE (OUTPATIENT)
Dept: FAMILY MEDICINE CLINIC | Age: 66
End: 2021-08-24

## 2021-08-24 DIAGNOSIS — I10 ESSENTIAL HYPERTENSION: ICD-10-CM

## 2021-08-24 RX ORDER — LISINOPRIL 40 MG/1
40 TABLET ORAL EVERY EVENING
Qty: 90 TABLET | Refills: 0 | Status: SHIPPED
Start: 2021-08-24 | End: 2021-12-17 | Stop reason: SDUPTHER

## 2021-08-24 NOTE — TELEPHONE ENCOUNTER
Blood pressure too high. Recommend increasing lisinopril to 40 mg daily. Sent prescription to [address] in [facility].

## 2021-08-26 ENCOUNTER — OFFICE VISIT (OUTPATIENT)
Dept: PODIATRY | Age: 66
End: 2021-08-26

## 2021-08-26 VITALS
SYSTOLIC BLOOD PRESSURE: 132 MMHG | BODY MASS INDEX: 37.11 KG/M2 | DIASTOLIC BLOOD PRESSURE: 74 MMHG | TEMPERATURE: 98.2 F | WEIGHT: 223 LBS

## 2021-08-26 DIAGNOSIS — M25.572 CHRONIC PAIN OF LEFT ANKLE: ICD-10-CM

## 2021-08-26 DIAGNOSIS — M77.32 CALCANEAL SPUR OF BOTH FEET: ICD-10-CM

## 2021-08-26 DIAGNOSIS — M77.31 CALCANEAL SPUR OF BOTH FEET: ICD-10-CM

## 2021-08-26 DIAGNOSIS — G89.29 CHRONIC PAIN OF LEFT ANKLE: ICD-10-CM

## 2021-08-26 DIAGNOSIS — S93.491A TEAR OF TALOFIBULAR LIGAMENT, RIGHT, INITIAL ENCOUNTER: Primary | ICD-10-CM

## 2021-08-26 DIAGNOSIS — M79.675 PAIN IN LEFT TOE(S): ICD-10-CM

## 2021-08-26 DIAGNOSIS — S93.422D SPRAIN OF DELTOID LIGAMENT OF LEFT ANKLE, SUBSEQUENT ENCOUNTER: ICD-10-CM

## 2021-08-26 DIAGNOSIS — S90.32XA CONTUSION OF LEFT FOOT, INITIAL ENCOUNTER: ICD-10-CM

## 2021-08-26 PROCEDURE — 99212 OFFICE O/P EST SF 10 MIN: CPT | Performed by: PODIATRIST

## 2021-08-26 NOTE — TELEPHONE ENCOUNTER
Yes, that would be great after increase in med. She should wait a week after starting the 40mg lisinopril and then start to check.

## 2021-08-26 NOTE — PROGRESS NOTES
21  Guanaco Angelina : 1955 Sex: female  Age: 61 y.o. Patient was referred by Latanya Dey MD    Chief Complaint   Patient presents with    Foot Pain     last visit pt was getting an inj in her back possible surgical correction the future to left ankle       SUBJECTIVE patient is seen today for follow-up of left ankle pain this is chronic with no improvement with conservative treatment at this time. Had injection did better     Foot Pain   The pain is present in the right toes, right foot, right ankle, left lower leg, left toes, left foot and left ankle. This is a chronic problem. The current episode started more than 1 year ago. There has been a history of trauma. The problem occurs constantly. The quality of the pain is described as burning, aching and pounding. The pain is at a severity of 4/10. The pain is moderate. Review of Systems  Const: Denies constitutional symptoms  Musculo: Denies symptoms other than stated above  Skin: Denies symptoms other than stated above       Current Outpatient Medications:     lisinopril (PRINIVIL;ZESTRIL) 40 MG tablet, Take 1 tablet by mouth every evening, Disp: 90 tablet, Rfl: 0    cyclobenzaprine (FLEXERIL) 10 MG tablet, Take 0.5 tablets by mouth 2 times daily as needed for Muscle spasms, Disp: 20 tablet, Rfl: 0    [START ON 2021] HYDROcodone-acetaminophen (NORCO) 5-325 MG per tablet, Take 1 tablet by mouth 2 times daily for 30 days. , Disp: 60 tablet, Rfl: 0    gabapentin (NEURONTIN) 400 MG capsule, Take 1 capsule by mouth 2 times daily for 30 days. , Disp: 60 capsule, Rfl: 2    hydrOXYzine (VISTARIL) 25 MG capsule, Take 1 capsule by mouth 3 times daily as needed for Anxiety, Disp: 270 capsule, Rfl: 1    atorvastatin (LIPITOR) 40 MG tablet, Take 1 tablet by mouth daily, Disp: 90 tablet, Rfl: 3    Handicap Placard MISC, DX:  Loss of Balance, Chronic low back pain, s/p back surgery.   Duration of 5 years, Disp: 1 each, Rfl: 0  Allergies Allergen Reactions    Pcn [Penicillins] Anaphylaxis       Past Medical History:   Diagnosis Date    Cancer ([facility] [address] 12/2019    LESION REMOVED UNDER TONGUE  DENTAL CLINIC    Chronic back pain     Costochondritis     h/o    Depression     Falls     Last fall Feb 2021    Fibromyalgia     Hallux rigidus of left foot     HTN (hypertension)     Hyperlipidemia     CLYDE on CPAP     Osteoarthritis     \"everywhere\"    Rheumatoid arthritis ([facility] [address]     \"As a child. \"    Rheumatoid arthritis(714.0)     TIA (transient ischemic attack)     no deficits     Use of cane as ambulatory aid      Past Surgical History:   Procedure Laterality Date    ANESTHESIA NERVE BLOCK Left 02/26/2019    LEFT C3,4,5 MBB performed by Xavier Ross MD at [address] [facility] Right 08/12/2019    BILATERAL TRANSFORAMINAL EPIDURAL STEROID INJECTION S1 #3 performed by Xavier Ross MD at [address] [place] [address] Right 06/16/2020    RIGHT SACROILIAC JOINT INJECTION      CPT: 55742 performed by Manisha Mills DO at [facility] [facility]  2005    Left    ARTHRODESIS Left 12/14/2018    ARTHRODESIS 2ND DIGIT LEFT FOOT performed by Lucio Alfaro DPM at [address] [place] [address]  08/16/2017    PLIF L3-L4, L4-L5 with rods, screws, and cages/Dr. Faisal Mendoza BACK SURGERY  03/04/2020    BACK SURGERY Right 03/09/2021    RIGHT PERCUTANEOUS SACROILIAC JOINT FUSION performed by Wyatt Newsome MD at [facility]  2010    reduction, bilat   [facility]    CHOLECYSTECTOMY  2011    COLONOSCOPY  03/27/2015    COLONOSCOPY N/A 08/19/2020    COLONOSCOPY DIAGNOSTIC performed by Dariel Shah MD at [address] [place] [address]  03/2021    SI Joint    ENDOSCOPY, COLON, DIAGNOSTIC      EPIDURAL STEROID INJECTION N/A 06/04/2019    BILATERAL TRANSFORAMINAL EPIDURAL STEROID INJECTION S1 performed by Manisha Mills DO at [facility] [address] [place] Left 12/14/2018    REMOVAL OF PAINFUL HARDWARE LEFT FOOT  ++SYNTHES++ performed by Jessica Dumont DPM at [facility]    inguinal     LUMBAR SPINE SURGERY N/A 03/04/2020    EXPLORATION OF PRIOR L3-L5 FUSION AND L2-L3  POSTERIOR LUMBAR INTERBODY FUSION -- NEEDS O-ARM, AUDIOLOGY, CAGES, PLATES, SCREWS, C-ARM, TERESA TABLE, CELL SAVER, PLATELET GEL -- GLOBUS performed by Socrates Null MD at [address] N/A 10/21/2020    EXCISION OF TONGUE LESION performed by Mark Young DO at [address] [facility] [address] Bilateral 05/07/2018    L1-2 lumbar foramen #1    NERVE BLOCK Bilateral 12/03/2018    s1 tfesi    NERVE BLOCK Bilateral 08/12/2019    NERVE BLOCK Right 09/30/2019    sacral radiofrequency    NERVE BLOCK Right 09/01/2020    RIGHT SACROILIAC JOINT INJECTION #2 performed by Janie Fontaine DO at [address] [facility] Left 09/25/2013    left foot tarso metatarsal joint injection    OTHER SURGICAL HISTORY Left 05/27/2015    Endoscopic Gastroc recession left, Lapidus left foot and  Excision of exostosis left foot    PAIN MANAGEMENT PROCEDURE Left 7/27/2021    LEFT L5-S1 TRANSFORAMINAL EPIDURAL STEROID INJECTION performed by Janie Fontaine DO at [address] AA&/STRD TFRML EPI LUMBAR/SACRAL 1 LEVEL Bilateral 12/03/2018    BILATERAL S1  EPIDURAL STEROID INJECTION performed by Melissa Smith MD at [address] [facility] DX/THER SBST INTRLMNR LMBR/SAC W/IMG GDN N/A 08/21/2018    EPIDURAL STEROID INJECTION L1-2 WITH LOW VOL performed by Melissa Smith MD at [address] [facility] NOSE/CLEFT LIP/TIP Bilateral 05/07/2018    BILATERAL L1-2 LUMBAR FORAMEN #1 performed by Melissa Smith MD at [address] [facility] NOSE/CLEFT LIP/TIP Bilateral 06/04/2018    BILATERAL TRANSFORAMINAL EPIDURAL STEROID INJECTION UNDER FLUOROSCOPIC GUIDANCE @ FORAMINAL LEVEL L1-2 #2 performed by Melissa Smith MD at [address] [facility] 2019    RIGHT SACRAL RADIOFREQUENCY ABLATION performed by Anne De MD at [address] [facility] TOE SURGERY  , 2005    Big Left Toe    TOE SURGERY Left 2018    2nd toe     TONSILLECTOMY      WISDOM TOOTH EXTRACTION       Family History   Problem Relation Age of Onset    Dementia Mother     Breast Cancer Mother     Cancer Mother         breast cancer [de-identified]     Stroke Mother     Heart Attack Father     Other Father 80        Aortic aneurysm    Cancer Brother     Diabetes Brother      Social History     Socioeconomic History    Marital status:      Spouse name: Not on file    Number of children: Not on file    Years of education: Not on file    Highest education level: Not on file   Occupational History    Not on file   Tobacco Use    Smoking status: Current Some Day Smoker     Packs/day: 0.25     Years: 30.00     Pack years: 7.50     Types: Cigarettes     Last attempt to quit: 10/5/2020     Years since quittin.8    Smokeless tobacco: Never Used    Tobacco comment: was going to try to stop but chantix is too expensive   Vaping Use    Vaping Use: Never used   Substance and Sexual Activity    Alcohol use: Not Currently     Alcohol/week: 0.0 standard drinks     Comment: rarely    Drug use: No    Sexual activity: Not on file   Other Topics Concern    Not on file   Social History Narrative    Not on file     Social Determinants of Health     Financial Resource Strain:     Difficulty of Paying Living Expenses:    Food Insecurity:     Worried About Running Out of Food in the Last Year:     Ran Out of Food in the Last Year:    Transportation Needs:     Lack of Transportation (Medical):      Lack of Transportation (Non-Medical):    Physical Activity:     Days of Exercise per Week:     Minutes of Exercise per Session:    Stress:     Feeling of Stress :    Social Connections:     Frequency of Communication with Friends and Family:     Frequency of Social Gatherings with Friends and Family:     Attends Nondenominational Services:     Active Member of Clubs or Organizations:     Attends Club or Organization Meetings:     Marital Status:    Intimate Partner Violence:     Fear of Current or Ex-Partner:     Emotionally Abused:     Physically Abused:     Sexually Abused:        Vitals:    08/26/21 1105   BP: 132/74   Temp: 98.2 °F (36.8 °C)   TempSrc: Temporal   Weight: 223 lb (101.2 kg)       Focused Lower Extremity Physical Exam:  Vitals:    08/26/21 1105   BP: 132/74   Temp: 98.2 °F (36.8 °C)        Foot Exam    General  General Appearance: appears stated age and healthy   Orientation: alert and oriented to person, place, and time       Left Foot/Ankle      Inspection and Palpation  Skin Exam: skin intact; Neurovascular  Dorsalis pedis: 3+  Posterior tibial: 3+  Saphenous nerve sensation: normal  Tibial nerve sensation: normal  Superficial peroneal nerve sensation: normal  Deep peroneal nerve sensation: normal  Sural nerve sensation: normal    Range of Motion    Passive  Ankle dorsiflexion: pain  Plantar flexion: pain  Ankle eversion: pain    Active  Ankle dorsiflexion: pain  Plantar flexion: pain  Ankle eversion: pain  Ankle inversion: pain    Tests  Anterior drawer: positive              Left Ankle Exam     Tenderness   The patient is experiencing tenderness in the ATF, CF, deltoid and medial malleolus. Swelling: mild    Range of Motion   Dorsiflexion: abnormal   Plantar flexion: abnormal   Eversion: abnormal   Inversion: abnormal     Muscle Strength   The patient has normal left ankle strength.     Tests   Anterior drawer: positive  Varus tilt: negative            Vascular: pulses  dp  pt    Capillary Refill Time:   Hair growth  Skin:    Edema:    Neurologic:      Musculoskeletal/ Orthopedic examination:    NAIL  Web space   Derm:      CT LUMBAR SPINE W CONTRAST    Result Date: 7/8/2021  EXAMINATION: CT OF THE LUMBAR SPINE WITH INTRATHECAL CONTRAST 7/8/2021 9:22 am: TECHNIQUE: CT of the lumbar spine was performed after the administration of intrathecal contrast with multiplanar reconstructed images provided for interpretation. Dose modulation, iterative reconstruction, and/or weight based adjustment of the mA/kV was utilized to reduce the radiation dose to as low as reasonably achievable. COMPARISON: CT lumbar spine performed 05/29/2021. HISTORY: ORDERING SYSTEM PROVIDED HISTORY: Chronic midline low back pain with sciatica, sciatica laterality unspecified TECHNOLOGIST PROVIDED HISTORY: Reason for exam:->post myelogram CT What reading provider will be dictating this exam?->CRC FINDINGS: BONES/ALIGNMENT: There is minimal levocurvature of the lumbar spine. There is posterior fixation from L2-L4 without evidence for hardware complication. The vertebral body heights are maintained. There is minimal retrolisthesis of L2 on L3. SOFT TISSUES: The posterior paraspinal soft tissues are unremarkable. The visualized abdominal structures are unremarkable. The conus is normal in caliber and terminates at L1. The cauda equina is unremarkable. L1-L2: There is no significant disc protrusion, central spinal canal stenosis or neural foraminal narrowing. L2-L3: There is artificial disc material with posterior laminectomy. There is no canal stenosis or left foraminal narrowing. There is moderate right foraminal narrowing. L3-L4: There is artificial disc material and posterior laminectomy. There is no canal stenosis. There is mild bilateral foraminal narrowing. L4-L5: There is artificial disc material with posterior laminectomy. There is no canal stenosis. There is mild left and moderate right foraminal narrowing. L5-S1: There is a circumferential disc bulge with facet hypertrophy. There is no canal stenosis. There is moderate right and severe left foraminal narrowing. Posterior fixation and laminectomy from L2-L4 without evidence for hardware complication.  Multilevel degenerative change with bilateral foraminal narrowing as described above. FL MYELOGRAM LUMBOSACRAL S&I    Result Date: 7/8/2021  EXAMINATION: FLUOROSCOPIC LUMBAR MYELOGRAM 7/8/2021: HISTORY: ORDERING SYSTEM PROVIDED HISTORY: Chronic midline low back pain with sciatica, sciatica laterality unspecified TECHNOLOGIST PROVIDED HISTORY: What reading provider will be dictating this exam?->CRC FLUOROSCOPY DOSE AND TYPE OR TIME AND EXPOSURES: Images: 6 Fluoroscopic time: 1.3 minutes Total dose: 75 mGy PROCEDURE: : Angi Foster Informed consent was obtained after the risks and benefits of the procedure were discussed with the patient and all questions were answered fully. Riley protocol was observed and a standard timeout was performed. The patient was positioned prone and the back was prepped and draped in the normal sterile fashion. 1% lidocaine was used for local anesthesia. The subarachnoid space was accessed with a 22-gauge 3.5\" spinal needle at the L3 level. Free flow of clear CSF was noted. [id number] ml of Isovue-M 200 was injected into the intrathecal space under fluoroscopic visualization. The stylet was reinserted, spinal needle was removed and brief pressure was applied at the puncture site. There were no immediate complications and the patient tolerated the procedure well. 1.  Successful lumbar myelogram with fluoroscopy. 2.  Postmyelogram lumbar CT to follow and reported separately.      MRI ANKLE LEFT WO CONTRAST    Result Date: 7/16/2021  EXAMINATION: MRI OF THE LEFT ANKLE WITHOUT CONTRAST, 7/16/2021 12:49 pm TECHNIQUE: Multiplanar multisequence MRI of the left ankle was performed without the administration of intravenous contrast. COMPARISON: Left ankle plain radiographs from 02/09/2021 HISTORY: ORDERING SYSTEM PROVIDED HISTORY: Chronic pain of left ankle 68-year-old female with chronic left ankle pain FINDINGS: SYNDESMOTIC LIGAMENTS:  The anterior-inferior tibiofibular ligament, interosseous membrane and organized fluid collection. 1. Susceptibility artifact from postsurgical changes in the distal fibula/lateral malleolus and lateral aspect of the talus as well as prior medial cuneiform-1st metatarsal related to prior arthrodesis. The suture anchors in the lateral malleolus and lateral talus could be related to prior ATFL repair surgery. 2. Mild diffuse degenerative changes as detailed above. 3. Mild plantar calcaneal spur. Mild central cord plantar fasciitis. 4. Age-indeterminate, likely remote complete ATFL tear. Evidence of remote partial tearing of the medial deltoid ligament complex. Assessment and Plan: reviewed mri with patient surgical correction in future   Repair atf ligament   And deltoid ligament    Gavin Nam was seen today for foot pain. Diagnoses and all orders for this visit:    Tear of talofibular ligament, right, initial encounter    Chronic pain of left ankle    Sprain of deltoid ligament of left ankle, subsequent encounter    Contusion of left foot, initial encounter    Calcaneal spur of both feet    Pain in left toe(s)        No follow-ups on file. Seen By:  Mikey Perry DPM      Document was created using voice recognition software. Note was reviewed, however may contain grammatical errors.

## 2021-08-26 NOTE — TELEPHONE ENCOUNTER
Eleanor informed&voiced understanding. She does not have a bp cuff at home. She states before you wanted her to check bp twice a week for two weeks at the pharmacy then phone in with readings. Do you want her to do that again ?

## 2021-08-30 DIAGNOSIS — M54.16 LUMBAR RADICULOPATHY: Primary | ICD-10-CM

## 2021-08-30 RX ORDER — CYCLOBENZAPRINE HCL 10 MG
5 TABLET ORAL 2 TIMES DAILY PRN
Qty: 60 TABLET | Refills: 2 | Status: SHIPPED
Start: 2021-08-30 | End: 2022-01-19 | Stop reason: SDUPTHER

## 2021-08-30 NOTE — TELEPHONE ENCOUNTER
Ashley Garcia stated that the flexeril did help and she would like a 90 day script sent to her pharmacy. medcation pended.

## 2021-09-03 ENCOUNTER — TELEPHONE (OUTPATIENT)
Dept: FAMILY MEDICINE CLINIC | Age: 66
End: 2021-09-03

## 2021-09-03 NOTE — TELEPHONE ENCOUNTER
Patient calling with BP readings done at the pharmacy today after medication increase.  She is asking if you would like to change anything  152/95  151/99    Done today on automatic machine

## 2021-09-07 RX ORDER — HYDROCHLOROTHIAZIDE 12.5 MG/1
12.5 TABLET ORAL DAILY
Qty: 30 TABLET | Refills: 3 | Status: SHIPPED
Start: 2021-09-07 | End: 2021-12-17 | Stop reason: SDUPTHER

## 2021-09-07 NOTE — TELEPHONE ENCOUNTER
Thanks. Blood pressure still too high. Continue lisinopril 40 mg daily. Add new hydrochlorothiazide 12.5 mg once daily for additional blood pressure control. Sent to [facility].

## 2021-09-16 NOTE — PROGRESS NOTES
[address]  [address]  MINA DAMIAN [facility] - BEHAVIORAL HEALTH SERVICES, [facility]    Follow up Note      Jaquelin Fears     Date of Visit:  2021    CC:  Patient presents for follow up   Chief Complaint   Patient presents with    Follow-up    Back Pain     lower back    Other     left ankle pain that radiates up       HPI:    Pain is a little worse to her left hip bursa area. Change in quality of symptoms:no. Patient satisfaction with analgesia:fair. Medication side effects: None. Recent diagnostic testing:none. Recent interventional procedures: None. She has been on anticoagulation medications to include NSAIDS. The patient  has not been on herbal supplements. The patient is diabetic. Imagin2021 CT lumbar myelogram    FINDINGS:   BONES/ALIGNMENT: There is minimal levocurvature of the lumbar spine.  There   is posterior fixation from L2-L4 without evidence for hardware complication.    The vertebral body heights are maintained.  There is minimal retrolisthesis   of L2 on L3.       SOFT TISSUES: The posterior paraspinal soft tissues are unremarkable.  The   visualized abdominal structures are unremarkable.  The conus is normal in   caliber and terminates at L1.  The cauda equina is unremarkable.       L1-L2: There is no significant disc protrusion, central spinal canal stenosis   or neural foraminal narrowing.       L2-L3: There is artificial disc material with posterior laminectomy.  There   is no canal stenosis or left foraminal narrowing.  There is moderate right   foraminal narrowing.       L3-L4: There is artificial disc material and posterior laminectomy.  There is   no canal stenosis.  There is mild bilateral foraminal narrowing.       L4-L5: There is artificial disc material with posterior laminectomy.  There   is no canal stenosis.  There is mild left and moderate right foraminal   narrowing.       L5-S1: There is a circumferential disc bulge with facet hypertrophy. Bri Seller   is no canal stenosis. Noreene Shouts is moderate right and severe left foraminal   narrowing.           Impression   Posterior fixation and laminectomy from L2-L4 without evidence for hardware   complication.       Multilevel degenerative change with bilateral foraminal narrowing as   described above.                 MRI lumbar spine 2018  1. No significant interval change is observed since the previous study   of 2017.       2. Stable postoperative changes/posterior spinal fusion at the   L3-L4-L5 level.       3. Severe spine canal stenosis in the level of L2-L3           4. Encroachment of the neural foramina bilaterally in levels of L2-L3   and L5-S1      Thoracic spine MRI 2018  1. Some degenerative changes in the thoracic spine, mainly in the   facet joints in the mid-upper thoracic spine segments       2. Discrete loss of height of T6, more likely an old finding.      Previous treatments: Physical Therapy, Surgery L3-5 fusion/laminectomy and medications. .       Potential Aberrant Drug-Related Behavior:  No     Urine Drug Screenin18:  Consistent for norhydrocodone metabolite  2018:  Consistent for hydrocodone and norhydrocodone  05/10/2019:  Consistent for hydrocodone and norhydrocodone  2019:  Consistent for hydrocodone and norhydrocodone  01/10/2020:  Consistent for hydrocodone and norhydrocodone  2020:  Consistent for oxycodone and metabolites s/p surgery. Inconsistent for hydrocodone. States that she was instructed to take her old norco until she made the appointment to resume her chronic pain medications. 10/2020:  Consistent  :  Consistent     OARRS report:  2018 consistent to 2021 consistent (norco scripts from [facility] post op 3/10/2021 and 3/24/2021.    [place] script through "[employer] - last one 2021 - consistent to 2021 consistent     Opioid agreement:  2020      Past Medical History:   Diagnosis Date    Cancer ([address] 2019    LESION REMOVED UNDER TONGUE  DENTAL CLINIC    Chronic back pain     Costochondritis     h/o    Depression     Falls     Last fall 2021    Fibromyalgia     Hallux rigidus of left foot     HTN (hypertension)     Hyperlipidemia     CLYDE on CPAP     Osteoarthritis     \"everywhere\"    Rheumatoid arthritis ([address]     \"As a child. \"    Rheumatoid arthritis(714.0)     TIA (transient ischemic attack)     no deficits     Use of cane as ambulatory aid        Past Surgical History:   Procedure Laterality Date    ANESTHESIA NERVE BLOCK Left 2019    LEFT C3,4,5 MBB performed by Suman Fall MD at [address] Right 2019    BILATERAL TRANSFORAMINAL EPIDURAL STEROID INJECTION S1 #3 performed by Suman Fall MD at [address] Right 2020    RIGHT SACROILIAC JOINT INJECTION      CPT: 52083 performed by Leroy Ayers DO at [address]      Left    ARTHRODESIS Left 2018    ARTHRODESIS 2ND DIGIT LEFT FOOT performed by Salvador Goncalves DPM at [facility]  2017    PLIF L3-L4, L4-L5 with rods, screws, and cages/Dr. Bobby Koroma BACK SURGERY  2020    BACK SURGERY Right 2021    RIGHT PERCUTANEOUS SACROILIAC JOINT FUSION performed by Marcelo Roach MD at [facility]      reduction, bilat     SECTION      CHOLECYSTECTOMY      COLONOSCOPY  2015    COLONOSCOPY N/A 2020    COLONOSCOPY DIAGNOSTIC performed by Andrez Barrientos MD at [address] [place] [address]  2021    SI Joint    ENDOSCOPY, COLON, DIAGNOSTIC      EPIDURAL STEROID INJECTION N/A 2019    BILATERAL TRANSFORAMINAL EPIDURAL STEROID INJECTION S1 performed by Leroy Ayers DO at [address]      Left    [address] / ANKLE Left 2018    REMOVAL OF PAINFUL HARDWARE LEFT FOOT  ++SYNTHES++ performed by Salvador Goncalves DPM at [place] [address] inguinal     LUMBAR SPINE SURGERY N/A 03/04/2020    EXPLORATION OF PRIOR L3-L5 FUSION AND L2-L3  POSTERIOR LUMBAR INTERBODY FUSION -- NEEDS O-ARM, AUDIOLOGY, CAGES, PLATES, SCREWS, C-ARM, TERESA TABLE, CELL SAVER, PLATELET GEL -- GLOBUS performed by Erick Saxena MD at [address] N/A 10/21/2020    EXCISION OF TONGUE LESION performed by Rob Medrano DO at [address] Bilateral 05/07/2018    L1-2 lumbar foramen #1    NERVE BLOCK Bilateral 12/03/2018    s1 tfesi    NERVE BLOCK Bilateral 08/12/2019    NERVE BLOCK Right 09/30/2019    sacral radiofrequency    NERVE BLOCK Right 09/01/2020    RIGHT SACROILIAC JOINT INJECTION #2 performed by Kell Wolfe DO at [address] [facility] Left 09/25/2013    left foot tarso metatarsal joint injection    OTHER SURGICAL HISTORY Left 05/27/2015    Endoscopic Gastroc recession left, Lapidus left foot and  Excision of exostosis left foot    PAIN MANAGEMENT PROCEDURE Left 7/27/2021    LEFT L5-S1 TRANSFORAMINAL EPIDURAL STEROID INJECTION performed by Kell Wolfe DO at [address] AA&/STRD TFRML EPI LUMBAR/SACRAL 1 LEVEL Bilateral 12/03/2018    BILATERAL S1  EPIDURAL STEROID INJECTION performed by Alexa Salcedo MD at [address] [facility] DX/THER SBST INTRLMNR LMBR/SAC W/IMG GDN N/A 08/21/2018    EPIDURAL STEROID INJECTION L1-2 WITH LOW VOL performed by Alexa Salcedo MD at [address] [facility] NOSE/CLEFT LIP/TIP Bilateral 05/07/2018    BILATERAL L1-2 LUMBAR FORAMEN #1 performed by Alexa Salcedo MD at [address] [facility] NOSE/CLEFT LIP/TIP Bilateral 06/04/2018    BILATERAL TRANSFORAMINAL EPIDURAL STEROID INJECTION UNDER FLUOROSCOPIC GUIDANCE @ FORAMINAL LEVEL L1-2 #2 performed by Alexa Salcedo MD at [address] Right 09/30/2019    RIGHT SACRAL RADIOFREQUENCY ABLATION performed by Alexa Salcedo MD at [address]  2000, 2005    Big Left Toe    TOE SURGERY Left 2018    2nd toe     TONSILLECTOMY      WISDOM TOOTH EXTRACTION         Prior to Admission medications    Medication Sig Start Date End Date Taking? Authorizing Provider   hydroCHLOROthiazide (MICROZIDE) 12.5 MG capsule TAKE ONE CAPSULE BY MOUTH ONCE DAILY FOR BLOOD PRESSURE 21  Yes Historical Provider, MD   HYDROcodone-acetaminophen (NORCO) 5-325 MG per tablet Take 1 tablet by mouth 2 times daily for 30 days. 9/26/21 10/26/21 Yes TREASURE Rowe   hydroCHLOROthiazide (HYDRODIURIL) 12.5 MG tablet Take 1 tablet by mouth daily For blood pressure 21  Yes Dilan Goldstein MD   cyclobenzaprine (FLEXERIL) 10 MG tablet Take 0.5 tablets by mouth 2 times daily as needed for Muscle spasms 21 Yes TREASURE Rowe   lisinopril (PRINIVIL;ZESTRIL) 40 MG tablet Take 1 tablet by mouth every evening 21  Yes Dilan Goldstein MD   gabapentin (NEURONTIN) 400 MG capsule Take 1 capsule by mouth 2 times daily for 30 days. 21 Yes TREASURE Rowe   hydrOXYzine (VISTARIL) 25 MG capsule Take 1 capsule by mouth 3 times daily as needed for Anxiety 21  Yes Dilan Goldstein MD   atorvastatin (LIPITOR) 40 MG tablet Take 1 tablet by mouth daily 21  Yes Dilan Goldstein MD   Handicap Placard MISC DX:  Loss of Balance, Chronic low back pain, s/p back surgery.    Duration of 5 years 21  Yes Dilan Goldstein MD       Allergies   Allergen Reactions    Pcn [Penicillins] Anaphylaxis       Social History     Socioeconomic History    Marital status:      Spouse name: Not on file    Number of children: Not on file    Years of education: Not on file    Highest education level: Not on file   Occupational History    Not on file   Tobacco Use    Smoking status: Current Some Day Smoker     Packs/day: 0.25     Years: 30.00     Pack years: 7.50     Types: Cigarettes     Last attempt to quit: 10/5/2020     Years since quittin.9  Smokeless tobacco: Never Used    Tobacco comment: was going to try to stop but chantix is too expensive   Vaping Use    Vaping Use: Never used   Substance and Sexual Activity    Alcohol use: Not Currently     Alcohol/week: 0.0 standard drinks     Comment: rarely    Drug use: No    Sexual activity: Not Currently   Other Topics Concern    Not on file   Social History Narrative    Not on file     Social Determinants of Health     Financial Resource Strain:     Difficulty of Paying Living Expenses:    Food Insecurity:     Worried About Running Out of Food in the Last Year:     Ran Out of Food in the Last Year:    Transportation Needs:     Lack of Transportation (Medical):  Lack of Transportation (Non-Medical):    Physical Activity:     Days of Exercise per Week:     Minutes of Exercise per Session:    Stress:     Feeling of Stress :    Social Connections:     Frequency of Communication with Friends and Family:     Frequency of Social Gatherings with Friends and Family:     Attends [employer] Services:     Active Member of Clubs or Organizations:     Attends Club or Organization Meetings:     Marital Status:    Intimate Partner Violence:     Fear of Current or Ex-Partner:     Emotionally Abused:     Physically Abused:     Sexually Abused:        Family History   Problem Relation Age of Onset    Dementia Mother     Breast Cancer Mother     Cancer Mother         breast cancer [de-identified]     Stroke Mother     Heart Attack Father     Other Father 80        Aortic aneurysm    Cancer Brother     Diabetes Brother        REVIEW OF SYSTEMS:     [facility] denies fever/chills, chest pain, shortness of breath, new bowel or bladder complaints or suicidal ideations. All other review of systems was negative.     PHYSICAL EXAMINATION:      /80   Pulse 110   Temp 97.3 °F (36.3 °C) (Infrared)   Resp 16   Ht 5' 5\" (1.651 m)   Wt 215 lb (97.5 kg)   LMP  (LMP Unknown)   SpO2 94%   BMI 35.78 kg/m² General:      General appearance: awake, alert, oriented, in no acute distress, well developed, well nourished and in no acute distress   pleasant and well-hydrated. in no distress and A & O x3  Build:Overweight  Function:Rises from a seated position with difficulty    HEENT:    Head:normocephalic and atraumatic  Pupils:regular, round and equal.  Sclera: icterus absent  EOM:full and intact. Lungs:    Breathing:Normal expansion. No  wheezing. Abdomen:    Shape:non-distended and normal    Left hip GT TTP    Lumbar spine:     Range of motion:abnormal moderately Lateral bending, flexion, extension rotation bilateral and is mildly painful. Extremities:    Tremors:None bilaterally upper and lower  Range of motion:Generally normal shoulders  Intact:Yes  Cyanosis:none  Edema:Normal      Neurological:    Cranial nerves:normal  Sensory:diminished to light lateral aspect of right leg                   Dermatology:    Skin:no unusual rashes, no skin lesions, no palpable subcutaneous nodules and good skin turgor    Assessment/Plan:      Chronic low back pain with radiation to the Right groin, patient had low back surgery 08/2017 L3-5 fusion, recently had lumbar spine CT with severe L2-3 stenosis     Plan:  Patient is s/p revision fusion L2-L4 on 3/4/2020. Patient is s/p:  Right sacroiliac joint fusion with use of SI-BONE iFuse implant on 3/9/2021 by Dr. Catalino Anderson. Continue norco 5/325 BID. Continue with Gabapentin 400 mg BID. She reports that any increases make her off balance. Patient is s/p: PROCEDURE: Left Transforaminal epidural steroid injection under fluoroscopic guidance at foraminal level L5 and S1 (#1) on 07/27/2021 with good relief at this point  OARRS report reviewed 09/2021  Continue flexeril 5 mg BID prn for right sided thoracic myofascial pain. Consider TPIs if not helpful  Consider left hip GT injection - she will call if she wants to schedule.   She will be having left foot surgery in November. Patient encouraged to stay active and to lose weight. Failed physical therapy  Treatment plan discussed with the patient including medications side effects       Controlled Substance Monitoring:    Acute and Chronic Pain Monitoring:   RX Monitoring 9/20/2021   Attestation -   Acute Pain Prescriptions -   Periodic Controlled Substance Monitoring Possible medication side effects, risk of tolerance/dependence & alternative treatments discussed. ;No signs of potential drug abuse or diversion identified. ;Assessed functional status. ;Obtaining appropriate analgesic effect of treatment.    Chronic Pain > 80 MEDD -               ccreferring physicf

## 2021-09-20 ENCOUNTER — OFFICE VISIT (OUTPATIENT)
Dept: PAIN MANAGEMENT | Age: 66
End: 2021-09-20

## 2021-09-20 VITALS
BODY MASS INDEX: 35.82 KG/M2 | SYSTOLIC BLOOD PRESSURE: 117 MMHG | DIASTOLIC BLOOD PRESSURE: 80 MMHG | TEMPERATURE: 97.3 F | HEIGHT: 65 IN | OXYGEN SATURATION: 94 % | RESPIRATION RATE: 16 BRPM | WEIGHT: 215 LBS | HEART RATE: 110 BPM

## 2021-09-20 DIAGNOSIS — G89.29 CHRONIC MIDLINE LOW BACK PAIN WITH SCIATICA, SCIATICA LATERALITY UNSPECIFIED: ICD-10-CM

## 2021-09-20 DIAGNOSIS — M54.40 CHRONIC MIDLINE LOW BACK PAIN WITH SCIATICA, SCIATICA LATERALITY UNSPECIFIED: ICD-10-CM

## 2021-09-20 DIAGNOSIS — G89.4 CHRONIC PAIN SYNDROME: ICD-10-CM

## 2021-09-20 PROCEDURE — 99213 OFFICE O/P EST LOW 20 MIN: CPT | Performed by: PHYSICIAN ASSISTANT

## 2021-09-20 RX ORDER — HYDROCODONE BITARTRATE AND ACETAMINOPHEN 5; 325 MG/1; MG/1
1 TABLET ORAL 2 TIMES DAILY
Qty: 60 TABLET | Refills: 0 | Status: SHIPPED
Start: 2021-09-26 | End: 2021-10-18 | Stop reason: SDUPTHER

## 2021-09-20 RX ORDER — HYDROCHLOROTHIAZIDE 12.5 MG/1
CAPSULE, GELATIN COATED ORAL
COMMUNITY
Start: 2021-09-08 | End: 2021-12-17

## 2021-09-20 NOTE — PROGRESS NOTES
Do you currently have any of the following:    Fever: No  Headache:  No  Cough: No  Shortness of breath: No  Exposed to anyone with these symptoms: No         Savanah Edwige [facility] the [facility] on 9[facility]21. Karlee Torres is complaining of pain left ankle up to knee and lower back down . The pain is constant. The pain is described as aching, throbbing, shooting, stabbing and burning. Pain is rated on her best day at a 7, on her worst day at a 10, and on average at a 8 on the VAS scale. She took her last dose of Norco, Neurontin and flexeril   . Any procedures since your last visit: No,      Pacemaker or defibrillator: No      She is not on NSAIDS and is not on anticoagulation medications to include none . Medication Contract and Consent for Opioid Use Documents Filed     Patient Documents       Type of Document Status Date Received Received By Description     Medication Contract Received  Rony George Opioid medication contract with Dr Jose White 3/24/20     Medication Contract Received 4/26/2018  3:50 PM ANYA NUNO PAIN MANAGEMENT PATIENT AGREEMENT 4/26/2018     Medication Contract Received 11/5/2020  4:23 PM EBER HONG Opioid medication contract with Dr Tiffanie Tatum Received 3/1/2021  1:26 PM EBER HONG Opioid medication contract with Dr Tiffanie Tatum Received 8/23/2021  2:03 PM HAYLIE CADENA Medication contract                /80   Pulse 110   Temp 97.3 °F (36.3 °C) (Infrared)   Resp 16   Ht 5' 5\" (1.651 m)   Wt 215 lb (97.5 kg)   LMP  (LMP Unknown)   SpO2 94%   BMI 35.78 kg/m²      No LMP recorded (lmp unknown).  Patient is postmenopausal.

## 2021-10-05 ENCOUNTER — OFFICE VISIT (OUTPATIENT)
Dept: PODIATRY | Age: 66
End: 2021-10-05

## 2021-10-05 VITALS
TEMPERATURE: 98.2 F | DIASTOLIC BLOOD PRESSURE: 78 MMHG | WEIGHT: 215 LBS | BODY MASS INDEX: 35.78 KG/M2 | SYSTOLIC BLOOD PRESSURE: 129 MMHG

## 2021-10-05 DIAGNOSIS — G89.18 POST-OP PAIN: ICD-10-CM

## 2021-10-05 DIAGNOSIS — S93.491A TEAR OF TALOFIBULAR LIGAMENT, RIGHT, INITIAL ENCOUNTER: Primary | ICD-10-CM

## 2021-10-05 PROCEDURE — 99213 OFFICE O/P EST LOW 20 MIN: CPT | Performed by: PODIATRIST

## 2021-10-05 NOTE — PROGRESS NOTES
21  Peggy Jacobs : 1955 Sex: female  Age: 61 y.o. Patient was referred by Geraldine Gibbs MD    Chief Complaint   Patient presents with    Foot Injury     right ankle possible surgical correction pt is self pay     Foot Pain       SUBJECTIVE  Pt here to discuss sx      Foot Pain   The pain is present in the right toes, right foot, right ankle, left lower leg, left toes, left foot and left ankle. This is a chronic problem. The current episode started more than 1 year ago. There has been a history of trauma. The problem occurs constantly. The quality of the pain is described as burning, aching and pounding. The pain is at a severity of 4/10. The pain is moderate. Foot Injury   The incident occurred more than 1 week ago. Review of Systems  Const: Denies constitutional symptoms  Musculo: Denies symptoms other than stated above  Skin: Denies symptoms other than stated above       Current Outpatient Medications:     hydroCHLOROthiazide (MICROZIDE) 12.5 MG capsule, TAKE ONE CAPSULE BY MOUTH ONCE DAILY FOR BLOOD PRESSURE, Disp: , Rfl:     HYDROcodone-acetaminophen (NORCO) 5-325 MG per tablet, Take 1 tablet by mouth 2 times daily for 30 days. , Disp: 60 tablet, Rfl: 0    hydroCHLOROthiazide (HYDRODIURIL) 12.5 MG tablet, Take 1 tablet by mouth daily For blood pressure, Disp: 30 tablet, Rfl: 3    lisinopril (PRINIVIL;ZESTRIL) 40 MG tablet, Take 1 tablet by mouth every evening, Disp: 90 tablet, Rfl: 0    hydrOXYzine (VISTARIL) 25 MG capsule, Take 1 capsule by mouth 3 times daily as needed for Anxiety, Disp: 270 capsule, Rfl: 1    atorvastatin (LIPITOR) 40 MG tablet, Take 1 tablet by mouth daily, Disp: 90 tablet, Rfl: 3    Handicap Placard MISC, DX:  Loss of Balance, Chronic low back pain, s/p back surgery. Duration of 5 years, Disp: 1 each, Rfl: 0    gabapentin (NEURONTIN) 400 MG capsule, Take 1 capsule by mouth 2 times daily for 30 days. , Disp: 60 capsule, Rfl: 2  Allergies   Allergen Reactions    Pcn [Penicillins] Anaphylaxis       Past Medical History:   Diagnosis Date    Cancer ([facility] [address] 12/2019    LESION REMOVED UNDER TONGUE  DENTAL CLINIC    Chronic back pain     Costochondritis     h/o    Depression     Falls     Last fall Feb 2021    Fibromyalgia     Hallux rigidus of left foot     HTN (hypertension)     Hyperlipidemia     CLYDE on CPAP     Osteoarthritis     \"everywhere\"    Rheumatoid arthritis ([facility] [address]     \"As a child. \"    Rheumatoid arthritis(714.0)     TIA (transient ischemic attack)     no deficits     Use of cane as ambulatory aid      Past Surgical History:   Procedure Laterality Date    ANESTHESIA NERVE BLOCK Left 02/26/2019    LEFT C3,4,5 MBB performed by Terrie Crook MD at [facility] Right 08/12/2019    BILATERAL TRANSFORAMINAL EPIDURAL STEROID INJECTION S1 #3 performed by Terrie Crook MD at [address] [facility] Right 06/16/2020    RIGHT SACROILIAC JOINT INJECTION      CPT: 78205 performed by Rebeca Reinoso DO at [address]  2005    Left    ARTHRODESIS Left 12/14/2018    ARTHRODESIS 2ND DIGIT LEFT FOOT performed by Raymond Covington DPM at [address] [place] [address]  08/16/2017    PLIF L3-L4, L4-L5 with rods, screws, and cages/Dr. Rand Morris BACK SURGERY  03/04/2020    BACK SURGERY Right 03/09/2021    RIGHT PERCUTANEOUS SACROILIAC JOINT FUSION performed by Evy Davis MD at [facility]  2010    reduction, bilat   [address]    CHOLECYSTECTOMY  2011    COLONOSCOPY  03/27/2015    COLONOSCOPY N/A 08/19/2020    COLONOSCOPY DIAGNOSTIC performed by Lenin Berger MD at [address] [place] [address]  03/2021    SI Joint    ENDOSCOPY, COLON, DIAGNOSTIC      EPIDURAL STEROID INJECTION N/A 06/04/2019    BILATERAL TRANSFORAMINAL EPIDURAL STEROID INJECTION S1 performed by Rebeca Reinoso DO at [address]      Left    HARDWARE REMOVAL FOOT / ANKLE Left 12/14/2018    REMOVAL OF PAINFUL HARDWARE LEFT FOOT  ++SYNTHES++ performed by Angel Fernandez DPM at [facility]    inguinal     LUMBAR SPINE SURGERY N/A 03/04/2020    EXPLORATION OF PRIOR L3-L5 FUSION AND L2-L3  POSTERIOR LUMBAR INTERBODY FUSION -- NEEDS O-ARM, AUDIOLOGY, CAGES, PLATES, SCREWS, C-ARM, TERESA TABLE, CELL SAVER, PLATELET GEL -- GLOBUS performed by Chivo Rene MD at [address] N/A 10/21/2020    EXCISION OF TONGUE LESION performed by Sylwia Dubon DO at [address] [facility] [address] Bilateral 05/07/2018    L1-2 lumbar foramen #1    NERVE BLOCK Bilateral 12/03/2018    s1 tfesi    NERVE BLOCK Bilateral 08/12/2019    NERVE BLOCK Right 09/30/2019    sacral radiofrequency    NERVE BLOCK Right 09/01/2020    RIGHT SACROILIAC JOINT INJECTION #2 performed by Stella Dempsey DO at [address] [facility] Left 09/25/2013    left foot tarso metatarsal joint injection    OTHER SURGICAL HISTORY Left 05/27/2015    Endoscopic Gastroc recession left, Lapidus left foot and  Excision of exostosis left foot    PAIN MANAGEMENT PROCEDURE Left 7/27/2021    LEFT L5-S1 TRANSFORAMINAL EPIDURAL STEROID INJECTION performed by Stella Dempsey DO at [address] AA&/STRD TFRML EPI LUMBAR/SACRAL 1 LEVEL Bilateral 12/03/2018    BILATERAL S1  EPIDURAL STEROID INJECTION performed by Ute Malin MD at [address] [facility] DX/THER SBST INTRLMNR LMBR/SAC W/IMG GDN N/A 08/21/2018    EPIDURAL STEROID INJECTION L1-2 WITH LOW VOL performed by Ute Malin MD at [address] [facility] NOSE/CLEFT LIP/TIP Bilateral 05/07/2018    BILATERAL L1-2 LUMBAR FORAMEN #1 performed by Ute Malin MD at [address] [facility] NOSE/CLEFT LIP/TIP Bilateral 06/04/2018    BILATERAL TRANSFORAMINAL EPIDURAL STEROID INJECTION UNDER FLUOROSCOPIC GUIDANCE @ FORAMINAL LEVEL L1-2 #2 performed by Ute Malin MD at [address] Right 09/30/2019 RIGHT SACRAL RADIOFREQUENCY ABLATION performed by Moises Boswell MD at . [facility]  ,     Big Left Toe    TOE SURGERY Left 2018    2nd toe     TONSILLECTOMY      WISDOM TOOTH EXTRACTION       Family History   Problem Relation Age of Onset    Dementia Mother     Breast Cancer Mother     Cancer Mother         breast cancer [de-identified]     Stroke Mother     Heart Attack Father     Other Father 80        Aortic aneurysm    Cancer Brother     Diabetes Brother      Social History     Socioeconomic History    Marital status:      Spouse name: Not on file    Number of children: Not on file    Years of education: Not on file    Highest education level: Not on file   Occupational History    Not on file   Tobacco Use    Smoking status: Current Some Day Smoker     Packs/day: 0.25     Years: 30.00     Pack years: 7.50     Types: Cigarettes     Last attempt to quit: 10/5/2020     Years since quittin.0    Smokeless tobacco: Never Used    Tobacco comment: was going to try to stop but chantix is too expensive   Vaping Use    Vaping Use: Never used   Substance and Sexual Activity    Alcohol use: Not Currently     Alcohol/week: 0.0 standard drinks     Comment: rarely    Drug use: No    Sexual activity: Not Currently   Other Topics Concern    Not on file   Social History Narrative    Not on file     Social Determinants of Health     Financial Resource Strain:     Difficulty of Paying Living Expenses:    Food Insecurity:     Worried About Running Out of Food in the Last Year:     Ran Out of Food in the Last Year:    Transportation Needs:     Lack of Transportation (Medical):      Lack of Transportation (Non-Medical):    Physical Activity:     Days of Exercise per Week:     Minutes of Exercise per Session:    Stress:     Feeling of Stress :    Social Connections:     Frequency of Communication with Friends and Family:     Frequency of Social Gatherings with Friends and Family:     Attends Yarsani Services:     Active Member of Clubs or Organizations:     Attends Club or Organization Meetings:     Marital Status:    Intimate Partner Violence:     Fear of Current or Ex-Partner:     Emotionally Abused:     Physically Abused:     Sexually Abused:        Vitals:    10/05/21 0948   BP: 129/78   Temp: 98.2 °F (36.8 °C)   TempSrc: Temporal   Weight: 215 lb (97.5 kg)       Focused Lower Extremity Physical Exam:  Vitals:    10/05/21 0948   BP: 129/78   Temp: 98.2 °F (36.8 °C)        Foot Exam    General  General Appearance: appears stated age and healthy   Orientation: alert and oriented to person, place, and time       Left Foot/Ankle      Inspection and Palpation  Skin Exam: skin intact; Neurovascular  Dorsalis pedis: 3+  Posterior tibial: 3+  Saphenous nerve sensation: normal  Tibial nerve sensation: normal  Superficial peroneal nerve sensation: normal  Deep peroneal nerve sensation: normal  Sural nerve sensation: normal    Range of Motion    Passive  Ankle dorsiflexion: pain  Plantar flexion: pain  Ankle eversion: pain    Active  Ankle dorsiflexion: pain  Plantar flexion: pain  Ankle eversion: pain  Ankle inversion: pain    Tests  Anterior drawer: positive              Left Ankle Exam     Tenderness   The patient is experiencing tenderness in the ATF, CF, deltoid and medial malleolus. Swelling: mild    Range of Motion   Dorsiflexion: abnormal   Plantar flexion: abnormal   Eversion: abnormal   Inversion: abnormal     Muscle Strength   The patient has normal left ankle strength.     Tests   Anterior drawer: positive  Varus tilt: negative            Vascular: pulses  dp  pt    Capillary Refill Time:   Hair growth  Skin:    Edema:    Neurologic:      Musculoskeletal/ Orthopedic examination:    NAIL  Web space   Derm:      CT LUMBAR SPINE W CONTRAST    Result Date: 7/8/2021  EXAMINATION: CT OF THE LUMBAR SPINE WITH INTRATHECAL CONTRAST 7/8/2021 9:22 am: TECHNIQUE: CT of the lumbar spine was performed after the administration of intrathecal contrast with multiplanar reconstructed images provided for interpretation. Dose modulation, iterative reconstruction, and/or weight based adjustment of the mA/kV was utilized to reduce the radiation dose to as low as reasonably achievable. COMPARISON: CT lumbar spine performed 05/29/2021. HISTORY: ORDERING SYSTEM PROVIDED HISTORY: Chronic midline low back pain with sciatica, sciatica laterality unspecified TECHNOLOGIST PROVIDED HISTORY: Reason for exam:->post myelogram CT What reading provider will be dictating this exam?->CRC FINDINGS: BONES/ALIGNMENT: There is minimal levocurvature of the lumbar spine. There is posterior fixation from L2-L4 without evidence for hardware complication. The vertebral body heights are maintained. There is minimal retrolisthesis of L2 on L3. SOFT TISSUES: The posterior paraspinal soft tissues are unremarkable. The visualized abdominal structures are unremarkable. The conus is normal in caliber and terminates at L1. The cauda equina is unremarkable. L1-L2: There is no significant disc protrusion, central spinal canal stenosis or neural foraminal narrowing. L2-L3: There is artificial disc material with posterior laminectomy. There is no canal stenosis or left foraminal narrowing. There is moderate right foraminal narrowing. L3-L4: There is artificial disc material and posterior laminectomy. There is no canal stenosis. There is mild bilateral foraminal narrowing. L4-L5: There is artificial disc material with posterior laminectomy. There is no canal stenosis. There is mild left and moderate right foraminal narrowing. L5-S1: There is a circumferential disc bulge with facet hypertrophy. There is no canal stenosis. There is moderate right and severe left foraminal narrowing. Posterior fixation and laminectomy from L2-L4 without evidence for hardware complication.  Multilevel degenerative change with bilateral foraminal narrowing as described above. FL MYELOGRAM LUMBOSACRAL S&I    Result Date: 7/8/2021  EXAMINATION: FLUOROSCOPIC LUMBAR MYELOGRAM 7/8/2021: HISTORY: ORDERING SYSTEM PROVIDED HISTORY: Chronic midline low back pain with sciatica, sciatica laterality unspecified TECHNOLOGIST PROVIDED HISTORY: What reading provider will be dictating this exam?->CRC FLUOROSCOPY DOSE AND TYPE OR TIME AND EXPOSURES: Images: 6 Fluoroscopic time: 1.3 minutes Total dose: 75 mGy PROCEDURE: : Sherman Montejo Informed consent was obtained after the risks and benefits of the procedure were discussed with the patient and all questions were answered fully. Forman protocol was observed and a standard timeout was performed. The patient was positioned prone and the back was prepped and draped in the normal sterile fashion. 1% lidocaine was used for local anesthesia. The subarachnoid space was accessed with a 22-gauge 3.5\" spinal needle at the L3 level. Free flow of clear CSF was noted. [id number] ml of Isovue-M 200 was injected into the intrathecal space under fluoroscopic visualization. The stylet was reinserted, spinal needle was removed and brief pressure was applied at the puncture site. There were no immediate complications and the patient tolerated the procedure well. 1.  Successful lumbar myelogram with fluoroscopy. 2.  Postmyelogram lumbar CT to follow and reported separately.      MRI ANKLE LEFT WO CONTRAST    Result Date: 7/16/2021  EXAMINATION: MRI OF THE LEFT ANKLE WITHOUT CONTRAST, 7/16/2021 12:49 pm TECHNIQUE: Multiplanar multisequence MRI of the left ankle was performed without the administration of intravenous contrast. COMPARISON: Left ankle plain radiographs from 02/09/2021 HISTORY: ORDERING SYSTEM PROVIDED HISTORY: Chronic pain of left ankle 26-year-old female with chronic left ankle pain FINDINGS: SYNDESMOTIC LIGAMENTS:  The anterior-inferior tibiofibular ligament, interosseous membrane and posterior-inferior tibiofibular ligaments are normal. LATERAL COLLATERAL LIGAMENT COMPLEX: Age-indeterminate complete ATFL tear. Posterior talofibular ligament and calcaneofibular ligament appear intact. DELTOID LIGAMENT COMPLEX: Evidence of remote partial tearing of the medial deltoid ligament complex. SINUS TARSI AND SPRING LIGAMENT:  The fat plug within the sinus tarsi is preserved and the interosseous and cervical ligaments are normal.  The navicular-calcaneal (spring) ligament is without acute abnormality. Lisfranc ligament complex appears intact. MEDIAL TENDONS: The posterior tibialis, flexor digitorum longus and flexor hallucis longus tendons are intact. LATERAL TENDONS:  The peroneus longus and brevis tendons are intact. ANTERIOR TENDONS: The tibialis anterior, extensor hallucis longus and extensor digitorum longus tendons are normal in position, morphology and signal. ACHILLES TENDON: The Achilles tendon is normal in position, morphology and signal.  No associated bursitis. PLANTAR FASCIA: Mild plantar calcaneal spur. Intermediate T1 signal thickening of the central cord plantar fascia. No discrete plantar fascial tear. TARSAL TUNNEL: There are no obstructing lesions within the tarsal tunnel. BONE MARROW: Postsurgical changes and susceptibility artifact from hardware and prior fusion of the medial cuneiform-1st metatarsal articulation. Susceptibility artifact from suture anchors in the distal fibula/lateral malleolus and lateral aspect of the talus. No osteochondral lesion or defect at the talar dome. Bone marrow signal intensity within the remaining visualized osseous structures otherwise grossly unremarkable. No tarsal coalition. No marginal erosions. JOINT SPACES: Mild degenerative changes of the TMT joints, tibiotalar and subtalar joints as well as the midfoot. No tibiotalar, subtalar or intertarsal joint effusion. SOFT TISSUES: Mild edema in the subcutaneous fat about the ankle.   No discrete organized fluid collection. 1. Susceptibility artifact from postsurgical changes in the distal fibula/lateral malleolus and lateral aspect of the talus as well as prior medial cuneiform-1st metatarsal related to prior arthrodesis. The suture anchors in the lateral malleolus and lateral talus could be related to prior ATFL repair surgery. 2. Mild diffuse degenerative changes as detailed above. 3. Mild plantar calcaneal spur. Mild central cord plantar fasciitis. 4. Age-indeterminate, likely remote complete ATFL tear. Evidence of remote partial tearing of the medial deltoid ligament complex. Assessment and Plan: reviewed mri with patient surgical correction in future   Repair atf ligament   I had a long discussion today with the patient about the likely diagnosis and its natural history, physical exam and imaging findings, as well as treatment options. We discussed both surgical and nonsurgical treatments, including risks and benefits. From a nonoperative standpoint, we discussed rest/activity modification, NSAIDs/Acetaminophen/topical anesthetics, orthotics, bracing/immobilization, and physical therapy. After conservative treatment has failed patient agrees and would like to have surgery today, we reviewed all aspects of the surgery including the risks the complications and the postop course there are no guarantees,   Patient agrees with surgery   we reviewed all risk and benefits all complications including anesthesia infection further surgery over under correction patient agrees to proceed with surgery. General the terms and procedures of the treatment were undertaken there was alternative procedures and methods discussed with the patient the patient declined these and opted for surgery. All the risks with the procedure were reviewed. Patient will have surgery at [facility] in the future        \[facility] [place] [facility]\"" was seen today for foot injury and foot pain.     Diagnoses and all orders for this visit:    Tear of talofibular ligament, right, initial encounter  -     DME Order for (Specify) as OP    Post-op pain  -     Walker standard  -     DME Order for (Specify) as OP        No follow-ups on file. Seen By:  Michael Belcher DPM      Document was created using voice recognition software. Note was reviewed, however may contain grammatical errors.

## 2021-10-18 ENCOUNTER — OFFICE VISIT (OUTPATIENT)
Dept: PAIN MANAGEMENT | Age: 66
End: 2021-10-18

## 2021-10-18 VITALS
WEIGHT: 215 LBS | DIASTOLIC BLOOD PRESSURE: 80 MMHG | SYSTOLIC BLOOD PRESSURE: 128 MMHG | HEART RATE: 87 BPM | BODY MASS INDEX: 35.82 KG/M2 | TEMPERATURE: 97.6 F | OXYGEN SATURATION: 96 % | HEIGHT: 65 IN | RESPIRATION RATE: 16 BRPM

## 2021-10-18 DIAGNOSIS — M51.36 DDD (DEGENERATIVE DISC DISEASE), LUMBAR: ICD-10-CM

## 2021-10-18 DIAGNOSIS — M47.816 LUMBAR FACET ARTHROPATHY: ICD-10-CM

## 2021-10-18 DIAGNOSIS — M54.40 CHRONIC MIDLINE LOW BACK PAIN WITH SCIATICA, SCIATICA LATERALITY UNSPECIFIED: ICD-10-CM

## 2021-10-18 DIAGNOSIS — M48.061 SPINAL STENOSIS OF LUMBAR REGION WITHOUT NEUROGENIC CLAUDICATION: ICD-10-CM

## 2021-10-18 DIAGNOSIS — M54.50 CHRONIC BILATERAL LOW BACK PAIN WITHOUT SCIATICA: ICD-10-CM

## 2021-10-18 DIAGNOSIS — G89.29 OTHER CHRONIC PAIN: ICD-10-CM

## 2021-10-18 DIAGNOSIS — M79.10 MYALGIA: ICD-10-CM

## 2021-10-18 DIAGNOSIS — G89.29 CHRONIC MIDLINE LOW BACK PAIN WITH SCIATICA, SCIATICA LATERALITY UNSPECIFIED: ICD-10-CM

## 2021-10-18 DIAGNOSIS — G89.29 CHRONIC BILATERAL LOW BACK PAIN WITHOUT SCIATICA: ICD-10-CM

## 2021-10-18 DIAGNOSIS — M54.16 LUMBAR RADICULOPATHY: ICD-10-CM

## 2021-10-18 DIAGNOSIS — G89.4 CHRONIC PAIN SYNDROME: Primary | ICD-10-CM

## 2021-10-18 PROCEDURE — 99213 OFFICE O/P EST LOW 20 MIN: CPT | Performed by: PHYSICIAN ASSISTANT

## 2021-10-18 RX ORDER — HYDROCODONE BITARTRATE AND ACETAMINOPHEN 5; 325 MG/1; MG/1
1 TABLET ORAL 2 TIMES DAILY
Qty: 60 TABLET | Refills: 0 | Status: SHIPPED
Start: 2021-10-25 | End: 2021-11-23 | Stop reason: SDUPTHER

## 2021-10-18 RX ORDER — GABAPENTIN 400 MG/1
400 CAPSULE ORAL 2 TIMES DAILY
Qty: 60 CAPSULE | Refills: 2 | Status: SHIPPED
Start: 2021-10-18 | End: 2022-01-19 | Stop reason: SDUPTHER

## 2021-10-18 NOTE — PROGRESS NOTES
Do you currently have any of the following:    Fever: No  Headache:  No  Cough: No  Shortness of breath: No  Exposed to anyone with these symptoms: No         Anika Brennan presents to the [address] [place] [address] on 10/18/2021. Carlos King is complaining of pain lower back and left leg. The pain is constant. The pain is described as shooting, stabbing and sharp. Pain is rated on her best day at a 10, on her worst day at a 10, and on average at a 10 on the VAS scale. She took her last dose of Norco this morning. Any procedures since your last visit: No, with  % relief. Pacemaker or defibrillator: No managed by . She is not on NSAIDS and is not on anticoagulation medications to include none and is managed by . Medication Contract and Consent for Opioid Use Documents Filed     Patient Documents       Type of Document Status Date Received Received By Description     Medication Contract Received  Chris Fleming Opioid medication contract with Dr Artem Flores 3/24/20     Medication Contract Received 4/26/2018  3:50 PM ANYA NUNO PAIN MANAGEMENT PATIENT AGREEMENT 4/26/2018     Medication Contract Received 11/5/2020  4:23 PM EBER HONG Opioid medication contract with Dr Sav Morgan Received 3/1/2021  1:26 PM EBER HONG Opioid medication contract with Dr Sav Morgan Received 8/23/2021  2:03 PM HAYLIE CADENA Medication contract                /80   Pulse 87   Temp 97.6 °F (36.4 °C) (Infrared)   Resp 16   Ht 5' 5\" (1.651 m)   Wt 215 lb (97.5 kg)   LMP  (LMP Unknown)   SpO2 96%   BMI 35.78 kg/m²      No LMP recorded (lmp unknown).  Patient is postmenopausal.

## 2021-10-18 NOTE — PROGRESS NOTES
hypertrophy. Lena Loll   is no canal stenosis.  There is moderate right and severe left foraminal   narrowing.           Impression   Posterior fixation and laminectomy from L2-L4 without evidence for hardware   complication.       Multilevel degenerative change with bilateral foraminal narrowing as   described above.                 MRI lumbar spine 2018  1. No significant interval change is observed since the previous study   of 2017.       2. Stable postoperative changes/posterior spinal fusion at the   L3-L4-L5 level.       3. Severe spine canal stenosis in the level of L2-L3           4. Encroachment of the neural foramina bilaterally in levels of L2-L3   and L5-S1      Thoracic spine MRI 2018  1. Some degenerative changes in the thoracic spine, mainly in the   facet joints in the mid-upper thoracic spine segments       2. Discrete loss of height of T6, more likely an old finding.      Previous treatments: Physical Therapy, Surgery L3-5 fusion/laminectomy and medications. .       Potential Aberrant Drug-Related Behavior:  No     Urine Drug Screenin18:  Consistent for norhydrocodone metabolite  2018:  Consistent for hydrocodone and norhydrocodone  05/10/2019:  Consistent for hydrocodone and norhydrocodone  2019:  Consistent for hydrocodone and norhydrocodone  01/10/2020:  Consistent for hydrocodone and norhydrocodone  2020:  Consistent for oxycodone and metabolites s/p surgery. Inconsistent for hydrocodone. States that she was instructed to take her old norco until she made the appointment to resume her chronic pain medications. 10/2020:  Consistent  :  Consistent     OARRS report:  2018 consistent to 2021 consistent (norco scripts from [facility] post op 3/10/2021 and 3/24/2021. [place] script through [facility] - last one 2021 - consistent to 10/2021 consistent     Opioid agreement:  2020 - new one to be filled out today.         Past Medical History: Diagnosis Date    Cancer [facility]) 12/2019    LESION REMOVED UNDER TONGUE  DENTAL CLINIC    Chronic back pain     Costochondritis     h/o    Depression     Falls     Last fall Feb 2021    Fibromyalgia     Hallux rigidus of left foot     HTN (hypertension)     Hyperlipidemia     CLYDE on CPAP     Osteoarthritis     \"everywhere\"    Rheumatoid arthritis ([address]     \"As a child. \"    Rheumatoid arthritis(714.0)     TIA (transient ischemic attack)     no deficits     Use of cane as ambulatory aid        Past Surgical History:   Procedure Laterality Date    ANESTHESIA NERVE BLOCK Left 02/26/2019    LEFT C3,4,5 MBB performed by Rd Jacobs MD at [address] Right 08/12/2019    BILATERAL TRANSFORAMINAL EPIDURAL STEROID INJECTION S1 #3 performed by Rd Jacobs MD at [address] Right 06/16/2020    RIGHT SACROILIAC JOINT INJECTION      CPT: 99818 performed by Avis Sheffield DO at [address]  2005    Left    ARTHRODESIS Left 12/14/2018    ARTHRODESIS 2ND DIGIT LEFT FOOT performed by Justa Farias DPM at [facility]  08/16/2017    PLIF L3-L4, L4-L5 with rods, screws, and cages/Dr. Desiree Ortiz BACK SURGERY  03/04/2020    BACK SURGERY Right 03/09/2021    RIGHT PERCUTANEOUS SACROILIAC JOINT FUSION performed by Dwayne Erickson MD at [facility]  2010    reduction, [address]    CHOLECYSTECTOMY  2011    COLONOSCOPY  03/27/2015    COLONOSCOPY N/A 08/19/2020    COLONOSCOPY DIAGNOSTIC performed by Adan Blandon MD at [address] [place] [address]  03/2021    SI Joint    ENDOSCOPY, COLON, DIAGNOSTIC      EPIDURAL STEROID INJECTION N/A 06/04/2019    BILATERAL TRANSFORAMINAL EPIDURAL STEROID INJECTION S1 performed by Avis Sheffield DO at [address]      Left    HARDWARE REMOVAL FOOT / ANKLE Left 12/14/2018    REMOVAL OF PAINFUL HARDWARE LEFT FOOT  ++SYNTHES++ performed by Raymond Covington DPM at [facility]    inguinal     LUMBAR SPINE SURGERY N/A 03/04/2020    EXPLORATION OF PRIOR L3-L5 FUSION AND L2-L3  POSTERIOR LUMBAR INTERBODY FUSION -- NEEDS O-ARM, AUDIOLOGY, CAGES, PLATES, SCREWS, C-ARM, TERESA TABLE, CELL SAVER, PLATELET GEL -- GLOBUS performed by Evy Davis MD at [address] N/A 10/21/2020    EXCISION OF TONGUE LESION performed by Kameron Honeycutt DO at [address] [facility] [address] [facility] [address] Bilateral 05/07/2018    L1-2 lumbar foramen #1    NERVE BLOCK Bilateral 12/03/2018    s1 tfesi    NERVE BLOCK Bilateral 08/12/2019    NERVE BLOCK Right 09/30/2019    sacral radiofrequency    NERVE BLOCK Right 09/01/2020    RIGHT SACROILIAC JOINT INJECTION #2 performed by Rebeca Reinoso DO at [address] [facility] Left 09/25/2013    left foot tarso metatarsal joint injection    OTHER SURGICAL HISTORY Left 05/27/2015    Endoscopic Gastroc recession left, Lapidus left foot and  Excision of exostosis left foot    PAIN MANAGEMENT PROCEDURE Left 7/27/2021    LEFT L5-S1 TRANSFORAMINAL EPIDURAL STEROID INJECTION performed by Rebeca Reinoso DO at [address] AA&/STRD TFRML EPI LUMBAR/SACRAL 1 LEVEL Bilateral 12/03/2018    BILATERAL S1  EPIDURAL STEROID INJECTION performed by Terrie Crook MD at [address] [facility] DX/THER SBST INTRLMNR LMBR/SAC W/IMG GDN N/A 08/21/2018    EPIDURAL STEROID INJECTION L1-2 WITH LOW VOL performed by Terrie Crook MD at [address] [facility] NOSE/CLEFT LIP/TIP Bilateral 05/07/2018    BILATERAL L1-2 LUMBAR FORAMEN #1 performed by Terrie Crook MD at [address] [facility] NOSE/CLEFT LIP/TIP Bilateral 06/04/2018    BILATERAL TRANSFORAMINAL EPIDURAL STEROID INJECTION UNDER FLUOROSCOPIC GUIDANCE @ FORAMINAL LEVEL L1-2 #2 performed by Terrie Crook MD at [address] Right 09/30/2019    RIGHT SACRAL RADIOFREQUENCY ABLATION performed by Terrie Crook MD at [facility] [place]    TOE SURGERY  ,     Big Left Toe    TOE SURGERY Left 2018    2nd toe     TONSILLECTOMY      WISDOM TOOTH EXTRACTION         Prior to Admission medications    Medication Sig Start Date End Date Taking? Authorizing Provider   HYDROcodone-acetaminophen (NORCO) 5-325 MG per tablet Take 1 tablet by mouth 2 times daily for 30 days. 10/25/21 11/24/21 Yes TREASURE Peoples   gabapentin (NEURONTIN) 400 MG capsule Take 1 capsule by mouth 2 times daily for 30 days. 10/18/21 11/17/21 Yes TREASURE Peoples   hydroCHLOROthiazide (MICROZIDE) 12.5 MG capsule TAKE ONE CAPSULE BY MOUTH ONCE DAILY FOR BLOOD PRESSURE 21  Yes Historical Provider, MD   hydroCHLOROthiazide (HYDRODIURIL) 12.5 MG tablet Take 1 tablet by mouth daily For blood pressure 21  Yes Beatrice Martell MD   lisinopril (PRINIVIL;ZESTRIL) 40 MG tablet Take 1 tablet by mouth every evening 21  Yes Beatrice Martell MD   hydrOXYzine (VISTARIL) 25 MG capsule Take 1 capsule by mouth 3 times daily as needed for Anxiety 21  Yes Beatrice Martell MD   atorvastatin (LIPITOR) 40 MG tablet Take 1 tablet by mouth daily 21  Yes Beatrice Martell MD   Handicap Placard MISC DX:  Loss of Balance, Chronic low back pain, s/p back surgery.    Duration of 5 years 21  Yes Beatrice Martell MD       Allergies   Allergen Reactions    Pcn [Penicillins] Anaphylaxis       Social History     Socioeconomic History    Marital status:      Spouse name: Not on file    Number of children: Not on file    Years of education: Not on file    Highest education level: Not on file   Occupational History    Not on file   Tobacco Use    Smoking status: Current Some Day Smoker     Packs/day: 0.25     Years: 30.00     Pack years: 7.50     Types: Cigarettes     Last attempt to quit: 10/5/2020     Years since quittin.0    Smokeless tobacco: Never Used    Tobacco comment: was going to try to stop but chantix is too expensive   Vaping Use    Vaping Use: Never used   Substance and Sexual Activity    Alcohol use: Not Currently     Alcohol/week: 0.0 standard drinks     Comment: rarely    Drug use: No    Sexual activity: Not Currently   Other Topics Concern    Not on file   Social History Narrative    Not on file     Social Determinants of Health     Financial Resource Strain:     Difficulty of Paying Living Expenses:    Food Insecurity:     Worried About Running Out of Food in the Last Year:     Ran Out of Food in the Last Year:    Transportation Needs:     Lack of Transportation (Medical):  Lack of Transportation (Non-Medical):    Physical Activity:     Days of Exercise per Week:     Minutes of Exercise per Session:    Stress:     Feeling of Stress :    Social Connections:     Frequency of Communication with Friends and Family:     Frequency of Social Gatherings with Friends and Family:     Attends Jew Services:     Active Member of Clubs or Organizations:     Attends Club or Organization Meetings:     Marital Status:    Intimate Partner Violence:     Fear of Current or Ex-Partner:     Emotionally Abused:     Physically Abused:     Sexually Abused:        Family History   Problem Relation Age of Onset    Dementia Mother     Breast Cancer Mother     Cancer Mother         breast cancer [de-identified]     Stroke Mother     Heart Attack Father     Other Father 80        Aortic aneurysm    Cancer Brother     Diabetes Brother        REVIEW OF SYSTEMS:     Madeline Gutierres denies fever/chills, chest pain, shortness of breath, new bowel or bladder complaints or suicidal ideations. All other review of systems was negative.     PHYSICAL EXAMINATION:      /80   Pulse 87   Temp 97.6 °F (36.4 °C) (Infrared)   Resp 16   Ht 5' 5\" (1.651 m)   Wt 215 lb (97.5 kg)   LMP  (LMP Unknown)   SpO2 96%   BMI 35.78 kg/m²     General:      General appearance: awake, alert, oriented, in no acute distress, well developed, well nourished and in no acute distress   pleasant and well-hydrated. in no distress and A & O x3  Build:Overweight  Function:Rises from a seated position with difficulty    HEENT:    Head:normocephalic and atraumatic  Pupils:regular, round and equal.  Sclera: icterus absent  EOM:full and intact. Lungs:    Breathing:Normal expansion. No wheezing. Abdomen:    Shape:non-distended and normal    Lumbar spine:     Range of motion:abnormal moderately Lateral bending, flexion, extension rotation bilateral and is mildly painful. Extremities:    Tremors:None bilaterally upper and lower  Range of motion:Generally normal shoulders  Intact:Yes  Cyanosis:none  Edema:Normal      Neurological:    Cranial nerves:normal  Sensory:diminished to light lateral aspect of right leg                   Dermatology:    Skin:no unusual rashes, no skin lesions, no palpable subcutaneous nodules and good skin turgor    Assessment/Plan:      Chronic low back pain with radiation to the Right groin, patient had low back surgery 08/2017 L3-5 fusion, recently had lumbar spine CT with severe L2-3 stenosis     Plan:  Patient is s/p revision fusion L2-L4 on 3/4/2020. Patient is s/p:  Right sacroiliac joint fusion with use of SI-BONE iFuse implant on 3/9/2021 by Dr. Gauri Hahn. Continue norco 5/325 BID. Continue with Gabapentin 400 mg BID. She reports that any increases make her off balance. Patient is s/p: PROCEDURE: Left Transforaminal epidural steroid injection under fluoroscopic guidance at foraminal level L5 and S1 (#1) on 07/27/2021 with good relief until recently. Now having significant left leg pain. She would like to hold off on a repeat injection. She will be having left foot surgery the day after Thanksgiving. Surgeon may freely prescribe during the post operative period. OARRS report reviewed 10/2021  Continue flexeril 5 mg BID prn for right sided thoracic myofascial pain - no refill today.     Consider TPIs if not helpful  Patient encouraged to stay active and to lose weight. Failed physical therapy  Treatment plan discussed with the patient including medications side effects       Controlled Substance Monitoring:    Acute and Chronic Pain Monitoring:   RX Monitoring 10/18/2021   Attestation -   Acute Pain Prescriptions -   Periodic Controlled Substance Monitoring Possible medication side effects, risk of tolerance/dependence & alternative treatments discussed. ;No signs of potential drug abuse or diversion identified. ;Assessed functional status. ;Obtaining appropriate analgesic effect of treatment.    Chronic Pain > 80 MEDD -               ccreferring physicf

## 2021-11-01 ENCOUNTER — PREP FOR PROCEDURE (OUTPATIENT)
Dept: PODIATRY | Age: 66
End: 2021-11-01

## 2021-11-01 RX ORDER — SODIUM CHLORIDE 9 MG/ML
25 INJECTION, SOLUTION INTRAVENOUS PRN
Status: CANCELLED | OUTPATIENT
Start: 2021-11-01

## 2021-11-01 RX ORDER — SODIUM CHLORIDE 0.9 % (FLUSH) 0.9 %
5-40 SYRINGE (ML) INJECTION PRN
Status: CANCELLED | OUTPATIENT
Start: 2021-11-01

## 2021-11-01 RX ORDER — SODIUM CHLORIDE 0.9 % (FLUSH) 0.9 %
5-40 SYRINGE (ML) INJECTION EVERY 12 HOURS SCHEDULED
Status: CANCELLED | OUTPATIENT
Start: 2021-11-01

## 2021-11-15 ENCOUNTER — TELEPHONE (OUTPATIENT)
Dept: FAMILY MEDICINE CLINIC | Age: 66
End: 2021-11-15

## 2021-11-15 NOTE — TELEPHONE ENCOUNTER
----- Message from Liang Marquez sent at 11/15/2021  1:16 PM EST -----  Subject: Message to Provider    QUESTIONS  Information for Provider? Pt. would like a call back wanting to know when   /where she can get the covid booster. ---------------------------------------------------------------------------  --------------  Tamir JAY  What is the best way for the office to contact you? OK to leave message on   voicemail  Preferred Call Back Phone Number? [phone]  ---------------------------------------------------------------------------  --------------  SCRIPT ANSWERS  Relationship to Patient?  Self

## 2021-11-16 ENCOUNTER — NURSE ONLY (OUTPATIENT)
Dept: FAMILY MEDICINE CLINIC | Age: 66
End: 2021-11-16

## 2021-11-16 DIAGNOSIS — Z23 NEED FOR INFLUENZA VACCINATION: Primary | ICD-10-CM

## 2021-11-16 PROCEDURE — 90471 IMMUNIZATION ADMIN: CPT | Performed by: FAMILY MEDICINE

## 2021-11-16 PROCEDURE — 90674 CCIIV4 VAC NO PRSV 0.5 ML IM: CPT | Performed by: FAMILY MEDICINE

## 2021-11-22 NOTE — PROGRESS NOTES
[facility]  [address]  [facility]    Follow up Note      Kevin Babb     Date of Visit:  2021    CC:  Patient presents for follow up   Chief Complaint   Patient presents with    Follow-up    Back Pain     lower, mid       HPI:    Pain is unchanged. No new changes. Change in quality of symptoms:no. Patient satisfaction with analgesia:fair. Medication side effects: None. Recent diagnostic testing:none. Recent interventional procedures: None. She has been on anticoagulation medications to include NSAIDS. The patient  has not been on herbal supplements. The patient is diabetic. Imagin2021 CT lumbar myelogram    FINDINGS:   BONES/ALIGNMENT: There is minimal levocurvature of the lumbar spine.  There   is posterior fixation from L2-L4 without evidence for hardware complication.    The vertebral body heights are maintained.  There is minimal retrolisthesis   of L2 on L3.       SOFT TISSUES: The posterior paraspinal soft tissues are unremarkable.  The   visualized abdominal structures are unremarkable.  The conus is normal in   caliber and terminates at L1.  The cauda equina is unremarkable.       L1-L2: There is no significant disc protrusion, central spinal canal stenosis   or neural foraminal narrowing.       L2-L3: There is artificial disc material with posterior laminectomy.  There   is no canal stenosis or left foraminal narrowing.  There is moderate right   foraminal narrowing.       L3-L4: There is artificial disc material and posterior laminectomy.  There is   no canal stenosis.  There is mild bilateral foraminal narrowing.       L4-L5: There is artificial disc material with posterior laminectomy.  There   is no canal stenosis.  There is mild left and moderate right foraminal   narrowing.       L5-S1: There is a circumferential disc bulge with facet hypertrophy.  There   is no canal stenosis.  There is moderate right and severe left foraminal   narrowing.           Impression   Posterior fixation and laminectomy from L2-L4 without evidence for hardware   complication.       Multilevel degenerative change with bilateral foraminal narrowing as   described above.                 MRI lumbar spine 2018  1. No significant interval change is observed since the previous study   of 2017.       2. Stable postoperative changes/posterior spinal fusion at the   L3-L4-L5 level.       3. Severe spine canal stenosis in the level of L2-L3           4. Encroachment of the neural foramina bilaterally in levels of L2-L3   and L5-S1      Thoracic spine MRI 2018  1. Some degenerative changes in the thoracic spine, mainly in the   facet joints in the mid-upper thoracic spine segments       2. Discrete loss of height of T6, more likely an old finding.      Previous treatments: Physical Therapy, Surgery L3-5 fusion/laminectomy and medications. .       Potential Aberrant Drug-Related Behavior:  No     Urine Drug Screenin18:  Consistent for norhydrocodone metabolite  2018:  Consistent for hydrocodone and norhydrocodone  05/10/2019:  Consistent for hydrocodone and norhydrocodone  2019:  Consistent for hydrocodone and norhydrocodone  01/10/2020:  Consistent for hydrocodone and norhydrocodone  2020:  Consistent for oxycodone and metabolites s/p surgery. Inconsistent for hydrocodone. States that she was instructed to take her old norco until she made the appointment to resume her chronic pain medications. 10/2020:  Consistent  2021:  Consistent     OARRS report:  2018 consistent to 2021 consistent (norco scripts from [facility] post op 3/10/2021 and 3/24/2021.    [place] script through [facility] - last one 2021 - consistent to 2021 consistent     Opioid agreement:  10/18/2021      Past Medical History:   Diagnosis Date    Cancer [facility]) 2019    [place] [facility] [place] [facility]    Chronic back pain     Costochondritis     h/o    Depression     Falls     Last fall 2021    Fibromyalgia     Hallux rigidus of left foot     HTN (hypertension)     Hyperlipidemia     CLYDE on CPAP     Osteoarthritis     \"everywhere\"    Rheumatoid arthritis ([address]     \"As a child. \"    Rheumatoid arthritis(714.0)     TIA (transient ischemic attack)     no deficits     Use of cane as ambulatory aid        Past Surgical History:   Procedure Laterality Date    ANESTHESIA NERVE BLOCK Left 2019    LEFT C3,4,5 MBB performed by Ritesh Marie MD at [address] Right 2019    BILATERAL TRANSFORAMINAL EPIDURAL STEROID INJECTION S1 #3 performed by Ritesh Marie MD at [address] Right 2020    RIGHT SACROILIAC JOINT INJECTION      CPT: 70800 performed by Jem Larson DO at [address]      Left    ARTHRODESIS Left 2018    ARTHRODESIS 2ND DIGIT LEFT FOOT performed by Codey Garcia DPM at [address] [place] [address]  2017    PLIF L3-L4, L4-L5 with rods, screws, and cages/Dr. Mia Gillis BACK SURGERY  2020    BACK SURGERY Right 2021    RIGHT PERCUTANEOUS SACROILIAC JOINT FUSION performed by Abran Castro MD at [facility]      reduction, bilat     SECTION      CHOLECYSTECTOMY      COLONOSCOPY  2015    COLONOSCOPY N/A 2020    COLONOSCOPY DIAGNOSTIC performed by Julia Leal MD at [address] [place] [address]  2021    SI Joint    ENDOSCOPY, COLON, DIAGNOSTIC      EPIDURAL STEROID INJECTION N/A 2019    BILATERAL TRANSFORAMINAL EPIDURAL STEROID INJECTION S1 performed by Jem Larson DO at [address]      Left    [address] / [facility] Left 2018    REMOVAL OF PAINFUL HARDWARE LEFT FOOT  ++SYNTHES++ performed by Codey Garcia DPM at [facility] [address]    [place] [id number] N/A 2020  WISDOM TOOTH EXTRACTION         Prior to Admission medications    Medication Sig Start Date End Date Taking? Authorizing Provider   HYDROcodone-acetaminophen (NORCO) 5-325 MG per tablet Take 1 tablet by mouth 2 times daily for 30 days. 21 Yes [facility]   hydroCHLOROthiazide (MICROZIDE) 12.5 MG capsule TAKE ONE CAPSULE BY MOUTH ONCE DAILY FOR BLOOD PRESSURE 21  Yes Historical Provider, MD   hydroCHLOROthiazide (HYDRODIURIL) 12.5 MG tablet Take 1 tablet by mouth daily For blood pressure 21  Yes Hilary Ellis MD   lisinopril (PRINIVIL;ZESTRIL) 40 MG tablet Take 1 tablet by mouth every evening 21  Yes Hilary Ellis MD   hydrOXYzine (VISTARIL) 25 MG capsule Take 1 capsule by mouth 3 times daily as needed for Anxiety 21  Yes Hilary Ellis MD   atorvastatin (LIPITOR) 40 MG tablet Take 1 tablet by mouth daily 21  Yes Hilary Ellis MD   Handicap Placard MISC DX:  Loss of Balance, Chronic low back pain, s/p back surgery. Duration of 5 years 21  Yes Hilary Ellis MD   gabapentin (NEURONTIN) 400 MG capsule Take 1 capsule by mouth 2 times daily for 30 days.  10/18/21 11/17/21  [facility]       Allergies   Allergen Reactions    Pcn [Penicillins] Anaphylaxis       Social History     Socioeconomic History    Marital status:      Spouse name: Not on file    Number of children: Not on file    Years of education: Not on file    Highest education level: Not on file   Occupational History    Not on file   Tobacco Use    Smoking status: Current Some Day Smoker     Packs/day: 0.25     Years: 30.00     Pack years: 7.50     Types: Cigarettes     Last attempt to quit: 10/5/2020     Years since quittin.1    Smokeless tobacco: Never Used    Tobacco comment: was going to try to stop but chantix is too expensive   Vaping Use    Vaping Use: Never used   Substance and Sexual Activity    Alcohol use: Not Currently     Alcohol/week: 0.0 standard drinks     Comment: rarely    Drug use: No    Sexual activity: Not Currently   Other Topics Concern    Not on file   Social History Narrative    Not on file     Social Determinants of Health     Financial Resource Strain:     Difficulty of Paying Living Expenses: Not on file   Food Insecurity:     Worried About Running Out of Food in the Last Year: Not on file    Titi of Food in the Last Year: Not on file   Transportation Needs:     Lack of Transportation (Medical): Not on file    Lack of Transportation (Non-Medical): Not on file   Physical Activity:     Days of Exercise per Week: Not on file    Minutes of Exercise per Session: Not on file   Stress:     Feeling of Stress : Not on file   Social Connections:     Frequency of Communication with Friends and Family: Not on file    Frequency of Social Gatherings with Friends and Family: Not on file    Attends Rastafarian Services: Not on file    Active Member of [address] [place] [address] or Organizations: Not on file    Attends Club or Organization Meetings: Not on file    Marital Status: Not on file   Intimate Partner Violence:     Fear of Current or Ex-Partner: Not on file    Emotionally Abused: Not on file    Physically Abused: Not on file    Sexually Abused: Not on file   Housing Stability:     Unable to Pay for Housing in the Last Year: Not on file    Number of [place] in the Last Year: Not on file    Unstable Housing in the Last Year: Not on file       Family History   Problem Relation Age of Onset    Dementia Mother     Breast Cancer Mother     Cancer Mother         breast cancer [de-identified]     Stroke Mother     Heart Attack Father     Other Father 80        Aortic aneurysm    Cancer Brother     Diabetes Brother        REVIEW OF SYSTEMS:     [address] denies fever/chills, chest pain, shortness of breath, new bowel or bladder complaints or suicidal ideations. All other review of systems was negative.     PHYSICAL EXAMINATION:      /88   Pulse 107   Temp 98.1 °F (36.7 °C) (Infrared)   Resp 16   Ht 5' 5\" (1.651 m)   Wt 215 lb (97.5 kg)   LMP  (LMP Unknown)   SpO2 97%   BMI 35.78 kg/m²     General:      General appearance: awake, alert, oriented, in no acute distress, well developed, well nourished and in no acute distress   pleasant and well-hydrated. in no distress and A & O x3  Build:Overweight  Function:Rises from a seated position with difficulty    HEENT:    Head:normocephalic and atraumatic  Pupils:regular, round and equal.  Sclera: icterus absent  EOM:full and intact. Lungs:    Breathing:Normal expansion. No wheezing. Abdomen:    Shape:non-distended and normal    Lumbar spine:     Range of motion:abnormal moderately Lateral bending, flexion, extension rotation bilateral and is mildly painful. Extremities:    Tremors:None bilaterally upper and lower  Range of motion:Generally normal shoulders  Intact:Yes  Cyanosis:none  Edema:Normal      Neurological:    Cranial nerves:normal  Sensory:diminished to light lateral aspect of right leg                   Dermatology:    Skin:no unusual rashes, no skin lesions, no palpable subcutaneous nodules and good skin turgor    Assessment/Plan:      Chronic low back pain with radiation to the Right groin, patient had low back surgery 08/2017 L3-5 fusion, recently had lumbar spine CT with severe L2-3 stenosis     Plan:  Patient is s/p revision fusion L2-L4 on 3/4/2020. Patient is s/p:  Right sacroiliac joint fusion with use of SI-BONE iFuse implant on 3/9/2021 by Dr. Tejal Burr. Continue norco 5/325 BID. Continue with Gabapentin 400 mg BID. She reports that any increases make her off balance. Foot surgery on hold due to financial issues. OARRS report reviewed 11/2021  Continue flexeril 5 mg BID prn for right sided thoracic myofascial pain - no refill today. Consider TPIs if not helpful  Patient encouraged to stay active and to lose weight.  Failed physical therapy  Treatment plan discussed with the patient including medications side effects     Controlled Substance Monitoring:    Acute and Chronic Pain Monitoring:   RX Monitoring 11/23/2021   Attestation -   Acute Pain Prescriptions -   Periodic Controlled Substance Monitoring Possible medication side effects, risk of tolerance/dependence & alternative treatments discussed. ;No signs of potential drug abuse or diversion identified. ;Assessed functional status. ;Obtaining appropriate analgesic effect of treatment.    Chronic Pain > 80 MEDD -               ccreferring physicf

## 2021-11-23 ENCOUNTER — OFFICE VISIT (OUTPATIENT)
Dept: PAIN MANAGEMENT | Age: 66
End: 2021-11-23

## 2021-11-23 VITALS
BODY MASS INDEX: 35.82 KG/M2 | HEIGHT: 65 IN | SYSTOLIC BLOOD PRESSURE: 132 MMHG | TEMPERATURE: 98.1 F | WEIGHT: 215 LBS | HEART RATE: 107 BPM | DIASTOLIC BLOOD PRESSURE: 88 MMHG | OXYGEN SATURATION: 97 % | RESPIRATION RATE: 16 BRPM

## 2021-11-23 DIAGNOSIS — G89.29 CHRONIC MIDLINE LOW BACK PAIN WITH SCIATICA, SCIATICA LATERALITY UNSPECIFIED: ICD-10-CM

## 2021-11-23 DIAGNOSIS — G89.29 OTHER CHRONIC PAIN: ICD-10-CM

## 2021-11-23 DIAGNOSIS — G89.29 CHRONIC BILATERAL LOW BACK PAIN WITHOUT SCIATICA: Primary | ICD-10-CM

## 2021-11-23 DIAGNOSIS — M48.061 SPINAL STENOSIS OF LUMBAR REGION WITHOUT NEUROGENIC CLAUDICATION: ICD-10-CM

## 2021-11-23 DIAGNOSIS — G89.4 CHRONIC PAIN SYNDROME: ICD-10-CM

## 2021-11-23 DIAGNOSIS — M47.816 LUMBAR FACET ARTHROPATHY: ICD-10-CM

## 2021-11-23 DIAGNOSIS — M54.40 CHRONIC MIDLINE LOW BACK PAIN WITH SCIATICA, SCIATICA LATERALITY UNSPECIFIED: ICD-10-CM

## 2021-11-23 DIAGNOSIS — M47.812 CERVICAL FACET JOINT SYNDROME: ICD-10-CM

## 2021-11-23 DIAGNOSIS — M54.50 CHRONIC BILATERAL LOW BACK PAIN WITHOUT SCIATICA: Primary | ICD-10-CM

## 2021-11-23 DIAGNOSIS — M54.16 LUMBAR RADICULOPATHY: ICD-10-CM

## 2021-11-23 DIAGNOSIS — M79.10 MYALGIA: ICD-10-CM

## 2021-11-23 DIAGNOSIS — M51.36 DDD (DEGENERATIVE DISC DISEASE), LUMBAR: ICD-10-CM

## 2021-11-23 PROCEDURE — 99213 OFFICE O/P EST LOW 20 MIN: CPT | Performed by: PHYSICIAN ASSISTANT

## 2021-11-23 RX ORDER — HYDROCODONE BITARTRATE AND ACETAMINOPHEN 5; 325 MG/1; MG/1
1 TABLET ORAL 2 TIMES DAILY
Qty: 60 TABLET | Refills: 0 | Status: SHIPPED
Start: 2021-11-24 | End: 2021-12-14 | Stop reason: SDUPTHER

## 2021-11-23 NOTE — PROGRESS NOTES
Do you currently have any of the following:    Fever: No  Headache:  No  Cough: No  Shortness of breath: No  Exposed to anyone with these symptoms: No         Kevin Babb presents to the [facility] on 11/23/2021. [facility] is complaining of pain lower and mid back. The pain is constant. The pain is described as aching, throbbing, stabbing, sharp, tender, burning and numb. Pain is rated on her best day at a 5, on her worst day at a 10, and on average at a 7 on the VAS scale. She took her last dose of Norco, Neurontin and this am.     Any procedures since your last visit: No    Pacemaker or defibrillator: No.    She is not on NSAIDS and is not on anticoagulation medications to include none. Medication Contract and Consent for Opioid Use Documents Filed     Patient Documents     Type of Document Status Date Received Received By Description    Medication Contract Received  Anil Cantu Opioid medication contract with Dr Cynthia Linares 3/24/20    Medication Contract Received 4/26/2018  3:50 PM ANYA NUNO PAIN MANAGEMENT PATIENT AGREEMENT 4/26/2018    Medication Contract Received 11/5/2020  4:23 PM EBER HONG Opioid medication contract with Dr Danya Louis Received 3/1/2021  1:26 PM Anil Cantu Opioid medication contract with Dr Danya Louis Received 8/23/2021  2:03 PM TAMMI, [address] [place] [address] Medication contract    Medication Contract Received 10/18/2021  3:03 PM HAYLIE CADENA medication contract 10/18/2021                /88   Pulse 107   Temp 98.1 °F (36.7 °C) (Infrared)   Resp 16   Ht 5' 5\" (1.651 m)   Wt 215 lb (97.5 kg)   LMP  (LMP Unknown)   SpO2 97%   BMI 35.78 kg/m²      No LMP recorded (lmp unknown).  Patient is postmenopausal.

## 2021-12-13 NOTE — PROGRESS NOTES
narrowing.           Impression   Posterior fixation and laminectomy from L2-L4 without evidence for hardware   complication.       Multilevel degenerative change with bilateral foraminal narrowing as   described above.                 MRI lumbar spine 2018  1. No significant interval change is observed since the previous study   of 2017.       2. Stable postoperative changes/posterior spinal fusion at the   L3-L4-L5 level.       3. Severe spine canal stenosis in the level of L2-L3           4. Encroachment of the neural foramina bilaterally in levels of L2-L3   and L5-S1      Thoracic spine MRI 2018  1. Some degenerative changes in the thoracic spine, mainly in the   facet joints in the mid-upper thoracic spine segments       2. Discrete loss of height of T6, more likely an old finding.      Previous treatments: Physical Therapy, Surgery L3-5 fusion/laminectomy and medications. .       Potential Aberrant Drug-Related Behavior:  No     Urine Drug Screenin18:  Consistent for norhydrocodone metabolite  2018:  Consistent for hydrocodone and norhydrocodone  05/10/2019:  Consistent for hydrocodone and norhydrocodone  2019:  Consistent for hydrocodone and norhydrocodone  01/10/2020:  Consistent for hydrocodone and norhydrocodone  2020:  Consistent for oxycodone and metabolites s/p surgery. Inconsistent for hydrocodone. States that she was instructed to take her old norco until she made the appointment to resume her chronic pain medications. 10/2020:  Consistent  2021:  Consistent     OARRS report:  2018 consistent to 2021 consistent (norco scripts from [facility] [facility] post op 3/10/2021 and 3/24/2021.    [place] script through [facility] - last one 2021 - consistent to 2021 consistent     Opioid agreement:  10/18/2021      Past Medical History:   Diagnosis Date    Cancer [facility]) 2019    LESION REMOVED UNDER TONGUE  DENTAL CLINIC    Chronic back pain     Costochondritis h/o    Depression     Falls     Last fall 2021    Fibromyalgia     Hallux rigidus of left foot     HTN (hypertension)     Hyperlipidemia     CLYDE on CPAP     Osteoarthritis     \"everywhere\"    Rheumatoid arthritis ([address]     \"As a child. \"    Rheumatoid arthritis(714.0)     TIA (transient ischemic attack)     no deficits     Use of cane as ambulatory aid        Past Surgical History:   Procedure Laterality Date    ANESTHESIA NERVE BLOCK Left 2019    LEFT C3,4,5 MBB performed by Ritesh Marie MD at [facility] Right 2019    BILATERAL TRANSFORAMINAL EPIDURAL STEROID INJECTION S1 #3 performed by Ritesh Marie MD at [address] [place] [address] Right 2020    RIGHT SACROILIAC JOINT INJECTION      CPT: 92264 performed by Jem Larson DO at [address]  2005    Left    ARTHRODESIS Left 2018    ARTHRODESIS 2ND DIGIT LEFT FOOT performed by Codey Garcia DPM at [facility]  2017    PLIF L3-L4, L4-L5 with rods, screws, and cages/Dr. Bellamy Row BACK SURGERY  2020    BACK SURGERY Right 2021    RIGHT PERCUTANEOUS SACROILIAC JOINT FUSION performed by Abran Castro MD at [facility]      reduction, bilat     SECTION      CHOLECYSTECTOMY      COLONOSCOPY  2015    COLONOSCOPY N/A 2020    COLONOSCOPY DIAGNOSTIC performed by Cathy Sánchez MD at [address] [place] [address]  2021    SI Joint    ENDOSCOPY, COLON, DIAGNOSTIC      EPIDURAL STEROID INJECTION N/A 2019    BILATERAL TRANSFORAMINAL EPIDURAL STEROID INJECTION S1 performed by Jem Larson DO at [facility]      Left    [address] / ANKLE Left 2018    REMOVAL OF PAINFUL HARDWARE LEFT FOOT  ++SYNTHES++ performed by Codey aGrcia DPM at [facility] 108    inguinal     LUMBAR SPINE SURGERY N/A 2020    EXPLORATION OF PRIOR L3-L5 FUSION AND L2-L3  POSTERIOR LUMBAR INTERBODY FUSION -- NEEDS O-ARM, AUDIOLOGY, CAGES, PLATES, SCREWS, C-ARM, TERESA TABLE, CELL SAVER, PLATELET GEL -- GLOBUS performed by Wanda Ruiz MD at [address] N/A 10/21/2020    EXCISION OF TONGUE LESION performed by Sharda Hartman DO at [address] Bilateral 05/07/2018    L1-2 lumbar foramen #1    NERVE BLOCK Bilateral 12/03/2018    s1 tfesi    NERVE BLOCK Bilateral 08/12/2019    NERVE BLOCK Right 09/30/2019    sacral radiofrequency    NERVE BLOCK Right 09/01/2020    RIGHT SACROILIAC JOINT INJECTION #2 performed by Main Rai DO at [facility] Left 09/25/2013    left foot tarso metatarsal joint injection    OTHER SURGICAL HISTORY Left 05/27/2015    Endoscopic Gastroc recession left, Lapidus left foot and  Excision of exostosis left foot    PAIN MANAGEMENT PROCEDURE Left 7/27/2021    LEFT L5-S1 TRANSFORAMINAL EPIDURAL STEROID INJECTION performed by Main Rai DO at [address] AA&/STRD TFRML EPI LUMBAR/SACRAL 1 LEVEL Bilateral 12/03/2018    BILATERAL S1  EPIDURAL STEROID INJECTION performed by Moises Boswell MD at [address] [facility] DX/THER SBST INTRLMNR LMBR/SAC W/IMG GDN N/A 08/21/2018    EPIDURAL STEROID INJECTION L1-2 WITH LOW VOL performed by Moises Boswell MD at [address] [facility] NOSE/CLEFT LIP/TIP Bilateral 05/07/2018    BILATERAL L1-2 LUMBAR FORAMEN #1 performed by Moises Boswell MD at [address] [facility] NOSE/CLEFT LIP/TIP Bilateral 06/04/2018    BILATERAL TRANSFORAMINAL EPIDURAL STEROID INJECTION UNDER FLUOROSCOPIC GUIDANCE @ FORAMINAL LEVEL L1-2 #2 performed by Moises Boswell MD at [address] Right 09/30/2019    RIGHT SACRAL RADIOFREQUENCY ABLATION performed by Moises Boswell MD at [address]  2000, 2005    Big Left Toe    TOE SURGERY Left 2018    2nd toe     TONSILLECTOMY      WISDOM TOOTH EXTRACTION         Prior to Admission medications    Medication Sig Start Date End Date Taking? Authorizing Provider   HYDROcodone-acetaminophen (NORCO) 5-325 MG per tablet Take 1 tablet by mouth 2 times daily for 30 days. 21 Yes TREASURE Jaeger   gabapentin (NEURONTIN) 400 MG capsule Take 1 capsule by mouth 2 times daily for 30 days. 10/18/21 12/14/21 Yes TREASURE Jaeger   hydroCHLOROthiazide (MICROZIDE) 12.5 MG capsule TAKE ONE CAPSULE BY MOUTH ONCE DAILY FOR BLOOD PRESSURE 21  Yes Historical Provider, MD   hydroCHLOROthiazide (HYDRODIURIL) 12.5 MG tablet Take 1 tablet by mouth daily For blood pressure 21  Yes Alex Hutchinson MD   lisinopril (PRINIVIL;ZESTRIL) 40 MG tablet Take 1 tablet by mouth every evening 21  Yes Alex Hutchinson MD   hydrOXYzine (VISTARIL) 25 MG capsule Take 1 capsule by mouth 3 times daily as needed for Anxiety 21  Yes Alex Hutchinson MD   atorvastatin (LIPITOR) 40 MG tablet Take 1 tablet by mouth daily 21  Yes Alex Hutchinson MD   Handicap Placard MISC DX:  Loss of Balance, Chronic low back pain, s/p back surgery.    Duration of 5 years 21  Yes Alex Hutchinson MD       Allergies   Allergen Reactions    Pcn [Penicillins] Anaphylaxis       Social History     Socioeconomic History    Marital status:      Spouse name: Not on file    Number of children: Not on file    Years of education: Not on file    Highest education level: Not on file   Occupational History    Not on file   Tobacco Use    Smoking status: Current Some Day Smoker     Packs/day: 0.25     Years: 30.00     Pack years: 7.50     Types: Cigarettes     Last attempt to quit: 10/5/2020     Years since quittin.1    Smokeless tobacco: Never Used    Tobacco comment: was going to try to stop but chantix is too expensive   Vaping Use    Vaping Use: Never used   Substance and Sexual Activity    Alcohol use: Not Currently Alcohol/week: 0.0 standard drinks     Comment: rarely    Drug use: No    Sexual activity: Not Currently   Other Topics Concern    Not on file   Social History Narrative    Not on file     Social Determinants of Health     Financial Resource Strain:     Difficulty of Paying Living Expenses: Not on file   Food Insecurity:     Worried About Running Out of Food in the Last Year: Not on file    Titi of Food in the Last Year: Not on file   Transportation Needs:     Lack of Transportation (Medical): Not on file    Lack of Transportation (Non-Medical): Not on file   Physical Activity:     Days of Exercise per Week: Not on file    Minutes of Exercise per Session: Not on file   Stress:     Feeling of Stress : Not on file   Social Connections:     Frequency of Communication with Friends and Family: Not on file    Frequency of Social Gatherings with Friends and Family: Not on file    Attends Sabianism Services: Not on file    Active Member of [address] [place] [address] or Organizations: Not on file    Attends Club or Organization Meetings: Not on file    Marital Status: Not on file   Intimate Partner Violence:     Fear of Current or Ex-Partner: Not on file    Emotionally Abused: Not on file    Physically Abused: Not on file    Sexually Abused: Not on file   Housing Stability:     Unable to Pay for Housing in the Last Year: Not on file    Number of [place] in the Last Year: Not on file    Unstable Housing in the Last Year: Not on file       Family History   Problem Relation Age of Onset    Dementia Mother     Breast Cancer Mother     Cancer Mother         breast cancer [de-identified]     Stroke Mother     Heart Attack Father     Other Father 80        Aortic aneurysm    Cancer Brother     Diabetes Brother        REVIEW OF SYSTEMS:     Evern Nearing denies fever/chills, chest pain, shortness of breath, new bowel or bladder complaints or suicidal ideations. All other review of systems was negative.     PHYSICAL EXAMINATION:      BP 122/68   Pulse 86   Temp 98.4 °F (36.9 °C) (Infrared)   Resp 16   Ht 5' 5\" (1.651 m)   Wt 215 lb (97.5 kg)   LMP  (LMP Unknown)   SpO2 98%   BMI 35.78 kg/m²     General:      General appearance: awake, alert, oriented, in no acute distress, well developed, well nourished and in no acute distress   pleasant and well-hydrated. in no distress and A & O x3  Build:Overweight  Function:Rises from a seated position with difficulty    HEENT:    Head:normocephalic and atraumatic  Pupils:regular, round and equal.  Sclera: icterus absent  EOM:full and intact. Lungs:    Breathing:Normal expansion. No wheezing. Abdomen:    Shape:non-distended and normal    Lumbar spine:     Range of motion:abnormal moderately Lateral bending, flexion, extension rotation bilateral and is mildly painful. Extremities:    Tremors:None bilaterally upper and lower  Range of motion:Generally normal shoulders  Intact:Yes  Cyanosis:none  Edema:Normal      Neurological:    Cranial nerves:normal  Sensory:diminished to light lateral aspect of right leg                   Dermatology:    Skin:no unusual rashes, no skin lesions, no palpable subcutaneous nodules and good skin turgor    Assessment/Plan:      Chronic low back pain with radiation to the Right groin, patient had low back surgery 08/2017 L3-5 fusion, recently had lumbar spine CT with severe L2-3 stenosis     Plan:  Patient is s/p revision fusion L2-L4 on 3/4/2020. Patient is s/p:  Right sacroiliac joint fusion with use of SI-BONE iFuse implant on 3/9/2021 by Dr. Jem Peterson. Continue norco 5/325 BID. Continue with Gabapentin 400 mg BID. She reports that any increases make her off balance. Foot surgery on hold due to financial issues. OARRS report reviewed 12/2021  Continue flexeril 5 mg BID prn for right sided thoracic myofascial pain - no refill today. Takes sparingly. Patient may relocate to [facility] at some point.     Consider TPIs if not helpful  Patient encouraged to stay active and to lose weight. Failed physical therapy  Treatment plan discussed with the patient including medications side effects       Controlled Substance Monitoring:    Acute and Chronic Pain Monitoring:   RX Monitoring 12/14/2021   Attestation -   Acute Pain Prescriptions -   Periodic Controlled Substance Monitoring Possible medication side effects, risk of tolerance/dependence & alternative treatments discussed. ;No signs of potential drug abuse or diversion identified. ;Assessed functional status. ;Obtaining appropriate analgesic effect of treatment.    Chronic Pain > 80 MEDD -             ccreferring physicf

## 2021-12-14 ENCOUNTER — OFFICE VISIT (OUTPATIENT)
Dept: PAIN MANAGEMENT | Age: 66
End: 2021-12-14

## 2021-12-14 VITALS
OXYGEN SATURATION: 98 % | RESPIRATION RATE: 16 BRPM | BODY MASS INDEX: 35.82 KG/M2 | TEMPERATURE: 98.4 F | HEART RATE: 86 BPM | SYSTOLIC BLOOD PRESSURE: 122 MMHG | DIASTOLIC BLOOD PRESSURE: 68 MMHG | HEIGHT: 65 IN | WEIGHT: 215 LBS

## 2021-12-14 DIAGNOSIS — M54.40 CHRONIC MIDLINE LOW BACK PAIN WITH SCIATICA, SCIATICA LATERALITY UNSPECIFIED: ICD-10-CM

## 2021-12-14 DIAGNOSIS — G89.29 CHRONIC MIDLINE LOW BACK PAIN WITH SCIATICA, SCIATICA LATERALITY UNSPECIFIED: ICD-10-CM

## 2021-12-14 PROCEDURE — 99213 OFFICE O/P EST LOW 20 MIN: CPT | Performed by: PHYSICIAN ASSISTANT

## 2021-12-14 RX ORDER — HYDROCODONE BITARTRATE AND ACETAMINOPHEN 5; 325 MG/1; MG/1
1 TABLET ORAL 2 TIMES DAILY
Qty: 60 TABLET | Refills: 0 | Status: SHIPPED
Start: 2021-12-24 | End: 2022-01-19 | Stop reason: SDUPTHER

## 2021-12-14 NOTE — PROGRESS NOTES
SUBJECTIVE: 15 year old male presents with complaint of  possible finger infection.   Washes hands a lot. In winter gets cracks.   Not using moisturizer.  Area is painful   No fever.  Immunocompromised.    Current Outpatient Medications   Medication Sig Dispense Refill   • amoxicillin (AMOXIL) 500 MG capsule Take 4 capsules by mouth for 1 dose 1 hour prior to dental procedure. 4 capsule 6   • albuterol (PROAIR HFA) 108 (90 BASE) MCG/ACT inhaler Inhale 2 puffs into the lungs every 4 hours as needed for Shortness of Breath or Wheezing. 1 Inhaler 3   • Multiple Vitamin (MULTI-VITAMIN DAILY PO) Vitamin with iron daily     • pravastatin (PRAVACHOL) 10 MG tablet Take 10 mg by mouth daily.     • cetirizine (ZYRTEC) 10 MG tablet Take 10 mg by mouth daily.     • Dispense (CHECK, UNKNOWN CONCENTRATION) 2.5 mg 2 times daily. Tacrolimus     • mycophenolate (CELLCEPT) 200 MG/ML suspension Take 750 mg by mouth 2 times daily.  175 mL 12   • Dispense (CHECK, UNKNOWN CONCENTRATION) Aspirin (Aspirin Low Strength) 80 mg by mouth daily     • dicloxacillin (DYNAPEN) 250 MG capsule Take 1 capsule by mouth 4 times daily. Pharmacy: dispense 5 of them in bottle for school 40 capsule 0   • triamcinolone (ARISTOCORT) 0.1 % cream Apply topically 2 times daily for 7 days. Or until better 15 g 0     No current facility-administered medications for this visit.        Patient Active Problem List   Diagnosis   • Heart transplanted (CMS/[facility])       Social History     Tobacco Use   • Smoking status: Never Smoker   • Smokeless tobacco: Never Used   Substance Use Topics   • Alcohol use: Not on file       OBJECTIVE:  Blood pressure 100/60, pulse 60, weight 42.1 kg.  bilateral hands with dry flakey sking.  left 4th finger with a deep fissure at flexural base of MCP joint . Distal part of first phalanx is swollen and red, blanching and slight tenderness. No streaks.   right hand with another fissure but no erythema.     ASSESSMENT: (L08.9) Infection of  Do you currently have any of the following:    Fever: No  Headache:  No  Cough: No  Shortness of breath: No  Exposed to anyone with these symptoms: No         Alma Murrieta presents to the [address] [place] [address] on 12/14/2021. [facility] is complaining of pain low back  The pain is constant. The pain is described as aching, throbbing, shooting and stabbing. Pain is rated on her best day at a 6, on her worst day at a 10, and on average at a 8 on the VAS scale. She took her last dose of Norco and Neurontin     Any procedures since your last visit: . Pacemaker or defibrillator: No managed by . She is not on NSAIDS and is not on anticoagulation medications to include none and is managed by     Medication Contract and Consent for Opioid Use Documents Filed     Patient Documents     Type of Document Status Date Received Received By Description    Medication Contract Received  Beatrice Echavarria Opioid medication contract with Dr Wanda Bonner 3/24/20    Medication Contract Received 4/26/2018  3:50 PM ANYA NUNO PAIN MANAGEMENT PATIENT AGREEMENT 4/26/2018    Medication Contract Received 11/5/2020  4:23 PM EBER HONG Opioid medication contract with Dr Elvin Talley Received 3/1/2021  1:26 PM Beatrice Echavarria Opioid medication contract with Dr Elvin Talley Received 8/23/2021  2:03 PM TAMMI, [address] [place] [address] Medication contract    Medication Contract Received 10/18/2021  3:03 PM HAYLIE CADENA medication contract 10/18/2021                /68   Pulse 86   Temp 98.4 °F (36.9 °C) (Infrared)   Resp 16   Ht 5' 5\" (1.651 m)   Wt 215 lb (97.5 kg)   LMP  (LMP Unknown)   SpO2 98%   BMI 35.78 kg/m²      No LMP recorded (lmp unknown).  Patient is postmenopausal. hand  (primary encounter diagnosis)  Comment: start antibiotics. Allergy to cephalosporins. Takes amox fine. Use anti-staph penicillin.   recommend follow up next week but  Melida Ramirez MD is out.   Able to get follow up with Dermatology.   Plan: SERVICE TO DERMATOLOGY     (D84.9) Immunocompromised state (CMS/[facility])  Comment:  Needs close follow up .  Plan: SERVICE TO DERMATOLOGY             (L85.3) Xerosis of skin  Comment: See instructions to prevent further problems   Plan: SERVICE TO DERMATOLOGY        25 minutes was spent in direct face to face contact for discussion and exam of the above issues.

## 2021-12-17 ENCOUNTER — OFFICE VISIT (OUTPATIENT)
Dept: FAMILY MEDICINE CLINIC | Age: 66
End: 2021-12-17

## 2021-12-17 VITALS
WEIGHT: 213 LBS | HEIGHT: 65 IN | HEART RATE: 100 BPM | TEMPERATURE: 98.1 F | BODY MASS INDEX: 35.49 KG/M2 | DIASTOLIC BLOOD PRESSURE: 78 MMHG | SYSTOLIC BLOOD PRESSURE: 134 MMHG | OXYGEN SATURATION: 98 %

## 2021-12-17 DIAGNOSIS — I10 ESSENTIAL HYPERTENSION: ICD-10-CM

## 2021-12-17 PROCEDURE — 99214 OFFICE O/P EST MOD 30 MIN: CPT | Performed by: FAMILY MEDICINE

## 2021-12-17 PROCEDURE — 90471 IMMUNIZATION ADMIN: CPT | Performed by: FAMILY MEDICINE

## 2021-12-17 PROCEDURE — 90674 CCIIV4 VAC NO PRSV 0.5 ML IM: CPT | Performed by: FAMILY MEDICINE

## 2021-12-17 RX ORDER — LISINOPRIL 40 MG/1
40 TABLET ORAL EVERY EVENING
Qty: 90 TABLET | Refills: 1 | Status: SHIPPED
Start: 2021-12-17 | End: 2022-06-20 | Stop reason: SDUPTHER

## 2021-12-17 RX ORDER — HYDROCHLOROTHIAZIDE 12.5 MG/1
12.5 TABLET ORAL DAILY
Qty: 90 TABLET | Refills: 1 | Status: SHIPPED
Start: 2021-12-17 | End: 2022-06-07

## 2021-12-17 NOTE — PROGRESS NOTES
[facility]  Office Progress Note - Dr. Mariann Moser  12/17/21    CC:   Chief Complaint   Patient presents with    Hypertension    Discuss Medications     Pt wants to know if she is to continue Htz         /78   Pulse 100   Temp 98.1 °F (36.7 °C) (Temporal)   Ht 5' 5\" (1.651 m)   Wt 213 lb (96.6 kg)   LMP  (LMP Unknown)   SpO2 98%   BMI 35.45 kg/m²   Wt Readings from Last 3 Encounters:   12/17/21 213 lb (96.6 kg)   12/14/21 215 lb (97.5 kg)   11/23/21 215 lb (97.5 kg)       HPI:   Hypertension  Follow-up  Blood pressure is controlled today. Better on recheck. BP Readings from Last 3 Encounters:   12/17/21 134/78   12/14/21 122/68   11/23/21 132/88     Patient continues medications regularly. Compliance is good. Denies CP, sob, abd pain, headaches, vision changes, dizziness, hypotensive symptoms. No side effects from medications noted. Has continued following with pain management regularly for chronic pain syndrome/back pain. Considering move to [place]. Not sure.     _________________________________________________________    Assessment / Jose Veras was seen today for hypertension and discuss medications. Diagnoses and all orders for this visit:    Essential hypertension, stable  -     lisinopril (PRINIVIL;ZESTRIL) 40 MG tablet; Take 1 tablet by mouth every evening  -     hydroCHLOROthiazide (HYDRODIURIL) 12.5 MG tablet; Take 1 tablet by mouth daily For blood pressure  Continue same. Other orders  -     INFLUENZA, MDCK QUADV, 2 YRS AND OLDER, IM, PF, PREFILL SYR OR SDV, 0.5ML (FLUCELVAX QUADV, PF)    Encouraged covid booster. Counseled extensively. Counseled due for mammo. Return in about 6 months (around 6/17/2022). or as scheduled. Patient counseled to follow up sooner or seek more acute care if symptoms worsening or not improving according to plan.      Electronically signed by Abhay Gaming MD on 12/17/2021  Please note that >30 minutes was spent on patient care, >50% face-to-face with patient, including gathering history, performing physical exam, discussing findings, counseling patient, determining plan forward, documenting the encounter and coordinating patient care. All questions addressed and answered. _________________________________________________________  Current Outpatient Medications on File Prior to Visit   Medication Sig Dispense Refill    [START ON 12/24/2021] HYDROcodone-acetaminophen (NORCO) 5-325 MG per tablet Take 1 tablet by mouth 2 times daily for 30 days. 60 tablet 0    hydrOXYzine (VISTARIL) 25 MG capsule Take 1 capsule by mouth 3 times daily as needed for Anxiety 270 capsule 1    atorvastatin (LIPITOR) 40 MG tablet Take 1 tablet by mouth daily 90 tablet 3    Handicap Placard MISC DX:  Loss of Balance, Chronic low back pain, s/p back surgery. Duration of 5 years 1 each 0    gabapentin (NEURONTIN) 400 MG capsule Take 1 capsule by mouth 2 times daily for 30 days. 60 capsule 2     No current facility-administered medications on file prior to visit. Patient Active Problem List   Diagnosis Code    Loss of balance R26.89    Vertebrobasilar TIAs G45.0    Obesity E66.9    Disc displacement, lumbar M51.26    Peripheral neuropathy ([facility] G62.9    Type 2 diabetes mellitus without complication ([facility] H82.7    Depression F32. A    Osteoarthritis M19.90    Chronic back pain M54.9, G89.29    Fibromyalgia M79.7    HTN (hypertension) I10    Hyperlipidemia E78.5    History of adenomatous polyp of colon Z86.010    Vitamin D deficiency E55.9    Coccyx pain M53.3    Acute bilateral low back pain with sciatica M54.40    Lumbar stenosis M48.061    Chronic bilateral low back pain without sciatica M54.50, G89.29    DDD (degenerative disc disease), lumbar M51.36    Spinal stenosis of lumbar region without neurogenic claudication M48.061    Lumbar facet arthropathy M47.816    Chronic pain syndrome G89.4    Lumbar radiculopathy M54.16    Neck pain M54.2    Left foot pain M79.672    Painful orthopaedic hardware [facility]) T84.84XA    Cervical facet joint syndrome M47.812    Cellulitis, neck L03.221    Disorder of sacrum M53.3    Adjacent segment disease with spinal stenosis 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 \"    Rheumatoid arthritis(714.0)     TIA (transient ischemic attack)     no deficits     Use of cane as ambulatory aid        Family History   Problem Relation Age of Onset    Dementia Mother     Breast Cancer Mother     Cancer Mother         breast cancer [de-identified]     Stroke Mother     Heart Attack Father     Other Father 80        Aortic aneurysm    Cancer Brother     Diabetes Brother        Past Surgical History:   Procedure Laterality Date    ANESTHESIA NERVE BLOCK Left 02/26/2019    LEFT C3,4,5 MBB performed by Benny Watson MD at [facility] Right 08/12/2019    BILATERAL TRANSFORAMINAL EPIDURAL STEROID INJECTION S1 #3 performed by Benny Watson MD at [address] [facility] Right 06/16/2020    RIGHT SACROILIAC JOINT INJECTION      CPT: 53277 performed by Bryan Bravo DO at [address]  2005    Left    ARTHRODESIS Left 12/14/2018    ARTHRODESIS 2ND DIGIT LEFT FOOT performed by Scott Castillo DPM at [address] [facility]  08/16/2017    PLIF L3-L4, L4-L5 with rods, screws, and cages/Dr. Bushra White BACK SURGERY  03/04/2020  BACK SURGERY Right 2021    RIGHT PERCUTANEOUS SACROILIAC JOINT FUSION performed by Marcia Padilla MD at [place] [address]      reduction, bilat     SECTION  1993    CHOLECYSTECTOMY      COLONOSCOPY  2015    COLONOSCOPY N/A 2020    COLONOSCOPY DIAGNOSTIC performed by Tyrone Laureano MD at [address] [facility]  2021    SI Joint    ENDOSCOPY, COLON, DIAGNOSTIC      EPIDURAL STEROID INJECTION N/A 2019    BILATERAL TRANSFORAMINAL EPIDURAL STEROID INJECTION S1 performed by Bib Wang DO at [address]      Left    [address] / [facility]KLE Left 2018    REMOVAL OF PAINFUL HARDWARE LEFT FOOT  ++SYNTHES++ performed by Alejandra Mello DPM at [facility]    inguinal     LUMBAR SPINE SURGERY N/A 2020    EXPLORATION OF PRIOR L3-L5 FUSION AND L2-L3  POSTERIOR LUMBAR INTERBODY FUSION -- NEEDS O-ARM, AUDIOLOGY, CAGES, PLATES, SCREWS, C-ARM, TERESA TABLE, CELL SAVER, PLATELET GEL -- GLOBUS performed by Marcia Padilla MD at [address] N/A 10/21/2020    EXCISION OF TONGUE LESION performed by Kiara Saxena DO at [address] Bilateral 2018    L1-2 lumbar foramen #1    NERVE BLOCK Bilateral 2018    s1 tfesi    NERVE BLOCK Bilateral 2019    NERVE BLOCK Right 2019    sacral radiofrequency    NERVE BLOCK Right 2020    RIGHT SACROILIAC JOINT INJECTION #2 [facility] [facility]    left foot tarso metatarsal joint injection    OTHER SURGICAL HISTORY Left 2015    Endoscopic Gastroc recession left, Lapidus left foot and  Excision of exostosis left foot    PAIN MANAGEMENT PROCEDURE Left 2021    LEFT L5-S1 TRANSFORAMINAL EPIDURAL STEROID INJECTION performed by Bbi Wang DO at [address] AA&/STRD TFRML EPI LUMBAR/SACRAL 1 LEVEL Bilateral 2018 BILATERAL S1  EPIDURAL STEROID INJECTION performed by Tyron Arellano MD at [address] [facility] DX/THER SBST INTRLMNR LMBR/SAC W/IMG GDN N/A 2018    EPIDURAL STEROID INJECTION L1-2 WITH LOW VOL performed by Tyron Arellano MD at [address] [facility] NOSE/CLEFT LIP/TIP Bilateral 2018    BILATERAL L1-2 LUMBAR FORAMEN #1 performed by Tyron Arellano MD at [address] [facility] NOSE/CLEFT LIP/TIP Bilateral 2018    BILATERAL TRANSFORAMINAL EPIDURAL STEROID INJECTION UNDER FLUOROSCOPIC GUIDANCE @ FORAMINAL LEVEL L1-2 #2 performed by Tyron Arellano MD at [address] Right 2019    RIGHT SACRAL RADIOFREQUENCY ABLATION performed by Tyron Arellano MD at . [facility] [address]  ,     Big Left Toe    TOE SURGERY Left 2018    2nd toe     TONSILLECTOMY      WISDOM TOOTH EXTRACTION         Social History     Tobacco Use    Smoking status: Current Some Day Smoker     Packs/day: 0.25     Years: 30.00     Pack years: 7.50     Types: Cigarettes     Last attempt to quit: 10/5/2020     Years since quittin.2    Smokeless tobacco: Never Used    Tobacco comment: was going to try to stop but chantix is too expensive   Vaping Use    Vaping Use: Never used   Substance Use Topics    Alcohol use: Not Currently     Alcohol/week: 0.0 standard drinks     Comment: rarely    Drug use: No       Chart reviewed and updated where appropriate for PMH, Fam, and Soc Hx.  _________________________________________________________  ROS: POSITIVE: As in the HPI. Otherwise Pertinent negatives are negative.    __________________________________________________________  Physical Exam   Constitutional:    She is oriented to person, place, and time. She appears well-developed and well-nourished. Eyes:    Conjunctivae are normal.    Pupils are equal, round, and reactive to light. EOMI.    Cardiovascular:    Normal rate, regular rhythm and normal heart sounds. No murmur. No gallop and no friction rub. Pulmonary/Chest:    Effort normal and breath sounds normal.    No wheezes. No rales or rhonchi. Abdominal:    Soft. Bowel sounds are normal.    No distension. No tenderness. Musculoskeletal:    Normal range of motion. No joint swelling noted. No peripheral edema. Psychiatric:    She has a normal mood and affect. Normal groom and dress. No SI or HI.   ________________________________________________________    This note may have been created using dictation software.  Efforts were made to reduce errors, but some may persist.

## 2022-01-05 ENCOUNTER — TELEPHONE (OUTPATIENT)
Dept: OBGYN | Age: 67
End: 2022-01-05

## 2022-01-05 NOTE — TELEPHONE ENCOUNTER
Patient called in and stated that she noticed her right nipple looks different. Patient said she is also overdue for a mammogram. Patient has not been seen with you in awhile but patient stated she currently does not have insurance and wants to try to keep bills as low as possible. I informed patient she may need a visit with an exam especially since it has been awhile.  Patient wanted to know if you could order the mammogram.

## 2022-01-06 DIAGNOSIS — N64.53 NIPPLE RETRACTION: Primary | ICD-10-CM

## 2022-01-10 ENCOUNTER — HOSPITAL ENCOUNTER (OUTPATIENT)
Dept: GENERAL RADIOLOGY | Age: 67
Discharge: HOME | End: 2022-01-12

## 2022-01-10 DIAGNOSIS — N64.53 NIPPLE RETRACTION: ICD-10-CM

## 2022-01-10 PROCEDURE — G0279 TOMOSYNTHESIS, MAMMO: HCPCS

## 2022-01-18 NOTE — PROGRESS NOTES
[facility]  [address]  [facility]    Follow up Note      Bill Valenzuela     Date of Visit:  2022    CC:  Patient presents for follow up   Chief Complaint   Patient presents with    Follow-up    Lower Back Pain     mid to lower back       HPI:    Pain is unchanged. No new changes. Continues with right sided thoracic myofascial pain. Change in quality of symptoms:no. Patient satisfaction with analgesia:fair. Medication side effects: None. Recent diagnostic testing:none. Recent interventional procedures: None. She has been on anticoagulation medications to include NSAIDS. The patient  has not been on herbal supplements. The patient is diabetic. Imagin2021 CT lumbar myelogram    FINDINGS:   BONES/ALIGNMENT: There is minimal levocurvature of the lumbar spine.  There   is posterior fixation from L2-L4 without evidence for hardware complication.    The vertebral body heights are maintained.  There is minimal retrolisthesis   of L2 on L3.       SOFT TISSUES: The posterior paraspinal soft tissues are unremarkable.  The   visualized abdominal structures are unremarkable.  The conus is normal in   caliber and terminates at L1.  The cauda equina is unremarkable.       L1-L2: There is no significant disc protrusion, central spinal canal stenosis   or neural foraminal narrowing.       L2-L3: There is artificial disc material with posterior laminectomy.  There   is no canal stenosis or left foraminal narrowing.  There is moderate right   foraminal narrowing.       L3-L4: There is artificial disc material and posterior laminectomy.  There is   no canal stenosis.  There is mild bilateral foraminal narrowing.       L4-L5: There is artificial disc material with posterior laminectomy.  There   is no canal stenosis.  There is mild left and moderate right foraminal   narrowing.       L5-S1: There is a circumferential disc bulge with facet hypertrophy. Denise Mercer   is no canal stenosis.  There is moderate right and severe left foraminal   narrowing.           Impression   Posterior fixation and laminectomy from L2-L4 without evidence for hardware   complication.       Multilevel degenerative change with bilateral foraminal narrowing as   described above.                 MRI lumbar spine 2018  1. No significant interval change is observed since the previous study   of 2017.       2. Stable postoperative changes/posterior spinal fusion at the   L3-L4-L5 level.       3. Severe spine canal stenosis in the level of L2-L3           4. Encroachment of the neural foramina bilaterally in levels of L2-L3   and L5-S1      Thoracic spine MRI 2018  1. Some degenerative changes in the thoracic spine, mainly in the   facet joints in the mid-upper thoracic spine segments       2. Discrete loss of height of T6, more likely an old finding.      Previous treatments: Physical Therapy, Surgery L3-5 fusion/laminectomy and medications. .       Potential Aberrant Drug-Related Behavior:  No     Urine Drug Screenin18:  Consistent for norhydrocodone metabolite  2018:  Consistent for hydrocodone and norhydrocodone  05/10/2019:  Consistent for hydrocodone and norhydrocodone  2019:  Consistent for hydrocodone and norhydrocodone  01/10/2020:  Consistent for hydrocodone and norhydrocodone  2020:  Consistent for oxycodone and metabolites s/p surgery. Inconsistent for hydrocodone. States that she was instructed to take her old norco until she made the appointment to resume her chronic pain medications. 10/2020:  Consistent  2021:  Consistent     OARRS report:  2018 consistent to 2021 consistent (norco scripts from [facility] post op 3/10/2021 and 3/24/2021.    [place] script through [facility] - last one 2021 - consistent to 2022 consistent     Opioid agreement:  10/18/2021      Past Medical History:   Diagnosis Date    Cancer ([facility] 2019    LESION REMOVED UNDER TONGUE  DENTAL CLINIC    Chronic back pain     Costochondritis     h/o    Depression     Falls     Last fall 2021    Fibromyalgia     Hallux rigidus of left foot     HTN (hypertension)     Hyperlipidemia     CLYDE on CPAP     Osteoarthritis     \"everywhere\"    Rheumatoid arthritis ([address]     \"As a child. \"    Rheumatoid arthritis(714.0)     TIA (transient ischemic attack)     no deficits     Use of cane as ambulatory aid        Past Surgical History:   Procedure Laterality Date    ANESTHESIA NERVE BLOCK Left 2019    LEFT C3,4,5 MBB performed by Ester Fernandez MD at [address] Right 2019    BILATERAL TRANSFORAMINAL EPIDURAL STEROID INJECTION S1 #3 performed by Ester Fernandez MD at [address] Right 2020    RIGHT SACROILIAC JOINT INJECTION      CPT: 35449 performed by Kiera Candelario DO at [address]      Left    ARTHRODESIS Left 2018    ARTHRODESIS 2ND DIGIT LEFT FOOT performed by Gerald Primrose, DPM at [address] [facility]  2017    PLIF L3-L4, L4-L5 with rods, screws, and cages/Dr. Paul Veras BACK SURGERY  2020    BACK SURGERY Right 2021    RIGHT PERCUTANEOUS SACROILIAC JOINT FUSION performed by Gloria Adams MD at [facility]      reduction, bilat     SECTION      CHOLECYSTECTOMY      COLONOSCOPY  2015    COLONOSCOPY N/A 2020    COLONOSCOPY DIAGNOSTIC performed by Lissy Jimenes MD at [address] [place] [address]  2021    SI Joint    ENDOSCOPY, COLON, DIAGNOSTIC      EPIDURAL STEROID INJECTION N/A 2019    BILATERAL TRANSFORAMINAL EPIDURAL STEROID INJECTION S1 performed by Kiera Candelario DO at [address]      Left    [address] / [facility]KLE Left 2018    REMOVAL OF PAINFUL HARDWARE LEFT FOOT  ++SYNTHES++ performed by Gerald Primrose, DPM at [facility] inguinal     LUMBAR SPINE SURGERY N/A 03/04/2020    EXPLORATION OF PRIOR L3-L5 FUSION AND L2-L3  POSTERIOR LUMBAR INTERBODY FUSION -- NEEDS O-ARM, AUDIOLOGY, CAGES, PLATES, SCREWS, C-ARM, TERESA TABLE, CELL SAVER, PLATELET GEL -- GLOBUS performed by Kwasi Charlton MD at [address] N/A 10/21/2020    EXCISION OF TONGUE LESION performed by Miri Campbell DO at [address] [facility] [address] Bilateral 05/07/2018    L1-2 lumbar foramen #1    NERVE BLOCK Bilateral 12/03/2018    s1 tfesi    NERVE BLOCK Bilateral 08/12/2019    NERVE BLOCK Right 09/30/2019    sacral radiofrequency    NERVE BLOCK Right 09/01/2020    RIGHT SACROILIAC JOINT INJECTION #2 performed by Henriette Bence, DO at [address] Left 09/25/2013    left foot tarso metatarsal joint injection    OTHER SURGICAL HISTORY Left 05/27/2015    Endoscopic Gastroc recession left, Lapidus left foot and  Excision of exostosis left foot    PAIN MANAGEMENT PROCEDURE Left 7/27/2021    LEFT L5-S1 TRANSFORAMINAL EPIDURAL STEROID INJECTION performed by Henriette Bence, DO at [address] AA&/STRD TFRML EPI LUMBAR/SACRAL 1 LEVEL Bilateral 12/03/2018    BILATERAL S1  EPIDURAL STEROID INJECTION performed by Cassie Swartz MD at [address] [facility] DX/THER SBST INTRLMNR LMBR/SAC W/IMG GDN N/A 08/21/2018    EPIDURAL STEROID INJECTION L1-2 WITH LOW VOL performed by Cassie Swartz MD at [address] [facility] NOSE/CLEFT LIP/TIP Bilateral 05/07/2018    BILATERAL L1-2 LUMBAR FORAMEN #1 performed by Cassie Swartz MD at [address] [facility] NOSE/CLEFT LIP/TIP Bilateral 06/04/2018    BILATERAL TRANSFORAMINAL EPIDURAL STEROID INJECTION UNDER FLUOROSCOPIC GUIDANCE @ FORAMINAL LEVEL L1-2 #2 performed by Cassie Swartz MD at [address] Right 09/30/2019    RIGHT SACRAL RADIOFREQUENCY ABLATION performed by Cassie Swartz MD at [address]  2000, 2005    Big Left Toe    TOE SURGERY Left 2018    2nd toe     TONSILLECTOMY      WISDOM TOOTH EXTRACTION         Prior to Admission medications    Medication Sig Start Date End Date Taking? Authorizing Provider   HYDROcodone-acetaminophen (NORCO) 5-325 MG per tablet Take 1 tablet by mouth 2 times daily for 30 days. 22 Yes TREASURE Pinon   lisinopril (PRINIVIL;ZESTRIL) 40 MG tablet Take 1 tablet by mouth every evening 21  Yes Rosa Power MD   hydroCHLOROthiazide (HYDRODIURIL) 12.5 MG tablet Take 1 tablet by mouth daily For blood pressure 21  Yes Rosa Power MD   gabapentin (NEURONTIN) 400 MG capsule Take 1 capsule by mouth 2 times daily for 30 days. 10/18/21 1/19/22 Yes TREASURE Pinon   hydrOXYzine (VISTARIL) 25 MG capsule Take 1 capsule by mouth 3 times daily as needed for Anxiety 21  Yes Rosa Power MD   atorvastatin (LIPITOR) 40 MG tablet Take 1 tablet by mouth daily 21  Yes Rosa Power MD   Handicap Placard MISC DX:  Loss of Balance, Chronic low back pain, s/p back surgery.    Duration of 5 years 21  Yes Rosa Power MD       Allergies   Allergen Reactions    Pcn [Penicillins] Anaphylaxis       Social History     Socioeconomic History    Marital status:      Spouse name: Not on file    Number of children: Not on file    Years of education: Not on file    Highest education level: Not on file   Occupational History    Not on file   Tobacco Use    Smoking status: Current Some Day Smoker     Packs/day: 0.25     Years: 30.00     Pack years: 7.50     Types: Cigarettes     Last attempt to quit: 10/5/2020     Years since quittin.2    Smokeless tobacco: Never Used    Tobacco comment: was going to try to stop but chantix is too expensive   Vaping Use    Vaping Use: Never used   Substance and Sexual Activity    Alcohol use: Not Currently     Alcohol/week: 0.0 standard drinks     Comment: rarely    Drug use: No    Sexual activity: Not Currently   Other Topics Concern    Not on file   Social History Narrative    Not on file     Social Determinants of Health     Financial Resource Strain:     Difficulty of Paying Living Expenses: Not on file   Food Insecurity:     Worried About Running Out of Food in the Last Year: Not on file    Titi of Food in the Last Year: Not on file   Transportation Needs:     Lack of Transportation (Medical): Not on file    Lack of Transportation (Non-Medical): Not on file   Physical Activity:     Days of Exercise per Week: Not on file    Minutes of Exercise per Session: Not on file   Stress:     Feeling of Stress : Not on file   Social Connections:     Frequency of Communication with Friends and Family: Not on file    Frequency of Social Gatherings with Friends and Family: Not on file    Attends Church Services: Not on file    Active Member of [address] [place] [address] [employer] or Organizations: Not on file    Attends Club or Organization Meetings: Not on file    Marital Status: Not on file   Intimate Partner Violence:     Fear of Current or Ex-Partner: Not on file    Emotionally Abused: Not on file    Physically Abused: Not on file    Sexually Abused: Not on file   Housing Stability:     Unable to Pay for Housing in the Last Year: Not on file    Number of [place] in the Last Year: Not on file    Unstable Housing in the Last Year: Not on file       Family History   Problem Relation Age of Onset    Dementia Mother     Breast Cancer Mother     Cancer Mother         breast cancer [de-identified]     Stroke Mother     Heart Attack Father     Other Father 80        Aortic aneurysm    Cancer Brother     Diabetes Brother        REVIEW OF SYSTEMS:     Jarrett Canela denies fever/chills, chest pain, shortness of breath, new bowel or bladder complaints or suicidal ideations. All other review of systems was negative.     PHYSICAL EXAMINATION:      BP (!) 146/90   Pulse 108   Temp 98.6 °F (37 °C) (Infrared)   Resp 16   Ht 5' 5\" (1.651 m)   Wt 213 lb (96.6 kg)   LMP  (LMP Unknown)   SpO2 95%   BMI 35.45 kg/m²     General:      General appearance: awake, alert, oriented, in no acute distress, well developed, well nourished and in no acute distress   pleasant and well-hydrated. in no distress and A & O x3  Build:Overweight  Function:Rises from a seated position with difficulty    HEENT:    Head:normocephalic and atraumatic  Pupils:regular, round and equal.  Sclera: icterus absent  EOM:full and intact. Lungs:    Breathing:Normal expansion. No wheezing. Abdomen:    Shape:non-distended and normal    Thoracic spine:  + right paraspinal TTP    Lumbar spine:     Range of motion:abnormal moderately Lateral bending, flexion, extension rotation bilateral and is mildly painful. Extremities:    Tremors:None bilaterally upper and lower  Range of motion:Generally normal shoulders  Intact:Yes  Cyanosis:none  Edema:Normal      Neurological:    Cranial nerves:normal  Sensory:diminished to light lateral aspect of right leg                   Dermatology:    Skin:no unusual rashes, no skin lesions, no palpable subcutaneous nodules and good skin turgor    Assessment/Plan:      Chronic low back pain with radiation to the Right groin, patient had low back surgery 08/2017 L3-5 fusion, recently had lumbar spine CT with severe L2-3 stenosis     Plan:  Patient is s/p revision fusion L2-L4 on 3/4/2020. Patient is s/p:  Right sacroiliac joint fusion with use of SI-BONE iFuse implant on 3/9/2021 by Dr. Brooke Richardson. Continue norco 5/325 BID. Continue with Gabapentin 400 mg BID. She reports that any increases make her off balance. Foot surgery on hold due to financial issues. OARRS report reviewed 01/2022  UDS ordered today  Continue flexeril 5 mg BID prn for right sided thoracic myofascial pain - Takes sparingly. Offered thoracic spine x-ray. She declines. Patient may relocate to [facility] at some point - on hold right now. .    Consider TPIs if not helpful  Patient encouraged to stay active and to lose weight. Failed physical therapy  Treatment plan discussed with the patient including medications side effects       Controlled Substance Monitoring:    Acute and Chronic Pain Monitoring:   RX Monitoring 1/19/2022   Attestation -   Acute Pain Prescriptions -   Periodic Controlled Substance Monitoring Possible medication side effects, risk of tolerance/dependence & alternative treatments discussed. ;No signs of potential drug abuse or diversion identified. ;Assessed functional status. ;Obtaining appropriate analgesic effect of treatment. ;Random urine drug screen sent today.    Chronic Pain > 80 MEDD -             ccreferring physicf

## 2022-01-19 ENCOUNTER — OFFICE VISIT (OUTPATIENT)
Dept: PAIN MANAGEMENT | Age: 67
End: 2022-01-19

## 2022-01-19 VITALS
DIASTOLIC BLOOD PRESSURE: 90 MMHG | TEMPERATURE: 98.6 F | HEART RATE: 108 BPM | BODY MASS INDEX: 35.49 KG/M2 | OXYGEN SATURATION: 95 % | HEIGHT: 65 IN | WEIGHT: 213 LBS | RESPIRATION RATE: 16 BRPM | SYSTOLIC BLOOD PRESSURE: 146 MMHG

## 2022-01-19 DIAGNOSIS — G89.29 CHRONIC BILATERAL LOW BACK PAIN WITHOUT SCIATICA: ICD-10-CM

## 2022-01-19 DIAGNOSIS — G89.29 OTHER CHRONIC PAIN: ICD-10-CM

## 2022-01-19 DIAGNOSIS — M51.36 DDD (DEGENERATIVE DISC DISEASE), LUMBAR: ICD-10-CM

## 2022-01-19 DIAGNOSIS — M54.50 CHRONIC BILATERAL LOW BACK PAIN WITHOUT SCIATICA: ICD-10-CM

## 2022-01-19 DIAGNOSIS — M79.10 MYALGIA: ICD-10-CM

## 2022-01-19 DIAGNOSIS — M54.40 CHRONIC MIDLINE LOW BACK PAIN WITH SCIATICA, SCIATICA LATERALITY UNSPECIFIED: Primary | ICD-10-CM

## 2022-01-19 DIAGNOSIS — M48.061 SPINAL STENOSIS OF LUMBAR REGION WITHOUT NEUROGENIC CLAUDICATION: ICD-10-CM

## 2022-01-19 DIAGNOSIS — M47.816 LUMBAR FACET ARTHROPATHY: ICD-10-CM

## 2022-01-19 DIAGNOSIS — M47.812 CERVICAL FACET JOINT SYNDROME: ICD-10-CM

## 2022-01-19 DIAGNOSIS — G89.29 CHRONIC MIDLINE LOW BACK PAIN WITH SCIATICA, SCIATICA LATERALITY UNSPECIFIED: Primary | ICD-10-CM

## 2022-01-19 DIAGNOSIS — M54.16 LUMBAR RADICULOPATHY: ICD-10-CM

## 2022-01-19 DIAGNOSIS — G89.29 CHRONIC MIDLINE LOW BACK PAIN WITH SCIATICA, SCIATICA LATERALITY UNSPECIFIED: ICD-10-CM

## 2022-01-19 DIAGNOSIS — G89.4 CHRONIC PAIN SYNDROME: ICD-10-CM

## 2022-01-19 DIAGNOSIS — M54.40 CHRONIC MIDLINE LOW BACK PAIN WITH SCIATICA, SCIATICA LATERALITY UNSPECIFIED: ICD-10-CM

## 2022-01-19 PROCEDURE — 99213 OFFICE O/P EST LOW 20 MIN: CPT | Performed by: PHYSICIAN ASSISTANT

## 2022-01-19 RX ORDER — CYCLOBENZAPRINE HCL 10 MG
5 TABLET ORAL 2 TIMES DAILY PRN
Qty: 60 TABLET | Refills: 0 | Status: SHIPPED
Start: 2022-01-19 | End: 2022-02-16 | Stop reason: SDUPTHER

## 2022-01-19 RX ORDER — GABAPENTIN 400 MG/1
400 CAPSULE ORAL 2 TIMES DAILY
Qty: 60 CAPSULE | Refills: 2 | Status: SHIPPED
Start: 2022-01-19 | End: 2022-06-20 | Stop reason: SDUPTHER

## 2022-01-19 RX ORDER — HYDROCODONE BITARTRATE AND ACETAMINOPHEN 5; 325 MG/1; MG/1
1 TABLET ORAL 2 TIMES DAILY
Qty: 60 TABLET | Refills: 0 | Status: SHIPPED
Start: 2022-01-23 | End: 2022-02-16 | Stop reason: SDUPTHER

## 2022-01-19 NOTE — PROGRESS NOTES
Do you currently have any of the following:    Fever: No  Headache:  No  Cough: No  Shortness of breath: No  Exposed to anyone with these symptoms: No         Teri Saravia presents to the [address] [place] [address] on 1/19/2022. Jessica Wiggins is complaining of pain mid to lower back. The pain is constant. The pain is described as aching, throbbing, shooting, stabbing, sharp and burning. Pain is rated on her best day at a 6, on her worst day at a 10, and on average at a 8 on the VAS scale. She took her last dose of Norco this morning. Any procedures since your last visit: No, with  % relief. Pacemaker or defibrillator: No managed by . She is not on NSAIDS and is not on anticoagulation medications to include none and is managed by . Medication Contract and Consent for Opioid Use Documents Filed     Patient Documents     Type of Document Status Date Received Received By Description    Medication Contract Received  Magdalene Delacruz Opioid medication contract with Dr Chikis Roa 3/24/20    Medication Contract Received 4/26/2018  3:50 PM ANYA NUNO PAIN MANAGEMENT PATIENT AGREEMENT 4/26/2018    Medication Contract Received 11/5/2020  4:23 PM EBER HONG Opioid medication contract with Dr Tamera Aquino Received 3/1/2021  1:26 PM Magdalene Delacruz Opioid medication contract with Dr Tamera Aquino Received 8/23/2021  2:03 PM TAMMI [address] [place] [address] Medication contract    Medication Contract Received 10/18/2021  3:03 PM HAYLIE CADENA medication contract 10/18/2021                BP (!) 146/90   Pulse 108   Temp 98.6 °F (37 °C) (Infrared)   Resp 16   Ht 5' 5\" (1.651 m)   Wt 213 lb (96.6 kg)   LMP  (LMP Unknown)   SpO2 95%   BMI 35.45 kg/m²      No LMP recorded (lmp unknown).  Patient is postmenopausal.

## 2022-01-20 LAB
6-MONOACETYLMORPHINE, URINE: NOT DETECTED
ALCOHOL URINE: NOT DETECTED
AMPHETAMINE SCREEN, URINE: NOT DETECTED
BARBITURATE SCREEN URINE: NOT DETECTED
BENZODIAZEPINE SCREEN, URINE: NOT DETECTED
BUPRENORPHINE URINE: NOT DETECTED
CANNABINOID SCREEN URINE: NOT DETECTED
COCAINE METABOLITE SCREEN URINE: NOT DETECTED
FENTANYL SCREEN, URINE: NOT DETECTED
INTEGRITY CHECK, CREATININE, URINE: 54
INTEGRITY CHECK, OXIDANT, URINE: <40
INTEGRITY CHECK, PH, URINE: 5.4 (ref 4.5–9)
INTEGRITY CHECK, SPECIFIC GRAVITY, URINE: 1.01 (ref 1–1.03)
INTEGRITY CHECK, SPECIMEN INTEGRITY, URINE: ABNORMAL
Lab: ABNORMAL
METHADONE SCREEN, URINE: NOT DETECTED
OPIATE SCREEN URINE: POSITIVE
OXYCODONE URINE: NOT DETECTED
PHENCYCLIDINE SCREEN URINE: NOT DETECTED
TRAMADOL SCREEN URINE: NOT DETECTED

## 2022-01-21 LAB
6-MAM, QUANTITATIVE, URINE: <10
7-AMINOCLONAZEPAM, QUANTITATIVE, URINE: <50
ALPHA-HYDROXYALPRAZOLAM, QUANTITATIVE, URINE: <50
ALPHA-HYDROXYMIDAZOLAM, QUANTITATIVE, URINE: <50
ALPHA-HYDROXYTRIAZOLAM, QUANTITATIVE, URINE: <50
ALPRAZOLAM URINE QUANT: <50
CHLORDIAZEPOXIDE, QUANTITATIVE, URINE: <50
CLONAZEPAM, QUANTITATIVE, URINE: <50
CODEINE, QUANTITATIVE, URINE: <50
COMMENT: NORMAL
DIAZEPAM URINE QUANT: <50
FLUNITRAZEPAM, QUANTITATIVE, URINE: <50
FLURAZEPAM, QUANTITATIVE, URINE: <50
HYDROCODONE, QUANTITATIVE, URINE: 378.6
HYDROMORPHONE, QUANTITATIVE, URINE: <50
LORAZEPAM URINE QUANT: <50
MIDAZOLAM URINE QUANT: <50
MORPHINE, QUANTITATIVE, URINE: <50
NORDIAZEPAM URINE QUANT: <50
NORHYDROCODONE, QUANTITATIVE, URINE: 314
NOROXYCODONE, QUANTITATIVE, URINE: <50
OXAZEPAM URINE QUANT: <50
OXYCODONE URINE, QUANTITATIVE: <50
OXYMORPHONE, QUANTITATIVE, URINE: <50
TEMAZEPAM, QUANTITATIVE, URINE: <50

## 2022-02-15 NOTE — PROGRESS NOTES
[address]  [address]  [facility]    Follow up Note      Jeanmarie Dozier     Date of Visit:  2022    CC:  Patient presents for follow up   Chief Complaint   Patient presents with    Follow-up     lower back /rt. thoracic region        HPI:    Pain is unchanged. No new changes. Continues with right sided thoracic myofascial pain. Change in quality of symptoms:no. Patient satisfaction with analgesia:fair. Medication side effects: None. Recent diagnostic testing:none. Recent interventional procedures: None. She has been on anticoagulation medications to include NSAIDS. The patient  has not been on herbal supplements. The patient is diabetic. Imagin2021 CT lumbar myelogram    FINDINGS:   BONES/ALIGNMENT: There is minimal levocurvature of the lumbar spine.  There   is posterior fixation from L2-L4 without evidence for hardware complication.    The vertebral body heights are maintained.  There is minimal retrolisthesis   of L2 on L3.       SOFT TISSUES: The posterior paraspinal soft tissues are unremarkable.  The   visualized abdominal structures are unremarkable.  The conus is normal in   caliber and terminates at L1.  The cauda equina is unremarkable.       L1-L2: There is no significant disc protrusion, central spinal canal stenosis   or neural foraminal narrowing.       L2-L3: There is artificial disc material with posterior laminectomy.  There   is no canal stenosis or left foraminal narrowing.  There is moderate right   foraminal narrowing.       L3-L4: There is artificial disc material and posterior laminectomy.  There is   no canal stenosis.  There is mild bilateral foraminal narrowing.       L4-L5: There is artificial disc material with posterior laminectomy.  There   is no canal stenosis.  There is mild left and moderate right foraminal   narrowing.       L5-S1: There is a circumferential disc bulge with facet hypertrophy. Nkechi Finders   is no canal stenosis. Pauleen Al is moderate right and severe left foraminal   narrowing.           Impression   Posterior fixation and laminectomy from L2-L4 without evidence for hardware   complication.       Multilevel degenerative change with bilateral foraminal narrowing as   described above.                 MRI lumbar spine 2018  1. No significant interval change is observed since the previous study   of 2017.       2. Stable postoperative changes/posterior spinal fusion at the   L3-L4-L5 level.       3. Severe spine canal stenosis in the level of L2-L3           4. Encroachment of the neural foramina bilaterally in levels of L2-L3   and L5-S1      Thoracic spine MRI 2018  1. Some degenerative changes in the thoracic spine, mainly in the   facet joints in the mid-upper thoracic spine segments       2. Discrete loss of height of T6, more likely an old finding.      Previous treatments: Physical Therapy, Surgery L3-5 fusion/laminectomy and medications. .       Potential Aberrant Drug-Related Behavior:  No     Urine Drug Screenin18:  Consistent for norhydrocodone metabolite  2018:  Consistent for hydrocodone and norhydrocodone  05/10/2019:  Consistent for hydrocodone and norhydrocodone  2019:  Consistent for hydrocodone and norhydrocodone  01/10/2020:  Consistent for hydrocodone and norhydrocodone  2020:  Consistent for oxycodone and metabolites s/p surgery. Inconsistent for hydrocodone. States that she was instructed to take her old norco until she made the appointment to resume her chronic pain medications. 10/2020:  Consistent  2021:  Consistent  2022:  Consistent     OARRS report:  2018 consistent to 2021 consistent (norco scripts from [facility] post op 3/10/2021 and 3/24/2021.    [place] script through [facility] - last one 2021 - consistent to 2022 consistent     Opioid agreement:  10/18/2021      Past Medical History:   Diagnosis Date    Cancer [facility]) 2019 LESION REMOVED UNDER TONGUE  DENTAL CLINIC    Chronic back pain     Costochondritis     h/o    Depression     Falls     Last fall 2021    Fibromyalgia     Hallux rigidus of left foot     HTN (hypertension)     Hyperlipidemia     CLYDE on CPAP     Osteoarthritis     \"everywhere\"    Rheumatoid arthritis ([address]     \"As a child. \"    Rheumatoid arthritis(714.0)     TIA (transient ischemic attack)     no deficits     Use of cane as ambulatory aid        Past Surgical History:   Procedure Laterality Date    ANESTHESIA NERVE BLOCK Left 2019    LEFT C3,4,5 MBB performed by Irene Ba MD at [address] Right 2019    BILATERAL TRANSFORAMINAL EPIDURAL STEROID INJECTION S1 #3 performed by Irene Ba MD at [address] [facility] [address] Right 2020    RIGHT SACROILIAC JOINT INJECTION      CPT: 67023 performed by Guy Fernandez DO at [address]      Left    ARTHRODESIS Left 2018    ARTHRODESIS 2ND DIGIT LEFT FOOT performed by Viviane Mohamud DPM at [address] [place] [address]  2017    PLIF L3-L4, L4-L5 with rods, screws, and cages/Dr. Hickman [facility] BACK SURGERY  2020    BACK SURGERY Right 2021    RIGHT PERCUTANEOUS SACROILIAC JOINT FUSION performed by Chelsey Choi MD at [facility]      reduction, bilat     SECTION      CHOLECYSTECTOMY      COLONOSCOPY  2015    COLONOSCOPY N/A 2020    COLONOSCOPY DIAGNOSTIC performed by Lori Williamson MD at [address] [place] [address]  2021    SI Joint    ENDOSCOPY, COLON, DIAGNOSTIC      EPIDURAL STEROID INJECTION N/A 2019    BILATERAL TRANSFORAMINAL EPIDURAL STEROID INJECTION S1 performed by Guy Fernandez DO at [address]      Left    [address] / ANKLE Left 2018    REMOVAL OF PAINFUL HARDWARE LEFT FOOT  ++SYNTHES++ performed by Viviane Mohamud DPM at [address] [facility] REPAIR  1998    inguinal     LUMBAR SPINE SURGERY N/A 03/04/2020    EXPLORATION OF PRIOR L3-L5 FUSION AND L2-L3  POSTERIOR LUMBAR INTERBODY FUSION -- NEEDS O-ARM, AUDIOLOGY, CAGES, PLATES, SCREWS, C-ARM, TERESA TABLE, CELL SAVER, PLATELET GEL -- GLOBUS performed by Urvashi June MD at [address] N/A 10/21/2020    EXCISION OF TONGUE LESION performed by Paul Khan DO at [address] Bilateral 05/07/2018    L1-2 lumbar foramen #1    NERVE BLOCK Bilateral 12/03/2018    s1 tfesi    NERVE BLOCK Bilateral 08/12/2019    NERVE BLOCK Right 09/30/2019    sacral radiofrequency    NERVE BLOCK Right 09/01/2020    RIGHT SACROILIAC JOINT INJECTION #2 performed by Irina Garcia DO at [place] [address] Left 09/25/2013    left foot tarso metatarsal joint injection    OTHER SURGICAL HISTORY Left 05/27/2015    Endoscopic Gastroc recession left, Lapidus left foot and  Excision of exostosis left foot    PAIN MANAGEMENT PROCEDURE Left 7/27/2021    LEFT L5-S1 TRANSFORAMINAL EPIDURAL STEROID INJECTION performed by Irina Garcia DO at [address] AA&/STRD TFRML EPI LUMBAR/SACRAL 1 LEVEL Bilateral 12/03/2018    BILATERAL S1  EPIDURAL STEROID INJECTION performed by Christiano Rowan MD at [address] [facility] DX/THER SBST INTRLMNR LMBR/SAC W/IMG GDN N/A 08/21/2018    EPIDURAL STEROID INJECTION L1-2 WITH LOW VOL performed by Christiano Rowan MD at [address] [facility] NOSE/CLEFT LIP/TIP Bilateral 05/07/2018    BILATERAL L1-2 LUMBAR FORAMEN #1 performed by Christiano Rowan MD at [address] [facility] NOSE/CLEFT LIP/TIP Bilateral 06/04/2018    BILATERAL TRANSFORAMINAL EPIDURAL STEROID INJECTION UNDER FLUOROSCOPIC GUIDANCE @ FORAMINAL LEVEL L1-2 #2 performed by Christiano Rowan MD at [address] Right 09/30/2019    RIGHT SACRAL RADIOFREQUENCY ABLATION performed by Christiano Rowan MD at [address]  2000, 2005 Big Left Toe    TOE SURGERY Left 2018    2nd toe     TONSILLECTOMY      WISDOM TOOTH EXTRACTION         Prior to Admission medications    Medication Sig Start Date End Date Taking? Authorizing Provider   HYDROcodone-acetaminophen (NORCO) 5-325 MG per tablet Take 1 tablet by mouth 2 times daily for 30 days. 2/23/22 3/25/22 Yes TREASURE Watkins   cyclobenzaprine (FLEXERIL) 10 MG tablet Take 0.5 tablets by mouth 2 times daily as needed for Muscle spasms 2/16/22 3/18/22 Yes TREASURE Watkins   gabapentin (NEURONTIN) 400 MG capsule Take 1 capsule by mouth 2 times daily for 30 days. 22 Yes TREASURE Watkins   lisinopril (PRINIVIL;ZESTRIL) 40 MG tablet Take 1 tablet by mouth every evening 21  Yes Anoop Caceres MD   hydroCHLOROthiazide (HYDRODIURIL) 12.5 MG tablet Take 1 tablet by mouth daily For blood pressure 21  Yes Anoop Caceres MD   hydrOXYzine (VISTARIL) 25 MG capsule Take 1 capsule by mouth 3 times daily as needed for Anxiety 21  Yes Anoop Caceres MD   atorvastatin (LIPITOR) 40 MG tablet Take 1 tablet by mouth daily 21  Yes Anoop Caceres MD   Handicap Placard MISC DX:  Loss of Balance, Chronic low back pain, s/p back surgery.    Duration of 5 years 21  Yes Anoop Caceres MD       Allergies   Allergen Reactions    Pcn [Penicillins] Anaphylaxis       Social History     Socioeconomic History    Marital status:      Spouse name: Not on file    Number of children: Not on file    Years of education: Not on file    Highest education level: Not on file   Occupational History    Not on file   Tobacco Use    Smoking status: Current Some Day Smoker     Packs/day: 0.25     Years: 30.00     Pack years: 7.50     Types: Cigarettes     Last attempt to quit: 10/5/2020     Years since quittin.3    Smokeless tobacco: Never Used    Tobacco comment: was going to try to stop but chantix is too expensive   Vaping Use    Vaping Use: Never used   Substance and Sexual Activity    Alcohol use: Not Currently     Alcohol/week: 0.0 standard drinks     Comment: rarely    Drug use: No    Sexual activity: Not Currently   Other Topics Concern    Not on file   Social History Narrative    Not on file     Social Determinants of Health     Financial Resource Strain:     Difficulty of Paying Living Expenses: Not on file   Food Insecurity:     Worried About Running Out of Food in the Last Year: Not on file    Titi of Food in the Last Year: Not on file   Transportation Needs:     Lack of Transportation (Medical): Not on file    Lack of Transportation (Non-Medical):  Not on file   Physical Activity:     Days of Exercise per Week: Not on file    Minutes of Exercise per Session: Not on file   Stress:     Feeling of Stress : Not on file   Social Connections:     Frequency of Communication with Friends and Family: Not on file    Frequency of Social Gatherings with Friends and Family: Not on file    Attends Restorationist Services: Not on file    Active Member of [address] [place] [address] or Organizations: Not on file    Attends Club or Organization Meetings: Not on file    Marital Status: Not on file   Intimate Partner Violence:     Fear of Current or Ex-Partner: Not on file    Emotionally Abused: Not on file    Physically Abused: Not on file    Sexually Abused: Not on file   Housing Stability:     Unable to Pay for Housing in the Last Year: Not on file    Number of [place] in the Last Year: Not on file    Unstable Housing in the Last Year: Not on file       Family History   Problem Relation Age of Onset    Dementia Mother     Breast Cancer Mother     Cancer Mother         breast cancer [de-identified]     Stroke Mother     Heart Attack Father     Other Father 80        Aortic aneurysm    Cancer Brother     Diabetes Brother        REVIEW OF SYSTEMS:     Naty Patiño denies fever/chills, chest pain, shortness of breath, new bowel or bladder complaints or suicidal ideations. All other review of systems was negative. PHYSICAL EXAMINATION:      BP (!) 154/92   Pulse 84   Temp 95.3 °F (35.2 °C) (Infrared)   Resp 16   Ht 5' 5\" (1.651 m)   Wt 213 lb (96.6 kg)   LMP  (LMP Unknown)   SpO2 95%   BMI 35.45 kg/m²     General:      General appearance: awake, alert, oriented, in no acute distress, well developed, well nourished and in no acute distress   pleasant and well-hydrated. in no distress and A & O x3  Build:Overweight  Function:Rises from a seated position with difficulty    HEENT:    Head:normocephalic and atraumatic  Pupils:regular, round and equal.  Sclera: icterus absent  EOM:full and intact. Lungs:    Breathing:Normal expansion. No wheezing. Abdomen:    Shape:non-distended and normal    Lumbar spine:     Range of motion:abnormal moderately Lateral bending, flexion, extension rotation bilateral and is mildly painful. Extremities:    Tremors:None bilaterally upper and lower  Range of motion:Generally normal shoulders  Intact:Yes  Cyanosis:none  Edema:Normal      Neurological:    Cranial nerves:normal  Sensory:diminished to light lateral aspect of right leg                   Dermatology:    Skin:no unusual rashes, no skin lesions, no palpable subcutaneous nodules and good skin turgor    Assessment/Plan:      Chronic low back pain with radiation to the Right groin, patient had low back surgery 08/2017 L3-5 fusion, recently had lumbar spine CT with severe L2-3 stenosis     Plan:  Patient is s/p revision fusion L2-L4 on 3/4/2020. Patient is s/p:  Right sacroiliac joint fusion with use of SI-BONE iFuse implant on 3/9/2021 by Dr. Merced Peñaloza. Continue norco 5/325 BID. Continue with Gabapentin 400 mg BID. She reports that any increases make her off balance. Foot surgery on hold due to financial issues.     OARRS report reviewed 02/2022  UDS reviewed and is consisent  Continue flexeril 5 mg BID prn for right sided thoracic myofascial pain - Takes sparingly. Offered thoracic spine x-ray. She declines. Patient may relocate to [facility] at some point - on hold right now. .    Consider TPIs - she would like to hold off. Patient encouraged to stay active and to lose weight. Failed physical therapy  Treatment plan discussed with the patient including medications side effects     Controlled Substance Monitoring:    Acute and Chronic Pain Monitoring:   RX Monitoring 2/16/2022   Attestation -   Acute Pain Prescriptions -   Periodic Controlled Substance Monitoring Possible medication side effects, risk of tolerance/dependence & alternative treatments discussed. ;No signs of potential drug abuse or diversion identified. ;Assessed functional status. ;Obtaining appropriate analgesic effect of treatment.    Chronic Pain > 80 MEDD -             ccreferring physicf

## 2022-02-16 ENCOUNTER — OFFICE VISIT (OUTPATIENT)
Dept: PAIN MANAGEMENT | Age: 67
End: 2022-02-16

## 2022-02-16 VITALS
HEART RATE: 84 BPM | TEMPERATURE: 95.3 F | HEIGHT: 65 IN | BODY MASS INDEX: 35.49 KG/M2 | WEIGHT: 213 LBS | DIASTOLIC BLOOD PRESSURE: 92 MMHG | SYSTOLIC BLOOD PRESSURE: 154 MMHG | OXYGEN SATURATION: 95 % | RESPIRATION RATE: 16 BRPM

## 2022-02-16 DIAGNOSIS — G89.4 CHRONIC PAIN SYNDROME: ICD-10-CM

## 2022-02-16 DIAGNOSIS — M54.16 LUMBAR RADICULOPATHY: ICD-10-CM

## 2022-02-16 DIAGNOSIS — M48.061 SPINAL STENOSIS OF LUMBAR REGION WITHOUT NEUROGENIC CLAUDICATION: ICD-10-CM

## 2022-02-16 DIAGNOSIS — M79.10 MYALGIA: ICD-10-CM

## 2022-02-16 DIAGNOSIS — G89.29 CHRONIC MIDLINE LOW BACK PAIN WITH SCIATICA, SCIATICA LATERALITY UNSPECIFIED: Primary | ICD-10-CM

## 2022-02-16 DIAGNOSIS — M51.36 DDD (DEGENERATIVE DISC DISEASE), LUMBAR: ICD-10-CM

## 2022-02-16 DIAGNOSIS — M47.816 LUMBAR FACET ARTHROPATHY: ICD-10-CM

## 2022-02-16 DIAGNOSIS — M54.40 CHRONIC MIDLINE LOW BACK PAIN WITH SCIATICA, SCIATICA LATERALITY UNSPECIFIED: Primary | ICD-10-CM

## 2022-02-16 PROCEDURE — 99213 OFFICE O/P EST LOW 20 MIN: CPT

## 2022-02-16 PROCEDURE — 99213 OFFICE O/P EST LOW 20 MIN: CPT | Performed by: PHYSICIAN ASSISTANT

## 2022-02-16 RX ORDER — HYDROCODONE BITARTRATE AND ACETAMINOPHEN 5; 325 MG/1; MG/1
1 TABLET ORAL 2 TIMES DAILY
Qty: 60 TABLET | Refills: 0 | Status: SHIPPED
Start: 2022-02-23 | End: 2022-03-16 | Stop reason: SDUPTHER

## 2022-02-16 RX ORDER — CYCLOBENZAPRINE HCL 10 MG
5 TABLET ORAL 2 TIMES DAILY PRN
Qty: 60 TABLET | Refills: 0 | Status: SHIPPED
Start: 2022-02-16 | End: 2022-06-21 | Stop reason: SDUPTHER

## 2022-02-16 NOTE — PROGRESS NOTES
Do you currently have any of the following:    Fever: No  Headache:  No  Cough: No  Shortness of breath: No  Exposed to anyone with these symptoms: Keri Campbell presents to the [address] [place] [address] on 2/16/2022. Shad is complaining of pain low back/rt. Thoracic region . The pain is constant. The pain is described as aching, throbbing, shooting and stabbing. Pain is rated on her best day at a 7, on her worst day at a 10, and on average at a 8 on the VAS scale. She took her last dose of Norco and Neurontin today. Any procedures since your last visit:     Pacemaker or defibrillator: No managed by     She is not on NSAIDS and is not on anticoagulation medications to include none and is managed by      Medication Contract and Consent for Opioid Use Documents Filed     Patient Documents     Type of Document Status Date Received Received By Description    Medication Contract Received  Katerin Humphreys Opioid medication contract with Dr Anuradha Cota 3/24/20    Medication Contract Received 4/26/2018  3:50 PM [address] MANAGEMENT PATIENT AGREEMENT 4/26/2018    Medication Contract Received 11/5/2020  4:23 PM EBER HONG Opioid medication contract with Dr Reynaldo Payan Received 3/1/2021  1:26 PM Katerin Humphreys Opioid medication contract with Dr Reynaldo Payan Received 8/23/2021  2:03 PM TAMMI, [address] [place] [address] Medication contract    Medication Contract Received 10/18/2021  3:03 PM HAYLIE CADENA medication contract 10/18/2021                Pulse 84   Temp 95.3 °F (35.2 °C) (Infrared)   Resp 16   Ht 5' 5\" (1.651 m)   Wt 213 lb (96.6 kg)   LMP  (LMP Unknown)   SpO2 95%   BMI 35.45 kg/m²      No LMP recorded (lmp unknown).  Patient is postmenopausal.

## 2022-03-08 ENCOUNTER — HOSPITAL ENCOUNTER (EMERGENCY)
Age: 67
Discharge: HOME | End: 2022-03-08

## 2022-03-08 VITALS
OXYGEN SATURATION: 98 % | DIASTOLIC BLOOD PRESSURE: 78 MMHG | BODY MASS INDEX: 35.45 KG/M2 | WEIGHT: 213 LBS | RESPIRATION RATE: 16 BRPM | HEART RATE: 70 BPM | TEMPERATURE: 98 F | SYSTOLIC BLOOD PRESSURE: 132 MMHG

## 2022-03-08 DIAGNOSIS — L02.31 ABSCESS OF BUTTOCK: Primary | ICD-10-CM

## 2022-03-08 PROCEDURE — 6370000000 HC RX 637 (ALT 250 FOR IP): Performed by: PHYSICIAN ASSISTANT

## 2022-03-08 PROCEDURE — 99283 EMERGENCY DEPT VISIT LOW MDM: CPT

## 2022-03-08 RX ORDER — DOXYCYCLINE HYCLATE 100 MG/1
100 CAPSULE ORAL 2 TIMES DAILY
Qty: 14 CAPSULE | Refills: 0 | Status: SHIPPED | OUTPATIENT
Start: 2022-03-08 | End: 2022-03-15

## 2022-03-08 RX ORDER — DOXYCYCLINE HYCLATE 100 MG/1
100 CAPSULE ORAL ONCE
Status: COMPLETED | OUTPATIENT
Start: 2022-03-08 | End: 2022-03-08

## 2022-03-08 RX ORDER — GABAPENTIN 400 MG/1
400 CAPSULE ORAL 3 TIMES DAILY
COMMUNITY
End: 2022-03-28

## 2022-03-08 RX ADMIN — DOXYCYCLINE HYCLATE 100 MG: 100 CAPSULE ORAL at 14:22

## 2022-03-08 NOTE — ED PROVIDER NOTES
[address]  Department of Emergency Medicine   ED  Encounter Note  Admit Date/RoomTime: 3/8/2022  1:10 PM  ED Room: 15/15    NAME: Winston Ga  : 1955  MRN: [id number]     Chief Complaint:  Abscess (right side of inner buttocks)    History of Present Illness        Winston Ga is a 61 y.o. old female who presents to the emergency department by private vehicle, for gradual onset tender area on right Buttocks, which occured 2 week(s) prior to arrival.  Since onset the symptoms have been persistent and stable and mild in severity. Symptoms are associated with just very mild swelling and pain to the area. Patient states it is not gotten worse. Patient states it has not been draining. Patient denies any fevers or chills. She has a history of no prior episodes. Patient states that she is not a diabetic. Patient states she has used warm compresses which is helped. Patient denies any fevers or chills. Patient states it is not worsened it is remained the same she just came in because she has some \"pain. \" Patient states that she is in pain management takes Norco twice a day. Patient denies any radiation of the pain. Patient states she still going to the bathroom normally. Patient states she still tolerating food and drink. ROS   Pertinent positives and negatives are stated within HPI, all other systems reviewed and are negative. Past Medical History:  has a past medical history of Cancer ([address] Chronic back pain, Costochondritis, Depression, Falls, Fibromyalgia, Hallux rigidus of left foot, HTN (hypertension), Hyperlipidemia, CLYDE on CPAP, Osteoarthritis, Rheumatoid arthritis ([address] Rheumatoid arthritis(714.0), TIA (transient ischemic attack), and Use of cane as ambulatory aid. Surgical History:  has a past surgical history that includes  section (); Toe Surgery (, ); Ankle surgery (); Cholecystectomy ();  Tonsillectomy; Dobson tooth extraction; other surgical history (Left, 09/25/2013); hernia repair (1998); Colonoscopy (03/27/2015); other surgical history (Left, 05/27/2015); Endoscopy, colon, diagnostic; back surgery (08/16/2017); Nerve Block (Bilateral, 05/07/2018); pr becca nose/cleft lip/tip (Bilateral, 05/07/2018); pr becca nose/cleft lip/tip (Bilateral, 06/04/2018); pr njx dx/ther sbst intrlmnr lmbr/sac w/img gdn (N/A, 08/21/2018); Nerve Block (Bilateral, 12/03/2018); pr njx aa&/strd tfrml epi lumbar/sacral 1 level (Bilateral, 12/03/2018); arthrodesis (Left, 12/14/2018); HARDWARE REMOVAL FOOT / ANKLE (Left, 12/14/2018); Anesthesia Nerve Block (Left, 02/26/2019); epidural steroid injection (N/A, 06/04/2019); Nerve Block (Bilateral, 08/12/2019); Anesthesia Nerve Block (Right, 08/12/2019); Nerve Block (Right, 09/30/2019); RADIOFREQUENCY ABLATION NERVES (Right, 09/30/2019); Lumbar spine surgery (N/A, 03/04/2020); Anesthesia Nerve Block (Right, 06/16/2020); back surgery (03/04/2020); Toe Surgery (Left, 2018); Colonoscopy (N/A, 08/19/2020); Nerve Block (Right, 09/01/2020); Breast surgery (2010); Foot surgery; Mouth surgery (N/A, 10/21/2020); back surgery (Right, 03/09/2021); Elbow joint fusion (03/2021); and Pain management procedure (Left, 7/27/2021). Social History:  reports that she has been smoking cigarettes. She has a 7.50 pack-year smoking history. She has never used smokeless tobacco. She reports previous alcohol use. She reports that she does not use drugs. Family History: family history includes Breast Cancer in her mother; Cancer in her brother and mother; Dementia in her mother; Diabetes in her brother; Heart Attack in her father; Other (age of onset: 80) in her father; Stroke in her mother.      Allergies: Pcn [penicillins]    Physical Exam   Oxygen Saturation Interpretation: Normal.        ED Triage Vitals   BP Temp Temp src Pulse Resp SpO2 Height Weight   03/08/22 1253 03/08/22 1253 -- 03/08/22 1253 03/08/22 1253 03/08/22 1253 -- 03/08/22 1307   (!) 178/100 98 °F (36.7 °C)  97 16 99 %  213 lb (96.6 kg)         Physical Exam  Constitutional:  Alert, development consistent with age. HEENT:  NC/NT. Airway patent  Neck:  Normal ROM. Supple. Respiratory:  Clear to auscultation and breath sounds equal.  CV:  Regular rate and rhythm, normal heart sounds, without pathological murmurs, ectopy, gallops, or rubs. GI:  Abdomen Soft, nontender, good bowel sounds. No firm or pulsatile mass. : On right glutes about 3 in from the rectum on the cheek there is a 1 in area of erythema with mild edema no evidence of induration or anything to incise and drain at this time. Mild pain with palpation. Back:  No costovertebral tenderness. Extremities: No tenderness or edema noted. Integument:  Normal turgor. Warm, dry. no wounds, erythema, or swelling  Lymphatics: No lymphangitis or adenopathy noted. Neurological:  Oriented. Motor functions intact. Lab / Imaging Results   (All laboratory and radiology results have been personally reviewed by myself)  Labs:  No results found for this visit on 03/08/22. Imaging: All Radiology results interpreted by Radiologist unless otherwise noted. No orders to display       ED Course / Medical Decision Making     Medications   doxycycline hyclate (VIBRAMYCIN) capsule 100 mg (100 mg Oral Given 3/8/22 1422)       Consult(s):   None    Procedure(s):  None    MDM:     Patient is a 72-year-old female that is presenting with a small abscess noted lying on her right glued. Patient had a thorough examination and no I&D is needed at this time. Area is mildly swollen but nothing is fluctuant to need to be drained. Discussed antibiotics with the patient. Patient does have a penicillin allergy that resulted anaphylaxis. Plan is for treatment with analgesics, ATB's and appropriate outpatient follow-up. Patient will be given doxycycline first dose given here.  Patient was advised the risk, benefits side effects of the medication. Patient was told to use warm sitz bath and warm compresses on the area. She was advised if the symptoms worsen such as increasing swelling, pain, fever or chills to return back to the emergency department immediately. She voiced understanding and agree with the plan of management. Patient is already in pain management and takes Norco twice a day for her back pain. Patient was told to continue on that regimen if she has breakthrough pain she can take ibuprofen 600 mg midday. She voiced understanding and agree with the plan of management. Patient is urged to take all ATB to completion unless and adverse reaction develops. Patient was explicitly instructed on specific signs and symptoms on which to return to the emergency room for. Patient was instructed to return to the ER for any new or worsening symptoms. Additional discharge instructions were given verbally. All questions were answered. Patient is comfortable and agreeable with discharge plan. Patient in no acute distress and non-toxic in appearance. Plan of Care/Counseling:  Jamal Joel PA-C reviewed today's visit with the patient in addition to providing specific details for the plan of care and counseling regarding the diagnosis and prognosis. Questions are answered at this time and are agreeable with the plan. Assessment      1. Abscess of buttock      Plan   Discharged home  Patient condition is stable    New Medications     New Prescriptions    DOXYCYCLINE HYCLATE (VIBRAMYCIN) 100 MG CAPSULE    Take 1 capsule by mouth 2 times daily for 7 days     Electronically signed by Jamal Joel PA-C   DD: 3/8/22  **This report was transcribed using voice recognition software. Every effort was made to ensure accuracy; however, inadvertent computerized transcription errors may be present.   END OF ED PROVIDER NOTE      Jamal Joel PA-C  03/08/22 9621 [facility]  03/08/22 [id number]

## 2022-03-14 NOTE — PROGRESS NOTES
[address]  [address]  MINA DAMIAN [facility] - BEHAVIORAL HEALTH SERVICES, [facility]    Follow up Note      [place]     Date of Visit:  3/16/2022    CC:  Patient presents for follow up   Chief Complaint   Patient presents with    Follow-up    Back Pain    Neck Pain       HPI:    Pain is unchanged. No new changes. Recently had an abscess in the anal area. Change in quality of symptoms:no. Patient satisfaction with analgesia:fair. Medication side effects: None. Recent diagnostic testing:none. Recent interventional procedures: None. She has been on anticoagulation medications to include NSAIDS. The patient  has not been on herbal supplements. The patient is diabetic. Imagin2021 CT lumbar myelogram    FINDINGS:   BONES/ALIGNMENT: There is minimal levocurvature of the lumbar spine.  There   is posterior fixation from L2-L4 without evidence for hardware complication. The vertebral body heights are maintained.  There is minimal retrolisthesis   of L2 on L3.       SOFT TISSUES: The posterior paraspinal soft tissues are unremarkable.  The   visualized abdominal structures are unremarkable.  The conus is normal in   caliber and terminates at L1.  The cauda equina is unremarkable.       L1-L2: There is no significant disc protrusion, central spinal canal stenosis   or neural foraminal narrowing.       L2-L3: There is artificial disc material with posterior laminectomy.  There   is no canal stenosis or left foraminal narrowing.  There is moderate right   foraminal narrowing.       L3-L4: There is artificial disc material and posterior laminectomy.  There is   no canal stenosis.  There is mild bilateral foraminal narrowing.       L4-L5: There is artificial disc material with posterior laminectomy.  There   is no canal stenosis.  There is mild left and moderate right foraminal   narrowing.       L5-S1: There is a circumferential disc bulge with facet hypertrophy.  There   is no canal stenosis.  There is moderate right and severe left foraminal   narrowing.           Impression   Posterior fixation and laminectomy from L2-L4 without evidence for hardware   complication.       Multilevel degenerative change with bilateral foraminal narrowing as   described above.                 MRI lumbar spine 2018  1. No significant interval change is observed since the previous study   of 2017.       2. Stable postoperative changes/posterior spinal fusion at the   L3-L4-L5 level.       3. Severe spine canal stenosis in the level of L2-L3           4. Encroachment of the neural foramina bilaterally in levels of L2-L3   and L5-S1      Thoracic spine MRI 2018  1. Some degenerative changes in the thoracic spine, mainly in the   facet joints in the mid-upper thoracic spine segments       2. Discrete loss of height of T6, more likely an old finding.      Previous treatments: Physical Therapy, Surgery L3-5 fusion/laminectomy and medications. .       Potential Aberrant Drug-Related Behavior:  No     Urine Drug Screenin18:  Consistent for norhydrocodone metabolite  2018:  Consistent for hydrocodone and norhydrocodone  05/10/2019:  Consistent for hydrocodone and norhydrocodone  2019:  Consistent for hydrocodone and norhydrocodone  01/10/2020:  Consistent for hydrocodone and norhydrocodone  2020:  Consistent for oxycodone and metabolites s/p surgery. Inconsistent for hydrocodone. States that she was instructed to take her old norco until she made the appointment to resume her chronic pain medications. 10/2020:  Consistent  2021:  Consistent  2022:  Consistent     OARRS report:  2018 consistent to 2021 consistent ([facility] post op 3/10/2021 and 3/24/2021.    [place] script through [facility] - last one 2021 - consistent to 2022 consistent     Opioid agreement:  10/18/2021      Past Medical History:   Diagnosis Date    Cancer ([facility] [address] 2019    LESION REMOVED UNDER TONGUE  DENTAL CLINIC    Chronic back pain     Costochondritis     h/o    Depression     Falls     Last fall 2021    Fibromyalgia     Hallux rigidus of left foot     HTN (hypertension)     Hyperlipidemia     CLYDE on CPAP     Osteoarthritis     \"everywhere\"    Rheumatoid arthritis ([address]     \"As a child. \"    Rheumatoid arthritis(714.0)     TIA (transient ischemic attack)     no deficits     Use of cane as ambulatory aid        Past Surgical History:   Procedure Laterality Date    ANESTHESIA NERVE BLOCK Left 2019    LEFT C3,4,5 MBB performed by Pamela Francis MD at [address] Right 2019    BILATERAL TRANSFORAMINAL EPIDURAL STEROID INJECTION S1 #3 performed by Pamela Francis MD at [address] [facility] Right 2020    RIGHT SACROILIAC JOINT INJECTION      CPT: 33298 performed by Geronimo Leggett DO at [address]      Left    ARTHRODESIS Left 2018    ARTHRODESIS 2ND DIGIT LEFT FOOT performed by Dafne Galeano DPM at [address] [place] [address]  2017    PLIF L3-L4, L4-L5 with rods, screws, and cages/Dr. Rosario Mendez BACK SURGERY  2020    BACK SURGERY Right 2021    RIGHT PERCUTANEOUS SACROILIAC JOINT FUSION performed by Lauren Luna MD at [facility]      reduction, bilat     SECTION      CHOLECYSTECTOMY      COLONOSCOPY  2015    COLONOSCOPY N/A 2020    COLONOSCOPY DIAGNOSTIC performed by Amita Salazar MD at [address] [place] [address]  2021    SI Joint    ENDOSCOPY, COLON, DIAGNOSTIC      EPIDURAL STEROID INJECTION N/A 2019    BILATERAL TRANSFORAMINAL EPIDURAL STEROID INJECTION S1 performed by Geronimo Leggett DO at [place]      Left    [address] / ANKLE Left 2018    REMOVAL OF PAINFUL HARDWARE LEFT FOOT  ++SYNTHES++ performed by Dafne Galeano DPM at [place] [address] inguinal     LUMBAR SPINE SURGERY N/A 03/04/2020    EXPLORATION OF PRIOR L3-L5 FUSION AND L2-L3  POSTERIOR LUMBAR INTERBODY FUSION -- NEEDS O-ARM, AUDIOLOGY, CAGES, PLATES, SCREWS, C-ARM, TERESA TABLE, CELL SAVER, PLATELET GEL -- GLOBUS performed by Chelsey Choi MD at [address] N/A 10/21/2020    EXCISION OF TONGUE LESION performed by Xavier Smith DO at [address] Bilateral 05/07/2018    L1-2 lumbar foramen #1    NERVE BLOCK Bilateral 12/03/2018    s1 tfesi    NERVE BLOCK Bilateral 08/12/2019    NERVE BLOCK Right 09/30/2019    sacral radiofrequency    NERVE BLOCK Right 09/01/2020    RIGHT SACROILIAC JOINT INJECTION #2 performed by Guy Fernandez DO at [facility] Left 09/25/2013    left foot tarso metatarsal joint injection    OTHER SURGICAL HISTORY Left 05/27/2015    Endoscopic Gastroc recession left, Lapidus left foot and  Excision of exostosis left foot    PAIN MANAGEMENT PROCEDURE Left 7/27/2021    LEFT L5-S1 TRANSFORAMINAL EPIDURAL STEROID INJECTION performed by Guy Fernandez DO at [address] AA&/STRD TFRML EPI LUMBAR/SACRAL 1 LEVEL Bilateral 12/03/2018    BILATERAL S1  EPIDURAL STEROID INJECTION performed by Irene Ba MD at [address] [facility] DX/THER SBST INTRLMNR LMBR/SAC W/IMG GDN N/A 08/21/2018    EPIDURAL STEROID INJECTION L1-2 WITH LOW VOL performed by Irene Ba MD at [address] [facility] NOSE/CLEFT LIP/TIP Bilateral 05/07/2018    BILATERAL L1-2 LUMBAR FORAMEN #1 performed by Irene Ba MD at [address] [facility] NOSE/CLEFT LIP/TIP Bilateral 06/04/2018    BILATERAL TRANSFORAMINAL EPIDURAL STEROID INJECTION UNDER FLUOROSCOPIC GUIDANCE @ FORAMINAL LEVEL L1-2 #2 performed by Irene Ba MD at [address] Right 09/30/2019    RIGHT SACRAL RADIOFREQUENCY ABLATION performed by Irene Ba MD at [address]  2000, 2005    Big Left Toe    TOE SURGERY Left 2018    2nd toe     TONSILLECTOMY      WISDOM TOOTH EXTRACTION         Prior to Admission medications    Medication Sig Start Date End Date Taking? Authorizing Provider   gabapentin (NEURONTIN) 400 MG capsule Take 400 mg by mouth 3 times daily. Yes Historical Provider, MD   HYDROcodone-acetaminophen (NORCO) 5-325 MG per tablet Take 1 tablet by mouth 2 times daily for 30 days. 2/23/22 3/25/22 Yes TREASURE Luciano   cyclobenzaprine (FLEXERIL) 10 MG tablet Take 0.5 tablets by mouth 2 times daily as needed for Muscle spasms 2/16/22 3/18/22 Yes TREASURE Luciano   lisinopril (PRINIVIL;ZESTRIL) 40 MG tablet Take 1 tablet by mouth every evening 21  Yes Elsa Jara MD   hydroCHLOROthiazide (HYDRODIURIL) 12.5 MG tablet Take 1 tablet by mouth daily For blood pressure 21  Yes Elsa Jara MD   hydrOXYzine (VISTARIL) 25 MG capsule Take 1 capsule by mouth 3 times daily as needed for Anxiety 21  Yes Elsa Jara MD   atorvastatin (LIPITOR) 40 MG tablet Take 1 tablet by mouth daily 21  Yes Elsa Jara MD   Handicap Placard MISC DX:  Loss of Balance, Chronic low back pain, s/p back surgery. Duration of 5 years 21  Yes Elsa Jara MD   gabapentin (NEURONTIN) 400 MG capsule Take 1 capsule by mouth 2 times daily for 30 days.  22  TREASURE Luciano       Allergies   Allergen Reactions    Pcn [Penicillins] Anaphylaxis       Social History     Socioeconomic History    Marital status:      Spouse name: Not on file    Number of children: Not on file    Years of education: Not on file    Highest education level: Not on file   Occupational History    Not on file   Tobacco Use    Smoking status: Current Some Day Smoker     Packs/day: 0.25     Years: 30.00     Pack years: 7.50     Types: Cigarettes     Last attempt to quit: 10/5/2020     Years since quittin.4    Smokeless tobacco: Never Used    Tobacco comment: was going to try to stop but chantix is too expensive   Vaping Use    Vaping Use: Never used   Substance and Sexual Activity    Alcohol use: Not Currently     Alcohol/week: 0.0 standard drinks     Comment: rarely    Drug use: No    Sexual activity: Not Currently   Other Topics Concern    Not on file   Social History Narrative    Not on file     Social Determinants of Health     Financial Resource Strain:     Difficulty of Paying Living Expenses: Not on file   Food Insecurity:     Worried About Running Out of Food in the Last Year: Not on file    Titi of Food in the Last Year: Not on file   Transportation Needs:     Lack of Transportation (Medical): Not on file    Lack of Transportation (Non-Medical):  Not on file   Physical Activity:     Days of Exercise per Week: Not on file    Minutes of Exercise per Session: Not on file   Stress:     Feeling of Stress : Not on file   Social Connections:     Frequency of Communication with Friends and Family: Not on file    Frequency of Social Gatherings with Friends and Family: Not on file    Attends Orthodox Services: Not on file    Active Member of [address] [place] [address] or Organizations: Not on file    Attends Club or Organization Meetings: Not on file    Marital Status: Not on file   Intimate Partner Violence:     Fear of Current or Ex-Partner: Not on file    Emotionally Abused: Not on file    Physically Abused: Not on file    Sexually Abused: Not on file   Housing Stability:     Unable to Pay for Housing in the Last Year: Not on file    Number of [place] in the Last Year: Not on file    Unstable Housing in the Last Year: Not on file       Family History   Problem Relation Age of Onset    Dementia Mother     Breast Cancer Mother     Cancer Mother         breast cancer [de-identified]     Stroke Mother     Heart Attack Father     Other Father 80        Aortic aneurysm    Cancer Brother     Diabetes Brother        REVIEW OF SYSTEMS:     Gina sifuentes fever/chills, chest pain, shortness of breath, new bowel or bladder complaints or suicidal ideations. All other review of systems was negative. PHYSICAL EXAMINATION:      /67   Pulse 108   Temp 97.9 °F (36.6 °C) (Infrared)   Resp 16   Ht 5' 5\" (1.651 m)   Wt 213 lb (96.6 kg)   LMP  (LMP Unknown)   SpO2 96%   BMI 35.45 kg/m²     General:      General appearance: awake, alert, oriented, in no acute distress, well developed, well nourished and in no acute distress   pleasant and well-hydrated. in no distress and A & O x3  Build:Overweight  Function:Rises from a seated position with difficulty    HEENT:    Head:normocephalic and atraumatic  Pupils:regular, round and equal.  Sclera: icterus absent  EOM:full and intact. Lungs:    Breathing:Normal expansion. No wheezing. Abdomen:    Shape:non-distended and normal    Lumbar spine:     Range of motion:abnormal moderately Lateral bending, flexion, extension rotation bilateral and is mildly painful. Extremities:    Tremors:None bilaterally upper and lower  Range of motion:Generally normal shoulders  Intact:Yes  Cyanosis:none  Edema:Normal      Neurological:    Cranial nerves:normal  Sensory:diminished to light lateral aspect of right leg                   Dermatology:    Skin:no unusual rashes, no skin lesions, no palpable subcutaneous nodules and good skin turgor    Assessment/Plan:      Chronic low back pain with radiation to the Right groin, patient had low back surgery 08/2017 L3-5 fusion, recently had lumbar spine CT with severe L2-3 stenosis     Plan:  Patient is s/p revision fusion L2-L4 on 3/4/2020. Patient is s/p:  Right sacroiliac joint fusion with use of SI-BONE iFuse implant on 3/9/2021 by Dr. Israel Patterson. Continue norco 5/325 BID. Continue with Gabapentin 400 mg BID. She reports that any increases make her off balance. No RF needed today. Foot surgery on hold due to financial issues.     OARRS report reviewed 03/2022  Continue flexeril 5 mg BID prn for right sided thoracic myofascial pain - Takes sparingly. Offered thoracic spine x-ray. She declines. Patient may relocate to [place] at some point - on hold right now. Consider TPIs - she would like to hold off. Patient encouraged to stay active and to lose weight. Failed physical therapy  Treatment plan discussed with the patient including medications side effects       Controlled Substance Monitoring:    Acute and Chronic Pain Monitoring:   RX Monitoring 3/16/2022   Attestation -   Acute Pain Prescriptions -   Periodic Controlled Substance Monitoring Possible medication side effects, risk of tolerance/dependence & alternative treatments discussed. ;No signs of potential drug abuse or diversion identified. ;Assessed functional status. ;Obtaining appropriate analgesic effect of treatment.    Chronic Pain > 80 MEDD -           ccreferring physicf

## 2022-03-16 ENCOUNTER — HOSPITAL ENCOUNTER (EMERGENCY)
Age: 67
Discharge: HOME | End: 2022-03-16
Attending: STUDENT IN AN ORGANIZED HEALTH CARE EDUCATION/TRAINING PROGRAM

## 2022-03-16 ENCOUNTER — APPOINTMENT (OUTPATIENT)
Dept: CT IMAGING | Age: 67
End: 2022-03-16

## 2022-03-16 ENCOUNTER — OFFICE VISIT (OUTPATIENT)
Dept: PAIN MANAGEMENT | Age: 67
End: 2022-03-16

## 2022-03-16 VITALS
HEART RATE: 108 BPM | RESPIRATION RATE: 16 BRPM | TEMPERATURE: 97.9 F | OXYGEN SATURATION: 96 % | DIASTOLIC BLOOD PRESSURE: 67 MMHG | SYSTOLIC BLOOD PRESSURE: 126 MMHG | BODY MASS INDEX: 35.49 KG/M2 | WEIGHT: 213 LBS | HEIGHT: 65 IN

## 2022-03-16 VITALS
SYSTOLIC BLOOD PRESSURE: 114 MMHG | OXYGEN SATURATION: 98 % | RESPIRATION RATE: 18 BRPM | DIASTOLIC BLOOD PRESSURE: 76 MMHG | TEMPERATURE: 97 F | WEIGHT: 213 LBS | BODY MASS INDEX: 35.45 KG/M2 | HEART RATE: 97 BPM

## 2022-03-16 DIAGNOSIS — M54.50 CHRONIC BILATERAL LOW BACK PAIN WITHOUT SCIATICA: ICD-10-CM

## 2022-03-16 DIAGNOSIS — G89.29 CHRONIC MIDLINE LOW BACK PAIN WITH SCIATICA, SCIATICA LATERALITY UNSPECIFIED: Primary | ICD-10-CM

## 2022-03-16 DIAGNOSIS — M47.816 LUMBAR FACET ARTHROPATHY: ICD-10-CM

## 2022-03-16 DIAGNOSIS — G89.29 OTHER CHRONIC PAIN: ICD-10-CM

## 2022-03-16 DIAGNOSIS — M48.061 SPINAL STENOSIS OF LUMBAR REGION WITHOUT NEUROGENIC CLAUDICATION: ICD-10-CM

## 2022-03-16 DIAGNOSIS — G89.4 CHRONIC PAIN SYNDROME: ICD-10-CM

## 2022-03-16 DIAGNOSIS — M51.36 DDD (DEGENERATIVE DISC DISEASE), LUMBAR: ICD-10-CM

## 2022-03-16 DIAGNOSIS — M79.10 MYALGIA: ICD-10-CM

## 2022-03-16 DIAGNOSIS — G89.29 CHRONIC BILATERAL LOW BACK PAIN WITHOUT SCIATICA: ICD-10-CM

## 2022-03-16 DIAGNOSIS — M54.40 CHRONIC MIDLINE LOW BACK PAIN WITH SCIATICA, SCIATICA LATERALITY UNSPECIFIED: Primary | ICD-10-CM

## 2022-03-16 DIAGNOSIS — M54.16 LUMBAR RADICULOPATHY: ICD-10-CM

## 2022-03-16 DIAGNOSIS — L05.91 CYST NEAR TAILBONE: Primary | ICD-10-CM

## 2022-03-16 DIAGNOSIS — M47.812 CERVICAL FACET JOINT SYNDROME: ICD-10-CM

## 2022-03-16 LAB
ALBUMIN SERPL-MCNC: 4 G/DL (ref 3.5–5.2)
ALP BLD-CCNC: 131 U/L (ref 35–104)
ALT SERPL-CCNC: 16 U/L (ref 0–32)
ANION GAP SERPL CALCULATED.3IONS-SCNC: 14 MMOL/L (ref 7–16)
AST SERPL-CCNC: 24 U/L (ref 0–31)
BASOPHILS ABSOLUTE: 0.08 E9/L (ref 0–0.2)
BASOPHILS RELATIVE PERCENT: 0.9 % (ref 0–2)
BILIRUB SERPL-MCNC: 0.5 MG/DL (ref 0–1.2)
BUN BLDV-MCNC: 17 MG/DL (ref 6–23)
CALCIUM SERPL-MCNC: 9.4 MG/DL (ref 8.6–10.2)
CHLORIDE BLD-SCNC: 100 MMOL/L (ref 98–107)
CO2: 22 MMOL/L (ref 22–29)
CREAT SERPL-MCNC: 1 MG/DL (ref 0.5–1)
EOSINOPHILS ABSOLUTE: 0.18 E9/L (ref 0.05–0.5)
EOSINOPHILS RELATIVE PERCENT: 2 % (ref 0–6)
GFR AFRICAN AMERICAN: >60
GFR NON-AFRICAN AMERICAN: 56 ML/MIN/1.73
GLUCOSE BLD-MCNC: 235 MG/DL (ref 74–99)
HCT VFR BLD CALC: 41.7 % (ref 34–48)
HEMOGLOBIN: 14.9 G/DL (ref 11.5–15.5)
IMMATURE GRANULOCYTES #: 0.03 E9/L
IMMATURE GRANULOCYTES %: 0.3 % (ref 0–5)
LACTIC ACID: 1.7 MMOL/L (ref 0.5–2.2)
LACTIC ACID: 2.7 MMOL/L (ref 0.5–2.2)
LYMPHOCYTES ABSOLUTE: 3.05 E9/L (ref 1.5–4)
LYMPHOCYTES RELATIVE PERCENT: 33.4 % (ref 20–42)
MCH RBC QN AUTO: 34.6 PG (ref 26–35)
MCHC RBC AUTO-ENTMCNC: 35.7 % (ref 32–34.5)
MCV RBC AUTO: 96.8 FL (ref 80–99.9)
MONOCYTES ABSOLUTE: 0.83 E9/L (ref 0.1–0.95)
MONOCYTES RELATIVE PERCENT: 9.1 % (ref 2–12)
NEUTROPHILS ABSOLUTE: 4.97 E9/L (ref 1.8–7.3)
NEUTROPHILS RELATIVE PERCENT: 54.3 % (ref 43–80)
PDW BLD-RTO: 12 FL (ref 11.5–15)
PLATELET # BLD: 287 E9/L (ref 130–450)
PMV BLD AUTO: 11 FL (ref 7–12)
POTASSIUM REFLEX MAGNESIUM: 4.5 MMOL/L (ref 3.5–5)
RBC # BLD: 4.31 E12/L (ref 3.5–5.5)
SODIUM BLD-SCNC: 136 MMOL/L (ref 132–146)
TOTAL PROTEIN: 6.6 G/DL (ref 6.4–8.3)
WBC # BLD: 9.1 E9/L (ref 4.5–11.5)

## 2022-03-16 PROCEDURE — 96375 TX/PRO/DX INJ NEW DRUG ADDON: CPT

## 2022-03-16 PROCEDURE — 83605 ASSAY OF LACTIC ACID: CPT

## 2022-03-16 PROCEDURE — 99213 OFFICE O/P EST LOW 20 MIN: CPT | Performed by: PHYSICIAN ASSISTANT

## 2022-03-16 PROCEDURE — 87040 BLOOD CULTURE FOR BACTERIA: CPT

## 2022-03-16 PROCEDURE — 74177 CT ABD & PELVIS W/CONTRAST: CPT

## 2022-03-16 PROCEDURE — 80053 COMPREHEN METABOLIC PANEL: CPT

## 2022-03-16 PROCEDURE — 6360000004 HC RX CONTRAST MEDICATION: Performed by: RADIOLOGY

## 2022-03-16 PROCEDURE — 6360000002 HC RX W HCPCS: Performed by: STUDENT IN AN ORGANIZED HEALTH CARE EDUCATION/TRAINING PROGRAM

## 2022-03-16 PROCEDURE — 85025 COMPLETE CBC W/AUTO DIFF WBC: CPT

## 2022-03-16 PROCEDURE — 99283 EMERGENCY DEPT VISIT LOW MDM: CPT

## 2022-03-16 PROCEDURE — 36415 COLL VENOUS BLD VENIPUNCTURE: CPT

## 2022-03-16 PROCEDURE — 96374 THER/PROPH/DIAG INJ IV PUSH: CPT

## 2022-03-16 PROCEDURE — 2580000003 HC RX 258: Performed by: STUDENT IN AN ORGANIZED HEALTH CARE EDUCATION/TRAINING PROGRAM

## 2022-03-16 RX ORDER — 0.9 % SODIUM CHLORIDE 0.9 %
500 INTRAVENOUS SOLUTION INTRAVENOUS ONCE
Status: COMPLETED | OUTPATIENT
Start: 2022-03-16 | End: 2022-03-16

## 2022-03-16 RX ORDER — ONDANSETRON 2 MG/ML
4 INJECTION INTRAMUSCULAR; INTRAVENOUS ONCE
Status: COMPLETED | OUTPATIENT
Start: 2022-03-16 | End: 2022-03-16

## 2022-03-16 RX ORDER — MORPHINE SULFATE 4 MG/ML
4 INJECTION, SOLUTION INTRAMUSCULAR; INTRAVENOUS ONCE
Status: COMPLETED | OUTPATIENT
Start: 2022-03-16 | End: 2022-03-16

## 2022-03-16 RX ORDER — 0.9 % SODIUM CHLORIDE 0.9 %
1000 INTRAVENOUS SOLUTION INTRAVENOUS ONCE
Status: COMPLETED | OUTPATIENT
Start: 2022-03-16 | End: 2022-03-16

## 2022-03-16 RX ORDER — HYDROCODONE BITARTRATE AND ACETAMINOPHEN 5; 325 MG/1; MG/1
1 TABLET ORAL 2 TIMES DAILY
Qty: 60 TABLET | Refills: 0 | Status: SHIPPED
Start: 2022-03-28 | End: 2022-04-19 | Stop reason: SDUPTHER

## 2022-03-16 RX ADMIN — SODIUM CHLORIDE 500 ML: 9 INJECTION, SOLUTION INTRAVENOUS at 17:03

## 2022-03-16 RX ADMIN — IOPAMIDOL 75 ML: 755 INJECTION, SOLUTION INTRAVENOUS at 17:16

## 2022-03-16 RX ADMIN — SODIUM CHLORIDE 1000 ML: 9 INJECTION, SOLUTION INTRAVENOUS at 17:03

## 2022-03-16 RX ADMIN — ONDANSETRON 4 MG: 2 INJECTION INTRAMUSCULAR; INTRAVENOUS at 17:02

## 2022-03-16 RX ADMIN — MORPHINE SULFATE 4 MG: 4 INJECTION, SOLUTION INTRAMUSCULAR; INTRAVENOUS at 17:02

## 2022-03-16 ASSESSMENT — PAIN SCALES - GENERAL: PAINLEVEL_OUTOF10: 9

## 2022-03-16 NOTE — ED NOTES
Discharge instructions given and pt verbalized understanding. Gait steady. No distress noted.      Swapna Sutherland, RN  03/16/22 [id number]

## 2022-03-16 NOTE — PROGRESS NOTES
Do you currently have any of the following:    Fever: No  Headache:  No  Cough: No  Shortness of breath: No  Exposed to anyone with these symptoms: No         Katey Dash presents to the [address] [place] [address] on 3/16/2022. Rangel Lopez is complaining of pain back and neck. The pain is constant. The pain is described as shooting, stabbing and burning. Pain is rated on her best day at a 6, on her worst day at a 10, and on average at a 8 on the VAS scale. She took her last dose of Norco and Neurontin . Any procedures since your last visit: No, with  % relief. Pacemaker or defibrillator: No managed by . She is not on NSAIDS and is not on anticoagulation medications to include none and is managed by . Medication Contract and Consent for Opioid Use Documents Filed     Patient Documents     Type of Document Status Date Received Received By Description    Medication Contract Received  Agapito Mullins Opioid medication contract with Dr Amalia Villela 3/24/20    Medication Contract Received 4/26/2018  3:50 PM ANYA NUNO PAIN MANAGEMENT PATIENT AGREEMENT 4/26/2018    Medication Contract Received 11/5/2020  4:23 PM EBER HONG Opioid medication contract with Dr Dorian Vilchis Received 3/1/2021  1:26 PM Agapito Mullins Opioid medication contract with Dr Dorian Vilchis Received 8/23/2021  2:03 PM TAMMI [address] [place] [address] Medication contract    Medication Contract Received 10/18/2021  3:03 PM HAYLIE CADENA medication contract 10/18/2021                /67   Pulse 108   Temp 97.9 °F (36.6 °C) (Infrared)   Resp 16   Ht 5' 5\" (1.651 m)   Wt 213 lb (96.6 kg)   LMP  (LMP Unknown)   SpO2 96%   BMI 35.45 kg/m²      No LMP recorded (lmp unknown).  Patient is postmenopausal.

## 2022-03-16 NOTE — ED PROVIDER NOTES
Department of Emergency Medicine   ED  Provider Note  Admit Date/RoomTime: 3/16/2022  3:23 PM  ED Room: 06/06          History of Present Illness:  3/16/22, Time: 3:29 PM EDT  Chief Complaint   Patient presents with    Abscess     Right side of the buttocks. Pt was treated with ATBs for this abscess. Last dose was yesterday. Katey Dash is a 61 y.o. female presenting to the ED for abscess, beginning over a week ago. The complaint has been persistent, moderate in severity, and worsened by nothing. The patient is a 72-year-old female who presents to the emergency department complaining of an abscess. Patient symptoms were sudden onset over a week ago, has been persistent, moderate severity, nothing makes it better or worse. She states that she was actually seen here on March 8 by a nurse practitioner and discharged home with doxycycline. She states that she completed the course of the antibiotic but still has significant pain on the right side of her buttocks and her perineum. She describes the pain as aching and constant. She states it is nonradiating. She states that despite completing the antibiotic she does not feel any better. She denies any fever, chills, history of recurrent abscess, history of diabetes, nausea, vomiting, abdominal pain, chest pain, shortness of breath, recent auscultation, recent was, or other acute symptoms or concerns. States that she is here today to get it lanced.     Review of Systems:   A complete review of systems was performed and pertinent positives and negatives are stated within HPI, all other systems reviewed and are negative.        --------------------------------------------- PAST HISTORY ---------------------------------------------  Past Medical History:  has a past medical history of Cancer ([address] Chronic back pain, Costochondritis, Depression, Falls, Fibromyalgia, Hallux rigidus of left foot, HTN (hypertension), Hyperlipidemia, CLYDE on CPAP, Osteoarthritis, Rheumatoid arthritis ([facility] [address] Rheumatoid arthritis(714.0), TIA (transient ischemic attack), and Use of cane as ambulatory aid. Past Surgical History:  has a past surgical history that includes  section (); Toe Surgery (, ); Ankle surgery (); Cholecystectomy (); Tonsillectomy; [place] tooth extraction; other surgical history (Left, 2013); hernia repair (); Colonoscopy (2015); other surgical history (Left, 2015); Endoscopy, colon, diagnostic; back surgery (2017); Nerve Block (Bilateral, 2018); pr becca nose/cleft lip/tip (Bilateral, 2018); pr becca nose/cleft lip/tip (Bilateral, 2018); pr njx dx/ther sbst intrlmnr lmbr/sac w/img gdn (N/A, 2018); Nerve Block (Bilateral, 2018); pr njx aa&/strd tfrml epi lumbar/sacral 1 level (Bilateral, 2018); arthrodesis (Left, 2018); HARDWARE REMOVAL FOOT / ANKLE (Left, 2018); Anesthesia Nerve Block (Left, 2019); epidural steroid injection (N/A, 2019); Nerve Block (Bilateral, 2019); Anesthesia Nerve Block (Right, 2019); Nerve Block (Right, 2019); RADIOFREQUENCY ABLATION NERVES (Right, 2019); Lumbar spine surgery (N/A, 2020); Anesthesia Nerve Block (Right, 2020); back surgery (2020); Toe Surgery (Left, ); Colonoscopy (N/A, 2020); Nerve Block (Right, 2020); Breast surgery (); Foot surgery; Mouth surgery (N/A, 10/21/2020); back surgery (Right, 2021); Elbow joint fusion (2021); and Pain management procedure (Left, 2021). Social History:  reports that she has been smoking cigarettes. She has a 7.50 pack-year smoking history. She has never used smokeless tobacco. She reports previous alcohol use. She reports that she does not use drugs.     Family History: family history includes Breast Cancer in her mother; Cancer in her brother and mother; Dementia in her mother; Diabetes in her brother; Heart Attack in her father; Other (age of onset: 80) in her father; Stroke in her mother. . Unless otherwise noted, family history is non contributory    The patients home medications have been reviewed. Allergies: Pcn [penicillins]    I have reviewed the past medical history, past surgical history, social history, and family history    ---------------------------------------------------PHYSICAL EXAM--------------------------------------    Constitutional/General: Alert and oriented x3, obese female sitting up in bed resting comfortably in no acute distress  Head: Normocephalic and atraumatic  Eyes:  EOMI, sclera non icteric  ENT: Oropharynx clear, handling secretions, no trismus, no asymmetry of the posterior oropharynx or uvular edema  Neck: Supple, full ROM, no stridor, no meningeal signs  Respiratory: Lungs clear to auscultation bilaterally, no wheezes, rales, or rhonchi. Not in respiratory distress  Cardiovascular: Tachycardia rate. Regular rhythm. No murmurs, no gallops, no rubs. 2+ distal pulses. Equal extremity pulses. Gastrointestinal:  Abdomen Soft, Non tender, Non distended. No rebound, guarding, or rigidity. No pulsatile masses. Musculoskeletal: Moves all extremities x 4. Warm and well perfused, no clubbing, no cyanosis, no edema. Capillary refill <3 seconds  Skin: skin warm and dry. No rashes. There is a small 2 to 3 cm area of erythema in the right perineal region below the rectum. It is not indurated or fluctuant. There is moderate tenderness to palpation. No evidence of rectum involvement. It is not warm to touch and no evidence of acute infection present. No crepitus on palpation.    Neurologic: GCS 15, no focal deficits, symmetric strength 5/5 in the upper and lower extremities bilaterally  Psychiatric: Normal Affect    -------------------------------------------------- RESULTS -------------------------------------------------  I have personally reviewed all laboratory and imaging results for this patient. Results are listed below. LABS: (Lab results interpreted by me)  Results for orders placed or performed during the hospital encounter of 03/16/22   CBC with Auto Differential   Result Value Ref Range    WBC 9.1 4.5 - 11.5 E9/L    RBC 4.31 3.50 - 5.50 E12/L    Hemoglobin 14.9 11.5 - 15.5 g/dL    Hematocrit 41.7 34.0 - 48.0 %    MCV 96.8 80.0 - 99.9 fL    MCH 34.6 26.0 - 35.0 pg    MCHC 35.7 (H) 32.0 - 34.5 %    RDW 12.0 11.5 - 15.0 fL    Platelets 541 560 - 448 E9/L    MPV 11.0 7.0 - 12.0 fL    Neutrophils % 54.3 43.0 - 80.0 %    Immature Granulocytes % 0.3 0.0 - 5.0 %    Lymphocytes % 33.4 20.0 - 42.0 %    Monocytes % 9.1 2.0 - 12.0 %    Eosinophils % 2.0 0.0 - 6.0 %    Basophils % 0.9 0.0 - 2.0 %    Neutrophils Absolute 4.97 1.80 - 7.30 E9/L    Immature Granulocytes # 0.03 E9/L    Lymphocytes Absolute 3.05 1.50 - 4.00 E9/L    Monocytes Absolute 0.83 0.10 - 0.95 E9/L    Eosinophils Absolute 0.18 0.05 - 0.50 E9/L    Basophils Absolute 0.08 0.00 - 0.20 E9/L   Comprehensive Metabolic Panel w/ Reflex to MG   Result Value Ref Range    Sodium 136 132 - 146 mmol/L    Potassium reflex Magnesium 4.5 3.5 - 5.0 mmol/L    Chloride 100 98 - 107 mmol/L    CO2 22 22 - 29 mmol/L    Anion Gap 14 7 - 16 mmol/L    Glucose 235 (H) 74 - 99 mg/dL    BUN 17 6 - 23 mg/dL    CREATININE 1.0 0.5 - 1.0 mg/dL    GFR Non-African American 56 >=60 mL/min/1.73    GFR African American >60     Calcium 9.4 8.6 - 10.2 mg/dL    Total Protein 6.6 6.4 - 8.3 g/dL    Albumin 4.0 3.5 - 5.2 g/dL    Total Bilirubin 0.5 0.0 - 1.2 mg/dL    Alkaline Phosphatase 131 (H) 35 - 104 U/L    ALT 16 0 - 32 U/L    AST 24 0 - 31 U/L   Lactic Acid   Result Value Ref Range    Lactic Acid 2.7 (H) 0.5 - 2.2 mmol/L   Lactic Acid   Result Value Ref Range    Lactic Acid 1.7 0.5 - 2.2 mmol/L   ,       RADIOLOGY:  Interpreted by Radiologist unless otherwise specified  CT ABDOMEN PELVIS W IV CONTRAST Additional Contrast? None   Final Result   1. No abnormal soft tissue swelling fluid collection or gas to suggest   perineal abscess. 2.  Hepatomegaly and mild hepatic steatosis. 3.  Multiple peripelvic renal cysts without obstruction. 4.  Previous posterior metallic lumbar spinal fusion and laminectomy as   described above.               ------------------------- NURSING NOTES AND VITALS REVIEWED ---------------------------   The nursing notes within the ED encounter and vital signs as below have been reviewed by myself  /76   Pulse 97   Temp 97 °F (36.1 °C) (Temporal)   Resp 18   Wt 213 lb (96.6 kg)   LMP  (LMP Unknown)   SpO2 98%   BMI 35.45 kg/m²     Oxygen Saturation Interpretation: Normal    The patients available past medical records and past encounters were reviewed. ------------------------------ ED COURSE/MEDICAL DECISION MAKING----------------------  Medications   0.9 % sodium chloride bolus (0 mLs IntraVENous Stopped 3/16/22 1831)   morphine sulfate (PF) injection 4 mg (4 mg IntraVENous Given 3/16/22 1702)   ondansetron (ZOFRAN) injection 4 mg (4 mg IntraVENous Given 3/16/22 1702)   0.9 % sodium chloride bolus (0 mLs IntraVENous Stopped 3/16/22 1909)   iopamidol (ISOVUE-370) 76 % injection 75 mL (75 mLs IntraVENous Given 3/16/22 1716)           The cardiac monitor revealed NSR with a heart rate in the 100s as interpreted by me. The cardiac monitor was ordered secondary to the patient's abscess and to monitor the patient for dysrhythmia. CPT [id number]       I, Dr. Namita Wright, am the primary provider of record    Medical Decision Making:   The patient is a 72-year-old female who presents to the emergency department complaining of an abscess. She is pretty tachycardic on arrival otherwise hemodynamically stable, nontoxic, in no acute distress. There is evidence of an abscess, but is not indurated or fluctuant or ready to be lanced.   Since she did just complete a course of antibiotics and has significant tachycardia and pain we will proceed with CT scan and labs at this time. Tachycardia improved with IV fluids. She will have a slight lactic acidosis which also improved with fluids and was normal prior to discharge. Labs were otherwise reassuring. CT scan did not show any evidence of abscess formation or other abnormalities. The small area that was found on her buttock region was not indurated or fluctuant or ready to be drained at this time. There was no evidence of acute infection. She just finished a course of doxycycline. Recommended close follow-up with general surgery. She is to return here for worsening symptoms or other acute symptoms or concerns. She verbalized understanding and agreement to treatment plan discharge instructions. Oxygen Saturation Interpretation: 99 % on room air. Re-Evaluations:       Resting comfortably and in no acute distress. This patient's ED course included: a personal history and physicial examination, re-evaluation prior to disposition, multiple bedside re-evaluations, IV medications, cardiac monitoring, continuous pulse oximetry and complex medical decision making and emergency management    This patient has remained hemodynamically stable during their ED course. Counseling: The emergency provider has spoken with the patient and discussed todays results, in addition to providing specific details for the plan of care and counseling regarding the diagnosis and prognosis. Questions are answered at this time and they are agreeable with the plan.       --------------------------------- IMPRESSION AND DISPOSITION ---------------------------------    IMPRESSION  1. Cyst near tailbone        DISPOSITION  Disposition: Discharge to home  Patient condition is stable        NOTE: This report was transcribed using voice recognition software.  Every effort was made to ensure accuracy; however, inadvertent computerized transcription errors may be present       [facility] DO Chan  03/16/22 1912

## 2022-03-22 ENCOUNTER — TELEPHONE (OUTPATIENT)
Dept: PAIN MANAGEMENT | Age: 67
End: 2022-03-22

## 2022-03-22 LAB
BLOOD CULTURE, ROUTINE: NORMAL
CULTURE, BLOOD 2: NORMAL

## 2022-03-22 NOTE — TELEPHONE ENCOUNTER
Pt. Called c/o left lower back pain radiates down left leg. Pain scale 10/10. Thinks she needs an epidural. Please advise.

## 2022-03-23 NOTE — PROGRESS NOTES
[facility]  [address]  [facility] - BEHAVIORAL HEALTH SERVICES, [facility]    Follow up Note      Hilton Soliz     Date of Visit:  3/24/2022    CC:  Patient presents for follow up   No chief complaint on file. HPI:    Pain is worse. Had a flare about 4 days ago with increased pain to her lower back that radiates down her left leg. Denies any injury. Change in quality of symptoms:no. Patient satisfaction with analgesia:fair. Medication side effects: None. Recent diagnostic testing:none. Recent interventional procedures: None. She has been on anticoagulation medications to include NSAIDS. The patient  has not been on herbal supplements. The patient is diabetic. Imagin2021 CT lumbar myelogram    FINDINGS:   BONES/ALIGNMENT: There is minimal levocurvature of the lumbar spine.  There   is posterior fixation from L2-L4 without evidence for hardware complication. The vertebral body heights are maintained.  There is minimal retrolisthesis   of L2 on L3.       SOFT TISSUES: The posterior paraspinal soft tissues are unremarkable.  The   visualized abdominal structures are unremarkable.  The conus is normal in   caliber and terminates at L1.  The cauda equina is unremarkable.       L1-L2: There is no significant disc protrusion, central spinal canal stenosis   or neural foraminal narrowing.       L2-L3: There is artificial disc material with posterior laminectomy.  There   is no canal stenosis or left foraminal narrowing.  There is moderate right   foraminal narrowing.       L3-L4: There is artificial disc material and posterior laminectomy.  There is   no canal stenosis.  There is mild bilateral foraminal narrowing.       L4-L5: There is artificial disc material with posterior laminectomy.  There   is no canal stenosis.  There is mild left and moderate right foraminal   narrowing.       L5-S1: There is a circumferential disc bulge with facet hypertrophy.  There   is no canal stenosis.  There is moderate right and severe left foraminal   narrowing.           Impression   Posterior fixation and laminectomy from L2-L4 without evidence for hardware   complication.       Multilevel degenerative change with bilateral foraminal narrowing as   described above.                 MRI lumbar spine 2018  1. No significant interval change is observed since the previous study   of 2017.       2. Stable postoperative changes/posterior spinal fusion at the   L3-L4-L5 level.       3. Severe spine canal stenosis in the level of L2-L3           4. Encroachment of the neural foramina bilaterally in levels of L2-L3   and L5-S1      Thoracic spine MRI 2018  1. Some degenerative changes in the thoracic spine, mainly in the   facet joints in the mid-upper thoracic spine segments       2. Discrete loss of height of T6, more likely an old finding.      Previous treatments: Physical Therapy, Surgery L3-5 fusion/laminectomy and medications. .       Potential Aberrant Drug-Related Behavior:  No     Urine Drug Screenin18:  Consistent for norhydrocodone metabolite  2018:  Consistent for hydrocodone and norhydrocodone  05/10/2019:  Consistent for hydrocodone and norhydrocodone  2019:  Consistent for hydrocodone and norhydrocodone  01/10/2020:  Consistent for hydrocodone and norhydrocodone  2020:  Consistent for oxycodone and metabolites s/p surgery. Inconsistent for hydrocodone. States that she was instructed to take her old norco until she made the appointment to resume her chronic pain medications. 10/2020:  Consistent  2021:  Consistent  2022:  Consistent     OARRS report:  2018 consistent to 2021 consistent ([facility] 3/10/2021 and 3/24/2021.    [place] [facility] - last one 2021 - consistent to 2022 consistent     Opioid agreement:  10/18/2021      Past Medical History:   Diagnosis Date    Cancer ([facility] [address] 2019    LESION REMOVED UNDER TONGUE  DENTAL CLINIC    Chronic back pain     Costochondritis     h/o    Depression     Falls     Last fall 2021    Fibromyalgia     Hallux rigidus of left foot     HTN (hypertension)     Hyperlipidemia     CLYDE on CPAP     Osteoarthritis     \"everywhere\"    Rheumatoid arthritis ([address]     \"As a child. \"    Rheumatoid arthritis(714.0)     TIA (transient ischemic attack)     no deficits     Use of cane as ambulatory aid        Past Surgical History:   Procedure Laterality Date    ANESTHESIA NERVE BLOCK Left 2019    LEFT C3,4,5 MBB performed by Mati Santos MD at [facility] Right 2019    BILATERAL TRANSFORAMINAL EPIDURAL STEROID INJECTION S1 #3 performed by Mati Santos MD at [address] [place] [address] Right 2020    RIGHT SACROILIAC JOINT INJECTION      CPT: 25367 performed by Marci Eaton DO at [address]      Left    ARTHRODESIS Left 2018    ARTHRODESIS 2ND DIGIT LEFT FOOT performed by Reji Rivas DPM at [facility]  2017    PLIF L3-L4, L4-L5 with rods, screws, and cages/Dr. Lauren Keene BACK SURGERY  2020    BACK SURGERY Right 2021    RIGHT PERCUTANEOUS SACROILIAC JOINT FUSION performed by oRse Salazar MD at [facility]      reduction, bilat     SECTION      CHOLECYSTECTOMY      COLONOSCOPY  2015    COLONOSCOPY N/A 2020    COLONOSCOPY DIAGNOSTIC performed by Roger Gresham MD at [address] [place] [address]  2021    SI Joint    ENDOSCOPY, COLON, DIAGNOSTIC      EPIDURAL STEROID INJECTION N/A 2019    BILATERAL TRANSFORAMINAL EPIDURAL STEROID INJECTION S1 [facility]      Left    [facility] [address] [facility]    REMOVAL OF PAINFUL HARDWARE LEFT FOOT  ++SYNTHES++ performed by Reji Rivas DPM at [place] [address] inguinal     LUMBAR SPINE SURGERY N/A 03/04/2020    EXPLORATION OF PRIOR L3-L5 FUSION AND L2-L3  POSTERIOR LUMBAR INTERBODY FUSION -- NEEDS O-ARM, AUDIOLOGY, CAGES, PLATES, SCREWS, C-ARM, TERESA TABLE, CELL SAVER, PLATELET GEL -- GLOBUS performed by Elva Anderson MD at [address] N/A 10/21/2020    EXCISION OF TONGUE LESION performed by Unique Juan DO at [facility] Bilateral 05/07/2018    L1-2 lumbar foramen #1    NERVE BLOCK Bilateral 12/03/2018    s1 tfesi    NERVE BLOCK Bilateral 08/12/2019    NERVE BLOCK Right 09/30/2019    sacral radiofrequency    NERVE BLOCK Right 09/01/2020    RIGHT SACROILIAC JOINT INJECTION #2 performed by Louie Martinez DO at [facility] Left 09/25/2013    left foot tarso metatarsal joint injection    OTHER SURGICAL HISTORY Left 05/27/2015    Endoscopic Gastroc recession left, Lapidus left foot and  Excision of exostosis left foot    PAIN MANAGEMENT PROCEDURE Left 7/27/2021    LEFT L5-S1 TRANSFORAMINAL EPIDURAL STEROID INJECTION performed by Louie Martinez DO at [address] AA&/STRD TFRML EPI LUMBAR/SACRAL 1 LEVEL Bilateral 12/03/2018    BILATERAL S1  EPIDURAL STEROID INJECTION performed by Aniceto Reilly MD at [facility] DX/THER SBST INTRLMNR LMBR/SAC W/IMG GDN N/A 08/21/2018    EPIDURAL STEROID INJECTION L1-2 WITH LOW VOL performed by Aniceto Reilly MD at [facility] NOSE/CLEFT LIP/TIP Bilateral 05/07/2018    BILATERAL L1-2 LUMBAR FORAMEN #1 performed by Aniceto Reilly MD at [address] [facility] NOSE/CLEFT LIP/TIP Bilateral 06/04/2018    BILATERAL TRANSFORAMINAL EPIDURAL STEROID INJECTION UNDER FLUOROSCOPIC GUIDANCE @ FORAMINAL LEVEL L1-2 #2 performed by Aniceto Reilly MD at [address] [place] Right 09/30/2019    RIGHT SACRAL RADIOFREQUENCY ABLATION performed by Aniceto Reilly MD at [address]  2000, 2005    Big Left Toe    TOE SURGERY Left 2018    2nd toe     TONSILLECTOMY      WISDOM TOOTH EXTRACTION         Prior to Admission medications    Medication Sig Start Date End Date Taking? Authorizing Provider   HYDROcodone-acetaminophen (NORCO) 5-325 MG per tablet Take 1 tablet by mouth 2 times daily for 30 days. 3/28/22 4/27/22  TREASURE Das   gabapentin (NEURONTIN) 400 MG capsule Take 400 mg by mouth 3 times daily. Historical Provider, MD   gabapentin (NEURONTIN) 400 MG capsule Take 1 capsule by mouth 2 times daily for 30 days. 22  TREASURE Das   lisinopril (PRINIVIL;ZESTRIL) 40 MG tablet Take 1 tablet by mouth every evening 21   Lisha Hubbard MD   hydroCHLOROthiazide (HYDRODIURIL) 12.5 MG tablet Take 1 tablet by mouth daily For blood pressure 21   Lisha Hubbard MD   hydrOXYzine (VISTARIL) 25 MG capsule Take 1 capsule by mouth 3 times daily as needed for Anxiety 21   Lisha Hubbard MD   atorvastatin (LIPITOR) 40 MG tablet Take 1 tablet by mouth daily 21   Lisha Hubbard MD   Handicap Placard MISC DX:  Loss of Balance, Chronic low back pain, s/p back surgery.    Duration of 5 years 21   Lisha Hubbard MD       Allergies   Allergen Reactions    Pcn [Penicillins] Anaphylaxis       Social History     Socioeconomic History    Marital status:      Spouse name: Not on file    Number of children: Not on file    Years of education: Not on file    Highest education level: Not on file   Occupational History    Not on file   Tobacco Use    Smoking status: Current Some Day Smoker     Packs/day: 0.25     Years: 30.00     Pack years: 7.50     Types: Cigarettes     Last attempt to quit: 10/5/2020     Years since quittin.4    Smokeless tobacco: Never Used    Tobacco comment: was going to try to stop but chantix is too expensive   Vaping Use    Vaping Use: Never used   Substance and Sexual Activity    Alcohol use: Not Currently Alcohol/week: 0.0 standard drinks     Comment: rarely    Drug use: No    Sexual activity: Not Currently   Other Topics Concern    Not on file   Social History Narrative    Not on file     Social Determinants of Health     Financial Resource Strain:     Difficulty of Paying Living Expenses: Not on file   Food Insecurity:     Worried About Running Out of Food in the Last Year: Not on file    Titi of Food in the Last Year: Not on file   Transportation Needs:     Lack of Transportation (Medical): Not on file    Lack of Transportation (Non-Medical): Not on file   Physical Activity:     Days of Exercise per Week: Not on file    Minutes of Exercise per Session: Not on file   Stress:     Feeling of Stress : Not on file   Social Connections:     Frequency of Communication with Friends and Family: Not on file    Frequency of Social Gatherings with Friends and Family: Not on file    Attends Congregation Services: Not on file    Active Member of [address] [place] [address] or Organizations: Not on file    Attends Club or Organization Meetings: Not on file    Marital Status: Not on file   Intimate Partner Violence:     Fear of Current or Ex-Partner: Not on file    Emotionally Abused: Not on file    Physically Abused: Not on file    Sexually Abused: Not on file   Housing Stability:     Unable to Pay for Housing in the Last Year: Not on file    Number of [place] in the Last Year: Not on file    Unstable Housing in the Last Year: Not on file       Family History   Problem Relation Age of Onset    Dementia Mother     Breast Cancer Mother     Cancer Mother         breast cancer [de-identified]     Stroke Mother     Heart Attack Father     Other Father 80        Aortic aneurysm    Cancer Brother     Diabetes Brother        REVIEW OF SYSTEMS:     Romeo Martha denies fever/chills, chest pain, shortness of breath, new bowel or bladder complaints or suicidal ideations. All other review of systems was negative.     PHYSICAL EXAMINATION: LMP  (LMP Unknown)     General:      General appearance: awake, alert, oriented, in no acute distress, well developed, well nourished and in no acute distress   pleasant and well-hydrated. in no distress and A & O x3  Build:Overweight  Function:Rises from a seated position with difficulty    HEENT:    Head:normocephalic and atraumatic  Pupils:regular, round and equal.  Sclera: icterus absent  EOM:full and intact. Lungs:    Breathing:Normal expansion. No wheezing. Abdomen:    Shape:non-distended and normal    Lumbar spine:    Inspection:  Healed surgical incision  + midline and left paraspinal TTP  Negative b/l SI joint TTP  Negative SLR on the left  Negative log roll on the left  Range of motion:abnormal moderately Lateral bending, flexion, extension rotation bilateral and is painful. Extremities:    Tremors:None bilaterally upper and lower  Range of motion:Generally normal shoulders  Intact:Yes  Cyanosis:none  Edema:Normal      Neurological:    Cranial nerves:normal  Sensory:diminished to light lateral aspect of right leg                   Dermatology:    Skin:no unusual rashes, no skin lesions, no palpable subcutaneous nodules and good skin turgor    Assessment/Plan:      Chronic low back pain with radiation to the Right groin, patient had low back surgery 08/2017 L3-5 fusion, recently had lumbar spine CT with severe L2-3 stenosis     Patient presents early to due flare to her lower back and left leg. Denies any injury. Having difficulty getting around. Medrol dose pack ordered. Left TFESI L5 and S1 ordered. Plan:  Patient is s/p revision fusion L2-L4 on 3/4/2020. Patient is s/p:  Right sacroiliac joint fusion with use of SI-BONE iFuse implant on 3/9/2021 by Dr. Billy Eric. Continue norco 5/325 BID. Continue with Gabapentin 400 mg BID. She reports that any increases make her off balance. No RF needed today. Foot surgery on hold due to financial issues.     OARRS report reviewed 03/2022  Continue flexeril 5 mg BID prn for right sided thoracic myofascial pain - Takes sparingly. Offered thoracic spine x-ray. She declines. Patient may rel[facility] to [facility] at some point - on hold right now. Consider TPIs - she would like to hold off. Patient encouraged to stay active and to lose weight. Failed physical therapy  Treatment plan discussed with the patient including medications side effects       Controlled Substance Monitoring:    Acute and Chronic Pain Monitoring:   RX Monitoring 3/24/2022   Attestation -   Acute Pain Prescriptions -   Periodic Controlled Substance Monitoring Possible medication side effects, risk of tolerance/dependence & alternative treatments discussed. ;No signs of potential drug abuse or diversion identified. ;Assessed functional status. ;Obtaining appropriate analgesic effect of treatment.    Chronic Pain > 80 MEDD -           ccreferring physicf

## 2022-03-24 ENCOUNTER — PREP FOR PROCEDURE (OUTPATIENT)
Dept: PAIN MANAGEMENT | Age: 67
End: 2022-03-24

## 2022-03-24 ENCOUNTER — OFFICE VISIT (OUTPATIENT)
Dept: PAIN MANAGEMENT | Age: 67
End: 2022-03-24

## 2022-03-24 VITALS
BODY MASS INDEX: 33.32 KG/M2 | WEIGHT: 200 LBS | DIASTOLIC BLOOD PRESSURE: 94 MMHG | HEART RATE: 86 BPM | HEIGHT: 65 IN | RESPIRATION RATE: 16 BRPM | SYSTOLIC BLOOD PRESSURE: 144 MMHG | TEMPERATURE: 97.3 F | OXYGEN SATURATION: 98 %

## 2022-03-24 DIAGNOSIS — M47.816 LUMBAR FACET ARTHROPATHY: ICD-10-CM

## 2022-03-24 DIAGNOSIS — M54.16 LUMBAR RADICULOPATHY: ICD-10-CM

## 2022-03-24 DIAGNOSIS — G89.4 CHRONIC PAIN SYNDROME: Primary | ICD-10-CM

## 2022-03-24 DIAGNOSIS — M79.10 MYALGIA: ICD-10-CM

## 2022-03-24 DIAGNOSIS — M48.061 SPINAL STENOSIS OF LUMBAR REGION WITHOUT NEUROGENIC CLAUDICATION: ICD-10-CM

## 2022-03-24 DIAGNOSIS — M54.40 CHRONIC MIDLINE LOW BACK PAIN WITH SCIATICA, SCIATICA LATERALITY UNSPECIFIED: ICD-10-CM

## 2022-03-24 DIAGNOSIS — M51.36 DDD (DEGENERATIVE DISC DISEASE), LUMBAR: ICD-10-CM

## 2022-03-24 DIAGNOSIS — G89.29 CHRONIC MIDLINE LOW BACK PAIN WITH SCIATICA, SCIATICA LATERALITY UNSPECIFIED: ICD-10-CM

## 2022-03-24 PROCEDURE — 99213 OFFICE O/P EST LOW 20 MIN: CPT | Performed by: PHYSICIAN ASSISTANT

## 2022-03-24 RX ORDER — HYDROCHLOROTHIAZIDE 12.5 MG/1
CAPSULE, GELATIN COATED ORAL
COMMUNITY
Start: 2022-03-15 | End: 2022-04-15

## 2022-03-24 RX ORDER — METHYLPREDNISOLONE 4 MG/1
TABLET ORAL
Qty: 1 KIT | Refills: 0 | Status: SHIPPED | OUTPATIENT
Start: 2022-03-24 | End: 2022-03-30

## 2022-03-24 NOTE — PROGRESS NOTES
Do you currently have any of the following:    Fever: No  Headache:  No  Cough: No  Shortness of breath: No  Exposed to anyone with these symptoms: Keri Diaz Mohan presents to the [facility] on 3/24/2022. Libby Murguia is complaining of pain lower back down left leg to foot. The pain is constant. The pain is described as aching, throbbing, shooting, stabbing, sharp, tender, burning and numb. Pain is rated on her best day at a 6, on her worst day at a 10, and on average at a 8 on the VAS scale. She took her last dose of Norco and Neurontin . Libby Murguia does not have issues with constipation. Any procedures since your last visit: No,      She is not on NSAIDS and  is not on anticoagulation medications to include none      Pacemaker or defibrillator: No Physician      Medication Contract and Consent for Opioid Use Documents Filed     Patient Documents     Type of Document Status Date Received Received By Description    Medication Contract Received  Dorrohan Walshr Opioid medication contract with Dr Anabelle Castillo 3/24/20    Medication Contract Received 4/26/2018  3:50 PM GARCÍA, [address] [place] [address] MANAGEMENT PATIENT AGREEMENT 4/26/2018    Medication Contract Received 11/5/2020  4:23 PM Dorethea Huger Opioid medication contract with Dr Roby Farmer Received 3/1/2021  1:26 PM Dorethea Huger Opioid medication contract with Dr Roby Farmer Received 8/23/2021  2:03 PM TAMMI [address] [place] [address] Medication contract    Medication Contract Received 10/18/2021  3:03 PM HAYLIE CADENA medication contract 10/18/2021                   BP (!) 144/94   Pulse 86   Temp 97.3 °F (36.3 °C) (Infrared)   Resp 16   Ht 5' 5\" (1.651 m)   Wt 200 lb (90.7 kg)   LMP  (LMP Unknown)   SpO2 98%   BMI 33.28 kg/m²      No LMP recorded (lmp unknown).  Patient is postmenopausal.

## 2022-03-25 ENCOUNTER — TELEPHONE (OUTPATIENT)
Dept: PAIN MANAGEMENT | Age: 67
End: 2022-03-25

## 2022-03-25 NOTE — TELEPHONE ENCOUNTER
Call to Sarah Angel that procedure was approved for 03.29.2022 and that the surgery center should call her a few days before for the pre op call and after 3:00 PM the business day before with the arrival time. Instructed Eleanor to hold ibuprofen for 24 hours, naprosyn for 4 days and any aspirin containing products or fish oil for 7 days. Instructed to call office back if any questions. Eleanor verbalized understanding.

## 2022-03-28 ENCOUNTER — TELEPHONE (OUTPATIENT)
Dept: PAIN MANAGEMENT | Age: 67
End: 2022-03-28

## 2022-03-28 NOTE — PROGRESS NOTES
Have you been tested for COVID  Yes           Have you been told you were positive for COVID No  Have you had any known exposure to someone that is positive for COVID No  Do you have a cough                   No              Do you have shortness of breath No                 Do you have a sore throat            No                Are you having chills                    No                Are you having muscle aches. No                    Please come to the hospital wearing a mask and have your significant other wear a mask as well. Both of you should check your temperature before leaving to come here,  if it is 100 or higher please call [phone] for instruction. [facility] PAIN MANAGEMENT  INSTRUCTIONS  . .......................................................................................................................................... [x] Parking the day of Surgery is located in the [facility].   Upon entering the door, make immediate right into the surgery reception room    [x]  Bring photo ID and insurance card     [x] You may have a light breakfast day of procedure    [x]  Wear loose comfortable clothing    [x]  Please follow instructions for medications as given per Dr's office    [x] You can expect a call the business day prior to procedure to notify you of your arrival time     [x] Please arrange for     []  Other instructions

## 2022-03-29 ENCOUNTER — HOSPITAL ENCOUNTER (OUTPATIENT)
Dept: GENERAL RADIOLOGY | Age: 67
Setting detail: OUTPATIENT SURGERY
Discharge: HOME | End: 2022-03-31
Attending: PAIN MEDICINE

## 2022-03-29 ENCOUNTER — HOSPITAL ENCOUNTER (OUTPATIENT)
Age: 67
Setting detail: OUTPATIENT SURGERY
Discharge: HOME | End: 2022-03-29
Attending: PAIN MEDICINE | Admitting: PAIN MEDICINE

## 2022-03-29 VITALS
BODY MASS INDEX: 33.32 KG/M2 | HEIGHT: 65 IN | OXYGEN SATURATION: 97 % | TEMPERATURE: 97.7 F | DIASTOLIC BLOOD PRESSURE: 61 MMHG | RESPIRATION RATE: 18 BRPM | HEART RATE: 100 BPM | SYSTOLIC BLOOD PRESSURE: 110 MMHG | WEIGHT: 200 LBS

## 2022-03-29 DIAGNOSIS — R52 PAIN MANAGEMENT: ICD-10-CM

## 2022-03-29 LAB — METER GLUCOSE: 231 MG/DL (ref 74–99)

## 2022-03-29 PROCEDURE — 2709999900 HC NON-CHARGEABLE SUPPLY: Performed by: PAIN MEDICINE

## 2022-03-29 PROCEDURE — 3600000002 HC SURGERY LEVEL 2 BASE: Performed by: PAIN MEDICINE

## 2022-03-29 PROCEDURE — 7100000010 HC PHASE II RECOVERY - FIRST 15 MIN: Performed by: PAIN MEDICINE

## 2022-03-29 PROCEDURE — 6360000002 HC RX W HCPCS: Performed by: PAIN MEDICINE

## 2022-03-29 PROCEDURE — 6360000004 HC RX CONTRAST MEDICATION: Performed by: PAIN MEDICINE

## 2022-03-29 PROCEDURE — 64484 NJX AA&/STRD TFRM EPI L/S EA: CPT | Performed by: PAIN MEDICINE

## 2022-03-29 PROCEDURE — 7100000011 HC PHASE II RECOVERY - ADDTL 15 MIN: Performed by: PAIN MEDICINE

## 2022-03-29 PROCEDURE — 3209999900 FLUORO FOR SURGICAL PROCEDURES

## 2022-03-29 PROCEDURE — 64483 NJX AA&/STRD TFRM EPI L/S 1: CPT | Performed by: PAIN MEDICINE

## 2022-03-29 PROCEDURE — 2500000003 HC RX 250 WO HCPCS: Performed by: PAIN MEDICINE

## 2022-03-29 PROCEDURE — 82962 GLUCOSE BLOOD TEST: CPT

## 2022-03-29 RX ORDER — LIDOCAINE HYDROCHLORIDE 5 MG/ML
INJECTION, SOLUTION INFILTRATION; INTRAVENOUS PRN
Status: DISCONTINUED | OUTPATIENT
Start: 2022-03-29 | End: 2022-03-29 | Stop reason: ALTCHOICE

## 2022-03-29 RX ORDER — METHYLPREDNISOLONE ACETATE 40 MG/ML
INJECTION, SUSPENSION INTRA-ARTICULAR; INTRALESIONAL; INTRAMUSCULAR; SOFT TISSUE PRN
Status: DISCONTINUED | OUTPATIENT
Start: 2022-03-29 | End: 2022-03-29 | Stop reason: ALTCHOICE

## 2022-03-29 ASSESSMENT — PAIN DESCRIPTION - LOCATION
LOCATION: BACK

## 2022-03-29 ASSESSMENT — PAIN SCALES - GENERAL
PAINLEVEL_OUTOF10: 3

## 2022-03-29 ASSESSMENT — PAIN DESCRIPTION - PAIN TYPE
TYPE: CHRONIC PAIN

## 2022-03-29 ASSESSMENT — PAIN DESCRIPTION - DESCRIPTORS: DESCRIPTORS: THROBBING

## 2022-03-29 ASSESSMENT — PAIN - FUNCTIONAL ASSESSMENT: PAIN_FUNCTIONAL_ASSESSMENT: 0-10

## 2022-03-29 NOTE — H&P
[facility] - BEHAVIORAL HEALTH SERVICES Pain Management        [address] [facility], [address]  Dept: [phone]    Procedure History & Physical      Jaiden Watson     HPI:    Patient  is here for LBP LLE pain for left L5 and S1 TFESI  Labs/imaging studies reviewed   All question and concerns addressed including R/B/A associated with the procedure    Past Medical History:   Diagnosis Date    Cancer ([facility] [address] 12/2019    LESION REMOVED UNDER TONGUE  DENTAL CLINIC    Chronic back pain     Costochondritis     h/o    Depression     Falls     Last fall Feb 2021    Fibromyalgia     Hallux rigidus of left foot     HTN (hypertension)     Hyperlipidemia     CLYDE on CPAP     Osteoarthritis     \"everywhere\"    Rheumatoid arthritis ([address]     \"As a child. \"    Rheumatoid arthritis(714.0)     TIA (transient ischemic attack)     no deficits     Use of cane as ambulatory aid        Past Surgical History:   Procedure Laterality Date    ANESTHESIA NERVE BLOCK Left 02/26/2019    LEFT C3,4,5 MBB performed by Abby Tompkins MD at [facility] Right 08/12/2019    BILATERAL TRANSFORAMINAL EPIDURAL STEROID INJECTION S1 #3 performed by Abby Tompkins MD at [facility] Right 06/16/2020    RIGHT SACROILIAC JOINT INJECTION      CPT: 04603 performed by Sweetie Mcneil DO at [address]  2005    Left    ARTHRODESIS Left 12/14/2018    ARTHRODESIS 2ND DIGIT LEFT FOOT performed by Jigna Samayoa DPM at [facility]  08/16/2017    PLIF L3-L4, L4-L5 with rods, screws, and cages/Dr. Siobhan Chappell BACK SURGERY  03/04/2020    BACK SURGERY Right 03/09/2021    RIGHT PERCUTANEOUS SACROILIAC JOINT FUSION performed by Lucio Villanueva MD at [facility]  2010    reduction, [address]    CHOLECYSTECTOMY  2011    COLONOSCOPY  03/27/2015    COLONOSCOPY N/A 08/19/2020    COLONOSCOPY DIAGNOSTIC performed by Vinod Kraft MD at [address] [place] [address] JOINT FUSION  03/2021    SI Joint    ENDOSCOPY, COLON, DIAGNOSTIC      EPIDURAL STEROID INJECTION N/A 06/04/2019    BILATERAL TRANSFORAMINAL EPIDURAL STEROID INJECTION S1 performed by Betty Quiroz DO at [address]      Left    [address] / ANKLE Left 12/14/2018    REMOVAL OF PAINFUL HARDWARE LEFT FOOT  ++SYNTHES++ performed by Oscar Galindo DPM at [facility] 108    inguinal     LUMBAR SPINE SURGERY N/A 03/04/2020    EXPLORATION OF PRIOR L3-L5 FUSION AND L2-L3  POSTERIOR LUMBAR INTERBODY FUSION -- NEEDS O-ARM, AUDIOLOGY, CAGES, PLATES, SCREWS, C-ARM, TERESA TABLE, CELL SAVER, PLATELET GEL -- GLOBUS performed by Kaia Diego MD at [address] N/A 10/21/2020    EXCISION OF TONGUE LESION performed by Devin Porter DO at [address] Bilateral 05/07/2018    L1-2 lumbar foramen #1    NERVE BLOCK Bilateral 12/03/2018    s1 tfesi    NERVE BLOCK Bilateral 08/12/2019    NERVE BLOCK Right 09/30/2019    sacral radiofrequency    NERVE BLOCK Right 09/01/2020    RIGHT SACROILIAC JOINT INJECTION #2 performed by Betty Quiroz DO at [address] Left 09/25/2013    left foot tarso metatarsal joint injection    OTHER SURGICAL HISTORY Left 05/27/2015    Endoscopic Gastroc recession left, Lapidus left foot and  Excision of exostosis left foot    PAIN MANAGEMENT PROCEDURE Left 7/27/2021    LEFT L5-S1 TRANSFORAMINAL EPIDURAL STEROID INJECTION performed by Betty Quiroz DO at [address] AA&/STRD TFRML EPI LUMBAR/SACRAL 1 LEVEL Bilateral 12/03/2018    BILATERAL S1  EPIDURAL STEROID INJECTION performed by Tanja Hernandez MD at [facility] DX/THER SBST INTRLMNR LMBR/SAC W/IMG GDN N/A 08/21/2018    EPIDURAL STEROID INJECTION L1-2 WITH LOW VOL performed by Tanja Hernandez MD at [facility] NOSE/CLEFT LIP/TIP Bilateral 05/07/2018    BILATERAL L1-2 LUMBAR FORAMEN #1 performed by Tanja Hernandez MD at [facility]    MD SUNNY NOSE/CLEFT LIP/TIP Bilateral 06/04/2018    BILATERAL TRANSFORAMINAL EPIDURAL STEROID INJECTION UNDER FLUOROSCOPIC GUIDANCE @ FORAMINAL LEVEL L1-2 #2 performed by Carlene Cantrell MD at [address] Right 09/30/2019    RIGHT SACRAL RADIOFREQUENCY ABLATION performed by Carlene Cantrell MD at [address]  2000, 2005    Big Left Toe    TOE SURGERY Left 2018    2nd toe     TONSILLECTOMY      WISDOM TOOTH EXTRACTION         Prior to Admission medications    Medication Sig Start Date End Date Taking? Authorizing Provider   hydroCHLOROthiazide (MICROZIDE) 12.5 MG capsule TAKE ONE CAPSULE BY MOUTH ONCE DAILY FOR BLOOD PRESSURE 3/15/22   Historical Provider, MD   methylPREDNISolone (MEDROL DOSEPACK) 4 MG tablet Take by mouth. 3/24/22 3/30/22  TREASURE Yuen   HYDROcodone-acetaminophen (NORCO) 5-325 MG per tablet Take 1 tablet by mouth 2 times daily for 30 days. 3/28/22 4/27/22  TREASURE Yuen   gabapentin (NEURONTIN) 400 MG capsule Take 1 capsule by mouth 2 times daily for 30 days. 1/19/22 2/18/22  TREASURE Yuen   lisinopril (PRINIVIL;ZESTRIL) 40 MG tablet Take 1 tablet by mouth every evening 12/17/21   Judith Rausch MD   hydroCHLOROthiazide (HYDRODIURIL) 12.5 MG tablet Take 1 tablet by mouth daily For blood pressure 12/17/21   Judith Rausch MD   hydrOXYzine (VISTARIL) 25 MG capsule Take 1 capsule by mouth 3 times daily as needed for Anxiety 8/7/21   Judith Rausch MD   atorvastatin (LIPITOR) 40 MG tablet Take 1 tablet by mouth daily 8/7/21   Judith Rausch MD   Handicap Placard MISC DX:  Loss of Balance, Chronic low back pain, s/p back surgery.    Duration of 5 years 2/22/21   Judith Rausch MD       Allergies   Allergen Reactions    Pcn [Penicillins] Anaphylaxis       Social History     Socioeconomic History    Marital status:      Spouse name: Not on file    Number of children: Not on file    Years of education: Not on file    Highest education level: Not on file   Occupational History    Not on file   Tobacco Use    Smoking status: Current Every Day Smoker     Packs/day: 0.50     Years: 30.00     Pack years: 15.00     Types: Cigarettes     Last attempt to quit: 10/5/2020     Years since quittin.4    Smokeless tobacco: Never Used   Vaping Use    Vaping Use: Never used   Substance and Sexual Activity    Alcohol use: Not Currently     Alcohol/week: 0.0 standard drinks     Comment: rarely    Drug use: No    Sexual activity: Not on file   Other Topics Concern    Not on file   Social History Narrative    Not on file     Social Determinants of Health     Financial Resource Strain:     Difficulty of Paying Living Expenses: Not on file   Food Insecurity:     Worried About Running Out of Food in the Last Year: Not on file    Titi of Food in the Last Year: Not on file   Transportation Needs:     Lack of Transportation (Medical): Not on file    Lack of Transportation (Non-Medical):  Not on file   Physical Activity:     Days of Exercise per Week: Not on file    Minutes of Exercise per Session: Not on file   Stress:     Feeling of Stress : Not on file   Social Connections:     Frequency of Communication with Friends and Family: Not on file    Frequency of Social Gatherings with Friends and Family: Not on file    Attends Taoist Services: Not on file    Active Member of [address] [place] [address] or Organizations: Not on file    Attends Club or Organization Meetings: Not on file    Marital Status: Not on file   Intimate Partner Violence:     Fear of Current or Ex-Partner: Not on file    Emotionally Abused: Not on file    Physically Abused: Not on file    Sexually Abused: Not on file   Housing Stability:     Unable to Pay for Housing in the Last Year: Not on file    Number of [place] in the Last Year: Not on file    Unstable Housing in the Last Year: Not on file       Family History   Problem Relation Age of Onset    Dementia Mother     Breast Cancer Mother     Cancer Mother         breast cancer [de-identified]     Stroke Mother     Heart Attack Father     Other Father 80        Aortic aneurysm    Cancer Brother     Diabetes Brother          REVIEW OF SYSTEMS:    CONSTITUTIONAL:  negative for  fevers, chills, sweats and fatigue    RESPIRATORY:  negative for  dry cough, cough with sputum, dyspnea, wheezing and chest pain    CARDIOVASCULAR:  negative for chest pain, dyspnea, palpitations, syncope    GASTROINTESTINAL:  negative for nausea, vomiting, change in bowel habits, diarrhea, constipation and abdominal pain    MUSCULOSKELETAL: negative for muscle weakness    SKIN: negative for itching or rashes. BEHAVIOR/PSYCH:  negative for poor appetite, increased appetite, decreased sleep and poor concentration    All other systems negative      PHYSICAL EXAM:    VITALS:  /74   Pulse 117   Temp 97.7 °F (36.5 °C) (Temporal)   Resp 18   Ht 5' 5\" (1.651 m)   Wt 200 lb (90.7 kg)   LMP  (LMP Unknown)   SpO2 98%   BMI 33.28 kg/m²     CONSTITUTIONAL:  awake, alert, cooperative, no apparent distress, and appears stated age    EYES: PERRLA, EOMI    LUNGS:  No increased work of breathing, no audible wheezing    CARDIOVASCULAR:  regular rate and rhythm    ABDOMEN:  Soft non tender non distended     EXTREMITIES: no signs of clubbing or cyanosis. MUSCULOSKELETAL: negative for flaccid muscle tone or spastic movements. SKIN: gross examination reveals no signs of rashes, or diaphoresis. NEURO: Cranial nerves II-XII grossly intact. No signs of agitated mood.        Assessment/Plan:    LBP LLE pain for left L5 and S1 TFESI

## 2022-03-29 NOTE — PROGRESS NOTES
Called back to update doctor on patient's blood sugar of 231. Also updated on patient heart rate of 117.

## 2022-03-29 NOTE — OP NOTE
3/29/2022    Patient: Anuja Block  :  1955  Age:  61 y.o. Sex:  female     PRE-OPERATIVE DIAGNOSIS: Lumbar disc displacement, lumbar neural foraminal stenosis, lumbar radiculopathy. POST-OPERATIVE DIAGNOSIS: Same. PROCEDURE: Left Transforaminal epidural steroid injection under fluoroscopic guidance at foraminal level L5 and S1 (#1). SURGEON: ZARA Waggoner. ANESTHESIA: local    ESTIMATED BLOOD LOSS: None.  ______________________________________________________________________  BRIEF HISTORY: Anuja Block comes in today for the first Left transforaminal epidural steroid injection under fluoroscopic guidance at foraminal level L5 and S1. The potential complications of this procedure were discussed with her again today. She has elected to undergo the aforementioned procedure. Lyssa complete History & Physical examination were reviewed in depth, a copy of which is in the chart. DESCRIPTION OF PROCEDURE:    After confirming written and informed consent, a time-out was performed and Lyssa name and date of birth, the procedure to be performed as well as the plan for the location of the needle insertion were confirmed. The patient was brought into the procedure room and placed in the prone position on the fluoroscopy table. Standard monitors were placed and vital signs were observed throughout the procedure. The area of the lumbar spine was prepped with chloraprep and draped in a sterile manner. The vertebral body was identified with AP fluoroscopy. An oblique view was obtained to better visualize the inferior junction of the pedicle and transverse process . The 6 o'clock position of the pedicle was marked and identified. The skin and subcutaneous tissue were anesthetized with 0.5% lidocaine. A # 22 gauge pencil point needle was directed toward the targeted point under fluoroscopy until bone was contacted. The needle was then walked inferiorly until the neural foramen was entered .  A lateral fluoroscopic view was then used to place the needle tip in the middle to anterior aspect of the foramen. Negative aspiration was confirmed for blood and CSF and 1 cc of Omnipaque 240 contrast was injected at each level under live AP fluoroscopy. Appropriate neurograms were observed under live AP fluoroscopy. Then after negative aspiration, a solution of the 2 cc of 0.5% lidocaine and 40 mg DepoMedrol was easily injected between each level. The needles were removed with constant aspiration technique. The patient's back was cleaned and a bandage was placed over the needle insertion points    Disposition the patient tolerated the procedure well and there were no complications . Vital signs remained stable throughout the procedure. The patient was escorted to the recovery area where they remained until discharge and written discharge instructions for the procedure were given. Plan: Rafael Rowan will return to our pain management center as scheduled.      Guy Fernandez DO

## 2022-04-15 ENCOUNTER — OFFICE VISIT (OUTPATIENT)
Dept: FAMILY MEDICINE CLINIC | Age: 67
End: 2022-04-15
Payer: MEDICARE

## 2022-04-15 VITALS
WEIGHT: 209 LBS | OXYGEN SATURATION: 98 % | TEMPERATURE: 98.1 F | DIASTOLIC BLOOD PRESSURE: 76 MMHG | HEIGHT: 65 IN | SYSTOLIC BLOOD PRESSURE: 118 MMHG | BODY MASS INDEX: 34.82 KG/M2 | HEART RATE: 106 BPM

## 2022-04-15 DIAGNOSIS — G47.33 OSA ON CPAP: Primary | ICD-10-CM

## 2022-04-15 DIAGNOSIS — Z99.89 OSA ON CPAP: Primary | ICD-10-CM

## 2022-04-15 PROCEDURE — 99213 OFFICE O/P EST LOW 20 MIN: CPT | Performed by: FAMILY MEDICINE

## 2022-04-15 ASSESSMENT — PATIENT HEALTH QUESTIONNAIRE - PHQ9
4. FEELING TIRED OR HAVING LITTLE ENERGY: 0
SUM OF ALL RESPONSES TO PHQ QUESTIONS 1-9: 0
10. IF YOU CHECKED OFF ANY PROBLEMS, HOW DIFFICULT HAVE THESE PROBLEMS MADE IT FOR YOU TO DO YOUR WORK, TAKE CARE OF THINGS AT HOME, OR GET ALONG WITH OTHER PEOPLE: 0
SUM OF ALL RESPONSES TO PHQ QUESTIONS 1-9: 0
5. POOR APPETITE OR OVEREATING: 0
SUM OF ALL RESPONSES TO PHQ QUESTIONS 1-9: 0
3. TROUBLE FALLING OR STAYING ASLEEP: 0
8. MOVING OR SPEAKING SO SLOWLY THAT OTHER PEOPLE COULD HAVE NOTICED. OR THE OPPOSITE, BEING SO FIGETY OR RESTLESS THAT YOU HAVE BEEN MOVING AROUND A LOT MORE THAN USUAL: 0
7. TROUBLE CONCENTRATING ON THINGS, SUCH AS READING THE NEWSPAPER OR WATCHING TELEVISION: 0
SUM OF ALL RESPONSES TO PHQ QUESTIONS 1-9: 0
1. LITTLE INTEREST OR PLEASURE IN DOING THINGS: 0
9. THOUGHTS THAT YOU WOULD BE BETTER OFF DEAD, OR OF HURTING YOURSELF: 0
2. FEELING DOWN, DEPRESSED OR HOPELESS: 0
6. FEELING BAD ABOUT YOURSELF - OR THAT YOU ARE A FAILURE OR HAVE LET YOURSELF OR YOUR FAMILY DOWN: 0
SUM OF ALL RESPONSES TO PHQ9 QUESTIONS 1 & 2: 0

## 2022-04-15 NOTE — PROGRESS NOTES
Ayad Henderson  Office Progress Note - Dr. Manuela Rizo  4/15/22    CC:   Chief Complaint   Patient presents with    Sleep Apnea     Pt needs new CPAP supplies/mask. She currently gets this through [address] [facility] on [facility]. /76 (Site: Left Upper Arm, Position: Sitting, Cuff Size: Large Adult)   Pulse 106   Temp 98.1 °F (36.7 °C) (Temporal)   Ht 5' 5\" (1.651 m)   Wt 209 lb (94.8 kg)   LMP  (LMP Unknown)   SpO2 98%   BMI 34.78 kg/m²   Wt Readings from Last 3 Encounters:   04/15/22 209 lb (94.8 kg)   03/29/22 200 lb (90.7 kg)   03/24/22 200 lb (90.7 kg)       HPI: CLYDE  Needs updated equipment. She recently has insurance. She had been paying cash for this but now needs documentation for insurance purposes. She wears her CPAP nightly. She has bene getting up about 3 times nightly to urinate  Her mask has an air leak which has been making it difficult to wear all night long - the mask has deteriorates. Has bene using for about 20 years now. Generally does feel better in the morning when wears it. No wakening headaches. No noted snoring or gasping while wearing the machine. T[place] is 2 today. Last sleep study was maybe 5-6 years ago. Intends to follow up with Pod now hat she has insurance.       _________________________________________________________    Assessment / Rosiland Mcburney was seen today for sleep apnea. Diagnoses and all orders for this visit:    CLYDE on CPAP  -     DME Order for [facility]) as OP    Her sleep apnea remains well controlled on current treatment. She is wearing the machine nightly nearly all night. This has been slightly impaired lately secondary to air leak from old mask which she needs replaced. Supplies ordered. or as scheduled. Patient counseled to follow up sooner or seek more acute care if symptoms worsening or not improving according to plan.      Electronically signed by Casandra Peña MD on 4/15/2022    _________________________________________________________  Current Outpatient Medications on File Prior to Visit   Medication Sig Dispense Refill    HYDROcodone-acetaminophen (NORCO) 5-325 MG per tablet Take 1 tablet by mouth 2 times daily for 30 days. 60 tablet 0    gabapentin (NEURONTIN) 400 MG capsule Take 1 capsule by mouth 2 times daily for 30 days. 60 capsule 2    lisinopril (PRINIVIL;ZESTRIL) 40 MG tablet Take 1 tablet by mouth every evening 90 tablet 1    hydroCHLOROthiazide (HYDRODIURIL) 12.5 MG tablet Take 1 tablet by mouth daily For blood pressure 90 tablet 1    hydrOXYzine (VISTARIL) 25 MG capsule Take 1 capsule by mouth 3 times daily as needed for Anxiety 270 capsule 1    atorvastatin (LIPITOR) 40 MG tablet Take 1 tablet by mouth daily 90 tablet 3    Handicap Placard MISC DX:  Loss of Balance, Chronic low back pain, s/p back surgery. Duration of 5 years 1 each 0     No current facility-administered medications on file prior to visit. Patient Active Problem List   Diagnosis Code    Loss of balance R26.89    Vertebrobasilar TIAs G45.0    Obesity E66.9    Disc displacement, lumbar M51.26    Peripheral neuropathy ([address] G62.9    Type 2 diabetes mellitus without complication ([address] T14.9    Depression F32. A    Osteoarthritis M19.90    Chronic back pain M54.9, G89.29    Fibromyalgia M79.7    HTN (hypertension) I10    Hyperlipidemia E78.5    History of adenomatous polyp of colon Z86.010    Vitamin D deficiency E55.9    Coccyx pain M53.3    Acute bilateral low back pain with sciatica M54.40    Lumbar stenosis M48.061    Chronic bilateral low back pain without sciatica M54.50, G89.29    DDD (degenerative disc disease), lumbar M51.36    Spinal stenosis of lumbar region without neurogenic claudication M48.061    Lumbar facet arthropathy M47.816    Chronic pain syndrome G89.4    Lumbar radiculopathy M54.16    Neck pain M54.2    Left foot pain M79.672    Painful orthopaedic hardware [facility]) T84.84XA    Cervical facet joint syndrome M47.812    Cellulitis, neck L03.221    Disorder of sacrum M53.3    Adjacent segment disease with spinal stenosis [id number]    S/P lumbar spinal fusion Z98.1    Tongue lesion K14.8    Sacroiliac joint dysfunction M53.3     _________________________________________________________  Past Medical History:   Diagnosis Date    Cancer ([address] 12/2019    LESION REMOVED UNDER TONGUE  DENTAL CLINIC    Chronic back pain     Costochondritis     h/o    Depression     Falls     Last fall Feb 2021    Fibromyalgia     Hallux rigidus of left foot     HTN (hypertension)     Hyperlipidemia     CLYDE on CPAP     Osteoarthritis     \"everywhere\"    Rheumatoid arthritis ([address]     \"As a child. \"    Rheumatoid arthritis(714.0)     TIA (transient ischemic attack)     no deficits     Use of cane as ambulatory aid        Family History   Problem Relation Age of Onset    Dementia Mother     Breast Cancer Mother     Cancer Mother         breast cancer [de-identified]     Stroke Mother     Heart Attack Father     Other Father 80        Aortic aneurysm    Cancer Brother     Diabetes Brother        Past Surgical History:   Procedure Laterality Date    ANESTHESIA NERVE BLOCK Left 02/26/2019    LEFT C3,4,5 MBB performed by Jolynn Shipley MD at [facility] Right 08/12/2019    BILATERAL TRANSFORAMINAL EPIDURAL STEROID INJECTION S1 #3 performed by Jolynn Shipley MD at [address] [facility] Right 06/16/2020    RIGHT SACROILIAC JOINT INJECTION      CPT: 97213 performed by Manuela Dance, DO at [address]  2005    Left    ARTHRODESIS Left 12/14/2018    ARTHRODESIS 2ND DIGIT LEFT FOOT performed by Tre Peters DPM at [facility]  08/16/2017    PLIF L3-L4, L4-L5 with rods, screws, and cages/Dr. Lansing Schwab BACK SURGERY  03/04/2020    BACK SURGERY Right 03/09/2021    RIGHT PERCUTANEOUS SACROILIAC JOINT FUSION performed by Foreign Shipman MD at [address]  2010    reduction, bilat   [address]    CHOLECYSTECTOMY  2011    COLONOSCOPY  03/27/2015    COLONOSCOPY N/A 08/19/2020    COLONOSCOPY DIAGNOSTIC performed by Sanju Brock MD at [facility]  03/2021    SI Joint    ENDOSCOPY, COLON, DIAGNOSTIC      EPIDURAL STEROID INJECTION N/A 06/04/2019    BILATERAL TRANSFORAMINAL EPIDURAL STEROID INJECTION S1 performed by Rhys Elizondo DO at [address] [place] [address]      Left    [address] / ANKLE Left 12/14/2018    REMOVAL OF PAINFUL HARDWARE LEFT FOOT  ++SYNTHES++ performed by Ruddy Connell DPM at [facility] 108    inguinal     LUMBAR SPINE SURGERY N/A 03/04/2020    EXPLORATION OF PRIOR L3-L5 FUSION AND L2-L3  POSTERIOR LUMBAR INTERBODY FUSION -- NEEDS O-ARM, AUDIOLOGY, CAGES, PLATES, SCREWS, C-ARM, TERESA TABLE, CELL SAVER, PLATELET GEL -- GLOBUS performed by Foreign Shipman MD at [facility] N/A 10/21/2020    EXCISION OF TONGUE LESION performed by Mabel Cornejo DO at [address] [facility] [address] Bilateral 05/07/2018    L1-2 lumbar foramen #1    NERVE BLOCK Bilateral 12/03/2018    s1 tfesi    NERVE BLOCK Bilateral 08/12/2019    NERVE BLOCK Right 09/30/2019    sacral radiofrequency    NERVE BLOCK Right 09/01/2020    RIGHT SACROILIAC JOINT INJECTION #2 performed by Rhys Elizondo DO at [address] [facility] [address] Left 09/25/2013    left foot tarso metatarsal joint injection    OTHER SURGICAL HISTORY Left 05/27/2015    Endoscopic Gastroc recession left, Lapidus left foot and  Excision of exostosis left foot    PAIN MANAGEMENT PROCEDURE Left 7/27/2021    LEFT L5-S1 TRANSFORAMINAL EPIDURAL STEROID INJECTION performed by Rhys Elizondo DO at [facility] Left 3/29/2022    LEFT TRANSFORAMINAL EPIDURAL STEROID INJECTION AT L5 AND S1 performed by Rhys Elizondo DO at [place] [address] [address] AA&/STRD TFRML EPI LUMBAR/SACRAL 1 LEVEL Bilateral 2018    BILATERAL S1  EPIDURAL STEROID INJECTION performed by Jazmín Mullins MD at [address] [facility] DX/THER SBST INTRLMNR LMBR/SAC W/IMG GDN N/A 2018    EPIDURAL STEROID INJECTION L1-2 WITH LOW VOL performed by Jazmín Mullins MD at [address] [facility] NOSE/CLEFT LIP/TIP Bilateral 2018    BILATERAL L1-2 LUMBAR FORAMEN #1 performed by Jazmín Mullins MD at [address] [facility] NOSE/CLEFT LIP/TIP Bilateral 2018    BILATERAL TRANSFORAMINAL EPIDURAL STEROID INJECTION UNDER FLUOROSCOPIC GUIDANCE @ FORAMINAL LEVEL L1-2 #2 performed by Jazmín Mullins MD at [address] [place] Right 2019    RIGHT SACRAL RADIOFREQUENCY ABLATION performed by Jazmín Mullins MD at [facility]  ,     Big Left Toe    TOE SURGERY Left 2018    2nd toe     TONSILLECTOMY      WISDOM TOOTH EXTRACTION         Social History     Tobacco Use    Smoking status: Current Every Day Smoker     Packs/day: 0.50     Years: 30.00     Pack years: 15.00     Types: Cigarettes     Last attempt to quit: 10/5/2020     Years since quittin.5    Smokeless tobacco: Never Used   Vaping Use    Vaping Use: Never used   Substance Use Topics    Alcohol use: Not Currently     Alcohol/week: 0.0 standard drinks     Comment: rarely    Drug use: No       Chart reviewed and updated where appropriate for PMH, Fam, and Soc Hx.  _________________________________________________________  ROS: POSITIVE: As in the HPI. Otherwise Pertinent negatives are negative.    __________________________________________________________  Physical Exam   Constitutional:    She is oriented to person, place, and time. She appears well-developed and well-nourished. HENT:    Right Ear: normal Pinna, normal canal, normal TM. Left Ear: normal Pinna, normal canal, normal TM.     Nose: Nose normal.    Mouth/Throat: Oropharynx is clear and moist.  Mallampati grade 3  Eyes:    Conjunctivae are normal.    Pupils are equal, round, and reactive to light. EOMI. Neck:    Normal range of motion. No thyromegaly or nodules noted. No bruit. No LAD. 15 inch neck circumference  Cardiovascular:    Normal rate, regular rhythm and normal heart sounds. No murmur. No gallop and no friction rub. Pulmonary/Chest:    Effort normal and breath sounds normal.    No wheezes. No rales or rhonchi. Abdominal:    Soft. Bowel sounds are normal.    No distension. No tenderness. ________________________________________________________    This note may have been created using dictation software.  Efforts were made to reduce errors, but some may persist.

## 2022-04-15 NOTE — Clinical Note
DME order for CPAP supplies placed.   Please fax to [facility] on [address]

## 2022-04-18 ENCOUNTER — TELEPHONE (OUTPATIENT)
Dept: FAMILY MEDICINE CLINIC | Age: 67
End: 2022-04-18

## 2022-04-18 NOTE — TELEPHONE ENCOUNTER
04/18/22   Order, demographics, insurance and OV notes faxed to [facility] Fx# [phone]

## 2022-04-18 NOTE — TELEPHONE ENCOUNTER
----- Message from Gabrielle Aldana MD sent at 4/15/2022  4:41 PM EDT -----  DME order for CPAP supplies placed.   Please fax to Rogelio BRAUN on [address]

## 2022-04-19 ENCOUNTER — OFFICE VISIT (OUTPATIENT)
Dept: PAIN MANAGEMENT | Age: 67
End: 2022-04-19
Payer: MEDICARE

## 2022-04-19 VITALS
WEIGHT: 209 LBS | HEART RATE: 95 BPM | BODY MASS INDEX: 34.82 KG/M2 | OXYGEN SATURATION: 95 % | SYSTOLIC BLOOD PRESSURE: 135 MMHG | HEIGHT: 65 IN | DIASTOLIC BLOOD PRESSURE: 93 MMHG | TEMPERATURE: 98.1 F | RESPIRATION RATE: 16 BRPM

## 2022-04-19 DIAGNOSIS — M54.50 CHRONIC BILATERAL LOW BACK PAIN WITHOUT SCIATICA: ICD-10-CM

## 2022-04-19 DIAGNOSIS — M54.40 CHRONIC MIDLINE LOW BACK PAIN WITH SCIATICA, SCIATICA LATERALITY UNSPECIFIED: ICD-10-CM

## 2022-04-19 DIAGNOSIS — G89.29 CHRONIC MIDLINE LOW BACK PAIN WITH SCIATICA, SCIATICA LATERALITY UNSPECIFIED: ICD-10-CM

## 2022-04-19 DIAGNOSIS — G89.29 CHRONIC BILATERAL LOW BACK PAIN WITHOUT SCIATICA: ICD-10-CM

## 2022-04-19 PROCEDURE — 99203 OFFICE O/P NEW LOW 30 MIN: CPT | Performed by: PHYSICIAN ASSISTANT

## 2022-04-19 PROCEDURE — 99213 OFFICE O/P EST LOW 20 MIN: CPT | Performed by: PHYSICIAN ASSISTANT

## 2022-04-19 RX ORDER — HYDROCODONE BITARTRATE AND ACETAMINOPHEN 5; 325 MG/1; MG/1
1 TABLET ORAL 2 TIMES DAILY
Qty: 60 TABLET | Refills: 0 | Status: SHIPPED
Start: 2022-04-27 | End: 2022-05-24 | Stop reason: SDUPTHER

## 2022-04-19 NOTE — PROGRESS NOTES
Do you currently have any of the following:    Fever: No  Headache:  No  Cough: No  Shortness of breath: No  Exposed to anyone with these symptoms: No         Nicole Mccabe presents to the [address] [place] [address] on 4/19/2022. Genaro Quigley is complaining of pain lower back. The pain is constant. The pain is described as aching, throbbing, shooting, stabbing, sharp and burning. Pain is rated on her best day at a 6, on her worst day at a 10, and on average at a 8 on the VAS scale. She took her last dose of Norco and Neurontin this morning. Any procedures since your last visit: No, with  % relief. Pacemaker or defibrillator: No managed by . She is not on NSAIDS and is not on anticoagulation medications to include none and is managed by . Medication Contract and Consent for Opioid Use Documents Filed     Patient Documents     Type of Document Status Date Received Received By Description    Medication Contract Received  Miriam Alejandra Opioid medication contract with Dr Bekah Christine 3/24/20    Medication Contract Received 4/26/2018  3:50 PM ANYA NUNO PAIN MANAGEMENT PATIENT AGREEMENT 4/26/2018    Medication Contract Received 11/5/2020  4:23 PM Miriam Alejandra Opioid medication contract with Dr Pernell Velez Received 3/1/2021  1:26 PM Miriam Alejandra Opioid medication contract with Dr Pernell Velez Received 8/23/2021  2:03 PM TAMMI [address] [place] [address] Medication contract    Medication Contract Received 10/18/2021  3:03 PM HAYLIE CADENA medication contract 10/18/2021                BP (!) 135/93   Pulse 95   Temp 98.1 °F (36.7 °C) (Infrared)   Resp 16   Ht 5' 5\" (1.651 m)   Wt 209 lb (94.8 kg)   LMP  (LMP Unknown)   SpO2 95%   BMI 34.78 kg/m²      No LMP recorded (lmp unknown).  Patient is postmenopausal.

## 2022-04-19 NOTE — PROGRESS NOTES
moderate right foraminal   narrowing.       L5-S1: There is a circumferential disc bulge with facet hypertrophy.  There   is no canal stenosis.  There is moderate right and severe left foraminal   narrowing.           Impression   Posterior fixation and laminectomy from L2-L4 without evidence for hardware   complication.       Multilevel degenerative change with bilateral foraminal narrowing as   described above.                 MRI lumbar spine 2018  1. No significant interval change is observed since the previous study   of 2017.       2. Stable postoperative changes/posterior spinal fusion at the   L3-L4-L5 level.       3. Severe spine canal stenosis in the level of L2-L3           4. Encroachment of the neural foramina bilaterally in levels of L2-L3   and L5-S1      Thoracic spine MRI 2018  1. Some degenerative changes in the thoracic spine, mainly in the   facet joints in the mid-upper thoracic spine segments       2. Discrete loss of height of T6, more likely an old finding.      Previous treatments: Physical Therapy, Surgery L3-5 fusion/laminectomy and medications. .       Potential Aberrant Drug-Related Behavior:  No     Urine Drug Screenin18:  Consistent for norhydrocodone metabolite  2018:  Consistent for hydrocodone and norhydrocodone  05/10/2019:  Consistent for hydrocodone and norhydrocodone  2019:  Consistent for hydrocodone and norhydrocodone  01/10/2020:  Consistent for hydrocodone and norhydrocodone  2020:  Consistent for oxycodone and metabolites s/p surgery. Inconsistent for hydrocodone. States that she was instructed to take her old norco until she made the appointment to resume her chronic pain medications. 10/2020:  Consistent  2021:  Consistent  2022:  Consistent     OARRS report:  2018 consistent to 2021 consistent (norco scripts from [employer] [facility] post op 3/10/2021 and 3/24/2021.    [place] script through [employer] - last one 2021 - consistent to 04/2022 consistent     Opioid agreement:  10/18/2021      Past Medical History:   Diagnosis Date    Cancer [facility]) 12/2019    LESION REMOVED UNDER TONGUE  DENTAL CLINIC    Chronic back pain     Costochondritis     h/o    Depression     Falls     Last fall Feb 2021    Fibromyalgia     Hallux rigidus of left foot     HTN (hypertension)     Hyperlipidemia     CLYDE on CPAP     Osteoarthritis     \"everywhere\"    Rheumatoid arthritis ([address]     \"As a child. \"    Rheumatoid arthritis(714.0)     TIA (transient ischemic attack)     no deficits     Use of cane as ambulatory aid        Past Surgical History:   Procedure Laterality Date    ANESTHESIA NERVE BLOCK Left 02/26/2019    LEFT C3,4,5 MBB performed by Mikala Quiñonez MD at [address] Right 08/12/2019    BILATERAL TRANSFORAMINAL EPIDURAL STEROID INJECTION S1 #3 performed by Mikala Quiñonez MD at [address] Right 06/16/2020    RIGHT SACROILIAC JOINT INJECTION      CPT: 70124 performed by Kelle Ritchie DO at [address]  2005    Left    ARTHRODESIS Left 12/14/2018    ARTHRODESIS 2ND DIGIT LEFT FOOT performed by Claudia Chaidez DPM at [address] [place] [address]  08/16/2017    PLIF L3-L4, L4-L5 with rods, screws, and cages/Dr. Lenore Alonso BACK SURGERY  03/04/2020    BACK SURGERY Right 03/09/2021    RIGHT PERCUTANEOUS SACROILIAC JOINT FUSION performed by Marce Ferguson MD at [facility]  2010    reduction, bilat   [address]    CHOLECYSTECTOMY  2011    COLONOSCOPY  03/27/2015    COLONOSCOPY N/A 08/19/2020    COLONOSCOPY DIAGNOSTIC performed by Alba Beach MD at [address] [place] [address]  03/2021    SI Joint    ENDOSCOPY, COLON, DIAGNOSTIC      EPIDURAL STEROID INJECTION N/A 06/04/2019    BILATERAL TRANSFORAMINAL EPIDURAL STEROID INJECTION S1 performed by Kelle Ritchie DO at [address]      Left    HARDWARE REMOVAL FOOT / ANKLE Left 12/14/2018    REMOVAL OF PAINFUL HARDWARE LEFT FOOT  ++SYNTHES++ performed by Marija Sanford DPM at [facility]    inguinal     LUMBAR SPINE SURGERY N/A 03/04/2020    EXPLORATION OF PRIOR L3-L5 FUSION AND L2-L3  POSTERIOR LUMBAR INTERBODY FUSION -- NEEDS O-ARM, AUDIOLOGY, CAGES, PLATES, SCREWS, C-ARM, TERESA TABLE, CELL SAVER, PLATELET GEL -- GLOBUS performed by Princess Dav MD at [address] N/A 10/21/2020    EXCISION OF TONGUE LESION performed by Kathleen Dowell DO at [address] [facility] [address] Bilateral 05/07/2018    L1-2 lumbar foramen #1    NERVE BLOCK Bilateral 12/03/2018    s1 tfesi    NERVE BLOCK Bilateral 08/12/2019    NERVE BLOCK Right 09/30/2019    sacral radiofrequency    NERVE BLOCK Right 09/01/2020    RIGHT SACROILIAC JOINT INJECTION #2 performed by Rebeca Sanford DO at [address] [facility] Left 09/25/2013    left foot tarso metatarsal joint injection    OTHER SURGICAL HISTORY Left 05/27/2015    Endoscopic Gastroc recession left, Lapidus left foot and  Excision of exostosis left foot    PAIN MANAGEMENT PROCEDURE Left 7/27/2021    LEFT L5-S1 TRANSFORAMINAL EPIDURAL STEROID INJECTION performed by Rebeca Sanford DO at [facility] Left 3/29/2022    LEFT TRANSFORAMINAL EPIDURAL STEROID INJECTION AT L5 AND S1 performed by Rebeca Sanford DO at [address] AA&/STRD TFRML EPI LUMBAR/SACRAL 1 LEVEL Bilateral 12/03/2018    BILATERAL S1  EPIDURAL STEROID INJECTION performed by Myriam Del Rio MD at [address] [facility] DX/THER SBST INTRLMNR LMBR/SAC W/IMG GDN N/A 08/21/2018    EPIDURAL STEROID INJECTION L1-2 WITH LOW VOL performed by Myriam Del Rio MD at [address] [facility] NOSE/CLEFT LIP/TIP Bilateral 05/07/2018    BILATERAL L1-2 LUMBAR FORAMEN #1 performed by Myriam Del Rio MD at [address] [facility] NOSE/CLEFT LIP/TIP Bilateral 06/04/2018    BILATERAL TRANSFORAMINAL EPIDURAL STEROID INJECTION UNDER FLUOROSCOPIC GUIDANCE @ FORAMINAL LEVEL L1-2 #2 performed by Ernestina Davis MD at [address] Right 09/30/2019    RIGHT SACRAL RADIOFREQUENCY ABLATION performed by Ernestina Davis MD at [address]  2000, 2005    Big Left Toe    TOE SURGERY Left 2018    2nd toe     TONSILLECTOMY      WISDOM TOOTH EXTRACTION         Prior to Admission medications    Medication Sig Start Date End Date Taking? Authorizing Provider   HYDROcodone-acetaminophen (NORCO) 5-325 MG per tablet Take 1 tablet by mouth 2 times daily for 30 days. 3/28/22 4/27/22 Yes TREASURE Felipe   gabapentin (NEURONTIN) 400 MG capsule Take 1 capsule by mouth 2 times daily for 30 days. 1/19/22 7/15/22 Yes TREASURE Felipe   lisinopril (PRINIVIL;ZESTRIL) 40 MG tablet Take 1 tablet by mouth every evening 12/17/21  Yes Chuyita Lam MD   hydroCHLOROthiazide (HYDRODIURIL) 12.5 MG tablet Take 1 tablet by mouth daily For blood pressure 12/17/21  Yes Chuyita Lam MD   hydrOXYzine (VISTARIL) 25 MG capsule Take 1 capsule by mouth 3 times daily as needed for Anxiety 8/7/21  Yes Chuyita Lam MD   atorvastatin (LIPITOR) 40 MG tablet Take 1 tablet by mouth daily 8/7/21  Yes Chuyita Lam MD   Handicap Placard MISC DX:  Loss of Balance, Chronic low back pain, s/p back surgery.    Duration of 5 years 2/22/21  Yes Chuyita Lam MD       Allergies   Allergen Reactions    Pcn [Penicillins] Anaphylaxis       Social History     Socioeconomic History    Marital status:      Spouse name: Not on file    Number of children: Not on file    Years of education: Not on file    Highest education level: Not on file   Occupational History    Not on file   Tobacco Use    Smoking status: Current Every Day Smoker     Packs/day: 0.50     Years: 30.00     Pack years: 15.00     Types: Cigarettes     Last attempt to quit: 10/5/2020     Years since quittin.5    Smokeless tobacco: Never Used   Vaping Use    Vaping Use: Never used   Substance and Sexual Activity    Alcohol use: Not Currently     Alcohol/week: 0.0 standard drinks     Comment: rarely    Drug use: No    Sexual activity: Not on file   Other Topics Concern    Not on file   Social History Narrative    Not on file     Social Determinants of Health     Financial Resource Strain:     Difficulty of Paying Living Expenses: Not on file   Food Insecurity:     Worried About Running Out of Food in the Last Year: Not on file    Titi of Food in the Last Year: Not on file   Transportation Needs:     Lack of Transportation (Medical): Not on file    Lack of Transportation (Non-Medical):  Not on file   Physical Activity:     Days of Exercise per Week: Not on file    Minutes of Exercise per Session: Not on file   Stress:     Feeling of Stress : Not on file   Social Connections:     Frequency of Communication with Friends and Family: Not on file    Frequency of Social Gatherings with Friends and Family: Not on file    Attends [employer] Services: Not on file    Active Member of [address] [place] [address] or Organizations: Not on file    Attends Club or Organization Meetings: Not on file    Marital Status: Not on file   Intimate Partner Violence:     Fear of Current or Ex-Partner: Not on file    Emotionally Abused: Not on file    Physically Abused: Not on file    Sexually Abused: Not on file   Housing Stability:     Unable to Pay for Housing in the Last Year: Not on file    Number of [place] in the Last Year: Not on file    Unstable Housing in the Last Year: Not on file       Family History   Problem Relation Age of Onset    Dementia Mother     Breast Cancer Mother     Cancer Mother         breast cancer [de-identified]     Stroke Mother     Heart Attack Father     Other Father 80        Aortic aneurysm    Cancer Brother     Diabetes Brother        REVIEW OF SYSTEMS:     Ladonna Thomas denies fever/chills, chest pain, shortness of breath, new bowel or bladder complaints or suicidal ideations. All other review of systems was negative. PHYSICAL EXAMINATION:      BP (!) 135/93   Pulse 95   Temp 98.1 °F (36.7 °C) (Infrared)   Resp 16   Ht 5' 5\" (1.651 m)   Wt 209 lb (94.8 kg)   LMP  (LMP Unknown)   SpO2 95%   BMI 34.78 kg/m²     General:      General appearance: awake, alert, oriented, in no acute distress, well developed, well nourished and in no acute distress   pleasant and well-hydrated. in no distress and A & O x3  Build:Overweight  Function:Rises from a seated position with difficulty    HEENT:    Head:normocephalic and atraumatic  Pupils:regular, round and equal.  Sclera: icterus absent  EOM:full and intact. Lungs:    Breathing:Normal expansion. No wheezing. Abdomen:    Shape:non-distended and normal    Thoracic spine:  + right sided myofascial TTP    Lumbar spine:    Range of motion:abnormal moderately Lateral bending, flexion, extension rotation bilateral and is painful. Extremities:    Tremors:None bilaterally upper and lower  Range of motion:Generally normal shoulders  Intact:Yes  Cyanosis:none  Edema:Normal      Neurological:    Cranial nerves:normal  Sensory:diminished to light lateral aspect of right leg                   Dermatology:    Skin:no unusual rashes, no skin lesions, no palpable subcutaneous nodules and good skin turgor    Assessment/Plan:      Chronic low back pain with radiation to the Right groin, patient had low back surgery 08/2017 L3-5 fusion, recently had lumbar spine CT with severe L2-3 stenosis     Plan:  Patient is s/p revision fusion L2-L4 on 3/4/2020. Patient is s/p:  Right sacroiliac joint fusion with use of SI-BONE iFuse implant on 3/9/2021 by Dr. Khadar Solis. Patient is s/p:  Left TFESI L5 and S1 on 03/29/2022 with 75% relief. Continue norco 5/325 BID. Continue with Gabapentin 400 mg BID. She reports that any increases make her off balance.    Foot surgery on hold due to financial issues. OARRS report reviewed 04/2022  Continue flexeril 5 mg BID prn for right sided thoracic myofascial pain - Takes sparingly. Patient may relocate to [place] at some point - on hold right now. Consider TPIs - she will consider next month. Patient encouraged to stay active and to lose weight. Failed physical therapy  Treatment plan discussed with the patient including medications side effects       Controlled Substance Monitoring:    Acute and Chronic Pain Monitoring:   RX Monitoring 4/19/2022   Attestation -   Acute Pain Prescriptions -   Periodic Controlled Substance Monitoring Possible medication side effects, risk of tolerance/dependence & alternative treatments discussed. ;No signs of potential drug abuse or diversion identified. ;Assessed functional status. ;Obtaining appropriate analgesic effect of treatment.    Chronic Pain > 80 MEDD -           ccreferring physicf

## 2022-05-05 ENCOUNTER — HOSPITAL ENCOUNTER (OUTPATIENT)
Dept: GENERAL RADIOLOGY | Age: 67
Discharge: HOME | End: 2022-05-07
Payer: MEDICARE

## 2022-05-05 ENCOUNTER — HOSPITAL ENCOUNTER (OUTPATIENT)
Age: 67
Discharge: HOME | End: 2022-05-07
Payer: MEDICARE

## 2022-05-05 ENCOUNTER — TELEPHONE (OUTPATIENT)
Dept: PAIN MANAGEMENT | Age: 67
End: 2022-05-05

## 2022-05-05 DIAGNOSIS — M54.6 THORACIC SPINE PAIN: ICD-10-CM

## 2022-05-05 DIAGNOSIS — M54.6 THORACIC SPINE PAIN: Primary | ICD-10-CM

## 2022-05-05 PROCEDURE — 72074 X-RAY EXAM THORAC SPINE4/>VW: CPT

## 2022-05-09 NOTE — TELEPHONE ENCOUNTER
Cass Chilel called said she discussed having trigger point injections, but she said before she moves forward with them she would like to get imaging done. Please advise. Updated Dr. Virgen Winston on morning labs, and about Ijeoma Jorge have a temp all night, and still being tachy with resp, and BP improved. No new orders at this time.

## 2022-05-11 ENCOUNTER — OFFICE VISIT (OUTPATIENT)
Dept: PODIATRY | Age: 67
End: 2022-05-11
Payer: MEDICARE

## 2022-05-11 VITALS
DIASTOLIC BLOOD PRESSURE: 78 MMHG | BODY MASS INDEX: 34.78 KG/M2 | TEMPERATURE: 98.2 F | WEIGHT: 209 LBS | SYSTOLIC BLOOD PRESSURE: 129 MMHG

## 2022-05-11 DIAGNOSIS — S93.491A TEAR OF TALOFIBULAR LIGAMENT, RIGHT, INITIAL ENCOUNTER: Primary | ICD-10-CM

## 2022-05-11 DIAGNOSIS — S93.422A TEAR OF DELTOID LIGAMENT OF ANKLE, LEFT, INITIAL ENCOUNTER: ICD-10-CM

## 2022-05-11 PROCEDURE — 99213 OFFICE O/P EST LOW 20 MIN: CPT | Performed by: PODIATRIST

## 2022-05-11 NOTE — PROGRESS NOTES
21  Scooby Sharp : 1955 Sex: female  Age: 74 y.o. Patient was referred by Kayden Cruz MD    Chief Complaint   Patient presents with    Foot Pain     pt was going to have sx left ankle in 2021 but cxl'd it due to high balance at [facility] wants to discuss rescheduling sx today        SUBJECTIVE  Pt here to discuss sx      Foot Injury   The incident occurred more than 1 week ago. Foot Pain   The pain is present in the right toes, right foot, right ankle, left lower leg, left toes, left foot and left ankle. This is a chronic problem. The current episode started more than 1 year ago. There has been a history of trauma. The problem occurs constantly. The quality of the pain is described as burning, aching and pounding. The pain is at a severity of 4/10. The pain is moderate. Review of Systems  Const: Denies constitutional symptoms  Musculo: Denies symptoms other than stated above  Skin: Denies symptoms other than stated above       Current Outpatient Medications:     HYDROcodone-acetaminophen (NORCO) 5-325 MG per tablet, Take 1 tablet by mouth 2 times daily for 30 days. , Disp: 60 tablet, Rfl: 0    gabapentin (NEURONTIN) 400 MG capsule, Take 1 capsule by mouth 2 times daily for 30 days. , Disp: 60 capsule, Rfl: 2    lisinopril (PRINIVIL;ZESTRIL) 40 MG tablet, Take 1 tablet by mouth every evening, Disp: 90 tablet, Rfl: 1    hydroCHLOROthiazide (HYDRODIURIL) 12.5 MG tablet, Take 1 tablet by mouth daily For blood pressure, Disp: 90 tablet, Rfl: 1    hydrOXYzine (VISTARIL) 25 MG capsule, Take 1 capsule by mouth 3 times daily as needed for Anxiety, Disp: 270 capsule, Rfl: 1    atorvastatin (LIPITOR) 40 MG tablet, Take 1 tablet by mouth daily, Disp: 90 tablet, Rfl: 3    Handicap Placard MISC, DX:  Loss of Balance, Chronic low back pain, s/p back surgery.   Duration of 5 years, Disp: 1 each, Rfl: 0  Allergies   Allergen Reactions    Pcn [Penicillins] Anaphylaxis       Past Medical History: Diagnosis Date    Cancer [facility]) 12/2019    LESION REMOVED UNDER TONGUE  DENTAL CLINIC    Chronic back pain     Costochondritis     h/o    Depression     Falls     Last fall Feb 2021    Fibromyalgia     Hallux rigidus of left foot     HTN (hypertension)     Hyperlipidemia     CLYDE on CPAP     Osteoarthritis     \"everywhere\"    Rheumatoid arthritis ([address]     \"As a child. \"    Rheumatoid arthritis(714.0)     TIA (transient ischemic attack)     no deficits     Use of cane as ambulatory aid      Past Surgical History:   Procedure Laterality Date    ANESTHESIA NERVE BLOCK Left 02/26/2019    LEFT C3,4,5 MBB performed by Donis Croft MD at [address] Right 08/12/2019    BILATERAL TRANSFORAMINAL EPIDURAL STEROID INJECTION S1 #3 performed by Donis Croft MD at [address] [address] Right 06/16/2020    RIGHT SACROILIAC JOINT INJECTION      CPT: 23916 performed by Shivam Rosenbaum DO at [address]  2005    Left    ARTHRODESIS Left 12/14/2018    ARTHRODESIS 2ND DIGIT LEFT FOOT performed by Corine Casanova DPM at [address] [facility]  08/16/2017    PLIF L3-L4, L4-L5 with rods, screws, and cages/Dr. Yoon Joy BACK SURGERY  03/04/2020    BACK SURGERY Right 03/09/2021    RIGHT PERCUTANEOUS SACROILIAC JOINT FUSION performed by Jd Martines MD at [facility]  2010    reduction, [address]    CHOLECYSTECTOMY  2011    COLONOSCOPY  03/27/2015    COLONOSCOPY N/A 08/19/2020    COLONOSCOPY DIAGNOSTIC performed by Jorge Diez MD at [address] [place] [address]  03/2021    SI Joint    ENDOSCOPY, COLON, DIAGNOSTIC      EPIDURAL STEROID INJECTION N/A 06/04/2019    BILATERAL TRANSFORAMINAL EPIDURAL STEROID INJECTION S1 performed by Shivam Rosenbaum DO at [address]      Left    HARDWARE REMOVAL FOOT / ANKLE Left 12/14/2018    REMOVAL OF PAINFUL HARDWARE LEFT FOOT  ++SYNTHES++ performed by Sav Mcelroy DPM at [facility]    inguinal     LUMBAR SPINE SURGERY N/A 03/04/2020    EXPLORATION OF PRIOR L3-L5 FUSION AND L2-L3  POSTERIOR LUMBAR INTERBODY FUSION -- NEEDS O-ARM, AUDIOLOGY, CAGES, PLATES, SCREWS, C-ARM, TERESA TABLE, CELL SAVER, PLATELET GEL -- GLOBUS performed by Twila Shah MD at [facility] N/A 10/21/2020    EXCISION OF TONGUE LESION performed by Eleazar Teran DO at [address] Bilateral 05/07/2018    L1-2 lumbar foramen #1    NERVE BLOCK Bilateral 12/03/2018    s1 tfesi    NERVE BLOCK Bilateral 08/12/2019    NERVE BLOCK Right 09/30/2019    sacral radiofrequency    NERVE BLOCK Right 09/01/2020    RIGHT SACROILIAC JOINT INJECTION #2 performed by Dano Alford DO at [address] Left 09/25/2013    left foot tarso metatarsal joint injection    OTHER SURGICAL HISTORY Left 05/27/2015    Endoscopic Gastroc recession left, Lapidus left foot and  Excision of exostosis left foot    PAIN MANAGEMENT PROCEDURE Left 7/27/2021    LEFT L5-S1 TRANSFORAMINAL EPIDURAL STEROID INJECTION performed by Dano Alford DO at [facility] [address] Left 3/29/2022    LEFT TRANSFORAMINAL EPIDURAL STEROID INJECTION AT L5 AND S1 performed by Dano Alford DO at [address] AA&/STRD TFRML EPI LUMBAR/SACRAL 1 LEVEL Bilateral 12/03/2018    BILATERAL S1  EPIDURAL STEROID INJECTION performed by Kerry Deras MD at [address] [facility] DX/THER SBST INTRLMNR LMBR/SAC W/IMG GDN N/A 08/21/2018    EPIDURAL STEROID INJECTION L1-2 WITH LOW VOL performed by Kerry Deras MD at [address] [facility] NOSE/CLEFT LIP/TIP Bilateral 05/07/2018    BILATERAL L1-2 LUMBAR FORAMEN #1 performed by Kerry Deras MD at [address] [facility] NOSE/CLEFT LIP/TIP Bilateral 06/04/2018    BILATERAL TRANSFORAMINAL EPIDURAL STEROID INJECTION UNDER FLUOROSCOPIC GUIDANCE @ FORAMINAL LEVEL L1-2 #2 performed by Kerry Deras MD at [address] Right 2019    RIGHT SACRAL RADIOFREQUENCY ABLATION performed by Jami Bui MD at [facility] TOE SURGERY  2000, 2005    Big Left Toe    TOE SURGERY Left 2018    2nd toe     TONSILLECTOMY      WISDOM TOOTH EXTRACTION       Family History   Problem Relation Age of Onset    Dementia Mother     Breast Cancer Mother     Cancer Mother         breast cancer [de-identified]     Stroke Mother     Heart Attack Father     Other Father 80        Aortic aneurysm    Cancer Brother     Diabetes Brother      Social History     Socioeconomic History    Marital status:      Spouse name: Not on file    Number of children: Not on file    Years of education: Not on file    Highest education level: Not on file   Occupational History    Not on file   Tobacco Use    Smoking status: Current Every Day Smoker     Packs/day: 0.50     Years: 30.00     Pack years: 15.00     Types: Cigarettes     Last attempt to quit: 10/5/2020     Years since quittin.5    Smokeless tobacco: Never Used   Vaping Use    Vaping Use: Never used   Substance and Sexual Activity    Alcohol use: Not Currently     Alcohol/week: 0.0 standard drinks     Comment: rarely    Drug use: No    Sexual activity: Not on file   Other Topics Concern    Not on file   Social History Narrative    Not on file     Social Determinants of Health     Financial Resource Strain:     Difficulty of Paying Living Expenses: Not on file   Food Insecurity:     Worried About [address] [employer] in the Last Year: Not on file    [address] [facility] [address] in the Last Year: Not on file   Transportation Needs:     Lack of Transportation (Medical): Not on file    Lack of Transportation (Non-Medical):  Not on file   Physical Activity:     Days of Exercise per Week: Not on file    Minutes of Exercise per Session: Not on file   Stress:     Feeling of Stress : Not on file   Social Connections:     Frequency of Communication with Friends and Family: Not on file    Frequency of Social Gatherings with Friends and Family: Not on file    Attends Taoism Services: Not on file    Active Member of Clubs or Organizations: Not on file    Attends Club or Organization Meetings: Not on file    Marital Status: Not on file   Intimate Partner Violence:     Fear of Current or Ex-Partner: Not on file    Emotionally Abused: Not on file    Physically Abused: Not on file    Sexually Abused: Not on file   Housing Stability:     Unable to Pay for Housing in the Last Year: Not on file    Number of [place] in the Last Year: Not on file    Unstable Housing in the Last Year: Not on file       Vitals:    05/11/22 1450   BP: 129/78   Temp: 98.2 °F (36.8 °C)   TempSrc: Temporal   Weight: 209 lb (94.8 kg)       Focused Lower Extremity Physical Exam:  Vitals:    05/11/22 1450   BP: 129/78   Temp: 98.2 °F (36.8 °C)        Foot Exam    General  General Appearance: appears stated age and healthy   Orientation: alert and oriented to person, place, and time       Left Foot/Ankle      Inspection and Palpation  Skin Exam: skin intact; Neurovascular  Dorsalis pedis: 3+  Posterior tibial: 3+  Saphenous nerve sensation: normal  Tibial nerve sensation: normal  Superficial peroneal nerve sensation: normal  Deep peroneal nerve sensation: normal  Sural nerve sensation: normal    Range of Motion    Passive  Ankle dorsiflexion: pain  Plantar flexion: pain  Ankle eversion: pain    Active  Ankle dorsiflexion: pain  Plantar flexion: pain  Ankle eversion: pain  Ankle inversion: pain    Tests  Anterior drawer: positive              Left Ankle Exam     Tenderness   The patient is experiencing tenderness in the ATF, CF, deltoid and medial malleolus. Swelling: mild    Range of Motion   Dorsiflexion: abnormal   Plantar flexion: abnormal   Eversion: abnormal   Inversion: abnormal     Muscle Strength   The patient has normal left ankle strength.     Tests Anterior drawer: positive  Varus tilt: negative            Vascular: pulses  dp  pt    Capillary Refill Time:   Hair growth  Skin:    Edema:    Neurologic:      Musculoskeletal/ Orthopedic examination:    NAIL  Web space   Derm:      CT LUMBAR SPINE W CONTRAST    Result Date: 7/8/2021  EXAMINATION: CT OF THE LUMBAR SPINE WITH INTRATHECAL CONTRAST 7/8/2021 9:22 am: TECHNIQUE: CT of the lumbar spine was performed after the administration of intrathecal contrast with multiplanar reconstructed images provided for interpretation. Dose modulation, iterative reconstruction, and/or weight based adjustment of the mA/kV was utilized to reduce the radiation dose to as low as reasonably achievable. COMPARISON: CT lumbar spine performed 05/29/2021. HISTORY: ORDERING SYSTEM PROVIDED HISTORY: Chronic midline low back pain with sciatica, sciatica laterality unspecified TECHNOLOGIST PROVIDED HISTORY: Reason for exam:->post myelogram CT What reading provider will be dictating this exam?->CRC FINDINGS: BONES/ALIGNMENT: There is minimal levocurvature of the lumbar spine. There is posterior fixation from L2-L4 without evidence for hardware complication. The vertebral body heights are maintained. There is minimal retrolisthesis of L2 on L3. SOFT TISSUES: The posterior paraspinal soft tissues are unremarkable. The visualized abdominal structures are unremarkable. The conus is normal in caliber and terminates at L1. The cauda equina is unremarkable. L1-L2: There is no significant disc protrusion, central spinal canal stenosis or neural foraminal narrowing. L2-L3: There is artificial disc material with posterior laminectomy. There is no canal stenosis or left foraminal narrowing. There is moderate right foraminal narrowing. L3-L4: There is artificial disc material and posterior laminectomy. There is no canal stenosis. There is mild bilateral foraminal narrowing. L4-L5: There is artificial disc material with posterior laminectomy. There is no canal stenosis. There is mild left and moderate right foraminal narrowing. L5-S1: There is a circumferential disc bulge with facet hypertrophy. There is no canal stenosis. There is moderate right and severe left foraminal narrowing. Posterior fixation and laminectomy from L2-L4 without evidence for hardware complication. Multilevel degenerative change with bilateral foraminal narrowing as described above. FL MYELOGRAM LUMBOSACRAL S&I    Result Date: 7/8/2021  EXAMINATION: FLUOROSCOPIC LUMBAR MYELOGRAM 7/8/2021: HISTORY: ORDERING SYSTEM PROVIDED HISTORY: Chronic midline low back pain with sciatica, sciatica laterality unspecified TECHNOLOGIST PROVIDED HISTORY: What reading provider will be dictating this exam?->CRC FLUOROSCOPY DOSE AND TYPE OR TIME AND EXPOSURES: Images: 6 Fluoroscopic time: 1.3 minutes Total dose: 75 mGy PROCEDURE: : Nilesh Rai Informed consent was obtained after the risks and benefits of the procedure were discussed with the patient and all questions were answered fully. [place] protocol was observed and a standard timeout was performed. The patient was positioned prone and the back was prepped and draped in the normal sterile fashion. 1% lidocaine was used for local anesthesia. The subarachnoid space was accessed with a 22-gauge 3.5\" spinal needle at the L3 level. Free flow of clear CSF was noted. [id number] ml of Isovue-M 200 was injected into the intrathecal space under fluoroscopic visualization. The stylet was reinserted, spinal needle was removed and brief pressure was applied at the puncture site. There were no immediate complications and the patient tolerated the procedure well. 1.  Successful lumbar myelogram with fluoroscopy. 2.  Postmyelogram lumbar CT to follow and reported separately.      MRI ANKLE LEFT WO CONTRAST    Result Date: 7/16/2021  EXAMINATION: MRI OF THE LEFT ANKLE WITHOUT CONTRAST, 7/16/2021 12:49 pm TECHNIQUE: Multiplanar multisequence MRI of the left ankle was performed without the administration of intravenous contrast. COMPARISON: Left ankle plain radiographs from 02/09/2021 HISTORY: ORDERING SYSTEM PROVIDED HISTORY: Chronic pain of left ankle 45-year-old female with chronic left ankle pain FINDINGS: SYNDESMOTIC LIGAMENTS:  The anterior-inferior tibiofibular ligament, interosseous membrane and posterior-inferior tibiofibular ligaments are normal. LATERAL COLLATERAL LIGAMENT COMPLEX: Age-indeterminate complete ATFL tear. Posterior talofibular ligament and calcaneofibular ligament appear intact. DELTOID LIGAMENT COMPLEX: Evidence of remote partial tearing of the medial deltoid ligament complex. SINUS TARSI AND SPRING LIGAMENT:  The fat plug within the sinus tarsi is preserved and the interosseous and cervical ligaments are normal.  The navicular-calcaneal (spring) ligament is without acute abnormality. Lisfranc ligament complex appears intact. MEDIAL TENDONS: The posterior tibialis, flexor digitorum longus and flexor hallucis longus tendons are intact. LATERAL TENDONS:  The peroneus longus and brevis tendons are intact. ANTERIOR TENDONS: The tibialis anterior, extensor hallucis longus and extensor digitorum longus tendons are normal in position, morphology and signal. ACHILLES TENDON: The Achilles tendon is normal in position, morphology and signal.  No associated bursitis. PLANTAR FASCIA: Mild plantar calcaneal spur. Intermediate T1 signal thickening of the central cord plantar fascia. No discrete plantar fascial tear. TARSAL TUNNEL: There are no obstructing lesions within the tarsal tunnel. BONE MARROW: Postsurgical changes and susceptibility artifact from hardware and prior fusion of the medial cuneiform-1st metatarsal articulation. Susceptibility artifact from suture anchors in the distal fibula/lateral malleolus and lateral aspect of the talus. No osteochondral lesion or defect at the talar dome.  Bone marrow signal intensity within the remaining visualized osseous structures otherwise grossly unremarkable. No tarsal coalition. No marginal erosions. JOINT SPACES: Mild degenerative changes of the TMT joints, tibiotalar and subtalar joints as well as the midfoot. No tibiotalar, subtalar or intertarsal joint effusion. SOFT TISSUES: Mild edema in the subcutaneous fat about the ankle. No discrete organized fluid collection. 1. Susceptibility artifact from postsurgical changes in the distal fibula/lateral malleolus and lateral aspect of the talus as well as prior medial cuneiform-1st metatarsal related to prior arthrodesis. The suture anchors in the lateral malleolus and lateral talus could be related to prior ATFL repair surgery. 2. Mild diffuse degenerative changes as detailed above. 3. Mild plantar calcaneal spur. Mild central cord plantar fasciitis. 4. Age-indeterminate, likely remote complete ATFL tear. Evidence of remote partial tearing of the medial deltoid ligament complex. Assessment and Plan: reviewed mri with patient surgical correction in future   Repair atf ligament ,repair  Deltoid ligament left    I had a long discussion today with the patient about the likely diagnosis and its natural history, physical exam and imaging findings, as well as treatment options. We discussed both surgical and nonsurgical treatments, including risks and benefits. From a nonoperative standpoint, we discussed rest/activity modification, NSAIDs/Acetaminophen/topical anesthetics, orthotics, bracing/immobilization, and physical therapy. After conservative treatment has failed patient agrees and would like to have surgery today, we reviewed all aspects of the surgery including the risks the complications and the postop course there are no guarantees,   Patient agrees with surgery   we reviewed all risk and benefits all complications including anesthesia infection further surgery over under correction patient agrees to proceed with surgery. General the terms and procedures of the treatment were undertaken there was alternative procedures and methods discussed with the patient the patient declined these and opted for surgery. All the risks with the procedure were reviewed. Patient will have surgery at [facility] in the future        Gloria Souza was seen today for foot pain. Diagnoses and all orders for this visit:    Tear of talofibular ligament, right, initial encounter    Tear of deltoid ligament of ankle, left, initial encounter        No follow-ups on file. Seen By:  Shirley Shannon DPM      Document was created using voice recognition software. Note was reviewed, however may contain grammatical errors.

## 2022-05-17 ENCOUNTER — TELEPHONE (OUTPATIENT)
Dept: PODIATRY | Age: 67
End: 2022-05-17

## 2022-05-17 NOTE — TELEPHONE ENCOUNTER
Prior Authorization Form:      DEMOGRAPHICS:                     Patient Name:  Ryan Garg  Patient :  1955            Insurance:  Payor: Mamie Bilberry / Plan: Mamie Bilberry / Product Type: *No Product type* /   Insurance ID Number:    Payor/Plan Subscr  Sex Relation Sub. Ins. ID Effective Group Num   1. [employer]* NATHAN OQUENDO 1955 Female Self [id number]                                    [address]         DIAGNOSIS & PROCEDURE:                       Procedure/Operation: Repair ATF lig left ankle with repair deltoid lig left ankle           CPT Code: 00644    Diagnosis:  Ruptured ATF left ankle  Ruptured deltoid lig left ankle    ICD10 Code: O91.456Y, s93.422a    Location:  Left ankle    Surgeon:  Dr. Iva Harris.  [facility] INFORMATION:    Date: 22   Time: 9am             Anesthesia:  general                                                     Status:  Out pt         Special Comments:     Electronically signed by Marva Heller LPN on 3/67/6636 at 29:14 AM

## 2022-05-18 NOTE — PROGRESS NOTES
[facility]  [address]  [facility]    Follow up Note      Sam Hand     Date of Visit:  2022    CC:  Patient presents for follow up   Chief Complaint   Patient presents with    Back Pain    Other     Ankle surgery with Dr. Declan Davis in July - would like discuss pain meds for procedure       HPI:    Pain is unchanged. No new changes. Change in quality of symptoms:no. Patient satisfaction with analgesia:fair. Medication side effects: None. Recent diagnostic testing:none. Recent interventional procedures: None. She has been on anticoagulation medications to include NSAIDS. The patient  has not been on herbal supplements. The patient is diabetic. Imagin2022 Thoracic Spine X-ray    FINDINGS:   Thoracic vertebral bodies are normal in height and alignment.  Multilevel   degenerative changes.  No evidence of fracture. Pedicles are symmetric and   intact.       Visualized lungs are clear.           Impression   No acute abnormality of the thoracic spine       Multilevel degenerative changes. 2021 CT lumbar myelogram    FINDINGS:   BONES/ALIGNMENT: There is minimal levocurvature of the lumbar spine.  There   is posterior fixation from L2-L4 without evidence for hardware complication.    The vertebral body heights are maintained.  There is minimal retrolisthesis   of L2 on L3.       SOFT TISSUES: The posterior paraspinal soft tissues are unremarkable.  The   visualized abdominal structures are unremarkable.  The conus is normal in   caliber and terminates at L1.  The cauda equina is unremarkable.       L1-L2: There is no significant disc protrusion, central spinal canal stenosis   or neural foraminal narrowing.       L2-L3: There is artificial disc material with posterior laminectomy.  There   is no canal stenosis or left foraminal narrowing.  There is moderate right   foraminal narrowing.       L3-L4: There is artificial disc material and posterior laminectomy.  There is   no canal stenosis.  There is mild bilateral foraminal narrowing.       L4-L5: There is artificial disc material with posterior laminectomy.  There   is no canal stenosis.  There is mild left and moderate right foraminal   narrowing.       L5-S1: There is a circumferential disc bulge with facet hypertrophy.  There   is no canal stenosis.  There is moderate right and severe left foraminal   narrowing.           Impression   Posterior fixation and laminectomy from L2-L4 without evidence for hardware   complication.       Multilevel degenerative change with bilateral foraminal narrowing as   described above.               MRI lumbar spine 2018  1. No significant interval change is observed since the previous study   of 2017.       2. Stable postoperative changes/posterior spinal fusion at the   L3-L4-L5 level.       3. Severe spine canal stenosis in the level of L2-L3           4. Encroachment of the neural foramina bilaterally in levels of L2-L3   and L5-S1      Thoracic spine MRI 2018  1. Some degenerative changes in the thoracic spine, mainly in the   facet joints in the mid-upper thoracic spine segments       2. Discrete loss of height of T6, more likely an old finding.      Previous treatments: Physical Therapy, Surgery L3-5 fusion/laminectomy and medications. .       Potential Aberrant Drug-Related Behavior:  No     Urine Drug Screenin18:  Consistent for norhydrocodone metabolite  2018:  Consistent for hydrocodone and norhydrocodone  05/10/2019:  Consistent for hydrocodone and norhydrocodone  2019:  Consistent for hydrocodone and norhydrocodone  01/10/2020:  Consistent for hydrocodone and norhydrocodone  2020:  Consistent for oxycodone and metabolites s/p surgery. Inconsistent for hydrocodone. States that she was instructed to take her old norco until she made the appointment to resume her chronic pain medications.     10/2020: Consistent  07/2021:  Consistent  01/2022:  Consistent     OARRS report:  05/2018 consistent to 05/2021 consistent (norco scripts from [facility] post op 3/10/2021 and 3/24/2021. [place] script through [facility] - last one 06/01/2021 07/2021 - consistent to 05/2022 consistent     Opioid agreement:  10/18/2021      Past Medical History:   Diagnosis Date    Cancer ([facility] [address] 12/2019    LESION REMOVED UNDER TONGUE  DENTAL CLINIC    Chronic back pain     Costochondritis     h/o    Depression     Falls     Last fall Feb 2021    Fibromyalgia     Hallux rigidus of left foot     HTN (hypertension)     Hyperlipidemia     CLYDE on CPAP     Osteoarthritis     \"everywhere\"    Rheumatoid arthritis ([address]     \"As a child. \"    Rheumatoid arthritis(714.0)     TIA (transient ischemic attack)     no deficits     Use of cane as ambulatory aid        Past Surgical History:   Procedure Laterality Date    ANESTHESIA NERVE BLOCK Left 02/26/2019    LEFT C3,4,5 MBB performed by Keli Adhikari MD at [facility] Right 08/12/2019    BILATERAL TRANSFORAMINAL EPIDURAL STEROID INJECTION S1 #3 performed by Keli Adhikari MD at [address] [place] [address] Right 06/16/2020    RIGHT SACROILIAC JOINT INJECTION      CPT: 68220 performed by Rachael Cuenca DO at [address]  2005    Left    ARTHRODESIS Left 12/14/2018    ARTHRODESIS 2ND DIGIT LEFT FOOT performed by Vasiliy Aleman DPM at [facility] [address]  08/16/2017    PLIF L3-L4, L4-L5 with rods, screws, and cages/Dr. Pam Hope BACK SURGERY  03/04/2020    BACK SURGERY Right 03/09/2021    RIGHT PERCUTANEOUS SACROILIAC JOINT FUSION performed by Kriss Díaz MD at [facility]  2010    reduction, [address]    CHOLECYSTECTOMY  2011    COLONOSCOPY  03/27/2015    COLONOSCOPY N/A 08/19/2020    COLONOSCOPY DIAGNOSTIC performed by Lance Adame MD at [address] [place] [address]  03/2021    SI Joint    ENDOSCOPY, COLON, DIAGNOSTIC      EPIDURAL STEROID INJECTION N/A 06/04/2019    BILATERAL TRANSFORAMINAL EPIDURAL STEROID INJECTION S1 performed by Asha Joseph DO at [address]      Left    [address] / ANKLE Left 12/14/2018    REMOVAL OF PAINFUL HARDWARE LEFT FOOT  ++SYNTHES++ performed by Marilee Perdomo DPM at [facility]    inguinal     LUMBAR SPINE SURGERY N/A 03/04/2020    EXPLORATION OF PRIOR L3-L5 FUSION AND L2-L3  POSTERIOR LUMBAR INTERBODY FUSION -- NEEDS O-ARM, AUDIOLOGY, CAGES, PLATES, SCREWS, C-ARM, TERESA TABLE, CELL SAVER, PLATELET GEL -- GLOBUS performed by Ciara Wolfe MD at [facility] N/A 10/21/2020    EXCISION OF TONGUE LESION performed by Bruce Sever, DO at [facility] Bilateral 05/07/2018    L1-2 lumbar foramen #1    NERVE BLOCK Bilateral 12/03/2018    s1 tfesi    NERVE BLOCK Bilateral 08/12/2019    NERVE BLOCK Right 09/30/2019    sacral radiofrequency    NERVE BLOCK Right 09/01/2020    RIGHT SACROILIAC JOINT INJECTION #2 performed by Asha Joseph DO at [facility] Left 09/25/2013    left foot tarso metatarsal joint injection    OTHER SURGICAL HISTORY Left 05/27/2015    Endoscopic Gastroc recession left, Lapidus left foot and  Excision of exostosis left foot    PAIN MANAGEMENT PROCEDURE Left 7/27/2021    LEFT L5-S1 TRANSFORAMINAL EPIDURAL STEROID INJECTION performed by Asha Joseph DO at [address] Left 3/29/2022    LEFT TRANSFORAMINAL EPIDURAL STEROID INJECTION AT L5 AND S1 performed by Asha Joseph DO at [address] AA&/STRD TFRML EPI LUMBAR/SACRAL 1 LEVEL Bilateral 12/03/2018    BILATERAL S1  EPIDURAL STEROID INJECTION performed by Mary Lane MD at [facility] DX/THER SBST INTRLMNR LMBR/SAC W/IMG GDN N/A 08/21/2018    EPIDURAL STEROID INJECTION L1-2 WITH LOW VOL performed by Mary Lane MD at [facility]  SC SUNNY NOSE/CLEFT LIP/TIP Bilateral 05/07/2018    BILATERAL L1-2 LUMBAR FORAMEN #1 performed by Manny Bo MD at [address] NOSE/CLEFT LIP/TIP Bilateral 06/04/2018    BILATERAL TRANSFORAMINAL EPIDURAL STEROID INJECTION UNDER FLUOROSCOPIC GUIDANCE @ FORAMINAL LEVEL L1-2 #2 performed by Manny Bo MD at [address] Right 09/30/2019    RIGHT SACRAL RADIOFREQUENCY ABLATION performed by Manny Bo MD at [address]  2000, 2005    Big Left Toe    TOE SURGERY Left 2018    2nd toe     TONSILLECTOMY      WISDOM TOOTH EXTRACTION         Prior to Admission medications    Medication Sig Start Date End Date Taking? Authorizing Provider   HYDROcodone-acetaminophen (NORCO) 5-325 MG per tablet Take 1 tablet by mouth 2 times daily for 30 days. 4/27/22 5/27/22 Yes TREASURE Cueva   gabapentin (NEURONTIN) 400 MG capsule Take 1 capsule by mouth 2 times daily for 30 days. 1/19/22 7/15/22 Yes TREASURE Cueva   lisinopril (PRINIVIL;ZESTRIL) 40 MG tablet Take 1 tablet by mouth every evening 12/17/21  Yes Vaughn Calixto MD   hydroCHLOROthiazide (HYDRODIURIL) 12.5 MG tablet Take 1 tablet by mouth daily For blood pressure 12/17/21  Yes Vaughn Calixto MD   hydrOXYzine (VISTARIL) 25 MG capsule Take 1 capsule by mouth 3 times daily as needed for Anxiety 8/7/21  Yes Vaughn Calixto MD   atorvastatin (LIPITOR) 40 MG tablet Take 1 tablet by mouth daily 8/7/21  Yes Vaughn Calixto MD   Handicap Placard MISC DX:  Loss of Balance, Chronic low back pain, s/p back surgery.    Duration of 5 years 2/22/21  Yes Vaughn Calixto MD       Allergies   Allergen Reactions    Pcn [Penicillins] Anaphylaxis       Social History     Socioeconomic History    Marital status:      Spouse name: Not on file    Number of children: Not on file    Years of education: Not on file    Highest education level: Not on file   Occupational History    Not on file   Tobacco Use    Smoking status: Current Every Day Smoker     Packs/day: 0.50     Years: 30.00     Pack years: 15.00     Types: Cigarettes     Last attempt to quit: 10/5/2020     Years since quittin.6    Smokeless tobacco: Never Used   Vaping Use    Vaping Use: Never used   Substance and Sexual Activity    Alcohol use: Not Currently     Alcohol/week: 0.0 standard drinks     Comment: rarely    Drug use: No    Sexual activity: Not on file   Other Topics Concern    Not on file   Social History Narrative    Not on file     Social Determinants of Health     Financial Resource Strain:     Difficulty of Paying Living Expenses: Not on file   Food Insecurity:     Worried About Running Out of Food in the Last Year: Not on file    Titi of Food in the Last Year: Not on file   Transportation Needs:     Lack of Transportation (Medical): Not on file    Lack of Transportation (Non-Medical):  Not on file   Physical Activity:     Days of Exercise per Week: Not on file    Minutes of Exercise per Session: Not on file   Stress:     Feeling of Stress : Not on file   Social Connections:     Frequency of Communication with Friends and Family: Not on file    Frequency of Social Gatherings with Friends and Family: Not on file    Attends Cheondoism Services: Not on file    Active Member of [address] [place] [address] [employer] or Organizations: Not on file    Attends Club or Organization Meetings: Not on file    Marital Status: Not on file   Intimate Partner Violence:     Fear of Current or Ex-Partner: Not on file    Emotionally Abused: Not on file    Physically Abused: Not on file    Sexually Abused: Not on file   Housing Stability:     Unable to Pay for Housing in the Last Year: Not on file    Number of [place] in the Last Year: Not on file    Unstable Housing in the Last Year: Not on file       Family History   Problem Relation Age of Onset    Dementia Mother     Breast Cancer Mother  Cancer Mother         breast cancer [de-identified]     Stroke Mother     Heart Attack Father     Other Father 80        Aortic aneurysm    Cancer Brother     Diabetes Brother        REVIEW OF SYSTEMS:     Ryan Phelan denies fever/chills, chest pain, shortness of breath, new bowel or bladder complaints or suicidal ideations. All other review of systems was negative. PHYSICAL EXAMINATION:      Pulse 96   Temp 97.2 °F (36.2 °C) (Infrared)   Ht 5' 5\" (1.651 m)   Wt 209 lb (94.8 kg)   LMP  (LMP Unknown)   SpO2 94%   BMI 34.78 kg/m²     General:      General appearance: awake, alert, oriented, in no acute distress, well developed, well nourished and in no acute distress   pleasant and well-hydrated. in no distress and A & O x3  Build:Overweight  Function:Rises from a seated position with difficulty    HEENT:    Head:normocephalic and atraumatic  Pupils:regular, round and equal.  Sclera: icterus absent  EOM:full and intact. Lungs:    Breathing:Normal expansion. No wheezing. Abdomen:    Shape:non-distended and normal    Thoracic spine:  + right sided myofascial TTP, + facet TTP on right    Lumbar spine:    Range of motion:abnormal moderately Lateral bending, flexion, extension rotation bilateral and is painful. Extremities:    Tremors:None bilaterally upper and lower  Range of motion:Generally normal shoulders  Intact:Yes  Cyanosis:none  Edema:Normal      Neurological:    Cranial nerves:normal  Sensory:diminished to light lateral aspect of right leg                   Dermatology:    Skin:no unusual rashes, no skin lesions, no palpable subcutaneous nodules and good skin turgor    Assessment/Plan:      Chronic low back pain with radiation to the Right groin, patient had low back surgery 08/2017 L3-5 fusion, recently had lumbar spine CT with severe L2-3 stenosis     Patient is having left ankle surgery on 7/8. Surgeon may freely prescribe during the post op period. We do not prescribe post op pain medication. Plan:  Patient is s/p revision fusion L2-L4 on 3/4/2020. Patient is s/p:  Right sacroiliac joint fusion with use of SI-BONE iFuse implant on 3/9/2021 by Dr. Alyson Romberg. Patient is s/p:  Left TFESI L5 and S1 on 03/29/2022 with 75% relief. Continue norco 5/325 BID. Continue with Gabapentin 400 mg BID. She reports that any increases make her off balance. Foot surgery on hold due to financial issues. OARRS report reviewed 05/2022  Continue flexeril 5 mg BID prn for right sided thoracic myofascial pain - Takes sparingly. Patient may relocate to [place] at some point - on hold right now. Consider TPIs and/or right thoracic nerve block after healed from ankle surgery. Patient encouraged to stay active and to lose weight. Failed physical therapy  Treatment plan discussed with the patient including medications side effects     Controlled Substance Monitoring:    Acute and Chronic Pain Monitoring:   RX Monitoring 5/24/2022   Attestation -   Acute Pain Prescriptions -   Periodic Controlled Substance Monitoring Possible medication side effects, risk of tolerance/dependence & alternative treatments discussed. ;No signs of potential drug abuse or diversion identified. ;Assessed functional status. ;Obtaining appropriate analgesic effect of treatment.    Chronic Pain > 80 MEDD -             ccreferring physicf

## 2022-05-24 ENCOUNTER — OFFICE VISIT (OUTPATIENT)
Dept: PAIN MANAGEMENT | Age: 67
End: 2022-05-24
Payer: MEDICARE

## 2022-05-24 VITALS
HEIGHT: 65 IN | HEART RATE: 96 BPM | WEIGHT: 209 LBS | OXYGEN SATURATION: 94 % | BODY MASS INDEX: 34.82 KG/M2 | TEMPERATURE: 97.2 F

## 2022-05-24 DIAGNOSIS — M51.36 DDD (DEGENERATIVE DISC DISEASE), LUMBAR: ICD-10-CM

## 2022-05-24 DIAGNOSIS — M54.50 CHRONIC BILATERAL LOW BACK PAIN WITHOUT SCIATICA: Primary | ICD-10-CM

## 2022-05-24 DIAGNOSIS — G89.29 CHRONIC MIDLINE LOW BACK PAIN WITH SCIATICA, SCIATICA LATERALITY UNSPECIFIED: ICD-10-CM

## 2022-05-24 DIAGNOSIS — G89.29 OTHER CHRONIC PAIN: ICD-10-CM

## 2022-05-24 DIAGNOSIS — M48.061 SPINAL STENOSIS OF LUMBAR REGION WITHOUT NEUROGENIC CLAUDICATION: ICD-10-CM

## 2022-05-24 DIAGNOSIS — G89.29 CHRONIC BILATERAL LOW BACK PAIN WITHOUT SCIATICA: Primary | ICD-10-CM

## 2022-05-24 DIAGNOSIS — M79.10 MYALGIA: ICD-10-CM

## 2022-05-24 DIAGNOSIS — M47.816 LUMBAR FACET ARTHROPATHY: ICD-10-CM

## 2022-05-24 DIAGNOSIS — M54.40 CHRONIC MIDLINE LOW BACK PAIN WITH SCIATICA, SCIATICA LATERALITY UNSPECIFIED: ICD-10-CM

## 2022-05-24 DIAGNOSIS — M47.812 CERVICAL FACET JOINT SYNDROME: ICD-10-CM

## 2022-05-24 DIAGNOSIS — M54.16 LUMBAR RADICULOPATHY: ICD-10-CM

## 2022-05-24 PROCEDURE — 99213 OFFICE O/P EST LOW 20 MIN: CPT | Performed by: PHYSICIAN ASSISTANT

## 2022-05-24 PROCEDURE — 99213 OFFICE O/P EST LOW 20 MIN: CPT

## 2022-05-24 RX ORDER — HYDROCODONE BITARTRATE AND ACETAMINOPHEN 5; 325 MG/1; MG/1
1 TABLET ORAL 2 TIMES DAILY
Qty: 60 TABLET | Refills: 0 | Status: SHIPPED
Start: 2022-06-04 | End: 2022-06-21 | Stop reason: SDUPTHER

## 2022-05-24 NOTE — LETTER
[address] [place] [address] [address] [address]  Phone: [phone]  Fax: [place], [place]        May 24, 2022     Patient: Nya Ac   YOB: 1955   Date of Visit: 5/24/2022       To Whom it May Concern: Macario Patricia was seen in my clinic on 5/24/2022. Surgeon may freely prescribe during the post operative period. We do not manage post op pain. I have provided her enough medication to get to her surgery date only. If you have any questions or concerns, please don't hesitate to call.     Sincerely,         TREASURE Ma

## 2022-05-24 NOTE — PROGRESS NOTES
Do you currently have any of the following:    Fever: No  Headache:  No  Cough: No  Shortness of breath: No  Exposed to anyone with these symptoms: No         Glenroy Donald presents to the [facility] on 5/24/2022. Lakshmi Ramires is complaining of pain in back. The pain is constant. The pain is described as shooting and sharp. Pain is rated on her best day at a 6, on her worst day at a 10, and on average at a 8 on the VAS scale. She took her last dose of Norco today. Any procedures since your last visit: No.    Pacemaker or defibrillator: No.    She is not on NSAIDS and is not on anticoagulation medications to include none and is managed by none. Medication Contract and Consent for Opioid Use Documents Filed     Patient Documents     Type of Document Status Date Received Received By Description    Medication Contract Received  [facility] Opioid medication contract with Dr Tomeka Zamora 3/24/20    Medication Contract Received 4/26/2018  3:50 PM ANYA NUNO PAIN MANAGEMENT PATIENT AGREEMENT 4/26/2018    Medication Contract Received 11/5/2020  4:23 PM EBER HONG Opioid medication contract with Dr Albertina Ramsay Received 3/1/2021  1:26 PM [facility] Opioid medication contract with Dr Albertina Ramsay Received 8/23/2021  2:03 PM TAMMI [address] [place] [address] Medication contract    Medication Contract Received 10/18/2021  3:03 PM HAYLIE CADENA medication contract 10/18/2021                Temp 97.2 °F (36.2 °C) (Infrared)   Ht 5' 5\" (1.651 m)   Wt 209 lb (94.8 kg)   LMP  (LMP Unknown)   BMI 34.78 kg/m²      No LMP recorded (lmp unknown).  Patient is postmenopausal.

## 2022-05-31 ENCOUNTER — PREP FOR PROCEDURE (OUTPATIENT)
Dept: PAIN MANAGEMENT | Age: 67
End: 2022-05-31

## 2022-05-31 ENCOUNTER — OFFICE VISIT (OUTPATIENT)
Dept: PAIN MANAGEMENT | Age: 67
End: 2022-05-31
Payer: MEDICARE

## 2022-05-31 VITALS
TEMPERATURE: 96.1 F | DIASTOLIC BLOOD PRESSURE: 82 MMHG | OXYGEN SATURATION: 94 % | BODY MASS INDEX: 34.82 KG/M2 | SYSTOLIC BLOOD PRESSURE: 134 MMHG | HEART RATE: 113 BPM | WEIGHT: 209 LBS | RESPIRATION RATE: 16 BRPM | HEIGHT: 65 IN

## 2022-05-31 DIAGNOSIS — G89.29 CHRONIC MIDLINE LOW BACK PAIN WITH SCIATICA, SCIATICA LATERALITY UNSPECIFIED: ICD-10-CM

## 2022-05-31 DIAGNOSIS — M54.40 CHRONIC MIDLINE LOW BACK PAIN WITH SCIATICA, SCIATICA LATERALITY UNSPECIFIED: ICD-10-CM

## 2022-05-31 DIAGNOSIS — M51.36 DDD (DEGENERATIVE DISC DISEASE), LUMBAR: ICD-10-CM

## 2022-05-31 DIAGNOSIS — M54.16 LUMBAR RADICULOPATHY: ICD-10-CM

## 2022-05-31 DIAGNOSIS — M54.41 ACUTE BILATERAL LOW BACK PAIN WITH BILATERAL SCIATICA: ICD-10-CM

## 2022-05-31 DIAGNOSIS — M48.061 SPINAL STENOSIS OF LUMBAR REGION WITHOUT NEUROGENIC CLAUDICATION: ICD-10-CM

## 2022-05-31 DIAGNOSIS — G89.29 CHRONIC BILATERAL LOW BACK PAIN WITHOUT SCIATICA: Primary | ICD-10-CM

## 2022-05-31 DIAGNOSIS — M47.816 LUMBAR FACET ARTHROPATHY: ICD-10-CM

## 2022-05-31 DIAGNOSIS — M54.50 CHRONIC BILATERAL LOW BACK PAIN WITHOUT SCIATICA: Primary | ICD-10-CM

## 2022-05-31 DIAGNOSIS — M54.42 ACUTE BILATERAL LOW BACK PAIN WITH BILATERAL SCIATICA: ICD-10-CM

## 2022-05-31 DIAGNOSIS — M79.10 MYALGIA: ICD-10-CM

## 2022-05-31 PROCEDURE — 1123F ACP DISCUSS/DSCN MKR DOCD: CPT | Performed by: PHYSICIAN ASSISTANT

## 2022-05-31 PROCEDURE — 99203 OFFICE O/P NEW LOW 30 MIN: CPT

## 2022-05-31 PROCEDURE — 99203 OFFICE O/P NEW LOW 30 MIN: CPT | Performed by: PHYSICIAN ASSISTANT

## 2022-05-31 PROCEDURE — 99213 OFFICE O/P EST LOW 20 MIN: CPT | Performed by: PHYSICIAN ASSISTANT

## 2022-05-31 NOTE — PROGRESS NOTES
[facility]  [address]  [address]    Follow up Note      Abhijeet Guzman     Date of Visit:  2022    CC:  Patient presents for follow up   Chief Complaint   Patient presents with    Follow-up    Lower Back Pain       HPI:    Pain is worse. States that she was using the vacuum and carried some laundry last week which exacerbated her normal lower back pain. She reports that the pain starts in her back and radiates down her posterior thighs and lateral thighs. Change in quality of symptoms:no. Patient satisfaction with analgesia:fair. Medication side effects: None. Recent diagnostic testing:none. Recent interventional procedures: None. She has been on anticoagulation medications to include NSAIDS. The patient  has not been on herbal supplements. The patient is diabetic. Imagin2022 Thoracic Spine X-ray    FINDINGS:   Thoracic vertebral bodies are normal in height and alignment.  Multilevel   degenerative changes.  No evidence of fracture. Pedicles are symmetric and   intact.       Visualized lungs are clear.           Impression   No acute abnormality of the thoracic spine       Multilevel degenerative changes. 2021 CT lumbar myelogram    FINDINGS:   BONES/ALIGNMENT: There is minimal levocurvature of the lumbar spine.  There   is posterior fixation from L2-L4 without evidence for hardware complication.    The vertebral body heights are maintained.  There is minimal retrolisthesis   of L2 on L3.       SOFT TISSUES: The posterior paraspinal soft tissues are unremarkable.  The   visualized abdominal structures are unremarkable.  The conus is normal in   caliber and terminates at L1.  The cauda equina is unremarkable.       L1-L2: There is no significant disc protrusion, central spinal canal stenosis   or neural foraminal narrowing.       L2-L3: There is artificial disc material with posterior laminectomy.  There   is no canal stenosis or left foraminal narrowing.  There is moderate right   foraminal narrowing.       L3-L4: There is artificial disc material and posterior laminectomy.  There is   no canal stenosis.  There is mild bilateral foraminal narrowing.       L4-L5: There is artificial disc material with posterior laminectomy.  There   is no canal stenosis.  There is mild left and moderate right foraminal   narrowing.       L5-S1: There is a circumferential disc bulge with facet hypertrophy.  There   is no canal stenosis.  There is moderate right and severe left foraminal   narrowing.           Impression   Posterior fixation and laminectomy from L2-L4 without evidence for hardware   complication.       Multilevel degenerative change with bilateral foraminal narrowing as   described above.               MRI lumbar spine 2018  1. No significant interval change is observed since the previous study   of 2017.       2. Stable postoperative changes/posterior spinal fusion at the   L3-L4-L5 level.       3. Severe spine canal stenosis in the level of L2-L3           4. Encroachment of the neural foramina bilaterally in levels of L2-L3   and L5-S1      Thoracic spine MRI 2018  1. Some degenerative changes in the thoracic spine, mainly in the   facet joints in the mid-upper thoracic spine segments       2. Discrete loss of height of T6, more likely an old finding.      Previous treatments: Physical Therapy, Surgery L3-5 fusion/laminectomy and medications. .       Potential Aberrant Drug-Related Behavior:  No     Urine Drug Screenin18:  Consistent for norhydrocodone metabolite  2018:  Consistent for hydrocodone and norhydrocodone  05/10/2019:  Consistent for hydrocodone and norhydrocodone  2019:  Consistent for hydrocodone and norhydrocodone  01/10/2020:  Consistent for hydrocodone and norhydrocodone  2020:  Consistent for oxycodone and metabolites s/p surgery. Inconsistent for hydrocodone.   States that she was instructed to take her old norco until she made the appointment to resume her chronic pain medications. 10/2020:  Consistent  07/2021:  Consistent  01/2022:  Consistent     OARRS report:  05/2018 consistent to 05/2021 consistent (norco scripts from [facility] post op 3/10/2021 and 3/24/2021. [place] script through [facility] - last one 06/01/2021 07/2021 - consistent to 05/2022 consistent     Opioid agreement:  10/18/2021      Past Medical History:   Diagnosis Date    Cancer ([facility] [address] 12/2019    [facility]    Chronic back pain     Costochondritis     h/o    Depression     Falls     Last fall Feb 2021    Fibromyalgia     Hallux rigidus of left foot     HTN (hypertension)     Hyperlipidemia     CLYDE on CPAP     Osteoarthritis     \"everywhere\"    Rheumatoid arthritis ([address]     \"As a child. \"    Rheumatoid arthritis(714.0)     TIA (transient ischemic attack)     no deficits     Use of cane as ambulatory aid        Past Surgical History:   Procedure Laterality Date    ANESTHESIA NERVE BLOCK Left 02/26/2019    LEFT C3,4,5 MBB performed by Rodrigo Kumar MD at [address] [facility] Right 08/12/2019    BILATERAL TRANSFORAMINAL EPIDURAL STEROID INJECTION S1 #3 performed by Rodrigo Kumar MD at [address] [facility] Right 06/16/2020    RIGHT SACROILIAC JOINT INJECTION      CPT: 17115 performed by Rhys Elizondo DO at [address]  2005    Left    ARTHRODESIS Left 12/14/2018    ARTHRODESIS 2ND DIGIT LEFT FOOT performed by Ruddy Connell DPM at [address]  08/16/2017    PLIF L3-L4, L4-L5 with rods, screws, and cages/Dr. Perla Medrano BACK SURGERY  03/04/2020    BACK SURGERY Right 03/09/2021    RIGHT PERCUTANEOUS SACROILIAC JOINT FUSION performed by Foreign Shipman MD at [address]  2010    reduction, bilat   [address]    CHOLECYSTECTOMY  2011    COLONOSCOPY  03/27/2015    COLONOSCOPY N/A 08/19/2020 LMBR/SAC W/IMG GDN N/A 08/21/2018    EPIDURAL STEROID INJECTION L1-2 WITH LOW VOL performed by Carine Naik MD at [address] [facility] NOSE/CLEFT LIP/TIP Bilateral 05/07/2018    BILATERAL L1-2 LUMBAR FORAMEN #1 performed by Carine Naik MD at [address] NOSE/CLEFT LIP/TIP Bilateral 06/04/2018    BILATERAL TRANSFORAMINAL EPIDURAL STEROID INJECTION UNDER FLUOROSCOPIC GUIDANCE @ FORAMINAL LEVEL L1-2 #2 performed by Carine Naik MD at [address] [place] Right 09/30/2019    RIGHT SACRAL RADIOFREQUENCY ABLATION performed by Carine Naik MD at [address]  2000, 2005    Big Left Toe    TOE SURGERY Left 2018    2nd toe     TONSILLECTOMY      WISDOM TOOTH EXTRACTION         Prior to Admission medications    Medication Sig Start Date End Date Taking? Authorizing Provider   HYDROcodone-acetaminophen (NORCO) 5-325 MG per tablet Take 1 tablet by mouth 2 times daily for 30 days. 6/4/22 7/4/22 Yes TREASURE Mirza   gabapentin (NEURONTIN) 400 MG capsule Take 1 capsule by mouth 2 times daily for 30 days. 1/19/22 7/15/22 Yes TREASURE Mirza   lisinopril (PRINIVIL;ZESTRIL) 40 MG tablet Take 1 tablet by mouth every evening 12/17/21  Yes Sabrina Leyva MD   hydroCHLOROthiazide (HYDRODIURIL) 12.5 MG tablet Take 1 tablet by mouth daily For blood pressure 12/17/21  Yes Sabrina Leyva MD   hydrOXYzine (VISTARIL) 25 MG capsule Take 1 capsule by mouth 3 times daily as needed for Anxiety 8/7/21  Yes Sabrina Leyva MD   atorvastatin (LIPITOR) 40 MG tablet Take 1 tablet by mouth daily 8/7/21  Yes Sabrina Leyva MD   Handicap Placard MISC DX:  Loss of Balance, Chronic low back pain, s/p back surgery.    Duration of 5 years 2/22/21  Yes Sabrina Leyva MD       Allergies   Allergen Reactions    Pcn [Penicillins] Anaphylaxis       Social History     Socioeconomic History    Marital status:      Spouse name: Not on file    Number of children: Not on file    Years of education: Not on file    Highest education level: Not on file   Occupational History    Not on file   Tobacco Use    Smoking status: Current Every Day Smoker     Packs/day: 0.50     Years: 30.00     Pack years: 15.00     Types: Cigarettes     Last attempt to quit: 10/5/2020     Years since quittin.6    Smokeless tobacco: Never Used   Vaping Use    Vaping Use: Never used   Substance and Sexual Activity    Alcohol use: Not Currently     Alcohol/week: 0.0 standard drinks     Comment: rarely    Drug use: No    Sexual activity: Not on file   Other Topics Concern    Not on file   Social History Narrative    Not on file     Social Determinants of Health     Financial Resource Strain:     Difficulty of Paying Living Expenses: Not on file   Food Insecurity:     Worried About Running Out of Food in the Last Year: Not on file    Titi of Food in the Last Year: Not on file   Transportation Needs:     Lack of Transportation (Medical): Not on file    Lack of Transportation (Non-Medical):  Not on file   Physical Activity:     Days of Exercise per Week: Not on file    Minutes of Exercise per Session: Not on file   Stress:     Feeling of Stress : Not on file   Social Connections:     Frequency of Communication with Friends and Family: Not on file    Frequency of Social Gatherings with Friends and Family: Not on file    Attends Temple Services: Not on file    Active Member of Clubs or Organizations: Not on file    Attends Club or Organization Meetings: Not on file    Marital Status: Not on file   Intimate Partner Violence:     Fear of Current or Ex-Partner: Not on file    Emotionally Abused: Not on file    Physically Abused: Not on file    Sexually Abused: Not on file   Housing Stability:     Unable to Pay for Housing in the Last Year: Not on file    Number of [place] in the Last Year: Not on file    Unstable Housing in the Last Year: Not on file       Family History   Problem Relation Age of Onset    Dementia Mother     Breast Cancer Mother     Cancer Mother         breast cancer [de-identified]     Stroke Mother     Heart Attack Father     Other Father 80        Aortic aneurysm    Cancer Brother     Diabetes Brother        REVIEW OF SYSTEMS:     John Louis denies fever/chills, chest pain, shortness of breath, new bowel or bladder complaints or suicidal ideations. All other review of systems was negative. PHYSICAL EXAMINATION:      /82   Pulse (!) 113   Temp (!) 96.1 °F (35.6 °C) (Infrared)   Resp 16   Ht 5' 5\" (1.651 m)   Wt 209 lb (94.8 kg)   LMP  (LMP Unknown)   SpO2 94%   BMI 34.78 kg/m²     General:      General appearance: awake, alert, oriented, in no acute distress, well developed, well nourished and in no acute distress   pleasant and well-hydrated. in no distress and A & O x3  Build:Overweight  Function:Rises from a seated position with difficulty    HEENT:    Head:normocephalic and atraumatic  Pupils:regular, round and equal.  Sclera: icterus absent  EOM:full and intact. Lungs:    Breathing:Normal expansion. No wheezing. Abdomen:    Shape:non-distended and normal      Lumbar spine:                Inspection:  Well-healed incision              Palpation:  + midline and paraspinal TTP  Range of motion:abnormal moderately Lateral bending, flexion, extension rotation bilateral and is painful.     Extremities:    Tremors:None bilaterally upper and lower  Range of motion:Generally normal shoulders  Intact:Yes  Cyanosis:none  Edema:Normal      Neurological:    Cranial nerves:normal  Sensory:diminished to light lateral aspect of right leg                   Dermatology:    Skin:no unusual rashes, no skin lesions, no palpable subcutaneous nodules and good skin turgor    Assessment/Plan:      Chronic low back pain with radiation to the Right groin, patient had low back surgery 08/2017 L3-5 fusion, recently had lumbar spine

## 2022-06-01 ENCOUNTER — TELEPHONE (OUTPATIENT)
Dept: PAIN MANAGEMENT | Age: 67
End: 2022-06-01

## 2022-06-06 DIAGNOSIS — I10 ESSENTIAL HYPERTENSION: ICD-10-CM

## 2022-06-06 NOTE — PROGRESS NOTES
[facility]  INSTRUCTIONS  . .......................................................................................................................................... [x] Parking the day of Surgery is located in the [facility].   Upon entering the door, make immediate right into the surgery reception room    [x]  Bring photo ID and insurance card     [x] You may have a light breakfast day of procedure    [x]  Wear loose comfortable clothing    [x]  Please follow instructions for medications as given per Dr's office    [x] You can expect a call the business day prior to procedure to notify you of your arrival time     [] Please arrange for     []  Other instructions

## 2022-06-06 NOTE — TELEPHONE ENCOUNTER
Last Appointment:  4/15/2022  Future Appointments   Date Time Provider Becky Zheng   6/20/2022  1:00 PM Carter Koo MD [address] [place] [address]   6/21/2022 12:45 PM Martín Rodriges PAIN [facility]   7/12/2022 11:45 AM Warden Micheal DPM [facility]

## 2022-06-07 RX ORDER — HYDROCHLOROTHIAZIDE 12.5 MG/1
CAPSULE, GELATIN COATED ORAL
Qty: 90 CAPSULE | Refills: 1 | Status: SHIPPED | OUTPATIENT
Start: 2022-06-07

## 2022-06-09 ENCOUNTER — HOSPITAL ENCOUNTER (OUTPATIENT)
Dept: GENERAL RADIOLOGY | Age: 67
Setting detail: OUTPATIENT SURGERY
Discharge: HOME | End: 2022-06-11
Attending: PAIN MEDICINE
Payer: MEDICARE

## 2022-06-09 ENCOUNTER — HOSPITAL ENCOUNTER (OUTPATIENT)
Age: 67
Setting detail: OUTPATIENT SURGERY
Discharge: HOME | End: 2022-06-09
Attending: PAIN MEDICINE | Admitting: PAIN MEDICINE
Payer: MEDICARE

## 2022-06-09 VITALS
RESPIRATION RATE: 18 BRPM | HEIGHT: 65 IN | OXYGEN SATURATION: 97 % | DIASTOLIC BLOOD PRESSURE: 95 MMHG | WEIGHT: 209 LBS | SYSTOLIC BLOOD PRESSURE: 175 MMHG | BODY MASS INDEX: 34.82 KG/M2 | TEMPERATURE: 97.7 F | HEART RATE: 80 BPM

## 2022-06-09 DIAGNOSIS — R52 PAIN MANAGEMENT: ICD-10-CM

## 2022-06-09 PROCEDURE — 7100000010 HC PHASE II RECOVERY - FIRST 15 MIN: Performed by: PAIN MEDICINE

## 2022-06-09 PROCEDURE — 2500000003 HC RX 250 WO HCPCS: Performed by: PAIN MEDICINE

## 2022-06-09 PROCEDURE — 62323 NJX INTERLAMINAR LMBR/SAC: CPT | Performed by: PAIN MEDICINE

## 2022-06-09 PROCEDURE — 7100000011 HC PHASE II RECOVERY - ADDTL 15 MIN: Performed by: PAIN MEDICINE

## 2022-06-09 PROCEDURE — 6360000002 HC RX W HCPCS: Performed by: PAIN MEDICINE

## 2022-06-09 PROCEDURE — 6360000004 HC RX CONTRAST MEDICATION: Performed by: PAIN MEDICINE

## 2022-06-09 PROCEDURE — 3600000002 HC SURGERY LEVEL 2 BASE: Performed by: PAIN MEDICINE

## 2022-06-09 PROCEDURE — 2709999900 HC NON-CHARGEABLE SUPPLY: Performed by: PAIN MEDICINE

## 2022-06-09 PROCEDURE — 3209999900 FLUORO FOR SURGICAL PROCEDURES

## 2022-06-09 RX ORDER — METHYLPREDNISOLONE ACETATE 40 MG/ML
INJECTION, SUSPENSION INTRA-ARTICULAR; INTRALESIONAL; INTRAMUSCULAR; SOFT TISSUE PRN
Status: DISCONTINUED | OUTPATIENT
Start: 2022-06-09 | End: 2022-06-09 | Stop reason: ALTCHOICE

## 2022-06-09 RX ORDER — LIDOCAINE HYDROCHLORIDE 5 MG/ML
INJECTION, SOLUTION INFILTRATION; INTRAVENOUS PRN
Status: DISCONTINUED | OUTPATIENT
Start: 2022-06-09 | End: 2022-06-09 | Stop reason: ALTCHOICE

## 2022-06-09 ASSESSMENT — PAIN SCALES - GENERAL
PAINLEVEL_OUTOF10: 8
PAINLEVEL_OUTOF10: 6
PAINLEVEL_OUTOF10: 5

## 2022-06-09 ASSESSMENT — PAIN - FUNCTIONAL ASSESSMENT: PAIN_FUNCTIONAL_ASSESSMENT: 0-10

## 2022-06-09 ASSESSMENT — PAIN DESCRIPTION - DESCRIPTORS: DESCRIPTORS: DISCOMFORT

## 2022-06-09 NOTE — H&P
[facility] Pain Management        [address] [facility] [facility]  Dept: [phone]    Procedure History & Physical      Rexie Angry     HPI:    Patient  is here for LBP for L5-S1 MARIELENA  Labs/imaging studies reviewed   All question and concerns addressed including R/B/A associated with the procedure    Past Medical History:   Diagnosis Date    Cancer ([facility] [address] 12/2019    LESION REMOVED [facility]    Chronic back pain     Costochondritis     h/o    Depression     Falls     Last fall Feb 2021    Fibromyalgia     Hallux rigidus of left foot     HTN (hypertension)     Hyperlipidemia     CLYDE on CPAP     Osteoarthritis     \"everywhere\"    Rheumatoid arthritis ([address]     \"As a child. \"    Rheumatoid arthritis(714.0)     TIA (transient ischemic attack)     no deficits        Past Surgical History:   Procedure Laterality Date    ANESTHESIA NERVE BLOCK Left 02/26/2019    LEFT C3,4,5 MBB performed by Keli Adhikari MD at [facility] Right 08/12/2019    BILATERAL TRANSFORAMINAL EPIDURAL STEROID INJECTION S1 #3 performed by Keli Adhikari MD at [address] [facility] Right 06/16/2020    RIGHT SACROILIAC JOINT INJECTION      CPT: 79665 performed by Rachael Cuenca DO at [address]  2005    Left    ARTHRODESIS Left 12/14/2018    ARTHRODESIS 2ND DIGIT LEFT FOOT performed by Vasiliy Aleman DPM at [address] [place] [address]  08/16/2017    PLIF L3-L4, L4-L5 with rods, screws, and cages/Dr. Pam Hope BACK SURGERY  03/04/2020    BACK SURGERY Right 03/09/2021    RIGHT PERCUTANEOUS SACROILIAC JOINT FUSION performed by Kriss Díaz MD at [facility]  2010    reduction, [address]    CHOLECYSTECTOMY  2011    COLONOSCOPY  03/27/2015    COLONOSCOPY N/A 08/19/2020    COLONOSCOPY DIAGNOSTIC performed by Lance Adame MD at [address] [place] [address]  03/2021    SI Joint    ENDOSCOPY, COLON, DIAGNOSTIC      EPIDURAL STEROID INJECTION N/A 06/04/2019    BILATERAL TRANSFORAMINAL EPIDURAL STEROID INJECTION S1 performed by Myesha Watkins DO at [address]      Left    [address] / [facility] Left 12/14/2018    REMOVAL OF PAINFUL HARDWARE LEFT FOOT  ++SYNTHES++ performed by Yousuf Rodas DPM at [facility]    inguinal     LUMBAR SPINE SURGERY N/A 03/04/2020    EXPLORATION OF PRIOR L3-L5 FUSION AND L2-L3  POSTERIOR LUMBAR INTERBODY FUSION -- NEEDS O-ARM, AUDIOLOGY, CAGES, PLATES, SCREWS, C-ARM, TERESA TABLE, CELL SAVER, PLATELET GEL -- GLOBUS performed by Lukasz Muniz MD at [address] N/A 10/21/2020    EXCISION OF TONGUE LESION performed by Derik Zuniga DO at [address] [facility] Bilateral 05/07/2018    L1-2 lumbar foramen #1    NERVE BLOCK Bilateral 12/03/2018    s1 tfesi    NERVE BLOCK Bilateral 08/12/2019    NERVE BLOCK Right 09/30/2019    sacral radiofrequency    NERVE BLOCK Right 09/01/2020    RIGHT SACROILIAC JOINT INJECTION #2 performed by Myesha Watkins DO at [address] [facility] Left 09/25/2013    left foot tarso metatarsal joint injection    OTHER SURGICAL HISTORY Left 05/27/2015    Endoscopic Gastroc recession left, Lapidus left foot and  Excision of exostosis left foot    PAIN MANAGEMENT PROCEDURE Left 7/27/2021    LEFT L5-S1 TRANSFORAMINAL EPIDURAL STEROID INJECTION performed by Myesha Watkins DO at [address] [facility] Left 3/29/2022    LEFT TRANSFORAMINAL EPIDURAL STEROID INJECTION AT L5 AND S1 performed by Myesha Watknis DO at [address] AA&/STRD TFRML EPI LUMBAR/SACRAL 1 LEVEL Bilateral 12/03/2018    BILATERAL S1  EPIDURAL STEROID INJECTION performed by Madhavi Coley MD at [address] [facility] DX/THER SBST INTRLMNR LMBR/SAC W/IMG GDN N/A 08/21/2018    EPIDURAL STEROID INJECTION L1-2 WITH LOW VOL performed by Madhavi Coley MD at [address] [facility] NOSE/CLEFT LIP/TIP Bilateral 05/07/2018    BILATERAL L1-2 LUMBAR FORAMEN #1 performed by Keli Adhikari MD at [address] NOSE/CLEFT LIP/TIP Bilateral 06/04/2018    BILATERAL TRANSFORAMINAL EPIDURAL STEROID INJECTION UNDER FLUOROSCOPIC GUIDANCE @ FORAMINAL LEVEL L1-2 #2 performed by Keli Adhikari MD at [address] Right 09/30/2019    RIGHT SACRAL RADIOFREQUENCY ABLATION performed by Keli Adhikari MD at [address]  [address], 2005    Big Left Toe    TOE SURGERY Left 2018    2nd toe     TONSILLECTOMY      WISDOM TOOTH EXTRACTION         Prior to Admission medications    Medication Sig Start Date End Date Taking? Authorizing Provider   hydroCHLOROthiazide (MICROZIDE) 12.5 MG capsule TAKE ONE CAPSULE BY MOUTH ONCE DAILY FOR BLOOD PRESSURE 6/7/22   Noah Ruiz MD   HYDROcodone-acetaminophen (NORCO) 5-325 MG per tablet Take 1 tablet by mouth 2 times daily for 30 days. 6/4/22 7/4/22  TREASURE Reid   gabapentin (NEURONTIN) 400 MG capsule Take 1 capsule by mouth 2 times daily for 30 days. 1/19/22 7/15/22  TREASURE Reid   lisinopril (PRINIVIL;ZESTRIL) 40 MG tablet Take 1 tablet by mouth every evening 12/17/21   Noah Ruiz MD   hydrOXYzine (VISTARIL) 25 MG capsule Take 1 capsule by mouth 3 times daily as needed for Anxiety 8/7/21   Noah Ruiz MD   atorvastatin (LIPITOR) 40 MG tablet Take 1 tablet by mouth daily 8/7/21   Noah Ruiz MD   Handicap Placard MISC DX:  Loss of Balance, Chronic low back pain, s/p back surgery.    Duration of 5 years 2/22/21   Noah Ruiz MD       Allergies   Allergen Reactions    Pcn [Penicillins] Anaphylaxis       Social History     Socioeconomic History    Marital status:      Spouse name: Not on file    Number of children: Not on file    Years of education: Not on file    Highest education level: Not on file   Occupational History    Not on file Tobacco Use    Smoking status: Former Smoker     Packs/day: 0.50     Years: 30.00     Pack years: 15.00     Types: Cigarettes    Smokeless tobacco: Never Used   Vaping Use    Vaping Use: Never used   Substance and Sexual Activity    Alcohol use: Not Currently     Alcohol/week: 0.0 standard drinks     Comment: rarely    Drug use: No    Sexual activity: Not on file   Other Topics Concern    Not on file   Social History Narrative    Not on file     Social Determinants of Health     Financial Resource Strain:     Difficulty of Paying Living Expenses: Not on file   Food Insecurity:     Worried About Running Out of Food in the Last Year: Not on file    Titi of Food in the Last Year: Not on file   Transportation Needs:     Lack of Transportation (Medical): Not on file    Lack of Transportation (Non-Medical):  Not on file   Physical Activity:     Days of Exercise per Week: Not on file    Minutes of Exercise per Session: Not on file   Stress:     Feeling of Stress : Not on file   Social Connections:     Frequency of Communication with Friends and Family: Not on file    Frequency of Social Gatherings with Friends and Family: Not on file    Attends Holiness Services: Not on file    Active Member of [address] [place] [address] [employer] or Organizations: Not on file    Attends Club or Organization Meetings: Not on file    Marital Status: Not on file   Intimate Partner Violence:     Fear of Current or Ex-Partner: Not on file    Emotionally Abused: Not on file    Physically Abused: Not on file    Sexually Abused: Not on file   Housing Stability:     Unable to Pay for Housing in the Last Year: Not on file    Number of [place] in the Last Year: Not on file    Unstable Housing in the Last Year: Not on file       Family History   Problem Relation Age of Onset    Dementia Mother     Breast Cancer Mother     Cancer Mother         breast cancer [de-identified]     Stroke Mother     Heart Attack Father     Other Father 80        Aortic aneurysm    Cancer Brother     Diabetes Brother          REVIEW OF SYSTEMS:    CONSTITUTIONAL:  negative for  fevers, chills, sweats and fatigue    RESPIRATORY:  negative for  dry cough, cough with sputum, dyspnea, wheezing and chest pain    CARDIOVASCULAR:  negative for chest pain, dyspnea, palpitations, syncope    GASTROINTESTINAL:  negative for nausea, vomiting, change in bowel habits, diarrhea, constipation and abdominal pain    MUSCULOSKELETAL: negative for muscle weakness    SKIN: negative for itching or rashes. BEHAVIOR/PSYCH:  negative for poor appetite, increased appetite, decreased sleep and poor concentration    All other systems negative      PHYSICAL EXAM:    VITALS:  BP (!) 170/82   Pulse 87   Temp 97.5 °F (36.4 °C) (Temporal)   Resp 18   Ht 5' 5\" (1.651 m)   Wt 209 lb (94.8 kg)   LMP  (LMP Unknown)   SpO2 98%   BMI 34.78 kg/m²     CONSTITUTIONAL:  awake, alert, cooperative, no apparent distress, and appears stated age    EYES: PERRLA, EOMI    LUNGS:  No increased work of breathing, no audible wheezing    CARDIOVASCULAR:  regular rate and rhythm    ABDOMEN:  Soft non tender non distended     EXTREMITIES: no signs of clubbing or cyanosis. MUSCULOSKELETAL: negative for flaccid muscle tone or spastic movements. SKIN: gross examination reveals no signs of rashes, or diaphoresis. NEURO: Cranial nerves II-XII grossly intact. No signs of agitated mood.        Assessment/Plan:    LBP pain for L5-S1 MARIELENA

## 2022-06-09 NOTE — OP NOTE
2022    Patient: Andrey Agrawal  :  1955  Age:  74 y.o. Sex:  female     PRE-OPERATIVE DIAGNOSIS: Lumbar disc displacement, lumbar radiculopathy. POST-OPERATIVE DIAGNOSIS: Same. PROCEDURE: Fluoroscopic guided therapeutic lumbar epidural steroid injection at the L5-S1 level (# 2). SURGEON: ZARA Boyle. ANESTHESIA: local    ESTIMATED BLOOD LOSS: None.  __________________________________________________    BRIEF HISTORY:  Andrey Agrawal comes in today for second lumbar epidural injection at L5-S1 level. The potential complications of this procedure were discussed with her again today. She has elected to undergo the aforementioned procedure. Lyssa complete History & Physical examination were reviewed in depth, a copy of which is in the chart. DESCRIPTION OF PROCEDURE:    After confirming written and informed consent, a time-out was performed and Lyssa name and date of birth, the procedure to be performed as well as the plan for the location of the needle insertion were confirmed. The patient was brought into the procedure room and placed in the prone position on the fluoroscopy table. A pillow was placed under the patient's lower abdomen/upper pelvis to increase lumbar interlaminar space. Standard monitors were placed, and vital signs were observed throughout the procedure. The area of the lumbar spine was prepped with chloraprep and draped in a sterile manner. The L5-S1 interspace was identified and marked under AP fluoroscopy. The skin and subcutaneous tissues at the above level were anesthestized with 0.5% lidocaine. With intermittent fluoroscopy, an # 18 gauge 3 1/2 tuohy epidural needle was inserted and directed toward the interlaminar space. The needle was slowly advanced using loss of resistance technique and 5 cc glass syringe until the tip of the epidural needle has passed through the ligamentum flavum and entered the epidural space.  AP and lateral fluoroscopic imaging was performed to verify that the epidural needle was properly placed. Negative aspiration of blood and CSF was confirmed. Two ml of Isovue 300 was used for confirmation of even epidural spread under both live lateral and live AP fluoroscopy. After negative aspiration, a solution of 0.5 % Lidocaine 3 ml and 40 mg DepoMedrol was easily injected. The needle was gently removed intact . The patient's back was cleaned and a Band-Aid was placed over the needle insertion point. Disposition the patient tolerated the procedure well and there were no complications . Vital signs remained stable throughout the procedure. The patient was escorted to the recovery area where they remained until discharge and written discharge instructions for the procedure were given. Plan: Antonio Cullen will return to our pain management center as scheduled.      Heidi Shipley, DO

## 2022-06-14 ENCOUNTER — PREP FOR PROCEDURE (OUTPATIENT)
Dept: PODIATRY | Age: 67
End: 2022-06-14

## 2022-06-14 RX ORDER — SODIUM CHLORIDE 9 MG/ML
INJECTION, SOLUTION INTRAVENOUS PRN
Status: CANCELLED | OUTPATIENT
Start: 2022-06-14

## 2022-06-14 RX ORDER — SODIUM CHLORIDE 0.9 % (FLUSH) 0.9 %
5-40 SYRINGE (ML) INJECTION PRN
Status: CANCELLED | OUTPATIENT
Start: 2022-06-14

## 2022-06-14 RX ORDER — SODIUM CHLORIDE 0.9 % (FLUSH) 0.9 %
5-40 SYRINGE (ML) INJECTION EVERY 12 HOURS SCHEDULED
Status: CANCELLED | OUTPATIENT
Start: 2022-06-14

## 2022-06-20 ENCOUNTER — OFFICE VISIT (OUTPATIENT)
Dept: FAMILY MEDICINE CLINIC | Age: 67
End: 2022-06-20
Payer: MEDICARE

## 2022-06-20 VITALS
WEIGHT: 207 LBS | BODY MASS INDEX: 34.49 KG/M2 | OXYGEN SATURATION: 98 % | SYSTOLIC BLOOD PRESSURE: 124 MMHG | TEMPERATURE: 97.8 F | HEART RATE: 95 BPM | HEIGHT: 65 IN | DIASTOLIC BLOOD PRESSURE: 72 MMHG

## 2022-06-20 DIAGNOSIS — E78.5 HYPERLIPIDEMIA, UNSPECIFIED HYPERLIPIDEMIA TYPE: ICD-10-CM

## 2022-06-20 DIAGNOSIS — G89.29 OTHER CHRONIC PAIN: ICD-10-CM

## 2022-06-20 DIAGNOSIS — E11.9 TYPE 2 DIABETES MELLITUS WITHOUT COMPLICATION, WITHOUT LONG-TERM CURRENT USE OF INSULIN (HCC): Primary | ICD-10-CM

## 2022-06-20 DIAGNOSIS — I10 ESSENTIAL HYPERTENSION: ICD-10-CM

## 2022-06-20 DIAGNOSIS — Z01.818 PRE-OP EVALUATION: ICD-10-CM

## 2022-06-20 DIAGNOSIS — E11.9 TYPE 2 DIABETES MELLITUS WITHOUT COMPLICATION, WITHOUT LONG-TERM CURRENT USE OF INSULIN (HCC): ICD-10-CM

## 2022-06-20 LAB
ALBUMIN SERPL-MCNC: 4.5 G/DL (ref 3.5–5.2)
ALP BLD-CCNC: 134 U/L (ref 35–104)
ALT SERPL-CCNC: 17 U/L (ref 0–32)
ANION GAP SERPL CALCULATED.3IONS-SCNC: 23 MMOL/L (ref 7–16)
AST SERPL-CCNC: 23 U/L (ref 0–31)
BASOPHILS ABSOLUTE: 0.07 E9/L (ref 0–0.2)
BASOPHILS RELATIVE PERCENT: 0.6 % (ref 0–2)
BILIRUB SERPL-MCNC: 0.6 MG/DL (ref 0–1.2)
BUN BLDV-MCNC: 13 MG/DL (ref 6–23)
CALCIUM SERPL-MCNC: 9.5 MG/DL (ref 8.6–10.2)
CHLORIDE BLD-SCNC: 102 MMOL/L (ref 98–107)
CHOLESTEROL, TOTAL: 174 MG/DL (ref 0–199)
CO2: 17 MMOL/L (ref 22–29)
CREAT SERPL-MCNC: 0.9 MG/DL (ref 0.5–1)
CREATININE URINE: 98 MG/DL (ref 29–226)
EOSINOPHILS ABSOLUTE: 0.14 E9/L (ref 0.05–0.5)
EOSINOPHILS RELATIVE PERCENT: 1.2 % (ref 0–6)
GFR AFRICAN AMERICAN: >60
GFR NON-AFRICAN AMERICAN: >60 ML/MIN/1.73
GLUCOSE BLD-MCNC: 107 MG/DL (ref 74–99)
HBA1C MFR BLD: 7.6 % (ref 4–5.6)
HCT VFR BLD CALC: 45 % (ref 34–48)
HDLC SERPL-MCNC: 43 MG/DL
HEMOGLOBIN: 14.7 G/DL (ref 11.5–15.5)
IMMATURE GRANULOCYTES #: 0.04 E9/L
IMMATURE GRANULOCYTES %: 0.3 % (ref 0–5)
LDL CHOLESTEROL CALCULATED: 97 MG/DL (ref 0–99)
LYMPHOCYTES ABSOLUTE: 3.27 E9/L (ref 1.5–4)
LYMPHOCYTES RELATIVE PERCENT: 28 % (ref 20–42)
MCH RBC QN AUTO: 33.9 PG (ref 26–35)
MCHC RBC AUTO-ENTMCNC: 32.7 % (ref 32–34.5)
MCV RBC AUTO: 103.7 FL (ref 80–99.9)
MICROALBUMIN UR-MCNC: <12 MG/L
MICROALBUMIN/CREAT UR-RTO: ABNORMAL (ref 0–30)
MONOCYTES ABSOLUTE: 0.78 E9/L (ref 0.1–0.95)
MONOCYTES RELATIVE PERCENT: 6.7 % (ref 2–12)
NEUTROPHILS ABSOLUTE: 7.37 E9/L (ref 1.8–7.3)
NEUTROPHILS RELATIVE PERCENT: 63.2 % (ref 43–80)
PDW BLD-RTO: 12.8 FL (ref 11.5–15)
PLATELET # BLD: 274 E9/L (ref 130–450)
PMV BLD AUTO: 11.5 FL (ref 7–12)
POTASSIUM SERPL-SCNC: 3.8 MMOL/L (ref 3.5–5)
RBC # BLD: 4.34 E12/L (ref 3.5–5.5)
SODIUM BLD-SCNC: 142 MMOL/L (ref 132–146)
TOTAL PROTEIN: 7 G/DL (ref 6.4–8.3)
TRIGL SERPL-MCNC: 168 MG/DL (ref 0–149)
VLDLC SERPL CALC-MCNC: 34 MG/DL
WBC # BLD: 11.7 E9/L (ref 4.5–11.5)

## 2022-06-20 PROCEDURE — 93000 ELECTROCARDIOGRAM COMPLETE: CPT | Performed by: FAMILY MEDICINE

## 2022-06-20 PROCEDURE — 99214 OFFICE O/P EST MOD 30 MIN: CPT | Performed by: FAMILY MEDICINE

## 2022-06-20 PROCEDURE — 1123F ACP DISCUSS/DSCN MKR DOCD: CPT | Performed by: FAMILY MEDICINE

## 2022-06-20 RX ORDER — LISINOPRIL 40 MG/1
40 TABLET ORAL EVERY EVENING
Qty: 90 TABLET | Refills: 1 | Status: SHIPPED | OUTPATIENT
Start: 2022-06-20

## 2022-06-20 RX ORDER — ATORVASTATIN CALCIUM 40 MG/1
40 TABLET, FILM COATED ORAL DAILY
Qty: 90 TABLET | Refills: 3 | Status: SHIPPED | OUTPATIENT
Start: 2022-06-20

## 2022-06-20 RX ORDER — GABAPENTIN 400 MG/1
400 CAPSULE ORAL 2 TIMES DAILY
Qty: 60 CAPSULE | Refills: 2 | Status: SHIPPED
Start: 2022-06-20 | End: 2022-09-06 | Stop reason: SDUPTHER

## 2022-06-20 NOTE — PROGRESS NOTES
[address]  [address]  [place] [facility], [facility]    Follow up Note      Cristhian Houser     Date of Visit:  2022    CC:  Patient presents for follow up   Chief Complaint   Patient presents with    Follow Up After Procedure     lumbar epidural steroid injection at the L5-S1 level (# 2)    Lower Back Pain       HPI:    Pain is somewhat better to lower back. Continues with right sided thoracic myofascial pain. Change in quality of symptoms:no. Patient satisfaction with analgesia:fair. Medication side effects: None. Recent diagnostic testing:none. Recent interventional procedures: 2022 PROCEDURE: Fluoroscopic guided therapeutic lumbar epidural steroid injection at the L5-S1 level (# 2) - 75% better. .    She has been on anticoagulation medications to include NSAIDS. The patient  has not been on herbal supplements. The patient is diabetic. Imagin2022 Thoracic Spine X-ray    FINDINGS:   Thoracic vertebral bodies are normal in height and alignment.  Multilevel   degenerative changes.  No evidence of fracture. Pedicles are symmetric and   intact.       Visualized lungs are clear.           Impression   No acute abnormality of the thoracic spine       Multilevel degenerative changes. 2021 CT lumbar myelogram    FINDINGS:   BONES/ALIGNMENT: There is minimal levocurvature of the lumbar spine.  There   is posterior fixation from L2-L4 without evidence for hardware complication.    The vertebral body heights are maintained.  There is minimal retrolisthesis   of L2 on L3.       SOFT TISSUES: The posterior paraspinal soft tissues are unremarkable.  The   visualized abdominal structures are unremarkable.  The conus is normal in   caliber and terminates at L1.  The cauda equina is unremarkable.       L1-L2: There is no significant disc protrusion, central spinal canal stenosis   or neural foraminal narrowing.       L2-L3: There is artificial disc material with posterior laminectomy.  There   is no canal stenosis or left foraminal narrowing.  There is moderate right   foraminal narrowing.       L3-L4: There is artificial disc material and posterior laminectomy.  There is   no canal stenosis.  There is mild bilateral foraminal narrowing.       L4-L5: There is artificial disc material with posterior laminectomy.  There   is no canal stenosis.  There is mild left and moderate right foraminal   narrowing.       L5-S1: There is a circumferential disc bulge with facet hypertrophy.  There   is no canal stenosis.  There is moderate right and severe left foraminal   narrowing.           Impression   Posterior fixation and laminectomy from L2-L4 without evidence for hardware   complication.       Multilevel degenerative change with bilateral foraminal narrowing as   described above.               MRI lumbar spine 2018  1. No significant interval change is observed since the previous study   of 2017.       2. Stable postoperative changes/posterior spinal fusion at the   L3-L4-L5 level.       3. Severe spine canal stenosis in the level of L2-L3           4. Encroachment of the neural foramina bilaterally in levels of L2-L3   and L5-S1      Thoracic spine MRI 2018  1. Some degenerative changes in the thoracic spine, mainly in the   facet joints in the mid-upper thoracic spine segments       2. Discrete loss of height of T6, more likely an old finding.      Previous treatments: Physical Therapy, Surgery L3-5 fusion/laminectomy and medications. .       Potential Aberrant Drug-Related Behavior:  No     Urine Drug Screenin18:  Consistent for norhydrocodone metabolite  2018:  Consistent for hydrocodone and norhydrocodone  05/10/2019:  Consistent for hydrocodone and norhydrocodone  2019:  Consistent for hydrocodone and norhydrocodone  01/10/2020:  Consistent for hydrocodone and norhydrocodone  2020:  Consistent for oxycodone and metabolites s/p surgery. Inconsistent for hydrocodone. States that she was instructed to take her old norco until she made the appointment to resume her chronic pain medications. 10/2020:  Consistent  2021:  Consistent  2022:  Consistent     OARRS report:  2018 consistent to 2021 consistent (norco scripts from [facility] post op 3/10/2021 and 3/24/2021. [place] script through [facility] - last one 2021 - consistent to 2022 consistent     Opioid agreement:  10/18/2021      Past Medical History:   Diagnosis Date    Cancer ([facility] [address] 2019    LESION REMOVED UNDER [facility]    Chronic back pain     Costochondritis     h/o    Depression     Falls     Last fall 2021    Fibromyalgia     Hallux rigidus of left foot     HTN (hypertension)     Hyperlipidemia     CLYDE on CPAP     Osteoarthritis     \"everywhere\"    Rheumatoid arthritis ([facility] [address]     \"As a child. \"    Rheumatoid arthritis(714.0)     TIA (transient ischemic attack)     no deficits        Past Surgical History:   Procedure Laterality Date    ANESTHESIA NERVE BLOCK Left 2019    LEFT C3,4,5 MBB performed by Ernestina Davis MD at [address] Right 2019    BILATERAL TRANSFORAMINAL EPIDURAL STEROID INJECTION S1 #3 performed by Ernestina Davis MD at [address] Right 2020    RIGHT SACROILIAC JOINT INJECTION      CPT: 34782 performed by Stephanie Giordano DO at [address]      Left    ARTHRODESIS Left 2018    ARTHRODESIS 2ND DIGIT LEFT FOOT performed by Shirley Shannon DPM at [facility]  2017    PLIF L3-L4, L4-L5 with rods, screws, and cages/Dr. Garnett Second BACK SURGERY  2020    BACK SURGERY Right 2021    RIGHT PERCUTANEOUS SACROILIAC JOINT FUSION performed by Kevin Galindo MD at [facility]      reduction, bilat     SECTION      CHOLECYSTECTOMY      COLONOSCOPY  2015    COLONOSCOPY N/A 08/19/2020    COLONOSCOPY DIAGNOSTIC performed by Consuelo Snell MD at [address] [facility]  03/2021    SI Joint    ENDOSCOPY, COLON, DIAGNOSTIC      EPIDURAL STEROID INJECTION N/A 06/04/2019    BILATERAL TRANSFORAMINAL EPIDURAL STEROID INJECTION S1 performed by Nile De La Rosa DO at [address]      Left    [address] / ANKLE Left 12/14/2018    REMOVAL OF PAINFUL HARDWARE LEFT FOOT  ++SYNTHES++ performed by Marquis Devin DPM at [facility]    inguinal     LUMBAR SPINE SURGERY N/A 03/04/2020    EXPLORATION OF PRIOR L3-L5 FUSION AND L2-L3  POSTERIOR LUMBAR INTERBODY FUSION -- NEEDS O-ARM, AUDIOLOGY, CAGES, PLATES, SCREWS, C-ARM, TERESA TABLE, CELL SAVER, PLATELET GEL -- GLOBUS performed by Frances Fowler MD at [address] N/A 10/21/2020    EXCISION OF TONGUE LESION performed by James Sanz DO at [address] [facility] [address] Bilateral 05/07/2018    L1-2 lumbar foramen #1    NERVE BLOCK Bilateral 12/03/2018    s1 tfesi    NERVE BLOCK Bilateral 08/12/2019    NERVE BLOCK Right 09/30/2019    sacral radiofrequency    NERVE BLOCK Right 09/01/2020    RIGHT SACROILIAC JOINT INJECTION #2 performed by Nile De La Rosa DO at [address] [facility] Left 09/25/2013    left foot tarso metatarsal joint injection    OTHER SURGICAL HISTORY Left 05/27/2015    Endoscopic Gastroc recession left, Lapidus left foot and  Excision of exostosis left foot    PAIN MANAGEMENT PROCEDURE Left 7/27/2021    LEFT L5-S1 TRANSFORAMINAL EPIDURAL STEROID INJECTION performed by Nile De La Rosa DO at [address] [facility] Left 3/29/2022    LEFT TRANSFORAMINAL EPIDURAL STEROID INJECTION AT L5 AND S1 performed by Nile De La Rosa DO at [address] [facility] N/A 6/9/2022    LUMBAR EPIDURAL STEROID INJECTION L5-S1 performed by Nile De La Rosa DO at [address] AA&/STRD TFRML EPI LUMBAR/SACRAL 1 LEVEL Bilateral 12/03/2018    BILATERAL S1  EPIDURAL STEROID INJECTION performed by Althea Carrillo MD at [address] [facility] DX/THER SBST INTRLMNR LMBR/SAC W/IMG GDN N/A 08/21/2018    EPIDURAL STEROID INJECTION L1-2 WITH LOW VOL performed by Althea Carrillo MD at [address] [facility] NOSE/CLEFT LIP/TIP Bilateral 05/07/2018    BILATERAL L1-2 LUMBAR FORAMEN #1 performed by Althea Carrillo MD at [address] [facility] NOSE/CLEFT LIP/TIP Bilateral 06/04/2018    BILATERAL TRANSFORAMINAL EPIDURAL STEROID INJECTION UNDER FLUOROSCOPIC GUIDANCE @ FORAMINAL LEVEL L1-2 #2 performed by Althea Carrillo MD at [address] Right 09/30/2019    RIGHT SACRAL RADIOFREQUENCY ABLATION performed by Althea Carrillo MD at [address]  2000, 2005    Big Left Toe    TOE SURGERY Left 2018    2nd toe     TONSILLECTOMY      WISDOM TOOTH EXTRACTION         Prior to Admission medications    Medication Sig Start Date End Date Taking? Authorizing Provider   HYDROcodone-acetaminophen (NORCO) 5-325 MG per tablet Take 1 tablet by mouth 2 times daily for 6 days. 2 days earlier due to Sunday closure and holiday. 7/2/22 7/8/22 Yes TREASURE Arango   cyclobenzaprine (FLEXERIL) 10 MG tablet Take 0.5 tablets by mouth 2 times daily as needed for Muscle spasms 6/21/22 7/21/22 Yes TREASURE Arango   atorvastatin (LIPITOR) 40 MG tablet Take 1 tablet by mouth daily 6/20/22  Yes Maria A Hightower MD   lisinopril (PRINIVIL;ZESTRIL) 40 MG tablet Take 1 tablet by mouth every evening 6/20/22  Yes Maria A Hightower MD   gabapentin (NEURONTIN) 400 MG capsule Take 1 capsule by mouth 2 times daily for 30 days.  6/20/22 7/20/22 Yes Maria A Hightower MD   hydroCHLOROthiazide (MICROZIDE) 12.5 MG capsule TAKE ONE CAPSULE BY MOUTH ONCE DAILY FOR BLOOD PRESSURE 6/7/22  Yes Maria A Hightower MD   hydrOXYzine (VISTARIL) 25 MG capsule Take 1 capsule by mouth 3 times daily as needed for Anxiety 8/7/21  Yes Wesley St MD   Handicap Placard MISC DX:  Loss of Balance, Chronic low back pain, s/p back surgery. Duration of 5 years 2/22/21  Yes Wesley St MD       Allergies   Allergen Reactions    Pcn [Penicillins] Anaphylaxis       Social History     Socioeconomic History    Marital status:      Spouse name: Not on file    Number of children: Not on file    Years of education: Not on file    Highest education level: Not on file   Occupational History    Not on file   Tobacco Use    Smoking status: Current Every Day Smoker     Packs/day: 0.50     Years: 30.00     Pack years: 15.00     Types: Cigarettes    Smokeless tobacco: Never Used   Vaping Use    Vaping Use: Never used   Substance and Sexual Activity    Alcohol use: Not Currently     Alcohol/week: 0.0 standard drinks     Comment: rarely    Drug use: No    Sexual activity: Not on file   Other Topics Concern    Not on file   Social History Narrative    Not on file     Social Determinants of Health     Financial Resource Strain:     Difficulty of Paying Living Expenses: Not on file   Food Insecurity:     Worried About Running Out of Food in the Last Year: Not on file    Titi of Food in the Last Year: Not on file   Transportation Needs:     Lack of Transportation (Medical): Not on file    Lack of Transportation (Non-Medical):  Not on file   Physical Activity:     Days of Exercise per Week: Not on file    Minutes of Exercise per Session: Not on file   Stress:     Feeling of Stress : Not on file   Social Connections:     Frequency of Communication with Friends and Family: Not on file    Frequency of Social Gatherings with Friends and Family: Not on file    Attends Mandaeism Services: Not on file    Active Member of Clubs or Organizations: Not on file    Attends Club or Organization Meetings: Not on file    Marital Status: Not on file   Intimate Partner Violence:     Fear of Current or Ex-Partner: Not on file    Emotionally Abused: Not on file    Physically Abused: Not on file    Sexually Abused: Not on file   Housing Stability:     Unable to Pay for Housing in the Last Year: Not on file    Number of Places Lived in the Last Year: Not on file    Unstable Housing in the Last Year: Not on file       Family History   Problem Relation Age of Onset    Dementia Mother     Breast Cancer Mother     Cancer Mother         breast cancer [de-identified]     Stroke Mother     Heart Attack Father     Other Father 80        Aortic aneurysm    Cancer Brother     Diabetes Brother        REVIEW OF SYSTEMS:     [facility] [place] denies fever/chills, chest pain, shortness of breath, new bowel or bladder complaints or suicidal ideations. All other review of systems was negative. PHYSICAL EXAMINATION:      BP (!) 137/93   Pulse 92   Temp 98.2 °F (36.8 °C) (Infrared)   Resp 16   LMP  (LMP Unknown)   SpO2 96%     General:      General appearance: awake, alert, oriented, in no acute distress, well developed, well nourished and in no acute distress   pleasant and well-hydrated. in no distress and A & O x3  Build:Overweight  Function:Rises from a seated position with difficulty    HEENT:    Head:normocephalic and atraumatic  Pupils:regular, round and equal.  Sclera: icterus absent  EOM:full and intact. Lungs:    Breathing:Normal expansion. No wheezing. Abdomen:    Shape:non-distended and normal      Lumbar spine:                Inspection:  Well-healed incision              Palpation:  + midline and paraspinal TTP  Range of motion:abnormal moderately Lateral bending, flexion, extension rotation bilateral and is painful.     Extremities:    Tremors:None bilaterally upper and lower  Range of motion:Generally normal shoulders  Intact:Yes  Cyanosis:none  Edema:Normal      Neurological:    Cranial nerves:normal  Sensory:diminished to light lateral aspect of right leg                   Dermatology:    Skin:no unusual rashes, no skin lesions, no palpable subcutaneous nodules and good skin turgor    Assessment/Plan:      Chronic low back pain with radiation to the Right groin, patient had low back surgery 08/2017 L3-5 fusion, recently had lumbar spine CT with severe L2-3 stenosis     Patient is having left ankle surgery on 7/8. Surgeon may freely prescribe during the post op period. May prescribe whatever you feel is appropriate for her surgery. We do not prescribe post op pain medication. I have only prescribed enough pain medication to get to her date of surgery. Plan:  Patient is s/p revision fusion L2-L4 on 3/4/2020. Patient is s/p:  Right sacroiliac joint fusion with use of SI-BONE iFuse implant on 3/9/2021 by Dr. Soha Whitehead. Patient is s/p:  Left TFESI L5 and S1 on 03/29/2022 with 75% relief. LESI L5-S1 on 06/09/2022 with 75% relief. She will call if she would like right thoracic TPIs. Continue norco 5/325 BID. Continue with Gabapentin 400 mg BID. She reports that any increases make her off balance. Refilled by PCP. OARRS report reviewed 06/2022  Continue flexeril 5 mg BID prn for right sided thoracic myofascial pain - Takes sparingly. RF today. Discussed holding off this medication while on post op pain medication. Patient encouraged to stay active and to lose weight. Failed physical therapy  Treatment plan discussed with the patient including medications side effects        Controlled Substance Monitoring:    Acute and Chronic Pain Monitoring:   RX Monitoring 6/21/2022   Attestation -   Acute Pain Prescriptions -   Periodic Controlled Substance Monitoring Possible medication side effects, risk of tolerance/dependence & alternative treatments discussed. ;No signs of potential drug abuse or diversion identified. ;Assessed functional status. ;Obtaining appropriate analgesic effect of treatment.    Chronic Pain > 80 MEDD -             ccreferring physic

## 2022-06-20 NOTE — PROGRESS NOTES
[facility]  Office Progress Note - Dr. Dhiraj Lopez  6/20/22    CC:   Chief Complaint   Patient presents with    Pre-op Exam     Left ankle surgery 07/08/22 with Dr. Galina Blevins        /72 (Site: Left Upper Arm, Position: Sitting, Cuff Size: Large Adult)   Pulse 95   Temp 97.8 °F (36.6 °C) (Temporal)   Ht 5' 5\" (1.651 m)   Wt 207 lb (93.9 kg)   LMP  (LMP Unknown)   SpO2 98%   BMI 34.45 kg/m²   Wt Readings from Last 3 Encounters:   06/20/22 207 lb (93.9 kg)   06/09/22 209 lb (94.8 kg)   05/31/22 209 lb (94.8 kg)       HPI: She is having elective surgery on her left ankle. She has been feeling well recently. ROS is negative. No CP, lightheadedness, angina, SOBOE, SOB. A particularly exertional day recently was grocery shopping, carrying the groceries, walking them to her apartment and putting them al away. Also vacuumed her apt. And was planting flowers in pots. No CP, pressure, or heaviness, just flare of her chronic back pain. She continues following with pain mgmt, had injections in her back about 2 weeks ago. Sister will be coming to help her for a few weeks after her upcoming surgery. No hx of bleeding problems. No hx blood clots. No hx of problems with anesethesia. Allergies   Allergen Reactions    Pcn [Penicillins] Anaphylaxis     Her BP is well controlled. Lab Results   Component Value Date    LABA1C 6.4 (H) 03/11/2021       _________________________________________________________    Assessment / Tori Eryn was seen today for pre-op exam.    Diagnoses and all orders for this visit:    Type 2 diabetes mellitus without complication, without long-term current use of insulin ([address]  -     CBC with Auto Differential; Future  -     Comprehensive Metabolic Panel; Future  -     Lipid Panel; Future  -     Hemoglobin A1C; Future  -     Microalbumin / Creatinine Urine Ratio; Future  Diet controlled but Glu on recent labs from May was high at 231.    Recheck labs and A1c. If >7 will start metformin. If >9 will start metformin and farxiga if possible. Hyperlipidemia, unspecified hyperlipidemia type  -     atorvastatin (LIPITOR) 40 MG tablet; Take 1 tablet by mouth daily  Continue statin for stability and update labs. Essential hypertension  -     lisinopril (PRINIVIL;ZESTRIL) 40 MG tablet; Take 1 tablet by mouth every evening  BP well controlled and Asx, continue same meds for stability. Other chronic pain  -     gabapentin (NEURONTIN) 400 MG capsule; Take 1 capsule by mouth 2 times daily for 30 days. Pre-op evaluation  -     EKG 12 lead; Future  No recent symptoms concerning for angina. Able to exert self to at least 4 METs w/o angina. No Hx of surgical complications, bleeding, or blood clots. No anesthesia problem. EKG reviewed and without concerning findings. NSR with normal axis and good r-wave progression. No significant Q-waves, ST elevations or depressions. Low voltage. OK to proceed with upcoming surgery as long as A1c <9.     3 months or as scheduled. Patient counseled to follow up sooner or seek more acute care if symptoms worsening or not improving according to plan. Electronically signed by Gwenith Oppenheim, MD on 6/20/2022    _________________________________________________________  Current Outpatient Medications on File Prior to Visit   Medication Sig Dispense Refill    hydroCHLOROthiazide (MICROZIDE) 12.5 MG capsule TAKE ONE CAPSULE BY MOUTH ONCE DAILY FOR BLOOD PRESSURE 90 capsule 1    HYDROcodone-acetaminophen (NORCO) 5-325 MG per tablet Take 1 tablet by mouth 2 times daily for 30 days. 60 tablet 0    hydrOXYzine (VISTARIL) 25 MG capsule Take 1 capsule by mouth 3 times daily as needed for Anxiety 270 capsule 1    Handicap Placard MISC DX:  Loss of Balance, Chronic low back pain, s/p back surgery. Duration of 5 years 1 each 0     No current facility-administered medications on file prior to visit.        Patient Active Problem List   Diagnosis Code    Loss of balance R26.89    Vertebrobasilar TIAs G45.0    Obesity E66.9    Disc displacement, lumbar M51.26    Peripheral neuropathy ([facility] G62.9    Type 2 diabetes mellitus without complication ([facility] Q90.1    Depression F32. A    Osteoarthritis M19.90    Chronic back pain M54.9, G89.29    Fibromyalgia M79.7    HTN (hypertension) I10    Hyperlipidemia E78.5    History of adenomatous polyp of colon Z86.010    Vitamin D deficiency E55.9    Coccyx pain M53.3    Acute bilateral low back pain with sciatica M54.40    Lumbar stenosis M48.061    Chronic bilateral low back pain without sciatica M54.50, G89.29    DDD (degenerative disc disease), lumbar M51.36    Spinal stenosis of lumbar region without neurogenic claudication M48.061    Lumbar facet arthropathy M47.816    Chronic pain syndrome G89.4    Lumbar radiculopathy M54.16    Neck pain M54.2    Left foot pain M79.672    Painful orthopaedic hardware [facility]) T84.84XA    Cervical facet joint syndrome M47.812    Cellulitis, neck L03.221    Disorder of sacrum M53.3    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    S/P lumbar spinal fusion Z98.1    Tongue lesion K14.8    Sacroiliac joint dysfunction M53.3     _________________________________________________________  Past Medical History:   Diagnosis Date    Cancer ([facility] 12/2019    LESION REMOVED UNDER TONGUE  DENTAL CLINIC    Chronic back pain     Costochondritis     h/o    Depression     Falls     Last fall Feb 2021    Fibromyalgia     Hallux rigidus of left foot     HTN (hypertension)     Hyperlipidemia     CLYDE on CPAP     Osteoarthritis     \"everywhere\"    Rheumatoid arthritis ([facility]     \"As a child. \"    Rheumatoid arthritis(714.0)     TIA (transient ischemic attack)     no deficits        Family History   Problem Relation Age of Onset    Dementia Mother     Breast Cancer Mother     Cancer Mother         breast cancer [de-identified]     Stroke Mother     Heart Attack Father     Other Father 80        Aortic aneurysm    Cancer Brother     Diabetes Brother        Past Surgical History:   Procedure Laterality Date    ANESTHESIA NERVE BLOCK Left 2019    LEFT C3,4,5 MBB performed by Jolynn Shipley MD at [facility] Right 2019    BILATERAL TRANSFORAMINAL EPIDURAL STEROID INJECTION S1 #3 performed by Jolynn Shipley MD at [facility] Right 2020    RIGHT SACROILIAC JOINT INJECTION      CPT: 88532 performed by Manuela Dance, DO at [address]  2005    Left    ARTHRODESIS Left 2018    ARTHRODESIS 2ND DIGIT LEFT FOOT performed by Tre Peters DPM at [facility]  2017    PLIF L3-L4, L4-L5 with rods, screws, and cages/Dr. Lansing Schwab BACK SURGERY  2020    BACK SURGERY Right 2021    RIGHT PERCUTANEOUS SACROILIAC JOINT FUSION performed by Jr Lockett MD at [facility]      reduction, bilat     SECTION      CHOLECYSTECTOMY      COLONOSCOPY  2015    COLONOSCOPY N/A 2020    COLONOSCOPY DIAGNOSTIC performed by Margaret Bolanos MD at [facility]  2021    SI Joint    ENDOSCOPY, COLON, DIAGNOSTIC      EPIDURAL STEROID INJECTION N/A 2019    BILATERAL TRANSFORAMINAL EPIDURAL STEROID INJECTION S1 performed by Manuela Dance, DO at [address]      Left    [address] / ANKLE Left 2018    REMOVAL OF PAINFUL HARDWARE LEFT FOOT  ++SYNTHES++ performed by Tre Peters DPM at [facility]    inguinal     LUMBAR SPINE SURGERY N/A 2020    EXPLORATION OF PRIOR L3-L5 FUSION AND L2-L3  POSTERIOR LUMBAR INTERBODY FUSION -- NEEDS O-ARM, AUDIOLOGY, CAGES, PLATES, SCREWS, C-ARM, TERESA TABLE, CELL SAVER, PLATELET GEL -- GLOBUS performed by Jr Lockett MD at [address] N/A 10/21/2020    EXCISION OF TONGUE LESION performed by Chante Liang DO at [address] [facility] Bilateral 05/07/2018    L1-2 lumbar foramen #1    NERVE BLOCK Bilateral 12/03/2018    s1 tfesi    NERVE BLOCK Bilateral 08/12/2019    NERVE BLOCK Right 09/30/2019    sacral radiofrequency    NERVE BLOCK Right 09/01/2020    RIGHT SACROILIAC JOINT INJECTION #2 performed by Kalyn Pearson DO at [address] Left 09/25/2013    left foot tarso metatarsal joint injection    OTHER SURGICAL HISTORY Left 05/27/2015    Endoscopic Gastroc recession left, Lapidus left foot and  Excision of exostosis left foot    PAIN MANAGEMENT PROCEDURE Left 7/27/2021    LEFT L5-S1 TRANSFORAMINAL EPIDURAL STEROID INJECTION performed by Kalyn Pearson DO at [facility] Left 3/29/2022    LEFT TRANSFORAMINAL EPIDURAL STEROID INJECTION AT L5 AND S1 performed by Kalyn Pearson DO at [facility] N/A 6/9/2022    LUMBAR EPIDURAL STEROID INJECTION L5-S1 performed by Kalyn Pearson DO at [address] AA&/STRD TFRML EPI LUMBAR/SACRAL 1 LEVEL Bilateral 12/03/2018    BILATERAL S1  EPIDURAL STEROID INJECTION performed by Manny Bo MD at [facility] DX/THER SBST INTRLMNR LMBR/SAC W/IMG GDN N/A 08/21/2018    EPIDURAL STEROID INJECTION L1-2 WITH LOW VOL performed by Manny Bo MD at [address] [facility] NOSE/CLEFT LIP/TIP Bilateral 05/07/2018    BILATERAL L1-2 LUMBAR FORAMEN #1 performed by Manny Bo MD at [address] [facility] NOSE/CLEFT LIP/TIP Bilateral 06/04/2018    BILATERAL TRANSFORAMINAL EPIDURAL STEROID INJECTION UNDER FLUOROSCOPIC GUIDANCE @ FORAMINAL LEVEL L1-2 #2 performed by Manny Bo MD at [address] Right 09/30/2019    RIGHT SACRAL RADIOFREQUENCY ABLATION performed by Manny Bo MD at [address]  2000, 2005    Big Left Toe    TOE SURGERY Left 2018    2nd toe     TONSILLECTOMY      WISDOM TOOTH EXTRACTION         Social History     Tobacco Use    Smoking status: Current Every Day Smoker     Packs/day: 0.50     Years: 30.00     Pack years: 15.00     Types: Cigarettes    Smokeless tobacco: Never Used   Vaping Use    Vaping Use: Never used   Substance Use Topics    Alcohol use: Not Currently     Alcohol/week: 0.0 standard drinks     Comment: rarely    Drug use: No       Chart reviewed and updated where appropriate for PMH, Fam, and Soc Hx.  _________________________________________________________  ROS: POSITIVE: As in the HPI. Otherwise Pertinent negatives are negative.    __________________________________________________________  Physical Exam   Constitutional:    She is oriented to person, place, and time. She appears well-developed and well-nourished. HENT:    Nose: Nose normal.    Mouth/Throat: Oropharynx is clear and moist. Mallampati 3. Eyes:    Conjunctivae are normal.    Pupils are equal, round, and reactive to light. EOMI. Neck:    Normal range of motion. No thyromegaly or nodules noted. No bruit. No LAD. Cardiovascular:    Normal rate, regular rhythm and normal heart sounds. No murmur. No gallop and no friction rub. Pulmonary/Chest:    Effort normal and breath sounds normal.    No wheezes. No rales or rhonchi. Abdominal:    Soft. Obese. Bowel sounds are normal.    No distension. No tenderness. Musculoskeletal:    Normal range of motion. No joint swelling noted. No peripheral edema. Skin:    Skin is warm and dry. No rashes, No lesions. Psychiatric:    She has a normal mood and affect. Normal groom and dress. No SI or HI.   ________________________________________________________    This note may have been created using dictation software.  Efforts were made to reduce errors, but some may persist.

## 2022-06-21 ENCOUNTER — OFFICE VISIT (OUTPATIENT)
Dept: PAIN MANAGEMENT | Age: 67
End: 2022-06-21
Payer: MEDICARE

## 2022-06-21 VITALS
HEART RATE: 92 BPM | SYSTOLIC BLOOD PRESSURE: 137 MMHG | DIASTOLIC BLOOD PRESSURE: 93 MMHG | TEMPERATURE: 98.2 F | OXYGEN SATURATION: 96 % | RESPIRATION RATE: 16 BRPM

## 2022-06-21 DIAGNOSIS — M54.6 THORACIC SPINE PAIN: ICD-10-CM

## 2022-06-21 DIAGNOSIS — M54.16 LUMBAR RADICULOPATHY: Primary | ICD-10-CM

## 2022-06-21 DIAGNOSIS — M51.36 DDD (DEGENERATIVE DISC DISEASE), LUMBAR: ICD-10-CM

## 2022-06-21 DIAGNOSIS — M48.061 SPINAL STENOSIS OF LUMBAR REGION WITHOUT NEUROGENIC CLAUDICATION: ICD-10-CM

## 2022-06-21 DIAGNOSIS — G89.29 CHRONIC MIDLINE LOW BACK PAIN WITH SCIATICA, SCIATICA LATERALITY UNSPECIFIED: ICD-10-CM

## 2022-06-21 DIAGNOSIS — M79.10 MYALGIA: ICD-10-CM

## 2022-06-21 DIAGNOSIS — M47.812 CERVICAL FACET JOINT SYNDROME: ICD-10-CM

## 2022-06-21 DIAGNOSIS — G89.4 CHRONIC PAIN SYNDROME: ICD-10-CM

## 2022-06-21 DIAGNOSIS — M54.40 CHRONIC MIDLINE LOW BACK PAIN WITH SCIATICA, SCIATICA LATERALITY UNSPECIFIED: ICD-10-CM

## 2022-06-21 DIAGNOSIS — M47.816 LUMBAR FACET ARTHROPATHY: ICD-10-CM

## 2022-06-21 PROCEDURE — 99213 OFFICE O/P EST LOW 20 MIN: CPT

## 2022-06-21 PROCEDURE — 1123F ACP DISCUSS/DSCN MKR DOCD: CPT | Performed by: PHYSICIAN ASSISTANT

## 2022-06-21 PROCEDURE — 99213 OFFICE O/P EST LOW 20 MIN: CPT | Performed by: PHYSICIAN ASSISTANT

## 2022-06-21 RX ORDER — HYDROCODONE BITARTRATE AND ACETAMINOPHEN 5; 325 MG/1; MG/1
1 TABLET ORAL 2 TIMES DAILY
Qty: 12 TABLET | Refills: 0 | Status: ON HOLD
Start: 2022-07-02 | End: 2022-07-08

## 2022-06-21 RX ORDER — CYCLOBENZAPRINE HCL 10 MG
5 TABLET ORAL 2 TIMES DAILY PRN
Qty: 60 TABLET | Refills: 0 | Status: SHIPPED | OUTPATIENT
Start: 2022-06-21 | End: 2022-07-21

## 2022-06-21 NOTE — PROGRESS NOTES
Do you currently have any of the following:    Fever: No  Headache:  No  Cough: No  Shortness of breath: No  Exposed to anyone with these symptoms: No         Nya Ac presents to the [address] [place] [address] on 6/21/2022. Rohini Verdin is complaining of pain in her lower back. The pain is persistent. The pain is described as stabbing and burning. Pain is rated on her best day at a 7, on her worst day at a 10, and on average at a 7 on the VAS scale. She took her last dose of Norco today. Any procedures since your last visit: Yes, lumbar epidural steroid injection at the L5-S1 level (# 2) with 80 % relief. Pacemaker or defibrillator: No managed by n/a. She is not on NSAIDS and is not on anticoagulation medications. Medication Contract and Consent for Opioid Use Documents Filed     Patient Documents     Type of Document Status Date Received Received By Description    Medication Contract Received  [employer] [employer] Opioid medication contract with Dr Sally Davis 3/24/20    Medication Contract Received 4/26/2018  3:50 PM ANYA NUNO PAIN MANAGEMENT PATIENT AGREEMENT 4/26/2018    Medication Contract Received 11/5/2020  4:23 PM EBER HONG Opioid medication contract with Dr Mainor Santillan Received 3/1/2021  1:26 PM Shelton Garcia Opioid medication contract with Dr Mainor Santillan Received 8/23/2021  2:03 PM Marybel Alonzo Medication contract    Medication Contract Received 10/18/2021  3:03 PM HAYLIE CADENA medication contract 10/18/2021                BP (!) 137/93   Pulse 92   Temp 98.2 °F (36.8 °C) (Infrared)   Resp 16   LMP  (LMP Unknown)   SpO2 96%      No LMP recorded (lmp unknown).  Patient is postmenopausal.

## 2022-06-28 ENCOUNTER — TELEPHONE (OUTPATIENT)
Dept: PAIN MANAGEMENT | Age: 67
End: 2022-06-28

## 2022-06-28 NOTE — TELEPHONE ENCOUNTER
Patient contacted office to advise that the cyclobenzaprine (FLEXERIL) 10 MG tablet is not covered under her insurance ([employer]) patient advised that her insurance allowed the current prescription to be filled and paid for. Patient was advised to contact her insurance company and ask for a prescription formulary so that the provider will see what the patients options are.

## 2022-07-07 ENCOUNTER — ANESTHESIA EVENT (OUTPATIENT)
Dept: OPERATING ROOM | Age: 67
End: 2022-07-07
Payer: MEDICARE

## 2022-07-07 NOTE — PROGRESS NOTES
[facility] PRE-ADMISSION TESTING INSTRUCTIONS    The Preadmission Testing patient is instructed accordingly using the following criteria (check applicable):    ARRIVAL INSTRUCTIONS:  [x] Parking the day of Surgery is located in the Main Entrance lot. Upon entering the door, make an immediate right to the surgery reception desk    [x] Bring photo ID and insurance card    [] Bring in a copy of Living will or Durable Power of  papers. [x] Please be sure to arrange for responsible adult to provide transportation to and from the hospital    [x] Please arrange for responsible adult to be with you for the 24 hour period post procedure due to having anesthesia    [x] If you awake am of surgery not feeling well or have temperature >100 please call [phone]    GENERAL INSTRUCTIONS:    [x] Nothing by mouth after midnight, including gum, candy, mints or water    [x] You may brush your teeth, but do not swallow any water    [x] Take medications as instructed with 1-2 oz of water    [] Stop herbal supplements and vitamins 5 days prior to procedure    [] Follow preop dosing of blood thinners per physician instructions    [] Take 1/2 dose of evening insulin, but no insulin after midnight    [x] No oral diabetic medications after midnight    [x] If diabetic and have low blood sugar or feel symptomatic, take 1-2oz apple juice only    [] Bring inhalers day of surgery    [] Bring C-PAP/ Bi-Pap day of surgery    [] Bring urine specimen day of surgery    [x] Shower or bath with soap, lather and rinse well, AM of Surgery, no lotion, powders or creams to surgical site    [] Follow bowel prep as instructed per surgeon    [x] No tobacco products within 24 hours of surgery     [x] No alcohol or illegal drug use within 24 hours of surgery.     [x] Jewelry, body piercing's, eyeglasses, contact lenses and dentures are not permitted into surgery (bring cases)      [x] Please do not wear any nail polish, make up or hair products on the day of surgery    [x] You can expect a call the business day prior to procedure to notify you if your arrival time changes    [x] If you receive a survey after surgery we would greatly appreciate your comments    [] Parent/guardian of a minor must accompany their child and remain on the premises  the entire time they are under our care     [] Pediatric patients may bring favorite toy, blanket or comfort item with them    [] A caregiver or family member must remain with the patient during their stay if they are mentally handicapped, have dementia, disoriented or unable to use a call light or would be a safety concern if left unattended    [x] Please notify surgeon if you develop any illness between now and time of surgery (cold, cough, sore throat, fever, nausea, vomiting) or any signs of infections  including skin, wounds, and dental.    [x]  The Outpatient Pharmacy is available to fill your prescription here on your day of surgery, ask your preop nurse for details    [] Other instructions

## 2022-07-08 ENCOUNTER — ANESTHESIA (OUTPATIENT)
Dept: OPERATING ROOM | Age: 67
End: 2022-07-08
Payer: MEDICARE

## 2022-07-08 ENCOUNTER — HOSPITAL ENCOUNTER (OUTPATIENT)
Age: 67
Setting detail: OUTPATIENT SURGERY
Discharge: HOME | End: 2022-07-08
Attending: PODIATRIST | Admitting: PODIATRIST
Payer: MEDICARE

## 2022-07-08 ENCOUNTER — HOSPITAL ENCOUNTER (OUTPATIENT)
Dept: GENERAL RADIOLOGY | Age: 67
Discharge: HOME | End: 2022-07-10
Attending: PODIATRIST
Payer: MEDICARE

## 2022-07-08 ENCOUNTER — APPOINTMENT (OUTPATIENT)
Dept: GENERAL RADIOLOGY | Age: 67
End: 2022-07-08
Attending: PODIATRIST
Payer: MEDICARE

## 2022-07-08 VITALS
TEMPERATURE: 97.6 F | HEIGHT: 65 IN | BODY MASS INDEX: 33.32 KG/M2 | HEART RATE: 84 BPM | DIASTOLIC BLOOD PRESSURE: 69 MMHG | WEIGHT: 200 LBS | RESPIRATION RATE: 16 BRPM | OXYGEN SATURATION: 96 % | SYSTOLIC BLOOD PRESSURE: 139 MMHG

## 2022-07-08 DIAGNOSIS — G89.18 POST-OP PAIN: Primary | ICD-10-CM

## 2022-07-08 DIAGNOSIS — R52 PAIN: ICD-10-CM

## 2022-07-08 LAB
ANION GAP SERPL CALCULATED.3IONS-SCNC: 15 MMOL/L (ref 7–16)
BUN BLDV-MCNC: 18 MG/DL (ref 6–23)
CALCIUM SERPL-MCNC: 8.8 MG/DL (ref 8.6–10.2)
CHLORIDE BLD-SCNC: 101 MMOL/L (ref 98–107)
CO2: 21 MMOL/L (ref 22–29)
CREAT SERPL-MCNC: 0.9 MG/DL (ref 0.5–1)
GFR AFRICAN AMERICAN: >60
GFR NON-AFRICAN AMERICAN: >60 ML/MIN/1.73
GLUCOSE BLD-MCNC: 127 MG/DL (ref 74–99)
HCT VFR BLD CALC: 38.5 % (ref 34–48)
HEMOGLOBIN: 13.2 G/DL (ref 11.5–15.5)
MCH RBC QN AUTO: 34.4 PG (ref 26–35)
MCHC RBC AUTO-ENTMCNC: 34.3 % (ref 32–34.5)
MCV RBC AUTO: 100.3 FL (ref 80–99.9)
METER GLUCOSE: 161 MG/DL (ref 74–99)
PDW BLD-RTO: 12.4 FL (ref 11.5–15)
PLATELET # BLD: 253 E9/L (ref 130–450)
PMV BLD AUTO: 10.9 FL (ref 7–12)
POTASSIUM SERPL-SCNC: 4.5 MMOL/L (ref 3.5–5)
RBC # BLD: 3.84 E12/L (ref 3.5–5.5)
SODIUM BLD-SCNC: 137 MMOL/L (ref 132–146)
WBC # BLD: 9.4 E9/L (ref 4.5–11.5)

## 2022-07-08 PROCEDURE — 6360000002 HC RX W HCPCS: Performed by: ANESTHESIOLOGY

## 2022-07-08 PROCEDURE — 2500000003 HC RX 250 WO HCPCS: Performed by: PODIATRIST

## 2022-07-08 PROCEDURE — 2709999900 HC NON-CHARGEABLE SUPPLY: Performed by: PODIATRIST

## 2022-07-08 PROCEDURE — 3700000000 HC ANESTHESIA ATTENDED CARE: Performed by: PODIATRIST

## 2022-07-08 PROCEDURE — 2500000003 HC RX 250 WO HCPCS: Performed by: ANESTHESIOLOGY

## 2022-07-08 PROCEDURE — 7100000010 HC PHASE II RECOVERY - FIRST 15 MIN: Performed by: PODIATRIST

## 2022-07-08 PROCEDURE — 3600000003 HC SURGERY LEVEL 3 BASE: Performed by: PODIATRIST

## 2022-07-08 PROCEDURE — 80048 BASIC METABOLIC PNL TOTAL CA: CPT

## 2022-07-08 PROCEDURE — 3209999900 FLUORO FOR SURGICAL PROCEDURES

## 2022-07-08 PROCEDURE — 36415 COLL VENOUS BLD VENIPUNCTURE: CPT

## 2022-07-08 PROCEDURE — 7100000000 HC PACU RECOVERY - FIRST 15 MIN: Performed by: PODIATRIST

## 2022-07-08 PROCEDURE — 6360000002 HC RX W HCPCS: Performed by: PODIATRIST

## 2022-07-08 PROCEDURE — 2580000003 HC RX 258: Performed by: PODIATRIST

## 2022-07-08 PROCEDURE — 7100000011 HC PHASE II RECOVERY - ADDTL 15 MIN: Performed by: PODIATRIST

## 2022-07-08 PROCEDURE — 82962 GLUCOSE BLOOD TEST: CPT

## 2022-07-08 PROCEDURE — 3600000013 HC SURGERY LEVEL 3 ADDTL 15MIN: Performed by: PODIATRIST

## 2022-07-08 PROCEDURE — A4216 STERILE WATER/SALINE, 10 ML: HCPCS | Performed by: ANESTHESIOLOGY

## 2022-07-08 PROCEDURE — 2500000003 HC RX 250 WO HCPCS: Performed by: ANESTHESIOLOGIST ASSISTANT

## 2022-07-08 PROCEDURE — 2580000003 HC RX 258: Performed by: ANESTHESIOLOGY

## 2022-07-08 PROCEDURE — 6370000000 HC RX 637 (ALT 250 FOR IP): Performed by: ANESTHESIOLOGY

## 2022-07-08 PROCEDURE — 6360000002 HC RX W HCPCS: Performed by: ANESTHESIOLOGIST ASSISTANT

## 2022-07-08 PROCEDURE — 27696 REPAIR OF ANKLE LIGAMENTS: CPT | Performed by: PODIATRIST

## 2022-07-08 PROCEDURE — 85027 COMPLETE CBC AUTOMATED: CPT

## 2022-07-08 PROCEDURE — 3700000001 HC ADD 15 MINUTES (ANESTHESIA): Performed by: PODIATRIST

## 2022-07-08 PROCEDURE — 7100000001 HC PACU RECOVERY - ADDTL 15 MIN: Performed by: PODIATRIST

## 2022-07-08 PROCEDURE — C1713 ANCHOR/SCREW BN/BN,TIS/BN: HCPCS | Performed by: PODIATRIST

## 2022-07-08 DEVICE — ANCHOR SUTURE BIOCOMP 4.75X22 MM DBL LD WHT SWIVELOCK C: Type: IMPLANTABLE DEVICE | Site: ANKLE | Status: FUNCTIONAL

## 2022-07-08 DEVICE — DEVICE GRFT FIX FOR LIGMNT AUG ANCHR REP PEEK INT BRAC: Type: IMPLANTABLE DEVICE | Site: ANKLE | Status: FUNCTIONAL

## 2022-07-08 DEVICE — ANCHOR SUT NDL DX FIBERTAK: Type: IMPLANTABLE DEVICE | Site: ANKLE | Status: FUNCTIONAL

## 2022-07-08 RX ORDER — PROCHLORPERAZINE EDISYLATE 5 MG/ML
5 INJECTION INTRAMUSCULAR; INTRAVENOUS
Status: DISCONTINUED | OUTPATIENT
Start: 2022-07-08 | End: 2022-07-08 | Stop reason: HOSPADM

## 2022-07-08 RX ORDER — DEXAMETHASONE SODIUM PHOSPHATE 4 MG/ML
INJECTION, SOLUTION INTRA-ARTICULAR; INTRALESIONAL; INTRAMUSCULAR; INTRAVENOUS; SOFT TISSUE PRN
Status: DISCONTINUED | OUTPATIENT
Start: 2022-07-08 | End: 2022-07-08 | Stop reason: SDUPTHER

## 2022-07-08 RX ORDER — BUPIVACAINE HYDROCHLORIDE 5 MG/ML
INJECTION, SOLUTION EPIDURAL; INTRACAUDAL PRN
Status: DISCONTINUED | OUTPATIENT
Start: 2022-07-08 | End: 2022-07-08 | Stop reason: ALTCHOICE

## 2022-07-08 RX ORDER — SODIUM CHLORIDE 0.9 % (FLUSH) 0.9 %
5-40 SYRINGE (ML) INJECTION EVERY 12 HOURS SCHEDULED
Status: DISCONTINUED | OUTPATIENT
Start: 2022-07-08 | End: 2022-07-08 | Stop reason: HOSPADM

## 2022-07-08 RX ORDER — METOCLOPRAMIDE HYDROCHLORIDE 5 MG/ML
10 INJECTION INTRAMUSCULAR; INTRAVENOUS ONCE
Status: COMPLETED | OUTPATIENT
Start: 2022-07-08 | End: 2022-07-08

## 2022-07-08 RX ORDER — SODIUM CHLORIDE 9 MG/ML
INJECTION, SOLUTION INTRAVENOUS PRN
Status: DISCONTINUED | OUTPATIENT
Start: 2022-07-08 | End: 2022-07-08 | Stop reason: HOSPADM

## 2022-07-08 RX ORDER — ONDANSETRON 2 MG/ML
INJECTION INTRAMUSCULAR; INTRAVENOUS PRN
Status: DISCONTINUED | OUTPATIENT
Start: 2022-07-08 | End: 2022-07-08 | Stop reason: SDUPTHER

## 2022-07-08 RX ORDER — SODIUM CHLORIDE 0.9 % (FLUSH) 0.9 %
5-40 SYRINGE (ML) INJECTION PRN
Status: DISCONTINUED | OUTPATIENT
Start: 2022-07-08 | End: 2022-07-08 | Stop reason: HOSPADM

## 2022-07-08 RX ORDER — LIDOCAINE HYDROCHLORIDE 20 MG/ML
INJECTION, SOLUTION EPIDURAL; INFILTRATION; INTRACAUDAL; PERINEURAL PRN
Status: DISCONTINUED | OUTPATIENT
Start: 2022-07-08 | End: 2022-07-08 | Stop reason: SDUPTHER

## 2022-07-08 RX ORDER — HYDRALAZINE HYDROCHLORIDE 20 MG/ML
5 INJECTION INTRAMUSCULAR; INTRAVENOUS
Status: DISCONTINUED | OUTPATIENT
Start: 2022-07-08 | End: 2022-07-08 | Stop reason: HOSPADM

## 2022-07-08 RX ORDER — PROPOFOL 10 MG/ML
INJECTION, EMULSION INTRAVENOUS PRN
Status: DISCONTINUED | OUTPATIENT
Start: 2022-07-08 | End: 2022-07-08 | Stop reason: SDUPTHER

## 2022-07-08 RX ORDER — OXYCODONE HYDROCHLORIDE AND ACETAMINOPHEN 5; 325 MG/1; MG/1
1 TABLET ORAL EVERY 6 HOURS PRN
Qty: 20 TABLET | Refills: 0 | Status: SHIPPED | OUTPATIENT
Start: 2022-07-08 | End: 2022-07-12

## 2022-07-08 RX ORDER — MIDAZOLAM HYDROCHLORIDE 1 MG/ML
INJECTION INTRAMUSCULAR; INTRAVENOUS PRN
Status: DISCONTINUED | OUTPATIENT
Start: 2022-07-08 | End: 2022-07-08 | Stop reason: SDUPTHER

## 2022-07-08 RX ORDER — DIPHENHYDRAMINE HYDROCHLORIDE 50 MG/ML
12.5 INJECTION INTRAMUSCULAR; INTRAVENOUS
Status: DISCONTINUED | OUTPATIENT
Start: 2022-07-08 | End: 2022-07-08 | Stop reason: HOSPADM

## 2022-07-08 RX ORDER — MEPERIDINE HYDROCHLORIDE 25 MG/ML
12.5 INJECTION INTRAMUSCULAR; INTRAVENOUS; SUBCUTANEOUS ONCE
Status: DISCONTINUED | OUTPATIENT
Start: 2022-07-08 | End: 2022-07-08 | Stop reason: HOSPADM

## 2022-07-08 RX ORDER — FENTANYL CITRATE 50 UG/ML
INJECTION, SOLUTION INTRAMUSCULAR; INTRAVENOUS PRN
Status: DISCONTINUED | OUTPATIENT
Start: 2022-07-08 | End: 2022-07-08 | Stop reason: SDUPTHER

## 2022-07-08 RX ORDER — LABETALOL HYDROCHLORIDE 5 MG/ML
5 INJECTION, SOLUTION INTRAVENOUS
Status: DISCONTINUED | OUTPATIENT
Start: 2022-07-08 | End: 2022-07-08 | Stop reason: HOSPADM

## 2022-07-08 RX ORDER — FENTANYL CITRATE 50 UG/ML
25 INJECTION, SOLUTION INTRAMUSCULAR; INTRAVENOUS EVERY 5 MIN PRN
Status: DISCONTINUED | OUTPATIENT
Start: 2022-07-08 | End: 2022-07-08 | Stop reason: HOSPADM

## 2022-07-08 RX ORDER — ACETAMINOPHEN 500 MG
1000 TABLET ORAL ONCE
Status: COMPLETED | OUTPATIENT
Start: 2022-07-08 | End: 2022-07-08

## 2022-07-08 RX ADMIN — PHENYLEPHRINE HYDROCHLORIDE 50 MCG: 10 INJECTION INTRAVENOUS at 09:11

## 2022-07-08 RX ADMIN — PHENYLEPHRINE HYDROCHLORIDE 50 MCG: 10 INJECTION INTRAVENOUS at 08:50

## 2022-07-08 RX ADMIN — METOCLOPRAMIDE 10 MG: 5 INJECTION, SOLUTION INTRAMUSCULAR; INTRAVENOUS at 07:34

## 2022-07-08 RX ADMIN — ACETAMINOPHEN 1000 MG: 500 TABLET ORAL at 07:34

## 2022-07-08 RX ADMIN — FAMOTIDINE 20 MG: 10 INJECTION INTRAVENOUS at 07:34

## 2022-07-08 RX ADMIN — FENTANYL CITRATE 50 MCG: 50 INJECTION, SOLUTION INTRAMUSCULAR; INTRAVENOUS at 08:40

## 2022-07-08 RX ADMIN — PHENYLEPHRINE HYDROCHLORIDE 50 MCG: 10 INJECTION INTRAVENOUS at 08:31

## 2022-07-08 RX ADMIN — FENTANYL CITRATE 25 MCG: 50 INJECTION, SOLUTION INTRAMUSCULAR; INTRAVENOUS at 09:26

## 2022-07-08 RX ADMIN — DEXAMETHASONE SODIUM PHOSPHATE 8 MG: 4 INJECTION, SOLUTION INTRAMUSCULAR; INTRAVENOUS at 08:33

## 2022-07-08 RX ADMIN — MIDAZOLAM 1 MG: 1 INJECTION INTRAMUSCULAR; INTRAVENOUS at 08:21

## 2022-07-08 RX ADMIN — HYDROMORPHONE HYDROCHLORIDE 0.5 MG: 1 INJECTION, SOLUTION INTRAMUSCULAR; INTRAVENOUS; SUBCUTANEOUS at 10:34

## 2022-07-08 RX ADMIN — PHENYLEPHRINE HYDROCHLORIDE 50 MCG: 10 INJECTION INTRAVENOUS at 08:26

## 2022-07-08 RX ADMIN — LIDOCAINE HYDROCHLORIDE 100 MG: 20 INJECTION, SOLUTION EPIDURAL; INFILTRATION; INTRACAUDAL; PERINEURAL at 08:21

## 2022-07-08 RX ADMIN — VANCOMYCIN HYDROCHLORIDE 1000 MG: 1 INJECTION, POWDER, LYOPHILIZED, FOR SOLUTION INTRAVENOUS at 07:56

## 2022-07-08 RX ADMIN — SODIUM CHLORIDE: 9 INJECTION, SOLUTION INTRAVENOUS at 08:11

## 2022-07-08 RX ADMIN — MIDAZOLAM 1 MG: 1 INJECTION INTRAMUSCULAR; INTRAVENOUS at 08:16

## 2022-07-08 RX ADMIN — FENTANYL CITRATE 50 MCG: 50 INJECTION, SOLUTION INTRAMUSCULAR; INTRAVENOUS at 08:21

## 2022-07-08 RX ADMIN — ONDANSETRON 4 MG: 2 INJECTION INTRAMUSCULAR; INTRAVENOUS at 08:33

## 2022-07-08 RX ADMIN — PROPOFOL 130 MG: 10 INJECTION, EMULSION INTRAVENOUS at 08:21

## 2022-07-08 RX ADMIN — HYDROMORPHONE HYDROCHLORIDE 0.5 MG: 1 INJECTION, SOLUTION INTRAMUSCULAR; INTRAVENOUS; SUBCUTANEOUS at 10:51

## 2022-07-08 ASSESSMENT — PAIN DESCRIPTION - ONSET
ONSET: ON-GOING
ONSET: PROGRESSIVE
ONSET: ON-GOING

## 2022-07-08 ASSESSMENT — PAIN DESCRIPTION - DESCRIPTORS
DESCRIPTORS: ACHING
DESCRIPTORS: SHARP;SHOOTING
DESCRIPTORS: ACHING
DESCRIPTORS: SHARP;SHOOTING
DESCRIPTORS: SHARP

## 2022-07-08 ASSESSMENT — PAIN DESCRIPTION - LOCATION
LOCATION: FOOT
LOCATION: ANKLE
LOCATION: FOOT
LOCATION: FOOT

## 2022-07-08 ASSESSMENT — PAIN SCALES - GENERAL
PAINLEVEL_OUTOF10: 9
PAINLEVEL_OUTOF10: 0
PAINLEVEL_OUTOF10: 9
PAINLEVEL_OUTOF10: 7
PAINLEVEL_OUTOF10: 5
PAINLEVEL_OUTOF10: 10
PAINLEVEL_OUTOF10: 10
PAINLEVEL_OUTOF10: 7
PAINLEVEL_OUTOF10: 10
PAINLEVEL_OUTOF10: 9

## 2022-07-08 ASSESSMENT — PAIN DESCRIPTION - FREQUENCY
FREQUENCY: CONTINUOUS

## 2022-07-08 ASSESSMENT — PAIN DESCRIPTION - ORIENTATION
ORIENTATION: LEFT

## 2022-07-08 ASSESSMENT — PAIN DESCRIPTION - PAIN TYPE
TYPE: CHRONIC PAIN
TYPE: SURGICAL PAIN

## 2022-07-08 ASSESSMENT — PAIN - FUNCTIONAL ASSESSMENT: PAIN_FUNCTIONAL_ASSESSMENT: ACTIVITIES ARE NOT PREVENTED

## 2022-07-08 ASSESSMENT — LIFESTYLE VARIABLES: SMOKING_STATUS: 1

## 2022-07-08 NOTE — OP NOTE
Operative Note      Patient: Ally Greenwood  YOB: 1955  MRN: [id number]    Date of Procedure: 7/8/2022    Pre-Op Diagnosis: RUPTURED ANTERIOR TALO FIBULAR LIGAMENT LEFT ANKLE RUPTURED DELTOID LIGAMENT LEFT ANKLE    Post-Op Diagnosis: Same       Procedure(s):  REPAIR OF ANTERIOR TALAR LIGAMENT LEFT ANKLE, REPAIR DELTOID LIGAMENT LEFT ANKLE    Surgeon(s):  Angel Fernandez DPM    Assistant:   Resident: Johnny Dao DPM    Anesthesia: General, 20 cc of 0.5% Marcaine plain postoperative local block    Estimated Blood Loss (mL): less than 50     Complications: None    Specimens:   * No specimens in log *    Implants:  Implant Name Type Inv. Item Serial No.  Lot No. LRB No. Used Action   DEVICE GRFT FIX FOR LIGMNT [facility] PEEK INT [facility] [id number]  DEVICE GRFT FIX FOR LIGMNT [facility] PEEK INT [facility] [id number] Left 2 Implanted   [facility] SUTURE BIOCOMP 4.75X22 MM DBL LD T Danette David [id number]  [facility] SUTURE BIOCOMP 4.75X22 MM DBL LD WHT Fabian Right [facility]-WD [id number] Left 1 Implanted   ANCHOR SUT NDL DX FIBERTAK - [id number]  ANCHOR SUT NDL DX FIBERTAK  [employer]-WD [id number] Left 1 Implanted   ANCHOR SUT NDL DX FIBERTAK - [id number]  ANCHOR SUT NDL DX FIBERTAK  [employer] FRANCIS-SN [id number] Left 1 Implanted         Drains: * No LDAs found *    Findings: Attenuated ATFL left ankle, FiberWire from previous repair lateral ankle ligaments removed    Indications for procedure: Patient is a 73-year-old female who been followed in the outpatient setting complaining of chronic left lateral ankle inversion sprains and unstable ankle secondary to an ankle fracture sustained several years ago to the left ankle. She has undergone previous ATFL repair with anchors that have since failed. She is complained of chronic instability for several years. She has exhausted all conservative therapy including bracing, splinting, taping, shoe gear modifications.   She would like to proceed forward surgical invention this time. All risks and benefits of the procedure were explained in detail prior to the case including but not limited to recurrence of deformity, recurrence of spraining, repeat rupture of ligaments, iatrogenic fractures, excessive bleeding, excessive pain, infection. Patient acknowledged and understood all risk involved procedure and agreed to proceed forward at this time. No guarantees were made regarding outcome of the case    Detailed Description of Procedure: On July 8, 212 this 71-year-old female was transported from the hospital preoperative holding area to the operating room which was placed in the operating table in supine position. A timeout was performed and all in attendance were in agreement of the correct limb and procedure. Following the induction of general anesthesia a pneumatic ankle tourniquet was applied to a well-padded site 4 cm proximal to the malleoli of the left ankle. The left foot was then prepped and draped in the usual sterile fashion. The left limb was elevated above the horizontal plane and pneumatic ankle tourniquet was inflated to 250 mmHg. The limb was then returned back to the operative table with following procedures performed:    Repair deltoid ligament left ankle:  Attention was then directed towards the medial aspect of the left ankle overlying the medial malleolus. Using a #15 blade a 4 cm curvilinear incision was made over the deltoid complex through the skin down to level subcutaneous tissue. The incision was then deepened through the subcutaneous tissue and deep fascia to the level of the deltoid ligaments. All bleeders were electrocoagulated and cauterized as encountered and as necessary. Care was taken to safe retract all vital neurovascular muscle skeletal structures.   The posterior tibial tendon, flexor digitorum longus tendon, flexor hallucis longus tendon and associated neurovascular structures within the tarsal tunnel were towards the lateral ankle with following procedures performed:    Repair of anterior talofibular ligament left ankle:  Attention was then directed towards the lateral ligamentous complex where a 5 cm linear longitudinal incision was made overlying the ATFL down through the skin to level subcutaneous tissue. The incision was then carried in a combination of sharp and blunt dissection through the subcutaneous tissue down to the level of ligaments with care to safely retract and resect any vital neurovascular muscular structures. All bleeders were electrocoagulated and cauterized as encountered. The ATFL was then encountered and noted to be attenuated with very thin strands and multiple lesions through the ligament. Using a #15 blade a horizontal incision was made overlying the ATFL and the ATFL was then reflected proximally and distally from the distal fibula and the talus. There were notable FiberWire's from a previous procedure that were removed in toto. Next a preprocedure x-ray was then taken and positioning was noted for the prior anchors. Using the [employer] talar guide a 1.13 K wire was drilled into the cannulated drill guide into the talus adjacent to the subtalar joint and confirmed under C-arm fluoroscopy did not be impinging subtalar joint. Next using the manufacture protocol the internal brace fiber tack was then drilled into the talus, tapped, tightened, and left in place to be completed following repair of the ATFL ligament. Attention was then directed towards the fibula where the fibertak anchors were placed into the distal fibula adjacent  the prior anchor that was in place. Proper position was confirmed under C-arm fluoroscopy and noted to be firmly in place and as perpendicular as possible to the ATFL ligament coarse. The ATFL ligament is then repaired in a pants over vest technique to a tight physiologic tension.   Next a anterior drawer and talar tilt test were performed and noted to be within normal physiologic tension with no abnormal attenuation noted. Upon completion of repair of the ATFL ligament the internal brace was then completed with a drill into the distal fibula group home between the 2 fiber tack anchors and free from the previous anchor that was placed. The drill hole was then tapped and the internal brace FiberWire was passed into the anchor which was then tightened in place following manufacture protocol. All remaining loose FiberWire was then cut with a smooth repair noted. The site was then flushed copious muscle dosing solution and attention was directed towards closure which consisted of 3-0 Vicryl for deep and capsular structures. The subcutaneous tissue was then requested using 4-0 Vicryl in a box stitch suture fashion. The skin was then were coapted using 4-0 nylon in a horizontal mattress type fashion. A wet and dry lap were then utilized to cleanse the foot and a 20 cc 0.5% Marcaine plain postoperative block was administered into the operative sites locally. Pneumatic ankle tourniquet was then dropped and immediate capillary fill time was noted to all digits the patient's left foot. The foot was then dressed with jumpstart, 4 x 4 gauze, Kerlix. A posterior splint was applied at 90 degrees at the ankle joint in the OR prior to transfer and held in place with Ace bandage. The patient was then awakened from anesthesia and transported from the operative room to the postanesthesia care unit all vital signs stable no acute distress. All neurovascular status was noted to be intact patient bilateral lower extremities. Following brief period of postoperative monitoring the patient be discharged home with instructions for strict nonweightbearing to the left lower extremity. She is to take all prescriptions as dispensed. He is to keep all dressings clean dry and intact until first postoperative visit.   She is to follow-up with Dr. Manuela Blevins within 1 week of discharge in the outpatient setting.     Electronically signed by Briana Muir DPM on 7/8/2022 at 10:19 AM

## 2022-07-08 NOTE — ANESTHESIA POSTPROCEDURE EVALUATION
Department of Anesthesiology  Postprocedure Note    Patient:  Heather Bradshaw  MRN: [id number]  YOB: 1955  Date of evaluation: 7/8/2022      Procedure Summary     Date: 07/08/22 Room / Location: [facility] OR 09 / [facility]    Anesthesia Start: 3480 Anesthesia Stop: 4613    Procedure: REPAIR OF ANTERIOR TALAR LIGAMENT LEFT ANKLE, REPAIR DELTOID LIGAMENT LEFT ANKLE (Left Ankle) Diagnosis:       Rupture of ligament      (RUPTURED ANTERIOR MIGUEL LIGAMENT LEFT ANKLE RUPTURED DELTOID LIGAMENT LEFT ANKLE)    Surgeons: Codey Garcia DPM Responsible Provider: Ana Rosa Rudd DO    Anesthesia Type: General ASA Status: 3          Anesthesia Type: General    Carolyn Phase I: Carolyn Score: 10    Carolyn Phase II:        Anesthesia Post Evaluation    Patient location during evaluation: bedside  Patient participation: complete - patient participated  Level of consciousness: awake  Pain score: 4  Airway patency: patent  Nausea & Vomiting: no vomiting and no nausea  Complications: no  Cardiovascular status: hemodynamically stable  Respiratory status: acceptable  Hydration status: stable  Multimodal analgesia pain management approach

## 2022-07-08 NOTE — BRIEF OP NOTE
Brief Postoperative Note      Patient: Peggy Jacobs  YOB: 1955  MRN: [id number]    Date of Procedure: 7/8/2022    Pre-Op Diagnosis: RUPTURED ANTERIOR TALAR LIGAMENT LEFT ANKLE RUPTURED DELTOID LIGAMENT LEFT ANKLE    Post-Op Diagnosis: Same       Procedure(s):  REPAIR OF ANTERIOR TALAR LIGAMENT LEFT ANKLE, REPAIR DELTOID LIGAMENT LEFT ANKLE    Surgeon(s):  Eleazar Wei DPM    Assistant:  Resident: Arnoldo Melendez DPM    Anesthesia: General, 20ccs of 0.5% Marcaine plain postoperatively local block    Estimated Blood Loss (mL): less than 50     Complications: None    Specimens:   * No specimens in log *    Implants:  Implant Name Type Inv. Item Serial No.  Lot No. LRB No. Used Action   DEVICE GRFT FIX FOR LIGMNT [facility] RE[facility] PEEK INT [facility] - [id number]  DEVICE GRFT FIX FOR LIGMNT [facility] REP PEEK INT [facility] [id number] Left 2 Implanted   [facility] SUTURE BIOCOMP 4.75X22 MM DBL LD T [facility] [id number]  [facility] SUTURE BIOCOMP 4.75X22 MM DBL LD T Kal [employer] [facility]- [id number] Left 1 Implanted   ANCHOR SUT NDL DX FIBERTAK - [id number]  ANCHOR SUT NDL DX [employer] [facility]- [id number] Left 1 Implanted   ANCHOR SUT NDL DX FIBERTAK - [id number]  ANCHOR SUT NDL DX FIBERTAK  [employer] [facility] [id number] Left 1 Implanted         Drains: * No LDAs found *    Findings: See full op report for detail.   Attenuated ATFL left ankle, FiberWire from previous repair lateral ankle ligaments removed    Electronically signed by Lynn0 Olive Melendez DPM on 7/8/2022 at 10:18 AM

## 2022-07-08 NOTE — ANESTHESIA PRE PROCEDURE
Department of Anesthesiology  Preprocedure Note       Name:  Taylor Schofield   Age:  74 y.o.  :  1955                                          MRN:  [id number]         Date:  2022      Surgeon: Lonna Frankel):  Lg Mackey DPM    Procedure: Procedure(s):  REPAIR OF ANTERIOR MIGUEL LIGAMENT LEFT ANKLE REPAIR DELTOID LIGAMENT LEFT ANKLE    +++ARTHREX+++    Medications prior to admission:   Prior to Admission medications    Medication Sig Start Date End Date Taking? Authorizing Provider   metFORMIN (GLUCOPHAGE) 500 MG tablet Take 1 tablet by mouth 2 times daily (with meals) Start with once daily during the first week. 22   Malgorzata Weeks MD   HYDROcodone-acetaminophen (NORCO) 5-325 MG per tablet Take 1 tablet by mouth 2 times daily for 6 days. 2 days earlier due to  closure and holiday. 22  TREASURE Watts   cyclobenzaprine (FLEXERIL) 10 MG tablet Take 0.5 tablets by mouth 2 times daily as needed for Muscle spasms 22  TREASURE Watts   atorvastatin (LIPITOR) 40 MG tablet Take 1 tablet by mouth daily 22   Malgorzata Weeks MD   lisinopril (PRINIVIL;ZESTRIL) 40 MG tablet Take 1 tablet by mouth every evening 22   Malgorzata Weeks MD   gabapentin (NEURONTIN) 400 MG capsule Take 1 capsule by mouth 2 times daily for 30 days. 22  Malgorzata Weeks MD   hydroCHLOROthiazide (MICROZIDE) 12.5 MG capsule TAKE ONE CAPSULE BY MOUTH ONCE DAILY FOR BLOOD PRESSURE 22   Malgorzata Weeks MD   hydrOXYzine (VISTARIL) 25 MG capsule Take 1 capsule by mouth 3 times daily as needed for Anxiety 21   Malgorzata Weeks MD   Handicap Placard MISC DX:  Loss of Balance, Chronic low back pain, s/p back surgery. Duration of 5 years 21   Malgorzata Weeks MD       Current medications:    No current facility-administered medications for this encounter. Allergies:     Allergies   Allergen Reactions    Pcn [Penicillins] Anaphylaxis       Problem List:    Patient Active Problem List   Diagnosis Code    Loss of balance R26.89    Vertebrobasilar TIAs G45.0    Obesity E66.9    Disc displacement, lumbar M51.26    Peripheral neuropathy ([address] G62.9    Type 2 diabetes mellitus without complication ([address] B30.0    Depression F32. A    Osteoarthritis M19.90    Chronic back pain M54.9, G89.29    Fibromyalgia M79.7    HTN (hypertension) I10    Hyperlipidemia E78.5    History of adenomatous polyp of colon Z86.010    Vitamin D deficiency E55.9    Coccyx pain M53.3    Acute bilateral low back pain with sciatica M54.40    Lumbar stenosis M48.061    Chronic bilateral low back pain without sciatica M54.50, G89.29    DDD (degenerative disc disease), lumbar M51.36    Spinal stenosis of lumbar region without neurogenic claudication M48.061    Lumbar facet arthropathy M47.816    Chronic pain syndrome G89.4    Lumbar radiculopathy M54.16    Neck pain M54.2    Left foot pain M79.672    Painful orthopaedic hardware [facility]) T84.84XA    Cervical facet joint syndrome M47.812    Cellulitis, neck L03.221    Disorder of sacrum M53.3    Adjacent segment disease with spinal stenosis [id number]    S/P lumbar spinal fusion Z98.1    Tongue lesion K14.8    Sacroiliac joint dysfunction M53.3       Past Medical History:        Diagnosis Date    Cancer ([address] 12/2019    LESION REMOVED UNDER TONGUE  DENTAL CLINIC    Chronic back pain     Costochondritis     h/o    Depression     Diabetes mellitus ([address]     Falls     Last fall Feb 2021    Fibromyalgia     Hallux rigidus of left foot     HTN (hypertension)     Hyperlipidemia     CLYDE on CPAP     Osteoarthritis     \"everywhere\"    Rheumatoid arthritis ([address]     \"As a child. \"    Rheumatoid arthritis(714.0)     TIA (transient ischemic attack)     no deficits        Past Surgical History:        Procedure Laterality Date    ANESTHESIA NERVE BLOCK Left 02/26/2019    LEFT C3,4,5 MBB performed by Arun Gold MD at [address] Right 2019    BILATERAL TRANSFORAMINAL EPIDURAL STEROID INJECTION S1 #3 performed by Arun Gold MD at [address] Right 2020    RIGHT SACROILIAC JOINT INJECTION      CPT: 20234 performed by Leopold Penner, DO at [address]  2005    Left    ARTHRODESIS Left 2018    ARTHRODESIS 2ND DIGIT LEFT FOOT performed by Mendel Calix, DPM at [address] [facility]  2017    PLIF L3-L4, L4-L5 with rods, screws, and cages/Dr. Gonzales Graff BACK SURGERY  2020    BACK SURGERY Right 2021    RIGHT PERCUTANEOUS SACROILIAC JOINT FUSION performed by Smitha Staton MD at [facility]      reduction, bilat     SECTION      CHOLECYSTECTOMY      COLONOSCOPY  2015    COLONOSCOPY N/A 2020    COLONOSCOPY DIAGNOSTIC performed by Catracho Hernandez MD at [address] [facility]  2021    SI Joint    ENDOSCOPY, COLON, DIAGNOSTIC      EPIDURAL STEROID INJECTION N/A 2019    BILATERAL TRANSFORAMINAL EPIDURAL STEROID INJECTION S1 performed by Leopold Penner, DO at [address]      Left    [address] / ANKLE Left 2018    REMOVAL OF PAINFUL HARDWARE LEFT FOOT  ++SYNTHES++ performed by Mendel Calix, DPM at [address] [facility]    inguinal     LUMBAR SPINE SURGERY N/A 2020    EXPLORATION OF PRIOR L3-L5 FUSION AND L2-L3  POSTERIOR LUMBAR INTERBODY FUSION -- NEEDS O-ARM, AUDIOLOGY, CAGES, PLATES, SCREWS, C-ARM, TERESA TABLE, CELL SAVER, PLATELET GEL -- GLOBUS performed by Smitha Staton MD at [address] 10/21/2020    EXCISION OF TONGUE LESION performed by Av Peñaloza DO [address] Bilateral 2018    L1-2 lumbar foramen #1    NERVE BLOCK Bilateral 2018    s1 tfesi    NERVE BLOCK Bilateral 2019    NERVE BLOCK Right 09/30/2019    sacral radiofrequency    NERVE BLOCK Right 09/01/2020    RIGHT SACROILIAC JOINT INJECTION #2 performed by Napoleon Rogers DO at [address] Left 09/25/2013    left foot tarso metatarsal joint injection    OTHER SURGICAL HISTORY Left 05/27/2015    Endoscopic Gastroc recession left, Lapidus left foot and  Excision of exostosis left foot    PAIN MANAGEMENT PROCEDURE Left 7/27/2021    LEFT L5-S1 TRANSFORAMINAL EPIDURAL STEROID INJECTION performed by Napoleon Rogers DO at [address] Left 3/29/2022    LEFT TRANSFORAMINAL EPIDURAL STEROID INJECTION AT L5 AND S1 performed by Napoleon Rogers DO at [address] N/A 6/9/2022    LUMBAR EPIDURAL STEROID INJECTION L5-S1 performed by Napoleon Rogers DO at [address] AA&/STRD TFRML EPI LUMBAR/SACRAL 1 LEVEL Bilateral 12/03/2018    BILATERAL S1  EPIDURAL STEROID INJECTION performed by Robert Luz MD at [address] [facility] DX/THER SBST INTRLMNR LMBR/SAC W/IMG GDN N/A 08/21/2018    EPIDURAL STEROID INJECTION L1-2 WITH LOW VOL performed by Robert Luz MD at [address] [facility] NOSE/CLEFT LIP/TIP Bilateral 05/07/2018    BILATERAL L1-2 LUMBAR FORAMEN #1 performed by Robert Luz MD at [address] [facility] NOSE/CLEFT LIP/TIP Bilateral 06/04/2018    BILATERAL TRANSFORAMINAL EPIDURAL STEROID INJECTION UNDER FLUOROSCOPIC GUIDANCE @ FORAMINAL LEVEL L1-2 #2 performed by Robert Luz MD at [address] [place] Right 09/30/2019    RIGHT SACRAL RADIOFREQUENCY ABLATION performed by Robert Luz MD at [address] [facility] [address]  2000, 2005    Big Left Toe    TOE SURGERY Left 2018    2nd toe     TONSILLECTOMY      WISDOM TOOTH EXTRACTION         Social History:    Social History     Tobacco Use    Smoking status: Current Every Day Smoker     Packs/day: 0.50     Years: 30.00     Pack years: 15.00     Types: Cigarettes    Smokeless tobacco: Never Used   Substance Use Topics    Alcohol use: Not Currently     Alcohol/week: 0.0 standard drinks     Comment: rarely                                Ready to quit: Not Answered  Counseling given: Not Answered      Vital Signs (Current):   Vitals:    07/07/22 1044   Weight: 200 lb (90.7 kg)   Height: 5' 5\" (1.651 m)                                              BP Readings from Last 3 Encounters:   06/21/22 (!) 137/93   06/20/22 124/72   06/09/22 (!) 175/95       NPO Status:                                                                                 BMI:   Wt Readings from Last 3 Encounters:   07/07/22 200 lb (90.7 kg)   06/20/22 207 lb (93.9 kg)   06/09/22 209 lb (94.8 kg)     Body mass index is 33.28 kg/m². CBC:   Lab Results   Component Value Date/Time    WBC 11.7 06/20/2022 01:39 PM    RBC 4.34 06/20/2022 01:39 PM    HGB 14.7 06/20/2022 01:39 PM    HCT 45.0 06/20/2022 01:39 PM    .7 06/20/2022 01:39 PM    RDW 12.8 06/20/2022 01:39 PM     06/20/2022 01:39 PM       CMP:   Lab Results   Component Value Date/Time     06/20/2022 01:39 PM    K 3.8 06/20/2022 01:39 PM    K 4.5 03/16/2022 04:05 PM     06/20/2022 01:39 PM    CO2 17 06/20/2022 01:39 PM    BUN 13 06/20/2022 01:39 PM    CREATININE 0.9 06/20/2022 01:39 PM    GFRAA >60 06/20/2022 01:39 PM    LABGLOM >60 06/20/2022 01:39 PM    GLUCOSE 107 06/20/2022 01:39 PM    PROT 7.0 06/20/2022 01:39 PM    CALCIUM 9.5 06/20/2022 01:39 PM    BILITOT 0.6 06/20/2022 01:39 PM    ALKPHOS 134 06/20/2022 01:39 PM    AST 23 06/20/2022 01:39 PM    ALT 17 06/20/2022 01:39 PM       POC Tests: No results for input(s): POCGLU, POCNA, POCK, POCCL, POCBUN, POCHEMO, POCHCT in the last 72 hours.     Coags:   Lab Results   Component Value Date/Time    PROTIME 10.9 07/05/2021 09:28 AM    INR 1.0 07/05/2021 09:28 AM    APTT 27.6 03/22/2019 03:28 PM       HCG (If Applicable): No results found for: PREGTESTUR, PREGSERUM, HCG, HCGQUANT     ABGs: No results found for: PHART, PO2ART, HPY4WFX, SRF5ESG, BEART, L2JFLPQG     Type & Screen (If Applicable):  No results found for: LABABO, LABRH    Drug/Infectious Status (If Applicable):  No results found for: HIV, HEPCAB    COVID-19 Screening (If Applicable):   Lab Results   Component Value Date/Time    COVID19 Not Detected 03/04/2021 12:15 PM         Abdominal CT:  Impression   1.  No abnormal soft tissue swelling fluid collection or gas to suggest   perineal abscess.       2.  Hepatomegaly and mild hepatic steatosis.       3.  Multiple peripelvic renal cysts without obstruction.       4.  Previous posterior metallic lumbar spinal fusion and laminectomy as   described above.               Anesthesia Evaluation  Patient summary reviewed and Nursing notes reviewed no history of anesthetic complications:   Airway: Mallampati: III  TM distance: >3 FB   Neck ROM: full  Mouth opening: > = 3 FB   Dental:          Pulmonary:   (+) sleep apnea: on noncompliant,  decreased breath sounds: bilateral current smoker    COPD: Probable underlying COPD - oxygen saturaton 96% on RA. Patient smoked on day of surgery. PE comment: Coarse Cardiovascular:  Exercise tolerance: good (>4 METS),   (+) hypertension: moderate and no interval change, hyperlipidemia    (-) past MI, CAD, CABG/stent, dysrhythmias and  angina    ECG reviewed  Rhythm: regular  Rate: normal           Beta Blocker:  Not on Beta Blocker      ROS comment: EKG:  Sinus  Rhythm   RSR(V1) -nondiagnostic. Left atrial enlargement. Low voltage with rightward P-axis and rotation -possible pulmonary disease.    ABNORMAL     Neuro/Psych:   (+) neuromuscular disease (Neuropathy):, TIA (Denies residual), psychiatric history:depression/anxiety    (-) seizures            ROS comment: Loss of balance  Vertebrobasilar TIAs  Coccyx pain  Acute bilateral low back pain with sciatica  Lumbar stenosis  Chronic bilateral low back pain without sciatica  DDD (degenerative disc disease), lumbar  Spinal stenosis of lumbar region without neurogenic claudication  Lumbar facet arthropathy  Chronic pain syndrome  Lumbar radiculopathy  Neck pain  Left foot pain  Painful orthopaedic hardware [facility])  Cervical facet joint syndrome  Cellulitis, neck  Disorder of sacrum  Adjacent segment disease with spinal stenosis  S/P lumbar spinal fusion     GI/Hepatic/Renal:        (-) hepatitis and no renal disease      ROS comment: History of colon polyps. Endo/Other:    (+) Diabetes (A1C 7.6%)Type II DM, well controlled, , : arthritis (OA, DJD, DDD, chronic back pain, and fibromyalgia): OA and rheumatoid. , malignancy/cancer (Tongue lesion/CA). (-) blood dyscrasia        Pt had no PAT visit       Abdominal:   (+) obese,           Vascular: negative vascular ROS. - DVT and PE. Other Findings:           Anesthesia Plan      general     ASA 3     (LMA okay  20mg Ketamine  PONV prophylaxis)  Induction: intravenous. MIPS: Postoperative opioids intended and Prophylactic antiemetics administered. Anesthetic plan and risks discussed with patient. Plan discussed with CRNA. Magdy Marks DO   7/8/2022      DOS STAFF ADDENDUM:    Pt seen and examined. Chart reviewed including anesthesia, medication, and allergy history. H&P reviewed. Physical exam updated. No interval changes to history or physical examination (unless noted above). NPO status confirmed. Anesthetic plan, risks, benefits, alternatives discussed with patient. Patient verbalized an understanding and agrees to proceed.      Magdy Marks DO  Staff Anesthesiologist  7:54 AM

## 2022-07-12 ENCOUNTER — OFFICE VISIT (OUTPATIENT)
Dept: PODIATRY | Age: 67
End: 2022-07-12

## 2022-07-12 VITALS — TEMPERATURE: 98.2 F | WEIGHT: 200 LBS | BODY MASS INDEX: 33.28 KG/M2

## 2022-07-12 DIAGNOSIS — Z09 POSTOPERATIVE EXAMINATION: ICD-10-CM

## 2022-07-12 DIAGNOSIS — G89.18 POST-OP PAIN: Primary | ICD-10-CM

## 2022-07-12 PROCEDURE — 99024 POSTOP FOLLOW-UP VISIT: CPT | Performed by: PODIATRIST

## 2022-07-12 RX ORDER — HYDROCODONE BITARTRATE AND ACETAMINOPHEN 5; 325 MG/1; MG/1
1 TABLET ORAL EVERY 8 HOURS PRN
Qty: 20 TABLET | Refills: 0 | Status: SHIPPED | OUTPATIENT
Start: 2022-07-12 | End: 2022-07-19

## 2022-07-12 NOTE — PROGRESS NOTES
Postop Progress Note    Subjective    Rosalianum Bradshaw presents to the office 4 days  following ankle. Pain is controlled with current analgesics. Objective    Vitals:    07/12/22 1143   Temp: 98.2 °F (36.8 °C)     General: alert, cooperative and no distress  Incision: healing well, no drainage, no erythema, no hernia, no seroma, no swelling, well approximated    Assessment    Doing well postoperatively. Plan   1. Continue any current medications  2. Wound care discussed  3. Wound/Incision: healing well  4. Disposition:   Limited activities. No heavy lifting. No strenuous exercise. Should not return to gym class or sports until cleared by a physician. 5. Diet: regular diet  6. Follow up: 1 weeks. Cam Walker  Signed informed consent was obtained from the patient. Patient applied the cam walker to the affected limb. This device fit well. Patient was given written and verbal instructions regarding this cam walker. This is medically necessary to help reduce pain and promote and expedite healing.   New  rx for pain medicine        Electronically signed by Codey Garcia DPM on 7/12/2022 at 12:10 PM

## 2022-07-20 ENCOUNTER — TELEPHONE (OUTPATIENT)
Dept: PAIN MANAGEMENT | Age: 67
End: 2022-07-20

## 2022-07-20 ENCOUNTER — TELEPHONE (OUTPATIENT)
Dept: FAMILY MEDICINE CLINIC | Age: 67
End: 2022-07-20

## 2022-07-20 NOTE — TELEPHONE ENCOUNTER
Patient states she was on Chantix before. Her insurance will not cover that but they will cover Varenicline generic for Chantix. She Uses [facility] pharmacy in [facility]. She would like that called in if possible?

## 2022-07-20 NOTE — TELEPHONE ENCOUNTER
Ayaan Marquise called in and stated that she needs a refill on her muscle relaxer. She is currently on Flexeril which her insurance will no longer cover, they told her they would cover Baclofen 10 mg. Please advise.

## 2022-07-21 ENCOUNTER — OFFICE VISIT (OUTPATIENT)
Dept: PODIATRY | Age: 67
End: 2022-07-21

## 2022-07-21 VITALS — BODY MASS INDEX: 33.28 KG/M2 | TEMPERATURE: 97.3 F | WEIGHT: 200 LBS

## 2022-07-21 DIAGNOSIS — Z09 POSTOPERATIVE EXAMINATION: ICD-10-CM

## 2022-07-21 DIAGNOSIS — G89.18 POST-OP PAIN: Primary | ICD-10-CM

## 2022-07-21 PROCEDURE — 99024 POSTOP FOLLOW-UP VISIT: CPT | Performed by: PODIATRIST

## 2022-07-21 RX ORDER — HYDROCODONE BITARTRATE AND ACETAMINOPHEN 5; 325 MG/1; MG/1
1 TABLET ORAL EVERY 8 HOURS PRN
Qty: 28 TABLET | Refills: 0 | Status: SHIPPED | OUTPATIENT
Start: 2022-07-21 | End: 2022-08-04

## 2022-07-21 RX ORDER — VARENICLINE TARTRATE 1 MG/1
1 TABLET, FILM COATED ORAL 2 TIMES DAILY
Qty: 60 TABLET | Refills: 2 | Status: SHIPPED
Start: 2022-07-21 | End: 2022-10-26

## 2022-07-21 NOTE — PROGRESS NOTES
Postop Progress Note    Subjective    Rhae Ayan presents to the office 2 weeks  following ankle sx. Pain is controlled with current analgesics. Objective    Vitals:    07/21/22 1245   Temp: 97.3 °F (36.3 °C)     General: alert, cooperative, and no distress  Incision: healing well    Assessment    Doing well postoperatively. Plan   1. Continue any current medications  2. Wound care discussed  3. Wound/Incision: healing well, no drainage, no erythema, no hernia, no seroma, no swelling, well approximated, sutures removed  4. Disposition:   Pt is to increase activities as tolerated. Limited activities. No strenuous exercise. Should not return to gym class or sports until cleared by a physician. 5. Diet: regular diet  6. Follow up: 2 weeks.      Pt given pain medicine for 2 weeks pt will then return to pain management for medicine        Electronically signed by Darleen Ibarra DPM on 7/21/2022 at 1:04 PM

## 2022-07-21 NOTE — TELEPHONE ENCOUNTER
I spoke to [facility] and explained what was discussed at her last appointment and she stated she forgot but know remembers. She said she will as about the Baclofen at her next appointment.

## 2022-07-29 DIAGNOSIS — F41.8 DEPRESSION WITH ANXIETY: ICD-10-CM

## 2022-07-29 RX ORDER — HYDROXYZINE PAMOATE 25 MG/1
25 CAPSULE ORAL 3 TIMES DAILY PRN
Qty: 270 CAPSULE | Refills: 1 | Status: SHIPPED | OUTPATIENT
Start: 2022-07-29

## 2022-07-29 NOTE — TELEPHONE ENCOUNTER
Last Appointment:  6/20/2022  Future Appointments   Date Time Provider Becky Zheng   8/4/2022  1:15 PM KATHARINA Sanchez [facility]   8/5/2022 12:30 PM TREASURE Persaud [facility]DM [facility]

## 2022-08-04 ENCOUNTER — OFFICE VISIT (OUTPATIENT)
Dept: PODIATRY | Age: 67
End: 2022-08-04

## 2022-08-04 VITALS — WEIGHT: 200 LBS | BODY MASS INDEX: 33.28 KG/M2 | TEMPERATURE: 97.6 F

## 2022-08-04 DIAGNOSIS — Z09 POSTOPERATIVE EXAMINATION: Primary | ICD-10-CM

## 2022-08-04 PROCEDURE — 99024 POSTOP FOLLOW-UP VISIT: CPT | Performed by: PODIATRIST

## 2022-08-04 NOTE — PROGRESS NOTES
Postop Progress Note    Subjective    Jaylin Lal presents to the office 4 weeks  following ankle sx . Patient reports wound healing well. The patient is not having any pain. Objective    Vitals:    08/04/22 1310   Temp: 97.6 °F (36.4 °C)     General: alert, cooperative, and no distress  Incision: healing well    Assessment    Doing well postoperatively. Plan   1. Continue any current medications  2. Wound care discussed  3. Wound/Incision: healing well  4. Disposition:   Limited activities. No heavy lifting. No strenuous exercise. Should not return to gym class or sports until cleared by a physician. 5. Diet: regular diet  6. Follow up: 2 weeks.      Cam  walker x 4 weeks  wr bearing as tolerated        Electronically signed by Rowan Wallis DPM on 8/4/2022 at 1:36 PM

## 2022-08-04 NOTE — PROGRESS NOTES
There is mild bilateral foraminal narrowing. L4-L5: There is artificial disc material with posterior laminectomy. There   is no canal stenosis. There is mild left and moderate right foraminal   narrowing. L5-S1: There is a circumferential disc bulge with facet hypertrophy. There   is no canal stenosis. There is moderate right and severe left foraminal   narrowing. Impression   Posterior fixation and laminectomy from L2-L4 without evidence for hardware   complication. Multilevel degenerative change with bilateral foraminal narrowing as   described above. MRI lumbar spine 2018  1. No significant interval change is observed since the previous study   of 2017.       2. Stable postoperative changes/posterior spinal fusion at the   L3-L4-L5 level. 3. Severe spine canal stenosis in the level of L2-L3           4. Encroachment of the neural foramina bilaterally in levels of L2-L3   and L5-S1      Thoracic spine MRI 2018  1. Some degenerative changes in the thoracic spine, mainly in the   facet joints in the mid-upper thoracic spine segments       2. Discrete loss of height of T6, more likely an old finding. Previous treatments: Physical Therapy, Surgery L3-5 fusion/laminectomy and medications. .       Potential Aberrant Drug-Related Behavior:  No     Urine Drug Screenin18:  Consistent for norhydrocodone metabolite  2018:  Consistent for hydrocodone and norhydrocodone  05/10/2019:  Consistent for hydrocodone and norhydrocodone  2019:  Consistent for hydrocodone and norhydrocodone  01/10/2020:  Consistent for hydrocodone and norhydrocodone  2020:  Consistent for oxycodone and metabolites s/p surgery. Inconsistent for hydrocodone. States that she was instructed to take her old norco until she made the appointment to resume her chronic pain medications.     10/2020:  Consistent  2021:  Consistent  2022:  Consistent     OARRS report:  05/2018 consistent to 05/2021 consistent (norco scripts from [facility] post op 3/10/2021 and 3/24/2021. [place] script through [facility] - last one 06/01/2021 07/2021 - consistent to 08/2022 consistent     Opioid agreement:  10/18/2021      Past Medical History:   Diagnosis Date    Cancer ([address] 12/2019    LES[facility]    Chronic back pain     Costochondritis     h/o    Depression     Diabetes mellitus ([address]     Falls     Last fall Feb 2021    Fibromyalgia     Hallux rigidus of left foot     HTN (hypertension)     Hyperlipidemia     CLYDE on CPAP     Osteoarthritis     \"everywhere\"    Rheumatoid arthritis ([address]     \"As a child. \"    Rheumatoid arthritis(714.0)     TIA (transient ischemic attack)     no deficits        Past Surgical History:   Procedure Laterality Date    ANESTHESIA NERVE BLOCK Left 02/26/2019    LEFT C3,4,5 MBB performed by Jero Coronado MD at [address] [facility] [facility] Right 08/12/2019    BILATERAL TRANSFORAMINAL EPIDURAL STEROID INJECTION S1 #3 performed by Jero Coronado MD at [facility] Right 06/16/2020    RIGHT SACROILIAC JOINT INJECTION      CPT: 09503 performed by Nanda Davidson DO at [address] [facility]  2005    Left    ANKLE SURGERY Left 7/8/2022    REPAIR OF ANTERIOR TALAR LIGAMENT LEFT ANKLE, REPAIR DELTOID LIGAMENT LEFT ANKLE performed by Colleen Painter DPM at [place] [address] Left 12/14/2018    ARTHRODESIS 2ND DIGIT LEFT FOOT performed by Colleen Painter DPM at [facility]  08/16/2017    PLIF L3-L4, L4-L5 with rods, screws, and cages/Dr. Vance Mclain SURGERY  03/04/2020    BACK SURGERY Right 03/09/2021    RIGHT PERCUTANEOUS SACROILIAC JOINT FUSION performed by Alexey Goldstein MD at [address]  2010    reduction, [address] [facility] [address]    CHOLECYSTECTOMY  2011    COLONOSCOPY  03/27/2015    COLONOSCOPY N/A 08/19/2020    COLONOSCOPY DIAGNOSTIC performed by Lord Mandy MD at [facility]    ELBOW JOINT FUSION  03/2021    SI Joint    ENDOSCOPY, COLON, DIAGNOSTIC      EPIDURAL STEROID INJECTION N/A 06/04/2019    BILATERAL TRANSFORAMINAL EPIDURAL STEROID INJECTION S1 performed by Kristina Tim DO at [facility]. / ANKLE Left 12/14/2018    REMOVAL OF PAINFUL HARDWARE LEFT FOOT  ++SYNTHES++ performed by Jaja Panchal DPM at [address] [facility]    inguinal     LUMBAR SPINE SURGERY N/A 03/04/2020    EXPLORATION OF PRIOR L3-L5 FUSION AND L2-L3  POSTERIOR LUMBAR INTERBODY FUSION -- NEEDS O-ARM, AUDIOLOGY, CAGES, PLATES, SCREWS, C-ARM, TERESA TABLE, CELL SAVER, PLATELET GEL -- GLOBUS performed by Herminia Boyer MD at [address] [place] [address] 10/21/2020    EXCISION OF TONGUE LESION performed by Yvan Gonzales DO at [facility] Bilateral 05/07/2018    L1-2 lumbar foramen #1    NERVE BLOCK Bilateral 12/03/2018    s1 tfesi    NERVE BLOCK Bilateral 08/12/2019    NERVE BLOCK Right 09/30/2019    sacral radiofrequency    NERVE BLOCK Right 09/01/2020    RIGHT SACROILIAC JOINT INJECTION #2 performed by Kristina Tim DO at [address] Left 09/25/2013    left foot tarso metatarsal joint injection    OTHER SURGICAL HISTORY Left 05/27/2015    Endoscopic Gastroc recession left, Lapidus left foot and  Excision of exostosis left foot    PAIN MANAGEMENT PROCEDURE Left 7/27/2021    LEFT L5-S1 TRANSFORAMINAL EPIDURAL STEROID INJECTION performed by Kristina Tim, DO at [address] Left 3/29/2022    LEFT TRANSFORAMINAL EPIDURAL STEROID INJECTION AT L5 AND S1 performed by Kristina Tim, DO at [address] N/A 6/9/2022    LUMBAR EPIDURAL STEROID INJECTION L5-S1 performed by Kristina Tim DO at [address] AA&/STRD TFRML EPI LUMBAR/SACRAL 1 LEVEL Bilateral 12/03/2018    BILATERAL S1  EPIDURAL STEROID INJECTION performed by Whitley Maxwell MD at [facility]    DE NJX DX/THER SBST INTRLMNR LMBR/SAC W/IMG GDN N/A 08/21/2018    EPIDURAL STEROID INJECTION L1-2 WITH LOW VOL performed by Olga Hannah MD at [address] NOSE/CLEFT LIP/TIP Bilateral 05/07/2018    BILATERAL L1-2 LUMBAR FORAMEN #1 performed by Olga Hannah MD at [address] NOSE/CLEFT LIP/TIP Bilateral 06/04/2018    BILATERAL TRANSFORAMINAL EPIDURAL STEROID INJECTION UNDER FLUOROSCOPIC GUIDANCE @ FORAMINAL LEVEL L1-2 #2 performed by Olga Hannah MD at [address] Right 09/30/2019    RIGHT SACRAL RADIOFREQUENCY ABLATION performed by Olga Hannah MD at [place] [address]    Big Left Toe    TOE SURGERY Left 2018    2nd toe     TONSILLECTOMY      WISDOM TOOTH EXTRACTION         Prior to Admission medications    Medication Sig Start Date End Date Taking? Authorizing Provider   hydrOXYzine pamoate (VISTARIL) 25 MG capsule Take 1 capsule by mouth 3 times daily as needed for Anxiety 7/29/22  Yes Zay Hendrix MD   varenicline (CHANTIX) 1 MG tablet Take 1 tablet by mouth in the morning and 1 tablet before bedtime. 7/21/22  Yes Zay Hendrix MD   metFORMIN (GLUCOPHAGE) 500 MG tablet Take 1 tablet by mouth 2 times daily (with meals) Start with once daily during the first week. 6/22/22  Yes Zay Hendrix MD   atorvastatin (LIPITOR) 40 MG tablet Take 1 tablet by mouth daily 6/20/22  Yes Zay Hendrix MD   lisinopril (PRINIVIL;ZESTRIL) 40 MG tablet Take 1 tablet by mouth every evening 6/20/22  Yes Zay Hendrix MD   hydroCHLOROthiazide (MICROZIDE) 12.5 MG capsule TAKE ONE CAPSULE BY MOUTH ONCE DAILY FOR BLOOD PRESSURE 6/7/22  Yes Zay Hendrix MD   Handicap Placard MISC DX:  Loss of Balance, Chronic low back pain, s/p back surgery.    Duration of 5 years 2/22/21  Yes Zay Hendrix MD   gabapentin (NEURONTIN) 400 MG capsule Take 1 capsule by mouth 2 times daily for 30 days. 6/20/22 7/20/22  Carlito De La Cruz MD       Allergies   Allergen Reactions    Pcn [Penicillins] Anaphylaxis       Social History     Socioeconomic History    Marital status:      Spouse name: Not on file    Number of children: Not on file    Years of education: Not on file    Highest education level: Not on file   Occupational History    Not on file   Tobacco Use    Smoking status: Every Day     Packs/day: 0.50     Years: 30.00     Pack years: 15.00     Types: Cigarettes    Smokeless tobacco: Never   Vaping Use    Vaping Use: Never used   Substance and Sexual Activity    Alcohol use: Not Currently     Alcohol/week: 0.0 standard drinks     Comment: rarely    Drug use: No    Sexual activity: Not on file   Other Topics Concern    Not on file   Social History Narrative    Not on file     Social Determinants of Health     Financial Resource Strain: Not on file   Food Insecurity: Not on file   Transportation Needs: Not on file   Physical Activity: Not on file   Stress: Not on file   Social Connections: Not on file   Intimate Partner Violence: Not on file   Housing Stability: Not on file       Family History   Problem Relation Age of Onset    Dementia Mother     Breast Cancer Mother     Cancer Mother         breast cancer [de-identified]     Stroke Mother     Heart Attack Father     Other Father 80        Aortic aneurysm    Cancer Brother     Diabetes Brother        REVIEW OF SYSTEMS:     Anola Grow denies fever/chills, chest pain, shortness of breath, new bowel or bladder complaints or suicidal ideations. All other review of systems was negative. PHYSICAL EXAMINATION:      /68   Pulse 99   Temp 98 °F (36.7 °C) (Infrared)   Resp 16   Ht 5' 5\" (1.651 m)   Wt 200 lb (90.7 kg)   LMP  (LMP Unknown)   SpO2 95%   BMI 33.28 kg/m²     General:      General appearance: awake, alert, oriented, in no acute distress, well developed, well nourished and in no acute distress   pleasant and well-hydrated.     in no distress and A & O x3  Build:Overweight  Function:Rises from a seated position with difficulty    HEENT:    Head:normocephalic and atraumatic  Pupils:regular, round and equal.  Sclera: icterus absent  EOM:full and intact. Lungs:    Breathing:Normal expansion. No wheezing. Abdomen:    Shape:non-distended and normal      Lumbar spine:    Range of motion:abnormal moderately Lateral bending, flexion, extension rotation bilateral and is painful. Extremities:    Tremors:None bilaterally upper and lower  Range of motion:Generally normal shoulders  Intact:Yes  Cyanosis:none  Edema:Normal      Neurological:    Cranial nerves:normal  Sensory:diminished to light lateral aspect of right leg                   Dermatology:    Skin:no unusual rashes, no skin lesions, no palpable subcutaneous nodules and good skin turgor    Assessment/Plan:      Chronic low back pain with radiation to the Right groin, patient had low back surgery 08/2017 L3-5 fusion, recently had lumbar spine CT with severe L2-3 stenosis     Patient is s/p left ankle surgery on 7/8. Plan:  Patient is s/p revision fusion L2-L4 on 3/4/2020. Patient is s/p:  Right sacroiliac joint fusion with use of SI-BONE iFuse implant on 3/9/2021 by Dr. Arnoldo Livingston. Patient is s/p:  Left TFESI L5 and S1 on 03/29/2022 with 75% relief. LESI L5-S1 on 06/09/2022 with 75% relief. She will call if she would like right thoracic TPIs. Continue norco 5/325 BID. Continue with Gabapentin 400 mg BID. She reports that any increases make her off balance. Refilled by PCP. OARRS report reviewed 08/2022  Hold flexeril for now. Doing well without it. Patient encouraged to stay active and to lose weight.  Failed physical therapy  Treatment plan discussed with the patient including medications side effects          Controlled Substance Monitoring:    Acute and Chronic Pain Monitoring:   RX Monitoring 8/5/2022   Attestation -   Acute Pain Prescriptions -   Periodic Controlled Substance Monitoring Possible medication side effects, risk of tolerance/dependence & alternative treatments discussed. ;No signs of potential drug abuse or diversion identified. ;Assessed functional status. ;Obtaining appropriate analgesic effect of treatment.    Chronic Pain > 80 MEDD -                ccreferring physic

## 2022-08-05 ENCOUNTER — OFFICE VISIT (OUTPATIENT)
Dept: PAIN MANAGEMENT | Age: 67
End: 2022-08-05
Payer: MEDICARE

## 2022-08-05 VITALS
DIASTOLIC BLOOD PRESSURE: 68 MMHG | BODY MASS INDEX: 33.32 KG/M2 | HEART RATE: 99 BPM | TEMPERATURE: 98 F | HEIGHT: 65 IN | OXYGEN SATURATION: 95 % | RESPIRATION RATE: 16 BRPM | WEIGHT: 200 LBS | SYSTOLIC BLOOD PRESSURE: 115 MMHG

## 2022-08-05 DIAGNOSIS — M54.16 LUMBAR RADICULOPATHY: ICD-10-CM

## 2022-08-05 DIAGNOSIS — M47.812 CERVICAL FACET JOINT SYNDROME: ICD-10-CM

## 2022-08-05 DIAGNOSIS — M54.6 THORACIC SPINE PAIN: ICD-10-CM

## 2022-08-05 DIAGNOSIS — M47.816 LUMBAR FACET ARTHROPATHY: ICD-10-CM

## 2022-08-05 DIAGNOSIS — G89.29 CHRONIC MIDLINE LOW BACK PAIN WITH SCIATICA, SCIATICA LATERALITY UNSPECIFIED: ICD-10-CM

## 2022-08-05 DIAGNOSIS — M48.061 SPINAL STENOSIS OF LUMBAR REGION WITHOUT NEUROGENIC CLAUDICATION: ICD-10-CM

## 2022-08-05 DIAGNOSIS — G89.4 CHRONIC PAIN SYNDROME: Primary | ICD-10-CM

## 2022-08-05 DIAGNOSIS — M51.36 DDD (DEGENERATIVE DISC DISEASE), LUMBAR: ICD-10-CM

## 2022-08-05 DIAGNOSIS — M79.10 MYALGIA: ICD-10-CM

## 2022-08-05 DIAGNOSIS — M54.40 CHRONIC MIDLINE LOW BACK PAIN WITH SCIATICA, SCIATICA LATERALITY UNSPECIFIED: ICD-10-CM

## 2022-08-05 PROCEDURE — 1123F ACP DISCUSS/DSCN MKR DOCD: CPT | Performed by: PHYSICIAN ASSISTANT

## 2022-08-05 PROCEDURE — 99213 OFFICE O/P EST LOW 20 MIN: CPT | Performed by: PHYSICIAN ASSISTANT

## 2022-08-05 RX ORDER — HYDROCODONE BITARTRATE AND ACETAMINOPHEN 5; 325 MG/1; MG/1
1 TABLET ORAL 2 TIMES DAILY
Qty: 60 TABLET | Refills: 0 | Status: SHIPPED
Start: 2022-08-05 | End: 2022-09-06 | Stop reason: SDUPTHER

## 2022-08-05 NOTE — PROGRESS NOTES
Do you currently have any of the following:    Fever: No  Headache:  No  Cough: No  Shortness of breath: No  Exposed to anyone with these symptoms: No         Jaqueline Weiss presents to the [address] [place] [address] on 8/5/2022. Ayaan Camarillo is complaining of pain in her low back. The pain is constant. The pain is described as aching, dull, sharp, and miserable. Pain is rated on her best day at a 5, on her worst day at a 10, and on average at a 7 on the VAS scale. She took her last dose of Neurontin today. Any procedures since your last visit: No    Pacemaker or defibrillator: No     She is not on NSAIDS and is not on anticoagulation medications. Medication Contract and Consent for Opioid Use Documents Filed       Patient Documents       Type of Document Status Date Received Received By Description    Medication Contract Received  Aleah Mullins Opioid medication contract with Dr Frank Almanza 3/24/20    Medication Contract Received 4/26/2018  3:50 PM ANYA NUNO PAIN MANAGEMENT PATIENT AGREEMENT 4/26/2018    Medication Contract Received 11/5/2020  4:23 PM EBER HONG Opioid medication contract with Dr Marshall Courser Received 3/1/2021  1:26 PM Aleah Mullins Opioid medication contract with Dr Marshall Courser Received 8/23/2021  2:03 PM TAMMI [address] [place] [address] Medication contract    Medication Contract Received 10/18/2021  3:03 PM HAYLIE CADENA medication contract 10/18/2021                    /68   Pulse 99   Temp 98 °F (36.7 °C) (Infrared)   Resp 16   Ht 5' 5\" (1.651 m)   Wt 200 lb (90.7 kg)   LMP  (LMP Unknown)   SpO2 95%   BMI 33.28 kg/m²      No LMP recorded (lmp unknown).  Patient is postmenopausal.

## 2022-08-23 ENCOUNTER — OFFICE VISIT (OUTPATIENT)
Dept: PODIATRY | Age: 67
End: 2022-08-23

## 2022-08-23 VITALS — BODY MASS INDEX: 33.28 KG/M2 | WEIGHT: 200 LBS | TEMPERATURE: 98.1 F

## 2022-08-23 DIAGNOSIS — G89.18 POST-OP PAIN: Primary | ICD-10-CM

## 2022-08-23 DIAGNOSIS — Z09 POSTOPERATIVE EXAMINATION: ICD-10-CM

## 2022-08-23 PROCEDURE — 99024 POSTOP FOLLOW-UP VISIT: CPT | Performed by: PODIATRIST

## 2022-08-23 NOTE — PROGRESS NOTES
Postop Progress Note    Subjective    Lauren Dodd presents to the office 4 weeks  following foot sx ankle sx . Pain is controlled with current analgesics. Objective    Vitals:    08/23/22 1234   Temp: 98.1 °F (36.7 °C)     General: alert, cooperative, and no distress  Incision: healing well    Assessment    Doing well postoperatively. Encounter Diagnosis   Name Primary? Post-op pain Yes          Plan   1. Continue any current medications  2. Wound care discussed  3. Wound/Incision: healing well  4. Disposition:   Limited activities. No heavy lifting. No strenuous exercise. Should not return to gym class or sports until cleared by a physician. 5. Diet: regular diet  6. Follow up: 2 week. No results found.         Electronically signed by Vicky Stapleton DPM on 8/23/2022 at 12:51 PM

## 2022-09-02 NOTE — PROGRESS NOTES
[facility]  [address]  [facility] - BEHAVIORAL HE[facility], [facility]    Follow up Note      Errol Santos     Date of Visit:  2022    CC:  Patient presents for follow up   Chief Complaint   Patient presents with    Follow-up     Low back pain            HPI:    Pain is unchanged. Recovering from left foot surgery. Having some myofascial cervical pain bilaterally. Change in quality of symptoms:no. Patient satisfaction with analgesia:fair. Medication side effects: None. Recent diagnostic testing:none. Recent interventional procedures:None. She has been on anticoagulation medications to include NSAIDS. The patient  has not been on herbal supplements. The patient is diabetic. Imagin2022 Thoracic Spine X-ray    FINDINGS:   Thoracic vertebral bodies are normal in height and alignment. Multilevel   degenerative changes. No evidence of fracture. Pedicles are symmetric and   intact. Visualized lungs are clear. Impression   No acute abnormality of the thoracic spine       Multilevel degenerative changes. 2021 CT lumbar myelogram    FINDINGS:   BONES/ALIGNMENT: There is minimal levocurvature of the lumbar spine. There   is posterior fixation from L2-L4 without evidence for hardware complication. The vertebral body heights are maintained. There is minimal retrolisthesis   of L2 on L3. SOFT TISSUES: The posterior paraspinal soft tissues are unremarkable. The   visualized abdominal structures are unremarkable. The conus is normal in   caliber and terminates at L1. The cauda equina is unremarkable. L1-L2: There is no significant disc protrusion, central spinal canal stenosis   or neural foraminal narrowing. L2-L3: There is artificial disc material with posterior laminectomy. There   is no canal stenosis or left foraminal narrowing. There is moderate right   foraminal narrowing.        L3-L4: There is artificial disc material and posterior laminectomy. There is   no canal stenosis. There is mild bilateral foraminal narrowing. L4-L5: There is artificial disc material with posterior laminectomy. There   is no canal stenosis. There is mild left and moderate right foraminal   narrowing. L5-S1: There is a circumferential disc bulge with facet hypertrophy. There   is no canal stenosis. There is moderate right and severe left foraminal   narrowing. Impression   Posterior fixation and laminectomy from L2-L4 without evidence for hardware   complication. Multilevel degenerative change with bilateral foraminal narrowing as   described above. MRI lumbar spine 2018  1. No significant interval change is observed since the previous study   of 2017.       2. Stable postoperative changes/posterior spinal fusion at the   L3-L4-L5 level. 3. Severe spine canal stenosis in the level of L2-L3           4. Encroachment of the neural foramina bilaterally in levels of L2-L3   and L5-S1      Thoracic spine MRI 2018  1. Some degenerative changes in the thoracic spine, mainly in the   facet joints in the mid-upper thoracic spine segments       2. Discrete loss of height of T6, more likely an old finding. Previous treatments: Physical Therapy, Surgery L3-5 fusion/laminectomy and medications. .       Potential Aberrant Drug-Related Behavior:  No     Urine Drug Screenin18:  Consistent for norhydrocodone metabolite  2018:  Consistent for hydrocodone and norhydrocodone  05/10/2019:  Consistent for hydrocodone and norhydrocodone  2019:  Consistent for hydrocodone and norhydrocodone  01/10/2020:  Consistent for hydrocodone and norhydrocodone  2020:  Consistent for oxycodone and metabolites s/p surgery. Inconsistent for hydrocodone. States that she was instructed to take her old norco until she made the appointment to resume her chronic pain medications.     10/2020: Consistent  07/2021:  Consistent  01/2022:  Consistent     OARRS report:  05/2018 consistent to 05/2021 consistent (norco scripts from [facility] post op 3/10/2021 and 3/24/2021. Trumann script through [facility] - last one 06/01/2021 07/2021 - consistent to 09/2022 consistent     Opioid agreement:  10/18/2021      Past Medical History:   Diagnosis Date    Cancer ([address] 12/2019    LESION REMOVED UNDER TONGUE  DENTAL CLINIC    Chronic back pain     Costochondritis     h/o    Depression     Diabetes mellitus ([address]     Falls     Last fall Feb 2021    Fibromyalgia     Hallux rigidus of left foot     HTN (hypertension)     Hyperlipidemia     CLYDE on CPAP     Osteoarthritis     \"everywhere\"    Rheumatoid arthritis ([address]     \"As a child. \"    Rheumatoid arthritis(714.0)     TIA (transient ischemic attack)     no deficits        Past Surgical History:   Procedure Laterality Date    ANESTHESIA NERVE BLOCK Left 02/26/2019    LEFT C3,4,5 MBB performed by Rhiannon Honeycutt MD at [address] [place] [address],[address] [facility] Right 08/12/2019    BILATERAL TRANSFORAMINAL EPIDURAL STEROID INJECTION S1 #3 performed by Rhiannon Honeycutt MD at [facility] Right 06/16/2020    RIGHT SACROILIAC JOINT INJECTION      CPT: 15493 performed by Mitali Pacheco DO at [address] [facility]  2005    Left    ANKLE SURGERY Left 7/8/2022    REPAIR OF ANTERIOR TALAR LIGAMENT LEFT ANKLE, REPAIR DELTOID LIGAMENT LEFT ANKLE performed by Amaya Harris DPM at [address] 12/14/2018    ARTHRODESIS 2ND DIGIT LEFT FOOT performed by Amaya Harris DPM at [facility]  08/16/2017    PLIF L3-L4, L4-L5 with rods, screws, and cages/Dr. Nogueira [facility] SURGERY  03/04/2020    BACK SURGERY Right 03/09/2021    RIGHT PERCUTANEOUS SACROILIAC JOINT FUSION performed by Apoorva Pires MD at [address] [facility]  2010    reduction, [facility]    CHOLECYSTECTOMY  2011    COLONOSCOPY  03/27/2015    COLONOSCOPY N/A 08/19/2020 COLONOSCOPY DIAGNOSTIC performed by Loco Watters MD at [address]  03/2021    SI Joint    ENDOSCOPY, COLON, DIAGNOSTIC      EPIDURAL STEROID INJECTION N/A 06/04/2019    BILATERAL TRANSFORAMINAL EPIDURAL STEROID INJECTION S1 performed by Gunjan Casanova DO at [address] / ANKLE Left 12/14/2018    REMOVAL OF PAINFUL HARDWARE LEFT FOOT  ++SYNTHES++ performed by Rangel Zambrano DPM at [address] [facility]    inguinal     LUMBAR SPINE SURGERY N/A 03/04/2020    EXPLORATION OF PRIOR L3-L5 FUSION AND L2-L3  POSTERIOR LUMBAR INTERBODY FUSION -- NEEDS O-ARM, AUDIOLOGY, CAGES, PLATES, SCREWS, C-ARM, TERESA TABLE, CELL SAVER, PLATELET GEL -- GLOBUS performed by Raffaele Emanuel MD at [address] 10/21/2020    EXCISION OF TONGUE LESION performed by Piyush Gonzales DO at [address] Bilateral 05/07/2018    L1-2 lumbar foramen #1    NERVE BLOCK Bilateral 12/03/2018    s1 tfesi    NERVE BLOCK Bilateral 08/12/2019    NERVE BLOCK Right 09/30/2019    sacral radiofrequency    NERVE BLOCK Right 09/01/2020    RIGHT SACROILIAC JOINT INJECTION #2 performed by Gunjan Casanova DO at [address] Left 09/25/2013    left foot tarso metatarsal joint injection    OTHER SURGICAL HISTORY Left 05/27/2015    Endoscopic Gastroc recession left, Lapidus left foot and  Excision of exostosis left foot    PAIN MANAGEMENT PROCEDURE Left 7/27/2021    LEFT L5-S1 TRANSFORAMINAL EPIDURAL STEROID INJECTION performed by Gunjan Casanova DO at [address] Left 3/29/2022    LEFT TRANSFORAMINAL EPIDURAL STEROID INJECTION AT L5 AND S1 performed by Gunjan Casanova DO at [address] N/A 6/9/2022    LUMBAR EPIDURAL STEROID INJECTION L5-S1 performed by Gunjan Casanova DO at [address] AA&/STRD TFRML EPI LUMBAR/SACRAL 1 LEVEL Bilateral 12/03/2018    BILATERAL S1 (NEURONTIN) 400 MG capsule Take 1 capsule by mouth 2 times daily for 30 days. 6/20/22 7/20/22  Scooby Palacios MD       Allergies   Allergen Reactions    Pcn [Penicillins] Anaphylaxis       Social History     Socioeconomic History    Marital status:      Spouse name: Not on file    Number of children: Not on file    Years of education: Not on file    Highest education level: Not on file   Occupational History    Not on file   Tobacco Use    Smoking status: Every Day     Packs/day: 0.50     Years: 30.00     Pack years: 15.00     Types: Cigarettes    Smokeless tobacco: Never   Vaping Use    Vaping Use: Never used   Substance and Sexual Activity    Alcohol use: Not Currently     Alcohol/week: 0.0 standard drinks     Comment: rarely    Drug use: No    Sexual activity: Not on file   Other Topics Concern    Not on file   Social History Narrative    Not on file     Social Determinants of Health     Financial Resource Strain: Not on file   Food Insecurity: Not on file   Transportation Needs: Not on file   Physical Activity: Not on file   Stress: Not on file   Social Connections: Not on file   Intimate Partner Violence: Not on file   Housing Stability: Not on file       Family History   Problem Relation Age of Onset    Dementia Mother     Breast Cancer Mother     Cancer Mother         breast cancer [de-identified]     Stroke Mother     Heart Attack Father     Other Father 80        Aortic aneurysm    Cancer Brother     Diabetes Brother        REVIEW OF SYSTEMS:     Pranav Naik denies fever/chills, chest pain, shortness of breath, new bowel or bladder complaints or suicidal ideations. All other review of systems was negative.     PHYSICAL EXAMINATION:      /83   Pulse 87   Temp 98.6 °F (37 °C) (Infrared)   Resp 16   Ht 5' 5\" (1.651 m)   Wt 200 lb (90.7 kg)   LMP  (LMP Unknown)   SpO2 97%   BMI 33.28 kg/m²     General:      General appearance: awake, alert, oriented, in no acute distress, well developed, well nourished and in no acute distress   pleasant and well-hydrated. in no distress and A & O x3  Build:Overweight  Function:Rises from a seated position with difficulty    HEENT:    Head:normocephalic and atraumatic  Pupils:regular, round and equal.  Sclera: icterus absent  EOM:full and intact. Lungs:    Breathing:Normal expansion. No wheezing. Abdomen:    Shape:non-distended and normal    Cervical Spine:  + TTP to b/l cervical myofascial areas. Lumbar spine:    Range of motion:abnormal moderately Lateral bending, flexion, extension rotation bilateral and is painful. Extremities:    Tremors:None bilaterally upper and lower  Range of motion:Generally normal shoulders  Intact:Yes  Cyanosis:none  Edema:Normal      Neurological:    Cranial nerves:normal  Sensory:diminished to light lateral aspect of right leg                   Dermatology:    Skin:no unusual rashes, no skin lesions, no palpable subcutaneous nodules and good skin turgor    Assessment/Plan:      Chronic low back pain with radiation to the Right groin, patient had low back surgery 08/2017 L3-5 fusion, recently had lumbar spine CT with severe L2-3 stenosis     Patient is s/p left ankle surgery on 7/8. Plan:  Patient is s/p revision fusion L2-L4 on 3/4/2020. Patient is s/p:  Right sacroiliac joint fusion with use of SI-BONE iFuse implant on 3/9/2021 by Dr. Ram. Patient is s/p:  Left TFESI L5 and S1 on 03/29/2022 with 75% relief. LESI L5-S1 on 06/09/2022 with continued good relief  She will call if she would like right thoracic TPIs. Pain is a little less now. Cervical TPIs ordered today  Continue norco 5/325 BID. Continue with Gabapentin 400 mg BID. She reports that any increases make her off balance. RF today. OARRS report reviewed 09/2022  Hold flexeril for now. Doing well without it. Patient encouraged to stay active and to lose weight.  Failed physical therapy  Treatment plan discussed with the patient including medications side effects          Controlled Substance Monitoring:    Acute and Chronic Pain Monitoring:   RX Monitoring 9/6/2022   Attestation -   Acute Pain Prescriptions -   Periodic Controlled Substance Monitoring Possible medication side effects, risk of tolerance/dependence & alternative treatments discussed. ;No signs of potential drug abuse or diversion identified. ;Assessed functional status. ;Obtaining appropriate analgesic effect of treatment.    Chronic Pain > 80 MEDD -              ccreferring physic

## 2022-09-06 ENCOUNTER — OFFICE VISIT (OUTPATIENT)
Dept: PAIN MANAGEMENT | Age: 67
End: 2022-09-06
Payer: MEDICARE

## 2022-09-06 VITALS
HEIGHT: 65 IN | BODY MASS INDEX: 33.32 KG/M2 | WEIGHT: 200 LBS | DIASTOLIC BLOOD PRESSURE: 83 MMHG | HEART RATE: 87 BPM | TEMPERATURE: 98.6 F | OXYGEN SATURATION: 97 % | RESPIRATION RATE: 16 BRPM | SYSTOLIC BLOOD PRESSURE: 133 MMHG

## 2022-09-06 DIAGNOSIS — M54.50 CHRONIC BILATERAL LOW BACK PAIN WITHOUT SCIATICA: ICD-10-CM

## 2022-09-06 DIAGNOSIS — M54.40 CHRONIC MIDLINE LOW BACK PAIN WITH SCIATICA, SCIATICA LATERALITY UNSPECIFIED: ICD-10-CM

## 2022-09-06 DIAGNOSIS — M47.812 CERVICAL FACET JOINT SYNDROME: ICD-10-CM

## 2022-09-06 DIAGNOSIS — G89.29 CHRONIC MIDLINE LOW BACK PAIN WITH SCIATICA, SCIATICA LATERALITY UNSPECIFIED: ICD-10-CM

## 2022-09-06 DIAGNOSIS — G89.29 OTHER CHRONIC PAIN: ICD-10-CM

## 2022-09-06 DIAGNOSIS — M48.061 SPINAL STENOSIS OF LUMBAR REGION WITHOUT NEUROGENIC CLAUDICATION: ICD-10-CM

## 2022-09-06 DIAGNOSIS — M51.36 DDD (DEGENERATIVE DISC DISEASE), LUMBAR: ICD-10-CM

## 2022-09-06 DIAGNOSIS — G89.29 CHRONIC BILATERAL LOW BACK PAIN WITHOUT SCIATICA: ICD-10-CM

## 2022-09-06 DIAGNOSIS — M47.816 LUMBAR FACET ARTHROPATHY: ICD-10-CM

## 2022-09-06 DIAGNOSIS — M54.16 LUMBAR RADICULOPATHY: ICD-10-CM

## 2022-09-06 DIAGNOSIS — G89.4 CHRONIC PAIN SYNDROME: Primary | ICD-10-CM

## 2022-09-06 DIAGNOSIS — M79.10 MYALGIA: ICD-10-CM

## 2022-09-06 PROCEDURE — 99213 OFFICE O/P EST LOW 20 MIN: CPT | Performed by: PHYSICIAN ASSISTANT

## 2022-09-06 PROCEDURE — 1123F ACP DISCUSS/DSCN MKR DOCD: CPT | Performed by: PHYSICIAN ASSISTANT

## 2022-09-06 RX ORDER — GABAPENTIN 400 MG/1
400 CAPSULE ORAL 2 TIMES DAILY
Qty: 60 CAPSULE | Refills: 2 | Status: SHIPPED | OUTPATIENT
Start: 2022-09-06 | End: 2022-11-01

## 2022-09-06 RX ORDER — HYDROCODONE BITARTRATE AND ACETAMINOPHEN 5; 325 MG/1; MG/1
1 TABLET ORAL 2 TIMES DAILY
Qty: 60 TABLET | Refills: 0 | Status: SHIPPED
Start: 2022-09-06 | End: 2022-10-04 | Stop reason: SDUPTHER

## 2022-09-06 NOTE — PROGRESS NOTES
Do you currently have any of the following:    Fever: No  Headache:  No  Cough: No  Shortness of breath: No  Exposed to anyone with these symptoms: No         Marzena Adams presents to the [facility] on 9/6/2022. Ysabel Vargas is complaining of pain in her low back. The pain is constant. The pain is described as aching, dull, sharp, and miserable. Pain is rated on her best day at a 7, on her worst day at a 10, and on average at a 8 on the VAS scale. She took her last dose of Neurontin today. Any procedures since your last visit: No    Pacemaker or defibrillator: No    She is not on NSAIDS and is not on anticoagulation medications. Medication Contract and Consent for Opioid Use Documents Filed       Patient Documents       Type of Document Status Date Received Received By Description    Medication Contract Received  Maryuri Infante Opioid medication contract with Dr Maile Villanueva 3/24/20    Medication Contract Received 4/26/2018  3:50 PM ANYA NUNO PAIN MANAGEMENT PATIENT AGREEMENT 4/26/2018    Medication Contract Received 11/5/2020  4:23 PM EBER HONG Opioid medication contract with Dr Suze Garcia Received 3/1/2021  1:26 PM Maryuri Infante Opioid medication contract with Dr Suze Garcia Received 8/23/2021  2:03 PM TAMMI [address] [place] [address] Medication contract    Medication Contract Received 10/18/2021  3:03 PM HAYLIE CADENA medication contract 10/18/2021                    /83   Pulse 87   Temp 98.6 °F (37 °C) (Infrared)   Resp 16   Ht 5' 5\" (1.651 m)   Wt 200 lb (90.7 kg)   LMP  (LMP Unknown)   SpO2 97%   BMI 33.28 kg/m²      No LMP recorded (lmp unknown).  Patient is postmenopausal.

## 2022-09-14 ENCOUNTER — OFFICE VISIT (OUTPATIENT)
Dept: PODIATRY | Age: 67
End: 2022-09-14

## 2022-09-14 VITALS — WEIGHT: 200 LBS | TEMPERATURE: 97.4 F | BODY MASS INDEX: 33.28 KG/M2

## 2022-09-14 DIAGNOSIS — Z09 POSTOPERATIVE EXAMINATION: Primary | ICD-10-CM

## 2022-09-14 PROCEDURE — 99024 POSTOP FOLLOW-UP VISIT: CPT | Performed by: PODIATRIST

## 2022-09-14 NOTE — PROGRESS NOTES
Postop Progress Note    Subjective    Denisse Fill presents to the office 9 weeks  following foot sx ankle sx . Pain is controlled with current analgesics. Objective    Vitals:    09/14/22 1348   Temp: 97.4 °F (36.3 °C)     General: alert, cooperative, and no distress  Incision: healing well    Assessment    Doing well postoperatively. Encounter Diagnosis   Name Primary? Postoperative examination Yes            Plan   1. Continue any current medications  2. Wound care discussed  3. Wound/Incision: healing well  4. Disposition:   Limited activities. No heavy lifting. No strenuous exercise. Should not return to gym class or sports until cleared by a physician. 5. Diet: regular diet  6. Follow up: 2 week. No results found. pt to start therapy           Electronically signed by Jaja Panchal DPM on 9/14/2022 at 1:56 PM

## 2022-09-21 ENCOUNTER — TELEPHONE (OUTPATIENT)
Dept: ADMINISTRATIVE | Age: 67
End: 2022-09-21

## 2022-09-21 NOTE — TELEPHONE ENCOUNTER
Left pt a mess advised doc will be out of the office the week of Oct 1-10   Will need to call my did line to reschedule appointment

## 2022-09-21 NOTE — TELEPHONE ENCOUNTER
Patient can not come for her PO on 9/29 and can not schedule on 10/4. Stated she can come any other day. Please advise for reschedule. Patient asked if a VM needs to be left, okay to leave new appointment day and time.

## 2022-09-29 ENCOUNTER — PROCEDURE VISIT (OUTPATIENT)
Dept: PAIN MANAGEMENT | Age: 67
End: 2022-09-29
Payer: MEDICARE

## 2022-09-29 VITALS
TEMPERATURE: 98 F | HEIGHT: 65 IN | RESPIRATION RATE: 16 BRPM | HEART RATE: 96 BPM | OXYGEN SATURATION: 96 % | WEIGHT: 200 LBS | BODY MASS INDEX: 33.32 KG/M2 | SYSTOLIC BLOOD PRESSURE: 131 MMHG | DIASTOLIC BLOOD PRESSURE: 83 MMHG

## 2022-09-29 DIAGNOSIS — M79.10 MYALGIA: ICD-10-CM

## 2022-09-29 DIAGNOSIS — G89.4 CHRONIC PAIN SYNDROME: Primary | ICD-10-CM

## 2022-09-29 PROCEDURE — 20553 NJX 1/MLT TRIGGER POINTS 3/>: CPT | Performed by: PAIN MEDICINE

## 2022-09-29 PROCEDURE — 76942 ECHO GUIDE FOR BIOPSY: CPT | Performed by: PAIN MEDICINE

## 2022-09-29 PROCEDURE — 99213 OFFICE O/P EST LOW 20 MIN: CPT | Performed by: PAIN MEDICINE

## 2022-09-29 RX ORDER — BUPIVACAINE HYDROCHLORIDE 2.5 MG/ML
10 INJECTION, SOLUTION EPIDURAL; INFILTRATION; INTRACAUDAL ONCE
Status: COMPLETED | OUTPATIENT
Start: 2022-09-29 | End: 2022-09-29

## 2022-09-29 RX ADMIN — BUPIVACAINE HYDROCHLORIDE 25 MG: 2.5 INJECTION, SOLUTION EPIDURAL; INFILTRATION; INTRACAUDAL at 12:29

## 2022-09-29 NOTE — PROGRESS NOTES
Do you currently have any of the following:    Fever: No  Headache:  No  Cough: No  Shortness of breath: No  Exposed to anyone with these symptoms: No         Robbie Torres presents to the [address] [place] [address] on 9/29/2022. Pranav Naik is complaining of pain neck pain . The pain is constant. The pain is described as aching, dull, sharp, and miserable. Pain is rated on her best day at a 7, on her worst day at a 10, and on average at a 8 on the VAS scale. She took her last dose of Norco and Neurontin today. Any procedures since your last visit: No    Pacemaker or defibrillator: No     She is not on NSAIDS and is not on anticoagulation medications. Medication Contract and Consent for Opioid Use Documents Filed       Patient Documents       Type of Document Status Date Received Received By Description    Medication Contract Received  Abdi Vargas Opioid medication contract with Dr Jefferson Truong 3/24/20    Medication Contract Received 4/26/2018  3:50 PM ANYA NUNO PAIN MANAGEMENT PATIENT AGREEMENT 4/26/2018    Medication Contract Received 11/5/2020  4:23 PM EBER HONG Opioid medication contract with Dr Reyna Dougherty Received 3/1/2021  1:26 PM Abdi Vargas Opioid medication contract with Dr Reyna Dougherty Received 8/23/2021  2:03 PM TAMMI [address] [place] [address] Medication contract    Medication Contract Received 10/18/2021  3:03 PM HAYLIE CADENA medication contract 10/18/2021                    /83   Pulse 96   Temp 98 °F (36.7 °C) (Infrared)   Resp 16   Ht 5' 5\" (1.651 m)   Wt 200 lb (90.7 kg)   LMP  (LMP Unknown)   SpO2 96%   BMI 33.28 kg/m²      No LMP recorded (lmp unknown).  Patient is postmenopausal.

## 2022-09-29 NOTE — PROGRESS NOTES
2022    Patient: Janel White  :  1955  Age:  74 y.o. Sex:  female     PRE-PROCEDURE DIAGNOSIS: Myofascial pain. POST-PROCEDURE DIAGNOSIS: Same. PROCEDURE: bilateral cervical paraspinal, trapezius, rhomboid, and supraspinatus trigger point injections under ultrasound guidance. SURGEON:   ZARA Winchester. ANESTHESIA: local    ESTIMATED BLOOD LOSS:  None.  ______________________________________________________________________    BRIEF HISTORY: Janel White comes in today for bilateral cervical paraspinal, trapezius, rhomboid, and supraspinatus trigger point injection. After discussing the potential risks and benefits of the procedure with the patient. Gilberto Delaney did request that we proceed. A complete History & Physical was reviewed and it is unchanged. DESCRIPTION OF PROCEDURE:   Each trigger point was marked with patient input, prepped with chloraprep and draped. A #25-gauge, 1.5-inch needle was passed into the area of greatest tenderness under ultrasound guidance. Each trigger point received equal, divided dosages or a total of 10 cc of 0.25% Marcaine after negative aspiration of blood, air or paresthesia with ultrasound visualization of solution spread. The needle was removed intact and Band-Aids were applied. Disposition the patient tolerated the procedure well and there were no complications . Gilberto Delaney will follow up in our [address] [place] [address] as scheduled. She was encouraged to call with questions, concerns or if worsening of symptoms occurs.     Rebeca Reinoso DO

## 2022-10-04 ENCOUNTER — PREP FOR PROCEDURE (OUTPATIENT)
Dept: PAIN MANAGEMENT | Age: 67
End: 2022-10-04

## 2022-10-04 ENCOUNTER — OFFICE VISIT (OUTPATIENT)
Dept: PAIN MANAGEMENT | Age: 67
End: 2022-10-04
Payer: MEDICARE

## 2022-10-04 VITALS
DIASTOLIC BLOOD PRESSURE: 87 MMHG | TEMPERATURE: 97.5 F | RESPIRATION RATE: 16 BRPM | HEART RATE: 95 BPM | WEIGHT: 200 LBS | HEIGHT: 65 IN | BODY MASS INDEX: 33.32 KG/M2 | OXYGEN SATURATION: 99 % | SYSTOLIC BLOOD PRESSURE: 135 MMHG

## 2022-10-04 DIAGNOSIS — M79.10 MYALGIA: ICD-10-CM

## 2022-10-04 DIAGNOSIS — M54.40 CHRONIC MIDLINE LOW BACK PAIN WITH SCIATICA, SCIATICA LATERALITY UNSPECIFIED: ICD-10-CM

## 2022-10-04 DIAGNOSIS — M54.50 CHRONIC BILATERAL LOW BACK PAIN WITHOUT SCIATICA: Primary | ICD-10-CM

## 2022-10-04 DIAGNOSIS — M47.812 CERVICAL FACET JOINT SYNDROME: ICD-10-CM

## 2022-10-04 DIAGNOSIS — G89.29 OTHER CHRONIC PAIN: ICD-10-CM

## 2022-10-04 DIAGNOSIS — G89.4 CHRONIC PAIN SYNDROME: ICD-10-CM

## 2022-10-04 DIAGNOSIS — M51.36 DDD (DEGENERATIVE DISC DISEASE), LUMBAR: ICD-10-CM

## 2022-10-04 DIAGNOSIS — M47.816 LUMBAR FACET ARTHROPATHY: ICD-10-CM

## 2022-10-04 DIAGNOSIS — M48.061 SPINAL STENOSIS OF LUMBAR REGION WITHOUT NEUROGENIC CLAUDICATION: ICD-10-CM

## 2022-10-04 DIAGNOSIS — G89.29 CHRONIC BILATERAL LOW BACK PAIN WITHOUT SCIATICA: Primary | ICD-10-CM

## 2022-10-04 DIAGNOSIS — M54.16 LUMBAR RADICULOPATHY: ICD-10-CM

## 2022-10-04 DIAGNOSIS — G89.29 CHRONIC MIDLINE LOW BACK PAIN WITH SCIATICA, SCIATICA LATERALITY UNSPECIFIED: ICD-10-CM

## 2022-10-04 PROCEDURE — 99213 OFFICE O/P EST LOW 20 MIN: CPT | Performed by: PHYSICIAN ASSISTANT

## 2022-10-04 PROCEDURE — 1123F ACP DISCUSS/DSCN MKR DOCD: CPT | Performed by: PHYSICIAN ASSISTANT

## 2022-10-04 RX ORDER — HYDROCODONE BITARTRATE AND ACETAMINOPHEN 5; 325 MG/1; MG/1
1 TABLET ORAL 2 TIMES DAILY
Qty: 60 TABLET | Refills: 0 | Status: SHIPPED
Start: 2022-10-08 | End: 2022-11-01 | Stop reason: SDUPTHER

## 2022-10-04 NOTE — PROGRESS NOTES
[address]  [address]  [facility]    Follow up Note      Osiel Bob     Date of Visit:  10/4/2022    CC:  Patient presents for follow up   Chief Complaint   Patient presents with    Follow-up    Lower Back Pain           HPI:    Pain is worse to lower back with radiation down both legs. Change in quality of symptoms:no. Patient satisfaction with analgesia:fair. Medication side effects: None. Recent diagnostic testing:none. Recent interventional procedures: 2022 PROCEDURE: bilateral cervical paraspinal, trapezius, rhomboid, and supraspinatus trigger point injections under ultrasound guidance - good relief but not completed gone. She has been on anticoagulation medications to include NSAIDS. The patient  has not been on herbal supplements. The patient is diabetic. Imagin2022 Thoracic Spine X-ray    FINDINGS:   Thoracic vertebral bodies are normal in height and alignment. Multilevel   degenerative changes. No evidence of fracture. Pedicles are symmetric and   intact. Visualized lungs are clear. Impression   No acute abnormality of the thoracic spine       Multilevel degenerative changes. 2021 CT lumbar myelogram    FINDINGS:   BONES/ALIGNMENT: There is minimal levocurvature of the lumbar spine. There   is posterior fixation from L2-L4 without evidence for hardware complication. The vertebral body heights are maintained. There is minimal retrolisthesis   of L2 on L3. SOFT TISSUES: The posterior paraspinal soft tissues are unremarkable. The   visualized abdominal structures are unremarkable. The conus is normal in   caliber and terminates at L1. The cauda equina is unremarkable. L1-L2: There is no significant disc protrusion, central spinal canal stenosis   or neural foraminal narrowing. L2-L3: There is artificial disc material with posterior laminectomy.   There   is no canal stenosis or left foraminal narrowing. There is moderate right   foraminal narrowing. L3-L4: There is artificial disc material and posterior laminectomy. There is   no canal stenosis. There is mild bilateral foraminal narrowing. L4-L5: There is artificial disc material with posterior laminectomy. There   is no canal stenosis. There is mild left and moderate right foraminal   narrowing. L5-S1: There is a circumferential disc bulge with facet hypertrophy. There   is no canal stenosis. There is moderate right and severe left foraminal   narrowing. Impression   Posterior fixation and laminectomy from L2-L4 without evidence for hardware   complication. Multilevel degenerative change with bilateral foraminal narrowing as   described above. MRI lumbar spine 2018  1. No significant interval change is observed since the previous study   of 2017.       2. Stable postoperative changes/posterior spinal fusion at the   L3-L4-L5 level. 3. Severe spine canal stenosis in the level of L2-L3           4. Encroachment of the neural foramina bilaterally in levels of L2-L3   and L5-S1      Thoracic spine MRI 2018  1. Some degenerative changes in the thoracic spine, mainly in the   facet joints in the mid-upper thoracic spine segments       2. Discrete loss of height of T6, more likely an old finding. Previous treatments: Physical Therapy, Surgery L3-5 fusion/laminectomy and medications. .       Potential Aberrant Drug-Related Behavior:  No     Urine Drug Screenin18:  Consistent for norhydrocodone metabolite  2018:  Consistent for hydrocodone and norhydrocodone  05/10/2019:  Consistent for hydrocodone and norhydrocodone  2019:  Consistent for hydrocodone and norhydrocodone  01/10/2020:  Consistent for hydrocodone and norhydrocodone  2020:  Consistent for oxycodone and metabolites s/p surgery. Inconsistent for hydrocodone.   States that she was instructed to take her old norco until she made the appointment to resume her chronic pain medications. 10/2020:  Consistent  07/2021:  Consistent  01/2022:  Consistent     OARRS report:  05/2018 consistent to 05/2021 consistent (norco scripts from [facility] post op 3/10/2021 and 3/24/2021. [place] script through [facility] - last one 06/01/2021 07/2021 - consistent to 10/2022 consistent     Opioid agreement:  10/18/2021 - new one to be filled out today. Past Medical History:   Diagnosis Date    Cancer ([address] 12/2019    LESION REMOVED UNDER [facility]  DENTAL [facility]    Chronic back pain     Costochondritis     h/o    Depression     Diabetes mellitus ([address]     Falls     Last fall Feb 2021    Fibromyalgia     Hallux rigidus of left foot     HTN (hypertension)     Hyperlipidemia     CLYDE on CPAP     Osteoarthritis     \"everywhere\"    Rheumatoid arthritis ([address]     \"As a child. \"    Rheumatoid arthritis(714.0)     TIA (transient ischemic attack)     no deficits        Past Surgical History:   Procedure Laterality Date    ANESTHESIA NERVE BLOCK Left 02/26/2019    LEFT C3,4,5 MBB performed by Jammie Anderson MD at [address] [facility] Right 08/12/2019    BILATERAL TRANSFORAMINAL EPIDURAL STEROID INJECTION S1 #3 performed by Jammie Anderson MD at [facility] Right 06/16/2020    RIGHT SACROILIAC JOINT INJECTION      CPT: 08622 performed by Sejal Reynolds DO at [address]  2005    Left    ANKLE SURGERY Left 7/8/2022    REPAIR OF ANTERIOR TALAR LIGAMENT LEFT ANKLE, REPAIR DELTOID LIGAMENT LEFT ANKLE performed by Helen Camarena DPM at [place] [address] Left 12/14/2018    ARTHRODESIS 2ND DIGIT LEFT FOOT performed by Helen Camarena DPM at [address]  08/16/2017    PLIF L3-L4, L4-L5 with rods, screws, and cages/Dr. Bartolo Carvalho SURGERY  03/04/2020    BACK SURGERY Right 03/09/2021    RIGHT PERCUTANEOUS SACROILIAC JOINT FUSION performed by Santhosh Wilburn MD at [address] [place] [address] BREAST SURGERY  2010    reduction, bilat     SECTION  1993    CHOLECYSTECTOMY      COLONOSCOPY  2015    COLONOSCOPY N/A 2020    COLONOSCOPY DIAGNOSTIC performed by Mary De Leon MD at [address]  2021    SI Joint    ENDOSCOPY, COLON, DIAGNOSTIC      EPIDURAL STEROID INJECTION N/A 2019    BILATERAL TRANSFORAMINAL EPIDURAL STEROID INJECTION S1 performed by Alli Cosme DO at [address] / ANKLE Left 2018    REMOVAL OF PAINFUL HARDWARE LEFT FOOT  ++SYNTHES++ performed by Sujey Keller DPM at [address] [facility]    inguinal     LUMBAR SPINE SURGERY N/A 2020    EXPLORATION OF PRIOR L3-L5 FUSION AND L2-L3  POSTERIOR LUMBAR INTERBODY FUSION -- NEEDS O-ARM, AUDIOLOGY, CAGES, PLATES, SCREWS, C-ARM, TERESA TABLE, CELL SAVER, PLATELET GEL -- GLOBUS performed by Tyron Au MD at [address] 10/21/2020    EXCISION OF TONGUE LESION performed by Edwige Gonzales DO at [address] [facility] Bilateral 2018    L1-2 lumbar foramen #1    NERVE BLOCK Bilateral 2018    s1 tfesi    NERVE BLOCK Bilateral 2019    NERVE BLOCK Right 2019    sacral radiofrequency    NERVE BLOCK Right 2020    RIGHT SACROILIAC JOINT INJECTION #2 [facility] [address] [facility] [facility]    left foot tarso metatarsal joint injection    OTHER SURGICAL HISTORY Left 2015    Endoscopic Gastroc recession left, Lapidus left foot and  Excision of exostosis left foot    PAIN MANAGEMENT PROCEDURE Left 2021    LEFT L5-S1 TRANSFORAMINAL EPIDURAL STEROID INJECTION performed by Alli Cosme DO at [address] Left 3/29/2022    LEFT TRANSFORAMINAL EPIDURAL STEROID INJECTION AT L5 AND S1 performed by Alli Cosme DO at [address] N/A 2022    LUMBAR EPIDURAL STEROID INJECTION L5-S1 performed by Sebastian Vila DO at [address] AA&/STRD TFRML EPI LUMBAR/SACRAL 1 LEVEL Bilateral 12/03/2018    BILATERAL S1  EPIDURAL STEROID INJECTION performed by Brennen Schaefer MD at  [address] DX/THER SBST INTRLMNR LMBR/SAC W/IMG GDN N/A 08/21/2018    EPIDURAL STEROID INJECTION L1-2 WITH LOW VOL performed by Brennen Schaefer MD at [address] NOSE/CLEFT LIP/TIP Bilateral 05/07/2018    BILATERAL L1-2 LUMBAR FORAMEN #1 performed by Brennen Schaefer MD at [address] NOSE/CLEFT LIP/TIP Bilateral 06/04/2018    BILATERAL TRANSFORAMINAL EPIDURAL STEROID INJECTION UNDER FLUOROSCOPIC GUIDANCE @ FORAMINAL LEVEL L1-2 #2 performed by Brennen Schaefer MD at [address] Right 09/30/2019    RIGHT SACRAL RADIOFREQUENCY ABLATION performed by Brennen Schaefer MD at [place] [address]    Big Left Toe    TOE SURGERY Left 2018    2nd toe     TONSILLECTOMY      WISDOM TOOTH EXTRACTION         Prior to Admission medications    Medication Sig Start Date End Date Taking? Authorizing Provider   HYDROcodone-acetaminophen (NORCO) 5-325 MG per tablet Take 1 tablet by mouth 2 times daily for 30 days. 9/6/22 10/6/22 Yes TREASURE Portillo   gabapentin (NEURONTIN) 400 MG capsule Take 1 capsule by mouth 2 times daily for 30 days. 9/6/22 10/6/22 Yes TREASURE Portillo   hydrOXYzine pamoate (VISTARIL) 25 MG capsule Take 1 capsule by mouth 3 times daily as needed for Anxiety 7/29/22  Yes Maynor Lima MD   varenicline (CHANTIX) 1 MG tablet Take 1 tablet by mouth in the morning and 1 tablet before bedtime. 7/21/22  Yes Maynor Lima MD   metFORMIN (GLUCOPHAGE) 500 MG tablet Take 1 tablet by mouth 2 times daily (with meals) Start with once daily during the first week.  6/22/22  Yes Maynor Lima MD   atorvastatin (LIPITOR) 40 MG tablet Take 1 tablet by mouth daily 6/20/22  Yes Maynor Lima MD   lisinopril (PRINIVIL;ZESTRIL) 40 MG tablet Take 1 tablet by mouth every evening 6/20/22  Yes Bhavana Cortes MD   hydroCHLOROthiazide (MICROZIDE) 12.5 MG capsule TAKE ONE CAPSULE BY MOUTH ONCE DAILY FOR BLOOD PRESSURE 6/7/22  Yes Bhavana Cortes MD   Handicap Placard MISC DX:  Loss of Balance, Chronic low back pain, s/p back surgery. Duration of 5 years 2/22/21  Yes Bhavana Cortes MD       Allergies   Allergen Reactions    Pcn [Penicillins] Anaphylaxis       Social History     Socioeconomic History    Marital status:      Spouse name: Not on file    Number of children: Not on file    Years of education: Not on file    Highest education level: Not on file   Occupational History    Not on file   Tobacco Use    Smoking status: Every Day     Packs/day: 0.50     Years: 30.00     Pack years: 15.00     Types: Cigarettes    Smokeless tobacco: Never   Vaping Use    Vaping Use: Never used   Substance and Sexual Activity    Alcohol use: Not Currently     Alcohol/week: 0.0 standard drinks     Comment: rarely    Drug use: No    Sexual activity: Not on file   Other Topics Concern    Not on file   Social History Narrative    Not on file     Social Determinants of Health     Financial Resource Strain: Not on file   Food Insecurity: Not on file   Transportation Needs: Not on file   Physical Activity: Not on file   Stress: Not on file   Social Connections: Not on file   Intimate Partner Violence: Not on file   Housing Stability: Not on file       Family History   Problem Relation Age of Onset    Dementia Mother     Breast Cancer Mother     Cancer Mother         breast cancer [de-identified]     Stroke Mother     Heart Attack Father     Other Father 80        Aortic aneurysm    Cancer Brother     Diabetes Brother        REVIEW OF SYSTEMS:     Jonh Lao denies fever/chills, chest pain, shortness of breath, new bowel or bladder complaints or suicidal ideations. All other review of systems was negative.     PHYSICAL EXAMINATION:      /87   Pulse 95   Temp 97.5 °F (36.4 °C) (Infrared)   Resp 16   Ht 5' 5\" (1.651 m)   Wt 200 lb (90.7 kg)   LMP  (LMP Unknown)   SpO2 99%   BMI 33.28 kg/m²     General:      General appearance: awake, alert, oriented, in no acute distress, well developed, well nourished and in no acute distress   pleasant and well-hydrated. in no distress and A & O x3  Build:Overweight  Function:Rises from a seated position with difficulty    HEENT:    Head:normocephalic and atraumatic  Pupils:regular, round and equal.  Sclera: icterus absent  EOM:full and intact. Lungs:    Breathing:Normal expansion. No wheezing. Abdomen:    Shape:non-distended and normal      Lumbar spine:                            + midline TTP distal incision line and inferiorly  Range of motion:abnormal moderately Lateral bending, flexion, extension rotation bilateral and is painful. Extremities:    Tremors:None bilaterally upper and lower  Range of motion:Generally normal shoulders  Intact:Yes  Cyanosis:none  Edema:Normal      Neurological:    Cranial nerves:normal  Sensory:diminished to light lateral aspect of right leg                   Dermatology:    Skin:no unusual rashes, no skin lesions, no palpable subcutaneous nodules and good skin turgor    Assessment/Plan:      Chronic low back pain with radiation to the Right groin, patient had low back surgery 08/2017 L3-5 fusion, recently had lumbar spine CT with severe L2-3 stenosis     Patient is s/p left ankle surgery on 7/8. Plan:  Patient is s/p revision fusion L2-L4 on 3/4/2020. Patient is s/p:  Right sacroiliac joint fusion with use of SI-BONE iFuse implant on 3/9/2021 by Dr. Cordelia Huerta. Patient is s/p:  Left TFESI L5 and S1 on 03/29/2022 with 75% relief. LESI L5-S1 on 06/09/2022 with good relief until recently. Repeat ordered. Radiation to both legs. She will call if she would like right thoracic TPIs. Pain is a little less now.    Cervical TPIs with good relief but not completely gone. Continue norco 5/325 BID. Continue with Gabapentin 400 mg BID. She reports that any increases make her off balance. OARRS report reviewed 10/2022  Hold flexeril for now. Doing well without it. Patient encouraged to stay active and to lose weight. Failed physical therapy  Treatment plan discussed with the patient including medications side effects        Controlled Substance Monitoring:    Acute and Chronic Pain Monitoring:   RX Monitoring 10/4/2022   Attestation -   Acute Pain Prescriptions -   Periodic Controlled Substance Monitoring Possible medication side effects, risk of tolerance/dependence & alternative treatments discussed. ;No signs of potential drug abuse or diversion identified. ;Assessed functional status. ;Obtaining appropriate analgesic effect of treatment.    Chronic Pain > 80 MEDD -                ccreferring physic

## 2022-10-04 NOTE — PROGRESS NOTES
Do you currently have any of the following:    Fever: No  Headache:  No  Cough: No  Shortness of breath: No  Exposed to anyone with these symptoms: No         Osiel Bob presents to the [address] [place] [address] on 10/4/2022. Josette Wilkins is complaining of pain lower back. The pain is constant. The pain is described as shooting. Pain is rated on her best day at a 6, on her worst day at a 10, and on average at a 6 on the VAS scale. She took her last dose of Norco and Neurontin today. Any procedures since your last visit: No    Pacemaker or defibrillator: No .    She is not on NSAIDS and is not on anticoagulation medications . Medication Contract and Consent for Opioid Use Documents Filed       Patient Documents       Type of Document Status Date Received Received By Description    Medication Contract Received  Lo Morton Opioid medication contract with Dr Marli Corea 3/24/20    Medication Contract Received 4/26/2018  3:50 PM ANYA NUNO PAIN MANAGEMENT PATIENT AGREEMENT 4/26/2018    Medication Contract Received 11/5/2020  4:23 PM EBER HONG Opioid medication contract with Dr Wes Servin Received 3/1/2021  1:26 PM Lo Morton Opioid medication contract with Dr Wes Servin Received 8/23/2021  2:03 PM TAMMI, [address] [place] [address] Medication contract    Medication Contract Received 10/18/2021  3:03 PM HAYLIE CADENA medication contract 10/18/2021                    /87   Pulse 95   Temp 97.5 °F (36.4 °C) (Infrared)   Resp 16   Ht 5' 5\" (1.651 m)   Wt 200 lb (90.7 kg)   LMP  (LMP Unknown)   SpO2 99%   BMI 33.28 kg/m²      No LMP recorded (lmp unknown).  Patient is postmenopausal.

## 2022-10-15 ENCOUNTER — OFFICE VISIT (OUTPATIENT)
Dept: PODIATRY | Age: 67
End: 2022-10-15
Payer: MEDICARE

## 2022-10-15 VITALS — TEMPERATURE: 98.2 F | BODY MASS INDEX: 33.28 KG/M2 | WEIGHT: 200 LBS

## 2022-10-15 DIAGNOSIS — M76.822 POSTERIOR TIBIAL TENDINITIS OF LEFT LOWER EXTREMITY: Primary | ICD-10-CM

## 2022-10-15 PROCEDURE — 1123F ACP DISCUSS/DSCN MKR DOCD: CPT | Performed by: PODIATRIST

## 2022-10-15 PROCEDURE — 99213 OFFICE O/P EST LOW 20 MIN: CPT | Performed by: PODIATRIST

## 2022-10-15 RX ORDER — METHYLPREDNISOLONE 4 MG/1
TABLET ORAL
Qty: 21 TABLET | Refills: 0 | Status: SHIPPED | OUTPATIENT
Start: 2022-10-15 | End: 2022-10-21

## 2022-10-15 NOTE — PROGRESS NOTES
10/15/22  Monet Plant : 1955 Sex: female  Age: 74 y.o. Patient was referred by Sharda Pradhan MD    Chief Complaint   Patient presents with    Post-Op Check     Left ankle sx from 2022       SUBJECTIVE patient is seen today for postop visit regarding left foot surgery the surgically corrected ankle is doing well she is having some plantar pain. No trauma noted. HPI  Review of Systems  Const: Denies constitutional symptoms  Musculo: Denies symptoms other than stated above  Skin: Denies symptoms other than stated above       Current Outpatient Medications:     methylPREDNISolone (MEDROL DOSEPACK) 4 MG tablet, Take by mouth., Disp: 21 tablet, Rfl: 0    HYDROcodone-acetaminophen (NORCO) 5-325 MG per tablet, Take 1 tablet by mouth 2 times daily for 30 days. , Disp: 60 tablet, Rfl: 0    gabapentin (NEURONTIN) 400 MG capsule, Take 1 capsule by mouth 2 times daily for 30 days. , Disp: 60 capsule, Rfl: 2    hydrOXYzine pamoate (VISTARIL) 25 MG capsule, Take 1 capsule by mouth 3 times daily as needed for Anxiety, Disp: 270 capsule, Rfl: 1    varenicline (CHANTIX) 1 MG tablet, Take 1 tablet by mouth in the morning and 1 tablet before bedtime. , Disp: 60 tablet, Rfl: 2    metFORMIN (GLUCOPHAGE) 500 MG tablet, Take 1 tablet by mouth 2 times daily (with meals) Start with once daily during the first week., Disp: 60 tablet, Rfl: 2    atorvastatin (LIPITOR) 40 MG tablet, Take 1 tablet by mouth daily, Disp: 90 tablet, Rfl: 3    lisinopril (PRINIVIL;ZESTRIL) 40 MG tablet, Take 1 tablet by mouth every evening, Disp: 90 tablet, Rfl: 1    hydroCHLOROthiazide (MICROZIDE) 12.5 MG capsule, TAKE ONE CAPSULE BY MOUTH ONCE DAILY FOR BLOOD PRESSURE, Disp: 90 capsule, Rfl: 1    Handicap Placard MISC, DX:  Loss of Balance, Chronic low back pain, s/p back surgery.   Duration of 5 years, Disp: 1 each, Rfl: 0  Allergies   Allergen Reactions    Pcn [Penicillins] Anaphylaxis       Past Medical History:   Diagnosis Date    Cancer ([facility] 12/2019    LESION REMOVED UNDER [facility]ENT[facility]    Chronic back pain     Costochondritis     h/o    Depression     Diabetes [facility] ([facility]     Falls     Last fall Feb 2021    Fibromyalgia     Hallux rigidus of left foot     HTN (hypertension)     Hyperlipidemia     CLYDE on CPAP     Osteoarthritis     \"everywhere\"    Rheumatoid arthritis ([facility]     \"As a child. \"    Rheumatoid arthritis(714.0)     TIA (transient ischemic attack)     no deficits      Past Surgical History:   Procedure Laterality Date    ANESTHESIA NERVE BLOCK Left 02/26/2019    LEFT C3,4,5 MBB performed by Alessandra Koo MD at [address] [place] [address],[address] Right 08/12/2019    BILATERAL TRANSFORAMINAL EPIDURAL STEROID INJECTION S1 #3 performed by Alessandra Koo MD at [facility] Right 06/16/2020    RIGHT SACROILIAC JOINT INJECTION      CPT: 54732 performed by Hetal Garcia DO at [address] [place] [address]  2005    Left    ANKLE SURGERY Left 7/8/2022    REPAIR OF ANTERIOR TALAR LIGAMENT LEFT ANKLE, REPAIR DELTOID LIGAMENT LEFT ANKLE performed by Penelope Aaron DPM at [place] [address] Left 12/14/2018    ARTHRODESIS 2ND DIGIT LEFT FOOT performed by Penelope Aaron DPM at [address] [place] [address]  08/16/2017    PLIF L3-L4, L4-L5 with rods, screws, and cages/Dr. Flaca Gresham SURGERY  03/04/2020    BACK SURGERY Right 03/09/2021    RIGHT PERCUTANEOUS SACROILIAC JOINT FUSION performed by Maurizio Markham MD at [address]  2010    [facility]  2011    COLONOSCOPY  03/27/2015    COLONOSCOPY N/A 08/19/2020    COLONOSCOPY DIAGNOSTIC performed by Yuko Navarro MD at [address] [place] [address]  03/2021    SI Joint    ENDOSCOPY, COLON, DIAGNOSTIC      EPIDURAL STEROID INJECTION N/A 06/04/2019    BILATERAL TRANSFORAMINAL EPIDURAL STEROID INJECTION S1 performed by Hetal Garcia DO at [facility]. / ANKLE Left 12/14/2018    REMOVAL OF PAINFUL HARDWARE LEFT FOOT  ++SYNTHES++ [facility]    inguinal     LUMBAR SPINE SURGERY N/A 03/04/2020    EXPLORATION OF PRIOR L3-L5 FUSION AND L2-L3  POSTERIOR LUMBAR INTERBODY FUSION -- NEEDS O-ARM, AUDIOLOGY, CAGES, PLATES, SCREWS, C-ARM, TERESA TABLE, CELL SAVER, PLATELET GEL -- GLOBUS performed by Monica Smith MD at [address] [place] [address] 10/21/2020    EXCISION OF TONGUE LESION performed by Mirian Chen DO at [address] Bilateral 05/07/2018    L1-2 lumbar foramen #1    NERVE BLOCK Bilateral 12/03/2018    s1 tfesi    NERVE BLOCK Bilateral 08/12/2019    NERVE BLOCK Right 09/30/2019    sacral radiofrequency    NERVE BLOCK Right 09/01/2020    RIGHT SACROILIAC JOINT INJECTION #2 performed by Ramos Mclaughlin DO at [address]  [facility] Left 09/25/2013    left foot tarso metatarsal joint injection    OTHER SURGICAL HISTORY Left 05/27/2015    Endoscopic Gastroc recession left, Lapidus left foot and  Excision of exostosis left foot    PAIN MANAGEMENT PROCEDURE Left 7/27/2021    LEFT L5-S1 TRANSFORAMINAL EPIDURAL STEROID INJECTION performed by Ramos Click, DO at [address] Left 3/29/2022    LEFT TRANSFORAMINAL EPIDURAL STEROID INJECTION AT L5 AND S1 performed by Ramos Click, DO at [address] N/A 6/9/2022    LUMBAR EPIDURAL STEROID INJECTION L5-S1 performed by Ramos Click, DO at [address] AA&/STRD TFRML EPI LUMBAR/SACRAL 1 LEVEL Bilateral 12/03/2018    BILATERAL S1  EPIDURAL STEROID INJECTION performed by Mikael Lr MD at [facility] DX/THER SBST INTRLMNR LMBR/SAC W/IMG GDN N/A 08/21/2018    EPIDURAL STEROID INJECTION L1-2 WITH LOW VOL performed by Mikael Lr MD at [facility] NOSE/CLEFT LIP/TIP Bilateral 05/07/2018    BILATERAL L1-2 LUMBAR FORAMEN #1 performed by Mikael Lr MD at [facility]    [place] NOSE/CLEFT LIP/TIP Bilateral 06/04/2018    BILATERAL TRANSFORAMINAL EPIDURAL STEROID INJECTION UNDER FLUOROSCOPIC GUIDANCE @ FORAMINAL LEVEL L1-2 #2 performed by Giovanny Edmond MD at [address] Right 09/30/2019    RIGHT SACRAL RADIOFREQUENCY ABLATION performed by Giovanny Edmond MD at [address]  [address], 2005    Big Left Toe    TOE SURGERY Left 2018    2nd toe     TONSILLECTOMY      WISDOM TOOTH EXTRACTION       Family History   Problem Relation Age of Onset    Dementia Mother     Breast Cancer Mother     Cancer Mother         breast cancer [de-identified]     Stroke Mother     Heart Attack Father     Other Father 80        Aortic aneurysm    Cancer Brother     Diabetes Brother      Social History     Socioeconomic History    Marital status:      Spouse name: Not on file    Number of children: Not on file    Years of education: Not on file    Highest education level: Not on file   Occupational History    Not on file   Tobacco Use    Smoking status: Every Day     Packs/day: 0.50     Years: 30.00     Pack years: 15.00     Types: Cigarettes    Smokeless tobacco: Never   Vaping Use    Vaping Use: Never used   Substance and Sexual Activity    Alcohol use: Not Currently     Alcohol/week: 0.0 standard drinks     Comment: rarely    Drug use: No    Sexual activity: Not on file   Other Topics Concern    Not on file   Social History Narrative    Not on file     Social Determinants of Health     Financial Resource Strain: Not on file   Food Insecurity: Not on file   Transportation Needs: Not on file   Physical Activity: Not on file   Stress: Not on file   Social Connections: Not on file   Intimate Partner Violence: Not on file   Housing Stability: Not on file       Vitals:    10/15/22 1105   Temp: 98.2 °F (36.8 °C)   TempSrc: Temporal   Weight: 200 lb (90.7 kg)       Focused Lower Extremity Physical Exam:  Vitals:    10/15/22 1105   Temp: 98.2 °F (36.8 °C)        Foot Exam    General  General Appearance: appears stated age and healthy   Orientation: alert and oriented to person, place, and time       Left Foot/Ankle      Inspection and Palpation  Ecchymosis: none  Tenderness: metatarsals (pain along base metatarsal  pain along posterior tib tendon)         Ortho Exam    Vascular: pulses  dp  pt    Capillary Refill Time:   Hair growth  Skin:    Edema:    Neurologic:      Musculoskeletal/ Orthopedic examination: Pain with palpation along the posterior tib tendon pain with palpation with inversion eversion negative pain with dorsiflexion plantarflexion  NAIL   Web space  Derm:          Assessment and Plan: At this time the patient was placed back into a cam walker. Placed the patient on a Medrol Dosepak. Patient is reappoint in 3 to 4 weeks  Tori Salazar was seen today for post-op check. Diagnoses and all orders for this visit:    Posterior tibial tendinitis of left lower extremity    Other orders  -     methylPREDNISolone (MEDROL DOSEPACK) 4 MG tablet; Take by mouth. No follow-ups on file. Seen By:  Pipo Lyons DPM      Document was created using voice recognition software. Note was reviewed, however may contain grammatical errors.

## 2022-10-25 ENCOUNTER — TELEPHONE (OUTPATIENT)
Dept: PAIN MANAGEMENT | Age: 67
End: 2022-10-25

## 2022-10-26 NOTE — PROGRESS NOTES
[facility]  INSTRUCTIONS  . .......................................................................................................................................... [x] Parking the day of Surgery is located in the [facility].   Upon entering the door, make immediate right into the surgery reception room    [x]  Bring photo ID and insurance card     [x] You may have a light breakfast day of procedure    [x]  Wear loose comfortable clothing    [x]  Please follow instructions for medications as given per Dr's office    [x] You can expect a call the business day prior to procedure to notify you of your arrival time     [x] Please arrange for     []  Other instructions

## 2022-10-26 NOTE — PROGRESS NOTES
Pt called back and stated her brother, Cheyanne Mo ([phone]) will drop her off and pick her up for her pain procedure tmro 10/27/2022

## 2022-10-26 NOTE — PROGRESS NOTES
Instructed pt she is required to have a  to and from the hospital 10/27/2022 for LESI. Pt states she has been having these procedures for years and always drives herself and stays longer at the hospital then drives herself home. . I told pt Dr. Sylvia Winslow changed her orders and is requiring someone to drive pt. .Notified Dr. Sylvia Winslow office of above and she states YES pt does need a  and the office will call pt now and instruct her on this. ..

## 2022-10-27 ENCOUNTER — HOSPITAL ENCOUNTER (OUTPATIENT)
Age: 67
Setting detail: OUTPATIENT SURGERY
Discharge: HOME | End: 2022-10-27
Attending: PAIN MEDICINE | Admitting: PAIN MEDICINE
Payer: MEDICARE

## 2022-10-27 ENCOUNTER — HOSPITAL ENCOUNTER (OUTPATIENT)
Dept: GENERAL RADIOLOGY | Age: 67
Discharge: HOME | End: 2022-10-29
Attending: PAIN MEDICINE
Payer: MEDICARE

## 2022-10-27 VITALS
DIASTOLIC BLOOD PRESSURE: 75 MMHG | BODY MASS INDEX: 33.32 KG/M2 | RESPIRATION RATE: 18 BRPM | TEMPERATURE: 97.9 F | WEIGHT: 200 LBS | HEART RATE: 88 BPM | HEIGHT: 65 IN | OXYGEN SATURATION: 94 % | SYSTOLIC BLOOD PRESSURE: 142 MMHG

## 2022-10-27 DIAGNOSIS — R52 PAIN MANAGEMENT: ICD-10-CM

## 2022-10-27 LAB — METER GLUCOSE: 178 MG/DL (ref 74–99)

## 2022-10-27 PROCEDURE — 7100000010 HC PHASE II RECOVERY - FIRST 15 MIN: Performed by: PAIN MEDICINE

## 2022-10-27 PROCEDURE — 6360000004 HC RX CONTRAST MEDICATION: Performed by: PAIN MEDICINE

## 2022-10-27 PROCEDURE — 2500000003 HC RX 250 WO HCPCS: Performed by: PAIN MEDICINE

## 2022-10-27 PROCEDURE — 82962 GLUCOSE BLOOD TEST: CPT

## 2022-10-27 PROCEDURE — 7100000011 HC PHASE II RECOVERY - ADDTL 15 MIN: Performed by: PAIN MEDICINE

## 2022-10-27 PROCEDURE — 64483 NJX AA&/STRD TFRM EPI L/S 1: CPT | Performed by: PAIN MEDICINE

## 2022-10-27 PROCEDURE — 3209999900 FLUORO FOR SURGICAL PROCEDURES

## 2022-10-27 PROCEDURE — 3600000002 HC SURGERY LEVEL 2 BASE: Performed by: PAIN MEDICINE

## 2022-10-27 PROCEDURE — 6360000002 HC RX W HCPCS: Performed by: PAIN MEDICINE

## 2022-10-27 PROCEDURE — 2709999900 HC NON-CHARGEABLE SUPPLY: Performed by: PAIN MEDICINE

## 2022-10-27 RX ORDER — LIDOCAINE HYDROCHLORIDE 5 MG/ML
INJECTION, SOLUTION INFILTRATION; INTRAVENOUS PRN
Status: DISCONTINUED | OUTPATIENT
Start: 2022-10-27 | End: 2022-10-27 | Stop reason: ALTCHOICE

## 2022-10-27 RX ORDER — METHYLPREDNISOLONE ACETATE 40 MG/ML
INJECTION, SUSPENSION INTRA-ARTICULAR; INTRALESIONAL; INTRAMUSCULAR; SOFT TISSUE PRN
Status: DISCONTINUED | OUTPATIENT
Start: 2022-10-27 | End: 2022-10-27 | Stop reason: ALTCHOICE

## 2022-10-27 ASSESSMENT — PAIN - FUNCTIONAL ASSESSMENT
PAIN_FUNCTIONAL_ASSESSMENT: 0-10
PAIN_FUNCTIONAL_ASSESSMENT: ACTIVITIES ARE NOT PREVENTED
PAIN_FUNCTIONAL_ASSESSMENT: 0-10

## 2022-10-27 ASSESSMENT — PAIN DESCRIPTION - DESCRIPTORS: DESCRIPTORS: ACHING

## 2022-10-27 NOTE — H&P
[facility]        [address] [facility], [address]  Dept: [phone]    Procedure History & Physical      Harper Garcia     HPI:    Patient  is here for LBP BLE pain for b/l L5 TFESI  Labs/imaging studies reviewed   All question and concerns addressed including R/B/A associated with the procedure    Past Medical History:   Diagnosis Date    Cancer ([facility] 12/2019    LESION REMOVED UNDER [facility]    Chronic back pain     Costochondritis     h/o    Depression     Diabetes mellitus ([facility]     Falls     Last fall Feb 2021    Fibromyalgia     Hallux rigidus of left foot     HTN (hypertension)     Hyperlipidemia     CLYDE on CPAP     Osteoarthritis     \"everywhere\"    Rheumatoid arthritis(714.0)     TIA (transient ischemic attack)     no deficits        Past Surgical History:   Procedure Laterality Date    ANESTHESIA NERVE BLOCK Left 02/26/2019    LEFT C3,4,5 MBB performed by Brennen Schaefer MD at [address] [place] [address],[facility] Right 08/12/2019    BILATERAL TRANSFORAMINAL EPIDURAL STEROID INJECTION S1 #3 performed by Brennen Schaefer MD at [facility] Right 06/16/2020    RIGHT SACROILIAC JOINT INJECTION      CPT: 57565 performed by Sebastian Vila DO at [address] [facility]  2005    Left    ANKLE SURGERY Left 7/8/2022    REPAIR OF ANTERIOR TALAR LIGAMENT LEFT ANKLE, REPAIR DELTOID LIGAMENT LEFT ANKLE performed by Nuris Yancey DPM at [place] [address] Left 12/14/2018    ARTHRODESIS 2ND DIGIT LEFT FOOT performed by Nuris Yancey DPM at [facility]  08/16/2017    PLIF L3-L4, L4-L5 with rods, screws, and cages/Dr. Mays Mort SURGERY  03/04/2020    BACK SURGERY Right 03/09/2021    RIGHT PERCUTANEOUS SACROILIAC JOINT FUSION performed by Toshia Atwood MD at [address]  2010    reduction, [address]  2011    COLONOSCOPY  03/27/2015    COLONOSCOPY N/A 08/19/2020    COLONOSCOPY DIAGNOSTIC performed by Lanna Dandy, MD at [address]  03/2021    SI Joint    ENDOSCOPY, COLON, DIAGNOSTIC      EPIDURAL STEROID INJECTION N/A 06/04/2019    BILATERAL TRANSFORAMINAL EPIDURAL STEROID INJECTION S1 performed by Yahir Polanco DO at [facility]. / ANKLE Left 12/14/2018    REMOVAL OF PAINFUL HARDWARE LEFT FOOT  ++SYNTHES++ performed by Marcella Colón DPM at [address] [facility]    inguinal     LUMBAR SPINE SURGERY N/A 03/04/2020    EXPLORATION OF PRIOR L3-L5 FUSION AND L2-L3  POSTERIOR LUMBAR INTERBODY FUSION -- NEEDS O-ARM, AUDIOLOGY, CAGES, PLATES, SCREWS, C-ARM, TERESA TABLE, CELL SAVER, PLATELET GEL -- GLOBUS performed by Clint Martinez MD at [facility] 10/21/2020    EXCISION OF TONGUE LESION performed by Dania Gonzales DO at [address] [facility] Bilateral 05/07/2018    L1-2 lumbar foramen #1    NERVE BLOCK Bilateral 12/03/2018    s1 tfesi    NERVE BLOCK Bilateral 08/12/2019    NERVE BLOCK Right 09/30/2019    sacral radiofrequency    NERVE BLOCK Right 09/01/2020    RIGHT SACROILIAC JOINT INJECTION #2 performed by Yahir Polanco DO at [facility] Left 09/25/2013    left foot tarso metatarsal joint injection    OTHER SURGICAL HISTORY Left 05/27/2015    Endoscopic Gastroc recession left, Lapidus left foot and  Excision of exostosis left foot    PAIN MANAGEMENT PROCEDURE Left 7/27/2021    LEFT L5-S1 TRANSFORAMINAL EPIDURAL STEROID INJECTION performed by Yahir Polanco DO at [address] Left 3/29/2022    LEFT TRANSFORAMINAL EPIDURAL STEROID INJECTION AT L5 AND S1 performed by Yahir Polanco DO at [address] N/A 6/9/2022    LUMBAR EPIDURAL STEROID INJECTION L5-S1 performed by Yahir Polanco DO at [address] AA&/STRD TFRML EPI LUMBAR/SACRAL 1 LEVEL Bilateral 12/03/2018    BILATERAL S1  EPIDURAL STEROID INJECTION performed by Kusum Bland MD at [address] DX/THER SBST INTRLMNR LMBR/SAC W/IMG GDN N/A 08/21/2018    EPIDURAL STEROID INJECTION L1-2 WITH LOW VOL performed by Kusum Bland MD at [address] NOSE/CLEFT LIP/TIP Bilateral 05/07/2018    BILATERAL L1-2 LUMBAR FORAMEN #1 performed by Kusum Bland MD at [address] NOSE/CLEFT LIP/TIP Bilateral 06/04/2018    BILATERAL TRANSFORAMINAL EPIDURAL STEROID INJECTION UNDER FLUOROSCOPIC GUIDANCE @ FORAMINAL LEVEL L1-2 #2 performed by Kusum Bland MD at [address] [facility] [address] Right 09/30/2019    RIGHT SACRAL RADIOFREQUENCY ABLATION performed by Kusum Bland MD at [facility]    Big Left Toe    TOE SURGERY Left 2018    2nd toe     TONSILLECTOMY      WISDOM TOOTH EXTRACTION         Prior to Admission medications    Medication Sig Start Date End Date Taking? Authorizing Provider   HYDROcodone-acetaminophen (NORCO) 5-325 MG per tablet Take 1 tablet by mouth 2 times daily for 30 days. 10/8/22 11/7/22  TREASURE Garces   gabapentin (NEURONTIN) 400 MG capsule Take 1 capsule by mouth 2 times daily for 30 days. 9/6/22 10/15/22  TREASURE Garces   hydrOXYzine pamoate (VISTARIL) 25 MG capsule Take 1 capsule by mouth 3 times daily as needed for Anxiety 7/29/22   Tod Lewis MD   metFORMIN (GLUCOPHAGE) 500 MG tablet Take 1 tablet by mouth 2 times daily (with meals) Start with once daily during the first week.  6/22/22   Tod Lewis MD   atorvastatin (LIPITOR) 40 MG tablet Take 1 tablet by mouth daily 6/20/22   Tod Lewis MD   lisinopril (PRINIVIL;ZESTRIL) 40 MG tablet Take 1 tablet by mouth every evening 6/20/22   Tod Lewis MD   hydroCHLOROthiazide (MICROZIDE) 12.5 MG capsule TAKE ONE CAPSULE BY MOUTH ONCE DAILY FOR BLOOD PRESSURE 6/7/22   Tod Lewis MD   Handicap Placard MISC DX:  Loss of Balance, Chronic low back pain, s/p back surgery. Duration of 5 years 2/22/21   Tom Bae MD       Allergies   Allergen Reactions    Pcn [Penicillins] Anaphylaxis       Social History     Socioeconomic History    Marital status:      Spouse name: Not on file    Number of children: Not on file    Years of education: Not on file    Highest education level: Not on file   Occupational History    Not on file   Tobacco Use    Smoking status: Every Day     Packs/day: 0.50     Years: 30.00     Pack years: 15.00     Types: Cigarettes    Smokeless tobacco: Never   Vaping Use    Vaping Use: Never used   Substance and Sexual Activity    Alcohol use: Not Currently     Alcohol/week: 0.0 standard drinks     Comment: rarely    Drug use: No    Sexual activity: Not on file   Other Topics Concern    Not on file   Social History Narrative    Not on file     Social Determinants of Health     Financial Resource Strain: Not on file   Food Insecurity: Not on file   Transportation Needs: Not on file   Physical Activity: Not on file   Stress: Not on file   Social Connections: Not on file   Intimate Partner Violence: Not on file   Housing Stability: Not on file       Family History   Problem Relation Age of Onset    Dementia Mother     Breast Cancer Mother     Cancer Mother         breast cancer [de-identified]     Stroke Mother     Heart Attack Father     Other Father 80        Aortic aneurysm    Cancer Brother     Diabetes Brother          REVIEW OF SYSTEMS:    CONSTITUTIONAL:  negative for  fevers, chills, sweats and fatigue    RESPIRATORY:  negative for  dry cough, cough with sputum, dyspnea, wheezing and chest pain    CARDIOVASCULAR:  negative for chest pain, dyspnea, palpitations, syncope    GASTROINTESTINAL:  negative for nausea, vomiting, change in bowel habits, diarrhea, constipation and abdominal pain    MUSCULOSKELETAL: negative for muscle weakness    SKIN: negative for itching or rashes.     BEHAVIOR/PSYCH:  negative for poor appetite, increased appetite, decreased sleep and poor concentration    All other systems negative      PHYSICAL EXAM:    VITALS:  BP (!) 139/95   Pulse 98   Temp 97.9 °F (36.6 °C) (Temporal)   Resp 20   Ht 5' 5\" (1.651 m)   Wt 200 lb (90.7 kg)   LMP  (LMP Unknown)   SpO2 97%   BMI 33.28 kg/m²     CONSTITUTIONAL:  awake, alert, cooperative, no apparent distress, and appears stated age    EYES: PERRLA, EOMI    LUNGS:  No increased work of breathing, no audible wheezing    CARDIOVASCULAR:  regular rate and rhythm    ABDOMEN:  Soft non tender non distended     EXTREMITIES: no signs of clubbing or cyanosis. MUSCULOSKELETAL: negative for flaccid muscle tone or spastic movements. SKIN: gross examination reveals no signs of rashes, or diaphoresis. NEURO: Cranial nerves II-XII grossly intact. No signs of agitated mood.        Assessment/Plan:    LBP BLE pain for b/l L5 TFESI

## 2022-10-27 NOTE — OP NOTE
10/27/2022    Patient: Abbey Botello  :  1955  Age:  74 y.o. Sex:  female     PRE-OPERATIVE DIAGNOSIS: Lumbar disc displacement, lumbar neural foraminal stenosis, lumbar radiculopathy. POST-OPERATIVE DIAGNOSIS: Same. PROCEDURE: Bilateral Transforaminal epidural steroid injection under fluoroscopic guidance at foraminal level L5 (#1). SURGEON: ZARA Mcgowan. ANESTHESIA: local    ESTIMATED BLOOD LOSS: None.  ______________________________________________________________________  BRIEF HISTORY: Abbey Botello comes in today for the first Bilateral transforaminal epidural steroid injection under fluoroscopic guidance at foraminal level L5. The potential complications of this procedure were discussed with her again today. She has elected to undergo the aforementioned procedure. Lyssa complete History & Physical examination were reviewed in depth, a copy of which is in the chart. DESCRIPTION OF PROCEDURE:    After confirming written and informed consent, a time-out was performed and Lyssa name and date of birth, the procedure to be performed as well as the plan for the location of the needle insertion were confirmed. The patient was brought into the procedure room and placed in the prone position on the fluoroscopy table. Standard monitors were placed and vital signs were observed throughout the procedure. The area of the lumbar spine was prepped with chloraprep and draped in a sterile manner. The vertebral body was identified with AP fluoroscopy. An oblique view was obtained to better visualize the inferior junction of the pedicle and transverse process . The 6 o'clock position of the pedicle was marked and identified. The skin and subcutaneous tissue were anesthetized with 0.5% lidocaine. A # 22 gauge pencil point needle was directed toward the targeted point under fluoroscopy until bone was contacted. The needle was then walked inferiorly until the neural foramen was entered .  A lateral fluoroscopic view was then used to place the needle tip in the middle to anterior aspect of the foramen. Negative aspiration was confirmed for blood and CSF and 1 cc of Isovue-M 300 contrast was injected at each level under live AP fluoroscopy. Appropriate neurograms were observed under live AP fluoroscopy. Then after negative aspiration, a solution of the 2 cc of 0.5% lidocaine and 40 mg DepoMedrol was easily injected between each level. The needles were removed with constant aspiration technique. The patient's back was cleaned and a bandage was placed over the needle insertion points    Disposition the patient tolerated the procedure well and there were no complications . Vital signs remained stable throughout the procedure. The patient was escorted to the recovery area where they remained until discharge and written discharge instructions for the procedure were given. Plan: Roberto Livingston will return to our pain management center as scheduled.      Alli Cosme DO

## 2022-10-27 NOTE — DISCHARGE INSTRUCTIONS
Wilian Vasquez Block/Radiofrequency  Home Going Instructions    1-Go home, rest for the remainder of the day  2-Please do not lift over 20 pounds the day of the injection  3-If you received sedation No: alcohol, driving, operating lawn mowers, plows, tractors or other dangerous equipment until next morning. Do not make important decisions or sign legal documents for 24 hours. You may experience light headedness, dizziness, nausea or sleepiness after sedation. Do not stay alone. A responsible adult must be with you for 24 hours. You could be nauseated from the medications you have received. Your IV site may be sore and bruised. 4-No dietary restrictions     5-Resume all medications the same day, blood thinners to be resumed 24 hours after injection if you were instructed to stop any. 6-Keep the surgical site clean and dry, you may shower the next morning and remove the      dressing. 7- No sitz baths, tub baths or hot tubs/swimming for 24 hours. 8- If you have any pain at the injection site(s), application of an ice pack to the area should be       helpful, 20 minutes on/20 minutes off for next 48 hours. 9- Call [facility] immediately at if you develop.   Fever greater than 100.4 F  Have bleeding or drainage from the puncture site  Have progressive Leg/arm numbness and or weakness  Loss of control of bowel and or bladder (wet/soil yourself)  Severe headache with inability to lift head  10-You may return to work the next day

## 2022-11-01 ENCOUNTER — OFFICE VISIT (OUTPATIENT)
Dept: PAIN MANAGEMENT | Age: 67
End: 2022-11-01
Payer: MEDICARE

## 2022-11-01 VITALS
DIASTOLIC BLOOD PRESSURE: 76 MMHG | HEART RATE: 103 BPM | WEIGHT: 195 LBS | TEMPERATURE: 97.3 F | BODY MASS INDEX: 32.49 KG/M2 | HEIGHT: 65 IN | OXYGEN SATURATION: 96 % | SYSTOLIC BLOOD PRESSURE: 112 MMHG | RESPIRATION RATE: 16 BRPM

## 2022-11-01 DIAGNOSIS — M54.6 THORACIC SPINE PAIN: ICD-10-CM

## 2022-11-01 DIAGNOSIS — M54.40 CHRONIC MIDLINE LOW BACK PAIN WITH SCIATICA, SCIATICA LATERALITY UNSPECIFIED: ICD-10-CM

## 2022-11-01 DIAGNOSIS — G89.4 CHRONIC PAIN SYNDROME: ICD-10-CM

## 2022-11-01 DIAGNOSIS — G89.29 CHRONIC BILATERAL LOW BACK PAIN WITHOUT SCIATICA: ICD-10-CM

## 2022-11-01 DIAGNOSIS — M47.812 CERVICAL FACET JOINT SYNDROME: ICD-10-CM

## 2022-11-01 DIAGNOSIS — M48.061 SPINAL STENOSIS OF LUMBAR REGION WITHOUT NEUROGENIC CLAUDICATION: ICD-10-CM

## 2022-11-01 DIAGNOSIS — M54.50 CHRONIC BILATERAL LOW BACK PAIN WITHOUT SCIATICA: ICD-10-CM

## 2022-11-01 DIAGNOSIS — M54.16 LUMBAR RADICULOPATHY: ICD-10-CM

## 2022-11-01 DIAGNOSIS — G89.29 OTHER CHRONIC PAIN: ICD-10-CM

## 2022-11-01 DIAGNOSIS — M51.36 DDD (DEGENERATIVE DISC DISEASE), LUMBAR: Primary | ICD-10-CM

## 2022-11-01 DIAGNOSIS — M79.10 MYALGIA: ICD-10-CM

## 2022-11-01 DIAGNOSIS — G89.29 CHRONIC MIDLINE LOW BACK PAIN WITH SCIATICA, SCIATICA LATERALITY UNSPECIFIED: ICD-10-CM

## 2022-11-01 DIAGNOSIS — M47.816 LUMBAR FACET ARTHROPATHY: ICD-10-CM

## 2022-11-01 PROCEDURE — 1123F ACP DISCUSS/DSCN MKR DOCD: CPT | Performed by: PHYSICIAN ASSISTANT

## 2022-11-01 PROCEDURE — 99213 OFFICE O/P EST LOW 20 MIN: CPT | Performed by: PHYSICIAN ASSISTANT

## 2022-11-01 PROCEDURE — 3074F SYST BP LT 130 MM HG: CPT | Performed by: PHYSICIAN ASSISTANT

## 2022-11-01 PROCEDURE — 3078F DIAST BP <80 MM HG: CPT | Performed by: PHYSICIAN ASSISTANT

## 2022-11-01 RX ORDER — HYDROCODONE BITARTRATE AND ACETAMINOPHEN 5; 325 MG/1; MG/1
1 TABLET ORAL 2 TIMES DAILY
Qty: 60 TABLET | Refills: 0 | Status: SHIPPED | OUTPATIENT
Start: 2022-11-07 | End: 2022-12-07

## 2022-11-01 NOTE — PROGRESS NOTES
[address]  [address]  MINA DAMIAN [facility] - [facility]IOR[facility], [facility]    Follow up Note      Sahil Primrose     Date of Visit:  2022    CC:  Patient presents for follow up   Chief Complaint   Patient presents with    Follow-up     Bilateral Transforaminal epidural steroid injection under fluoroscopic guidance at foraminal level L5 (#1). HPI:    Pain is a little better after her injection. Change in quality of symptoms:no. Patient satisfaction with analgesia:fair. Medication side effects: None. Recent diagnostic testing:none. Recent interventional procedures: 10/27/2022 PROCEDURE: Bilateral Transforaminal epidural steroid injection under fluoroscopic guidance at foraminal level L5 (#1) - 40% relief    She has been on anticoagulation medications to include NSAIDS. The patient  has not been on herbal supplements. The patient is diabetic. Imagin2022 Thoracic Spine X-ray    FINDINGS:   Thoracic vertebral bodies are normal in height and alignment. Multilevel   degenerative changes. No evidence of fracture. Pedicles are symmetric and   intact. Visualized lungs are clear. Impression   No acute abnormality of the thoracic spine       Multilevel degenerative changes. 2021 CT lumbar myelogram    FINDINGS:   BONES/ALIGNMENT: There is minimal levocurvature of the lumbar spine. There   is posterior fixation from L2-L4 without evidence for hardware complication. The vertebral body heights are maintained. There is minimal retrolisthesis   of L2 on L3. SOFT TISSUES: The posterior paraspinal soft tissues are unremarkable. The   visualized abdominal structures are unremarkable. The conus is normal in   caliber and terminates at L1. The cauda equina is unremarkable. L1-L2: There is no significant disc protrusion, central spinal canal stenosis   or neural foraminal narrowing.        L2-L3: There is artificial disc material with posterior laminectomy. There   is no canal stenosis or left foraminal narrowing. There is moderate right   foraminal narrowing. L3-L4: There is artificial disc material and posterior laminectomy. There is   no canal stenosis. There is mild bilateral foraminal narrowing. L4-L5: There is artificial disc material with posterior laminectomy. There   is no canal stenosis. There is mild left and moderate right foraminal   narrowing. L5-S1: There is a circumferential disc bulge with facet hypertrophy. There   is no canal stenosis. There is moderate right and severe left foraminal   narrowing. Impression   Posterior fixation and laminectomy from L2-L4 without evidence for hardware   complication. Multilevel degenerative change with bilateral foraminal narrowing as   described above. MRI lumbar spine 2018  1. No significant interval change is observed since the previous study   of 2017.       2. Stable postoperative changes/posterior spinal fusion at the   L3-L4-L5 level. 3. Severe spine canal stenosis in the level of L2-L3           4. Encroachment of the neural foramina bilaterally in levels of L2-L3   and L5-S1      Thoracic spine MRI 2018  1. Some degenerative changes in the thoracic spine, mainly in the   facet joints in the mid-upper thoracic spine segments       2. Discrete loss of height of T6, more likely an old finding. Previous treatments: Physical Therapy, Surgery L3-5 fusion/laminectomy and medications. .       Potential Aberrant Drug-Related Behavior:  No     Urine Drug Screenin18:  Consistent for norhydrocodone metabolite  2018:  Consistent for hydrocodone and norhydrocodone  05/10/2019:  Consistent for hydrocodone and norhydrocodone  2019:  Consistent for hydrocodone and norhydrocodone  01/10/2020:  Consistent for hydrocodone and norhydrocodone  2020:  Consistent for oxycodone and metabolites s/p surgery. Inconsistent for hydrocodone. States that she was instructed to take her old norco until she made the appointment to resume her chronic pain medications. 10/2020:  Consistent  07/2021:  Consistent  01/2022:  Consistent     OARRS report:  05/2018 consistent to 05/2021 consistent (norco scripts from [facility] post op 3/10/2021 and 3/24/2021.    [place] script through [facility] - last one 06/01/2021 07/2021 - consistent to 10/2022 consistent     Opioid agreement:  10/18/2021  Updated 10/04/2022      Past Medical History:   Diagnosis Date    Cancer ([facility] [address] 12/2019    LESION REMOVED UNDER [facility]    Chronic back pain     Costochondritis     h/o    Depression     Diabetes mellitus ([facility] [address]     Falls     Last fall Feb 2021    Fibromyalgia     Hallux rigidus of left foot     HTN (hypertension)     Hyperlipidemia     CLYDE on CPAP     Osteoarthritis     \"everywhere\"    Rheumatoid arthritis(714.0)     TIA (transient ischemic attack)     no deficits        Past Surgical History:   Procedure Laterality Date    ANESTHESIA NERVE BLOCK Left 02/26/2019    LEFT C3,4,5 MBB performed by Kusum Bland MD at [address] [place] [address],[address] [facility] Right 08/12/2019    BILATERAL TRANSFORAMINAL EPIDURAL STEROID INJECTION S1 #3 performed by Kusum Bland MD at [facility] Right 06/16/2020    RIGHT SACROILIAC JOINT INJECTION      CPT: 09291 performed by Aarti Palumbo DO at [address] [place] [address]  2005    Left    ANKLE SURGERY Left 7/8/2022    REPAIR OF ANTERIOR TALAR LIGAMENT LEFT ANKLE, REPAIR DELTOID LIGAMENT LEFT ANKLE performed by Demetrio Sandoval DPM at [place] [address] Left 12/14/2018    ARTHRODESIS 2ND DIGIT LEFT FOOT performed by Demetrio Sandoval DPM at [address] [place] [address]  08/16/2017    PLIF L3-L4, L4-L5 with rods, screws, and cages/Dr. Meghann Ospina SURGERY  03/04/2020    BACK SURGERY Right 03/09/2021    RIGHT PERCUTANEOUS SACROILIAC JOINT FUSION performed by Megan Negrete MD at [place] [address] SURGERY  2010    reduction, bilat     SECTION  1993    CHOLECYSTECTOMY      COLONOSCOPY  2015    COLONOSCOPY N/A 2020    COLONOSCOPY DIAGNOSTIC performed by Yumi Schaefer MD at [address]  2021    SI Joint    ENDOSCOPY, COLON, DIAGNOSTIC      EPIDURAL STEROID INJECTION N/A 2019    BILATERAL TRANSFORAMINAL EPIDURAL STEROID INJECTION S1 performed [facility]itin [facility] DO [facility] / ANKLE Left 2018    REMOVAL OF PAINFUL HARDWARE LEFT FOOT  ++SYNTHES++ performed by Hang Swartz DPM at [address]    inguinal     LUMBAR SPINE SURGERY N/A 2020    EXPLORATION OF PRIOR L3-L5 FUSION AND L2-L3  POSTERIOR LUMBAR INTERBODY FUSION -- NEEDS O-ARM, AUDIOLOGY, CAGES, PLATES, SCREWS, C-ARM, TERESA TABLE, CELL SAVER, PLATELET GEL -- GLOBUS performed by Andrey Jansen MD at [facility] 10/21/2020    EXCISION OF TONGUE LESION performed by Kody Gonzales DO at [address] Bilateral 2018    L1-2 lumbar foramen #1    NERVE BLOCK Bilateral 2018    s1 tfesi    NERVE BLOCK Bilateral 2019    NERVE BLOCK Right 2019    sacral radiofrequency    NERVE BLOCK Right 2020    RIGHT SACROILIAC JOINT INJECTION #2 performed by Nitin Bradley DO at [facility] Left 2013    left foot tarso metatarsal joint injection    OTHER SURGICAL HISTORY Left 2015    Endoscopic Gastroc recession left, Lapidus left foot and  Excision of exostosis left foot    PAIN MANAGEMENT PROCEDURE Left 2021    LEFT L5-S1 TRANSFORAMINAL EPIDURAL STEROID INJECTION performed by Nitin Bradley DO at [facility] Left 3/29/2022    LEFT TRANSFORAMINAL EPIDURAL STEROID INJECTION AT L5 AND S1 performed by Nitin Bradley DO at [facility] N/A 2022    LUMBAR EPIDURAL STEROID INJECTION L5-S1 performed by Esvin Ferreira DO at [address] N/A 10/27/2022    BILATERAL L5 LUMBAR TRANSFORAMINAL EPIDURAL STEROID INJECTION performed by Esvin Ferreira DO at [address] AA&/STRD TFRML EPI LUMBAR/SACRAL 1 LEVEL Bilateral 12/03/2018    BILATERAL S1  EPIDURAL STEROID INJECTION performed by Deanna Lizarraga MD at [place] [address] DX/THER SBST INTRLMNR LMBR/SAC W/IMG GDN N/A 08/21/2018    EPIDURAL STEROID INJECTION L1-2 WITH LOW VOL performed by Deanna Lizarraga MD at [address] [facility] NOSE/CLEFT LIP/TIP Bilateral 05/07/2018    BILATERAL L1-2 LUMBAR FORAMEN #1 performed by Deanna Lizarraga MD at [address] NOSE/CLEFT LIP/TIP Bilateral 06/04/2018    BILATERAL TRANSFORAMINAL EPIDURAL STEROID INJECTION UNDER FLUOROSCOPIC GUIDANCE @ FORAMINAL LEVEL L1-2 #2 performed by Deanna Lizarraga MD at [address] Right 09/30/2019    RIGHT SACRAL RADIOFREQUENCY ABLATION performed by Deanna Lizarraga MD at [place] [address]    Big Left Toe    TOE SURGERY Left 2018    2nd toe     TONSILLECTOMY      WISDOM TOOTH EXTRACTION         Prior to Admission medications    Medication Sig Start Date End Date Taking? Authorizing Provider   HYDROcodone-acetaminophen (NORCO) 5-325 MG per tablet Take 1 tablet by mouth 2 times daily for 30 days. 11/7/22 12/7/22 Yes TREASURE Stevens   gabapentin (NEURONTIN) 400 MG capsule Take 1 capsule by mouth 2 times daily for 30 days. 9/6/22 11/1/22 Yes TREASURE Stevens   hydrOXYzine pamoate (VISTARIL) 25 MG capsule Take 1 capsule by mouth 3 times daily as needed for Anxiety 7/29/22  Yes Vishal Wallace MD   metFORMIN (GLUCOPHAGE) 500 MG tablet Take 1 tablet by mouth 2 times daily (with meals) Start with once daily during the first week.  6/22/22  Yes Vishal Wallace MD   atorvastatin (LIPITOR) 40 MG tablet Take 1 tablet by mouth daily 6/20/22  Yes Vishal Wallace MD   lisinopril (PRINIVIL;ZESTRIL) 40 MG tablet Take 1 tablet by mouth every evening 6/20/22  Yes Rosaura Traore MD   hydroCHLOROthiazide (MICROZIDE) 12.5 MG capsule TAKE ONE CAPSULE BY MOUTH ONCE DAILY FOR BLOOD PRESSURE 6/7/22  Yes Rosaura Traore MD   Handicap Placard MISC DX:  Loss of Balance, Chronic low back pain, s/p back surgery. Duration of 5 years 2/22/21  Yes Rosaura Traore MD       Allergies   Allergen Reactions    Pcn [Penicillins] Anaphylaxis       Social History     Socioeconomic History    Marital status:      Spouse name: Not on file    Number of children: Not on file    Years of education: Not on file    Highest education level: Not on file   Occupational History    Not on file   Tobacco Use    Smoking status: Every Day     Packs/day: 0.50     Years: 30.00     Pack years: 15.00     Types: Cigarettes    Smokeless tobacco: Never   Vaping Use    Vaping Use: Never used   Substance and Sexual Activity    Alcohol use: Not Currently     Alcohol/week: 0.0 standard drinks     Comment: rarely    Drug use: No    Sexual activity: Not on file   Other Topics Concern    Not on file   Social History Narrative    Not on file     Social Determinants of Health     Financial Resource Strain: Not on file   Food Insecurity: Not on file   Transportation Needs: Not on file   Physical Activity: Not on file   Stress: Not on file   Social Connections: Not on file   Intimate Partner Violence: Not on file   Housing Stability: Not on file       Family History   Problem Relation Age of Onset    Dementia Mother     Breast Cancer Mother     Cancer Mother         breast cancer [de-identified]     Stroke Mother     Heart Attack Father     Other Father 80        Aortic aneurysm    Cancer Brother     Diabetes Brother        REVIEW OF SYSTEMS:     Carlos Gold denies fever/chills, chest pain, shortness of breath, new bowel or bladder complaints or suicidal ideations. All other review of systems was negative.     PHYSICAL EXAMINATION:      /76   Pulse (!) 103   Temp 97.3 °F (36.3 °C) (Infrared)   Resp 16   Ht 5' 5\" (1.651 m)   Wt 195 lb (88.5 kg)   LMP  (LMP Unknown)   SpO2 96%   BMI 32.45 kg/m²     General:      General appearance: awake, alert, oriented, in no acute distress, well developed, well nourished and in no acute distress   pleasant and well-hydrated. in no distress and A & O x3  Build:Overweight  Function:Rises from a seated position with difficulty    HEENT:    Head:normocephalic and atraumatic  Pupils:regular, round and equal.  Sclera: icterus absent  EOM:full and intact. Lungs:    Breathing:Normal expansion. No wheezing. Abdomen:    Shape:non-distended and normal      Lumbar spine:                Range of motion:abnormal moderately Lateral bending, flexion, extension rotation bilateral and is painful. Extremities:    Tremors:None bilaterally upper and lower  Range of motion:Generally normal shoulders  Intact:Yes  Cyanosis:none  Edema:Normal      Neurological:    Cranial nerves:normal  Sensory:diminished to light lateral aspect of right leg                   Dermatology:    Skin:no unusual rashes, no skin lesions, no palpable subcutaneous nodules and good skin turgor    Assessment/Plan:      Chronic low back pain with radiation to the Right groin, patient had low back surgery 08/2017 L3-5 fusion, recently had lumbar spine CT with severe L2-3 stenosis     Patient is s/p left ankle surgery on 7/8. Back in cam walker boot. Plan:  Patient is s/p revision fusion L2-L4 on 3/4/2020. Patient is s/p:  Right sacroiliac joint fusion with use of SI-BONE iFuse implant on 3/9/2021 by Dr. Seferino Unger. Patient is s/p:  Left TFESI L5 and S1 on 03/29/2022 with 75% relief. LESI L5-S1 on 06/09/2022 with good relief until recently. Repeat with about 40% relief. She would like to hold off any further injections at this point. Cervical TPIs with  diminished relief at this time.     Continue norco 5/325 BID.     Continue with Gabapentin 400 mg BID. She reports that any increases make her off balance. OARRS report reviewed 11/2022  Hold flexeril for now. Doing well without it. Patient encouraged to stay active and to lose weight. Failed physical therapy  Treatment plan discussed with the patient including medications side effects       Controlled Substance Monitoring:    Acute and Chronic Pain Monitoring:   RX Monitoring 11/1/2022   Attestation -   Acute Pain Prescriptions -   Periodic Controlled Substance Monitoring Possible medication side effects, risk of tolerance/dependence & alternative treatments discussed. ;No signs of potential drug abuse or diversion identified. ;Assessed functional status. ;Obtaining appropriate analgesic effect of treatment.    Chronic Pain > 80 MEDD -              ccreferring physic

## 2022-11-01 NOTE — PROGRESS NOTES
Do you currently have any of the following:    Fever: No  Headache:  No  Cough: No  Shortness of breath: No  Exposed to anyone with these symptoms: No         Armand Martin presents to the [facility] on 11/1/2022. Mio Garcia is complaining of pain in her low back. The pain is constant. The pain is described as aching. Pain is rated on her best day at a 7, on her worst day at a 10, and on average at a 8 on the VAS scale. She took her last dose of Neurontin today. Any procedures since your last visit: Yes, with 50 % relief. Pacemaker or defibrillator: No     She is not on NSAIDS and is not on anticoagulation medications. Medication Contract and Consent for Opioid Use Documents Filed       Patient Documents       Type of Document Status Date Received Received By Description    Medication Contract Received  Fide Kaye Opioid medication contract with Dr Steven Ellington 3/24/20    Medication Contract Received 4/26/2018  3:50 PM ANYA NUNO PAIN MANAGEMENT PATIENT AGREEMENT 4/26/2018    Medication Contract Received 11/5/2020  4:23 PM EBER HONG Opioid medication contract with Dr Ricky Anthony Received 3/1/2021  1:26 PM Fide Kaye Opioid medication contract with Dr Ricky Anthony Received 8/23/2021  2:03 PM Satish Cortez Medication contract    Medication Contract Received 10/18/2021  3:03 PM Satish Cortez medication contract 10/18/2021    Medication Contract Signed 10/4/2022 12:35 PM CRISTIANA STERLING Pain Med. Contract 10/04/22                    /76   Pulse (!) 103   Temp 97.3 °F (36.3 °C) (Infrared)   Resp 16   Ht 5' 5\" (1.651 m)   Wt 195 lb (88.5 kg)   LMP  (LMP Unknown)   SpO2 96%   BMI 32.45 kg/m²      No LMP recorded (lmp unknown).  Patient is postmenopausal.

## 2022-11-14 ENCOUNTER — OFFICE VISIT (OUTPATIENT)
Dept: FAMILY MEDICINE CLINIC | Age: 67
End: 2022-11-14
Payer: MEDICARE

## 2022-11-14 VITALS
TEMPERATURE: 98.1 F | DIASTOLIC BLOOD PRESSURE: 80 MMHG | WEIGHT: 200 LBS | BODY MASS INDEX: 33.32 KG/M2 | HEIGHT: 65 IN | SYSTOLIC BLOOD PRESSURE: 120 MMHG | HEART RATE: 95 BPM | OXYGEN SATURATION: 99 %

## 2022-11-14 DIAGNOSIS — E11.9 TYPE 2 DIABETES MELLITUS WITHOUT COMPLICATION, WITHOUT LONG-TERM CURRENT USE OF INSULIN (HCC): ICD-10-CM

## 2022-11-14 DIAGNOSIS — I10 ESSENTIAL HYPERTENSION: ICD-10-CM

## 2022-11-14 DIAGNOSIS — G89.29 OTHER CHRONIC PAIN: ICD-10-CM

## 2022-11-14 DIAGNOSIS — Z23 NEED FOR INFLUENZA VACCINATION: ICD-10-CM

## 2022-11-14 DIAGNOSIS — Z00.00 WELCOME TO MEDICARE PREVENTIVE VISIT: Primary | ICD-10-CM

## 2022-11-14 DIAGNOSIS — Z78.0 POSTMENOPAUSAL: ICD-10-CM

## 2022-11-14 DIAGNOSIS — M54.16 LUMBAR RADICULOPATHY: ICD-10-CM

## 2022-11-14 LAB — HBA1C MFR BLD: 8 %

## 2022-11-14 PROCEDURE — 99214 OFFICE O/P EST MOD 30 MIN: CPT | Performed by: FAMILY MEDICINE

## 2022-11-14 PROCEDURE — 83036 HEMOGLOBIN GLYCOSYLATED A1C: CPT | Performed by: FAMILY MEDICINE

## 2022-11-14 PROCEDURE — G0008 ADMIN INFLUENZA VIRUS VAC: HCPCS | Performed by: FAMILY MEDICINE

## 2022-11-14 PROCEDURE — 3074F SYST BP LT 130 MM HG: CPT | Performed by: FAMILY MEDICINE

## 2022-11-14 PROCEDURE — G0402 INITIAL PREVENTIVE EXAM: HCPCS | Performed by: FAMILY MEDICINE

## 2022-11-14 PROCEDURE — 90694 VACC AIIV4 NO PRSRV 0.5ML IM: CPT | Performed by: FAMILY MEDICINE

## 2022-11-14 PROCEDURE — 1123F ACP DISCUSS/DSCN MKR DOCD: CPT | Performed by: FAMILY MEDICINE

## 2022-11-14 PROCEDURE — 3052F HG A1C>EQUAL 8.0%<EQUAL 9.0%: CPT | Performed by: FAMILY MEDICINE

## 2022-11-14 PROCEDURE — 3078F DIAST BP <80 MM HG: CPT | Performed by: FAMILY MEDICINE

## 2022-11-14 RX ORDER — GABAPENTIN 400 MG/1
400 CAPSULE ORAL 2 TIMES DAILY
Qty: 180 CAPSULE | Refills: 1 | Status: SHIPPED | OUTPATIENT
Start: 2022-11-14 | End: 2023-02-12

## 2022-11-14 RX ORDER — CYCLOBENZAPRINE HCL 10 MG
5 TABLET ORAL 2 TIMES DAILY PRN
Qty: 180 TABLET | Refills: 0 | Status: SHIPPED | OUTPATIENT
Start: 2022-11-14 | End: 2022-12-14

## 2022-11-14 RX ORDER — HYDROCHLOROTHIAZIDE 12.5 MG/1
CAPSULE, GELATIN COATED ORAL
Qty: 90 CAPSULE | Refills: 1 | Status: SHIPPED | OUTPATIENT
Start: 2022-11-14

## 2022-11-14 RX ORDER — LISINOPRIL 40 MG/1
40 TABLET ORAL EVERY EVENING
Qty: 90 TABLET | Refills: 1 | Status: SHIPPED | OUTPATIENT
Start: 2022-11-14

## 2022-11-14 ASSESSMENT — LIFESTYLE VARIABLES
HOW OFTEN DO YOU HAVE A DRINK CONTAINING ALCOHOL: NEVER
HOW MANY STANDARD DRINKS CONTAINING ALCOHOL DO YOU HAVE ON A TYPICAL DAY: PATIENT DOES NOT DRINK

## 2022-11-14 ASSESSMENT — PATIENT HEALTH QUESTIONNAIRE - PHQ9
10. IF YOU CHECKED OFF ANY PROBLEMS, HOW DIFFICULT HAVE THESE PROBLEMS MADE IT FOR YOU TO DO YOUR WORK, TAKE CARE OF THINGS AT HOME, OR GET ALONG WITH OTHER PEOPLE: 0
7. TROUBLE CONCENTRATING ON THINGS, SUCH AS READING THE NEWSPAPER OR WATCHING TELEVISION: 0
3. TROUBLE FALLING OR STAYING ASLEEP: 0
1. LITTLE INTEREST OR PLEASURE IN DOING THINGS: 0
4. FEELING TIRED OR HAVING LITTLE ENERGY: 0
SUM OF ALL RESPONSES TO PHQ QUESTIONS 1-9: 0
SUM OF ALL RESPONSES TO PHQ QUESTIONS 1-9: 0
6. FEELING BAD ABOUT YOURSELF - OR THAT YOU ARE A FAILURE OR HAVE LET YOURSELF OR YOUR FAMILY DOWN: 0
SUM OF ALL RESPONSES TO PHQ9 QUESTIONS 1 & 2: 0
5. POOR APPETITE OR OVEREATING: 0
SUM OF ALL RESPONSES TO PHQ QUESTIONS 1-9: 0
SUM OF ALL RESPONSES TO PHQ QUESTIONS 1-9: 0
8. MOVING OR SPEAKING SO SLOWLY THAT OTHER PEOPLE COULD HAVE NOTICED. OR THE OPPOSITE, BEING SO FIGETY OR RESTLESS THAT YOU HAVE BEEN MOVING AROUND A LOT MORE THAN USUAL: 0
2. FEELING DOWN, DEPRESSED OR HOPELESS: 0
9. THOUGHTS THAT YOU WOULD BE BETTER OFF DEAD, OR OF HURTING YOURSELF: 0

## 2022-11-14 NOTE — PATIENT INSTRUCTIONS
Personalized Preventive Plan for Nicky Orta - 11/14/2022  Medicare offers a range of preventive health benefits. Some of the tests and screenings are paid in full while other may be subject to a deductible, co-insurance, and/or copay. Some of these benefits include a comprehensive review of your medical history including lifestyle, illnesses that may run in your family, and various assessments and screenings as appropriate. After reviewing your medical record and screening and assessments performed today your provider may have ordered immunizations, labs, imaging, and/or referrals for you. A list of these orders (if applicable) as well as your Preventive Care list are included within your After Visit Summary for your review. Other Preventive Recommendations:    A preventive eye exam performed by an eye specialist is recommended every 1-2 years to screen for glaucoma; cataracts, macular degeneration, and other eye disorders. A preventive dental visit is recommended every 6 months. Try to get at least 150 minutes of exercise per week or 10,000 steps per day on a pedometer . Order or download the FREE \"Exercise & Physical Activity: Your Everyday Guide\" from [employer]. Call 3-702.534.7984 or search [employer] online. You need 0993-5406 mg of calcium and 8122-5791 IU of vitamin D per day. It is possible to meet your calcium requirement with diet alone, but a vitamin D supplement is usually necessary to meet this goal.  When exposed to the sun, use a sunscreen that protects against both UVA and UVB radiation with an SPF of 30 or greater. Reapply every 2 to 3 hours or after sweating, drying off with a towel, or swimming. Always wear a seat belt when traveling in a car. Always wear a helmet when riding a bicycle or motorcycle.

## 2022-11-14 NOTE — PROGRESS NOTES
Medicare Annual Wellness Visit    Anant Burleson is here for Medicare AWV and Discuss Medications (Metformin causing diarrhea, It is currently ordered as BID, pt states she is only taking once daily.)    Assessment & Plan   Welcome to Medicare preventive visit  Overall Anant Burleson is doing well. Age appropriate HM topics reviewed and updated where agreeable. Vaccines reviewed and updated where agreeable. Age appropriate anticipatory guidance discussed. Encouraged to work on [employer] and exercise strategies. Opportunity to ask questions and answers provided as able. Advised due for pap. DEXA ordered. Recommend RTO in 1 year for annual physical.     Separately billable, addressed today:    Essential hypertension, at goal, continue same for stability. -     hydroCHLOROthiazide (MICROZIDE) 12.5 MG capsule; TAKE ONE CAPSULE BY MOUTH ONCE DAILY FOR BLOOD PRESSURE, Disp-90 capsule, R-1This prescription was filled on 3/15/2022. Any refills authorized will be placed on file. Normal  -     lisinopril (PRINIVIL;ZESTRIL) 40 MG tablet; Take 1 tablet by mouth every evening, Disp-90 tablet, R-1Normal    Other chronic pain, stable  -     gabapentin (NEURONTIN) 400 MG capsule; Take 1 capsule by mouth 2 times daily for 90 days. , Disp-180 capsule, R-1Normal    Lumbar radiculopathy, stable, working with pain mgmt. -     cyclobenzaprine (FLEXERIL) 10 MG tablet; Take 0.5 tablets by mouth 2 times daily as needed for Muscle spasms, Disp-180 tablet, R-0Normal    Need for influenza vaccination  -     Influenza, FLUAD, (age 72 y+), IM, Preservative Free, 0.5 mL    Type 2 diabetes mellitus without complication, without long-term current use of insulin (HCC) - worsened. -     POCT glycosylated hemoglobin (Hb A1C)  Hemoglobin A1C   Date Value Ref Range Status   11/14/2022 8.0 % Final   Worsened, even with the addition of metformin, Could not tolerate BID metformin. Will add Tradjenta to once daily metformin. Continue statin. Postmenopausal  Dexa scan ordered. Recommendations for Preventive Services Due: see orders and patient instructions/AVS.  Recommended screening schedule for the next 5-10 years is provided to the patient in written form: see Patient Instructions/AVS.     Return in 6 months (on 5/14/2023) for Medicare Annual Wellness Visit in 1 year. Subjective     She been doctoring with her podiatrist and pain mgmt for foot and back respectively. Metformin BID causing significant diarrhea. Continues QD dosing. Last A1c 7.6  Today 8.0 despite adding metformin back. No polyuria, polydipsia, polyphagia. Hypertension  Follow-up  Blood pressure is controlled today. BP Readings from Last 3 Encounters:   11/14/22 120/80   11/01/22 112/76   10/27/22 (!) 142/75     Patient continues medications regularly. Compliance is good. Denies CP, sob, abd pain, headaches, vision changes, dizziness, hypotensive symptoms. No side effects from medications noted. Chronic back pain persisting, expected to. Improved with current regimen. Working with pain mgmt. Has norco.   Would like refill on gbp and flexeril. Patient's complete Health Risk Assessment and screening values have been reviewed and are found in Flowsheets. The following problems were reviewed today and where indicated follow up appointments were made and/or referrals ordered.     Positive Risk Factor Screenings with Interventions:    Fall Risk:  Do you feel unsteady or are you worried about falling? : no  2 or more falls in past year?: (!) yes  Fall with injury in past year?: no   Fall Risk Interventions:    Home safety tips provided      Tobacco Use:  Tobacco Use: High Risk    Smoking Tobacco Use: Every Day    Smokeless Tobacco Use: Never    Passive Exposure: Not on file     E-cigarette/Vaping       Questions Responses    E-cigarette/Vaping Use Never User    Start Date     Passive Exposure     Quit Date     Counseling Given Comments           Substance Use - Tobacco Interventions:  patient is not ready to work toward tobacco cessation at this time         Opioid Risk: (Low risk score <55) Opioid risk score: 34    Patient is low risk for opioid use disorder or overdose. Last PDMP Isabela Bueno as Reviewed:  Review User Review Instant Review Result   Pilar Orta 11/1/2022  2:32 PM     Reviewed PDMP [1]     Last Controlled Substance Monitoring Documentation      [facility] [facility] Office Visit from 11/1/2022 in [address]   Periodic Controlled Substance Monitoring Possible medication side effects, risk of tolerance/dependence & alternative treatments discussed., No signs of potential drug abuse or diversion identified. , Assessed functional status., Obtaining appropriate analgesic effect of treatment.  filed at 11/01/2022 8914           General Health and ACP:  General  In general, how would you say your health is?: Fair  In the past 7 days, have you experienced any of the following: New or Increased Pain, New or Increased Fatigue, Loneliness, Social Isolation, Stress or Anger?: No  Do you get the social and emotional support that you need?: (!) No  Do you have a Living Will?: Yes    Advance Directives       Power of  Living Will ACP-Advance Directive ACP-Power of     Not on File Filed on 03/10/20 Filed Not on File        General Health Risk Interventions:  Inadequate social/emotional support: patient declines any further intervention for this issue    Health Habits/Nutrition:  Physical Activity: Inactive    Days of Exercise per Week: 0 days    Minutes of Exercise per Session: 0 min     Have you lost any weight without trying in the past 3 months?: No  Body mass index: (!) 33.28  Have you seen the dentist within the past year?: Yes  Health Habits/Nutrition Interventions:  Inadequate physical activity:  patient is not ready to increase his/her physical activity level at this time    Hearing/Vision:  Do you or your family notice any trouble with your hearing that hasn't been managed with hearing aids?: (!) Yes (Less hearing on left)  Do you have difficulty driving, watching TV, or doing any of your daily activities because of your eyesight?: No  Have you had an eye exam within the past year?: Yes  No results found. Hearing/Vision Interventions:  Hearing concerns:  patient declines any further evaluation/treatment for hearing issues            Objective   Vitals:    11/14/22 0854   BP: 120/80   Pulse: 95   Temp: 98.1 °F (36.7 °C)   TempSrc: Temporal   SpO2: 99%   Weight: 200 lb (90.7 kg)   Height: 5' 5\" (1.651 m)      Body mass index is 33.28 kg/m². General Appearance: alert and oriented to person, place and time, well developed and well- nourished, in no acute distress  Skin: warm and dry, no rash or erythema  Head: normocephalic and atraumatic  Eyes: pupils equal, round, and reactive to light, extraocular eye movements intact, conjunctivae normal  ENT: tympanic membrane, external ear and ear canal normal bilaterally, nose without deformity, nasal mucosa and turbinates normal without polyps  Neck: supple and non-tender without mass, no thyromegaly or thyroid nodules, no cervical lymphadenopathy  Pulmonary/Chest: clear to auscultation bilaterally- no wheezes, rales or rhonchi, normal air movement, no respiratory distress  Cardiovascular: normal rate, regular rhythm, normal S1 and S2, no murmurs, rubs, clicks, or gallops, distal pulses intact, no carotid bruits  Abdomen: soft, non-tender, non-distended, normal bowel sounds, no masses or organomegaly  Extremities: no cyanosis, clubbing or edema  Musculoskeletal: normal range of motion, no joint swelling, deformity or tenderness       Allergies   Allergen Reactions    Pcn [Penicillins] Anaphylaxis     Prior to Visit Medications    Medication Sig Taking?  Authorizing Provider   hydroCHLOROthiazide (MICROZIDE) 12.5 MG capsule TAKE ONE CAPSULE BY MOUTH ONCE DAILY FOR BLOOD PRESSURE Yes Adryan Goodwin MD   lisinopril (PRINIVIL;ZESTRIL) 40 MG tablet Take 1 tablet by mouth every evening Yes Adryan Goodwin MD   metFORMIN (GLUCOPHAGE) 500 MG tablet Take 1 tablet by mouth daily (with breakfast) Start with once daily during the first week. Yes Adryan Goodwin MD   gabapentin (NEURONTIN) 400 MG capsule Take 1 capsule by mouth 2 times daily for 90 days. Yes Adryan Goodwin MD   cyclobenzaprine (FLEXERIL) 10 MG tablet Take 0.5 tablets by mouth 2 times daily as needed for Muscle spasms Yes Adryan Goodwin MD   HYDROcodone-acetaminophen (NORCO) 5-325 MG per tablet Take 1 tablet by mouth 2 times daily for 30 days. Yes TREASURE Gimenez   hydrOXYzine pamoate (VISTARIL) 25 MG capsule Take 1 capsule by mouth 3 times daily as needed for Anxiety Yes Adryan Goodwin MD   atorvastatin (LIPITOR) 40 MG tablet Take 1 tablet by mouth daily Yes Adryan Goodwin MD   Handicap Placard MISC DX:  Loss of Balance, Chronic low back pain, s/p back surgery.    Duration of 5 years Yes MD [facility]The [facility] (Including outside providers/suppliers regularly involved in providing care):   Patient Care Team:  Adryan Goodwin MD as PCP - General (Family Medicine)  Adryan Goodwin MD as PCP [facility]  Radha Clemente DO as Surgeon (Otolaryngology)     Reviewed and updated this visit:  Tobacco  Allergies  Meds  Med Hx  Surg Hx  Soc Hx  Fam Hx

## 2022-11-15 ENCOUNTER — TELEPHONE (OUTPATIENT)
Dept: FAMILY MEDICINE CLINIC | Age: 67
End: 2022-11-15

## 2022-11-15 NOTE — TELEPHONE ENCOUNTER
Patient initiated a PA w/ her insurance for tradjenta. They would like to know if colesevelam hydrochloride powder or tablets would could be used in conjunction w/ diet and exercise to improve blood sugar, or if it is contra indicated, or you do feel it would be less effective    They will also be sending a fax with this information.      Phone [phone]   Ref # [id number]

## 2022-11-16 NOTE — TELEPHONE ENCOUNTER
Called and provided information over the phone. They will submit, will take up to 4 days for determination.

## 2022-11-22 ENCOUNTER — OFFICE VISIT (OUTPATIENT)
Dept: PODIATRY | Age: 67
End: 2022-11-22
Payer: MEDICARE

## 2022-11-22 VITALS — BODY MASS INDEX: 33.28 KG/M2 | WEIGHT: 200 LBS | TEMPERATURE: 98.2 F

## 2022-11-22 DIAGNOSIS — Z09 POSTOPERATIVE EXAMINATION: ICD-10-CM

## 2022-11-22 DIAGNOSIS — M76.822 POSTERIOR TIBIAL TENDINITIS OF LEFT LOWER EXTREMITY: Primary | ICD-10-CM

## 2022-11-22 PROCEDURE — 1123F ACP DISCUSS/DSCN MKR DOCD: CPT | Performed by: PODIATRIST

## 2022-11-22 PROCEDURE — 99213 OFFICE O/P EST LOW 20 MIN: CPT | Performed by: PODIATRIST

## 2022-11-22 RX ORDER — KETOCONAZOLE 20 MG/G
CREAM TOPICAL
Qty: 30 G | Refills: 1 | Status: SHIPPED | OUTPATIENT
Start: 2022-11-22

## 2022-11-22 NOTE — PROGRESS NOTES
10/15/22  Mony Carlton : 1955 Sex: female  Age: 74 y.o. Patient was referred by Geraline Primrose, MD    Chief Complaint   Patient presents with    Post-Op Check     22 left foot sx     Diabetes     Still wearing cam walker        SUBJECTIVE patient is seen for postop surgical correction to the left foot and ankle. Patient is doing about 65 to 70% better. He has been in a cam walker now for follow-up in 3 months. Diabetes    Review of Systems  Const: Denies constitutional symptoms  Musculo: Denies symptoms other than stated above  Skin: Denies symptoms other than stated above       Current Outpatient Medications:     ketoconazole (NIZORAL) 2 % cream, Apply topically daily. , Disp: 30 g, Rfl: 1    hydroCHLOROthiazide (MICROZIDE) 12.5 MG capsule, TAKE ONE CAPSULE BY MOUTH ONCE DAILY FOR BLOOD PRESSURE, Disp: 90 capsule, Rfl: 1    lisinopril (PRINIVIL;ZESTRIL) 40 MG tablet, Take 1 tablet by mouth every evening, Disp: 90 tablet, Rfl: 1    metFORMIN (GLUCOPHAGE) 500 MG tablet, Take 1 tablet by mouth daily (with breakfast) Start with once daily during the first week., Disp: 90 tablet, Rfl: 1    gabapentin (NEURONTIN) 400 MG capsule, Take 1 capsule by mouth 2 times daily for 90 days. , Disp: 180 capsule, Rfl: 1    cyclobenzaprine (FLEXERIL) 10 MG tablet, Take 0.5 tablets by mouth 2 times daily as needed for Muscle spasms, Disp: 180 tablet, Rfl: 0    linagliptin (TRADJENTA) 5 MG tablet, Take 1 tablet by mouth daily, Disp: 30 tablet, Rfl: 2    HYDROcodone-acetaminophen (NORCO) 5-325 MG per tablet, Take 1 tablet by mouth 2 times daily for 30 days. , Disp: 60 tablet, Rfl: 0    hydrOXYzine pamoate (VISTARIL) 25 MG capsule, Take 1 capsule by mouth 3 times daily as needed for Anxiety, Disp: 270 capsule, Rfl: 1    atorvastatin (LIPITOR) 40 MG tablet, Take 1 tablet by mouth daily, Disp: 90 tablet, Rfl: 3    Handicap Placard MISC, DX:  Loss of Balance, Chronic low back pain, s/p back surgery.   Duration of 5 years, Disp: 1 each, Rfl: 0  Allergies   Allergen Reactions    Pcn [Penicillins] Anaphylaxis       Past Medical History:   Diagnosis Date    Cancer ([facility] [address] 12/2019    LESION REMOVED UNDER TONGUE  DENTAL CLINIC    Chronic back pain     Costochondritis     h/o    Depression     Diabetes mellitus ([facility] [address]     Falls     Last fall Feb 2021    Fibromyalgia     Hallux rigidus of left foot     HTN (hypertension)     Hyperlipidemia     CLYDE on CPAP     Osteoarthritis     \"everywhere\"    Rheumatoid arthritis(714.0)     TIA (transient ischemic attack)     no deficits      Past Surgical History:   Procedure Laterality Date    ANESTHESIA NERVE BLOCK Left 02/26/2019    LEFT C3,4,5 MBB performed by Carmel Jc MD at [address] Right 08/12/2019    BILATERAL TRANSFORAMINAL EPIDURAL STEROID INJECTION S1 #3 performed by Carmel Jc MD at [facility] Right 06/16/2020    RIGHT SACROILIAC JOINT INJECTION      CPT: 99151 performed by Angel Che DO at [address]  2005    Left    ANKLE SURGERY Left 7/8/2022    REPAIR OF ANTERIOR TALAR LIGAMENT LEFT ANKLE, REPAIR DELTOID LIGAMENT LEFT ANKLE performed by Usha Tilley DPM at [place] [address] Left 12/14/2018    ARTHRODESIS 2ND DIGIT LEFT FOOT performed by Usha Tilley DPM at [address]  08/16/2017    PLIF L3-L4, L4-L5 with rods, screws, and cages/Dr. [facility] SURGERY  03/04/2020    BACK SURGERY Right 03/09/2021    RIGHT PERCUTANEOUS SACROILIAC JOINT FUSION performed by Nicolas Beatty MD at [address]  2010    reduction, \[facility]  2011    COLONOSCOPY  03/27/2015    COLONOSCOPY N/A 08/19/2020    COLONOSCOPY DIAGNOSTIC performed by Robert Avalos MD at [address]  03/2021    SI Joint    ENDOSCOPY, COLON, DIAGNOSTIC      EPIDURAL STEROID INJECTION N/A 06/04/2019    BILATERAL TRANSFORAMINAL EPIDURAL STEROID INJECTION S1 performed by Caron Flores DO at [facility] [address] / ANKLE Left 12/14/2018    REMOVAL OF PAINFUL HARDWARE LEFT FOOT  ++SYNTHES++ performed by Hollis Alberto DPM at [address] [facility]    inguinal     LUMBAR SPINE SURGERY N/A 03/04/2020    EXPLORATION OF PRIOR L3-L5 FUSION AND L2-L3  POSTERIOR LUMBAR INTERBODY FUSION -- NEEDS O-ARM, AUDIOLOGY, CAGES, PLATES, SCREWS, C-ARM, TERESA TABLE, CELL SAVER, PLATELET GEL -- GLOBUS performed by Karthikeyan Hutchison MD at [address] 10/21/2020    EXCISION OF TONGUE LESION performed by Vaibhav Ling DO at [address] Bilateral 05/07/2018    L1-2 lumbar foramen #1    NERVE BLOCK Bilateral 12/03/2018    s1 tfesi    NERVE BLOCK Bilateral 08/12/2019    NERVE BLOCK Right 09/30/2019    sacral radiofrequency    NERVE BLOCK Right 09/01/2020    RIGHT SACROILIAC JOINT INJECTION #2 performed by Caron Flores DO at [address] [facility] Left 09/25/2013    left foot tarso metatarsal joint injection    OTHER SURGICAL HISTORY Left 05/27/2015    Endoscopic Gastroc recession left, Lapidus left foot and  Excision of exostosis left foot    PAIN MANAGEMENT PROCEDURE Left 7/27/2021    LEFT L5-S1 TRANSFORAMINAL EPIDURAL STEROID INJECTION performed by Caron Flores DO at [address] [facility] Left 3/29/2022    LEFT TRANSFORAMINAL EPIDURAL STEROID INJECTION AT L5 AND S1 performed by Caron Flores DO at [address] [facility] N/A 6/9/2022    LUMBAR EPIDURAL STEROID INJECTION L5-S1 performed by Caron Flores DO at [address] [facility] N/A 10/27/2022    BILATERAL L5 LUMBAR TRANSFORAMINAL EPIDURAL STEROID INJECTION performed by Caron Flores DO at [address] AA&/STRD TFRML EPI LUMBAR/SACRAL 1 LEVEL Bilateral 12/03/2018    BILATERAL S1  EPIDURAL STEROID INJECTION performed by Shahzad Weaver MD at  [address] DX/THER SBST INTRLMNR LMBR/SAC W/IMG GDN N/A 08/21/2018    EPIDURAL STEROID INJECTION L1-2 WITH LOW VOL performed by Arianna Olea MD at [address] NOSE/CLEFT LIP/TIP Bilateral 05/07/2018    BILATERAL L1-2 LUMBAR FORAMEN #1 performed by Arianna Olea MD at [address] NOSE/CLEFT LIP/TIP Bilateral 06/04/2018    BILATERAL TRANSFORAMINAL EPIDURAL STEROID INJECTION UNDER FLUOROSCOPIC GUIDANCE @ FORAMINAL LEVEL L1-2 #2 performed by Arianna Olea MD at [address] [facility] [facility] [address] Right 09/30/2019    RIGHT SACRAL RADIOFREQUENCY ABLATION performed by Arianna Olea MD at [address]  [address], 2005    Big Left Toe    TOE SURGERY Left 2018    2nd toe     TONSILLECTOMY      WISDOM TOOTH EXTRACTION       Family History   Problem Relation Age of Onset    Dementia Mother     Breast Cancer Mother     Cancer Mother         breast cancer [de-identified]     Stroke Mother     Heart Attack Father     Other Father 80        Aortic aneurysm    Cancer Brother     Diabetes Brother      Social History     Socioeconomic History    Marital status:      Spouse name: Not on file    Number of children: Not on file    Years of education: Not on file    Highest education level: Not on file   Occupational History    Not on file   Tobacco Use    Smoking status: Every Day     Packs/day: 0.50     Years: 30.00     Pack years: 15.00     Types: Cigarettes    Smokeless tobacco: Never   Vaping Use    Vaping Use: Never used   Substance and Sexual Activity    Alcohol use: Not Currently     Alcohol/week: 0.0 standard drinks     Comment: rarely    Drug use: No    Sexual activity: Not on file   Other Topics Concern    Not on file   Social History Narrative    Not on file     Social Determinants of Health     Financial Resource Strain: Not on file   Food Insecurity: Not on file   Transportation Needs: Not on file   Physical Activity: Inactive    Days of Exercise per Week: 0 days    Minutes of Exercise per Session: 0 min Stress: Not on file   Social Connections: Not on file   Intimate Partner Violence: Not on file   Housing Stability: Not on file       Vitals:    11/22/22 1425   Temp: 98.2 °F (36.8 °C)   TempSrc: Temporal   Weight: 200 lb (90.7 kg)       Focused Lower Extremity Physical Exam:  Vitals:    11/22/22 1425   Temp: 98.2 °F (36.8 °C)        Foot Exam    General  General Appearance: appears stated age and healthy   Orientation: alert and oriented to person, place, and time       Left Foot/Ankle      Inspection and Palpation  Ecchymosis: none  Tenderness: none   Swelling: none   Skin Exam: skin intact; Neurovascular  Dorsalis pedis: 3+  Posterior tibial: 3+  Saphenous nerve sensation: normal  Tibial nerve sensation: normal  Superficial peroneal nerve sensation: normal  Deep peroneal nerve sensation: normal  Sural nerve sensation: normal         Ortho Exam    Vascular: pulses  dp  pt    Capillary Refill Time:   Hair growth  Skin:    Edema:    Neurologic:      Musculoskeletal/ Orthopedic examination: Pain with palpation along the posterior tib tendon pain with palpation with inversion eversion negative pain with dorsiflexion plantarflexion  NAIL   Web space  Derm:          Assessment and Plan: Ankle Stirrup Brace Dispensing  Signed informed consent was obtained from the patient. Brace was fitted and applied. Patient was counseled. Brace is medically necessary to promote and expedite healing ad reduce pain and swelling. D/c  narayan cruz    Shad was seen today for post-op check and diabetes. Diagnoses and all orders for this visit:    Posterior tibial tendinitis of left lower extremity    Postoperative examination    Other orders  -     ketoconazole (NIZORAL) 2 % cream; Apply topically daily. Return in about 1 month (around 12/22/2022). Seen By:  Kassandra Mccord DPM      Document was created using voice recognition software. Note was reviewed, however may contain grammatical errors.

## 2022-11-25 ENCOUNTER — TELEPHONE (OUTPATIENT)
Dept: FAMILY MEDICINE CLINIC | Age: 67
End: 2022-11-25

## 2022-11-25 NOTE — TELEPHONE ENCOUNTER
Pt recently went down to metformin ONCE daily due to GI side effects that BID was causing. She has added Tradjenta in place of the second metformin. Abdiel Cody did confirm that tradjenta seems to be covered for now, and she was able to get it for about $10 a month.

## 2022-11-27 ENCOUNTER — HOSPITAL ENCOUNTER (EMERGENCY)
Age: 67
Discharge: HOME | End: 2022-11-27
Attending: EMERGENCY MEDICINE
Payer: MEDICARE

## 2022-11-27 VITALS
SYSTOLIC BLOOD PRESSURE: 150 MMHG | BODY MASS INDEX: 33.28 KG/M2 | TEMPERATURE: 97.5 F | OXYGEN SATURATION: 100 % | DIASTOLIC BLOOD PRESSURE: 95 MMHG | RESPIRATION RATE: 16 BRPM | HEART RATE: 95 BPM | WEIGHT: 200 LBS

## 2022-11-27 DIAGNOSIS — R21 RASH AND OTHER NONSPECIFIC SKIN ERUPTION: Primary | ICD-10-CM

## 2022-11-27 PROCEDURE — 99283 EMERGENCY DEPT VISIT LOW MDM: CPT

## 2022-11-27 RX ORDER — CLOTRIMAZOLE AND BETAMETHASONE DIPROPIONATE 10; .64 MG/G; MG/G
CREAM TOPICAL 2 TIMES DAILY
Qty: 45 G | Refills: 0 | Status: SHIPPED | OUTPATIENT
Start: 2022-11-27

## 2022-11-27 ASSESSMENT — PAIN DESCRIPTION - ONSET: ONSET: ON-GOING

## 2022-11-27 ASSESSMENT — PAIN DESCRIPTION - PAIN TYPE: TYPE: ACUTE PAIN

## 2022-11-27 ASSESSMENT — PAIN DESCRIPTION - DESCRIPTORS: DESCRIPTORS: BURNING

## 2022-11-27 ASSESSMENT — PAIN DESCRIPTION - LOCATION: LOCATION: BREAST

## 2022-11-27 ASSESSMENT — PAIN - FUNCTIONAL ASSESSMENT: PAIN_FUNCTIONAL_ASSESSMENT: 0-10

## 2022-11-27 ASSESSMENT — PAIN SCALES - GENERAL: PAINLEVEL_OUTOF10: 9

## 2022-11-27 ASSESSMENT — PAIN DESCRIPTION - FREQUENCY: FREQUENCY: CONTINUOUS

## 2022-11-27 ASSESSMENT — PAIN DESCRIPTION - ORIENTATION: ORIENTATION: RIGHT;LEFT

## 2022-11-27 NOTE — ED PROVIDER NOTES
HPI:  22,   Time: 3:51 PM KATEY Webb is a 74 y.o. female presenting to the ED for rash under both breast, beginning 2 weeks ago. The complaint has been persistent, mild in severity, and worsened by nothing. Rash under both breast, painful and itchy. Seen by podiatrist for other issue. No fever/chills/sweats/n/v/d    Review of Systems:   Pertinent positives and negatives are stated within HPI, all other systems reviewed and are negative.          --------------------------------------------- PAST HISTORY ---------------------------------------------  Past Medical History:  has a past medical history of Cancer ([facility] [address] Chronic back pain, Costochondritis, Depression, Diabetes mellitus ([facility] [address] Falls, Fibromyalgia, Hallux rigidus of left foot, HTN (hypertension), Hyperlipidemia, CLYDE on CPAP, Osteoarthritis, Rheumatoid arthritis(714.0), and TIA (transient ischemic attack). Past Surgical History:  has a past surgical history that includes  section (); Toe Surgery (, ); Ankle surgery (); Cholecystectomy (); Tonsillectomy; [place] tooth extraction; other surgical history (Left, 2013); hernia repair (); Colonoscopy (2015); other surgical history (Left, 2015); Endoscopy, colon, diagnostic; back surgery (2017); Nerve Block (Bilateral, 2018); pr becca nose/cleft lip/tip (Bilateral, 2018); pr becca nose/cleft lip/tip (Bilateral, 2018); pr njx dx/ther sbst intrlmnr lmbr/sac w/img gdn (N/A, 2018); Nerve Block (Bilateral, 2018); pr njx aa&/strd tfrml epi lumbar/sacral 1 level (Bilateral, 2018); arthrodesis (Left, 2018); HARDWARE REMOVAL FOOT / ANKLE (Left, 2018); Anesthesia Nerve Block (Left, 2019); epidural steroid injection (N/A, 2019); Nerve Block (Bilateral, 2019); Anesthesia Nerve Block (Right, 2019);  Nerve Block (Right, 2019); RADIOFREQUENCY ABLATION NERVES (Right, 09/30/2019); Lumbar spine surgery (N/A, 03/04/2020); Anesthesia Nerve Block (Right, 06/16/2020); back surgery (03/04/2020); Toe Surgery (Left, 2018); Colonoscopy (N/A, 08/19/2020); Nerve Block (Right, 09/01/2020); Breast surgery (2010); Foot surgery; Mouth surgery (N/A, 10/21/2020); back surgery (Right, 03/09/2021); Elbow joint fusion (03/2021); Pain management procedure (Left, 7/27/2021); Pain management procedure (Left, 3/29/2022); Pain management procedure (N/A, 6/9/2022); Ankle surgery (Left, 7/8/2022); and Pain management procedure (N/A, 10/27/2022). Social History:  reports that she has been smoking cigarettes. She has a 15.00 pack-year smoking history. She has never used smokeless tobacco. She reports that she does not currently use alcohol. She reports that she does not use drugs. Family History: family history includes Breast Cancer in her mother; Cancer in her brother and mother; Dementia in her mother; Diabetes in her brother; Heart Attack in her father; Other (age of onset: 80) in her father; Stroke in her mother. The patients home medications have been reviewed. Allergies: Pcn [penicillins]        ---------------------------------------------------PHYSICAL EXAM--------------------------------------    Constitutional/General: Alert and oriented x3, well appearing, non toxic in NAD  Head: Normocephalic and atraumatic  Eyes: PERRL, EOMI, conjunctive normal, sclera non icteric  Mouth: Oropharynx clear, handling secretions,   Neck: Supple, full ROM,   Respiratory: . Not in respiratory distress  Cardiovascular:  R. 2+ distal pulses  Chest: erythematous fungal rash under both breasts  GI:  Abdomen Soft, Non tender, Non distended. .   Musculoskeletal: Moves all extremities x 4. Warm and well perfused,   Integument: skin warm and dry. No rashes.    Lymphatic: no lymphadenopathy noted  Neurologic: GCS 15, no focal deficits,   Psychiatric: Normal Affect    -------------------------------------------------- RESULTS -------------------------------------------------  I have personally reviewed all laboratory and imaging results for this patient. Results are listed below. LABS:  No results found for this visit on 11/27/22. RADIOLOGY:  Interpreted by Radiologist.  No orders to display       EKG: This EKG is signed and interpreted by the EP. Time:   Rate:   Rhythm:   Interpretation:   Comparison:       ------------------------- NURSING NOTES AND VITALS REVIEWED ---------------------------   The nursing notes within the ED encounter and vital signs as below have been reviewed by myself. BP (!) 150/95   Pulse 95   Temp 97.5 °F (36.4 °C) (Oral)   Resp 16   Wt 200 lb (90.7 kg)   LMP  (LMP Unknown)   SpO2 100%   BMI 33.28 kg/m²   Oxygen Saturation Interpretation: Normal    The patients available past medical records and past encounters were reviewed. ------------------------------ ED COURSE/MEDICAL DECISION MAKING----------------------  Medications - No data to display      ED COURSE:       Medical Decision Making:    Fungal with some irritant, dc on comb antifungal      This patient's ED course included: a personal history and physicial examination    This patient has remained hemodynamically stable during their ED course. Counseling: The emergency provider has spoken with the patient and discussed todays results, in addition to providing specific details for the plan of care and counseling regarding the diagnosis and prognosis. Questions are answered at this time and they are agreeable with the plan.       --------------------------------- IMPRESSION AND DISPOSITION ---------------------------------    IMPRESSION  1. Rash and other nonspecific skin eruption        DISPOSITION  Disposition: Discharge to home  Patient condition is stable    NOTE: This report was transcribed using voice recognition software.  Every effort was made to ensure accuracy; however, inadvertent computerized transcription errors may be present        Anali Chilel MD  11/27/22 [id number]

## 2022-12-06 NOTE — PROGRESS NOTES
[address]  [address]  [facility]    Follow up Note      Grant Dear     Date of Visit:  2022    CC:  Patient presents for follow up   Chief Complaint   Patient presents with    Lower Back Pain    Follow-up               HPI:    Pain is worse to neck. Change in quality of symptoms:no. Patient satisfaction with analgesia:fair. Medication side effects: None. Recent diagnostic testing:none. Recent interventional procedures: None. She has been on anticoagulation medications to include NSAIDS. The patient  has not been on herbal supplements. The patient is diabetic. Imagin2022 Thoracic Spine X-ray    FINDINGS:   Thoracic vertebral bodies are normal in height and alignment. Multilevel   degenerative changes. No evidence of fracture. Pedicles are symmetric and   intact. Visualized lungs are clear. Impression   No acute abnormality of the thoracic spine       Multilevel degenerative changes. 2021 CT lumbar myelogram    FINDINGS:   BONES/ALIGNMENT: There is minimal levocurvature of the lumbar spine. There   is posterior fixation from L2-L4 without evidence for hardware complication. The vertebral body heights are maintained. There is minimal retrolisthesis   of L2 on L3. SOFT TISSUES: The posterior paraspinal soft tissues are unremarkable. The   visualized abdominal structures are unremarkable. The conus is normal in   caliber and terminates at L1. The cauda equina is unremarkable. L1-L2: There is no significant disc protrusion, central spinal canal stenosis   or neural foraminal narrowing. L2-L3: There is artificial disc material with posterior laminectomy. There   is no canal stenosis or left foraminal narrowing. There is moderate right   foraminal narrowing. L3-L4: There is artificial disc material and posterior laminectomy. There is   no canal stenosis.   There is mild bilateral foraminal narrowing. L4-L5: There is artificial disc material with posterior laminectomy. There   is no canal stenosis. There is mild left and moderate right foraminal   narrowing. L5-S1: There is a circumferential disc bulge with facet hypertrophy. There   is no canal stenosis. There is moderate right and severe left foraminal   narrowing. Impression   Posterior fixation and laminectomy from L2-L4 without evidence for hardware   complication. Multilevel degenerative change with bilateral foraminal narrowing as   described above. MRI lumbar spine 2018  1. No significant interval change is observed since the previous study   of 2017.       2. Stable postoperative changes/posterior spinal fusion at the   L3-L4-L5 level. 3. Severe spine canal stenosis in the level of L2-L3           4. Encroachment of the neural foramina bilaterally in levels of L2-L3   and L5-S1      Thoracic spine MRI 2018  1. Some degenerative changes in the thoracic spine, mainly in the   facet joints in the mid-upper thoracic spine segments       2. Discrete loss of height of T6, more likely an old finding. Previous treatments: Physical Therapy, Surgery L3-5 fusion/laminectomy and medications. .       Potential Aberrant Drug-Related Behavior:  No     Urine Drug Screenin18:  Consistent for norhydrocodone metabolite  2018:  Consistent for hydrocodone and norhydrocodone  05/10/2019:  Consistent for hydrocodone and norhydrocodone  2019:  Consistent for hydrocodone and norhydrocodone  01/10/2020:  Consistent for hydrocodone and norhydrocodone  2020:  Consistent for oxycodone and metabolites s/p surgery. Inconsistent for hydrocodone. States that she was instructed to take her old norco until she made the appointment to resume her chronic pain medications.     10/2020:  Consistent  2021:  Consistent  2022:  Consistent     OARRS report:  2018 consistent to 05/2021 consistent (norco scripts from [facility] post op 3/10/2021 and 3/24/2021.    Russell script through [facility] - last one 06/01/2021 07/2021 - consistent to 12/2022 consistent     Opioid agreement:  10/18/2021  Updated 10/04/2022      Past Medical History:   Diagnosis Date    Cancer ([facility] [address] 12/2019    LESION REMOVED UNDER TONGUE  DENTAL CLINIC    Chronic back pain     Costochondritis     h/o    Depression     Diabetes mellitus ([facility] [address]     Falls     Last fall Feb 2021    Fibromyalgia     Hallux rigidus of left foot     HTN (hypertension)     Hyperlipidemia     CLYDE on CPAP     Osteoarthritis     \"everywhere\"    Rheumatoid arthritis(714.0)     TIA (transient ischemic attack)     no deficits        Past Surgical History:   Procedure Laterality Date    ANESTHESIA NERVE BLOCK Left 02/26/2019    LEFT C3,4,5 MBB performed by Dayna Gonzalez MD at [address] [facility] [facility] Right 08/12/2019    BILATERAL TRANSFORAMINAL EPIDURAL STEROID INJECTION S1 #3 performed by Dayna Gonzalez MD at [facility] Right 06/16/2020    RIGHT SACROILIAC JOINT INJECTION      CPT: 81175 performed by Jean-Claude Chaudhry DO at [address]  2005    Left    ANKLE SURGERY Left 7/8/2022    REPAIR OF ANTERIOR TALAR LIGAMENT LEFT ANKLE, REPAIR DELTOID LIGAMENT LEFT ANKLE performed by Brittni Morales DPM at [place] [address] Left 12/14/2018    ARTHRODESIS 2ND DIGIT LEFT FOOT performed by Brittni Morales DPM at [address]  08/16/2017    PLIF L3-L4, L4-L5 with rods, screws, and cages/Dr. Barrios Resides SURGERY  03/04/2020    BACK SURGERY Right 03/09/2021    RIGHT PERCUTANEOUS SACROILIAC JOINT FUSION performed by Rodolfo Baker MD at [address]  [address]    reduction, [address]  2011    COLONOSCOPY  03/27/2015    COLONOSCOPY N/A 08/19/2020    COLONOSCOPY DIAGNOSTIC performed by Patti Ta MD at [address]  03/2021    SI Joint    ENDOSCOPY, COLON, DIAGNOSTIC      EPIDURAL STEROID INJECTION N/A 06/04/2019    BILATERAL TRANSFORAMINAL EPIDURAL STEROID INJECTION S1 performed by Homero Jones DO at [address] / ANKLE Left 12/14/2018    REMOVAL OF PAINFUL HARDWARE LEFT FOOT  ++SYNTHES++ performed by Vinicius Damon DPM at [address] [facility]    inguinal     LUMBAR SPINE SURGERY N/A 03/04/2020    EXPLORATION OF PRIOR L3-L5 FUSION AND L2-L3  POSTERIOR LUMBAR INTERBODY FUSION -- NEEDS O-ARM, AUDIOLOGY, CAGES, PLATES, SCREWS, C-ARM, TERESA TABLE, CELL SAVER, PLATELET GEL -- GLOBUS performed by Azael Monique MD at [address] 10/21/2020    EXCISION OF TONGUE LESION performed by Martinez Gonzales DO at [address] Bilateral 05/07/2018    L1-2 lumbar foramen #1    NERVE BLOCK Bilateral 12/03/2018    s1 tfesi    NERVE BLOCK Bilateral 08/12/2019    NERVE BLOCK Right 09/30/2019    sacral radiofrequency    NERVE BLOCK Right 09/01/2020    RIGHT SACROILIAC JOINT INJECTION #2 performed by Homero Jones DO at [address] Left 09/25/2013    left foot tarso metatarsal joint injection    OTHER SURGICAL HISTORY Left 05/27/2015    Endoscopic Gastroc recession left, Lapidus left foot and  Excision of exostosis left foot    PAIN MANAGEMENT PROCEDURE Left 7/27/2021    LEFT L5-S1 TRANSFORAMINAL EPIDURAL STEROID INJECTION performed by Homero Jones DO at [address] Left 3/29/2022    LEFT TRANSFORAMINAL EPIDURAL STEROID INJECTION AT L5 AND S1 performed by Homero Jones DO at [address] N/A 6/9/2022    LUMBAR EPIDURAL STEROID INJECTION L5-S1 performed by Homero Jones DO at [address] N/A 10/27/2022    BILATERAL L5 LUMBAR TRANSFORAMINAL EPIDURAL STEROID INJECTION performed by Homero Jones DO at [address] AA&/STRD TFRML EPI LUMBAR/SACRAL 1 LEVEL Bilateral 12/03/2018    BILATERAL S1  EPIDURAL STEROID INJECTION performed by Paul Beatty MD at [address] DX/THER SBST INTRLMNR LMBR/SAC W/IMG GDN N/A 08/21/2018    EPIDURAL STEROID INJECTION L1-2 WITH LOW VOL performed by Paul Beatty MD at [address] NOSE/CLEFT LIP/TIP Bilateral 05/07/2018    BILATERAL L1-2 LUMBAR FORAMEN #1 performed by Paul Beatty MD at [address] NOSE/CLEFT LIP/TIP Bilateral 06/04/2018    BILATERAL TRANSFORAMINAL EPIDURAL STEROID INJECTION UNDER FLUOROSCOPIC GUIDANCE @ FORAMINAL LEVEL L1-2 #2 performed by Paul Beatty MD at [address] Right 09/30/2019    RIGHT SACRAL RADIOFREQUENCY ABLATION performed by Paul Beatty MD at [place] [address]    Big Left Toe    TOE SURGERY Left 2018    2nd toe     TONSILLECTOMY      WISDOM TOOTH EXTRACTION         Prior to Admission medications    Medication Sig Start Date End Date Taking? Authorizing Provider   HYDROcodone-acetaminophen (NORCO) 5-325 MG per tablet Take 1 tablet by mouth 2 times daily for 30 days. 12/12/22 1/11/23 Yes TREASURE Corona   clotrimazole-betamethasone (LOTRISONE) 1-0.05 % cream Apply topically 2 times daily 11/27/22  Yes Anitha Mcelroy MD   ketoconazole (NIZORAL) 2 % cream Apply topically daily. 11/22/22  Yes Kassandra Mccord DPM   hydroCHLOROthiazide (MICROZIDE) 12.5 MG capsule TAKE ONE CAPSULE BY MOUTH ONCE DAILY FOR BLOOD PRESSURE 11/14/22  Yes Tom Bae MD   lisinopril (PRINIVIL;ZESTRIL) 40 MG tablet Take 1 tablet by mouth every evening 11/14/22  Yes Tom Bae MD   metFORMIN (GLUCOPHAGE) 500 MG tablet Take 1 tablet by mouth daily (with breakfast) Start with once daily during the first week. 11/14/22  Yes Tom Bae MD   gabapentin (NEURONTIN) 400 MG capsule Take 1 capsule by mouth 2 times daily for 90 days.  11/14/22 2/12/23 Yes Tom Bae MD   cyclobenzaprine (FLEXERIL) 10 MG tablet Take 0.5 tablets by mouth 2 times daily as needed for Muscle spasms 11/14/22 12/14/22 Yes Natasha Martinez MD   linagliptin (TRADJENTA) 5 MG tablet Take 1 tablet by mouth daily 11/14/22  Yes Natasha Martinez MD   hydrOXYzine pamoate (VISTARIL) 25 MG capsule Take 1 capsule by mouth 3 times daily as needed for Anxiety 7/29/22  Yes Natasha Martinez MD   atorvastatin (LIPITOR) 40 MG tablet Take 1 tablet by mouth daily 6/20/22  Yes Natasha Martinez MD   Handicap Placard MISC DX:  Loss of Balance, Chronic low back pain, s/p back surgery.    Duration of 5 years 2/22/21  Yes Natasha Martinez MD       Allergies   Allergen Reactions    Pcn [Penicillins] Anaphylaxis       Social History     Socioeconomic History    Marital status:      Spouse name: Not on file    Number of children: Not on file    Years of education: Not on file    Highest education level: Not on file   Occupational History    Not on file   Tobacco Use    Smoking status: Every Day     Packs/day: 0.50     Years: 30.00     Pack years: 15.00     Types: Cigarettes    Smokeless tobacco: Never   Vaping Use    Vaping Use: Never used   Substance and Sexual Activity    Alcohol use: Not Currently     Alcohol/week: 0.0 standard drinks     Comment: rarely    Drug use: No    Sexual activity: Not on file   Other Topics Concern    Not on file   Social History Narrative    Not on file     Social Determinants of Health     Financial Resource Strain: Not on file   Food Insecurity: Not on file   Transportation Needs: Not on file   Physical Activity: Inactive    Days of Exercise per Week: 0 days    Minutes of Exercise per Session: 0 min   Stress: Not on file   Social Connections: Not on file   Intimate Partner Violence: Not on file   Housing Stability: Not on file       Family History   Problem Relation Age of Onset    Dementia Mother     Breast Cancer Mother     Cancer Mother         breast cancer [de-identified]     Stroke Mother     Heart Attack Father     Other Father 80        Aortic aneurysm    Cancer Brother     Diabetes Brother        REVIEW OF SYSTEMS:     Mio Garcia denies fever/chills, chest pain, shortness of breath, new bowel or bladder complaints or suicidal ideations. All other review of systems was negative. PHYSICAL EXAMINATION:      /84   Pulse 95   Temp 98.6 °F (37 °C) (Infrared)   Resp 16   Ht 5' 5\" (1.651 m)   Wt 200 lb (90.7 kg)   LMP  (LMP Unknown)   BMI 33.28 kg/m²     General:      General appearance: awake, alert, oriented, in no acute distress, well developed, well nourished and in no acute distress   pleasant and well-hydrated. in no distress and A & O x3  Build:Overweight  Function:Rises from a seated position with difficulty    HEENT:    Head:normocephalic and atraumatic  Pupils:regular, round and equal.  Sclera: icterus absent  EOM:full and intact. Lungs:    Breathing:Normal expansion. No wheezing. Abdomen:    Shape:non-distended and normal    Cervical spine:  + midline and paraspinal TTP b/l  Mild pain on flex/trent  Full strength 5/5      Lumbar spine:                Range of motion:abnormal moderately Lateral bending, flexion, extension rotation bilateral and is painful. Extremities:    Tremors:None bilaterally upper and lower  Range of motion:Generally normal shoulders  Intact:Yes  Cyanosis:none  Edema:Normal      Neurological:    Cranial nerves:normal  Sensory:diminished to light lateral aspect of right leg                   Dermatology:    Skin:no unusual rashes, no skin lesions, no palpable subcutaneous nodules and good skin turgor    Assessment/Plan:      Chronic low back pain with radiation to the Right groin, patient had low back surgery 08/2017 L3-5 fusion, recently had lumbar spine CT with severe L2-3 stenosis     Patient is s/p left ankle surgery on 7/8. Back in cam walker boot. Plan:  Patient is s/p revision fusion L2-L4 on 3/4/2020.     Patient is s/p:  Right sacroiliac joint fusion with use of SI-BONE iFuse implant on 3/9/2021 by Dr. Светлана Kamara. Patient is s/p:  Left TFESI L5 and S1 on 03/29/2022 with 75% relief. LESI L5-S1 on 06/09/2022 with good relief until recently. Repeat with about 40% relief. She would like to hold off any further injections at this point. Cervical TPIs with  diminished relief at this time. Cervical spine x-ray ordered to update for continued neck pain. Consider ROGELIO after x-ray. Continue norco 5/325 BID. Continue with Gabapentin 400 mg BID. She reports that any increases make her off balance. OARRS report reviewed 12/2022  UDS next visit  Hold flexeril for now. Doing well without it. Patient encouraged to stay active and to lose weight. Failed physical therapy  Treatment plan discussed with the patient including medications side effects       Controlled Substance Monitoring:    Acute and Chronic Pain Monitoring:   RX Monitoring 12/7/2022   Attestation -   Acute Pain Prescriptions -   Periodic Controlled Substance Monitoring Possible medication side effects, risk of tolerance/dependence & alternative treatments discussed. ;No signs of potential drug abuse or diversion identified. ;Assessed functional status. ;Obtaining appropriate analgesic effect of treatment.    Chronic Pain > 80 MEDD -              ccreferring physic

## 2022-12-07 ENCOUNTER — HOSPITAL ENCOUNTER (OUTPATIENT)
Dept: GENERAL RADIOLOGY | Age: 67
Discharge: HOME | End: 2022-12-09
Payer: MEDICARE

## 2022-12-07 ENCOUNTER — OFFICE VISIT (OUTPATIENT)
Dept: PAIN MANAGEMENT | Age: 67
End: 2022-12-07
Payer: MEDICARE

## 2022-12-07 ENCOUNTER — HOSPITAL ENCOUNTER (OUTPATIENT)
Age: 67
Discharge: HOME | End: 2022-12-09
Payer: MEDICARE

## 2022-12-07 VITALS
SYSTOLIC BLOOD PRESSURE: 124 MMHG | TEMPERATURE: 98.6 F | HEIGHT: 65 IN | WEIGHT: 200 LBS | DIASTOLIC BLOOD PRESSURE: 84 MMHG | RESPIRATION RATE: 16 BRPM | HEART RATE: 95 BPM | BODY MASS INDEX: 33.32 KG/M2

## 2022-12-07 DIAGNOSIS — G89.29 CHRONIC BILATERAL LOW BACK PAIN WITHOUT SCIATICA: ICD-10-CM

## 2022-12-07 DIAGNOSIS — G89.4 CHRONIC PAIN SYNDROME: ICD-10-CM

## 2022-12-07 DIAGNOSIS — M54.40 CHRONIC MIDLINE LOW BACK PAIN WITH SCIATICA, SCIATICA LATERALITY UNSPECIFIED: ICD-10-CM

## 2022-12-07 DIAGNOSIS — M47.816 LUMBAR FACET ARTHROPATHY: ICD-10-CM

## 2022-12-07 DIAGNOSIS — M51.36 DDD (DEGENERATIVE DISC DISEASE), LUMBAR: ICD-10-CM

## 2022-12-07 DIAGNOSIS — M47.812 CERVICAL FACET JOINT SYNDROME: ICD-10-CM

## 2022-12-07 DIAGNOSIS — G89.29 OTHER CHRONIC PAIN: ICD-10-CM

## 2022-12-07 DIAGNOSIS — G89.29 CHRONIC MIDLINE LOW BACK PAIN WITH SCIATICA, SCIATICA LATERALITY UNSPECIFIED: ICD-10-CM

## 2022-12-07 DIAGNOSIS — M54.16 LUMBAR RADICULOPATHY: ICD-10-CM

## 2022-12-07 DIAGNOSIS — M54.6 THORACIC SPINE PAIN: ICD-10-CM

## 2022-12-07 DIAGNOSIS — M48.061 SPINAL STENOSIS OF LUMBAR REGION WITHOUT NEUROGENIC CLAUDICATION: ICD-10-CM

## 2022-12-07 DIAGNOSIS — M54.50 CHRONIC BILATERAL LOW BACK PAIN WITHOUT SCIATICA: ICD-10-CM

## 2022-12-07 DIAGNOSIS — M79.10 MYALGIA: Primary | ICD-10-CM

## 2022-12-07 PROCEDURE — 1123F ACP DISCUSS/DSCN MKR DOCD: CPT | Performed by: PHYSICIAN ASSISTANT

## 2022-12-07 PROCEDURE — 72040 X-RAY EXAM NECK SPINE 2-3 VW: CPT

## 2022-12-07 PROCEDURE — 99213 OFFICE O/P EST LOW 20 MIN: CPT | Performed by: PHYSICIAN ASSISTANT

## 2022-12-07 PROCEDURE — 3074F SYST BP LT 130 MM HG: CPT | Performed by: PHYSICIAN ASSISTANT

## 2022-12-07 PROCEDURE — 3078F DIAST BP <80 MM HG: CPT | Performed by: PHYSICIAN ASSISTANT

## 2022-12-07 RX ORDER — HYDROCODONE BITARTRATE AND ACETAMINOPHEN 5; 325 MG/1; MG/1
1 TABLET ORAL 2 TIMES DAILY
Qty: 60 TABLET | Refills: 0 | Status: SHIPPED | OUTPATIENT
Start: 2022-12-12 | End: 2023-01-11

## 2022-12-07 NOTE — PROGRESS NOTES
Do you currently have any of the following:    Fever: No  Headache:  No  Cough: No  Shortness of breath: No  Exposed to anyone with these symptoms: No         Osiel Bob presents to the [address] [place] [address] on 12/7/2022. Josette Wilkins is complaining of pain lower back. The pain is constant. The pain is described as aching, throbbing, shooting, stabbing, and sharp. Pain is rated on her best day at a 7, on her worst day at a 10, and on average at a 8 on the VAS scale. She took her last dose of Norco and Neurontin this morning. Any procedures since your last visit: no    Pacemaker or defibrillator: No .    She is not on NSAIDS and is not on anticoagulation medications . Medication Contract and Consent for Opioid Use Documents Filed       Patient Documents       Type of Document Status Date Received Received By Description    Medication Contract Received  Lo Morton Opioid medication contract with Dr Marli Corea 3/24/20    Medication Contract Received 4/26/2018  3:50 PM ANYA NUNO PAIN MANAGEMENT PATIENT AGREEMENT 4/26/2018    Medication Contract Received 11/5/2020  4:23 PM EBER HONG Opioid medication contract with Dr Wes Servin Received 3/1/2021  1:26 PM Lo Morton Opioid medication contract with Dr Wes Servin Received 8/23/2021  2:03 PM Mary Holman Medication contract    Medication Contract Received 10/18/2021  3:03 PM Mary Manda medication contract 10/18/2021    Medication Contract Signed 10/4/2022 12:35 PM CRISTIANA BAÑUELOS Pain Med. Contract 10/04/22                    LMP  (LMP Unknown)      No LMP recorded (lmp unknown).  Patient is postmenopausal.

## 2022-12-09 ENCOUNTER — TELEPHONE (OUTPATIENT)
Dept: PAIN MANAGEMENT | Age: 67
End: 2022-12-09

## 2022-12-09 ENCOUNTER — PREP FOR PROCEDURE (OUTPATIENT)
Dept: PAIN MANAGEMENT | Age: 67
End: 2022-12-09

## 2022-12-09 NOTE — TELEPHONE ENCOUNTER
Returned call. Reviewed cervical spine x-ray. She would like to proceed with ROGELIO C7-T1. Discussed again. Message sent to [facility] to coordinate.

## 2022-12-09 NOTE — TELEPHONE ENCOUNTER
Solo Joyce called in to go over her x-ray and talk about getting an epidural done. She said she was told when she was in on Weds. To call in today. Please advise. Thanks.

## 2023-01-04 ENCOUNTER — OFFICE VISIT (OUTPATIENT)
Dept: OBGYN | Age: 68
End: 2023-01-04
Payer: MEDICARE

## 2023-01-04 VITALS
BODY MASS INDEX: 33.32 KG/M2 | HEART RATE: 82 BPM | DIASTOLIC BLOOD PRESSURE: 82 MMHG | SYSTOLIC BLOOD PRESSURE: 138 MMHG | WEIGHT: 200 LBS | RESPIRATION RATE: 16 BRPM | HEIGHT: 65 IN

## 2023-01-04 DIAGNOSIS — N90.7 INCLUSION CYST OF VULVA: Primary | ICD-10-CM

## 2023-01-04 DIAGNOSIS — Z12.31 ENCOUNTER FOR SCREENING MAMMOGRAM FOR BREAST CANCER: ICD-10-CM

## 2023-01-04 PROCEDURE — 99213 OFFICE O/P EST LOW 20 MIN: CPT | Performed by: OBSTETRICS & GYNECOLOGY

## 2023-01-04 PROCEDURE — 3078F DIAST BP <80 MM HG: CPT | Performed by: OBSTETRICS & GYNECOLOGY

## 2023-01-04 PROCEDURE — 1123F ACP DISCUSS/DSCN MKR DOCD: CPT | Performed by: OBSTETRICS & GYNECOLOGY

## 2023-01-04 PROCEDURE — 3074F SYST BP LT 130 MM HG: CPT | Performed by: OBSTETRICS & GYNECOLOGY

## 2023-01-04 RX ORDER — TRIAMCINOLONE ACETONIDE 0.25 MG/G
OINTMENT TOPICAL
Qty: 1 EACH | Refills: 1 | Status: SHIPPED | OUTPATIENT
Start: 2023-01-04 | End: 2023-01-11

## 2023-01-04 NOTE — PROGRESS NOTES
Here today for 2 sores of her vulvar area. She only wants these checked today. Both are on the left sore, one is a \"lump\" and the other is a sore. Has actua;;y had tese for several months. Needs mammogram order. As well. Pelvic:on the left labis, lateral to the majora she has a very small inclusion cyst.  Told her to leave that alone and do no try to squeeze this. In addition on the inside of the left labia majora superiorly she has what appears to de a dry patch that is bothering her. Will get her some triamcinolone oinment to use on this area. See back PRN. Ordered a mammogram as well.

## 2023-01-05 ENCOUNTER — TELEPHONE (OUTPATIENT)
Dept: PAIN MANAGEMENT | Age: 68
End: 2023-01-05

## 2023-01-05 PROBLEM — M54.12 CERVICAL RADICULOPATHY: Status: ACTIVE | Noted: 2023-01-05

## 2023-01-05 NOTE — TELEPHONE ENCOUNTER
Call to [facility], left message that procedure was approved for 1/12/2023 and that Nel should call her a few days before for the pre op call and between 2:00 PM and 4:00 PM  the business day before with the arrival time. Instructed Eleanor to hold ibuprofen for 24 hours, naprosyn for 4 days and any aspirin containing products or fish oil for 7 days. Instructed to call office back if any questions.      Electronically signed by Kaylee Henderson RN on 1/5/2023 at 10:59 AM

## 2023-01-06 ENCOUNTER — OFFICE VISIT (OUTPATIENT)
Dept: PAIN MANAGEMENT | Age: 68
End: 2023-01-06
Payer: MEDICARE

## 2023-01-06 VITALS
OXYGEN SATURATION: 95 % | HEIGHT: 65 IN | DIASTOLIC BLOOD PRESSURE: 68 MMHG | BODY MASS INDEX: 35.16 KG/M2 | SYSTOLIC BLOOD PRESSURE: 105 MMHG | HEART RATE: 105 BPM | WEIGHT: 211 LBS | TEMPERATURE: 97.1 F | RESPIRATION RATE: 16 BRPM

## 2023-01-06 DIAGNOSIS — M48.061 SPINAL STENOSIS OF LUMBAR REGION WITHOUT NEUROGENIC CLAUDICATION: ICD-10-CM

## 2023-01-06 DIAGNOSIS — M47.812 CERVICAL FACET JOINT SYNDROME: ICD-10-CM

## 2023-01-06 DIAGNOSIS — M54.16 LUMBAR RADICULOPATHY: ICD-10-CM

## 2023-01-06 DIAGNOSIS — G89.29 CHRONIC MIDLINE LOW BACK PAIN WITH SCIATICA, SCIATICA LATERALITY UNSPECIFIED: ICD-10-CM

## 2023-01-06 DIAGNOSIS — M54.6 THORACIC SPINE PAIN: ICD-10-CM

## 2023-01-06 DIAGNOSIS — G89.4 CHRONIC PAIN SYNDROME: ICD-10-CM

## 2023-01-06 DIAGNOSIS — G89.29 CHRONIC BILATERAL LOW BACK PAIN WITHOUT SCIATICA: ICD-10-CM

## 2023-01-06 DIAGNOSIS — M47.816 LUMBAR FACET ARTHROPATHY: ICD-10-CM

## 2023-01-06 DIAGNOSIS — G89.29 OTHER CHRONIC PAIN: ICD-10-CM

## 2023-01-06 DIAGNOSIS — M54.50 CHRONIC BILATERAL LOW BACK PAIN WITHOUT SCIATICA: ICD-10-CM

## 2023-01-06 DIAGNOSIS — G89.4 CHRONIC PAIN SYNDROME: Primary | ICD-10-CM

## 2023-01-06 DIAGNOSIS — M51.36 DDD (DEGENERATIVE DISC DISEASE), LUMBAR: ICD-10-CM

## 2023-01-06 DIAGNOSIS — M79.10 MYALGIA: ICD-10-CM

## 2023-01-06 DIAGNOSIS — M54.40 CHRONIC MIDLINE LOW BACK PAIN WITH SCIATICA, SCIATICA LATERALITY UNSPECIFIED: ICD-10-CM

## 2023-01-06 PROCEDURE — 99213 OFFICE O/P EST LOW 20 MIN: CPT | Performed by: PHYSICIAN ASSISTANT

## 2023-01-06 RX ORDER — HYDROCODONE BITARTRATE AND ACETAMINOPHEN 5; 325 MG/1; MG/1
1 TABLET ORAL 2 TIMES DAILY
Qty: 60 TABLET | Refills: 0 | Status: SHIPPED | OUTPATIENT
Start: 2023-01-11 | End: 2023-02-10

## 2023-01-06 NOTE — PROGRESS NOTES
Do you currently have any of the following:    Fever: No  Headache:  No  Cough: No  Shortness of breath: No  Exposed to anyone with these symptoms: No         Jhon Lee presents to the [address] [place] [address] on 1/6/2023. Darrel Andre is complaining of pain in the neck. The pain is constant. The pain is described as aching, dull, sharp, and miserable. Pain is rated on her best day at a 7, on her worst day at a 10, and on average at a 8 on the VAS scale. She took her last dose of Norco today. Any procedures since your last visit: No    Pacemaker or defibrillator: No     She is not on NSAIDS and is  on anticoagulation medications. Medication Contract and Consent for Opioid Use Documents Filed       Patient Documents       Type of Document Status Date Received Received By Description    Medication Contract [facility]  [facility] Corine Koyanagi 3/24/20    Medication Contract Received 4/26/2018  3:50 PM ANYA NUNO PAIN MANAGEMENT PATIENT AGREEMENT 4/26/2018    Medication Contract Received 11/5/2020  4:23 PM EBER HONG Opioid medication contract with Dr Teofilo Shone Received 3/1/2021  1:26 PM Angelique Cunningham Opioid medication contract with Dr Teofilo Shone Received 8/23/2021  2:03 PM Berdine Barge Medication contract    Medication Contract Received 10/18/2021  3:03 PM Berdine Barge medication contract 10/18/2021    Medication Contract Signed 10/4/2022 12:35 PM CRISTIANA BAÑUELOS Pain Med. Contract 10/04/22                    /68   Pulse (!) 105   Temp 97.1 °F (36.2 °C) (Infrared)   Resp 16   Ht 5' 5\" (1.651 m)   Wt 211 lb (95.7 kg)   LMP  (LMP Unknown)   SpO2 95%   BMI 35.11 kg/m²      No LMP recorded (lmp unknown).  Patient is postmenopausal.

## 2023-01-06 NOTE — PROGRESS NOTES
[address]  [address]  [facility]    Follow up Note      Errol Santos     Date of Visit:  2023    CC:  Patient presents for follow up   Chief Complaint   Patient presents with    Follow-up     Neck pain                 HPI:    Pain is unchanged. No new changes. Change in quality of symptoms:no. Patient satisfaction with analgesia:fair. Medication side effects: None. Recent diagnostic testing:none. Recent interventional procedures: None. She has been on anticoagulation medications to include NSAIDS. The patient  has not been on herbal supplements. The patient is diabetic. Imagin2022 Cervical spine x-ray    FINDINGS:   Multilevel severe degenerative facet arthropathy. Mild degenerative disc   disease primarily at C5-6. Normal soft tissues. No fracture or dislocation. Impression   Degenerative spondylosis with severe multilevel degenerative facet   arthropathy. 2022 Thoracic Spine X-ray    FINDINGS:   Thoracic vertebral bodies are normal in height and alignment. Multilevel   degenerative changes. No evidence of fracture. Pedicles are symmetric and   intact. Visualized lungs are clear. Impression   No acute abnormality of the thoracic spine       Multilevel degenerative changes. 2021 CT lumbar myelogram    FINDINGS:   BONES/ALIGNMENT: There is minimal levocurvature of the lumbar spine. There   is posterior fixation from L2-L4 without evidence for hardware complication. The vertebral body heights are maintained. There is minimal retrolisthesis   of L2 on L3. SOFT TISSUES: The posterior paraspinal soft tissues are unremarkable. The   visualized abdominal structures are unremarkable. The conus is normal in   caliber and terminates at L1. The cauda equina is unremarkable.        L1-L2: There is no significant disc protrusion, central spinal canal stenosis   or neural foraminal narrowing. L2-L3: There is artificial disc material with posterior laminectomy. There   is no canal stenosis or left foraminal narrowing. There is moderate right   foraminal narrowing. L3-L4: There is artificial disc material and posterior laminectomy. There is   no canal stenosis. There is mild bilateral foraminal narrowing. L4-L5: There is artificial disc material with posterior laminectomy. There   is no canal stenosis. There is mild left and moderate right foraminal   narrowing. L5-S1: There is a circumferential disc bulge with facet hypertrophy. There   is no canal stenosis. There is moderate right and severe left foraminal   narrowing. Impression   Posterior fixation and laminectomy from L2-L4 without evidence for hardware   complication. Multilevel degenerative change with bilateral foraminal narrowing as   described above. MRI lumbar spine 2018  1. No significant interval change is observed since the previous study   of 2017.       2. Stable postoperative changes/posterior spinal fusion at the   L3-L4-L5 level. 3. Severe spine canal stenosis in the level of L2-L3           4. Encroachment of the neural foramina bilaterally in levels of L2-L3   and L5-S1      Thoracic spine MRI 2018  1. Some degenerative changes in the thoracic spine, mainly in the   facet joints in the mid-upper thoracic spine segments       2. Discrete loss of height of T6, more likely an old finding. Previous treatments: Physical Therapy, Surgery L3-5 fusion/laminectomy and medications. .       Potential Aberrant Drug-Related Behavior:  No     Urine Drug Screenin18:  Consistent for norhydrocodone metabolite  2018:  Consistent for hydrocodone and norhydrocodone  05/10/2019:  Consistent for hydrocodone and norhydrocodone  2019:  Consistent for hydrocodone and norhydrocodone  01/10/2020:  Consistent for hydrocodone and norhydrocodone  06/2020:  Consistent for oxycodone and metabolites s/p surgery. Inconsistent for hydrocodone. States that she was instructed to take her old norco until she made the appointment to resume her chronic pain medications. 10/2020:  Consistent  07/2021:  Consistent  01/2022:  Consistent     OARRS report:  05/2018 consistent to 05/2021 consistent (norco scripts from [facility] post op 3/10/2021 and 3/24/2021.    [facility] script through [facility] - last one 06/01/2021 07/2021 - consistent to 01/2023consistent     Opioid agreement:  10/18/2021  Updated 10/04/2022      Past Medical History:   Diagnosis Date    Cancer ([facility] [address] 12/2019    LESION REMOVED UNDER [facility]  DENTAL [facility]    Chronic back pain     Costochondritis     h/o    Depression     Diabetes mellitus ([facility] [address]     Falls     Last fall Feb 2021    Fibromyalgia     Hallux rigidus of left foot     HTN (hypertension)     Hyperlipidemia     CLYDE on CPAP     Osteoarthritis     \"everywhere\"    Rheumatoid arthritis(714.0)     TIA (transient ischemic attack)     no deficits        Past Surgical History:   Procedure Laterality Date    ANESTHESIA NERVE BLOCK Left 02/26/2019    LEFT C3,4,5 MBB performed by Nimco Felder MD at [address] [place] [address],[address] Right 08/12/2019    BILATERAL TRANSFORAMINAL EPIDURAL STEROID INJECTION S1 #3 performed by Nimco Felder MD at [facility] Right 06/16/2020    RIGHT SACROILIAC JOINT INJECTION      CPT: 20193 performed by Dianne Raygoza DO at [address]  2005    Left    ANKLE SURGERY Left 7/8/2022    REPAIR OF ANTERIOR TALAR LIGAMENT LEFT ANKLE, REPAIR DELTOID LIGAMENT LEFT ANKLE performed by Eduin Meyers DPM at [place] [address] Left 12/14/2018    ARTHRODESIS 2ND DIGIT LEFT FOOT performed by Eduin Meyers DPM at [facility]  08/16/2017    PLIF L3-L4, L4-L5 with rods, screws, and cages/Dr. Tressa Soler SURGERY  03/04/2020    BACK SURGERY Right 03/09/2021    RIGHT PERCUTANEOUS SACROILIAC JOINT FUSION performed by Daniel Mccallum MD at [address]  [address]    reduction, [address]  2011    COLONOSCOPY  03/27/2015    COLONOSCOPY N/A 08/19/2020    COLONOSCOPY DIAGNOSTIC performed by Jolie Steel MD at [address]  03/2021    SI Joint    ENDOSCOPY, COLON, DIAGNOSTIC      EPIDURAL STEROID INJECTION N/A 06/04/2019    BILATERAL TRANSFORAMINAL EPIDURAL STEROID INJECTION S1 performed by Dianne Raygoza DO at [address] / ANKLE Left 12/14/2018    REMOVAL OF PAINFUL HARDWARE LEFT FOOT  ++SYNTHES++ performed by Eduin Meyers DPM at [address] [employer] [place]    inguinal     LUMBAR SPINE SURGERY N/A 03/04/2020    EXPLORATION OF PRIOR L3-L5 FUSION AND L2-L3  POSTERIOR LUMBAR INTERBODY FUSION -- NEEDS O-ARM, AUDIOLOGY, CAGES, PLATES, SCREWS, C-ARM, TERESA TABLE, CELL SAVER, PLATELET GEL -- GLOBUS performed by Daniel Mccallum MD at [address] 10/21/2020    EXCISION OF TONGUE LESION performed by Itz Rajan DO at [address] [facility] Bilateral 05/07/2018    L1-2 lumbar foramen #1    NERVE BLOCK Bilateral 12/03/2018    s1 tfesi    NERVE BLOCK Bilateral 08/12/2019    NERVE BLOCK Right 09/30/2019    sacral radiofrequency    NERVE BLOCK Right 09/01/2020    RIGHT SACROILIAC JOINT INJECTION #2 performed by Dianne Raygoza DO at [address] [facility] Left 09/25/2013    left foot tarso metatarsal joint injection    OTHER SURGICAL HISTORY Left 05/27/2015    Endoscopic Gastroc recession left, Lapidus left foot and  Excision of exostosis left foot    PAIN MANAGEMENT PROCEDURE Left 7/27/2021    LEFT L5-S1 TRANSFORAMINAL EPIDURAL STEROID INJECTION performed by Dianne Raygoza DO at [address] Left 3/29/2022    LEFT TRANSFORAMINAL EPIDURAL STEROID INJECTION AT L5 AND S1 performed by Dianne Raygoza DO at [address] [facility] PAIN MANAGEMENT PROCEDURE N/A 6/9/2022    LUMBAR EPIDURAL STEROID INJECTION L5-S1 performed by Chris Patel DO at [address] N/A 10/27/2022    BILATERAL L5 LUMBAR TRANSFORAMINAL EPIDURAL STEROID INJECTION performed by Chris Patel DO at [address] AA&/STRD TFRML EPI LUMBAR/SACRAL 1 LEVEL Bilateral 12/03/2018    BILATERAL S1  EPIDURAL STEROID INJECTION performed by Jeanette Andre MD at [address] DX/THER SBST INTRLMNR LMBR/SAC W/IMG GDN N/A 08/21/2018    EPIDURAL STEROID INJECTION L1-2 WITH LOW VOL performed by Jeanette Andre MD at [address] [address] W/COLUM [address] [address] TIP ONLY Bilateral 05/07/2018    BILATERAL L1-2 LUMBAR FORAMEN #1 performed by Jeanette Andre MD at [address] [facility] [address] [facility] W/[address] [facility] Bilateral 06/04/2018    BILATERAL TRANSFORAMINAL EPIDURAL STEROID INJECTION UNDER FLUOROSCOPIC GUIDANCE @ FORAMINAL LEVEL L1-2 #2 performed by Jeanette Andre MD at [address] Right 09/30/2019    RIGHT SACRAL RADIOFREQUENCY ABLATION performed by Jeanette Andre MD at [place] [address]    Big Left Toe    TOE SURGERY Left 2018    2nd toe     TONSILLECTOMY      WISDOM TOOTH EXTRACTION         Prior to Admission medications    Medication Sig Start Date End Date Taking? Authorizing Provider   triamcinolone (KENALOG) 0.025 % ointment Apply topically 2 times daily. 1/4/23 1/11/23 Yes Harry Patiño MD   HYDROcodone-acetaminophen (NORCO) 5-325 MG per tablet Take 1 tablet by mouth 2 times daily for 30 days. 12/12/22 1/11/23 Yes TREASURE Rao   clotrimazole-betamethasone (LOTRISONE) 1-0.05 % cream Apply topically 2 times daily 11/27/22  Yes Clifton Sicard, MD   ketoconazole (NIZORAL) 2 % cream Apply topically daily.  11/22/22  Yes Vicky Stapleton DPM   hydroCHLOROthiazide (MICROZIDE) 12.5 MG capsule TAKE ONE CAPSULE BY MOUTH ONCE DAILY FOR BLOOD PRESSURE 11/14/22  Yes [facility] Teagan Díaz MD   lisinopril (PRINIVIL;ZESTRIL) 40 MG tablet Take 1 tablet by mouth every evening 11/14/22  Yes Zay Hendrix MD   metFORMIN (GLUCOPHAGE) 500 MG tablet Take 1 tablet by mouth daily (with breakfast) Start with once daily during the first week. 11/14/22  Yes Zay Hendrix MD   gabapentin (NEURONTIN) 400 MG capsule Take 1 capsule by mouth 2 times daily for 90 days. 11/14/22 2/12/23 Yes Zay Hendrix MD   linagliptin (TRADJENTA) 5 MG tablet Take 1 tablet by mouth daily 11/14/22  Yes Zay Hendrix MD   hydrOXYzine pamoate (VISTARIL) 25 MG capsule Take 1 capsule by mouth 3 times daily as needed for Anxiety 7/29/22  Yes aZy Hendrix MD   atorvastatin (LIPITOR) 40 MG tablet Take 1 tablet by mouth daily 6/20/22  Yes Zay Hendrix MD   Handicap Placard MISC DX:  Loss of Balance, Chronic low back pain, s/p back surgery.    Duration of 5 years 2/22/21  Yes Zay Hendrix MD       Allergies   Allergen Reactions    Pcn [Penicillins] Anaphylaxis       Social History     Socioeconomic History    Marital status:      Spouse name: Not on file    Number of children: Not on file    Years of education: Not on file    Highest education level: Not on file   Occupational History    Not on file   Tobacco Use    Smoking status: Every Day     Packs/day: 0.50     Years: 30.00     Pack years: 15.00     Types: Cigarettes    Smokeless tobacco: Never   Vaping Use    Vaping Use: Never used   Substance and Sexual Activity    Alcohol use: Not Currently     Alcohol/week: 0.0 standard drinks     Comment: rarely    Drug use: No    Sexual activity: Not on file   Other Topics Concern    Not on file   Social History Narrative    Not on file     Social Determinants of Health     Financial Resource Strain: Not on file   Food Insecurity: Not on file   Transportation Needs: Not on file   Physical Activity: Inactive    Days of Exercise per Week: 0 days    Minutes of Exercise per Session: 0 min   Stress: Not on file   Social Connections: Not on file   Intimate Partner Violence: Not on file   Housing Stability: Not on file       Family History   Problem Relation Age of Onset    Dementia Mother     Breast Cancer Mother     Cancer Mother         breast cancer [de-identified]     Stroke Mother     Heart Attack Father     Other Father 80        Aortic aneurysm    Cancer Brother     Diabetes Brother        REVIEW OF SYSTEMS:     Diane Aguila denies fever/chills, chest pain, shortness of breath, new bowel or bladder complaints or suicidal ideations. All other review of systems was negative. PHYSICAL EXAMINATION:      /68   Pulse (!) 105   Temp 97.1 °F (36.2 °C) (Infrared)   Resp 16   Ht 5' 5\" (1.651 m)   Wt 211 lb (95.7 kg)   LMP  (LMP Unknown)   SpO2 95%   BMI 35.11 kg/m²     General:      General appearance: awake, alert, oriented, in no acute distress, well developed, well nourished and in no acute distress   pleasant and well-hydrated. in no distress and A & O x3  Build:Overweight  Function:Rises from a seated position with difficulty    HEENT:    Head:normocephalic and atraumatic  Pupils:regular, round and equal.  Sclera: icterus absent  EOM:full and intact. Lungs:    Breathing:Normal expansion. No wheezing. Abdomen:    Shape:non-distended and normal    Cervical spine:  + midline and paraspinal TTP b/l  Mild pain on flex/trent  Full strength 5/5      Lumbar spine:                Range of motion:abnormal moderately Lateral bending, flexion, extension rotation bilateral and is painful.     Extremities:    Tremors:None bilaterally upper and lower  Range of motion:Generally normal shoulders  Intact:Yes  Cyanosis:none  Edema:Normal      Neurological:    Cranial nerves:normal  Sensory:diminished to light lateral aspect of right leg                   Dermatology:    Skin:no unusual rashes, no skin lesions, no palpable subcutaneous nodules and good skin turgor    Assessment/Plan:      Chronic low back pain with radiation to the Right groin, patient had low back surgery 08/2017 L3-5 fusion, recently had lumbar spine CT with severe L2-3 stenosis     Patient is s/p left ankle surgery on 7/8. Back in cam walker boot. Plan:  Patient is s/p revision fusion L2-L4 on 3/4/2020. Patient is s/p:  Right sacroiliac joint fusion with use of SI-BONE iFuse implant on 3/9/2021 by Dr. Jose Myrick. Patient is s/p:  Left TFESI L5 and S1 on 03/29/2022 with 75% relief. LESI L5-S1 on 06/09/2022 with good relief until recently. Repeat with about 40% relief. She would like to hold off any further injections at this point. Cervical TPIs with  diminished relief at this time. Cervical spine x-ray reviewed. ROGELIO C7-T1 scheduled for next week. Continue norco 5/325 BID. Continue with Gabapentin 400 mg BID. She reports that any increases make her off balance. OARRS report reviewed 01/2023  UDS ordered today  Hold flexeril for now. Doing well without it. Patient encouraged to stay active and to lose weight. Failed physical therapy  Treatment plan discussed with the patient including medications side effects       Controlled Substance Monitoring:    Acute and Chronic Pain Monitoring:   RX Monitoring 1/6/2023   Attestation -   Acute Pain Prescriptions -   Periodic Controlled Substance Monitoring Possible medication side effects, risk of tolerance/dependence & alternative treatments discussed. ;No signs of potential drug abuse or diversion identified. ;Assessed functional status. ;Obtaining appropriate analgesic effect of treatment. ;Random urine drug screen sent today.    Chronic Pain > 80 MEDD -            ccreferring physic

## 2023-01-09 LAB
6-MONOACETYLMORPHINE, URINE: NOT DETECTED
ALCOHOL URINE: NOT DETECTED
AMPHETAMINE SCREEN, URINE: NOT DETECTED
BARBITURATE SCREEN URINE: NOT DETECTED
BENZODIAZEPINE SCREEN, URINE: NOT DETECTED
BUPRENORPHINE URINE: NOT DETECTED
CANNABINOID SCREEN URINE: NOT DETECTED
COCAINE METABOLITE SCREEN URINE: NOT DETECTED
FENTANYL SCREEN, URINE: NOT DETECTED
INTEGRITY CHECK, CREATININE, URINE: 170.5
INTEGRITY CHECK, OXIDANT, URINE: 178
INTEGRITY CHECK, PH, URINE: 5.9 (ref 4.5–9)
INTEGRITY CHECK, SPECIFIC GRAVITY, URINE: 1.02 (ref 1–1.03)
INTEGRITY CHECK, SPECIMEN INTEGRITY, URINE: ABNORMAL
Lab: ABNORMAL
METHADONE SCREEN, URINE: NOT DETECTED
OPIATE SCREEN URINE: POSITIVE
OXYCODONE URINE: NOT DETECTED
PHENCYCLIDINE SCREEN URINE: NOT DETECTED
TRAMADOL SCREEN URINE: NOT DETECTED

## 2023-01-09 NOTE — PROGRESS NOTES
[facility]  INSTRUCTIONS  . .......................................................................................................................................... [x] Parking the day of Surgery is located in the [facility].   Upon entering the door, make immediate right into the surgery reception room    [x]  Bring photo ID and insurance card     [x] You may have a light breakfast day of procedure    [x]  Wear loose comfortable clothing    [x]  Please follow instructions for medications as given per Dr's office    [x] You can expect a call the business day prior to procedure to notify you of your arrival time     [x] Please arrange for     []  Other instructions

## 2023-01-10 LAB
6-MAM, QUANTITATIVE, URINE: <10
7-AMINOCLONAZEPAM, QUANTITATIVE, URINE: <50
ALPHA-HYDROXYALPRAZOLAM, QUANTITATIVE, URINE: <50
ALPHA-HYDROXYMIDAZOLAM, QUANTITATIVE, URINE: <50
ALPHA-HYDROXYTRIAZOLAM, QUANTITATIVE, URINE: <50
ALPRAZOLAM URINE QUANT: <50
CHLORDIAZEPOXIDE, QUANTITATIVE, URINE: <50
CLONAZEPAM, QUANTITATIVE, URINE: <50
CODEINE, QUANTITATIVE, URINE: <50
COMMENT: NORMAL
DIAZEPAM URINE QUANT: <50
FLUNITRAZEPAM, QUANTITATIVE, URINE: <50
FLURAZEPAM, QUANTITATIVE, URINE: <50
HYDROCODONE, QUANTITATIVE, URINE: >1000
HYDROMORPHONE, QUANTITATIVE, URINE: 90.1
LORAZEPAM URINE QUANT: <50
MIDAZOLAM URINE QUANT: <50
MORPHINE, QUANTITATIVE, URINE: <50
NORDIAZEPAM URINE QUANT: <50
NORHYDROCODONE, QUANTITATIVE, URINE: >1000
NOROXYCODONE, QUANTITATIVE, URINE: <50
OXAZEPAM URINE QUANT: <50
OXYCODONE URINE, QUANTITATIVE: <50
OXYMORPHONE, QUANTITATIVE, URINE: <50
TEMAZEPAM, QUANTITATIVE, URINE: <50

## 2023-01-12 ENCOUNTER — HOSPITAL ENCOUNTER (OUTPATIENT)
Dept: GENERAL RADIOLOGY | Age: 68
Setting detail: OUTPATIENT SURGERY
Discharge: HOME | End: 2023-01-14
Attending: PAIN MEDICINE
Payer: MEDICARE

## 2023-01-12 ENCOUNTER — HOSPITAL ENCOUNTER (OUTPATIENT)
Age: 68
Setting detail: OUTPATIENT SURGERY
Discharge: HOME | End: 2023-01-12
Attending: PAIN MEDICINE | Admitting: PAIN MEDICINE
Payer: MEDICARE

## 2023-01-12 VITALS
RESPIRATION RATE: 18 BRPM | HEIGHT: 65 IN | OXYGEN SATURATION: 97 % | DIASTOLIC BLOOD PRESSURE: 74 MMHG | TEMPERATURE: 98 F | BODY MASS INDEX: 35.16 KG/M2 | HEART RATE: 88 BPM | SYSTOLIC BLOOD PRESSURE: 152 MMHG | WEIGHT: 211 LBS

## 2023-01-12 DIAGNOSIS — R52 PAIN MANAGEMENT: ICD-10-CM

## 2023-01-12 DIAGNOSIS — M54.12 CERVICAL RADICULOPATHY: ICD-10-CM

## 2023-01-12 LAB — METER GLUCOSE: 150 MG/DL (ref 74–99)

## 2023-01-12 PROCEDURE — 7100000011 HC PHASE II RECOVERY - ADDTL 15 MIN: Performed by: PAIN MEDICINE

## 2023-01-12 PROCEDURE — 2709999900 HC NON-CHARGEABLE SUPPLY: Performed by: PAIN MEDICINE

## 2023-01-12 PROCEDURE — 2500000003 HC RX 250 WO HCPCS: Performed by: PAIN MEDICINE

## 2023-01-12 PROCEDURE — A4216 STERILE WATER/SALINE, 10 ML: HCPCS | Performed by: PAIN MEDICINE

## 2023-01-12 PROCEDURE — 3209999900 FLUORO FOR SURGICAL PROCEDURES

## 2023-01-12 PROCEDURE — 62321 NJX INTERLAMINAR CRV/THRC: CPT | Performed by: PAIN MEDICINE

## 2023-01-12 PROCEDURE — 6360000002 HC RX W HCPCS: Performed by: PAIN MEDICINE

## 2023-01-12 PROCEDURE — 2580000003 HC RX 258: Performed by: PAIN MEDICINE

## 2023-01-12 PROCEDURE — 3600000002 HC SURGERY LEVEL 2 BASE: Performed by: PAIN MEDICINE

## 2023-01-12 PROCEDURE — 7100000010 HC PHASE II RECOVERY - FIRST 15 MIN: Performed by: PAIN MEDICINE

## 2023-01-12 PROCEDURE — 82962 GLUCOSE BLOOD TEST: CPT

## 2023-01-12 PROCEDURE — 6360000004 HC RX CONTRAST MEDICATION: Performed by: PAIN MEDICINE

## 2023-01-12 RX ORDER — METHYLPREDNISOLONE ACETATE 40 MG/ML
INJECTION, SUSPENSION INTRA-ARTICULAR; INTRALESIONAL; INTRAMUSCULAR; SOFT TISSUE PRN
Status: DISCONTINUED | OUTPATIENT
Start: 2023-01-12 | End: 2023-01-12 | Stop reason: ALTCHOICE

## 2023-01-12 RX ORDER — LIDOCAINE HYDROCHLORIDE 5 MG/ML
INJECTION, SOLUTION INFILTRATION; INTRAVENOUS PRN
Status: DISCONTINUED | OUTPATIENT
Start: 2023-01-12 | End: 2023-01-12 | Stop reason: ALTCHOICE

## 2023-01-12 RX ORDER — SODIUM CHLORIDE 9 MG/ML
INJECTION INTRAVENOUS PRN
Status: DISCONTINUED | OUTPATIENT
Start: 2023-01-12 | End: 2023-01-12 | Stop reason: ALTCHOICE

## 2023-01-12 ASSESSMENT — PAIN DESCRIPTION - DESCRIPTORS
DESCRIPTORS: DISCOMFORT
DESCRIPTORS: ACHING

## 2023-01-12 ASSESSMENT — PAIN - FUNCTIONAL ASSESSMENT
PAIN_FUNCTIONAL_ASSESSMENT: 0-10
PAIN_FUNCTIONAL_ASSESSMENT: 0-10

## 2023-01-12 NOTE — DISCHARGE INSTRUCTIONS
Vivian Alvarado Block/Radiofrequency  Home Going Instructions    1-Go home, rest for the remainder of the day  2-Please do not lift over 20 pounds the day of the injection  3-If you received sedation No: alcohol, driving, operating lawn mowers, plows, tractors or other dangerous equipment until next morning. Do not make important decisions or sign legal documents for 24 hours. You may experience light headedness, dizziness, nausea or sleepiness after sedation. Do not stay alone. A responsible adult must be with you for 24 hours. You could be nauseated from the medications you have received. Your IV site may be sore and bruised. 4-No dietary restrictions     5-Resume all medications the same day, blood thinners to be resumed 24 hours after injection if you were instructed to stop any. 6-Keep the surgical site clean and dry, you may shower the next morning and remove the      dressing. 7- No sitz baths, tub baths or hot tubs/swimming for 24 hours. 8- If you have any pain at the injection site(s), application of an ice pack to the area should be       helpful, 20 minutes on/20 minutes off for next 48 hours. 9- Call [facility] immediately at if you develop.   Fever greater than 100.4 F  Have bleeding or drainage from the puncture site  Have progressive Leg/arm numbness and or weakness  Loss of control of bowel and or bladder (wet/soil yourself)  Severe headache with inability to lift head  10-You may return to work the next day

## 2023-01-12 NOTE — H&P
[facility]        [address] [facility], [address]  Dept: [phone]    Procedure History & Physical      Abbey Ayan     HPI:    Patient  is here for cervical pain for C7-T1 epidural steroid injection  Labs/imaging studies reviewed   All question and concerns addressed including R/B/A associated with the procedure    Past Medical History:   Diagnosis Date    Cancer ([facility] [address] 12/2019    LESION REMOVED UNDER TONGUE  DENTAL CLINIC    Chronic back pain     Costochondritis     h/o    Depression     Diabetes mellitus ([facility] [address]     Falls     Last fall Feb 2021    Fibromyalgia     Hallux rigidus of left foot     HTN (hypertension)     Hyperlipidemia     CLYDE on CPAP     Osteoarthritis     \"everywhere\"    Rheumatoid arthritis(714.0)     TIA (transient ischemic attack)     no deficits        Past Surgical History:   Procedure Laterality Date    ANESTHESIA NERVE BLOCK Left 02/26/2019    LEFT C3,4,5 MBB performed by Dayna Gonzalez MD at [address] [facility] [facility] Right 08/12/2019    BILATERAL TRANSFORAMINAL EPIDURAL STEROID INJECTION S1 #3 performed by Dayna Gonzalez MD at [facility] Right 06/16/2020    RIGHT SACROILIAC JOINT INJECTION      CPT: 72068 performed by Jean-Claude Chaudhry DO at [address] [facility]  2005    Left    ANKLE SURGERY Left 7/8/2022    REPAIR OF ANTERIOR TALAR LIGAMENT LEFT ANKLE, REPAIR DELTOID LIGAMENT LEFT ANKLE performed by Brittni Morales DPM at [place] [address] Left 12/14/2018    ARTHRODESIS 2ND DIGIT LEFT FOOT performed by Brittni Morales DPM at [address]  08/16/2017    PLIF L3-L4, L4-L5 with rods, screws, and cages/Dr. Barrios Resides SURGERY  03/04/2020    BACK SURGERY Right 03/09/2021    RIGHT PERCUTANEOUS SACROILIAC JOINT FUSION performed by Rodolfo Baker MD at [address]  2010    reduction, [address]  2011    COLONOSCOPY  03/27/2015    COLONOSCOPY N/A 08/19/2020 COLONOSCOPY DIAGNOSTIC performed by Yeni Vieira MD at [address]  03/2021    SI Joint    ENDOSCOPY, COLON, DIAGNOSTIC      EPIDURAL STEROID INJECTION N/A 06/04/2019    BILATERAL TRANSFORAMINAL EPIDURAL STEROID INJECTION S1 performed by Fuentes Muñoz DO at [address] / ANKLE Left 12/14/2018    REMOVAL OF PAINFUL HARDWARE LEFT FOOT  ++SYNTHES++ performed by Pipo Lyons DPM at [address] [facility]    inguinal     LUMBAR SPINE SURGERY N/A 03/04/2020    EXPLORATION OF PRIOR L3-L5 FUSION AND L2-L3  POSTERIOR LUMBAR INTERBODY FUSION -- NEEDS O-ARM, AUDIOLOGY, CAGES, PLATES, SCREWS, C-ARM, TERESA TABLE, CELL SAVER, PLATELET GEL -- GLOBUS performed by Karishma Morse MD at [address] [facility] 10/21/2020    EXCISION OF TONGUE LESION performed by Asad Gonzales DO at [address] [facility] [address] Bilateral 05/07/2018    L1-2 lumbar foramen #1    NERVE BLOCK Bilateral 12/03/2018    s1 tfesi    NERVE BLOCK Bilateral 08/12/2019    NERVE BLOCK Right 09/30/2019    sacral radiofrequency    NERVE BLOCK Right 09/01/2020    RIGHT SACROILIAC JOINT INJECTION #2 performed by Fuentes Muñoz DO at [address] Left 09/25/2013    left foot tarso metatarsal joint injection    OTHER SURGICAL HISTORY Left 05/27/2015    Endoscopic Gastroc recession left, Lapidus left foot and  Excision of exostosis left foot    PAIN MANAGEMENT PROCEDURE Left 7/27/2021    LEFT L5-S1 TRANSFORAMINAL EPIDURAL STEROID INJECTION performed by Fuentes Muñoz DO at [address] Left 3/29/2022    LEFT TRANSFORAMINAL EPIDURAL STEROID INJECTION AT L5 AND S1 performed by Fuentes Muñoz DO at [address] N/A 6/9/2022    LUMBAR EPIDURAL STEROID INJECTION L5-S1 performed by Fuentes Muñoz DO at [address] N/A 10/27/2022    BILATERAL L5 LUMBAR TRANSFORAMINAL EPIDURAL STEROID INJECTION performed by Akash Pulliam DO at [address] AA&/STRD TFRML EPI LUMBAR/SACRAL 1 LEVEL Bilateral 12/03/2018    BILATERAL S1  EPIDURAL STEROID INJECTION performed by Rodrick Graff MD at [address] DX/THER SBST INTRLMNR LMBR/SAC W/IMG GDN N/A 08/21/2018    EPIDURAL STEROID INJECTION L1-2 WITH LOW VOL performed by Rodrick Graff MD at [address] [address]/COLUM [address] [facility] [address] TIP ONLY Bilateral 05/07/2018    BILATERAL L1-2 LUMBAR FORAMEN #1 performed by Rodrick Graff MD at [address] [facility] W/COL[address] [facility] Bilateral 06/04/2018    BILATERAL TRANSFORAMINAL EPIDURAL STEROID INJECTION UNDER FLUOROSCOPIC GUIDANCE @ FORAMINAL LEVEL L1-2 #2 performed by Rodrick Graff MD at [address] Right 09/30/2019    RIGHT SACRAL RADIOFREQUENCY ABLATION performed by Rodrick Graff MD at [place] [address]    Big Left Toe    TOE SURGERY Left 2018    2nd toe     TONSILLECTOMY      WISDOM TOOTH EXTRACTION         Prior to Admission medications    Medication Sig Start Date End Date Taking? Authorizing Provider   HYDROcodone-acetaminophen (NORCO) 5-325 MG per tablet Take 1 tablet by mouth 2 times daily for 30 days. 1/11/23 2/10/23  TREASURE Moore   hydroCHLOROthiazide (MICROZIDE) 12.5 MG capsule TAKE ONE CAPSULE BY MOUTH ONCE DAILY FOR BLOOD PRESSURE 11/14/22   Eleno Jacobo MD   lisinopril (PRINIVIL;ZESTRIL) 40 MG tablet Take 1 tablet by mouth every evening 11/14/22   Eleno Jacobo MD   metFORMIN (GLUCOPHAGE) 500 MG tablet Take 1 tablet by mouth daily (with breakfast) Start with once daily during the first week. 11/14/22   Eleno Jacobo MD   gabapentin (NEURONTIN) 400 MG capsule Take 1 capsule by mouth 2 times daily for 90 days.  11/14/22 2/12/23  Eleno Jacobo MD   linagliptin (TRADJENTA) 5 MG tablet Take 1 tablet by mouth daily 11/14/22   Eleno Jacobo MD hydrOXYzine pamoate (VISTARIL) 25 MG capsule Take 1 capsule by mouth 3 times daily as needed for Anxiety 7/29/22   Enrique Terrazas MD   atorvastatin (LIPITOR) 40 MG tablet Take 1 tablet by mouth daily 6/20/22   Enrique Terrazas MD   Handicap Placard MISC DX:  Loss of Balance, Chronic low back pain, s/p back surgery.    Duration of 5 years 2/22/21   Enrique Terrazas MD       Allergies   Allergen Reactions    Pcn [Penicillins] Anaphylaxis       Social History     Socioeconomic History    Marital status:      Spouse name: Not on file    Number of children: Not on file    Years of education: Not on file    Highest education level: Not on file   Occupational History    Not on file   Tobacco Use    Smoking status: Every Day     Packs/day: 0.50     Years: 30.00     Pack years: 15.00     Types: Cigarettes    Smokeless tobacco: Never   Vaping Use    Vaping Use: Never used   Substance and Sexual Activity    Alcohol use: Not Currently     Alcohol/week: 0.0 standard drinks     Comment: rarely    Drug use: No    Sexual activity: Not on file   Other Topics Concern    Not on file   Social History Narrative    Not on file     Social Determinants of Health     Financial Resource Strain: Not on file   Food Insecurity: Not on file   Transportation Needs: Not on file   Physical Activity: Inactive    Days of Exercise per Week: 0 days    Minutes of Exercise per Session: 0 min   Stress: Not on file   Social Connections: Not on file   Intimate Partner Violence: Not on file   Housing Stability: Not on file       Family History   Problem Relation Age of Onset    Dementia Mother     Breast Cancer Mother     Cancer Mother         breast cancer [de-identified]     Stroke Mother     Heart Attack Father     Other Father 80        Aortic aneurysm    Cancer Brother     Diabetes Brother          REVIEW OF SYSTEMS:    CONSTITUTIONAL:  negative for  fevers, chills, sweats and fatigue    RESPIRATORY:  negative for  dry cough, cough with sputum, dyspnea, wheezing and chest pain    CARDIOVASCULAR:  negative for chest pain, dyspnea, palpitations, syncope    GASTROINTESTINAL:  negative for nausea, vomiting, change in bowel habits, diarrhea, constipation and abdominal pain    MUSCULOSKELETAL: negative for muscle weakness    SKIN: negative for itching or rashes. BEHAVIOR/PSYCH:  negative for poor appetite, increased appetite, decreased sleep and poor concentration    All other systems negative      PHYSICAL EXAM:    VITALS:  BP (!) 196/89   Pulse (!) 106   Temp 98 °F (36.7 °C) (Temporal)   Resp 18   Ht 5' 5\" (1.651 m)   Wt 211 lb (95.7 kg)   LMP  (LMP Unknown)   SpO2 100%   BMI 35.11 kg/m²     CONSTITUTIONAL:  awake, alert, cooperative, no apparent distress, and appears stated age    EYES: PERRLA, EOMI    LUNGS:  No increased work of breathing, no audible wheezing    CARDIOVASCULAR:  regular rate and rhythm    ABDOMEN:  Soft non tender non distended     EXTREMITIES: no signs of clubbing or cyanosis. MUSCULOSKELETAL: negative for flaccid muscle tone or spastic movements. SKIN: gross examination reveals no signs of rashes, or diaphoresis. NEURO: Cranial nerves II-XII grossly intact. No signs of agitated mood.        Assessment/Plan:    Cervical pain for C7-T1 epidural steroid injection

## 2023-01-17 NOTE — OP NOTE
2023    Patient: Augustina Quigley  :  1955  Age:  74 y.o. Sex:  female     PRE-PROCEDURE DIAGNOSIS: Cervical degenerative disc disease, cervical radicular pain. POST-PROCEDURE DIAGNOSIS: Same. PROCEDURE: Fluoroscopic guided cervical epidural steroid injection at C7-T1 level. SURGEON: ZARA Nelson. ANESTHESIA: local    ESTIMATED BLOOD LOSS: None.  ______________________________________________________________________  BRIEF HISTORY:  Augustina Quigley comes in today for the therapeutic cervical epidural injection under fluoroscopic guidance. The potential complications of this procedure were discussed with the her again today. She has elected to undergo the aforementioned procedure. Lyssa complete History & Physical examination were reviewed in depth, a copy of which is in the chart. DESCRIPTION OF PROCEDURE:    After confirming written and informed consent, a time-out was performed and Lyssa name and date of birth, the procedure to be performed as well as the plan for the location of the needle insertion were confirmed. The patient was brought into the procedure room and placed in the prone position with the head flexed midline on the fluoroscopy table. A pillow was placed under the patient's chest and forehead to increase cervical interlaminar space. Standard monitors were placed, and vital signs were observed throughout the procedure. The area was prepped with chloraprep and the C7-T1 interspace was marked under fluoroscopy. The skin and subcutaneous tissues at the above level were anesthestized with 0.5% lidocaine. A # 18 gauge 3 1/2 tuohy epidural needle was inserted and advanced toward the inferior lamina until bony contact was made. The needle was then advanced superiorly toward the epidural space. From this point on the loss of resistance technique with a 5 cc glass syringe was used to confirm entrance of the needle into the epidural space under intermittent lateral fluoroscopy. Once in the epidural space , negative aspiration for blood and CSF was confirmed . Epidural needle tip placement was confirmed by visualizing epidural spread of 2 ml of Isovue-M 300 in both live lateral and live AP fluoroscopic views. Then after negative aspiration, a solution of preservative free saline, 2 ml, and 40 mg DepoMedrol was easily injected. The needle was gently removed intact. The patient neck was cleaned and a Band-Aid was placed over the needle insertion point. Disposition the patient tolerated the procedure well and there were no complications . Vital signs remained stable throughout the procedure. The patient was escorted to the recovery area where they remained until discharge and written discharge instructions for the procedure were given. Plan: Daniel Smith will return to our pain management center as scheduled.      Glenny Mayberry, DO

## 2023-01-24 ENCOUNTER — HOSPITAL ENCOUNTER (OUTPATIENT)
Dept: GENERAL RADIOLOGY | Age: 68
Discharge: HOME | End: 2023-01-26
Payer: MEDICARE

## 2023-01-24 VITALS — BODY MASS INDEX: 34.99 KG/M2 | WEIGHT: 210 LBS | HEIGHT: 65 IN

## 2023-01-24 DIAGNOSIS — Z12.31 ENCOUNTER FOR SCREENING MAMMOGRAM FOR BREAST CANCER: ICD-10-CM

## 2023-01-24 PROCEDURE — 77063 BREAST TOMOSYNTHESIS BI: CPT

## 2023-01-26 ENCOUNTER — OFFICE VISIT (OUTPATIENT)
Dept: PODIATRY | Age: 68
End: 2023-01-26
Payer: MEDICARE

## 2023-01-26 VITALS — WEIGHT: 210 LBS | BODY MASS INDEX: 34.95 KG/M2 | TEMPERATURE: 97.4 F

## 2023-01-26 DIAGNOSIS — Z09 POSTOPERATIVE EXAMINATION: ICD-10-CM

## 2023-01-26 DIAGNOSIS — M76.822 POSTERIOR TIBIAL TENDINITIS OF LEFT LOWER EXTREMITY: Primary | ICD-10-CM

## 2023-01-26 PROCEDURE — 99212 OFFICE O/P EST SF 10 MIN: CPT | Performed by: PODIATRIST

## 2023-01-26 PROCEDURE — 1123F ACP DISCUSS/DSCN MKR DOCD: CPT | Performed by: PODIATRIST

## 2023-01-26 NOTE — PROGRESS NOTES
10/15/22  Jhon Lee : 1955 Sex: female  Age: 74 y.o. Patient was referred by Isabella Briggs MD    Chief Complaint   Patient presents with    Post-Op Check     22 left ankle sx     Diabetes       SUBJECTIVE patient is seen for postop surgical correction to the left foot and ankle. follow-up in 5 months. doing great   Diabetes    Review of Systems  Const: Denies constitutional symptoms  Musculo: Denies symptoms other than stated above  Skin: Denies symptoms other than stated above       Current Outpatient Medications:     HYDROcodone-acetaminophen (NORCO) 5-325 MG per tablet, Take 1 tablet by mouth 2 times daily for 30 days. , Disp: 60 tablet, Rfl: 0    hydroCHLOROthiazide (MICROZIDE) 12.5 MG capsule, TAKE ONE CAPSULE BY MOUTH ONCE DAILY FOR BLOOD PRESSURE, Disp: 90 capsule, Rfl: 1    lisinopril (PRINIVIL;ZESTRIL) 40 MG tablet, Take 1 tablet by mouth every evening, Disp: 90 tablet, Rfl: 1    metFORMIN (GLUCOPHAGE) 500 MG tablet, Take 1 tablet by mouth daily (with breakfast) Start with once daily during the first week., Disp: 90 tablet, Rfl: 1    gabapentin (NEURONTIN) 400 MG capsule, Take 1 capsule by mouth 2 times daily for 90 days. , Disp: 180 capsule, Rfl: 1    linagliptin (TRADJENTA) 5 MG tablet, Take 1 tablet by mouth daily, Disp: 30 tablet, Rfl: 2    hydrOXYzine pamoate (VISTARIL) 25 MG capsule, Take 1 capsule by mouth 3 times daily as needed for Anxiety, Disp: 270 capsule, Rfl: 1    atorvastatin (LIPITOR) 40 MG tablet, Take 1 tablet by mouth daily, Disp: 90 tablet, Rfl: 3    Handicap Placard MISC, DX:  Loss of Balance, Chronic low back pain, s/p back surgery.   Duration of 5 years, Disp: 1 each, Rfl: 0  Allergies   Allergen Reactions    Pcn [Penicillins] Anaphylaxis       Past Medical History:   Diagnosis Date    Cancer ([facility] [address] 2019    LESION REMOVED UNDER TONGUE  DENTAL CLINIC    Chronic back pain     Costochondritis     h/o    Depression     Diabetes mellitus ([facility] [address]     Falls     Last fall Feb 2021    Fibromyalgia     Hallux rigidus of left foot     HTN (hypertension)     Hyperlipidemia     CLYDE on CPAP     Osteoarthritis     \"everywhere\"    Rheumatoid arthritis(714.0)     TIA (transient ischemic attack)     no deficits      Past Surgical History:   Procedure Laterality Date    ANESTHESIA NERVE BLOCK Left 02/26/2019    LEFT C3,4,5 MBB performed by Liz Pearson MD at [address] [facility] Right 08/12/2019    BILATERAL TRANSFORAMINAL EPIDURAL STEROID INJECTION S1 #3 performed by Liz Pearson MD at [facility] Right 06/16/2020    RIGHT SACROILIAC JOINT INJECTION      CPT: 91674 performed by Phillip Casillas DO at [address] [facility] [address]  2005    Left    ANKLE SURGERY Left 7/8/2022    REPAIR OF ANTERIOR TALAR LIGAMENT LEFT ANKLE, REPAIR DELTOID LIGAMENT LEFT ANKLE performed by Vivian Ellis DPM at [place] [address] Left 12/14/2018    ARTHRODESIS 2ND DIGIT LEFT FOOT performed by Vivian Ellis DPM at [address] [facility] [address]  08/16/2017    PLIF L3-L4, L4-L5 with rods, screws, and cages/Dr. Lo Rivera SURGERY  03/04/2020    BACK SURGERY Right 03/09/2021    RIGHT PERCUTANEOUS SACROILIAC JOINT FUSION performed by Fabiana Nobles MD at [address]  2010    reduction, [address]  2011    COLONOSCOPY  03/27/2015    COLONOSCOPY N/A 08/19/2020    COLONOSCOPY DIAGNOSTIC performed by Felicia Larry MD at [facility]  03/2021    SI Joint    ENDOSCOPY, COLON, DIAGNOSTIC      EPIDURAL STEROID INJECTION N/A 06/04/2019    BILATERAL TRANSFORAMINAL EPIDURAL STEROID INJECTION S1 performed by Phillip Casillas DO at [facility] / ANKLE Left 12/14/2018    REMOVAL OF PAINFUL HARDWARE LEFT FOOT  ++SYNTHES++ performed by Vivian Ellis DPM at [address] [facility]    inguinal     LUMBAR SPINE SURGERY N/A 03/04/2020    EXPLORATION OF PRIOR L3-L5 FUSION AND L2-L3  POSTERIOR LUMBAR INTERBODY FUSION -- NEEDS O-ARM, AUDIOLOGY, CAGES, PLATES, SCREWS, C-ARM, TERESA TABLE, CELL SAVER, PLATELET GEL -- GLOBUS performed by Apoorva Pires MD at [address] 10/21/2020    EXCISION OF TONGUE LESION performed by Archana Kay DO at [address] [facility] [address] Bilateral 05/07/2018    L1-2 lumbar foramen #1    NERVE BLOCK Bilateral 12/03/2018    s1 tfesi    NERVE BLOCK Bilateral 08/12/2019    NERVE BLOCK Right 09/30/2019    sacral radiofrequency    NERVE BLOCK Right 09/01/2020    RIGHT SACROILIAC JOINT INJECTION #2 performed by Mitali Pacheco DO at [address] Left 09/25/2013    left foot tarso metatarsal joint injection    OTHER SURGICAL HISTORY Left 05/27/2015    Endoscopic Gastroc recession left, Lapidus left foot and  Excision of exostosis left foot    PAIN MANAGEMENT PROCEDURE Left 7/27/2021    LEFT L5-S1 TRANSFORAMINAL EPIDURAL STEROID INJECTION performed by Mitali Pacheco DO at [address] Left 3/29/2022    LEFT TRANSFORAMINAL EPIDURAL STEROID INJECTION AT L5 AND S1 performed by Mitali Pacheco, DO at [address] N/A 6/9/2022    LUMBAR EPIDURAL STEROID INJECTION L5-S1 performed by Mitali Pacheco, DO at [address] N/A 10/27/2022    BILATERAL L5 LUMBAR TRANSFORAMINAL EPIDURAL STEROID INJECTION performed by Mitali Pacheco DO at [address] N/A 1/12/2023    CERVICAL EPIDURAL STEROID INJECTION C7-T1 performed by Mitali Pacheco DO at [address] AA&/STRD TFRML EPI LUMBAR/SACRAL 1 LEVEL Bilateral 12/03/2018    BILATERAL S1  EPIDURAL STEROID INJECTION performed by Rhiannon Honeycutt MD at [address] [place] [address] DX/THER SBST INTRLMNR LMBR/SAC W/IMG GDN N/A 08/21/2018    EPIDURAL STEROID INJECTION L1-2 WITH LOW VOL performed by Rhiannon Honeycutt MD at [address] [address]/COL[address] [facility] [address] TIP ONLY Bilateral 05/07/2018 BILATERAL L1-2 LUMBAR FORAMEN #1 performed by Terrie Crook MD at [address] [facility] W/COLUM [address] TIP ONLY Bilateral 06/04/2018    BILATERAL TRANSFORAMINAL EPIDURAL STEROID INJECTION UNDER FLUOROSCOPIC GUIDANCE @ FORAMINAL LEVEL L1-2 #2 performed by Terrie Crook MD at [address] [facility] [address] Right 09/30/2019    RIGHT SACRAL RADIOFREQUENCY ABLATION performed by Terrie Crook MD at [address]  [address], 2005    Big Left Toe    TOE SURGERY Left 2018    2nd toe     TONSILLECTOMY      WISDOM TOOTH EXTRACTION       Family History   Problem Relation Age of Onset    Dementia Mother     Breast Cancer Mother     Cancer Mother         breast cancer [de-identified]     Stroke Mother     Heart Attack Father     Other Father 80        Aortic aneurysm    Cancer Brother     Diabetes Brother      Social History     Socioeconomic History    Marital status:      Spouse name: Not on file    Number of children: Not on file    Years of education: Not on file    Highest education level: Not on file   Occupational History    Not on file   Tobacco Use    Smoking status: Every Day     Packs/day: 0.50     Years: 30.00     Pack years: 15.00     Types: Cigarettes    Smokeless tobacco: Never   Vaping Use    Vaping Use: Never used   Substance and Sexual Activity    Alcohol use: Not Currently     Alcohol/week: 0.0 standard drinks     Comment: rarely    Drug use: No    Sexual activity: Not on file   Other Topics Concern    Not on file   Social History Narrative    Not on file     Social Determinants of Health     Financial Resource Strain: Not on file   Food Insecurity: Not on file   Transportation Needs: Not on file   Physical Activity: Inactive    Days of Exercise per Week: 0 days    Minutes of Exercise per Session: 0 min   Stress: Not on file   Social Connections: Not on file   Intimate Partner Violence: Not on file   Housing Stability: Not on file       Vitals:    01/26/23 1440   Temp: 97.4 °F (36.3 °C)   TempSrc: Temporal   Weight: 210 lb (95.3 kg)       Focused Lower Extremity Physical Exam:  Vitals:    01/26/23 1440   Temp: 97.4 °F (36.3 °C)        Foot Exam    General  General Appearance: appears stated age and healthy   Orientation: alert and oriented to person, place, and time       Left Foot/Ankle      Inspection and Palpation  Ecchymosis: none  Tenderness: none   Swelling: none   Skin Exam: skin intact; Neurovascular  Dorsalis pedis: 3+  Posterior tibial: 3+  Saphenous nerve sensation: normal  Tibial nerve sensation: normal  Superficial peroneal nerve sensation: normal  Deep peroneal nerve sensation: normal  Sural nerve sensation: normal         Ortho Exam    Vascular: pulses  dp  pt    Capillary Refill Time:   Hair growth  Skin:    Edema:    Neurologic:      Musculoskeletal/ Orthopedic examination: Pain with palpation along the posterior tib tendon pain with palpation with inversion eversion negative pain with dorsiflexion plantarflexion  NAIL   Web space  Derm:          Assessment and Plan: pt o d/c  stirrup  brace  resume all activities      Jamey Lobo was seen today for post-op check and diabetes. Diagnoses and all orders for this visit:    Posterior tibial tendinitis of left lower extremity    Postoperative examination      No follow-ups on file. Seen By:  Yaakov Cantu DPM      Document was created using voice recognition software. Note was reviewed, however may contain grammatical errors.

## 2023-01-30 ENCOUNTER — HOSPITAL ENCOUNTER (EMERGENCY)
Age: 68
Discharge: HOME | End: 2023-01-30
Payer: MEDICARE

## 2023-01-30 ENCOUNTER — APPOINTMENT (OUTPATIENT)
Dept: GENERAL RADIOLOGY | Age: 68
End: 2023-01-30
Payer: MEDICARE

## 2023-01-30 VITALS
HEART RATE: 106 BPM | HEIGHT: 65 IN | RESPIRATION RATE: 16 BRPM | TEMPERATURE: 98.1 F | BODY MASS INDEX: 33.32 KG/M2 | DIASTOLIC BLOOD PRESSURE: 100 MMHG | SYSTOLIC BLOOD PRESSURE: 150 MMHG | WEIGHT: 200 LBS | OXYGEN SATURATION: 98 %

## 2023-01-30 DIAGNOSIS — M19.049 ARTHRITIS PAIN, HAND: Primary | ICD-10-CM

## 2023-01-30 PROCEDURE — 96372 THER/PROPH/DIAG INJ SC/IM: CPT

## 2023-01-30 PROCEDURE — 73130 X-RAY EXAM OF HAND: CPT

## 2023-01-30 PROCEDURE — 99284 EMERGENCY DEPT VISIT MOD MDM: CPT

## 2023-01-30 PROCEDURE — 6360000002 HC RX W HCPCS: Performed by: NURSE PRACTITIONER

## 2023-01-30 RX ORDER — DEXAMETHASONE SODIUM PHOSPHATE 4 MG/ML
10 INJECTION, SOLUTION INTRA-ARTICULAR; INTRALESIONAL; INTRAMUSCULAR; INTRAVENOUS; SOFT TISSUE ONCE
Status: COMPLETED | OUTPATIENT
Start: 2023-01-30 | End: 2023-01-30

## 2023-01-30 RX ORDER — MELOXICAM 7.5 MG/1
7.5 TABLET ORAL DAILY
Qty: 30 TABLET | Refills: 0 | Status: SHIPPED | OUTPATIENT
Start: 2023-01-30

## 2023-01-30 RX ADMIN — DEXAMETHASONE SODIUM PHOSPHATE 10 MG: 4 INJECTION, SOLUTION INTRAMUSCULAR; INTRAVENOUS at 15:38

## 2023-01-30 ASSESSMENT — PAIN DESCRIPTION - LOCATION: LOCATION: HAND

## 2023-01-30 ASSESSMENT — LIFESTYLE VARIABLES: HOW OFTEN DO YOU HAVE A DRINK CONTAINING ALCOHOL: NEVER

## 2023-01-30 ASSESSMENT — PAIN SCALES - GENERAL: PAINLEVEL_OUTOF10: 10

## 2023-01-30 ASSESSMENT — PAIN DESCRIPTION - ORIENTATION: ORIENTATION: LEFT

## 2023-01-30 NOTE — ED PROVIDER NOTES
Independent RICHARD Visit. [address] [facility]  Department of Emergency Medicine   ED  Encounter Note  Admit Date/RoomTime: 2023  2:14 PM  ED Room: [facility]    NAME: Merna Rosado  : 1955  MRN: [id number]     Chief Complaint:  Hand Pain (Patient c/o left hand pain for the past 2-3 weeks after carrying groceries)    History of Present Illness       Merna Rosado is a 74 y.o. old female presenting to the emergency department by private vehicle, for a 2 to 3-week history of pain to the left first and second metacarpals that occurred after carrying heavy grocery bags. She denies any direct trauma or recent falls. She is enrolled in pain management. She states that her pain is a 10 out of 10 and worse with direct palpation over the distal aspect of the right first metacarpal.  Denies any numbness or tingling to the fingers, states that she has having difficulty moving the hand due to pain. She is on hydrocodone and gabapentin for pain, states that these medications are not helping. ROS   Pertinent positives and negatives are stated within HPI, all other systems reviewed and are negative. Past Medical History:  has a past medical history of Cancer ([address] Chronic back pain, Costochondritis, Depression, Diabetes mellitus ([address] Falls, Fibromyalgia, Hallux rigidus of left foot, HTN (hypertension), Hyperlipidemia, CLYDE on CPAP, Osteoarthritis, Rheumatoid arthritis(714.0), and TIA (transient ischemic attack). Surgical History:  has a past surgical history that includes  section (); Toe Surgery (, ); Ankle surgery (); Cholecystectomy (); Tonsillectomy; [place] tooth extraction; other surgical history (Left, 2013); hernia repair (); Colonoscopy (2015); other surgical history (Left, 2015); Endoscopy, colon, diagnostic; back surgery (2017);  Nerve Block (Bilateral, 2018); pr rhinp dfrm w/colum lngth tip only (Bilateral, 2018); pr rhinp dfrm w/colum lngth tip only (Bilateral, 06/04/2018); pr njx dx/ther sbst intrlmnr lmbr/sac w/img gdn (N/A, 08/21/2018); Nerve Block (Bilateral, 12/03/2018); pr njx aa&/strd tfrml epi lumbar/sacral 1 level (Bilateral, 12/03/2018); arthrodesis (Left, 12/14/2018); HARDWARE REMOVAL FOOT / ANKLE (Left, 12/14/2018); Anesthesia Nerve Block (Left, 02/26/2019); epidural steroid injection (N/A, 06/04/2019); Nerve Block (Bilateral, 08/12/2019); Anesthesia Nerve Block (Right, 08/12/2019); Nerve Block (Right, 09/30/2019); RADIOFREQUENCY ABLATION NERVES (Right, 09/30/2019); Lumbar spine surgery (N/A, 03/04/2020); Anesthesia Nerve Block (Right, 06/16/2020); back surgery (03/04/2020); Toe Surgery (Left, 2018); Colonoscopy (N/A, 08/19/2020); Nerve Block (Right, 09/01/2020); Breast surgery (2010); Foot surgery; Mouth surgery (N/A, 10/21/2020); back surgery (Right, 03/09/2021); Elbow joint fusion (03/2021); Pain management procedure (Left, 7/27/2021); Pain management procedure (Left, 3/29/2022); Pain management procedure (N/A, 6/9/2022); Ankle surgery (Left, 7/8/2022); Pain management procedure (N/A, 10/27/2022); and Pain management procedure (N/A, 1/12/2023).    Social History:  reports that she has been smoking cigarettes. She has a 15.00 pack-year smoking history. She has never used smokeless tobacco. She reports that she does not currently use alcohol. She reports that she does not use drugs.    Family History: family history includes Breast Cancer in her mother; Cancer in her brother and mother; Dementia in her mother; Diabetes in her brother; Heart Attack in her father; Other (age of onset: 86) in her father; Stroke in her mother.     Allergies: Pcn [penicillins]    Physical Exam   Oxygen Saturation Interpretation: Normal.        ED Triage Vitals   BP Temp Temp Source Heart Rate Resp SpO2 Height Weight   01/30/23 1409 01/30/23 1409 01/30/23 1409 01/30/23 1409 01/30/23 1409 01/30/23 1409 01/30/23 1423 01/30/23 1423   (!)  150/100 98.1 °F (36.7 °C) Oral (!) 106 16 98 % 5' 5\" (1.651 m) 200 lb (90.7 kg)         Constitutional:  Alert, development consistent with age. Neck:  Normal ROM. Supple. Non-tender. Physical Exam  Hand: Left dorsal & volar 1st , 2nd distal aspect first and second metacarpal .              Tenderness: severe. Swelling: Mild. Deformity: no deformity observed/palpated. Skin:  no wounds, erythema, or swelling. Neurovascular: Motor deficit: none. Able to abduct and adduct fingers, able to make the okay sign, able to abduct and adduct thumb. Delbra Pole test was negative with no radiation of pain to wrist or forearm. Sensory deficit:   none. Pulse deficit: none. Capillary refill: normal.  Fingers:  1st , 2nd, all            Tenderness:  none. Swelling: None. Deformity: no deformity observed/palpated. ROM: full range of motion. Skin:  no wounds, erythema, or swelling. Wrist:  diffusely across carpal bones. Tenderness:  none. Swelling: None. Deformity: no deformity observed/palpated. ROM: full range of motion. Skin: no wounds, erythema, or swelling. Lymphatics: No lymphangitis or adenopathy noted. Neurological:  Oriented. Motor functions intact. t. Lab / Imaging Results   (All laboratory and radiology results have been personally reviewed by myself)  Labs:  No results found for this visit on 01/30/23. Imaging: All Radiology results interpreted by Radiologist unless otherwise noted. XR HAND LEFT (MIN 3 VIEWS)   Final Result   Osteoarthritis as noted.            ED Course / Medical Decision Making     Medications   dexamethasone (DECADRON) injection 10 mg (10 mg IntraMUSCular Given 1/30/23 1538)            MDM: This is a 77-year-old female who presents with a 2-week history of pain to her distal first and second metacarpal on the left hand near the MCP of both digits that occurred after carrying a heavy grocery bag. She denies any direct trauma to the hand. States the pain has been constant and worse with palpation. On exam patient is tender over the left first and second metacarpals however there is no notable soft tissue swelling, no obvious deformity, range of motion is intact, no evidence of compression of the radial or ulnar nerves, no neurovascular compromise. X-ray was completed and reveals mild to moderate osteoarthritis of the DIP of the index finger and of the left thumb with diffuse osteoarthritic changes throughout the hand. Patient symptoms did improve modestly with the administration of Decadron. Patient is currently enrolled in pain management, I did offer Mobic to her as her history and physical exam are consistent with osteoarthritic pain. Patient is agreeable to trying this, I have also advised follow-up with Ortho hand should her symptoms persist as she may benefit from nonemergent advanced imaging including MRI should the symptoms persist.  There is no neurovascular compromise noted on exam, there is no signs or symptoms of soft tissue infection on exam.  Patient is comfortable and agreeable for plan to discharge home. Patient was explicitly instructed on specific signs and symptoms on which to return to the emergency room for. Patient was instructed to return to the ER for any new or worsening symptoms. Additional discharge instructions were given verbally. All questions were answered. Patient is comfortable and agreeable with discharge plan. Patient in no acute distress and non-toxic in appearance. Plan of Care/Counseling:  ALEXANDRO Rodriguez CNP reviewed today's visit with the patient in addition to providing specific details for the plan of care and counseling regarding the diagnosis and prognosis.   Questions are answered at this time and are agreeable with the plan.    Assessment      1. Arthritis pain, hand      Plan   Discharged home. Patient condition is good    New Medications     Discharge Medication List as of 1/30/2023  4:50 PM        START taking these medications    Details   meloxicam (MOBIC) 7.5 MG tablet Take 1 tablet by mouth daily, Disp-30 tablet, R-0Normal           Electronically signed by ALEXANDRO Manning CNP   DD: 1/30/23  **This report was transcribed using voice recognition software. Every effort was made to ensure accuracy; however, inadvertent computerized transcription errors may be present.   END OF ED PROVIDER NOTE      ALEXANDRO Manning CNP  01/31/23 5533

## 2023-01-30 NOTE — DISCHARGE INSTRUCTIONS
Mobic once daily as needed for pain. Please follow-up with orthopedics if your symptoms do not improve. Your x-ray was negative for an acute fracture however you do have quite a lot of arthritis in that hand joint where you are having the pain. The burning that you are feeling in your right arm is due to the Decadron, that this is the nature of that medication when it is injected. It can be very painful. If you continue to have pain please follow-up with your pain management providers.

## 2023-02-07 ENCOUNTER — TELEPHONE (OUTPATIENT)
Dept: ORTHOPEDIC SURGERY | Age: 68
End: 2023-02-07

## 2023-02-07 NOTE — TELEPHONE ENCOUNTER
Abhishek rec'd from pt to make ED f/u appt    ED 1/30/23 TTS on call  Chief Complaint:  Hand Pain (Patient c/o left hand pain for the past 2-3 weeks after carrying groceries)  Imaging: All Radiology results interpreted by Radiologist unless otherwise noted. XR HAND LEFT (MIN 3 VIEWS)   Final Result   Osteoarthritis as noted     Assessment       1. Arthritis pain, hand      Routing to provider for guidance.

## 2023-02-07 NOTE — TELEPHONE ENCOUNTER
2/7/23 - Patient educated on most appropriate providers to see her for the ED F/u. She chose and is scheduled to see Dr. Andi Campos for early interventions (US guided CSI) and possible brace on 2/14/23. She will discuss with her PCP whom she has appointment with on 2/13/23.

## 2023-02-10 ENCOUNTER — OFFICE VISIT (OUTPATIENT)
Dept: PAIN MANAGEMENT | Age: 68
End: 2023-02-10
Payer: MEDICARE

## 2023-02-10 VITALS
HEART RATE: 117 BPM | WEIGHT: 200 LBS | OXYGEN SATURATION: 94 % | BODY MASS INDEX: 33.32 KG/M2 | TEMPERATURE: 97.6 F | DIASTOLIC BLOOD PRESSURE: 87 MMHG | SYSTOLIC BLOOD PRESSURE: 142 MMHG | HEIGHT: 65 IN | RESPIRATION RATE: 16 BRPM

## 2023-02-10 DIAGNOSIS — M54.16 LUMBAR RADICULOPATHY: ICD-10-CM

## 2023-02-10 DIAGNOSIS — M54.40 CHRONIC MIDLINE LOW BACK PAIN WITH SCIATICA, SCIATICA LATERALITY UNSPECIFIED: ICD-10-CM

## 2023-02-10 DIAGNOSIS — M54.12 CERVICAL RADICULOPATHY: ICD-10-CM

## 2023-02-10 DIAGNOSIS — M48.061 SPINAL STENOSIS OF LUMBAR REGION WITHOUT NEUROGENIC CLAUDICATION: Primary | ICD-10-CM

## 2023-02-10 DIAGNOSIS — M51.36 DDD (DEGENERATIVE DISC DISEASE), LUMBAR: ICD-10-CM

## 2023-02-10 DIAGNOSIS — G89.4 CHRONIC PAIN SYNDROME: ICD-10-CM

## 2023-02-10 DIAGNOSIS — G89.29 OTHER CHRONIC PAIN: ICD-10-CM

## 2023-02-10 DIAGNOSIS — M47.816 LUMBAR FACET ARTHROPATHY: ICD-10-CM

## 2023-02-10 DIAGNOSIS — M54.6 THORACIC SPINE PAIN: ICD-10-CM

## 2023-02-10 DIAGNOSIS — M54.50 CHRONIC BILATERAL LOW BACK PAIN WITHOUT SCIATICA: ICD-10-CM

## 2023-02-10 DIAGNOSIS — G89.29 CHRONIC MIDLINE LOW BACK PAIN WITH SCIATICA, SCIATICA LATERALITY UNSPECIFIED: ICD-10-CM

## 2023-02-10 DIAGNOSIS — M47.812 CERVICAL FACET JOINT SYNDROME: ICD-10-CM

## 2023-02-10 DIAGNOSIS — G89.29 CHRONIC BILATERAL LOW BACK PAIN WITHOUT SCIATICA: ICD-10-CM

## 2023-02-10 PROCEDURE — 99213 OFFICE O/P EST LOW 20 MIN: CPT | Performed by: PHYSICIAN ASSISTANT

## 2023-02-10 RX ORDER — GABAPENTIN 400 MG/1
400 CAPSULE ORAL 2 TIMES DAILY
Qty: 180 CAPSULE | Refills: 1 | Status: SHIPPED | OUTPATIENT
Start: 2023-02-10 | End: 2023-05-11

## 2023-02-10 RX ORDER — HYDROCODONE BITARTRATE AND ACETAMINOPHEN 5; 325 MG/1; MG/1
1 TABLET ORAL 2 TIMES DAILY
Qty: 60 TABLET | Refills: 0 | Status: SHIPPED | OUTPATIENT
Start: 2023-02-10 | End: 2023-03-12

## 2023-02-10 NOTE — PROGRESS NOTES
[facility]  [address] [place] [id number]    Follow up Note      Eleanor Holder     Date of Visit:  2/10/2023    CC:  Patient presents for follow up   Chief Complaint   Patient presents with    Follow Up After Procedure     CERVICAL EPIDURAL STEROID INJECTION C7-T1    Neck Pain                 HPI:    Pain is a little better to her neck but still having left sided midline and myofascial pain.    Change in quality of symptoms:no.    Patient satisfaction with analgesia:fair.    Medication side effects: None.  Recent diagnostic testing:none.   Recent interventional procedures: 2023 PROCEDURE: Fluoroscopic guided cervical epidural steroid injection at C7-T1 level. - 40% relief at this point.    She has been on anticoagulation medications to include NSAIDS. The patient  has not been on herbal supplements.  The patient is diabetic.    Imagin2022 Cervical spine x-ray    FINDINGS:   Multilevel severe degenerative facet arthropathy.  Mild degenerative disc   disease primarily at C5-6.  Normal soft tissues.  No fracture or dislocation.           Impression   Degenerative spondylosis with severe multilevel degenerative facet   arthropathy.           2022 Thoracic Spine X-ray    FINDINGS:   Thoracic vertebral bodies are normal in height and alignment.  Multilevel   degenerative changes.  No evidence of fracture. Pedicles are symmetric and   intact.       Visualized lungs are clear.           Impression   No acute abnormality of the thoracic spine       Multilevel degenerative changes.         2021 CT lumbar myelogram    FINDINGS:   BONES/ALIGNMENT: There is minimal levocurvature of the lumbar spine.  There   is posterior fixation from L2-L4 without evidence for hardware complication.   The vertebral body heights are maintained.  There is minimal retrolisthesis   of L2 on L3.       SOFT TISSUES: The posterior paraspinal soft tissues are unremarkable.  The   visualized  abdominal structures are unremarkable. The conus is normal in   caliber and terminates at L1. The cauda equina is unremarkable. L1-L2: There is no significant disc protrusion, central spinal canal stenosis   or neural foraminal narrowing. L2-L3: There is artificial disc material with posterior laminectomy. There   is no canal stenosis or left foraminal narrowing. There is moderate right   foraminal narrowing. L3-L4: There is artificial disc material and posterior laminectomy. There is   no canal stenosis. There is mild bilateral foraminal narrowing. L4-L5: There is artificial disc material with posterior laminectomy. There   is no canal stenosis. There is mild left and moderate right foraminal   narrowing. L5-S1: There is a circumferential disc bulge with facet hypertrophy. There   is no canal stenosis. There is moderate right and severe left foraminal   narrowing. Impression   Posterior fixation and laminectomy from L2-L4 without evidence for hardware   complication. Multilevel degenerative change with bilateral foraminal narrowing as   described above. MRI lumbar spine 2018  1. No significant interval change is observed since the previous study   of 2017.       2. Stable postoperative changes/posterior spinal fusion at the   L3-L4-L5 level. 3. Severe spine canal stenosis in the level of L2-L3           4. Encroachment of the neural foramina bilaterally in levels of L2-L3   and L5-S1      Thoracic spine MRI 2018  1. Some degenerative changes in the thoracic spine, mainly in the   facet joints in the mid-upper thoracic spine segments       2. Discrete loss of height of T6, more likely an old finding. Previous treatments: Physical Therapy, Surgery L3-5 fusion/laminectomy and medications. .       Potential Aberrant Drug-Related Behavior:  No     Urine Drug Screenin18:  Consistent for norhydrocodone metabolite  12/4/2018:  Consistent for hydrocodone and norhydrocodone  05/10/2019:  Consistent for hydrocodone and norhydrocodone  09/25/2019:  Consistent for hydrocodone and norhydrocodone  01/10/2020:  Consistent for hydrocodone and norhydrocodone  06/2020:  Consistent for oxycodone and metabolites s/p surgery. Inconsistent for hydrocodone. States that she was instructed to take her old norco until she made the appointment to resume her chronic pain medications. 10/2020:  Consistent  07/2021:  Consistent  01/2022:  Consistent  01/2023:  Consistent     OARRS report:  05/2018 consistent to 05/2021 consistent (norco scripts from [facility] post op 3/10/2021 and 3/24/2021.    [place] [facility] - last one 06/01/2021 07/2021 - consistent to 02/2023 consistent     Opioid agreement:  10/18/2021  Updated 10/04/2022      Past Medical History:   Diagnosis Date    Cancer ([facility] [address] 12/2019    LESION REMOVED UNDER TONGUE  DENTAL CLINIC    Chronic back pain     Costochondritis     h/o    Depression     Diabetes mellitus ([facility] [address]     Falls     Last fall Feb 2021    Fibromyalgia     Hallux rigidus of left foot     HTN (hypertension)     Hyperlipidemia     CLYDE on CPAP     Osteoarthritis     \"everywhere\"    Rheumatoid arthritis(714.0)     TIA (transient ischemic attack)     no deficits        Past Surgical History:   Procedure Laterality Date    ANESTHESIA NERVE BLOCK Left 02/26/2019    LEFT C3,4,5 MBB performed by Samuel Lu MD at [address] [place] [address],[facility] Right 08/12/2019    BILATERAL TRANSFORAMINAL EPIDURAL STEROID INJECTION S1 #3 performed by Samuel Lu MD at [facility] Right 06/16/2020    RIGHT SACROILIAC JOINT INJECTION      CPT: 93323 performed by Debra Powell DO at [address] [facility]  2005    Left    ANKLE SURGERY Left 7/8/2022    REPAIR OF ANTERIOR TALAR LIGAMENT LEFT ANKLE, REPAIR DELTOID LIGAMENT LEFT ANKLE performed by Álvaro Patel DPM at [address] 12/14/2018    ARTHRODESIS 2ND DIGIT LEFT FOOT performed by Richa Smith DPM at [address]  08/16/2017    PLIF L3-L4, L4-L5 with rods, screws, and cages/Dr. Garrison [place] [facility]  03/04/2020    BACK SURGERY Right 03/09/2021    RIGHT PERCUTANEOUS SACROILIAC JOINT FUSION performed by John Denise MD at [address]  2010    reduction, [address] [facility] [address]    CHOLECYSTECTOMY  2011    COLONOSCOPY  03/27/2015    COLONOSCOPY N/A 08/19/2020    COLONOSCOPY DIAGNOSTIC performed by Marlene Wolf MD at [address]  03/2021    SI Joint    ENDOSCOPY, COLON, DIAGNOSTIC      EPIDURAL STEROID INJECTION N/A 06/04/2019    BILATERAL TRANSFORAMINAL EPIDURAL STEROID INJECTION S1 performed by Inez Durant DO at [facility] / ANKLE Left 12/14/2018    REMOVAL OF PAINFUL HARDWARE LEFT FOOT  ++SYNTHES++ performed by Richa Smith DPM at [address] [facility]    inguinal     LUMBAR SPINE SURGERY N/A 03/04/2020    EXPLORATION OF PRIOR L3-L5 FUSION AND L2-L3  POSTERIOR LUMBAR INTERBODY FUSION -- NEEDS O-ARM, AUDIOLOGY, CAGES, PLATES, SCREWS, C-ARM, TERESA TABLE, CELL SAVER, PLATELET GEL -- GLOBUS performed by John Denise MD at [address] 10/21/2020    EXCISION OF TONGUE LESION performed by Raul De La Cruz DO at [address] Bilateral 05/07/2018    L1-2 lumbar foramen #1    NERVE BLOCK Bilateral 12/03/2018    s1 tfesi    NERVE BLOCK Bilateral 08/12/2019    NERVE BLOCK Right 09/30/2019    sacral radiofrequency    NERVE BLOCK Right 09/01/2020    RIGHT SACROILIAC JOINT INJECTION #2 performed by Inez Durant DO at [address] Left 09/25/2013    left foot tarso metatarsal joint injection    OTHER SURGICAL HISTORY Left 05/27/2015    Endoscopic Gastroc recession left, Lapidus left foot and  Excision of exostosis left foot    PAIN MANAGEMENT PROCEDURE Left 7/27/2021    LEFT L5-S1 TRANSFORAMINAL EPIDURAL STEROID INJECTION performed by Debra Powell, DO at [address] Left 3/29/2022    LEFT TRANSFORAMINAL EPIDURAL STEROID INJECTION AT L5 AND S1 performed by Debra Powell, DO at [address] N/A 6/9/2022    LUMBAR EPIDURAL STEROID INJECTION L5-S1 performed by Debra Powell, DO at [address] N/A 10/27/2022    BILATERAL L5 LUMBAR TRANSFORAMINAL EPIDURAL STEROID INJECTION performed by Debra Powell, DO at [address] N/A 1/12/2023    CERVICAL EPIDURAL STEROID INJECTION C7-T1 performed by Debra Powell, DO at [address] AA&/STRD TFRML EPI LUMBAR/SACRAL 1 LEVEL Bilateral 12/03/2018    BILATERAL S1  EPIDURAL STEROID INJECTION performed by Samuel Lu MD at [place] [address] DX/THER SBST INTRLMNR LMBR/SAC W/IMG GDN N/A 08/21/2018    EPIDURAL STEROID INJECTION L1-2 WITH LOW VOL performed by Samuel Lu MD at [address] [address] [facility] [address] TIP ONLY Bilateral 05/07/2018    BILATERAL L1-2 LUMBAR FORAMEN #1 performed by Samuel Lu MD at [address] [facility] [facility] [address] [facility] Bilateral 06/04/2018    BILATERAL TRANSFORAMINAL EPIDURAL STEROID INJECTION UNDER FLUOROSCOPIC GUIDANCE @ FORAMINAL LEVEL L1-2 #2 performed by Samuel Lu MD at [address] Right 09/30/2019    RIGHT SACRAL RADIOFREQUENCY ABLATION performed by Samuel Lu MD at [place] [address]    Big Left Toe    TOE SURGERY Left 2018    2nd toe     TONSILLECTOMY      WISDOM TOOTH EXTRACTION         Prior to Admission medications    Medication Sig Start Date End Date Taking? Authorizing Provider   HYDROcodone-acetaminophen (NORCO) 5-325 MG per tablet Take 1 tablet by mouth 2 times daily for 30 days.  1/11/23 2/10/23 Yes TREASURE Culp   hydroCHLOROthiazide (MICROZIDE) 12.5 MG capsule TAKE ONE CAPSULE BY MOUTH ONCE DAILY FOR BLOOD PRESSURE 11/14/22  Yes Barbie Jang MD   lisinopril (PRINIVIL;ZESTRIL) 40 MG tablet Take 1 tablet by mouth every evening 11/14/22  Yes Barbie Jang MD   metFORMIN (GLUCOPHAGE) 500 MG tablet Take 1 tablet by mouth daily (with breakfast) Start with once daily during the first week. 11/14/22  Yes Barbie Jang MD   gabapentin (NEURONTIN) 400 MG capsule Take 1 capsule by mouth 2 times daily for 90 days. 11/14/22 2/12/23 Yes Barbie Jang MD   linagliptin (TRADJENTA) 5 MG tablet Take 1 tablet by mouth daily 11/14/22  Yes Barbie Jang MD   hydrOXYzine pamoate (VISTARIL) 25 MG capsule Take 1 capsule by mouth 3 times daily as needed for Anxiety 7/29/22  Yes Barbie Jang MD   atorvastatin (LIPITOR) 40 MG tablet Take 1 tablet by mouth daily 6/20/22  Yes Barbie Jang MD   Handicap Placard MISC DX:  Loss of Balance, Chronic low back pain, s/p back surgery.    Duration of 5 years 2/22/21  Yes Barbie Jang MD   meloxicam [facility]) 7.5 MG tablet Take 1 tablet by mouth daily  Patient not taking: Reported on 2/10/2023 1/30/23   ALEXANDRO Owen - CNP       Allergies   Allergen Reactions    Pcn [Penicillins] Anaphylaxis       Social History     Socioeconomic History    Marital status:      Spouse name: Not on file    Number of children: Not on file    Years of education: Not on file    Highest education level: Not on file   Occupational History    Not on file   Tobacco Use    Smoking status: Every Day     Packs/day: 0.50     Years: 30.00     Pack years: 15.00     Types: Cigarettes    Smokeless tobacco: Never   Vaping Use    Vaping Use: Never used   Substance and Sexual Activity    Alcohol use: Not Currently     Alcohol/week: 0.0 standard drinks     Comment: rarely    Drug use: No    Sexual activity: Not on file   Other Topics Concern    Not on file   Social History Narrative    Not on file     Social Determinants of Health     Financial Resource Strain: Not on file   Food Insecurity: Not on file   Transportation Needs: Not on file   Physical Activity: Inactive    Days of Exercise per Week: 0 days    Minutes of Exercise per Session: 0 min   Stress: Not on file   Social Connections: Not on file   Intimate Partner Violence: Not on file   Housing Stability: Not on file       Family History   Problem Relation Age of Onset    Dementia Mother     Breast Cancer Mother     Cancer Mother         breast cancer [de-identified]     Stroke Mother     Heart Attack Father     Other Father 80        Aortic aneurysm    Cancer Brother     Diabetes Brother        REVIEW OF SYSTEMS:     Ana Rosa Rajan denies fever/chills, chest pain, shortness of breath, new bowel or bladder complaints or suicidal ideations. All other review of systems was negative. PHYSICAL EXAMINATION:      BP (!) 142/87   Pulse (!) 117   Temp 97.6 °F (36.4 °C) (Infrared)   Resp 16   Ht 5' 5\" (1.651 m)   Wt 200 lb (90.7 kg)   LMP  (LMP Unknown)   SpO2 94%   BMI 33.28 kg/m²     General:      General appearance: awake, alert, oriented, in no acute distress, well developed, well nourished and in no acute distress   pleasant and well-hydrated. in no distress and A & O x3  Build:Overweight  Function:Rises from a seated position with difficulty    HEENT:    Head:normocephalic and atraumatic  Pupils:regular, round and equal.  Sclera: icterus absent  EOM:full and intact. Lungs:    Breathing:Normal expansion. No wheezing. Abdomen:    Shape:non-distended and normal    Cervical spine:  + midline and paraspinal TTP b/l  Full strength 5/5  Sensation intact b/l hands      Lumbar spine:                Range of motion:abnormal moderately Lateral bending, flexion, extension rotation bilateral and is painful.     Extremities:    Tremors:None bilaterally upper and lower  Range of motion:Generally normal shoulders  Intact:Yes  Cyanosis:none  Edema:Normal      Neurological:    Cranial nerves:normal  Sensory:diminished to light lateral aspect of right leg                   Dermatology:    Skin:no unusual rashes, no skin lesions, no palpable subcutaneous nodules and good skin turgor    Assessment/Plan:      Chronic low back pain with radiation to the Right groin, patient had low back surgery 08/2017 L3-5 fusion, recently had lumbar spine CT with severe L2-3 stenosis     Patient is s/p left ankle surgery on 7/8. Back in cam walker boot. Plan:  Patient is s/p revision fusion L2-L4 on 3/4/2020. Patient is s/p:  Right sacroiliac joint fusion with use of SI-BONE iFuse implant on 3/9/2021 by Dr. Amada Jacobs. Patient is s/p:  Left TFESI L5 and S1 on 03/29/2022 with 75% relief. Doing relatively well at this point. LESI L5-S1 on 06/09/2022 with good relief until recently. Repeat with about 40% relief. She would like to hold off any further injections at this point. Cervical TPIs with  diminished relief at this time. ROGELIO C7-T1 with 40% relief. MRI cervical spine ordered for worsening neck pain despite epidural.  Radicular symptoms noted. Continue norco 5/325 BID. Continue with Gabapentin 400 mg BID. She reports that any increases make her off balance. OARRS report reviewed   UDS reviewed and is consistent  Hold flexeril for now. Doing well without it. Patient encouraged to stay active and to lose weight. Failed physical therapy  Treatment plan discussed with the patient including medications side effects         Controlled Substance Monitoring:    Acute and Chronic Pain Monitoring:   RX Monitoring 2/10/2023   Attestation -   Acute Pain Prescriptions -   Periodic Controlled Substance Monitoring Possible medication side effects, risk of tolerance/dependence & alternative treatments discussed. ;No signs of potential drug abuse or diversion identified. ;Assessed functional status. ;Obtaining appropriate analgesic effect of treatment.    Chronic Pain > 80 MEDD -               ccreferring physic

## 2023-02-10 NOTE — PROGRESS NOTES
Do you currently have any of the following:    Fever: No  Headache:  No  Cough: No  Shortness of breath: No  Exposed to anyone with these symptoms: No         Magdy Beverage presents to the [address] [place] [address] on 2/10/2023. [facility] is complaining of pain neck. The pain is constant. The pain is described as aching, throbbing, shooting, stabbing, and sharp. Pain is rated on her best day at a 8, on her worst day at a 10, and on average at a 10 on the VAS scale. She took her last dose of Norco and Neurontin today. Any procedures since your last visit: Yes  Pacemaker or defibrillator: No .    She is not on NSAIDS and is not on anticoagulation medications. Medication Contract and Consent for Opioid Use Documents Filed       Patient Documents       Type of Document Status Date Received Received By Description    Medication Contract Received  Blanche Bae Opioid medication contract with Dr Amada Jacobs 3/24/20    Medication Contract Received 4/26/2018  3:50 PM ANYA NUNO PAIN MANAGEMENT PATIENT AGREEMENT 4/26/2018    Medication Contract Received 11/5/2020  4:23 PM EBER HONG Opioid medication contract with Dr Wilfredo Lanier Received 3/1/2021  1:26 PM Blanche Bae Opioid medication contract with Dr Wilfredo Lanier Received 8/23/2021  2:03 PM Jason Chapa Medication contract    Medication Contract Received 10/18/2021  3:03 PM Jason Chapa medication contract 10/18/2021    Medication Contract Signed 10/4/2022 12:35 PM CRISTIANA BAÑUELOS Pain Med. Contract 10/04/22                    BP (!) 142/87   Pulse (!) 117   Temp 97.6 °F (36.4 °C) (Infrared)   Resp 16   Ht 5' 5\" (1.651 m)   Wt 200 lb (90.7 kg)   LMP  (LMP Unknown)   SpO2 94%   BMI 33.28 kg/m²      No LMP recorded (lmp unknown).  Patient is postmenopausal.

## 2023-02-13 ENCOUNTER — OFFICE VISIT (OUTPATIENT)
Dept: FAMILY MEDICINE CLINIC | Age: 68
End: 2023-02-13

## 2023-02-13 VITALS
BODY MASS INDEX: 32.65 KG/M2 | HEIGHT: 65 IN | SYSTOLIC BLOOD PRESSURE: 124 MMHG | TEMPERATURE: 98.1 F | DIASTOLIC BLOOD PRESSURE: 84 MMHG | OXYGEN SATURATION: 96 % | HEART RATE: 120 BPM | WEIGHT: 196 LBS

## 2023-02-13 DIAGNOSIS — E11.9 TYPE 2 DIABETES MELLITUS WITHOUT COMPLICATION, WITHOUT LONG-TERM CURRENT USE OF INSULIN (HCC): Primary | ICD-10-CM

## 2023-02-13 DIAGNOSIS — M79.89 SWOLLEN FINGER: ICD-10-CM

## 2023-02-13 LAB — HBA1C MFR BLD: 6.6 %

## 2023-02-13 NOTE — PROGRESS NOTES
[facility]  Office Progress Note - Dr. Moreland Floor  2/13/23    CC:   Chief Complaint   Patient presents with    Diabetes        /84   Pulse (!) 120   Temp 98.1 °F (36.7 °C) (Temporal)   Ht 5' 5\" (1.651 m)   Wt 196 lb (88.9 kg)   LMP  (LMP Unknown)   SpO2 96%   BMI 32.62 kg/m²   Wt Readings from Last 3 Encounters:   02/13/23 196 lb (88.9 kg)   02/10/23 200 lb (90.7 kg)   01/30/23 200 lb (90.7 kg)       HPI: Diabetes mellitus  Follow-up  Added tradjenta last visit. Couldn't tolerate metformin BID. Lab Results   Component Value Date    LABA1C 6.6 02/13/2023    LABA1C 8.0 11/14/2022    LABA1C 7.6 (H) 06/20/2022     Lab Results   Component Value Date    LABMICR <12.0 06/20/2022    LDLCALC 97 06/20/2022    CREATININE 0.9 07/08/2022     Wt Readings from Last 3 Encounters:   02/13/23 196 lb (88.9 kg)   02/10/23 200 lb (90.7 kg)   01/30/23 200 lb (90.7 kg)     Patient continues diabetic medication regimen. Compliance is good. No side effects of medications noted. Diabetes is adequately controlled. Peripheral neuropathy is not present. Regular foot exams encouraged. Vision changes are not present. Yearly eye exam encouraged. Shes working with pain mgmt on facet arthritis in her cerv spine   She also has occ radiation down her arm. Considering trigger point injections and facet injections, maintenance over time. MRI cerv spine pending next week. Shes got a swollen finger / MCP after an injury with carrying heavy bags. Sees Ortho / sports med tomorrow. Says did have gout episode Dx in the historical past before.   _________________________________________________________    Assessment / Vlad Bobo was seen today for diabetes. Diagnoses and all orders for this visit:    Type 2 diabetes mellitus without complication, without long-term current use of insulin (HCC)  -     POCT glycosylated hemoglobin (Hb A1C) = 6.6  -     linagliptin (TRADJENTA) 5 MG tablet;  Take 1 tablet by mouth daily  -     CBC with Auto Differential; Future  -     Comprehensive Metabolic Panel; Future  -     Lipid Panel; Future  -     Microalbumin / Creatinine Urine Ratio; Future  Tradjenta working well  She wants to come off the once daily metformin. So we'll do that. FU in 4 months or so. Swollen finger  -     Uric Acid; Future  Decadron injection from ED did not help pain. Sees sports med tomorrow. 4 months or as scheduled. Patient counseled to follow up sooner or seek more acute care if symptoms worsening or not improving according to plan. Electronically signed by Tobias Anderson MD on 2/13/2023    _________________________________________________________  Current Outpatient Medications on File Prior to Visit   Medication Sig Dispense Refill    HYDROcodone-acetaminophen (NORCO) 5-325 MG per tablet Take 1 tablet by mouth 2 times daily for 30 days. 60 tablet 0    gabapentin (NEURONTIN) 400 MG capsule Take 1 capsule by mouth 2 times daily for 90 days. 180 capsule 1    hydroCHLOROthiazide (MICROZIDE) 12.5 MG capsule TAKE ONE CAPSULE BY MOUTH ONCE DAILY FOR BLOOD PRESSURE 90 capsule 1    lisinopril (PRINIVIL;ZESTRIL) 40 MG tablet Take 1 tablet by mouth every evening 90 tablet 1    hydrOXYzine pamoate (VISTARIL) 25 MG capsule Take 1 capsule by mouth 3 times daily as needed for Anxiety 270 capsule 1    atorvastatin (LIPITOR) 40 MG tablet Take 1 tablet by mouth daily 90 tablet 3    Handicap Placard MISC DX:  Loss of Balance, Chronic low back pain, s/p back surgery. Duration of 5 years 1 each 0     No current facility-administered medications on file prior to visit. Patient Active Problem List   Diagnosis Code    Loss of balance R26.89    Vertebrobasilar TIAs G45.0    Obesity E66.9    Disc displacement, lumbar M51.26    Peripheral neuropathy (HCC) G62.9    Type 2 diabetes mellitus without complication ([facility] S84.7    Depression F32. A    Osteoarthritis M19.90    Chronic back pain M54.9, G89.29    Fibromyalgia M79.7    HTN (hypertension) I10    Hyperlipidemia E78.5    History of adenomatous polyp of colon Z86.010    Vitamin D deficiency E55.9    Coccyx pain M53.3    Acute bilateral low back pain with sciatica M54.40    Lumbar stenosis M48.061    Chronic bilateral low back pain without sciatica M54.50, G89.29    DDD (degenerative disc disease), lumbar M51.36    Spinal stenosis of lumbar region without neurogenic claudication M48.061    Lumbar facet arthropathy M47.816    Chronic pain syndrome G89.4    Lumbar radiculopathy M54.16    Neck pain M54.2    Left foot pain M79.672    Painful orthopaedic hardware [facility]) T84.84XA    Cervical facet joint syndrome M47.812    Cellulitis, neck L03.221    Disorder of sacrum M53.3    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    S/P lumbar spinal fusion Z98.1    Tongue lesion K14.8    Sacroiliac joint dysfunction M53.3    Tear of deltoid ligament of left ankle S93.422A    Sprain of anterior talofibular ligament of left ankle S93.492A    Cervical radiculopathy M54.12     _________________________________________________________  Past Medical History:   Diagnosis Date    Cancer ([facility] [address] 12/2019    LESION REMOVED UNDER TONGUE  DENTAL CLINIC    Chronic back pain     Costochondritis     h/o    Depression     Diabetes mellitus ([facility] [address] [facility]     Falls     Last fall Feb 2021    Fibromyalgia     Hallux rigidus of left foot     HTN (hypertension)     Hyperlipidemia     CLYDE on CPAP     Osteoarthritis     \"everywhere\"    Rheumatoid arthritis(714.0)     TIA (transient ischemic attack)     no deficits        Family History   Problem Relation Age of Onset    Dementia Mother     Breast Cancer Mother     Cancer Mother         breast cancer [de-identified]     Stroke Mother     Heart Attack Father     Other Father 80        Aortic aneurysm    Cancer Brother     Diabetes Brother        Past Surgical History:   Procedure Laterality Date    ANESTHESIA NERVE BLOCK Left 02/26/2019    LEFT C3,4,5 MBB performed by Olga Hannah MD at [address] [facility] [place] Right 08/12/2019    BILATERAL TRANSFORAMINAL EPIDURAL STEROID INJECTION S1 #3 performed by Olga Hannah MD at [facility] Right 06/16/2020    RIGHT SACROILIAC JOINT INJECTION      CPT: 31413 performed by Bairon Vaughan DO at [address]  2005    Left    ANKLE SURGERY Left 7/8/2022    REPAIR OF ANTERIOR TALAR LIGAMENT LEFT ANKLE, REPAIR DELTOID LIGAMENT LEFT ANKLE performed by Natalia Flores DPM at [place] [address] Left 12/14/2018    ARTHRODESIS 2ND DIGIT LEFT FOOT performed by Natalia Flores DPM at [address]  08/16/2017    PLIF L3-L4, L4-L5 with rods, screws, and cages/Dr. Jeri Parada SURGERY  03/04/2020    BACK SURGERY Right 03/09/2021    RIGHT PERCUTANEOUS SACROILIAC JOINT FUSION performed by Adis Shabazz MD at [address] [facility] [address]  2010    reduction, [address]  2011    COLONOSCOPY  03/27/2015    COLONOSCOPY N/A 08/19/2020    COLONOSCOPY DIAGNOSTIC performed by Terri Boyce MD at [address] [facility] [place]  03/2021    SI Joint    ENDOSCOPY, COLON, DIAGNOSTIC      EPIDURAL STEROID INJECTION N/A 06/04/2019    BILATERAL TRANSFORAMINAL EPIDURAL STEROID INJECTION S1 performed by Bairon Vaughan DO at [facility]. / ANKLE Left 12/14/2018    REMOVAL OF PAINFUL HARDWARE LEFT FOOT  ++SYNTHES++ performed by Natalia Flores DPM at [address] [facility]    inguinal     LUMBAR SPINE SURGERY N/A 03/04/2020    EXPLORATION OF PRIOR L3-L5 FUSION AND L2-L3  POSTERIOR LUMBAR INTERBODY FUSION -- NEEDS O-ARM, AUDIOLOGY, CAGES, PLATES, SCREWS, C-ARM, TERESA TABLE, CELL SAVER, PLATELET GEL -- GLOBUS performed by Adis Shabazz MD at [address] 10/21/2020    EXCISION OF TONGUE LESION performed by Maryse Turner DO at [address] [facility] Bilateral 05/07/2018 L1-2 lumbar foramen #1    NERVE BLOCK Bilateral 12/03/2018    s1 tfesi    NERVE BLOCK Bilateral 08/12/2019    NERVE BLOCK Right 09/30/2019    sacral radiofrequency    NERVE BLOCK Right 09/01/2020    RIGHT SACROILIAC JOINT INJECTION #2 performed by Kristina Tim, DO at [address] [facility] Left 09/25/2013    left foot tarso metatarsal joint injection    OTHER SURGICAL HISTORY Left 05/27/2015    Endoscopic Gastroc recession left, Lapidus left foot and  Excision of exostosis left foot    PAIN MANAGEMENT PROCEDURE Left 7/27/2021    LEFT L5-S1 TRANSFORAMINAL EPIDURAL STEROID INJECTION performed by Kristina Tim, DO at [address] Left 3/29/2022    LEFT TRANSFORAMINAL EPIDURAL STEROID INJECTION AT L5 AND S1 performed by Kristina Tim, DO at [address] N/A 6/9/2022    LUMBAR EPIDURAL STEROID INJECTION L5-S1 performed by Kristina Tim, DO at [address] N/A 10/27/2022    BILATERAL L5 LUMBAR TRANSFORAMINAL EPIDURAL STEROID INJECTION performed by Kristina Tim, DO at [address] N/A 1/12/2023    CERVICAL EPIDURAL STEROID INJECTION C7-T1 performed by Kristina Tim, DO at [address] AA&/STRD TFRML EPI LUMBAR/SACRAL 1 LEVEL Bilateral 12/03/2018    BILATERAL S1  EPIDURAL STEROID INJECTION performed by Whitley Maxwell MD [place] [facility] DX/THER SBST INTRLMNR LMBR/SAC W/IMG GDN N/A 08/21/2018    EPIDURAL STEROID INJECTION L1-2 WITH LOW VOL performed by Whitley Maxwell MD at [address] [address]COLUM [address] [facility] [address] TIP ONLY Bilateral 05/07/2018    BILATERAL L1-2 LUMBAR FORAMEN #1 performed by Whitley Maxwell MD at [address] [facility] W[address] [facility] Bilateral 06/04/2018    BILATERAL TRANSFORAMINAL EPIDURAL STEROID INJECTION UNDER FLUOROSCOPIC GUIDANCE @ FORAMINAL LEVEL L1-2 #2 performed by Whitley Maxwell MD at [address] Right 09/30/2019 RIGHT SACRAL RADIOFREQUENCY ABLATION performed by Ariel Hanson MD at [address]  [address], 2005    Big Left Toe    TOE SURGERY Left 2018    2nd toe     TONSILLECTOMY      WISDOM TOOTH EXTRACTION         Social History     Tobacco Use    Smoking status: Every Day     Packs/day: 0.50     Years: 30.00     Pack years: 15.00     Types: Cigarettes    Smokeless tobacco: Never   Vaping Use    Vaping Use: Never used   Substance Use Topics    Alcohol use: Not Currently     Alcohol/week: 0.0 standard drinks     Comment: rarely    Drug use: No       Chart reviewed and updated where appropriate for PMH, Fam, and Soc Hx.  _________________________________________________________  ROS: POSITIVE: As in the HPI. Otherwise Pertinent negatives are negative.    __________________________________________________________  Physical Exam   Constitutional:    She is oriented to person, place, and time. She appears well-developed and well-nourished. Eyes:    Conjunctivae are normal.    Pupils are equal, round, and reactive to light. EOMI. Neck:    Normal range of motion. No thyromegaly or nodules noted. No bruit. No LAD. Cardiovascular:    Normal rate, regular rhythm and normal heart sounds. No murmur. No gallop and no friction rub. Pulmonary/Chest:    Effort normal and breath sounds normal.    No wheezes. No rales or rhonchi. Abdominal:    Soft. Bowel sounds are normal.    No distension. No tenderness. Musculoskeletal:    Normal range of motion. Left 2nd MCP joint swelling and pain   No joint swelling noted. No peripheral edema. Skin:    Skin is warm and dry. No rashes, No lesions. Psychiatric:    She has a normal mood and affect. Normal groom and dress. No SI or HI.   ________________________________________________________    This note may have been created using dictation software.  Efforts were made to reduce errors, but some may persist.

## 2023-02-14 ENCOUNTER — OFFICE VISIT (OUTPATIENT)
Dept: ORTHOPEDIC SURGERY | Age: 68
End: 2023-02-14

## 2023-02-14 VITALS — HEIGHT: 65 IN | BODY MASS INDEX: 32.65 KG/M2 | WEIGHT: 196 LBS

## 2023-02-14 DIAGNOSIS — M65.322 TRIGGER INDEX FINGER OF LEFT HAND: ICD-10-CM

## 2023-02-14 DIAGNOSIS — M79.642 LEFT HAND PAIN: Primary | ICD-10-CM

## 2023-02-14 RX ORDER — LIDOCAINE HYDROCHLORIDE 10 MG/ML
1 INJECTION, SOLUTION INFILTRATION; PERINEURAL ONCE
Status: COMPLETED | OUTPATIENT
Start: 2023-02-14 | End: 2023-02-14

## 2023-02-14 RX ORDER — BETAMETHASONE SODIUM PHOSPHATE AND BETAMETHASONE ACETATE 3; 3 MG/ML; MG/ML
6 INJECTION, SUSPENSION INTRA-ARTICULAR; INTRALESIONAL; INTRAMUSCULAR; SOFT TISSUE ONCE
Status: COMPLETED | OUTPATIENT
Start: 2023-02-14 | End: 2023-02-14

## 2023-02-14 RX ADMIN — BETAMETHASONE SODIUM PHOSPHATE AND BETAMETHASONE ACETATE 6 MG: 3; 3 INJECTION, SUSPENSION INTRA-ARTICULAR; INTRALESIONAL; INTRAMUSCULAR; SOFT TISSUE at 13:57

## 2023-02-14 RX ADMIN — LIDOCAINE HYDROCHLORIDE 1 ML: 10 INJECTION, SOLUTION INFILTRATION; PERINEURAL at 13:57

## 2023-02-14 NOTE — PROGRESS NOTES
PROCEDURE NOTE:    DIAGNOSIS    LEFT trigger finger, INDEX finger    PROCEDURE    Ultrasound-guided LEFT INDEX finger flexor tendon sheath steroid injection at the A1 pulley. PROCEDURAL PAUSE    Procedural pause conducted to verify: correct patient identity, procedure to be performed, and as applicable, correct side and site, correct patient position, and availability of implants, special equipment, or special requirements. PROCEDURE DETAILS    The procedure was carried out under sterile technique. Patient Position: seated    Localization Process: The A1 pulley was evaluated under ultrasound prior to starting the procedure. The skin was prepped with Betadine and Alcohol. Approach: In-plane. Injection: A 25-gauge 2-inch needle was advanced from an in-plane, distal to proximal approach into the flexor tendon sheath at the a1 pulley. After visualization of the needle tip in the target area and negative aspiration for blood, a mixture of 0.5 cc of 1% lidocaine and 0.5 cc of betamethasone (6 mg/cc) was injected into the tendon sheath at the a1 pulley with excellent sonographic flow. Images of procedure were permanently recorded. Postprocedure Care: The patient will avoid heavy exertion and avoid soaking the area under water for two days. The patient will contact me with any problems related to the injection. PATIENT EDUCATION    Ready to learn, no apparent learning barriers were identified; learning preferences include listening. Explained diagnosis and treatment plan; patient expressed understanding of the content. INFORMED CONSENT    Discussed the risks, benefits, alternatives, and the necessity of other members of the healthcare team participating in the procedure. All questions answered and consent given.     Following denial of allergy and review of potential side effects and complications including but not necessarily limited to infection, allergic reaction, local tissue breakdown, aseptic effusion potentially necessitating aspiration and corticosteroid injection, elevation of blood glucose, injury to soft tissue and/or nerves, and seizure, the patient indicated their understanding and agreed to proceed.     FOLLOW-UP    Follow up in 6-8 weeks    Electronically signed by Natalie Vyas MD on 2/14/2023 at 12:10 PM

## 2023-02-14 NOTE — PROGRESS NOTES
[facility]  PRIMARY CARE SPORTS MEDICINE  DATE OF VISIT : 2023    Patient : Jhon Lee  Age : 74 y.o.  : 1955  MRN : [id number]   ______________________________________________________________________    Chief Complaint :   Chief Complaint   Patient presents with    Hand Pain     (L) hand pain. Patient states that her hand started to become painful after carrying heavy bags into her apartment. Patient has had XR which was negative. Patient has had IM injection. Pain is entire median distrubution. HPI : Jhon Lee is 74 y.o. female who presented to the clinic today for evaluation of left hand pain. Onset of the symptoms was several months ago, with no known mechanism of injury. Initially only pain was present but now pain and catching. Patient denies numbness and tingling. Pain is aggravated by any repetitive use of the left upper extremity. Evaluation to date: XRs of the left hand which demonstrate no acute fractures or dislocations. Treatment to date: avoidance of offending activity and OTC analgesics which are not very effective.      Past Medical History :  Past Medical History:   Diagnosis Date    Cancer ([facility] [address] 2019    LESION REMOVED UNDER TONGUE  DENTAL CLINIC    Chronic back pain     Costochondritis     h/o    Depression     Diabetes mellitus ([facility] [address]     Falls     Last fall 2021    Fibromyalgia     Hallux rigidus of left foot     HTN (hypertension)     Hyperlipidemia     CLYDE on CPAP     Osteoarthritis     \"everywhere\"    Rheumatoid arthritis(714.0)     TIA (transient ischemic attack)     no deficits      Past Surgical History:   Procedure Laterality Date    ANESTHESIA NERVE BLOCK Left 2019    LEFT C3,4,5 MBB performed by Liborio Vital MD at [address] [place] [address],[facility] Right 2019    BILATERAL TRANSFORAMINAL EPIDURAL STEROID INJECTION S1 #3 performed by Liborio Vital MD at [facility] Right 2020    RIGHT SACROILIAC JOINT INJECTION      CPT: 25267 performed by Graciela Jiang DO at [address] [facility]  2005    Left    ANKLE SURGERY Left 7/8/2022    REPAIR OF ANTERIOR TALAR LIGAMENT LEFT ANKLE, REPAIR DELTOID LIGAMENT LEFT ANKLE performed by Dorrine Sandifer, DPM at [place] [address] Left 12/14/2018    ARTHRODESIS 2ND DIGIT LEFT FOOT performed by Dorrine Sandifer, DPM at [address]  08/16/2017    PLIF L3-L4, L4-L5 with rods, screws, and cages/Dr. Yue Medeiros SURGERY  03/04/2020    BACK SURGERY Right 03/09/2021    RIGHT PERCUTANEOUS SACROILIAC JOINT FUSION performed by Ashli Caceres MD at [address]  2010    reduction, [address] [facility]    CHOLECYSTECTOMY  2011    COLONOSCOPY  03/27/2015    COLONOSCOPY N/A 08/19/2020    COLONOSCOPY DIAGNOSTIC performed by Dequan Benitez MD at [address] [place] [address]  03/2021    SI Joint    ENDOSCOPY, COLON, DIAGNOSTIC      EPIDURAL STEROID INJECTION N/A 06/04/2019    BILATERAL TRANSFORAMINAL EPIDURAL STEROID INJECTION S1 performed by Graciela Jiang DO at [facility]. / ANKLE Left 12/14/2018    REMOVAL OF PAINFUL HARDWARE LEFT FOOT  ++SYNTHES++ performed by Dorrine Sandifer, DPM at [address] [facility]    inguinal     LUMBAR SPINE SURGERY N/A 03/04/2020    EXPLORATION OF PRIOR L3-L5 FUSION AND L2-L3  POSTERIOR LUMBAR INTERBODY FUSION -- NEEDS O-ARM, AUDIOLOGY, CAGES, PLATES, SCREWS, C-ARM, TERESA TABLE, CELL SAVER, PLATELET GEL -- GLOBUS performed by Ashli Caceres MD at [address] 10/21/2020    EXCISION OF TONGUE LESION performed by Connie Cleveland DO at [address] [facility] [address] Bilateral 05/07/2018    L1-2 lumbar foramen #1    NERVE BLOCK Bilateral 12/03/2018    s1 tfesi    NERVE BLOCK Bilateral 08/12/2019    NERVE BLOCK Right 09/30/2019    sacral radiofrequency    NERVE BLOCK Right 09/01/2020    RIGHT SACROILIAC JOINT INJECTION #2 performed by Marylou Whitman Randy Monteiro, DO at [address] HISTORY Left 09/25/2013    left foot tarso metatarsal joint injection    OTHER SURGICAL HISTORY Left 05/27/2015    Endoscopic Gastroc recession left, Lapidus left foot and  Excision of exostosis left foot    PAIN MANAGEMENT PROCEDURE Left 7/27/2021    LEFT L5-S1 TRANSFORAMINAL EPIDURAL STEROID INJECTION performed by Bryan Bravo DO at [address] Left 3/29/2022    LEFT TRANSFORAMINAL EPIDURAL STEROID INJECTION AT L5 AND S1 performed by Bryan Bravo DO at [address] N/A 6/9/2022    LUMBAR EPIDURAL STEROID INJECTION L5-S1 performed by Bryan Bravo DO at [address] N/A 10/27/2022    BILATERAL L5 LUMBAR TRANSFORAMINAL EPIDURAL STEROID INJECTION performed by Bryan Bravo DO at [address] N/A 1/12/2023    CERVICAL EPIDURAL STEROID INJECTION C7-T1 performed by Bryan Bravo DO at [address] AA&/STRD TFRML EPI LUMBAR/SACRAL 1 LEVEL Bilateral 12/03/2018    BILATERAL S1  EPIDURAL STEROID INJECTION performed by Benny Watson MD at [place] [address] DX/THER SBST INTRLMNR LMBR/SAC W/IMG GDN N/A 08/21/2018    EPIDURAL STEROID INJECTION L1-2 WITH LOW VOL performed by Benny Watson MD at [address] [facility] [address] TIP ONLY Bilateral 05/07/2018    BILATERAL L1-2 LUMBAR FORAMEN #1 performed by Benny Watson MD at [address] [facility] W/[address] [facility] Bilateral 06/04/2018    BILATERAL TRANSFORAMINAL EPIDURAL STEROID INJECTION UNDER FLUOROSCOPIC GUIDANCE @ FORAMINAL LEVEL L1-2 #2 performed by Benny Watson MD at [address] Right 09/30/2019    RIGHT SACRAL RADIOFREQUENCY ABLATION performed by Benny Watson MD at [facility]    Big Left Toe    TOE SURGERY Left 2018    2nd toe     TONSILLECTOMY      WISDOM TOOTH EXTRACTION         Allergies :    Allergies   Allergen Reactions    Pcn [Penicillins] Anaphylaxis       Medication List :    Current Outpatient Medications   Medication Sig Dispense Refill    linagliptin (TRADJENTA) 5 MG tablet Take 1 tablet by mouth daily 90 tablet 2    HYDROcodone-acetaminophen (NORCO) 5-325 MG per tablet Take 1 tablet by mouth 2 times daily for 30 days. 60 tablet 0    gabapentin (NEURONTIN) 400 MG capsule Take 1 capsule by mouth 2 times daily for 90 days. 180 capsule 1    hydroCHLOROthiazide (MICROZIDE) 12.5 MG capsule TAKE ONE CAPSULE BY MOUTH ONCE DAILY FOR BLOOD PRESSURE 90 capsule 1    lisinopril (PRINIVIL;ZESTRIL) 40 MG tablet Take 1 tablet by mouth every evening 90 tablet 1    hydrOXYzine pamoate (VISTARIL) 25 MG capsule Take 1 capsule by mouth 3 times daily as needed for Anxiety 270 capsule 1    atorvastatin (LIPITOR) 40 MG tablet Take 1 tablet by mouth daily 90 tablet 3    Handicap Placard MISC DX:  Loss of Balance, Chronic low back pain, s/p back surgery. Duration of 5 years 1 each 0     No current facility-administered medications for this visit.      ______________________________________________________________________    Physical Exam :    Vitals: Ht 5' 5\" (1.651 m)   Wt 196 lb (88.9 kg)   LMP  (LMP Unknown)   BMI 32.62 kg/m²   General Appearance: Nontoxic, awake, alert, and in no acute distress. Chest wall/Lung: Respirations regular and unlabored. No cyanosis. Heart: RRR, distal pulses intact. Neurologic: Alert&Oriented x3. Sensation grossly intact. No focal motor deficits detected. Musculokeletal: LEFT Hand:  No ecchymosis or swelling. No active triggering. No obvious deformities. No hypothenar or thenar atrophy. TTP: ( - ) Scaphoid, ( - ) Ulnar Styloid, ( - ) Distal Radius, ( + ) A1 Pulley  Carpal Tunnel: ( - ) Tinels, ( - ) Phalens, ( - ) Carpal Tunnel Compression  Special Tests: ( - ) CMC Compression, ( - ) Finkelsteins, ( - ) Thumb UCL Stress Test  Strength and sensation intact. ______________________________________________________________________    Assessment & Plan :    1. Left hand pain  2. Trigger index finger of left hand  Patient presents to the office today for evaluation of left hand pain. History, referring provider note, physical exam and imaging (as interpreted by me) are consistent with left index finger trigger finger. Treatment options discussed with patient in the office today including activity modification, oral anti-inflammatories, physical therapy, injection options, advanced imaging in the form of a MRI and referral to an orthopedic surgeon for discussion of surgical opinion. Patient wishes to proceed with conservative treatment in the form of an ultrasound-guided corticosteroid injection which was performed in the office today, please see separate procedure note for further details. Patient will follow up in 6 weeks for reevaluation of symptoms and consider escalation therapy should symptoms persist.  Patient is agreeable with above plan all questions and concerns were addressed in the office today. - lidocaine 1 % injection 1 mL  - betamethasone acetate-betamethasone sodium phosphate (CELESTONE) injection 6 mg  - US GUIDED NEEDLE PLACEMENT; Future    Return to Office: Return in about 6 weeks (around 3/28/2023).     Gretta Gracia MD

## 2023-02-23 ENCOUNTER — HOSPITAL ENCOUNTER (OUTPATIENT)
Dept: MRI IMAGING | Age: 68
Discharge: HOME | End: 2023-02-25
Payer: MEDICARE

## 2023-02-23 DIAGNOSIS — M54.12 CERVICAL RADICULOPATHY: ICD-10-CM

## 2023-02-23 PROCEDURE — 72141 MRI NECK SPINE W/O DYE: CPT

## 2023-02-24 NOTE — TELEPHONE ENCOUNTER
Call to [facility], left message that procedure was approved for 6/9/2022 and that the surgery center should call her a few days before for the pre op call and after 3:00 PM the business day before with the arrival time. Instructed Eleanor to hold ibuprofen for 24 hours, naprosyn for 4 days and any aspirin containing products or fish oil for 7 days. Instructed to call office back.     James Loera RN  Pain Management
: Yes

## 2023-03-10 ENCOUNTER — OFFICE VISIT (OUTPATIENT)
Dept: PAIN MANAGEMENT | Age: 68
End: 2023-03-10
Payer: MEDICARE

## 2023-03-10 VITALS
DIASTOLIC BLOOD PRESSURE: 92 MMHG | OXYGEN SATURATION: 95 % | HEART RATE: 98 BPM | TEMPERATURE: 97.5 F | WEIGHT: 200 LBS | SYSTOLIC BLOOD PRESSURE: 156 MMHG | BODY MASS INDEX: 33.32 KG/M2 | HEIGHT: 65 IN | RESPIRATION RATE: 16 BRPM

## 2023-03-10 DIAGNOSIS — M54.50 CHRONIC BILATERAL LOW BACK PAIN WITHOUT SCIATICA: ICD-10-CM

## 2023-03-10 DIAGNOSIS — M47.816 LUMBAR FACET ARTHROPATHY: ICD-10-CM

## 2023-03-10 DIAGNOSIS — M51.36 DDD (DEGENERATIVE DISC DISEASE), LUMBAR: ICD-10-CM

## 2023-03-10 DIAGNOSIS — M54.40 CHRONIC MIDLINE LOW BACK PAIN WITH SCIATICA, SCIATICA LATERALITY UNSPECIFIED: ICD-10-CM

## 2023-03-10 DIAGNOSIS — M54.6 THORACIC SPINE PAIN: ICD-10-CM

## 2023-03-10 DIAGNOSIS — G89.4 CHRONIC PAIN SYNDROME: Primary | ICD-10-CM

## 2023-03-10 DIAGNOSIS — M54.16 LUMBAR RADICULOPATHY: ICD-10-CM

## 2023-03-10 DIAGNOSIS — G89.29 CHRONIC MIDLINE LOW BACK PAIN WITH SCIATICA, SCIATICA LATERALITY UNSPECIFIED: ICD-10-CM

## 2023-03-10 DIAGNOSIS — M48.061 SPINAL STENOSIS OF LUMBAR REGION WITHOUT NEUROGENIC CLAUDICATION: ICD-10-CM

## 2023-03-10 DIAGNOSIS — M47.812 CERVICAL FACET JOINT SYNDROME: ICD-10-CM

## 2023-03-10 DIAGNOSIS — G89.29 CHRONIC BILATERAL LOW BACK PAIN WITHOUT SCIATICA: ICD-10-CM

## 2023-03-10 DIAGNOSIS — G89.29 OTHER CHRONIC PAIN: ICD-10-CM

## 2023-03-10 DIAGNOSIS — M54.12 CERVICAL RADICULOPATHY: ICD-10-CM

## 2023-03-10 PROCEDURE — 99213 OFFICE O/P EST LOW 20 MIN: CPT | Performed by: PHYSICIAN ASSISTANT

## 2023-03-10 RX ORDER — CYCLOBENZAPRINE HCL 10 MG
TABLET ORAL
COMMUNITY
Start: 2023-03-06

## 2023-03-10 RX ORDER — HYDROCODONE BITARTRATE AND ACETAMINOPHEN 5; 325 MG/1; MG/1
1 TABLET ORAL 2 TIMES DAILY
Qty: 60 TABLET | Refills: 0 | Status: SHIPPED | OUTPATIENT
Start: 2023-03-11 | End: 2023-04-10

## 2023-03-10 NOTE — PROGRESS NOTES
Do you currently have any of the following:    Fever: No  Headache:  No  Cough: No  Shortness of breath: No  Exposed to anyone with these symptoms: No         Kirill Baer presents to the [address] [place] [address] on 3/10/2023. Myesha Lawrence is complaining of pain in her neck. The pain is constant. The pain is described as aching and miserable. Pain is rated on her best day at a 10, on her worst day at a 10, and on average at a 10 on the VAS scale. She took her last dose of Norco today. Any procedures since your last visit: No    Pacemaker or defibrillator: No     She is not on NSAIDS and is not on anticoagulation medications. Medication Contract and Consent for Opioid Use Documents Filed       Patient Documents       Type of Document Status Date Received Received By Description    Medication Contract Received  Dylan Oliveira Opioid medication contract with Dr Jonathan German 3/24/20    Medication Contract Received 4/26/2018  3:50 PM ANAY NUNO PAIN MANAGEMENT PATIENT AGREEMENT 4/26/2018    Medication Contract Received 11/5/2020  4:23 PM EBER HONG Opioid medication contract with Dr Shari Shearer Received 3/1/2021  1:26 PM Dylan Oliveira Opioid medication contract with Dr Shari Sheraer Received 8/23/2021  2:03 PM Fer Pappas Medication contract    Medication Contract Received 10/18/2021  3:03 PM Fer Pappas medication contract 10/18/2021    Medication Contract Signed 10/4/2022 12:35 PM CRISTIANA BAÑUELOS Pain Med. Contract 10/04/22                    BP (!) 156/92   Pulse 98   Temp 97.5 °F (36.4 °C) (Infrared)   Resp 16   Ht 5' 5\" (1.651 m)   Wt 200 lb (90.7 kg)   LMP  (LMP Unknown)   SpO2 95%   BMI 33.28 kg/m²      No LMP recorded (lmp unknown).  Patient is postmenopausal.

## 2023-03-10 NOTE — PROGRESS NOTES
[address]  [address]  [facility]    Follow up Note      Rhae Ayan     Date of Visit:  3/10/2023    CC:  Patient presents for follow up   Chief Complaint   Patient presents with    Follow-up     MRI follow up, neck pain                  HPI:    Pain is worse to the left side of her neck with radiation to left shoulder. Left sided cervical myofascial pain. Change in quality of symptoms:no. Patient satisfaction with analgesia:fair. Medication side effects: None. Recent diagnostic testing:none. Recent interventional procedures: None. She has been on anticoagulation medications to include NSAIDS. The patient  has not been on herbal supplements. The patient is diabetic. Imagin2023 MRI cervical spine    FINDINGS:   BONES/ALIGNMENT: There is normal alignment of the spine. The vertebral body   heights are maintained. The bone marrow signal appears unremarkable. SPINAL CORD: No abnormal cord signal is seen. SOFT TISSUES: No paraspinal mass identified. C2-C3: Grade 1 anterolisthesis. Severe left facet arthropathy resulting in   severe left neural foraminal narrowing. C3-C4: Grade 1 anterolisthesis with disc bulging. Severe left and moderate   right facet arthropathy. Findings resulting in severe left neural foraminal   narrowing       C4-C5: Grade 1 anterolisthesis with disc bulging. Moderate bilateral facet   arthropathy. Mild left neural foraminal narrowing. C5-C6: Grade 1 anterolisthesis with disc bulging. Moderate bilateral facet   arthropathy. No canal or foraminal stenosis. C6-C7: Disc bulging with 2 mm central disc protrusion. Mild bilateral facet   arthropathy. Otherwise unremarkable       C7-T1: Grade 1 anterolisthesis. Mild bilateral facet arthropathy. Otherwise   unremarkable           Impression   At C2-C3, severe left neural foraminal narrowing.        At C3-C4, severe left neural foraminal narrowing. No canal stenosis. No significant posterior disc pathology. 12/07/2022 Cervical spine x-ray    FINDINGS:   Multilevel severe degenerative facet arthropathy. Mild degenerative disc   disease primarily at C5-6. Normal soft tissues. No fracture or dislocation. Impression   Degenerative spondylosis with severe multilevel degenerative facet   arthropathy. 05/05/2022 Thoracic Spine X-ray    FINDINGS:   Thoracic vertebral bodies are normal in height and alignment. Multilevel   degenerative changes. No evidence of fracture. Pedicles are symmetric and   intact. Visualized lungs are clear. Impression   No acute abnormality of the thoracic spine       Multilevel degenerative changes. 07/08/2021 CT lumbar myelogram    FINDINGS:   BONES/ALIGNMENT: There is minimal levocurvature of the lumbar spine. There   is posterior fixation from L2-L4 without evidence for hardware complication. The vertebral body heights are maintained. There is minimal retrolisthesis   of L2 on L3. SOFT TISSUES: The posterior paraspinal soft tissues are unremarkable. The   visualized abdominal structures are unremarkable. The conus is normal in   caliber and terminates at L1. The cauda equina is unremarkable. L1-L2: There is no significant disc protrusion, central spinal canal stenosis   or neural foraminal narrowing. L2-L3: There is artificial disc material with posterior laminectomy. There   is no canal stenosis or left foraminal narrowing. There is moderate right   foraminal narrowing. L3-L4: There is artificial disc material and posterior laminectomy. There is   no canal stenosis. There is mild bilateral foraminal narrowing. L4-L5: There is artificial disc material with posterior laminectomy. There   is no canal stenosis. There is mild left and moderate right foraminal   narrowing.        L5-S1: There is a circumferential disc bulge with facet hypertrophy. There   is no canal stenosis. There is moderate right and severe left foraminal   narrowing. Impression   Posterior fixation and laminectomy from L2-L4 without evidence for hardware   complication. Multilevel degenerative change with bilateral foraminal narrowing as   described above. MRI lumbar spine 2018  1. No significant interval change is observed since the previous study   of 2017.       2. Stable postoperative changes/posterior spinal fusion at the   L3-L4-L5 level. 3. Severe spine canal stenosis in the level of L2-L3           4. Encroachment of the neural foramina bilaterally in levels of L2-L3   and L5-S1      Thoracic spine MRI 2018  1. Some degenerative changes in the thoracic spine, mainly in the   facet joints in the mid-upper thoracic spine segments       2. Discrete loss of height of T6, more likely an old finding. Previous treatments: Physical Therapy, Surgery L3-5 fusion/laminectomy and medications. .       Potential Aberrant Drug-Related Behavior:  No     Urine Drug Screenin18:  Consistent for norhydrocodone metabolite  2018:  Consistent for hydrocodone and norhydrocodone  05/10/2019:  Consistent for hydrocodone and norhydrocodone  2019:  Consistent for hydrocodone and norhydrocodone  01/10/2020:  Consistent for hydrocodone and norhydrocodone  2020:  Consistent for oxycodone and metabolites s/p surgery. Inconsistent for hydrocodone. States that she was instructed to take her old norco until she made the appointment to resume her chronic pain medications. 10/2020:  Consistent  2021:  Consistent  2022:  Consistent  2023:  Consistent     OARRS report:  2018 consistent to 2021 consistent (norco scripts from [facility] post op 3/10/2021 and 3/24/2021.    Berry script through [facility] - last one 2021 - consistent to 2023 consistent     Opioid agreement:  10/18/2021  Updated 10/04/2022      Past Medical History:   Diagnosis Date    Cancer ([facility] [address] 12/2019    LESION REMOVED UNDER TONGUE  DENTAL CLINIC    Chronic back pain     Costochondritis     h/o    Depression     Diabetes mellitus ([facility] [address]     Falls     Last fall Feb 2021    Fibromyalgia     Hallux rigidus of left foot     HTN (hypertension)     Hyperlipidemia     CLYDE on CPAP     Osteoarthritis     \"everywhere\"    Rheumatoid arthritis(714.0)     TIA (transient ischemic attack)     no deficits        Past Surgical History:   Procedure Laterality Date    ANESTHESIA NERVE BLOCK Left 02/26/2019    LEFT C3,4,5 MBB performed by Maricarmen Pena MD at [address] [facility] Right 08/12/2019    BILATERAL TRANSFORAMINAL EPIDURAL STEROID INJECTION S1 #3 performed by Maricarmen Pena MD at [facility] Right 06/16/2020    RIGHT SACROILIAC JOINT INJECTION      CPT: 65306 performed by Triston Gill DO at [address]  2005    Left    ANKLE SURGERY Left 7/8/2022    REPAIR OF ANTERIOR TALAR LIGAMENT LEFT ANKLE, REPAIR DELTOID LIGAMENT LEFT ANKLE performed by Lg Mackey DPM at [place] [address] Left 12/14/2018    ARTHRODESIS 2ND DIGIT LEFT FOOT performed by Lg Mackey DPM at [address]  08/16/2017    PLIF L3-L4, L4-L5 with rods, screws, and cages/Dr. Veloz Endo SURGERY  03/04/2020    BACK SURGERY Right 03/09/2021    RIGHT PERCUTANEOUS SACROILIAC JOINT FUSION performed by Bibi Roth MD at [address] [facility]  2010    reduction, [facility]  2011    COLONOSCOPY  03/27/2015    COLONOSCOPY N/A 08/19/2020    COLONOSCOPY DIAGNOSTIC performed by Ricki Dale MD at [address] [place] [address]  03/2021    SI Joint    ENDOSCOPY, COLON, DIAGNOSTIC      EPIDURAL STEROID INJECTION N/A 06/04/2019    BILATERAL TRANSFORAMINAL EPIDURAL STEROID INJECTION S1 performed by Triston Gill DO at [place] [address]      Left HARDWARE REMOVAL FOOT / ANKLE Left 12/14/2018    REMOVAL OF PAINFUL HARDWARE LEFT FOOT  ++SYNTHES++ performed by Michael Belcher DPM at [address] [place]    inguinal     LUMBAR SPINE SURGERY N/A 03/04/2020    EXPLORATION OF PRIOR L3-L5 FUSION AND L2-L3  POSTERIOR LUMBAR INTERBODY FUSION -- NEEDS O-ARM, AUDIOLOGY, CAGES, PLATES, SCREWS, C-ARM, TERESA TABLE, CELL SAVER, PLATELET GEL -- GLOBUS performed by Jacqueline Guerrero MD at [address] 10/21/2020    EXCISION OF TONGUE LESION performed by Nisreen Stock DO at [address] Bilateral 05/07/2018    L1-2 lumbar foramen #1    NERVE BLOCK Bilateral 12/03/2018    s1 tfesi    NERVE BLOCK Bilateral 08/12/2019    NERVE BLOCK Right 09/30/2019    sacral radiofrequency    NERVE BLOCK Right 09/01/2020    RIGHT SACROILIAC JOINT INJECTION #2 performed by Luis Martinez DO at [address] Left 09/25/2013    left foot tarso metatarsal joint injection    OTHER SURGICAL HISTORY Left 05/27/2015    Endoscopic Gastroc recession left, Lapidus left foot and  Excision of exostosis left foot    PAIN MANAGEMENT PROCEDURE Left 7/27/2021    LEFT L5-S1 TRANSFORAMINAL EPIDURAL STEROID INJECTION performed by Luis Martinez DO at [address] Left 3/29/2022    LEFT TRANSFORAMINAL EPIDURAL STEROID INJECTION AT L5 AND S1 performed by Luis Martinez DO at [address] N/A 6/9/2022    LUMBAR EPIDURAL STEROID INJECTION L5-S1 performed by Luis Martinez DO at [address] N/A 10/27/2022    BILATERAL L5 LUMBAR TRANSFORAMINAL EPIDURAL STEROID INJECTION performed by Luis Martinez DO at [address] N/A 1/12/2023    CERVICAL EPIDURAL STEROID INJECTION C7-T1 performed by Luis Martinez DO at [address] AA&/STRD TFRML EPI LUMBAR/SACRAL 1 LEVEL Bilateral 12/03/2018    BILATERAL S1  EPIDURAL STEROID INJECTION performed by Laurel Greco MD at [address] DX/THER SBST INTRLMNR LMBR/SAC W/IMG GDN N/A 08/21/2018    EPIDURAL STEROID INJECTION L1-2 WITH LOW VOL performed by Benny Watson MD at [address] [facility] [address] TIP ONLY Bilateral 05/07/2018    BILATERAL L1-2 LUMBAR FORAMEN #1 performed by Benny Watson MD at [address] [facility] [address] [facility] [address] [facility] [address] Bilateral 06/04/2018    BILATERAL TRANSFORAMINAL EPIDURAL STEROID INJECTION UNDER FLUOROSCOPIC GUIDANCE @ FORAMINAL LEVEL L1-2 #2 performed by Benny Watson MD at [address] Right 09/30/2019    RIGHT SACRAL RADIOFREQUENCY ABLATION performed by Benny Watson MD at [facility]    Big Left Toe    TOE SURGERY Left 2018    2nd toe     TONSILLECTOMY      WISDOM TOOTH EXTRACTION         Prior to Admission medications    Medication Sig Start Date End Date Taking? Authorizing Provider   cyclobenzaprine (FLEXERIL) 10 MG tablet TAKE 1/2 TABLET BY MOUTH TWICE DAILY AS NEEDED FOR MUSCLE SPASMS 3/6/23  Yes Historical Provider, MD   linagliptin (TRADJENTA) 5 MG tablet Take 1 tablet by mouth daily 2/13/23  Yes Johana Tse MD   HYDROcodone-acetaminophen (NORCO) 5-325 MG per tablet Take 1 tablet by mouth 2 times daily for 30 days. 2/10/23 3/12/23 Yes TREASURE Perez   gabapentin (NEURONTIN) 400 MG capsule Take 1 capsule by mouth 2 times daily for 90 days.  2/10/23 5/11/23 Yes TREASURE Perez   hydroCHLOROthiazide (MICROZIDE) 12.5 MG capsule TAKE ONE CAPSULE BY MOUTH ONCE DAILY FOR BLOOD PRESSURE 11/14/22  Yes Johana Tse MD   lisinopril (PRINIVIL;ZESTRIL) 40 MG tablet Take 1 tablet by mouth every evening 11/14/22  Yes Johana Tse MD   hydrOXYzine pamoate (VISTARIL) 25 MG capsule Take 1 capsule by mouth 3 times daily as needed for Anxiety 7/29/22  Yes Johana Tse MD   atorvastatin (LIPITOR) 40 MG tablet Take 1 tablet by mouth daily 6/20/22  Yes Aurora Meter Ian Owens MD   Handicap Placard MISC DX:  Loss of Balance, Chronic low back pain, s/p back surgery. Duration of 5 years 2/22/21  Yes Kika Ybarra MD       Allergies   Allergen Reactions    Pcn [Penicillins] Anaphylaxis       Social History     Socioeconomic History    Marital status:      Spouse name: Not on file    Number of children: Not on file    Years of education: Not on file    Highest education level: Not on file   Occupational History    Not on file   Tobacco Use    Smoking status: Every Day     Packs/day: 0.50     Years: 30.00     Pack years: 15.00     Types: Cigarettes    Smokeless tobacco: Never   Vaping Use    Vaping Use: Never used   Substance and Sexual Activity    Alcohol use: Not Currently     Alcohol/week: 0.0 standard drinks     Comment: rarely    Drug use: No    Sexual activity: Not on file   Other Topics Concern    Not on file   Social History Narrative    Not on file     Social Determinants of Health     Financial Resource Strain: Not on file   Food Insecurity: Not on file   Transportation Needs: Not on file   Physical Activity: Inactive    Days of Exercise per Week: 0 days    Minutes of Exercise per Session: 0 min   Stress: Not on file   Social Connections: Not on file   Intimate Partner Violence: Not on file   Housing Stability: Not on file       Family History   Problem Relation Age of Onset    Dementia Mother     Breast Cancer Mother     Cancer Mother         breast cancer [de-identified]     Stroke Mother     Heart Attack Father     Other Father 80        Aortic aneurysm    Cancer Brother     Diabetes Brother        REVIEW OF SYSTEMS:     Darrel Andre denies fever/chills, chest pain, shortness of breath, new bowel or bladder complaints or suicidal ideations. All other review of systems was negative.     PHYSICAL EXAMINATION:      BP (!) 156/92   Pulse 98   Temp 97.5 °F (36.4 °C) (Infrared)   Resp 16   Ht 5' 5\" (1.651 m)   Wt 200 lb (90.7 kg)   LMP  (LMP Unknown)   SpO2 95% BMI 33.28 kg/m²     General:      General appearance: awake, alert, oriented, in no acute distress, well developed, well nourished and in no acute distress   pleasant and well-hydrated. in no distress and A & O x3  Build:Overweight  Function:Rises from a seated position with difficulty    HEENT:    Head:normocephalic and atraumatic  Pupils:regular, round and equal.  Sclera: icterus absent  EOM:full and intact. Lungs:    Breathing:Normal expansion. No wheezing. Abdomen:    Shape:non-distended and normal    Cervical spine:  + midline and left paraspinal TTP      Lumbar spine:                Range of motion:abnormal moderately Lateral bending, flexion, extension rotation bilateral and is painful. Extremities:    Tremors:None bilaterally upper and lower  Range of motion:Generally normal shoulders  Intact:Yes  Cyanosis:none  Edema:Normal      Neurological:    Cranial nerves:normal  Sensory:diminished to light lateral aspect of right leg                   Dermatology:    Skin:no unusual rashes, no skin lesions, no palpable subcutaneous nodules and good skin turgor    Assessment/Plan:      Chronic low back pain with radiation to the Right groin, patient had low back surgery 08/2017 L3-5 fusion, recently had lumbar spine CT with severe L2-3 stenosis     Patient is s/p left ankle surgery on 7/8. Back in cam walker boot. Plan:  Patient is s/p revision fusion L2-L4 on 3/4/2020. Patient is s/p:  Right sacroiliac joint fusion with use of SI-BONE iFuse implant on 3/9/2021 by Dr. Mason Adler. Patient is s/p:  Left TFESI L5 and S1 on 03/29/2022 with 75% relief. Doing relatively well at this point. LESI L5-S1 on 06/09/2022 with good relief until recently. Repeat with about 40% relief. She would like to try TPIs before another ROGELIO. Left cervical paraspinal, trapezius, rhomboid, and supraspinatus trigger point injections under ultrasound guidance. ordered today with marcaine. ROGELIO C7-T1 with 40% relief.   She may want to repeat after the TPIs. MRI cervical spine reviewed. Continue norco 5/325 BID. Continue with Gabapentin 400 mg BID. She reports that any increases make her off balance. OARRS report reviewed   Hold flexeril for now. Doing well without it. Patient encouraged to stay active and to lose weight. Failed physical therapy  Treatment plan discussed with the patient including medications side effects           Controlled Substance Monitoring:    Acute and Chronic Pain Monitoring:   RX Monitoring 3/10/2023   Attestation -   Acute Pain Prescriptions -   Periodic Controlled Substance Monitoring Possible medication side effects, risk of tolerance/dependence & alternative treatments discussed. ;No signs of potential drug abuse or diversion identified. ;Assessed functional status. ;Obtaining appropriate analgesic effect of treatment.    Chronic Pain > 80 MEDD -             ccreferring physic

## 2023-03-17 ENCOUNTER — PROCEDURE VISIT (OUTPATIENT)
Dept: PAIN MANAGEMENT | Age: 68
End: 2023-03-17
Payer: MEDICARE

## 2023-03-17 VITALS
RESPIRATION RATE: 16 BRPM | HEART RATE: 114 BPM | OXYGEN SATURATION: 95 % | DIASTOLIC BLOOD PRESSURE: 83 MMHG | BODY MASS INDEX: 33.32 KG/M2 | HEIGHT: 65 IN | SYSTOLIC BLOOD PRESSURE: 122 MMHG | WEIGHT: 200 LBS | TEMPERATURE: 98.6 F

## 2023-03-17 DIAGNOSIS — M79.10 MYALGIA: Primary | ICD-10-CM

## 2023-03-17 PROCEDURE — 76942 ECHO GUIDE FOR BIOPSY: CPT | Performed by: PAIN MEDICINE

## 2023-03-17 PROCEDURE — 20553 NJX 1/MLT TRIGGER POINTS 3/>: CPT | Performed by: PAIN MEDICINE

## 2023-03-17 RX ORDER — BUPIVACAINE HYDROCHLORIDE 2.5 MG/ML
10 INJECTION, SOLUTION EPIDURAL; INFILTRATION; INTRACAUDAL ONCE
Status: COMPLETED | OUTPATIENT
Start: 2023-03-17 | End: 2023-03-17

## 2023-03-17 RX ADMIN — BUPIVACAINE HYDROCHLORIDE 25 MG: 2.5 INJECTION, SOLUTION EPIDURAL; INFILTRATION; INTRACAUDAL at 14:25

## 2023-03-17 NOTE — PROGRESS NOTES
3/17/2023    No chief complaint on file. Allergies as of 03/17/2023 - Fully Reviewed 03/17/2023   Allergen Reaction Noted    Pcn [penicillins] Anaphylaxis 11/12/2012        Procedure: cervical      x consent signed x procedure verified with patient  x allergies noted x pt confirms not pregnant    Patient rates pain a 9/10 on the VAS scale. Skin Prep:  ChloraPrep    Anesthetic:  n/a    Medication:  Marcaine 0.25%    Vitals:    03/17/23 1238   BP: 122/83   Pulse: (!) 114   Resp: 16   Temp: 98.6 °F (37 °C)   TempSrc: Infrared   SpO2: 95%   Weight: 200 lb (90.7 kg)   Height: 5' 5\" (1.651 m)       I witnessed patient signing consent to Medical Procedure and Treatment form.      [place], MA

## 2023-03-17 NOTE — PROGRESS NOTES
3/17/2023    Patient: Zoila Mendez  :  1955  Age:  74 y.o. Sex:  female     PRE-PROCEDURE DIAGNOSIS: Myofascial pain. POST-PROCEDURE DIAGNOSIS: Same. PROCEDURE: left cervical paraspinal, trapezius, rhomboid, and supraspinatus trigger point injections under ultrasound guidance. SURGEON:   ZARA Westfall. ANESTHESIA: local    ESTIMATED BLOOD LOSS:  None.  ______________________________________________________________________    BRIEF HISTORY: Zoila Mendez comes in today for left cervical paraspinal, trapezius, rhomboid, and supraspinatus trigger point injection. After discussing the potential risks and benefits of the procedure with the patient. Ronda Ro did request that we proceed. A complete History & Physical was reviewed and it is unchanged. DESCRIPTION OF PROCEDURE:   Each trigger point was marked with patient input, prepped with chloraprep and draped. A #25-gauge, 1.5-inch needle was passed into the area of greatest tenderness under ultrasound guidance. Each trigger point received equal, divided dosages or a total of 10 cc of 0.25% Marcaine after negative aspiration of blood, air or paresthesia with ultrasound visualization of solution spread. The needle was removed intact and Band-Aids were applied. Disposition the patient tolerated the procedure well and there were no complications . Ronda Starr will follow up in our [address] [place] [address] as scheduled. She was encouraged to call with questions, concerns or if worsening of symptoms occurs.     Azalia Nicole DO

## 2023-04-05 ENCOUNTER — OFFICE VISIT (OUTPATIENT)
Dept: ORTHOPEDIC SURGERY | Age: 68
End: 2023-04-05

## 2023-04-05 ENCOUNTER — OFFICE VISIT (OUTPATIENT)
Dept: PAIN MANAGEMENT | Age: 68
End: 2023-04-05
Payer: MEDICARE

## 2023-04-05 ENCOUNTER — PREP FOR PROCEDURE (OUTPATIENT)
Dept: PAIN MANAGEMENT | Age: 68
End: 2023-04-05

## 2023-04-05 VITALS — HEIGHT: 65 IN | BODY MASS INDEX: 33.32 KG/M2 | WEIGHT: 200 LBS

## 2023-04-05 VITALS
WEIGHT: 200 LBS | SYSTOLIC BLOOD PRESSURE: 102 MMHG | HEIGHT: 65 IN | OXYGEN SATURATION: 97 % | DIASTOLIC BLOOD PRESSURE: 66 MMHG | BODY MASS INDEX: 33.32 KG/M2 | RESPIRATION RATE: 16 BRPM | TEMPERATURE: 97.2 F | HEART RATE: 95 BPM

## 2023-04-05 DIAGNOSIS — M65.322 TRIGGER INDEX FINGER OF LEFT HAND: Primary | ICD-10-CM

## 2023-04-05 DIAGNOSIS — M54.16 LUMBAR RADICULOPATHY: ICD-10-CM

## 2023-04-05 DIAGNOSIS — G89.4 CHRONIC PAIN SYNDROME: Primary | ICD-10-CM

## 2023-04-05 DIAGNOSIS — M47.812 CERVICAL SPONDYLOSIS: Primary | ICD-10-CM

## 2023-04-05 DIAGNOSIS — M47.812 CERVICAL FACET JOINT SYNDROME: ICD-10-CM

## 2023-04-05 DIAGNOSIS — M47.812 CERVICAL SPONDYLOSIS: ICD-10-CM

## 2023-04-05 DIAGNOSIS — M54.50 CHRONIC BILATERAL LOW BACK PAIN WITHOUT SCIATICA: ICD-10-CM

## 2023-04-05 DIAGNOSIS — M51.36 DDD (DEGENERATIVE DISC DISEASE), LUMBAR: ICD-10-CM

## 2023-04-05 DIAGNOSIS — G89.29 CHRONIC BILATERAL LOW BACK PAIN WITHOUT SCIATICA: ICD-10-CM

## 2023-04-05 PROCEDURE — 99213 OFFICE O/P EST LOW 20 MIN: CPT | Performed by: PAIN MEDICINE

## 2023-04-05 PROCEDURE — 99214 OFFICE O/P EST MOD 30 MIN: CPT | Performed by: PAIN MEDICINE

## 2023-04-05 PROCEDURE — 1123F ACP DISCUSS/DSCN MKR DOCD: CPT | Performed by: PAIN MEDICINE

## 2023-04-05 PROCEDURE — 3074F SYST BP LT 130 MM HG: CPT | Performed by: PAIN MEDICINE

## 2023-04-05 PROCEDURE — 3078F DIAST BP <80 MM HG: CPT | Performed by: PAIN MEDICINE

## 2023-04-05 RX ORDER — HYDROCODONE BITARTRATE AND ACETAMINOPHEN 5; 325 MG/1; MG/1
1 TABLET ORAL 2 TIMES DAILY
Qty: 60 TABLET | Refills: 0 | Status: SHIPPED | OUTPATIENT
Start: 2023-04-10 | End: 2023-05-10

## 2023-04-05 NOTE — PROGRESS NOTES
Ximena Rios presents to the [facility] on 4/5/2023. Mer Hanley is complaining of pain cervical . The pain is constant. The pain is described as aching, shooting, and stabbing. Pain is rated on her best day at a 8, on her worst day at a 10, and on average at a 9 on the VAS scale. She took her last dose of Neurontin this morning . Any procedures since your last visit: No    Pacemaker or defibrillator: No     She is not on NSAIDS and is not on anticoagulation medications   Medication Contract and Consent for Opioid Use Documents Filed       Patient Documents       Type of Document Status Date Received Received By Description    Medication Contract Received  Trena Kanner Opioid medication contract with Dr Daria Arita 3/24/20    Medication Contract Received 4/26/2018  3:50 PM ANYA NUNO PAIN MANAGEMENT PATIENT AGREEMENT 4/26/2018    Medication Contract Received 11/5/2020  4:23 PM EBER HONG Opioid medication contract with Dr Dheeraj Iglesias Received 3/1/2021  1:26 PM Trena Kanner Opioid medication contract with Dr Dheeraj Iglesias Received 8/23/2021  2:03 PM Yandel Majors Medication contract    Medication Contract Received 10/18/2021  3:03 PM Yandel Majors medication contract 10/18/2021    Medication Contract Signed 10/4/2022 12:35 PM CRISTIANA BAÑUELOS Pain Med. Contract 10/04/22                    Temp 97.2 °F (36.2 °C) (Temporal)   Resp 16   Ht 5' 5\" (1.651 m)   Wt 200 lb (90.7 kg)   LMP  (LMP Unknown)   BMI 33.28 kg/m²      No LMP recorded (lmp unknown).  Patient is postmenopausal.
significant posterior disc pathology. 12/07/2022 Cervical spine x-ray    FINDINGS:   Multilevel severe degenerative facet arthropathy. Mild degenerative disc   disease primarily at C5-6. Normal soft tissues. No fracture or dislocation. Impression   Degenerative spondylosis with severe multilevel degenerative facet   arthropathy. 05/05/2022 Thoracic Spine X-ray    FINDINGS:   Thoracic vertebral bodies are normal in height and alignment. Multilevel   degenerative changes. No evidence of fracture. Pedicles are symmetric and   intact. Visualized lungs are clear. Impression   No acute abnormality of the thoracic spine       Multilevel degenerative changes. 07/08/2021 CT lumbar myelogram    FINDINGS:   BONES/ALIGNMENT: There is minimal levocurvature of the lumbar spine. There   is posterior fixation from L2-L4 without evidence for hardware complication. The vertebral body heights are maintained. There is minimal retrolisthesis   of L2 on L3. SOFT TISSUES: The posterior paraspinal soft tissues are unremarkable. The   visualized abdominal structures are unremarkable. The conus is normal in   caliber and terminates at L1. The cauda equina is unremarkable. L1-L2: There is no significant disc protrusion, central spinal canal stenosis   or neural foraminal narrowing. L2-L3: There is artificial disc material with posterior laminectomy. There   is no canal stenosis or left foraminal narrowing. There is moderate right   foraminal narrowing. L3-L4: There is artificial disc material and posterior laminectomy. There is   no canal stenosis. There is mild bilateral foraminal narrowing. L4-L5: There is artificial disc material with posterior laminectomy. There   is no canal stenosis. There is mild left and moderate right foraminal   narrowing. L5-S1: There is a circumferential disc bulge with facet hypertrophy.   There   is no canal

## 2023-04-09 NOTE — PROGRESS NOTES
[facility]  PRIMARY CARE SPORTS MEDICINE  DATE OF VISIT : 2023    Patient : Osiel Bob  Age : 74 y.o.  : 1955  MRN : [id number]   ______________________________________________________________________    Chief Complaint :   Chief Complaint   Patient presents with    Trigger finger     Patient states that the injections helped out but she is still having pain with gripping objects. Patient is having joint pain in digits 2 and 3. HPI : Osiel Bob is 74 y.o. female who presented to the clinic today for follow-up of trigger finger s/p CSI on 2023. Patient reports improvement of symptoms following corticosteroid injection but states symptoms have begun to return.     Past Medical History :  Past Medical History:   Diagnosis Date    Cancer ([facility] [address] 2019    LESION REMOVED UNDER TONGUE  DENTAL CLINIC    Chronic back pain     Costochondritis     h/o    Depression     Diabetes mellitus ([facility] [address]     Falls     Last fall 2021    Fibromyalgia     Hallux rigidus of left foot     HTN (hypertension)     Hyperlipidemia     CLYDE on CPAP     Osteoarthritis     \"everywhere\"    Rheumatoid arthritis(714.0)     TIA (transient ischemic attack)     no deficits      Past Surgical History:   Procedure Laterality Date    ANESTHESIA NERVE BLOCK Left 2019    LEFT C3,4,5 MBB performed by Shahzad Weaver MD at [address] [place] [address],[address] [facility] Right 2019    BILATERAL TRANSFORAMINAL EPIDURAL STEROID INJECTION S1 #3 performed by Shahzad Weaver MD at [facility] Right 2020    RIGHT SACROILIAC JOINT INJECTION      CPT: 26740 performed by Caron Flores DO at [address]  2005    Left    ANKLE SURGERY Left 2022    REPAIR OF ANTERIOR TALAR LIGAMENT LEFT ANKLE, REPAIR DELTOID LIGAMENT LEFT ANKLE performed by Hollis Alberot DPM at [place] [address] Left 2018    ARTHRODESIS 2ND DIGIT LEFT FOOT performed by Hollis Alberto DPM at [address] [place] [address]

## 2023-04-12 NOTE — TELEPHONE ENCOUNTER
Pt had A1C at the office today it was 5.4 I told her I would let her know if you want her to stop the metformin.  Please advise show

## 2023-05-03 ENCOUNTER — TELEPHONE (OUTPATIENT)
Dept: PAIN MANAGEMENT | Age: 68
End: 2023-05-03

## 2023-05-03 NOTE — TELEPHONE ENCOUNTER
Call to [facility] that procedure was approved for 5/11/2023 and that Nel should call her a few days before for the pre op call and between 2:00 PM and 4:00 PM  the business day before with the arrival time. Instructed Eleanor to hold ibuprofen for 24 hours, naprosyn for 4 days and any aspirin containing products or fish oil for 7 days. Instructed to call office back if any questions. Eleanor verbalized understanding.     Electronically signed by Stevie Vergara RN on 5/3/2023 at 3:06 PM

## 2023-05-04 NOTE — PROGRESS NOTES
[address]  [address]  [facility]    Follow up Note      Raffaele Yepez     Date of Visit:  2023    CC:  Patient presents for follow up   Chief Complaint   Patient presents with    Follow-up     Neck pain     HPI:    Pain is unchanged  Change in quality of symptoms:no. Patient satisfaction with analgesia:fair. Medication side effects: None. Recent diagnostic testing:none. Recent interventional procedures:none. She has been on anticoagulation medications to include NSAIDS. The patient  has not been on herbal supplements. The patient is diabetic. Imagin2023 MRI cervical spine    FINDINGS:   BONES/ALIGNMENT: There is normal alignment of the spine. The vertebral body   heights are maintained. The bone marrow signal appears unremarkable. SPINAL CORD: No abnormal cord signal is seen. SOFT TISSUES: No paraspinal mass identified. C2-C3: Grade 1 anterolisthesis. Severe left facet arthropathy resulting in   severe left neural foraminal narrowing. C3-C4: Grade 1 anterolisthesis with disc bulging. Severe left and moderate   right facet arthropathy. Findings resulting in severe left neural foraminal   narrowing       C4-C5: Grade 1 anterolisthesis with disc bulging. Moderate bilateral facet   arthropathy. Mild left neural foraminal narrowing. C5-C6: Grade 1 anterolisthesis with disc bulging. Moderate bilateral facet   arthropathy. No canal or foraminal stenosis. C6-C7: Disc bulging with 2 mm central disc protrusion. Mild bilateral facet   arthropathy. Otherwise unremarkable       C7-T1: Grade 1 anterolisthesis. Mild bilateral facet arthropathy. Otherwise   unremarkable           Impression   At C2-C3, severe left neural foraminal narrowing. At C3-C4, severe left neural foraminal narrowing. No canal stenosis. No significant posterior disc pathology.          2022 Cervical spine x-ray    FINDINGS:

## 2023-05-05 ENCOUNTER — OFFICE VISIT (OUTPATIENT)
Dept: PAIN MANAGEMENT | Age: 68
End: 2023-05-05
Payer: MEDICARE

## 2023-05-05 VITALS
TEMPERATURE: 97.6 F | RESPIRATION RATE: 16 BRPM | SYSTOLIC BLOOD PRESSURE: 147 MMHG | DIASTOLIC BLOOD PRESSURE: 82 MMHG | WEIGHT: 190 LBS | HEIGHT: 65 IN | OXYGEN SATURATION: 95 % | HEART RATE: 89 BPM | BODY MASS INDEX: 31.65 KG/M2

## 2023-05-05 DIAGNOSIS — M47.812 CERVICAL SPONDYLOSIS: ICD-10-CM

## 2023-05-05 DIAGNOSIS — G89.4 CHRONIC PAIN SYNDROME: Primary | ICD-10-CM

## 2023-05-05 DIAGNOSIS — M47.812 CERVICAL FACET JOINT SYNDROME: ICD-10-CM

## 2023-05-05 PROCEDURE — 3079F DIAST BP 80-89 MM HG: CPT | Performed by: PAIN MEDICINE

## 2023-05-05 PROCEDURE — 99213 OFFICE O/P EST LOW 20 MIN: CPT | Performed by: PAIN MEDICINE

## 2023-05-05 PROCEDURE — 1123F ACP DISCUSS/DSCN MKR DOCD: CPT | Performed by: PAIN MEDICINE

## 2023-05-05 PROCEDURE — 3077F SYST BP >= 140 MM HG: CPT | Performed by: PAIN MEDICINE

## 2023-05-05 RX ORDER — HYDROCODONE BITARTRATE AND ACETAMINOPHEN 5; 325 MG/1; MG/1
1 TABLET ORAL 2 TIMES DAILY
Qty: 60 TABLET | Refills: 0 | Status: SHIPPED | OUTPATIENT
Start: 2023-05-10 | End: 2023-06-09

## 2023-05-05 NOTE — PROGRESS NOTES
Enrique Roberts presents to the [facility] on 5/5/2023. Willem Alexander is complaining of pain in her neck. The pain is constant. The pain is described as aching, dull, and sharp. Pain is rated on her best day at a 8, on her worst day at a 10, and on average at a 9 on the VAS scale. She took her last dose of Norco today. Any procedures since your last visit: No    Pacemaker or defibrillator: No     She is not on NSAIDS and is not on anticoagulation medications. Medication Contract and Consent for Opioid Use Documents Filed       Patient Documents       Type of Document Status Date Received Received By Description    Medication Contract Received  Lance Reilly Opioid medication contract with Dr José Luis Jones 3/24/20    Medication Contract Received 4/26/2018  3:50 PM ANYA NUNO PAIN MANAGEMENT PATIENT AGREEMENT 4/26/2018    Medication Contract Received 11/5/2020  4:23 PM EBER HONG Opioid medication contract with Dr Maury Shearer Received 3/1/2021  1:26 PM Lance Reilly Opioid medication contract with Dr Maury Shearer Received 8/23/2021  2:03 PM Zavaleta Ensley Medication contract    Medication Contract Received 10/18/2021  3:03 PM Zavaleta Ensley medication contract 10/18/2021    Medication Contract Signed 10/4/2022 12:35 PM CRISTIANA BAÑUELOS Pain Med. Contract 10/04/22                    BP (!) 147/82   Pulse 89   Temp 97.6 °F (36.4 °C) (Infrared)   Resp 16   Ht 5' 5\" (1.651 m)   Wt 190 lb (86.2 kg)   LMP  (LMP Unknown)   SpO2 95%   BMI 31.62 kg/m²      No LMP recorded (lmp unknown).  Patient is postmenopausal.

## 2023-05-11 ENCOUNTER — HOSPITAL ENCOUNTER (OUTPATIENT)
Dept: GENERAL RADIOLOGY | Age: 68
Setting detail: OUTPATIENT SURGERY
Discharge: HOME | End: 2023-05-13
Attending: PAIN MEDICINE
Payer: MEDICARE

## 2023-05-11 ENCOUNTER — HOSPITAL ENCOUNTER (OUTPATIENT)
Age: 68
Setting detail: OUTPATIENT SURGERY
Discharge: HOME | End: 2023-05-11
Attending: PAIN MEDICINE | Admitting: PAIN MEDICINE
Payer: MEDICARE

## 2023-05-11 VITALS
BODY MASS INDEX: 31.65 KG/M2 | RESPIRATION RATE: 16 BRPM | HEART RATE: 79 BPM | WEIGHT: 190 LBS | TEMPERATURE: 97.4 F | SYSTOLIC BLOOD PRESSURE: 157 MMHG | HEIGHT: 65 IN | DIASTOLIC BLOOD PRESSURE: 69 MMHG | OXYGEN SATURATION: 97 %

## 2023-05-11 DIAGNOSIS — R52 PAIN MANAGEMENT: ICD-10-CM

## 2023-05-11 LAB — METER GLUCOSE: 144 MG/DL (ref 74–99)

## 2023-05-11 PROCEDURE — 64490 INJ PARAVERT F JNT C/T 1 LEV: CPT | Performed by: PAIN MEDICINE

## 2023-05-11 PROCEDURE — 82962 GLUCOSE BLOOD TEST: CPT

## 2023-05-11 PROCEDURE — 7100000011 HC PHASE II RECOVERY - ADDTL 15 MIN: Performed by: PAIN MEDICINE

## 2023-05-11 PROCEDURE — 7100000010 HC PHASE II RECOVERY - FIRST 15 MIN: Performed by: PAIN MEDICINE

## 2023-05-11 PROCEDURE — 64491 INJ PARAVERT F JNT C/T 2 LEV: CPT | Performed by: PAIN MEDICINE

## 2023-05-11 PROCEDURE — 3600000002 HC SURGERY LEVEL 2 BASE: Performed by: PAIN MEDICINE

## 2023-05-11 PROCEDURE — 2500000003 HC RX 250 WO HCPCS: Performed by: PAIN MEDICINE

## 2023-05-11 PROCEDURE — 3209999900 FLUORO FOR SURGICAL PROCEDURES

## 2023-05-11 PROCEDURE — 2709999900 HC NON-CHARGEABLE SUPPLY: Performed by: PAIN MEDICINE

## 2023-05-11 RX ORDER — LIDOCAINE HYDROCHLORIDE 5 MG/ML
INJECTION, SOLUTION INFILTRATION; INTRAVENOUS PRN
Status: DISCONTINUED | OUTPATIENT
Start: 2023-05-11 | End: 2023-05-11 | Stop reason: ALTCHOICE

## 2023-05-11 RX ORDER — BUPIVACAINE HYDROCHLORIDE 2.5 MG/ML
INJECTION, SOLUTION EPIDURAL; INFILTRATION; INTRACAUDAL PRN
Status: DISCONTINUED | OUTPATIENT
Start: 2023-05-11 | End: 2023-05-11 | Stop reason: ALTCHOICE

## 2023-05-11 ASSESSMENT — PAIN DESCRIPTION - ORIENTATION: ORIENTATION: LOWER

## 2023-05-11 ASSESSMENT — PAIN DESCRIPTION - LOCATION: LOCATION: NECK

## 2023-05-11 ASSESSMENT — PAIN - FUNCTIONAL ASSESSMENT: PAIN_FUNCTIONAL_ASSESSMENT: 0-10

## 2023-05-11 ASSESSMENT — PAIN SCALES - GENERAL: PAINLEVEL_OUTOF10: 5

## 2023-05-11 ASSESSMENT — PAIN DESCRIPTION - DESCRIPTORS
DESCRIPTORS: DISCOMFORT
DESCRIPTORS: DISCOMFORT

## 2023-05-11 NOTE — H&P
sweats and fatigue    RESPIRATORY:  negative for  dry cough, cough with sputum, dyspnea, wheezing and chest pain    CARDIOVASCULAR:  negative for chest pain, dyspnea, palpitations, syncope    GASTROINTESTINAL:  negative for nausea, vomiting, change in bowel habits, diarrhea, constipation and abdominal pain    MUSCULOSKELETAL: negative for muscle weakness    SKIN: negative for itching or rashes. BEHAVIOR/PSYCH:  negative for poor appetite, increased appetite, decreased sleep and poor concentration    All other systems negative      PHYSICAL EXAM:    VITALS:  BP (!) 149/89   Pulse 84   Temp 97.4 °F (36.3 °C) (Temporal)   Resp 16   Ht 5' 5\" (1.651 m)   Wt 190 lb (86.2 kg)   LMP  (LMP Unknown)   SpO2 98%   BMI 31.62 kg/m²     CONSTITUTIONAL:  awake, alert, cooperative, no apparent distress, and appears stated age    EYES: PERRLA, EOMI    LUNGS:  No increased work of breathing, no audible wheezing    CARDIOVASCULAR:  regular rate and rhythm    ABDOMEN:  Soft non tender non distended     EXTREMITIES: no signs of clubbing or cyanosis. MUSCULOSKELETAL: negative for flaccid muscle tone or spastic movements. SKIN: gross examination reveals no signs of rashes, or diaphoresis. NEURO: Cranial nerves II-XII grossly intact. No signs of agitated mood.        Assessment/Plan:    Left cervical pain for left C2,3,4 MNBB #1

## 2023-05-11 NOTE — DISCHARGE INSTRUCTIONS
Anish Hamilton Block/Radiofrequency  Home Going Instructions    1-Go home, rest for the remainder of the day  2-Please do not lift over 20 pounds the day of the injection  3-If you received sedation No: alcohol, driving, operating lawn mowers, plows, tractors or other dangerous equipment until next morning. Do not make important decisions or sign legal documents for 24 hours. You may experience light headedness, dizziness, nausea or sleepiness after sedation. Do not stay alone. A responsible adult must be with you for 24 hours. You could be nauseated from the medications you have received. Your IV site may be sore and bruised. 4-No dietary restrictions     5-Resume all medications the same day, blood thinners to be resumed 24 hours after injection if you were instructed to stop any. 6-Keep the surgical site clean and dry, you may shower the next morning and remove the      dressing. 7- No sitz baths, tub baths or hot tubs/swimming for 24 hours. 8- If you have any pain at the injection site(s), application of an ice pack to the area should be       helpful, 20 minutes on/20 minutes off for next 48 hours. 9- Call [facility] immediately at if you develop.   Fever greater than 100.4 F  Have bleeding or drainage from the puncture site  Have progressive Leg/arm numbness and or weakness  Loss of control of bowel and or bladder (wet/soil yourself)  Severe headache with inability to lift head  10-You may return to work the next day

## 2023-05-11 NOTE — OP NOTE
2023    Patient: Catalino Leyva  :  1955  Age:  79 y.o. Sex:  female     PRE-PROCEDURE DIAGNOSIS: Left  Cervical facet syndrome, cervical spondylosis. POST-PROCEDURE DIAGNOSIS: Same. PROCEDURE: # 1  Left Cervical medial branch blocks at  Levels C2, C3, and C4    SURGEON: ZARA Valdez. ANESTHESIA: local    ESTIMATED BLOOD LOSS:  None.  ______________________________________________________________________  BRIEF HISTORY:  Catalino Leyva comes in today for Left cervical facet medial branch block at levels C2, C3, and C4 . The potential complications of this procedure were discussed with her again today. She has elected to undergo the aforementioned procedure. Lyssa complete History & Physical examination were reviewed in depth, a copy of which is in the chart. DESCRIPTION OF PROCEDURE:    After confirming written and informed consent, a time-out was performed and Lyssa name and date of birth, the procedure to be performed as well as the plan for the location of the needle insertion were confirmed. The patient was brought into the procedure room and placed in the prone position on the fluoroscopy table. Standard monitors were placed and vital signs were observed throughout the procedure. The area of the cervical spine was prepped with chloraprep and draped in the usual sterile manner. AP fluoroscopy views were used to identify and miladis the mid lateral aspect of the articular pillars of the targeted levels. The spinal needle was directed until bone was contacted at each level. Once bone was contacted, the needle was walked off laterally. Lateral fluoroscopy was then utilized during final needle positioning in placing the tip of the needle in the middle of the articular pillar. After negative aspiration was confirmed, a total of 1 cc of marcaine 0.25% was injected equally, in divided dosages, at each of the levels.  The needles were removed and the patient body part was cleaned and bandage

## 2023-05-15 ENCOUNTER — OFFICE VISIT (OUTPATIENT)
Dept: ORTHOPEDIC SURGERY | Age: 68
End: 2023-05-15

## 2023-05-15 VITALS — HEIGHT: 65 IN | BODY MASS INDEX: 31.65 KG/M2 | WEIGHT: 190 LBS

## 2023-05-15 DIAGNOSIS — M65.322 TRIGGER INDEX FINGER OF LEFT HAND: Primary | ICD-10-CM

## 2023-05-15 RX ORDER — BETAMETHASONE SODIUM PHOSPHATE AND BETAMETHASONE ACETATE 3; 3 MG/ML; MG/ML
6 INJECTION, SUSPENSION INTRA-ARTICULAR; INTRALESIONAL; INTRAMUSCULAR; SOFT TISSUE ONCE
Status: COMPLETED | OUTPATIENT
Start: 2023-05-15 | End: 2023-05-15

## 2023-05-15 RX ORDER — LIDOCAINE HYDROCHLORIDE 10 MG/ML
1 INJECTION, SOLUTION INFILTRATION; PERINEURAL ONCE
Status: COMPLETED | OUTPATIENT
Start: 2023-05-15 | End: 2023-05-15

## 2023-05-15 RX ADMIN — LIDOCAINE HYDROCHLORIDE 1 ML: 10 INJECTION, SOLUTION INFILTRATION; PERINEURAL at 11:50

## 2023-05-15 RX ADMIN — BETAMETHASONE SODIUM PHOSPHATE AND BETAMETHASONE ACETATE 6 MG: 3; 3 INJECTION, SUSPENSION INTRA-ARTICULAR; INTRALESIONAL; INTRAMUSCULAR; SOFT TISSUE at 11:49

## 2023-05-15 NOTE — PROGRESS NOTES
PROCEDURE NOTE:    DIAGNOSIS    LEFT trigger finger, INDEX finger    PROCEDURE    Ultrasound-guided LEFT INDEX finger flexor tendon sheath steroid injection at the A1 pulley. PROCEDURAL PAUSE    Procedural pause conducted to verify: correct patient identity, procedure to be performed, and as applicable, correct side and site, correct patient position, and availability of implants, special equipment, or special requirements. PROCEDURE DETAILS    The procedure was carried out under sterile technique. Patient Position: seated    Localization Process: The A1 pulley was evaluated under ultrasound prior to starting the procedure. The skin was prepped with Betadine and Alcohol. Approach: In-plane. Injection: A 25-gauge 2-inch needle was advanced from an in-plane, distal to proximal approach into the flexor tendon sheath at the a1 pulley. After visualization of the needle tip in the target area and negative aspiration for blood, a mixture of 1 cc of 1% lidocaine and 1 cc of betamethasone (6 mg/cc) was injected into the tendon sheath at the a1 pulley with excellent sonographic flow. Images of procedure were permanently recorded. Postprocedure Care: The patient will avoid heavy exertion and avoid soaking the area under water for two days. The patient will contact me with any problems related to the injection. PATIENT EDUCATION    Ready to learn, no apparent learning barriers were identified; learning preferences include listening. Explained diagnosis and treatment plan; patient expressed understanding of the content. INFORMED CONSENT    Discussed the risks, benefits, alternatives, and the necessity of other members of the healthcare team participating in the procedure. All questions answered and consent given.     Following denial of allergy and review of potential side effects and complications including but not necessarily limited to infection, allergic reaction, local tissue breakdown, aseptic

## 2023-05-23 ENCOUNTER — OFFICE VISIT (OUTPATIENT)
Dept: PODIATRY | Age: 68
End: 2023-05-23
Payer: MEDICARE

## 2023-05-23 VITALS
HEIGHT: 64 IN | TEMPERATURE: 97.3 F | WEIGHT: 190 LBS | SYSTOLIC BLOOD PRESSURE: 132 MMHG | DIASTOLIC BLOOD PRESSURE: 84 MMHG | BODY MASS INDEX: 32.44 KG/M2

## 2023-05-23 DIAGNOSIS — L60.0 INGROWN NAIL: Primary | ICD-10-CM

## 2023-05-23 DIAGNOSIS — M79.675 PAIN IN LEFT TOE(S): ICD-10-CM

## 2023-05-23 PROCEDURE — 99213 OFFICE O/P EST LOW 20 MIN: CPT | Performed by: PODIATRIST

## 2023-05-23 PROCEDURE — 3079F DIAST BP 80-89 MM HG: CPT | Performed by: PODIATRIST

## 2023-05-23 PROCEDURE — 3075F SYST BP GE 130 - 139MM HG: CPT | Performed by: PODIATRIST

## 2023-05-23 PROCEDURE — 1123F ACP DISCUSS/DSCN MKR DOCD: CPT | Performed by: PODIATRIST

## 2023-05-23 NOTE — PROGRESS NOTES
performed by Aviva Sanders MD at [address]  [address]    reduction, [address]  2011    COLONOSCOPY  03/27/2015    COLONOSCOPY N/A 08/19/2020    COLONOSCOPY DIAGNOSTIC performed by Thien Colón MD at [address]  03/2021    SI Joint    ENDOSCOPY, COLON, DIAGNOSTIC      EPIDURAL STEROID INJECTION N/A 06/04/2019    BILATERAL TRANSFORAMINAL EPIDURAL STEROID INJECTION S1 performed by Sridhar Way DO at [address] / ANKLE Left 12/14/2018    REMOVAL OF PAINFUL HARDWARE LEFT FOOT  ++SYNTHES++ performed by Jes Nobles DPM at [address] [facility]    inguinal     LUMBAR SPINE SURGERY N/A 03/04/2020    EXPLORATION OF PRIOR L3-L5 FUSION AND L2-L3  POSTERIOR LUMBAR INTERBODY FUSION -- NEEDS O-ARM, AUDIOLOGY, CAGES, PLATES, SCREWS, C-ARM, TERESA TABLE, CELL SAVER, PLATELET GEL -- GLOBUS performed by Aviva Sanders MD at [address] 10/21/2020    EXCISION OF TONGUE LESION performed by Melissa Breen DO at [address] [facility] Bilateral 05/07/2018    L1-2 lumbar foramen #1    NERVE BLOCK Bilateral 12/03/2018    s1 tfesi    NERVE BLOCK Bilateral 08/12/2019    NERVE BLOCK Right 09/30/2019    sacral radiofrequency    NERVE BLOCK Right 09/01/2020    RIGHT SACROILIAC JOINT INJECTION #2 performed by Sridhar Way DO at [address] [facility] Left 5/11/2023    LEFT C2,3,4 MEDIAL NERVE BRANCH BLOCK performed by Sridhar Way DO at [address] Left 09/25/2013    left foot tarso metatarsal joint injection    OTHER SURGICAL HISTORY Left 05/27/2015    Endoscopic Gastroc recession left, Lapidus left foot and  Excision of exostosis left foot    PAIN MANAGEMENT PROCEDURE Left 7/27/2021    LEFT L5-S1 TRANSFORAMINAL EPIDURAL STEROID INJECTION performed by Sridhar Way DO at [address] Left 3/29/2022    LEFT

## 2023-05-28 DIAGNOSIS — I10 ESSENTIAL HYPERTENSION: ICD-10-CM

## 2023-05-30 ENCOUNTER — OFFICE VISIT (OUTPATIENT)
Dept: ORTHOPEDIC SURGERY | Age: 68
End: 2023-05-30

## 2023-05-30 VITALS — WEIGHT: 190 LBS | BODY MASS INDEX: 32.44 KG/M2 | HEIGHT: 64 IN

## 2023-05-30 DIAGNOSIS — M65.322 TRIGGER INDEX FINGER OF LEFT HAND: Primary | ICD-10-CM

## 2023-05-30 RX ORDER — HYDROCHLOROTHIAZIDE 12.5 MG/1
CAPSULE, GELATIN COATED ORAL
Qty: 90 CAPSULE | Refills: 1 | Status: SHIPPED | OUTPATIENT
Start: 2023-05-30

## 2023-06-01 ENCOUNTER — OFFICE VISIT (OUTPATIENT)
Dept: PAIN MANAGEMENT | Age: 68
End: 2023-06-01
Payer: MEDICARE

## 2023-06-01 VITALS
HEIGHT: 64 IN | OXYGEN SATURATION: 95 % | DIASTOLIC BLOOD PRESSURE: 80 MMHG | WEIGHT: 190 LBS | BODY MASS INDEX: 32.44 KG/M2 | HEART RATE: 93 BPM | SYSTOLIC BLOOD PRESSURE: 145 MMHG

## 2023-06-01 DIAGNOSIS — M47.812 CERVICAL SPONDYLOSIS: ICD-10-CM

## 2023-06-01 DIAGNOSIS — M51.36 DDD (DEGENERATIVE DISC DISEASE), LUMBAR: ICD-10-CM

## 2023-06-01 DIAGNOSIS — M54.16 LUMBAR RADICULOPATHY: ICD-10-CM

## 2023-06-01 DIAGNOSIS — M54.50 CHRONIC BILATERAL LOW BACK PAIN WITHOUT SCIATICA: ICD-10-CM

## 2023-06-01 DIAGNOSIS — M79.10 MYALGIA: ICD-10-CM

## 2023-06-01 DIAGNOSIS — G89.4 CHRONIC PAIN SYNDROME: Primary | ICD-10-CM

## 2023-06-01 DIAGNOSIS — G89.29 OTHER CHRONIC PAIN: ICD-10-CM

## 2023-06-01 DIAGNOSIS — M47.812 CERVICAL FACET JOINT SYNDROME: ICD-10-CM

## 2023-06-01 DIAGNOSIS — G89.29 CHRONIC BILATERAL LOW BACK PAIN WITHOUT SCIATICA: ICD-10-CM

## 2023-06-01 PROCEDURE — 3077F SYST BP >= 140 MM HG: CPT | Performed by: PHYSICIAN ASSISTANT

## 2023-06-01 PROCEDURE — 99213 OFFICE O/P EST LOW 20 MIN: CPT | Performed by: PHYSICIAN ASSISTANT

## 2023-06-01 PROCEDURE — 3079F DIAST BP 80-89 MM HG: CPT | Performed by: PHYSICIAN ASSISTANT

## 2023-06-01 PROCEDURE — 1123F ACP DISCUSS/DSCN MKR DOCD: CPT | Performed by: PHYSICIAN ASSISTANT

## 2023-06-01 RX ORDER — HYDROCODONE BITARTRATE AND ACETAMINOPHEN 5; 325 MG/1; MG/1
1 TABLET ORAL 2 TIMES DAILY
Qty: 60 TABLET | Refills: 0 | Status: SHIPPED | OUTPATIENT
Start: 2023-06-09 | End: 2023-07-09

## 2023-06-01 NOTE — PROGRESS NOTES
[address]  [address]  [facility], [facility]    Follow up Note      Paulette Melvin     Date of Visit:  2023    CC:  Patient presents for follow up   Chief Complaint   Patient presents with    Follow Up After Procedure     # 1  Left Cervical medial branch blocks at  Levels C2, C3, and C4                 HPI:  Pain is unchanged to neck. Left neck myofascial pain continues. Change in quality of symptoms:no. Patient satisfaction with analgesia:fair. Medication side effects: None. Recent diagnostic testing:none. Recent interventional procedures: 2023 PROCEDURE: # 1  Left Cervical medial branch blocks at  Levels C2, C3, and C4 - 60% relief. She has been on anticoagulation medications to include NSAIDS. The patient  has not been on herbal supplements. The patient is diabetic. Imagin2023 MRI cervical spine    FINDINGS:   BONES/ALIGNMENT: There is normal alignment of the spine. The vertebral body   heights are maintained. The bone marrow signal appears unremarkable. SPINAL CORD: No abnormal cord signal is seen. SOFT TISSUES: No paraspinal mass identified. C2-C3: Grade 1 anterolisthesis. Severe left facet arthropathy resulting in   severe left neural foraminal narrowing. C3-C4: Grade 1 anterolisthesis with disc bulging. Severe left and moderate   right facet arthropathy. Findings resulting in severe left neural foraminal   narrowing       C4-C5: Grade 1 anterolisthesis with disc bulging. Moderate bilateral facet   arthropathy. Mild left neural foraminal narrowing. C5-C6: Grade 1 anterolisthesis with disc bulging. Moderate bilateral facet   arthropathy. No canal or foraminal stenosis. C6-C7: Disc bulging with 2 mm central disc protrusion. Mild bilateral facet   arthropathy. Otherwise unremarkable       C7-T1: Grade 1 anterolisthesis. Mild bilateral facet arthropathy.   Otherwise   unremarkable

## 2023-06-02 NOTE — PROGRESS NOTES
[facility]  PRIMARY CARE SPORTS MEDICINE  DATE OF VISIT : 2023    Patient : Harsha Lance  Age : 79 y.o.  : 1955  MRN : [id number]   ______________________________________________________________________    Chief Complaint :   Chief Complaint   Patient presents with    Trigger finger     Patient states she did well with release and has not had and locking since her visit on 5/15/2023. HPI : Hasrha Lance is 79 y.o. female who presented to the clinic today for follow-up of trigger finger s/p USG TFR on 5/15/2023. Patient reports resolution of symptoms without complication.     Past Medical History :  Past Medical History:   Diagnosis Date    Cancer ([facility] [address] 2019    LESION REMOVED UNDER TONGUE  DENTAL CLINIC    Chronic back pain     Costochondritis     h/o    Depression     Diabetes mellitus ([facility] [address]     Fibromyalgia     Hallux rigidus of left foot     HTN (hypertension)     Hyperlipidemia     CLYDE on CPAP     Osteoarthritis     \"everywhere\"    Rheumatoid arthritis(714.0)     TIA (transient ischemic attack)     no deficits      Past Surgical History:   Procedure Laterality Date    ANESTHESIA NERVE BLOCK Left 2019    LEFT C3,4,5 MBB performed by Yannick Rodriguez MD at [address] [place] [address],[address] [facility] Right 2019    BILATERAL TRANSFORAMINAL EPIDURAL STEROID INJECTION S1 #3 performed by Yannick Rodriguez MD at [facility] Right 2020    RIGHT SACROILIAC JOINT INJECTION      CPT: 78642 performed by Kiesha Concepcion DO at [address]  2005    Left    ANKLE SURGERY Left 2022    REPAIR OF ANTERIOR TALAR LIGAMENT LEFT ANKLE, REPAIR DELTOID LIGAMENT LEFT ANKLE performed by Milka Dunham DPM at [place] [address] Left 2018    ARTHRODESIS 2ND DIGIT LEFT FOOT performed by Milka Dunham DPM at [address]  2017    PLIF L3-L4, L4-L5 with rods, screws, and cages/Dr. Agrawal [facility] SURGERY  2020    BACK SURGERY Right 2021

## 2023-06-14 DIAGNOSIS — G89.4 CHRONIC PAIN SYNDROME: Primary | ICD-10-CM

## 2023-06-14 DIAGNOSIS — M47.812 CERVICAL FACET JOINT SYNDROME: ICD-10-CM

## 2023-06-14 DIAGNOSIS — M47.812 CERVICAL SPONDYLOSIS: ICD-10-CM

## 2023-06-29 ENCOUNTER — OFFICE VISIT (OUTPATIENT)
Dept: PAIN MANAGEMENT | Age: 68
End: 2023-06-29
Payer: MEDICARE

## 2023-06-29 VITALS
BODY MASS INDEX: 33.63 KG/M2 | HEIGHT: 64 IN | TEMPERATURE: 97 F | WEIGHT: 197 LBS | RESPIRATION RATE: 16 BRPM | SYSTOLIC BLOOD PRESSURE: 127 MMHG | OXYGEN SATURATION: 96 % | HEART RATE: 102 BPM | DIASTOLIC BLOOD PRESSURE: 79 MMHG

## 2023-06-29 DIAGNOSIS — M51.36 DDD (DEGENERATIVE DISC DISEASE), LUMBAR: Primary | ICD-10-CM

## 2023-06-29 DIAGNOSIS — M79.10 MYALGIA: ICD-10-CM

## 2023-06-29 DIAGNOSIS — M54.16 LUMBAR RADICULOPATHY: ICD-10-CM

## 2023-06-29 DIAGNOSIS — M47.816 LUMBAR FACET ARTHROPATHY: ICD-10-CM

## 2023-06-29 DIAGNOSIS — M54.50 CHRONIC BILATERAL LOW BACK PAIN WITHOUT SCIATICA: ICD-10-CM

## 2023-06-29 DIAGNOSIS — G89.4 CHRONIC PAIN SYNDROME: ICD-10-CM

## 2023-06-29 DIAGNOSIS — M47.812 CERVICAL SPONDYLOSIS: ICD-10-CM

## 2023-06-29 DIAGNOSIS — M48.061 SPINAL STENOSIS OF LUMBAR REGION WITHOUT NEUROGENIC CLAUDICATION: ICD-10-CM

## 2023-06-29 DIAGNOSIS — G89.29 CHRONIC BILATERAL LOW BACK PAIN WITHOUT SCIATICA: ICD-10-CM

## 2023-06-29 DIAGNOSIS — M47.812 CERVICAL FACET JOINT SYNDROME: ICD-10-CM

## 2023-06-29 PROCEDURE — 3078F DIAST BP <80 MM HG: CPT | Performed by: PHYSICIAN ASSISTANT

## 2023-06-29 PROCEDURE — 99213 OFFICE O/P EST LOW 20 MIN: CPT | Performed by: PHYSICIAN ASSISTANT

## 2023-06-29 PROCEDURE — 1123F ACP DISCUSS/DSCN MKR DOCD: CPT | Performed by: PHYSICIAN ASSISTANT

## 2023-06-29 PROCEDURE — 3074F SYST BP LT 130 MM HG: CPT | Performed by: PHYSICIAN ASSISTANT

## 2023-06-29 RX ORDER — HYDROCODONE BITARTRATE AND ACETAMINOPHEN 5; 325 MG/1; MG/1
1 TABLET ORAL 2 TIMES DAILY
Qty: 60 TABLET | Refills: 0 | Status: SHIPPED | OUTPATIENT
Start: 2023-07-09 | End: 2023-08-08

## 2023-07-13 ENCOUNTER — TELEPHONE (OUTPATIENT)
Dept: CASE MANAGEMENT | Age: 68
End: 2023-07-13

## 2023-07-18 ENCOUNTER — EVALUATION (OUTPATIENT)
Dept: PHYSICAL THERAPY | Age: 68
End: 2023-07-18
Payer: MEDICARE

## 2023-07-18 DIAGNOSIS — M47.812 CERVICAL FACET JOINT SYNDROME: ICD-10-CM

## 2023-07-18 DIAGNOSIS — M47.812 CERVICAL SPONDYLOSIS: ICD-10-CM

## 2023-07-18 DIAGNOSIS — G89.4 CHRONIC PAIN SYNDROME: Primary | ICD-10-CM

## 2023-07-18 PROCEDURE — 97161 PT EVAL LOW COMPLEX 20 MIN: CPT | Performed by: PHYSICAL THERAPIST

## 2023-07-18 PROCEDURE — 97140 MANUAL THERAPY 1/> REGIONS: CPT | Performed by: PHYSICAL THERAPIST

## 2023-07-18 NOTE — PROGRESS NOTES
[facility]   Phone: [phone]   Fax: [phone]    Patient: Heavenly Manuel  : 1955  MRN: [id number]  Referring Provider: Philpi Clark, [address] [facility]  [address] [facility],  [address] [facility]     Medical Diagnosis:      Diagnosis Orders   1. Chronic pain syndrome        2. Cervical facet joint syndrome        3. Cervical spondylosis             SUBJECTIVE:     Onset date: chronic    Onset[de-identified] Insidious onset    Mechanism of Injury: Patient with a h/o multiple back surgeries who presents to therapy due to worsening neck pain over the past few months. Previous PT:  yes    Medical Management for Current Problem: epidurals    Chief complaint: pain    Behavior: condition is staying the same    Pain: constant  Current: 7/10     Best: 7/10     Worst:10/10    Symptom Type/Quality: N/A  Location[de-identified] Neck: right lateral neck and left lateral neck with radiation to left scapula. Provoking Activities/Positions:  looking down                 Relieving Activitie/Positions:  ice, hot shower     Disturbed Sleep: yes    Imaging results: No results found.     Past Medical History:  Past Medical History:   Diagnosis Date    Cancer ([address]) 2019    LESION REMOVED UNDER TONGUE  DENTAL CLINIC    Chronic back pain     Costochondritis     h/o    Depression     Diabetes mellitus ([facility])     Fibromyalgia     Hallux rigidus of left foot     HTN (hypertension)     Hyperlipidemia     CLYDE on CPAP     Osteoarthritis     \"everywhere\"    Rheumatoid arthritis(714.0)     TIA (transient ischemic attack)     no deficits      Past Surgical History:   Procedure Laterality Date    ANESTHESIA NERVE BLOCK Left 2019    LEFT C3,4,5 MBB performed by Myriam Palacios MD at [address] [facility] Right 2019    BILATERAL TRANSFORAMINAL EPIDURAL STEROID INJECTION S1 #3 performed by Myriam Palacios MD at [address] [facility] Right 2020    RIGHT SACROILIAC JOINT

## 2023-07-18 NOTE — PROGRESS NOTES
[facility]          Phone: [phone] Fax: [phone]    Physical Therapy Daily Treatment Note  Date:  2023    Patient Name:  Lizbeth Lopez    :  1955  MRN: [id number]    Evaluating Therapist:  Melany Jacques PT [id number]    Restrictions/Precautions:    Diagnosis:     Diagnosis Orders   1. Chronic pain syndrome        2. Cervical facet joint syndrome        3.  Cervical spondylosis          Treatment Diagnosis:    Insurance/Certification information:  [employer]  Referring Physician:  TREASURE Marsh  Plan of care signed (Y/N):    Visit# / total visits:    Pain level: 7/10   Time In:  1121  Time Out:  1200    Subjective:  See evaluation    Exercises:  Exercise/Equipment Resistance/Repetitions Other comments                                                                                                                                             Other Therapeutic Activities:  PT evaluation    Home Exercise Program:  N/A    Manual Treatments:  MFR and manual cervical traction x 10 min    Modalities:  US PRN     Time-in Time-out Total Time   47978  Evaluation Low Complexity 1121 1150 29   42148  Evaluation Med Complexity      31472  Evaluation High Complexity      30213  Ther Ex      08033  Neuro Re-ed        29349  Ther Activities        35278  Manual Therapy  1150 1200 10   09270  E-stim       84205  Ultrasound            Session 6519 4849 91       Treatment/Activity Tolerance:  [x] Patient tolerated treatment well [] Patient limited by fatigue  [] Patient limited by pain  [] Patient limited by other medical complications  [] Other:     Prognosis: [x] Good [x] Fair  [] Poor    Patient Requires Follow-up: [x] Yes  [] No    Plan:   [] Continue per plan of care [] Alter current plan (see comments)  [x] Plan of care initiated [] Hold pending MD visit [] Discharge  Plan for Next Session:        Electronically signed by:  Miguelina Gomes, [address] [place] [address] [id number]

## 2023-07-20 ENCOUNTER — HOSPITAL ENCOUNTER (OUTPATIENT)
Dept: CT IMAGING | Age: 68
Discharge: HOME | End: 2023-07-22
Payer: MEDICARE

## 2023-07-20 DIAGNOSIS — Z87.891 PERSONAL HISTORY OF TOBACCO USE: ICD-10-CM

## 2023-07-20 PROCEDURE — 71271 CT THORAX LUNG CANCER SCR C-: CPT

## 2023-07-25 ENCOUNTER — TREATMENT (OUTPATIENT)
Dept: PHYSICAL THERAPY | Age: 68
End: 2023-07-25
Payer: MEDICARE

## 2023-07-25 DIAGNOSIS — M47.812 CERVICAL FACET JOINT SYNDROME: ICD-10-CM

## 2023-07-25 DIAGNOSIS — G89.4 CHRONIC PAIN SYNDROME: Primary | ICD-10-CM

## 2023-07-25 DIAGNOSIS — M47.812 CERVICAL SPONDYLOSIS: ICD-10-CM

## 2023-07-25 PROCEDURE — 97110 THERAPEUTIC EXERCISES: CPT | Performed by: PHYSICAL THERAPIST

## 2023-07-25 PROCEDURE — 97140 MANUAL THERAPY 1/> REGIONS: CPT | Performed by: PHYSICAL THERAPIST

## 2023-07-25 NOTE — PROGRESS NOTES
[facility]          Phone: [phone] Fax: [phone]    Physical Therapy Daily Treatment Note  Date:  2023    Patient Name:  Mike Cox    :  1955  MRN: [id number]    Evaluating Therapist:  Keshawn Landis PT [id number]    Restrictions/Precautions:    Diagnosis:     Diagnosis Orders   1. Chronic pain syndrome        2. Cervical facet joint syndrome        3. Cervical spondylosis          Treatment Diagnosis:    Insurance/Certification information:  [employer]  Referring Physician:  TREASURE Vargas  Plan of care signed (Y/N):    Visit# / total visits:    Pain level: 8/10   Time In:  1123  Time Out:  1205    Subjective:  Pt reports that she had increased pain after last treatment, but then the pain calmed down by the next day. Reports continued tightness and pain on the left side of her neck.     Exercises:  Exercise/Equipment Resistance/Repetitions Other comments     Corner stretch 20 sec x 3 reps B      Levator scap stretch 20 sec x 2 reps B                                                                                                                                Other Therapeutic Activities:      Home Exercise Program:  23 - levator scap stretch, corner stretch    Manual Treatments:  MFR and manual cervical traction x 10 min    Modalities:  US PRN     Time-in Time-out Total Time   36734  Evaluation Low Complexity      15121  Evaluation Med Complexity      84859  Evaluation High Complexity      82102  Ther Ex 1150 1205 15   81310  Neuro Re-ed        67013  Ther Activities        05228  Manual Therapy  0544 8344 13   48591  E-stim       51651  Ultrasound            Session 8461 2580 80       Treatment/Activity Tolerance:  [x] Patient tolerated treatment well [] Patient limited by fatigue  [] Patient limited by pain  [] Patient limited by other medical complications  [] Other:     Prognosis: [x] Good [x] Fair  [] Poor    Patient Requires

## 2023-07-27 ENCOUNTER — TELEPHONE (OUTPATIENT)
Dept: CASE MANAGEMENT | Age: 68
End: 2023-07-27

## 2023-07-27 NOTE — TELEPHONE ENCOUNTER
No call, encounter opened to process CT Lung Screening. CT Lung Screen: 7/20/2023    IMPRESSION:  Mild emphysematous changes with bilateral pulmonary nodules, the largest of  which has imaging features most consistent with a calcified granuloma and  measures up to 8 mm. Additional, noncalcified pulmonary nodules measure up  to 4 mm, as above. A one year follow-up low-dose noncontrast lung cancer  screening chest CT is recommended. Incidental findings as above, including coronary artery calcifications which  may be a marker for coronary artery disease. LUNG RADS:  Lung-RADS 2 - Benign ()     Management:  12 month screening LDCT     RECOMMENDATIONS:  If you would like to register your patient with the [facility], please contact the Nurse Navigator at  [phone]. Pack years: 20    Social History     Tobacco Use  Smoking Status: Current Every day Smoker    Start Date:    Quit Date:    Types: Cigarettes   Packs/Day: 0.5   Years: 36   Pack Years: 20   Smokeless Tobacco: Never         Results letter sent to patient via my chart or mailed.      [address]

## 2023-07-28 ENCOUNTER — TREATMENT (OUTPATIENT)
Dept: PHYSICAL THERAPY | Age: 68
End: 2023-07-28

## 2023-07-28 DIAGNOSIS — G89.4 CHRONIC PAIN SYNDROME: Primary | ICD-10-CM

## 2023-07-28 DIAGNOSIS — M47.812 CERVICAL FACET JOINT SYNDROME: ICD-10-CM

## 2023-07-28 DIAGNOSIS — M47.812 CERVICAL SPONDYLOSIS: ICD-10-CM

## 2023-08-01 ENCOUNTER — TREATMENT (OUTPATIENT)
Dept: PHYSICAL THERAPY | Age: 68
End: 2023-08-01
Payer: MEDICARE

## 2023-08-01 DIAGNOSIS — M47.812 CERVICAL FACET JOINT SYNDROME: ICD-10-CM

## 2023-08-01 DIAGNOSIS — G89.4 CHRONIC PAIN SYNDROME: Primary | ICD-10-CM

## 2023-08-01 DIAGNOSIS — M47.812 CERVICAL SPONDYLOSIS: ICD-10-CM

## 2023-08-01 PROCEDURE — 97035 APP MDLTY 1+ULTRASOUND EA 15: CPT | Performed by: PHYSICAL THERAPIST

## 2023-08-01 PROCEDURE — 97140 MANUAL THERAPY 1/> REGIONS: CPT | Performed by: PHYSICAL THERAPIST

## 2023-08-03 ENCOUNTER — TREATMENT (OUTPATIENT)
Dept: PHYSICAL THERAPY | Age: 68
End: 2023-08-03
Payer: MEDICARE

## 2023-08-03 DIAGNOSIS — M47.812 CERVICAL FACET JOINT SYNDROME: ICD-10-CM

## 2023-08-03 DIAGNOSIS — M47.812 CERVICAL SPONDYLOSIS: ICD-10-CM

## 2023-08-03 DIAGNOSIS — G89.4 CHRONIC PAIN SYNDROME: Primary | ICD-10-CM

## 2023-08-03 PROCEDURE — 97140 MANUAL THERAPY 1/> REGIONS: CPT | Performed by: PHYSICAL THERAPIST

## 2023-08-03 PROCEDURE — 97035 APP MDLTY 1+ULTRASOUND EA 15: CPT | Performed by: PHYSICAL THERAPIST

## 2023-08-08 ENCOUNTER — OFFICE VISIT (OUTPATIENT)
Dept: PAIN MANAGEMENT | Age: 68
End: 2023-08-08
Payer: MEDICARE

## 2023-08-08 VITALS
OXYGEN SATURATION: 96 % | RESPIRATION RATE: 16 BRPM | BODY MASS INDEX: 32.82 KG/M2 | TEMPERATURE: 98.2 F | SYSTOLIC BLOOD PRESSURE: 128 MMHG | HEART RATE: 95 BPM | DIASTOLIC BLOOD PRESSURE: 86 MMHG | WEIGHT: 197 LBS | HEIGHT: 65 IN

## 2023-08-08 DIAGNOSIS — G89.4 CHRONIC PAIN SYNDROME: ICD-10-CM

## 2023-08-08 DIAGNOSIS — M54.16 LUMBAR RADICULOPATHY: ICD-10-CM

## 2023-08-08 DIAGNOSIS — M47.812 CERVICAL SPONDYLOSIS: ICD-10-CM

## 2023-08-08 DIAGNOSIS — G89.29 OTHER CHRONIC PAIN: ICD-10-CM

## 2023-08-08 DIAGNOSIS — M47.812 CERVICAL FACET JOINT SYNDROME: ICD-10-CM

## 2023-08-08 DIAGNOSIS — M48.061 SPINAL STENOSIS OF LUMBAR REGION WITHOUT NEUROGENIC CLAUDICATION: ICD-10-CM

## 2023-08-08 DIAGNOSIS — M47.816 LUMBAR FACET ARTHROPATHY: ICD-10-CM

## 2023-08-08 DIAGNOSIS — G89.29 CHRONIC BILATERAL LOW BACK PAIN WITHOUT SCIATICA: ICD-10-CM

## 2023-08-08 DIAGNOSIS — M79.10 MYALGIA: ICD-10-CM

## 2023-08-08 DIAGNOSIS — M51.36 DDD (DEGENERATIVE DISC DISEASE), LUMBAR: Primary | ICD-10-CM

## 2023-08-08 DIAGNOSIS — M54.50 CHRONIC BILATERAL LOW BACK PAIN WITHOUT SCIATICA: ICD-10-CM

## 2023-08-08 PROCEDURE — 3074F SYST BP LT 130 MM HG: CPT | Performed by: PHYSICIAN ASSISTANT

## 2023-08-08 PROCEDURE — 99213 OFFICE O/P EST LOW 20 MIN: CPT | Performed by: PHYSICIAN ASSISTANT

## 2023-08-08 PROCEDURE — 1123F ACP DISCUSS/DSCN MKR DOCD: CPT | Performed by: PHYSICIAN ASSISTANT

## 2023-08-08 PROCEDURE — 99213 OFFICE O/P EST LOW 20 MIN: CPT

## 2023-08-08 PROCEDURE — 3079F DIAST BP 80-89 MM HG: CPT | Performed by: PHYSICIAN ASSISTANT

## 2023-08-08 RX ORDER — GABAPENTIN 400 MG/1
400 CAPSULE ORAL 2 TIMES DAILY
Qty: 180 CAPSULE | Refills: 1 | Status: SHIPPED | OUTPATIENT
Start: 2023-08-08 | End: 2023-11-06

## 2023-08-08 RX ORDER — HYDROCODONE BITARTRATE AND ACETAMINOPHEN 5; 325 MG/1; MG/1
1 TABLET ORAL 2 TIMES DAILY
Qty: 60 TABLET | Refills: 0 | Status: SHIPPED | OUTPATIENT
Start: 2023-08-08 | End: 2023-09-07

## 2023-08-08 NOTE — PROGRESS NOTES
[address]    Follow up Note      Theone Bravo     Date of Visit:  2023    CC:  Patient presents for follow up   Chief Complaint   Patient presents with    Follow-up     Cervical                  HPI:  Pain is unchanged. Working with PT regarding neck. Change in quality of symptoms:no. Patient satisfaction with analgesia:fair. Medication side effects: None. Recent diagnostic testing:none. Recent interventional procedures: None. She has been on anticoagulation medications to include NSAIDS. The patient  has not been on herbal supplements. The patient is diabetic. Imagin2023 MRI cervical spine    FINDINGS:   BONES/ALIGNMENT: There is normal alignment of the spine. The vertebral body   heights are maintained. The bone marrow signal appears unremarkable. SPINAL CORD: No abnormal cord signal is seen. SOFT TISSUES: No paraspinal mass identified. C2-C3: Grade 1 anterolisthesis. Severe left facet arthropathy resulting in   severe left neural foraminal narrowing. C3-C4: Grade 1 anterolisthesis with disc bulging. Severe left and moderate   right facet arthropathy. Findings resulting in severe left neural foraminal   narrowing       C4-C5: Grade 1 anterolisthesis with disc bulging. Moderate bilateral facet   arthropathy. Mild left neural foraminal narrowing. C5-C6: Grade 1 anterolisthesis with disc bulging. Moderate bilateral facet   arthropathy. No canal or foraminal stenosis. C6-C7: Disc bulging with 2 mm central disc protrusion. Mild bilateral facet   arthropathy. Otherwise unremarkable       C7-T1: Grade 1 anterolisthesis. Mild bilateral facet arthropathy. Otherwise   unremarkable           Impression   At C2-C3, severe left neural foraminal narrowing. At C3-C4, severe left neural foraminal narrowing. No canal stenosis. No significant posterior disc pathology.

## 2023-08-10 ENCOUNTER — TREATMENT (OUTPATIENT)
Dept: PHYSICAL THERAPY | Age: 68
End: 2023-08-10

## 2023-08-10 DIAGNOSIS — M47.812 CERVICAL FACET JOINT SYNDROME: ICD-10-CM

## 2023-08-10 DIAGNOSIS — G89.4 CHRONIC PAIN SYNDROME: Primary | ICD-10-CM

## 2023-08-10 DIAGNOSIS — M47.812 CERVICAL SPONDYLOSIS: ICD-10-CM

## 2023-08-22 ENCOUNTER — TREATMENT (OUTPATIENT)
Dept: PHYSICAL THERAPY | Age: 68
End: 2023-08-22
Payer: MEDICARE

## 2023-08-22 DIAGNOSIS — M47.812 CERVICAL FACET JOINT SYNDROME: ICD-10-CM

## 2023-08-22 DIAGNOSIS — M47.812 CERVICAL SPONDYLOSIS: ICD-10-CM

## 2023-08-22 DIAGNOSIS — G89.4 CHRONIC PAIN SYNDROME: Primary | ICD-10-CM

## 2023-08-22 PROCEDURE — 97035 APP MDLTY 1+ULTRASOUND EA 15: CPT | Performed by: PHYSICAL THERAPIST

## 2023-08-22 PROCEDURE — 97140 MANUAL THERAPY 1/> REGIONS: CPT | Performed by: PHYSICAL THERAPIST

## 2023-08-25 ENCOUNTER — TREATMENT (OUTPATIENT)
Dept: PHYSICAL THERAPY | Age: 68
End: 2023-08-25

## 2023-08-25 DIAGNOSIS — M47.812 CERVICAL FACET JOINT SYNDROME: ICD-10-CM

## 2023-08-25 DIAGNOSIS — M47.812 CERVICAL SPONDYLOSIS: ICD-10-CM

## 2023-08-25 DIAGNOSIS — G89.4 CHRONIC PAIN SYNDROME: Primary | ICD-10-CM

## 2023-08-27 ENCOUNTER — APPOINTMENT (OUTPATIENT)
Dept: GENERAL RADIOLOGY | Age: 68
End: 2023-08-27
Payer: MEDICARE

## 2023-08-27 ENCOUNTER — HOSPITAL ENCOUNTER (EMERGENCY)
Age: 68
Discharge: HOME | End: 2023-08-27
Attending: STUDENT IN AN ORGANIZED HEALTH CARE EDUCATION/TRAINING PROGRAM
Payer: MEDICARE

## 2023-08-27 VITALS
DIASTOLIC BLOOD PRESSURE: 92 MMHG | TEMPERATURE: 97.4 F | OXYGEN SATURATION: 97 % | WEIGHT: 200 LBS | SYSTOLIC BLOOD PRESSURE: 162 MMHG | HEART RATE: 80 BPM | RESPIRATION RATE: 16 BRPM | BODY MASS INDEX: 33.28 KG/M2

## 2023-08-27 DIAGNOSIS — M79.602 PAIN OF LEFT UPPER EXTREMITY: Primary | ICD-10-CM

## 2023-08-27 LAB
ANION GAP SERPL CALCULATED.3IONS-SCNC: 8 MMOL/L (ref 7–16)
BASOPHILS # BLD: 0.06 K/UL (ref 0–0.2)
BASOPHILS NFR BLD: 1 % (ref 0–2)
BUN SERPL-MCNC: 7 MG/DL (ref 6–23)
CALCIUM SERPL-MCNC: 9.4 MG/DL (ref 8.6–10.2)
CHLORIDE SERPL-SCNC: 103 MMOL/L (ref 98–107)
CO2 SERPL-SCNC: 29 MMOL/L (ref 22–29)
CREAT SERPL-MCNC: 0.8 MG/DL (ref 0.5–1)
EOSINOPHIL # BLD: 0.22 K/UL (ref 0.05–0.5)
EOSINOPHILS RELATIVE PERCENT: 2 % (ref 0–6)
ERYTHROCYTE [DISTWIDTH] IN BLOOD BY AUTOMATED COUNT: 12.3 % (ref 11.5–15)
GFR SERPL CREATININE-BSD FRML MDRD: >60 ML/MIN/1.73M2
GLUCOSE SERPL-MCNC: 127 MG/DL (ref 74–99)
HCT VFR BLD AUTO: 45 % (ref 34–48)
HGB BLD-MCNC: 15.7 G/DL (ref 11.5–15.5)
IMM GRANULOCYTES # BLD AUTO: <0.03 K/UL (ref 0–0.58)
IMM GRANULOCYTES NFR BLD: 0 % (ref 0–5)
LYMPHOCYTES NFR BLD: 2.63 K/UL (ref 1.5–4)
LYMPHOCYTES RELATIVE PERCENT: 25 % (ref 20–42)
MCH RBC QN AUTO: 34.6 PG (ref 26–35)
MCHC RBC AUTO-ENTMCNC: 34.9 G/DL (ref 32–34.5)
MCV RBC AUTO: 99.1 FL (ref 80–99.9)
MONOCYTES NFR BLD: 0.75 K/UL (ref 0.1–0.95)
MONOCYTES NFR BLD: 7 % (ref 2–12)
NEUTROPHILS NFR BLD: 65 % (ref 43–80)
NEUTS SEG NFR BLD: 6.81 K/UL (ref 1.8–7.3)
PLATELET # BLD AUTO: 295 K/UL (ref 130–450)
PMV BLD AUTO: 10.7 FL (ref 7–12)
POTASSIUM SERPL-SCNC: 4.5 MMOL/L (ref 3.5–5)
RBC # BLD AUTO: 4.54 M/UL (ref 3.5–5.5)
SODIUM SERPL-SCNC: 140 MMOL/L (ref 132–146)
TROPONIN I SERPL HS-MCNC: <6 NG/L (ref 0–9)
WBC OTHER # BLD: 10.5 K/UL (ref 4.5–11.5)

## 2023-08-27 PROCEDURE — 6370000000 HC RX 637 (ALT 250 FOR IP): Performed by: STUDENT IN AN ORGANIZED HEALTH CARE EDUCATION/TRAINING PROGRAM

## 2023-08-27 PROCEDURE — 84484 ASSAY OF TROPONIN QUANT: CPT

## 2023-08-27 PROCEDURE — 73030 X-RAY EXAM OF SHOULDER: CPT

## 2023-08-27 PROCEDURE — 85025 COMPLETE CBC W/AUTO DIFF WBC: CPT

## 2023-08-27 PROCEDURE — 80048 BASIC METABOLIC PNL TOTAL CA: CPT

## 2023-08-27 PROCEDURE — 6360000002 HC RX W HCPCS: Performed by: STUDENT IN AN ORGANIZED HEALTH CARE EDUCATION/TRAINING PROGRAM

## 2023-08-27 PROCEDURE — 93005 ELECTROCARDIOGRAM TRACING: CPT | Performed by: STUDENT IN AN ORGANIZED HEALTH CARE EDUCATION/TRAINING PROGRAM

## 2023-08-27 PROCEDURE — 99285 EMERGENCY DEPT VISIT HI MDM: CPT

## 2023-08-27 PROCEDURE — 96372 THER/PROPH/DIAG INJ SC/IM: CPT

## 2023-08-27 RX ORDER — PREDNISONE 20 MG/1
40 TABLET ORAL DAILY
Qty: 10 TABLET | Refills: 0 | Status: SHIPPED | OUTPATIENT
Start: 2023-08-27 | End: 2023-09-01

## 2023-08-27 RX ORDER — PREDNISONE 20 MG/1
40 TABLET ORAL ONCE
Status: COMPLETED | OUTPATIENT
Start: 2023-08-27 | End: 2023-08-27

## 2023-08-27 RX ORDER — KETOROLAC TROMETHAMINE 30 MG/ML
30 INJECTION, SOLUTION INTRAMUSCULAR; INTRAVENOUS ONCE
Status: COMPLETED | OUTPATIENT
Start: 2023-08-27 | End: 2023-08-27

## 2023-08-27 RX ORDER — PREDNISONE 20 MG/1
40 TABLET ORAL DAILY
Qty: 20 TABLET | Refills: 0 | Status: SHIPPED | OUTPATIENT
Start: 2023-08-27 | End: 2023-08-27 | Stop reason: SDUPTHER

## 2023-08-27 RX ADMIN — KETOROLAC TROMETHAMINE 30 MG: 30 INJECTION, SOLUTION INTRAMUSCULAR; INTRAVENOUS at 16:21

## 2023-08-27 RX ADMIN — PREDNISONE 40 MG: 20 TABLET ORAL at 16:20

## 2023-08-27 ASSESSMENT — PAIN SCALES - GENERAL
PAINLEVEL_OUTOF10: 8

## 2023-08-27 ASSESSMENT — PAIN DESCRIPTION - DESCRIPTORS
DESCRIPTORS: THROBBING;DISCOMFORT
DESCRIPTORS: DISCOMFORT;SHOOTING;ACHING

## 2023-08-27 ASSESSMENT — PAIN DESCRIPTION - LOCATION
LOCATION: ARM;SHOULDER
LOCATION: SHOULDER
LOCATION: ARM;SHOULDER

## 2023-08-27 ASSESSMENT — PAIN DESCRIPTION - PAIN TYPE: TYPE: ACUTE PAIN

## 2023-08-27 ASSESSMENT — PAIN DESCRIPTION - ORIENTATION
ORIENTATION: LEFT

## 2023-08-27 ASSESSMENT — PAIN DESCRIPTION - FREQUENCY: FREQUENCY: CONTINUOUS

## 2023-08-27 NOTE — ED NOTES
Declined ice pack at this time. stated \"I've been putting on ice on\" & \"I'll wait to see what they say\".       Marcela Camarillo  08/27/23 1507

## 2023-08-28 ENCOUNTER — TELEPHONE (OUTPATIENT)
Dept: FAMILY MEDICINE CLINIC | Age: 68
End: 2023-08-28

## 2023-08-28 DIAGNOSIS — R20.0 PAIN AND NUMBNESS OF LEFT UPPER EXTREMITY: Primary | ICD-10-CM

## 2023-08-28 DIAGNOSIS — M79.602 PAIN AND NUMBNESS OF LEFT UPPER EXTREMITY: Primary | ICD-10-CM

## 2023-08-28 LAB
EKG ATRIAL RATE: 81 BPM
EKG P AXIS: 76 DEGREES
EKG P-R INTERVAL: 162 MS
EKG Q-T INTERVAL: 392 MS
EKG QRS DURATION: 90 MS
EKG QTC CALCULATION (BAZETT): 455 MS
EKG R AXIS: 18 DEGREES
EKG T AXIS: 58 DEGREES
EKG VENTRICULAR RATE: 81 BPM

## 2023-08-28 PROCEDURE — 93010 ELECTROCARDIOGRAM REPORT: CPT | Performed by: INTERNAL MEDICINE

## 2023-08-28 ASSESSMENT — ENCOUNTER SYMPTOMS: SHORTNESS OF BREATH: 0

## 2023-08-28 NOTE — TELEPHONE ENCOUNTER
Priscila Carranza is requesting a script for OT sent to [facility] outpatient rehab and therapy. She currently does PT for her back there, and when she injured her arm/shoulder the other day the ER doctor said to start OT.  It would be for her Left shoulder/arm/wrist.

## 2023-08-28 NOTE — ED PROVIDER NOTES
k/uL    Monocytes Absolute 0.75 0.10 - 0.95 k/uL    Eosinophils Absolute 0.22 0.05 - 0.50 k/uL    Basophils Absolute 0.06 0.00 - 0.20 k/uL    Absolute Immature Granulocyte <0.03 0.00 - 0.58 k/uL   Basic Metabolic Panel   Result Value Ref Range    Glucose 127 (H) 74 - 99 mg/dL    BUN 7 6 - 23 mg/dL    Creatinine 0.8 0.50 - 1.00 mg/dL    Est, Glom Filt Rate >60 >60 mL/min/1.73m2    Calcium 9.4 8.6 - 10.2 mg/dL    Sodium 140 132 - 146 mmol/L    Potassium 4.5 3.5 - 5.0 mmol/L    Chloride 103 98 - 107 mmol/L    CO2 29 22 - 29 mmol/L    Anion Gap 8 7 - 16 mmol/L   Troponin   Result Value Ref Range    Troponin, High Sensitivity <6 0 - 9 ng/L   EKG 12 Lead   Result Value Ref Range    Ventricular Rate 81 BPM    Atrial Rate 81 BPM    P-R Interval 162 ms    QRS Duration 90 ms    Q-T Interval 392 ms    QTc Calculation (Bazett) 455 ms    P Axis 76 degrees    R Axis 18 degrees    T Axis 58 degrees       RADIOLOGY:  XR SHOULDER LEFT (MIN 2 VIEWS)   Final Result   No acute abnormality. AC joint degenerative changes. ------------------------- NURSING NOTES AND VITALS REVIEWED ---------------------------  Date / Time Roomed:  8/27/2023  3:00 PM  ED Bed Assignment:  BRITNEY/BRITNEY    The nursing notes within the ED encounter and vital signs as below have been reviewed. Patient Vitals for the past 24 hrs:   BP Pulse Resp   08/27/23 1753 (!) 162/92 80 16       Oxygen Saturation Interpretation: Normal      Medical Decision Making  16-year-old female presenting with left shoulder pain radiating down her left arm. Pain did seem to follow radial nerve distribution. XR shoulder negative for any acute process. I discuss that this was likely neuropathic pain. Patient states she has gotten injections from ortho for pain and would like to follow up with them. Patient was given Toradal IM and prednisone in the ED. She is already on gabapentin. She is stable for discharge at this time. She was prescribed short course of prednisone.

## 2023-08-29 NOTE — TELEPHONE ENCOUNTER
Reviewing the emergency room note, it looks like she injured her arm lifting some groceries. While I think that therapy could potentially help, this might also resolve on its own. If I am reading this rate this is a very recent injury and its only been a few days that she has been experiencing pain. Does she want to wait and see if it gets better over the next week or so and then decide on therapy or does she want to start now?

## 2023-08-29 NOTE — TELEPHONE ENCOUNTER
Spoke w/ Mary Alonzo, she would like referral for OT now. States that this is the second time this has happened when she was carrying groceries. Last time she waited too long and could not use her left hand while she was waiting to see a specialist. ER doctor thought that OT would be more beneficial and that she did not need to follow up w/ hand surgeon because problem is higher up. Referral is ready to be signed.

## 2023-08-30 ENCOUNTER — TREATMENT (OUTPATIENT)
Dept: PHYSICAL THERAPY | Age: 68
End: 2023-08-30

## 2023-08-30 DIAGNOSIS — M47.812 CERVICAL SPONDYLOSIS: ICD-10-CM

## 2023-08-30 DIAGNOSIS — G89.4 CHRONIC PAIN SYNDROME: Primary | ICD-10-CM

## 2023-08-30 DIAGNOSIS — M47.812 CERVICAL FACET JOINT SYNDROME: ICD-10-CM

## 2023-08-30 NOTE — PROGRESS NOTES
[facility]          Phone: [phone] Fax: [phone]    Physical Therapy Daily Treatment Note  Date:  2023    Patient Name:  Jim Leavitt    :  1955  MRN: [id number]    Evaluating Therapist:  Wiley Browne PT [id number]    Restrictions/Precautions:    Diagnosis:     Diagnosis Orders   1. Chronic pain syndrome        2. Cervical facet joint syndrome        3. Cervical spondylosis              Treatment Diagnosis:    Insurance/Certification information:  [employer]  Referring Physician:  TREASURE Urbina  Plan of care signed (Y/N):    Visit# / total visits:  -  Pain level: 10   Time In:  1000  Time Out:  1040    Subjective:  Pt reports she had partial relief of symptoms the evening following IDN intervetion. She later went grocery shopping and lifted an item with sudden onset of L UE pain which she was evaluated at ER for and was determined not to be cardiac in nature. Pain remains in elbow and forearm of L UE but was unchanged with cervical movements. She has since obtained OT script for evaluation of elbow/forearm. Slight increase of L shoulder symptoms. Exercises:  Exercise/Equipment Resistance/Repetitions Other comments        Levator scap stretch 20 sec x3 reps B      Upper trap stretch 20 sec x3 reps B                                                                                                                     Other:  Pt tolerated stretches with decreased ROM d/t limitation secondary to onset of discomfort. Pt was able to maintain appropriate form following verbal/visual cueing. She tolerated IDN without complications or adverse reaction. Pt notes trigger point was palpated during IDN which she feels will be beneficial.    Home Exercise Program:   supine cervical retraction    Manual Treatments:  Manual release of suboccipitals, cervical paraspinals and upper trap in prone.  Pt remained prone for IDN intervention with 4x 1/2\"

## 2023-09-01 ENCOUNTER — TREATMENT (OUTPATIENT)
Dept: PHYSICAL THERAPY | Age: 68
End: 2023-09-01

## 2023-09-01 DIAGNOSIS — G89.4 CHRONIC PAIN SYNDROME: Primary | ICD-10-CM

## 2023-09-01 DIAGNOSIS — M47.812 CERVICAL SPONDYLOSIS: ICD-10-CM

## 2023-09-01 DIAGNOSIS — M47.812 CERVICAL FACET JOINT SYNDROME: ICD-10-CM

## 2023-09-01 NOTE — PROGRESS NOTES
[facility]          Phone: [phone] Fax: [phone]    Physical Therapy Daily Treatment Note  Date:  2023    Patient Name:  Terrence Ignacio    :  1955  MRN: [id number]    Evaluating Therapist:  Lacho Ghosh PT [id number]    Restrictions/Precautions:    Diagnosis:     Diagnosis Orders   1. Chronic pain syndrome        2. Cervical facet joint syndrome        3. Cervical spondylosis                Treatment Diagnosis:    Insurance/Certification information:  [employer]  Referring Physician:  TREASURE Valencia  Plan of care signed (Y/N):    Visit# / total visits:  10/8-  Pain level: 7/10   Time In:  1125  Time Out:  1200    Subjective:  Pt reports no significant change in symptoms since last session. Persistent pain starting at elbow. Exercises:  Exercise/Equipment Resistance/Repetitions Other comments        Levator scap stretch 20 sec x3 reps B      Upper trap stretch 20 sec x3 reps B                                                                                                                     Other:  Pt demonstrated no reproduction of L forearm/elbow symptoms with cervical rotation and compression (Spurling's test) or axial compression in neutral rotation. Pt tolerated IDN treatment with active trigger points identified and treated without issues or adverse reactions. Home Exercise Program:   supine cervical retraction    Manual Treatments:  Pt in sitting for treatment of levator scapulae insertion, L C6 paraspinal and, supraspinatus active trigger points with 1\" needles and twisting manipulation only. Needles remained in situ approx 5 min and were removed without issue. Pt remained sitting for soft tissue mobilization along paraspinals and upper trap region with emphasis on L side due to pt primary symptoms this date.  Pt positioned in supine with R rotation for treatment of SCM origin and greater oricular point with 1/2\" needle, remained

## 2023-09-05 ENCOUNTER — OFFICE VISIT (OUTPATIENT)
Dept: PAIN MANAGEMENT | Age: 68
End: 2023-09-05
Payer: MEDICARE

## 2023-09-05 ENCOUNTER — HOSPITAL ENCOUNTER (OUTPATIENT)
Age: 68
Discharge: HOME | End: 2023-09-07
Payer: MEDICARE

## 2023-09-05 ENCOUNTER — PREP FOR PROCEDURE (OUTPATIENT)
Dept: PAIN MANAGEMENT | Age: 68
End: 2023-09-05

## 2023-09-05 ENCOUNTER — HOSPITAL ENCOUNTER (OUTPATIENT)
Dept: GENERAL RADIOLOGY | Age: 68
Discharge: HOME | End: 2023-09-07
Payer: MEDICARE

## 2023-09-05 VITALS
HEART RATE: 101 BPM | HEIGHT: 65 IN | BODY MASS INDEX: 33.32 KG/M2 | SYSTOLIC BLOOD PRESSURE: 128 MMHG | RESPIRATION RATE: 16 BRPM | WEIGHT: 200 LBS | OXYGEN SATURATION: 97 % | TEMPERATURE: 98.2 F | DIASTOLIC BLOOD PRESSURE: 72 MMHG

## 2023-09-05 DIAGNOSIS — M79.10 MYALGIA: ICD-10-CM

## 2023-09-05 DIAGNOSIS — M48.061 SPINAL STENOSIS OF LUMBAR REGION WITHOUT NEUROGENIC CLAUDICATION: ICD-10-CM

## 2023-09-05 DIAGNOSIS — M25.522 LEFT ELBOW PAIN: ICD-10-CM

## 2023-09-05 DIAGNOSIS — G89.4 CHRONIC PAIN SYNDROME: ICD-10-CM

## 2023-09-05 DIAGNOSIS — M54.16 LUMBAR RADICULOPATHY: ICD-10-CM

## 2023-09-05 DIAGNOSIS — M47.812 CERVICAL FACET JOINT SYNDROME: ICD-10-CM

## 2023-09-05 DIAGNOSIS — M54.50 CHRONIC BILATERAL LOW BACK PAIN WITHOUT SCIATICA: ICD-10-CM

## 2023-09-05 DIAGNOSIS — M51.36 DDD (DEGENERATIVE DISC DISEASE), LUMBAR: ICD-10-CM

## 2023-09-05 DIAGNOSIS — G89.29 OTHER CHRONIC PAIN: ICD-10-CM

## 2023-09-05 DIAGNOSIS — M47.816 LUMBAR FACET ARTHROPATHY: ICD-10-CM

## 2023-09-05 DIAGNOSIS — G89.29 CHRONIC BILATERAL LOW BACK PAIN WITHOUT SCIATICA: ICD-10-CM

## 2023-09-05 DIAGNOSIS — M47.812 CERVICAL SPONDYLOSIS: ICD-10-CM

## 2023-09-05 DIAGNOSIS — M54.12 CERVICAL RADICULOPATHY: Primary | ICD-10-CM

## 2023-09-05 PROCEDURE — 3074F SYST BP LT 130 MM HG: CPT | Performed by: PHYSICIAN ASSISTANT

## 2023-09-05 PROCEDURE — 3078F DIAST BP <80 MM HG: CPT | Performed by: PHYSICIAN ASSISTANT

## 2023-09-05 PROCEDURE — 99213 OFFICE O/P EST LOW 20 MIN: CPT | Performed by: PHYSICIAN ASSISTANT

## 2023-09-05 PROCEDURE — 73080 X-RAY EXAM OF ELBOW: CPT

## 2023-09-05 PROCEDURE — 1123F ACP DISCUSS/DSCN MKR DOCD: CPT | Performed by: PHYSICIAN ASSISTANT

## 2023-09-05 PROCEDURE — 99214 OFFICE O/P EST MOD 30 MIN: CPT | Performed by: PHYSICIAN ASSISTANT

## 2023-09-05 RX ORDER — HYDROCODONE BITARTRATE AND ACETAMINOPHEN 5; 325 MG/1; MG/1
1 TABLET ORAL 2 TIMES DAILY
Qty: 60 TABLET | Refills: 0 | Status: SHIPPED | OUTPATIENT
Start: 2023-09-07 | End: 2023-10-07

## 2023-09-05 NOTE — PROGRESS NOTES
Carol Langford presents to the [facility] on 9/5/2023. Asmita Jerry is complaining of pain neck. The pain is constant. The pain is described as aching and sharp. Pain is rated on her best day at a 9, on her worst day at a 9, and on average at a 9 on the VAS scale. She took her last dose of Norco today. Any procedures since your last visit: No    Pacemaker or defibrillator: No   She is not on NSAIDS and is not on anticoagulation medications   Medication Contract and Consent for Opioid Use Documents Filed       Patient Documents       Type of Document Status Date Received Received By Description    Medication Contract Received  [facility] Opioid medication contract with Dr Reji Hamm 3/24/20    Medication Contract Received 4/26/2018  3:50 PM ANYA NUNO PAIN MANAGEMENT PATIENT AGREEMENT 4/26/2018    Medication Contract Received 11/5/2020  4:23 PM EBER HONG Opioid medication contract with Dr Luna Fleming Received 3/1/2021  1:26 PM [facility] Opioid medication contract with Dr Luna Fleming Received 8/23/2021  2:03 PM Lety Winchester Medication contract    Medication Contract Received 10/18/2021  3:03 PM Lety Winchester medication contract 10/18/2021    Medication Contract Signed 10/4/2022 12:35 PM CRISTIANA BAÑUELOS Pain Med. Contract 10/04/22                    LMP  (LMP Unknown)      No LMP recorded (lmp unknown).  Patient is postmenopausal.
spine CT with severe L2-3 stenosis   Chronic left-sided cervical pain  Pmhx: depression, DM, HTN, DLD, CLYDE on CPAP, RA, TIA  Retired, lives in senior apartments     Patient seen today for new problem:  left elbow pain as well as her chronic issues. Patient with left elbow pain after lifting some groceries which has not improved. Left elbow x-ray ordered. Suspect lateral epicondylitis. Consider tennis elbow brace. Plan:  Patient is s/p revision fusion L2-L4 on 3/4/2020. Patient is s/p:  Right sacroiliac joint fusion with use of SI-BONE iFuse implant on 3/9/2021 by Dr. Bandar Aguila. Patient is s/p:  Left TFESI L5 and S1 on 03/29/2022 with 75% relief. Doing relatively well at this point. LESI L5-S1 on 06/09/2022 with good relief until recently. Repeat with about 40% relief. She would like to try TPIs before another ROGELIO. ROGELIO C7-T1 with 40% relief. Repeat ordered today. Consider doing 2 close together for better relief. Her neck pain is causing issues with her activities of daily living. RF norco 5/325 BID     No RF Gabapentin 400 mg BID. She reports that any increases make her off balance. OARRS report reviewed   UDS next visit  Patient encouraged to stay active and to lose weight. Failed physical therapy \"many times over the past 30 years\"  Treatment plan discussed with the patient including medications side effects     30 minutes was spent with this patient counseling and educating her on her treatment plan. Controlled Substance Monitoring:    Acute and Chronic Pain Monitoring:   RX Monitoring 9/5/2023   Attestation -   Acute Pain Prescriptions -   Periodic Controlled Substance Monitoring Possible medication side effects, risk of tolerance/dependence & alternative treatments discussed. ;No signs of potential drug abuse or diversion identified. ;Assessed functional status. ;Obtaining appropriate analgesic effect of treatment.    Chronic Pain > 80 MEDD -                  ccreferring physic

## 2023-09-06 ENCOUNTER — TREATMENT (OUTPATIENT)
Dept: PHYSICAL THERAPY | Age: 68
End: 2023-09-06

## 2023-09-06 ENCOUNTER — EVALUATION (OUTPATIENT)
Dept: OCCUPATIONAL THERAPY | Age: 68
End: 2023-09-06

## 2023-09-06 DIAGNOSIS — M79.602 PAIN AND NUMBNESS OF LEFT UPPER EXTREMITY: Primary | ICD-10-CM

## 2023-09-06 DIAGNOSIS — R20.0 PAIN AND NUMBNESS OF LEFT UPPER EXTREMITY: Primary | ICD-10-CM

## 2023-09-06 DIAGNOSIS — M47.812 CERVICAL FACET JOINT SYNDROME: ICD-10-CM

## 2023-09-06 DIAGNOSIS — M47.812 CERVICAL SPONDYLOSIS: ICD-10-CM

## 2023-09-06 DIAGNOSIS — G89.4 CHRONIC PAIN SYNDROME: Primary | ICD-10-CM

## 2023-09-06 NOTE — PROGRESS NOTES
OCCUPATIONAL THERAPY INITIAL EVALUATION    [facility] OCCUPATIONAL THERAPY  [address] [facility] [address]  Dept: [phone]  Loc: Deana Qureshi OT Fax: [phone]    Date:  2023  Initial Evaluation Date: 2023   Evaluating Therapist: Jerel Arellano OT    Patient Name:  Noemy Joyce    :  1955    Restrictions/Precautions:  none, low fall risk  Diagnosis:  M79.602, R20.0 (ICD-10-CM) - Pain and numbness of left upper extremity       Date of Surgery/Injury: 1 week prior     Insurance/Certification information:  [employer]  Plan of care signed (Y/N): N  Visit# / total visits: -    Referring Practitioner:  Price Niño MD  Specific Practitioner Orders: Eval & Treat: Pain in left shoulder, elbow and wrist    Assessment of current deficits   [] Functional mobility  [] ADLs  [x] Strength   [] Cognition   [] Functional transfers   [] IADLs  [] Safety Awareness  [] Endurance   [x] Fine Motor Coordination  [] Balance  [] Vision/perception  [] Sensation    [x] Gross Motor Coordination [] ROM  [x] Pain   [x] Edema    [] Scar Adhesion/Skin Integrity     OT PLAN OF CARE   OT POC based on physician orders, patient diagnosis and results of clinical assessment    Frequency/Duration:1-2x / week for 12-14 visits.    Certification period From: 2023  To: 2024  Specific OT Treatment to include:     [x] Instruction in HEP                   Modalities:  [x] Therapeutic Exercise        [x] Ultrasound               [x] Electrical Stimulation/Attended  [x] PROM/Stretching                    [x] Fluidotherapy          [x]  Paraffin                   [x] AAROM  [x] AROM                 [x] Iontophoresis:   [x] Tendon Glides                                               [] Neuromuscular Re-Ed            [] ADL/IADL re-training    [x] Therapeutic Activity       [x] Pain Management

## 2023-09-07 DIAGNOSIS — I10 ESSENTIAL HYPERTENSION: ICD-10-CM

## 2023-09-07 DIAGNOSIS — E11.9 TYPE 2 DIABETES MELLITUS WITHOUT COMPLICATION, WITHOUT LONG-TERM CURRENT USE OF INSULIN (HCC): ICD-10-CM

## 2023-09-07 RX ORDER — LINAGLIPTIN 5 MG/1
TABLET, FILM COATED ORAL
Qty: 90 TABLET | Refills: 2 | Status: SHIPPED | OUTPATIENT
Start: 2023-09-07

## 2023-09-07 NOTE — TELEPHONE ENCOUNTER
Medication(s) pended?    [x] Yes  [] No    Last Appointment:  6/13/2023    Future appts:  Future Appointments   Date Time Provider [address]   9/13/2023  1:00 PM Naveen Galdamez, OT [facility]   9/15/2023 10:00 AM Naveen Galdamez, OT [facility]   9/20/2023  2:00 PM Naveen Galdamez, OT [facility]   9/22/2023 10:00 AM Naveen Galdamez, OT [facility]   9/27/2023  1:00 PM Naveen Galdamez, OT [facility]   9/29/2023 10:00 AM Naveen Galdamez, OT [facility]   10/5/2023 11:45 AM TREASURE Staples BDM PAIN [facility]   12/18/2023  1:20 PM Ani Jordan MD [email or web address]

## 2023-09-13 ENCOUNTER — TREATMENT (OUTPATIENT)
Dept: OCCUPATIONAL THERAPY | Age: 68
End: 2023-09-13
Payer: MEDICARE

## 2023-09-13 DIAGNOSIS — R20.0 PAIN AND NUMBNESS OF LEFT UPPER EXTREMITY: Primary | ICD-10-CM

## 2023-09-13 DIAGNOSIS — M79.602 PAIN AND NUMBNESS OF LEFT UPPER EXTREMITY: Primary | ICD-10-CM

## 2023-09-13 PROCEDURE — 97110 THERAPEUTIC EXERCISES: CPT | Performed by: OCCUPATIONAL THERAPIST

## 2023-09-13 PROCEDURE — 97140 MANUAL THERAPY 1/> REGIONS: CPT | Performed by: OCCUPATIONAL THERAPIST

## 2023-09-13 NOTE — PROGRESS NOTES
OCCUPATIONAL THERAPY DAILY NOTE  [address]  [facility] OCCUPATIONAL THERAPY  [address] [facility] [facility] [address]  Dept: [phone]  Loc: Deana Qureshi OT Fax: [phone]      Date:  2023    Initial Evaluation Date: 2023                          Evaluating Therapist: Alejandro Tyler OT     Patient Name:  Evangelist Pettit                         :  1955     Restrictions/Precautions:  none, low fall risk  Diagnosis:  M79.602, R20.0 (ICD-10-CM) - Pain and numbness of left upper extremity       L forearm pain                                                  Date of Surgery/Injury: Aug 30, 0363     Insurance/Certification information:  [employer]  Plan of care signed (Y/N): N  Visit# / total visits: -     Referring Practitioner:  Brooklyn Jackson MD  Specific Practitioner Orders: Eval & Treat: Pain in left shoulder, elbow and wrist    OT PLAN OF CARE   OT POC based on physician orders, patient diagnosis and results of clinical assessment     Frequency/Duration:1-2x / week for 12-14 visits. Certification period From: 2023  To: 2024    GOALS (Long term same as Short term):  1) Patient will demonstrate good understanding of home program (exercises/activities/diagnosis/prognosis/goals) with good accuracy. 2) Patient will demonstrate increased /pinch strength of at least 10 / 3-5 pinch pounds of their LUE hand. 3) Patient to report decreased pain in their affected L upper extremity from 8/10 to 2/10 or less with resistive functional use. 4) Increase in fine motor function as evidenced by decreased time to complete 9-hole peg test and/or MRMT test by at least 5-10 seconds with LUE in order to complete writing. 5) Patient will report ADL functions as Mod I/I using LUE.    6) Patient will demonstrate improved functional activity tolerance from poor to fair for ADL/IADL

## 2023-09-15 ENCOUNTER — TREATMENT (OUTPATIENT)
Dept: OCCUPATIONAL THERAPY | Age: 68
End: 2023-09-15

## 2023-09-15 DIAGNOSIS — M79.602 PAIN AND NUMBNESS OF LEFT UPPER EXTREMITY: Primary | ICD-10-CM

## 2023-09-15 DIAGNOSIS — R20.0 PAIN AND NUMBNESS OF LEFT UPPER EXTREMITY: Primary | ICD-10-CM

## 2023-09-15 NOTE — PROGRESS NOTES
CODE  Minutes  Units   61517 Fluidotherapy 15 1   33040 Paraffin     06655 Ultrasound     [id number] Electrical Stim - Attended     34 76 31 Iontophoresis     82830 Therapeutic Ex 30 2   39689 Therapeutic Activity     78754 Neuromuscular Re-Ed     00575 Manual Therapy 10 1   98508 ADL/COMP Tech Train     37243 Orthotic Management/Training      Other                 Total  55 4       Plan: OT 1-2x/week for 12-14 sessions    [x]  Continues Plan of care with focus on improve LUE pain, strength, functional use and decrease edema/disability.  : Treatment covered based on POC and graduated to patient's progress. Pt education continues at each visit to obtain maximum benefits from skilled OT intervention.   []  Alter Plan of care:   []  Discharge:      Krunal Nayak/EDITH, [facility]

## 2023-09-20 ENCOUNTER — TREATMENT (OUTPATIENT)
Dept: OCCUPATIONAL THERAPY | Age: 68
End: 2023-09-20
Payer: MEDICARE

## 2023-09-20 DIAGNOSIS — M79.602 PAIN AND NUMBNESS OF LEFT UPPER EXTREMITY: Primary | ICD-10-CM

## 2023-09-20 DIAGNOSIS — R20.0 PAIN AND NUMBNESS OF LEFT UPPER EXTREMITY: Primary | ICD-10-CM

## 2023-09-20 PROCEDURE — 97110 THERAPEUTIC EXERCISES: CPT | Performed by: OCCUPATIONAL THERAPIST

## 2023-09-20 PROCEDURE — 97140 MANUAL THERAPY 1/> REGIONS: CPT | Performed by: OCCUPATIONAL THERAPIST

## 2023-09-20 PROCEDURE — 97022 WHIRLPOOL THERAPY: CPT | Performed by: OCCUPATIONAL THERAPIST

## 2023-09-20 NOTE — PROGRESS NOTES
OCCUPATIONAL THERAPY DAILY NOTE  [facility] OCCUPATIONAL THERAPY  [address] [place] [address]  Dept: [phone]  Loc: Deana Qureshi OT Fax: [phone]      Date:  2023    Initial Evaluation Date: 2023                          Evaluating Therapist: Denisse Salas OT     Patient Name:  Mike Cox                         :  1955     Restrictions/Precautions:  none, low fall risk  Diagnosis:  M79.602, R20.0 (ICD-10-CM) - Pain and numbness of left upper extremity       L forearm pain                                                  Date of Surgery/Injury: Aug 30, 7170     Insurance/Certification information:  [employer]  Plan of care signed (Y/N): N  Visit# / total visits: -     Referring Practitioner:  Dillon Ramirez MD  Specific Practitioner Orders: Eval & Treat: Pain in left shoulder, elbow and wrist    OT PLAN OF CARE   OT POC based on physician orders, patient diagnosis and results of clinical assessment     Frequency/Duration:1-2x / week for 12-14 visits. Certification period From: 2023  To: 2024    GOALS (Long term same as Short term):  1) Patient will demonstrate good understanding of home program (exercises/activities/diagnosis/prognosis/goals) with good accuracy. 2) Patient will demonstrate increased /pinch strength of at least 10 / 3-5 pinch pounds of their LUE hand. 3) Patient to report decreased pain in their affected L upper extremity from 8/10 to 2/10 or less with resistive functional use. 4) Increase in fine motor function as evidenced by decreased time to complete 9-hole peg test and/or MRMT test by at least 5-10 seconds with LUE in order to complete writing. 5) Patient will report ADL functions as Mod I/I using LUE.    6) Patient will demonstrate improved functional activity tolerance from poor to fair for ADL/IADL

## 2023-09-22 ENCOUNTER — TELEPHONE (OUTPATIENT)
Dept: PAIN MANAGEMENT | Age: 68
End: 2023-09-22

## 2023-09-22 ENCOUNTER — TREATMENT (OUTPATIENT)
Dept: OCCUPATIONAL THERAPY | Age: 68
End: 2023-09-22

## 2023-09-22 DIAGNOSIS — M79.602 PAIN AND NUMBNESS OF LEFT UPPER EXTREMITY: Primary | ICD-10-CM

## 2023-09-22 DIAGNOSIS — R20.0 PAIN AND NUMBNESS OF LEFT UPPER EXTREMITY: Primary | ICD-10-CM

## 2023-09-22 NOTE — TELEPHONE ENCOUNTER
Call to [facility] that procedure was approved for 9/28/2023 and that Becky Wells should call her a few days before for the pre op call and between 2:00 PM and 4:00 PM  the business day before with the arrival time. Instructed Eleanor to hold ibuprofen for 24 hours, naprosyn for 4 days and any aspirin containing products or fish oil for 7 days. Instructed to call office back if any questions. Eleanor verbalized understanding.     Electronically signed by Catalino Jansen RN on 9/22/2023 at 2:55 PM

## 2023-09-22 NOTE — PROGRESS NOTES
OCCUPATIONAL THERAPY DAILY NOTE  [facility] OCCUPATIONAL THERAPY  [address] [place] [address]  Dept: [phone]  Loc: Deana Qureshi OT Fax: [phone]      Date:  2023    Initial Evaluation Date: 2023                          Evaluating Therapist: Denisse Salas OT     Patient Name:  Mike Cox                         :  1955     Restrictions/Precautions:  none, low fall risk  Diagnosis:  M79.602, R20.0 (ICD-10-CM) - Pain and numbness of left upper extremity       L forearm pain                                                  Date of Surgery/Injury: Aug 30, 4968     Insurance/Certification information:  [employer]  Plan of care signed (Y/N): N  Visit# / total visits: -     Referring Practitioner:  Dillon Ramirez MD  Specific Practitioner Orders: Eval & Treat: Pain in left shoulder, elbow and wrist    OT PLAN OF CARE   OT POC based on physician orders, patient diagnosis and results of clinical assessment     Frequency/Duration:1-2x / week for 12-14 visits. Certification period From: 2023  To: 2024    GOALS (Long term same as Short term):  1) Patient will demonstrate good understanding of home program (exercises/activities/diagnosis/prognosis/goals) with good accuracy. 2) Patient will demonstrate increased /pinch strength of at least 10 / 3-5 pinch pounds of their LUE hand. 3) Patient to report decreased pain in their affected L upper extremity from 8/10 to 2/10 or less with resistive functional use. 4) Increase in fine motor function as evidenced by decreased time to complete 9-hole peg test and/or MRMT test by at least 5-10 seconds with LUE in order to complete writing. 5) Patient will report ADL functions as Mod I/I using LUE.    6) Patient will demonstrate improved functional activity tolerance from poor to fair for ADL/IADL

## 2023-09-26 NOTE — PROGRESS NOTES
[address] [facility] PAIN MANAGEMENT  INSTRUCTIONS  . .......................................................................................................................................... [x] Parking the day of Surgery is located in the [facility].   Upon entering the door, make immediate right into the surgery reception room    [x]  Bring photo ID and insurance card     [x] You may have a light breakfast day of procedure    [x]  Wear loose comfortable clothing    [x]  Please follow instructions for medications as given per Dr's office    [x] You can expect a call the business day prior to procedure to notify you of your arrival time     [x] Please arrange for     []  Other instructions

## 2023-09-27 ENCOUNTER — TREATMENT (OUTPATIENT)
Dept: OCCUPATIONAL THERAPY | Age: 68
End: 2023-09-27
Payer: MEDICARE

## 2023-09-27 DIAGNOSIS — M79.602 PAIN AND NUMBNESS OF LEFT UPPER EXTREMITY: Primary | ICD-10-CM

## 2023-09-27 DIAGNOSIS — R20.0 PAIN AND NUMBNESS OF LEFT UPPER EXTREMITY: Primary | ICD-10-CM

## 2023-09-27 PROCEDURE — 97022 WHIRLPOOL THERAPY: CPT | Performed by: OCCUPATIONAL THERAPIST

## 2023-09-27 PROCEDURE — 97110 THERAPEUTIC EXERCISES: CPT | Performed by: OCCUPATIONAL THERAPIST

## 2023-09-27 NOTE — PROGRESS NOTES
OCCUPATIONAL THERAPY DAILY NOTE  [address]  [facility] OCCUPATIONAL THERAPY  [address] [facility] [place] [facility] [address]  Dept: [phone]  Loc: Deana Qureshi OT Fax: [phone]      Date:  2023    Initial Evaluation Date: 2023                          Evaluating Therapist: Stella Land OT     Patient Name:  Linus Nuno                         :  1955     Restrictions/Precautions:  none, low fall risk  Diagnosis:  M79.602, R20.0 (ICD-10-CM) - Pain and numbness of left upper extremity       L forearm pain                                                  Date of Surgery/Injury: Aug 30, 1479     Insurance/Certification information:  [employer]  Plan of care signed (Y/N): N  Visit# / total visits: -     Referring Practitioner:  Ruthie Rushing MD  Specific Practitioner Orders: Eval & Treat: Pain in left shoulder, elbow and wrist    OT PLAN OF CARE   OT POC based on physician orders, patient diagnosis and results of clinical assessment     Frequency/Duration:1-2x / week for 12-14 visits. Certification period From: 2023  To: 2024    GOALS (Long term same as Short term):  1) Patient will demonstrate good understanding of home program (exercises/activities/diagnosis/prognosis/goals) with good accuracy. 2) Patient will demonstrate increased /pinch strength of at least 10 / 3-5 pinch pounds of their LUE hand. 3) Patient to report decreased pain in their affected L upper extremity from 8/10 to 2/10 or less with resistive functional use. 4) Increase in fine motor function as evidenced by decreased time to complete 9-hole peg test and/or MRMT test by at least 5-10 seconds with LUE in order to complete writing. 5) Patient will report ADL functions as Mod I/I using LUE.    6) Patient will demonstrate improved functional activity tolerance from poor to fair for ADL/IADL

## 2023-09-28 ENCOUNTER — HOSPITAL ENCOUNTER (OUTPATIENT)
Dept: GENERAL RADIOLOGY | Age: 68
Discharge: HOME | End: 2023-09-30
Attending: PAIN MEDICINE
Payer: MEDICARE

## 2023-09-28 ENCOUNTER — HOSPITAL ENCOUNTER (OUTPATIENT)
Age: 68
Setting detail: OUTPATIENT SURGERY
Discharge: HOME | End: 2023-09-28
Attending: PAIN MEDICINE | Admitting: PAIN MEDICINE
Payer: MEDICARE

## 2023-09-28 VITALS
BODY MASS INDEX: 32.49 KG/M2 | TEMPERATURE: 97.6 F | DIASTOLIC BLOOD PRESSURE: 65 MMHG | WEIGHT: 195 LBS | OXYGEN SATURATION: 95 % | RESPIRATION RATE: 18 BRPM | SYSTOLIC BLOOD PRESSURE: 134 MMHG | HEIGHT: 65 IN | HEART RATE: 84 BPM

## 2023-09-28 DIAGNOSIS — R52 PAIN MANAGEMENT: ICD-10-CM

## 2023-09-28 LAB — GLUCOSE BLD-MCNC: 175 MG/DL (ref 74–99)

## 2023-09-28 PROCEDURE — 6360000002 HC RX W HCPCS: Performed by: PAIN MEDICINE

## 2023-09-28 PROCEDURE — 7100000010 HC PHASE II RECOVERY - FIRST 15 MIN: Performed by: PAIN MEDICINE

## 2023-09-28 PROCEDURE — 7100000011 HC PHASE II RECOVERY - ADDTL 15 MIN: Performed by: PAIN MEDICINE

## 2023-09-28 PROCEDURE — A4216 STERILE WATER/SALINE, 10 ML: HCPCS | Performed by: PAIN MEDICINE

## 2023-09-28 PROCEDURE — 82962 GLUCOSE BLOOD TEST: CPT

## 2023-09-28 PROCEDURE — 6360000004 HC RX CONTRAST MEDICATION: Performed by: PAIN MEDICINE

## 2023-09-28 PROCEDURE — 62321 NJX INTERLAMINAR CRV/THRC: CPT | Performed by: PAIN MEDICINE

## 2023-09-28 PROCEDURE — 2500000003 HC RX 250 WO HCPCS: Performed by: PAIN MEDICINE

## 2023-09-28 PROCEDURE — 2580000003 HC RX 258: Performed by: PAIN MEDICINE

## 2023-09-28 PROCEDURE — 3600000002 HC SURGERY LEVEL 2 BASE: Performed by: PAIN MEDICINE

## 2023-09-28 PROCEDURE — 2709999900 HC NON-CHARGEABLE SUPPLY: Performed by: PAIN MEDICINE

## 2023-09-28 RX ORDER — LIDOCAINE HYDROCHLORIDE 5 MG/ML
INJECTION, SOLUTION INFILTRATION; INTRAVENOUS PRN
Status: DISCONTINUED | OUTPATIENT
Start: 2023-09-28 | End: 2023-09-28 | Stop reason: ALTCHOICE

## 2023-09-28 RX ORDER — METHYLPREDNISOLONE ACETATE 40 MG/ML
INJECTION, SUSPENSION INTRA-ARTICULAR; INTRALESIONAL; INTRAMUSCULAR; SOFT TISSUE PRN
Status: DISCONTINUED | OUTPATIENT
Start: 2023-09-28 | End: 2023-09-28 | Stop reason: ALTCHOICE

## 2023-09-28 RX ORDER — SODIUM CHLORIDE 9 MG/ML
INJECTION INTRAVENOUS PRN
Status: DISCONTINUED | OUTPATIENT
Start: 2023-09-28 | End: 2023-09-28 | Stop reason: ALTCHOICE

## 2023-09-28 RX ORDER — IOPAMIDOL 612 MG/ML
INJECTION, SOLUTION INTRATHECAL PRN
Status: DISCONTINUED | OUTPATIENT
Start: 2023-09-28 | End: 2023-09-28 | Stop reason: ALTCHOICE

## 2023-09-28 ASSESSMENT — PAIN DESCRIPTION - DESCRIPTORS: DESCRIPTORS: SHARP;SHOOTING

## 2023-09-28 ASSESSMENT — PAIN - FUNCTIONAL ASSESSMENT: PAIN_FUNCTIONAL_ASSESSMENT: 0-10

## 2023-09-28 NOTE — OP NOTE
2023    Patient: Fred Hutchinson  :  1955  Age:  79 y.o. Sex:  female     PRE-PROCEDURE DIAGNOSIS: Cervical degenerative disc disease, cervical radicular pain. POST-PROCEDURE DIAGNOSIS: Same. PROCEDURE: Fluoroscopic guided cervical epidural steroid injection at C7-T1 level. SURGEON: ZARA Robertson. ANESTHESIA: local    ESTIMATED BLOOD LOSS: None.  ______________________________________________________________________  BRIEF HISTORY:  Fred Hutchinson comes in today for the therapeutic cervical epidural injection under fluoroscopic guidance. The potential complications of this procedure were discussed with the her again today. She has elected to undergo the aforementioned procedure. Sea complete History & Physical examination were reviewed in depth, a copy of which is in the chart. DESCRIPTION OF PROCEDURE:    After confirming written and informed consent, a time-out was performed and Sea name and date of birth, the procedure to be performed as well as the plan for the location of the needle insertion were confirmed. The patient was brought into the procedure room and placed in the prone position with the head flexed midline on the fluoroscopy table. A pillow was placed under the patient's chest and forehead to increase cervical interlaminar space. Standard monitors were placed, and vital signs were observed throughout the procedure. The area was prepped with chloraprep and the C7-T1 interspace was marked under fluoroscopy. The skin and subcutaneous tissues at the above level were anesthestized with 0.5% lidocaine. A # 18 gauge 3 1/2 tuohy epidural needle was inserted and advanced toward the inferior lamina until bony contact was made. The needle was then advanced superiorly toward the epidural space. From this point on the loss of resistance technique with a 5 cc glass syringe was used to confirm entrance of the needle into the epidural space under intermittent lateral fluoroscopy.

## 2023-09-28 NOTE — DISCHARGE INSTRUCTIONS
Genaro Galeazzi Dr. Adelbert Grooms Dr. Tonia Drafts Block/Radiofrequency  Home Going Instructions    1-Go home, rest for the remainder of the day  2-Please do not lift over 20 pounds the day of the injection  3-If you received sedation No: alcohol, driving, operating lawn mowers, plows, tractors or other dangerous equipment until next morning. Do not make important decisions or sign legal documents for 24 hours. You may experience light headedness, dizziness, nausea or sleepiness after sedation. Do not stay alone. A responsible adult must be with you for 24 hours. You could be nauseated from the medications you have received. Your IV site may be sore and bruised. 4-No dietary restrictions     5-Resume all medications the same day, blood thinners to be resumed 24 hours after injection if you were instructed to stop any. 6-Keep the surgical site clean and dry, you may shower the next morning and remove the      dressing. 7- No sitz baths, tub baths or hot tubs/swimming for 24 hours. 8- If you have any pain at the injection site(s), application of an ice pack to the area should be       helpful, 20 minutes on/20 minutes off for next 48 hours. 9- Call [facility] immediately at if you develop.   Fever greater than 100.4 F  Have bleeding or drainage from the puncture site  Have progressive Leg/arm numbness and or weakness  Loss of control of bowel and or bladder (wet/soil yourself)  Severe headache with inability to lift head  10-You may return to work the next day

## 2023-09-28 NOTE — H&P
Carmen Díaz MD   lisinopril (PRINIVIL;ZESTRIL) 40 MG tablet Take 1 tablet by mouth every evening 6/13/23   Ruba Frias MD   hydroCHLOROthiazide (MICROZIDE) 12.5 MG capsule TAKE ONE CAPSULE BY MOUTH ONCE DAILY FOR FOR BLOOD PRESSURE 5/30/23   Ruba Frias MD   cyclobenzaprine (FLEXERIL) 10 MG tablet TAKE 1/2 TABLET BY MOUTH TWICE DAILY AS NEEDED FOR MUSCLE SPASMS 3/6/23   Historical Provider, MD   Handicap Placard MISC DX:  Loss of Balance, Chronic low back pain, s/p back surgery. Duration of 5 years 2/22/21   Ruba Frias MD       Allergies   Allergen Reactions    Pcn [Penicillins] Anaphylaxis       Social History     Socioeconomic History    Marital status:      Spouse name: Not on file    Number of children: Not on file    Years of education: Not on file    Highest education level: Not on file   Occupational History    Not on file   Tobacco Use    Smoking status: Every Day     Packs/day: 0.50     Years: 40.00     Additional pack years: 0.00     Total pack years: 20.00     Types: Cigarettes    Smokeless tobacco: Never   Vaping Use    Vaping Use: Never used   Substance and Sexual Activity    Alcohol use: Not Currently     Alcohol/week: 0.0 standard drinks of alcohol     Comment: rarely    Drug use: No    Sexual activity: Not on file   Other Topics Concern    Not on file   Social History Narrative    Not on file     Social Determinants of Health     Financial Resource Strain: Unknown (6/13/2023)    Overall Financial Resource Strain (CARDIA)     Difficulty of Paying Living Expenses: Patient refused   Food Insecurity: Unknown (6/13/2023)    Hunger Vital Sign     Worried About Running Out of Food in the Last Year: Patient refused     [address] in the Last Year: Patient refused   Transportation Needs: Unknown (6/13/2023)    PRAPARE - Transportation     Lack of Transportation (Medical):  Not on file     Lack of Transportation (Non-Medical): Patient refused   Physical

## 2023-09-29 ENCOUNTER — TREATMENT (OUTPATIENT)
Dept: OCCUPATIONAL THERAPY | Age: 68
End: 2023-09-29
Payer: MEDICARE

## 2023-09-29 DIAGNOSIS — M79.602 PAIN AND NUMBNESS OF LEFT UPPER EXTREMITY: Primary | ICD-10-CM

## 2023-09-29 DIAGNOSIS — R20.0 PAIN AND NUMBNESS OF LEFT UPPER EXTREMITY: Primary | ICD-10-CM

## 2023-09-29 PROCEDURE — 97110 THERAPEUTIC EXERCISES: CPT | Performed by: OCCUPATIONAL THERAPIST

## 2023-09-29 PROCEDURE — 97022 WHIRLPOOL THERAPY: CPT | Performed by: OCCUPATIONAL THERAPIST

## 2023-09-29 NOTE — PROGRESS NOTES
OCCUPATIONAL THERAPY DAILY NOTE  [facility] OCCUPATIONAL THERAPY  [address] [place] [address]  Dept: [phone]  Loc: Deana Qureshi OT Fax: [phone]      Date:  2023    Initial Evaluation Date: 2023                          Evaluating Therapist: Karissa Clemons OT     Patient Name:  Trev Coburn                         :  1955     Restrictions/Precautions:  none, low fall risk  Diagnosis:  M79.602, R20.0 (ICD-10-CM) - Pain and numbness of left upper extremity       L forearm pain                                                  Date of Surgery/Injury: Aug 30, 2148     Insurance/Certification information:  [employer]  Plan of care signed (Y/N): N  Visit# / total visits: -     Referring Practitioner:  Devyn Kiran MD  Specific Practitioner Orders: Eval & Treat: Pain in left shoulder, elbow and wrist    OT PLAN OF CARE   OT POC based on physician orders, patient diagnosis and results of clinical assessment     Frequency/Duration:1-2x / week for 12-14 visits. Certification period From: 2023  To: 2024    GOALS (Long term same as Short term):  1) Patient will demonstrate good understanding of home program (exercises/activities/diagnosis/prognosis/goals) with good accuracy. 2) Patient will demonstrate increased /pinch strength of at least 10 / 3-5 pinch pounds of their LUE hand. 3) Patient to report decreased pain in their affected L upper extremity from 8/10 to 2/10 or less with resistive functional use. 4) Increase in fine motor function as evidenced by decreased time to complete 9-hole peg test and/or MRMT test by at least 5-10 seconds with LUE in order to complete writing. 5) Patient will report ADL functions as Mod I/I using LUE.    6) Patient will demonstrate improved functional activity tolerance from poor to fair for ADL/IADL

## 2023-10-02 ENCOUNTER — TELEPHONE (OUTPATIENT)
Dept: FAMILY MEDICINE CLINIC | Age: 68
End: 2023-10-02

## 2023-10-02 DIAGNOSIS — E11.9 TYPE 2 DIABETES MELLITUS WITHOUT COMPLICATION, WITHOUT LONG-TERM CURRENT USE OF INSULIN (HCC): ICD-10-CM

## 2023-10-02 RX ORDER — CYCLOBENZAPRINE HCL 10 MG
TABLET ORAL
Qty: 30 TABLET | Refills: 1 | Status: SHIPPED | OUTPATIENT
Start: 2023-10-02

## 2023-10-02 NOTE — TELEPHONE ENCOUNTER
She can go back on metformin extended release or can try a sulfonylurea like glipizide. Glipizide can cause low blood sugar though some people have to be aware of that and watch out for it if they are taking it.

## 2023-10-02 NOTE — TELEPHONE ENCOUNTER
Eleanor calling to tell you that she is in the doughnut hole, and the cost of the Pedro Dupstevenson is now not affordable. She is asking if there is something you can replace it with? Also needs a refill on Cyclobenzaprine.        Last Appointment:  6/13/2023  Future Appointments   Date Time Provider [address]   10/4/2023  1:00 PM Augustine Ridley OT [facility]   10/5/2023 11:45 AM TREASURE Hubbard [facility] PAIN [facility]   10/6/2023 10:00 AM Augustine Ridley OT [facility]   10/11/2023  1:00 PM Marcy Dent OT [facility]   10/13/2023 11:00 AM Marcy Dent OT [facility]   12/18/2023  1:20 PM Mikey Ramirez  [facility]

## 2023-10-03 NOTE — TELEPHONE ENCOUNTER
Danielle Zapata would like to use extended release Metformin. She is asking for a 90 day Rx to be sent to [facility], 90 days is less expensive. I reviewed Rx for Cyclobenzaprine w/ Danielle Mccallralph she said that she takes a whole 10mg tablet 1 -2 times a day as needed. She has a hard time cutting tablets in half b/c they are so small. She will be mindful of dosing going forward. She will ask for a new Rx for 5mg once she has used up what you sent yesterday.

## 2023-10-04 ENCOUNTER — TREATMENT (OUTPATIENT)
Dept: OCCUPATIONAL THERAPY | Age: 68
End: 2023-10-04

## 2023-10-04 DIAGNOSIS — R20.0 PAIN AND NUMBNESS OF LEFT UPPER EXTREMITY: Primary | ICD-10-CM

## 2023-10-04 DIAGNOSIS — M79.602 PAIN AND NUMBNESS OF LEFT UPPER EXTREMITY: Primary | ICD-10-CM

## 2023-10-04 RX ORDER — METFORMIN HYDROCHLORIDE 500 MG/1
500 TABLET, EXTENDED RELEASE ORAL
Qty: 90 TABLET | Refills: 1 | Status: SHIPPED | OUTPATIENT
Start: 2023-10-04

## 2023-10-04 NOTE — PROGRESS NOTES
and place into container.   - Light theraputty grasping, rolling, pinching out objects and placing into container. - 1 ^ 2# wrist flexion, extension RD, UD, supination, pronation x15        Other:                 Assessment/Comments: Pt tolerated session well with pt increasing her strengthening with slight fatigue noted. No pain was noted and pt reports significant improvement with her pain and being able to function with her LUE. Pt is planning on possible discharge next session due to improvement with continued HEP recommended.     -Rehab Potential: Good   -Patient Response to Treatment: Pt tolerated session well with decreased pain reported. Patient. Education:  [x] Plans/Goals, Risks/Benefits discussed  [x] Home exercise program  Method of Education: [x] Verbal  [x] Demo  [] Written  Comprehension of Education:  [x] Verbalizes understanding. [x] Demonstrates understanding. [] Needs Review. [] Demonstrates/verbalizes understanding of HEP/Ed previously given. Time In: 1:00 pm         Time Out: 1:55 pm    CODE  Minutes  Units   27499 Fluidotherapy 15 1   47363 Paraffin     08295 Ultrasound     70116 Electrical Stim - Attended     31921 Iontophoresis     66666 Therapeutic Ex 40 3   95837 Therapeutic Activity     37836 Neuromuscular Re-Ed     13961 Manual Therapy     65035 ADL/COMP Tech Train     20943 Orthotic Management/Training      Other                 Total  55 4       Plan: OT 1-2x/week for 12-14 sessions    [x]  Continues Plan of care with focus on improve LUE pain, strength, functional use and decrease edema/disability.  : Treatment covered based on POC and graduated to patient's progress. Pt education continues at each visit to obtain maximum benefits from skilled OT intervention.   []  Alter Plan of care:   []  Discharge:      Krunal Gardner/EDITH, [facility]

## 2023-10-06 ENCOUNTER — TREATMENT (OUTPATIENT)
Dept: OCCUPATIONAL THERAPY | Age: 68
End: 2023-10-06

## 2023-10-06 DIAGNOSIS — M79.602 PAIN AND NUMBNESS OF LEFT UPPER EXTREMITY: Primary | ICD-10-CM

## 2023-10-06 DIAGNOSIS — R20.0 PAIN AND NUMBNESS OF LEFT UPPER EXTREMITY: Primary | ICD-10-CM

## 2023-10-06 NOTE — PROGRESS NOTES
OCCUPATIONAL THERAPY DAILY NOTE  [address]  [facility] OCCUPATIONAL THERAPY  [address] [place] [address]  Dept: [phone]  Loc: Deana Qureshi OT Fax: [phone]      Date:  10/6/2023    Initial Evaluation Date: 2023                          Evaluating Therapist: Arturo Joya OT     Patient Name:  Ana Paula Bhatti                         :  1955     Restrictions/Precautions:  none, low fall risk  Diagnosis:  M79.602, R20.0 (ICD-10-CM) - Pain and numbness of left upper extremity       L forearm pain                                                  Date of Surgery/Injury: Aug 30, 4519     Insurance/Certification information:  [employer]  Plan of care signed (Y/N): N  Visit# / total visits: -     Referring Practitioner:  Willam Zeng MD  Specific Practitioner Orders: Eval & Treat: Pain in left shoulder, elbow and wrist    OT PLAN OF CARE   OT POC based on physician orders, patient diagnosis and results of clinical assessment     Frequency/Duration:1-2x / week for 12-14 visits. Certification period From: 2023  To: 2024    GOALS (Long term same as Short term):  1) Patient will demonstrate good understanding of home program (exercises/activities/diagnosis/prognosis/goals) with good accuracy. Met, Pt reports good carry over with all exercises and additional HEP for strengthening provided. 2) Patient will demonstrate increased /pinch strength of at least 10 / 3-5 pinch pounds of their LUE hand. Met. See measurements below    Dynamometer (setting 2) IE  10/06   Left 25# 60#    Right 60#     Pinch (lateral)       Left 16#  18#   Right 10#     Pinch (tripod)       Left 6#  15#   Right 12#        3) Patient to report decreased pain in their affected L upper extremity from 8/10 to 2/10 or less with resistive functional use.    Met, pt reports no pain at this time in

## 2023-10-10 NOTE — PROGRESS NOTES
[address] - BE[facility], [facility]    Follow up Note      Lizbeth Lopez     Date of Visit:  10/11/2023    CC:  Patient presents for follow up   Chief Complaint   Patient presents with    Follow Up After Procedure        CERVICAL EPIDURAL STEROID INJECTION C7-T1          Neck Pain                 HPI:  Pain is better to her neck, but still having some left sided pain. Change in quality of symptoms:no. Patient satisfaction with analgesia:fair. Medication side effects: None. Recent diagnostic testing:none. Recent interventional procedures:  2023 PROCEDURE: Fluoroscopic guided cervical epidural steroid injection at C7-T1 level - 75-80% relief. She has been on anticoagulation medications to include NSAIDS. The patient  has not been on herbal supplements. The patient is diabetic. Imagin2023 MRI cervical spine    FINDINGS:   BONES/ALIGNMENT: There is normal alignment of the spine. The vertebral body   heights are maintained. The bone marrow signal appears unremarkable. SPINAL CORD: No abnormal cord signal is seen. SOFT TISSUES: No paraspinal mass identified. C2-C3: Grade 1 anterolisthesis. Severe left facet arthropathy resulting in   severe left neural foraminal narrowing. C3-C4: Grade 1 anterolisthesis with disc bulging. Severe left and moderate   right facet arthropathy. Findings resulting in severe left neural foraminal   narrowing       C4-C5: Grade 1 anterolisthesis with disc bulging. Moderate bilateral facet   arthropathy. Mild left neural foraminal narrowing. C5-C6: Grade 1 anterolisthesis with disc bulging. Moderate bilateral facet   arthropathy. No canal or foraminal stenosis. C6-C7: Disc bulging with 2 mm central disc protrusion. Mild bilateral facet   arthropathy. Otherwise unremarkable       C7-T1: Grade 1 anterolisthesis. Mild bilateral facet arthropathy.   Otherwise   unremarkable

## 2023-10-11 ENCOUNTER — OFFICE VISIT (OUTPATIENT)
Dept: PAIN MANAGEMENT | Age: 68
End: 2023-10-11
Payer: MEDICARE

## 2023-10-11 VITALS
TEMPERATURE: 97.2 F | SYSTOLIC BLOOD PRESSURE: 122 MMHG | HEIGHT: 65 IN | WEIGHT: 195 LBS | RESPIRATION RATE: 16 BRPM | BODY MASS INDEX: 32.49 KG/M2 | DIASTOLIC BLOOD PRESSURE: 76 MMHG | HEART RATE: 103 BPM | OXYGEN SATURATION: 93 %

## 2023-10-11 DIAGNOSIS — G89.4 CHRONIC PAIN SYNDROME: ICD-10-CM

## 2023-10-11 DIAGNOSIS — M54.16 LUMBAR RADICULOPATHY: ICD-10-CM

## 2023-10-11 DIAGNOSIS — M79.10 MYALGIA: ICD-10-CM

## 2023-10-11 DIAGNOSIS — Z79.891 ENCOUNTER FOR LONG-TERM OPIATE ANALGESIC USE: ICD-10-CM

## 2023-10-11 DIAGNOSIS — M47.816 LUMBAR FACET ARTHROPATHY: ICD-10-CM

## 2023-10-11 DIAGNOSIS — M47.812 CERVICAL FACET JOINT SYNDROME: ICD-10-CM

## 2023-10-11 DIAGNOSIS — M51.36 DDD (DEGENERATIVE DISC DISEASE), LUMBAR: Primary | ICD-10-CM

## 2023-10-11 DIAGNOSIS — G89.29 CHRONIC BILATERAL LOW BACK PAIN WITHOUT SCIATICA: ICD-10-CM

## 2023-10-11 DIAGNOSIS — M47.812 CERVICAL SPONDYLOSIS: ICD-10-CM

## 2023-10-11 DIAGNOSIS — M54.12 CERVICAL RADICULOPATHY: ICD-10-CM

## 2023-10-11 DIAGNOSIS — M48.061 SPINAL STENOSIS OF LUMBAR REGION WITHOUT NEUROGENIC CLAUDICATION: ICD-10-CM

## 2023-10-11 DIAGNOSIS — M54.50 CHRONIC BILATERAL LOW BACK PAIN WITHOUT SCIATICA: ICD-10-CM

## 2023-10-11 DIAGNOSIS — G89.29 OTHER CHRONIC PAIN: ICD-10-CM

## 2023-10-11 PROCEDURE — 3074F SYST BP LT 130 MM HG: CPT | Performed by: PHYSICIAN ASSISTANT

## 2023-10-11 PROCEDURE — 3078F DIAST BP <80 MM HG: CPT | Performed by: PHYSICIAN ASSISTANT

## 2023-10-11 PROCEDURE — 1123F ACP DISCUSS/DSCN MKR DOCD: CPT | Performed by: PHYSICIAN ASSISTANT

## 2023-10-11 PROCEDURE — 99213 OFFICE O/P EST LOW 20 MIN: CPT | Performed by: PHYSICIAN ASSISTANT

## 2023-10-11 RX ORDER — HYDROCODONE BITARTRATE AND ACETAMINOPHEN 5; 325 MG/1; MG/1
1 TABLET ORAL 2 TIMES DAILY
Qty: 60 TABLET | Refills: 0 | Status: SHIPPED | OUTPATIENT
Start: 2023-10-11 | End: 2023-11-10

## 2023-10-12 LAB
6-MAM, QUANTITATIVE, URINE: <10 NG/ML
6-MONOACETYLMORPHINE, URINE: NEGATIVE
7-AMINOCLONAZEPAM, QUANTITATIVE, URINE: <50 NG/ML
ABNORMAL SPECIMEN VALIDITY TEST: NORMAL
ALCOHOL URINE: NOT DETECTED MG/DL
ALPHA-HYDROXYALPRAZOLAM, QUANTITATIVE, URINE: <50 NG/ML
ALPHA-HYDROXYMIDAZOLAM, QUANTITATIVE, URINE: <50 NG/ML
ALPHA-HYDROXYTRIAZOLAM, QUANTITATIVE, URINE: <50 NG/ML
ALPRAZOLAM URINE QUANT: <50 NG/ML
AMPHETAMINE SCREEN URINE: NEGATIVE
BARBITURATE SCREEN URINE: NEGATIVE
BENZODIAZEPINE SCREEN, URINE: NEGATIVE
BUPRENORPHINE URINE: NEGATIVE
CANNABINOID SCREEN URINE: NEGATIVE
CHLORDIAZEPOXIDE, QUANTITATIVE, URINE: <50 NG/ML
CLONAZEPAM, QUANTITATIVE, URINE: <50 NG/ML
COCAINE METABOLITE, URINE: NEGATIVE
CODEINE, QUANTITATIVE, URINE: <50 NG/ML
COMPLIANCE DRUG ANALYSIS, URINE: NORMAL
DIAZEPAM URINE QUANT: <50 NG/ML
FENTANYL URINE: NEGATIVE
FLUNITRAZEPAM, QUANTITATIVE, URINE: <50 NG/ML
FLURAZEPAM, QUANTITATIVE, URINE: <50 NG/ML
HYDROCODONE, QUANTITATIVE, URINE: 348.5 NG/ML
HYDROMORPHONE, QUANTITATIVE, URINE: <50 NG/ML
INTEGRITY CHECK, CREATININE, URINE: 52.4
INTEGRITY CHECK, OXIDANT, URINE: 40
INTEGRITY CHECK, PH, URINE: 7.2
INTEGRITY CHECK, SPECIFIC GRAVITY, URINE: 1
LORAZEPAM URINE QUANT: <50 NG/ML
METHADONE SCREEN, URINE: NEGATIVE
MIDAZOLAM URINE QUANT: <50 NG/ML
MORPHINE, QUANTITATIVE, URINE: <50 NG/ML
NORDIAZEPAM URINE QUANT: <50 NG/ML
NORHYDROCODONE, QUANTITATIVE, URINE: 357.5 NG/ML
NOROXYCODONE, QUANTITATIVE, URINE: <50 NG/ML
OPIATES, URINE: NEGATIVE
OXAZEPAM URINE QUANT: <50 NG/ML
OXYCODONE SCREEN URINE: NEGATIVE
OXYCODONE URINE, QUANTITATIVE: <50 NG/ML
OXYMORPHONE, QUANTITATIVE, URINE: <50 NG/ML
PHENCYCLIDINE, URINE: NEGATIVE
TEMAZEPAM, QUANTITATIVE, URINE: <50 NG/ML
TEST INFORMATION: NORMAL
TRAMADOL, URINE: NEGATIVE

## 2023-10-20 ENCOUNTER — TELEPHONE (OUTPATIENT)
Dept: PAIN MANAGEMENT | Age: 68
End: 2023-10-20

## 2023-10-20 DIAGNOSIS — M54.12 RADICULOPATHY, CERVICAL: ICD-10-CM

## 2023-10-20 NOTE — TELEPHONE ENCOUNTER
Call to [facility], left message that procedure was approved for 10/26/2023 and that Becky Wells should call her a few days before for the pre op call and between 2:00 PM and 4:00 PM  the business day before with the arrival time. Instructed Eleanor to hold ibuprofen for 24 hours, naprosyn for 4 days and any aspirin containing products or fish oil for 7 days. Instructed to call office back if any questions.      Electronically signed by Zac Rosales RN on 10/20/2023 at 11:02 AM

## 2023-10-24 NOTE — PROGRESS NOTES
[address] [facility] PAIN MANAGEMENT  INSTRUCTIONS  . .......................................................................................................................................... [x] Parking the day of Surgery is located in the [facility].   Upon entering the door, make immediate right into the surgery reception room    [x]  Bring photo ID and insurance card     [x] You may have a light breakfast day of procedure    [x]  Wear loose comfortable clothing    [x]  Please follow instructions for medications as given per Dr's office    [x] You can expect a call the business day prior to procedure to notify you of your arrival time     [x] Please arrange for     []  Other instructions

## 2023-10-26 ENCOUNTER — HOSPITAL ENCOUNTER (OUTPATIENT)
Age: 68
Setting detail: OUTPATIENT SURGERY
Discharge: HOME | End: 2023-10-26
Attending: PAIN MEDICINE | Admitting: PAIN MEDICINE
Payer: MEDICARE

## 2023-10-26 ENCOUNTER — HOSPITAL ENCOUNTER (OUTPATIENT)
Dept: GENERAL RADIOLOGY | Age: 68
Discharge: HOME | End: 2023-10-28
Attending: PAIN MEDICINE
Payer: MEDICARE

## 2023-10-26 VITALS
TEMPERATURE: 98.5 F | SYSTOLIC BLOOD PRESSURE: 175 MMHG | DIASTOLIC BLOOD PRESSURE: 86 MMHG | RESPIRATION RATE: 16 BRPM | OXYGEN SATURATION: 98 % | BODY MASS INDEX: 32.49 KG/M2 | HEART RATE: 89 BPM | WEIGHT: 195 LBS | HEIGHT: 65 IN

## 2023-10-26 DIAGNOSIS — R52 PAIN MANAGEMENT: ICD-10-CM

## 2023-10-26 LAB — GLUCOSE BLD-MCNC: 183 MG/DL (ref 74–99)

## 2023-10-26 PROCEDURE — 7100000010 HC PHASE II RECOVERY - FIRST 15 MIN: Performed by: PAIN MEDICINE

## 2023-10-26 PROCEDURE — A4216 STERILE WATER/SALINE, 10 ML: HCPCS | Performed by: PAIN MEDICINE

## 2023-10-26 PROCEDURE — 62321 NJX INTERLAMINAR CRV/THRC: CPT | Performed by: PAIN MEDICINE

## 2023-10-26 PROCEDURE — 6360000004 HC RX CONTRAST MEDICATION: Performed by: PAIN MEDICINE

## 2023-10-26 PROCEDURE — 2500000003 HC RX 250 WO HCPCS: Performed by: PAIN MEDICINE

## 2023-10-26 PROCEDURE — 82962 GLUCOSE BLOOD TEST: CPT

## 2023-10-26 PROCEDURE — 3600000002 HC SURGERY LEVEL 2 BASE: Performed by: PAIN MEDICINE

## 2023-10-26 PROCEDURE — 7100000011 HC PHASE II RECOVERY - ADDTL 15 MIN: Performed by: PAIN MEDICINE

## 2023-10-26 PROCEDURE — 2580000003 HC RX 258: Performed by: PAIN MEDICINE

## 2023-10-26 PROCEDURE — 6360000002 HC RX W HCPCS: Performed by: PAIN MEDICINE

## 2023-10-26 PROCEDURE — 2709999900 HC NON-CHARGEABLE SUPPLY: Performed by: PAIN MEDICINE

## 2023-10-26 RX ORDER — SODIUM CHLORIDE 9 MG/ML
INJECTION INTRAVENOUS PRN
Status: DISCONTINUED | OUTPATIENT
Start: 2023-10-26 | End: 2023-10-26 | Stop reason: ALTCHOICE

## 2023-10-26 RX ORDER — IOPAMIDOL 612 MG/ML
INJECTION, SOLUTION INTRATHECAL PRN
Status: DISCONTINUED | OUTPATIENT
Start: 2023-10-26 | End: 2023-10-26 | Stop reason: ALTCHOICE

## 2023-10-26 RX ORDER — LIDOCAINE HYDROCHLORIDE 5 MG/ML
INJECTION, SOLUTION INFILTRATION; INTRAVENOUS PRN
Status: DISCONTINUED | OUTPATIENT
Start: 2023-10-26 | End: 2023-10-26 | Stop reason: ALTCHOICE

## 2023-10-26 RX ORDER — METHYLPREDNISOLONE ACETATE 40 MG/ML
INJECTION, SUSPENSION INTRA-ARTICULAR; INTRALESIONAL; INTRAMUSCULAR; SOFT TISSUE PRN
Status: DISCONTINUED | OUTPATIENT
Start: 2023-10-26 | End: 2023-10-26 | Stop reason: ALTCHOICE

## 2023-10-26 ASSESSMENT — PAIN DESCRIPTION - LOCATION
LOCATION: NECK
LOCATION: NECK

## 2023-10-26 ASSESSMENT — PAIN DESCRIPTION - ONSET
ONSET: ON-GOING
ONSET: ON-GOING

## 2023-10-26 ASSESSMENT — PAIN DESCRIPTION - DESCRIPTORS: DESCRIPTORS: ACHING;STABBING

## 2023-10-26 ASSESSMENT — PAIN SCALES - GENERAL
PAINLEVEL_OUTOF10: 6
PAINLEVEL_OUTOF10: 0
PAINLEVEL_OUTOF10: 5

## 2023-10-26 ASSESSMENT — PAIN DESCRIPTION - PAIN TYPE
TYPE: CHRONIC PAIN
TYPE: CHRONIC PAIN

## 2023-10-26 ASSESSMENT — PAIN DESCRIPTION - ORIENTATION: ORIENTATION: INNER

## 2023-10-26 ASSESSMENT — PAIN DESCRIPTION - FREQUENCY
FREQUENCY: CONTINUOUS
FREQUENCY: CONTINUOUS

## 2023-10-26 ASSESSMENT — PAIN - FUNCTIONAL ASSESSMENT: PAIN_FUNCTIONAL_ASSESSMENT: 0-10

## 2023-10-26 NOTE — H&P
[facility]        [address] [facility], [address] [place] [address]  Dept: [phone]    Procedure History & Physical      Milas Median     HPI:    Patient  is here for cervical pain for C7-T1 MARIELENA  Labs/imaging studies reviewed   All question and concerns addressed including R/B/A associated with the procedure    Past Medical History:   Diagnosis Date    Cancer ([address]) 12/2019    Cervical radiculopathy     Chronic back pain     Costochondritis     h/o    Depression     Diabetes mellitus ([address])     Fibromyalgia     Hallux rigidus of left foot     HTN (hypertension)     Hyperlipidemia     CLYDE on CPAP     Osteoarthritis     \"everywhere\"    Rheumatoid arthritis(714.0)     TIA (transient ischemic attack)     no deficits        Past Surgical History:   Procedure Laterality Date    ANESTHESIA NERVE BLOCK Left 02/26/2019    LEFT C3,4,5 MBB performed by Froy Fontaine MD at [address] [facility] Right 08/12/2019    BILATERAL TRANSFORAMINAL EPIDURAL STEROID INJECTION S1 #3 performed by Froy Fontaine MD at [address] Right 06/16/2020    RIGHT SACROILIAC JOINT INJECTION      CPT: 35699 performed by Lory Leblanc DO at [address]  2005    Left    ANKLE SURGERY Left 7/8/2022    REPAIR OF ANTERIOR TALAR LIGAMENT LEFT ANKLE, REPAIR DELTOID LIGAMENT LEFT ANKLE performed by Patty Connell DPM at [facility] Left 12/14/2018    ARTHRODESIS 2ND DIGIT LEFT FOOT performed by Patty Connell DPM at [address]  08/16/2017    PLIF L3-L4, L4-L5 with rods, screws, and cages/Dr. Candis Rome SURGERY  03/04/2020    BACK SURGERY Right 03/09/2021    RIGHT PERCUTANEOUS SACROILIAC JOINT FUSION performed by Greyson Rios MD at [address]  2010    reduction, [address]  2011    COLONOSCOPY  03/27/2015    COLONOSCOPY N/A 08/19/2020    COLONOSCOPY DIAGNOSTIC performed by Alex Woodard MD at [facility]

## 2023-10-26 NOTE — DISCHARGE INSTRUCTIONS
[facility]  Dr. Kelvin Barnett Block/Radiofrequency  Home Going Instructions    1-Go home, rest for the remainder of the day  2-Please do not lift over 20 pounds the day of the injection  3-If you received sedation No: alcohol, driving, operating lawn mowers, plows, tractors or other dangerous equipment until next morning. Do not make important decisions or sign legal documents for 24 hours. You may experience light headedness, dizziness, nausea or sleepiness after sedation. Do not stay alone. A responsible adult must be with you for 24 hours. You could be nauseated from the medications you have received. Your IV site may be sore and bruised. 4-No dietary restrictions     5-Resume all medications the same day, blood thinners to be resumed 24 hours after injection if you were instructed to stop any. 6-Keep the surgical site clean and dry, you may shower the next morning and remove the      dressing. 7- No sitz baths, tub baths or hot tubs/swimming for 24 hours. 8- If you have any pain at the injection site(s), application of an ice pack to the area should be       helpful, 20 minutes on/20 minutes off for next 48 hours. 9- Call [facility] immediately at if you develop.   Fever greater than 100.4 F  Have bleeding or drainage from the puncture site  Have progressive Leg/arm numbness and or weakness  Loss of control of bowel and or bladder (wet/soil yourself)  Severe headache with inability to lift head  10-You may return to work the next day

## 2023-10-26 NOTE — OP NOTE
Once in the epidural space , negative aspiration for blood and CSF was confirmed . Epidural needle tip placement was confirmed by visualizing epidural spread of 2 ml of Isovue-M 300 in both live lateral and live AP fluoroscopic views. Then after negative aspiration, a solution of preservative free saline, 2 ml, and 40 mg DepoMedrol was easily injected. The needle was gently removed intact. The patient neck was cleaned and a Band-Aid was placed over the needle insertion point. Disposition the patient tolerated the procedure well and there were no complications . Vital signs remained stable throughout the procedure. The patient was escorted to the recovery area where they remained until discharge and written discharge instructions for the procedure were given. Plan: Celester Reasons will return to our pain management center as scheduled.      Sinai Romeo DO

## 2023-11-17 DIAGNOSIS — I10 ESSENTIAL HYPERTENSION: ICD-10-CM

## 2023-11-17 RX ORDER — HYDROCHLOROTHIAZIDE 12.5 MG/1
CAPSULE, GELATIN COATED ORAL
Qty: 90 CAPSULE | Refills: 1 | Status: SHIPPED | OUTPATIENT
Start: 2023-11-17

## 2023-11-17 NOTE — TELEPHONE ENCOUNTER
Last Appointment:  6/13/2023  Future Appointments   Date Time Provider [address]   11/28/2023  3:00 PM Lupe Espitia DO BDM PAIN [facility]   12/18/2023  1:20 PM Myra Mckeon  [facility]

## 2023-11-28 ENCOUNTER — OFFICE VISIT (OUTPATIENT)
Dept: PAIN MANAGEMENT | Age: 68
End: 2023-11-28
Payer: MEDICARE

## 2023-11-28 VITALS
RESPIRATION RATE: 16 BRPM | HEIGHT: 65 IN | DIASTOLIC BLOOD PRESSURE: 73 MMHG | BODY MASS INDEX: 31.65 KG/M2 | HEART RATE: 102 BPM | TEMPERATURE: 97.2 F | OXYGEN SATURATION: 97 % | SYSTOLIC BLOOD PRESSURE: 119 MMHG | WEIGHT: 190 LBS

## 2023-11-28 DIAGNOSIS — M54.12 RADICULOPATHY, CERVICAL: ICD-10-CM

## 2023-11-28 DIAGNOSIS — G89.4 CHRONIC PAIN SYNDROME: Primary | ICD-10-CM

## 2023-11-28 DIAGNOSIS — M54.16 LUMBAR RADICULOPATHY: ICD-10-CM

## 2023-11-28 DIAGNOSIS — M51.36 DDD (DEGENERATIVE DISC DISEASE), LUMBAR: ICD-10-CM

## 2023-11-28 DIAGNOSIS — M54.12 CERVICAL RADICULOPATHY: ICD-10-CM

## 2023-11-28 DIAGNOSIS — M47.812 CERVICAL SPONDYLOSIS: ICD-10-CM

## 2023-11-28 DIAGNOSIS — G89.4 CHRONIC PAIN SYNDROME: ICD-10-CM

## 2023-11-28 PROCEDURE — 3074F SYST BP LT 130 MM HG: CPT | Performed by: PAIN MEDICINE

## 2023-11-28 PROCEDURE — 3078F DIAST BP <80 MM HG: CPT | Performed by: PAIN MEDICINE

## 2023-11-28 PROCEDURE — 99213 OFFICE O/P EST LOW 20 MIN: CPT | Performed by: PAIN MEDICINE

## 2023-11-28 PROCEDURE — 1123F ACP DISCUSS/DSCN MKR DOCD: CPT | Performed by: PAIN MEDICINE

## 2023-11-28 RX ORDER — HYDROCODONE BITARTRATE AND ACETAMINOPHEN 5; 325 MG/1; MG/1
1 TABLET ORAL 2 TIMES DAILY
Qty: 60 TABLET | Refills: 0 | Status: SHIPPED | OUTPATIENT
Start: 2023-11-28 | End: 2023-12-28

## 2023-11-28 RX ORDER — GABAPENTIN 400 MG/1
400 CAPSULE ORAL 2 TIMES DAILY
Qty: 180 CAPSULE | Refills: 0 | Status: SHIPPED | OUTPATIENT
Start: 2023-11-28 | End: 2024-02-26

## 2023-12-12 ENCOUNTER — HOSPITAL ENCOUNTER (OUTPATIENT)
Age: 68
Discharge: HOME | End: 2023-12-12
Payer: MEDICARE

## 2023-12-12 DIAGNOSIS — E11.9 TYPE 2 DIABETES MELLITUS WITHOUT COMPLICATION, WITHOUT LONG-TERM CURRENT USE OF INSULIN (HCC): ICD-10-CM

## 2023-12-12 LAB
ALBUMIN SERPL-MCNC: 4.5 G/DL (ref 3.5–5.2)
ALP SERPL-CCNC: 120 U/L (ref 35–104)
ALT SERPL-CCNC: 16 U/L (ref 0–32)
ANION GAP SERPL CALCULATED.3IONS-SCNC: 12 MMOL/L (ref 7–16)
AST SERPL-CCNC: 16 U/L (ref 0–31)
BASOPHILS # BLD: 0.07 K/UL (ref 0–0.2)
BASOPHILS NFR BLD: 1 % (ref 0–2)
BILIRUB SERPL-MCNC: 0.4 MG/DL (ref 0–1.2)
BUN SERPL-MCNC: 11 MG/DL (ref 6–23)
CALCIUM SERPL-MCNC: 9.5 MG/DL (ref 8.6–10.2)
CHLORIDE SERPL-SCNC: 103 MMOL/L (ref 98–107)
CO2 SERPL-SCNC: 27 MMOL/L (ref 22–29)
CREAT SERPL-MCNC: 0.8 MG/DL (ref 0.5–1)
EOSINOPHIL # BLD: 0.13 K/UL (ref 0.05–0.5)
EOSINOPHILS RELATIVE PERCENT: 1 % (ref 0–6)
ERYTHROCYTE [DISTWIDTH] IN BLOOD BY AUTOMATED COUNT: 12.4 % (ref 11.5–15)
GFR SERPL CREATININE-BSD FRML MDRD: >60 ML/MIN/1.73M2
GLUCOSE SERPL-MCNC: 169 MG/DL (ref 74–99)
HCT VFR BLD AUTO: 45.1 % (ref 34–48)
HGB BLD-MCNC: 15.5 G/DL (ref 11.5–15.5)
IMM GRANULOCYTES # BLD AUTO: 0.03 K/UL (ref 0–0.58)
IMM GRANULOCYTES NFR BLD: 0 % (ref 0–5)
LYMPHOCYTES NFR BLD: 3.24 K/UL (ref 1.5–4)
LYMPHOCYTES RELATIVE PERCENT: 30 % (ref 20–42)
MCH RBC QN AUTO: 34.9 PG (ref 26–35)
MCHC RBC AUTO-ENTMCNC: 34.4 G/DL (ref 32–34.5)
MCV RBC AUTO: 101.6 FL (ref 80–99.9)
MONOCYTES NFR BLD: 0.79 K/UL (ref 0.1–0.95)
MONOCYTES NFR BLD: 7 % (ref 2–12)
NEUTROPHILS NFR BLD: 60 % (ref 43–80)
NEUTS SEG NFR BLD: 6.47 K/UL (ref 1.8–7.3)
PLATELET # BLD AUTO: 313 K/UL (ref 130–450)
PMV BLD AUTO: 10.5 FL (ref 7–12)
POTASSIUM SERPL-SCNC: 4.2 MMOL/L (ref 3.5–5)
PROT SERPL-MCNC: 6.9 G/DL (ref 6.4–8.3)
RBC # BLD AUTO: 4.44 M/UL (ref 3.5–5.5)
SODIUM SERPL-SCNC: 142 MMOL/L (ref 132–146)
WBC OTHER # BLD: 10.7 K/UL (ref 4.5–11.5)

## 2023-12-12 PROCEDURE — 85025 COMPLETE CBC W/AUTO DIFF WBC: CPT

## 2023-12-12 PROCEDURE — 80053 COMPREHEN METABOLIC PANEL: CPT

## 2023-12-12 PROCEDURE — 36415 COLL VENOUS BLD VENIPUNCTURE: CPT

## 2023-12-12 PROCEDURE — 80061 LIPID PANEL: CPT

## 2023-12-12 PROCEDURE — 83036 HEMOGLOBIN GLYCOSYLATED A1C: CPT

## 2023-12-13 LAB
CHOLEST SERPL-MCNC: 143 MG/DL
HBA1C MFR BLD: 6.8 % (ref 4–5.6)
HDLC SERPL-MCNC: 37 MG/DL
LDLC SERPL CALC-MCNC: 62 MG/DL
TRIGL SERPL-MCNC: 221 MG/DL
VLDLC SERPL CALC-MCNC: 44 MG/DL

## 2023-12-28 ENCOUNTER — OFFICE VISIT (OUTPATIENT)
Dept: PAIN MANAGEMENT | Age: 68
End: 2023-12-28
Payer: MEDICARE

## 2023-12-28 VITALS
DIASTOLIC BLOOD PRESSURE: 84 MMHG | WEIGHT: 194 LBS | SYSTOLIC BLOOD PRESSURE: 137 MMHG | BODY MASS INDEX: 32.32 KG/M2 | OXYGEN SATURATION: 94 % | HEIGHT: 65 IN | TEMPERATURE: 97.2 F | RESPIRATION RATE: 16 BRPM | HEART RATE: 102 BPM

## 2023-12-28 DIAGNOSIS — M54.12 CERVICAL RADICULOPATHY: ICD-10-CM

## 2023-12-28 DIAGNOSIS — M47.816 LUMBAR FACET ARTHROPATHY: ICD-10-CM

## 2023-12-28 DIAGNOSIS — M48.061 SPINAL STENOSIS OF LUMBAR REGION WITHOUT NEUROGENIC CLAUDICATION: ICD-10-CM

## 2023-12-28 DIAGNOSIS — G89.4 CHRONIC PAIN SYNDROME: Primary | ICD-10-CM

## 2023-12-28 DIAGNOSIS — G89.29 OTHER CHRONIC PAIN: ICD-10-CM

## 2023-12-28 DIAGNOSIS — M47.812 CERVICAL SPONDYLOSIS: ICD-10-CM

## 2023-12-28 DIAGNOSIS — M54.16 LUMBAR RADICULOPATHY: ICD-10-CM

## 2023-12-28 DIAGNOSIS — M54.50 CHRONIC BILATERAL LOW BACK PAIN WITHOUT SCIATICA: ICD-10-CM

## 2023-12-28 DIAGNOSIS — M51.36 DDD (DEGENERATIVE DISC DISEASE), LUMBAR: ICD-10-CM

## 2023-12-28 DIAGNOSIS — G89.4 CHRONIC PAIN SYNDROME: ICD-10-CM

## 2023-12-28 DIAGNOSIS — G89.29 CHRONIC BILATERAL LOW BACK PAIN WITHOUT SCIATICA: ICD-10-CM

## 2023-12-28 DIAGNOSIS — M54.12 RADICULOPATHY, CERVICAL: ICD-10-CM

## 2023-12-28 PROCEDURE — 3079F DIAST BP 80-89 MM HG: CPT | Performed by: PHYSICIAN ASSISTANT

## 2023-12-28 PROCEDURE — 1123F ACP DISCUSS/DSCN MKR DOCD: CPT | Performed by: PHYSICIAN ASSISTANT

## 2023-12-28 PROCEDURE — 3075F SYST BP GE 130 - 139MM HG: CPT | Performed by: PHYSICIAN ASSISTANT

## 2023-12-28 PROCEDURE — 99213 OFFICE O/P EST LOW 20 MIN: CPT | Performed by: PHYSICIAN ASSISTANT

## 2023-12-28 RX ORDER — HYDROCODONE BITARTRATE AND ACETAMINOPHEN 5; 325 MG/1; MG/1
1 TABLET ORAL 2 TIMES DAILY
Qty: 60 TABLET | Refills: 0 | Status: SHIPPED | OUTPATIENT
Start: 2023-12-28 | End: 2024-01-27

## 2023-12-28 NOTE — PROGRESS NOTES
[facility]  [address], [address] [facility]    Follow up Note      Noemy Joyce     Date of Visit:  2023    CC:  Patient presents for follow up   Chief Complaint   Patient presents with    Follow-up     Cervical/lower back                  HPI:  Patient is having some worsening pain to the right lower back that radiates to her right groin after bending down the other day. States a little more prominent over the past few days. More mild over the last couple months. .   Change in quality of symptoms:no. Patient satisfaction with analgesia:fair. Medication side effects: None. Recent diagnostic testing:none. Recent interventional procedures: None. She has been on anticoagulation medications to include NSAIDS. The patient  has not been on herbal supplements. The patient is diabetic. Imagin2023 MRI cervical spine    FINDINGS:   BONES/ALIGNMENT: There is normal alignment of the spine. The vertebral body   heights are maintained. The bone marrow signal appears unremarkable. SPINAL CORD: No abnormal cord signal is seen. SOFT TISSUES: No paraspinal mass identified. C2-C3: Grade 1 anterolisthesis. Severe left facet arthropathy resulting in   severe left neural foraminal narrowing. C3-C4: Grade 1 anterolisthesis with disc bulging. Severe left and moderate   right facet arthropathy. Findings resulting in severe left neural foraminal   narrowing       C4-C5: Grade 1 anterolisthesis with disc bulging. Moderate bilateral facet   arthropathy. Mild left neural foraminal narrowing. C5-C6: Grade 1 anterolisthesis with disc bulging. Moderate bilateral facet   arthropathy. No canal or foraminal stenosis. C6-C7: Disc bulging with 2 mm central disc protrusion. Mild bilateral facet   arthropathy. Otherwise unremarkable       C7-T1: Grade 1 anterolisthesis. Mild bilateral facet arthropathy.   Otherwise   unremarkable

## 2024-01-08 ENCOUNTER — HOSPITAL ENCOUNTER (OUTPATIENT)
Age: 69
Discharge: HOME | End: 2024-01-10
Payer: MEDICARE

## 2024-01-08 ENCOUNTER — HOSPITAL ENCOUNTER (OUTPATIENT)
Dept: GENERAL RADIOLOGY | Age: 69
Discharge: HOME | End: 2024-01-10
Payer: MEDICARE

## 2024-01-08 DIAGNOSIS — M54.16 LUMBAR RADICULOPATHY: ICD-10-CM

## 2024-01-08 DIAGNOSIS — M51.36 DDD (DEGENERATIVE DISC DISEASE), LUMBAR: ICD-10-CM

## 2024-01-08 PROCEDURE — 72100 X-RAY EXAM L-S SPINE 2/3 VWS: CPT

## 2024-01-17 ENCOUNTER — TELEPHONE (OUTPATIENT)
Dept: FAMILY MEDICINE CLINIC | Age: 69
End: 2024-01-17

## 2024-01-17 DIAGNOSIS — Z12.31 ENCOUNTER FOR MAMMOGRAM TO ESTABLISH BASELINE MAMMOGRAM: Primary | ICD-10-CM

## 2024-01-17 NOTE — TELEPHONE ENCOUNTER
Eleanor is requesting a referral for a mammogram through [facility]      She received a letter saying she is due for a zita      P: [phone]

## 2024-01-17 NOTE — TELEPHONE ENCOUNTER
I left her message that the order has been placed and she would just need to call there and make her appointment.

## 2024-01-23 ENCOUNTER — OFFICE VISIT (OUTPATIENT)
Dept: PAIN MANAGEMENT | Age: 69
End: 2024-01-23
Payer: MEDICARE

## 2024-01-23 VITALS
SYSTOLIC BLOOD PRESSURE: 120 MMHG | HEART RATE: 113 BPM | RESPIRATION RATE: 16 BRPM | OXYGEN SATURATION: 94 % | WEIGHT: 194 LBS | DIASTOLIC BLOOD PRESSURE: 77 MMHG | HEIGHT: 65 IN | TEMPERATURE: 98.2 F | BODY MASS INDEX: 32.32 KG/M2

## 2024-01-23 DIAGNOSIS — M54.16 LUMBAR RADICULOPATHY: ICD-10-CM

## 2024-01-23 DIAGNOSIS — M47.816 LUMBAR FACET ARTHROPATHY: ICD-10-CM

## 2024-01-23 DIAGNOSIS — G89.4 CHRONIC PAIN SYNDROME: Primary | ICD-10-CM

## 2024-01-23 DIAGNOSIS — M48.061 SPINAL STENOSIS OF LUMBAR REGION WITHOUT NEUROGENIC CLAUDICATION: ICD-10-CM

## 2024-01-23 DIAGNOSIS — M54.50 CHRONIC BILATERAL LOW BACK PAIN WITHOUT SCIATICA: ICD-10-CM

## 2024-01-23 DIAGNOSIS — M51.36 DDD (DEGENERATIVE DISC DISEASE), LUMBAR: ICD-10-CM

## 2024-01-23 DIAGNOSIS — M79.10 MYALGIA: ICD-10-CM

## 2024-01-23 DIAGNOSIS — M54.12 RADICULOPATHY, CERVICAL: ICD-10-CM

## 2024-01-23 DIAGNOSIS — M47.812 CERVICAL SPONDYLOSIS: ICD-10-CM

## 2024-01-23 DIAGNOSIS — M54.12 CERVICAL RADICULOPATHY: ICD-10-CM

## 2024-01-23 DIAGNOSIS — G89.4 CHRONIC PAIN SYNDROME: ICD-10-CM

## 2024-01-23 DIAGNOSIS — G89.29 OTHER CHRONIC PAIN: ICD-10-CM

## 2024-01-23 DIAGNOSIS — G89.29 CHRONIC BILATERAL LOW BACK PAIN WITHOUT SCIATICA: ICD-10-CM

## 2024-01-23 PROCEDURE — 99213 OFFICE O/P EST LOW 20 MIN: CPT | Performed by: PHYSICIAN ASSISTANT

## 2024-01-23 PROCEDURE — 3078F DIAST BP <80 MM HG: CPT | Performed by: PHYSICIAN ASSISTANT

## 2024-01-23 PROCEDURE — 1123F ACP DISCUSS/DSCN MKR DOCD: CPT | Performed by: PHYSICIAN ASSISTANT

## 2024-01-23 PROCEDURE — 3074F SYST BP LT 130 MM HG: CPT | Performed by: PHYSICIAN ASSISTANT

## 2024-01-23 RX ORDER — GABAPENTIN 400 MG/1
400 CAPSULE ORAL 2 TIMES DAILY
Qty: 180 CAPSULE | Refills: 0 | Status: SHIPPED | OUTPATIENT
Start: 2024-01-23 | End: 2024-04-22

## 2024-01-23 RX ORDER — HYDROCODONE BITARTRATE AND ACETAMINOPHEN 5; 325 MG/1; MG/1
1 TABLET ORAL 2 TIMES DAILY
Qty: 60 TABLET | Refills: 0 | Status: SHIPPED | OUTPATIENT
Start: 2024-01-29 | End: 2024-02-28

## 2024-01-23 NOTE — PROGRESS NOTES
Eleanor Holder presents to the [facility] on 1/23/2024. Eleanor is complaining of pain low back . The pain is constant. The pain is described as aching, throbbing, shooting, and stabbing. Pain is rated on her best day at a 6, on her worst day at a 10, and on average at a 8 on the VAS scale. She took her last dose of Norco this morning .    Any procedures since your last visit: No,  She is not on NSAIDS and  is not on anticoagulation medications  Pacemaker or defibrillator: No      Medication Contract and Consent for Opioid Use Documents Filed       Patient Documents       Type of Document Status Date Received Received By Description    Medication Contract Received  EBER HONG Opioid medication contract with Dr Wheatley 3/24/20    Medication Contract Received 4/26/2018  3:50 PM ANYA NUNO PAIN MANAGEMENT PATIENT AGREEMENT 4/26/2018    Medication Contract Received 11/5/2020  4:23 PM EBER HONG Opioid medication contract with Dr Wheatley    Medication Contract Received 3/1/2021  1:26 PM EBER HONG Opioid medication contract with Dr Wheatley    Medication Contract Received 8/23/2021  2:03 PM HAYLIE CADENA Medication contract    Medication Contract Received 10/18/2021  3:03 PM HAYLIE CADENA medication contract 10/18/2021    Medication Contract Signed 10/4/2022 12:35 PM CRISTIANA BAÑUELOS Pain Med. Contract 10/04/22    Medication Contract Received 10/11/2023  3:46 PM KAILYN WILKINSON Medication contract                       Temp 98.2 °F (36.8 °C) (Temporal)   Resp 16   Ht 1.651 m (5' 5\")   Wt 88 kg (194 lb)   LMP  (LMP Unknown)   SpO2 94%   BMI 32.28 kg/m²      No LMP recorded (lmp unknown). Patient is postmenopausal.  
[facility]    HERNIA REPAIR  1998    inguinal     LUMBAR SPINE SURGERY N/A 03/04/2020    EXPLORATION OF PRIOR L3-L5 FUSION AND L2-L3  POSTERIOR LUMBAR INTERBODY FUSION -- NEEDS O-ARM, AUDIOLOGY, CAGES, PLATES, SCREWS, C-ARM, TERESA TABLE, CELL SAVER, PLATELET GEL -- GLOBUS performed by Samir Wheatley MD at [facility] [place]    MOUTH SURGERY N/A 10/21/2020    EXCISION OF TONGUE LESION performed by Karthik Gonzales DO at [facility] OR    NERVE BLOCK Bilateral 05/07/2018    L1-2 lumbar foramen #1    NERVE BLOCK Bilateral 12/03/2018    s1 tfesi    NERVE BLOCK Bilateral 08/12/2019    NERVE BLOCK Right 09/30/2019    sacral radiofrequency    NERVE BLOCK Right 09/01/2020    RIGHT SACROILIAC JOINT INJECTION #2 performed by Inez Montelongo DO at [facility] OR    NERVE BLOCK Left 5/11/2023    LEFT C2,3,4 MEDIAL NERVE BRANCH BLOCK performed by Inez Montelongo DO at [facility] OR    OTHER SURGICAL HISTORY Left 09/25/2013    left foot tarso metatarsal joint injection    OTHER SURGICAL HISTORY Left 05/27/2015    Endoscopic Gastroc recession left, Lapidus left foot and  Excision of exostosis left foot    PAIN MANAGEMENT PROCEDURE Left 7/27/2021    LEFT L5-S1 TRANSFORAMINAL EPIDURAL STEROID INJECTION performed by Inez Montelongo DO at [facility] OR    PAIN MANAGEMENT PROCEDURE Left 3/29/2022    LEFT TRANSFORAMINAL EPIDURAL STEROID INJECTION AT L5 AND S1 performed by Inez Montelongo DO at [facility] OR    PAIN MANAGEMENT PROCEDURE N/A 6/9/2022    LUMBAR EPIDURAL STEROID INJECTION L5-S1 performed by Inez Montelongo DO at [facility] OR    PAIN MANAGEMENT PROCEDURE N/A 10/27/2022    BILATERAL L5 LUMBAR TRANSFORAMINAL EPIDURAL STEROID INJECTION performed by Inez Montelongo DO at [facility] OR    PAIN MANAGEMENT PROCEDURE N/A 1/12/2023    CERVICAL EPIDURAL STEROID INJECTION C7-T1 performed by Inez Montelongo DO at [facility] OR    PAIN MANAGEMENT PROCEDURE N/A 9/28/2023    CERVICAL EPIDURAL STEROID INJECTION C7-T1 performed by Inez Montelongo DO at [facility] OR    PAIN MANAGEMENT PROCEDURE N/A

## 2024-01-24 NOTE — TELEPHONE ENCOUNTER
Call to [facility] and message left that procedure was approved for 10/27/2022 and that Nel should call her a few days before for the pre op call and between 2:00 PM and 4:00 PM  the business day before with the arrival time. Instructed Eleanor to hold ibuprofen for 24 hours, naprosyn for 4 days and any aspirin containing products or fish oil for 7 days. Instructed to call office back.     Electronically signed by Jr Whitney RN on 10/25/2022 at 11:25 AM abnormal lab result

## 2024-01-29 ENCOUNTER — HOSPITAL ENCOUNTER (OUTPATIENT)
Dept: GENERAL RADIOLOGY | Age: 69
Discharge: HOME | End: 2024-01-31
Payer: MEDICARE

## 2024-01-29 VITALS — BODY MASS INDEX: 32.32 KG/M2 | WEIGHT: 194 LBS | HEIGHT: 65 IN

## 2024-01-29 DIAGNOSIS — Z12.31 ENCOUNTER FOR MAMMOGRAM TO ESTABLISH BASELINE MAMMOGRAM: ICD-10-CM

## 2024-01-29 PROCEDURE — 77063 BREAST TOMOSYNTHESIS BI: CPT

## 2024-02-14 ENCOUNTER — OFFICE VISIT (OUTPATIENT)
Dept: PODIATRY | Age: 69
End: 2024-02-14
Payer: MEDICARE

## 2024-02-14 VITALS
SYSTOLIC BLOOD PRESSURE: 130 MMHG | WEIGHT: 194 LBS | BODY MASS INDEX: 32.28 KG/M2 | DIASTOLIC BLOOD PRESSURE: 78 MMHG | TEMPERATURE: 97.9 F

## 2024-02-14 DIAGNOSIS — L60.0 INGROWN NAIL: ICD-10-CM

## 2024-02-14 DIAGNOSIS — S90.02XA CONTUSION OF LEFT ANKLE, INITIAL ENCOUNTER: Primary | ICD-10-CM

## 2024-02-14 DIAGNOSIS — M79.675 PAIN IN LEFT TOE(S): ICD-10-CM

## 2024-02-14 PROCEDURE — 3078F DIAST BP <80 MM HG: CPT | Performed by: PODIATRIST

## 2024-02-14 PROCEDURE — 1123F ACP DISCUSS/DSCN MKR DOCD: CPT | Performed by: PODIATRIST

## 2024-02-14 PROCEDURE — 99213 OFFICE O/P EST LOW 20 MIN: CPT | Performed by: PODIATRIST

## 2024-02-14 PROCEDURE — 11750 EXCISION NAIL&NAIL MATRIX: CPT | Performed by: PODIATRIST

## 2024-02-14 PROCEDURE — 3075F SYST BP GE 130 - 139MM HG: CPT | Performed by: PODIATRIST

## 2024-02-14 NOTE — PROGRESS NOTES
400 MG capsule Take 1 capsule by mouth 2 times daily for 90 days. 1/23/24 4/22/24 Yes Meghan Corcoran PA   cyclobenzaprine (FLEXERIL) 10 MG tablet TAKE 1/2 TABLET BY MOUTH TWICE DAILY AS NEEDED FOR MUSCLE SPASMS 12/18/23  Yes Manolo Mckeon MD   hydrOXYzine pamoate (VISTARIL) 25 MG capsule Take 1 capsule by mouth 3 times daily as needed for Anxiety Watch for sedation 12/18/23  Yes Manolo Mckeon MD   lisinopril (PRINIVIL;ZESTRIL) 40 MG tablet Take 1 tablet by mouth every evening 12/18/23  Yes Manolo Mckeon MD   varenicline (CHANTIX) 0.5 MG tablet Take 1-2 tablets by mouth See Admin Instructions 0.5mg DAILY for 3 days followed by 0.5mg TWICE DAILY for 4 days followed by 1mg TWICE DAILY 12/18/23  Yes Manolo Mckeon MD   hydroCHLOROthiazide (MICROZIDE) 12.5 MG capsule TAKE ONE CAPSULE BY MOUTH ONCE DAILY FOR FOR BLOOD PRESSURE 11/17/23  Yes Manolo Mckeon MD   metFORMIN (GLUCOPHAGE-XR) 500 MG extended release tablet Take 1 tablet by mouth daily (with breakfast) 10/4/23  Yes Manolo Mckeon MD   atorvastatin (LIPITOR) 40 MG tablet Take 1 tablet by mouth daily 6/13/23  Yes Manolo Mckeon MD   Handicap Placard MISC DX:  Loss of Balance, Chronic low back pain, s/p back surgery.   Duration of 5 years 2/22/21  Yes Manolo Mckeon MD       Past Surgical History:   Procedure Laterality Date    ANESTHESIA NERVE BLOCK Left 02/26/2019    LEFT C3,4,5 MBB performed by Baljit Dorsey MD at [facility] OR    ANESTHESIA NERVE BLOCK Right 08/12/2019    BILATERAL TRANSFORAMINAL EPIDURAL STEROID INJECTION S1 #3 performed by Baljit Dorsey MD at [facility] OR    ANESTHESIA NERVE BLOCK Right 06/16/2020    RIGHT SACROILIAC JOINT INJECTION      CPT: 43662 performed by Inez Montelongo DO at [facility] OR    ANKLE SURGERY  2005    Left    ANKLE SURGERY Left 07/08/2022    REPAIR OF ANTERIOR TALAR LIGAMENT LEFT ANKLE, REPAIR DELTOID LIGAMENT LEFT ANKLE performed by Yariel Haro,

## 2024-02-16 RX ORDER — CYCLOBENZAPRINE HCL 10 MG
TABLET ORAL
Qty: 30 TABLET | Refills: 1 | Status: SHIPPED | OUTPATIENT
Start: 2024-02-16

## 2024-02-16 NOTE — TELEPHONE ENCOUNTER
Last Appointment:  12/18/2023  Future Appointments   Date Time Provider Department Center   2/28/2024  1:15 PM Meghan Corcoran PA BDM PAIN [facility]   3/1/2024  4:30 PM Yariel Haro DPM [facility]   6/18/2024  1:20 PM Manolo Mckeon MD [facility]

## 2024-02-28 ENCOUNTER — OFFICE VISIT (OUTPATIENT)
Dept: PAIN MANAGEMENT | Age: 69
End: 2024-02-28
Payer: MEDICARE

## 2024-02-28 ENCOUNTER — PREP FOR PROCEDURE (OUTPATIENT)
Dept: PAIN MANAGEMENT | Age: 69
End: 2024-02-28

## 2024-02-28 VITALS
BODY MASS INDEX: 32.32 KG/M2 | DIASTOLIC BLOOD PRESSURE: 92 MMHG | HEART RATE: 85 BPM | RESPIRATION RATE: 16 BRPM | WEIGHT: 194 LBS | HEIGHT: 65 IN | SYSTOLIC BLOOD PRESSURE: 161 MMHG | TEMPERATURE: 97.3 F | OXYGEN SATURATION: 98 %

## 2024-02-28 DIAGNOSIS — M54.16 LUMBAR RADICULOPATHY: ICD-10-CM

## 2024-02-28 DIAGNOSIS — M54.12 RADICULOPATHY, CERVICAL: ICD-10-CM

## 2024-02-28 DIAGNOSIS — M47.812 CERVICAL SPONDYLOSIS: ICD-10-CM

## 2024-02-28 DIAGNOSIS — M54.50 CHRONIC BILATERAL LOW BACK PAIN WITHOUT SCIATICA: ICD-10-CM

## 2024-02-28 DIAGNOSIS — M79.10 MYALGIA: ICD-10-CM

## 2024-02-28 DIAGNOSIS — M54.12 CERVICAL RADICULOPATHY: ICD-10-CM

## 2024-02-28 DIAGNOSIS — G89.4 CHRONIC PAIN SYNDROME: Primary | ICD-10-CM

## 2024-02-28 DIAGNOSIS — M48.061 SPINAL STENOSIS OF LUMBAR REGION WITHOUT NEUROGENIC CLAUDICATION: ICD-10-CM

## 2024-02-28 DIAGNOSIS — M51.36 DDD (DEGENERATIVE DISC DISEASE), LUMBAR: ICD-10-CM

## 2024-02-28 DIAGNOSIS — G89.4 CHRONIC PAIN SYNDROME: ICD-10-CM

## 2024-02-28 DIAGNOSIS — G89.29 CHRONIC BILATERAL LOW BACK PAIN WITHOUT SCIATICA: ICD-10-CM

## 2024-02-28 DIAGNOSIS — M47.816 LUMBAR FACET ARTHROPATHY: ICD-10-CM

## 2024-02-28 DIAGNOSIS — G89.29 OTHER CHRONIC PAIN: ICD-10-CM

## 2024-02-28 PROCEDURE — 99213 OFFICE O/P EST LOW 20 MIN: CPT | Performed by: PHYSICIAN ASSISTANT

## 2024-02-28 PROCEDURE — 3080F DIAST BP >= 90 MM HG: CPT | Performed by: PHYSICIAN ASSISTANT

## 2024-02-28 PROCEDURE — 3077F SYST BP >= 140 MM HG: CPT | Performed by: PHYSICIAN ASSISTANT

## 2024-02-28 PROCEDURE — 1123F ACP DISCUSS/DSCN MKR DOCD: CPT | Performed by: PHYSICIAN ASSISTANT

## 2024-02-28 RX ORDER — HYDROCODONE BITARTRATE AND ACETAMINOPHEN 5; 325 MG/1; MG/1
1 TABLET ORAL 2 TIMES DAILY
Qty: 60 TABLET | Refills: 0 | Status: SHIPPED | OUTPATIENT
Start: 2024-02-29 | End: 2024-03-30

## 2024-02-28 NOTE — PROGRESS NOTES
Eleanor Holder presents to the [facility] on 2/28/2024. Eleanor is complaining of pain cervical neck and low back. The pain is constant. The pain is described as aching, shooting, and sharp. Pain is rated on her best day at a 6, on her worst day at a 10, and on average at a 8 on the VAS scale. She took her last dose of Norco and Neurontin today.     Any procedures since your last visit: No.    Pacemaker or defibrillator: No.    She is not on NSAIDS and is not on anticoagulation medications to include none.     Medication Contract and Consent for Opioid Use Documents Filed       Patient Documents       Type of Document Status Date Received Received By Description    Medication Contract Received  EBER HONG Opioid medication contract with Dr Wheatley 3/24/20    Medication Contract Received 4/26/2018  3:50 PM ANYA NUNO PAIN MANAGEMENT PATIENT AGREEMENT 4/26/2018    Medication Contract Received 11/5/2020  4:23 PM EBER HONG Opioid medication contract with Dr Wheatley    Medication Contract Received 3/1/2021  1:26 PM EBER HONG Opioid medication contract with Dr Wheatley    Medication Contract Received 8/23/2021  2:03 PM HAYLIE CADENA Medication contract    Medication Contract Received 10/18/2021  3:03 PM HAYLIE CADENA medication contract 10/18/2021    Medication Contract Signed 10/4/2022 12:35 PM CRISTIANA BAÑUELOS Pain Med. Contract 10/04/22    Medication Contract Received 10/11/2023  3:46 PM KAILYN WILKINSON Medication contract                    BP (!) 161/92   Pulse 85   Temp 97.3 °F (36.3 °C) (Infrared)   Resp 16   Ht 1.651 m (5' 5\")   Wt 88 kg (194 lb 0.1 oz)   LMP  (LMP Unknown)   SpO2 98%   BMI 32.28 kg/m²      No LMP recorded (lmp unknown). Patient is postmenopausal.

## 2024-02-28 NOTE — PROGRESS NOTES
[facility]  [address] [place] [id number]    Follow up Note      Eleanor Holder     Date of Visit:  2024    CC:  Patient presents for follow up   Chief Complaint   Patient presents with    Follow-up     Cervical neck and low back pain                 HPI:  Pain is worse to left side of cervical spine.     Change in quality of symptoms:no.    Patient satisfaction with analgesia:fair.    Medication side effects: None.  Recent diagnostic testing:none.   Recent interventional procedures: None.       She has been on anticoagulation medications to include NSAIDS. The patient  has not been on herbal supplements.  The patient is diabetic.    Imagin2023 MRI cervical spine    FINDINGS:   BONES/ALIGNMENT: There is normal alignment of the spine. The vertebral body   heights are maintained. The bone marrow signal appears unremarkable.       SPINAL CORD: No abnormal cord signal is seen.       SOFT TISSUES: No paraspinal mass identified.       C2-C3: Grade 1 anterolisthesis.  Severe left facet arthropathy resulting in   severe left neural foraminal narrowing.       C3-C4: Grade 1 anterolisthesis with disc bulging.  Severe left and moderate   right facet arthropathy.  Findings resulting in severe left neural foraminal   narrowing       C4-C5: Grade 1 anterolisthesis with disc bulging.  Moderate bilateral facet   arthropathy.  Mild left neural foraminal narrowing.       C5-C6: Grade 1 anterolisthesis with disc bulging.  Moderate bilateral facet   arthropathy.  No canal or foraminal stenosis.       C6-C7: Disc bulging with 2 mm central disc protrusion.  Mild bilateral facet   arthropathy.  Otherwise unremarkable       C7-T1: Grade 1 anterolisthesis.  Mild bilateral facet arthropathy.  Otherwise   unremarkable           Impression   At C2-C3, severe left neural foraminal narrowing.       At C3-C4, severe left neural foraminal narrowing.       No canal stenosis.  No significant posterior

## 2024-02-29 ENCOUNTER — TELEPHONE (OUTPATIENT)
Dept: PAIN MANAGEMENT | Age: 69
End: 2024-02-29

## 2024-02-29 NOTE — PROGRESS NOTES
[facility] PAIN MANAGEMENT  INSTRUCTIONS  ...........................................................................................................................................     [x] Parking the day of Surgery is located in the Main Entrance lot.  Upon entering the door, make immediate right into the surgery reception room    [x]  Bring photo ID and insurance card     [x] You may have a light breakfast day of procedure    [x]  Wear loose comfortable clothing    [x]  Please follow instructions for medications as given per Dr's office    [x] You can expect a call the business day prior to procedure to notify you of your arrival time     [x] Please arrange for     []  Other instructions

## 2024-02-29 NOTE — TELEPHONE ENCOUNTER
Call to Eleanor DRAKE Gallo that procedure was approved for 3/5/2024 and that [facility] should call her a few days before for the pre op call and between 2:00 PM and 4:00 PM  the business day before with the arrival time. Instructed Eleanor to hold ibuprofen for 24 hours, naprosyn for 4 days and any aspirin containing products or fish oil for 7 days. Instructed to call office back if any questions. Eleanor verbalized understanding.    Electronically signed by Malik Morel RN on 2/29/2024 at 10:23 AM

## 2024-03-01 ENCOUNTER — OFFICE VISIT (OUTPATIENT)
Dept: PODIATRY | Age: 69
End: 2024-03-01
Payer: MEDICARE

## 2024-03-01 VITALS
SYSTOLIC BLOOD PRESSURE: 130 MMHG | TEMPERATURE: 97.8 F | WEIGHT: 190 LBS | BODY MASS INDEX: 31.62 KG/M2 | DIASTOLIC BLOOD PRESSURE: 70 MMHG

## 2024-03-01 DIAGNOSIS — S90.02XA CONTUSION OF LEFT ANKLE, INITIAL ENCOUNTER: Primary | ICD-10-CM

## 2024-03-01 PROCEDURE — 1123F ACP DISCUSS/DSCN MKR DOCD: CPT | Performed by: PODIATRIST

## 2024-03-01 PROCEDURE — 3078F DIAST BP <80 MM HG: CPT | Performed by: PODIATRIST

## 2024-03-01 PROCEDURE — 99212 OFFICE O/P EST SF 10 MIN: CPT | Performed by: PODIATRIST

## 2024-03-01 PROCEDURE — 3075F SYST BP GE 130 - 139MM HG: CPT | Performed by: PODIATRIST

## 2024-03-01 NOTE — PROGRESS NOTES
growth  Skin:    Edema:    Neurologic:      Musculoskeletal/ Orthopedic examination: Pain with palpation to the anterior aspect of the left ankle.  NAIL the left hallux is asymptomatic no signs of infection drainage or erythema          Assessment and Plan: At this time the patient will DC all orders for the matrixectomy patient may resume normal shoe gear as for the left ankle we did order an MRI.  Eleanor was seen today for foot injury, ingrown toenail and diabetes.    Diagnoses and all orders for this visit:    Contusion of left ankle, initial encounter  -     MRI ANKLE LEFT WO CONTRAST; Future        No follow-ups on file.      Seen By:  Yariel Haro DPM      Document was created using voice recognition software.  Note was reviewed, however may contain grammatical errors.

## 2024-03-05 ENCOUNTER — HOSPITAL ENCOUNTER (OUTPATIENT)
Age: 69
Setting detail: OUTPATIENT SURGERY
Discharge: HOME | End: 2024-03-05
Attending: PAIN MEDICINE | Admitting: PAIN MEDICINE
Payer: MEDICARE

## 2024-03-05 ENCOUNTER — HOSPITAL ENCOUNTER (OUTPATIENT)
Dept: GENERAL RADIOLOGY | Age: 69
Setting detail: OUTPATIENT SURGERY
Discharge: HOME | End: 2024-03-07
Attending: PAIN MEDICINE
Payer: MEDICARE

## 2024-03-05 VITALS
OXYGEN SATURATION: 98 % | SYSTOLIC BLOOD PRESSURE: 171 MMHG | BODY MASS INDEX: 31.65 KG/M2 | HEART RATE: 75 BPM | WEIGHT: 190 LBS | RESPIRATION RATE: 18 BRPM | DIASTOLIC BLOOD PRESSURE: 87 MMHG | HEIGHT: 65 IN

## 2024-03-05 DIAGNOSIS — R52 PAIN MANAGEMENT: ICD-10-CM

## 2024-03-05 LAB — GLUCOSE BLD-MCNC: 145 MG/DL (ref 74–99)

## 2024-03-05 PROCEDURE — 82962 GLUCOSE BLOOD TEST: CPT

## 2024-03-05 PROCEDURE — 2580000003 HC RX 258: Performed by: PAIN MEDICINE

## 2024-03-05 PROCEDURE — 2709999900 HC NON-CHARGEABLE SUPPLY: Performed by: PAIN MEDICINE

## 2024-03-05 PROCEDURE — 6360000002 HC RX W HCPCS: Performed by: PAIN MEDICINE

## 2024-03-05 PROCEDURE — 6360000004 HC RX CONTRAST MEDICATION: Performed by: PAIN MEDICINE

## 2024-03-05 PROCEDURE — A4216 STERILE WATER/SALINE, 10 ML: HCPCS | Performed by: PAIN MEDICINE

## 2024-03-05 PROCEDURE — 62321 NJX INTERLAMINAR CRV/THRC: CPT | Performed by: PAIN MEDICINE

## 2024-03-05 PROCEDURE — 2500000003 HC RX 250 WO HCPCS: Performed by: PAIN MEDICINE

## 2024-03-05 PROCEDURE — 7100000010 HC PHASE II RECOVERY - FIRST 15 MIN: Performed by: PAIN MEDICINE

## 2024-03-05 PROCEDURE — 3600000002 HC SURGERY LEVEL 2 BASE: Performed by: PAIN MEDICINE

## 2024-03-05 RX ORDER — LIDOCAINE HYDROCHLORIDE 5 MG/ML
INJECTION, SOLUTION INFILTRATION; INTRAVENOUS PRN
Status: DISCONTINUED | OUTPATIENT
Start: 2024-03-05 | End: 2024-03-05 | Stop reason: ALTCHOICE

## 2024-03-05 RX ORDER — IOPAMIDOL 612 MG/ML
INJECTION, SOLUTION INTRATHECAL PRN
Status: DISCONTINUED | OUTPATIENT
Start: 2024-03-05 | End: 2024-03-05 | Stop reason: ALTCHOICE

## 2024-03-05 RX ORDER — METHYLPREDNISOLONE ACETATE 40 MG/ML
INJECTION, SUSPENSION INTRA-ARTICULAR; INTRALESIONAL; INTRAMUSCULAR; SOFT TISSUE PRN
Status: DISCONTINUED | OUTPATIENT
Start: 2024-03-05 | End: 2024-03-05 | Stop reason: ALTCHOICE

## 2024-03-05 RX ORDER — SODIUM CHLORIDE 9 MG/ML
INJECTION, SOLUTION INTRAMUSCULAR; INTRAVENOUS; SUBCUTANEOUS PRN
Status: DISCONTINUED | OUTPATIENT
Start: 2024-03-05 | End: 2024-03-05 | Stop reason: ALTCHOICE

## 2024-03-05 ASSESSMENT — PAIN DESCRIPTION - DESCRIPTORS: DESCRIPTORS: DISCOMFORT

## 2024-03-05 ASSESSMENT — PAIN SCALES - GENERAL
PAINLEVEL_OUTOF10: 0

## 2024-03-05 ASSESSMENT — PAIN - FUNCTIONAL ASSESSMENT: PAIN_FUNCTIONAL_ASSESSMENT: 0-10

## 2024-03-05 NOTE — H&P
Soft non tender non distended     EXTREMITIES: no signs of clubbing or cyanosis.    MUSCULOSKELETAL: negative for flaccid muscle tone or spastic movements.    SKIN: gross examination reveals no signs of rashes, or diaphoresis.    NEURO: Cranial nerves II-XII grossly intact. No signs of agitated mood.       Assessment/Plan:    cervical pain for C7-T1 MARIELENA

## 2024-03-05 NOTE — OP NOTE
3/5/2024    Patient: Eleanor Hloder  :  1955  Age:  68 y.o.  Sex:  female     PRE-PROCEDURE DIAGNOSIS: Cervical degenerative disc disease, cervical radicular pain.    POST-PROCEDURE DIAGNOSIS: Same.    PROCEDURE: Fluoroscopic guided cervical epidural steroid injection at C7-T1 level.    SURGEON: ZARA Montelongo D.O.    ANESTHESIA: local    ESTIMATED BLOOD LOSS: None.  ______________________________________________________________________  BRIEF HISTORY:  Eleanor Holder comes in today for the therapeutic cervical epidural injection under fluoroscopic guidance. The potential complications of this procedure were discussed with the her again today.  She has elected to undergo the aforementioned procedure.    Eleanor’s complete History & Physical examination were reviewed in depth, a copy of which is in the chart.      DESCRIPTION OF PROCEDURE:    After confirming written and informed consent, a time-out was performed and Eleanor’s name and date of birth, the procedure to be performed as well as the plan for the location of the needle insertion were confirmed.    The patient was brought into the procedure room and placed in the prone position with the head flexed midline on the fluoroscopy table. A pillow was placed under the patient's chest and forehead to increase cervical interlaminar space. Standard monitors were placed, and vital signs were observed throughout the procedure. The area was prepped with chloraprep and the C7-T1 interspace was marked under fluoroscopy. The skin and subcutaneous tissues at the above level were anesthestized with 0.5% lidocaine. A # 18 gauge 3 1/2 tuohy epidural needle was inserted and advanced toward the inferior lamina until bony contact was made. The needle was then advanced superiorly toward the epidural space. From this point on the loss of resistance technique with a 5 cc glass syringe was used to confirm entrance of the needle into the epidural space under intermittent lateral

## 2024-03-05 NOTE — DISCHARGE INSTRUCTIONS
[facility] Pain Management Department  Basye Igcbyu-[phone]  Dr. Juarez Montelongo   Post-Pain Block/Radiofrequency  Home Going Instructions    1-Go home, rest for the remainder of the day  2-Please do not lift over 20 pounds the day of the injection  3-If you received sedation No: alcohol, driving, operating lawn mowers, plows, tractors or other dangerous equipment until next morning. Do not make important decisions or sign legal documents for 24 hours. You may experience light headedness, dizziness, nausea or sleepiness after sedation. Do not stay alone. A responsible adult must be with you for 24 hours. You could be nauseated from the medications you have received. Your IV site may be sore and bruised.    4-No dietary restrictions     5-Resume all medications the same day, blood thinners to be resumed 24 hours after injection if you were instructed to stop any.    6-Keep the surgical site clean and dry, you may shower the next morning and remove the      dressing.     7- No sitz baths, tub baths or hot tubs/swimming for 24 hours.       8- If you have any pain at the injection site(s), application of an ice pack to the area should be       helpful, 20 minutes on/20 minutes off for next 48 hours.  9- Call [facility] Pain Management immediately at if you develop.  Fever greater than 100.4 F  Have bleeding or drainage from the puncture site  Have progressive Leg/arm numbness and or weakness  Loss of control of bowel and or bladder (wet/soil yourself)  Severe headache with inability to lift head  10-You may return to work the next day

## 2024-03-18 ENCOUNTER — HOSPITAL ENCOUNTER (OUTPATIENT)
Dept: MRI IMAGING | Age: 69
Discharge: HOME | End: 2024-03-20
Attending: PODIATRIST
Payer: MEDICARE

## 2024-03-18 DIAGNOSIS — S90.02XA CONTUSION OF LEFT ANKLE, INITIAL ENCOUNTER: ICD-10-CM

## 2024-03-18 PROCEDURE — 73721 MRI JNT OF LWR EXTRE W/O DYE: CPT

## 2024-03-26 ENCOUNTER — OFFICE VISIT (OUTPATIENT)
Dept: PODIATRY | Age: 69
End: 2024-03-26
Payer: MEDICARE

## 2024-03-26 VITALS
WEIGHT: 190 LBS | SYSTOLIC BLOOD PRESSURE: 130 MMHG | TEMPERATURE: 97.8 F | BODY MASS INDEX: 31.62 KG/M2 | DIASTOLIC BLOOD PRESSURE: 78 MMHG

## 2024-03-26 DIAGNOSIS — L60.0 INGROWN NAIL: ICD-10-CM

## 2024-03-26 DIAGNOSIS — S90.02XA CONTUSION OF LEFT ANKLE, INITIAL ENCOUNTER: Primary | ICD-10-CM

## 2024-03-26 PROCEDURE — 99212 OFFICE O/P EST SF 10 MIN: CPT | Performed by: PODIATRIST

## 2024-03-26 PROCEDURE — 1123F ACP DISCUSS/DSCN MKR DOCD: CPT | Performed by: PODIATRIST

## 2024-03-26 PROCEDURE — 3075F SYST BP GE 130 - 139MM HG: CPT | Performed by: PODIATRIST

## 2024-03-26 PROCEDURE — 3078F DIAST BP <80 MM HG: CPT | Performed by: PODIATRIST

## 2024-03-26 NOTE — PROGRESS NOTES
Eleanor Holder : 1955 Sex: female  Age: 68 y.o.    Patient was referred by Manolo Mckeon MD    Chief Complaint   Patient presents with    Foot Pain     Pt is here to go over MRI results left side     Ingrown Toenail     Post op ingrown toenail        SUBJECTIVE patient is seen today for follow-up for matrixectomy to an ingrown toenail this is doing very well patient is still having chronic left ankle pain comes and goes but still bothering her.  Foot Pain   This is a recurrent problem. The current episode started more than 1 year ago. There has been no history of extremity trauma. The problem occurs every several days.     Review of Systems  Const: Denies constitutional symptoms  Musculo: Denies symptoms other than stated above  Skin: Denies symptoms other than stated above       Current Outpatient Medications:     HYDROcodone-acetaminophen (NORCO) 5-325 MG per tablet, Take 1 tablet by mouth 2 times daily for 30 days., Disp: 60 tablet, Rfl: 0    cyclobenzaprine (FLEXERIL) 10 MG tablet, TAKE 1/2 TABLET BY MOUTH TWICE DAILY AS NEEDED FOR MUSCLE SPASMS, Disp: 30 tablet, Rfl: 1    gabapentin (NEURONTIN) 400 MG capsule, Take 1 capsule by mouth 2 times daily for 90 days., Disp: 180 capsule, Rfl: 0    hydrOXYzine pamoate (VISTARIL) 25 MG capsule, Take 1 capsule by mouth 3 times daily as needed for Anxiety Watch for sedation, Disp: 90 capsule, Rfl: 1    lisinopril (PRINIVIL;ZESTRIL) 40 MG tablet, Take 1 tablet by mouth every evening, Disp: 90 tablet, Rfl: 1    hydroCHLOROthiazide (MICROZIDE) 12.5 MG capsule, TAKE ONE CAPSULE BY MOUTH ONCE DAILY FOR FOR BLOOD PRESSURE, Disp: 90 capsule, Rfl: 1    metFORMIN (GLUCOPHAGE-XR) 500 MG extended release tablet, Take 1 tablet by mouth daily (with breakfast), Disp: 90 tablet, Rfl: 1    atorvastatin (LIPITOR) 40 MG tablet, Take 1 tablet by mouth daily, Disp: 90 tablet, Rfl: 3    Handicap Placard MISC, DX:  Loss of Balance, Chronic low back pain, s/p back surgery.

## 2024-03-27 ENCOUNTER — OFFICE VISIT (OUTPATIENT)
Dept: PAIN MANAGEMENT | Age: 69
End: 2024-03-27
Payer: MEDICARE

## 2024-03-27 ENCOUNTER — PREP FOR PROCEDURE (OUTPATIENT)
Dept: PAIN MANAGEMENT | Age: 69
End: 2024-03-27

## 2024-03-27 VITALS
DIASTOLIC BLOOD PRESSURE: 80 MMHG | SYSTOLIC BLOOD PRESSURE: 140 MMHG | HEIGHT: 65 IN | BODY MASS INDEX: 31.65 KG/M2 | RESPIRATION RATE: 16 BRPM | HEART RATE: 97 BPM | TEMPERATURE: 98.2 F | WEIGHT: 190 LBS | OXYGEN SATURATION: 97 %

## 2024-03-27 DIAGNOSIS — M47.812 CERVICAL SPONDYLOSIS: ICD-10-CM

## 2024-03-27 DIAGNOSIS — M54.16 LUMBAR RADICULOPATHY: ICD-10-CM

## 2024-03-27 DIAGNOSIS — M54.12 CERVICAL RADICULOPATHY: ICD-10-CM

## 2024-03-27 DIAGNOSIS — M51.36 DDD (DEGENERATIVE DISC DISEASE), LUMBAR: ICD-10-CM

## 2024-03-27 DIAGNOSIS — M54.12 CERVICAL RADICULOPATHY: Primary | ICD-10-CM

## 2024-03-27 DIAGNOSIS — G89.4 CHRONIC PAIN SYNDROME: Primary | ICD-10-CM

## 2024-03-27 DIAGNOSIS — M54.12 RADICULOPATHY, CERVICAL: ICD-10-CM

## 2024-03-27 PROCEDURE — 99213 OFFICE O/P EST LOW 20 MIN: CPT | Performed by: PAIN MEDICINE

## 2024-03-27 PROCEDURE — 99213 OFFICE O/P EST LOW 20 MIN: CPT

## 2024-03-27 PROCEDURE — 1123F ACP DISCUSS/DSCN MKR DOCD: CPT | Performed by: PAIN MEDICINE

## 2024-03-27 PROCEDURE — 3077F SYST BP >= 140 MM HG: CPT | Performed by: PAIN MEDICINE

## 2024-03-27 PROCEDURE — 3079F DIAST BP 80-89 MM HG: CPT | Performed by: PAIN MEDICINE

## 2024-03-27 RX ORDER — GABAPENTIN 400 MG/1
400 CAPSULE ORAL 2 TIMES DAILY
Qty: 180 CAPSULE | Refills: 0 | Status: SHIPPED | OUTPATIENT
Start: 2024-03-27 | End: 2024-06-25

## 2024-03-27 RX ORDER — HYDROCODONE BITARTRATE AND ACETAMINOPHEN 5; 325 MG/1; MG/1
1 TABLET ORAL 2 TIMES DAILY
Qty: 60 TABLET | Refills: 0 | Status: SHIPPED | OUTPATIENT
Start: 2024-03-30 | End: 2024-04-29

## 2024-03-27 NOTE — PROGRESS NOTES
Eleanor Holder presents to the [facility] on 3/27/2024. Eleanor is complaining of pain cervical . The pain is constant. The pain is described as aching, throbbing, shooting, and stabbing. Pain is rated on her best day at a 6, on her worst day at a 10, and on average at a 8 on the VAS scale. She took her last dose of Neurontin this morning .    Any procedures since your last visit: Yes, Procedure:   CERVICAL EPIDURAL STEROID INJECTION C7-T1 LEFT PARAMEDIAN    , Date: 3/5/24 with 80 % relief .    She is not on NSAIDS and  is not on anticoagulation medications \\    Pacemaker or defibrillator: No \\    Medication Contract and Consent for Opioid Use Documents Filed       Patient Documents       Type of Document Status Date Received Received By Description    Medication Contract Received  EBER HONG Opioid medication contract with Dr Wheatley 3/24/20    Medication Contract Received 4/26/2018  3:50 PM ANYA NUNO PAIN MANAGEMENT PATIENT AGREEMENT 4/26/2018    Medication Contract Received 11/5/2020  4:23 PM EBER HONG Opioid medication contract with Dr Wheatley    Medication Contract Received 3/1/2021  1:26 PM EBER HONG Opioid medication contract with Dr Wheatley    Medication Contract Received 8/23/2021  2:03 PM HAYLIE CADENA Medication contract    Medication Contract Received 10/18/2021  3:03 PM HAYLIE CADENA medication contract 10/18/2021    Medication Contract Signed 10/4/2022 12:35 PM CRISTIANA BAÑUELOS Pain Med. Contract 10/04/22    Medication Contract Received 10/11/2023  3:46 PM KAILYN WILKNISON Medication contract                       Temp 98.2 °F (36.8 °C) (Temporal)   Resp 16   Ht 1.651 m (5' 5\")   Wt 86.2 kg (190 lb)   LMP  (LMP Unknown)   SpO2 97%   BMI 31.62 kg/m²      No LMP recorded (lmp unknown). Patient is postmenopausal.  
  Dermatology:    Skin:no unusual rashes, no skin lesions, no palpable subcutaneous nodules and good skin turgor    Assessment/Plan:    Chronic low back pain with radiation to the Right groin, patient had low back surgery 08/2017 L3-5 fusion, had lumbar spine CT with severe L2-3 stenosis, had L2-4 fusion followed by rt SIJ fusion with Dr. Wheatley  Chronic left-sided cervical pain  Pmhx: depression, DM, HTN, DLD, CLYDE on CPAP, RA, TIA  Retired, lives in senior apartments     Sig relief with the most recent ROGELIO      Plan:  Patient is s/p:  Left TFESI L5 and S1 on 03/29/2022 with 75% relief  LESI L5-S1 on 06/09/2022 with good relief   ROGELIO C7-T1 >80% relief, repeat ordered  Has failed MBB in the past.   RF norco 5/325 BID     RF Gabapentin 400 mg BID, RF 90 day supply  UDS today  OARRS report reviewed   Patient encouraged to stay active and to lose weight. Failed physical therapy \"many times over the past 30 years\"  Treatment plan discussed with the patient including medications side effects     Cc: Referring physician

## 2024-03-29 NOTE — OP NOTE
[id number] [address]                                OPERATIVE REPORT    PATIENT NAME: Raegan Main                        :        1955  MED REC NO:   [id number]                            ROOM:  ACCOUNT NO:   [de-identified]                           ADMIT DATE: 10/21/2020  PROVIDER:     Bridget Klein DO    DATE OF PROCEDURE:  10/21/2020    PREOPERATIVE DIAGNOSIS:  Left anterior ventral tongue lesion. POSTOPERATIVE DIAGNOSIS:  Left anterior ventral tongue lesion. PROCEDURE PERFORMED:  Excisional biopsy of left anterior ventral tongue  lesion. SURGEON:  Dr. Spenser Amaya. ASSISTANTS:  Dr. Bridget Klein, PGY-4 and Francisca Naik, PGY-2. ANESTHESIA:  General endotracheal.    ESTIMATED BLOOD LOSS:  10 mL. IV FLUIDS:  600 mL. COMPLICATIONS:  None. SPECIMEN:  Tongue lesion. 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 Risks, benefits, and  alternatives were discussed. Risks including but not limited to pain,  bleeding, infection, damage to any surrounding structures, seroma,  hematoma, heavy scarring, recurrence, and the need for further surgery. The patient voiced understanding and elected to proceed. She was seen  in the preop holding by Dr. Amna Cantu. All questions were answered, and  consent was reviewed and signed. DESCRIPTION OF PROCEDURE:  The patient was brought to the operating room  and placed supine on the operating table. SCDs were placed. No  perioperative antibiotic was administered. A time out was done and was  agreed by all parties present. Anesthesia was induced without any  complication. The patient was then prepped and draped in the usual  sterile fashion.   The operative
16

## 2024-04-08 RX ORDER — CYCLOBENZAPRINE HCL 10 MG
TABLET ORAL
Qty: 30 TABLET | Refills: 1 | Status: SHIPPED | OUTPATIENT
Start: 2024-04-08

## 2024-04-08 NOTE — TELEPHONE ENCOUNTER
Last Appointment:  12/18/2023  Future Appointments   Date Time Provider Department Center   4/26/2024  3:00 PM Inez Montelongo DO BDM PAIN MAR [facility]   6/18/2024  1:20 PM Manolo Mckeon MD COLUMB BIRK [facility]   7/9/2024 11:45 AM Yariel Haro, DPM [facility] P[facility]

## 2024-04-11 ENCOUNTER — TELEPHONE (OUTPATIENT)
Dept: PAIN MANAGEMENT | Age: 69
End: 2024-04-11

## 2024-04-11 DIAGNOSIS — M54.12 CERVICAL RADICULOPATHY: Primary | ICD-10-CM

## 2024-04-11 NOTE — TELEPHONE ENCOUNTER
Call to Eleanor Holder that procedure was approved for 4/16/2024 and that [facility] should call her a few days before for the pre op call and between 2:00 PM and 4:00 PM  the business day before with the arrival time. Instructed Eleanor to hold ibuprofen for 24 hours, naprosyn for 4 days and any aspirin containing products or fish oil for 7 days. Instructed to call office back if any questions. Eleanor verbalized understanding.    Electronically signed by Quoc Schultz RN on 4/11/2024 at 2:28 PM

## 2024-04-11 NOTE — PROGRESS NOTES
[facility] PAIN MANAGEMENT  INSTRUCTIONS  ...........................................................................................................................................     [x] Parking the day of Surgery is located in the Main Entrance lot.  Upon entering the door, make immediate right into the surgery reception room    [x]  Bring photo ID and insurance card     [x] You may have a light breakfast day of procedure    [x]  Wear loose comfortable clothing    [x]  Please follow instructions for medications as given per Dr's office    [x] You can expect a call the business day prior to procedure to notify you of your arrival time     [x] Please arrange for     []  Other instructions

## 2024-04-16 ENCOUNTER — HOSPITAL ENCOUNTER (OUTPATIENT)
Age: 69
Setting detail: OUTPATIENT SURGERY
Discharge: HOME | End: 2024-04-16
Attending: PAIN MEDICINE | Admitting: PAIN MEDICINE
Payer: MEDICARE

## 2024-04-16 ENCOUNTER — HOSPITAL ENCOUNTER (OUTPATIENT)
Dept: GENERAL RADIOLOGY | Age: 69
Setting detail: OUTPATIENT SURGERY
Discharge: HOME | End: 2024-04-18
Attending: PAIN MEDICINE
Payer: MEDICARE

## 2024-04-16 VITALS
HEART RATE: 82 BPM | SYSTOLIC BLOOD PRESSURE: 126 MMHG | BODY MASS INDEX: 33.29 KG/M2 | HEIGHT: 64 IN | TEMPERATURE: 97.8 F | OXYGEN SATURATION: 95 % | RESPIRATION RATE: 15 BRPM | WEIGHT: 195 LBS | DIASTOLIC BLOOD PRESSURE: 79 MMHG

## 2024-04-16 DIAGNOSIS — R52 PAIN MANAGEMENT: ICD-10-CM

## 2024-04-16 LAB — GLUCOSE BLD-MCNC: 151 MG/DL (ref 74–99)

## 2024-04-16 PROCEDURE — 6360000004 HC RX CONTRAST MEDICATION: Performed by: PAIN MEDICINE

## 2024-04-16 PROCEDURE — 2580000003 HC RX 258: Performed by: PAIN MEDICINE

## 2024-04-16 PROCEDURE — 7100000010 HC PHASE II RECOVERY - FIRST 15 MIN: Performed by: PAIN MEDICINE

## 2024-04-16 PROCEDURE — 6360000002 HC RX W HCPCS: Performed by: PAIN MEDICINE

## 2024-04-16 PROCEDURE — 3600000002 HC SURGERY LEVEL 2 BASE: Performed by: PAIN MEDICINE

## 2024-04-16 PROCEDURE — 7100000011 HC PHASE II RECOVERY - ADDTL 15 MIN: Performed by: PAIN MEDICINE

## 2024-04-16 PROCEDURE — 82962 GLUCOSE BLOOD TEST: CPT

## 2024-04-16 PROCEDURE — 62321 NJX INTERLAMINAR CRV/THRC: CPT | Performed by: PAIN MEDICINE

## 2024-04-16 PROCEDURE — 2500000003 HC RX 250 WO HCPCS: Performed by: PAIN MEDICINE

## 2024-04-16 PROCEDURE — A4216 STERILE WATER/SALINE, 10 ML: HCPCS | Performed by: PAIN MEDICINE

## 2024-04-16 PROCEDURE — 2709999900 HC NON-CHARGEABLE SUPPLY: Performed by: PAIN MEDICINE

## 2024-04-16 RX ORDER — METHYLPREDNISOLONE ACETATE 40 MG/ML
INJECTION, SUSPENSION INTRA-ARTICULAR; INTRALESIONAL; INTRAMUSCULAR; SOFT TISSUE PRN
Status: DISCONTINUED | OUTPATIENT
Start: 2024-04-16 | End: 2024-04-16 | Stop reason: ALTCHOICE

## 2024-04-16 RX ORDER — IOPAMIDOL 612 MG/ML
INJECTION, SOLUTION INTRATHECAL PRN
Status: DISCONTINUED | OUTPATIENT
Start: 2024-04-16 | End: 2024-04-16 | Stop reason: ALTCHOICE

## 2024-04-16 RX ORDER — LIDOCAINE HYDROCHLORIDE 5 MG/ML
INJECTION, SOLUTION INFILTRATION; INTRAVENOUS PRN
Status: DISCONTINUED | OUTPATIENT
Start: 2024-04-16 | End: 2024-04-16 | Stop reason: ALTCHOICE

## 2024-04-16 RX ORDER — SODIUM CHLORIDE 9 MG/ML
INJECTION, SOLUTION INTRAMUSCULAR; INTRAVENOUS; SUBCUTANEOUS PRN
Status: DISCONTINUED | OUTPATIENT
Start: 2024-04-16 | End: 2024-04-16 | Stop reason: ALTCHOICE

## 2024-04-16 ASSESSMENT — PAIN - FUNCTIONAL ASSESSMENT
PAIN_FUNCTIONAL_ASSESSMENT: 0-10
PAIN_FUNCTIONAL_ASSESSMENT: 0-10
PAIN_FUNCTIONAL_ASSESSMENT: ACTIVITIES ARE NOT PREVENTED

## 2024-04-16 ASSESSMENT — PAIN DESCRIPTION - DESCRIPTORS
DESCRIPTORS: ACHING;SORE
DESCRIPTORS: DISCOMFORT

## 2024-04-16 NOTE — OP NOTE
2024    Patient: Eleanor Holder  :  1955  Age:  68 y.o.  Sex:  female     PRE-PROCEDURE DIAGNOSIS: Cervical degenerative disc disease, cervical radicular pain.    POST-PROCEDURE DIAGNOSIS: Same.    PROCEDURE: Fluoroscopic guided cervical epidural steroid injection at C7-T1 level.    SURGEON: ZARA Montelongo D.O.    ANESTHESIA: local    ESTIMATED BLOOD LOSS: None.  ______________________________________________________________________  BRIEF HISTORY:  Eleanor Holder comes in today for the therapeutic cervical epidural injection under fluoroscopic guidance. The potential complications of this procedure were discussed with the her again today.  She has elected to undergo the aforementioned procedure.    Eleanor’s complete History & Physical examination were reviewed in depth, a copy of which is in the chart.      DESCRIPTION OF PROCEDURE:    After confirming written and informed consent, a time-out was performed and Eleanor’s name and date of birth, the procedure to be performed as well as the plan for the location of the needle insertion were confirmed.    The patient was brought into the procedure room and placed in the prone position with the head flexed midline on the fluoroscopy table. A pillow was placed under the patient's chest and forehead to increase cervical interlaminar space. Standard monitors were placed, and vital signs were observed throughout the procedure. The area was prepped with chloraprep and the C7-T1 interspace was marked under fluoroscopy. The skin and subcutaneous tissues at the above level were anesthestized with 0.5% lidocaine. A # 18 gauge 3 1/2 tuohy epidural needle was inserted and advanced toward the inferior lamina until bony contact was made. The needle was then advanced superiorly toward the epidural space. From this point on the loss of resistance technique with a 5 cc glass syringe was used to confirm entrance of the needle into the epidural space under intermittent lateral

## 2024-04-16 NOTE — H&P
[facility]        [address] [place] [id number]  Dept: [phone]    Procedure History & Physical      Eleanor Holder     HPI:    Patient  is here for cervical pain for C7-T1 MARIELENA  Labs/imaging studies reviewed   All question and concerns addressed including R/B/A associated with the procedure    Past Medical History:   Diagnosis Date    Cancer (HCC) 2019    tongue    Cervical radiculopathy     Chronic back pain     Costochondritis     h/o    Depression     Diabetes mellitus (HCC)     Fibromyalgia     HTN (hypertension)     Hyperlipidemia     CLYDE on CPAP     Osteoarthritis     \"everywhere\"    Rheumatoid arthritis(714.0)     TIA (transient ischemic attack)     no deficits        Past Surgical History:   Procedure Laterality Date    ANESTHESIA NERVE BLOCK Left 2019    LEFT C3,4,5 MBB performed by Baljit Dorsey MD at [facility] OR    ANESTHESIA NERVE BLOCK Right 2019    BILATERAL TRANSFORAMINAL EPIDURAL STEROID INJECTION S1 #3 performed by Baljit Dorsey MD at [facility] OR    ANESTHESIA NERVE BLOCK Right 2020    RIGHT SACROILIAC JOINT INJECTION      CPT: 22170 performed by Inez Montelongo DO at [facility] OR    ANKLE SURGERY      Left    ANKLE SURGERY Left 2022    REPAIR OF ANTERIOR TALAR LIGAMENT LEFT ANKLE, REPAIR DELTOID LIGAMENT LEFT ANKLE performed by Yariel Haro DPM at [facility] OR    ARTHRODESIS Left 2018    ARTHRODESIS 2ND DIGIT LEFT FOOT performed by Yariel Haro DPM at [facility] OR    BACK SURGERY  2017    PLIF L3-L4, L4-L5 with rods, screws, and cages/Dr. Adams    BACK SURGERY  2020    BACK SURGERY Right 2021    RIGHT PERCUTANEOUS SACROILIAC JOINT FUSION performed by Samir Wheatley MD at [facility] [place]    BREAST BIOPSY      BREAST REDUCTION SURGERY      BREAST SURGERY  2010    reduction, bilat     SECTION  1993    CHOLECYSTECTOMY      COLONOSCOPY  2015    COLONOSCOPY N/A 2020    COLONOSCOPY DIAGNOSTIC performed by Zaira SHARMA

## 2024-04-16 NOTE — DISCHARGE INSTRUCTIONS
[facility] Pain Management Department  [place] [facility]-[phone]  Dr. Juarez Montelongo   Post-Pain Block/Radiofrequency  Home Going Instructions    1-Go home, rest for the remainder of the day  2-Please do not lift over 20 pounds the day of the injection  3-If you received sedation No: alcohol, driving, operating lawn mowers, plows, tractors or other dangerous equipment until next morning. Do not make important decisions or sign legal documents for 24 hours. You may experience light headedness, dizziness, nausea or sleepiness after sedation. Do not stay alone. A responsible adult must be with you for 24 hours. You could be nauseated from the medications you have received. Your IV site may be sore and bruised.    4-No dietary restrictions     5-Resume all medications the same day, blood thinners to be resumed 24 hours after injection if you were instructed to stop any.    6-Keep the surgical site clean and dry, you may shower the next morning and remove the      dressing.     7- No sitz baths, tub baths or hot tubs/swimming for 24 hours.       8- If you have any pain at the injection site(s), application of an ice pack to the area should be       helpful, 20 minutes on/20 minutes off for next 48 hours.  9- Call [facility] Pain Management immediately at if you develop.  Fever greater than 100.4 F  Have bleeding or drainage from the puncture site  Have progressive Leg/arm numbness and or weakness  Loss of control of bowel and or bladder (wet/soil yourself)  Severe headache with inability to lift head  10-You may return to work the next day

## 2024-04-22 RX ORDER — METFORMIN HYDROCHLORIDE 500 MG/1
TABLET, EXTENDED RELEASE ORAL
Qty: 90 TABLET | Refills: 1 | Status: SHIPPED | OUTPATIENT
Start: 2024-04-22

## 2024-04-22 NOTE — TELEPHONE ENCOUNTER
Last Appointment:  12/18/2023  Future Appointments   Date Time Provider Department Center   4/26/2024  3:00 PM Inez Montelongo DO BDM PAIN MAR [facility]   6/18/2024  1:20 PM Manolo Mckeon MD COLUMB BIRK [facility]   7/9/2024 11:45 AM Yariel Haro, DPM [place] [facility]

## 2024-04-26 ENCOUNTER — OFFICE VISIT (OUTPATIENT)
Dept: PAIN MANAGEMENT | Age: 69
End: 2024-04-26
Payer: MEDICARE

## 2024-04-26 VITALS
HEART RATE: 105 BPM | HEIGHT: 64 IN | SYSTOLIC BLOOD PRESSURE: 157 MMHG | WEIGHT: 195.11 LBS | BODY MASS INDEX: 33.31 KG/M2 | OXYGEN SATURATION: 94 % | RESPIRATION RATE: 16 BRPM | TEMPERATURE: 97.8 F | DIASTOLIC BLOOD PRESSURE: 76 MMHG

## 2024-04-26 DIAGNOSIS — G89.4 CHRONIC PAIN SYNDROME: Primary | ICD-10-CM

## 2024-04-26 DIAGNOSIS — M54.12 CERVICAL RADICULOPATHY: ICD-10-CM

## 2024-04-26 DIAGNOSIS — M54.12 RADICULOPATHY, CERVICAL: ICD-10-CM

## 2024-04-26 DIAGNOSIS — M54.16 LUMBAR RADICULOPATHY: ICD-10-CM

## 2024-04-26 DIAGNOSIS — M47.812 CERVICAL SPONDYLOSIS: ICD-10-CM

## 2024-04-26 DIAGNOSIS — M51.36 DDD (DEGENERATIVE DISC DISEASE), LUMBAR: ICD-10-CM

## 2024-04-26 PROCEDURE — 99213 OFFICE O/P EST LOW 20 MIN: CPT

## 2024-04-26 PROCEDURE — 3077F SYST BP >= 140 MM HG: CPT | Performed by: PAIN MEDICINE

## 2024-04-26 PROCEDURE — 99213 OFFICE O/P EST LOW 20 MIN: CPT | Performed by: PAIN MEDICINE

## 2024-04-26 PROCEDURE — 3078F DIAST BP <80 MM HG: CPT | Performed by: PAIN MEDICINE

## 2024-04-26 PROCEDURE — 1123F ACP DISCUSS/DSCN MKR DOCD: CPT | Performed by: PAIN MEDICINE

## 2024-04-26 RX ORDER — HYDROCODONE BITARTRATE AND ACETAMINOPHEN 5; 325 MG/1; MG/1
1 TABLET ORAL 2 TIMES DAILY
Qty: 60 TABLET | Refills: 0 | Status: SHIPPED | OUTPATIENT
Start: 2024-04-29 | End: 2024-05-29

## 2024-04-26 NOTE — PROGRESS NOTES
kg/m²      No LMP recorded (lmp unknown). Patient is postmenopausal.    
M, DO   HYDROcodone-acetaminophen (NORCO) 5-325 MG per tablet Take 1 tablet by mouth 2 times daily for 30 days. 3/30/24 4/29/24 Yes Inez Montelongo DO   hydrOXYzine pamoate (VISTARIL) 25 MG capsule Take 1 capsule by mouth 3 times daily as needed for Anxiety Watch for sedation 12/18/23  Yes Manolo Mckeon MD   lisinopril (PRINIVIL;ZESTRIL) 40 MG tablet Take 1 tablet by mouth every evening 12/18/23  Yes Manolo Mckoen MD   hydroCHLOROthiazide (MICROZIDE) 12.5 MG capsule TAKE ONE CAPSULE BY MOUTH ONCE DAILY FOR FOR BLOOD PRESSURE 11/17/23  Yes Manolo Mckeon MD   atorvastatin (LIPITOR) 40 MG tablet Take 1 tablet by mouth daily 6/13/23  Yes Manolo Mckeon MD   Handicap Placard MISC DX:  Loss of Balance, Chronic low back pain, s/p back surgery.   Duration of 5 years 2/22/21  Yes Manolo Mckeon MD       Allergies   Allergen Reactions    Pcn [Penicillins] Anaphylaxis       Social History     Socioeconomic History    Marital status:      Spouse name: Not on file    Number of children: Not on file    Years of education: Not on file    Highest education level: Not on file   Occupational History    Not on file   Tobacco Use    Smoking status: Every Day     Current packs/day: 0.50     Average packs/day: 0.5 packs/day for 40.0 years (20.0 ttl pk-yrs)     Types: Cigarettes    Smokeless tobacco: Never   Vaping Use    Vaping Use: Never used   Substance and Sexual Activity    Alcohol use: Yes     Comment: rarely    Drug use: No    Sexual activity: Not on file   Other Topics Concern    Not on file   Social History Narrative    Not on file     Social Determinants of Health     Financial Resource Strain: Patient Declined (6/13/2023)    Overall Financial Resource Strain (CARDIA)     Difficulty of Paying Living Expenses: Patient declined   Food Insecurity: Not on file (6/13/2023)   Transportation Needs: Unknown (6/13/2023)    PRAPARE - Transportation     Lack of Transportation

## 2024-04-29 LAB
6-MAM, QUANTITATIVE, URINE: <10 NG/ML
6-MONOACETYLMORPHINE, URINE: NEGATIVE
7-AMINOCLONAZEPAM, QUANTITATIVE, URINE: <50 NG/ML
ABNORMAL SPECIMEN VALIDITY TEST: ABNORMAL
ALCOHOL URINE: NOT DETECTED MG/DL
ALPHA-HYDROXYALPRAZOLAM, QUANTITATIVE, URINE: <50 NG/ML
ALPHA-HYDROXYMIDAZOLAM, QUANTITATIVE, URINE: <50 NG/ML
ALPHA-HYDROXYTRIAZOLAM, QUANTITATIVE, URINE: <50 NG/ML
ALPRAZOLAM URINE QUANT: <50 NG/ML
AMPHETAMINE SCREEN URINE: NEGATIVE
BARBITURATE SCREEN URINE: NEGATIVE
BENZODIAZEPINE SCREEN, URINE: NEGATIVE
BUPRENORPHINE URINE: NEGATIVE
CANNABINOID SCREEN URINE: NEGATIVE
CHLORDIAZEPOXIDE, QUANTITATIVE, URINE: <50 NG/ML
CLONAZEPAM, QUANTITATIVE, URINE: <50 NG/ML
COCAINE METABOLITE, URINE: NEGATIVE
CODEINE, QUANTITATIVE, URINE: <50 NG/ML
COMPLIANCE DRUG ANALYSIS, URINE: NORMAL
DIAZEPAM URINE QUANT: <50 NG/ML
FENTANYL URINE: NEGATIVE
FLUNITRAZEPAM, QUANTITATIVE, URINE: <50 NG/ML
FLURAZEPAM, QUANTITATIVE, URINE: <50 NG/ML
HYDROCODONE, QUANTITATIVE, URINE: >1000 NG/ML
HYDROMORPHONE, QUANTITATIVE, URINE: 118.9 NG/ML
INTEGRITY CHECK, CREATININE, URINE: 89.3 MG/DL (ref 22–250)
INTEGRITY CHECK, OXIDANT, URINE: <40 MG/L
INTEGRITY CHECK, PH, URINE: 5.3 (ref 4.5–9)
INTEGRITY CHECK, SPECIFIC GRAVITY, URINE: 1.01 (ref 1–1.03)
LORAZEPAM URINE QUANT: <50 NG/ML
METHADONE SCREEN, URINE: NEGATIVE
MIDAZOLAM URINE QUANT: <50 NG/ML
MORPHINE, QUANTITATIVE, URINE: <50 NG/ML
NORDIAZEPAM URINE QUANT: <50 NG/ML
NORHYDROCODONE, QUANTITATIVE, URINE: >1000 NG/ML
NOROXYCODONE, QUANTITATIVE, URINE: <50 NG/ML
OPIATES, URINE: POSITIVE
OXAZEPAM URINE QUANT: <50 NG/ML
OXYCODONE SCREEN URINE: NEGATIVE
OXYCODONE URINE, QUANTITATIVE: <50 NG/ML
OXYMORPHONE, QUANTITATIVE, URINE: <50 NG/ML
PHENCYCLIDINE, URINE: NEGATIVE
TEMAZEPAM, QUANTITATIVE, URINE: <50 NG/ML
TEST INFORMATION: ABNORMAL
TRAMADOL, URINE: NEGATIVE

## 2024-05-14 DIAGNOSIS — I10 ESSENTIAL HYPERTENSION: ICD-10-CM

## 2024-05-14 RX ORDER — HYDROCHLOROTHIAZIDE 12.5 MG/1
CAPSULE, GELATIN COATED ORAL
Qty: 90 CAPSULE | Refills: 1 | Status: SHIPPED | OUTPATIENT
Start: 2024-05-14

## 2024-05-14 NOTE — TELEPHONE ENCOUNTER
Last Appointment:  12/18/2023  Future Appointments   Date Time Provider Department Center   5/23/2024  3:15 PM Meghan Corcoran PA BDM PAIN MAR [facility]   6/18/2024  1:20 PM Manolo Mckeon MD COLUMB BIRK [facility]   7/9/2024 11:45 AM Yariel Haro, DPLEE [place] P[facility]

## 2024-05-23 ENCOUNTER — OFFICE VISIT (OUTPATIENT)
Dept: PAIN MANAGEMENT | Age: 69
End: 2024-05-23
Payer: MEDICARE

## 2024-05-23 VITALS
BODY MASS INDEX: 33.31 KG/M2 | HEART RATE: 111 BPM | WEIGHT: 195.11 LBS | HEIGHT: 64 IN | SYSTOLIC BLOOD PRESSURE: 125 MMHG | RESPIRATION RATE: 18 BRPM | TEMPERATURE: 98.7 F | DIASTOLIC BLOOD PRESSURE: 80 MMHG | OXYGEN SATURATION: 99 %

## 2024-05-23 DIAGNOSIS — M54.50 CHRONIC BILATERAL LOW BACK PAIN WITHOUT SCIATICA: ICD-10-CM

## 2024-05-23 DIAGNOSIS — M47.816 LUMBAR FACET ARTHROPATHY: ICD-10-CM

## 2024-05-23 DIAGNOSIS — M54.12 CERVICAL RADICULOPATHY: ICD-10-CM

## 2024-05-23 DIAGNOSIS — M79.10 MYALGIA: ICD-10-CM

## 2024-05-23 DIAGNOSIS — M54.16 LUMBAR RADICULOPATHY: ICD-10-CM

## 2024-05-23 DIAGNOSIS — G89.29 OTHER CHRONIC PAIN: ICD-10-CM

## 2024-05-23 DIAGNOSIS — G89.4 CHRONIC PAIN SYNDROME: Primary | ICD-10-CM

## 2024-05-23 DIAGNOSIS — G89.29 CHRONIC BILATERAL LOW BACK PAIN WITHOUT SCIATICA: ICD-10-CM

## 2024-05-23 DIAGNOSIS — G89.4 CHRONIC PAIN SYNDROME: ICD-10-CM

## 2024-05-23 DIAGNOSIS — M48.061 SPINAL STENOSIS OF LUMBAR REGION WITHOUT NEUROGENIC CLAUDICATION: ICD-10-CM

## 2024-05-23 DIAGNOSIS — M47.812 CERVICAL SPONDYLOSIS: ICD-10-CM

## 2024-05-23 DIAGNOSIS — M54.12 RADICULOPATHY, CERVICAL: ICD-10-CM

## 2024-05-23 DIAGNOSIS — M51.36 DDD (DEGENERATIVE DISC DISEASE), LUMBAR: ICD-10-CM

## 2024-05-23 PROCEDURE — 3079F DIAST BP 80-89 MM HG: CPT | Performed by: PHYSICIAN ASSISTANT

## 2024-05-23 PROCEDURE — 3074F SYST BP LT 130 MM HG: CPT | Performed by: PHYSICIAN ASSISTANT

## 2024-05-23 PROCEDURE — 99213 OFFICE O/P EST LOW 20 MIN: CPT | Performed by: PHYSICIAN ASSISTANT

## 2024-05-23 PROCEDURE — 1123F ACP DISCUSS/DSCN MKR DOCD: CPT | Performed by: PHYSICIAN ASSISTANT

## 2024-05-23 RX ORDER — HYDROCODONE BITARTRATE AND ACETAMINOPHEN 5; 325 MG/1; MG/1
1 TABLET ORAL 2 TIMES DAILY
Qty: 60 TABLET | Refills: 0 | Status: SHIPPED | OUTPATIENT
Start: 2024-05-29 | End: 2024-06-28

## 2024-05-23 NOTE — PROGRESS NOTES
Eleanor Holder presents to the [facility] on 5/23/2024. Eleanor is complaining of pain in her cervial and lumbar areas. The pain is constant. The pain is described as aching and throbbing. Pain is rated on her best day at a 7, on her worst day at a 10, and on average at a 8 on the VAS scale. She took her last dose of Norco, Neurontin, and Flexeril today.     Any procedures since your last visit: Yes, with 80 % relief.    Pacemaker or defibrillator: No    She is not on NSAIDS and is not on anticoagulation medications to include none,    Medication Contract and Consent for Opioid Use Documents Filed       Patient Documents       Type of Document Status Date Received Received By Description    Medication Contract Received  EBER HONG Opioid medication contract with Dr Wheatley 3/24/20    Medication Contract Received 4/26/2018  3:50 PM ANYA NUNO PAIN MANAGEMENT PATIENT AGREEMENT 4/26/2018    Medication Contract Received 11/5/2020  4:23 PM EBER HONG Opioid medication contract with Dr Wheatley    Medication Contract Received 3/1/2021  1:26 PM EBER HONG Opioid medication contract with Dr Wheatley    Medication Contract Received 8/23/2021  2:03 PM HAYLIE CADENA Medication contract    Medication Contract Received 10/18/2021  3:03 PM HAYLIE CADENA medication contract 10/18/2021    Medication Contract Signed 10/4/2022 12:35 PM CRISTIANA BAÑUELOS Pain Med. Contract 10/04/22    Medication Contract Received 10/11/2023  3:46 PM KAILYN WILKINSON Medication contract                    Resp 18   Ht 1.626 m (5' 4.02\")   Wt 88.5 kg (195 lb 1.7 oz)   LMP  (LMP Unknown)   BMI 33.47 kg/m²      No LMP recorded (lmp unknown). Patient is postmenopausal.

## 2024-05-23 NOTE — PROGRESS NOTES
[facility]  [address] [place] [address]    Follow up Note      Eleanor Holder     Date of Visit:  2024    CC:  Patient presents for follow up   Chief Complaint   Patient presents with    Follow-up     Cervical and lumbar pain                 HPI:  Pain is better to her neck.  Having some right lower back pain that radiates to her right groin and right leg.    Change in quality of symptoms:no.    Patient satisfaction with analgesia:fair.    Medication side effects: None.  Recent diagnostic testing:none.   Recent interventional procedures:  2024 PROCEDURE: Fluoroscopic guided cervical epidural steroid injection at C7-T1 level. 70-80% relief.         She has been on anticoagulation medications to include NSAIDS. The patient  has not been on herbal supplements.  The patient is diabetic.    Imagin2023 MRI cervical spine    FINDINGS:   BONES/ALIGNMENT: There is normal alignment of the spine. The vertebral body   heights are maintained. The bone marrow signal appears unremarkable.       SPINAL CORD: No abnormal cord signal is seen.       SOFT TISSUES: No paraspinal mass identified.       C2-C3: Grade 1 anterolisthesis.  Severe left facet arthropathy resulting in   severe left neural foraminal narrowing.       C3-C4: Grade 1 anterolisthesis with disc bulging.  Severe left and moderate   right facet arthropathy.  Findings resulting in severe left neural foraminal   narrowing       C4-C5: Grade 1 anterolisthesis with disc bulging.  Moderate bilateral facet   arthropathy.  Mild left neural foraminal narrowing.       C5-C6: Grade 1 anterolisthesis with disc bulging.  Moderate bilateral facet   arthropathy.  No canal or foraminal stenosis.       C6-C7: Disc bulging with 2 mm central disc protrusion.  Mild bilateral facet   arthropathy.  Otherwise unremarkable       C7-T1: Grade 1 anterolisthesis.  Mild bilateral facet arthropathy.  Otherwise   unremarkable

## 2024-06-07 RX ORDER — CYCLOBENZAPRINE HCL 10 MG
TABLET ORAL
Qty: 30 TABLET | Refills: 1 | Status: SHIPPED | OUTPATIENT
Start: 2024-06-07

## 2024-06-28 ENCOUNTER — OFFICE VISIT (OUTPATIENT)
Dept: PAIN MANAGEMENT | Age: 69
End: 2024-06-28
Payer: MEDICARE

## 2024-06-28 ENCOUNTER — PREP FOR PROCEDURE (OUTPATIENT)
Dept: PAIN MANAGEMENT | Age: 69
End: 2024-06-28

## 2024-06-28 ENCOUNTER — OFFICE VISIT (OUTPATIENT)
Dept: FAMILY MEDICINE CLINIC | Age: 69
End: 2024-06-28
Payer: MEDICARE

## 2024-06-28 VITALS
HEART RATE: 100 BPM | HEIGHT: 64 IN | SYSTOLIC BLOOD PRESSURE: 136 MMHG | TEMPERATURE: 98.6 F | WEIGHT: 175 LBS | OXYGEN SATURATION: 97 % | BODY MASS INDEX: 29.88 KG/M2 | DIASTOLIC BLOOD PRESSURE: 86 MMHG

## 2024-06-28 VITALS
RESPIRATION RATE: 16 BRPM | OXYGEN SATURATION: 96 % | HEIGHT: 64 IN | BODY MASS INDEX: 33.31 KG/M2 | DIASTOLIC BLOOD PRESSURE: 84 MMHG | SYSTOLIC BLOOD PRESSURE: 140 MMHG | WEIGHT: 195.11 LBS | TEMPERATURE: 98.1 F | HEART RATE: 89 BPM

## 2024-06-28 DIAGNOSIS — I83.813 VARICOSE VEINS OF BOTH LOWER EXTREMITIES WITH PAIN: ICD-10-CM

## 2024-06-28 DIAGNOSIS — I10 ESSENTIAL HYPERTENSION: ICD-10-CM

## 2024-06-28 DIAGNOSIS — M54.50 CHRONIC BILATERAL LOW BACK PAIN WITHOUT SCIATICA: ICD-10-CM

## 2024-06-28 DIAGNOSIS — M47.816 LUMBAR FACET ARTHROPATHY: ICD-10-CM

## 2024-06-28 DIAGNOSIS — Z00.00 MEDICARE ANNUAL WELLNESS VISIT, SUBSEQUENT: Primary | ICD-10-CM

## 2024-06-28 DIAGNOSIS — G89.29 OTHER CHRONIC PAIN: ICD-10-CM

## 2024-06-28 DIAGNOSIS — M54.12 RADICULOPATHY, CERVICAL: ICD-10-CM

## 2024-06-28 DIAGNOSIS — R63.4 WEIGHT LOSS: ICD-10-CM

## 2024-06-28 DIAGNOSIS — M48.061 SPINAL STENOSIS OF LUMBAR REGION WITHOUT NEUROGENIC CLAUDICATION: ICD-10-CM

## 2024-06-28 DIAGNOSIS — M51.36 DDD (DEGENERATIVE DISC DISEASE), LUMBAR: ICD-10-CM

## 2024-06-28 DIAGNOSIS — G89.4 CHRONIC PAIN SYNDROME: Primary | ICD-10-CM

## 2024-06-28 DIAGNOSIS — M54.12 CERVICAL RADICULOPATHY: ICD-10-CM

## 2024-06-28 DIAGNOSIS — E78.5 HYPERLIPIDEMIA, UNSPECIFIED HYPERLIPIDEMIA TYPE: ICD-10-CM

## 2024-06-28 DIAGNOSIS — M54.16 LUMBAR RADICULOPATHY: ICD-10-CM

## 2024-06-28 DIAGNOSIS — G89.4 CHRONIC PAIN SYNDROME: ICD-10-CM

## 2024-06-28 DIAGNOSIS — E11.9 TYPE 2 DIABETES MELLITUS WITHOUT COMPLICATION, WITHOUT LONG-TERM CURRENT USE OF INSULIN (HCC): ICD-10-CM

## 2024-06-28 DIAGNOSIS — Z87.891 PERSONAL HISTORY OF TOBACCO USE: ICD-10-CM

## 2024-06-28 DIAGNOSIS — F41.8 DEPRESSION WITH ANXIETY: ICD-10-CM

## 2024-06-28 DIAGNOSIS — M47.812 CERVICAL SPONDYLOSIS: ICD-10-CM

## 2024-06-28 DIAGNOSIS — G89.29 CHRONIC BILATERAL LOW BACK PAIN WITHOUT SCIATICA: ICD-10-CM

## 2024-06-28 DIAGNOSIS — M79.10 MYALGIA: ICD-10-CM

## 2024-06-28 LAB
ALBUMIN: 4 G/DL (ref 3.5–5.2)
ALP BLD-CCNC: 107 U/L (ref 35–104)
ALT SERPL-CCNC: 9 U/L (ref 0–32)
ANION GAP SERPL CALCULATED.3IONS-SCNC: 9 MMOL/L (ref 7–16)
AST SERPL-CCNC: 14 U/L (ref 0–31)
BASOPHILS ABSOLUTE: 0.06 K/UL (ref 0–0.2)
BASOPHILS RELATIVE PERCENT: 1 % (ref 0–2)
BILIRUB SERPL-MCNC: 0.3 MG/DL (ref 0–1.2)
BUN BLDV-MCNC: 7 MG/DL (ref 6–23)
CALCIUM SERPL-MCNC: 9.1 MG/DL (ref 8.6–10.2)
CHLORIDE BLD-SCNC: 102 MMOL/L (ref 98–107)
CO2: 26 MMOL/L (ref 22–29)
CREAT SERPL-MCNC: 0.8 MG/DL (ref 0.5–1)
EOSINOPHILS ABSOLUTE: 0.16 K/UL (ref 0.05–0.5)
EOSINOPHILS RELATIVE PERCENT: 2 % (ref 0–6)
GFR, ESTIMATED: 84 ML/MIN/1.73M2
GLUCOSE BLD-MCNC: 223 MG/DL (ref 74–99)
HBA1C MFR BLD: 6.4 % (ref 4–5.6)
HCT VFR BLD CALC: 44.4 % (ref 34–48)
HEMOGLOBIN: 14.6 G/DL (ref 11.5–15.5)
IMMATURE GRANULOCYTES %: 1 % (ref 0–5)
IMMATURE GRANULOCYTES ABSOLUTE: 0.07 K/UL (ref 0–0.58)
LIPASE: 23 U/L (ref 13–60)
LYMPHOCYTES ABSOLUTE: 2.64 K/UL (ref 1.5–4)
LYMPHOCYTES RELATIVE PERCENT: 28 % (ref 20–42)
MCH RBC QN AUTO: 34.5 PG (ref 26–35)
MCHC RBC AUTO-ENTMCNC: 32.9 G/DL (ref 32–34.5)
MCV RBC AUTO: 105 FL (ref 80–99.9)
MONOCYTES ABSOLUTE: 0.77 K/UL (ref 0.1–0.95)
MONOCYTES RELATIVE PERCENT: 8 % (ref 2–12)
NEUTROPHILS ABSOLUTE: 5.62 K/UL (ref 1.8–7.3)
NEUTROPHILS RELATIVE PERCENT: 60 % (ref 43–80)
PDW BLD-RTO: 12.7 % (ref 11.5–15)
PLATELET # BLD: 303 K/UL (ref 130–450)
PMV BLD AUTO: 11.9 FL (ref 7–12)
POTASSIUM SERPL-SCNC: 4.9 MMOL/L (ref 3.5–5)
RBC # BLD: 4.23 M/UL (ref 3.5–5.5)
SODIUM BLD-SCNC: 137 MMOL/L (ref 132–146)
TOTAL PROTEIN: 6.5 G/DL (ref 6.4–8.3)
TSH SERPL DL<=0.05 MIU/L-ACNC: 0.33 UIU/ML (ref 0.27–4.2)
WBC # BLD: 9.3 K/UL (ref 4.5–11.5)

## 2024-06-28 PROCEDURE — 1123F ACP DISCUSS/DSCN MKR DOCD: CPT | Performed by: FAMILY MEDICINE

## 2024-06-28 PROCEDURE — G0296 VISIT TO DETERM LDCT ELIG: HCPCS | Performed by: FAMILY MEDICINE

## 2024-06-28 PROCEDURE — 3077F SYST BP >= 140 MM HG: CPT | Performed by: PHYSICIAN ASSISTANT

## 2024-06-28 PROCEDURE — 3079F DIAST BP 80-89 MM HG: CPT | Performed by: PHYSICIAN ASSISTANT

## 2024-06-28 PROCEDURE — 3079F DIAST BP 80-89 MM HG: CPT | Performed by: FAMILY MEDICINE

## 2024-06-28 PROCEDURE — G0439 PPPS, SUBSEQ VISIT: HCPCS | Performed by: FAMILY MEDICINE

## 2024-06-28 PROCEDURE — 1123F ACP DISCUSS/DSCN MKR DOCD: CPT | Performed by: PHYSICIAN ASSISTANT

## 2024-06-28 PROCEDURE — 99213 OFFICE O/P EST LOW 20 MIN: CPT | Performed by: PHYSICIAN ASSISTANT

## 2024-06-28 PROCEDURE — 3044F HG A1C LEVEL LT 7.0%: CPT | Performed by: FAMILY MEDICINE

## 2024-06-28 PROCEDURE — 99213 OFFICE O/P EST LOW 20 MIN: CPT

## 2024-06-28 PROCEDURE — 3075F SYST BP GE 130 - 139MM HG: CPT | Performed by: FAMILY MEDICINE

## 2024-06-28 RX ORDER — GABAPENTIN 400 MG/1
400 CAPSULE ORAL 2 TIMES DAILY
Qty: 180 CAPSULE | Refills: 0 | Status: SHIPPED | OUTPATIENT
Start: 2024-06-28 | End: 2024-09-26

## 2024-06-28 RX ORDER — LISINOPRIL 40 MG/1
40 TABLET ORAL EVERY EVENING
Qty: 90 TABLET | Refills: 1 | Status: SHIPPED | OUTPATIENT
Start: 2024-06-28

## 2024-06-28 RX ORDER — HYDROCODONE BITARTRATE AND ACETAMINOPHEN 5; 325 MG/1; MG/1
1 TABLET ORAL 2 TIMES DAILY
Qty: 60 TABLET | Refills: 0 | Status: SHIPPED | OUTPATIENT
Start: 2024-06-28 | End: 2024-07-28

## 2024-06-28 RX ORDER — HYDROXYZINE PAMOATE 25 MG/1
25 CAPSULE ORAL 3 TIMES DAILY PRN
Qty: 90 CAPSULE | Refills: 1 | Status: SHIPPED | OUTPATIENT
Start: 2024-06-28

## 2024-06-28 RX ORDER — ATORVASTATIN CALCIUM 40 MG/1
40 TABLET, FILM COATED ORAL DAILY
Qty: 90 TABLET | Refills: 3 | Status: SHIPPED | OUTPATIENT
Start: 2024-06-28

## 2024-06-28 RX ORDER — CYCLOBENZAPRINE HCL 5 MG
5-10 TABLET ORAL NIGHTLY
Qty: 180 TABLET | Refills: 1 | Status: SHIPPED | OUTPATIENT
Start: 2024-06-28

## 2024-06-28 ASSESSMENT — PATIENT HEALTH QUESTIONNAIRE - PHQ9
6. FEELING BAD ABOUT YOURSELF - OR THAT YOU ARE A FAILURE OR HAVE LET YOURSELF OR YOUR FAMILY DOWN: NOT AT ALL
5. POOR APPETITE OR OVEREATING: NOT AT ALL
8. MOVING OR SPEAKING SO SLOWLY THAT OTHER PEOPLE COULD HAVE NOTICED. OR THE OPPOSITE, BEING SO FIGETY OR RESTLESS THAT YOU HAVE BEEN MOVING AROUND A LOT MORE THAN USUAL: NOT AT ALL
SUM OF ALL RESPONSES TO PHQ QUESTIONS 1-9: 1
2. FEELING DOWN, DEPRESSED OR HOPELESS: NOT AT ALL
SUM OF ALL RESPONSES TO PHQ QUESTIONS 1-9: 1
9. THOUGHTS THAT YOU WOULD BE BETTER OFF DEAD, OR OF HURTING YOURSELF: NOT AT ALL
SUM OF ALL RESPONSES TO PHQ9 QUESTIONS 1 & 2: 0
4. FEELING TIRED OR HAVING LITTLE ENERGY: NOT AT ALL
SUM OF ALL RESPONSES TO PHQ QUESTIONS 1-9: 1
3. TROUBLE FALLING OR STAYING ASLEEP: NOT AT ALL
7. TROUBLE CONCENTRATING ON THINGS, SUCH AS READING THE NEWSPAPER OR WATCHING TELEVISION: SEVERAL DAYS
SUM OF ALL RESPONSES TO PHQ QUESTIONS 1-9: 1
1. LITTLE INTEREST OR PLEASURE IN DOING THINGS: NOT AT ALL
10. IF YOU CHECKED OFF ANY PROBLEMS, HOW DIFFICULT HAVE THESE PROBLEMS MADE IT FOR YOU TO DO YOUR WORK, TAKE CARE OF THINGS AT HOME, OR GET ALONG WITH OTHER PEOPLE: NOT DIFFICULT AT ALL

## 2024-06-28 ASSESSMENT — LIFESTYLE VARIABLES
HOW OFTEN DO YOU HAVE A DRINK CONTAINING ALCOHOL: MONTHLY OR LESS
HOW MANY STANDARD DRINKS CONTAINING ALCOHOL DO YOU HAVE ON A TYPICAL DAY: 1 OR 2

## 2024-06-28 NOTE — PROGRESS NOTES
Medicare Annual Wellness Visit    Eleanor Holder is here for Medicare AWV (/), Weight Loss (Unintentional weight loss -20 lbs loss from Dec to June/Pt reports eating less due to less appetite.), and Orders (Received letter that she is due for annual CT lung screen after 07/19/24, order pended.)    Assessment & Plan   Medicare annual wellness visit, subsequent  Overall Eleanor Holder is doing well.   Age appropriate HM topics reviewed and updated where agreeable.   Vaccines reviewed and updated where agreeable.   Age appropriate anticipatory guidance discussed.   Encouraged to work on healthy diet and exercise strategies.   Opportunity to ask questions and answers provided as able.     She's generally UTD on her HM topics.    She's due for lung cancer screening coming up next month. She's not displaying any localizing symptoms.  Her 20 pound weight loss is concerning, but she does note eating less over the past 6 months, so this could just be calorie restriction.     Recommend RTO in 1 year for annual physical.       Hyperlipidemia, unspecified hyperlipidemia type  -     atorvastatin (LIPITOR) 40 MG tablet; Take 1 tablet by mouth daily, Disp-90 tablet, R-3Normal    Depression with anxiety  -     hydrOXYzine pamoate (VISTARIL) 25 MG capsule; Take 1 capsule by mouth 3 times daily as needed for Anxiety Watch for sedation, Disp-90 capsule, R-1Normal    Essential hypertension  -     lisinopril (PRINIVIL;ZESTRIL) 40 MG tablet; Take 1 tablet by mouth every evening, Disp-90 tablet, R-1Normal    Personal history of tobacco use  -     [place] VISIT TO DISCUSS LUNG CA SCREEN W LDCT  -     CT Lung Screen (Initial/Annual/Baseline); Future    Varicose veins of both lower extremities with pain  -     Shannan Duff MD, Vascular Surgery, [place]  Symptomatic varicose veins in LEs around the knee and thighs.  She is interested in learning about options besides those conservatives things like compression stockings and elevation.

## 2024-06-28 NOTE — PATIENT INSTRUCTIONS
Instructions  Overview     It can be a challenge to lose weight. But your doctor can help you make a weight-loss plan that meets your needs.  You don't have to make a lot of big changes at once. A better idea might be to focus on small changes and stick with them. When those changes become habit, you can add a few more changes.  Some people find it helpful to take an exercise or nutrition class. If you have questions, ask your doctor about seeing a registered dietitian or an exercise specialist. You might also think about joining a weight-loss support group.  If you're not ready to make changes right now, try to pick a date in the future. Then make an appointment with your doctor to talk about when and how you'll get started with a plan.  Follow-up care is a key part of your treatment and safety. Be sure to make and go to all appointments, and call your doctor if you are having problems. It's also a good idea to know your test results and keep a list of the medicines you take.  How can you care for yourself at home?  Set realistic goals. Many people expect to lose much more weight than is likely. A weight loss of 5% to 10% of your body weight may be enough to improve your health.  Get family and friends involved to provide support. Talk to them about why you are trying to lose weight, and ask them to help. They can help by participating in exercise and having meals with you, even if they may be eating something different.  Find what works best for you. If you do not have time or do not like to cook, a program that offers meal replacement bars or shakes may be better for you. Or if you like to prepare meals, finding a plan that includes daily menus and recipes may be best.  Ask your doctor about other health professionals who can help you achieve your weight loss goals.  A dietitian can help you make healthy changes in your diet.  An exercise specialist or  can help you develop a safe and effective

## 2024-06-28 NOTE — PROGRESS NOTES
Eleanor Holder presents to the [facility] on 6/28/2024. Eleanor is complaining of pain low back radiating to bilateral hips and groins, right greater than left The pain is constant. The pain is described as aching, shooting, and sharp. Pain is rated on her best day at a 7, on her worst day at a 10, and on average at a 9 on the VAS scale. She took her last dose of Norco, Neurontin, and Flexeril yesterday.     Any procedures since your last visit: No.    Pacemaker or defibrillator: No.    She is not on NSAIDS and is not on anticoagulation medications to include none.     Medication Contract and Consent for Opioid Use Documents Filed       Patient Documents       Type of Document Status Date Received Received By Description    Medication Contract Received  EBER HONG Opioid medication contract with Dr Wheatley 3/24/20    Medication Contract Received 4/26/2018  3:50 PM ANYA NUNO PAIN MANAGEMENT PATIENT AGREEMENT 4/26/2018    Medication Contract Received 11/5/2020  4:23 PM EBER HOGN Opioid medication contract with Dr Wheatley    Medication Contract Received 3/1/2021  1:26 PM EBER HONG Opioid medication contract with Dr Wheatley    Medication Contract Received 8/23/2021  2:03 PM HAYLIE CADENA Medication contract    Medication Contract Received 10/18/2021  3:03 PM HAYLIE CADENA medication contract 10/18/2021    Medication Contract Signed 10/4/2022 12:35 PM CRISTIANA BAÑUELOS Pain Med. Contract 10/04/22    Medication Contract Received 10/11/2023  3:46 PM KAILYN WILKINSON Medication contract                    BP (!) 140/84   Pulse 89   Temp 98.1 °F (36.7 °C) (Infrared)   Resp 16   Ht 1.626 m (5' 4\")   Wt 88.5 kg (195 lb 1.7 oz)   LMP  (LMP Unknown)   SpO2 96%   BMI 33.49 kg/m²      No LMP recorded (lmp unknown). Patient is postmenopausal.    
script through [facility] - last one 2021 - consistent to 2024 consistent     Opioid agreement:  10/18/2021  Updated 10/11/2023        Past Medical History:   Diagnosis Date    Cancer (HCC) 2019    tongue    Cervical radiculopathy     Chronic back pain     Costochondritis     h/o    Depression     Diabetes mellitus (HCC)     Fibromyalgia     HTN (hypertension)     Hyperlipidemia     CLYDE on CPAP     Osteoarthritis     \"everywhere\"    Rheumatoid arthritis(714.0)     TIA (transient ischemic attack)     no deficits        Past Surgical History:   Procedure Laterality Date    ANESTHESIA NERVE BLOCK Left 2019    LEFT C3,4,5 MBB performed by Baljit Dorsey MD at [facility] OR    ANESTHESIA NERVE BLOCK Right 2019    BILATERAL TRANSFORAMINAL EPIDURAL STEROID INJECTION S1 #3 performed by Baljit Dorsey MD at [facility] OR    ANESTHESIA NERVE BLOCK Right 2020    RIGHT SACROILIAC JOINT INJECTION      CPT: 63689 performed by Inez Montelongo DO at [facility] OR    ANKLE SURGERY      Left    ANKLE SURGERY Left 2022    REPAIR OF ANTERIOR TALAR LIGAMENT LEFT ANKLE, REPAIR DELTOID LIGAMENT LEFT ANKLE performed by Yariel Haro DPM at [facility] OR    ARTHRODESIS Left 2018    ARTHRODESIS 2ND DIGIT LEFT FOOT performed by Yariel Haro DPM at [facility] OR    BACK SURGERY  2017    PLIF L3-L4, L4-L5 with rods, screws, and cages/Dr. Adams    BACK SURGERY  2020    BACK SURGERY Right 2021    RIGHT PERCUTANEOUS SACROILIAC JOINT FUSION performed by Samir Wheatley MD at [facility] [place]    BREAST BIOPSY      BREAST REDUCTION SURGERY      BREAST SURGERY      reduction, bilat     SECTION  1993    CHOLECYSTECTOMY      COLONOSCOPY  2015    COLONOSCOPY N/A 2020    COLONOSCOPY DIAGNOSTIC performed by Zaira Handley MD at [facility] ENDOSCOPY    ENDOSCOPY, COLON, DIAGNOSTIC      EPIDURAL STEROID INJECTION N/A 2019    BILATERAL TRANSFORAMINAL EPIDURAL STEROID INJECTION S1 performed

## 2024-06-28 NOTE — PROGRESS NOTES
Discussed with the patient the current USPSTF guidelines released March 9, 2021 for screening for lung cancer.    For adults aged 50 to 80 years who have a 20 pack-year smoking history and currently smoke or have quit within the past 15 years the grade B recommendation is to:  Screen for lung cancer with low-dose computed tomography (LDCT) every year.  Stop screening once a person has not smoked for 15 years or has a health problem that limits life expectancy or the ability to have lung surgery.    The patient  reports that she has been smoking cigarettes. She has a 20.0 pack-year smoking history. She has never used smokeless tobacco.. Discussed with patient the risks and benefits of screening, including over-diagnosis, false positive rate, and total radiation exposure.  The patient currently exhibits no signs or symptoms suggestive of lung cancer.  Discussed with patient the importance of compliance with yearly annual lung cancer screenings and willingness to undergo diagnosis and treatment if screening scan is positive.  In addition, the patient was counseled regarding the importance of remaining smoke free and/or total smoking cessation.    Also reviewed the following if the patient has Medicare that as of February 10, 2022, Medicare only covers LDCT screening in patients aged 50-77 with at least a 20 pack-year smoking history who currently smoke or have quit in the last 15 years

## 2024-07-09 ENCOUNTER — OFFICE VISIT (OUTPATIENT)
Dept: PODIATRY | Age: 69
End: 2024-07-09
Payer: MEDICARE

## 2024-07-09 VITALS
DIASTOLIC BLOOD PRESSURE: 82 MMHG | TEMPERATURE: 97.8 F | SYSTOLIC BLOOD PRESSURE: 118 MMHG | BODY MASS INDEX: 30.38 KG/M2 | WEIGHT: 177 LBS

## 2024-07-09 DIAGNOSIS — L60.0 INGROWN NAIL: ICD-10-CM

## 2024-07-09 DIAGNOSIS — M19.072 ARTHRITIS OF MIDTARSAL JOINT OF LEFT FOOT: Primary | ICD-10-CM

## 2024-07-09 PROCEDURE — 3079F DIAST BP 80-89 MM HG: CPT | Performed by: PODIATRIST

## 2024-07-09 PROCEDURE — 99213 OFFICE O/P EST LOW 20 MIN: CPT | Performed by: PODIATRIST

## 2024-07-09 PROCEDURE — 3074F SYST BP LT 130 MM HG: CPT | Performed by: PODIATRIST

## 2024-07-09 PROCEDURE — 1123F ACP DISCUSS/DSCN MKR DOCD: CPT | Performed by: PODIATRIST

## 2024-07-09 PROCEDURE — 20600 DRAIN/INJ JOINT/BURSA W/O US: CPT | Performed by: PODIATRIST

## 2024-07-09 RX ORDER — BUPIVACAINE HYDROCHLORIDE 5 MG/ML
1 INJECTION, SOLUTION PERINEURAL ONCE
Status: COMPLETED | OUTPATIENT
Start: 2024-07-09 | End: 2024-07-09

## 2024-07-09 RX ORDER — METHYLPREDNISOLONE ACETATE 40 MG/ML
40 INJECTION, SUSPENSION INTRA-ARTICULAR; INTRALESIONAL; INTRAMUSCULAR; SOFT TISSUE ONCE
Status: COMPLETED | OUTPATIENT
Start: 2024-07-09 | End: 2024-07-09

## 2024-07-09 RX ADMIN — METHYLPREDNISOLONE ACETATE 1 MG: 40 INJECTION, SUSPENSION INTRA-ARTICULAR; INTRALESIONAL; INTRAMUSCULAR; SOFT TISSUE at 12:28

## 2024-07-09 RX ADMIN — BUPIVACAINE HYDROCHLORIDE 1 MG: 5 INJECTION, SOLUTION PERINEURAL at 12:28

## 2024-07-09 NOTE — PROGRESS NOTES
24  Eleanor Holder : 1955 Sex: female  Age: 69 y.o.    Patient was referred by Manolo Mckeon MD    Chief Complaint   Patient presents with    Toe Pain     Manolo Mckeon MD  24    Foot Pain     B/l contusion to ankle     Post-Op Check    Diabetes    Ingrown Toenail       SUBJECTIVE patient is seen today for follow-up of left foot still bothering her pain when ambulating pain with certain shoe gears.  There was trauma to the area about 3 months ago  Toe Pain     Foot Pain     Diabetes      Review of Systems  Const: Denies constitutional symptoms  Musculo: Denies symptoms other than stated above  Skin: Denies symptoms other than stated above       Current Outpatient Medications:     HYDROcodone-acetaminophen (NORCO) 5-325 MG per tablet, Take 1 tablet by mouth 2 times daily for 30 days., Disp: 60 tablet, Rfl: 0    gabapentin (NEURONTIN) 400 MG capsule, Take 1 capsule by mouth 2 times daily for 90 days., Disp: 180 capsule, Rfl: 0    atorvastatin (LIPITOR) 40 MG tablet, Take 1 tablet by mouth daily, Disp: 90 tablet, Rfl: 3    hydrOXYzine pamoate (VISTARIL) 25 MG capsule, Take 1 capsule by mouth 3 times daily as needed for Anxiety Watch for sedation, Disp: 90 capsule, Rfl: 1    lisinopril (PRINIVIL;ZESTRIL) 40 MG tablet, Take 1 tablet by mouth every evening, Disp: 90 tablet, Rfl: 1    cyclobenzaprine (FLEXERIL) 5 MG tablet, Take 1-2 tablets by mouth at bedtime, Disp: 180 tablet, Rfl: 1    hydroCHLOROthiazide 12.5 MG capsule, TAKE ONE CAPSULE BY MOUTH ONCE DAILY FOR FOR BLOOD PRESSURE, Disp: 90 capsule, Rfl: 1    metFORMIN (GLUCOPHAGE-XR) 500 MG extended release tablet, TAKE ONE TABLET BY MOUTH EVERY MORNING with BREAKFAST, Disp: 90 tablet, Rfl: 1    Handicap Placard MISC, DX:  Loss of Balance, Chronic low back pain, s/p back surgery.  Duration of 5 years, Disp: 1 each, Rfl: 0  Allergies   Allergen Reactions    Pcn [Penicillins] Anaphylaxis       Past Medical History:   Diagnosis Date

## 2024-07-10 NOTE — PROGRESS NOTES
[facility] PAIN MANAGEMENT  INSTRUCTIONS  ...........................................................................................................................................     [x] Parking the day of Surgery is located in the Main Entrance lot.  Upon entering the door, make immediate right into the surgery reception room    [x]  Bring photo ID and insurance card     [x] You may have a light breakfast day of procedure    [x]  Wear loose comfortable clothing    [x]  Please follow instructions for medications as given per Dr's office    [x] You can expect a call the business day prior to procedure to notify you of your arrival time     [x] Please arrange for     []  Other instructions

## 2024-07-11 ENCOUNTER — HOSPITAL ENCOUNTER (OUTPATIENT)
Age: 69
Setting detail: OUTPATIENT SURGERY
Discharge: HOME | End: 2024-07-11
Attending: PAIN MEDICINE | Admitting: PAIN MEDICINE
Payer: MEDICARE

## 2024-07-11 ENCOUNTER — HOSPITAL ENCOUNTER (OUTPATIENT)
Dept: GENERAL RADIOLOGY | Age: 69
Setting detail: OUTPATIENT SURGERY
Discharge: HOME | End: 2024-07-13
Attending: PAIN MEDICINE
Payer: MEDICARE

## 2024-07-11 VITALS
RESPIRATION RATE: 18 BRPM | TEMPERATURE: 97.2 F | BODY MASS INDEX: 29.88 KG/M2 | WEIGHT: 175 LBS | DIASTOLIC BLOOD PRESSURE: 92 MMHG | HEART RATE: 72 BPM | HEIGHT: 64 IN | OXYGEN SATURATION: 98 % | SYSTOLIC BLOOD PRESSURE: 181 MMHG

## 2024-07-11 DIAGNOSIS — R52 PAIN MANAGEMENT: ICD-10-CM

## 2024-07-11 LAB — GLUCOSE BLD-MCNC: 114 MG/DL (ref 74–99)

## 2024-07-11 PROCEDURE — 7100000010 HC PHASE II RECOVERY - FIRST 15 MIN: Performed by: PAIN MEDICINE

## 2024-07-11 PROCEDURE — 2500000003 HC RX 250 WO HCPCS: Performed by: PAIN MEDICINE

## 2024-07-11 PROCEDURE — 6360000002 HC RX W HCPCS: Performed by: PAIN MEDICINE

## 2024-07-11 PROCEDURE — 82962 GLUCOSE BLOOD TEST: CPT

## 2024-07-11 PROCEDURE — 3600000002 HC SURGERY LEVEL 2 BASE: Performed by: PAIN MEDICINE

## 2024-07-11 PROCEDURE — 6360000004 HC RX CONTRAST MEDICATION: Performed by: PAIN MEDICINE

## 2024-07-11 PROCEDURE — 2709999900 HC NON-CHARGEABLE SUPPLY: Performed by: PAIN MEDICINE

## 2024-07-11 PROCEDURE — 64483 NJX AA&/STRD TFRM EPI L/S 1: CPT | Performed by: PAIN MEDICINE

## 2024-07-11 PROCEDURE — 7100000011 HC PHASE II RECOVERY - ADDTL 15 MIN: Performed by: PAIN MEDICINE

## 2024-07-11 RX ORDER — METHYLPREDNISOLONE ACETATE 40 MG/ML
INJECTION, SUSPENSION INTRA-ARTICULAR; INTRALESIONAL; INTRAMUSCULAR; SOFT TISSUE PRN
Status: DISCONTINUED | OUTPATIENT
Start: 2024-07-11 | End: 2024-07-11 | Stop reason: ALTCHOICE

## 2024-07-11 RX ORDER — LIDOCAINE HYDROCHLORIDE 5 MG/ML
INJECTION, SOLUTION INFILTRATION; INTRAVENOUS PRN
Status: DISCONTINUED | OUTPATIENT
Start: 2024-07-11 | End: 2024-07-11 | Stop reason: ALTCHOICE

## 2024-07-11 RX ORDER — IOPAMIDOL 612 MG/ML
INJECTION, SOLUTION INTRATHECAL PRN
Status: DISCONTINUED | OUTPATIENT
Start: 2024-07-11 | End: 2024-07-11 | Stop reason: ALTCHOICE

## 2024-07-11 ASSESSMENT — PAIN DESCRIPTION - DESCRIPTORS
DESCRIPTORS: ACHING
DESCRIPTORS: ACHING

## 2024-07-11 ASSESSMENT — PAIN - FUNCTIONAL ASSESSMENT
PAIN_FUNCTIONAL_ASSESSMENT: 0-10

## 2024-07-11 NOTE — DISCHARGE INSTRUCTIONS
[facility] Pain Management Department  [place]-[phone]  Dr. Juarez Montelongo   Post-Pain Block/Radiofrequency  Home Going Instructions    1-Go home, rest for the remainder of the day  2-Please do not lift over 20 pounds the day of the injection  3-If you received sedation No: alcohol, driving, operating lawn mowers, plows, tractors or other dangerous equipment until next morning. Do not make important decisions or sign legal documents for 24 hours. You may experience light headedness, dizziness, nausea or sleepiness after sedation. Do not stay alone. A responsible adult must be with you for 24 hours. You could be nauseated from the medications you have received. Your IV site may be sore and bruised.    4-No dietary restrictions     5-Resume all medications the same day, blood thinners to be resumed 24 hours after injection if you were instructed to stop any.    6-Keep the surgical site clean and dry, you may shower the next morning and remove the      dressing.     7- No sitz baths, tub baths or hot tubs/swimming for 24 hours.       8- If you have any pain at the injection site(s), application of an ice pack to the area should be       helpful, 20 minutes on/20 minutes off for next 48 hours.  9- Call [facility] immediately at if you develop.  Fever greater than 100.4 F  Have bleeding or drainage from the puncture site  Have progressive Leg/arm numbness and or weakness  Loss of control of bowel and or bladder (wet/soil yourself)  Severe headache with inability to lift head  10-You may return to work the next day

## 2024-07-11 NOTE — H&P
[facility]        [address] [place] [id number]  Dept: [phone]    Procedure History & Physical      Eleanor Holder     HPI:    Patient  is here for LBP BLE pain for b/l L5 TFESI  Labs/imaging studies reviewed   All question and concerns addressed including R/B/A associated with the procedure    Past Medical History:   Diagnosis Date    Cancer (HCC) 2019    tongue    Cervical radiculopathy     Chronic back pain     Costochondritis     h/o    Depression     Diabetes mellitus (HCC)     Fibromyalgia     HTN (hypertension)     Hyperlipidemia     CLYDE on CPAP     Osteoarthritis     \"everywhere\"    Rheumatoid arthritis(714.0)     TIA (transient ischemic attack)     no deficits        Past Surgical History:   Procedure Laterality Date    ANESTHESIA NERVE BLOCK Left 2019    LEFT C3,4,5 MBB performed by Baljit Dorsey MD at [facility] OR    ANESTHESIA NERVE BLOCK Right 2019    BILATERAL TRANSFORAMINAL EPIDURAL STEROID INJECTION S1 #3 performed by Baljit Dorsey MD at [facility] OR    ANESTHESIA NERVE BLOCK Right 2020    RIGHT SACROILIAC JOINT INJECTION      CPT: 20528 performed by Inez Montelongo DO at [facility] OR    ANKLE SURGERY      Left    ANKLE SURGERY Left 2022    REPAIR OF ANTERIOR TALAR LIGAMENT LEFT ANKLE, REPAIR DELTOID LIGAMENT LEFT ANKLE performed by Yariel Haro DPM at [facility] OR    ARTHRODESIS Left 2018    ARTHRODESIS 2ND DIGIT LEFT FOOT performed by Yariel Haro DPM at [facility] OR    BACK SURGERY  2017    PLIF L3-L4, L4-L5 with rods, screws, and cages/Dr. Adams    BACK SURGERY  2020    BACK SURGERY Right 2021    RIGHT PERCUTANEOUS SACROILIAC JOINT FUSION performed by Samir Wheatley MD at [facility] [place]    BREAST BIOPSY      BREAST REDUCTION SURGERY      BREAST SURGERY      reduction, bilat     SECTION  1993    CHOLECYSTECTOMY      COLONOSCOPY  2015    COLONOSCOPY N/A 2020    COLONOSCOPY DIAGNOSTIC performed by Zaira SHARMA  Declined (6/13/2023)    Overall Financial Resource Strain (CARDIA)     Difficulty of Paying Living Expenses: Patient declined   Food Insecurity: Not on file (6/13/2023)   Transportation Needs: Unknown (6/13/2023)    PRAPARE - Transportation     Lack of Transportation (Medical): Not on file     Lack of Transportation (Non-Medical): Patient declined   Physical Activity: Inactive (6/28/2024)    Exercise Vital Sign     Days of Exercise per Week: 0 days     Minutes of Exercise per Session: 0 min   Stress: Not on file   Social Connections: Not on file   Intimate Partner Violence: Not on file   Housing Stability: Unknown (6/13/2023)    Housing Stability Vital Sign     Unable to Pay for Housing in the Last Year: Not on file     Number of Places Lived in the Last Year: Not on file     Unstable Housing in the Last Year: Patient refused       Family History   Problem Relation Age of Onset    Dementia Mother     Breast Cancer Mother     Cancer Mother         breast cancer 80     Stroke Mother     Heart Attack Father     Other Father 86        Aortic aneurysm    Cancer Brother     Diabetes Brother          REVIEW OF SYSTEMS:    CONSTITUTIONAL:  negative for  fevers, chills, sweats and fatigue    RESPIRATORY:  negative for  dry cough, cough with sputum, dyspnea, wheezing and chest pain    CARDIOVASCULAR:  negative for chest pain, dyspnea, palpitations, syncope    GASTROINTESTINAL:  negative for nausea, vomiting, change in bowel habits, diarrhea, constipation and abdominal pain    MUSCULOSKELETAL: negative for muscle weakness    SKIN: negative for itching or rashes.    BEHAVIOR/PSYCH:  negative for poor appetite, increased appetite, decreased sleep and poor concentration    All other systems negative      PHYSICAL EXAM:    VITALS:  BP (!) 175/93   Pulse 76   Temp 97.1 °F (36.2 °C) (Temporal)   Resp 18   Ht 1.626 m (5' 4\")   Wt 79.4 kg (175 lb)   LMP  (LMP Unknown)   SpO2 97%   BMI 30.04 kg/m²     CONSTITUTIONAL:  awake,

## 2024-07-11 NOTE — OP NOTE
2024    Patient: Eleanor Holder  :  1955  Age:  69 y.o.  Sex:  female     PRE-OPERATIVE DIAGNOSIS: Lumbar disc displacement, lumbar neural foraminal stenosis, lumbar radiculopathy.     POST-OPERATIVE DIAGNOSIS: Same.    PROCEDURE: Bilateral Transforaminal epidural steroid injection under fluoroscopic guidance at foraminal level L5.    SURGEON: ZARA Montelongo D.O.    ANESTHESIA: local    ESTIMATED BLOOD LOSS: None.  ______________________________________________________________________  BRIEF HISTORY: Eleanor Holder comes in today for the Bilateral transforaminal epidural steroid injection under fluoroscopic guidance at foraminal level L5. The potential complications of this procedure were discussed with her again today.  She has elected to undergo the aforementioned procedure.     Eleanor’s complete History & Physical examination were reviewed in depth, a copy of which is in the chart.      DESCRIPTION OF PROCEDURE:    After confirming written and informed consent, a time-out was performed and Eleanor’s name and date of birth, the procedure to be performed as well as the plan for the location of the needle insertion were confirmed.    The patient was brought into the procedure room and placed in the prone position on the fluoroscopy table. Standard monitors were placed and vital signs were observed throughout the procedure. The area of the lumbar spine was prepped with chloraprep and draped in a sterile manner. The vertebral body was identified with AP fluoroscopy. An oblique view was obtained to better visualize the inferior junction of the pedicle and transverse process . The 6 o'clock position of the pedicle was marked and identified. The skin and subcutaneous tissue were anesthetized with 0.5% lidocaine. A # 22 gauge pencil point needle was directed toward the targeted point under fluoroscopy until bone was contacted. The needle was then walked inferiorly until the neural foramen was entered . A lateral  fluoroscopic view was then used to place the needle tip in the middle to anterior aspect of the foramen. Negative aspiration was confirmed for blood and CSF and 1 cc of Isovue-M 300 contrast was injected at each level under live AP fluoroscopy. Appropriate neurograms were observed under live AP fluoroscopy. Then after negative aspiration, a solution of the 2 cc of 0.5% lidocaine and 40 mg DepoMedrol was easily injected between each level. The needles were removed with constant aspiration technique. The patient's back was cleaned and a bandage was placed over the needle insertion points    Disposition the patient tolerated the procedure well and there were no complications . Vital signs remained stable throughout the procedure. The patient was escorted to the recovery area where they remained until discharge and written discharge instructions for the procedure were given.    Plan: Eleanor will return to our pain management center as scheduled.     Inez Montelongo DO

## 2024-07-11 NOTE — PROGRESS NOTES
PT RECEIVED IN PACU 2 ASSESSMENT COMPLETED INJECTION SITE BANDAIDS REMAIN DRY AND INTACT  1200 DISCHARGE INSTRUCTIONS GIVEN , PT VERBALIZED UNDERSTANDNG , TOLERATING PO , PT B/P REMAIN WITH IN RANGE POST OP OF BASELINE B/P PT STATES NEEDS TO TAKE ONE BP MED HAS NOT TAKEN YET PT INSTRUCTED TO DO SO

## 2024-07-21 ENCOUNTER — HOSPITAL ENCOUNTER (OUTPATIENT)
Dept: CT IMAGING | Age: 69
Discharge: HOME | End: 2024-07-23
Payer: MEDICARE

## 2024-07-21 DIAGNOSIS — Z87.891 PERSONAL HISTORY OF TOBACCO USE: ICD-10-CM

## 2024-07-21 PROCEDURE — 71271 CT THORAX LUNG CANCER SCR C-: CPT

## 2024-07-22 DIAGNOSIS — M84.375S STRESS FRACTURE, LEFT FOOT, SEQUELA: ICD-10-CM

## 2024-07-22 DIAGNOSIS — M19.072 ARTHRITIS OF MIDTARSAL JOINT OF LEFT FOOT: ICD-10-CM

## 2024-07-22 DIAGNOSIS — M76.822 POSTERIOR TIBIAL TENDINITIS OF LEFT LOWER EXTREMITY: Primary | ICD-10-CM

## 2024-07-31 ENCOUNTER — OFFICE VISIT (OUTPATIENT)
Dept: PAIN MANAGEMENT | Age: 69
End: 2024-07-31
Payer: MEDICARE

## 2024-07-31 VITALS
DIASTOLIC BLOOD PRESSURE: 80 MMHG | TEMPERATURE: 97.1 F | SYSTOLIC BLOOD PRESSURE: 130 MMHG | RESPIRATION RATE: 16 BRPM | WEIGHT: 175 LBS | OXYGEN SATURATION: 95 % | HEART RATE: 86 BPM | HEIGHT: 64 IN | BODY MASS INDEX: 29.88 KG/M2

## 2024-07-31 DIAGNOSIS — M54.12 CERVICAL RADICULOPATHY: ICD-10-CM

## 2024-07-31 DIAGNOSIS — M48.061 SPINAL STENOSIS OF LUMBAR REGION WITHOUT NEUROGENIC CLAUDICATION: ICD-10-CM

## 2024-07-31 DIAGNOSIS — G89.4 CHRONIC PAIN SYNDROME: Primary | ICD-10-CM

## 2024-07-31 DIAGNOSIS — G89.29 OTHER CHRONIC PAIN: ICD-10-CM

## 2024-07-31 DIAGNOSIS — M51.36 DDD (DEGENERATIVE DISC DISEASE), LUMBAR: ICD-10-CM

## 2024-07-31 DIAGNOSIS — M47.816 LUMBAR FACET ARTHROPATHY: ICD-10-CM

## 2024-07-31 DIAGNOSIS — G89.29 CHRONIC BILATERAL LOW BACK PAIN WITHOUT SCIATICA: ICD-10-CM

## 2024-07-31 DIAGNOSIS — M79.10 MYALGIA: ICD-10-CM

## 2024-07-31 DIAGNOSIS — M47.812 CERVICAL SPONDYLOSIS: ICD-10-CM

## 2024-07-31 DIAGNOSIS — M54.16 LUMBAR RADICULOPATHY: ICD-10-CM

## 2024-07-31 DIAGNOSIS — M54.12 RADICULOPATHY, CERVICAL: ICD-10-CM

## 2024-07-31 DIAGNOSIS — M54.50 CHRONIC BILATERAL LOW BACK PAIN WITHOUT SCIATICA: ICD-10-CM

## 2024-07-31 DIAGNOSIS — G89.4 CHRONIC PAIN SYNDROME: ICD-10-CM

## 2024-07-31 PROCEDURE — 3075F SYST BP GE 130 - 139MM HG: CPT | Performed by: PHYSICIAN ASSISTANT

## 2024-07-31 PROCEDURE — 3079F DIAST BP 80-89 MM HG: CPT | Performed by: PHYSICIAN ASSISTANT

## 2024-07-31 PROCEDURE — 99213 OFFICE O/P EST LOW 20 MIN: CPT

## 2024-07-31 PROCEDURE — 99213 OFFICE O/P EST LOW 20 MIN: CPT | Performed by: PHYSICIAN ASSISTANT

## 2024-07-31 PROCEDURE — 1123F ACP DISCUSS/DSCN MKR DOCD: CPT | Performed by: PHYSICIAN ASSISTANT

## 2024-07-31 RX ORDER — HYDROCODONE BITARTRATE AND ACETAMINOPHEN 5; 325 MG/1; MG/1
1 TABLET ORAL 2 TIMES DAILY
Qty: 60 TABLET | Refills: 0 | Status: SHIPPED | OUTPATIENT
Start: 2024-07-31 | End: 2024-08-30

## 2024-07-31 NOTE — PROGRESS NOTES
Eleanor Holder presents to the [facility] on 7/31/2024. Eleanor is complaining of pain in her low back. The pain is constant. The pain is described as aching, dull, and sharp. Pain is rated on her best day at a 7, on her worst day at a 10, and on average at a 8 on the VAS scale. She took her last dose of Neurontin today.     Any procedures since your last visit: No    Pacemaker or defibrillator: No     She is not on NSAIDS and is not on anticoagulation medications.      Medication Contract and Consent for Opioid Use Documents Filed       Patient Documents       Type of Document Status Date Received Received By Description    Medication Contract Received  EBER HONG Opioid medication contract with Dr Wheatley 3/24/20    Medication Contract Received 4/26/2018  3:50 PM ANYA NUNO PAIN MANAGEMENT PATIENT AGREEMENT 4/26/2018    Medication Contract Received 11/5/2020  4:23 PM EBER HONG Opioid medication contract with Dr Wheatley    Medication Contract Received 3/1/2021  1:26 PM EBER HONG Opioid medication contract with Dr Wheatley    Medication Contract Received 8/23/2021  2:03 PM HAYLIE CADENA Medication contract    Medication Contract Received 10/18/2021  3:03 PM HAYLIE CADENA medication contract 10/18/2021    Medication Contract Signed 10/4/2022 12:35 PM CRISTIANA BAÑUELOS Pain Med. Contract 10/04/22    Medication Contract Received 10/11/2023  3:46 PM KAILYN WILKINSON Medication contract                    /80   Pulse 86   Temp 97.1 °F (36.2 °C) (Infrared)   Resp 16   Ht 1.626 m (5' 4\")   Wt 79.4 kg (175 lb)   LMP  (LMP Unknown)   SpO2 95%   BMI 30.04 kg/m²      No LMP recorded (lmp unknown). Patient is postmenopausal.  
  unremarkable           Impression   At C2-C3, severe left neural foraminal narrowing.       At C3-C4, severe left neural foraminal narrowing.       No canal stenosis.  No significant posterior disc pathology.         12/07/2022 Cervical spine x-ray    FINDINGS:   Multilevel severe degenerative facet arthropathy.  Mild degenerative disc   disease primarily at C5-6.  Normal soft tissues.  No fracture or dislocation.           Impression   Degenerative spondylosis with severe multilevel degenerative facet   arthropathy.           05/05/2022 Thoracic Spine X-ray    FINDINGS:   Thoracic vertebral bodies are normal in height and alignment.  Multilevel   degenerative changes.  No evidence of fracture. Pedicles are symmetric and   intact.       Visualized lungs are clear.           Impression   No acute abnormality of the thoracic spine       Multilevel degenerative changes.         07/08/2021 CT lumbar myelogram    FINDINGS:   BONES/ALIGNMENT: There is minimal levocurvature of the lumbar spine.  There   is posterior fixation from L2-L4 without evidence for hardware complication.   The vertebral body heights are maintained.  There is minimal retrolisthesis   of L2 on L3.       SOFT TISSUES: The posterior paraspinal soft tissues are unremarkable.  The   visualized abdominal structures are unremarkable.  The conus is normal in   caliber and terminates at L1.  The cauda equina is unremarkable.       L1-L2: There is no significant disc protrusion, central spinal canal stenosis   or neural foraminal narrowing.       L2-L3: There is artificial disc material with posterior laminectomy.  There   is no canal stenosis or left foraminal narrowing.  There is moderate right   foraminal narrowing.       L3-L4: There is artificial disc material and posterior laminectomy.  There is   no canal stenosis.  There is mild bilateral foraminal narrowing.       L4-L5: There is artificial disc material with posterior laminectomy.  There   is no

## 2024-08-05 RX ORDER — CYCLOBENZAPRINE HCL 10 MG
TABLET ORAL
Qty: 30 TABLET | Refills: 1 | Status: SHIPPED | OUTPATIENT
Start: 2024-08-05

## 2024-08-07 ENCOUNTER — OFFICE VISIT (OUTPATIENT)
Dept: VASCULAR SURGERY | Age: 69
End: 2024-08-07

## 2024-08-07 VITALS — WEIGHT: 175 LBS | BODY MASS INDEX: 30.04 KG/M2

## 2024-08-07 DIAGNOSIS — I83.813 VARICOSE VEINS OF BILATERAL LOWER EXTREMITIES WITH PAIN: Primary | ICD-10-CM

## 2024-08-07 NOTE — PROGRESS NOTES
OF SYSTEMS (New symptoms):    Eyes:      Blurred vision:  No [x]/Yes []               Diplopia:   No [x]/Yes []               Vision loss:       No [x]/Yes []   Ears, nose, throat:             Hearing loss:    No [x]/Yes []      Vertigo:   No [x]/Yes []                       Swallowing problem:  No [x]/Yes []               Nose bleeds:   No [x]/Yes []      Voice hoarseness:  No [x]/Yes []  Respiratory:             Cough:   No [x]/Yes []      Pleuritic chest pain:  No [x]/Yes []                        Dyspnea:   No [x]/Yes []      Wheezing:   No [x]/Yes []  Cardiovascular:             Angina:   No [x]/Yes []      Palpitations:   No [x]/Yes []          Claudication:    No [x]/Yes []      Leg swelling:   No [x]/Yes []  Gastrointestinal:             Nausea or vomiting:  No [x]/Yes []               Abdominal pain:  No [x]/Yes []                     Intestinal bleeding: No [x]/Yes []  Musculoskeletal:             Leg pain:   No []/Yes [x]      Back pain:   No [x]/Yes []                    Weakness:   No [x]/Yes []  Neurologic:             Numbness:   No [x]/Yes []      Paralysis:   No [x]/Yes []                       Headaches:   No [x]/Yes []  Hematologic, lymphatic:   Anemia:   No [x]/Yes []              Bleeding or bruising:  No [x]/Yes []              Fevers or chills: No [x]/Yes []  Endocrine:             Temp intolerance:   No [x]/Yes []                       Polydipsia, polyuria:  No [x]/Yes []  Skin:              Rash:    No [x]/Yes []      Ulcers:   No [x]/Yes []              Abnorm pigment: No [x]/Yes []  :              Frequency/urgency:  No [x]/Yes []      Hematuria:    No [x]/Yes []                      Incontinence:    No [x]/Yes []    PHYSICAL EXAM:  There were no vitals filed for this visit.  General Appearance: alert and oriented to person, place and time, well developed and well- nourished, in no acute distress  Skin: warm and dry, no rash or erythema  Head: normocephalic and atraumatic  Eyes:

## 2024-08-12 ENCOUNTER — TELEPHONE (OUTPATIENT)
Dept: ADMINISTRATIVE | Age: 69
End: 2024-08-12

## 2024-08-12 NOTE — TELEPHONE ENCOUNTER
Pt is having increased left foot pain and is requesting to be seen this week.  No availability.  Please contact pt.

## 2024-08-14 ENCOUNTER — OFFICE VISIT (OUTPATIENT)
Dept: PODIATRY | Age: 69
End: 2024-08-14
Payer: MEDICARE

## 2024-08-14 VITALS
WEIGHT: 175 LBS | SYSTOLIC BLOOD PRESSURE: 134 MMHG | BODY MASS INDEX: 30.04 KG/M2 | TEMPERATURE: 97.2 F | DIASTOLIC BLOOD PRESSURE: 82 MMHG

## 2024-08-14 DIAGNOSIS — M25.572 CHRONIC PAIN OF LEFT ANKLE: Primary | ICD-10-CM

## 2024-08-14 DIAGNOSIS — M79.672 PAIN IN LEFT FOOT: ICD-10-CM

## 2024-08-14 DIAGNOSIS — G89.29 CHRONIC PAIN OF LEFT ANKLE: Primary | ICD-10-CM

## 2024-08-14 DIAGNOSIS — M19.072 ARTHRITIS OF MIDTARSAL JOINT OF LEFT FOOT: ICD-10-CM

## 2024-08-14 PROCEDURE — 3079F DIAST BP 80-89 MM HG: CPT | Performed by: PODIATRIST

## 2024-08-14 PROCEDURE — 99213 OFFICE O/P EST LOW 20 MIN: CPT | Performed by: PODIATRIST

## 2024-08-14 PROCEDURE — 20605 DRAIN/INJ JOINT/BURSA W/O US: CPT | Performed by: PODIATRIST

## 2024-08-14 PROCEDURE — 1123F ACP DISCUSS/DSCN MKR DOCD: CPT | Performed by: PODIATRIST

## 2024-08-14 PROCEDURE — 3075F SYST BP GE 130 - 139MM HG: CPT | Performed by: PODIATRIST

## 2024-08-14 RX ORDER — TRIAMCINOLONE ACETONIDE 40 MG/ML
40 INJECTION, SUSPENSION INTRA-ARTICULAR; INTRAMUSCULAR ONCE
Status: COMPLETED | OUTPATIENT
Start: 2024-08-14 | End: 2024-08-14

## 2024-08-14 RX ORDER — BUPIVACAINE HYDROCHLORIDE 5 MG/ML
1 INJECTION, SOLUTION PERINEURAL ONCE
Status: COMPLETED | OUTPATIENT
Start: 2024-08-14 | End: 2024-08-14

## 2024-08-14 RX ADMIN — BUPIVACAINE HYDROCHLORIDE 1 MG: 5 INJECTION, SOLUTION PERINEURAL at 10:27

## 2024-08-14 RX ADMIN — TRIAMCINOLONE ACETONIDE 1 MG: 40 INJECTION, SUSPENSION INTRA-ARTICULAR; INTRAMUSCULAR at 10:28

## 2024-08-14 NOTE — PROGRESS NOTES
24  Eleanor Holder : 1955 Sex: female  Age: 69 y.o.    Patient was referred by Manolo Mckeon MD    Chief Complaint   Patient presents with    Foot Pain     Left foot left ankle  MRI was denied need to have 6-8 weeks of failed conservative tx  Pt is suppose to have another appointment 9-10 will try to submit MRI again thru ins.  Wants to know if she can get an injection or something until MRI is complete or approved     Diabetes       SUBJECTIVE pt seen for chronic foot and ankle pain   no improvement  mri  cancelled  do to insurance denying    Foot Pain     Diabetes      Review of Systems  Const: Denies constitutional symptoms  Musculo: Denies symptoms other than stated above  Skin: Denies symptoms other than stated above       Current Outpatient Medications:     cyclobenzaprine (FLEXERIL) 10 MG tablet, TAKE 1/2 TABLET BY MOUTH TWICE DAILY AS NEEDED FOR MUSCLE SPASMS, Disp: 30 tablet, Rfl: 1    HYDROcodone-acetaminophen (NORCO) 5-325 MG per tablet, Take 1 tablet by mouth 2 times daily for 30 days., Disp: 60 tablet, Rfl: 0    gabapentin (NEURONTIN) 400 MG capsule, Take 1 capsule by mouth 2 times daily for 90 days., Disp: 180 capsule, Rfl: 0    atorvastatin (LIPITOR) 40 MG tablet, Take 1 tablet by mouth daily, Disp: 90 tablet, Rfl: 3    hydrOXYzine pamoate (VISTARIL) 25 MG capsule, Take 1 capsule by mouth 3 times daily as needed for Anxiety Watch for sedation, Disp: 90 capsule, Rfl: 1    lisinopril (PRINIVIL;ZESTRIL) 40 MG tablet, Take 1 tablet by mouth every evening, Disp: 90 tablet, Rfl: 1    cyclobenzaprine (FLEXERIL) 5 MG tablet, Take 1-2 tablets by mouth at bedtime, Disp: 180 tablet, Rfl: 1    hydroCHLOROthiazide 12.5 MG capsule, TAKE ONE CAPSULE BY MOUTH ONCE DAILY FOR FOR BLOOD PRESSURE, Disp: 90 capsule, Rfl: 1    metFORMIN (GLUCOPHAGE-XR) 500 MG extended release tablet, TAKE ONE TABLET BY MOUTH EVERY MORNING with BREAKFAST, Disp: 90 tablet, Rfl: 1    Handicap Placard MISC, DX:  Loss

## 2024-08-28 ENCOUNTER — OFFICE VISIT (OUTPATIENT)
Dept: PAIN MANAGEMENT | Age: 69
End: 2024-08-28
Payer: MEDICARE

## 2024-08-28 VITALS
RESPIRATION RATE: 14 BRPM | HEART RATE: 96 BPM | TEMPERATURE: 97.9 F | OXYGEN SATURATION: 93 % | SYSTOLIC BLOOD PRESSURE: 144 MMHG | BODY MASS INDEX: 29.88 KG/M2 | HEIGHT: 64 IN | WEIGHT: 175 LBS | DIASTOLIC BLOOD PRESSURE: 82 MMHG

## 2024-08-28 DIAGNOSIS — M47.812 CERVICAL SPONDYLOSIS: ICD-10-CM

## 2024-08-28 DIAGNOSIS — M51.36 DDD (DEGENERATIVE DISC DISEASE), LUMBAR: ICD-10-CM

## 2024-08-28 DIAGNOSIS — M54.12 CERVICAL RADICULOPATHY: ICD-10-CM

## 2024-08-28 DIAGNOSIS — M54.16 LUMBAR RADICULOPATHY: ICD-10-CM

## 2024-08-28 DIAGNOSIS — G89.4 CHRONIC PAIN SYNDROME: ICD-10-CM

## 2024-08-28 DIAGNOSIS — M54.12 RADICULOPATHY, CERVICAL: ICD-10-CM

## 2024-08-28 PROCEDURE — 99213 OFFICE O/P EST LOW 20 MIN: CPT | Performed by: PHYSICIAN ASSISTANT

## 2024-08-28 PROCEDURE — 3077F SYST BP >= 140 MM HG: CPT | Performed by: PHYSICIAN ASSISTANT

## 2024-08-28 PROCEDURE — 99213 OFFICE O/P EST LOW 20 MIN: CPT

## 2024-08-28 PROCEDURE — 3079F DIAST BP 80-89 MM HG: CPT | Performed by: PHYSICIAN ASSISTANT

## 2024-08-28 PROCEDURE — 1123F ACP DISCUSS/DSCN MKR DOCD: CPT | Performed by: PHYSICIAN ASSISTANT

## 2024-08-28 RX ORDER — HYDROCODONE BITARTRATE AND ACETAMINOPHEN 5; 325 MG/1; MG/1
1 TABLET ORAL 2 TIMES DAILY
Qty: 60 TABLET | Refills: 0 | Status: SHIPPED | OUTPATIENT
Start: 2024-08-30 | End: 2024-09-29

## 2024-08-28 NOTE — PROGRESS NOTES
Eleanor Holder presents to the [facility] on 8/28/2024. Eleanor is complaining of pain back The pain is constant. The pain is described as aching and throbbing. Pain is rated on her best day at a 7, on her worst day at a 10, and on average at a 8 on the VAS scale. She took her last dose of Norco and Flexeril .     Any procedures since your last visit: No,     Pacemaker or defibrillator: No managed by .    She is not on NSAIDS and is not on anticoagulation medications to include none and is managed by .     Medication Contract and Consent for Opioid Use Documents Filed       Patient Documents       Type of Document Status Date Received Received By Description    Medication Contract Received  EBER HONG Opioid medication contract with Dr Wheatley 3/24/20    Medication Contract Received 4/26/2018  3:50 PM ANYA NUNO PAIN MANAGEMENT PATIENT AGREEMENT 4/26/2018    Medication Contract Received 11/5/2020  4:23 PM EBER HONG Opioid medication contract with Dr Wheatley    Medication Contract Received 3/1/2021  1:26 PM EBER HONG Opioid medication contract with Dr Wheatley    Medication Contract Received 8/23/2021  2:03 PM HAYLIE CADENA Medication contract    Medication Contract Received 10/18/2021  3:03 PM HAYLIE CADENA medication contract 10/18/2021    Medication Contract Signed 10/4/2022 12:35 PM CRISTIANA BAÑUELOS Pain Med. Contract 10/04/22    Medication Contract Received 10/11/2023  3:46 PM KAILYN WILKINSON Medication contract                    BP (!) 144/82   Pulse 96   Temp 97.9 °F (36.6 °C)   Resp 14   Ht 1.626 m (5' 4.02\")   Wt 79.4 kg (175 lb)   LMP  (LMP Unknown)   SpO2 93%   BMI 30.02 kg/m²      No LMP recorded (lmp unknown). Patient is postmenopausal.Eleanor's blood pressure was elevated today. She was instructed to contact his primary

## 2024-08-28 NOTE — PROGRESS NOTES
FOOT SURGERY      Left    HARDWARE REMOVAL FOOT / ANKLE Left 12/14/2018    REMOVAL OF PAINFUL HARDWARE LEFT FOOT  ++SYNTHES++ performed by Yariel Haro DPM at [facility] OR    HERNIA REPAIR  1998    inguinal     LUMBAR SPINE SURGERY N/A 03/04/2020    EXPLORATION OF PRIOR L3-L5 FUSION AND L2-L3  POSTERIOR LUMBAR INTERBODY FUSION -- NEEDS O-ARM, AUDIOLOGY, CAGES, PLATES, SCREWS, C-ARM, TERESA TABLE, CELL SAVER, PLATELET GEL -- GLOBUS performed by Samir Wheatley MD at [facility] OR    MOUTH SURGERY N/A 10/21/2020    EXCISION OF TONGUE LESION performed by Karthik Gonzales DO at [facility] OR    NERVE BLOCK Bilateral 05/07/2018    L1-2 lumbar foramen #1    NERVE BLOCK Bilateral 12/03/2018    s1 tfesi    NERVE BLOCK Bilateral 08/12/2019    NERVE BLOCK Right 09/30/2019    sacral radiofrequency    NERVE BLOCK Right 09/01/2020    RIGHT SACROILIAC JOINT INJECTION #2 performed by Inez Montelongo DO at [facility] OR    NERVE BLOCK Left 05/11/2023    LEFT C2,3,4 MEDIAL NERVE BRANCH BLOCK performed by Inez Montelongo DO at [facility] OR    OTHER SURGICAL HISTORY Left 09/25/2013    left foot tarso metatarsal joint injection    OTHER SURGICAL HISTORY Left 05/27/2015    Endoscopic Gastroc recession left, Lapidus left foot and  Excision of exostosis left foot    PAIN MANAGEMENT PROCEDURE Left 07/27/2021    LEFT L5-S1 TRANSFORAMINAL EPIDURAL STEROID INJECTION performed by Inez Montelongo DO at [facility] OR    PAIN MANAGEMENT PROCEDURE Left 03/29/2022    LEFT TRANSFORAMINAL EPIDURAL STEROID INJECTION AT L5 AND S1 performed by Inez Montelongo DO at [facility] OR    PAIN MANAGEMENT PROCEDURE N/A 06/09/2022    LUMBAR EPIDURAL STEROID INJECTION L5-S1 performed by Inez Montelongo DO at [facility] OR    PAIN MANAGEMENT PROCEDURE N/A 10/27/2022    BILATERAL L5 LUMBAR TRANSFORAMINAL EPIDURAL STEROID INJECTION performed by Inez Montelongo DO at [facility] OR    PAIN MANAGEMENT PROCEDURE N/A 01/12/2023    CERVICAL EPIDURAL STEROID INJECTION C7-T1 performed by Inez Montelongo DO at [facility] OR  diversion identified.;Assessed functional status (ability to engage in work or other purposeful activities, the pain intensity and its interference with activities of daily living, quality of family life and social activities, and the physical activity);Obtaining appropriate analgesic effect of treatment.             ccreferring physic

## 2024-08-31 ENCOUNTER — APPOINTMENT (OUTPATIENT)
Dept: GENERAL RADIOLOGY | Age: 69
End: 2024-08-31
Payer: MEDICARE

## 2024-08-31 ENCOUNTER — HOSPITAL ENCOUNTER (EMERGENCY)
Age: 69
Discharge: HOME | End: 2024-08-31
Payer: MEDICARE

## 2024-08-31 VITALS
HEART RATE: 100 BPM | RESPIRATION RATE: 14 BRPM | SYSTOLIC BLOOD PRESSURE: 141 MMHG | DIASTOLIC BLOOD PRESSURE: 86 MMHG | OXYGEN SATURATION: 96 % | TEMPERATURE: 98.4 F

## 2024-08-31 DIAGNOSIS — E86.0 DEHYDRATION: ICD-10-CM

## 2024-08-31 DIAGNOSIS — E87.1 HYPONATREMIA: ICD-10-CM

## 2024-08-31 DIAGNOSIS — R53.83 OTHER FATIGUE: ICD-10-CM

## 2024-08-31 DIAGNOSIS — U07.1 COVID: Primary | ICD-10-CM

## 2024-08-31 LAB
ALBUMIN SERPL-MCNC: 3.7 G/DL (ref 3.5–5.2)
ALP SERPL-CCNC: 108 U/L (ref 35–104)
ALT SERPL-CCNC: 18 U/L (ref 0–32)
ANION GAP SERPL CALCULATED.3IONS-SCNC: 14 MMOL/L (ref 7–16)
ANION GAP SERPL CALCULATED.3IONS-SCNC: 15 MMOL/L (ref 7–16)
AST SERPL-CCNC: 24 U/L (ref 0–31)
BASOPHILS # BLD: 0.03 K/UL (ref 0–0.2)
BASOPHILS NFR BLD: 1 % (ref 0–2)
BILIRUB SERPL-MCNC: 0.3 MG/DL (ref 0–1.2)
BUN SERPL-MCNC: 9 MG/DL (ref 6–23)
BUN SERPL-MCNC: 9 MG/DL (ref 6–23)
CALCIUM SERPL-MCNC: 8.4 MG/DL (ref 8.6–10.2)
CALCIUM SERPL-MCNC: 8.9 MG/DL (ref 8.6–10.2)
CHLORIDE SERPL-SCNC: 93 MMOL/L (ref 98–107)
CHLORIDE SERPL-SCNC: 96 MMOL/L (ref 98–107)
CO2 SERPL-SCNC: 20 MMOL/L (ref 22–29)
CO2 SERPL-SCNC: 20 MMOL/L (ref 22–29)
CREAT SERPL-MCNC: 0.7 MG/DL (ref 0.5–1)
CREAT SERPL-MCNC: 0.7 MG/DL (ref 0.5–1)
EOSINOPHIL # BLD: 0 K/UL (ref 0.05–0.5)
EOSINOPHILS RELATIVE PERCENT: 0 % (ref 0–6)
ERYTHROCYTE [DISTWIDTH] IN BLOOD BY AUTOMATED COUNT: 11.9 % (ref 11.5–15)
GFR, ESTIMATED: >90 ML/MIN/1.73M2
GFR, ESTIMATED: >90 ML/MIN/1.73M2
GLUCOSE SERPL-MCNC: 113 MG/DL (ref 74–99)
GLUCOSE SERPL-MCNC: 97 MG/DL (ref 74–99)
HCT VFR BLD AUTO: 39.6 % (ref 34–48)
HGB BLD-MCNC: 14 G/DL (ref 11.5–15.5)
IMM GRANULOCYTES # BLD AUTO: <0.03 K/UL (ref 0–0.58)
IMM GRANULOCYTES NFR BLD: 0 % (ref 0–5)
LYMPHOCYTES NFR BLD: 0.58 K/UL (ref 1.5–4)
LYMPHOCYTES RELATIVE PERCENT: 10 % (ref 20–42)
MCH RBC QN AUTO: 34.2 PG (ref 26–35)
MCHC RBC AUTO-ENTMCNC: 35.4 G/DL (ref 32–34.5)
MCV RBC AUTO: 96.8 FL (ref 80–99.9)
MONOCYTES NFR BLD: 1.18 K/UL (ref 0.1–0.95)
MONOCYTES NFR BLD: 20 % (ref 2–12)
NEUTROPHILS NFR BLD: 69 % (ref 43–80)
NEUTS SEG NFR BLD: 4.03 K/UL (ref 1.8–7.3)
PLATELET # BLD AUTO: 248 K/UL (ref 130–450)
PMV BLD AUTO: 10.3 FL (ref 7–12)
POTASSIUM SERPL-SCNC: 3.5 MMOL/L (ref 3.5–5)
POTASSIUM SERPL-SCNC: 3.8 MMOL/L (ref 3.5–5)
PROT SERPL-MCNC: 5.8 G/DL (ref 6.4–8.3)
RBC # BLD AUTO: 4.09 M/UL (ref 3.5–5.5)
SARS-COV-2 RDRP RESP QL NAA+PROBE: DETECTED
SODIUM SERPL-SCNC: 128 MMOL/L (ref 132–146)
SODIUM SERPL-SCNC: 130 MMOL/L (ref 132–146)
SPECIMEN DESCRIPTION: ABNORMAL
TROPONIN I SERPL HS-MCNC: 10 NG/L (ref 0–9)
TROPONIN I SERPL HS-MCNC: 11 NG/L (ref 0–9)
WBC OTHER # BLD: 5.8 K/UL (ref 4.5–11.5)

## 2024-08-31 PROCEDURE — 96374 THER/PROPH/DIAG INJ IV PUSH: CPT

## 2024-08-31 PROCEDURE — 70450 CT HEAD/BRAIN W/O DYE: CPT

## 2024-08-31 PROCEDURE — 87635 SARS-COV-2 COVID-19 AMP PRB: CPT

## 2024-08-31 PROCEDURE — 2500000003 HC RX 250 WO HCPCS: Performed by: NURSE PRACTITIONER

## 2024-08-31 PROCEDURE — 85025 COMPLETE CBC W/AUTO DIFF WBC: CPT

## 2024-08-31 PROCEDURE — 84484 ASSAY OF TROPONIN QUANT: CPT

## 2024-08-31 PROCEDURE — 93005 ELECTROCARDIOGRAM TRACING: CPT

## 2024-08-31 PROCEDURE — 6360000002 HC RX W HCPCS: Performed by: NURSE PRACTITIONER

## 2024-08-31 PROCEDURE — 96361 HYDRATE IV INFUSION ADD-ON: CPT

## 2024-08-31 PROCEDURE — 80053 COMPREHEN METABOLIC PANEL: CPT

## 2024-08-31 PROCEDURE — 2580000003 HC RX 258: Performed by: NURSE PRACTITIONER

## 2024-08-31 PROCEDURE — 71046 X-RAY EXAM CHEST 2 VIEWS: CPT

## 2024-08-31 PROCEDURE — 80048 BASIC METABOLIC PNL TOTAL CA: CPT

## 2024-08-31 PROCEDURE — 99285 EMERGENCY DEPT VISIT HI MDM: CPT

## 2024-08-31 PROCEDURE — 6370000000 HC RX 637 (ALT 250 FOR IP): Performed by: NURSE PRACTITIONER

## 2024-08-31 RX ORDER — ACETAMINOPHEN 500 MG
500 TABLET ORAL 4 TIMES DAILY PRN
Qty: 30 TABLET | Refills: 0 | Status: SHIPPED | OUTPATIENT
Start: 2024-08-31

## 2024-08-31 RX ORDER — GUAIFENESIN 200 MG/10ML
200 LIQUID ORAL ONCE
Status: COMPLETED | OUTPATIENT
Start: 2024-08-31 | End: 2024-08-31

## 2024-08-31 RX ORDER — ONDANSETRON 4 MG/1
4 TABLET, ORALLY DISINTEGRATING ORAL ONCE
Status: COMPLETED | OUTPATIENT
Start: 2024-08-31 | End: 2024-08-31

## 2024-08-31 RX ORDER — IBUPROFEN 600 MG/1
600 TABLET, FILM COATED ORAL 3 TIMES DAILY PRN
Qty: 30 TABLET | Refills: 0 | Status: SHIPPED | OUTPATIENT
Start: 2024-08-31

## 2024-08-31 RX ORDER — ACETAMINOPHEN 500 MG
1000 TABLET ORAL ONCE
Status: COMPLETED | OUTPATIENT
Start: 2024-08-31 | End: 2024-08-31

## 2024-08-31 RX ORDER — KETOROLAC TROMETHAMINE 30 MG/ML
15 INJECTION, SOLUTION INTRAMUSCULAR; INTRAVENOUS ONCE
Status: COMPLETED | OUTPATIENT
Start: 2024-08-31 | End: 2024-08-31

## 2024-08-31 RX ORDER — 0.9 % SODIUM CHLORIDE 0.9 %
1000 INTRAVENOUS SOLUTION INTRAVENOUS ONCE
Status: COMPLETED | OUTPATIENT
Start: 2024-08-31 | End: 2024-08-31

## 2024-08-31 RX ORDER — GUAIFENESIN 200 MG/10ML
200 LIQUID ORAL 3 TIMES DAILY PRN
Qty: 118 ML | Refills: 0 | Status: SHIPPED | OUTPATIENT
Start: 2024-08-31

## 2024-08-31 RX ORDER — ALBUTEROL SULFATE 90 UG/1
2 AEROSOL, METERED RESPIRATORY (INHALATION) 4 TIMES DAILY PRN
Qty: 18 G | Refills: 0 | Status: SHIPPED | OUTPATIENT
Start: 2024-08-31

## 2024-08-31 RX ORDER — ONDANSETRON 4 MG/1
4 TABLET, ORALLY DISINTEGRATING ORAL 3 TIMES DAILY PRN
Qty: 12 TABLET | Refills: 0 | Status: SHIPPED | OUTPATIENT
Start: 2024-08-31

## 2024-08-31 RX ADMIN — ACETAMINOPHEN 1000 MG: 500 TABLET ORAL at 13:50

## 2024-08-31 RX ADMIN — ONDANSETRON 4 MG: 4 TABLET, ORALLY DISINTEGRATING ORAL at 13:50

## 2024-08-31 RX ADMIN — KETOROLAC TROMETHAMINE 15 MG: 30 INJECTION, SOLUTION INTRAMUSCULAR at 15:57

## 2024-08-31 RX ADMIN — GUAIFENESIN 200 MG: 100 LIQUID ORAL at 13:50

## 2024-08-31 RX ADMIN — SODIUM CHLORIDE 1000 ML: 9 INJECTION, SOLUTION INTRAVENOUS at 13:50

## 2024-08-31 ASSESSMENT — LIFESTYLE VARIABLES
HOW MANY STANDARD DRINKS CONTAINING ALCOHOL DO YOU HAVE ON A TYPICAL DAY: PATIENT DOES NOT DRINK
HOW OFTEN DO YOU HAVE A DRINK CONTAINING ALCOHOL: NEVER

## 2024-08-31 ASSESSMENT — PAIN SCALES - GENERAL
PAINLEVEL_OUTOF10: 10

## 2024-08-31 ASSESSMENT — PAIN - FUNCTIONAL ASSESSMENT: PAIN_FUNCTIONAL_ASSESSMENT: 0-10

## 2024-08-31 ASSESSMENT — PAIN DESCRIPTION - LOCATION
LOCATION: HEAD
LOCATION: GENERALIZED;HEAD

## 2024-08-31 ASSESSMENT — PAIN DESCRIPTION - ONSET: ONSET: ON-GOING

## 2024-08-31 ASSESSMENT — PAIN DESCRIPTION - FREQUENCY: FREQUENCY: CONTINUOUS

## 2024-08-31 ASSESSMENT — PAIN DESCRIPTION - PAIN TYPE: TYPE: ACUTE PAIN

## 2024-08-31 NOTE — ED PROVIDER NOTES
Independent RICHARD Visit.          [facility] EMERGENCY DEPARTMENT  EMERGENCY DEPARTMENT ENCOUNTER        Pt Name: Eleanor Holder  MRN: [id number]  Birthdate 1955  Date of evaluation: 8/31/2024  Provider: ALEXANDRO Machado - CNP  PCP: Manolo Mckeon MD  Note Started: 6:47 PM EDT 8/31/24    CHIEF COMPLAINT       Chief Complaint   Patient presents with    Concern For COVID-19     Generalized body pain, headache started yesterday       HISTORY OF PRESENT ILLNESS: 1 or more Elements     History from : Patient    Limitations to history : None    Eleanor Holder is a 69 y.o. female who presents to the emergency department for concern for COVID.  Patient states that she has a generalized body ache headache cough congestion sore throat starting 1 day ago.  She also states sinus congestion.  She states that she has fatigued and has generalized bodyaches.  She states that she has not tried take anything for her symptoms she states that she lives alone she has nobody to help her she states that she just feels miserable.  Patient denies any chest pain denies any shortness of breath denies any abdominal pain she denies any vomiting or diarrhea.  Patient states that her brother brought her to the emergency department.  Patient patient states that she denies any known exposure to COVID denies any recent travel denies any recent antibiotic use.  Patient states that she is not coughing up any blood denies any chest pain or shortness of breath.    Nursing Notes were all reviewed and agreed with or any disagreements were addressed in the HPI.    REVIEW OF SYSTEMS :      Review of Systems   All other systems reviewed and are negative.      Positives and Pertinent negatives as per HPI.     SURGICAL HISTORY     Past Surgical History:   Procedure Laterality Date    ANESTHESIA NERVE BLOCK Left 02/26/2019    LEFT C3,4,5 MBB performed by Baljit Dorsey MD at [facility] OR    ANESTHESIA NERVE BLOCK Right

## 2024-09-01 LAB
EKG ATRIAL RATE: 85 BPM
EKG P AXIS: 78 DEGREES
EKG P-R INTERVAL: 176 MS
EKG Q-T INTERVAL: 380 MS
EKG QRS DURATION: 90 MS
EKG QTC CALCULATION (BAZETT): 452 MS
EKG R AXIS: 67 DEGREES
EKG T AXIS: 78 DEGREES
EKG VENTRICULAR RATE: 85 BPM

## 2024-09-03 ENCOUNTER — TELEPHONE (OUTPATIENT)
Dept: FAMILY MEDICINE CLINIC | Age: 69
End: 2024-09-03

## 2024-09-03 DIAGNOSIS — E87.1 HYPONATREMIA: Primary | ICD-10-CM

## 2024-09-03 NOTE — TELEPHONE ENCOUNTER
Eleanor called because she was in the ER on 8/31. When she was discharged they suggested she follow up w/ her PCP. Eleanor is currently scheduled to see you 9/30 and would like to know if you need to see her sooner. States that her sodium level was off and improved some w/ IV fluids in the ED. She understands how to manage Covid symptoms but is unsure is sodium needs rechecked and/or you need to see her to discuss.     If you would like to see her sooner, it is okay for office to leave a detailed message w/ appointment date and time.

## 2024-09-04 ENCOUNTER — HOSPITAL ENCOUNTER (OUTPATIENT)
Age: 69
Discharge: HOME | End: 2024-09-04
Payer: MEDICARE

## 2024-09-04 DIAGNOSIS — E87.1 HYPONATREMIA: ICD-10-CM

## 2024-09-04 LAB
ANION GAP SERPL CALCULATED.3IONS-SCNC: 13 MMOL/L (ref 7–16)
BUN SERPL-MCNC: 9 MG/DL (ref 6–23)
CALCIUM SERPL-MCNC: 9.7 MG/DL (ref 8.6–10.2)
CHLORIDE SERPL-SCNC: 95 MMOL/L (ref 98–107)
CO2 SERPL-SCNC: 27 MMOL/L (ref 22–29)
CREAT SERPL-MCNC: 0.8 MG/DL (ref 0.5–1)
EKG ATRIAL RATE: 85 BPM
EKG P AXIS: 78 DEGREES
EKG P-R INTERVAL: 176 MS
EKG Q-T INTERVAL: 380 MS
EKG QRS DURATION: 90 MS
EKG QTC CALCULATION (BAZETT): 452 MS
EKG R AXIS: 67 DEGREES
EKG T AXIS: 78 DEGREES
EKG VENTRICULAR RATE: 85 BPM
GFR, ESTIMATED: 86 ML/MIN/1.73M2
GLUCOSE SERPL-MCNC: 94 MG/DL (ref 74–99)
POTASSIUM SERPL-SCNC: 4.7 MMOL/L (ref 3.5–5)
SODIUM SERPL-SCNC: 135 MMOL/L (ref 132–146)

## 2024-09-04 PROCEDURE — 36415 COLL VENOUS BLD VENIPUNCTURE: CPT

## 2024-09-04 PROCEDURE — 80048 BASIC METABOLIC PNL TOTAL CA: CPT

## 2024-09-10 ENCOUNTER — OFFICE VISIT (OUTPATIENT)
Dept: PODIATRY | Age: 69
End: 2024-09-10
Payer: MEDICARE

## 2024-09-10 VITALS — BODY MASS INDEX: 30.02 KG/M2 | WEIGHT: 175 LBS | TEMPERATURE: 97.5 F

## 2024-09-10 DIAGNOSIS — M19.072 ARTHRITIS OF MIDTARSAL JOINT OF LEFT FOOT: Primary | ICD-10-CM

## 2024-09-10 DIAGNOSIS — M25.572 CHRONIC PAIN OF LEFT ANKLE: ICD-10-CM

## 2024-09-10 DIAGNOSIS — G89.29 CHRONIC PAIN OF LEFT ANKLE: ICD-10-CM

## 2024-09-10 PROCEDURE — 1123F ACP DISCUSS/DSCN MKR DOCD: CPT | Performed by: PODIATRIST

## 2024-09-10 PROCEDURE — 20605 DRAIN/INJ JOINT/BURSA W/O US: CPT | Performed by: PODIATRIST

## 2024-09-10 PROCEDURE — 99213 OFFICE O/P EST LOW 20 MIN: CPT | Performed by: PODIATRIST

## 2024-09-10 RX ORDER — BUPIVACAINE HYDROCHLORIDE 5 MG/ML
1 INJECTION, SOLUTION PERINEURAL ONCE
Status: COMPLETED | OUTPATIENT
Start: 2024-09-10 | End: 2024-09-10

## 2024-09-10 RX ORDER — TRIAMCINOLONE ACETONIDE 40 MG/ML
40 INJECTION, SUSPENSION INTRA-ARTICULAR; INTRAMUSCULAR ONCE
Status: COMPLETED | OUTPATIENT
Start: 2024-09-10 | End: 2024-09-10

## 2024-09-10 RX ADMIN — BUPIVACAINE HYDROCHLORIDE 1 MG: 5 INJECTION, SOLUTION PERINEURAL at 11:24

## 2024-09-10 RX ADMIN — TRIAMCINOLONE ACETONIDE 1 MG: 40 INJECTION, SUSPENSION INTRA-ARTICULAR; INTRAMUSCULAR at 11:25

## 2024-09-25 ENCOUNTER — OFFICE VISIT (OUTPATIENT)
Dept: PAIN MANAGEMENT | Age: 69
End: 2024-09-25
Payer: MEDICARE

## 2024-09-25 VITALS
HEART RATE: 97 BPM | OXYGEN SATURATION: 99 % | RESPIRATION RATE: 16 BRPM | SYSTOLIC BLOOD PRESSURE: 136 MMHG | DIASTOLIC BLOOD PRESSURE: 78 MMHG | TEMPERATURE: 97.3 F

## 2024-09-25 DIAGNOSIS — M48.061 SPINAL STENOSIS OF LUMBAR REGION WITHOUT NEUROGENIC CLAUDICATION: ICD-10-CM

## 2024-09-25 DIAGNOSIS — M54.50 CHRONIC BILATERAL LOW BACK PAIN WITHOUT SCIATICA: ICD-10-CM

## 2024-09-25 DIAGNOSIS — G89.4 CHRONIC PAIN SYNDROME: ICD-10-CM

## 2024-09-25 DIAGNOSIS — M54.16 LUMBAR RADICULOPATHY: Primary | ICD-10-CM

## 2024-09-25 DIAGNOSIS — M79.10 MYALGIA: ICD-10-CM

## 2024-09-25 DIAGNOSIS — M47.816 LUMBAR FACET ARTHROPATHY: ICD-10-CM

## 2024-09-25 DIAGNOSIS — G89.29 CHRONIC BILATERAL LOW BACK PAIN WITHOUT SCIATICA: ICD-10-CM

## 2024-09-25 DIAGNOSIS — M54.12 RADICULOPATHY, CERVICAL: ICD-10-CM

## 2024-09-25 DIAGNOSIS — M54.12 CERVICAL RADICULOPATHY: ICD-10-CM

## 2024-09-25 DIAGNOSIS — M47.812 CERVICAL SPONDYLOSIS: ICD-10-CM

## 2024-09-25 DIAGNOSIS — M51.36 DDD (DEGENERATIVE DISC DISEASE), LUMBAR: ICD-10-CM

## 2024-09-25 PROCEDURE — 99213 OFFICE O/P EST LOW 20 MIN: CPT

## 2024-09-25 PROCEDURE — 1123F ACP DISCUSS/DSCN MKR DOCD: CPT | Performed by: PHYSICIAN ASSISTANT

## 2024-09-25 PROCEDURE — 99213 OFFICE O/P EST LOW 20 MIN: CPT | Performed by: PHYSICIAN ASSISTANT

## 2024-09-25 PROCEDURE — 3075F SYST BP GE 130 - 139MM HG: CPT | Performed by: PHYSICIAN ASSISTANT

## 2024-09-25 PROCEDURE — 3078F DIAST BP <80 MM HG: CPT | Performed by: PHYSICIAN ASSISTANT

## 2024-09-25 RX ORDER — GABAPENTIN 400 MG/1
400 CAPSULE ORAL 2 TIMES DAILY
Qty: 180 CAPSULE | Refills: 0 | Status: SHIPPED | OUTPATIENT
Start: 2024-09-25 | End: 2024-12-24

## 2024-09-25 RX ORDER — HYDROCODONE BITARTRATE AND ACETAMINOPHEN 5; 325 MG/1; MG/1
1 TABLET ORAL 2 TIMES DAILY
Qty: 60 TABLET | Refills: 0 | Status: SHIPPED | OUTPATIENT
Start: 2024-09-27 | End: 2024-10-27

## 2024-09-27 ENCOUNTER — HOSPITAL ENCOUNTER (OUTPATIENT)
Dept: MRI IMAGING | Age: 69
Discharge: HOME | End: 2024-09-29
Attending: PODIATRIST
Payer: MEDICARE

## 2024-09-27 DIAGNOSIS — M19.072 ARTHRITIS OF MIDTARSAL JOINT OF LEFT FOOT: ICD-10-CM

## 2024-09-27 DIAGNOSIS — M25.572 CHRONIC PAIN OF LEFT ANKLE: ICD-10-CM

## 2024-09-27 DIAGNOSIS — G89.29 CHRONIC PAIN OF LEFT ANKLE: ICD-10-CM

## 2024-09-27 PROCEDURE — 73721 MRI JNT OF LWR EXTRE W/O DYE: CPT

## 2024-09-27 PROCEDURE — 73718 MRI LOWER EXTREMITY W/O DYE: CPT

## 2024-09-30 ENCOUNTER — OFFICE VISIT (OUTPATIENT)
Dept: PODIATRY | Age: 69
End: 2024-09-30
Payer: MEDICARE

## 2024-09-30 ENCOUNTER — OFFICE VISIT (OUTPATIENT)
Dept: FAMILY MEDICINE CLINIC | Age: 69
End: 2024-09-30
Payer: MEDICARE

## 2024-09-30 VITALS
DIASTOLIC BLOOD PRESSURE: 80 MMHG | TEMPERATURE: 98.9 F | SYSTOLIC BLOOD PRESSURE: 132 MMHG | WEIGHT: 167 LBS | BODY MASS INDEX: 28.67 KG/M2

## 2024-09-30 VITALS
BODY MASS INDEX: 28.51 KG/M2 | DIASTOLIC BLOOD PRESSURE: 80 MMHG | OXYGEN SATURATION: 99 % | HEART RATE: 108 BPM | TEMPERATURE: 98.9 F | HEIGHT: 64 IN | WEIGHT: 167 LBS | SYSTOLIC BLOOD PRESSURE: 132 MMHG

## 2024-09-30 DIAGNOSIS — G89.29 CHRONIC PAIN OF LEFT ANKLE: ICD-10-CM

## 2024-09-30 DIAGNOSIS — M25.572 CHRONIC PAIN OF LEFT ANKLE: ICD-10-CM

## 2024-09-30 DIAGNOSIS — M19.072 ARTHRITIS OF MIDTARSAL JOINT OF LEFT FOOT: Primary | ICD-10-CM

## 2024-09-30 DIAGNOSIS — I10 ESSENTIAL HYPERTENSION: ICD-10-CM

## 2024-09-30 DIAGNOSIS — Z23 NEED FOR INFLUENZA VACCINATION: Primary | ICD-10-CM

## 2024-09-30 PROCEDURE — G0008 ADMIN INFLUENZA VIRUS VAC: HCPCS | Performed by: FAMILY MEDICINE

## 2024-09-30 PROCEDURE — 99212 OFFICE O/P EST SF 10 MIN: CPT | Performed by: PODIATRIST

## 2024-09-30 PROCEDURE — 1123F ACP DISCUSS/DSCN MKR DOCD: CPT | Performed by: PODIATRIST

## 2024-09-30 PROCEDURE — 3075F SYST BP GE 130 - 139MM HG: CPT | Performed by: FAMILY MEDICINE

## 2024-09-30 PROCEDURE — 99213 OFFICE O/P EST LOW 20 MIN: CPT | Performed by: FAMILY MEDICINE

## 2024-09-30 PROCEDURE — 3079F DIAST BP 80-89 MM HG: CPT | Performed by: PODIATRIST

## 2024-09-30 PROCEDURE — 90653 IIV ADJUVANT VACCINE IM: CPT | Performed by: FAMILY MEDICINE

## 2024-09-30 PROCEDURE — 3079F DIAST BP 80-89 MM HG: CPT | Performed by: FAMILY MEDICINE

## 2024-09-30 PROCEDURE — 1123F ACP DISCUSS/DSCN MKR DOCD: CPT | Performed by: FAMILY MEDICINE

## 2024-09-30 PROCEDURE — 3075F SYST BP GE 130 - 139MM HG: CPT | Performed by: PODIATRIST

## 2024-09-30 NOTE — PROGRESS NOTES
[facility]  Office Progress Note - Dr. Mckeon  9/30/24    CC:   Chief Complaint   Patient presents with    Post-COVID Symptoms     Went to ED on 09/04/24 Positive for COVID  Fatigue lingering.        /80   Pulse (!) 108   Temp 98.9 °F (37.2 °C) (Temporal)   Ht 1.626 m (5' 4\")   Wt 75.8 kg (167 lb)   LMP  (LMP Unknown)   SpO2 99%   BMI 28.67 kg/m²   Wt Readings from Last 3 Encounters:   09/30/24 75.8 kg (167 lb)   09/30/24 75.8 kg (167 lb)   09/10/24 79.4 kg (175 lb)       HPI:     + COVID 9/4/24. Here for lingering symptoms. No paxlovid or steroids.     Just did not feel well, prompted her the he ER. Was surprised she was positive for COVID. No cough, no sinus congestion. No sore throat. Had a headache. Headache is resolved, but the fatigue persists. Taste was altered, now better.     No fevers. No chest pain. No cough. Does report some SOB, but unsure if associated with exertions. No abdominal pain no nausea or vomiting. No difficulties urinating.   Mostly just lingering fatigue but feels improved.     Hgb 14.0 at end of August.      here today. Usually is approximately 100. Better on recheck still borderline tachy. Feels well, doesn't appreciate.   _________________________________________________________    Assessment / Plan  Eleanor was seen today for post-covid symptoms.    Diagnoses and all orders for this visit:    Need for influenza vaccination  -     Influenza, FLUAD Trivalent, (age 65 y+), IM, Preservative Free, 0.5mL    Essential hypertension  BP well controlled.   Borderline tachy today, not new, but transient.   Lingering fatigue from covid infection, improving.   Overall no major changes today.   FU 4 mos.       Return in about 4 months (around 1/30/2025). or as scheduled.   Patient counseled to follow up sooner or seek more acute care if symptoms worsening or not improving according to plan.     Electronically signed by RUPALI MCKEON MD on

## 2024-10-01 RX ORDER — METFORMIN HCL 500 MG
500 TABLET, EXTENDED RELEASE 24 HR ORAL
Qty: 90 TABLET | Refills: 1 | Status: SHIPPED | OUTPATIENT
Start: 2024-10-01

## 2024-10-01 RX ORDER — HYDROCHLOROTHIAZIDE 12.5 MG/1
CAPSULE ORAL
Qty: 90 CAPSULE | Refills: 1 | Status: SHIPPED | OUTPATIENT
Start: 2024-10-01

## 2024-10-01 NOTE — PROGRESS NOTES
10/1/24  Eleanor Holder : 1955 Sex: female  Age: 69 y.o.    Patient was referred by Manolo Mckeon MD    Chief Complaint   Patient presents with    Foot Pain     Today we are going over results MRI left ankle left foot     Diabetes       SUBJECTIVE this patient is seen today for follow-up and going over an MRI results of the left foot and left ankle patient still has the chronic conditions.  But the injection did help  Foot Pain     Diabetes      Review of Systems  Const: Denies constitutional symptoms  Musculo: Denies symptoms other than stated above  Skin: Denies symptoms other than stated above       Current Outpatient Medications:     HYDROcodone-acetaminophen (NORCO) 5-325 MG per tablet, Take 1 tablet by mouth 2 times daily for 30 days. Early due to pharmacy hours., Disp: 60 tablet, Rfl: 0    gabapentin (NEURONTIN) 400 MG capsule, Take 1 capsule by mouth 2 times daily for 90 days., Disp: 180 capsule, Rfl: 0    atorvastatin (LIPITOR) 40 MG tablet, Take 1 tablet by mouth daily, Disp: 90 tablet, Rfl: 3    hydrOXYzine pamoate (VISTARIL) 25 MG capsule, Take 1 capsule by mouth 3 times daily as needed for Anxiety Watch for sedation, Disp: 90 capsule, Rfl: 1    lisinopril (PRINIVIL;ZESTRIL) 40 MG tablet, Take 1 tablet by mouth every evening, Disp: 90 tablet, Rfl: 1    cyclobenzaprine (FLEXERIL) 5 MG tablet, Take 1-2 tablets by mouth at bedtime, Disp: 180 tablet, Rfl: 1    hydroCHLOROthiazide 12.5 MG capsule, TAKE ONE CAPSULE BY MOUTH ONCE DAILY FOR FOR BLOOD PRESSURE, Disp: 90 capsule, Rfl: 1    metFORMIN (GLUCOPHAGE-XR) 500 MG extended release tablet, TAKE ONE TABLET BY MOUTH EVERY MORNING with BREAKFAST, Disp: 90 tablet, Rfl: 1    Handicap Placard MISC, DX:  Loss of Balance, Chronic low back pain, s/p back surgery.  Duration of 5 years, Disp: 1 each, Rfl: 0  Allergies   Allergen Reactions    Pcn [Penicillins] Anaphylaxis       Past Medical History:   Diagnosis Date    Cancer (HCC) 2019

## 2024-10-03 ENCOUNTER — TELEPHONE (OUTPATIENT)
Dept: OBGYN | Age: 69
End: 2024-10-03

## 2024-10-03 NOTE — TELEPHONE ENCOUNTER
Patient has 3 bumps on her vagina she thinks they are ingrown hairs.  Dr. Heredia told her she didn't need to do anything about them but they bother her and she would like to have them examined and possibly removed if possible.     Can she schedule with a midwife or does she need to see a Doctor?

## 2024-10-04 NOTE — TELEPHONE ENCOUNTER
Pt chose to schedule as a new pt with Dr Pelayo in January.  She said she does not feel that these are anything to worry about and they have been checked by Dr Solorzano in the past.  She said if she feels any differently in the near future, that she would call to be seen sooner.

## 2024-10-14 RX ORDER — CYCLOBENZAPRINE HCL 5 MG
5-10 TABLET ORAL NIGHTLY
Qty: 180 TABLET | Refills: 1 | Status: SHIPPED | OUTPATIENT
Start: 2024-10-14

## 2024-10-14 NOTE — TELEPHONE ENCOUNTER
Last Appointment:  9/30/2024  Future Appointments   Date Time Provider Department Center   10/29/2024  1:15 PM Meghan Corcoran PA BDM PAIN MAR [facility]   1/30/2025 10:40 AM Manolo Mckeon MD [facility] DEP   1/30/2025  2:00 PM Marybel Pelayo MD ACC [facility]   6/30/2025  2:40 PM Manolo Mckeon MD [facility] DEP

## 2024-10-29 ENCOUNTER — OFFICE VISIT (OUTPATIENT)
Dept: PAIN MANAGEMENT | Age: 69
End: 2024-10-29
Payer: MEDICARE

## 2024-10-29 VITALS
WEIGHT: 167 LBS | TEMPERATURE: 98.2 F | OXYGEN SATURATION: 94 % | BODY MASS INDEX: 27.82 KG/M2 | HEART RATE: 90 BPM | DIASTOLIC BLOOD PRESSURE: 84 MMHG | RESPIRATION RATE: 16 BRPM | SYSTOLIC BLOOD PRESSURE: 126 MMHG | HEIGHT: 65 IN

## 2024-10-29 DIAGNOSIS — M47.812 CERVICAL SPONDYLOSIS: ICD-10-CM

## 2024-10-29 DIAGNOSIS — G89.4 CHRONIC PAIN SYNDROME: ICD-10-CM

## 2024-10-29 DIAGNOSIS — M79.10 MYALGIA: ICD-10-CM

## 2024-10-29 DIAGNOSIS — M47.816 LUMBAR FACET ARTHROPATHY: ICD-10-CM

## 2024-10-29 DIAGNOSIS — G89.29 CHRONIC BILATERAL LOW BACK PAIN WITHOUT SCIATICA: ICD-10-CM

## 2024-10-29 DIAGNOSIS — M54.16 LUMBAR RADICULOPATHY: Primary | ICD-10-CM

## 2024-10-29 DIAGNOSIS — M48.061 SPINAL STENOSIS OF LUMBAR REGION WITHOUT NEUROGENIC CLAUDICATION: ICD-10-CM

## 2024-10-29 DIAGNOSIS — M51.369 DEGENERATION OF INTERVERTEBRAL DISC OF LUMBAR REGION, UNSPECIFIED WHETHER PAIN PRESENT: ICD-10-CM

## 2024-10-29 DIAGNOSIS — G89.29 OTHER CHRONIC PAIN: ICD-10-CM

## 2024-10-29 DIAGNOSIS — M54.50 CHRONIC BILATERAL LOW BACK PAIN WITHOUT SCIATICA: ICD-10-CM

## 2024-10-29 DIAGNOSIS — M54.12 CERVICAL RADICULOPATHY: ICD-10-CM

## 2024-10-29 DIAGNOSIS — M54.12 RADICULOPATHY, CERVICAL: ICD-10-CM

## 2024-10-29 PROCEDURE — 99213 OFFICE O/P EST LOW 20 MIN: CPT | Performed by: PHYSICIAN ASSISTANT

## 2024-10-29 RX ORDER — HYDROCODONE BITARTRATE AND ACETAMINOPHEN 5; 325 MG/1; MG/1
1 TABLET ORAL 2 TIMES DAILY
Qty: 60 TABLET | Refills: 0 | Status: SHIPPED | OUTPATIENT
Start: 2024-10-29 | End: 2024-11-28

## 2024-10-29 NOTE — PROGRESS NOTES
Eleanor Holder presents to the [facility] on 10/29/2024. Eleanor is complaining of pain lower back. The pain is constant. The pain is described as aching, throbbing, shooting, stabbing, and dull. Pain is rated on her best day at a 7, on her worst day at a 10, and on average at a 8 on the VAS scale. She took her last dose of Neurontin and Flexeril today.     Any procedures since your last visit: No,    Pacemaker or defibrillator: No    She is not on NSAIDS and is not on anticoagulation medications.     Medication Contract and Consent for Opioid Use Documents Filed       Patient Documents       Type of Document Status Date Received Received By Description    Medication Contract Received  EBER HONG Opioid medication contract with Dr Wheatley 3/24/20    Medication Contract Received 4/26/2018  3:50 PM ANYA NUNO PAIN MANAGEMENT PATIENT AGREEMENT 4/26/2018    Medication Contract Received 11/5/2020  4:23 PM EBER HONG Opioid medication contract with Dr Wheatley    Medication Contract Received 3/1/2021  1:26 PM EBER HONG Opioid medication contract with Dr Wheatley    Medication Contract Received 8/23/2021  2:03 PM HAYLIE CADENA Medication contract    Medication Contract Received 10/18/2021  3:03 PM HAYLIE CADENA medication contract 10/18/2021    Medication Contract Signed 10/4/2022 12:35 PM CRISTIANA BAÑUELOS Pain Med. Contract 10/04/22    Medication Contract Received 10/11/2023  3:46 PM KAILYN WILKINSON Medication contract                    /84   Pulse 90   Temp 98.2 °F (36.8 °C) (Infrared)   Resp 16   Ht 1.651 m (5' 5\")   Wt 75.8 kg (167 lb)   LMP  (LMP Unknown)   SpO2 94%   BMI 27.79 kg/m²      No LMP recorded (lmp unknown). Patient is postmenopausal.  
bilaterally upper and lower  Range of motion:Generally normal shoulders  Intact:Yes  Cyanosis:none  Edema:Normal    Neurological:    Cranial nerves:normal                   Dermatology:    Skin:no unusual rashes, no skin lesions, no palpable subcutaneous nodules and good skin turgor    Assessment/Plan:    Chronic low back pain with radiation to the Right groin, patient had low back surgery 08/2017 L3-5 fusion, had lumbar spine CT with severe L2-3 stenosis, had L2-4 fusion followed by rt SIJ fusion with Dr. Wheatley  Chronic left-sided cervical pain  Pmhx: depression, DM, HTN, DLD, CLYDE on CPAP, RA, TIA  Retired, lives in senior apartments           Plan:  Patient is s/p:     PROCEDURE: Bilateral Transforaminal epidural steroid injection under fluoroscopic guidance at foraminal level L5 on 07/11/2024 with 85% relief.  This continues.  ROGELIO C7-T1 >80% relief, repeat with >80% Repeat with 70-80% relief.  Has failed MBB in the past.   RF Norco 5/325 BID     No RF Gabapentin 400 mg BID, RF 90 day supply  [place]ARRS report reviewed   UDS at future visit  Patient encouraged to stay active and to lose weight. Failed physical therapy \"many times over the past 30 years\"  Treatment plan discussed with the patient including medications side effects       Controlled Substance Monitoring:    Acute and Chronic Pain Monitoring:   RX Monitoring Periodic Controlled Substance Monitoring   10/29/2024   1:20 PM Possible medication side effects, risk of tolerance/dependence & alternative treatments discussed.;No signs of potential drug abuse or diversion identified.;Assessed functional status (ability to engage in work or other purposeful activities, the pain intensity and its interference with activities of daily living, quality of family life and social activities, and the physical activity);Obtaining appropriate analgesic effect of treatment.               ccreferring physic

## 2024-11-22 DIAGNOSIS — F41.8 DEPRESSION WITH ANXIETY: ICD-10-CM

## 2024-11-22 RX ORDER — HYDROXYZINE PAMOATE 25 MG/1
CAPSULE ORAL
Qty: 90 CAPSULE | Refills: 1 | Status: SHIPPED | OUTPATIENT
Start: 2024-11-22

## 2024-11-22 NOTE — TELEPHONE ENCOUNTER
Last Appointment:  9/30/2024  Future Appointments   Date Time Provider Department Center   11/26/2024  1:15 PM Meghan Corcoran PA BDM PAIN MAR [facility]   1/30/2025 10:40 AM Manolo Mckeon MD [facility] DEP   1/30/2025  2:00 PM Marybel Pelayo MD [facility]   6/30/2025  2:40 PM Manolo Mckeon MD [facility] DEP

## 2024-11-25 ENCOUNTER — TELEPHONE (OUTPATIENT)
Dept: OBGYN | Age: 69
End: 2024-11-25

## 2024-11-25 NOTE — TELEPHONE ENCOUNTER
Patient is schedule with Dr Pelayo on 1/30/24 and would like to get in sooner.  Patient is having pain during intercourse and there is staining of blood when she wipes.  Patient would like to be seen before the 1/30/24 date with any female provider.  No sooner appointments to offer.   Patient states if she can be schedule sooner and there is no answer please leave day and time on voicemail and she will take it.  Please advise.

## 2024-11-25 NOTE — TELEPHONE ENCOUNTER
Pt also stated its okay to leave a detailed message, its also okay to r/s the appt and leave the details on her VM, and she will make it work.

## 2024-11-25 NOTE — TELEPHONE ENCOUNTER
Pt called in, she wanted to know if she can move her new patient appt on 1/30/24 up sooner, she is having some issues, she is having pain during intercourse, and there is very tiny bit of blood after intercourse. Pt has been added to the waitlist. Please, advise. Thank you Eleanor can be reached at [phone].

## 2024-11-26 ENCOUNTER — OFFICE VISIT (OUTPATIENT)
Dept: PAIN MANAGEMENT | Age: 69
End: 2024-11-26
Payer: MEDICARE

## 2024-11-26 VITALS
DIASTOLIC BLOOD PRESSURE: 65 MMHG | HEIGHT: 65 IN | WEIGHT: 167 LBS | BODY MASS INDEX: 27.82 KG/M2 | HEART RATE: 96 BPM | OXYGEN SATURATION: 95 % | RESPIRATION RATE: 16 BRPM | SYSTOLIC BLOOD PRESSURE: 121 MMHG | TEMPERATURE: 98 F

## 2024-11-26 DIAGNOSIS — Z79.891 ENCOUNTER FOR LONG-TERM OPIATE ANALGESIC USE: ICD-10-CM

## 2024-11-26 DIAGNOSIS — M79.10 MYALGIA: ICD-10-CM

## 2024-11-26 DIAGNOSIS — M54.16 LUMBAR RADICULOPATHY: Primary | ICD-10-CM

## 2024-11-26 DIAGNOSIS — M51.369 DEGENERATION OF INTERVERTEBRAL DISC OF LUMBAR REGION, UNSPECIFIED WHETHER PAIN PRESENT: ICD-10-CM

## 2024-11-26 DIAGNOSIS — G89.4 CHRONIC PAIN SYNDROME: ICD-10-CM

## 2024-11-26 DIAGNOSIS — M54.12 CERVICAL RADICULOPATHY: ICD-10-CM

## 2024-11-26 DIAGNOSIS — M54.50 CHRONIC BILATERAL LOW BACK PAIN WITHOUT SCIATICA: ICD-10-CM

## 2024-11-26 DIAGNOSIS — M54.12 RADICULOPATHY, CERVICAL: ICD-10-CM

## 2024-11-26 DIAGNOSIS — G89.29 OTHER CHRONIC PAIN: ICD-10-CM

## 2024-11-26 DIAGNOSIS — M47.812 CERVICAL SPONDYLOSIS: ICD-10-CM

## 2024-11-26 DIAGNOSIS — M47.816 LUMBAR FACET ARTHROPATHY: ICD-10-CM

## 2024-11-26 DIAGNOSIS — G89.29 CHRONIC BILATERAL LOW BACK PAIN WITHOUT SCIATICA: ICD-10-CM

## 2024-11-26 DIAGNOSIS — M48.061 SPINAL STENOSIS OF LUMBAR REGION WITHOUT NEUROGENIC CLAUDICATION: ICD-10-CM

## 2024-11-26 PROCEDURE — 99213 OFFICE O/P EST LOW 20 MIN: CPT | Performed by: PHYSICIAN ASSISTANT

## 2024-11-26 PROCEDURE — 1159F MED LIST DOCD IN RCRD: CPT | Performed by: PHYSICIAN ASSISTANT

## 2024-11-26 PROCEDURE — 1123F ACP DISCUSS/DSCN MKR DOCD: CPT | Performed by: PHYSICIAN ASSISTANT

## 2024-11-26 PROCEDURE — 3078F DIAST BP <80 MM HG: CPT | Performed by: PHYSICIAN ASSISTANT

## 2024-11-26 PROCEDURE — 3074F SYST BP LT 130 MM HG: CPT | Performed by: PHYSICIAN ASSISTANT

## 2024-11-26 PROCEDURE — 1160F RVW MEDS BY RX/DR IN RCRD: CPT | Performed by: PHYSICIAN ASSISTANT

## 2024-11-26 RX ORDER — GABAPENTIN 400 MG/1
400 CAPSULE ORAL 2 TIMES DAILY
Qty: 180 CAPSULE | Refills: 0 | Status: SHIPPED | OUTPATIENT
Start: 2024-11-26 | End: 2025-02-24

## 2024-11-26 RX ORDER — HYDROCODONE BITARTRATE AND ACETAMINOPHEN 5; 325 MG/1; MG/1
1 TABLET ORAL 2 TIMES DAILY
Qty: 60 TABLET | Refills: 0 | Status: SHIPPED | OUTPATIENT
Start: 2024-11-27 | End: 2024-12-27

## 2024-11-26 NOTE — TELEPHONE ENCOUNTER
Called patient with no answer  A detailed message was left for patient that if she would like to change to a male provider we can get her in sooner with Dr. Dueñas otherwise the female providers in this office do not have anything sooner then 1/30/25

## 2024-11-26 NOTE — PROGRESS NOTES
Eleanor Holder presents to the [facility] on 11/26/2024. Eleanor is complaining of pain all over from upper mid and low. The pain is constant. The pain is described as aching, throbbing, shooting, stabbing, dull, sharp, burning, and numb. Pain is rated on her best day at a 6, on her worst day at a 10, and on average at a 8 on the VAS scale. She took her last dose of Norco and Neurontin this morning.     Any procedures since your last visit: No    Pacemaker or defibrillator: No managed by na.    She is not on NSAIDS and is not on anticoagulation medications to include none and is managed by na.     Do you want someone present when the provider examines you? No    Medication Contract and Consent for Opioid Use Documents Filed       Patient Documents       Type of Document Status Date Received Received By Description    Medication Contract Received  EBER HONG Opioid medication contract with Dr Wheatley 3/24/20    Medication Contract Received 4/26/2018  3:50 PM ANYA NUNO PAIN MANAGEMENT PATIENT AGREEMENT 4/26/2018    Medication Contract Received 11/5/2020  4:23 PM EBER HONG Opioid medication contract with Dr Wheatley    Medication Contract Received 3/1/2021  1:26 PM EBER HONG Opioid medication contract with Dr Wheatley    Medication Contract Received 8/23/2021  2:03 PM HAYLIE CADENA Medication contract    Medication Contract Received 10/18/2021  3:03 PM HAYLIE CADENA medication contract 10/18/2021    Medication Contract Signed 10/4/2022 12:35 PM CRISTIANA BAÑUELOS Pain Med. Contract 10/04/22    Medication Contract Received 10/11/2023  3:46 PM KAILYN WILKINSON Medication contract    Consent Opioid Use Received 10/29/2024  1:39 PM JAMIE RIVAS Consent Opioid Use                    /65   Pulse 96   Temp 98 °F (36.7 °C)   Resp 16   Ht 1.651 m

## 2024-11-26 NOTE — PROGRESS NOTES
[facility]  [address] [place] [id number]    Follow up Note      Eleanor Holder     Date of Visit:  2024    CC:  Patient presents for follow up   Chief Complaint   Patient presents with    Follow-up     Follow up           HPI:  Pain is unchanged.    Change in quality of symptoms:no.    Patient satisfaction with analgesia:fair.    Medication side effects: None.  Recent diagnostic testing:none.   Recent interventional procedures:   None.    She has been on anticoagulation medications to include NSAIDS. The patient  has not been on herbal supplements.  The patient is diabetic.    Imagin2023 MRI cervical spine    FINDINGS:   BONES/ALIGNMENT: There is normal alignment of the spine. The vertebral body   heights are maintained. The bone marrow signal appears unremarkable.       SPINAL CORD: No abnormal cord signal is seen.       SOFT TISSUES: No paraspinal mass identified.       C2-C3: Grade 1 anterolisthesis.  Severe left facet arthropathy resulting in   severe left neural foraminal narrowing.       C3-C4: Grade 1 anterolisthesis with disc bulging.  Severe left and moderate   right facet arthropathy.  Findings resulting in severe left neural foraminal   narrowing       C4-C5: Grade 1 anterolisthesis with disc bulging.  Moderate bilateral facet   arthropathy.  Mild left neural foraminal narrowing.       C5-C6: Grade 1 anterolisthesis with disc bulging.  Moderate bilateral facet   arthropathy.  No canal or foraminal stenosis.       C6-C7: Disc bulging with 2 mm central disc protrusion.  Mild bilateral facet   arthropathy.  Otherwise unremarkable       C7-T1: Grade 1 anterolisthesis.  Mild bilateral facet arthropathy.  Otherwise   unremarkable           Impression   At C2-C3, severe left neural foraminal narrowing.       At C3-C4, severe left neural foraminal narrowing.       No canal stenosis.  No significant posterior disc pathology.         2022 Cervical spine

## 2024-11-27 LAB
6-MAM, QUANTITATIVE, URINE: <10 NG/ML
6-MONOACETYLMORPHINE, URINE: NEGATIVE
7-AMINOCLONAZEPAM, QUANTITATIVE, URINE: <50 NG/ML
ABNORMAL SPECIMEN VALIDITY TEST: ABNORMAL
ALCOHOL URINE: NOT DETECTED MG/DL
ALPHA-HYDROXYALPRAZOLAM, QUANTITATIVE, URINE: <50 NG/ML
ALPHA-HYDROXYMIDAZOLAM, QUANTITATIVE, URINE: <50 NG/ML
ALPHA-HYDROXYTRIAZOLAM, QUANTITATIVE, URINE: <50 NG/ML
ALPRAZOLAM URINE QUANT: <50 NG/ML
AMPHETAMINE SCREEN URINE: NEGATIVE
BARBITURATE SCREEN URINE: NEGATIVE
BENZODIAZEPINE SCREEN, URINE: NEGATIVE
BUPRENORPHINE URINE: NEGATIVE
CANNABINOID SCREEN URINE: NEGATIVE
CHLORDIAZEPOXIDE, QUANTITATIVE, URINE: <50 NG/ML
CLONAZEPAM, QUANTITATIVE, URINE: <50 NG/ML
COCAINE METABOLITE, URINE: NEGATIVE
CODEINE, QUANTITATIVE, URINE: <50 NG/ML
COMPLIANCE DRUG ANALYSIS, URINE: NORMAL
DIAZEPAM URINE QUANT: <50 NG/ML
FENTANYL URINE: NEGATIVE
FLUNITRAZEPAM, QUANTITATIVE, URINE: <50 NG/ML
FLURAZEPAM, QUANTITATIVE, URINE: <50 NG/ML
HYDROCODONE, QUANTITATIVE, URINE: 891.7 NG/ML
HYDROMORPHONE, QUANTITATIVE, URINE: 83.2 NG/ML
INTEGRITY CHECK, CREATININE, URINE: 57.4 MG/DL (ref 22–250)
INTEGRITY CHECK, OXIDANT, URINE: 70 MG/L
INTEGRITY CHECK, PH, URINE: 6.3 (ref 4.5–9)
INTEGRITY CHECK, SPECIFIC GRAVITY, URINE: 1.01 (ref 1–1.03)
LORAZEPAM URINE QUANT: <50 NG/ML
METHADONE SCREEN, URINE: NEGATIVE
MIDAZOLAM URINE QUANT: <50 NG/ML
MORPHINE, QUANTITATIVE, URINE: <50 NG/ML
NORDIAZEPAM URINE QUANT: <50 NG/ML
NORHYDROCODONE, QUANTITATIVE, URINE: >1000 NG/ML
NOROXYCODONE, QUANTITATIVE, URINE: <50 NG/ML
OPIATES, URINE: POSITIVE
OXAZEPAM URINE QUANT: <50 NG/ML
OXYCODONE SCREEN URINE: NEGATIVE
OXYCODONE URINE, QUANTITATIVE: <50 NG/ML
OXYMORPHONE, QUANTITATIVE, URINE: <50 NG/ML
PCP,URINE: NEGATIVE
TEMAZEPAM, QUANTITATIVE, URINE: <50 NG/ML
TEST INFORMATION: ABNORMAL
TRAMADOL, URINE: NEGATIVE

## 2024-12-02 ENCOUNTER — PREP FOR PROCEDURE (OUTPATIENT)
Dept: PAIN MANAGEMENT | Age: 69
End: 2024-12-02

## 2024-12-10 ENCOUNTER — TELEPHONE (OUTPATIENT)
Dept: PAIN MANAGEMENT | Age: 69
End: 2024-12-10

## 2024-12-10 DIAGNOSIS — M54.16 LUMBAR RADICULOPATHY: Primary | ICD-10-CM

## 2024-12-10 NOTE — TELEPHONE ENCOUNTER
Call to Eleanor Zarate Holder and message left that procedure was scheduled for 12/17/2024 and that [facility] should call her a few days before for the pre op call and between 2:00 PM and 4:00 PM  the business day before with the arrival time. Instructed Eleanor to hold ibuprofen for 24 hours, Celebrex, Mobic, and naprosyn for 4 days and any aspirin containing products, CoQ-10, or fish oil for 7 days. Instructed to call office back. Advised that if they do not return the call it may result in their procedure being delayed.    Electronically signed by Quoc Schultz RN on 12/10/2024 at 12:43 PM

## 2024-12-14 ENCOUNTER — HOSPITAL ENCOUNTER (EMERGENCY)
Age: 69
Discharge: HOME | End: 2024-12-14
Attending: STUDENT IN AN ORGANIZED HEALTH CARE EDUCATION/TRAINING PROGRAM
Payer: MEDICARE

## 2024-12-14 VITALS
DIASTOLIC BLOOD PRESSURE: 94 MMHG | HEIGHT: 65 IN | WEIGHT: 170 LBS | HEART RATE: 91 BPM | RESPIRATION RATE: 20 BRPM | BODY MASS INDEX: 28.32 KG/M2 | OXYGEN SATURATION: 96 % | SYSTOLIC BLOOD PRESSURE: 163 MMHG | TEMPERATURE: 98.5 F

## 2024-12-14 DIAGNOSIS — H40.9 GLAUCOMA OF BOTH EYES, UNSPECIFIED GLAUCOMA TYPE: ICD-10-CM

## 2024-12-14 DIAGNOSIS — H10.9 CONJUNCTIVITIS OF RIGHT EYE, UNSPECIFIED CONJUNCTIVITIS TYPE: Primary | ICD-10-CM

## 2024-12-14 DIAGNOSIS — L03.213 PERIORBITAL CELLULITIS OF RIGHT EYE: ICD-10-CM

## 2024-12-14 PROCEDURE — 99283 EMERGENCY DEPT VISIT LOW MDM: CPT

## 2024-12-14 RX ORDER — ERYTHROMYCIN 5 MG/G
1 OINTMENT OPHTHALMIC EVERY 6 HOURS
Qty: 3.5 G | Refills: 0 | Status: SHIPPED | OUTPATIENT
Start: 2024-12-14 | End: 2024-12-19

## 2024-12-14 RX ORDER — LEVOFLOXACIN 750 MG/1
750 TABLET, FILM COATED ORAL DAILY
Qty: 5 TABLET | Refills: 0 | Status: SHIPPED | OUTPATIENT
Start: 2024-12-14 | End: 2024-12-19

## 2024-12-14 ASSESSMENT — PAIN - FUNCTIONAL ASSESSMENT: PAIN_FUNCTIONAL_ASSESSMENT: NONE - DENIES PAIN

## 2024-12-14 NOTE — DISCHARGE INSTRUCTIONS
You were seen in the emergency room today for your eye issue.  It appeared on exam you had some mild cellulitis as well as conjunctivitis for which I wrote you for 2 antibiotics.  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  If you start experiencing worsening eye pain, swelling, you are noticing pain when you are moving your eyeball around behind your eye, you are losing vision please return to the emergency department.

## 2024-12-14 NOTE — ED PROVIDER NOTES
[facility] EMERGENCY DEPARTMENT  EMERGENCY DEPARTMENT ENCOUNTER        Pt Name: Eleanor Holder  MRN: [id number]  Birthdate 1955  Date of evaluation: 12/14/2024  Provider: Rivas Squires MD  PCP: Manolo Mckeon MD  Note Started: 12:38 PM EST 12/14/24    CHIEF COMPLAINT       Chief Complaint   Patient presents with    Eye Problem       HISTORY OF PRESENT ILLNESS: 1 or more Elements        Limitations to history : None    Eleanor Holder is a 69 y.o. female who presents to the emergency room for eye itching, irritation, swelling, ongoing for the last 3 days.  She describes the itching to be mostly located near her tear duct.  Denies getting anything in her eye or having eye pain.  Denies any changes in vision.  Does not use contact lenses.  Does not have pain with extraocular motion.     Nursing Notes were all reviewed and agreed with or any disagreements were addressed in the HPI.      REVIEW OF EXTERNAL NOTE :       Medicare visit 12/18/2023 patient has a history of smoking and hypertension    REVIEW OF SYSTEMS :      Positives and Pertinent negatives as per HPI.     SURGICAL HISTORY     Past Surgical History:   Procedure Laterality Date    ANESTHESIA NERVE BLOCK Left 02/26/2019    LEFT C3,4,5 MBB performed by Baljit Dorsey MD at [facility] OR    ANESTHESIA NERVE BLOCK Right 08/12/2019    BILATERAL TRANSFORAMINAL EPIDURAL STEROID INJECTION S1 #3 performed by Baljit Dorsey MD at [facility] OR    ANESTHESIA NERVE BLOCK Right 06/16/2020    RIGHT SACROILIAC JOINT INJECTION      CPT: 89668 performed by Inez Montelongo DO at [facility] OR    ANKLE SURGERY  2005    Left    ANKLE SURGERY Left 07/08/2022    REPAIR OF ANTERIOR TALAR LIGAMENT LEFT ANKLE, REPAIR DELTOID LIGAMENT LEFT ANKLE performed by Yariel Haro DPM at [facility] OR    ARTHRODESIS Left 12/14/2018    ARTHRODESIS 2ND DIGIT LEFT FOOT performed by Yariel Haro DPM at [facility] OR    BACK SURGERY  08/16/2017    PLIF L3-L4, L4-L5 with  significant eye pain and eye pressures were 26 post signs consistent with mild glaucoma but not consistent with open angle glaucoma.  Considered orbital cellulitis, patient had no changes in vision, and no pain with extraocular motion to suggest this.  Considered conjunctivitis, on exam patient did have conjunctival irritation with no chemosis present, with some mild eyelid swelling and erythema to suggest associated irritation versus periorbital cellulitis.  Did have a history of penicillin allergy, she was written for Levaquin for this reason, considered Pseudomonas prophylaxis but patient was not a contact lens wearer, is written for erythromycin ointment as well for treatment of conjunctivitis as well as soothing the irritation and itching she was describing.  Patient discharge, return precautions given, recommended following up with an ophthalmologist, agreeable with this plan. [JG]      ED Course User Index  [JG] Rivas Squires MD       Medical Decision Making  Problems Addressed:  Conjunctivitis of right eye, unspecified conjunctivitis type: acute illness or injury  Glaucoma of both eyes, unspecified glaucoma type: chronic illness or injury  Periorbital cellulitis of right eye: acute illness or injury    Amount and/or Complexity of Data Reviewed  External Data Reviewed: notes.    Risk  Prescription drug management.              CONSULTS: (Who and What was discussed)  None        I am the Primary Clinician of Record.    FINAL IMPRESSION      1. Conjunctivitis of right eye, unspecified conjunctivitis type    2. Glaucoma of both eyes, unspecified glaucoma type    3. Periorbital cellulitis of right eye          DISPOSITION/PLAN     DISPOSITION Decision To Discharge 12/14/2024 12:58:22 PM      PATIENT REFERRED TO:  Manolo Mckeon MD  [address] [facility] [address]  [address] [place] [id number] [phone]    Call in 1 day      Manolo Wagoner MD  [address] [place]

## 2024-12-17 ENCOUNTER — HOSPITAL ENCOUNTER (OUTPATIENT)
Age: 69
Setting detail: OUTPATIENT SURGERY
Discharge: HOME | End: 2024-12-17
Attending: PAIN MEDICINE | Admitting: PAIN MEDICINE
Payer: MEDICARE

## 2024-12-17 ENCOUNTER — HOSPITAL ENCOUNTER (OUTPATIENT)
Dept: GENERAL RADIOLOGY | Age: 69
Setting detail: OUTPATIENT SURGERY
Discharge: HOME | End: 2024-12-19
Attending: PAIN MEDICINE
Payer: MEDICARE

## 2024-12-17 VITALS
HEIGHT: 65 IN | WEIGHT: 167 LBS | DIASTOLIC BLOOD PRESSURE: 82 MMHG | RESPIRATION RATE: 15 BRPM | BODY MASS INDEX: 27.82 KG/M2 | OXYGEN SATURATION: 94 % | SYSTOLIC BLOOD PRESSURE: 149 MMHG | TEMPERATURE: 98.2 F | HEART RATE: 85 BPM

## 2024-12-17 DIAGNOSIS — R52 PAIN MANAGEMENT: ICD-10-CM

## 2024-12-17 LAB — GLUCOSE BLD-MCNC: 105 MG/DL (ref 74–99)

## 2024-12-17 PROCEDURE — 6360000002 HC RX W HCPCS: Performed by: PAIN MEDICINE

## 2024-12-17 PROCEDURE — 64483 NJX AA&/STRD TFRM EPI L/S 1: CPT | Performed by: PAIN MEDICINE

## 2024-12-17 PROCEDURE — 3600000002 HC SURGERY LEVEL 2 BASE: Performed by: PAIN MEDICINE

## 2024-12-17 PROCEDURE — 2709999900 HC NON-CHARGEABLE SUPPLY: Performed by: PAIN MEDICINE

## 2024-12-17 PROCEDURE — 7100000010 HC PHASE II RECOVERY - FIRST 15 MIN: Performed by: PAIN MEDICINE

## 2024-12-17 PROCEDURE — 82947 ASSAY GLUCOSE BLOOD QUANT: CPT

## 2024-12-17 PROCEDURE — 7100000011 HC PHASE II RECOVERY - ADDTL 15 MIN: Performed by: PAIN MEDICINE

## 2024-12-17 PROCEDURE — 6360000004 HC RX CONTRAST MEDICATION: Performed by: PAIN MEDICINE

## 2024-12-17 RX ORDER — METHYLPREDNISOLONE ACETATE 40 MG/ML
INJECTION, SUSPENSION INTRA-ARTICULAR; INTRALESIONAL; INTRAMUSCULAR; SOFT TISSUE PRN
Status: DISCONTINUED | OUTPATIENT
Start: 2024-12-17 | End: 2024-12-17 | Stop reason: ALTCHOICE

## 2024-12-17 RX ORDER — IOPAMIDOL 612 MG/ML
INJECTION, SOLUTION INTRATHECAL PRN
Status: DISCONTINUED | OUTPATIENT
Start: 2024-12-17 | End: 2024-12-17 | Stop reason: ALTCHOICE

## 2024-12-17 RX ORDER — LIDOCAINE HYDROCHLORIDE 5 MG/ML
INJECTION, SOLUTION INFILTRATION; INTRAVENOUS PRN
Status: DISCONTINUED | OUTPATIENT
Start: 2024-12-17 | End: 2024-12-17 | Stop reason: ALTCHOICE

## 2024-12-17 ASSESSMENT — PAIN DESCRIPTION - DESCRIPTORS: DESCRIPTORS: ACHING;SHARP;SHOOTING;DISCOMFORT

## 2024-12-17 ASSESSMENT — PAIN - FUNCTIONAL ASSESSMENT: PAIN_FUNCTIONAL_ASSESSMENT: 0-10

## 2024-12-17 NOTE — DISCHARGE INSTRUCTIONS
[facility] Pain Management Department  [place] Htrohr-[phone]  Dr. Juarez Montelongo   Post-Pain Block/Radiofrequency  Home Going Instructions    1-Go home, rest for the remainder of the day  2-Please do not lift over 20 pounds the day of the injection  3-If you received sedation No: alcohol, driving, operating lawn mowers, plows, tractors or other dangerous equipment until next morning. Do not make important decisions or sign legal documents for 24 hours. You may experience light headedness, dizziness, nausea or sleepiness after sedation. Do not stay alone. A responsible adult must be with you for 24 hours. You could be nauseated from the medications you have received. Your IV site may be sore and bruised.    4-No dietary restrictions     5-Resume all medications the same day, blood thinners to be resumed 24 hours after injection if you were instructed to stop any.    6-Keep the surgical site clean and dry, you may shower the next morning and remove the      dressing.     7- No sitz baths, tub baths or hot tubs/swimming for 24 hours.       8- If you have any pain at the injection site(s), application of an ice pack to the area should be       helpful, 20 minutes on/20 minutes off for next 48 hours.  9- Call [facility] immediately at if you develop.  Fever greater than 100.4 F  Have bleeding or drainage from the puncture site  Have progressive Leg/arm numbness and or weakness  Loss of control of bowel and or bladder (wet/soil yourself)  Severe headache with inability to lift head  10-You may return to work the next day

## 2024-12-17 NOTE — H&P
[facility]        [address] [place] [id number]  Dept: [phone]    Procedure History & Physical      Eleanor Holder     HPI:    Patient  is here for LBP BLE pain for b/l L5 TFESI  Labs/imaging studies reviewed   All question and concerns addressed including R/B/A associated with the procedure    Past Medical History:   Diagnosis Date    Cancer (HCC) 2019    tongue    Cervical radiculopathy     Chronic back pain     Costochondritis     h/o    Depression     Diabetes mellitus (HCC)     Fibromyalgia     HTN (hypertension)     Hyperlipidemia     Leg pain     CLYDE on CPAP     Osteoarthritis     \"everywhere\"    Rheumatoid arthritis(714.0)     TIA (transient ischemic attack)     no deficits        Past Surgical History:   Procedure Laterality Date    ANESTHESIA NERVE BLOCK Left 2019    LEFT C3,4,5 MBB performed by Baljit Dorsey MD at [facility] OR    ANESTHESIA NERVE BLOCK Right 2019    BILATERAL TRANSFORAMINAL EPIDURAL STEROID INJECTION S1 #3 performed by Baljit Dorsey MD at [facility] OR    ANESTHESIA NERVE BLOCK Right 2020    RIGHT SACROILIAC JOINT INJECTION      CPT: 72304 performed by Inez Montelongo DO at [facility] OR    ANKLE SURGERY      Left    ANKLE SURGERY Left 2022    REPAIR OF ANTERIOR TALAR LIGAMENT LEFT ANKLE, REPAIR DELTOID LIGAMENT LEFT ANKLE performed by Yariel Haro DPM at [facility] OR    ARTHRODESIS Left 2018    ARTHRODESIS 2ND DIGIT LEFT FOOT performed by Yariel Haro DPM at [facility] OR    BACK SURGERY  2017    PLIF L3-L4, L4-L5 with rods, screws, and cages/Dr. Adams    BACK SURGERY  2020    BACK SURGERY Right 2021    RIGHT PERCUTANEOUS SACROILIAC JOINT FUSION performed by Samir Wheatley MD [facility] [place]    BREAST BIOPSY      BREAST REDUCTION SURGERY      BREAST SURGERY      reduction, bilat     SECTION  1993    CHOLECYSTECTOMY      COLONOSCOPY  2015    COLONOSCOPY N/A 2020    COLONOSCOPY DIAGNOSTIC

## 2024-12-17 NOTE — OP NOTE
2024    Patient: Eleanor Holder  :  1955  Age:  69 y.o.  Sex:  female     PRE-OPERATIVE DIAGNOSIS: Lumbar disc displacement, lumbar neural foraminal stenosis, lumbar radiculopathy.     POST-OPERATIVE DIAGNOSIS: Same.    PROCEDURE: Bilateral Transforaminal epidural steroid injection under fluoroscopic guidance at foraminal level L5.    SURGEON: ZARA Montelongo D.O.    ANESTHESIA: local    ESTIMATED BLOOD LOSS: None.  ______________________________________________________________________  BRIEF HISTORY: Eelanor Holder comes in today for the Bilateral transforaminal epidural steroid injection under fluoroscopic guidance at foraminal level L5. The potential complications of this procedure were discussed with her again today.  She has elected to undergo the aforementioned procedure.     Eleanor’s complete History & Physical examination were reviewed in depth, a copy of which is in the chart.      DESCRIPTION OF PROCEDURE:    After confirming written and informed consent, a time-out was performed and Eleanor’s name and date of birth, the procedure to be performed as well as the plan for the location of the needle insertion were confirmed.    The patient was brought into the procedure room and placed in the prone position on the fluoroscopy table. Standard monitors were placed and vital signs were observed throughout the procedure. The area of the lumbar spine was prepped with chloraprep and draped in a sterile manner. The vertebral body was identified with AP fluoroscopy. An oblique view was obtained to better visualize the inferior junction of the pedicle and transverse process . The 6 o'clock position of the pedicle was marked and identified. The skin and subcutaneous tissue were anesthetized with 0.5% lidocaine. A # 22 gauge pencil point needle was directed toward the targeted point under fluoroscopy until bone was contacted. The needle was then walked inferiorly until the neural foramen was entered . A lateral

## 2024-12-27 ENCOUNTER — SCHEDULED TELEPHONE ENCOUNTER (OUTPATIENT)
Dept: PAIN MANAGEMENT | Age: 69
End: 2024-12-27

## 2024-12-27 DIAGNOSIS — M54.16 LUMBAR RADICULOPATHY: Primary | ICD-10-CM

## 2024-12-27 DIAGNOSIS — M54.12 CERVICAL RADICULOPATHY: ICD-10-CM

## 2024-12-27 DIAGNOSIS — M47.812 CERVICAL SPONDYLOSIS: ICD-10-CM

## 2024-12-27 DIAGNOSIS — M54.50 CHRONIC BILATERAL LOW BACK PAIN WITHOUT SCIATICA: ICD-10-CM

## 2024-12-27 DIAGNOSIS — M51.369 DEGENERATION OF INTERVERTEBRAL DISC OF LUMBAR REGION, UNSPECIFIED WHETHER PAIN PRESENT: ICD-10-CM

## 2024-12-27 DIAGNOSIS — G89.29 CHRONIC BILATERAL LOW BACK PAIN WITHOUT SCIATICA: ICD-10-CM

## 2024-12-27 DIAGNOSIS — M79.10 MYALGIA: ICD-10-CM

## 2024-12-27 DIAGNOSIS — G89.4 CHRONIC PAIN SYNDROME: ICD-10-CM

## 2024-12-27 DIAGNOSIS — M54.12 RADICULOPATHY, CERVICAL: ICD-10-CM

## 2024-12-27 DIAGNOSIS — M47.816 LUMBAR FACET ARTHROPATHY: ICD-10-CM

## 2024-12-27 DIAGNOSIS — M48.061 SPINAL STENOSIS OF LUMBAR REGION WITHOUT NEUROGENIC CLAUDICATION: ICD-10-CM

## 2024-12-27 RX ORDER — HYDROCODONE BITARTRATE AND ACETAMINOPHEN 5; 325 MG/1; MG/1
1 TABLET ORAL 2 TIMES DAILY
Qty: 60 TABLET | Refills: 0 | Status: SHIPPED | OUTPATIENT
Start: 2024-12-27 | End: 2025-01-26

## 2024-12-27 NOTE — PROGRESS NOTES
Eleanor Holder was read the following message “We want to confirm that, for purposes of billing, this is a virtual visit with your provider for which we will submit a claim for reimbursement with your insurance company. You will be responsible for any copays, coinsurance amounts or other amounts not covered by your insurance company. If you do not accept this, unfortunately we will not be able to schedule or proceed with a virtual visit with the provider. Do you accept? Eleanor responded Yes .

## 2024-12-27 NOTE — PROGRESS NOTES
[facility]  [address] [place] [address]    Telephone follow up visit      Date of Visit:  2024    CC: follow up    Consent:  Telephone follow up due to COVID 19 pandemic   The patient and/or health care decision maker is aware that he/she may receive a bill for this telephone service, depending on his insurance coverage, and has provided verbal consent to proceed: Yes    I have considered the risks of abuse, dependence, addiction and diversion. My patient is aware that they will need a follow-up visit (in-person or virtually) at the appropriate time indicated for continued medications. Further, my patient is aware that when this acute crisis has lifted, they will be expected to return for an in-person visit and all elements of standard local and hospital guidelines in order to continue this medication.      Patient location: Home   Physician Location:Other address in [place]    HPI:  Pain is better to her lower back.  Overall, doing well.  Appropriate analgesia with current medications regimen: yes.    Change in quality of symptoms:no.    Medication side effects:none.   Recent diagnostic testing:none.   Recent interventional procedures: 2024  Bilateral Transforaminal epidural steroid injection under fluoroscopic guidance at foraminal level L5 - 80% relief.    She has been on anticoagulation medications to include NSAIDS. The patient  has not been on herbal supplements.  The patient is diabetic.     Imagin2023 MRI cervical spine     FINDINGS:   BONES/ALIGNMENT: There is normal alignment of the spine. The vertebral body   heights are maintained. The bone marrow signal appears unremarkable.       SPINAL CORD: No abnormal cord signal is seen.       SOFT TISSUES: No paraspinal mass identified.       C2-C3: Grade 1 anterolisthesis.  Severe left facet arthropathy resulting in   severe left neural foraminal narrowing.       C3-C4: Grade 1 anterolisthesis with disc bulging.

## 2025-01-08 RX ORDER — CYCLOBENZAPRINE HCL 10 MG
TABLET ORAL
Qty: 30 TABLET | Refills: 1 | OUTPATIENT
Start: 2025-01-08

## 2025-01-16 DIAGNOSIS — F41.8 DEPRESSION WITH ANXIETY: ICD-10-CM

## 2025-01-16 RX ORDER — HYDROXYZINE PAMOATE 25 MG/1
CAPSULE ORAL
Qty: 90 CAPSULE | Refills: 1 | Status: SHIPPED | OUTPATIENT
Start: 2025-01-16

## 2025-01-16 NOTE — PROGRESS NOTES
PA submitted to plan via CMM.  KEY:  [id number]   Diann Aguilera,  at [address]   2005     Left    HARDWARE REMOVAL FOOT / ANKLE Left 12/14/2018     REMOVAL OF PAINFUL HARDWARE LEFT FOOT  ++SYNTHES++ performed by Robina Nye DPM at [address] [facility]     inguinal     LUMBAR SPINE SURGERY N/A 3/4/2020     EXPLORATION OF PRIOR L3-L5 FUSION AND L2-L3  POSTERIOR LUMBAR INTERBODY FUSION -- NEEDS O-ARM, AUDIOLOGY, CAGES, PLATES, SCREWS, C-ARM, TERESA TABLE, CELL SAVER, PLATELET GEL -- GLOBUS performed by Samantha Serrato MD at [facility] Bilateral 05/07/2018     L1-2 lumbar foramen #1    NERVE BLOCK Bilateral 12/03/2018     s1 tfesi    NERVE BLOCK Bilateral 08/12/2019    NERVE BLOCK Right 09/30/2019     sacral radiofrequency    OTHER SURGICAL HISTORY Left 9/25/13     left foot tarso metatarsal joint injection    OTHER SURGICAL HISTORY Left 5/27/15     Endoscopic Gastroc recession left, Lapidus left foot and  Excision of exostosis left foot    MD INJECT ANES/STEROID FORAMEN LUMBAR/SACRAL W IMG GUIDE ,1 LEVEL Bilateral 12/3/2018     BILATERAL S1  EPIDURAL STEROID INJECTION performed by Lavon Kim MD at [address] [facility] DX/THER SBST INTRLMNR LMBR/SAC W/IMG GDN N/A 8/21/2018     EPIDURAL STEROID INJECTION L1-2 WITH LOW VOL performed by Lavon Kim MD at [address] [facility] NOSE/CLEFT LIP/TIP Bilateral 5/7/2018     BILATERAL L1-2 LUMBAR FORAMEN #1 performed by Lavon Kim MD at [address] [facility] NOSE/CLEFT LIP/TIP Bilateral 6/4/2018     BILATERAL TRANSFORAMINAL EPIDURAL STEROID INJECTION UNDER FLUOROSCOPIC GUIDANCE @ FORAMINAL LEVEL L1-2 #2 performed by Lavon Kim MD at [address] Right 9/30/2019     RIGHT SACRAL RADIOFREQUENCY ABLATION performed by Lavon Kim MD at [address]   [address], 2005     Big Left Toe    TONSILLECTOMY        WISDOM TOOTH EXTRACTION             Pertinent Medical History:   Past Medical History        Past Medical for clothing management and thoroughness of hygiene  Supervision    Bed Mobility  Log roll: Min A  Supine to sit: Moderate Assist   Sit to supine:NT Min A- supine<->sit  Educated pt on log rolling technique to increase independence.    Supine to sit: Supervision   Sit to supine: Supervision    Functional Transfers Moderate Assist with sit <> stand, SPT using ww  Min A- sit<->stand  Cuing for hand placement and body mechanics Supervision    Functional Mobility Moderate Assist with ww few steps towards bedside chair Min A  Home distance using w/w  Supervision    Balance Sitting:     Static:  SBA    Dynamic:Min A  Standing: Mod A Sitting:     Static:  SBA    Dynamic:Min A  Standing: Min A      Activity Tolerance Poor with lt. ax. Required increased time with ax. d/t perseveration on pain.  spO2 98%   bpm Fair-  Fair with lt/mod. ax. Visual/  Perceptual Glasses: yes          Safety Awareness Fair-   fair                   Fair+       Comments: Upon arrival pt supine in bed. Educated pt on spinal precautions, demonstrates good- understanding. Pt educated on adaptive techniques to increase independence and safety during ADL's, bed mobility, and functional transfers while maintaining precautions. At end of session pt left seated in bedside chair, call light within reach. · Pt has made good progress towards set goals.      · Continue with current plan of care    Treatment Time In: 9:30            Treatment Time Out: 9:55             Treatment Charges: Mins Units   Ther Ex  [id number]     Manual Therapy [phone]     Thera Activities [id number] 15 1   ADL/Home Mgt [id number] 10 1   Neuro Re-ed [id number]     Group Therapy      Orthotic manage/training  [id number]     Non-Billable Time     Total Timed Treatment 25 2       Phani Palomino

## 2025-01-16 NOTE — TELEPHONE ENCOUNTER
Name of Medication(s) Requested:  Requested Prescriptions     Pending Prescriptions Disp Refills    hydrOXYzine pamoate (VISTARIL) 25 MG capsule [Pharmacy Med Name: hydroxyzine pamoate 25 mg capsule] 90 capsule 1     Sig: TAKE ONE CAPSULE BY MOUTH THREE TIMES DAILY AS NEEDED FOR ANXIETY. Watch FOR sedation.       Medication is on current medication list Yes    Dosage and directions were verified? Yes    Quantity verified: 90 day supply     Pharmacy Verified?  Yes    Last Appointment:  9/30/2024    Future appts:  Future Appointments   Date Time Provider Department Center   1/23/2025  1:15 PM Meghan Corcoran PA [facility] PAIN [facility]   1/30/2025  2:00 PM Marybel Pelayo MD [facility]   1/31/2025 10:40 AM Manolo Mckeon MD [facility] DEP   6/30/2025  2:40 PM Manolo Mckeon MD [facility] DE[facility]        (If no appt send self scheduling link. .REFILLAPPT)  Scheduling request sent?     [] Yes  [x] No    Does patient need updated?  [] Yes  [x] No

## 2025-01-17 ENCOUNTER — HOSPITAL ENCOUNTER (EMERGENCY)
Age: 70
Discharge: HOME | End: 2025-01-17
Attending: EMERGENCY MEDICINE
Payer: MEDICARE

## 2025-01-17 VITALS
SYSTOLIC BLOOD PRESSURE: 168 MMHG | RESPIRATION RATE: 16 BRPM | OXYGEN SATURATION: 95 % | TEMPERATURE: 97.9 F | HEART RATE: 96 BPM | DIASTOLIC BLOOD PRESSURE: 82 MMHG | BODY MASS INDEX: 29.16 KG/M2 | WEIGHT: 175 LBS | HEIGHT: 65 IN

## 2025-01-17 DIAGNOSIS — H10.9 CONJUNCTIVITIS OF LEFT EYE, UNSPECIFIED CONJUNCTIVITIS TYPE: Primary | ICD-10-CM

## 2025-01-17 PROCEDURE — 99283 EMERGENCY DEPT VISIT LOW MDM: CPT

## 2025-01-17 RX ORDER — LEVOFLOXACIN 750 MG/1
750 TABLET, FILM COATED ORAL DAILY
Qty: 5 TABLET | Refills: 0 | Status: SHIPPED | OUTPATIENT
Start: 2025-01-17 | End: 2025-01-22

## 2025-01-17 RX ORDER — ERYTHROMYCIN 5 MG/G
1 OINTMENT OPHTHALMIC EVERY 6 HOURS
Qty: 3.5 G | Refills: 0 | Status: SHIPPED | OUTPATIENT
Start: 2025-01-17

## 2025-01-17 ASSESSMENT — VISUAL ACUITY
OS: 20/50
OU: 20/30
OD: 20/25

## 2025-01-17 ASSESSMENT — PAIN - FUNCTIONAL ASSESSMENT: PAIN_FUNCTIONAL_ASSESSMENT: NONE - DENIES PAIN

## 2025-01-17 NOTE — ED PROVIDER NOTES
eye doctor in 1-2 days    Patient appears well, nontoxic, neurovascularly intact and ambulatory upon discharge.  Patient was explicitly instructed on specific signs and symptoms on which to return to the emergency room for. Patient was instructed to return to the ER for any new or worsening symptoms. Additional discharge instructions were given verbally. All questions were answered. Patient is comfortable and agreeable with discharge plan. Patient in no acute distress and non-toxic in appearance.     Shared decision making was used to determine testing, treatments and disposition.      Counseling:  The emergency provider has spoken with the patient and discussed today’s results, in addition to providing specific details for the plan of care and counseling regarding the diagnosis and prognosis.  Questions are answered at this time and they are agreeable with the plan.    I am the Primary Clinician of Record.     --------------------------------- IMPRESSION AND DISPOSITION ---------------------------------    IMPRESSION  1. Conjunctivitis of left eye, unspecified conjunctivitis type        DISPOSITION  Disposition: Discharge to home  Patient condition is stable    PATIENT REFERRED TO:  Aislinn Diaz MD  [address] [facility] [address] [phone]    Call today  eye doctor    Manolo Mckeon MD  [address]  [address] [place] [id number] [phone]          [facility] Emergency Department  [facility] [address] [phone]  Go to   If symptoms worsen      DISCHARGE MEDICATIONS:  New Prescriptions    ERYTHROMYCIN (ROMYCIN) 5 MG/GM OPHTHALMIC OINTMENT    Place 1 cm into the left eye every 6 hours    LEVOFLOXACIN (LEVAQUIN) 750 MG TABLET    Take 1 tablet by mouth daily for 5 days       DISCONTINUED MEDICATIONS:  Discontinued Medications    No medications on file              (Please note that portions of this note were completed with a

## 2025-01-23 ENCOUNTER — OFFICE VISIT (OUTPATIENT)
Dept: PAIN MANAGEMENT | Age: 70
End: 2025-01-23
Payer: MEDICARE

## 2025-01-23 VITALS
SYSTOLIC BLOOD PRESSURE: 126 MMHG | OXYGEN SATURATION: 98 % | BODY MASS INDEX: 29.16 KG/M2 | RESPIRATION RATE: 18 BRPM | DIASTOLIC BLOOD PRESSURE: 81 MMHG | TEMPERATURE: 97.3 F | WEIGHT: 175.04 LBS | HEART RATE: 95 BPM | HEIGHT: 65 IN

## 2025-01-23 DIAGNOSIS — G89.29 OTHER CHRONIC PAIN: ICD-10-CM

## 2025-01-23 DIAGNOSIS — G89.29 CHRONIC BILATERAL LOW BACK PAIN WITHOUT SCIATICA: ICD-10-CM

## 2025-01-23 DIAGNOSIS — M54.50 CHRONIC BILATERAL LOW BACK PAIN WITHOUT SCIATICA: ICD-10-CM

## 2025-01-23 DIAGNOSIS — M54.12 RADICULOPATHY, CERVICAL: ICD-10-CM

## 2025-01-23 DIAGNOSIS — M54.16 LUMBAR RADICULOPATHY: Primary | ICD-10-CM

## 2025-01-23 DIAGNOSIS — M79.10 MYALGIA: ICD-10-CM

## 2025-01-23 DIAGNOSIS — M51.369 DEGENERATION OF INTERVERTEBRAL DISC OF LUMBAR REGION, UNSPECIFIED WHETHER PAIN PRESENT: ICD-10-CM

## 2025-01-23 DIAGNOSIS — M47.816 LUMBAR FACET ARTHROPATHY: ICD-10-CM

## 2025-01-23 DIAGNOSIS — G89.4 CHRONIC PAIN SYNDROME: ICD-10-CM

## 2025-01-23 DIAGNOSIS — M47.812 CERVICAL SPONDYLOSIS: ICD-10-CM

## 2025-01-23 DIAGNOSIS — M48.061 SPINAL STENOSIS OF LUMBAR REGION WITHOUT NEUROGENIC CLAUDICATION: ICD-10-CM

## 2025-01-23 DIAGNOSIS — M54.12 CERVICAL RADICULOPATHY: ICD-10-CM

## 2025-01-23 PROCEDURE — 3079F DIAST BP 80-89 MM HG: CPT | Performed by: PHYSICIAN ASSISTANT

## 2025-01-23 PROCEDURE — 99213 OFFICE O/P EST LOW 20 MIN: CPT | Performed by: PHYSICIAN ASSISTANT

## 2025-01-23 PROCEDURE — 1123F ACP DISCUSS/DSCN MKR DOCD: CPT | Performed by: PHYSICIAN ASSISTANT

## 2025-01-23 PROCEDURE — 3074F SYST BP LT 130 MM HG: CPT | Performed by: PHYSICIAN ASSISTANT

## 2025-01-23 PROCEDURE — 1159F MED LIST DOCD IN RCRD: CPT | Performed by: PHYSICIAN ASSISTANT

## 2025-01-23 PROCEDURE — 1160F RVW MEDS BY RX/DR IN RCRD: CPT | Performed by: PHYSICIAN ASSISTANT

## 2025-01-23 RX ORDER — HYDROCODONE BITARTRATE AND ACETAMINOPHEN 5; 325 MG/1; MG/1
1 TABLET ORAL 2 TIMES DAILY
Qty: 60 TABLET | Refills: 0 | Status: SHIPPED | OUTPATIENT
Start: 2025-01-24 | End: 2025-02-23

## 2025-01-23 NOTE — PROGRESS NOTES
Eleanor Holder presents to the [facility] on 1/23/2025. Eleanor is complaining of pain in her back. The pain is constant. The pain is described as aching, shooting, stabbing, dull, and sharp. Pain is rated on her best day at a 7, on her worst day at a 10, and on average at a 8 on the VAS scale. She took her last dose of Norco and Neurontin today.      Any procedures since your last visit: No    She is not on NSAIDS and  is not on anticoagulation medications to include none.     Pacemaker or defibrillator: No .    Do you want someone present when the provider examines you? No    Medication Contract and Consent for Opioid Use Documents Filed       Patient Documents       Type of Document Status Date Received Received By Description    Medication Contract Received  EBER HONG Opioid medication contract with Dr Wheatley 3/24/20    Medication Contract Received 4/26/2018  3:50 PM ANYA NUNO PAIN [facility]NAGEMENT PATIENT AGREEMENT 4/26/2018    Medication Contract Received 11/5/2020  4:23 PM EBER HONG Opioid medication contract with Dr Wheatley    Medication Contract Received 3/1/2021  1:26 PM EBER HONG Opioid medication contract with Dr Wheatley    Medication Contract Received 8/23/2021  2:03 PM HAYLIE CADENA Medication contract    Medication Contract Received 10/18/2021  3:03 PM HAYLIE CADENA medication contract 10/18/2021    Medication Contract Signed 10/4/2022 12:35 PM CRISTIANA BAÑUELOS Pain Med. Contract 10/04/22    Medication Contract Received 10/11/2023  3:46 PM KAILYN WILKINSON Medication contract    Consent Opioid Use Received 10/29/2024  1:39 PM JAMIE RIVAS Consent Opioid Use                       Resp 18   Ht 1.651 m (5' 5\")   Wt 79.4 kg (175 lb 0.7 oz)   LMP  (LMP Unknown)   BMI 29.13 kg/m²      No LMP recorded (lmp unknown). Patient is postmenopausal.  
  hydrOXYzine pamoate (VISTARIL) 25 MG capsule TAKE ONE CAPSULE BY MOUTH THREE TIMES DAILY AS NEEDED FOR ANXIETY. Watch FOR sedation. 1/16/25  Yes Manolo Mckeon MD   HYDROcodone-acetaminophen (NORCO) 5-325 MG per tablet Take 1 tablet by mouth 2 times daily for 30 days. Early due to holiday. 12/27/24 1/26/25 Yes Meghan Corcoran PA   gabapentin (NEURONTIN) 400 MG capsule Take 1 capsule by mouth 2 times daily for 90 days. 11/26/24 2/24/25 Yes Meghan Corcoran PA   cyclobenzaprine (FLEXERIL) 5 MG tablet Take 1-2 tablets by mouth at bedtime 10/14/24  Yes Manolo Mckeon MD   hydroCHLOROthiazide 12.5 MG capsule TAKE ONE CAPSULE BY MOUTH ONCE DAILY FOR FOR BLOOD PRESSURE 10/1/24  Yes Manolo Mckeon MD   metFORMIN (GLUCOPHAGE-XR) 500 MG extended release tablet Take 1 tablet by mouth daily (with breakfast) 10/1/24  Yes Manolo Mckeon MD   atorvastatin (LIPITOR) 40 MG tablet Take 1 tablet by mouth daily 6/28/24  Yes Manolo Mckeon MD   lisinopril (PRINIVIL;ZESTRIL) 40 MG tablet Take 1 tablet by mouth every evening 6/28/24  Yes Manolo Mckeon MD   Handicap Placard MISC DX:  Loss of Balance, Chronic low back pain, s/p back surgery.   Duration of 5 years 2/22/21  Yes Manolo Mckeon MD   erythromycin (ROMYCIN) 5 MG/GM ophthalmic ointment Place 1 cm into the left eye every 6 hours  Patient not taking: Reported on 1/23/2025 1/17/25   Parvin Locke DO       Allergies   Allergen Reactions    Pcn [Penicillins] Anaphylaxis       Social History     Socioeconomic History    Marital status:      Spouse name: Not on file    Number of children: Not on file    Years of education: Not on file    Highest education level: Not on file   Occupational History    Not on file   Tobacco Use    Smoking status: Every Day     Current packs/day: 0.50     Average packs/day: 0.5 packs/day for 40.0 years (20.0 ttl pk-yrs)     Types: Cigarettes    Smokeless tobacco: Never

## 2025-01-30 ENCOUNTER — OFFICE VISIT (OUTPATIENT)
Dept: OBGYN | Age: 70
End: 2025-01-30
Payer: MEDICARE

## 2025-01-30 VITALS
HEIGHT: 64 IN | HEART RATE: 95 BPM | SYSTOLIC BLOOD PRESSURE: 135 MMHG | WEIGHT: 170 LBS | BODY MASS INDEX: 29.02 KG/M2 | DIASTOLIC BLOOD PRESSURE: 64 MMHG

## 2025-01-30 DIAGNOSIS — N95.2 VAGINAL ATROPHY: ICD-10-CM

## 2025-01-30 DIAGNOSIS — Z12.4 SCREENING FOR CERVICAL CANCER: ICD-10-CM

## 2025-01-30 DIAGNOSIS — Z01.419 ENCOUNTER FOR WELL WOMAN EXAM: ICD-10-CM

## 2025-01-30 DIAGNOSIS — Z12.31 ENCOUNTER FOR SCREENING MAMMOGRAM FOR BREAST CANCER: Primary | ICD-10-CM

## 2025-01-30 DIAGNOSIS — N90.7 VULVAR CYST: ICD-10-CM

## 2025-01-30 PROCEDURE — 1159F MED LIST DOCD IN RCRD: CPT | Performed by: OBSTETRICS & GYNECOLOGY

## 2025-01-30 PROCEDURE — G0101 CA SCREEN;PELVIC/BREAST EXAM: HCPCS | Performed by: OBSTETRICS & GYNECOLOGY

## 2025-01-30 PROCEDURE — 3078F DIAST BP <80 MM HG: CPT | Performed by: OBSTETRICS & GYNECOLOGY

## 2025-01-30 PROCEDURE — 3075F SYST BP GE 130 - 139MM HG: CPT | Performed by: OBSTETRICS & GYNECOLOGY

## 2025-01-30 PROCEDURE — 1126F AMNT PAIN NOTED NONE PRSNT: CPT | Performed by: OBSTETRICS & GYNECOLOGY

## 2025-01-30 RX ORDER — ESTRADIOL 0.1 MG/G
1 CREAM VAGINAL DAILY
Qty: 42.5 G | Refills: 3 | Status: SHIPPED | OUTPATIENT
Start: 2025-01-30

## 2025-01-30 SDOH — ECONOMIC STABILITY: FOOD INSECURITY: WITHIN THE PAST 12 MONTHS, THE FOOD YOU BOUGHT JUST DIDN'T LAST AND YOU DIDN'T HAVE MONEY TO GET MORE.: NEVER TRUE

## 2025-01-30 SDOH — ECONOMIC STABILITY: FOOD INSECURITY: WITHIN THE PAST 12 MONTHS, YOU WORRIED THAT YOUR FOOD WOULD RUN OUT BEFORE YOU GOT MONEY TO BUY MORE.: NEVER TRUE

## 2025-01-30 ASSESSMENT — PATIENT HEALTH QUESTIONNAIRE - PHQ9
SUM OF ALL RESPONSES TO PHQ QUESTIONS 1-9: 0
2. FEELING DOWN, DEPRESSED OR HOPELESS: NOT AT ALL
8. MOVING OR SPEAKING SO SLOWLY THAT OTHER PEOPLE COULD HAVE NOTICED. OR THE OPPOSITE, BEING SO FIGETY OR RESTLESS THAT YOU HAVE BEEN MOVING AROUND A LOT MORE THAN USUAL: NOT AT ALL
6. FEELING BAD ABOUT YOURSELF - OR THAT YOU ARE A FAILURE OR HAVE LET YOURSELF OR YOUR FAMILY DOWN: NOT AT ALL
5. POOR APPETITE OR OVEREATING: NOT AT ALL
3. TROUBLE FALLING OR STAYING ASLEEP: NOT AT ALL
SUM OF ALL RESPONSES TO PHQ QUESTIONS 1-9: 0
SUM OF ALL RESPONSES TO PHQ QUESTIONS 1-9: 0
9. THOUGHTS THAT YOU WOULD BE BETTER OFF DEAD, OR OF HURTING YOURSELF: NOT AT ALL
SUM OF ALL RESPONSES TO PHQ QUESTIONS 1-9: 0
10. IF YOU CHECKED OFF ANY PROBLEMS, HOW DIFFICULT HAVE THESE PROBLEMS MADE IT FOR YOU TO DO YOUR WORK, TAKE CARE OF THINGS AT HOME, OR GET ALONG WITH OTHER PEOPLE: NOT DIFFICULT AT ALL
4. FEELING TIRED OR HAVING LITTLE ENERGY: NOT AT ALL
1. LITTLE INTEREST OR PLEASURE IN DOING THINGS: NOT AT ALL
SUM OF ALL RESPONSES TO PHQ9 QUESTIONS 1 & 2: 0
7. TROUBLE CONCENTRATING ON THINGS, SUCH AS READING THE NEWSPAPER OR WATCHING TELEVISION: NOT AT ALL

## 2025-01-30 NOTE — PROGRESS NOTES
Patient alert and pleasant with POSSIBLE INGROWN HAIRS   Here today for annual GYN exam.  Pelvic exam, pap smear obtained, labeled  and delivered to lab.  Breast exam completed by doctor.   Discharge instructions have been discussed with the patient. Patient advised to call our office with any questions or concerns.   Voiced understanding.   
every 6 hours (Patient not taking: Reported on 1/23/2025), Disp: 3.5 g, Rfl: 0     Allergies   Allergen Reactions    Pcn [Penicillins] Anaphylaxis        Social History       Tobacco History       Smoking Status  Every Day Current Packs/Day  0.5 packs/day Average Packs/Day  0.5 packs/day for 40.0 years (20.0 ttl pk-yrs) Smoking Tobacco Type  Cigarettes   Pack Year History     Packs/Day From To Years    0.5   40.0    0.5   0.0      Smokeless Tobacco Use  Never              Alcohol History       Alcohol Use Status  Yes Drinks/Week  0 Standard drinks or equivalent per week Comment  rarely              Drug Use       Drug Use Status  No              Sexual Activity       Sexually Active  Not Asked                     Vitals:    01/30/25 1358   BP: 135/64   Pulse: 95        Physical Exam:  General: pleasant, alert     Breasts: breasts appear normal, no suspicious masses, no skin or nipple changes or axillary nodes.     Pelvic exam: normal external genitalia, vulva, vagina, cervix, uterus and adnexa. 2 small sebaceous cysts noted on vulva near perineum. Pale vaginal mucosa. Normal urethra, bladder, anus   No uterine or adnexal masses or tenderness.      Eleanor was seen today for gynecologic exam and other.    Diagnoses and all orders for this visit:    Encounter for screening mammogram for breast cancer  -     [facility] DIGITAL SCREEN BILATERAL; Future    Screening for cervical cancer  -     PAP SMEAR; Future  -     PAP SMEAR    Encounter for well woman exam    Vulvar cyst    Vaginal atrophy  -     estradiol (ESTRACE VAGINAL) 0.1 MG/GM vaginal cream; Place 1 g vaginally daily For two weeks followed by 2-3 times per week    Discussed starting estrogen cream for atrophy. Risks reviewed.     Reviewed sebaceous cysts, patient would like them removed. Will start with removal of one in office. If unable to tolerate will discuss OR for removal .       Return for removal of vulvar cyst .     Marybel Pelayo MD

## 2025-01-31 ENCOUNTER — OFFICE VISIT (OUTPATIENT)
Dept: FAMILY MEDICINE CLINIC | Age: 70
End: 2025-01-31
Payer: MEDICARE

## 2025-01-31 VITALS
TEMPERATURE: 97.6 F | DIASTOLIC BLOOD PRESSURE: 78 MMHG | OXYGEN SATURATION: 98 % | HEIGHT: 64 IN | HEART RATE: 82 BPM | WEIGHT: 170 LBS | BODY MASS INDEX: 29.02 KG/M2 | SYSTOLIC BLOOD PRESSURE: 118 MMHG

## 2025-01-31 DIAGNOSIS — I10 ESSENTIAL HYPERTENSION: ICD-10-CM

## 2025-01-31 DIAGNOSIS — M79.2 RADICULAR PAIN IN RIGHT ARM: Primary | ICD-10-CM

## 2025-01-31 DIAGNOSIS — E11.9 TYPE 2 DIABETES MELLITUS WITHOUT COMPLICATION, WITHOUT LONG-TERM CURRENT USE OF INSULIN (HCC): ICD-10-CM

## 2025-01-31 LAB
HPV SAMPLE: NORMAL
HPV SOURCE: NORMAL
HPV, GENOTYPE 16: NORMAL
HPV, GENOTYPE 18: NORMAL
HPV, HIGH RISK OTHER: NORMAL
HPV, INTERPRETATION: NORMAL

## 2025-01-31 PROCEDURE — 99214 OFFICE O/P EST MOD 30 MIN: CPT | Performed by: FAMILY MEDICINE

## 2025-01-31 PROCEDURE — 3074F SYST BP LT 130 MM HG: CPT | Performed by: FAMILY MEDICINE

## 2025-01-31 PROCEDURE — 1159F MED LIST DOCD IN RCRD: CPT | Performed by: FAMILY MEDICINE

## 2025-01-31 PROCEDURE — 3078F DIAST BP <80 MM HG: CPT | Performed by: FAMILY MEDICINE

## 2025-01-31 PROCEDURE — 1123F ACP DISCUSS/DSCN MKR DOCD: CPT | Performed by: FAMILY MEDICINE

## 2025-01-31 PROCEDURE — 96372 THER/PROPH/DIAG INJ SC/IM: CPT | Performed by: FAMILY MEDICINE

## 2025-01-31 RX ORDER — METFORMIN HYDROCHLORIDE 500 MG/1
500 TABLET, EXTENDED RELEASE ORAL
Qty: 90 TABLET | Refills: 1 | Status: SHIPPED | OUTPATIENT
Start: 2025-01-31

## 2025-01-31 RX ORDER — LISINOPRIL 40 MG/1
40 TABLET ORAL EVERY EVENING
Qty: 90 TABLET | Refills: 1 | Status: SHIPPED | OUTPATIENT
Start: 2025-01-31

## 2025-01-31 RX ORDER — KETOROLAC TROMETHAMINE 30 MG/ML
30 INJECTION, SOLUTION INTRAMUSCULAR; INTRAVENOUS ONCE
Status: COMPLETED | OUTPATIENT
Start: 2025-01-31 | End: 2025-01-31

## 2025-01-31 RX ADMIN — KETOROLAC TROMETHAMINE 30 MG: 30 INJECTION, SOLUTION INTRAMUSCULAR; INTRAVENOUS at 12:03

## 2025-01-31 NOTE — PROGRESS NOTES
[facility]  Office Progress Note - Dr. Mckeon  1/31/25    CC:   Chief Complaint   Patient presents with    Neck Pain     Right sided neck pain that radiates down right arm.  Worse in the last week.  Wonders about some testing or imaging on this.    Decreased ROM in right arm due to this.        /78   Pulse 82   Temp 97.6 °F (36.4 °C) (Temporal)   Ht 1.626 m (5' 4\")   Wt 77.1 kg (170 lb)   LMP  (LMP Unknown)   SpO2 98%   BMI 29.18 kg/m²   Wt Readings from Last 3 Encounters:   01/31/25 77.1 kg (170 lb)   01/30/25 77.1 kg (170 lb)   01/23/25 79.4 kg (175 lb 0.7 oz)       HPI:   IMMANUEL Generated Note:  History of Present Illness  The patient presents for evaluation of right-sided neck pain.    She reports experiencing severe, shooting pain on the right side of her neck, extending into her right shoulder, armpit, arm, and fingers. The pain also radiates to the right quarter of her back, specifically around the ribs. She describes the sensation as tingling and electric in nature. The onset of this pain was sudden, occurring over the past week, with no associated injury or fall. She has been managing the pain with ice packs and heating pads but has not sought medical attention as she anticipated the pain would subside. However, the pain has escalated to a level that is causing significant discomfort, even during sleep. She has been waking up frequently at night due to the pain, necessitating the use of an ice pack and heating pad. She is considering contacting her healthcare provider for an epidural injection if the pain persists. She has been prescribed cyclobenzaprine 5 mg, which she takes 1 to 2 tablets orally at bedtime. She also takes Norco twice daily. She does not use over-the-counter medications such as Tylenol or ibuprofen. She has no history of stomach ulcers. She has previously received Toradol injections in the emergency room, which provided relief. She has a habit of

## 2025-02-04 ENCOUNTER — OFFICE VISIT (OUTPATIENT)
Dept: PAIN MANAGEMENT | Age: 70
End: 2025-02-04
Payer: MEDICARE

## 2025-02-04 VITALS
RESPIRATION RATE: 16 BRPM | DIASTOLIC BLOOD PRESSURE: 83 MMHG | WEIGHT: 170 LBS | TEMPERATURE: 98.7 F | HEIGHT: 64 IN | OXYGEN SATURATION: 98 % | HEART RATE: 88 BPM | BODY MASS INDEX: 29.02 KG/M2 | SYSTOLIC BLOOD PRESSURE: 163 MMHG

## 2025-02-04 DIAGNOSIS — M47.812 CERVICAL SPONDYLOSIS: ICD-10-CM

## 2025-02-04 DIAGNOSIS — M79.10 MYALGIA: ICD-10-CM

## 2025-02-04 DIAGNOSIS — M54.50 CHRONIC BILATERAL LOW BACK PAIN WITHOUT SCIATICA: ICD-10-CM

## 2025-02-04 DIAGNOSIS — M54.12 CERVICAL RADICULOPATHY: ICD-10-CM

## 2025-02-04 DIAGNOSIS — M51.369 DEGENERATION OF INTERVERTEBRAL DISC OF LUMBAR REGION, UNSPECIFIED WHETHER PAIN PRESENT: ICD-10-CM

## 2025-02-04 DIAGNOSIS — G89.29 OTHER CHRONIC PAIN: ICD-10-CM

## 2025-02-04 DIAGNOSIS — M54.12 RADICULOPATHY, CERVICAL: ICD-10-CM

## 2025-02-04 DIAGNOSIS — M47.816 LUMBAR FACET ARTHROPATHY: ICD-10-CM

## 2025-02-04 DIAGNOSIS — M48.061 SPINAL STENOSIS OF LUMBAR REGION WITHOUT NEUROGENIC CLAUDICATION: ICD-10-CM

## 2025-02-04 DIAGNOSIS — G89.4 CHRONIC PAIN SYNDROME: ICD-10-CM

## 2025-02-04 DIAGNOSIS — G89.29 CHRONIC BILATERAL LOW BACK PAIN WITHOUT SCIATICA: ICD-10-CM

## 2025-02-04 DIAGNOSIS — M54.16 LUMBAR RADICULOPATHY: Primary | ICD-10-CM

## 2025-02-04 PROCEDURE — 3077F SYST BP >= 140 MM HG: CPT | Performed by: PHYSICIAN ASSISTANT

## 2025-02-04 PROCEDURE — 1159F MED LIST DOCD IN RCRD: CPT | Performed by: PHYSICIAN ASSISTANT

## 2025-02-04 PROCEDURE — 3079F DIAST BP 80-89 MM HG: CPT | Performed by: PHYSICIAN ASSISTANT

## 2025-02-04 PROCEDURE — 1160F RVW MEDS BY RX/DR IN RCRD: CPT | Performed by: PHYSICIAN ASSISTANT

## 2025-02-04 PROCEDURE — 1123F ACP DISCUSS/DSCN MKR DOCD: CPT | Performed by: PHYSICIAN ASSISTANT

## 2025-02-04 PROCEDURE — 99213 OFFICE O/P EST LOW 20 MIN: CPT | Performed by: PHYSICIAN ASSISTANT

## 2025-02-04 RX ORDER — METHYLPREDNISOLONE ACETATE 40 MG/ML
40 INJECTION, SUSPENSION INTRA-ARTICULAR; INTRALESIONAL; INTRAMUSCULAR; SOFT TISSUE ONCE
Status: COMPLETED | OUTPATIENT
Start: 2025-02-04 | End: 2025-02-04

## 2025-02-04 RX ADMIN — METHYLPREDNISOLONE ACETATE 40 MG: 40 INJECTION, SUSPENSION INTRA-ARTICULAR; INTRALESIONAL; INTRAMUSCULAR; SOFT TISSUE at 11:18

## 2025-02-05 ENCOUNTER — PROCEDURE VISIT (OUTPATIENT)
Dept: PAIN MANAGEMENT | Age: 70
End: 2025-02-05
Payer: MEDICARE

## 2025-02-05 ENCOUNTER — PROCEDURE VISIT (OUTPATIENT)
Dept: OBGYN | Age: 70
End: 2025-02-05

## 2025-02-05 ENCOUNTER — TELEPHONE (OUTPATIENT)
Dept: PAIN MANAGEMENT | Age: 70
End: 2025-02-05

## 2025-02-05 VITALS
HEIGHT: 65 IN | BODY MASS INDEX: 28.16 KG/M2 | HEART RATE: 102 BPM | DIASTOLIC BLOOD PRESSURE: 86 MMHG | OXYGEN SATURATION: 97 % | WEIGHT: 169 LBS | RESPIRATION RATE: 18 BRPM | SYSTOLIC BLOOD PRESSURE: 142 MMHG

## 2025-02-05 VITALS
HEIGHT: 65 IN | BODY MASS INDEX: 28.16 KG/M2 | WEIGHT: 169 LBS | HEART RATE: 76 BPM | DIASTOLIC BLOOD PRESSURE: 81 MMHG | SYSTOLIC BLOOD PRESSURE: 180 MMHG

## 2025-02-05 DIAGNOSIS — M79.10 MYALGIA: Primary | ICD-10-CM

## 2025-02-05 DIAGNOSIS — N90.7 VULVAR CYST: Primary | ICD-10-CM

## 2025-02-05 LAB
HPV SAMPLE: NORMAL
HPV SOURCE: NORMAL
HPV, GENOTYPE 16: NOT DETECTED
HPV, GENOTYPE 18: NOT DETECTED
HPV, HIGH RISK OTHER: NOT DETECTED
HPV, INTERPRETATION: NORMAL

## 2025-02-05 PROCEDURE — 20553 NJX 1/MLT TRIGGER POINTS 3/>: CPT | Performed by: PAIN MEDICINE

## 2025-02-05 PROCEDURE — 76942 ECHO GUIDE FOR BIOPSY: CPT | Performed by: PAIN MEDICINE

## 2025-02-05 RX ORDER — LIDOCAINE HYDROCHLORIDE 10 MG/ML
2 INJECTION, SOLUTION EPIDURAL; INFILTRATION; INTRACAUDAL; PERINEURAL ONCE
Status: COMPLETED | OUTPATIENT
Start: 2025-02-05 | End: 2025-02-05

## 2025-02-05 RX ORDER — BUPIVACAINE HYDROCHLORIDE 2.5 MG/ML
10 INJECTION, SOLUTION EPIDURAL; INFILTRATION; INTRACAUDAL ONCE
Status: COMPLETED | OUTPATIENT
Start: 2025-02-05 | End: 2025-02-05

## 2025-02-05 RX ADMIN — BUPIVACAINE HYDROCHLORIDE 25 MG: 2.5 INJECTION, SOLUTION EPIDURAL; INFILTRATION; INTRACAUDAL at 16:26

## 2025-02-05 RX ADMIN — LIDOCAINE HYDROCHLORIDE 2 ML: 10 INJECTION, SOLUTION EPIDURAL; INFILTRATION; INTRACAUDAL; PERINEURAL at 13:21

## 2025-02-05 SDOH — ECONOMIC STABILITY: FOOD INSECURITY: WITHIN THE PAST 12 MONTHS, THE FOOD YOU BOUGHT JUST DIDN'T LAST AND YOU DIDN'T HAVE MONEY TO GET MORE.: NEVER TRUE

## 2025-02-05 SDOH — ECONOMIC STABILITY: FOOD INSECURITY: WITHIN THE PAST 12 MONTHS, YOU WORRIED THAT YOUR FOOD WOULD RUN OUT BEFORE YOU GOT MONEY TO BUY MORE.: NEVER TRUE

## 2025-02-05 NOTE — PROGRESS NOTES
2025    Patient: Eleanor Holder  :  1955  Age:  69 y.o.  Sex:  female     PRE-PROCEDURE DIAGNOSIS: Myofascial pain.      POST-PROCEDURE DIAGNOSIS: Same.     PROCEDURE: right cervical paraspinal, trapezius, supraspinatus, and rhomboid trigger point injections under ultrasound guidance.    SURGEON:   ZARA Montelongo D.O.    ANESTHESIA: local    ESTIMATED BLOOD LOSS:  None.  ______________________________________________________________________    BRIEF HISTORY: Eleanor Holder comes in today for right cervical paraspinal, trapezius, supraspinatus, and rhomboid trigger point injection. After discussing the potential risks and benefits of the procedure with the patient.  Eleanor did request that we proceed. A complete History & Physical was reviewed and it is unchanged.     DESCRIPTION OF PROCEDURE:   Each trigger point was marked with patient input, prepped with chloraprep and draped.  A #25-gauge, 1.5-inch needle was passed into the area of greatest tenderness under ultrasound guidance.  Each trigger point received equal, divided dosages or a total of 10 cc of 0.25% Marcaine after negative aspiration of blood, air or paresthesia with ultrasound visualization of solution spread. The needle was removed intact and Band-Aids were applied.    Disposition the patient tolerated the procedure well and there were no complications .     Eleanor will follow up in [facility] as scheduled. She was encouraged to call with questions, concerns or if worsening of symptoms occurs.    Inez Montelongo DO

## 2025-02-05 NOTE — PROGRESS NOTES
Here today for vulvar biopsy Instructed on the procedure of vulvar biopsy voiced understanding and permit signed for vulvar biopsy  Prepared for procedure.  Time out done by    Prior to starting the procedure.  Tolerated procedure.  No bleeding noted after procedure.  Biopsy obtained, labeled and sent to lab   To return in one week for results.  Discharge instructions reviewed verbally and written AVS given to patient.  Voiced understanding and agreement.

## 2025-02-05 NOTE — TELEPHONE ENCOUNTER
Called patient to check on her pain after her injection yesterday.  No answer.  LM to return call if she would like.  If she calls, please ask her how she is doing.  Thank you.

## 2025-02-05 NOTE — PROGRESS NOTES
Vulvar Biopsy     Eleanor Holder 69 y.o. presents today for vulvar cyst removal.  Patient has two sebaceous cysts on her vulva that she would like removed. Discussed removal in office vs OR. Patient would like to try for removal of one in office today.     Vitals:  BP (!) 180/81   Pulse 76   Ht 1.651 m (5' 5\")   Wt 76.7 kg (169 lb)   LMP  (LMP Unknown)   BMI 28.12 kg/m²       The risks, benefits and anticipated outcomes of the procedure,the risks and benefits of the alternatives to the procedure and the roles and tasks of the personnel to be involved were discussed with the patient and the patient does consent to the procedure and does agree to proceed. Theequipment necessary to proceed is available and in working order.  Verbal and written consent obtained.     Vulva:        Procedure note: Sebaceous cyst noted on right buttocks near perineum, the area sterilly cleansed with Betadine, infiltrated at base with 1% Lidocaine. An incision was made with the scalped and the cyst was drained. The cyst wall was removed. Hemostasis was obtained with silver nitrate. Hemostatic at the end of the procedure. Patient tolerated procedure well. Specimen was labeled and sent to Pathology for analysis.      Assessment:      Diagnosis Orders   1. Vulvar cyst  Surgical Pathology            Plan:     Advised sitz baths and warm compressed to help with healing.   Tissue sent to pathology.  Patient willfollow up for results in two weeks for other cyst removal.      Marybel Pelayo MD  2/5/25, 10:49 AM EST

## 2025-02-05 NOTE — PROGRESS NOTES
Eleanor Holder presents to the [facility] on 2/5/2025. Eleanor is complaining of neck pain. The pain is constant. The pain is described as aching, throbbing, shooting, stabbing, sharp, penetrating, nagging, miserable, and unbearable. Pain is rated on her best day at a 10, on her worst day at a 10, and on average at a 10 on the VAS scale. She took her last dose of Norco, Neurontin, Motrin, Tylenol, and Flexeril today.      Any procedures since your last visit: yes, with 50 % relief.    She is not on NSAIDS and  is not on anticoagulation medications.    Pacemaker or defibrillator: No.    Do you want someone present when the provider examines you? No    Medication Contract and Consent for Opioid Use Documents Filed       Patient Documents       Type of Document Status Date Received Received By Description    Medication Contract Received  EBER HONG Opioid medication contract with Dr Wheatley 3/24/20    Medication Contract Received 4/26/2018  3:50 PM ANYA NUNO PAIN MANAGEMENT PATIENT AGREEMENT 4/26/2018    Medication Contract Received 11/5/2020  4:23 PM EBER HONG Opioid medication contract with Dr Wheatley    Medication Contract Received 3/1/2021  1:26 PM EBER HONG Opioid medication contract with Dr Wheatley    Medication Contract Received 8/23/2021  2:03 PM HAYLIE CADENA Medication contract    Medication Contract Received 10/18/2021  3:03 PM HAYLIE CADENA medication contract 10/18/2021    Medication Contract Signed 10/4/2022 12:35 PM CRISTIANA BAÑUELOS Pain Med. Contract 10/04/22    Medication Contract Received 10/11/2023  3:46 PM KAILYN WILKINSON Medication contract    Consent Opioid Use Received 10/29/2024  1:39 PM JAMIE RIVAS Consent Opioid Use                       Resp 18   Ht 1.651 m (5' 5\")   Wt 76.7 kg (169 lb)   LMP  (LMP Unknown)   BMI 28.12

## 2025-02-06 LAB — GYNECOLOGY CYTOLOGY REPORT: NORMAL

## 2025-02-10 ENCOUNTER — TELEPHONE (OUTPATIENT)
Dept: FAMILY MEDICINE CLINIC | Age: 70
End: 2025-02-10

## 2025-02-10 LAB — SURGICAL PATHOLOGY REPORT: NORMAL

## 2025-02-10 NOTE — TELEPHONE ENCOUNTER
Eleanor calling about the sleep stud that she had done recently, asking if you had any results yet.      She is also needing a referral to see someone for the varicose vanes in her legs.  She stated that they are swollen and painful at times.

## 2025-02-13 NOTE — TELEPHONE ENCOUNTER
Eleanor called back to follow up on her request, states that she was told last year that she is due for a new CPAP. She believes her machine is 7-9 years old. She is thinking she should have a new sleep study before getting a new CPAP.  States that she does everything through [employer] and you should have the records.     For the referral to a vascular surgeon, she saw Shannan Regulo 8/7/24 and states that she only looked are the spider veins on her lower legs and not the larger painful varicose vein on her upper right leg. Regulo referred her to Dr Holder's office. Patient received a message from Dr Holder's office stating that they do not treat varicose veins and an appointment was never made. Patient would to to see someone who can treat varicose veins and is asking for a referral.

## 2025-02-13 NOTE — TELEPHONE ENCOUNTER
Please let her know I'm out of the office ad we'll get started on this within the next couple days

## 2025-02-14 NOTE — TELEPHONE ENCOUNTER
Detailed message left advising pt that doctor has been out of office but we will address her concerns soon.

## 2025-02-14 NOTE — TELEPHONE ENCOUNTER
So the first telephone message is saying that she HAD a sleep study done And the second telephone message is saying that she thinks she needs a sleep study done.  What is the correct scenario?    If she needs a sleep study done, I will order it but she might need to actually have an office visit first depending on her insurance.    Regarding the varicose vein, she could either see Dr. Rajput again or someone different.  If Dr. Rajput did not address the area that she is concerned about at the initial visit it requires its own attention.    It sounds like she was referred to plastics because of the spider veins but they may not have even talked about the varicose vein that is now bothering her.  Dr. Rajput is trained to manage that if appropriate

## 2025-02-15 ENCOUNTER — HOSPITAL ENCOUNTER (EMERGENCY)
Age: 70
Discharge: HOME | End: 2025-02-15
Payer: MEDICARE

## 2025-02-15 VITALS
OXYGEN SATURATION: 99 % | HEART RATE: 74 BPM | TEMPERATURE: 97.9 F | DIASTOLIC BLOOD PRESSURE: 80 MMHG | SYSTOLIC BLOOD PRESSURE: 152 MMHG | RESPIRATION RATE: 18 BRPM

## 2025-02-15 DIAGNOSIS — S61.217A LACERATION OF LEFT LITTLE FINGER WITHOUT FOREIGN BODY WITHOUT DAMAGE TO NAIL, INITIAL ENCOUNTER: Primary | ICD-10-CM

## 2025-02-15 PROCEDURE — 6370000000 HC RX 637 (ALT 250 FOR IP): Performed by: NURSE PRACTITIONER

## 2025-02-15 PROCEDURE — 99284 EMERGENCY DEPT VISIT MOD MDM: CPT

## 2025-02-15 PROCEDURE — 90714 TD VACC NO PRESV 7 YRS+ IM: CPT | Performed by: NURSE PRACTITIONER

## 2025-02-15 PROCEDURE — 12001 RPR S/N/AX/GEN/TRNK 2.5CM/<: CPT

## 2025-02-15 PROCEDURE — 90471 IMMUNIZATION ADMIN: CPT | Performed by: NURSE PRACTITIONER

## 2025-02-15 PROCEDURE — 6360000002 HC RX W HCPCS: Performed by: NURSE PRACTITIONER

## 2025-02-15 RX ORDER — LIDOCAINE HYDROCHLORIDE 10 MG/ML
5 INJECTION, SOLUTION INFILTRATION; PERINEURAL ONCE
Status: COMPLETED | OUTPATIENT
Start: 2025-02-15 | End: 2025-02-15

## 2025-02-15 RX ORDER — BACITRACIN ZINC 500 [USP'U]/G
OINTMENT TOPICAL ONCE
Status: COMPLETED | OUTPATIENT
Start: 2025-02-15 | End: 2025-02-15

## 2025-02-15 RX ADMIN — BACITRACIN ZINC: 500 OINTMENT TOPICAL at 19:07

## 2025-02-15 RX ADMIN — CLOSTRIDIUM TETANI TOXOID ANTIGEN (FORMALDEHYDE INACTIVATED) AND CORYNEBACTERIUM DIPHTHERIAE TOXOID ANTIGEN (FORMALDEHYDE INACTIVATED) 0.5 ML: 5; 2 INJECTION, SUSPENSION INTRAMUSCULAR at 19:06

## 2025-02-15 RX ADMIN — LIDOCAINE HYDROCHLORIDE 5 ML: 10 INJECTION, SOLUTION INFILTRATION; PERINEURAL at 19:07

## 2025-02-15 ASSESSMENT — PAIN - FUNCTIONAL ASSESSMENT: PAIN_FUNCTIONAL_ASSESSMENT: 0-10

## 2025-02-15 ASSESSMENT — PAIN DESCRIPTION - DESCRIPTORS: DESCRIPTORS: ACHING;DISCOMFORT;SORE

## 2025-02-15 ASSESSMENT — PAIN DESCRIPTION - PAIN TYPE: TYPE: ACUTE PAIN

## 2025-02-15 ASSESSMENT — PAIN DESCRIPTION - LOCATION: LOCATION: FINGER (COMMENT WHICH ONE)

## 2025-02-15 ASSESSMENT — PAIN SCALES - GENERAL: PAINLEVEL_OUTOF10: 10

## 2025-02-15 ASSESSMENT — PAIN DESCRIPTION - FREQUENCY: FREQUENCY: CONTINUOUS

## 2025-02-15 ASSESSMENT — PAIN DESCRIPTION - ORIENTATION: ORIENTATION: LEFT

## 2025-02-15 ASSESSMENT — PAIN DESCRIPTION - ONSET: ONSET: ON-GOING

## 2025-02-15 NOTE — DISCHARGE INSTRUCTIONS
Keep your wound dry tonight, you may shower tomorrow, wash the area gently with mild soap and water, pat dry, apply a thin layer of antibiotic ointment and a Band-Aid.  Change the dressing at least once daily for the next 3 to 4 days then you may leave it open to air.  Please have sutures removed in 7 to 10 days.  Your tetanus was updated today.  Please make sure that you follow-up with your regular doctor.  If you develop any increased redness, swelling, drainage from the area please return to ER

## 2025-02-15 NOTE — ED PROVIDER NOTES
Independent RICHARD Visit.       Department of Emergency Medicine  ED Provider Note  Admit Date: 2/15/2025  Room: BRITNEY/BRITNEY            HPI:  2/15/25, Time: 6:52 PM EST  .       Eleanor Holder is a 69 y.o. old female presenting to the emergency department for a laceration to the left fifth finger that occurred approximate 2 hours prior to arrival.  Patient states that she was opening a pop top can of soup whenever the lid came back and lacerated her fifth finger.  She initially tried to apply a Band-Aid, this did not help but she bled through it.  She then tried some liquid bandage on it which did not help.  Is still having some burning pain in the area and did have some bleeding.  Unsure of his tetanus status.    Review of Systems:   Pertinent positives and negatives are stated within HPI, all other systems reviewed and are negative.          --------------------------------------------- PAST HISTORY ---------------------------------------------  Past Medical History:  has a past medical history of Cancer (HCC), Cervical radiculopathy, Chronic back pain, Costochondritis, Depression, Diabetes mellitus (HCC), Fibromyalgia, HTN (hypertension), Hyperlipidemia, Leg pain, CLYDE on CPAP, Osteoarthritis, Rheumatoid arthritis(714.0), and TIA (transient ischemic attack).    Past Surgical History:  has a past surgical history that includes  section (); Toe Surgery (, ); Ankle surgery (); Cholecystectomy (); Tonsillectomy; [place] tooth extraction; other surgical history (Left, 2013); hernia repair (); Colonoscopy (2015); other surgical history (Left, 2015); Endoscopy, colon, diagnostic; back surgery (2017); Nerve Block (Bilateral, 2018); pr rhinp dfrm w/colum lngth tip only (Bilateral, 2018); pr rhinp dfrm w/colum lngth tip only (Bilateral, 2018); pr njx dx/ther sbst intrlmnr lmbr/sac w/img gdn (N/A, 2018); Nerve Block (Bilateral, 2018); pr njx  myself      Laceration #: 1.  Location: Left fifth finger  Length: 1.5 cm.  The wound area was cleansed with povidone iodine and draped in a sterile fashion.  The wound area was anesthetized with Lidocaine 1% without epinephrine.  WOUND COMPLEXITY:    Debridement: None.  Undermining: None.  Wound Margins Revised: None.  Flaps Aligned: yes.  The wound was explored with the following results no foreign body or tendon injury seen.  The wound was closed with 5-0 Prolene using interrupted sutures.  Dressing:  bacitracin and a sterile dressing was placed.    Total number suture: 3      Medical Decision Making:    Briefly this is a 69-year-old female patient is presenting with a laceration to the volar aspect of her left fifth finger.  On exam she is warm well-perfused, cap refills less than 2 seconds, this is a partial-thickness laceration and not through the subcutaneous tissue.  She has no evidence of a tendon damage, she has able to flex and extend the finger with no difficulty.  I did consider x-ray however this is partial-thickness and not through the subcu tissue with no concern for a possible retained foreign body and no concern for a bony injury.  Laceration was repaired as above, patient's tetanus was updated.  Patient was advised on local wound care at home.  Advised to have sutures removed in 7 to 10 days by PCP.  Discussed strict return to ER precautions    Patient was explicitly instructed on specific signs and symptoms on which to return to the emergency room for. Patient was instructed to return to the ER for any new or worsening symptoms. Additional discharge instructions were given verbally. All questions were answered. Patient is comfortable and agreeable with discharge plan. Patient in no acute distress and non-toxic in appearance.      Counseling:   The emergency provider has spoken with the patient and discussed today’s results, in addition to providing specific details for the plan of care and

## 2025-02-20 ENCOUNTER — TELEPHONE (OUTPATIENT)
Dept: OBGYN | Age: 70
End: 2025-02-20

## 2025-02-20 ENCOUNTER — OFFICE VISIT (OUTPATIENT)
Dept: PAIN MANAGEMENT | Age: 70
End: 2025-02-20
Payer: MEDICARE

## 2025-02-20 VITALS
DIASTOLIC BLOOD PRESSURE: 78 MMHG | TEMPERATURE: 96.6 F | HEIGHT: 65 IN | SYSTOLIC BLOOD PRESSURE: 142 MMHG | WEIGHT: 169 LBS | HEART RATE: 86 BPM | OXYGEN SATURATION: 99 % | BODY MASS INDEX: 28.16 KG/M2 | RESPIRATION RATE: 18 BRPM

## 2025-02-20 DIAGNOSIS — M48.061 SPINAL STENOSIS OF LUMBAR REGION WITHOUT NEUROGENIC CLAUDICATION: ICD-10-CM

## 2025-02-20 DIAGNOSIS — G89.4 CHRONIC PAIN SYNDROME: ICD-10-CM

## 2025-02-20 DIAGNOSIS — M79.10 MYALGIA: Primary | ICD-10-CM

## 2025-02-20 DIAGNOSIS — M54.16 LUMBAR RADICULOPATHY: ICD-10-CM

## 2025-02-20 DIAGNOSIS — M47.812 CERVICAL SPONDYLOSIS: ICD-10-CM

## 2025-02-20 DIAGNOSIS — M54.12 CERVICAL RADICULOPATHY: ICD-10-CM

## 2025-02-20 DIAGNOSIS — M54.12 RADICULOPATHY, CERVICAL: ICD-10-CM

## 2025-02-20 DIAGNOSIS — M51.369 DEGENERATION OF INTERVERTEBRAL DISC OF LUMBAR REGION, UNSPECIFIED WHETHER PAIN PRESENT: ICD-10-CM

## 2025-02-20 DIAGNOSIS — G89.29 CHRONIC BILATERAL LOW BACK PAIN WITHOUT SCIATICA: ICD-10-CM

## 2025-02-20 DIAGNOSIS — G89.29 OTHER CHRONIC PAIN: ICD-10-CM

## 2025-02-20 DIAGNOSIS — M47.816 LUMBAR FACET ARTHROPATHY: ICD-10-CM

## 2025-02-20 DIAGNOSIS — M54.50 CHRONIC BILATERAL LOW BACK PAIN WITHOUT SCIATICA: ICD-10-CM

## 2025-02-20 PROCEDURE — 99213 OFFICE O/P EST LOW 20 MIN: CPT | Performed by: PHYSICIAN ASSISTANT

## 2025-02-20 RX ORDER — HYDROCODONE BITARTRATE AND ACETAMINOPHEN 5; 325 MG/1; MG/1
1 TABLET ORAL 2 TIMES DAILY
Qty: 60 TABLET | Refills: 0 | Status: SHIPPED | OUTPATIENT
Start: 2025-02-24 | End: 2025-03-26

## 2025-02-20 RX ORDER — GABAPENTIN 400 MG/1
400 CAPSULE ORAL 2 TIMES DAILY
Qty: 180 CAPSULE | Refills: 0 | Status: SHIPPED | OUTPATIENT
Start: 2025-02-20 | End: 2025-05-21

## 2025-02-20 NOTE — PROGRESS NOTES
Eleanor Holder presents to the [facility] on 2/20/2025. Eleanor is complaining of pain neck radiates to right shoulder. The pain is constant. The pain is described as aching, throbbing, stabbing, sharp, and burning. Pain is rated on her best day at a 6, on her worst day at a 10, and on average at a 8 on the VAS scale. She took her last dose of Norco, Neurontin, and Flexeril today.     Any procedures since your last visit: Yes, with 50 % relief.    Pacemaker or defibrillator: No   She is not on NSAIDS and is not on anticoagulation medications t    Medication Contract and Consent for Opioid Use Documents Filed       Patient Documents       Type of Document Status Date Received Received By Description    Medication Contract Received  EBER HONG Opioid medication contract with Dr Wheatley 3/24/20    Medication Contract Received 4/26/2018  3:50 PM ANYA NUNO PAIN MANAGEMENT PATIENT AGREEMENT 4/26/2018    Medication Contract Received 11/5/2020  4:23 PM EBER HONG Opioid medication contract with Dr Wheatley    Medication Contract Received 3/1/2021  1:26 PM EBER HONG Opioid medication contract with Dr Wheatley    Medication Contract Received 8/23/2021  2:03 PM HAYLIE CADENA Medication contract    Medication Contract Received 10/18/2021  3:03 PM HAYLIE CADENA medication contract 10/18/2021    Medication Contract Signed 10/4/2022 12:35 PM CRISTIANA BAÑUELOS Pain Med. Contract 10/04/22    Medication Contract Received 10/11/2023  3:46 PM KAILYN WILKINSON Medication contract    Consent Opioid Use Received 10/29/2024  1:39 PM JAMIE RIVAS Consent Opioid Use                    LMP  (LMP Unknown)      No LMP recorded (lmp unknown). Patient is postmenopausal.

## 2025-02-20 NOTE — TELEPHONE ENCOUNTER
Patient left a voicemail to reschedule her missed appointment 2/20 with Dr Pelayo.  I attempted to contact patient, left voicemail for her to return call.

## 2025-02-20 NOTE — PROGRESS NOTES
[facility]  [address] [place] [id number]    Follow up Note      Eleanor Holder     Date of Visit:  2025    CC:  Patient presents for follow up   Chief Complaint   Patient presents with    Follow-up      right cervical paraspinal, trapezius, supraspinatus, and rhomboid trigger point injections under ultrasound guidance.           HPI:  Pain is a little better.     Change in quality of symptoms:no.    Patient satisfaction with analgesia:fair.    Medication side effects: None.  Recent diagnostic testing:none.   Recent interventional procedures:  2025 right cervical paraspinal, trapezius, supraspinatus, and rhomboid trigger point injections under ultrasound guidance - 50% relief.     She has been on anticoagulation medications to include NSAIDS. The patient  has not been on herbal supplements.  The patient is diabetic.    Imagin2023 MRI cervical spine    FINDINGS:   BONES/ALIGNMENT: There is normal alignment of the spine. The vertebral body   heights are maintained. The bone marrow signal appears unremarkable.       SPINAL CORD: No abnormal cord signal is seen.       SOFT TISSUES: No paraspinal mass identified.       C2-C3: Grade 1 anterolisthesis.  Severe left facet arthropathy resulting in   severe left neural foraminal narrowing.       C3-C4: Grade 1 anterolisthesis with disc bulging.  Severe left and moderate   right facet arthropathy.  Findings resulting in severe left neural foraminal   narrowing       C4-C5: Grade 1 anterolisthesis with disc bulging.  Moderate bilateral facet   arthropathy.  Mild left neural foraminal narrowing.       C5-C6: Grade 1 anterolisthesis with disc bulging.  Moderate bilateral facet   arthropathy.  No canal or foraminal stenosis.       C6-C7: Disc bulging with 2 mm central disc protrusion.  Mild bilateral facet   arthropathy.  Otherwise unremarkable       C7-T1: Grade 1 anterolisthesis.  Mild bilateral facet arthropathy.

## 2025-02-21 NOTE — TELEPHONE ENCOUNTER
Pt needs a sleep study.  Please order.  She has an appt with you on 02/25 for stiches removal of her pinky finger.  Perhaps we can document the need for a sleep study at that appt.    Pt c/o varicose veins in her left thigh and one above her right knee.  The one above her right knee is causing burning and discomfort.    Per NP at Dr. Rajput's office at 08/07/24 appt:  “I reviewed with the patient that she has small varicosities and spider telangiectasias are too small for stab phlebectomy. As she is symptomatic from this, I recommend evaluation for injection sclerotherapy, which is not performed in this office. I offered her a referral to Dr. Holder's office and she is interested in this. She does not need to follow up here unless she has any new problems.”    I spoke with Dr. Miller office and they said if the veins have grown since Aug they can certainly see her back.      Pt will follow up with PCP at 02/25 appt.

## 2025-02-24 ENCOUNTER — HOSPITAL ENCOUNTER (OUTPATIENT)
Dept: MRI IMAGING | Age: 70
Discharge: HOME | End: 2025-02-26
Payer: MEDICARE

## 2025-02-24 DIAGNOSIS — M47.812 CERVICAL SPONDYLOSIS: ICD-10-CM

## 2025-02-24 DIAGNOSIS — M54.12 CERVICAL RADICULOPATHY: ICD-10-CM

## 2025-02-24 PROCEDURE — 72141 MRI NECK SPINE W/O DYE: CPT

## 2025-02-25 ENCOUNTER — OFFICE VISIT (OUTPATIENT)
Dept: FAMILY MEDICINE CLINIC | Age: 70
End: 2025-02-25
Payer: MEDICARE

## 2025-02-25 VITALS
HEIGHT: 65 IN | TEMPERATURE: 98.5 F | BODY MASS INDEX: 27.99 KG/M2 | WEIGHT: 168 LBS | DIASTOLIC BLOOD PRESSURE: 68 MMHG | HEART RATE: 100 BPM | OXYGEN SATURATION: 98 % | SYSTOLIC BLOOD PRESSURE: 110 MMHG

## 2025-02-25 DIAGNOSIS — I83.813 VARICOSE VEINS OF BILATERAL LOWER EXTREMITIES WITH PAIN: ICD-10-CM

## 2025-02-25 DIAGNOSIS — G47.33 OSA (OBSTRUCTIVE SLEEP APNEA): Primary | ICD-10-CM

## 2025-02-25 DIAGNOSIS — S61.217D LACERATION OF LEFT LITTLE FINGER WITHOUT FOREIGN BODY WITHOUT DAMAGE TO NAIL, SUBSEQUENT ENCOUNTER: ICD-10-CM

## 2025-02-25 PROCEDURE — 3078F DIAST BP <80 MM HG: CPT | Performed by: FAMILY MEDICINE

## 2025-02-25 PROCEDURE — 1123F ACP DISCUSS/DSCN MKR DOCD: CPT | Performed by: FAMILY MEDICINE

## 2025-02-25 PROCEDURE — 1159F MED LIST DOCD IN RCRD: CPT | Performed by: FAMILY MEDICINE

## 2025-02-25 PROCEDURE — 99214 OFFICE O/P EST MOD 30 MIN: CPT | Performed by: FAMILY MEDICINE

## 2025-02-25 PROCEDURE — 3074F SYST BP LT 130 MM HG: CPT | Performed by: FAMILY MEDICINE

## 2025-02-25 RX ORDER — SULFAMETHOXAZOLE AND TRIMETHOPRIM 800; 160 MG/1; MG/1
1 TABLET ORAL 2 TIMES DAILY
Qty: 10 TABLET | Refills: 0 | Status: SHIPPED | OUTPATIENT
Start: 2025-02-25 | End: 2025-03-02

## 2025-02-26 ENCOUNTER — TELEPHONE (OUTPATIENT)
Dept: VASCULAR SURGERY | Age: 70
End: 2025-02-26

## 2025-02-27 ENCOUNTER — HOSPITAL ENCOUNTER (OUTPATIENT)
Dept: GENERAL RADIOLOGY | Age: 70
Discharge: HOME | End: 2025-03-01
Attending: OBSTETRICS & GYNECOLOGY
Payer: MEDICARE

## 2025-02-27 DIAGNOSIS — Z12.31 ENCOUNTER FOR SCREENING MAMMOGRAM FOR BREAST CANCER: ICD-10-CM

## 2025-02-27 PROCEDURE — 77063 BREAST TOMOSYNTHESIS BI: CPT

## 2025-03-12 ENCOUNTER — OFFICE VISIT (OUTPATIENT)
Dept: VASCULAR SURGERY | Age: 70
End: 2025-03-12
Payer: MEDICARE

## 2025-03-12 ENCOUNTER — TELEPHONE (OUTPATIENT)
Dept: VASCULAR SURGERY | Age: 70
End: 2025-03-12

## 2025-03-12 DIAGNOSIS — I87.2 VENOUS INSUFFICIENCY: Primary | ICD-10-CM

## 2025-03-12 PROCEDURE — 99213 OFFICE O/P EST LOW 20 MIN: CPT | Performed by: SURGERY

## 2025-03-12 PROCEDURE — 1159F MED LIST DOCD IN RCRD: CPT | Performed by: SURGERY

## 2025-03-12 PROCEDURE — 1123F ACP DISCUSS/DSCN MKR DOCD: CPT | Performed by: SURGERY

## 2025-03-12 NOTE — TELEPHONE ENCOUNTER
I called Emilia to inform her that , the testing she has to have can only be done at [facility] or [facility] . She wanted to go to [facility] but they no longer do that testing there. Duplex vein mapping lower bilateral is the test that needs to be scheduled. Then an appointment to see Dr. Rajput after that date.

## 2025-03-12 NOTE — PROGRESS NOTES
Vascular Surgery Outpatient Consultation      Chief Complaint   Patient presents with    Follow-up     VV       Reason for Consult:  Varicose veins    Requesting Physician:  Dr. Mckeon    HISTORY OF PRESENT ILLNESS:                The patient is a 69 y.o. female who presents for reevaluation of varicose veins.  She had previously been seen last year with some small spider veins.  At that point in time she was relatively asymptomatic.  She reports since then she has developed some larger varicosities especially in her left thigh and right knee.  She reports she was sick recently and lost some weight and she thinks now she can see the veins more easily.  She describes burning and a heaviness and achiness in her bilateral lower extremities.  She has tried to wear compression stockings in the past without improvement of her symptoms.  She reports a history of vein procedures on her right leg many many years ago.    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    Cervical radiculopathy     Chronic back pain     Costochondritis     h/o    Depression     Diabetes mellitus (HCC)     Fibromyalgia     HTN (hypertension)     Hyperlipidemia     Leg pain     CLYDE on CPAP     Osteoarthritis     \"everywhere\"    Rheumatoid arthritis(714.0)     TIA (transient ischemic attack)     no deficits      Past Surgical History:        Procedure Laterality Date    ANESTHESIA NERVE BLOCK Left 02/26/2019    LEFT C3,4,5 MBB performed by Baljit Dorsey MD at [facility] OR    ANESTHESIA NERVE BLOCK Right 08/12/2019    BILATERAL TRANSFORAMINAL EPIDURAL STEROID INJECTION S1 #3 performed by Baljit Dorsey MD at [facility] OR    ANESTHESIA NERVE BLOCK Right 06/16/2020    RIGHT SACROILIAC JOINT INJECTION      CPT: 75099 performed by Inez Montelongo DO at [facility] OR    ANKLE SURGERY  2005    Left    ANKLE SURGERY Left 07/08/2022    REPAIR OF ANTERIOR TALAR LIGAMENT LEFT ANKLE, REPAIR DELTOID LIGAMENT LEFT ANKLE performed by

## 2025-03-12 NOTE — TELEPHONE ENCOUNTER
I called Eleanor to inform her , that she has an appointment at [facility] on March 27,2025 at a 12:30 arrival time to register. She can go in threw entrance B by the Emergency room entrance. Eleanor should bring her photo Id. , list of medications , Insurance cards.  I also made her appointment to see Dr. Rajput on April 2,2025 at 11:00 am.

## 2025-03-19 ENCOUNTER — PROCEDURE VISIT (OUTPATIENT)
Dept: OBGYN | Age: 70
End: 2025-03-19

## 2025-03-19 VITALS
HEART RATE: 82 BPM | SYSTOLIC BLOOD PRESSURE: 160 MMHG | WEIGHT: 171 LBS | BODY MASS INDEX: 28.49 KG/M2 | HEIGHT: 65 IN | DIASTOLIC BLOOD PRESSURE: 71 MMHG

## 2025-03-19 DIAGNOSIS — F41.8 DEPRESSION WITH ANXIETY: ICD-10-CM

## 2025-03-19 DIAGNOSIS — N90.7 VULVAR CYST: Primary | ICD-10-CM

## 2025-03-19 RX ORDER — HYDROXYZINE PAMOATE 25 MG/1
CAPSULE ORAL
Qty: 90 CAPSULE | Refills: 1 | OUTPATIENT
Start: 2025-03-19

## 2025-03-19 NOTE — PROGRESS NOTES
Here today for Vulvar biopsy Instructed on the procedure of Vulvar biopsy voiced understanding and permit signed for Vulvar biopsy  Prepared for procedure.  Time out done by    Prior to starting the procedure.  Tolerated procedure.  No bleeding noted after procedure.  Biopsy obtained, labeled and sent to lab   To return in one week for results.  Discharge instructions reviewed verbally and written AVS given to patient.  Voiced understanding and agreement.  No baths for 48 hours

## 2025-03-19 NOTE — PROGRESS NOTES
Vulvar Biopsy     Eleanor Holder 69 y.o. presents today for vulvar cyst removal.      Patient has had multiple vulvar cysts. She had one removed last month that showed an epidermoid cyst.     Vitals:  BP (!) 160/71   Pulse 82   Ht 1.651 m (5' 5\")   Wt 77.6 kg (171 lb)   LMP  (LMP Unknown)   BMI 28.46 kg/m²       The risks, benefits and anticipated outcomes of the procedure,the risks and benefits of the alternatives to the procedure and the roles and tasks of the personnel to be involved were discussed with the patient and the patient does consent to the procedure and does agree to proceed. Theequipment necessary to proceed is available and in working order.  Verbal consent obtained.     Vulva:        Procedure note: after area sterilly cleansed with Betadine, infiltrated at base with 1% Lidocaine. A scalpel was used to open the cyst. The cyst was drained and the cyst wall was removed.  Hemostasis was obtained with silver nitrate. Hemostatic at the end of the procedure. Patient tolerated procedure well. Specimen was labeled and sent to Pathology for analysis.      Assessment:        Diagnosis Orders   1. Vulvar cyst  Surgical Pathology            Plan:       Tissue sent to pathology. Will call with results.       Marybel Pelayo MD  3/19/25, 11:23 AM EDT

## 2025-03-20 ENCOUNTER — HOSPITAL ENCOUNTER (OUTPATIENT)
Dept: SLEEP CENTER | Age: 70
Discharge: HOME | End: 2025-03-20
Payer: MEDICARE

## 2025-03-20 DIAGNOSIS — G47.33 OSA (OBSTRUCTIVE SLEEP APNEA): ICD-10-CM

## 2025-03-20 PROCEDURE — 95811 POLYSOM 6/>YRS CPAP 4/> PARM: CPT

## 2025-03-25 ENCOUNTER — TELEPHONE (OUTPATIENT)
Dept: FAMILY MEDICINE CLINIC | Age: 70
End: 2025-03-25

## 2025-03-25 NOTE — PROGRESS NOTES
[facility]  [address] [place] [id number]    Follow up Note      Eleanor Holder     Date of Visit:  3/26/2025    CC:  Patient presents for follow up   Chief Complaint   Patient presents with    Back Pain    Neck Pain    Follow-up           HPI:  Pain is relatively controlled.    Change in quality of symptoms:no.    Patient satisfaction with analgesia:fair.    Medication side effects: None.  Recent diagnostic testing:none.   Recent interventional procedures:  none.    She has been on anticoagulation medications to include NSAIDS. The patient  has not been on herbal supplements.  The patient is diabetic.    Imagin2023 MRI cervical spine    FINDINGS:   BONES/ALIGNMENT: There is normal alignment of the spine. The vertebral body   heights are maintained. The bone marrow signal appears unremarkable.       SPINAL CORD: No abnormal cord signal is seen.       SOFT TISSUES: No paraspinal mass identified.       C2-C3: Grade 1 anterolisthesis.  Severe left facet arthropathy resulting in   severe left neural foraminal narrowing.       C3-C4: Grade 1 anterolisthesis with disc bulging.  Severe left and moderate   right facet arthropathy.  Findings resulting in severe left neural foraminal   narrowing       C4-C5: Grade 1 anterolisthesis with disc bulging.  Moderate bilateral facet   arthropathy.  Mild left neural foraminal narrowing.       C5-C6: Grade 1 anterolisthesis with disc bulging.  Moderate bilateral facet   arthropathy.  No canal or foraminal stenosis.       C6-C7: Disc bulging with 2 mm central disc protrusion.  Mild bilateral facet   arthropathy.  Otherwise unremarkable       C7-T1: Grade 1 anterolisthesis.  Mild bilateral facet arthropathy.  Otherwise   unremarkable           Impression   At C2-C3, severe left neural foraminal narrowing.       At C3-C4, severe left neural foraminal narrowing.       No canal stenosis.  No significant posterior disc pathology.

## 2025-03-25 NOTE — TELEPHONE ENCOUNTER
Patient had her sleep study completed. They told her to call you and let you know to send information to DME.

## 2025-03-26 ENCOUNTER — OFFICE VISIT (OUTPATIENT)
Dept: PAIN MANAGEMENT | Age: 70
End: 2025-03-26
Payer: MEDICARE

## 2025-03-26 VITALS
SYSTOLIC BLOOD PRESSURE: 147 MMHG | RESPIRATION RATE: 18 BRPM | DIASTOLIC BLOOD PRESSURE: 83 MMHG | WEIGHT: 171 LBS | BODY MASS INDEX: 28.49 KG/M2 | HEART RATE: 92 BPM | OXYGEN SATURATION: 99 % | HEIGHT: 65 IN | TEMPERATURE: 98.1 F

## 2025-03-26 DIAGNOSIS — M54.12 CERVICAL RADICULOPATHY: ICD-10-CM

## 2025-03-26 DIAGNOSIS — M54.50 CHRONIC BILATERAL LOW BACK PAIN WITHOUT SCIATICA: ICD-10-CM

## 2025-03-26 DIAGNOSIS — M54.12 RADICULOPATHY, CERVICAL: ICD-10-CM

## 2025-03-26 DIAGNOSIS — M47.816 LUMBAR FACET ARTHROPATHY: ICD-10-CM

## 2025-03-26 DIAGNOSIS — M51.369 DEGENERATION OF INTERVERTEBRAL DISC OF LUMBAR REGION, UNSPECIFIED WHETHER PAIN PRESENT: ICD-10-CM

## 2025-03-26 DIAGNOSIS — M48.061 SPINAL STENOSIS OF LUMBAR REGION WITHOUT NEUROGENIC CLAUDICATION: ICD-10-CM

## 2025-03-26 DIAGNOSIS — M79.10 MYALGIA: Primary | ICD-10-CM

## 2025-03-26 DIAGNOSIS — M54.16 LUMBAR RADICULOPATHY: ICD-10-CM

## 2025-03-26 DIAGNOSIS — G89.29 OTHER CHRONIC PAIN: ICD-10-CM

## 2025-03-26 DIAGNOSIS — G89.29 CHRONIC BILATERAL LOW BACK PAIN WITHOUT SCIATICA: ICD-10-CM

## 2025-03-26 DIAGNOSIS — G89.4 CHRONIC PAIN SYNDROME: ICD-10-CM

## 2025-03-26 DIAGNOSIS — M47.812 CERVICAL SPONDYLOSIS: ICD-10-CM

## 2025-03-26 PROCEDURE — 3079F DIAST BP 80-89 MM HG: CPT | Performed by: PHYSICIAN ASSISTANT

## 2025-03-26 PROCEDURE — 1123F ACP DISCUSS/DSCN MKR DOCD: CPT | Performed by: PHYSICIAN ASSISTANT

## 2025-03-26 PROCEDURE — 99213 OFFICE O/P EST LOW 20 MIN: CPT | Performed by: PHYSICIAN ASSISTANT

## 2025-03-26 PROCEDURE — 99213 OFFICE O/P EST LOW 20 MIN: CPT

## 2025-03-26 PROCEDURE — 1159F MED LIST DOCD IN RCRD: CPT | Performed by: PHYSICIAN ASSISTANT

## 2025-03-26 PROCEDURE — 3077F SYST BP >= 140 MM HG: CPT | Performed by: PHYSICIAN ASSISTANT

## 2025-03-26 PROCEDURE — 1160F RVW MEDS BY RX/DR IN RCRD: CPT | Performed by: PHYSICIAN ASSISTANT

## 2025-03-26 PROCEDURE — 1125F AMNT PAIN NOTED PAIN PRSNT: CPT | Performed by: PHYSICIAN ASSISTANT

## 2025-03-26 RX ORDER — HYDROCODONE BITARTRATE AND ACETAMINOPHEN 5; 325 MG/1; MG/1
1 TABLET ORAL 2 TIMES DAILY
Qty: 60 TABLET | Refills: 0 | Status: SHIPPED | OUTPATIENT
Start: 2025-03-26 | End: 2025-04-25

## 2025-03-26 NOTE — PROGRESS NOTES
Eleanor Holder presents to the [facility] on 3/26/2025. Eleanor is complaining of pain in her lumbar spine and right side.. The pain is constant. The pain is described as aching, stabbing, sharp, and burning. Pain is rated on her best day at a 6, on her worst day at a 10, and on average at a 8 on the VAS scale. She took her last dose of Norco, Neurontin, and Flexeril this morning.     Any procedures since your last visit: No.    Pacemaker or defibrillator: No.    She is not on NSAIDS and is not on anticoagulation medications.    Do you want someone present when the provider examines you? No.    Medication Contract and Consent for Opioid Use Documents Filed       Patient Documents       Type of Document Status Date Received Received By Description    Medication Contract Received  EBER HONG Opioid medication contract with Dr Wheatley 3/24/20    Medication Contract Received 4/26/2018  3:50 PM ANYA NUNO PAIN MANAGEMENT PATIENT AGREEMENT 4/26/2018    Medication Contract Received 11/5/2020  4:23 PM EBER HONG Opioid medication contract with Dr Wheatley    Medication Contract Received 3/1/2021  1:26 PM EBER HONG Opioid medication contract with Dr Wheatley    Medication Contract Received 8/23/2021  2:03 PM HAYLIE CADENA Medication contract    Medication Contract Received 10/18/2021  3:03 PM HAYLIE CADENA medication contract 10/18/2021    Medication Contract Signed 10/4/2022 12:35 PM CRISTIANA BAÑUELOS Pain Med. Contract 10/04/22    Medication Contract Received 10/11/2023  3:46 PM KAILYN WILKINSON Medication contract    Consent Opioid Use Received 10/29/2024  1:39 PM JAMIE RIVAS Consent Opioid Use                    BP (!) 147/83 (BP Site: Right Upper Arm, Patient Position: Sitting, BP Cuff Size: Medium Adult)   Pulse 92   Temp 98.1 °F (36.7 °C)

## 2025-03-27 ENCOUNTER — HOSPITAL ENCOUNTER (OUTPATIENT)
Dept: ULTRASOUND IMAGING | Age: 70
Discharge: HOME | End: 2025-03-29
Attending: SURGERY
Payer: MEDICARE

## 2025-03-27 DIAGNOSIS — I87.2 VENOUS INSUFFICIENCY: ICD-10-CM

## 2025-03-27 PROCEDURE — 93970 EXTREMITY STUDY: CPT

## 2025-03-27 NOTE — TELEPHONE ENCOUNTER
So Sleep study is done but no report has been finalized yet.  This comes from sleep medicine doc and usually takes 1-2 weeks after the study is compete.    If she doesn't hear back from us in the next 10 days, please reach out and touch base.

## 2025-03-28 ENCOUNTER — RESULTS FOLLOW-UP (OUTPATIENT)
Dept: OBGYN | Age: 70
End: 2025-03-28

## 2025-03-28 LAB — SURGICAL PATHOLOGY REPORT: NORMAL

## 2025-04-02 ENCOUNTER — OFFICE VISIT (OUTPATIENT)
Dept: VASCULAR SURGERY | Age: 70
End: 2025-04-02

## 2025-04-02 ENCOUNTER — HOSPITAL ENCOUNTER (OUTPATIENT)
Age: 70
Discharge: HOME | End: 2025-04-04
Payer: MEDICARE

## 2025-04-02 ENCOUNTER — HOSPITAL ENCOUNTER (OUTPATIENT)
Dept: GENERAL RADIOLOGY | Age: 70
Discharge: HOME | End: 2025-04-04
Payer: MEDICARE

## 2025-04-02 DIAGNOSIS — M25.552 LEFT HIP PAIN: ICD-10-CM

## 2025-04-02 DIAGNOSIS — I83.813 VARICOSE VEINS OF BOTH LOWER EXTREMITIES WITH PAIN: ICD-10-CM

## 2025-04-02 DIAGNOSIS — R60.0 BILATERAL LEG EDEMA: ICD-10-CM

## 2025-04-02 DIAGNOSIS — M25.552 LEFT HIP PAIN: Primary | ICD-10-CM

## 2025-04-02 PROCEDURE — 73502 X-RAY EXAM HIP UNI 2-3 VIEWS: CPT

## 2025-04-02 NOTE — PROGRESS NOTES
Vascular Surgery Outpatient Follow Up      Chief Complaint   Patient presents with    Follow-up     VV.        HISTORY OF PRESENT ILLNESS:                The patient is a 69 y.o. female who presents for re-evaluation of varicose veins.  She had bilateral lower extremity reflux study since last seen demonstrating reflux through the L GSV. She describes burning and a heaviness and achiness in her bilateral lower extremities, but her left is more significant.  She states the pain is 8/10 typically. She describes an aching pain to her left groin when she is laying in bed which makes it difficult to get comfortable.  She has tried to wear compression stockings in the past without improvement of her symptoms.      She reports a history of vein procedures on her right leg many many years ago. She also has had several spine surgeries and sees pain management for epidural injections.    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    Cervical radiculopathy     Chronic back pain     Costochondritis     h/o    Depression     Diabetes mellitus (HCC)     Fibromyalgia     HTN (hypertension)     Hyperlipidemia     Leg pain     CLYDE on CPAP     Osteoarthritis     \"everywhere\"    Rheumatoid arthritis(714.0)     TIA (transient ischemic attack)     no deficits      Past Surgical History:        Procedure Laterality Date    ANESTHESIA NERVE BLOCK Left 02/26/2019    LEFT C3,4,5 MBB performed by Baljit Dorsey MD at [facility] OR    ANESTHESIA NERVE BLOCK Right 08/12/2019    BILATERAL TRANSFORAMINAL EPIDURAL STEROID INJECTION S1 #3 performed by Baljit Dorsey MD at [facility] OR    ANESTHESIA NERVE BLOCK Right 06/16/2020    RIGHT SACROILIAC JOINT INJECTION      CPT: 59160 performed by Inez Montelongo DO at [facility] OR    ANKLE SURGERY  2005    Left    ANKLE SURGERY Left 07/08/2022    REPAIR OF ANTERIOR TALAR LIGAMENT LEFT ANKLE, REPAIR DELTOID LIGAMENT LEFT ANKLE performed by Yariel Haro DPM at [facility] OR

## 2025-04-11 ENCOUNTER — TELEPHONE (OUTPATIENT)
Dept: FAMILY MEDICINE CLINIC | Age: 70
End: 2025-04-11

## 2025-04-11 DIAGNOSIS — G47.33 OSA (OBSTRUCTIVE SLEEP APNEA): Primary | ICD-10-CM

## 2025-04-11 NOTE — TELEPHONE ENCOUNTER
Looks like she had a sleep study on March 20.  I do not see any results for that.  Please call the sleep lab and see why no one has read it yet/it is not resulted in epic.

## 2025-04-11 NOTE — TELEPHONE ENCOUNTER
Eleanor calling abut the sleep study she had done some time ago.    She has not heard from anyone regarding the results. She is stating that she needs to know what is going on because she needs to get the breathing machine as soon as possible.

## 2025-04-14 ENCOUNTER — TELEPHONE (OUTPATIENT)
Dept: VASCULAR SURGERY | Age: 70
End: 2025-04-14

## 2025-04-14 ENCOUNTER — TELEPHONE (OUTPATIENT)
Dept: FAMILY MEDICINE CLINIC | Age: 70
End: 2025-04-14

## 2025-04-14 NOTE — TELEPHONE ENCOUNTER
OK.  Sleep study finally read.   What company is she going to get her products from and I'll send an order.

## 2025-04-14 NOTE — TELEPHONE ENCOUNTER
I was able to reach the sleep lab, asking about the results of the sleep study done on this patient on March 20th.    Genaro said that he would look into this and call back to me before the end of the day today.

## 2025-04-14 NOTE — TELEPHONE ENCOUNTER
Spoke with Pedro at the sleep center [phone]    He had already sent the providers responsible for reading sleep studies a message regarding this.  He says they are very behind on reading studies in general.  There are tests older than Eleanor's that have not been read yet.

## 2025-04-14 NOTE — TELEPHONE ENCOUNTER
Toby from the sleep center just called to inform that her sleep study has been read and the results will be available to you now.

## 2025-04-14 NOTE — TELEPHONE ENCOUNTER
Pt returned your call.  States OK to leave a message.  Pain has improved with compression stockings but still has some throbbing.

## 2025-04-15 ENCOUNTER — TELEPHONE (OUTPATIENT)
Dept: FAMILY MEDICINE CLINIC | Age: 70
End: 2025-04-15

## 2025-04-15 ENCOUNTER — TELEPHONE (OUTPATIENT)
Dept: VASCULAR SURGERY | Age: 70
End: 2025-04-15

## 2025-04-15 DIAGNOSIS — M25.552 LEFT HIP PAIN: Primary | ICD-10-CM

## 2025-04-15 NOTE — TELEPHONE ENCOUNTER
Spoke with the pt, referral placed to ortho in regards to (L) hip.  Pt declined to schedule a f/u visit with Dr. Rajput at this time, she would like to see what ortho has to say first.  She will then call our office to schedule.

## 2025-04-15 NOTE — TELEPHONE ENCOUNTER
Message left on pt's voicemail for a return call in regards to a referral to ortho for arthritis of the (L) hip.  She also needs a 2 month follow up with Dr. Rajput.

## 2025-04-15 NOTE — TELEPHONE ENCOUNTER
I just got a call back from Dr Rajput's Office.   They are going to put in a referral for her to see Dr Bennie Rajput for her hip pain.      Dr Shannan Rajput is going to address the vein issue, recommending Compression stockings at this time.  They will follow up with her on this.       Nothing is needed from you at this time.

## 2025-04-15 NOTE — TELEPHONE ENCOUNTER
Eleanor called to let you know that she saw Dr Shannan Rajput yesterday for hip pain.  They did an Xray of the hip and found that a vein in that leg was swollen. She would need to see an orthopedic surgeon for this.      I had some questions that she could not answer, so I called Dr Shannan Rajput's office and was advised that what they saw on the xray appeared to be a vein with moderate reflux.    She was told that she would need to see a vein specialist for this.     Will you place that referral, or do you need to see her first?

## 2025-04-15 NOTE — TELEPHONE ENCOUNTER
Sleep study results and DME order placed in [facility] fax bin by the printer.  Please send.

## 2025-04-18 NOTE — TELEPHONE ENCOUNTER
Order, sleep study, demographics, insurance, and OV notes faxed to [facility] on [address]   Fx [phone]

## 2025-04-22 ENCOUNTER — OFFICE VISIT (OUTPATIENT)
Dept: ORTHOPEDIC SURGERY | Age: 70
End: 2025-04-22
Payer: MEDICARE

## 2025-04-22 VITALS — WEIGHT: 171 LBS | BODY MASS INDEX: 28.49 KG/M2 | HEIGHT: 65 IN

## 2025-04-22 DIAGNOSIS — M16.12 PRIMARY LOCALIZED OSTEOARTHRITIS OF LEFT HIP: Primary | ICD-10-CM

## 2025-04-22 PROCEDURE — 1123F ACP DISCUSS/DSCN MKR DOCD: CPT | Performed by: ORTHOPAEDIC SURGERY

## 2025-04-22 PROCEDURE — 99203 OFFICE O/P NEW LOW 30 MIN: CPT | Performed by: ORTHOPAEDIC SURGERY

## 2025-04-22 PROCEDURE — 1125F AMNT PAIN NOTED PAIN PRSNT: CPT | Performed by: ORTHOPAEDIC SURGERY

## 2025-04-22 PROCEDURE — 1159F MED LIST DOCD IN RCRD: CPT | Performed by: ORTHOPAEDIC SURGERY

## 2025-04-22 ASSESSMENT — ENCOUNTER SYMPTOMS
EYE DISCHARGE: 0
ALLERGIC/IMMUNOLOGIC NEGATIVE: 1
SHORTNESS OF BREATH: 0
ABDOMINAL DISTENTION: 0

## 2025-04-22 NOTE — PROGRESS NOTES
Eleanor Holder (:  1955) is a 70 y.o. female,New patient, here for evaluation of the following chief complaint(s):  Leg Pain (Patient has had pain and burning in both legs. She was seeing Dr. Shannan Rajput and she was referred to us for her left hip. She has back pain and is going to the back doctor soon for another injection . Pain is worse when laying down. She has pain that radiates up from her left ankle up the entire leg. Pain causes her to loose sleep because it hurts to sleep on her sides and back. )      Assessment & Plan   ASSESSMENT/PLAN:  1. Primary localized osteoarthritis of left hip    This is a 70 y.o. year old female with Primary localized osteoarthritis of left hip [M16.12].  I discussed a variety of treatment options with the patient today including observation, NSAID, low impact exercise, weight loss, physical therapy and injections. I also discussed the risks, benefits, alternatives and subsequent rehab with surgery. The patient would like to proceed with hip injection.    The patient has significant left hip arthritis and symptoms that warrant an intra-articular hip steroid injection We will schedule the procedure under fluoroscopic or ultrasound guidance    Based on the results of her injection she may need further imaging of her left knee, left ankle or further spine eval    Return in about 4 weeks (around 2025).         Subjective   SUBJECTIVE/OBJECTIVE:  Leg Pain         70-year-old female here today to discuss her bilateral legs.  She has pain and a burning sensation on the lateral side of her left leg and on the anterior side of her right leg.  She feels as though her legs are heavy after walking and at nighttime.  She states it is really bothersome at night.  She has a history of spine fusion and is due to have another epidural here in the coming weeks.  She notes that her lower leg pain does not necessarily improve after epidurals.  She does have groin pain as well mainly

## 2025-04-25 ENCOUNTER — OFFICE VISIT (OUTPATIENT)
Dept: PAIN MANAGEMENT | Age: 70
End: 2025-04-25
Payer: MEDICARE

## 2025-04-25 VITALS
HEART RATE: 87 BPM | SYSTOLIC BLOOD PRESSURE: 124 MMHG | DIASTOLIC BLOOD PRESSURE: 77 MMHG | HEIGHT: 65 IN | WEIGHT: 175 LBS | TEMPERATURE: 97.9 F | BODY MASS INDEX: 29.16 KG/M2 | RESPIRATION RATE: 16 BRPM | OXYGEN SATURATION: 94 %

## 2025-04-25 DIAGNOSIS — M79.10 MYALGIA: Primary | ICD-10-CM

## 2025-04-25 DIAGNOSIS — M54.16 LUMBAR RADICULOPATHY: ICD-10-CM

## 2025-04-25 DIAGNOSIS — G89.4 CHRONIC PAIN SYNDROME: ICD-10-CM

## 2025-04-25 DIAGNOSIS — M54.12 RADICULOPATHY, CERVICAL: ICD-10-CM

## 2025-04-25 DIAGNOSIS — G89.29 CHRONIC BILATERAL LOW BACK PAIN WITHOUT SCIATICA: ICD-10-CM

## 2025-04-25 DIAGNOSIS — M51.369 DEGENERATION OF INTERVERTEBRAL DISC OF LUMBAR REGION, UNSPECIFIED WHETHER PAIN PRESENT: ICD-10-CM

## 2025-04-25 DIAGNOSIS — M48.061 SPINAL STENOSIS OF LUMBAR REGION WITHOUT NEUROGENIC CLAUDICATION: ICD-10-CM

## 2025-04-25 DIAGNOSIS — M54.12 CERVICAL RADICULOPATHY: ICD-10-CM

## 2025-04-25 DIAGNOSIS — M54.50 CHRONIC BILATERAL LOW BACK PAIN WITHOUT SCIATICA: ICD-10-CM

## 2025-04-25 DIAGNOSIS — M47.816 LUMBAR FACET ARTHROPATHY: ICD-10-CM

## 2025-04-25 DIAGNOSIS — M47.812 CERVICAL SPONDYLOSIS: ICD-10-CM

## 2025-04-25 DIAGNOSIS — G89.29 OTHER CHRONIC PAIN: ICD-10-CM

## 2025-04-25 PROCEDURE — 99213 OFFICE O/P EST LOW 20 MIN: CPT | Performed by: PHYSICIAN ASSISTANT

## 2025-04-25 RX ORDER — HYDROCODONE BITARTRATE AND ACETAMINOPHEN 5; 325 MG/1; MG/1
1 TABLET ORAL 2 TIMES DAILY
Qty: 60 TABLET | Refills: 0 | Status: SHIPPED | OUTPATIENT
Start: 2025-04-25 | End: 2025-05-25

## 2025-04-25 RX ORDER — GABAPENTIN 400 MG/1
400 CAPSULE ORAL 2 TIMES DAILY
Qty: 180 CAPSULE | Refills: 0 | Status: SHIPPED | OUTPATIENT
Start: 2025-04-25 | End: 2025-07-24

## 2025-04-25 NOTE — PROGRESS NOTES
Eleanor Holder presents to the [facility] on 4/25/2025. Eleanor is complaining of pain lower back pain. The pain is constant. The pain is described as aching, shooting, stabbing, and sharp. Pain is rated on her best day at a 7, on her worst day at a 10, and on average at a 9 on the VAS scale. She took her last dose of Neurontin and oxycodone this morning.     Any procedures since your last visit: No    Pacemaker or defibrillator: No     She is not on NSAIDS and is not on anticoagulation medications     Do you want someone present when the provider examines you? No    Medication Contract and Consent for Opioid Use Documents Filed       Patient Documents       Type of Document Status Date Received Received By Description    Medication Contract Received  EBER HONG Opioid medication contract with Dr Wheatley 3/24/20    Medication Contract Received 4/26/2018  3:50 PM ANYA NUNO PAIN MANAGEMENT PATIENT AGREEMENT 4/26/2018    Medication Contract Received 11/5/2020  4:23 PM EBER HONG Opioid medication contract with Dr Wheatley    Medication Contract Received 3/1/2021  1:26 PM EBER HONG Opioid medication contract with Dr Wheatley    Medication Contract Received 8/23/2021  2:03 PM HAYLIE CADENA Medication contract    Medication Contract Received 10/18/2021  3:03 PM HAYLIE CADENA medication contract 10/18/2021    Medication Contract Signed 10/4/2022 12:35 PM CRISTIANA BAÑUELOS Pain Med. Contract 10/04/22    Medication Contract Received 10/11/2023  3:46 PM KAILYN WILKINSON Medication contract    Consent Opioid Use Received 10/29/2024  1:39 PM JAMIE RIVAS Consent Opioid Use                    /77   Pulse 87   Temp 97.9 °F (36.6 °C)   Resp 16   Ht 1.651 m (5' 5\")   Wt 79.4 kg (175 lb)   LMP  (LMP Unknown)   SpO2 94%   BMI 29.12 kg/m²      No 
negative.    PHYSICAL EXAMINATION:      /77   Pulse 87   Temp 97.9 °F (36.6 °C)   Resp 16   Ht 1.651 m (5' 5\")   Wt 79.4 kg (175 lb)   LMP  (LMP Unknown)   SpO2 94%   BMI 29.12 kg/m²     General:      General appearance: awake, alert, oriented, in no acute distress, well developed, well nourished and in no acute distress   pleasant and well-hydrated.    in no distress and A & O x3  Build:Overweight  Function:Rises from a seated position with difficulty    HEENT:    Head:normocephalic and atraumatic  Pupils:regular, round and equal.  Sclera: icterus absent  EOM:full and intact.    Lungs:    Breathing:Normal expansion.  No wheezing.    Abdomen:    Shape:non-distended and normal      Lumbar spine:                        Range of motion:abnormal moderately Lateral bending, flexion, extension rotation bilateral and is painful.    Extremities:    Tremors:None bilaterally upper and lower  Range of motion:Generally normal shoulders  Intact:Yes  Cyanosis:none  Edema:Normal    Neurological:    Cranial nerves:normal                   Dermatology:    Skin:no unusual rashes, no skin lesions, no palpable subcutaneous nodules and good skin turgor    Assessment/Plan:     Chronic low back pain with radiation to the Right groin, patient had low back surgery 08/2017 L3-5 fusion, had lumbar spine CT with severe L2-3 stenosis, had L2-4 fusion followed by rt SIJ fusion with Dr. Wheatley  Chronic left-sided cervical pain  Pmhx: depression, DM, HTN, DLD, CLYDE on CPAP, RA, TIA  Retired, lives in senior apartments                 Plan:  Patient is s/p:     PROCEDURE: Bilateral Transforaminal epidural steroid injection under fluoroscopic guidance at foraminal level L5 on 12/17/2024 with 80% relief.  Still doing relatively well but having right sided burning.   ROGELIO C7-T1 Repeat with 70-80% relief.  Controlled at this time.   Has failed MBB in the past.   RF Norco 5/325 BID     No RF Gabapentin 400 mg BID, RF 90 day

## 2025-04-29 RX ORDER — METFORMIN HYDROCHLORIDE 500 MG/1
TABLET, EXTENDED RELEASE ORAL
Qty: 90 TABLET | Refills: 1 | OUTPATIENT
Start: 2025-04-29

## 2025-04-30 ENCOUNTER — OFFICE VISIT (OUTPATIENT)
Dept: ORTHOPEDIC SURGERY | Age: 70
End: 2025-04-30

## 2025-04-30 VITALS — BODY MASS INDEX: 29.16 KG/M2 | WEIGHT: 175 LBS | HEIGHT: 65 IN

## 2025-04-30 DIAGNOSIS — M16.12 PRIMARY LOCALIZED OSTEOARTHRITIS OF LEFT HIP: Primary | ICD-10-CM

## 2025-04-30 RX ORDER — LIDOCAINE HYDROCHLORIDE 10 MG/ML
3 INJECTION, SOLUTION INFILTRATION; PERINEURAL ONCE
Status: COMPLETED | OUTPATIENT
Start: 2025-04-30 | End: 2025-04-30

## 2025-04-30 RX ORDER — TRIAMCINOLONE ACETONIDE 40 MG/ML
80 INJECTION, SUSPENSION INTRA-ARTICULAR; INTRAMUSCULAR ONCE
Status: COMPLETED | OUTPATIENT
Start: 2025-04-30 | End: 2025-04-30

## 2025-04-30 RX ADMIN — TRIAMCINOLONE ACETONIDE 80 MG: 40 INJECTION, SUSPENSION INTRA-ARTICULAR; INTRAMUSCULAR at 10:41

## 2025-04-30 RX ADMIN — LIDOCAINE HYDROCHLORIDE 3 ML: 10 INJECTION, SOLUTION INFILTRATION; PERINEURAL at 10:41

## 2025-04-30 ASSESSMENT — ENCOUNTER SYMPTOMS
ABDOMINAL PAIN: 0
CHEST TIGHTNESS: 0
DIARRHEA: 0
SHORTNESS OF BREATH: 0
VOMITING: 0
COUGH: 0
NAUSEA: 0
CONSTIPATION: 0

## 2025-04-30 NOTE — PROGRESS NOTES
[facility]  PRIMARY CARE SPORTS MEDICINE  DATE OF VISIT : 2025    Patient : Eleanor Holder  Age : 70 y.o.   : 1955  MRN : [id number]   ______________________________________________________________________    Chief Complaint :   Chief Complaint   Patient presents with    Hip Pain     Left Hip Injection - from        HPI : Eleanor Holder is 70 y.o. female who presented to the clinic for evaluation of left hip pain.     Today 2025, she says the pain started years ago has progressively worsened since onset.  She originally thought it was a vascular issue and has seen vascular surgery (Dr. Shannan Rajput) and was referred to orthopedics to rule out her left hip joint.  Patient was seen by Dr. Lonny Rajput on 2025 and referred for a left hip joint corticosteroid injection.  Patient is interested moving forward with this today in the office    ROS:  All pertinent positive symptoms are included in the history of present illness.    Review of Systems   Constitutional:  Negative for chills and fever.   Respiratory:  Negative for cough, chest tightness and shortness of breath.    Cardiovascular:  Negative for chest pain and palpitations.   Gastrointestinal:  Negative for abdominal pain, constipation, diarrhea, nausea and vomiting.   Genitourinary:  Negative for dysuria and frequency.   Musculoskeletal:  Positive for gait problem. Negative for joint swelling.   Skin:  Negative for rash and wound.   Hematological:  Does not bruise/bleed easily.   All other systems reviewed and are negative.         Past Medical History :  Past Medical History:   Diagnosis Date    Cancer (HCC) 2019    tongue    Cervical radiculopathy     Chronic back pain     Costochondritis     h/o    Depression     Diabetes mellitus (HCC)     Fibromyalgia     HTN (hypertension)     Hyperlipidemia     Leg pain     CLYDE on CPAP     Osteoarthritis     \"everywhere\"    Rheumatoid arthritis(714.0)     TIA (transient ischemic

## 2025-05-01 ENCOUNTER — OFFICE VISIT (OUTPATIENT)
Dept: FAMILY MEDICINE CLINIC | Age: 70
End: 2025-05-01
Payer: MEDICARE

## 2025-05-01 VITALS
SYSTOLIC BLOOD PRESSURE: 130 MMHG | HEART RATE: 89 BPM | HEIGHT: 65 IN | WEIGHT: 172 LBS | RESPIRATION RATE: 18 BRPM | DIASTOLIC BLOOD PRESSURE: 84 MMHG | TEMPERATURE: 98.3 F | OXYGEN SATURATION: 99 % | BODY MASS INDEX: 28.66 KG/M2

## 2025-05-01 DIAGNOSIS — Z00.00 MEDICARE ANNUAL WELLNESS VISIT, SUBSEQUENT: Primary | ICD-10-CM

## 2025-05-01 PROCEDURE — 3079F DIAST BP 80-89 MM HG: CPT | Performed by: FAMILY MEDICINE

## 2025-05-01 PROCEDURE — 1159F MED LIST DOCD IN RCRD: CPT | Performed by: FAMILY MEDICINE

## 2025-05-01 PROCEDURE — 1123F ACP DISCUSS/DSCN MKR DOCD: CPT | Performed by: FAMILY MEDICINE

## 2025-05-01 PROCEDURE — G0439 PPPS, SUBSEQ VISIT: HCPCS | Performed by: FAMILY MEDICINE

## 2025-05-01 PROCEDURE — 3075F SYST BP GE 130 - 139MM HG: CPT | Performed by: FAMILY MEDICINE

## 2025-05-01 ASSESSMENT — PATIENT HEALTH QUESTIONNAIRE - PHQ9
8. MOVING OR SPEAKING SO SLOWLY THAT OTHER PEOPLE COULD HAVE NOTICED. OR THE OPPOSITE, BEING SO FIGETY OR RESTLESS THAT YOU HAVE BEEN MOVING AROUND A LOT MORE THAN USUAL: NOT AT ALL
1. LITTLE INTEREST OR PLEASURE IN DOING THINGS: NOT AT ALL
SUM OF ALL RESPONSES TO PHQ QUESTIONS 1-9: 0
3. TROUBLE FALLING OR STAYING ASLEEP: NOT AT ALL
SUM OF ALL RESPONSES TO PHQ QUESTIONS 1-9: 0
7. TROUBLE CONCENTRATING ON THINGS, SUCH AS READING THE NEWSPAPER OR WATCHING TELEVISION: NOT AT ALL
6. FEELING BAD ABOUT YOURSELF - OR THAT YOU ARE A FAILURE OR HAVE LET YOURSELF OR YOUR FAMILY DOWN: NOT AT ALL
5. POOR APPETITE OR OVEREATING: NOT AT ALL
SUM OF ALL RESPONSES TO PHQ QUESTIONS 1-9: 0
SUM OF ALL RESPONSES TO PHQ QUESTIONS 1-9: 0
10. IF YOU CHECKED OFF ANY PROBLEMS, HOW DIFFICULT HAVE THESE PROBLEMS MADE IT FOR YOU TO DO YOUR WORK, TAKE CARE OF THINGS AT HOME, OR GET ALONG WITH OTHER PEOPLE: NOT DIFFICULT AT ALL
4. FEELING TIRED OR HAVING LITTLE ENERGY: NOT AT ALL
2. FEELING DOWN, DEPRESSED OR HOPELESS: NOT AT ALL
9. THOUGHTS THAT YOU WOULD BE BETTER OFF DEAD, OR OF HURTING YOURSELF: NOT AT ALL

## 2025-05-01 NOTE — PATIENT INSTRUCTIONS
Learning About Being Active as an Older Adult  Why is being active important as you get older?     Being active is one of the best things you can do for your health. And it's never too late to start. Being active--or getting active, if you aren't already--has definite benefits. It can:  Give you more energy,  Keep your mind sharp.  Improve balance to reduce your risk of falls.  Help you manage chronic illness with fewer medicines.  No matter how old you are, how fit you are, or what health problems you have, there is a form of activity that will work for you. And the more physical activity you can do, the better your overall health will be.  What kinds of activity can help you stay healthy?  Being more active will make your daily activities easier. Physical activity includes planned exercise and things you do in daily life. There are four types of activity:  Aerobic.  Doing aerobic activity makes your heart and lungs strong.  Includes walking, dancing, and gardening.  Aim for at least 2½ hours spread throughout the week.  It improves your energy and can help you sleep better.  Muscle-strengthening.  This type of activity can help maintain muscle and strengthen bones.  Includes climbing stairs, using resistance bands, and lifting or carrying heavy loads.  Aim for at least twice a week.  It can help protect the knees and other joints.  Stretching.  Stretching gives you better range of motion in joints and muscles.  Includes upper arm stretches, calf stretches, and gentle yoga.  Aim for at least twice a week, preferably after your muscles are warmed up from other activities.  It can help you function better in daily life.  Balancing.  This helps you stay coordinated and have good posture.  Includes heel-to-toe walking, mee chi, and certain types of yoga.  Aim for at least 3 days a week.  It can reduce your risk of falling.  Even if you have a hard time meeting the recommendations, it's better to be more active

## 2025-05-01 NOTE — PROGRESS NOTES
Medicare Annual Wellness Visit    Eleanor Holder is here for Medicare AWV    Assessment & Plan    Medicare annual wellness visit, subsequent  Overall Eleanor Holder is doing well.   Age appropriate HM topics reviewed and updated where agreeable.   Vaccines reviewed and updated where agreeable.   Age appropriate anticipatory guidance discussed.   Encouraged to work on healthy diet and exercise strategies.   Opportunity to ask questions and answers provided as able.       Recommend RTO in 1 year for annual physical.     Back in 6 mos for chronic conditions, labs, etc. Sooner if needed.   Results       Return in 6 months (on 11/1/2025) for Medicare Annual Wellness Visit in 1 year.     Subjective     History of Present Illness    Presents for Medicare wellness visit.  Overall she is doing well.  She is working with some specialist recently.  She recently had a left hip injection with Dr. Uriarte.  She consulted with orthopedics regarding some left hip pain who recommended the injection.  Dr. Lonny Rajput was also suspicious that she could be having some left radicular back pain contributing to some left sided lateral calf pain ?S1?.  This is her biggest complaint right now.  She also saw Dr. Shannan Rajput, vascular surgery for some opinions on painful veins in her legs.  Sounds like right now no further plans for that although she does think she will follow-up.  Dr. Holder's plastic surgeon did not operate on that problem apparently.    She had a sleep study done that had a prolonged time period  Before it was resulted.       Patient's complete Health Risk Assessment and screening values have been reviewed and are found in Flowsheets. The following problems were reviewed today and where indicated follow up appointments were made and/or referrals ordered.    Positive Risk Factor Screenings with Interventions:          Controlled Medication Review:    Today's Pain Level: Pain Score: SEVEN     Opioid Risk: (Low risk

## 2025-05-15 ENCOUNTER — OFFICE VISIT (OUTPATIENT)
Dept: ORTHOPEDIC SURGERY | Age: 70
End: 2025-05-15
Payer: MEDICARE

## 2025-05-15 VITALS — HEIGHT: 65 IN | WEIGHT: 172 LBS | BODY MASS INDEX: 28.66 KG/M2 | TEMPERATURE: 98.6 F

## 2025-05-15 DIAGNOSIS — M16.12 PRIMARY LOCALIZED OSTEOARTHRITIS OF LEFT HIP: Primary | ICD-10-CM

## 2025-05-15 PROCEDURE — 99213 OFFICE O/P EST LOW 20 MIN: CPT | Performed by: ORTHOPAEDIC SURGERY

## 2025-05-15 PROCEDURE — 1123F ACP DISCUSS/DSCN MKR DOCD: CPT | Performed by: ORTHOPAEDIC SURGERY

## 2025-05-15 PROCEDURE — 1159F MED LIST DOCD IN RCRD: CPT | Performed by: ORTHOPAEDIC SURGERY

## 2025-05-15 ASSESSMENT — ENCOUNTER SYMPTOMS
SHORTNESS OF BREATH: 0
ABDOMINAL DISTENTION: 0
EYE DISCHARGE: 0
ALLERGIC/IMMUNOLOGIC NEGATIVE: 1

## 2025-05-15 NOTE — PROGRESS NOTES
swelling.   Skin:  Negative for wound.   Allergic/Immunologic: Negative.    Neurological: Negative.    Hematological: Negative.    Psychiatric/Behavioral: Negative.     All other systems reviewed and are negative.         Objective   Physical Exam  Constitutional:       Appearance: Normal appearance.   HENT:      Head: Normocephalic and atraumatic.      Nose: Nose normal.   Eyes:      Extraocular Movements: Extraocular movements intact.   Cardiovascular:      Rate and Rhythm: Normal rate and regular rhythm.   Pulmonary:      Effort: Pulmonary effort is normal.   Abdominal:      Palpations: Abdomen is soft.   Musculoskeletal:      Cervical back: Normal range of motion.      Comments: Left Hip:  Gait: Antalgic  Inspection: No ecchymosis, scars, masses or deformities.  ROM: 10IR, 10ER. Pain with PROM  Tenderness: Anterior groin, trochanteric bursa  Strength Testing: Weak flexion and abduction  Limb Alignment: Leg length discrepancy present  Provocative Test: Stinchfield positive  Neurovascular exam: 2+DP, normal sensation     Skin:     General: Skin is warm and dry.      Capillary Refill: Capillary refill takes less than 2 seconds.   Neurological:      General: No focal deficit present.      Mental Status: She is alert.   Psychiatric:         Mood and Affect: Mood normal.         Behavior: Behavior normal.              XRAYS:  Xrays reviewed  3 views including AP Pelvis, AP and lateral bilateral hip demonstrate endstage hip arthritis with  hypertrophic osteophyte formation, subchondral sclerosis, degenerative cysts and joint  subluxation. No evidence of fracture, tumor or other pathologic process.      An electronic signature was used to authenticate this note.    --Lonny Rajput MD    yes

## 2025-05-16 DIAGNOSIS — I10 ESSENTIAL HYPERTENSION: ICD-10-CM

## 2025-05-16 NOTE — TELEPHONE ENCOUNTER
Requested Prescriptions     Pending Prescriptions Disp Refills    hydroCHLOROthiazide 12.5 MG capsule [Pharmacy Med Name: hydrochlorothiazide 12.5 mg capsule] 90 capsule 1     Sig: TAKE ONE CAPSULE BY MOUTH ONCE DAILY FOR BLOOD PRESSURE     Last Appointment:  5/1/2025  Future Appointments   Date Time Provider Department Center   5/21/2025  2:30 PM Megahn Corcoran PA [facility] PAIN [facility]   11/3/2025  1:00 PM Manolo Mckeon MD [facility] DEP   2/3/2026  2:30 PM Marybel Pelayo MD [facility]

## 2025-05-19 RX ORDER — HYDROCHLOROTHIAZIDE 12.5 MG/1
CAPSULE ORAL
Qty: 90 CAPSULE | Refills: 1 | Status: SHIPPED | OUTPATIENT
Start: 2025-05-19

## 2025-05-21 ENCOUNTER — OFFICE VISIT (OUTPATIENT)
Age: 70
End: 2025-05-21
Payer: MEDICARE

## 2025-05-21 VITALS
OXYGEN SATURATION: 97 % | SYSTOLIC BLOOD PRESSURE: 124 MMHG | TEMPERATURE: 98.1 F | RESPIRATION RATE: 16 BRPM | HEART RATE: 88 BPM | DIASTOLIC BLOOD PRESSURE: 81 MMHG | BODY MASS INDEX: 28.66 KG/M2 | WEIGHT: 172 LBS | HEIGHT: 65 IN

## 2025-05-21 DIAGNOSIS — M51.369 DEGENERATION OF INTERVERTEBRAL DISC OF LUMBAR REGION, UNSPECIFIED WHETHER PAIN PRESENT: ICD-10-CM

## 2025-05-21 DIAGNOSIS — M54.12 CERVICAL RADICULOPATHY: ICD-10-CM

## 2025-05-21 DIAGNOSIS — G89.29 CHRONIC BILATERAL LOW BACK PAIN WITHOUT SCIATICA: ICD-10-CM

## 2025-05-21 DIAGNOSIS — G89.4 CHRONIC PAIN SYNDROME: ICD-10-CM

## 2025-05-21 DIAGNOSIS — M79.10 MYALGIA: Primary | ICD-10-CM

## 2025-05-21 DIAGNOSIS — M54.50 CHRONIC BILATERAL LOW BACK PAIN WITHOUT SCIATICA: ICD-10-CM

## 2025-05-21 DIAGNOSIS — G89.29 OTHER CHRONIC PAIN: ICD-10-CM

## 2025-05-21 DIAGNOSIS — M47.812 CERVICAL SPONDYLOSIS: ICD-10-CM

## 2025-05-21 DIAGNOSIS — M54.12 RADICULOPATHY, CERVICAL: ICD-10-CM

## 2025-05-21 DIAGNOSIS — M48.061 SPINAL STENOSIS OF LUMBAR REGION WITHOUT NEUROGENIC CLAUDICATION: ICD-10-CM

## 2025-05-21 DIAGNOSIS — M54.16 LUMBAR RADICULOPATHY: ICD-10-CM

## 2025-05-21 DIAGNOSIS — M47.816 LUMBAR FACET ARTHROPATHY: ICD-10-CM

## 2025-05-21 PROCEDURE — 1160F RVW MEDS BY RX/DR IN RCRD: CPT | Performed by: PHYSICIAN ASSISTANT

## 2025-05-21 PROCEDURE — 1159F MED LIST DOCD IN RCRD: CPT | Performed by: PHYSICIAN ASSISTANT

## 2025-05-21 PROCEDURE — 99213 OFFICE O/P EST LOW 20 MIN: CPT | Performed by: PHYSICIAN ASSISTANT

## 2025-05-21 PROCEDURE — 3074F SYST BP LT 130 MM HG: CPT | Performed by: PHYSICIAN ASSISTANT

## 2025-05-21 PROCEDURE — 1123F ACP DISCUSS/DSCN MKR DOCD: CPT | Performed by: PHYSICIAN ASSISTANT

## 2025-05-21 PROCEDURE — 3079F DIAST BP 80-89 MM HG: CPT | Performed by: PHYSICIAN ASSISTANT

## 2025-05-21 RX ORDER — HYDROCODONE BITARTRATE AND ACETAMINOPHEN 5; 325 MG/1; MG/1
1 TABLET ORAL 2 TIMES DAILY
Qty: 60 TABLET | Refills: 0 | Status: SHIPPED | OUTPATIENT
Start: 2025-05-28 | End: 2025-06-27

## 2025-05-21 NOTE — H&P (VIEW-ONLY)
[facility]  [address] [place] [id number]    Follow up Note      Eleanor Holder     Date of Visit:  2025    CC:  Patient presents for follow up   Chief Complaint   Patient presents with    Follow-up     Cervical and lumbar pain           HPI:  Pain is worse to right neck that radiates to her right arm.  Lower back is painful.    Change in quality of symptoms:no.    Patient satisfaction with analgesia:fair.    Medication side effects: None.  Recent diagnostic testing:none.   Recent interventional procedures:  none.    She has been on anticoagulation medications to include NSAIDS. The patient  has not been on herbal supplements.  The patient is diabetic.    Imagin2023 MRI cervical spine    FINDINGS:   BONES/ALIGNMENT: There is normal alignment of the spine. The vertebral body   heights are maintained. The bone marrow signal appears unremarkable.       SPINAL CORD: No abnormal cord signal is seen.       SOFT TISSUES: No paraspinal mass identified.       C2-C3: Grade 1 anterolisthesis.  Severe left facet arthropathy resulting in   severe left neural foraminal narrowing.       C3-C4: Grade 1 anterolisthesis with disc bulging.  Severe left and moderate   right facet arthropathy.  Findings resulting in severe left neural foraminal   narrowing       C4-C5: Grade 1 anterolisthesis with disc bulging.  Moderate bilateral facet   arthropathy.  Mild left neural foraminal narrowing.       C5-C6: Grade 1 anterolisthesis with disc bulging.  Moderate bilateral facet   arthropathy.  No canal or foraminal stenosis.       C6-C7: Disc bulging with 2 mm central disc protrusion.  Mild bilateral facet   arthropathy.  Otherwise unremarkable       C7-T1: Grade 1 anterolisthesis.  Mild bilateral facet arthropathy.  Otherwise   unremarkable           Impression   At C2-C3, severe left neural foraminal narrowing.       At C3-C4, severe left neural foraminal narrowing.       No canal stenosis.   Repeat ordered today.     Has failed MBB in the past.   RF Norco 5/325 BID     No RF Gabapentin 400 mg BID, RF 90 day supply  02/05/2025 right cervical paraspinal, trapezius, supraspinatus, and rhomboid trigger point injections under ultrasound guidance - 50% relief which is diminishing somewhat.   OARRS report reviewed   Patient encouraged to stay active and to lose weight. Failed physical therapy \"many times over the past 30 years\"  Treatment plan discussed with the patient including medications side effects           Controlled Substance Monitoring:    Acute and Chronic Pain Monitoring:   RX Monitoring Periodic Controlled Substance Monitoring   5/21/2025   3:08 PM Possible medication side effects, risk of tolerance/dependence & alternative treatments discussed.;No signs of potential drug abuse or diversion identified.;Assessed functional status (ability to engage in work or other purposeful activities, the pain intensity and its interference with activities of daily living, quality of family life and social activities, and the physical activity);Obtaining appropriate analgesic effect of treatment.             ccreferring physic

## 2025-05-21 NOTE — PROGRESS NOTES
Eleanor Holder presents to the [facility] on 5/21/2025. Eleanor is complaining of pain in neck and lower back. The pain is constant. The pain is described as aching, shooting, and stabbing. Pain is rated on her best day at a 7, on her worst day at a 10, and on average at a 8 on the VAS scale. She took her last dose of Norco, Neurontin, and Flexeril this morning.     Any procedures since your last visit: No    Pacemaker or defibrillator: No     She is not on NSAIDS and is not on anticoagulation medications    Do you want someone present when the provider examines you? No    Medication Contract and Consent for Opioid Use Documents Filed       Patient Documents       Type of Document Status Date Received Received By Description    Medication Contract Received  EBER HONG Opioid medication contract with Dr Wheatley 3/24/20    Medication Contract Received 4/26/2018  3:50 PM ANYA NUNO PAIN MANAGEMENT PATIENT AGREEMENT 4/26/2018    Medication Contract Received 11/5/2020  4:23 PM EBER HONG Opioid medication contract with Dr Wheatley    Medication Contract Received 3/1/2021  1:26 PM EBER HONG Opioid medication contract with Dr Wheatley    Medication Contract Received 8/23/2021  2:03 PM HAYLIE CADENA Medication contract    Medication Contract Received 10/18/2021  3:03 PM HAYLIE CADENA medication contract 10/18/2021    Medication Contract Signed 10/4/2022 12:35 PM CRISTIANA BAÑUELOS Pain Med. Contract 10/04/22    Medication Contract Received 10/11/2023  3:46 PM KAILYN WILKINSON Medication contract    Consent Opioid Use Received 10/29/2024  1:39 PM JAMIE RIVAS Consent Opioid Use                    /81   Pulse 88   Temp 98.1 °F (36.7 °C) (Infrared)   Resp 16   Ht 1.651 m (5' 5\")   Wt 78 kg (172 lb)   LMP  (LMP Unknown)   SpO2 97%   BMI 28.62 
Repeat ordered today.     Has failed MBB in the past.   RF Norco 5/325 BID     No RF Gabapentin 400 mg BID, RF 90 day supply  02/05/2025 right cervical paraspinal, trapezius, supraspinatus, and rhomboid trigger point injections under ultrasound guidance - 50% relief which is diminishing somewhat.   OARRS report reviewed   Patient encouraged to stay active and to lose weight. Failed physical therapy \"many times over the past 30 years\"  Treatment plan discussed with the patient including medications side effects           Controlled Substance Monitoring:    Acute and Chronic Pain Monitoring:   RX Monitoring Periodic Controlled Substance Monitoring   5/21/2025   3:08 PM Possible medication side effects, risk of tolerance/dependence & alternative treatments discussed.;No signs of potential drug abuse or diversion identified.;Assessed functional status (ability to engage in work or other purposeful activities, the pain intensity and its interference with activities of daily living, quality of family life and social activities, and the physical activity);Obtaining appropriate analgesic effect of treatment.             ccreferring physic

## 2025-05-23 ENCOUNTER — TELEPHONE (OUTPATIENT)
Dept: ADMINISTRATIVE | Age: 70
End: 2025-05-23

## 2025-05-23 NOTE — TELEPHONE ENCOUNTER
Patient is calling because she needs her last OV notes, last MRI, and last surgery notes faxed over to Dr. Ewing.     Fax # [phone]

## 2025-06-11 ENCOUNTER — TELEPHONE (OUTPATIENT)
Age: 70
End: 2025-06-11

## 2025-06-11 NOTE — TELEPHONE ENCOUNTER
Call to Eleanor Holder that procedure was scheduled for 06/19/2025 and that [facility] should call her a few days before for the pre op call and between 2:00 PM and 4:00 PM  the business day before with the arrival time. Instructed Eleanor to hold ibuprofen for 24 hours, Celebrex, Mobic, and naprosyn for 4 days and any aspirin containing products, CoQ 10, or fish oil for 7 days. Instructed to call office back if any questions. Eleanor verbalized understanding.    Electronically signed by Karen Rae RN on 6/11/2025 at 4:31 PM

## 2025-06-16 NOTE — PROGRESS NOTES
[facility] PAIN MANAGEMENT  INSTRUCTIONS  ...........................................................................................................................................    [x] Parking the day of Surgery is located in the Main Entrance lot.  Entrance A, make immediate right into the surgery reception room    [x]  Bring photo ID and insurance card    [x] You may have a light breakfast day of procedure    [x]  Wear loose comfortable clothing    [x]  Please follow instructions for medications as given per your doctors office    [x] You can expect a call the business day prior to procedure between 2PM and 4PM to notify you of your arrival time.       [x] Please arrange for     ALL QUESTIONS ANSWERED AT THIS TIME.

## 2025-06-19 ENCOUNTER — HOSPITAL ENCOUNTER (OUTPATIENT)
Dept: GENERAL RADIOLOGY | Age: 70
Setting detail: OUTPATIENT SURGERY
Discharge: HOME | End: 2025-06-21
Attending: ANESTHESIOLOGY
Payer: MEDICARE

## 2025-06-19 ENCOUNTER — HOSPITAL ENCOUNTER (OUTPATIENT)
Age: 70
Setting detail: OUTPATIENT SURGERY
Discharge: HOME | End: 2025-06-19
Attending: ANESTHESIOLOGY | Admitting: ANESTHESIOLOGY
Payer: MEDICARE

## 2025-06-19 VITALS
HEART RATE: 80 BPM | TEMPERATURE: 97.6 F | DIASTOLIC BLOOD PRESSURE: 70 MMHG | OXYGEN SATURATION: 97 % | RESPIRATION RATE: 18 BRPM | BODY MASS INDEX: 28.32 KG/M2 | HEIGHT: 65 IN | WEIGHT: 170 LBS | SYSTOLIC BLOOD PRESSURE: 133 MMHG

## 2025-06-19 DIAGNOSIS — R52 PAIN MANAGEMENT: ICD-10-CM

## 2025-06-19 LAB — GLUCOSE BLD-MCNC: 146 MG/DL (ref 74–99)

## 2025-06-19 PROCEDURE — 62321 NJX INTERLAMINAR CRV/THRC: CPT | Performed by: ANESTHESIOLOGY

## 2025-06-19 PROCEDURE — 7100000010 HC PHASE II RECOVERY - FIRST 15 MIN: Performed by: ANESTHESIOLOGY

## 2025-06-19 PROCEDURE — 7100000011 HC PHASE II RECOVERY - ADDTL 15 MIN: Performed by: ANESTHESIOLOGY

## 2025-06-19 PROCEDURE — 6360000004 HC RX CONTRAST MEDICATION: Performed by: ANESTHESIOLOGY

## 2025-06-19 PROCEDURE — 2709999900 HC NON-CHARGEABLE SUPPLY: Performed by: ANESTHESIOLOGY

## 2025-06-19 PROCEDURE — 82962 GLUCOSE BLOOD TEST: CPT

## 2025-06-19 PROCEDURE — 6360000002 HC RX W HCPCS: Performed by: ANESTHESIOLOGY

## 2025-06-19 PROCEDURE — 3600000012 HC SURGERY LEVEL 2 ADDTL 15MIN: Performed by: ANESTHESIOLOGY

## 2025-06-19 PROCEDURE — 2500000003 HC RX 250 WO HCPCS: Performed by: ANESTHESIOLOGY

## 2025-06-19 PROCEDURE — 3600000002 HC SURGERY LEVEL 2 BASE: Performed by: ANESTHESIOLOGY

## 2025-06-19 RX ORDER — LIDOCAINE HYDROCHLORIDE 5 MG/ML
INJECTION, SOLUTION INFILTRATION; INTRAVENOUS PRN
Status: DISCONTINUED | OUTPATIENT
Start: 2025-06-19 | End: 2025-06-19 | Stop reason: ALTCHOICE

## 2025-06-19 RX ORDER — SODIUM CHLORIDE 9 MG/ML
INJECTION, SOLUTION INTRAMUSCULAR; INTRAVENOUS; SUBCUTANEOUS PRN
Status: DISCONTINUED | OUTPATIENT
Start: 2025-06-19 | End: 2025-06-19 | Stop reason: ALTCHOICE

## 2025-06-19 RX ORDER — IOPAMIDOL 612 MG/ML
INJECTION, SOLUTION INTRATHECAL PRN
Status: DISCONTINUED | OUTPATIENT
Start: 2025-06-19 | End: 2025-06-19 | Stop reason: ALTCHOICE

## 2025-06-19 ASSESSMENT — PAIN DESCRIPTION - LOCATION
LOCATION: NECK

## 2025-06-19 ASSESSMENT — PAIN SCALES - GENERAL
PAINLEVEL_OUTOF10: 5

## 2025-06-19 ASSESSMENT — PAIN DESCRIPTION - PAIN TYPE: TYPE: CHRONIC PAIN

## 2025-06-19 ASSESSMENT — PAIN - FUNCTIONAL ASSESSMENT: PAIN_FUNCTIONAL_ASSESSMENT: 0-10

## 2025-06-19 ASSESSMENT — PAIN DESCRIPTION - DESCRIPTORS
DESCRIPTORS: ACHING
DESCRIPTORS: DISCOMFORT;SORE

## 2025-06-19 NOTE — DISCHARGE INSTRUCTIONS
[facility] Pain Management Department  [place] [phone]  Dr. Willian Montelongo   Post-Pain Block/Radiofrequency  Home Going Instructions    1-Go home, rest for the remainder of the day  2-Please do not lift over 20 pounds the day of the injection  3-If you received sedation No: alcohol, driving, operating lawn mowers, plows, tractors or other dangerous equipment until next morning. Do not make important decisions or sign legal documents for 24 hours. You may experience light headedness, dizziness, nausea or sleepiness after sedation. Do not stay alone. A responsible adult must be with you for 24 hours. You could be nauseated from the medications you have received. Your IV site may be sore and bruised.    4-No dietary restrictions     5-Resume all medications the same day, blood thinners to be resumed 24 hours after injection if you were instructed to stop any.    6-Keep the surgical site clean and dry, you may shower the next morning and remove the      dressing.     7- No sitz baths, tub baths or hot tubs/swimming for 24 hours.       8- If you have any pain at the injection site(s), application of an ice pack to the area should be       helpful, 20 minutes on/20 minutes off for next 48 hours.  9- Call [facility] Pain Management immediately at if you develop.  Fever greater than 100.4 F  Have bleeding or drainage from the puncture site  Have progressive Leg/arm numbness and or weakness  Loss of control of bowel and or bladder (wet/soil yourself)  Severe headache with inability to lift head  10-You may return to work the next day

## 2025-06-19 NOTE — OP NOTE
Operative Note      Patient: Eleanor Holder  YOB: 1955  MRN: [id number]    Date of Procedure: 2025    Pre-Op Diagnosis Codes:      * Cervical radiculopathy [M54.12]    Post-Op Diagnosis: Same       Procedure(s):  FLUOROSCOPIC GUIDED CERVICAL EPIDURAL STEROID INJECTION AT CERVICAL6- CERVICAL 7 LEVEL    Surgeon(s):  Gerardo Pizarro MD    Assistant:   * No surgical staff found *    Anesthesia: Local    Estimated Blood Loss (mL): Minimal    Complications: None    Specimens:   * No specimens in log *    Implants:  * No implants in log *      Drains: * No LDAs found *    Findings:  Infection Present At Time Of Surgery (PATOS) (choose all levels that have infection present):  No infection present  Other Findings: good needle placement    Detailed Description of Procedure:   2025    Patient: Eleanor Holder  :  1955  Age:  70 y.o.  Sex:  female     PRE-PROCEDURE DIAGNOSIS: Cervical degenerative disc disease, cervical radicular pain.    POST-PROCEDURE DIAGNOSIS: Same.    PROCEDURE: Fluoroscopic guided cervical epidural steroid injection, # at C6-7 level.    SURGEON: Gerardo Pizarro MD    ANESTHESIA: Local    ESTIMATED BLOOD LOSS: None.  ______________________________________________________________________  BRIEF HISTORY:  Eleanor Holder comes in today for the   therapeutic cervical epidural injection under fluoroscopic guidance. The potential complications of this procedure were discussed with the her again today.  She has elected to undergo the aforementioned procedure.    Eleanor’s complete History & Physical examination were reviewed in depth, a copy of which is in the chart.      DESCRIPTION OF PROCEDURE:    After confirming written and informed consent, a time-out was performed and Eleanor’s name and date of birth, the procedure to be performed as well as the plan for the location of the needle insertion were confirmed.    The patient was brought into the procedure room and placed in the

## 2025-06-19 NOTE — INTERVAL H&P NOTE
Update History & Physical    The patient's History and Physical of May 21, 2025 was reviewed with the patient and I examined the patient. There was no change. The surgical site was confirmed by the patient and me.     Plan: The risks, benefits, expected outcome, and alternative to the recommended procedure have been discussed with the patient. Patient understands and wants to proceed with the procedure.     Electronically signed by Gerardo Pizarro MD on 6/19/2025 at 2:58 PM

## 2025-07-03 DIAGNOSIS — F41.8 DEPRESSION WITH ANXIETY: ICD-10-CM

## 2025-07-03 NOTE — TELEPHONE ENCOUNTER
Last Appointment:  5/1/2025  Future Appointments   Date Time Provider Department Center   7/25/2025 11:45 AM Meghan Corcoran PA [facility] PAIN [facility]   11/3/2025  1:00 PM Manolo Mckeon MD [facility] DEP   2/3/2026  2:30 PM Marybel Pelayo MD [facility]

## 2025-07-07 RX ORDER — HYDROXYZINE PAMOATE 25 MG/1
CAPSULE ORAL
Qty: 90 CAPSULE | Refills: 1 | Status: SHIPPED | OUTPATIENT
Start: 2025-07-07

## 2025-07-07 RX ORDER — CYCLOBENZAPRINE HCL 5 MG
5-10 TABLET ORAL NIGHTLY
Qty: 180 TABLET | Refills: 1 | Status: SHIPPED | OUTPATIENT
Start: 2025-07-07

## 2025-07-08 ENCOUNTER — TELEPHONE (OUTPATIENT)
Age: 70
End: 2025-07-08

## 2025-07-08 DIAGNOSIS — M54.16 LUMBAR RADICULOPATHY: ICD-10-CM

## 2025-07-08 DIAGNOSIS — M54.12 RADICULOPATHY, CERVICAL: ICD-10-CM

## 2025-07-08 DIAGNOSIS — M54.12 CERVICAL RADICULOPATHY: ICD-10-CM

## 2025-07-08 DIAGNOSIS — G89.4 CHRONIC PAIN SYNDROME: ICD-10-CM

## 2025-07-08 DIAGNOSIS — M47.812 CERVICAL SPONDYLOSIS: ICD-10-CM

## 2025-07-08 RX ORDER — GABAPENTIN 400 MG/1
400 CAPSULE ORAL 2 TIMES DAILY
Qty: 180 CAPSULE | Refills: 0 | Status: SHIPPED | OUTPATIENT
Start: 2025-07-08 | End: 2025-10-06

## 2025-07-21 ENCOUNTER — OFFICE VISIT (OUTPATIENT)
Dept: FAMILY MEDICINE CLINIC | Age: 70
End: 2025-07-21
Payer: MEDICARE

## 2025-07-21 VITALS
WEIGHT: 169 LBS | DIASTOLIC BLOOD PRESSURE: 88 MMHG | SYSTOLIC BLOOD PRESSURE: 132 MMHG | TEMPERATURE: 99.1 F | BODY MASS INDEX: 28.16 KG/M2 | OXYGEN SATURATION: 96 % | HEART RATE: 103 BPM | HEIGHT: 65 IN

## 2025-07-21 DIAGNOSIS — Z01.818 PRE-OP EVALUATION: Primary | ICD-10-CM

## 2025-07-21 PROCEDURE — 3075F SYST BP GE 130 - 139MM HG: CPT | Performed by: FAMILY MEDICINE

## 2025-07-21 PROCEDURE — 3078F DIAST BP <80 MM HG: CPT | Performed by: FAMILY MEDICINE

## 2025-07-21 PROCEDURE — 1159F MED LIST DOCD IN RCRD: CPT | Performed by: FAMILY MEDICINE

## 2025-07-21 PROCEDURE — 1123F ACP DISCUSS/DSCN MKR DOCD: CPT | Performed by: FAMILY MEDICINE

## 2025-07-21 PROCEDURE — 99213 OFFICE O/P EST LOW 20 MIN: CPT | Performed by: FAMILY MEDICINE

## 2025-07-21 NOTE — PROGRESS NOTES
[facility]  Office Progress Note - Dr. Mckeon  7/21/25    CC:   Chief Complaint   Patient presents with    Pre-op Exam     BL eye lid surgery - 08/14/25 - Dr. Jules Suárez        /88   Pulse (!) 103   Temp 99.1 °F (37.3 °C) (Temporal)   Ht 1.651 m (5' 5\")   Wt 76.7 kg (169 lb)   LMP  (LMP Unknown)   SpO2 96%   BMI 28.12 kg/m²   Wt Readings from Last 3 Encounters:   07/21/25 76.7 kg (169 lb)   06/16/25 77.1 kg (170 lb)   05/21/25 78 kg (172 lb)       HPI:   IMMANUEL Generated Note:  History of Present Illness        Pre-op prior to eyelid surgery planned for 8/14  Feels well  No changes in chronic problems.   Nothing uncontrolled.   No new symptoms .  Denies CP, sob, abd pain, headaches, vision changes, lightheadedness.   No problems with anesthesia.   No hx of blood clots or bleeding.   Allergy to penicillin.  She is able to go up 4 flights of stairs without stopping.  _________________________________________________________    Assessment / Plan  IMMANUEL Generated A/P\"  Assessment & Plan      Eleanor was seen today for pre-op exam.    Diagnoses and all orders for this visit:    Pre-op evaluation    Patient is low risk for upcoming ophthalmologic surgery.  Chronic conditions controlled.  On no blood thinners.  No medication changes needed.  Allergy to penicillin.  No further risk stratification planned.    She can keep her regularly scheduled follow-up.  Or as scheduled.   Patient counseled to follow up sooner or seek more acute care if symptoms worsening or not improving according to plan.     Electronically signed by RUPALI MCKEON MD on 7/21/2025    _________________________________________________________  Current Outpatient Medications on File Prior to Visit   Medication Sig Dispense Refill    gabapentin (NEURONTIN) 400 MG capsule Take 1 capsule by mouth 2 times daily for 90 days. 180 capsule 0    hydrOXYzine pamoate (VISTARIL) 25 MG capsule TAKE ONE CAPSULE BY MOUTH THREE TIMES

## 2025-07-25 ENCOUNTER — OFFICE VISIT (OUTPATIENT)
Age: 70
End: 2025-07-25

## 2025-07-25 VITALS
HEIGHT: 65 IN | HEART RATE: 97 BPM | RESPIRATION RATE: 16 BRPM | OXYGEN SATURATION: 95 % | DIASTOLIC BLOOD PRESSURE: 75 MMHG | TEMPERATURE: 97.5 F | SYSTOLIC BLOOD PRESSURE: 119 MMHG | WEIGHT: 169 LBS | BODY MASS INDEX: 28.16 KG/M2

## 2025-07-25 DIAGNOSIS — M54.50 CHRONIC BILATERAL LOW BACK PAIN WITHOUT SCIATICA: ICD-10-CM

## 2025-07-25 DIAGNOSIS — M51.369 DEGENERATION OF INTERVERTEBRAL DISC OF LUMBAR REGION, UNSPECIFIED WHETHER PAIN PRESENT: ICD-10-CM

## 2025-07-25 DIAGNOSIS — G89.29 OTHER CHRONIC PAIN: ICD-10-CM

## 2025-07-25 DIAGNOSIS — M54.16 LUMBAR RADICULOPATHY: ICD-10-CM

## 2025-07-25 DIAGNOSIS — M47.816 LUMBAR FACET ARTHROPATHY: ICD-10-CM

## 2025-07-25 DIAGNOSIS — M47.812 CERVICAL SPONDYLOSIS: ICD-10-CM

## 2025-07-25 DIAGNOSIS — M54.12 RADICULOPATHY, CERVICAL: ICD-10-CM

## 2025-07-25 DIAGNOSIS — M54.12 CERVICAL RADICULOPATHY: ICD-10-CM

## 2025-07-25 DIAGNOSIS — G89.4 CHRONIC PAIN SYNDROME: ICD-10-CM

## 2025-07-25 DIAGNOSIS — G89.29 CHRONIC BILATERAL LOW BACK PAIN WITHOUT SCIATICA: ICD-10-CM

## 2025-07-25 DIAGNOSIS — M79.10 MYALGIA: Primary | ICD-10-CM

## 2025-07-25 DIAGNOSIS — M48.061 SPINAL STENOSIS OF LUMBAR REGION WITHOUT NEUROGENIC CLAUDICATION: ICD-10-CM

## 2025-07-25 RX ORDER — HYDROCODONE BITARTRATE AND ACETAMINOPHEN 5; 325 MG/1; MG/1
1 TABLET ORAL 2 TIMES DAILY
Qty: 60 TABLET | Refills: 0 | Status: SHIPPED | OUTPATIENT
Start: 2025-07-25 | End: 2025-08-24

## 2025-08-11 ENCOUNTER — TELEPHONE (OUTPATIENT)
Age: 70
End: 2025-08-11

## 2025-08-15 DIAGNOSIS — E78.5 HYPERLIPIDEMIA, UNSPECIFIED HYPERLIPIDEMIA TYPE: ICD-10-CM

## 2025-08-15 RX ORDER — ATORVASTATIN CALCIUM 40 MG/1
40 TABLET, FILM COATED ORAL DAILY
Qty: 90 TABLET | Refills: 3 | Status: SHIPPED | OUTPATIENT
Start: 2025-08-15

## 2025-08-28 DIAGNOSIS — F41.8 DEPRESSION WITH ANXIETY: ICD-10-CM

## 2025-08-28 RX ORDER — HYDROXYZINE PAMOATE 25 MG/1
CAPSULE ORAL
Qty: 90 CAPSULE | Refills: 1 | OUTPATIENT
Start: 2025-08-28

## (undated) DEVICE — BANDAGE ADH W1XL3IN NAT FAB WVN FLX DURABLE N ADH PD SEAL

## (undated) DEVICE — NEEDLE HYPO 25GA L1.5IN BLU POLYPR HUB S STL REG BVL STR

## (undated) DEVICE — 4-PORT MANIFOLD: Brand: NEPTUNE 2

## (undated) DEVICE — GAUZE,SPONGE,4"X4",12PLY,STERILE,LF,2'S: Brand: MEDLINE

## (undated) DEVICE — INTENDED FOR TISSUE SEPARATION, AND OTHER PROCEDURES THAT REQUIRE A SHARP SURGICAL BLADE TO PUNCTURE OR CUT.: Brand: BARD-PARKER ® STAINLESS STEEL BLADES

## (undated) DEVICE — Z DISCONTINUED APPLICATOR SURG PREP 0.35OZ 2% CHG 70% ISO ALC W/ HI LT

## (undated) DEVICE — MARKER,SKIN,WI/RULER AND LABELS: Brand: MEDLINE

## (undated) DEVICE — BLADE ES L6IN ELASTOMERIC COAT EXT DURABLE BEND UPTO 90DEG

## (undated) DEVICE — GLOVE ORANGE PI 7   MSG9070

## (undated) DEVICE — 6 ML SYRINGE LUER-LOCK TIP: Brand: MONOJECT

## (undated) DEVICE — TUBING SUCT 12FR MAL ALUM SHFT FN CAP VENT UNIV CONN W/ OBT

## (undated) DEVICE — GLOVE SURG SZ 65 THK91MIL LTX FREE SYN POLYISOPRENE

## (undated) DEVICE — APPLICATOR PREP 26ML 0.7% IOD POVACRYLEX 74% ISO ALC ST

## (undated) DEVICE — Device: Brand: PORTEX

## (undated) DEVICE — GAUZE,SPONGE,4"X4",8PLY,STRL,LF,10/TRAY: Brand: MEDLINE

## (undated) DEVICE — NON-DEHP CATHETER EXTENSION SET, MALE LUER LOCK ADAPTER

## (undated) DEVICE — CHLORAPREP 26ML ORANGE

## (undated) DEVICE — SYRINGE MED 5ML STD CLR PLAS LUERLOCK TIP N CTRL DISP

## (undated) DEVICE — 3M™ RED DOT™ MONITORING ELECTRODE WITH FOAM TAPE AND STICKY GEL 2560, 50/BAG, 20/CASE, 72/PLT: Brand: RED DOT™

## (undated) DEVICE — NEEDLE HYPO 18GA L1.5IN PNK POLYPR HUB S STL THN WALL FILL

## (undated) DEVICE — CLOTH SURG PREP PREOPERATIVE CHLORHEXIDINE GLUC 2% READYPREP

## (undated) DEVICE — PATIENT RETURN ELECTRODE, SINGLE-USE, CONTACT QUALITY MONITORING, ADULT, WITH 9FT CORD, FOR PATIENTS WEIGING OVER 33LBS. (15KG): Brand: MEGADYNE

## (undated) DEVICE — SURGICAL PROCEDURE PACK EENT CUST

## (undated) DEVICE — GLOVE ORANGE PI 7 1/2   MSG9075

## (undated) DEVICE — BANDAGE,GAUZE,CONFORMING,4"X75",STRL,LF: Brand: MEDLINE

## (undated) DEVICE — PIN 9733236 150MM STERILE PERC REF

## (undated) DEVICE — 12 ML SYRINGE,LUER-LOCK TIP: Brand: MONOJECT

## (undated) DEVICE — BRUNNS CURRETTES

## (undated) DEVICE — DRAPE,EXTREMITY,89X128,STERILE: Brand: MEDLINE

## (undated) DEVICE — STANDARD HYPODERMIC NEEDLE,POLYPROPYLENE HUB: Brand: MONOJECT

## (undated) DEVICE — 3M™ IOBAN™ 2 ANTIMICROBIAL INCISE DRAPE 6650EZ: Brand: IOBAN™ 2

## (undated) DEVICE — SOLUTION IV IRRIG POUR BRL 0.9% SODIUM CHL 2F7124

## (undated) DEVICE — READY WET SKIN SCRUB TRAY-LF: Brand: MEDLINE INDUSTRIES, INC.

## (undated) DEVICE — 3 ML SYRINGE LUER-LOCK TIP: Brand: MONOJECT

## (undated) DEVICE — ZIMMER® STERILE DISPOSABLE TOURNIQUET CUFF WITH PLC, DUAL PORT, SINGLE BLADDER, 18 IN. (46 CM)

## (undated) DEVICE — GOWN,SIRUS,FABRNF,XL,20/CS: Brand: MEDLINE

## (undated) DEVICE — NEEDLE HYPO 18GA L1.5IN PNK POLYPR HUB S STL REG BVL STR

## (undated) DEVICE — 1.5L THIN WALL CAN: Brand: CRD

## (undated) DEVICE — ELECTRODE PT RET AD L9FT HI MOIST COND ADH HYDRGEL CORDED

## (undated) DEVICE — TRAY EPI SGL DOSE 18GA NDL CUST AULTMAN HOSP

## (undated) DEVICE — DRILL SYSTEM 7

## (undated) DEVICE — GLOVE ORANGE PI 8   MSG9080

## (undated) DEVICE — NEEDLE SPNL L3.5IN PNK HUB S STL REG WALL FIT STYL W/ QNCKE

## (undated) DEVICE — GRADUATE TRIANG MEASURE 1000ML BLK PRNT

## (undated) DEVICE — EXTRAS UGOKWE

## (undated) DEVICE — SYRINGE, LUER LOCK, 5ML: Brand: MEDLINE

## (undated) DEVICE — SET PSI

## (undated) DEVICE — GAUZE,SPONGE,4"X4",16PLY,XRAY,STRL,LF: Brand: MEDLINE

## (undated) DEVICE — MANIFOLD REPROC 4 PRT NEP 1

## (undated) DEVICE — STAPLER SKIN L39MM DIA0.53MM CRWN 5.7MM S STL FIX HD PROX

## (undated) DEVICE — KIT PLT RATIO DISPNS KT 2IN CANN TIP SPRY TIP DISP MAGELLAN

## (undated) DEVICE — PACK PROCEDURE SURG GEN CUST

## (undated) DEVICE — DOUBLE BASIN SET: Brand: MEDLINE INDUSTRIES, INC.

## (undated) DEVICE — PRECISION THIN (7.0 X 0.38 X 29.5MM)

## (undated) DEVICE — Device

## (undated) DEVICE — LABEL MED 4 IN SURG PANEL W/ PEN STRL

## (undated) DEVICE — THE MILL DISPOSABLE - MEDIUM

## (undated) DEVICE — GLOVE ORANGE PI 8 1/2   MSG9085

## (undated) DEVICE — SPHERE EYE 1 MRK GUIDANCE PASS STEALTHSTATION 1PK/EA

## (undated) DEVICE — JACKSON TABLE POSITIONER KIT: Brand: MEDLINE INDUSTRIES, INC.

## (undated) DEVICE — SHEET,DRAPE,40X58,STERILE: Brand: MEDLINE

## (undated) DEVICE — BASIC SINGLE BASIN 1-LF: Brand: MEDLINE INDUSTRIES, INC.

## (undated) DEVICE — SYRINGE 20ML LL S/C 50

## (undated) DEVICE — SPLINT OCL 2 PLSTR SPLNTING SYS 15 LAYR 4INX 20FT

## (undated) DEVICE — DRAPE CARM MINI FOR IMAG SYS INSIGHT FLROSCN

## (undated) DEVICE — TOWEL,OR,DSP,ST,BLUE,STD,6/PK,12PK/CS: Brand: MEDLINE

## (undated) DEVICE — TOWEL,OR,DSP,ST,BLUE,DLX,10/PK,8PK/CS: Brand: MEDLINE

## (undated) DEVICE — BAG TRNSF AUTOLGS SUCT AND ANTICOAG LN AUTOLOG

## (undated) DEVICE — KIT EVAC 400CC DIA1/8IN H PAT 12.5IN 3 SPR RND SHP PVC DRN

## (undated) DEVICE — PUNCH TONSIL / ADENOID

## (undated) DEVICE — GLOVE SURG SZ 85 STD WHT LTX SYN POLYMER BEAD REINF ANTI RL

## (undated) DEVICE — SHEET, T, LAPAROTOMY, STERILE: Brand: MEDLINE

## (undated) DEVICE — DRESSING ADH N ADH 8X35 IN 6X175 IN SFT CLTH MEDIPORE +

## (undated) DEVICE — GOWN,SIRUS,FABRNF,L,20/CS: Brand: MEDLINE

## (undated) DEVICE — SET SPINAL NEURO STNEU1

## (undated) DEVICE — Z DUP USE 2257490 ADHESIVE SKIN CLSRE 036ML TPCL 2CTL CNCRLTE HIGH VSCSTY DRMB

## (undated) DEVICE — BNDG,ELSTC,MATRIX,STRL,4"X5YD,LF,HOOK&LP: Brand: MEDLINE

## (undated) DEVICE — HYPODERMIC SAFETY NEEDLE: Brand: MAGELLAN

## (undated) DEVICE — CVD CANNULA

## (undated) DEVICE — CODMAN® SURGICAL PATTIES 1/2" X 1" (1.27CM X 2.54CM): Brand: CODMAN®

## (undated) DEVICE — STOCKINETTE,DOUBLE PLY,6X48,STERILE: Brand: MEDLINE

## (undated) DEVICE — DRESSING ALG W2XL5IN ANTIMIC WND JUMPSTART

## (undated) DEVICE — CONTROL SYRINGE LUER-LOCK TIP: Brand: MONOJECT

## (undated) DEVICE — 3M™ MEDIPORE™ + PAD 3564: Brand: 3M™ MEDIPORE™

## (undated) DEVICE — GRADUATE

## (undated) DEVICE — THIN OFFSET W/8.0MM STOP (9.0 X 0.38 X 27.0MM)

## (undated) DEVICE — COAGULATOR SUCT 10FR LAIN FTSWCH ACTIVATION DISP VALLEYLAB

## (undated) DEVICE — PACK,UNIVERSAL,NO GOWNS: Brand: MEDLINE

## (undated) DEVICE — DRAPE C ARM UNIV W41XL74IN CLR PLAS XR VELC CLSR POLY STRP

## (undated) DEVICE — STERILE HOOK LOCK LATEX FREE ELASTIC BANDAGE 4INX5YD: Brand: HOOK LOCK™

## (undated) DEVICE — LOCATOR RAD PASS SPHR MRK NAVIGATION BALL DISP STLTH STN

## (undated) DEVICE — DRESSING PETRO W3XL8IN OIL EMUL N ADH GZ KNIT IMPREG CELOS

## (undated) DEVICE — COVER,TABLE,60X90,STERILE: Brand: MEDLINE

## (undated) DEVICE — ELECTRODE SURG MPLR NEUT SELF ADH PT PLT MULTIGEN

## (undated) DEVICE — ELECTRODE ELECSURG NDL 2.8 INX7.2 CM COAT INSUL EDGE

## (undated) DEVICE — 3.2MM X 18.3MM METAL CUTTING HELIOCOIDAL RASP

## (undated) DEVICE — BLADE CLIPPER GEN PURP NS

## (undated) DEVICE — LIGHT SOURCE WHT

## (undated) DEVICE — KIT INSTR DRL GUID 1.6/1.35MM GUIDWIRE DISP FIBERTAK DX

## (undated) DEVICE — GLOVE SURG 8.5 PF POLYMER WHT STRL SIGN LTX ESSENTIAL LTX

## (undated) DEVICE — TOTAL TRAY, DB, 100% SILI FOLEY, 16FR 10: Brand: MEDLINE

## (undated) DEVICE — DRILL SURG DIA7MM CANN

## (undated) DEVICE — ELECTROSURGICAL PENCIL BUTTON SWITCH E-Z CLEAN COATED BLADE ELECTRODE 10 FT (3 M) CORD HOLSTER: Brand: MEGADYNE

## (undated) DEVICE — SURGICAL PROCEDURE PACK LAMINECTOMY LUMBAR

## (undated) DEVICE — PADDING UNDERCAST W6INXL4YD WYTEX 6 PER BG

## (undated) DEVICE — SPONGE GZ W4XL4IN RAYON POLY FILL CVR W/ NONWOVEN FAB

## (undated) DEVICE — 1000 S-DRAPE TOWEL DRAPE 10/BX: Brand: STERI-DRAPE™

## (undated) DEVICE — 5.0MM PRECISION ROUND

## (undated) DEVICE — SET T AND A PASCOLINI

## (undated) DEVICE — BANDAGE,GAUZE,BULKEE II,4.5"X4.1YD,STRL: Brand: MEDLINE

## (undated) DEVICE — RESERVOIR CARDOTMY C4L HARDSHELL W/ 40UM FLTR EL SER 4 PRT

## (undated) DEVICE — 3M(TM) MEDIPORE(TM) +PAD SOFT CLOTH ADHESIVE WOUND DRESSING 3569: Brand: 3M™ MEDIPORE™

## (undated) DEVICE — ADHESIVE SKIN CLOSURE TOP 36 CC HI VISC DERMBND MINI

## (undated) DEVICE — SOLUTION IV IRRIG WATER 1000ML POUR BRL 2F7114